# Patient Record
Sex: MALE | Race: WHITE | NOT HISPANIC OR LATINO
[De-identification: names, ages, dates, MRNs, and addresses within clinical notes are randomized per-mention and may not be internally consistent; named-entity substitution may affect disease eponyms.]

---

## 2017-01-11 ENCOUNTER — FORM ENCOUNTER (OUTPATIENT)
Age: 67
End: 2017-01-11

## 2017-01-11 ENCOUNTER — OUTPATIENT (OUTPATIENT)
Dept: OUTPATIENT SERVICES | Facility: HOSPITAL | Age: 67
LOS: 1 days | End: 2017-01-11
Payer: MEDICARE

## 2017-01-11 VITALS
TEMPERATURE: 99 F | WEIGHT: 196.21 LBS | OXYGEN SATURATION: 99 % | DIASTOLIC BLOOD PRESSURE: 80 MMHG | SYSTOLIC BLOOD PRESSURE: 147 MMHG | HEIGHT: 71 IN | RESPIRATION RATE: 16 BRPM | HEART RATE: 96 BPM

## 2017-01-11 DIAGNOSIS — Z98.890 OTHER SPECIFIED POSTPROCEDURAL STATES: Chronic | ICD-10-CM

## 2017-01-11 DIAGNOSIS — K31.9 DISEASE OF STOMACH AND DUODENUM, UNSPECIFIED: ICD-10-CM

## 2017-01-11 DIAGNOSIS — Z01.818 ENCOUNTER FOR OTHER PREPROCEDURAL EXAMINATION: ICD-10-CM

## 2017-01-11 DIAGNOSIS — E11.9 TYPE 2 DIABETES MELLITUS WITHOUT COMPLICATIONS: ICD-10-CM

## 2017-01-11 RX ORDER — CEFAZOLIN SODIUM 1 G
2000 VIAL (EA) INJECTION ONCE
Qty: 0 | Refills: 0 | Status: DISCONTINUED | OUTPATIENT
Start: 2017-01-12 | End: 2017-01-27

## 2017-01-11 NOTE — H&P PST ADULT - PMH
Essential hypertension    Gastric mass    Hypothyroidism    Type 2 diabetes mellitus without complication, unspecified long term insulin use status  no BS monitoring Essential hypertension  was on losartan  Gastric mass  ulcerated mass in gastric cardia with small perigastric nodes on endoscopy.  Hypothyroidism    Iron deficiency anemia, unspecified iron deficiency anemia type    Type 2 diabetes mellitus without complication, unspecified long term insulin use status  no BS monitoring

## 2017-01-11 NOTE — H&P PST ADULT - NEGATIVE GASTROINTESTINAL SYMPTOMS
no change in bowel habits/no nausea/no vomiting no nausea/no melena/no vomiting/no abdominal pain/no change in bowel habits

## 2017-01-11 NOTE — H&P PST ADULT - GASTROINTESTINAL DETAILS
no distention/nontender/no guarding/no rebound tenderness/no masses palpable/soft/bowel sounds normal

## 2017-01-11 NOTE — H&P PST ADULT - HISTORY OF PRESENT ILLNESS
66 year old male with PMH of HTN, Hypothyroidism, DM2 with iron deficiency anemia found to have ulcerated mass in the gastric cardia on endoscopy planned for Laparoscopy  abdominal washing, insertion of Mediport.

## 2017-01-11 NOTE — H&P PST ADULT - NSANTHOSAYNRD_GEN_A_CORE
No. SIMONE screening performed.  STOP BANG Legend: 0-2 = LOW Risk; 3-4 = INTERMEDIATE Risk; 5-8 = HIGH Risk

## 2017-01-11 NOTE — H&P PST ADULT - VENOUS THROMBOEMBOLISM CURRENT STATUS
major surgery, including arthroscopic and laparoscopic (greater than 1 hr)/present cancer or chemotherapy

## 2017-01-11 NOTE — H&P PST ADULT - PROBLEM SELECTOR PLAN 1
Laparoscopy  abdominal washing, insertion of Mediport.  Recent Labs to [facility] from PCP  Preprocedure surgical scrub instructions discussed

## 2017-01-12 ENCOUNTER — APPOINTMENT (OUTPATIENT)
Dept: SURGICAL ONCOLOGY | Facility: HOSPITAL | Age: 67
End: 2017-01-12

## 2017-01-12 ENCOUNTER — OUTPATIENT (OUTPATIENT)
Dept: OUTPATIENT SERVICES | Facility: HOSPITAL | Age: 67
LOS: 1 days | End: 2017-01-12
Payer: MEDICARE

## 2017-01-12 VITALS
SYSTOLIC BLOOD PRESSURE: 106 MMHG | TEMPERATURE: 97 F | HEART RATE: 83 BPM | RESPIRATION RATE: 18 BRPM | DIASTOLIC BLOOD PRESSURE: 62 MMHG | OXYGEN SATURATION: 96 %

## 2017-01-12 VITALS
WEIGHT: 196.21 LBS | OXYGEN SATURATION: 98 % | TEMPERATURE: 99 F | HEIGHT: 71 IN | RESPIRATION RATE: 20 BRPM | DIASTOLIC BLOOD PRESSURE: 78 MMHG | HEART RATE: 86 BPM | SYSTOLIC BLOOD PRESSURE: 150 MMHG

## 2017-01-12 DIAGNOSIS — K31.9 DISEASE OF STOMACH AND DUODENUM, UNSPECIFIED: ICD-10-CM

## 2017-01-12 DIAGNOSIS — Z98.890 OTHER SPECIFIED POSTPROCEDURAL STATES: Chronic | ICD-10-CM

## 2017-01-12 PROCEDURE — C1788: CPT

## 2017-01-12 PROCEDURE — 71010: CPT | Mod: 26

## 2017-01-12 PROCEDURE — 76000 FLUOROSCOPY <1 HR PHYS/QHP: CPT

## 2017-01-12 PROCEDURE — 88112 CYTOPATH CELL ENHANCE TECH: CPT | Mod: 26

## 2017-01-12 PROCEDURE — 49320 DIAG LAPARO SEPARATE PROC: CPT

## 2017-01-12 PROCEDURE — 71045 X-RAY EXAM CHEST 1 VIEW: CPT

## 2017-01-12 PROCEDURE — 88112 CYTOPATH CELL ENHANCE TECH: CPT

## 2017-01-12 PROCEDURE — 36561 INSERT TUNNELED CV CATH: CPT

## 2017-01-12 PROCEDURE — 49320 DIAG LAPARO SEPARATE PROC: CPT | Mod: 59

## 2017-01-12 RX ORDER — SODIUM CHLORIDE 9 MG/ML
3 INJECTION INTRAMUSCULAR; INTRAVENOUS; SUBCUTANEOUS EVERY 8 HOURS
Qty: 0 | Refills: 0 | Status: DISCONTINUED | OUTPATIENT
Start: 2017-01-12 | End: 2017-01-12

## 2017-01-12 RX ORDER — ONDANSETRON 8 MG/1
4 TABLET, FILM COATED ORAL ONCE
Qty: 0 | Refills: 0 | Status: DISCONTINUED | OUTPATIENT
Start: 2017-01-12 | End: 2017-01-12

## 2017-01-12 RX ORDER — HYDROMORPHONE HYDROCHLORIDE 2 MG/ML
0.5 INJECTION INTRAMUSCULAR; INTRAVENOUS; SUBCUTANEOUS
Qty: 0 | Refills: 0 | Status: DISCONTINUED | OUTPATIENT
Start: 2017-01-12 | End: 2017-01-12

## 2017-01-12 NOTE — ASU DISCHARGE PLAN (ADULT/PEDIATRIC). - MEDICATION SUMMARY - MEDICATIONS TO TAKE
I will START or STAY ON the medications listed below when I get home from the hospital:    metFORMIN 500 mg oral tablet, extended release  -- 1 tab(s) by mouth once a day  -- Indication: For Diabetes    pravastatin 20 mg oral tablet  -- 1 tab(s) by mouth once a day  -- Indication: For Hyperlipidemia    Ambien 10 mg oral tablet  -- 1 tab(s) by mouth once a day (at bedtime)  -- Indication: For Sedative    ferrous sulfate 324 mg (65 mg elemental iron) oral delayed release tablet  --  by mouth 2 times a day  -- Indication: For Supplementation    omeprazole 40 mg oral delayed release capsule  -- 1 cap(s) by mouth once a day  -- Indication: For Reflux    levothyroxine 100 mcg (0.1 mg) oral tablet  -- 1 tab(s) by mouth once a day  -- Indication: For Hypothyroidism

## 2017-01-12 NOTE — ASU DISCHARGE PLAN (ADULT/PEDIATRIC). - NOTIFY
Swelling that continues/Persistent Nausea and Vomiting/Fever greater than 101/Bleeding that does not stop

## 2017-01-12 NOTE — BRIEF OPERATIVE NOTE - OPERATION/FINDINGS
Procedure: Mediport placement; laparoscopic abdominal washout    Findings: A Mediport was placed in the R subclavian vein in the upper R chest. Placement was confirmed on fluoroscopy. Skin was then closed. Attention was then turned towards the abdomen. A Veress needle was placed in the LUQ and the abdomen was insufflated. Additional trocars were placed in the midline and L quadrant. No peritoneal carcinomatosis was noted. The abdomen was washed out and then suctioned out. A sample was sent off. Skin was then closed. Procedure: Mediport placement; laparoscopic abdominal washout    Findings: A Mediport was placed in the R subclavian vein in the upper R chest. Placement was confirmed on fluoroscopy. Skin was then closed. Attention was then turned towards the abdomen. A Veress needle was placed in the LUQ and the abdomen was insufflated. Additional trocars were placed in the midline and L quadrant. No peritoneal carcinomatosis was noted. The abdomen was washed out and then suctioned out. A sample was sent off. Skin was then closed. Post-procedure CXR showed no pneumothorax.

## 2017-01-17 LAB — NON-GYNECOLOGICAL CYTOLOGY STUDY: SIGNIFICANT CHANGE UP

## 2017-01-18 ENCOUNTER — OUTPATIENT (OUTPATIENT)
Dept: OUTPATIENT SERVICES | Facility: HOSPITAL | Age: 67
LOS: 1 days | Discharge: HOME | End: 2017-01-18

## 2017-01-18 DIAGNOSIS — Z98.890 OTHER SPECIFIED POSTPROCEDURAL STATES: Chronic | ICD-10-CM

## 2017-01-18 DIAGNOSIS — C16.9 MALIGNANT NEOPLASM OF STOMACH, UNSPECIFIED: ICD-10-CM

## 2017-01-19 ENCOUNTER — RESULT REVIEW (OUTPATIENT)
Age: 67
End: 2017-01-19

## 2017-01-20 ENCOUNTER — LABORATORY RESULT (OUTPATIENT)
Age: 67
End: 2017-01-20

## 2017-01-20 ENCOUNTER — APPOINTMENT (OUTPATIENT)
Dept: INFUSION THERAPY | Facility: HOSPITAL | Age: 67
End: 2017-01-20

## 2017-01-20 LAB
BASOPHILS # BLD AUTO: 0.1 K/UL — SIGNIFICANT CHANGE UP (ref 0–0.2)
BASOPHILS NFR BLD AUTO: 0.6 % — SIGNIFICANT CHANGE UP (ref 0–2)
EOSINOPHIL # BLD AUTO: 0.3 K/UL — SIGNIFICANT CHANGE UP (ref 0–0.5)
EOSINOPHIL NFR BLD AUTO: 2.1 % — SIGNIFICANT CHANGE UP (ref 0–6)
HCT VFR BLD CALC: 32.8 % — LOW (ref 39–50)
HGB BLD-MCNC: 10.3 G/DL — LOW (ref 13–17)
LYMPHOCYTES # BLD AUTO: 18.8 % — SIGNIFICANT CHANGE UP (ref 13–44)
LYMPHOCYTES # BLD AUTO: 2.6 K/UL — SIGNIFICANT CHANGE UP (ref 1–3.3)
MCHC RBC-ENTMCNC: 24.7 PG — LOW (ref 27–34)
MCHC RBC-ENTMCNC: 31.5 GM/DL — LOW (ref 32–36)
MCV RBC AUTO: 78.3 FL — LOW (ref 80–100)
MONOCYTES # BLD AUTO: 0.7 K/UL — SIGNIFICANT CHANGE UP (ref 0–0.9)
MONOCYTES NFR BLD AUTO: 5.5 % — SIGNIFICANT CHANGE UP (ref 2–14)
NEUTROPHILS # BLD AUTO: 9.9 K/UL — HIGH (ref 1.8–7.4)
NEUTROPHILS NFR BLD AUTO: 73 % — SIGNIFICANT CHANGE UP (ref 43–77)
PLATELET # BLD AUTO: 417 K/UL — HIGH (ref 150–400)
RBC # BLD: 4.2 M/UL — SIGNIFICANT CHANGE UP (ref 4.2–5.8)
RBC # FLD: 14.7 % — HIGH (ref 10.3–14.5)
WBC # BLD: 13.6 K/UL — HIGH (ref 3.8–10.5)
WBC # FLD AUTO: 13.6 K/UL — HIGH (ref 3.8–10.5)

## 2017-01-23 DIAGNOSIS — R11.2 NAUSEA WITH VOMITING, UNSPECIFIED: ICD-10-CM

## 2017-01-23 DIAGNOSIS — Z51.11 ENCOUNTER FOR ANTINEOPLASTIC CHEMOTHERAPY: ICD-10-CM

## 2017-01-24 ENCOUNTER — APPOINTMENT (OUTPATIENT)
Dept: MRI IMAGING | Facility: IMAGING CENTER | Age: 67
End: 2017-01-24

## 2017-01-24 ENCOUNTER — APPOINTMENT (OUTPATIENT)
Dept: SURGICAL ONCOLOGY | Facility: CLINIC | Age: 67
End: 2017-01-24

## 2017-01-24 VITALS
HEART RATE: 83 BPM | DIASTOLIC BLOOD PRESSURE: 79 MMHG | RESPIRATION RATE: 17 BRPM | BODY MASS INDEX: 26.32 KG/M2 | HEIGHT: 71 IN | WEIGHT: 188 LBS | SYSTOLIC BLOOD PRESSURE: 120 MMHG

## 2017-01-30 PROBLEM — D50.9 IRON DEFICIENCY ANEMIA, UNSPECIFIED: Chronic | Status: ACTIVE | Noted: 2017-01-11

## 2017-01-30 PROBLEM — E03.9 HYPOTHYROIDISM, UNSPECIFIED: Chronic | Status: ACTIVE | Noted: 2017-01-11

## 2017-01-31 ENCOUNTER — RESULT REVIEW (OUTPATIENT)
Age: 67
End: 2017-01-31

## 2017-02-01 ENCOUNTER — APPOINTMENT (OUTPATIENT)
Dept: HEMATOLOGY ONCOLOGY | Facility: CLINIC | Age: 67
End: 2017-02-01

## 2017-02-01 VITALS
OXYGEN SATURATION: 90 % | WEIGHT: 190.48 LBS | SYSTOLIC BLOOD PRESSURE: 129 MMHG | TEMPERATURE: 100.3 F | DIASTOLIC BLOOD PRESSURE: 75 MMHG | BODY MASS INDEX: 26.57 KG/M2 | HEART RATE: 125 BPM

## 2017-02-01 LAB
BASOPHILS # BLD AUTO: 0.1 K/UL — SIGNIFICANT CHANGE UP (ref 0–0.2)
BASOPHILS NFR BLD AUTO: 0.5 % — SIGNIFICANT CHANGE UP (ref 0–2)
EOSINOPHIL # BLD AUTO: 0.1 K/UL — SIGNIFICANT CHANGE UP (ref 0–0.5)
EOSINOPHIL NFR BLD AUTO: 1.2 % — SIGNIFICANT CHANGE UP (ref 0–6)
HCT VFR BLD CALC: 33.2 % — LOW (ref 39–50)
HGB BLD-MCNC: 10.5 G/DL — LOW (ref 13–17)
LYMPHOCYTES # BLD AUTO: 1.9 K/UL — SIGNIFICANT CHANGE UP (ref 1–3.3)
LYMPHOCYTES # BLD AUTO: 15.3 % — SIGNIFICANT CHANGE UP (ref 13–44)
MCHC RBC-ENTMCNC: 24.7 PG — LOW (ref 27–34)
MCHC RBC-ENTMCNC: 31.5 GM/DL — LOW (ref 32–36)
MCV RBC AUTO: 78.5 FL — LOW (ref 80–100)
MONOCYTES # BLD AUTO: 0.7 K/UL — SIGNIFICANT CHANGE UP (ref 0–0.9)
MONOCYTES NFR BLD AUTO: 5.7 % — SIGNIFICANT CHANGE UP (ref 2–14)
NEUTROPHILS # BLD AUTO: 9.5 K/UL — HIGH (ref 1.8–7.4)
NEUTROPHILS NFR BLD AUTO: 77.3 % — HIGH (ref 43–77)
PLATELET # BLD AUTO: 339 K/UL — SIGNIFICANT CHANGE UP (ref 150–400)
RBC # BLD: 4.23 M/UL — SIGNIFICANT CHANGE UP (ref 4.2–5.8)
RBC # FLD: 15.6 % — HIGH (ref 10.3–14.5)
WBC # BLD: 12.3 K/UL — HIGH (ref 3.8–10.5)
WBC # FLD AUTO: 12.3 K/UL — HIGH (ref 3.8–10.5)

## 2017-02-02 ENCOUNTER — RESULT REVIEW (OUTPATIENT)
Age: 67
End: 2017-02-02

## 2017-02-03 ENCOUNTER — APPOINTMENT (OUTPATIENT)
Dept: INFUSION THERAPY | Facility: HOSPITAL | Age: 67
End: 2017-02-03

## 2017-02-03 LAB
BASOPHILS # BLD AUTO: 0.1 K/UL — SIGNIFICANT CHANGE UP (ref 0–0.2)
BASOPHILS NFR BLD AUTO: 0.6 % — SIGNIFICANT CHANGE UP (ref 0–2)
EOSINOPHIL # BLD AUTO: 0 K/UL — SIGNIFICANT CHANGE UP (ref 0–0.5)
EOSINOPHIL NFR BLD AUTO: 0 % — SIGNIFICANT CHANGE UP (ref 0–6)
HCT VFR BLD CALC: 34.6 % — LOW (ref 39–50)
HGB BLD-MCNC: 10.4 G/DL — LOW (ref 13–17)
LYMPHOCYTES # BLD AUTO: 19.1 % — SIGNIFICANT CHANGE UP (ref 13–44)
LYMPHOCYTES # BLD AUTO: 2.1 K/UL — SIGNIFICANT CHANGE UP (ref 1–3.3)
MCHC RBC-ENTMCNC: 23.7 PG — LOW (ref 27–34)
MCHC RBC-ENTMCNC: 30 GM/DL — LOW (ref 32–36)
MCV RBC AUTO: 78.9 FL — LOW (ref 80–100)
MONOCYTES # BLD AUTO: 0.8 K/UL — SIGNIFICANT CHANGE UP (ref 0–0.9)
MONOCYTES NFR BLD AUTO: 7 % — SIGNIFICANT CHANGE UP (ref 2–14)
NEUTROPHILS # BLD AUTO: 8.2 K/UL — HIGH (ref 1.8–7.4)
NEUTROPHILS NFR BLD AUTO: 73.2 % — SIGNIFICANT CHANGE UP (ref 43–77)
PLATELET # BLD AUTO: 286 K/UL — SIGNIFICANT CHANGE UP (ref 150–400)
RBC # BLD: 4.39 M/UL — SIGNIFICANT CHANGE UP (ref 4.2–5.8)
RBC # FLD: 16.2 % — HIGH (ref 10.3–14.5)
WBC # BLD: 11.1 K/UL — HIGH (ref 3.8–10.5)
WBC # FLD AUTO: 11.1 K/UL — HIGH (ref 3.8–10.5)

## 2017-02-14 ENCOUNTER — RESULT REVIEW (OUTPATIENT)
Age: 67
End: 2017-02-14

## 2017-02-15 ENCOUNTER — LABORATORY RESULT (OUTPATIENT)
Age: 67
End: 2017-02-15

## 2017-02-15 ENCOUNTER — OUTPATIENT (OUTPATIENT)
Dept: OUTPATIENT SERVICES | Facility: HOSPITAL | Age: 67
LOS: 1 days | Discharge: HOME | End: 2017-02-15

## 2017-02-15 ENCOUNTER — APPOINTMENT (OUTPATIENT)
Dept: HEMATOLOGY ONCOLOGY | Facility: CLINIC | Age: 67
End: 2017-02-15

## 2017-02-15 DIAGNOSIS — C16.9 MALIGNANT NEOPLASM OF STOMACH, UNSPECIFIED: ICD-10-CM

## 2017-02-15 DIAGNOSIS — Z98.890 OTHER SPECIFIED POSTPROCEDURAL STATES: Chronic | ICD-10-CM

## 2017-02-15 LAB
BASOPHILS # BLD AUTO: 0.1 K/UL — SIGNIFICANT CHANGE UP (ref 0–0.2)
BASOPHILS NFR BLD AUTO: 0.7 % — SIGNIFICANT CHANGE UP (ref 0–2)
EOSINOPHIL # BLD AUTO: 0.1 K/UL — SIGNIFICANT CHANGE UP (ref 0–0.5)
EOSINOPHIL NFR BLD AUTO: 1.2 % — SIGNIFICANT CHANGE UP (ref 0–6)
HCT VFR BLD CALC: 33.1 % — LOW (ref 39–50)
HGB BLD-MCNC: 10.6 G/DL — LOW (ref 13–17)
LYMPHOCYTES # BLD AUTO: 1.9 K/UL — SIGNIFICANT CHANGE UP (ref 1–3.3)
LYMPHOCYTES # BLD AUTO: 20.7 % — SIGNIFICANT CHANGE UP (ref 13–44)
MCHC RBC-ENTMCNC: 25 PG — LOW (ref 27–34)
MCHC RBC-ENTMCNC: 31.9 G/DL — LOW (ref 32–36)
MCV RBC AUTO: 78.3 FL — LOW (ref 80–100)
MONOCYTES # BLD AUTO: 0.6 K/UL — SIGNIFICANT CHANGE UP (ref 0–0.9)
MONOCYTES NFR BLD AUTO: 6.7 % — SIGNIFICANT CHANGE UP (ref 2–14)
NEUTROPHILS # BLD AUTO: 6.6 K/UL — SIGNIFICANT CHANGE UP (ref 1.8–7.4)
NEUTROPHILS NFR BLD AUTO: 70.8 % — SIGNIFICANT CHANGE UP (ref 43–77)
PLATELET # BLD AUTO: 196 K/UL — SIGNIFICANT CHANGE UP (ref 150–400)
RBC # BLD: 4.22 M/UL — SIGNIFICANT CHANGE UP (ref 4.2–5.8)
RBC # FLD: 17.2 % — HIGH (ref 10.3–14.5)
WBC # BLD: 9.3 K/UL — SIGNIFICANT CHANGE UP (ref 3.8–10.5)
WBC # FLD AUTO: 9.3 K/UL — SIGNIFICANT CHANGE UP (ref 3.8–10.5)

## 2017-02-17 ENCOUNTER — APPOINTMENT (OUTPATIENT)
Dept: INFUSION THERAPY | Facility: HOSPITAL | Age: 67
End: 2017-02-17

## 2017-02-21 DIAGNOSIS — R11.2 NAUSEA WITH VOMITING, UNSPECIFIED: ICD-10-CM

## 2017-02-21 DIAGNOSIS — Z51.11 ENCOUNTER FOR ANTINEOPLASTIC CHEMOTHERAPY: ICD-10-CM

## 2017-03-02 ENCOUNTER — RESULT REVIEW (OUTPATIENT)
Age: 67
End: 2017-03-02

## 2017-03-03 ENCOUNTER — APPOINTMENT (OUTPATIENT)
Dept: INFUSION THERAPY | Facility: HOSPITAL | Age: 67
End: 2017-03-03

## 2017-03-03 ENCOUNTER — APPOINTMENT (OUTPATIENT)
Dept: HEMATOLOGY ONCOLOGY | Facility: CLINIC | Age: 67
End: 2017-03-03

## 2017-03-03 VITALS
WEIGHT: 191.8 LBS | SYSTOLIC BLOOD PRESSURE: 150 MMHG | DIASTOLIC BLOOD PRESSURE: 100 MMHG | BODY MASS INDEX: 26.75 KG/M2 | TEMPERATURE: 98.8 F | OXYGEN SATURATION: 99 % | HEART RATE: 76 BPM | RESPIRATION RATE: 16 BRPM

## 2017-03-03 LAB
BASOPHILS # BLD AUTO: 0.1 K/UL — SIGNIFICANT CHANGE UP (ref 0–0.2)
BASOPHILS NFR BLD AUTO: 0.9 % — SIGNIFICANT CHANGE UP (ref 0–2)
EOSINOPHIL # BLD AUTO: 0.1 K/UL — SIGNIFICANT CHANGE UP (ref 0–0.5)
EOSINOPHIL NFR BLD AUTO: 1.3 % — SIGNIFICANT CHANGE UP (ref 0–6)
HCT VFR BLD CALC: 34.1 % — LOW (ref 39–50)
HGB BLD-MCNC: 10.9 G/DL — LOW (ref 13–17)
LYMPHOCYTES # BLD AUTO: 2.3 K/UL — SIGNIFICANT CHANGE UP (ref 1–3.3)
LYMPHOCYTES # BLD AUTO: 34.5 % — SIGNIFICANT CHANGE UP (ref 13–44)
MCHC RBC-ENTMCNC: 26 PG — LOW (ref 27–34)
MCHC RBC-ENTMCNC: 32.1 G/DL — SIGNIFICANT CHANGE UP (ref 32–36)
MCV RBC AUTO: 80.9 FL — SIGNIFICANT CHANGE UP (ref 80–100)
MONOCYTES # BLD AUTO: 0.5 K/UL — SIGNIFICANT CHANGE UP (ref 0–0.9)
MONOCYTES NFR BLD AUTO: 7.3 % — SIGNIFICANT CHANGE UP (ref 2–14)
NEUTROPHILS # BLD AUTO: 3.7 K/UL — SIGNIFICANT CHANGE UP (ref 1.8–7.4)
NEUTROPHILS NFR BLD AUTO: 56 % — SIGNIFICANT CHANGE UP (ref 43–77)
PLATELET # BLD AUTO: 150 K/UL — SIGNIFICANT CHANGE UP (ref 150–400)
RBC # BLD: 4.21 M/UL — SIGNIFICANT CHANGE UP (ref 4.2–5.8)
RBC # FLD: 21.7 % — HIGH (ref 10.3–14.5)
WBC # BLD: 6.6 K/UL — SIGNIFICANT CHANGE UP (ref 3.8–10.5)
WBC # FLD AUTO: 6.6 K/UL — SIGNIFICANT CHANGE UP (ref 3.8–10.5)

## 2017-03-09 ENCOUNTER — OTHER (OUTPATIENT)
Age: 67
End: 2017-03-09

## 2017-03-16 ENCOUNTER — RESULT REVIEW (OUTPATIENT)
Age: 67
End: 2017-03-16

## 2017-03-16 ENCOUNTER — OUTPATIENT (OUTPATIENT)
Dept: OUTPATIENT SERVICES | Facility: HOSPITAL | Age: 67
LOS: 1 days | Discharge: HOME | End: 2017-03-16

## 2017-03-16 DIAGNOSIS — C16.9 MALIGNANT NEOPLASM OF STOMACH, UNSPECIFIED: ICD-10-CM

## 2017-03-16 DIAGNOSIS — Z98.890 OTHER SPECIFIED POSTPROCEDURAL STATES: Chronic | ICD-10-CM

## 2017-03-17 ENCOUNTER — APPOINTMENT (OUTPATIENT)
Dept: INFUSION THERAPY | Facility: HOSPITAL | Age: 67
End: 2017-03-17

## 2017-03-17 ENCOUNTER — LABORATORY RESULT (OUTPATIENT)
Age: 67
End: 2017-03-17

## 2017-03-17 LAB
BASOPHILS # BLD AUTO: 0.1 K/UL — SIGNIFICANT CHANGE UP (ref 0–0.2)
BASOPHILS NFR BLD AUTO: 1.4 % — SIGNIFICANT CHANGE UP (ref 0–2)
EOSINOPHIL # BLD AUTO: 0.1 K/UL — SIGNIFICANT CHANGE UP (ref 0–0.5)
EOSINOPHIL NFR BLD AUTO: 1.2 % — SIGNIFICANT CHANGE UP (ref 0–6)
HCT VFR BLD CALC: 35.7 % — LOW (ref 39–50)
HGB BLD-MCNC: 11.5 G/DL — LOW (ref 13–17)
LYMPHOCYTES # BLD AUTO: 2.2 K/UL — SIGNIFICANT CHANGE UP (ref 1–3.3)
LYMPHOCYTES # BLD AUTO: 31.9 % — SIGNIFICANT CHANGE UP (ref 13–44)
MCHC RBC-ENTMCNC: 27.2 PG — SIGNIFICANT CHANGE UP (ref 27–34)
MCHC RBC-ENTMCNC: 32.4 G/DL — SIGNIFICANT CHANGE UP (ref 32–36)
MCV RBC AUTO: 83.9 FL — SIGNIFICANT CHANGE UP (ref 80–100)
MONOCYTES # BLD AUTO: 0.5 K/UL — SIGNIFICANT CHANGE UP (ref 0–0.9)
MONOCYTES NFR BLD AUTO: 7.9 % — SIGNIFICANT CHANGE UP (ref 2–14)
NEUTROPHILS # BLD AUTO: 3.9 K/UL — SIGNIFICANT CHANGE UP (ref 1.8–7.4)
NEUTROPHILS NFR BLD AUTO: 57.6 % — SIGNIFICANT CHANGE UP (ref 43–77)
PLATELET # BLD AUTO: 153 K/UL — SIGNIFICANT CHANGE UP (ref 150–400)
RBC # BLD: 4.25 M/UL — SIGNIFICANT CHANGE UP (ref 4.2–5.8)
RBC # FLD: 23.4 % — HIGH (ref 10.3–14.5)
WBC # BLD: 6.8 K/UL — SIGNIFICANT CHANGE UP (ref 3.8–10.5)
WBC # FLD AUTO: 6.8 K/UL — SIGNIFICANT CHANGE UP (ref 3.8–10.5)

## 2017-03-20 DIAGNOSIS — Z51.11 ENCOUNTER FOR ANTINEOPLASTIC CHEMOTHERAPY: ICD-10-CM

## 2017-03-20 DIAGNOSIS — R11.2 NAUSEA WITH VOMITING, UNSPECIFIED: ICD-10-CM

## 2017-03-30 ENCOUNTER — RESULT REVIEW (OUTPATIENT)
Age: 67
End: 2017-03-30

## 2017-03-31 ENCOUNTER — LABORATORY RESULT (OUTPATIENT)
Age: 67
End: 2017-03-31

## 2017-03-31 ENCOUNTER — APPOINTMENT (OUTPATIENT)
Dept: INFUSION THERAPY | Facility: HOSPITAL | Age: 67
End: 2017-03-31

## 2017-03-31 LAB
BASOPHILS # BLD AUTO: 0.1 K/UL — SIGNIFICANT CHANGE UP (ref 0–0.2)
BASOPHILS NFR BLD AUTO: 1.3 % — SIGNIFICANT CHANGE UP (ref 0–2)
EOSINOPHIL # BLD AUTO: 0.1 K/UL — SIGNIFICANT CHANGE UP (ref 0–0.5)
EOSINOPHIL NFR BLD AUTO: 1.9 % — SIGNIFICANT CHANGE UP (ref 0–6)
HCT VFR BLD CALC: 34.3 % — LOW (ref 39–50)
HGB BLD-MCNC: 11.2 G/DL — LOW (ref 13–17)
LYMPHOCYTES # BLD AUTO: 1.9 K/UL — SIGNIFICANT CHANGE UP (ref 1–3.3)
LYMPHOCYTES # BLD AUTO: 34.4 % — SIGNIFICANT CHANGE UP (ref 13–44)
MCHC RBC-ENTMCNC: 28.7 PG — SIGNIFICANT CHANGE UP (ref 27–34)
MCHC RBC-ENTMCNC: 32.6 G/DL — SIGNIFICANT CHANGE UP (ref 32–36)
MCV RBC AUTO: 88.1 FL — SIGNIFICANT CHANGE UP (ref 80–100)
MONOCYTES # BLD AUTO: 0.6 K/UL — SIGNIFICANT CHANGE UP (ref 0–0.9)
MONOCYTES NFR BLD AUTO: 10.2 % — SIGNIFICANT CHANGE UP (ref 2–14)
NEUTROPHILS # BLD AUTO: 2.9 K/UL — SIGNIFICANT CHANGE UP (ref 1.8–7.4)
NEUTROPHILS NFR BLD AUTO: 52.3 % — SIGNIFICANT CHANGE UP (ref 43–77)
PLATELET # BLD AUTO: 123 K/UL — LOW (ref 150–400)
RBC # BLD: 3.89 M/UL — LOW (ref 4.2–5.8)
RBC # FLD: 23.4 % — HIGH (ref 10.3–14.5)
WBC # BLD: 5.6 K/UL — SIGNIFICANT CHANGE UP (ref 3.8–10.5)
WBC # FLD AUTO: 5.6 K/UL — SIGNIFICANT CHANGE UP (ref 3.8–10.5)

## 2017-04-04 ENCOUNTER — APPOINTMENT (OUTPATIENT)
Dept: INFUSION THERAPY | Facility: HOSPITAL | Age: 67
End: 2017-04-04

## 2017-04-09 ENCOUNTER — FORM ENCOUNTER (OUTPATIENT)
Age: 67
End: 2017-04-09

## 2017-04-10 ENCOUNTER — APPOINTMENT (OUTPATIENT)
Dept: CT IMAGING | Facility: IMAGING CENTER | Age: 67
End: 2017-04-10

## 2017-04-10 ENCOUNTER — OUTPATIENT (OUTPATIENT)
Dept: OUTPATIENT SERVICES | Facility: HOSPITAL | Age: 67
LOS: 1 days | End: 2017-04-10
Payer: MEDICARE

## 2017-04-10 DIAGNOSIS — Z98.890 OTHER SPECIFIED POSTPROCEDURAL STATES: Chronic | ICD-10-CM

## 2017-04-10 DIAGNOSIS — C16.9 MALIGNANT NEOPLASM OF STOMACH, UNSPECIFIED: ICD-10-CM

## 2017-04-10 PROCEDURE — 71260 CT THORAX DX C+: CPT

## 2017-04-10 PROCEDURE — 74177 CT ABD & PELVIS W/CONTRAST: CPT

## 2017-04-11 ENCOUNTER — OUTPATIENT (OUTPATIENT)
Dept: OUTPATIENT SERVICES | Facility: HOSPITAL | Age: 67
LOS: 1 days | Discharge: HOME | End: 2017-04-11

## 2017-04-11 DIAGNOSIS — Z98.890 OTHER SPECIFIED POSTPROCEDURAL STATES: Chronic | ICD-10-CM

## 2017-04-11 DIAGNOSIS — C16.9 MALIGNANT NEOPLASM OF STOMACH, UNSPECIFIED: ICD-10-CM

## 2017-04-12 ENCOUNTER — APPOINTMENT (OUTPATIENT)
Dept: HEMATOLOGY ONCOLOGY | Facility: CLINIC | Age: 67
End: 2017-04-12

## 2017-04-12 VITALS
WEIGHT: 200.62 LBS | BODY MASS INDEX: 27.98 KG/M2 | SYSTOLIC BLOOD PRESSURE: 130 MMHG | TEMPERATURE: 98.1 F | DIASTOLIC BLOOD PRESSURE: 80 MMHG | HEART RATE: 76 BPM

## 2017-04-13 ENCOUNTER — APPOINTMENT (OUTPATIENT)
Dept: HEMATOLOGY ONCOLOGY | Facility: CLINIC | Age: 67
End: 2017-04-13

## 2017-04-16 ENCOUNTER — FORM ENCOUNTER (OUTPATIENT)
Age: 67
End: 2017-04-16

## 2017-04-17 ENCOUNTER — APPOINTMENT (OUTPATIENT)
Dept: SURGICAL ONCOLOGY | Facility: CLINIC | Age: 67
End: 2017-04-17

## 2017-04-17 ENCOUNTER — OUTPATIENT (OUTPATIENT)
Dept: OUTPATIENT SERVICES | Facility: HOSPITAL | Age: 67
LOS: 1 days | End: 2017-04-17
Payer: MEDICARE

## 2017-04-17 ENCOUNTER — APPOINTMENT (OUTPATIENT)
Dept: MRI IMAGING | Facility: IMAGING CENTER | Age: 67
End: 2017-04-17

## 2017-04-17 VITALS
TEMPERATURE: 98 F | WEIGHT: 199 LBS | RESPIRATION RATE: 15 BRPM | HEIGHT: 71 IN | BODY MASS INDEX: 27.86 KG/M2 | OXYGEN SATURATION: 99 % | HEART RATE: 80 BPM | DIASTOLIC BLOOD PRESSURE: 63 MMHG | SYSTOLIC BLOOD PRESSURE: 185 MMHG

## 2017-04-17 DIAGNOSIS — C16.9 MALIGNANT NEOPLASM OF STOMACH, UNSPECIFIED: ICD-10-CM

## 2017-04-17 DIAGNOSIS — Z98.890 OTHER SPECIFIED POSTPROCEDURAL STATES: Chronic | ICD-10-CM

## 2017-04-17 PROCEDURE — 74183 MRI ABD W/O CNTR FLWD CNTR: CPT

## 2017-04-17 PROCEDURE — A9585: CPT

## 2017-05-02 ENCOUNTER — OUTPATIENT (OUTPATIENT)
Dept: OUTPATIENT SERVICES | Facility: HOSPITAL | Age: 67
LOS: 1 days | End: 2017-05-02
Payer: MEDICARE

## 2017-05-02 VITALS
RESPIRATION RATE: 16 BRPM | SYSTOLIC BLOOD PRESSURE: 150 MMHG | HEART RATE: 69 BPM | DIASTOLIC BLOOD PRESSURE: 100 MMHG | HEIGHT: 71.5 IN | TEMPERATURE: 99 F | WEIGHT: 199.96 LBS

## 2017-05-02 DIAGNOSIS — I10 ESSENTIAL (PRIMARY) HYPERTENSION: ICD-10-CM

## 2017-05-02 DIAGNOSIS — C16.9 MALIGNANT NEOPLASM OF STOMACH, UNSPECIFIED: ICD-10-CM

## 2017-05-02 DIAGNOSIS — E03.9 HYPOTHYROIDISM, UNSPECIFIED: ICD-10-CM

## 2017-05-02 DIAGNOSIS — Z95.828 PRESENCE OF OTHER VASCULAR IMPLANTS AND GRAFTS: Chronic | ICD-10-CM

## 2017-05-02 DIAGNOSIS — Z98.890 OTHER SPECIFIED POSTPROCEDURAL STATES: Chronic | ICD-10-CM

## 2017-05-02 DIAGNOSIS — E11.9 TYPE 2 DIABETES MELLITUS WITHOUT COMPLICATIONS: ICD-10-CM

## 2017-05-02 LAB
ALBUMIN SERPL ELPH-MCNC: 4.1 G/DL — SIGNIFICANT CHANGE UP (ref 3.3–5)
ALP SERPL-CCNC: 120 U/L — SIGNIFICANT CHANGE UP (ref 40–120)
ALT FLD-CCNC: 17 U/L — SIGNIFICANT CHANGE UP (ref 4–41)
AST SERPL-CCNC: 24 U/L — SIGNIFICANT CHANGE UP (ref 4–40)
BILIRUB SERPL-MCNC: 0.5 MG/DL — SIGNIFICANT CHANGE UP (ref 0.2–1.2)
BLD GP AB SCN SERPL QL: NEGATIVE — SIGNIFICANT CHANGE UP
BUN SERPL-MCNC: 11 MG/DL — SIGNIFICANT CHANGE UP (ref 7–23)
CALCIUM SERPL-MCNC: 9.7 MG/DL — SIGNIFICANT CHANGE UP (ref 8.4–10.5)
CHLORIDE SERPL-SCNC: 102 MMOL/L — SIGNIFICANT CHANGE UP (ref 98–107)
CO2 SERPL-SCNC: 24 MMOL/L — SIGNIFICANT CHANGE UP (ref 22–31)
CREAT SERPL-MCNC: 0.79 MG/DL — SIGNIFICANT CHANGE UP (ref 0.5–1.3)
GLUCOSE SERPL-MCNC: 107 MG/DL — HIGH (ref 70–99)
HBA1C BLD-MCNC: 6.5 % — HIGH (ref 4–5.6)
HCT VFR BLD CALC: 42.6 % — SIGNIFICANT CHANGE UP (ref 39–50)
HGB BLD-MCNC: 13.2 G/DL — SIGNIFICANT CHANGE UP (ref 13–17)
MCHC RBC-ENTMCNC: 30.4 PG — SIGNIFICANT CHANGE UP (ref 27–34)
MCHC RBC-ENTMCNC: 31 % — LOW (ref 32–36)
MCV RBC AUTO: 98.2 FL — SIGNIFICANT CHANGE UP (ref 80–100)
PLATELET # BLD AUTO: 125 K/UL — LOW (ref 150–400)
PMV BLD: 9.7 FL — SIGNIFICANT CHANGE UP (ref 7–13)
POTASSIUM SERPL-MCNC: 4 MMOL/L — SIGNIFICANT CHANGE UP (ref 3.5–5.3)
POTASSIUM SERPL-SCNC: 4 MMOL/L — SIGNIFICANT CHANGE UP (ref 3.5–5.3)
PROT SERPL-MCNC: 7.9 G/DL — SIGNIFICANT CHANGE UP (ref 6–8.3)
RBC # BLD: 4.34 M/UL — SIGNIFICANT CHANGE UP (ref 4.2–5.8)
RBC # FLD: 18.3 % — HIGH (ref 10.3–14.5)
RH IG SCN BLD-IMP: NEGATIVE — SIGNIFICANT CHANGE UP
SODIUM SERPL-SCNC: 141 MMOL/L — SIGNIFICANT CHANGE UP (ref 135–145)
TSH SERPL-MCNC: 1.27 UIU/ML — SIGNIFICANT CHANGE UP (ref 0.27–4.2)
WBC # BLD: 6.5 K/UL — SIGNIFICANT CHANGE UP (ref 3.8–10.5)
WBC # FLD AUTO: 6.5 K/UL — SIGNIFICANT CHANGE UP (ref 3.8–10.5)

## 2017-05-02 PROCEDURE — 93010 ELECTROCARDIOGRAM REPORT: CPT

## 2017-05-02 RX ORDER — SODIUM CHLORIDE 9 MG/ML
1000 INJECTION, SOLUTION INTRAVENOUS
Qty: 0 | Refills: 0 | Status: DISCONTINUED | OUTPATIENT
Start: 2017-05-12 | End: 2017-05-13

## 2017-05-02 RX ORDER — ZOLPIDEM TARTRATE 10 MG/1
1 TABLET ORAL
Qty: 0 | Refills: 0 | COMMUNITY

## 2017-05-02 RX ORDER — SODIUM CHLORIDE 9 MG/ML
3 INJECTION INTRAMUSCULAR; INTRAVENOUS; SUBCUTANEOUS EVERY 8 HOURS
Qty: 0 | Refills: 0 | Status: DISCONTINUED | OUTPATIENT
Start: 2017-05-12 | End: 2017-05-12

## 2017-05-02 NOTE — H&P PST ADULT - HISTORY OF PRESENT ILLNESS
65yo male with HTN, Hypothyroidism, DM II, GERD and HDL. Pt reports dark stool and surveillance Endoscopy and Colonoscopy done in 12/2016 showed abnormal results. Pt reports completed chemotherapy 3/2017. Pt presents today for presurgical evaluation for Laparotomy Proximal Gastrectomy Possible Total Gastrectomy scheduled for 5/12/2017. 65yo male with HTN, Hypothyroidism, DM II, GERD and HDL. Pt reports noticing dark stool in 12/2016 and had Endoscopy and Colonoscopy done which showed abnormal results. Pt reports completing chemotherapy in 3/2017. Pt presents today for presurgical evaluation for Laparotomy Proximal Gastrectomy Possible Total Gastrectomy scheduled for 5/12/2017.

## 2017-05-02 NOTE — H&P PST ADULT - NEGATIVE GASTROINTESTINAL SYMPTOMS
no constipation/no vomiting/no abdominal pain/no change in bowel habits/no diarrhea/no flatulence/no nausea/no melena

## 2017-05-02 NOTE — H&P PST ADULT - NEGATIVE CARDIOVASCULAR SYMPTOMS
port to right chest for chemotherapy access/no peripheral edema/no claudication/no chest pain/no orthopnea/no paroxysmal nocturnal dyspnea/no palpitations/no dyspnea on exertion

## 2017-05-02 NOTE — H&P PST ADULT - MUSCULOSKELETAL
negative detailed exam no joint erythema/no calf tenderness/ROM intact/no joint swelling/no joint warmth

## 2017-05-02 NOTE — H&P PST ADULT - FAMILY HISTORY
Mother  Still living? Unknown  Family history of ovarian cancer, Age at diagnosis: Age Unknown     Father  Still living? No  Family history of ischemic heart disease (IHD), Age at diagnosis: Age Unknown

## 2017-05-02 NOTE — H&P PST ADULT - PROBLEM SELECTOR PLAN 3
Pt instructed to take meds as prescribed.  SIMONE precaution - OR Booking notified. BP elevated 150/100 - pt reports he had stopped taking BP meds as instructed by PCP due to BP being in normal range.  Medical clearance requested - pt has appt today at 11am.  SIMONE precaution - OR Booking notified.

## 2017-05-02 NOTE — H&P PST ADULT - PMH
Essential hypertension  was on losartan  Gastric mass  ulcerated mass in gastric cardia with small perigastric nodes on endoscopy.  Hypothyroidism    Iron deficiency anemia, unspecified iron deficiency anemia type    Type 2 diabetes mellitus without complication, unspecified long term insulin use status  no BS monitoring

## 2017-05-02 NOTE — H&P PST ADULT - ATTENDING COMMENTS
planned procedure and risks d/w patient    PMH/PSH/FH/SH:   Past Medical History:  Essential hypertension  was on losartan  Gastric mass  ulcerated mass in gastric cardia with small perigastric nodes on endoscopy.  Hypothyroidism    Iron deficiency anemia, unspecified iron deficiency anemia type    Type 2 diabetes mellitus without complication, unspecified long term insulin use status  no BS monitoring.

## 2017-05-02 NOTE — H&P PST ADULT - PROBLEM SELECTOR PLAN 1
Laparotomy Proximal Gastrectomy Possible Total Gastrectomy scheduled for 5/12/2017. Laparotomy Proximal Gastrectomy Possible Total Gastrectomy scheduled for 5/12/2017.  Pre-op instructed given. Pt verbalized understanding.  Pepcid given for GI prophylaxis.  Chlorhexidine wash instructions given.  ABO ordered STAT for day of procedure.  Medical clearance requested - including copy of Stress/Echo results.

## 2017-05-02 NOTE — H&P PST ADULT - LYMPHATIC
supraclavicular L/posterior cervical R/anterior cervical R/posterior cervical L/anterior cervical L/supraclavicular R

## 2017-05-12 ENCOUNTER — APPOINTMENT (OUTPATIENT)
Dept: SURGICAL ONCOLOGY | Facility: HOSPITAL | Age: 67
End: 2017-05-12

## 2017-05-12 ENCOUNTER — RESULT REVIEW (OUTPATIENT)
Age: 67
End: 2017-05-12

## 2017-05-12 ENCOUNTER — INPATIENT (INPATIENT)
Facility: HOSPITAL | Age: 67
LOS: 13 days | Discharge: HOME | End: 2017-05-26
Attending: SPECIALIST | Admitting: SPECIALIST
Payer: MEDICARE

## 2017-05-12 VITALS
DIASTOLIC BLOOD PRESSURE: 93 MMHG | OXYGEN SATURATION: 97 % | HEIGHT: 71.5 IN | SYSTOLIC BLOOD PRESSURE: 153 MMHG | TEMPERATURE: 99 F | WEIGHT: 199.96 LBS | HEART RATE: 83 BPM | RESPIRATION RATE: 14 BRPM

## 2017-05-12 DIAGNOSIS — Z95.828 PRESENCE OF OTHER VASCULAR IMPLANTS AND GRAFTS: Chronic | ICD-10-CM

## 2017-05-12 DIAGNOSIS — C16.9 MALIGNANT NEOPLASM OF STOMACH, UNSPECIFIED: ICD-10-CM

## 2017-05-12 DIAGNOSIS — Z98.890 OTHER SPECIFIED POSTPROCEDURAL STATES: Chronic | ICD-10-CM

## 2017-05-12 LAB
BASE EXCESS BLDA CALC-SCNC: 0.1 MMOL/L — SIGNIFICANT CHANGE UP
BASE EXCESS BLDA CALC-SCNC: 0.1 MMOL/L — SIGNIFICANT CHANGE UP
BASE EXCESS BLDA CALC-SCNC: 0.2 MMOL/L — SIGNIFICANT CHANGE UP
BUN SERPL-MCNC: 9 MG/DL — SIGNIFICANT CHANGE UP (ref 7–23)
CA-I BLDA-SCNC: 1.16 MMOL/L — SIGNIFICANT CHANGE UP (ref 1.15–1.29)
CA-I BLDA-SCNC: 1.16 MMOL/L — SIGNIFICANT CHANGE UP (ref 1.15–1.29)
CA-I BLDA-SCNC: 1.2 MMOL/L — SIGNIFICANT CHANGE UP (ref 1.15–1.29)
CALCIUM SERPL-MCNC: 8.5 MG/DL — SIGNIFICANT CHANGE UP (ref 8.4–10.5)
CHLORIDE SERPL-SCNC: 101 MMOL/L — SIGNIFICANT CHANGE UP (ref 98–107)
CO2 SERPL-SCNC: 22 MMOL/L — SIGNIFICANT CHANGE UP (ref 22–31)
CREAT SERPL-MCNC: 0.79 MG/DL — SIGNIFICANT CHANGE UP (ref 0.5–1.3)
GLUCOSE BLDA-MCNC: 121 MG/DL — HIGH (ref 70–99)
GLUCOSE BLDA-MCNC: 179 MG/DL — HIGH (ref 70–99)
GLUCOSE BLDA-MCNC: 195 MG/DL — HIGH (ref 70–99)
GLUCOSE SERPL-MCNC: 201 MG/DL — HIGH (ref 70–99)
HCO3 BLDA-SCNC: 25 MMOL/L — SIGNIFICANT CHANGE UP (ref 22–26)
HCT VFR BLD CALC: 36.1 % — LOW (ref 39–50)
HCT VFR BLDA CALC: 36 % — LOW (ref 39–51)
HCT VFR BLDA CALC: 36.8 % — LOW (ref 39–51)
HCT VFR BLDA CALC: 37.3 % — LOW (ref 39–51)
HGB BLD-MCNC: 11.4 G/DL — LOW (ref 13–17)
HGB BLDA-MCNC: 11.7 G/DL — LOW (ref 13–17)
HGB BLDA-MCNC: 11.9 G/DL — LOW (ref 13–17)
HGB BLDA-MCNC: 12.1 G/DL — LOW (ref 13–17)
MAGNESIUM SERPL-MCNC: 1.6 MG/DL — SIGNIFICANT CHANGE UP (ref 1.6–2.6)
MCHC RBC-ENTMCNC: 31 PG — SIGNIFICANT CHANGE UP (ref 27–34)
MCHC RBC-ENTMCNC: 31.6 % — LOW (ref 32–36)
MCV RBC AUTO: 98.1 FL — SIGNIFICANT CHANGE UP (ref 80–100)
PCO2 BLDA: 36 MMHG — SIGNIFICANT CHANGE UP (ref 35–48)
PCO2 BLDA: 36 MMHG — SIGNIFICANT CHANGE UP (ref 35–48)
PCO2 BLDA: 37 MMHG — SIGNIFICANT CHANGE UP (ref 35–48)
PH BLDA: 7.43 PH — SIGNIFICANT CHANGE UP (ref 7.35–7.45)
PH BLDA: 7.44 PH — SIGNIFICANT CHANGE UP (ref 7.35–7.45)
PH BLDA: 7.44 PH — SIGNIFICANT CHANGE UP (ref 7.35–7.45)
PHOSPHATE SERPL-MCNC: 3.8 MG/DL — SIGNIFICANT CHANGE UP (ref 2.5–4.5)
PLATELET # BLD AUTO: 130 K/UL — LOW (ref 150–400)
PMV BLD: 10.6 FL — SIGNIFICANT CHANGE UP (ref 7–13)
PO2 BLDA: 145 MMHG — HIGH (ref 83–108)
PO2 BLDA: 295 MMHG — HIGH (ref 83–108)
PO2 BLDA: 297 MMHG — HIGH (ref 83–108)
POTASSIUM BLDA-SCNC: 3.6 MMOL/L — SIGNIFICANT CHANGE UP (ref 3.4–4.5)
POTASSIUM BLDA-SCNC: 3.6 MMOL/L — SIGNIFICANT CHANGE UP (ref 3.4–4.5)
POTASSIUM BLDA-SCNC: 3.7 MMOL/L — SIGNIFICANT CHANGE UP (ref 3.4–4.5)
POTASSIUM SERPL-MCNC: 3.7 MMOL/L — SIGNIFICANT CHANGE UP (ref 3.5–5.3)
POTASSIUM SERPL-SCNC: 3.7 MMOL/L — SIGNIFICANT CHANGE UP (ref 3.5–5.3)
RBC # BLD: 3.68 M/UL — LOW (ref 4.2–5.8)
RBC # FLD: 15.8 % — HIGH (ref 10.3–14.5)
RH IG SCN BLD-IMP: NEGATIVE — SIGNIFICANT CHANGE UP
SAO2 % BLDA: 100 % — HIGH (ref 95–99)
SAO2 % BLDA: 99.4 % — HIGH (ref 95–99)
SAO2 % BLDA: 99.8 % — HIGH (ref 95–99)
SODIUM BLDA-SCNC: 135 MMOL/L — LOW (ref 136–146)
SODIUM BLDA-SCNC: 136 MMOL/L — SIGNIFICANT CHANGE UP (ref 136–146)
SODIUM BLDA-SCNC: 137 MMOL/L — SIGNIFICANT CHANGE UP (ref 136–146)
SODIUM SERPL-SCNC: 138 MMOL/L — SIGNIFICANT CHANGE UP (ref 135–145)
WBC # BLD: 9.45 K/UL — SIGNIFICANT CHANGE UP (ref 3.8–10.5)
WBC # FLD AUTO: 9.45 K/UL — SIGNIFICANT CHANGE UP (ref 3.8–10.5)

## 2017-05-12 PROCEDURE — 88341 IMHCHEM/IMCYTCHM EA ADD ANTB: CPT | Mod: 26

## 2017-05-12 PROCEDURE — 43122 PARTIAL REMOVAL OF ESOPHAGUS: CPT

## 2017-05-12 PROCEDURE — 88342 IMHCHEM/IMCYTCHM 1ST ANTB: CPT | Mod: 26

## 2017-05-12 PROCEDURE — 48140 PARTIAL REMOVAL OF PANCREAS: CPT

## 2017-05-12 PROCEDURE — 44121 REMOVAL OF SMALL INTESTINE: CPT

## 2017-05-12 PROCEDURE — 49421 INS TUN IP CATH FOR DIAL OPN: CPT

## 2017-05-12 PROCEDURE — 88304 TISSUE EXAM BY PATHOLOGIST: CPT | Mod: 26

## 2017-05-12 PROCEDURE — 44120 REMOVAL OF SMALL INTESTINE: CPT

## 2017-05-12 PROCEDURE — 38747 REMOVE ABDOMINAL LYMPH NODES: CPT | Mod: 59

## 2017-05-12 PROCEDURE — 88309 TISSUE EXAM BY PATHOLOGIST: CPT | Mod: 26

## 2017-05-12 PROCEDURE — 88305 TISSUE EXAM BY PATHOLOGIST: CPT | Mod: 26

## 2017-05-12 PROCEDURE — 88331 PATH CONSLTJ SURG 1 BLK 1SPC: CPT | Mod: 26

## 2017-05-12 RX ORDER — LEVOTHYROXINE SODIUM 125 MCG
50 TABLET ORAL DAILY
Qty: 0 | Refills: 0 | Status: DISCONTINUED | OUTPATIENT
Start: 2017-05-12 | End: 2017-05-23

## 2017-05-12 RX ORDER — NALOXONE HYDROCHLORIDE 4 MG/.1ML
0.1 SPRAY NASAL
Qty: 0 | Refills: 0 | Status: DISCONTINUED | OUTPATIENT
Start: 2017-05-12 | End: 2017-05-16

## 2017-05-12 RX ORDER — POTASSIUM CHLORIDE 20 MEQ
10 PACKET (EA) ORAL
Qty: 0 | Refills: 0 | Status: COMPLETED | OUTPATIENT
Start: 2017-05-12 | End: 2017-05-12

## 2017-05-12 RX ORDER — MAGNESIUM SULFATE 500 MG/ML
2 VIAL (ML) INJECTION ONCE
Qty: 0 | Refills: 0 | Status: COMPLETED | OUTPATIENT
Start: 2017-05-12 | End: 2017-05-12

## 2017-05-12 RX ORDER — HEPARIN SODIUM 5000 [USP'U]/ML
5000 INJECTION INTRAVENOUS; SUBCUTANEOUS EVERY 8 HOURS
Qty: 0 | Refills: 0 | Status: DISCONTINUED | OUTPATIENT
Start: 2017-05-12 | End: 2017-05-17

## 2017-05-12 RX ORDER — HYDROMORPHONE HYDROCHLORIDE 2 MG/ML
0.5 INJECTION INTRAMUSCULAR; INTRAVENOUS; SUBCUTANEOUS
Qty: 0 | Refills: 0 | Status: DISCONTINUED | OUTPATIENT
Start: 2017-05-12 | End: 2017-05-16

## 2017-05-12 RX ORDER — ONDANSETRON 8 MG/1
4 TABLET, FILM COATED ORAL ONCE
Qty: 0 | Refills: 0 | Status: DISCONTINUED | OUTPATIENT
Start: 2017-05-12 | End: 2017-05-12

## 2017-05-12 RX ORDER — INSULIN LISPRO 100/ML
VIAL (ML) SUBCUTANEOUS EVERY 6 HOURS
Qty: 0 | Refills: 0 | Status: DISCONTINUED | OUTPATIENT
Start: 2017-05-12 | End: 2017-05-17

## 2017-05-12 RX ORDER — ONDANSETRON 8 MG/1
4 TABLET, FILM COATED ORAL EVERY 6 HOURS
Qty: 0 | Refills: 0 | Status: DISCONTINUED | OUTPATIENT
Start: 2017-05-12 | End: 2017-05-16

## 2017-05-12 RX ORDER — CEFOTETAN DISODIUM 1 G
2 VIAL (EA) INJECTION
Qty: 0 | Refills: 0 | Status: COMPLETED | OUTPATIENT
Start: 2017-05-12 | End: 2017-05-12

## 2017-05-12 RX ORDER — HYDROMORPHONE HYDROCHLORIDE 2 MG/ML
1 INJECTION INTRAMUSCULAR; INTRAVENOUS; SUBCUTANEOUS
Qty: 0 | Refills: 0 | Status: DISCONTINUED | OUTPATIENT
Start: 2017-05-12 | End: 2017-05-12

## 2017-05-12 RX ADMIN — HYDROMORPHONE HYDROCHLORIDE 1 MILLIGRAM(S): 2 INJECTION INTRAMUSCULAR; INTRAVENOUS; SUBCUTANEOUS at 15:00

## 2017-05-12 RX ADMIN — SODIUM CHLORIDE 130 MILLILITER(S): 9 INJECTION, SOLUTION INTRAVENOUS at 13:38

## 2017-05-12 RX ADMIN — HYDROMORPHONE HYDROCHLORIDE 1 MILLIGRAM(S): 2 INJECTION INTRAMUSCULAR; INTRAVENOUS; SUBCUTANEOUS at 14:45

## 2017-05-12 RX ADMIN — Medication 50 GRAM(S): at 20:03

## 2017-05-12 RX ADMIN — HEPARIN SODIUM 5000 UNIT(S): 5000 INJECTION INTRAVENOUS; SUBCUTANEOUS at 14:56

## 2017-05-12 RX ADMIN — Medication 100 MILLIEQUIVALENT(S): at 21:08

## 2017-05-12 RX ADMIN — Medication 100 MILLIEQUIVALENT(S): at 22:23

## 2017-05-12 RX ADMIN — HYDROMORPHONE HYDROCHLORIDE 1 MILLIGRAM(S): 2 INJECTION INTRAMUSCULAR; INTRAVENOUS; SUBCUTANEOUS at 13:45

## 2017-05-12 RX ADMIN — HYDROMORPHONE HYDROCHLORIDE 1 MILLIGRAM(S): 2 INJECTION INTRAMUSCULAR; INTRAVENOUS; SUBCUTANEOUS at 14:00

## 2017-05-12 RX ADMIN — Medication 100 GRAM(S): at 22:21

## 2017-05-12 RX ADMIN — HEPARIN SODIUM 5000 UNIT(S): 5000 INJECTION INTRAVENOUS; SUBCUTANEOUS at 21:07

## 2017-05-12 RX ADMIN — Medication 100 MILLIEQUIVALENT(S): at 23:35

## 2017-05-12 NOTE — BRIEF OPERATIVE NOTE - PROCEDURE
Jejunostomy feeding tube placement  05/12/2017    Active  ASHEN12  Laparotomy  05/12/2017    Active  ASHEN12  Esophagogastroduodenoscopy  05/12/2017    Active  ASHEN12  Total gastrectomy and Lorena-en-Y esophagojejunal anastomosis  05/12/2017    Active  ASHEN12  Lymphadenectomy  05/12/2017    Active  ASHEN12  Splenectomy  05/12/2017    Active  [id number]  Distal pancreatectomy  05/12/2017    Active  [id number]

## 2017-05-12 NOTE — BRIEF OPERATIVE NOTE - OPERATION/FINDINGS
EGD showed gastric cancer at the GE junction, and the cardia extending downward along the lesser curvature.    1. Total Gastrectomy  2. Splenectomy  3. Distal Pancreatectomy  4. Lorena-En-Y Esophagojejunostomy  5. Placement of Feeding Jejunostomy  6. Paraesophageal and celiac axis lymphadenectomy

## 2017-05-13 LAB
APTT BLD: 28.5 SEC — SIGNIFICANT CHANGE UP (ref 27.5–37.4)
BUN SERPL-MCNC: 12 MG/DL — SIGNIFICANT CHANGE UP (ref 7–23)
CALCIUM SERPL-MCNC: 8.3 MG/DL — LOW (ref 8.4–10.5)
CHLORIDE SERPL-SCNC: 99 MMOL/L — SIGNIFICANT CHANGE UP (ref 98–107)
CO2 SERPL-SCNC: 25 MMOL/L — SIGNIFICANT CHANGE UP (ref 22–31)
CREAT SERPL-MCNC: 0.96 MG/DL — SIGNIFICANT CHANGE UP (ref 0.5–1.3)
GLUCOSE SERPL-MCNC: 141 MG/DL — HIGH (ref 70–99)
HCT VFR BLD CALC: 36.1 % — LOW (ref 39–50)
HGB BLD-MCNC: 11.2 G/DL — LOW (ref 13–17)
INR BLD: 1.15 — SIGNIFICANT CHANGE UP (ref 0.88–1.17)
MAGNESIUM SERPL-MCNC: 2 MG/DL — SIGNIFICANT CHANGE UP (ref 1.6–2.6)
MCHC RBC-ENTMCNC: 30.8 PG — SIGNIFICANT CHANGE UP (ref 27–34)
MCHC RBC-ENTMCNC: 31 % — LOW (ref 32–36)
MCV RBC AUTO: 99.2 FL — SIGNIFICANT CHANGE UP (ref 80–100)
PHOSPHATE SERPL-MCNC: 3.6 MG/DL — SIGNIFICANT CHANGE UP (ref 2.5–4.5)
PLATELET # BLD AUTO: 149 K/UL — LOW (ref 150–400)
PMV BLD: 10.5 FL — SIGNIFICANT CHANGE UP (ref 7–13)
POTASSIUM SERPL-MCNC: 4.6 MMOL/L — SIGNIFICANT CHANGE UP (ref 3.5–5.3)
POTASSIUM SERPL-SCNC: 4.6 MMOL/L — SIGNIFICANT CHANGE UP (ref 3.5–5.3)
PROTHROM AB SERPL-ACNC: 12.9 SEC — SIGNIFICANT CHANGE UP (ref 9.8–13.1)
RBC # BLD: 3.64 M/UL — LOW (ref 4.2–5.8)
RBC # FLD: 15.6 % — HIGH (ref 10.3–14.5)
SODIUM SERPL-SCNC: 137 MMOL/L — SIGNIFICANT CHANGE UP (ref 135–145)
WBC # BLD: 10.5 K/UL — SIGNIFICANT CHANGE UP (ref 3.8–10.5)
WBC # FLD AUTO: 10.5 K/UL — SIGNIFICANT CHANGE UP (ref 3.8–10.5)

## 2017-05-13 PROCEDURE — 74000: CPT | Mod: 26

## 2017-05-13 RX ORDER — TETRACAINE/BENZOCAINE/BUTAMBEN 2%-14%-2%
1 OINTMENT (GRAM) TOPICAL THREE TIMES A DAY
Qty: 0 | Refills: 0 | Status: DISCONTINUED | OUTPATIENT
Start: 2017-05-13 | End: 2017-05-18

## 2017-05-13 RX ORDER — DEXTROSE MONOHYDRATE, SODIUM CHLORIDE, AND POTASSIUM CHLORIDE 50; .745; 4.5 G/1000ML; G/1000ML; G/1000ML
1000 INJECTION, SOLUTION INTRAVENOUS
Qty: 0 | Refills: 0 | Status: DISCONTINUED | OUTPATIENT
Start: 2017-05-13 | End: 2017-05-14

## 2017-05-13 RX ADMIN — SODIUM CHLORIDE 130 MILLILITER(S): 9 INJECTION, SOLUTION INTRAVENOUS at 03:23

## 2017-05-13 RX ADMIN — HEPARIN SODIUM 5000 UNIT(S): 5000 INJECTION INTRAVENOUS; SUBCUTANEOUS at 14:57

## 2017-05-13 RX ADMIN — Medication 2: at 00:10

## 2017-05-13 RX ADMIN — Medication: at 19:36

## 2017-05-13 RX ADMIN — Medication 50 MICROGRAM(S): at 06:36

## 2017-05-13 RX ADMIN — HEPARIN SODIUM 5000 UNIT(S): 5000 INJECTION INTRAVENOUS; SUBCUTANEOUS at 06:36

## 2017-05-13 RX ADMIN — Medication 1 SPRAY(S): at 11:41

## 2017-05-13 RX ADMIN — DEXTROSE MONOHYDRATE, SODIUM CHLORIDE, AND POTASSIUM CHLORIDE 100 MILLILITER(S): 50; .745; 4.5 INJECTION, SOLUTION INTRAVENOUS at 14:59

## 2017-05-13 RX ADMIN — Medication 1 SPRAY(S): at 19:39

## 2017-05-13 RX ADMIN — HEPARIN SODIUM 5000 UNIT(S): 5000 INJECTION INTRAVENOUS; SUBCUTANEOUS at 21:29

## 2017-05-14 LAB
APPEARANCE UR: SIGNIFICANT CHANGE UP
APTT BLD: 29.1 SEC — SIGNIFICANT CHANGE UP (ref 27.5–37.4)
BILIRUB UR-MCNC: SIGNIFICANT CHANGE UP
BLOOD UR QL VISUAL: HIGH
BUN SERPL-MCNC: 10 MG/DL — SIGNIFICANT CHANGE UP (ref 7–23)
CALCIUM SERPL-MCNC: 8.4 MG/DL — SIGNIFICANT CHANGE UP (ref 8.4–10.5)
CHLORIDE SERPL-SCNC: 99 MMOL/L — SIGNIFICANT CHANGE UP (ref 98–107)
CO2 SERPL-SCNC: 24 MMOL/L — SIGNIFICANT CHANGE UP (ref 22–31)
COLOR SPEC: YELLOW — SIGNIFICANT CHANGE UP
CREAT SERPL-MCNC: 0.81 MG/DL — SIGNIFICANT CHANGE UP (ref 0.5–1.3)
GLUCOSE SERPL-MCNC: 165 MG/DL — HIGH (ref 70–99)
GLUCOSE UR-MCNC: SIGNIFICANT CHANGE UP
HCT VFR BLD CALC: 37.1 % — LOW (ref 39–50)
HGB BLD-MCNC: 11.6 G/DL — LOW (ref 13–17)
HYALINE CASTS # UR AUTO: SIGNIFICANT CHANGE UP (ref 0–?)
INR BLD: 1.11 — SIGNIFICANT CHANGE UP (ref 0.88–1.17)
KETONES UR-MCNC: SIGNIFICANT CHANGE UP
LEUKOCYTE ESTERASE UR-ACNC: HIGH
MAGNESIUM SERPL-MCNC: 1.9 MG/DL — SIGNIFICANT CHANGE UP (ref 1.6–2.6)
MCHC RBC-ENTMCNC: 31.3 % — LOW (ref 32–36)
MCHC RBC-ENTMCNC: 31.5 PG — SIGNIFICANT CHANGE UP (ref 27–34)
MCV RBC AUTO: 100.8 FL — HIGH (ref 80–100)
MUCOUS THREADS # UR AUTO: SIGNIFICANT CHANGE UP
NITRITE UR-MCNC: NEGATIVE — SIGNIFICANT CHANGE UP
PH UR: 6 — SIGNIFICANT CHANGE UP (ref 4.6–8)
PHOSPHATE SERPL-MCNC: 2.3 MG/DL — LOW (ref 2.5–4.5)
PLATELET # BLD AUTO: 152 K/UL — SIGNIFICANT CHANGE UP (ref 150–400)
PMV BLD: 10.7 FL — SIGNIFICANT CHANGE UP (ref 7–13)
POTASSIUM SERPL-MCNC: 4.5 MMOL/L — SIGNIFICANT CHANGE UP (ref 3.5–5.3)
POTASSIUM SERPL-SCNC: 4.5 MMOL/L — SIGNIFICANT CHANGE UP (ref 3.5–5.3)
PROT UR-MCNC: 150 — HIGH
PROTHROM AB SERPL-ACNC: 12.5 SEC — SIGNIFICANT CHANGE UP (ref 9.8–13.1)
RBC # BLD: 3.68 M/UL — LOW (ref 4.2–5.8)
RBC # FLD: 15.4 % — HIGH (ref 10.3–14.5)
RBC CASTS # UR COMP ASSIST: HIGH (ref 0–?)
SODIUM SERPL-SCNC: 135 MMOL/L — SIGNIFICANT CHANGE UP (ref 135–145)
SP GR SPEC: 1.03 — HIGH (ref 1–1.03)
UROBILINOGEN FLD QL: 1 E.U. — SIGNIFICANT CHANGE UP (ref 0.1–0.2)
WBC # BLD: 14.66 K/UL — HIGH (ref 3.8–10.5)
WBC # FLD AUTO: 14.66 K/UL — HIGH (ref 3.8–10.5)
WBC UR QL: SIGNIFICANT CHANGE UP (ref 0–?)

## 2017-05-14 PROCEDURE — 71010: CPT | Mod: 26

## 2017-05-14 RX ORDER — CEFTRIAXONE 500 MG/1
1 INJECTION, POWDER, FOR SOLUTION INTRAMUSCULAR; INTRAVENOUS ONCE
Qty: 0 | Refills: 0 | Status: COMPLETED | OUTPATIENT
Start: 2017-05-14 | End: 2017-05-14

## 2017-05-14 RX ORDER — CEFTRIAXONE 500 MG/1
INJECTION, POWDER, FOR SOLUTION INTRAMUSCULAR; INTRAVENOUS
Qty: 0 | Refills: 0 | Status: DISCONTINUED | OUTPATIENT
Start: 2017-05-14 | End: 2017-05-19

## 2017-05-14 RX ORDER — CEFTRIAXONE 500 MG/1
1 INJECTION, POWDER, FOR SOLUTION INTRAMUSCULAR; INTRAVENOUS EVERY 24 HOURS
Qty: 0 | Refills: 0 | Status: DISCONTINUED | OUTPATIENT
Start: 2017-05-15 | End: 2017-05-19

## 2017-05-14 RX ORDER — SODIUM CHLORIDE 9 MG/ML
1000 INJECTION, SOLUTION INTRAVENOUS
Qty: 0 | Refills: 0 | Status: DISCONTINUED | OUTPATIENT
Start: 2017-05-14 | End: 2017-05-18

## 2017-05-14 RX ORDER — MAGNESIUM SULFATE 500 MG/ML
2 VIAL (ML) INJECTION ONCE
Qty: 0 | Refills: 0 | Status: COMPLETED | OUTPATIENT
Start: 2017-05-14 | End: 2017-05-14

## 2017-05-14 RX ADMIN — Medication 50 MICROGRAM(S): at 06:46

## 2017-05-14 RX ADMIN — Medication 2: at 06:47

## 2017-05-14 RX ADMIN — Medication 2: at 17:59

## 2017-05-14 RX ADMIN — Medication 2: at 23:09

## 2017-05-14 RX ADMIN — DEXTROSE MONOHYDRATE, SODIUM CHLORIDE, AND POTASSIUM CHLORIDE 75 MILLILITER(S): 50; .745; 4.5 INJECTION, SOLUTION INTRAVENOUS at 09:50

## 2017-05-14 RX ADMIN — Medication 63.75 MILLIMOLE(S): at 15:06

## 2017-05-14 RX ADMIN — HEPARIN SODIUM 5000 UNIT(S): 5000 INJECTION INTRAVENOUS; SUBCUTANEOUS at 06:46

## 2017-05-14 RX ADMIN — HEPARIN SODIUM 5000 UNIT(S): 5000 INJECTION INTRAVENOUS; SUBCUTANEOUS at 13:07

## 2017-05-14 RX ADMIN — Medication 1 SPRAY(S): at 22:56

## 2017-05-14 RX ADMIN — Medication 1 SPRAY(S): at 13:08

## 2017-05-14 RX ADMIN — HEPARIN SODIUM 5000 UNIT(S): 5000 INJECTION INTRAVENOUS; SUBCUTANEOUS at 22:55

## 2017-05-14 RX ADMIN — Medication 2: at 01:23

## 2017-05-14 RX ADMIN — Medication 1 SPRAY(S): at 06:48

## 2017-05-14 RX ADMIN — Medication 50 GRAM(S): at 13:01

## 2017-05-14 NOTE — PROVIDER CONTACT NOTE (OTHER) - ASSESSMENT
Isael 831T  urine output only 150 ml  joyner  since 1600time  and d5 1/2NS running at 75 ml /hr. No complaints for pain-  Evelyn rn ext [phone] for any questions Isael 831T  urine output only 150 ml  joyner  since 1600time  and d5 1/2NS running at 75 ml /hr. No complaints for pain-

## 2017-05-14 NOTE — PROVIDER CONTACT NOTE (OTHER) - BACKGROUND
Isael 831T  urine output only 150 ml  joyner  since 1600time  and d5 1/2NS running at 75 ml /hr. No complaints for pain-  Evelyn rn ext [phone] for any questions Isael 831T  urine output only 150 ml  joyner  since 1600time  and d5 1/2NS running at 75 ml /hr. No complaints for pain-

## 2017-05-14 NOTE — PROVIDER CONTACT NOTE (OTHER) - SITUATION
Isael 831T  urine output only 150 ml  joyner  since 1600time  and d5 1/2NS running at 75 ml /hr. No complaints for pain-  Evelyn rn ext [phone] for any questions Isael 831T  urine output only 150 ml  joyner  since 1600time  and d5 1/2NS running at 75 ml /hr. No complaints for pain-

## 2017-05-15 ENCOUNTER — TRANSCRIPTION ENCOUNTER (OUTPATIENT)
Age: 67
End: 2017-05-15

## 2017-05-15 LAB
AMYLASE FLD-CCNC: 58 U/L — SIGNIFICANT CHANGE UP
AMYLASE P1 CFR SERPL: 60 U/L — SIGNIFICANT CHANGE UP (ref 25–125)
APTT BLD: 28.7 SEC — SIGNIFICANT CHANGE UP (ref 27.5–37.4)
BUN SERPL-MCNC: 12 MG/DL — SIGNIFICANT CHANGE UP (ref 7–23)
CALCIUM SERPL-MCNC: 8.1 MG/DL — LOW (ref 8.4–10.5)
CHLORIDE SERPL-SCNC: 98 MMOL/L — SIGNIFICANT CHANGE UP (ref 98–107)
CO2 SERPL-SCNC: 23 MMOL/L — SIGNIFICANT CHANGE UP (ref 22–31)
CREAT SERPL-MCNC: 0.71 MG/DL — SIGNIFICANT CHANGE UP (ref 0.5–1.3)
GLUCOSE SERPL-MCNC: 181 MG/DL — HIGH (ref 70–99)
HCT VFR BLD CALC: 34.4 % — LOW (ref 39–50)
HGB BLD-MCNC: 10.7 G/DL — LOW (ref 13–17)
INR BLD: 1.08 — SIGNIFICANT CHANGE UP (ref 0.88–1.17)
MAGNESIUM SERPL-MCNC: 2 MG/DL — SIGNIFICANT CHANGE UP (ref 1.6–2.6)
MCHC RBC-ENTMCNC: 31.1 % — LOW (ref 32–36)
MCHC RBC-ENTMCNC: 31.4 PG — SIGNIFICANT CHANGE UP (ref 27–34)
MCV RBC AUTO: 100.9 FL — HIGH (ref 80–100)
PHOSPHATE SERPL-MCNC: 2.6 MG/DL — SIGNIFICANT CHANGE UP (ref 2.5–4.5)
PLATELET # BLD AUTO: 176 K/UL — SIGNIFICANT CHANGE UP (ref 150–400)
PMV BLD: 11.4 FL — SIGNIFICANT CHANGE UP (ref 7–13)
POTASSIUM SERPL-MCNC: 4.1 MMOL/L — SIGNIFICANT CHANGE UP (ref 3.5–5.3)
POTASSIUM SERPL-SCNC: 4.1 MMOL/L — SIGNIFICANT CHANGE UP (ref 3.5–5.3)
PROTHROM AB SERPL-ACNC: 12.1 SEC — SIGNIFICANT CHANGE UP (ref 9.8–13.1)
RBC # BLD: 3.41 M/UL — LOW (ref 4.2–5.8)
RBC # FLD: 15 % — HIGH (ref 10.3–14.5)
SODIUM SERPL-SCNC: 134 MMOL/L — LOW (ref 135–145)
SPECIMEN SOURCE: SIGNIFICANT CHANGE UP
SPECIMEN SOURCE: SIGNIFICANT CHANGE UP
WBC # BLD: 14.13 K/UL — HIGH (ref 3.8–10.5)
WBC # FLD AUTO: 14.13 K/UL — HIGH (ref 3.8–10.5)

## 2017-05-15 RX ADMIN — SODIUM CHLORIDE 100 MILLILITER(S): 9 INJECTION, SOLUTION INTRAVENOUS at 11:01

## 2017-05-15 RX ADMIN — CEFTRIAXONE 100 GRAM(S): 500 INJECTION, POWDER, FOR SOLUTION INTRAMUSCULAR; INTRAVENOUS at 00:21

## 2017-05-15 RX ADMIN — HEPARIN SODIUM 5000 UNIT(S): 5000 INJECTION INTRAVENOUS; SUBCUTANEOUS at 13:01

## 2017-05-15 RX ADMIN — Medication 2: at 13:00

## 2017-05-15 RX ADMIN — Medication 50 MICROGRAM(S): at 05:03

## 2017-05-15 RX ADMIN — Medication 2: at 17:56

## 2017-05-15 RX ADMIN — SODIUM CHLORIDE 100 MILLILITER(S): 9 INJECTION, SOLUTION INTRAVENOUS at 20:24

## 2017-05-15 RX ADMIN — Medication 1 SPRAY(S): at 22:04

## 2017-05-15 RX ADMIN — HEPARIN SODIUM 5000 UNIT(S): 5000 INJECTION INTRAVENOUS; SUBCUTANEOUS at 22:03

## 2017-05-15 RX ADMIN — CEFTRIAXONE 100 GRAM(S): 500 INJECTION, POWDER, FOR SOLUTION INTRAMUSCULAR; INTRAVENOUS at 23:13

## 2017-05-15 RX ADMIN — HEPARIN SODIUM 5000 UNIT(S): 5000 INJECTION INTRAVENOUS; SUBCUTANEOUS at 05:04

## 2017-05-15 RX ADMIN — SODIUM CHLORIDE 100 MILLILITER(S): 9 INJECTION, SOLUTION INTRAVENOUS at 00:33

## 2017-05-15 RX ADMIN — Medication 1 SPRAY(S): at 05:04

## 2017-05-15 RX ADMIN — Medication 1 SPRAY(S): at 16:01

## 2017-05-15 RX ADMIN — Medication 2: at 06:25

## 2017-05-15 NOTE — PROVIDER CONTACT NOTE (OTHER) - ACTION/TREATMENT ORDERED:
Will continue to monitor
Pt was pan cultured yesterday. If he complains, Acetaminophen will be ordered.
Will order blood, urine cultures as well as xray

## 2017-05-15 NOTE — PROVIDER CONTACT NOTE (OTHER) - REASON
elevated temp. of 101
pt is febrile
urine output only 150 ml  joyner  since 1600time  and d5 1/2NS running at 75 ml /hr.

## 2017-05-16 LAB
ALBUMIN SERPL ELPH-MCNC: 2.6 G/DL — LOW (ref 3.3–5)
ALP SERPL-CCNC: 119 U/L — SIGNIFICANT CHANGE UP (ref 40–120)
ALT FLD-CCNC: 23 U/L — SIGNIFICANT CHANGE UP (ref 4–41)
APTT BLD: 28.2 SEC — SIGNIFICANT CHANGE UP (ref 27.5–37.4)
AST SERPL-CCNC: 27 U/L — SIGNIFICANT CHANGE UP (ref 4–40)
BACTERIA UR CULT: SIGNIFICANT CHANGE UP
BILIRUB SERPL-MCNC: 0.3 MG/DL — SIGNIFICANT CHANGE UP (ref 0.2–1.2)
BUN SERPL-MCNC: 9 MG/DL — SIGNIFICANT CHANGE UP (ref 7–23)
CALCIUM SERPL-MCNC: 8.4 MG/DL — SIGNIFICANT CHANGE UP (ref 8.4–10.5)
CHLORIDE SERPL-SCNC: 99 MMOL/L — SIGNIFICANT CHANGE UP (ref 98–107)
CO2 SERPL-SCNC: 22 MMOL/L — SIGNIFICANT CHANGE UP (ref 22–31)
CREAT SERPL-MCNC: 0.62 MG/DL — SIGNIFICANT CHANGE UP (ref 0.5–1.3)
FOLATE SERPL-MCNC: 14.3 NG/ML — SIGNIFICANT CHANGE UP (ref 4.7–20)
GLUCOSE SERPL-MCNC: 196 MG/DL — HIGH (ref 70–99)
HCT VFR BLD CALC: 37.3 % — LOW (ref 39–50)
HGB BLD-MCNC: 11.2 G/DL — LOW (ref 13–17)
INR BLD: 1.05 — SIGNIFICANT CHANGE UP (ref 0.88–1.17)
MAGNESIUM SERPL-MCNC: 1.8 MG/DL — SIGNIFICANT CHANGE UP (ref 1.6–2.6)
MCHC RBC-ENTMCNC: 30 % — LOW (ref 32–36)
MCHC RBC-ENTMCNC: 31 PG — SIGNIFICANT CHANGE UP (ref 27–34)
MCV RBC AUTO: 103.3 FL — HIGH (ref 80–100)
PHOSPHATE SERPL-MCNC: 2.7 MG/DL — SIGNIFICANT CHANGE UP (ref 2.5–4.5)
PLATELET # BLD AUTO: 215 K/UL — SIGNIFICANT CHANGE UP (ref 150–400)
PMV BLD: 11.3 FL — SIGNIFICANT CHANGE UP (ref 7–13)
POTASSIUM SERPL-MCNC: 4.3 MMOL/L — SIGNIFICANT CHANGE UP (ref 3.5–5.3)
POTASSIUM SERPL-SCNC: 4.3 MMOL/L — SIGNIFICANT CHANGE UP (ref 3.5–5.3)
PROT SERPL-MCNC: 6.1 G/DL — SIGNIFICANT CHANGE UP (ref 6–8.3)
PROTHROM AB SERPL-ACNC: 11.8 SEC — SIGNIFICANT CHANGE UP (ref 9.8–13.1)
RBC # BLD: 3.61 M/UL — LOW (ref 4.2–5.8)
RBC # FLD: 15.2 % — HIGH (ref 10.3–14.5)
SODIUM SERPL-SCNC: 136 MMOL/L — SIGNIFICANT CHANGE UP (ref 135–145)
SPECIMEN SOURCE: SIGNIFICANT CHANGE UP
VIT B12 SERPL-MCNC: 353 PG/ML — SIGNIFICANT CHANGE UP (ref 200–900)
WBC # BLD: 11.99 K/UL — HIGH (ref 3.8–10.5)
WBC # FLD AUTO: 11.99 K/UL — HIGH (ref 3.8–10.5)

## 2017-05-16 RX ORDER — MAGNESIUM SULFATE 500 MG/ML
2 VIAL (ML) INJECTION ONCE
Qty: 0 | Refills: 0 | Status: COMPLETED | OUTPATIENT
Start: 2017-05-16 | End: 2017-05-16

## 2017-05-16 RX ORDER — HYDROMORPHONE HYDROCHLORIDE 2 MG/ML
0.5 INJECTION INTRAMUSCULAR; INTRAVENOUS; SUBCUTANEOUS
Qty: 0 | Refills: 0 | Status: DISCONTINUED | OUTPATIENT
Start: 2017-05-16 | End: 2017-05-23

## 2017-05-16 RX ADMIN — Medication 2: at 23:25

## 2017-05-16 RX ADMIN — Medication 50 GRAM(S): at 10:00

## 2017-05-16 RX ADMIN — HEPARIN SODIUM 5000 UNIT(S): 5000 INJECTION INTRAVENOUS; SUBCUTANEOUS at 05:35

## 2017-05-16 RX ADMIN — Medication 2: at 06:50

## 2017-05-16 RX ADMIN — CEFTRIAXONE 100 GRAM(S): 500 INJECTION, POWDER, FOR SOLUTION INTRAMUSCULAR; INTRAVENOUS at 23:24

## 2017-05-16 RX ADMIN — Medication 1 SPRAY(S): at 14:02

## 2017-05-16 RX ADMIN — SODIUM CHLORIDE 100 MILLILITER(S): 9 INJECTION, SOLUTION INTRAVENOUS at 10:07

## 2017-05-16 RX ADMIN — Medication 2: at 00:11

## 2017-05-16 RX ADMIN — HEPARIN SODIUM 5000 UNIT(S): 5000 INJECTION INTRAVENOUS; SUBCUTANEOUS at 14:01

## 2017-05-16 RX ADMIN — HYDROMORPHONE HYDROCHLORIDE 0.5 MILLIGRAM(S): 2 INJECTION INTRAMUSCULAR; INTRAVENOUS; SUBCUTANEOUS at 23:23

## 2017-05-16 RX ADMIN — Medication 50 MICROGRAM(S): at 05:35

## 2017-05-16 RX ADMIN — HEPARIN SODIUM 5000 UNIT(S): 5000 INJECTION INTRAVENOUS; SUBCUTANEOUS at 23:24

## 2017-05-16 RX ADMIN — Medication 2: at 12:51

## 2017-05-17 LAB
BUN SERPL-MCNC: 9 MG/DL — SIGNIFICANT CHANGE UP (ref 7–23)
CALCIUM SERPL-MCNC: 8.6 MG/DL — SIGNIFICANT CHANGE UP (ref 8.4–10.5)
CHLORIDE SERPL-SCNC: 97 MMOL/L — LOW (ref 98–107)
CO2 SERPL-SCNC: 24 MMOL/L — SIGNIFICANT CHANGE UP (ref 22–31)
CREAT SERPL-MCNC: 0.65 MG/DL — SIGNIFICANT CHANGE UP (ref 0.5–1.3)
GLUCOSE SERPL-MCNC: 215 MG/DL — HIGH (ref 70–99)
HCT VFR BLD CALC: 35 % — LOW (ref 39–50)
HGB BLD-MCNC: 10.9 G/DL — LOW (ref 13–17)
MAGNESIUM SERPL-MCNC: 1.8 MG/DL — SIGNIFICANT CHANGE UP (ref 1.6–2.6)
MCHC RBC-ENTMCNC: 30.9 PG — SIGNIFICANT CHANGE UP (ref 27–34)
MCHC RBC-ENTMCNC: 31.1 % — LOW (ref 32–36)
MCV RBC AUTO: 99.2 FL — SIGNIFICANT CHANGE UP (ref 80–100)
PHOSPHATE SERPL-MCNC: 3.2 MG/DL — SIGNIFICANT CHANGE UP (ref 2.5–4.5)
PLATELET # BLD AUTO: 309 K/UL — SIGNIFICANT CHANGE UP (ref 150–400)
PMV BLD: 10.6 FL — SIGNIFICANT CHANGE UP (ref 7–13)
POTASSIUM SERPL-MCNC: 4.1 MMOL/L — SIGNIFICANT CHANGE UP (ref 3.5–5.3)
POTASSIUM SERPL-SCNC: 4.1 MMOL/L — SIGNIFICANT CHANGE UP (ref 3.5–5.3)
RBC # BLD: 3.53 M/UL — LOW (ref 4.2–5.8)
RBC # FLD: 15 % — HIGH (ref 10.3–14.5)
SODIUM SERPL-SCNC: 134 MMOL/L — LOW (ref 135–145)
WBC # BLD: 12.75 K/UL — HIGH (ref 3.8–10.5)
WBC # FLD AUTO: 12.75 K/UL — HIGH (ref 3.8–10.5)

## 2017-05-17 RX ORDER — ENOXAPARIN SODIUM 100 MG/ML
40 INJECTION SUBCUTANEOUS DAILY
Qty: 0 | Refills: 0 | Status: DISCONTINUED | OUTPATIENT
Start: 2017-05-17 | End: 2017-05-26

## 2017-05-17 RX ORDER — INSULIN LISPRO 100/ML
VIAL (ML) SUBCUTANEOUS AT BEDTIME
Qty: 0 | Refills: 0 | Status: DISCONTINUED | OUTPATIENT
Start: 2017-05-17 | End: 2017-05-18

## 2017-05-17 RX ORDER — INSULIN LISPRO 100/ML
VIAL (ML) SUBCUTANEOUS
Qty: 0 | Refills: 0 | Status: DISCONTINUED | OUTPATIENT
Start: 2017-05-17 | End: 2017-05-18

## 2017-05-17 RX ORDER — INSULIN GLARGINE 100 [IU]/ML
6 INJECTION, SOLUTION SUBCUTANEOUS AT BEDTIME
Qty: 0 | Refills: 0 | Status: DISCONTINUED | OUTPATIENT
Start: 2017-05-17 | End: 2017-05-18

## 2017-05-17 RX ADMIN — Medication 2: at 12:46

## 2017-05-17 RX ADMIN — INSULIN GLARGINE 6 UNIT(S): 100 INJECTION, SOLUTION SUBCUTANEOUS at 23:56

## 2017-05-17 RX ADMIN — Medication 1 SPRAY(S): at 05:27

## 2017-05-17 RX ADMIN — HYDROMORPHONE HYDROCHLORIDE 0.5 MILLIGRAM(S): 2 INJECTION INTRAMUSCULAR; INTRAVENOUS; SUBCUTANEOUS at 18:42

## 2017-05-17 RX ADMIN — Medication 50 MICROGRAM(S): at 05:26

## 2017-05-17 RX ADMIN — Medication 1 SPRAY(S): at 23:57

## 2017-05-17 RX ADMIN — HYDROMORPHONE HYDROCHLORIDE 0.5 MILLIGRAM(S): 2 INJECTION INTRAMUSCULAR; INTRAVENOUS; SUBCUTANEOUS at 12:46

## 2017-05-17 RX ADMIN — CEFTRIAXONE 100 GRAM(S): 500 INJECTION, POWDER, FOR SOLUTION INTRAMUSCULAR; INTRAVENOUS at 23:56

## 2017-05-17 RX ADMIN — Medication 4: at 18:27

## 2017-05-17 RX ADMIN — HYDROMORPHONE HYDROCHLORIDE 0.5 MILLIGRAM(S): 2 INJECTION INTRAMUSCULAR; INTRAVENOUS; SUBCUTANEOUS at 13:01

## 2017-05-17 RX ADMIN — HYDROMORPHONE HYDROCHLORIDE 0.5 MILLIGRAM(S): 2 INJECTION INTRAMUSCULAR; INTRAVENOUS; SUBCUTANEOUS at 00:00

## 2017-05-17 RX ADMIN — ENOXAPARIN SODIUM 40 MILLIGRAM(S): 100 INJECTION SUBCUTANEOUS at 12:46

## 2017-05-17 RX ADMIN — Medication 4: at 06:28

## 2017-05-17 RX ADMIN — HYDROMORPHONE HYDROCHLORIDE 0.5 MILLIGRAM(S): 2 INJECTION INTRAMUSCULAR; INTRAVENOUS; SUBCUTANEOUS at 18:27

## 2017-05-17 RX ADMIN — HEPARIN SODIUM 5000 UNIT(S): 5000 INJECTION INTRAVENOUS; SUBCUTANEOUS at 05:27

## 2017-05-18 LAB
BUN SERPL-MCNC: 9 MG/DL — SIGNIFICANT CHANGE UP (ref 7–23)
CALCIUM SERPL-MCNC: 9.2 MG/DL — SIGNIFICANT CHANGE UP (ref 8.4–10.5)
CHLORIDE SERPL-SCNC: 93 MMOL/L — LOW (ref 98–107)
CO2 SERPL-SCNC: 25 MMOL/L — SIGNIFICANT CHANGE UP (ref 22–31)
CREAT SERPL-MCNC: 0.69 MG/DL — SIGNIFICANT CHANGE UP (ref 0.5–1.3)
GLUCOSE SERPL-MCNC: 305 MG/DL — HIGH (ref 70–99)
HCT VFR BLD CALC: 38.3 % — LOW (ref 39–50)
HGB BLD-MCNC: 12 G/DL — LOW (ref 13–17)
MAGNESIUM SERPL-MCNC: 1.8 MG/DL — SIGNIFICANT CHANGE UP (ref 1.6–2.6)
MCHC RBC-ENTMCNC: 30.8 PG — SIGNIFICANT CHANGE UP (ref 27–34)
MCHC RBC-ENTMCNC: 31.3 % — LOW (ref 32–36)
MCV RBC AUTO: 98.5 FL — SIGNIFICANT CHANGE UP (ref 80–100)
PHOSPHATE SERPL-MCNC: 3 MG/DL — SIGNIFICANT CHANGE UP (ref 2.5–4.5)
PLATELET # BLD AUTO: 446 K/UL — HIGH (ref 150–400)
PMV BLD: 10.4 FL — SIGNIFICANT CHANGE UP (ref 7–13)
POTASSIUM SERPL-MCNC: 4.2 MMOL/L — SIGNIFICANT CHANGE UP (ref 3.5–5.3)
POTASSIUM SERPL-SCNC: 4.2 MMOL/L — SIGNIFICANT CHANGE UP (ref 3.5–5.3)
RBC # BLD: 3.89 M/UL — LOW (ref 4.2–5.8)
RBC # FLD: 14.9 % — HIGH (ref 10.3–14.5)
SODIUM SERPL-SCNC: 132 MMOL/L — LOW (ref 135–145)
WBC # BLD: 11.53 K/UL — HIGH (ref 3.8–10.5)
WBC # FLD AUTO: 11.53 K/UL — HIGH (ref 3.8–10.5)

## 2017-05-18 PROCEDURE — 76000 FLUOROSCOPY <1 HR PHYS/QHP: CPT | Mod: 26

## 2017-05-18 PROCEDURE — 74000: CPT | Mod: 26

## 2017-05-18 PROCEDURE — 74220 X-RAY XM ESOPHAGUS 1CNTRST: CPT | Mod: 26

## 2017-05-18 PROCEDURE — 74177 CT ABD & PELVIS W/CONTRAST: CPT | Mod: 26

## 2017-05-18 RX ORDER — DEXTROSE 50 % IN WATER 50 %
25 SYRINGE (ML) INTRAVENOUS ONCE
Qty: 0 | Refills: 0 | Status: DISCONTINUED | OUTPATIENT
Start: 2017-05-18 | End: 2017-05-26

## 2017-05-18 RX ORDER — TETRACAINE/BENZOCAINE/BUTAMBEN 2%-14%-2%
1 OINTMENT (GRAM) TOPICAL ONCE
Qty: 0 | Refills: 0 | Status: COMPLETED | OUTPATIENT
Start: 2017-05-18 | End: 2017-05-18

## 2017-05-18 RX ORDER — DEXTROSE 50 % IN WATER 50 %
1 SYRINGE (ML) INTRAVENOUS ONCE
Qty: 0 | Refills: 0 | Status: DISCONTINUED | OUTPATIENT
Start: 2017-05-18 | End: 2017-05-19

## 2017-05-18 RX ORDER — INSULIN GLARGINE 100 [IU]/ML
10 INJECTION, SOLUTION SUBCUTANEOUS AT BEDTIME
Qty: 0 | Refills: 0 | Status: DISCONTINUED | OUTPATIENT
Start: 2017-05-18 | End: 2017-05-26

## 2017-05-18 RX ORDER — SODIUM CHLORIDE 9 MG/ML
1000 INJECTION, SOLUTION INTRAVENOUS
Qty: 0 | Refills: 0 | Status: DISCONTINUED | OUTPATIENT
Start: 2017-05-18 | End: 2017-05-19

## 2017-05-18 RX ORDER — DEXTROSE 50 % IN WATER 50 %
12.5 SYRINGE (ML) INTRAVENOUS ONCE
Qty: 0 | Refills: 0 | Status: DISCONTINUED | OUTPATIENT
Start: 2017-05-18 | End: 2017-05-19

## 2017-05-18 RX ORDER — GLUCAGON INJECTION, SOLUTION 0.5 MG/.1ML
1 INJECTION, SOLUTION SUBCUTANEOUS ONCE
Qty: 0 | Refills: 0 | Status: DISCONTINUED | OUTPATIENT
Start: 2017-05-18 | End: 2017-05-19

## 2017-05-18 RX ORDER — ONDANSETRON 8 MG/1
4 TABLET, FILM COATED ORAL ONCE
Qty: 0 | Refills: 0 | Status: COMPLETED | OUTPATIENT
Start: 2017-05-18 | End: 2017-05-18

## 2017-05-18 RX ORDER — SODIUM CHLORIDE 9 MG/ML
1000 INJECTION INTRAMUSCULAR; INTRAVENOUS; SUBCUTANEOUS
Qty: 0 | Refills: 0 | Status: DISCONTINUED | OUTPATIENT
Start: 2017-05-18 | End: 2017-05-19

## 2017-05-18 RX ORDER — INSULIN LISPRO 100/ML
VIAL (ML) SUBCUTANEOUS
Qty: 0 | Refills: 0 | Status: DISCONTINUED | OUTPATIENT
Start: 2017-05-18 | End: 2017-05-19

## 2017-05-18 RX ADMIN — INSULIN GLARGINE 10 UNIT(S): 100 INJECTION, SOLUTION SUBCUTANEOUS at 23:25

## 2017-05-18 RX ADMIN — Medication 1 SPRAY(S): at 23:26

## 2017-05-18 RX ADMIN — HYDROMORPHONE HYDROCHLORIDE 0.5 MILLIGRAM(S): 2 INJECTION INTRAMUSCULAR; INTRAVENOUS; SUBCUTANEOUS at 02:16

## 2017-05-18 RX ADMIN — HYDROMORPHONE HYDROCHLORIDE 0.5 MILLIGRAM(S): 2 INJECTION INTRAMUSCULAR; INTRAVENOUS; SUBCUTANEOUS at 17:44

## 2017-05-18 RX ADMIN — ENOXAPARIN SODIUM 40 MILLIGRAM(S): 100 INJECTION SUBCUTANEOUS at 11:36

## 2017-05-18 RX ADMIN — CEFTRIAXONE 100 GRAM(S): 500 INJECTION, POWDER, FOR SOLUTION INTRAMUSCULAR; INTRAVENOUS at 23:26

## 2017-05-18 RX ADMIN — HYDROMORPHONE HYDROCHLORIDE 0.5 MILLIGRAM(S): 2 INJECTION INTRAMUSCULAR; INTRAVENOUS; SUBCUTANEOUS at 02:46

## 2017-05-18 RX ADMIN — HYDROMORPHONE HYDROCHLORIDE 0.5 MILLIGRAM(S): 2 INJECTION INTRAMUSCULAR; INTRAVENOUS; SUBCUTANEOUS at 20:33

## 2017-05-18 RX ADMIN — HYDROMORPHONE HYDROCHLORIDE 0.5 MILLIGRAM(S): 2 INJECTION INTRAMUSCULAR; INTRAVENOUS; SUBCUTANEOUS at 10:31

## 2017-05-18 RX ADMIN — HYDROMORPHONE HYDROCHLORIDE 0.5 MILLIGRAM(S): 2 INJECTION INTRAMUSCULAR; INTRAVENOUS; SUBCUTANEOUS at 13:50

## 2017-05-18 RX ADMIN — Medication 1 SPRAY(S): at 06:48

## 2017-05-18 RX ADMIN — SODIUM CHLORIDE 100 MILLILITER(S): 9 INJECTION INTRAMUSCULAR; INTRAVENOUS; SUBCUTANEOUS at 10:31

## 2017-05-18 RX ADMIN — SODIUM CHLORIDE 100 MILLILITER(S): 9 INJECTION INTRAMUSCULAR; INTRAVENOUS; SUBCUTANEOUS at 23:23

## 2017-05-18 RX ADMIN — Medication 50 MICROGRAM(S): at 06:47

## 2017-05-18 RX ADMIN — Medication 4: at 20:33

## 2017-05-18 RX ADMIN — HYDROMORPHONE HYDROCHLORIDE 0.5 MILLIGRAM(S): 2 INJECTION INTRAMUSCULAR; INTRAVENOUS; SUBCUTANEOUS at 10:46

## 2017-05-18 RX ADMIN — HYDROMORPHONE HYDROCHLORIDE 0.5 MILLIGRAM(S): 2 INJECTION INTRAMUSCULAR; INTRAVENOUS; SUBCUTANEOUS at 21:00

## 2017-05-18 RX ADMIN — HYDROMORPHONE HYDROCHLORIDE 0.5 MILLIGRAM(S): 2 INJECTION INTRAMUSCULAR; INTRAVENOUS; SUBCUTANEOUS at 13:35

## 2017-05-18 RX ADMIN — Medication 6: at 11:36

## 2017-05-18 RX ADMIN — ONDANSETRON 4 MILLIGRAM(S): 8 TABLET, FILM COATED ORAL at 11:35

## 2017-05-18 NOTE — PROGRESS NOTE ADULT - SUBJECTIVE AND OBJECTIVE BOX
Surgical oncology (D Team) daily Progress note    S: Unclogged J tube with Central line dilator at approximately 00:00;    VITALS    T(C): 36.8, Max: 37.8 (05-17 @ 18:18)  HR: 87 (87 - 92)  BP: 148/78 (139/72 - 153/77)  RR: 18 (18 - 18)  SpO2: 94% (94% - 98%)  Wt(kg): --  I&O's Detail  I & Os for 24h ending 17 May 2017 07:00  =============================================  IN:    dextrose 5% + sodium chloride 0.45%.: 1200 ml    Glucerna: 780 ml    Total IN: 1980 ml  ---------------------------------------------  OUT:    Indwelling Catheter - Urethral: 2100 ml    Nasoenteral Tube: 300 ml    Bulb: 5 ml    Total OUT: 2405 ml  ---------------------------------------------  Total NET: -425 ml    I & Os for current day (as of 18 May 2017 02:36)  =============================================  IN:    dextrose 5% + sodium chloride 0.45%.: 1100 ml    Glucerna: 715 ml    Total IN: 1815 ml  ---------------------------------------------  OUT:    Voided: 650 ml    Nasoenteral Tube: 200 ml    Bulb: 12 ml    Total OUT: 862 ml  ---------------------------------------------  Total NET: 953 ml      PHYSICAL EXAM    Gen:  Abdominal: Surgical oncology (D Team) daily Progress note    S: Unclogged J tube with Central line dilator at approximately 00:00. Patient reports nocturia since Abarca removed, causing him poor sleep overnight. He follows up with Dr. Conway as an outpatient.     VITALS    T(C): 36.8, Max: 37.8 (05-17 @ 18:18)  HR: 87 (87 - 92)  BP: 148/78 (139/72 - 153/77)  RR: 18 (18 - 18)  SpO2: 94% (94% - 98%)  Wt(kg): --  I&O's Detail  I & Os for 24h ending 17 May 2017 07:00  =============================================  IN:    dextrose 5% + sodium chloride 0.45%.: 1200 ml    Glucerna: 780 ml    Total IN: 1980 ml  ---------------------------------------------  OUT:    Indwelling Catheter - Urethral: 2100 ml    Nasoenteral Tube: 300 ml    Bulb: 5 ml    Total OUT: 2405 ml  ---------------------------------------------  Total NET: -425 ml    I & Os for current day (as of 18 May 2017 02:36)  =============================================  IN:    dextrose 5% + sodium chloride 0.45%.: 1100 ml    Glucerna: 715 ml    Total IN: 1815 ml  ---------------------------------------------  OUT:    Voided: 650 ml    Nasoenteral Tube: 200 ml    Bulb: 12 ml    Total OUT: 862 ml  ---------------------------------------------  Total NET: 953 ml      PHYSICAL EXAM    Gen: NAD, AOx3.  Abdominal: Soft, NT, ND. Incisions healing well, c/d/i. MICH with serosanguinous drainage.

## 2017-05-18 NOTE — CONSULT NOTE ADULT - SUBJECTIVE AND OBJECTIVE BOX
HPI  66M with stage 3 gastric cancer s/p chemo (3/2017) POD#6 s/p s/p total gastrectomy, minh-en-Y, splenectomy, distal pancreatectomy reports increasing urinary frequency since joyner removed. Also endorses urgency and nocturia. Denies hematuria, dysuria, incomplete emptying, fevers/chills. NPO and reports +flatus/bowel movement.   Last seen by Dr. Conway 12/2016 for elevated PSA of 4.2 with no previous PSA. Reported nocturia at that time.  exam revealed 40 gram prostate with induration on the right and MRI prostate recommended at that time.     PAST MEDICAL & SURGICAL HISTORY:  Iron deficiency anemia  Gastric mass: ulcerated mass in gastric cardia with small perigastric nodes on endoscopy.  Hypothyroidism  Type 2 diabetes mellitus   Essential hypertension  Port-a-cath in place: right chest wall  H/O umbilical hernia repair      MEDICATIONS  (STANDING):  levothyroxine Injectable 50MICROGram(s) IV Push daily  cefTRIAXone   IVPB 1Gram(s) IV Intermittent every 24 hours  enoxaparin Injectable 40milliGRAM(s) SubCutaneous daily  insulin glargine Injectable (LANTUS) 10Unit(s) SubCutaneous at bedtime  insulin lispro (HumaLOG) corrective regimen sliding scale  SubCutaneous three times a day before meals      MEDICATIONS  (PRN):  HYDROmorphone  Injectable 0.5milliGRAM(s) IV Push every 2 hours PRN Moderate and Severe Pain      FAMILY HISTORY: No history of prostate/kidney/bladder cancer   Family history of ischemic heart disease (IHD) (Father): father  Family history of ovarian cancer (Mother): mother      Allergies: No Known Allergies    SOCIAL HISTORY: Non-smoker    REVIEW OF SYSTEMS: Otherwise negative as stated in HPI    Physical Exam  Vital signs  T(C): 36.5, Max: 37.8 (05-17 @ 18:18)   HR: 81   BP: 144/84    SpO2: 96%    Output  I&O's Summary  I & Os for 24h ending 18 May 2017 07:00  =============================================  IN: 1815 ml / OUT: 1172 ml / NET: 643 ml    I & Os for current day (as of 18 May 2017 12:36)  =============================================  IN: 0 ml / OUT: 225 ml / NET: -225 ml    UOP: 225 cc     Gen:  [X] NAD [] toxic    Pulm:  [X] no resp distress [] no substernal retractions  	    GI: soft, approp tender, incision c/d/i,  J tube capped, no suprapubic fullness, MICH secure     :  Glans Circumcised [x]Y  []N, []lesions: none  Meatus Discharge []Y  [x] N,  Blood []Y x[] N  Testes  Descended []Y  []N,    Tender []Y  [x]N,   Epididymis Tender  []Y []N,    Cremasteric []Y  []N  Did not want rectal exam at this time. Previous ROSALIA January 2016 as indicated                        	  MSK:  Edema []Y [x]N    LABS:                        12.0   11.53 )-----------( 446      ( 18 May 2017 06:31 )             38.3     05-18    132<L>  |  93<L>  |  9   ----------------------------<  305<H>  4.2   |  25  |  0.69    Ca    9.2      18 May 2017 06:31  Phos  3.0     05-18  Mg     1.8     05-18      Urine Cx: negative  Blood Cx: prelim negative     RADIOLOGY:  CT abd/pelvis (4/17): prostate enlarged measuring 5.3 x 5.8 cm and indents the bladder base, small right posterior lateral bladder diverticulum

## 2017-05-18 NOTE — PROGRESS NOTE ADULT - SUBJECTIVE AND OBJECTIVE BOX
CHIEF COMPLAINT:Patient is a 66y old  Male who presents with a chief complaint of "they are removing a tumor from my stomach" (02 May 2017 09:58)    	      No Known Allergies      PAST MEDICAL & SURGICAL HISTORY:  Iron deficiency anemia, unspecified iron deficiency anemia type  Gastric mass: ulcerated mass in gastric cardia with small perigastric nodes on endoscopy.  Hypothyroidism  Type 2 diabetes mellitus without complication, unspecified long term insulin use status: no BS monitoring  Essential hypertension: was on losartan  Port-a-cath in place: right chest wall  H/O umbilical hernia repair      FAMILY HISTORY:  Family history of ischemic heart disease (IHD) (Father): father  Family history of ovarian cancer (Mother): mother      REVIEW OF SYSTEMS:  CONSTITUTIONAL: No fever, weight loss, or fatigue  EYES: No eye pain, visual disturbances, or discharge  NECK: No pain or stiffness  RESPIRATORY: No cough, wheezing, chills or hemoptysis; No Shortness of Breath  CARDIOVASCULAR: No chest pain, palpitations, passing out, dizziness, or leg swelling  GASTROINTESTINAL: No abdominal or epigastric pain. Reports nausea and 1 episode of vomiting, no hematemesis; No diarrhea or constipation. No melena or hematochezia.  GENITOURINARY: No dysuria, hematuria, or incontinence, Reports urinary frequency and hesitancy  NEUROLOGICAL: No headaches, memory loss, loss of strength, numbness, or tremors  SKIN: No itching, burning, rashes, or lesions   LYMPH Nodes: No enlarged glands  ENDOCRINE: No heat or cold intolerance; No hair loss  MUSCULOSKELETAL: No joint pain or swelling; No muscle, back, or extremity pain    Medications:  MEDICATIONS  (STANDING):  levothyroxine Injectable 50MICROGram(s) IV Push daily  cefTRIAXone   IVPB 1Gram(s) IV Intermittent every 24 hours  cefTRIAXone   IVPB  IV Intermittent   enoxaparin Injectable 40milliGRAM(s) SubCutaneous daily  sodium chloride 0.9%. 1000milliLiter(s) IV Continuous <Continuous>  insulin glargine Injectable (LANTUS) 10Unit(s) SubCutaneous at bedtime  insulin lispro (HumaLOG) corrective regimen sliding scale  SubCutaneous three times a day before meals  dextrose 5%. 1000milliLiter(s) IV Continuous <Continuous>  dextrose 50% Injectable 12.5Gram(s) IV Push once  dextrose 50% Injectable 25Gram(s) IV Push once  dextrose 50% Injectable 25Gram(s) IV Push once    MEDICATIONS  (PRN):  HYDROmorphone  Injectable 0.5milliGRAM(s) IV Push every 2 hours PRN Moderate and Severe Pain  dextrose Gel 1Dose(s) Oral once PRN Blood Glucose LESS THAN 70 milliGRAM(s)/deciliter  glucagon  Injectable 1milliGRAM(s) IntraMuscular once PRN Glucose LESS THAN 70 milligrams/deciliter    	    PHYSICAL EXAM:  T(C): 36.9, Max: 37.5 (05-18 @ 12:54)  HR: 94 (81 - 94)  BP: 131/64 (131/64 - 163/78)  RR: 18 (18 - 19)  SpO2: 94% (92% - 96%)  Wt(kg): --  I&O's Summary  I & Os for 24h ending 18 May 2017 07:00  =============================================  IN: 1815 ml / OUT: 1172 ml / NET: 643 ml    I & Os for current day (as of 18 May 2017 20:42)  =============================================  IN: 0 ml / OUT: 485 ml / NET: -485 ml      Appearance: Normal	  HEENT:   Normal oral mucosa, PERRL, EOMI	  Lymphatic: No lymphadenopathy  Cardiovascular: Normal S1 S2, No JVD, No murmurs, No edema  Respiratory: Lungs clear to auscultation	  Psychiatry: A & O x 3, Mood & affect appropriate  Gastrointestinal:  Soft, mildly tender over surgery site, + BS	  Skin: No rashes, No ecchymoses, No cyanosis	  Neurologic: Non-focal  Extremities: Normal range of motion, No clubbing, cyanosis or edema  Vascular: Peripheral pulses palpable 2+ bilaterally                              12.0   11.53 )-----------( 446      ( 18 May 2017 06:31 )             38.3     05-18    132<L>  |  93<L>  |  9   ----------------------------<  305<H>  4.2   |  25  |  0.69    Ca    9.2      18 May 2017 06:31  Phos  3.0     05-18  Mg     1.8     05-18

## 2017-05-18 NOTE — PROGRESS NOTE ADULT - ASSESSMENT
67 yo M with HTN, DM2, hypothyroidism, gastric CA s/p resection, now with UTI and urinary frequency  - Gastric CA s/p resection - NGT removed today, reporting nausea and vomiting x1, would start Reglan if ok from surgical standpoint, continue pain control and incentive spirometry  - UTI - patient to complete 7 days of Ceftriaxone, currently afebrile, WBC improving  - Urinary frequency and hesitancy - possibly BPH, follow with Urology, consider Flomax  - DM2 - will continue current Insuling regimen, monitor FS closely as pt is off tube feeds and may have not good PO intake, hold Lantus if FS <150  - HTN - BP at goal, monitor  - Hypothyroidism - continue Synthroid.

## 2017-05-18 NOTE — PROGRESS NOTE ADULT - ASSESSMENT
66M p/w gastric adenocarcinoma s/p total gastrectomy, splenectomy, distal pancreatectomy, feeding J tube  - AM labs  - f/u I/O  - OOB  - c/w Tube feeds 66M p/w gastric adenocarcinoma s/p total gastrectomy, splenectomy, distal pancreatectomy, feeding J tube   - PO esophagram today indicating no leak  - c/w NPO since patient with emesis post PO contrast  - f/u FS, will increase lantus  - Appreciate uro recommendations; will restart flomax when no longer NPO

## 2017-05-18 NOTE — CONSULT NOTE ADULT - ASSESSMENT
66M with stage 3 gastric cancer s/p chemo (3/2017) POD#6 s/p s/p total gastrectomy, minh-en-Y, splenectomy, distal pancreatectomy with urinary frequency, previously seen by Dr. Conway for elevated PSA.   -Post void residual 75 cc   -Urine culture negative   -Flomax when no longer NPO    -Follow up with Dr. Conway

## 2017-05-19 LAB
ALBUMIN SERPL ELPH-MCNC: 2.4 G/DL — LOW (ref 3.3–5)
ALP SERPL-CCNC: 121 U/L — HIGH (ref 40–120)
ALT FLD-CCNC: 28 U/L — SIGNIFICANT CHANGE UP (ref 4–41)
AST SERPL-CCNC: 19 U/L — SIGNIFICANT CHANGE UP (ref 4–40)
BACTERIA BLD CULT: SIGNIFICANT CHANGE UP
BACTERIA BLD CULT: SIGNIFICANT CHANGE UP
BILIRUB SERPL-MCNC: 0.3 MG/DL — SIGNIFICANT CHANGE UP (ref 0.2–1.2)
BUN SERPL-MCNC: 17 MG/DL — SIGNIFICANT CHANGE UP (ref 7–23)
CALCIUM SERPL-MCNC: 8.7 MG/DL — SIGNIFICANT CHANGE UP (ref 8.4–10.5)
CHLORIDE SERPL-SCNC: 100 MMOL/L — SIGNIFICANT CHANGE UP (ref 98–107)
CO2 SERPL-SCNC: 26 MMOL/L — SIGNIFICANT CHANGE UP (ref 22–31)
CREAT SERPL-MCNC: 0.78 MG/DL — SIGNIFICANT CHANGE UP (ref 0.5–1.3)
GLUCOSE SERPL-MCNC: 150 MG/DL — HIGH (ref 70–99)
HCT VFR BLD CALC: 33.2 % — LOW (ref 39–50)
HGB BLD-MCNC: 10.3 G/DL — LOW (ref 13–17)
MAGNESIUM SERPL-MCNC: 1.9 MG/DL — SIGNIFICANT CHANGE UP (ref 1.6–2.6)
MCHC RBC-ENTMCNC: 30.8 PG — SIGNIFICANT CHANGE UP (ref 27–34)
MCHC RBC-ENTMCNC: 31 % — LOW (ref 32–36)
MCV RBC AUTO: 99.4 FL — SIGNIFICANT CHANGE UP (ref 80–100)
PHOSPHATE SERPL-MCNC: 3.7 MG/DL — SIGNIFICANT CHANGE UP (ref 2.5–4.5)
PLATELET # BLD AUTO: 480 K/UL — HIGH (ref 150–400)
PMV BLD: 10.7 FL — SIGNIFICANT CHANGE UP (ref 7–13)
POTASSIUM SERPL-MCNC: 4.3 MMOL/L — SIGNIFICANT CHANGE UP (ref 3.5–5.3)
POTASSIUM SERPL-SCNC: 4.3 MMOL/L — SIGNIFICANT CHANGE UP (ref 3.5–5.3)
PROT SERPL-MCNC: 5.7 G/DL — LOW (ref 6–8.3)
RBC # BLD: 3.34 M/UL — LOW (ref 4.2–5.8)
RBC # FLD: 15.1 % — HIGH (ref 10.3–14.5)
SODIUM SERPL-SCNC: 137 MMOL/L — SIGNIFICANT CHANGE UP (ref 135–145)
WBC # BLD: 15.95 K/UL — HIGH (ref 3.8–10.5)
WBC # FLD AUTO: 15.95 K/UL — HIGH (ref 3.8–10.5)

## 2017-05-19 RX ORDER — INSULIN LISPRO 100/ML
VIAL (ML) SUBCUTANEOUS EVERY 6 HOURS
Qty: 0 | Refills: 0 | Status: DISCONTINUED | OUTPATIENT
Start: 2017-05-19 | End: 2017-05-22

## 2017-05-19 RX ORDER — CEFTRIAXONE 500 MG/1
1 INJECTION, POWDER, FOR SOLUTION INTRAMUSCULAR; INTRAVENOUS EVERY 24 HOURS
Qty: 0 | Refills: 0 | Status: DISCONTINUED | OUTPATIENT
Start: 2017-05-19 | End: 2017-05-20

## 2017-05-19 RX ORDER — DEXTROSE MONOHYDRATE, SODIUM CHLORIDE, AND POTASSIUM CHLORIDE 50; .745; 4.5 G/1000ML; G/1000ML; G/1000ML
1000 INJECTION, SOLUTION INTRAVENOUS
Qty: 0 | Refills: 0 | Status: DISCONTINUED | OUTPATIENT
Start: 2017-05-19 | End: 2017-05-23

## 2017-05-19 RX ADMIN — HYDROMORPHONE HYDROCHLORIDE 0.5 MILLIGRAM(S): 2 INJECTION INTRAMUSCULAR; INTRAVENOUS; SUBCUTANEOUS at 07:25

## 2017-05-19 RX ADMIN — Medication: at 23:48

## 2017-05-19 RX ADMIN — HYDROMORPHONE HYDROCHLORIDE 0.5 MILLIGRAM(S): 2 INJECTION INTRAMUSCULAR; INTRAVENOUS; SUBCUTANEOUS at 06:51

## 2017-05-19 RX ADMIN — INSULIN GLARGINE 10 UNIT(S): 100 INJECTION, SOLUTION SUBCUTANEOUS at 23:47

## 2017-05-19 RX ADMIN — HYDROMORPHONE HYDROCHLORIDE 0.5 MILLIGRAM(S): 2 INJECTION INTRAMUSCULAR; INTRAVENOUS; SUBCUTANEOUS at 19:00

## 2017-05-19 RX ADMIN — ENOXAPARIN SODIUM 40 MILLIGRAM(S): 100 INJECTION SUBCUTANEOUS at 11:46

## 2017-05-19 RX ADMIN — Medication 50 MICROGRAM(S): at 06:51

## 2017-05-19 RX ADMIN — HYDROMORPHONE HYDROCHLORIDE 0.5 MILLIGRAM(S): 2 INJECTION INTRAMUSCULAR; INTRAVENOUS; SUBCUTANEOUS at 18:29

## 2017-05-19 RX ADMIN — DEXTROSE MONOHYDRATE, SODIUM CHLORIDE, AND POTASSIUM CHLORIDE 75 MILLILITER(S): 50; .745; 4.5 INJECTION, SOLUTION INTRAVENOUS at 20:08

## 2017-05-19 RX ADMIN — CEFTRIAXONE 100 GRAM(S): 500 INJECTION, POWDER, FOR SOLUTION INTRAMUSCULAR; INTRAVENOUS at 21:45

## 2017-05-19 NOTE — PROGRESS NOTE ADULT - SUBJECTIVE AND OBJECTIVE BOX
Procedure: Total gastrectomy, splenectomy, feeding j-tube  Post operative date: 7    S: No acute overnight events. Feeling improved today.     VITAL SIGNS: T(C): 37, Max: 37 (05-19 @ 20:25)  HR: 84 (77 - 87)  BP: 116/63 (112/62 - 134/73)  RR: 18 (16 - 18)  SpO2: 93% (93% - 95%)  Wt(kg): --      I+Os: I&O's Summary  I & Os for 24h ending 19 May 2017 07:00  =============================================  IN: 0 ml / OUT: 1115 ml / NET: -1115 ml    I & Os for current day (as of 19 May 2017 20:35)  =============================================  IN: 0 ml / OUT: 915 ml / NET: -915 ml    Labs:                         10.3   15.95 )-----------( 480      ( 19 May 2017 06:10 )             33.2      05-19    137  |  100  |  17  ----------------------------<  150<H>  4.3   |  26  |  0.78    Ca    8.7      19 May 2017 06:19  Phos  3.7     05-19  Mg     1.9     05-19    TPro  5.7<L>  /  Alb  2.4<L>  /  TBili  0.3  /  DBili  x   /  AST  19  /  ALT  28  /  AlkPhos  121<H>  05-19    PHYSICAL EXAM    Gen: NAD, AOx3.  Abdominal: Soft, NT, ND. Incisions healing well, c/d/i. MICH with serosanguinous drainage    MEDICATIONS:  levothyroxine Injectable 50MICROGram(s) IV Push daily  HYDROmorphone  Injectable 0.5milliGRAM(s) IV Push every 2 hours PRN  enoxaparin Injectable 40milliGRAM(s) SubCutaneous daily  insulin glargine Injectable (LANTUS) 10Unit(s) SubCutaneous at bedtime  dextrose 50% Injectable 25Gram(s) IV Push once  dextrose 50% Injectable 25Gram(s) IV Push once  insulin lispro (HumaLOG) corrective regimen sliding scale  SubCutaneous every 6 hours  dextrose 5% + sodium chloride 0.45% with potassium chloride 20 mEq/L 1000milliLiter(s) IV Continuous <Continuous>  cefTRIAXone   IVPB 1Gram(s) IV Intermittent every 24 hours      IMAGING STUDIES:

## 2017-05-19 NOTE — PROGRESS NOTE ADULT - SUBJECTIVE AND OBJECTIVE BOX
CHIEF COMPLAINt pt w/ ng tube  feels ok   no cp or sob    no n/v   no chills      	      No Known Allergies      PAST MEDICAL & SURGICAL HISTORY:  Iron deficiency anemia, unspecified iron deficiency anemia type  Gastric mass: ulcerated mass in gastric cardia with small perigastric nodes on endoscopy.  Hypothyroidism  Type 2 diabetes mellitus without complication, unspecified long term insulin use status: no BS monitoring  Essential hypertension: was on losartan  Port-a-cath in place: right chest wall  H/O umbilical hernia repair      FAMILY HISTORY:  Family history of ischemic heart disease (IHD) (Father): father  Family history of ovarian cancer (Mother): mother      REVIEW OF SYSTEMS:  CONSTITUTIONAL: No fever, weight loss, or fatigue  EYES: No eye pain, visual disturbances, or discharge  NECK: No pain or stiffness  RESPIRATORY: No cough, wheezing, chills or hemoptysis; No Shortness of Breath  CARDIOVASCULAR: No chest pain, palpitations, passing out, dizziness, or leg swelling  GASTROINTESTINAL: No abdominal or epigastric pain. No nausea, vomiting, or hematemesis; No diarrhea or constipation. No melena or hematochezia.  GENITOURINARY: No dysuria, frequency, hematuria, or incontinence  NEUROLOGICAL: No headaches, memory loss, loss of strength, numbness, or tremors  SKIN: No itching, burning, rashes, or lesions   LYMPH Nodes: No enlarged glands  ENDOCRINE: No heat or cold intolerance; No hair loss  MUSCULOSKELETAL: No joint pain or swelling; No muscle, back, or extremity pain    Medications:  MEDICATIONS  (STANDING):  levothyroxine Injectable 50MICROGram(s) IV Push daily  cefTRIAXone   IVPB 1Gram(s) IV Intermittent every 24 hours  cefTRIAXone   IVPB  IV Intermittent   enoxaparin Injectable 40milliGRAM(s) SubCutaneous daily  sodium chloride 0.9%. 1000milliLiter(s) IV Continuous <Continuous>  insulin glargine Injectable (LANTUS) 10Unit(s) SubCutaneous at bedtime  dextrose 50% Injectable 25Gram(s) IV Push once  dextrose 50% Injectable 25Gram(s) IV Push once  insulin lispro (HumaLOG) corrective regimen sliding scale  SubCutaneous every 6 hours    MEDICATIONS  (PRN):  HYDROmorphone  Injectable 0.5milliGRAM(s) IV Push every 2 hours PRN Moderate and Severe Pain    	    PHYSICAL EXAM:  T(C): 36.8, Max: 37.5 (05-18 @ 12:54)  HR: 82 (77 - 94)  BP: 121/71 (112/62 - 163/78)  RR: 16 (16 - 19)  SpO2: 94% (92% - 94%)  Wt(kg): --  I&O's Summary    I & Os for current day (as of 19 May 2017 09:10)  =============================================  IN: 0 ml / OUT: 1115 ml / NET: -1115 ml      Appearance: Normal	  HEENT:   Normal oral mucosa, PERRL, EOMI	ng in place   Lymphatic: No lymphadenopathy  Cardiovascular: Normal S1 S2, No JVD, No murmurs, No edema  Respiratory: Lungs clear to auscultation	  Psychiatry: A & O x 3, Mood & affect appropriate  Gastrointestinal:  Soft, Non-tender, + BS	  Skin: No rashes, No ecchymoses, No cyanosis	  Neurologic: Non-focal  Extremities: Normal range of motion, No clubbing, cyanosis or edema  Vascular: Peripheral pulses palpable 2+ bilaterally    TELEMETRY: 	    ECG:  	  RADIOLOGY:  OTHER: 	  	  LABS:	 	    CARDIAC MARKERS:                                10.3   15.95 )-----------( 480      ( 19 May 2017 06:10 )             33.2     05-19    137  |  100  |  17  ----------------------------<  150<H>  4.3   |  26  |  0.78    Ca    8.7      19 May 2017 06:19  Phos  3.7     05-19  Mg     1.9     05-19    TPro  5.7<L>  /  Alb  2.4<L>  /  TBili  0.3  /  DBili  x   /  AST  19  /  ALT  28  /  AlkPhos  121<H>  05-19    proBNP:   Lipid Profile:   HgA1c:   TSH:

## 2017-05-19 NOTE — PROGRESS NOTE ADULT - ASSESSMENT
· Assessment	  65 yo M with HTN, DM2, hypothyroidism, gastric CA s/p resection,  - Gastric CA s/p resection - ng reinserted ,    continue pain control and incentive spirometry  - UTi  complete 7 days of Ceftriaxone, currently afebrile,   -   - DM2 - will continue current Insulin regimen, monitor FS / hold Lantus if FS <150  - HTN - BP at goal, monitor  - Hypothyroidism - continue Synthroid  dvt proph  inc spirometry   oob

## 2017-05-19 NOTE — PROGRESS NOTE ADULT - ASSESSMENT
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 Lutz

## 2017-05-20 LAB
ALBUMIN SERPL ELPH-MCNC: 2.6 G/DL — LOW (ref 3.3–5)
ALP SERPL-CCNC: 118 U/L — SIGNIFICANT CHANGE UP (ref 40–120)
ALT FLD-CCNC: 26 U/L — SIGNIFICANT CHANGE UP (ref 4–41)
AST SERPL-CCNC: 23 U/L — SIGNIFICANT CHANGE UP (ref 4–40)
BACTERIA BLD CULT: SIGNIFICANT CHANGE UP
BACTERIA BLD CULT: SIGNIFICANT CHANGE UP
BILIRUB SERPL-MCNC: 0.2 MG/DL — SIGNIFICANT CHANGE UP (ref 0.2–1.2)
BUN SERPL-MCNC: 15 MG/DL — SIGNIFICANT CHANGE UP (ref 7–23)
CALCIUM SERPL-MCNC: 8.4 MG/DL — SIGNIFICANT CHANGE UP (ref 8.4–10.5)
CHLORIDE SERPL-SCNC: 103 MMOL/L — SIGNIFICANT CHANGE UP (ref 98–107)
CO2 SERPL-SCNC: 26 MMOL/L — SIGNIFICANT CHANGE UP (ref 22–31)
CREAT SERPL-MCNC: 0.63 MG/DL — SIGNIFICANT CHANGE UP (ref 0.5–1.3)
GLUCOSE SERPL-MCNC: 178 MG/DL — HIGH (ref 70–99)
HCT VFR BLD CALC: 32.5 % — LOW (ref 39–50)
HGB BLD-MCNC: 10.1 G/DL — LOW (ref 13–17)
MCHC RBC-ENTMCNC: 30.9 PG — SIGNIFICANT CHANGE UP (ref 27–34)
MCHC RBC-ENTMCNC: 31.1 % — LOW (ref 32–36)
MCV RBC AUTO: 99.4 FL — SIGNIFICANT CHANGE UP (ref 80–100)
PLATELET # BLD AUTO: 510 K/UL — HIGH (ref 150–400)
PMV BLD: 10.3 FL — SIGNIFICANT CHANGE UP (ref 7–13)
POTASSIUM SERPL-MCNC: 4.2 MMOL/L — SIGNIFICANT CHANGE UP (ref 3.5–5.3)
POTASSIUM SERPL-SCNC: 4.2 MMOL/L — SIGNIFICANT CHANGE UP (ref 3.5–5.3)
PROT SERPL-MCNC: 5.8 G/DL — LOW (ref 6–8.3)
RBC # BLD: 3.27 M/UL — LOW (ref 4.2–5.8)
RBC # FLD: 15.1 % — HIGH (ref 10.3–14.5)
SODIUM SERPL-SCNC: 140 MMOL/L — SIGNIFICANT CHANGE UP (ref 135–145)
WBC # BLD: 10.09 K/UL — SIGNIFICANT CHANGE UP (ref 3.8–10.5)
WBC # FLD AUTO: 10.09 K/UL — SIGNIFICANT CHANGE UP (ref 3.8–10.5)

## 2017-05-20 RX ADMIN — HYDROMORPHONE HYDROCHLORIDE 0.5 MILLIGRAM(S): 2 INJECTION INTRAMUSCULAR; INTRAVENOUS; SUBCUTANEOUS at 08:28

## 2017-05-20 RX ADMIN — HYDROMORPHONE HYDROCHLORIDE 0.5 MILLIGRAM(S): 2 INJECTION INTRAMUSCULAR; INTRAVENOUS; SUBCUTANEOUS at 17:45

## 2017-05-20 RX ADMIN — HYDROMORPHONE HYDROCHLORIDE 0.5 MILLIGRAM(S): 2 INJECTION INTRAMUSCULAR; INTRAVENOUS; SUBCUTANEOUS at 08:58

## 2017-05-20 RX ADMIN — ENOXAPARIN SODIUM 40 MILLIGRAM(S): 100 INJECTION SUBCUTANEOUS at 12:33

## 2017-05-20 RX ADMIN — Medication 50 MICROGRAM(S): at 05:38

## 2017-05-20 RX ADMIN — HYDROMORPHONE HYDROCHLORIDE 0.5 MILLIGRAM(S): 2 INJECTION INTRAMUSCULAR; INTRAVENOUS; SUBCUTANEOUS at 18:15

## 2017-05-20 RX ADMIN — Medication 2: at 06:07

## 2017-05-20 RX ADMIN — INSULIN GLARGINE 10 UNIT(S): 100 INJECTION, SOLUTION SUBCUTANEOUS at 22:59

## 2017-05-20 RX ADMIN — DEXTROSE MONOHYDRATE, SODIUM CHLORIDE, AND POTASSIUM CHLORIDE 75 MILLILITER(S): 50; .745; 4.5 INJECTION, SOLUTION INTRAVENOUS at 20:51

## 2017-05-20 RX ADMIN — Medication 2: at 17:47

## 2017-05-20 NOTE — PROGRESS NOTE ADULT - ASSESSMENT
67 yo M with HTN, DM2, hypothyroidism, gastric CA s/p resection,  - Gastric CA s/p resection - ng as per sx   continue pain control and incentive spirometry  - UTi  culture neg  d/c abs  leukocytosis resolved  -   - DM2 - will continue current Insulin regimen, monitor FS / hold Lantus if FS <150  - HTN - BP at goal, monitor  - Hypothyroidism - continue Synthroid  dvt proph  inc spirometry   oob

## 2017-05-20 NOTE — PROGRESS NOTE ADULT - ASSESSMENT
66M p/w gastric adenocarcinoma s/p total gastrectomy, splenectomy, distal pancreatectomy, feeding J tube  - NPO / NJ tube  - c/w tube feeds  - Ceftriaxone x 7 days for UTI per Dr. Lutz  - NATALYA  - f/u I/O 66M p/w gastric adenocarcinoma s/p total gastrectomy, splenectomy, distal pancreatectomy, feeding J tube now POD 8  - NPO / NJ tube  - increase TF to 40ml / hour  - UCx neg; d/c-ed abx today  - OOB / Ambulate / IS  - Pt seen and examined with Dr. Worley

## 2017-05-20 NOTE — PROGRESS NOTE ADULT - SUBJECTIVE AND OBJECTIVE BOX
CHIEF COMPLAINT:Patient is a 66y old  Male who presents with a chief complaint of "they are removing a tumor from my stomach" (02 May 2017 09:58)    	      No Known Allergies      PAST MEDICAL & SURGICAL HISTORY:  Iron deficiency anemia, unspecified iron deficiency anemia type  Gastric mass: ulcerated mass in gastric cardia with small perigastric nodes on endoscopy.  Hypothyroidism  Type 2 diabetes mellitus without complication, unspecified long term insulin use status: no BS monitoring  Essential hypertension: was on losartan  Port-a-cath in place: right chest wall  H/O umbilical hernia repair      FAMILY HISTORY:  Family history of ischemic heart disease (IHD) (Father): father  Family history of ovarian cancer (Mother): mother      REVIEW OF SYSTEMS:  CONSTITUTIONAL: No fever, weight loss, or fatigue  EYES: No eye pain, visual disturbances, or discharge  NECK: No pain or stiffness  RESPIRATORY: No cough, wheezing, chills or hemoptysis; No Shortness of Breath  CARDIOVASCULAR: No chest pain, palpitations, passing out, dizziness, or leg swelling  GASTROINTESTINAL: No abdominal or epigastric pain. No nausea, vomiting, or hematemesis; No diarrhea or constipation. No melena or hematochezia.  GENITOURINARY: No dysuria, frequency, hematuria, or incontinence  NEUROLOGICAL: No headaches, memory loss, loss of strength, numbness, or tremors  SKIN: No itching, burning, rashes, or lesions   LYMPH Nodes: No enlarged glands  ENDOCRINE: No heat or cold intolerance; No hair loss  MUSCULOSKELETAL: No joint pain or swelling; No muscle, back, or extremity pain    Medications:  MEDICATIONS  (STANDING):  levothyroxine Injectable 50MICROGram(s) IV Push daily  enoxaparin Injectable 40milliGRAM(s) SubCutaneous daily  insulin glargine Injectable (LANTUS) 10Unit(s) SubCutaneous at bedtime  dextrose 50% Injectable 25Gram(s) IV Push once  dextrose 50% Injectable 25Gram(s) IV Push once  insulin lispro (HumaLOG) corrective regimen sliding scale  SubCutaneous every 6 hours  dextrose 5% + sodium chloride 0.45% with potassium chloride 20 mEq/L 1000milliLiter(s) IV Continuous <Continuous>    MEDICATIONS  (PRN):  HYDROmorphone  Injectable 0.5milliGRAM(s) IV Push every 2 hours PRN Moderate and Severe Pain    	    PHYSICAL EXAM:  T(C): 36.9, Max: 37 (05-19 @ 20:25)  HR: 74 (74 - 87)  BP: 149/76 (116/63 - 149/76)  RR: 18 (16 - 18)  SpO2: 93% (93% - 96%)  Wt(kg): --  I&O's Summary    I & Os for current day (as of 20 May 2017 09:21)  =============================================  IN: 1310 ml / OUT: 1280 ml / NET: 30 ml      Appearance: Normal	  HEENT:   Normal oral mucosa, PERRL, EOMI	  Lymphatic: No lymphadenopathy  Cardiovascular: Normal S1 S2, No JVD, No murmurs, No edema  Respiratory: Lungs clear to auscultation	  Psychiatry: A & O x 3, Mood & affect appropriate  Gastrointestinal:  Soft, Non-tender, + BS	  Skin: No rashes, No ecchymoses, No cyanosis	  Neurologic: Non-focal  Extremities: Normal range of motion, No clubbing, cyanosis or edema  Vascular: Peripheral pulses palpable 2+ bilaterally    TELEMETRY: 	    ECG:  	  RADIOLOGY:  OTHER: 	  	  LABS:	 	    CARDIAC MARKERS:                                10.1   10.09 )-----------( 510      ( 20 May 2017 06:33 )             32.5     05-20    140  |  103  |  15  ----------------------------<  178<H>  4.2   |  26  |  0.63    Ca    8.4      20 May 2017 06:33  Phos  3.7     05-19  Mg     1.9     05-19    TPro  5.8<L>  /  Alb  2.6<L>  /  TBili  0.2  /  DBili  x   /  AST  23  /  ALT  26  /  AlkPhos  118  05-20    proBNP:   Lipid Profile:   HgA1c:   TSH: CHIEF COMPLAINT:Patient feels better  no cp no sob        	            PAST MEDICAL & SURGICAL HISTORY:  Iron deficiency anemia, unspecified iron deficiency anemia type  Gastric mass: ulcerated mass in gastric cardia with small perigastric nodes on endoscopy.  Hypothyroidism  Type 2 diabetes mellitus without complication, unspecified long term insulin use status: no BS monitoring  Essential hypertension: was on losartan  Port-a-cath in place: right chest wall  H/O umbilical hernia repair        REVIEW OF SYSTEMS:  CONSTITUTIONAL: No fever, weight loss, or fatigue  EYES: No eye pain, visual disturbances, or discharge  NECK: No pain or stiffness  RESPIRATORY: No cough, wheezing, chills or hemoptysis; No Shortness of Breath  CARDIOVASCULAR: No chest pain, palpitations, passing out, dizziness, or leg swelling  GASTROINTESTINAL: mild abd pain  No diarrhea or constipation. No melena or hematochezia.  GENITOURINARY: No dysuria, frequency, hematuria, or incontinence  NEUROLOGICAL: No headaches, memory loss, loss of strength, numbness, or tremors  SKIN: No itching, burning, rashes, or lesions   LYMPH Nodes: No enlarged glands  ENDOCRINE: No heat or cold intolerance; No hair loss  MUSCULOSKELETAL: No joint pain or swelling; No muscle, back, or extremity pain    Medications:  MEDICATIONS  (STANDING):  levothyroxine Injectable 50MICROGram(s) IV Push daily  enoxaparin Injectable 40milliGRAM(s) SubCutaneous daily  insulin glargine Injectable (LANTUS) 10Unit(s) SubCutaneous at bedtime  dextrose 50% Injectable 25Gram(s) IV Push once  dextrose 50% Injectable 25Gram(s) IV Push once  insulin lispro (HumaLOG) corrective regimen sliding scale  SubCutaneous every 6 hours  dextrose 5% + sodium chloride 0.45% with potassium chloride 20 mEq/L 1000milliLiter(s) IV Continuous <Continuous>    MEDICATIONS  (PRN):  HYDROmorphone  Injectable 0.5milliGRAM(s) IV Push every 2 hours PRN Moderate and Severe Pain    	    PHYSICAL EXAM:  T(C): 36.9, Max: 37 (05-19 @ 20:25)  HR: 74 (74 - 87)  BP: 149/76 (116/63 - 149/76)  RR: 18 (16 - 18)  SpO2: 93% (93% - 96%)  Wt(kg): --  I&O's Summary    I & Os for current day (as of 20 May 2017 09:21)  =============================================  IN: 1310 ml / OUT: 1280 ml / NET: 30 ml      Appearance: Normal	  HEENT:   Normal oral mucosa, PERRL, EOMI/ ng in place	  Lymphatic: No lymphadenopathy  Cardiovascular: Normal S1 S2, No JVD, No murmurs, No edema  Respiratory: Lungs clear to auscultation	  Psychiatry: A & O x 3, Mood & affect appropriate  Gastrointestinal:  Soft, Non-tender, + BS	  Skin: No rashes, No ecchymoses, No cyanosis	  Neurologic: Non-focal  Extremities: Normal range of motion, No clubbing, cyanosis or edema  Vascular: Peripheral pulses palpable 2+ bilaterally    TELEMETRY: 	    ECG:  	  RADIOLOGY:  OTHER: 	  	  LABS:	 	    CARDIAC MARKERS:                                10.1   10.09 )-----------( 510      ( 20 May 2017 06:33 )             32.5     05-20    140  |  103  |  15  ----------------------------<  178<H>  4.2   |  26  |  0.63    Ca    8.4      20 May 2017 06:33  Phos  3.7     05-19  Mg     1.9     05-19    TPro  5.8<L>  /  Alb  2.6<L>  /  TBili  0.2  /  DBili  x   /  AST  23  /  ALT  26  /  AlkPhos  118  05-20    proBNP:   Lipid Profile:   HgA1c:   TSH:

## 2017-05-21 LAB
ALBUMIN SERPL ELPH-MCNC: 2.5 G/DL — LOW (ref 3.3–5)
ALP SERPL-CCNC: 117 U/L — SIGNIFICANT CHANGE UP (ref 40–120)
ALT FLD-CCNC: 25 U/L — SIGNIFICANT CHANGE UP (ref 4–41)
AST SERPL-CCNC: 23 U/L — SIGNIFICANT CHANGE UP (ref 4–40)
BILIRUB SERPL-MCNC: < 0.2 MG/DL — LOW (ref 0.2–1.2)
BUN SERPL-MCNC: 12 MG/DL — SIGNIFICANT CHANGE UP (ref 7–23)
CALCIUM SERPL-MCNC: 8.1 MG/DL — LOW (ref 8.4–10.5)
CHLORIDE SERPL-SCNC: 102 MMOL/L — SIGNIFICANT CHANGE UP (ref 98–107)
CO2 SERPL-SCNC: 25 MMOL/L — SIGNIFICANT CHANGE UP (ref 22–31)
CREAT SERPL-MCNC: 0.58 MG/DL — SIGNIFICANT CHANGE UP (ref 0.5–1.3)
GLUCOSE SERPL-MCNC: 177 MG/DL — HIGH (ref 70–99)
HCT VFR BLD CALC: 31.1 % — LOW (ref 39–50)
HGB BLD-MCNC: 9.6 G/DL — LOW (ref 13–17)
MAGNESIUM SERPL-MCNC: 2.1 MG/DL — SIGNIFICANT CHANGE UP (ref 1.6–2.6)
MCHC RBC-ENTMCNC: 30.6 PG — SIGNIFICANT CHANGE UP (ref 27–34)
MCHC RBC-ENTMCNC: 30.9 % — LOW (ref 32–36)
MCV RBC AUTO: 99 FL — SIGNIFICANT CHANGE UP (ref 80–100)
PHOSPHATE SERPL-MCNC: 3.5 MG/DL — SIGNIFICANT CHANGE UP (ref 2.5–4.5)
PLATELET # BLD AUTO: 600 K/UL — HIGH (ref 150–400)
PMV BLD: 10 FL — SIGNIFICANT CHANGE UP (ref 7–13)
POTASSIUM SERPL-MCNC: 4.2 MMOL/L — SIGNIFICANT CHANGE UP (ref 3.5–5.3)
POTASSIUM SERPL-SCNC: 4.2 MMOL/L — SIGNIFICANT CHANGE UP (ref 3.5–5.3)
PROT SERPL-MCNC: 5.6 G/DL — LOW (ref 6–8.3)
RBC # BLD: 3.14 M/UL — LOW (ref 4.2–5.8)
RBC # FLD: 14.9 % — HIGH (ref 10.3–14.5)
SODIUM SERPL-SCNC: 138 MMOL/L — SIGNIFICANT CHANGE UP (ref 135–145)
WBC # BLD: 9.36 K/UL — SIGNIFICANT CHANGE UP (ref 3.8–10.5)
WBC # FLD AUTO: 9.36 K/UL — SIGNIFICANT CHANGE UP (ref 3.8–10.5)

## 2017-05-21 RX ADMIN — INSULIN GLARGINE 10 UNIT(S): 100 INJECTION, SOLUTION SUBCUTANEOUS at 23:51

## 2017-05-21 RX ADMIN — Medication 2: at 23:51

## 2017-05-21 RX ADMIN — HYDROMORPHONE HYDROCHLORIDE 0.5 MILLIGRAM(S): 2 INJECTION INTRAMUSCULAR; INTRAVENOUS; SUBCUTANEOUS at 12:02

## 2017-05-21 RX ADMIN — HYDROMORPHONE HYDROCHLORIDE 0.5 MILLIGRAM(S): 2 INJECTION INTRAMUSCULAR; INTRAVENOUS; SUBCUTANEOUS at 12:30

## 2017-05-21 RX ADMIN — Medication 2: at 17:45

## 2017-05-21 RX ADMIN — ENOXAPARIN SODIUM 40 MILLIGRAM(S): 100 INJECTION SUBCUTANEOUS at 12:02

## 2017-05-21 RX ADMIN — HYDROMORPHONE HYDROCHLORIDE 0.5 MILLIGRAM(S): 2 INJECTION INTRAMUSCULAR; INTRAVENOUS; SUBCUTANEOUS at 03:08

## 2017-05-21 RX ADMIN — Medication 50 MICROGRAM(S): at 05:31

## 2017-05-21 RX ADMIN — DEXTROSE MONOHYDRATE, SODIUM CHLORIDE, AND POTASSIUM CHLORIDE 75 MILLILITER(S): 50; .745; 4.5 INJECTION, SOLUTION INTRAVENOUS at 21:14

## 2017-05-21 RX ADMIN — HYDROMORPHONE HYDROCHLORIDE 0.5 MILLIGRAM(S): 2 INJECTION INTRAMUSCULAR; INTRAVENOUS; SUBCUTANEOUS at 02:53

## 2017-05-21 NOTE — PROGRESS NOTE ADULT - SUBJECTIVE AND OBJECTIVE BOX
CHIEF COMPLAINT:no new complaints     	        PAST MEDICAL & SURGICAL HISTORY:  Iron deficiency anemia, unspecified iron deficiency anemia type  Gastric mass: ulcerated mass in gastric cardia with small perigastric nodes on endoscopy.  Hypothyroidism  Type 2 diabetes mellitus without complication, unspecified long term insulin use status: no BS monitoring  Essential hypertension: was on losartan  Port-a-cath in place: right chest wall  H/O umbilical hernia repair          REVIEW OF SYSTEMS:  CONSTITUTIONAL: No fever, weight loss, or fatigue  EYES: No eye pain, visual disturbances, or discharge  NECK: No pain or stiffness  RESPIRATORY: No cough, wheezing, chills or hemoptysis; No Shortness of Breath  CARDIOVASCULAR: No chest pain, palpitations, passing out, dizziness, or leg swelling  GASTROINTESTINAL: No abdominal or epigastric pain. No nausea, vomiting, or hematemesis; No diarrhea or constipation. No melena or hematochezia.  GENITOURINARY: No dysuria, frequency, hematuria, or incontinence  NEUROLOGICAL: No headaches, memory loss, loss of strength, numbness, or tremors  SKIN: No itching, burning, rashes, or lesions   LYMPH Nodes: No enlarged glands  ENDOCRINE: No heat or cold intolerance; No hair loss  MUSCULOSKELETAL: No joint pain or swelling; No muscle, back, or extremity pain    Medications:  MEDICATIONS  (STANDING):  levothyroxine Injectable 50MICROGram(s) IV Push daily  enoxaparin Injectable 40milliGRAM(s) SubCutaneous daily  insulin glargine Injectable (LANTUS) 10Unit(s) SubCutaneous at bedtime  dextrose 50% Injectable 25Gram(s) IV Push once  dextrose 50% Injectable 25Gram(s) IV Push once  insulin lispro (HumaLOG) corrective regimen sliding scale  SubCutaneous every 6 hours  dextrose 5% + sodium chloride 0.45% with potassium chloride 20 mEq/L 1000milliLiter(s) IV Continuous <Continuous>    MEDICATIONS  (PRN):  HYDROmorphone  Injectable 0.5milliGRAM(s) IV Push every 2 hours PRN Moderate and Severe Pain    	    PHYSICAL EXAM:  T(C): 37.2, Max: 37.2 (05-20 @ 20:39)  HR: 72 (66 - 75)  BP: 145/72 (121/53 - 145/72)  RR: 17 (16 - 20)  SpO2: 97% (95% - 98%)  Wt(kg): --  I&O's Summary    I & Os for current day (as of 21 May 2017 12:13)  =============================================  IN: 1380 ml / OUT: 1712.5 ml / NET: -332.5 ml      Appearance: Normal	  HEENT:   Normal oral mucosa, PERRL, EOMI	ng in place  Lymphatic: No lymphadenopathy  Cardiovascular: Normal S1 S2, No JVD, No murmurs, No edema  Respiratory: Lungs clear to auscultation	  Psychiatry: A & O x 3, Mood & affect appropriate  Gastrointestinal:  Soft, Non-tender, + BS	  Skin: No rashes, No ecchymoses, No cyanosis	  Neurologic: Non-focal  Extremities: Normal range of motion, No clubbing, cyanosis or edema  Vascular: Peripheral pulses palpable 2+ bilaterally    TELEMETRY: 	    ECG:  	  RADIOLOGY:  OTHER: 	  	  LABS:	 	    CARDIAC MARKERS:                                9.6    9.36  )-----------( 600      ( 21 May 2017 05:27 )             31.1     05-21    138  |  102  |  12  ----------------------------<  177<H>  4.2   |  25  |  0.58    Ca    8.1<L>      21 May 2017 05:27  Phos  3.5     05-21  Mg     2.1     05-21    TPro  5.6<L>  /  Alb  2.5<L>  /  TBili  < 0.2<L>  /  DBili  x   /  AST  23  /  ALT  25  /  AlkPhos  117  05-21    proBNP:   Lipid Profile:   HgA1c:   TSH:

## 2017-05-21 NOTE — PROGRESS NOTE ADULT - ASSESSMENT
65 yo M with HTN, DM2, hypothyroidism, gastric CA s/p resection,  - Gastric CA s/p resection - ng as per sx   continue pain control and incentive spirometry  - UTi  culture neg  d/c abs  leukocytosis resolved  -   - DM2 - will continue current Insulin regimen, monitor FS / hold Lantus if FS <150  - HTN - BP at goal, monitor  - Hypothyroidism - continue Synthroid  dvt proph  inc spirometry   oob

## 2017-05-21 NOTE — PROGRESS NOTE ADULT - SUBJECTIVE AND OBJECTIVE BOX
Surgical Oncology Progress Note    Procedure: total gastrectomy, splenectomy, distal pancreatectomy, feeding J tube  POD: 9 Surgical Oncology Progress Note    Procedure: total gastrectomy, splenectomy, distal pancreatectomy, feeding J tube  POD: 9    Overnight Events:  doing well      Physical Exam  Vital Signs Last 24 Hrs  T(C): 36.8, Max: 37.2 (05-20 @ 20:39)  T(F): 98.3, Max: 99 (05-20 @ 20:39)  HR: 66 (66 - 75)  BP: 132/76 (121/53 - 140/73)  BP(mean): --  RR: 18 (16 - 20)  SpO2: 98% (95% - 98%)  Gen: NAD  HEENT: normocephalic, atraumatic, no scleral icterus  CV: S1, S2, RRR  Pulm: CTA B/L  Abd: Soft, ND, NTP, no rebound, no guarding, no palpable organomegaly/masses  Ext: warm, no edema, palp dp/pt  I&O's Detail    I & Os for current day (as of 21 May 2017 09:41)  =============================================  IN:    dextrose 5% + sodium chloride 0.45% with potassium chloride 20 mEq/L: 900 ml    Glucerna: 480 ml    Total IN: 1380 ml  ---------------------------------------------  OUT:    Voided: 1425 ml    Nasoenteral Tube: 250 ml    Bulb: 37.5 ml    Total OUT: 1712.5 ml  ---------------------------------------------  Total NET: -332.5 ml      Labs:                        9.6    9.36  )-----------( 600      ( 21 May 2017 05:27 )             31.1     05-21    138  |  102  |  12  ----------------------------<  177<H>  4.2   |  25  |  0.58    Ca    8.1<L>      21 May 2017 05:27  Phos  3.5     05-21  Mg     2.1     05-21    TPro  5.6<L>  /  Alb  2.5<L>  /  TBili  < 0.2<L>  /  DBili  x   /  AST  23  /  ALT  25  /  AlkPhos  117  05-21

## 2017-05-21 NOTE — PROGRESS NOTE ADULT - ASSESSMENT
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

## 2017-05-22 ENCOUNTER — TRANSCRIPTION ENCOUNTER (OUTPATIENT)
Age: 67
End: 2017-05-22

## 2017-05-22 LAB
ALBUMIN SERPL ELPH-MCNC: 3 G/DL — LOW (ref 3.3–5)
ALP SERPL-CCNC: 131 U/L — HIGH (ref 40–120)
ALT FLD-CCNC: 23 U/L — SIGNIFICANT CHANGE UP (ref 4–41)
AMYLASE FLD-CCNC: 5315 U/L — SIGNIFICANT CHANGE UP
AST SERPL-CCNC: 24 U/L — SIGNIFICANT CHANGE UP (ref 4–40)
BILIRUB DIRECT SERPL-MCNC: 0.1 MG/DL — SIGNIFICANT CHANGE UP (ref 0.1–0.2)
BILIRUB SERPL-MCNC: 0.2 MG/DL — SIGNIFICANT CHANGE UP (ref 0.2–1.2)
BUN SERPL-MCNC: 9 MG/DL — SIGNIFICANT CHANGE UP (ref 7–23)
CALCIUM SERPL-MCNC: 8.5 MG/DL — SIGNIFICANT CHANGE UP (ref 8.4–10.5)
CHLORIDE SERPL-SCNC: 99 MMOL/L — SIGNIFICANT CHANGE UP (ref 98–107)
CO2 SERPL-SCNC: 22 MMOL/L — SIGNIFICANT CHANGE UP (ref 22–31)
CREAT SERPL-MCNC: 0.61 MG/DL — SIGNIFICANT CHANGE UP (ref 0.5–1.3)
GLUCOSE SERPL-MCNC: 184 MG/DL — HIGH (ref 70–99)
HCT VFR BLD CALC: 34.9 % — LOW (ref 39–50)
HGB BLD-MCNC: 10.9 G/DL — LOW (ref 13–17)
MAGNESIUM SERPL-MCNC: 2.1 MG/DL — SIGNIFICANT CHANGE UP (ref 1.6–2.6)
MCHC RBC-ENTMCNC: 30.6 PG — SIGNIFICANT CHANGE UP (ref 27–34)
MCHC RBC-ENTMCNC: 31.2 % — LOW (ref 32–36)
MCV RBC AUTO: 98 FL — SIGNIFICANT CHANGE UP (ref 80–100)
PHOSPHATE SERPL-MCNC: 3.2 MG/DL — SIGNIFICANT CHANGE UP (ref 2.5–4.5)
PLATELET # BLD AUTO: 707 K/UL — HIGH (ref 150–400)
PMV BLD: 10.2 FL — SIGNIFICANT CHANGE UP (ref 7–13)
POTASSIUM SERPL-MCNC: 4.2 MMOL/L — SIGNIFICANT CHANGE UP (ref 3.5–5.3)
POTASSIUM SERPL-SCNC: 4.2 MMOL/L — SIGNIFICANT CHANGE UP (ref 3.5–5.3)
PROT SERPL-MCNC: 6.5 G/DL — SIGNIFICANT CHANGE UP (ref 6–8.3)
RBC # BLD: 3.56 M/UL — LOW (ref 4.2–5.8)
RBC # FLD: 14.7 % — HIGH (ref 10.3–14.5)
SODIUM SERPL-SCNC: 138 MMOL/L — SIGNIFICANT CHANGE UP (ref 135–145)
WBC # BLD: 11.31 K/UL — HIGH (ref 3.8–10.5)
WBC # FLD AUTO: 11.31 K/UL — HIGH (ref 3.8–10.5)

## 2017-05-22 RX ORDER — INSULIN LISPRO 100/ML
VIAL (ML) SUBCUTANEOUS AT BEDTIME
Qty: 0 | Refills: 0 | Status: DISCONTINUED | OUTPATIENT
Start: 2017-05-22 | End: 2017-05-26

## 2017-05-22 RX ORDER — DEXTROSE 50 % IN WATER 50 %
1 SYRINGE (ML) INTRAVENOUS ONCE
Qty: 0 | Refills: 0 | Status: DISCONTINUED | OUTPATIENT
Start: 2017-05-22 | End: 2017-05-26

## 2017-05-22 RX ORDER — GLUCAGON INJECTION, SOLUTION 0.5 MG/.1ML
1 INJECTION, SOLUTION SUBCUTANEOUS ONCE
Qty: 0 | Refills: 0 | Status: DISCONTINUED | OUTPATIENT
Start: 2017-05-22 | End: 2017-05-26

## 2017-05-22 RX ORDER — SODIUM CHLORIDE 9 MG/ML
1000 INJECTION, SOLUTION INTRAVENOUS
Qty: 0 | Refills: 0 | Status: DISCONTINUED | OUTPATIENT
Start: 2017-05-22 | End: 2017-05-26

## 2017-05-22 RX ORDER — INSULIN LISPRO 100/ML
VIAL (ML) SUBCUTANEOUS
Qty: 0 | Refills: 0 | Status: DISCONTINUED | OUTPATIENT
Start: 2017-05-22 | End: 2017-05-26

## 2017-05-22 RX ORDER — TAMSULOSIN HYDROCHLORIDE 0.4 MG/1
0.4 CAPSULE ORAL AT BEDTIME
Qty: 0 | Refills: 0 | Status: DISCONTINUED | OUTPATIENT
Start: 2017-05-22 | End: 2017-05-26

## 2017-05-22 RX ADMIN — ENOXAPARIN SODIUM 40 MILLIGRAM(S): 100 INJECTION SUBCUTANEOUS at 12:34

## 2017-05-22 RX ADMIN — HYDROMORPHONE HYDROCHLORIDE 0.5 MILLIGRAM(S): 2 INJECTION INTRAMUSCULAR; INTRAVENOUS; SUBCUTANEOUS at 16:51

## 2017-05-22 RX ADMIN — Medication 50 MICROGRAM(S): at 05:40

## 2017-05-22 RX ADMIN — HYDROMORPHONE HYDROCHLORIDE 0.5 MILLIGRAM(S): 2 INJECTION INTRAMUSCULAR; INTRAVENOUS; SUBCUTANEOUS at 16:23

## 2017-05-22 RX ADMIN — Medication 2: at 08:11

## 2017-05-22 RX ADMIN — TAMSULOSIN HYDROCHLORIDE 0.4 MILLIGRAM(S): 0.4 CAPSULE ORAL at 22:17

## 2017-05-22 RX ADMIN — DEXTROSE MONOHYDRATE, SODIUM CHLORIDE, AND POTASSIUM CHLORIDE 75 MILLILITER(S): 50; .745; 4.5 INJECTION, SOLUTION INTRAVENOUS at 23:22

## 2017-05-22 RX ADMIN — DEXTROSE MONOHYDRATE, SODIUM CHLORIDE, AND POTASSIUM CHLORIDE 75 MILLILITER(S): 50; .745; 4.5 INJECTION, SOLUTION INTRAVENOUS at 09:11

## 2017-05-22 RX ADMIN — INSULIN GLARGINE 10 UNIT(S): 100 INJECTION, SOLUTION SUBCUTANEOUS at 23:21

## 2017-05-22 RX ADMIN — Medication 2: at 12:34

## 2017-05-22 NOTE — PROGRESS NOTE ADULT - ASSESSMENT
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

## 2017-05-22 NOTE — DISCHARGE NOTE ADULT - PATIENT PORTAL LINK FT
“You can access the [employer] Patient Portal, offered by [facility], by registering with the following website: [email or web address]”

## 2017-05-22 NOTE — PROGRESS NOTE ADULT - SUBJECTIVE AND OBJECTIVE BOX
*********Note incomplete pending morning rounds*****************    Surgical Oncology Progress Note  pager: [phone]    Procedure: gastric adenocarcinoma s/p total gastrectomy, splenectomy, distal pancreatectomy, feeding J tube  POD: 6    S: NGT out, CLD          Physical Exam  Gen: awake and alert, NAD  Abd: Surgical Oncology Progress Note  pager: [phone]    Procedure: gastric adenocarcinoma s/p total gastrectomy, splenectomy, distal pancreatectomy, feeding J tube  POD: 6    S: NGT out, CLD. doing very well. flatus and bm (+). no n/v      Vital Signs Last 24 Hrs  T(C): 36.6, Max: 37.2 (05-21 @ 11:51)  T(F): 97.8, Max: 99 (05-21 @ 11:51)  HR: 75 (70 - 75)  BP: 146/63 (117/93 - 147/80)  BP(mean): --  RR: 16 (16 - 18)  SpO2: 97% (96% - 98%)    I&O's Detail    I & Os for current day (as of 22 May 2017 07:52)  =============================================  IN:    dextrose 5% + sodium chloride 0.45% with potassium chloride 20 mEq/L: 900 ml    Glucerna: 480 ml    Total IN: 1380 ml  ---------------------------------------------  OUT:    Voided: 1350 ml    Nasoenteral Tube: 100 ml    Bulb: 12.5 ml    Total OUT: 1462.5 ml  ---------------------------------------------  Total NET: -82.5 ml                            10.9   11.31 )-----------( 707      ( 22 May 2017 06:13 )             34.9       05-22    138  |  99  |  9   ----------------------------<  184<H>  4.2   |  22  |  0.61    Ca    8.5      22 May 2017 06:13  Phos  3.2     05-22  Mg     2.1     05-22    TPro  6.5  /  Alb  3.0<L>  /  TBili  0.2  /  DBili  0.1  /  AST  24  /  ALT  23  /  AlkPhos  131<H>  05-22      CAPILLARY BLOOD GLUCOSE  173 (21 May 2017 23:49)  154 (21 May 2017 17:43)  148 (21 May 2017 11:51)      LIVER FUNCTIONS - ( 22 May 2017 06:13 )  Alb: 3.0 g/dL / Pro: 6.5 g/dL / ALK PHOS: 131 u/L / ALT: 23 u/L / AST: 24 u/L / GGT: x                     Physical Exam  Gen: awake and alert, NAD  Abd: soft, mildly tender, mild distension, incision CDI

## 2017-05-22 NOTE — DISCHARGE NOTE ADULT - HOSPITAL COURSE
67yo male with HTN, Hypothyroidism, DM II, GERD and HDL. Pt reports noticing dark stool in 12/2016 and had Endoscopy and Colonoscopy done which showed abnormal results. Pt reports completing chemotherapy in 3/2017. Pt presents today for presurgical evaluation for Laparotomy Proximal Gastrectomy Possible Total Gastrectomy scheduled for 5/12/2017.    Pt admitted to general surgery service and went to the OR with Dr. Cabello on 5/12/17 for a EGD showed gastric cancer at the GE junction, and the cardia extending downward along the lesser curvature.  1. Total Gastrectomy 2. Splenectomy 3. Distal Pancreatectomy 4. Lorena-En-Y Esophagojejunostomy 5. Placement of Feeding Jejunostomy 6. Paraesophageal and celiac axis lymphadenectomy.  Pt tolerated procedure well without complication.  Post op pt transferred from PACU to the floor with MICH drain, J tube, NJT, and PCEA for pain control.  J tube study done on POD#1 IMPRESSION: A jejunostomy tube overlies the left hemiabdomen with contrast opacifying a loop of small bowel. There is no extravasation of contrast suggest leak.  Enteric tube with tip and side port within the stomach. Trickle feeds started through J tube and slowly advanced to goal.  Pt with post op fevers, BCxs and UCx negative.      May 18th IMPRESSION:   Normal postgastrectomy appearance with an intact gastrojejunal   anastomosis. No evidence of leak. Mild delay in the progression of   contrast through the anastomosis which likely represents postoperative   edema. 65yo male with HTN, Hypothyroidism, DM II, GERD and HDL. Pt reports noticing dark stool in 12/2016 and had Endoscopy and Colonoscopy done which showed abnormal results. Pt reports completing chemotherapy in 3/2017. Pt presents today for presurgical evaluation for Laparotomy Proximal Gastrectomy Possible Total Gastrectomy scheduled for 5/12/2017.    Pt admitted to general surgery service and went to the OR with Dr. Cabello on 5/12/17 for a EGD showed gastric cancer at the GE junction, and the cardia extending downward along the lesser curvature.  1. Total Gastrectomy 2. Splenectomy 3. Distal Pancreatectomy 4. Lorena-En-Y Esophagojejunostomy 5. Placement of Feeding Jejunostomy 6. Paraesophageal and celiac axis lymphadenectomy.  Pt tolerated procedure well without complication.  Post op pt transferred from PACU to the floor with MICH drain, J tube, NJT, and PCEA for pain control.  J tube study done on POD#1 IMPRESSION: A jejunostomy tube overlies the left hemiabdomen with contrast opacifying a loop of small bowel. There is no extravasation of contrast suggest leak.  Enteric tube with tip and side port within the stomach. Trickle feeds started through J tube and slowly advanced to goal.  Pt with post op fevers treated empirically for UTI, BCxs and UCx negative.        May 18th IMPRESSION: Normal postgastrectomy appearance with an intact gastrojejunal anastomosis. No evidence of leak. Mild delay in the progression of contrast through the anastomosis which likely represents postoperative edema. 65yo male with HTN, Hypothyroidism, DM II, GERD and HDL. Pt reports noticing dark stool in 12/2016 and had Endoscopy and Colonoscopy done which showed abnormal results. Pt reports completing chemotherapy in 3/2017. Pt presents today for presurgical evaluation for Laparotomy Proximal Gastrectomy Possible Total Gastrectomy scheduled for 5/12/2017.    Pt admitted to general surgery service and went to the OR with Dr. Cabello on 5/12/17 for a EGD showed gastric cancer at the GE junction, and the cardia extending downward along the lesser curvature.  1. Total Gastrectomy 2. Splenectomy 3. Distal Pancreatectomy 4. Lorena-En-Y Esophagojejunostomy 5. Placement of Feeding Jejunostomy 6. Paraesophageal and celiac axis lymphadenectomy.  Pt tolerated procedure well without complication.  Post op pt transferred from PACU to the floor with MICH drain, J tube, NJT, and PCEA for pain control.  J tube study done on POD#1 IMPRESSION: A jejunostomy tube overlies the left hemiabdomen with contrast opacifying a loop of small bowel. There is no extravasation of contrast suggest leak.  Enteric tube with tip and side port within the stomach. Trickle feeds started through J tube and slowly advanced to goal.  Pt with post op fevers treated empirically for UTI, BCxs and UCx negative.  Pt improved.  On POD#6 an esophagram was performed IMPRESSION: Normal postgastrectomy appearance with an intact gastrojejunal anastomosis. No evidence of leak. Mild delay in the progression of contrast through the anastomosis which likely represents postoperative edema. 67yo male with HTN, Hypothyroidism, DM II, GERD and HDL. Pt reports noticing dark stool in 12/2016 and had Endoscopy and Colonoscopy done which showed abnormal results. Pt reports completing chemotherapy in 3/2017. Pt presents today for presurgical evaluation for Laparotomy Proximal Gastrectomy Possible Total Gastrectomy scheduled for 5/12/2017.    Pt admitted to general surgery service and went to the OR with Dr. Cabello on 5/12/17 for a EGD showed gastric cancer at the GE junction, and the cardia extending downward along the lesser curvature.  1. Total Gastrectomy 2. Splenectomy 3. Distal Pancreatectomy 4. Lorena-En-Y Esophagojejunostomy 5. Placement of Feeding Jejunostomy 6. Paraesophageal and celiac axis lymphadenectomy.  Pt tolerated procedure well without complication.  Post op pt transferred from PACU to the floor with MICH drain, J tube, NJT, and PCEA for pain control.  J tube study done on POD#1 IMPRESSION: A jejunostomy tube overlies the left hemiabdomen with contrast opacifying a loop of small bowel. There is no extravasation of contrast suggest leak.  Enteric tube with tip and side port within the stomach. Trickle feeds started through J tube and slowly advanced to goal.  Pt with post op fevers treated empirically for UTI, BCxs and UCx negative.  CT abd/pelvis: IMPRESSION: Status post gastrectomy with esophagojejunostomy. Dilated loops of proximal jejunum with significant wall thickening and associated mesenteric edema involving the jejunum after the jejunostomy tube. Consideration given to the jejunal ischemia versus hemorrhage. Oral contrast reaches the rectum.  Wall thickening of the descending colon and sigmoid colon likely reactive.  Small amount of abdominal and pelvic ascites with trace intraperitoneal free air. No drainable fluid collection.  Small bilateral pleural effusions and associated atelectasis.  Pt improved, remained afebrile.  On POD#6 an esophagram was performed IMPRESSION: Normal postgastrectomy appearance with an intact gastrojejunal anastomosis. No evidence of leak. Mild delay in the progression of contrast through the anastomosis which likely represents postoperative edema.  NJT removed prior to esophagram and later that evening pt developed N/V and NJT replaced under fluoroscopy.  As N improved and pt had return of GI function a clamp trial was performed, pt tolerated well, and NJT was removed and diet slowly restarted, CLD to post gastrectomy regular diet.  Pt continued on TF and switched to nocturnal TF as pt tolerated more PO food.  A calorie count and dietician eval was ordered.  Pt requiring 2268 kwan per day, but only taking in around 1000 kwan.  Decision was made to continue nocturnal TF.  Pain control was transitioned from PCEA to PO pain meds as tolerated.  Abarca removed after PCEA.  After the PCEA removed DVT ppx changed from SQH to lovenox and pt provided lovenox teaching.  Pt not comfortable with self injections and case discussed with case management for VNS.  Pt was requiring lantus for better glycemic control and endocrine was consulted for diabetes management and outpatient regimen.  Physical therapy worked with pt, rec outpatient PT.  HIB and Prevnar 13 vaccines given prior to discharge pt will need to follow up as an outpatient for the pneumovax (pneumococcal 23) in 5-8weeks.    __________ 67yo male with HTN, Hypothyroidism, DM II, GERD and HDL. Pt reports noticing dark stool in 12/2016 and had Endoscopy and Colonoscopy done which showed abnormal results. Pt reports completing chemotherapy in 3/2017. Pt presents today for presurgical evaluation for Laparotomy Proximal Gastrectomy Possible Total Gastrectomy scheduled for 5/12/2017.    Pt admitted to general surgery service and went to the OR with Dr. Cabello on 5/12/17 for a EGD showed gastric cancer at the GE junction, and the cardia extending downward along the lesser curvature.  1. Total Gastrectomy 2. Splenectomy 3. Distal Pancreatectomy 4. Lorena-En-Y Esophagojejunostomy 5. Placement of Feeding Jejunostomy 6. Paraesophageal and celiac axis lymphadenectomy.  Pt tolerated procedure well without complication.  Post op pt transferred from PACU to the floor with MICH drain, J tube, NJT, and PCEA for pain control.  J tube study done on POD#1 IMPRESSION: A jejunostomy tube overlies the left hemiabdomen with contrast opacifying a loop of small bowel. There is no extravasation of contrast suggest leak.  Enteric tube with tip and side port within the stomach. Trickle feeds started through J tube and slowly advanced to goal.  Pt with post op fevers treated empirically for UTI, BCxs and UCx negative.  CT abd/pelvis: IMPRESSION: Status post gastrectomy with esophagojejunostomy. Dilated loops of proximal jejunum with significant wall thickening and associated mesenteric edema involving the jejunum after the jejunostomy tube. Consideration given to the jejunal ischemia versus hemorrhage. Oral contrast reaches the rectum.  Wall thickening of the descending colon and sigmoid colon likely reactive.  Small amount of abdominal and pelvic ascites with trace intraperitoneal free air. No drainable fluid collection.  Small bilateral pleural effusions and associated atelectasis.  Pt improved, remained afebrile.  On POD#6 an esophagram was performed IMPRESSION: Normal postgastrectomy appearance with an intact gastrojejunal anastomosis. No evidence of leak. Mild delay in the progression of contrast through the anastomosis which likely represents postoperative edema.  NJT removed prior to esophagram and later that evening pt developed N/V and NJT replaced under fluoroscopy.  As N improved and pt had return of GI function a clamp trial was performed, pt tolerated well, and NJT was removed and diet slowly restarted, CLD to post gastrectomy regular diet.  Pt continued on TF and switched to nocturnal TF as pt tolerated more PO food.  A calorie count and dietician eval was ordered.  Pt requiring 2268 kwan per day, but only taking in around 1000 kwan.  Decision was made to continue nocturnal TF but will not be covered under his insurance.  Pain control was transitioned from PCEA to PO pain meds as tolerated.  Abarca removed after PCEA.  After the PCEA removed DVT ppx changed from SQH to lovenox and pt provided lovenox teaching.  Pt not comfortable with self injections and case discussed with case management for VNS.  Pt was requiring lantus for better glycemic control and endocrine was consulted for diabetes management and outpatient regimen.  Physical therapy worked with pt, rec outpatient PT.  HIB and Prevnar 13 and meningococcal with diptheria vaccines given prior to discharge

## 2017-05-22 NOTE — DISCHARGE NOTE ADULT - MEDICATION SUMMARY - MEDICATIONS TO TAKE
I will START or STAY ON the medications listed below when I get home from the hospital:    acetaminophen 325 mg oral tablet  -- 2 tab(s) by mouth every 6 hours, As needed, Mild Pain (1 - 3)  -- Indication: For Pain    oxyCODONE 5 mg oral tablet  -- 1 tab(s) by mouth every 6 hours as needed for pain MDD:4  -- Caution federal law prohibits the transfer of this drug to any person other  than the person for whom it was prescribed.  It is very important that you take or use this exactly as directed.  Do not skip doses or discontinue unless directed by your doctor.  May cause drowsiness.  Alcohol may intensify this effect.  Use care when operating dangerous machinery.  This prescription cannot be refilled.  Using more of this medication than prescribed may cause serious breathing problems.    -- Indication: For Pain    aspirin 81 mg oral tablet, chewable  -- 1 tab(s) by mouth once a day  -- Indication: For Antibiotics    tamsulosin 0.4 mg oral capsule  -- 1 cap(s) by mouth once a day (at bedtime) MDD:1  -- Indication: For bph    enoxaparin 40 mg/0.4 mL injectable solution  -- 40 milligram(s) injectable once a day  -- It is very important that you take or use this exactly as directed.  Do not skip doses or discontinue unless directed by your doctor.    -- Indication: For Prevent blood clot    metFORMIN 500 mg oral tablet, extended release  -- 1 tab(s) by mouth once a day  -- Indication: For Diabetes    pravastatin 20 mg oral tablet  -- 1 tab(s) by mouth once a day  -- Indication: For Hyperlipidemia    ferrous sulfate 324 mg (65 mg elemental iron) oral delayed release tablet  --  by mouth 2 times a day  -- Indication: For iron    omeprazole 40 mg oral delayed release capsule  -- 1 cap(s) by mouth once a day  -- Indication: For gerd    levothyroxine 100 mcg (0.1 mg) oral tablet  -- 1 tab(s) by mouth once a day  -- Indication: For Hypothyroidism

## 2017-05-22 NOTE — PROGRESS NOTE ADULT - ASSESSMENT
65 yo M with HTN, DM2, hypothyroidism, gastric CA s/p resection,  - Gastric CA s/p resection - ng as per sx   continue pain control and incentive spirometry  - UTi  culture neg  abs d/ronnie  leukocytosis resolved  -   - DM2 - will continue current Insulin regimen, monitor FS / hold Lantus if FS <150  - HTN - BP at goal, monitor  - Hypothyroidism - continue Synthroid  dvt proph  inc spirometry   oob   pt tolerating diet

## 2017-05-22 NOTE — DISCHARGE NOTE ADULT - HOME CARE AGENCY
[facility] [phone] initial visit will be day after discharge home. A nurse will call prior to the home visit.

## 2017-05-22 NOTE — DISCHARGE NOTE ADULT - PLAN OF CARE
wound healing WOUND CARE:   BATHING: Please do not submerge wound underwater. You may shower and/or sponge bathe. It is OK to wash drain wound site.  ACTIVITY: No heavy lifting or straining. Otherwise, you may return to your usual level of physical activity. If you are taking narcotic pain medication (such as Percocet) DO NOT drive a car, operate machinery or make important decisions.  DIET: Regular post gastrectomy diet.  Frequent small meals, 6 per day, please only have liquids 30min after ingesting solids.  NOTIFY YOUR SURGEON IF: You have any bleeding that does not stop, any pus draining from your wound(s), any fever (over 100.4 F) or chills, persistent nausea/vomiting, persistent diarrhea, or if your pain is not controlled on your discharge pain medications.  FOLLOW-UP: Please follow up with your primary care physician in one week regarding your hospitalization. Please follow up with Dr. Cabello in one week. Please follow up with your primary care physician regarding your hospitalization WOUND CARE: Apply clean gauze and paper tape to drain site daily.  Monitor and record MICH output and bring record to your follow up appointment.  Drain will likely be removed at this appointment.  The remaining staples will also be removed at this follow up appointment.  Apply clean gauze and paper tape to J tube daily.  Please flush with 10cc 0.9%NS after TF to prevent clogging of J tube.  BATHING: Please do not submerge wound underwater. You may shower and/or sponge bathe. It is OK to wash drain wound site.  ACTIVITY: No heavy lifting or straining. Otherwise, you may return to your usual level of physical activity. If you are taking narcotic pain medication (such as Percocet) DO NOT drive a car, operate machinery or make important decisions.  DIET: Regular post gastrectomy diet.  Frequent small meals, 6 per day, please only have liquids 30min after ingesting solids.  You will also be receiving supplemental nocturnal TF through your J tube.  NOTIFY YOUR SURGEON IF: You have any bleeding that does not stop, any pus draining from your wound(s), any fever (over 100.4 F) or chills, persistent nausea/vomiting, persistent diarrhea, or if your pain is not controlled on your discharge pain medications.  FOLLOW-UP: Please follow up with your primary care physician in one week regarding your hospitalization. Please follow up with Dr. Cabello in one week. ____________ WOUND CARE: Apply clean gauze and paper tape to drain site daily.  Monitor and record MICH output and bring record to your follow up appointment.  Drain will likely be removed at this appointment.  The remaining staples will also be removed at this follow up appointment.  Apply clean gauze and paper tape to J tube daily.  Please flush with 10cc 0.9%NS after TF to prevent clogging of J tube.  BATHING: Please do not submerge wound underwater. You may shower and/or sponge bathe. It is OK to wash drain wound site.  ACTIVITY: No heavy lifting or straining. Otherwise, you may return to your usual level of physical activity. If you are taking narcotic pain medication (such as Percocet) DO NOT drive a car, operate machinery or make important decisions.  DIET: Regular post gastrectomy diet.  Frequent small meals, 6 per day, please only have liquids 30min after ingesting solids.  You will also be receiving supplemental nocturnal TF through your J tube.  NOTIFY YOUR SURGEON IF: You have any bleeding that does not stop, any pus draining from your wound(s), any fever (over 100.4 F) or chills, persistent nausea/vomiting, persistent diarrhea, or if your pain is not controlled on your discharge pain medications.  FOLLOW-UP: Please follow up with your primary care physician in one week regarding your hospitalization. Please follow up with Dr. Cabello in one week.  You will need the pneumococcal 23 (pneumovax) 5-8 weeks post Prevnar 13 vaccine (received 5/24).  Please call to schedule an appointment on or after 6/29/17 for the Pneumovax 23.

## 2017-05-22 NOTE — PROGRESS NOTE ADULT - SUBJECTIVE AND OBJECTIVE BOX
CHIEF COMPLAINT:Patient eating  ng removed    	        PAST MEDICAL & SURGICAL HISTORY:  Iron deficiency anemia, unspecified iron deficiency anemia type  Gastric mass: ulcerated mass in gastric cardia with small perigastric nodes on endoscopy.  Hypothyroidism  Type 2 diabetes mellitus without complication, unspecified long term insulin use status: no BS monitoring  Essential hypertension: was on losartan  Port-a-cath in place: right chest wall  H/O umbilical hernia repair          REVIEW OF SYSTEMS:  CONSTITUTIONAL: No fever, weight loss, or fatigue  EYES: No eye pain, visual disturbances, or discharge  NECK: No pain or stiffness  RESPIRATORY: No cough, wheezing, chills or hemoptysis; No Shortness of Breath  CARDIOVASCULAR: No chest pain, palpitations, passing out, dizziness, or leg swelling  GASTROINTESTINAL: No abdominal or epigastric pain. No nausea, vomiting, or hematemesis; No diarrhea or constipation. No melena or hematochezia.  GENITOURINARY: No dysuria, frequency, hematuria, or incontinence  NEUROLOGICAL: No headaches, memory loss, loss of strength, numbness, or tremors  SKIN: No itching, burning, rashes, or lesions   LYMPH Nodes: No enlarged glands  ENDOCRINE: No heat or cold intolerance; No hair loss  MUSCULOSKELETAL: No joint pain or swelling; No muscle, back, or extremity pain    Medications:  MEDICATIONS  (STANDING):  levothyroxine Injectable 50MICROGram(s) IV Push daily  enoxaparin Injectable 40milliGRAM(s) SubCutaneous daily  insulin glargine Injectable (LANTUS) 10Unit(s) SubCutaneous at bedtime  dextrose 50% Injectable 25Gram(s) IV Push once  dextrose 50% Injectable 25Gram(s) IV Push once  dextrose 5% + sodium chloride 0.45% with potassium chloride 20 mEq/L 1000milliLiter(s) IV Continuous <Continuous>  insulin lispro (HumaLOG) corrective regimen sliding scale  SubCutaneous three times a day before meals  insulin lispro (HumaLOG) corrective regimen sliding scale  SubCutaneous at bedtime  dextrose 5%. 1000milliLiter(s) IV Continuous <Continuous>  tamsulosin 0.4milliGRAM(s) Oral at bedtime    MEDICATIONS  (PRN):  HYDROmorphone  Injectable 0.5milliGRAM(s) IV Push every 2 hours PRN Moderate and Severe Pain  dextrose Gel 1Dose(s) Oral once PRN Blood Glucose LESS THAN 70 milliGRAM(s)/deciliter  glucagon  Injectable 1milliGRAM(s) IntraMuscular once PRN Glucose LESS THAN 70 milligrams/deciliter    	    PHYSICAL EXAM:  T(C): 37, Max: 37.2 (05-21 @ 11:51)  HR: 73 (70 - 75)  BP: 149/79 (117/93 - 149/79)  RR: 17 (16 - 18)  SpO2: 98% (96% - 98%)  Wt(kg): --  I&O's Summary  I & Os for 24h ending 22 May 2017 07:00  =============================================  IN: 1380 ml / OUT: 1462.5 ml / NET: -82.5 ml    I & Os for current day (as of 22 May 2017 11:01)  =============================================  IN: 460 ml / OUT: 320 ml / NET: 140 ml      Appearance: Normal	  HEENT:   Normal oral mucosa, PERRL, EOMI	  Lymphatic: No lymphadenopathy  Cardiovascular: Normal S1 S2, No JVD, No murmurs, No edema  Respiratory: Lungs clear to auscultation	  Psychiatry: A & O x 3, Mood & affect appropriate  Gastrointestinal:  Soft, Non-tender, + BS	  Skin: No rashes, No ecchymoses, No cyanosis	  Neurologic: Non-focal  Extremities: Normal range of motion, No clubbing, cyanosis or edema  Vascular: Peripheral pulses palpable 2+ bilaterally    TELEMETRY: 	    ECG:  	  RADIOLOGY:  OTHER: 	  	  LABS:	 	    CARDIAC MARKERS:                                10.9   11.31 )-----------( 707      ( 22 May 2017 06:13 )             34.9     05-22    138  |  99  |  9   ----------------------------<  184<H>  4.2   |  22  |  0.61    Ca    8.5      22 May 2017 06:13  Phos  3.2     05-22  Mg     2.1     05-22    TPro  6.5  /  Alb  3.0<L>  /  TBili  0.2  /  DBili  0.1  /  AST  24  /  ALT  23  /  AlkPhos  131<H>  05-22    proBNP:   Lipid Profile:   HgA1c:   TSH:

## 2017-05-22 NOTE — DISCHARGE NOTE ADULT - CARE PLAN
Principal Discharge DX:	Malignant neoplasm of stomach, unspecified  Goal:	wound healing  Instructions for follow-up, activity and diet:	WOUND CARE:   BATHING: Please do not submerge wound underwater. You may shower and/or sponge bathe. It is OK to wash drain wound site.  ACTIVITY: No heavy lifting or straining. Otherwise, you may return to your usual level of physical activity. If you are taking narcotic pain medication (such as Percocet) DO NOT drive a car, operate machinery or make important decisions.  DIET: Regular post gastrectomy diet.  Frequent small meals, 6 per day, please only have liquids 30min after ingesting solids.  NOTIFY YOUR SURGEON IF: You have any bleeding that does not stop, any pus draining from your wound(s), any fever (over 100.4 F) or chills, persistent nausea/vomiting, persistent diarrhea, or if your pain is not controlled on your discharge pain medications.  FOLLOW-UP: Please follow up with your primary care physician in one week regarding your hospitalization. Please follow up with Dr. Cabello in one week.  Secondary Diagnosis:	Hypertension  Instructions for follow-up, activity and diet:	Please follow up with your primary care physician regarding your hospitalization  Secondary Diagnosis:	Hypothyroidism  Instructions for follow-up, activity and diet:	Please follow up with your primary care physician regarding your hospitalization  Secondary Diagnosis:	Diabetes  Instructions for follow-up, activity and diet:	Please follow up with your primary care physician regarding your hospitalization Principal Discharge DX:	Malignant neoplasm of stomach, unspecified  Goal:	wound healing  Instructions for follow-up, activity and diet:	WOUND CARE: Apply clean gauze and paper tape to drain site daily.  Monitor and record MICH output and bring record to your follow up appointment.  Drain will likely be removed at this appointment.  The remaining staples will also be removed at this follow up appointment.  Apply clean gauze and paper tape to J tube daily.  Please flush with 10cc 0.9%NS after TF to prevent clogging of J tube.  BATHING: Please do not submerge wound underwater. You may shower and/or sponge bathe. It is OK to wash drain wound site.  ACTIVITY: No heavy lifting or straining. Otherwise, you may return to your usual level of physical activity. If you are taking narcotic pain medication (such as Percocet) DO NOT drive a car, operate machinery or make important decisions.  DIET: Regular post gastrectomy diet.  Frequent small meals, 6 per day, please only have liquids 30min after ingesting solids.  You will also be receiving supplemental nocturnal TF through your J tube.  NOTIFY YOUR SURGEON IF: You have any bleeding that does not stop, any pus draining from your wound(s), any fever (over 100.4 F) or chills, persistent nausea/vomiting, persistent diarrhea, or if your pain is not controlled on your discharge pain medications.  FOLLOW-UP: Please follow up with your primary care physician in one week regarding your hospitalization. Please follow up with Dr. Cabello in one week.  Secondary Diagnosis:	Hypertension  Instructions for follow-up, activity and diet:	Please follow up with your primary care physician regarding your hospitalization  Secondary Diagnosis:	Hypothyroidism  Instructions for follow-up, activity and diet:	Please follow up with your primary care physician regarding your hospitalization  Secondary Diagnosis:	Diabetes  Instructions for follow-up, activity and diet:	____________ Principal Discharge DX:	Malignant neoplasm of stomach, unspecified  Goal:	wound healing  Instructions for follow-up, activity and diet:	WOUND CARE: Apply clean gauze and paper tape to drain site daily.  Monitor and record MICH output and bring record to your follow up appointment.  Drain will likely be removed at this appointment.  The remaining staples will also be removed at this follow up appointment.  Apply clean gauze and paper tape to J tube daily.  Please flush with 10cc 0.9%NS after TF to prevent clogging of J tube.  BATHING: Please do not submerge wound underwater. You may shower and/or sponge bathe. It is OK to wash drain wound site.  ACTIVITY: No heavy lifting or straining. Otherwise, you may return to your usual level of physical activity. If you are taking narcotic pain medication (such as Percocet) DO NOT drive a car, operate machinery or make important decisions.  DIET: Regular post gastrectomy diet.  Frequent small meals, 6 per day, please only have liquids 30min after ingesting solids.  You will also be receiving supplemental nocturnal TF through your J tube.  NOTIFY YOUR SURGEON IF: You have any bleeding that does not stop, any pus draining from your wound(s), any fever (over 100.4 F) or chills, persistent nausea/vomiting, persistent diarrhea, or if your pain is not controlled on your discharge pain medications.  FOLLOW-UP: Please follow up with your primary care physician in one week regarding your hospitalization. Please follow up with Dr. Cabello in one week.  You will need the pneumococcal 23 (pneumovax) 5-8 weeks post Prevnar 13 vaccine (received 5/24).  Please call to schedule an appointment on or after 6/29/17 for the Pneumovax 23.  Secondary Diagnosis:	Hypertension  Instructions for follow-up, activity and diet:	Please follow up with your primary care physician regarding your hospitalization  Secondary Diagnosis:	Hypothyroidism  Instructions for follow-up, activity and diet:	Please follow up with your primary care physician regarding your hospitalization  Secondary Diagnosis:	Diabetes  Instructions for follow-up, activity and diet:	____________

## 2017-05-23 DIAGNOSIS — E03.9 HYPOTHYROIDISM, UNSPECIFIED: ICD-10-CM

## 2017-05-23 DIAGNOSIS — E78.4 OTHER HYPERLIPIDEMIA: ICD-10-CM

## 2017-05-23 DIAGNOSIS — E11.9 TYPE 2 DIABETES MELLITUS WITHOUT COMPLICATIONS: ICD-10-CM

## 2017-05-23 LAB
ALBUMIN SERPL ELPH-MCNC: 2.7 G/DL — LOW (ref 3.3–5)
ALP SERPL-CCNC: 115 U/L — SIGNIFICANT CHANGE UP (ref 40–120)
ALT FLD-CCNC: 19 U/L — SIGNIFICANT CHANGE UP (ref 4–41)
AST SERPL-CCNC: 20 U/L — SIGNIFICANT CHANGE UP (ref 4–40)
BILIRUB SERPL-MCNC: 0.2 MG/DL — SIGNIFICANT CHANGE UP (ref 0.2–1.2)
BUN SERPL-MCNC: 10 MG/DL — SIGNIFICANT CHANGE UP (ref 7–23)
CALCIUM SERPL-MCNC: 8.5 MG/DL — SIGNIFICANT CHANGE UP (ref 8.4–10.5)
CHLORIDE SERPL-SCNC: 97 MMOL/L — LOW (ref 98–107)
CO2 SERPL-SCNC: 23 MMOL/L — SIGNIFICANT CHANGE UP (ref 22–31)
CREAT SERPL-MCNC: 0.63 MG/DL — SIGNIFICANT CHANGE UP (ref 0.5–1.3)
GLUCOSE SERPL-MCNC: 195 MG/DL — HIGH (ref 70–99)
HCT VFR BLD CALC: 34.4 % — LOW (ref 39–50)
HGB BLD-MCNC: 10.6 G/DL — LOW (ref 13–17)
MAGNESIUM SERPL-MCNC: 2 MG/DL — SIGNIFICANT CHANGE UP (ref 1.6–2.6)
MCHC RBC-ENTMCNC: 30 PG — SIGNIFICANT CHANGE UP (ref 27–34)
MCHC RBC-ENTMCNC: 30.8 % — LOW (ref 32–36)
MCV RBC AUTO: 97.5 FL — SIGNIFICANT CHANGE UP (ref 80–100)
PHOSPHATE SERPL-MCNC: 3.1 MG/DL — SIGNIFICANT CHANGE UP (ref 2.5–4.5)
PLATELET # BLD AUTO: 712 K/UL — HIGH (ref 150–400)
PMV BLD: 9.6 FL — SIGNIFICANT CHANGE UP (ref 7–13)
POTASSIUM SERPL-MCNC: 4.3 MMOL/L — SIGNIFICANT CHANGE UP (ref 3.5–5.3)
POTASSIUM SERPL-SCNC: 4.3 MMOL/L — SIGNIFICANT CHANGE UP (ref 3.5–5.3)
PROT SERPL-MCNC: 6 G/DL — SIGNIFICANT CHANGE UP (ref 6–8.3)
RBC # BLD: 3.53 M/UL — LOW (ref 4.2–5.8)
RBC # FLD: 14.8 % — HIGH (ref 10.3–14.5)
SODIUM SERPL-SCNC: 135 MMOL/L — SIGNIFICANT CHANGE UP (ref 135–145)
SURGICAL PATHOLOGY STUDY: SIGNIFICANT CHANGE UP
WBC # BLD: 11.8 K/UL — HIGH (ref 3.8–10.5)
WBC # FLD AUTO: 11.8 K/UL — HIGH (ref 3.8–10.5)

## 2017-05-23 PROCEDURE — 99222 1ST HOSP IP/OBS MODERATE 55: CPT

## 2017-05-23 RX ORDER — LEVOTHYROXINE SODIUM 125 MCG
100 TABLET ORAL DAILY
Qty: 0 | Refills: 0 | Status: DISCONTINUED | OUTPATIENT
Start: 2017-05-23 | End: 2017-05-26

## 2017-05-23 RX ORDER — ATORVASTATIN CALCIUM 80 MG/1
10 TABLET, FILM COATED ORAL AT BEDTIME
Qty: 0 | Refills: 0 | Status: DISCONTINUED | OUTPATIENT
Start: 2017-05-23 | End: 2017-05-26

## 2017-05-23 RX ORDER — ENOXAPARIN SODIUM 100 MG/ML
40 INJECTION SUBCUTANEOUS
Qty: 30 | Refills: 0 | OUTPATIENT
Start: 2017-05-23 | End: 2017-06-22

## 2017-05-23 RX ORDER — ACETAMINOPHEN 500 MG
650 TABLET ORAL EVERY 6 HOURS
Qty: 0 | Refills: 0 | Status: DISCONTINUED | OUTPATIENT
Start: 2017-05-23 | End: 2017-05-26

## 2017-05-23 RX ORDER — OXYCODONE HYDROCHLORIDE 5 MG/1
5 TABLET ORAL EVERY 4 HOURS
Qty: 0 | Refills: 0 | Status: DISCONTINUED | OUTPATIENT
Start: 2017-05-23 | End: 2017-05-26

## 2017-05-23 RX ADMIN — Medication 2: at 08:07

## 2017-05-23 RX ADMIN — TAMSULOSIN HYDROCHLORIDE 0.4 MILLIGRAM(S): 0.4 CAPSULE ORAL at 21:13

## 2017-05-23 RX ADMIN — DEXTROSE MONOHYDRATE, SODIUM CHLORIDE, AND POTASSIUM CHLORIDE 75 MILLILITER(S): 50; .745; 4.5 INJECTION, SOLUTION INTRAVENOUS at 08:08

## 2017-05-23 RX ADMIN — ENOXAPARIN SODIUM 40 MILLIGRAM(S): 100 INJECTION SUBCUTANEOUS at 12:47

## 2017-05-23 RX ADMIN — Medication 50 MICROGRAM(S): at 05:08

## 2017-05-23 RX ADMIN — ATORVASTATIN CALCIUM 10 MILLIGRAM(S): 80 TABLET, FILM COATED ORAL at 21:12

## 2017-05-23 RX ADMIN — INSULIN GLARGINE 10 UNIT(S): 100 INJECTION, SOLUTION SUBCUTANEOUS at 22:12

## 2017-05-23 NOTE — PROGRESS NOTE ADULT - SUBJECTIVE AND OBJECTIVE BOX
*********Note incomplete pending morning rounds*****************    Surgical Oncology Progress Note  pager: [phone]    Procedure: gastric adenocarcinoma s/p total gastrectomy, splenectomy, distal pancreatectomy, feeding J tube  POD: 11    S:           Physical Exam  Gen:  Abd: Surgical Oncology Progress Note  pager: [phone]    Procedure: gastric adenocarcinoma s/p total gastrectomy, splenectomy, distal pancreatectomy, feeding J tube  POD: 11    S: No acute overnight events. Tolerated his bariatric regular diet without nausea or vomiting. Continuing to pass flatus and have bowel movements. OOB, ambulating.       Physical Exam  Gen: NAD, AOx3  Abd: soft, mildly tender, mild distension, incision CDI. MICH with light milky output.     Vital Signs Last 24 Hrs  T(C): 37, Max: 37.1 (05-22 @ 20:39)  T(F): 98.6, Max: 98.7 (05-22 @ 20:39)  HR: 91 (73 - 91)  BP: 132/88 (128/72 - 135/75)  BP(mean): --  RR: 18 (17 - 18)  SpO2: 98% (95% - 98%)    I&O's Detail  I & Os for 24h ending 23 May 2017 07:00  =============================================  IN:    dextrose 5% + sodium chloride 0.45% with potassium chloride 20 mEq/L: 1800 ml    Glucerna: 480 ml    Total IN: 2280 ml  ---------------------------------------------  OUT:    Voided: 1350 ml    Bulb: 29.5 ml    Total OUT: 1379.5 ml  ---------------------------------------------  Total NET: 900.5 ml    I & Os for current day (as of 23 May 2017 18:49)  =============================================  IN:    dextrose 5% + sodium chloride 0.45% with potassium chloride 20 mEq/L: 600 ml    Total IN: 600 ml  ---------------------------------------------  OUT:    Voided: 300 ml    Bulb: 3 ml    Total OUT: 303 ml  ---------------------------------------------  Total NET: 297 ml                            10.6   11.80 )-----------( 712      ( 23 May 2017 06:07 )             34.4       05-23    135  |  97<L>  |  10  ----------------------------<  195<H>  4.3   |  23  |  0.63    Ca    8.5      23 May 2017 06:07  Phos  3.1     05-23  Mg     2.0     05-23    TPro  6.0  /  Alb  2.7<L>  /  TBili  0.2  /  DBili  x   /  AST  20  /  ALT  19  /  AlkPhos  115  05-23

## 2017-05-23 NOTE — PROGRESS NOTE ADULT - SUBJECTIVE AND OBJECTIVE BOX
STATUS POST:      POST OPERATIVE DAY #:     Vital Signs Last 24 Hrs  T(C): 36.8, Max: 37.2 (05-22 @ 16:00)  T(F): 98.3, Max: 98.9 (05-22 @ 16:00)  HR: 86 (73 - 86)  BP: 135/75 (128/72 - 138/74)  BP(mean): --  RR: 18 (16 - 18)  SpO2: 98% (95% - 98%)      SUBJECTIVE: Pt seen  SOB:  [ ] YES [ ] NO  Dyspnea: [ ]YES [ ]NO  Chest Discomfort: [ ] YES [ ] NO    Nausea: [ ] YES [ ] NO           Vomiting: [ ] YES [ ] NO  Flatus: [ ] YES [ ] NO             Bowel Movement: [ ] YES [ ] NO  Diarrhea: [ ] YES [ ] NO         Void: [ ]YES [ ]No  Constipation: [ ] YES [ ] NO     Pain (0-10):              Pain Control Adequate: [ ] YES [ ] NO  Aabrca:  NGT:    PHYSICAL EXAM:  Constitutional: Patient well nourish. well developed.  Eyes:  PERRL, EOMI, Conjunctiva clear.  ENMT:  WNL  Neck:  Supple.  Respiratory:  Lungs CTA, B/L, no rales , no wheezing, no rhonchi.  Cardiovascular:  S1, S2, RRR  Gastrointestinal: Abdomen soft, non distended, + BS, non tenderness  Wound: C/D/I  Genitourinary:  Normal.  Rectal:   Extremities:  No edema, no calf tenderrness,  Neurological: AxAxOx3                    I&O's Summary  I & Os for 24h ending 23 May 2017 07:00  =============================================  IN: 2280 ml / OUT: 1379.5 ml / NET: 900.5 ml    I & Os for current day (as of 23 May 2017 15:23)  =============================================  IN: 450 ml / OUT: 303 ml / NET: 147 ml    I&O's Detail  I & Os for 24h ending 23 May 2017 07:00  =============================================  IN:    dextrose 5% + sodium chloride 0.45% with potassium chloride 20 mEq/L: 1800 ml    Glucerna: 480 ml    Total IN: 2280 ml  ---------------------------------------------  OUT:    Voided: 1350 ml    Bulb: 29.5 ml    Total OUT: 1379.5 ml  ---------------------------------------------  Total NET: 900.5 ml    I & Os for current day (as of 23 May 2017 15:23)  =============================================  IN:    dextrose 5% + sodium chloride 0.45% with potassium chloride 20 mEq/L: 450 ml    Total IN: 450 ml  ---------------------------------------------  OUT:    Voided: 300 ml    Bulb: 3 ml    Total OUT: 303 ml  ---------------------------------------------  Total NET: 147 ml      MEDICATIONS  (STANDING):  enoxaparin Injectable 40milliGRAM(s) SubCutaneous daily  insulin glargine Injectable (LANTUS) 10Unit(s) SubCutaneous at bedtime  dextrose 50% Injectable 25Gram(s) IV Push once  dextrose 50% Injectable 25Gram(s) IV Push once  dextrose 5% + sodium chloride 0.45% with potassium chloride 20 mEq/L 1000milliLiter(s) IV Continuous <Continuous>  insulin lispro (HumaLOG) corrective regimen sliding scale  SubCutaneous three times a day before meals  insulin lispro (HumaLOG) corrective regimen sliding scale  SubCutaneous at bedtime  dextrose 5%. 1000milliLiter(s) IV Continuous <Continuous>  tamsulosin 0.4milliGRAM(s) Oral at bedtime  levothyroxine 100MICROGram(s) Oral daily  atorvastatin 10milliGRAM(s) Oral at bedtime    MEDICATIONS  (PRN):  dextrose Gel 1Dose(s) Oral once PRN Blood Glucose LESS THAN 70 milliGRAM(s)/deciliter  glucagon  Injectable 1milliGRAM(s) IntraMuscular once PRN Glucose LESS THAN 70 milligrams/deciliter  acetaminophen   Tablet. 650milliGRAM(s) Oral every 6 hours PRN Mild Pain (1 - 3)  oxyCODONE IR 5milliGRAM(s) Oral every 4 hours PRN moderate and severe pain      LABS:                        10.6   11.80 )-----------( 712      ( 23 May 2017 06:07 )             34.4     05-23    135  |  97<L>  |  10  ----------------------------<  195<H>  4.3   |  23  |  0.63    Ca    8.5      23 May 2017 06:07  Phos  3.1     05-23  Mg     2.0     05-23    TPro  6.0  /  Alb  2.7<L>  /  TBili  0.2  /  DBili  x   /  AST  20  /  ALT  19  /  AlkPhos  115  05-23          RADIOLOGY & ADDITIONAL STUDIES:      Pathology:  Surgical Pathology Report:   ACCESSION No:  [id number]    BEATA MCGEE                       1        Surgical Final Report          Final Diagnosis    1. Stomach, gastric mass, biopsy  - Ulcerated high-grade dysplasia, focally suspicious for  intramucosal carcinoma, see comment  - Chronic gastritis with intestinal metaplasia also present  - No H. pylori seen on Warthin-Starry stain    2. Stomach, antrum, biopsy  - Chronic gastritis with intestinal metaplasia  - No H. pylori seen on Warthin-Starry stain    Comment:    This case was reviewed for  by Dr. TRINITY Rubalcava,  who concur(s) with the diagnosis on 12/2/2016.    Discussed with Dr. South on 12/2/2016 2:59:10 PM EST    AMY MEDRANO MD  (Electronic Signature)  Reported on: 12/02/16    Clinical History  No clinical data provided  Operation performed: EGD, colonoscopy    Specimen(s) Submitted  1- Gastric mass fundus  2- Antrum    Gross Description  12/01/16 21:10  1.   The specimen is received in formalin and the specimen  container is labeled: Gastric mass fundus.  It consists of two  fragments of soft tan tissue measuring 0.1 cm and 0.3 cm in  greatest dimension.  Entirely submitted.  One cassette.    2.   The specimen is received in formalin and the specimen  container is labeled: Antrum.  It consists of a 0.4 cm fragment  of soft tan tissue.  H. pylori staining has been ordered.  Entirely submitted.  One cassette.    In addition to other data that may appear on the specimen  containers, all labels have been inspected to confirm the  presence of the patient's name and date of birth.  TK 12/01/16 21:12 (11.30.16 @ 11:50)

## 2017-05-23 NOTE — PROGRESS NOTE ADULT - SUBJECTIVE AND OBJECTIVE BOX
CHIEF COMPLAINT: Patient is a 66y old  Male with s/p gastric CA resection    	      No Known Allergies      PAST MEDICAL & SURGICAL HISTORY:  Iron deficiency anemia, unspecified iron deficiency anemia type  Gastric mass: ulcerated mass in gastric cardia with small perigastric nodes on endoscopy.  Hypothyroidism  Type 2 diabetes mellitus without complication, unspecified long term insulin use status: no BS monitoring  Essential hypertension: was on losartan  Port-a-cath in place: right chest wall  H/O umbilical hernia repair      FAMILY HISTORY:  Family history of ischemic heart disease (IHD) (Father): father  Family history of ovarian cancer (Mother): mother      REVIEW OF SYSTEMS:  CONSTITUTIONAL: No fever, weight loss, or fatigue  EYES: No eye pain, visual disturbances, or discharge  NECK: No pain or stiffness  RESPIRATORY: No cough, wheezing, chills or hemoptysis; No Shortness of Breath  CARDIOVASCULAR: No chest pain, palpitations, passing out, dizziness, or leg swelling  GASTROINTESTINAL: No abdominal or epigastric pain. No nausea, vomiting, or hematemesis; No diarrhea or constipation. No melena or hematochezia.  GENITOURINARY: No dysuria, frequency, hematuria, or incontinence  NEUROLOGICAL: No headaches, memory loss, loss of strength, numbness, or tremors  SKIN: No itching, burning, rashes, or lesions   LYMPH Nodes: No enlarged glands  ENDOCRINE: No heat or cold intolerance; No hair loss  MUSCULOSKELETAL: No joint pain or swelling; No muscle, back, or extremity pain    Medications:  MEDICATIONS  (STANDING):  enoxaparin Injectable 40milliGRAM(s) SubCutaneous daily  insulin glargine Injectable (LANTUS) 10Unit(s) SubCutaneous at bedtime  dextrose 50% Injectable 25Gram(s) IV Push once  dextrose 50% Injectable 25Gram(s) IV Push once  insulin lispro (HumaLOG) corrective regimen sliding scale  SubCutaneous three times a day before meals  insulin lispro (HumaLOG) corrective regimen sliding scale  SubCutaneous at bedtime  dextrose 5%. 1000milliLiter(s) IV Continuous <Continuous>  tamsulosin 0.4milliGRAM(s) Oral at bedtime  levothyroxine 100MICROGram(s) Oral daily  atorvastatin 10milliGRAM(s) Oral at bedtime    MEDICATIONS  (PRN):  dextrose Gel 1Dose(s) Oral once PRN Blood Glucose LESS THAN 70 milliGRAM(s)/deciliter  glucagon  Injectable 1milliGRAM(s) IntraMuscular once PRN Glucose LESS THAN 70 milligrams/deciliter  acetaminophen   Tablet. 650milliGRAM(s) Oral every 6 hours PRN Mild Pain (1 - 3)  oxyCODONE IR 5milliGRAM(s) Oral every 4 hours PRN moderate and severe pain    	    PHYSICAL EXAM:  T(C): 37, Max: 37.1 (05-22 @ 20:39)  HR: 91 (73 - 91)  BP: 132/88 (128/72 - 135/75)  RR: 18 (17 - 18)  SpO2: 98% (95% - 98%)  Wt(kg): --  I&O's Summary  I & Os for 24h ending 23 May 2017 07:00  =============================================  IN: 2280 ml / OUT: 1379.5 ml / NET: 900.5 ml    I & Os for current day (as of 23 May 2017 19:51)  =============================================  IN: 680 ml / OUT: 933 ml / NET: -253 ml      Appearance: Normal	  HEENT:   Normal oral mucosa, PERRL, EOMI	  Lymphatic: No lymphadenopathy  Cardiovascular: Normal S1 S2, No JVD, No murmurs, No edema  Respiratory: Lungs clear to auscultation	  Psychiatry: A & O x 3, Mood & affect appropriate  Gastrointestinal:  Soft, mildly tender over surgical scar, + BS	  Skin: No rashes, No ecchymoses, No cyanosis	  Neurologic: Non-focal  Extremities: Normal range of motion, No clubbing, cyanosis or edema  Vascular: Peripheral pulses palpable 2+ bilaterally                              10.6   11.80 )-----------( 712      ( 23 May 2017 06:07 )             34.4     05-23    135  |  97<L>  |  10  ----------------------------<  195<H>  4.3   |  23  |  0.63    Ca    8.5      23 May 2017 06:07  Phos  3.1     05-23  Mg     2.0     05-23    TPro  6.0  /  Alb  2.7<L>  /  TBili  0.2  /  DBili  x   /  AST  20  /  ALT  19  /  AlkPhos  115  05-23

## 2017-05-23 NOTE — PROGRESS NOTE ADULT - ASSESSMENT
65 yo M with HTN, DM2, hypothyroidism, gastric CA s/p resection, now with UTI and urinary frequency  - Gastric CA s/p resection - NGT removed today, reporting nausea and vomiting x1, would start Reglan if ok from surgical standpoint, continue pain control and incentive spirometry  - UTI - resolved, off antibiotics  - Urinary frequency and hesitancy - continue with Flomax  - DM2 - Insulin dosing as per Endocrine  - HTN - BP at goal, monitor  - Hypothyroidism - continue Synthroid.  - Tolerating diet with nocturnal tube feeds, continue as per Surgical team

## 2017-05-23 NOTE — PROGRESS NOTE ADULT - ASSESSMENT
66M p/w gastric adenocarcinoma s/p total gastrectomy, splenectomy, distal pancreatectomy, feeding J tube  - DVT PPx  - Out of Bed  - Incentive Spirometry  - Analgesia  - Diet: Reg  - MICH Care / Teaching  - Local Wound Care 66M p/w gastric adenocarcinoma s/p total gastrectomy, splenectomy, distal pancreatectomy, feeding J tube  - DVT PPx  - Out of Bed  - Incentive Spirometry  - Analgesia  - Diet: Reg w/ nocturnal TF (Plan to d/c with norcturnal tube feeds, will require pump as cannot bolus feed through J tube)  - MICH Care / Teaching  - Local Wound Care 66M p/w gastric adenocarcinoma s/p total gastrectomy, splenectomy, distal pancreatectomy, feeding J tube  - DVT PPx  - Out of Bed  - Incentive Spirometry  - Analgesia  - Diet: Reg w/ nocturnal TF (Plan to d/c with nocturnal tube feeds, will require pump as cannot bolus feed through J tube)  - MICH Care / Teaching  - Local Wound Care  - Appreciate endocrine recommendations for outpatient medications

## 2017-05-23 NOTE — CONSULT NOTE ADULT - SUBJECTIVE AND OBJECTIVE BOX
HPI:  67yo male with HTN, Hypothyroidism, DM2, GERD and HLD  and recently diagnosed gastric adenocarcinoma s/p total gastrectomy, splenectomy and distal pancreatectomy, with J tube placement. History of DM2 x several years but has always been well controlled only on metformin ER 500mg daily. Tries to follow proper DM diet.  Currently tolerating po post op and plan for J tube feeds overnight x 12h to start tonight with po feeding during the day. In hospital patient has been maintained on Lantus 10u qhs and a Huamlog correction scale. He is currently receiving IV dextrose in fluids. HbA1c was noted at 6.5% on this admission.    PAST MEDICAL & SURGICAL HISTORY:  Iron deficiency anemia, unspecified iron deficiency anemia type  Gastric mass: ulcerated mass in gastric cardia with small perigastric nodes on endoscopy.  Hypothyroidism  Type 2 diabetes mellitus without complication, unspecified long term insulin use status: no BS monitoring  Essential hypertension: was on losartan  Port-a-cath in place: right chest wall  H/O umbilical hernia repair      FAMILY HISTORY:  Family history of ischemic heart disease (IHD) (Father): father  Family history of ovarian cancer (Mother): mother      Social History: never smoker    Outpatient Medications: metformin  mg daily, levothyroxine 100 mcg, pravastatin 20    MEDICATIONS  (STANDING):  enoxaparin Injectable 40milliGRAM(s) SubCutaneous daily  insulin glargine Injectable (LANTUS) 10Unit(s) SubCutaneous at bedtime  dextrose 50% Injectable 25Gram(s) IV Push once  dextrose 50% Injectable 25Gram(s) IV Push once  insulin lispro (HumaLOG) corrective regimen sliding scale  SubCutaneous three times a day before meals  insulin lispro (HumaLOG) corrective regimen sliding scale  SubCutaneous at bedtime  dextrose 5%. 1000milliLiter(s) IV Continuous <Continuous>  tamsulosin 0.4milliGRAM(s) Oral at bedtime  levothyroxine 100MICROGram(s) Oral daily  atorvastatin 10milliGRAM(s) Oral at bedtime    MEDICATIONS  (PRN):  dextrose Gel 1Dose(s) Oral once PRN Blood Glucose LESS THAN 70 milliGRAM(s)/deciliter  glucagon  Injectable 1milliGRAM(s) IntraMuscular once PRN Glucose LESS THAN 70 milligrams/deciliter  acetaminophen   Tablet. 650milliGRAM(s) Oral every 6 hours PRN Mild Pain (1 - 3)  oxyCODONE IR 5milliGRAM(s) Oral every 4 hours PRN moderate and severe pain      Allergies    No Known Allergies    Intolerances      Review of Systems:  Constitutional: No fever  Eyes: No blurry vision  Neuro: No tremors  HEENT: No pain  Cardiovascular: No chest pain, palpitations  Respiratory: No SOB, no cough  GI: No nausea, vomiting, abdominal pain  : No dysuria  Skin: no rash  Psych: no depression  Endocrine: no polyuria, polydipsia  Hem/lymph: no swelling  Osteoporosis: no fractures    ALL OTHER SYSTEMS REVIEWED AND NEGATIVE    UNABLE TO OBTAIN    PHYSICAL EXAM:  VITALS: T(C): 36.8  T(F): 98.3, Max: 98.7 (05-22 @ 20:39)  HR: 86 (73 - 86)  BP: 135/75 (128/72 - 135/75)  RR:  (17 - 18)  SpO2:  (95% - 98%)  Wt(kg): --  GENERAL: NAD, well-groomed, thin male  EYES: No proptosis, no lid lag, anicteric  HEENT:  Atraumatic, Normocephalic, moist mucous membranes  THYROID: Normal size, no palpable nodules  RESPIRATORY: Clear to auscultation bilaterally; No rales, rhonchi, wheezing  CARDIOVASCULAR: Regular rate and rhythm; No murmurs; no peripheral edema  GI: Soft, nontender J tube in place  SKIN: Dry, intact, No rashes or lesions  MUSCULOSKELETAL: Full range of motion, normal strength  NEURO: sensation intact, extraocular movements intact, no tremor  PSYCH: Alert and oriented x 3, normal affect, normal mood  CUSHING'S SIGNS: no striae    CAPILLARY BLOOD GLUCOSE  143 (05-23 @ 12:45)  160 (05-23 @ 08:00)  171 (05-22 @ 22:20)  136 (05-22 @ 16:57)  176 (05-22 @ 12:40)  188 (05-22 @ 08:32)  173 (05-21 @ 23:49)  154 (05-21 @ 17:43)  148 (05-21 @ 11:51)  144 (05-21 @ 06:08)  148 (05-20 @ 22:58)  156 (05-20 @ 17:41)                            10.6   11.80 )-----------( 712      ( 23 May 2017 06:07 )             34.4       05-23    135  |  97<L>  |  10  ----------------------------<  195<H>  4.3   |  23  |  0.63    EGFR if : 118  EGFR if non : 102    Ca    8.5      05-23  Mg     2.0     05-23  Phos  3.1     05-23    TPro  6.0  /  Alb  2.7<L>  /  TBili  0.2  /  DBili  x   /  AST  20  /  ALT  19  /  AlkPhos  115  05-23      Thyroid Function Tests:  05-02 @ 10:00 TSH 1.27 FreeT4 -- T3 -- Anti TPO -- Anti Thyroglobulin Ab -- TSI --      Hemoglobin A1C, Whole Blood: 6.5 % <H> [4.0 - 5.6] (05-02 @ 10:00)          Radiology:

## 2017-05-23 NOTE — CONSULT NOTE ADULT - PROBLEM SELECTOR RECOMMENDATION 9
Would stop dextrose in IV fluids as patient is tolerating po and will also be receiving enteral feeds overnight.  Monitor glucose trend once enteral feeds are started.  For now continue Lantus 10u qhs and Humalog moderate scale qac, low scale qhs.  Will consider resuming metformin ER for dc once observe glucose trend on enteral feeds, to be clarified prior to dc. Do not suspect insulin deficiency in setting of only distal pancreatectomy.

## 2017-05-23 NOTE — CONSULT NOTE ADULT - ASSESSMENT
66M DM2 controlled HbA1c 6.5% with gastric adenocarcinoma s/p total gastrectomy, distal pancreatectomy, J tube on oral and will be started on nightly enteral feeds via J tube.

## 2017-05-24 LAB
ALBUMIN SERPL ELPH-MCNC: 3 G/DL — LOW (ref 3.3–5)
ALP SERPL-CCNC: 115 U/L — SIGNIFICANT CHANGE UP (ref 40–120)
ALT FLD-CCNC: 19 U/L — SIGNIFICANT CHANGE UP (ref 4–41)
AMYLASE FLD-CCNC: 2770 U/L — SIGNIFICANT CHANGE UP
AST SERPL-CCNC: 19 U/L — SIGNIFICANT CHANGE UP (ref 4–40)
BILIRUB SERPL-MCNC: 0.2 MG/DL — SIGNIFICANT CHANGE UP (ref 0.2–1.2)
BUN SERPL-MCNC: 11 MG/DL — SIGNIFICANT CHANGE UP (ref 7–23)
CALCIUM SERPL-MCNC: 8.6 MG/DL — SIGNIFICANT CHANGE UP (ref 8.4–10.5)
CHLORIDE SERPL-SCNC: 99 MMOL/L — SIGNIFICANT CHANGE UP (ref 98–107)
CO2 SERPL-SCNC: 23 MMOL/L — SIGNIFICANT CHANGE UP (ref 22–31)
CREAT SERPL-MCNC: 0.7 MG/DL — SIGNIFICANT CHANGE UP (ref 0.5–1.3)
GLUCOSE SERPL-MCNC: 155 MG/DL — HIGH (ref 70–99)
HCT VFR BLD CALC: 34.4 % — LOW (ref 39–50)
HGB BLD-MCNC: 10.8 G/DL — LOW (ref 13–17)
MAGNESIUM SERPL-MCNC: 2 MG/DL — SIGNIFICANT CHANGE UP (ref 1.6–2.6)
MCHC RBC-ENTMCNC: 30.8 PG — SIGNIFICANT CHANGE UP (ref 27–34)
MCHC RBC-ENTMCNC: 31.4 % — LOW (ref 32–36)
MCV RBC AUTO: 98 FL — SIGNIFICANT CHANGE UP (ref 80–100)
PHOSPHATE SERPL-MCNC: 3.6 MG/DL — SIGNIFICANT CHANGE UP (ref 2.5–4.5)
PLATELET # BLD AUTO: 756 K/UL — HIGH (ref 150–400)
PMV BLD: 9.6 FL — SIGNIFICANT CHANGE UP (ref 7–13)
POTASSIUM SERPL-MCNC: 4.1 MMOL/L — SIGNIFICANT CHANGE UP (ref 3.5–5.3)
POTASSIUM SERPL-SCNC: 4.1 MMOL/L — SIGNIFICANT CHANGE UP (ref 3.5–5.3)
PROT SERPL-MCNC: 6.6 G/DL — SIGNIFICANT CHANGE UP (ref 6–8.3)
RBC # BLD: 3.51 M/UL — LOW (ref 4.2–5.8)
RBC # FLD: 15 % — HIGH (ref 10.3–14.5)
SODIUM SERPL-SCNC: 137 MMOL/L — SIGNIFICANT CHANGE UP (ref 135–145)
WBC # BLD: 10.95 K/UL — HIGH (ref 3.8–10.5)
WBC # FLD AUTO: 10.95 K/UL — HIGH (ref 3.8–10.5)

## 2017-05-24 PROCEDURE — 99232 SBSQ HOSP IP/OBS MODERATE 35: CPT

## 2017-05-24 RX ORDER — INFLUENZA VIRUS VACCINE 15; 15; 15; 15 UG/.5ML; UG/.5ML; UG/.5ML; UG/.5ML
0.5 SUSPENSION INTRAMUSCULAR ONCE
Qty: 0 | Refills: 0 | Status: DISCONTINUED | OUTPATIENT
Start: 2017-05-24 | End: 2017-05-24

## 2017-05-24 RX ORDER — HAEMOPH B POLYSAC CONJ-MENING 7.5MCG/0.5
0.5 VIAL (ML) INTRAMUSCULAR ONCE
Qty: 0 | Refills: 0 | Status: COMPLETED | OUTPATIENT
Start: 2017-05-24 | End: 2017-05-24

## 2017-05-24 RX ORDER — PNEUMOCOCCAL 23-VAL P-SAC VAC 25MCG/0.5
0.5 VIAL (ML) INJECTION ONCE
Qty: 0 | Refills: 0 | Status: DISCONTINUED | OUTPATIENT
Start: 2017-05-24 | End: 2017-05-25

## 2017-05-24 RX ORDER — PNEUMOCOCCAL 13-VALENT CONJUGATE VACCINE 2.2; 2.2; 2.2; 2.2; 2.2; 4.4; 2.2; 2.2; 2.2; 2.2; 2.2; 2.2; 2.2 UG/.5ML; UG/.5ML; UG/.5ML; UG/.5ML; UG/.5ML; UG/.5ML; UG/.5ML; UG/.5ML; UG/.5ML; UG/.5ML; UG/.5ML; UG/.5ML; UG/.5ML
0.5 INJECTION, SUSPENSION INTRAMUSCULAR ONCE
Qty: 0 | Refills: 0 | Status: COMPLETED | OUTPATIENT
Start: 2017-05-24 | End: 2017-05-24

## 2017-05-24 RX ADMIN — ATORVASTATIN CALCIUM 10 MILLIGRAM(S): 80 TABLET, FILM COATED ORAL at 21:58

## 2017-05-24 RX ADMIN — ENOXAPARIN SODIUM 40 MILLIGRAM(S): 100 INJECTION SUBCUTANEOUS at 12:59

## 2017-05-24 RX ADMIN — Medication 0.5 MILLILITER(S): at 18:23

## 2017-05-24 RX ADMIN — PNEUMOCOCCAL 13-VALENT CONJUGATE VACCINE 0.5 MILLILITER(S): 2.2; 2.2; 2.2; 2.2; 2.2; 4.4; 2.2; 2.2; 2.2; 2.2; 2.2; 2.2; 2.2 INJECTION, SUSPENSION INTRAMUSCULAR at 18:19

## 2017-05-24 RX ADMIN — Medication 100 MICROGRAM(S): at 06:49

## 2017-05-24 RX ADMIN — TAMSULOSIN HYDROCHLORIDE 0.4 MILLIGRAM(S): 0.4 CAPSULE ORAL at 21:58

## 2017-05-24 RX ADMIN — INSULIN GLARGINE 10 UNIT(S): 100 INJECTION, SOLUTION SUBCUTANEOUS at 23:03

## 2017-05-24 NOTE — DIETITIAN INITIAL EVALUATION ADULT. - OTHER INFO
Calorie count was conducted for Pt yesterday. Pt consumed approximately 960 kwan, po in 24 hours. He also was receiving  Glucerna 1.2 @ 40 ml/hour for 12 hours via J tube. Pt tolerated both po and enteral feeds well. Pt's total caloric intake was 1536 kwan/24 hours which is below his caloric needs. Pt should be taking 2268 kcal /day. To meet his needs the Glucerna should provide about half of his needs. The Glucerna 1.2 should be increased to 84 ml/hr for 12 hours. Start at 40 ml/hour and increase by 20 every 2 hours until goal is reached. RDN remains available.

## 2017-05-24 NOTE — PROGRESS NOTE ADULT - ASSESSMENT
66M p/w gastric adenocarcinoma s/p total gastrectomy, splenectomy, distal pancreatectomy, feeding J tube  - DVT PPx  - Out of Bed  - Incentive Spirometry  - Analgesia  - Diet: Reg w/ nocturnal TF (Plan to d/c with nocturnal tube feeds, will require pump as cannot bolus feed through J tube)  - MICH Care / Teaching  - Local Wound Care  - Appreciate endocrine recommendations for outpatient medications 66M p/w gastric adenocarcinoma s/p total gastrectomy, splenectomy, distal pancreatectomy, feeding J tube now POD 12  - DVT PPx with lovenox  - Diet: Reg w/ nocturnal TF (Plan to d/c with nocturnal tube feeds, will require pump as cannot bolus feed through J tube)  - MICH amylase; if normal will likely d/c MICH  - Splenectomy vaccines; pneumovax to be given 5-8 weeks after Prevnar (plan to give pneumovax as an outpatient)  - d/c planning for Thursday v. Friday  - Appreciate endocrine recommendations for outpatient medications

## 2017-05-24 NOTE — PROGRESS NOTE ADULT - SUBJECTIVE AND OBJECTIVE BOX
Chief Complaint: DM2    History: Tolerated overnight tube feeds. Also tolerating moderate po intake during the day.  No hypoglycemia.    MEDICATIONS  (STANDING):  enoxaparin Injectable 40milliGRAM(s) SubCutaneous daily  insulin glargine Injectable (LANTUS) 10Unit(s) SubCutaneous at bedtime  dextrose 50% Injectable 25Gram(s) IV Push once  dextrose 50% Injectable 25Gram(s) IV Push once  insulin lispro (HumaLOG) corrective regimen sliding scale  SubCutaneous three times a day before meals  insulin lispro (HumaLOG) corrective regimen sliding scale  SubCutaneous at bedtime  dextrose 5%. 1000milliLiter(s) IV Continuous <Continuous>  tamsulosin 0.4milliGRAM(s) Oral at bedtime  levothyroxine 100MICROGram(s) Oral daily  atorvastatin 10milliGRAM(s) Oral at bedtime  pneumococcal  13 Vaccine (PREVNAR 13) 0.5milliLiter(s) IntraMuscular once  pneumococcal  23 Vaccine (PNEUMOVAX) 0.5milliLiter(s) IntraMuscular once  haemophilus B conjugate Vaccine 0.5milliLiter(s) IntraMuscular once    MEDICATIONS  (PRN):  dextrose Gel 1Dose(s) Oral once PRN Blood Glucose LESS THAN 70 milliGRAM(s)/deciliter  glucagon  Injectable 1milliGRAM(s) IntraMuscular once PRN Glucose LESS THAN 70 milligrams/deciliter  acetaminophen   Tablet. 650milliGRAM(s) Oral every 6 hours PRN Mild Pain (1 - 3)  oxyCODONE IR 5milliGRAM(s) Oral every 4 hours PRN moderate and severe pain      Allergies    No Known Allergies    Intolerances      Review of Systems:  Constitutional: No fever  Eyes: No blurry vision  Neuro: No tremors  HEENT: No pain  Cardiovascular: No chest pain, palpitations  Respiratory: No SOB, no cough  GI: No nausea, vomiting, abdominal pain  : No dysuria  Skin: no rash  Psych: no depression  Endocrine: no polyuria, polydipsia  Hem/lymph: no swelling  Osteoporosis: no fractures    ALL OTHER SYSTEMS REVIEWED AND NEGATIVE      PHYSICAL EXAM:  VITALS: T(C): 37.1  T(F): 98.8, Max: 98.8 (05-23 @ 20:36)  HR: 76 (76 - 95)  BP: 147/79 (125/72 - 147/79)  RR:  (18 - 18)  SpO2:  (95% - 100%)  Wt(kg): --  GENERAL: NAD, well-groomed, well-developed  EYES: No proptosis, no lid lag, anicteric  HEENT:  Atraumatic, Normocephalic, moist mucous membranes  GI: J tube in place  SKIN: Dry, intact, No rashes or lesions  MUSCULOSKELETAL: Full range of motion, normal strength  NEURO: extraocular movements intact, no tremor  PSYCH: Alert and oriented x 3, normal affect, normal mood    CAPILLARY BLOOD GLUCOSE  123 (05-24 @ 12:43)  148 (05-24 @ 08:00)  160 (05-23 @ 21:55)  129 (05-23 @ 17:13)  143 (05-23 @ 12:45)  160 (05-23 @ 08:00)  171 (05-22 @ 22:20)  136 (05-22 @ 16:57)  176 (05-22 @ 12:40)  188 (05-22 @ 08:32)  173 (05-21 @ 23:49)  154 (05-21 @ 17:43)      05-24    137  |  99  |  11  ----------------------------<  155<H>  4.1   |  23  |  0.70    EGFR if : 113  EGFR if non : 98    Ca    8.6      05-24  Mg     2.0     05-24  Phos  3.6     05-24    TPro  6.6  /  Alb  3.0<L>  /  TBili  0.2  /  DBili  x   /  AST  19  /  ALT  19  /  AlkPhos  115  05-24          Thyroid Function Tests:  05-02 @ 10:00 TSH 1.27 FreeT4 -- T3 -- Anti TPO -- Anti Thyroglobulin Ab -- TSI --      Hemoglobin A1C, Whole Blood: 6.5 % <H> [4.0 - 5.6] (05-02 @ 10:00)

## 2017-05-24 NOTE — DIETITIAN INITIAL EVALUATION ADULT. - DIET TYPE
Glucerna 1.2 via J tube, Glucerchirag Shake 3x/day/phase 3 bariatric regular/fiber/residue restricted/consistent carbohydrate (no snacks)

## 2017-05-24 NOTE — PROGRESS NOTE ADULT - ASSESSMENT
67 yo M with HTN, DM2, hypothyroidism, gastric CA s/p resection, now with UTI and urinary frequency  - Gastric CA s/p resection - NGT removed, continue pain control and incentive spirometry  - UTI - resolved, off antibiotics  - Urinary frequency and hesitancy - continue with Flomax  - DM2 - Insulin dosing as per Endocrine  - HTN - BP at goal, monitor  - Hypothyroidism - continue Synthroid.  - Tolerating diet with nocturnal tube feeds, continue as per Surgical team

## 2017-05-24 NOTE — PROGRESS NOTE ADULT - SUBJECTIVE AND OBJECTIVE BOX
*********Note incomplete pending morning rounds*****************    Surgical Oncology Progress Note  pager: [phone]    Procedure: gastric adenocarcinoma s/p total gastrectomy, splenectomy, distal pancreatectomy, feeding J tube  POD: 12    S:           Physical Exam  Gen:  Abd: Surgical Oncology Progress Note  pager: [phone]    Procedure: gastric adenocarcinoma s/p total gastrectomy, splenectomy, distal pancreatectomy, feeding J tube  POD: 12    S: No acute events overnight. Tolerated his reg bariatric diet without N/V, but reports he felt at one point his "food became stuck".       Physical Exam  Gen: NAD, AOx3  Abd: soft, mildly tender, mild distension, incision CDI. MICH with light milky output.     Vital Signs Last 24 Hrs  T(C): 36.7, Max: 37.1 (05-23 @ 20:36)  T(F): 98, Max: 98.8 (05-23 @ 20:36)  HR: 89 (76 - 95)  BP: 133/69 (125/72 - 147/79)  BP(mean): --  RR: 18 (18 - 18)  SpO2: 94% (94% - 100%)    I&O's Detail  I & Os for 24h ending 24 May 2017 07:00  =============================================  IN:    dextrose 5% + sodium chloride 0.45% with potassium chloride 20 mEq/L: 600 ml    Glucerna: 80 ml    Total IN: 680 ml  ---------------------------------------------  OUT:    Voided: 1980 ml    Bulb: 4 ml    Total OUT: 1984 ml  ---------------------------------------------  Total NET: -1304 ml    I & Os for current day (as of 24 May 2017 18:12)  =============================================  IN:    Oral Fluid: 240 ml    Total IN: 240 ml  ---------------------------------------------  OUT:    Total OUT: 0 ml  ---------------------------------------------  Total NET: 240 ml                            10.8   10.95 )-----------( 756      ( 24 May 2017 06:30 )             34.4       05-24    137  |  99  |  11  ----------------------------<  155<H>  4.1   |  23  |  0.70    Ca    8.6      24 May 2017 06:30  Phos  3.6     05-24  Mg     2.0     05-24    TPro  6.6  /  Alb  3.0<L>  /  TBili  0.2  /  DBili  x   /  AST  19  /  ALT  19  /  AlkPhos  115  05-24

## 2017-05-24 NOTE — PROGRESS NOTE ADULT - PROBLEM SELECTOR PLAN 1
While inpatient BG are well controlled on Lantus 10u qhs and Humalog correction. Continue same plan.    For discharge:  patient will need a glucometer and instruction on its use (he has never used one before).  Will recommend metformin 500mg BID upon dc  follow up with pcp and if needed endocrine [phone]. While inpatient BG are well controlled on Lantus 10u qhs and Humalog correction. Continue same plan.    For discharge:  patient will need a glucometer and instruction on its use (he has never used one before).  Will recommend metformin ER 500mg BID upon dc  follow up with pcp and if needed endocrine [phone].

## 2017-05-24 NOTE — PROGRESS NOTE ADULT - ASSESSMENT
66M DM2 controlled HbA1c 6.5% with gastric adenocarcinoma s/p total gastrectomy, distal pancreatectomy, J tube on oral and nightly enteral feeds via J tube.

## 2017-05-24 NOTE — PROGRESS NOTE ADULT - SUBJECTIVE AND OBJECTIVE BOX
CHIEF COMPLAINT: Patient is a 66y old  Male with s/p gastric CA resection    	  No Known Allergies      PAST MEDICAL & SURGICAL HISTORY:  Iron deficiency anemia, unspecified iron deficiency anemia type  Gastric mass: ulcerated mass in gastric cardia with small perigastric nodes on endoscopy.  Hypothyroidism  Type 2 diabetes mellitus without complication, unspecified long term insulin use status: no BS monitoring  Essential hypertension: was on losartan  Port-a-cath in place: right chest wall  H/O umbilical hernia repair      FAMILY HISTORY:  Family history of ischemic heart disease (IHD) (Father): father  Family history of ovarian cancer (Mother): mother      REVIEW OF SYSTEMS:  CONSTITUTIONAL: No fever, weight loss, or fatigue  EYES: No eye pain, visual disturbances, or discharge  NECK: No pain or stiffness  RESPIRATORY: No cough, wheezing, chills or hemoptysis; No Shortness of Breath  CARDIOVASCULAR: No chest pain, palpitations, passing out, dizziness, or leg swelling  GASTROINTESTINAL: No abdominal or epigastric pain. No nausea, vomiting, or hematemesis; No diarrhea or constipation. No melena or hematochezia.  GENITOURINARY: No dysuria, frequency, hematuria, or incontinence  NEUROLOGICAL: No headaches, memory loss, loss of strength, numbness, or tremors  SKIN: No itching, burning, rashes, or lesions   LYMPH Nodes: No enlarged glands  ENDOCRINE: No heat or cold intolerance; No hair loss  MUSCULOSKELETAL: No joint pain or swelling; No muscle, back, or extremity pain    Medications:  MEDICATIONS  (STANDING):  enoxaparin Injectable 40milliGRAM(s) SubCutaneous daily  insulin glargine Injectable (LANTUS) 10Unit(s) SubCutaneous at bedtime  dextrose 50% Injectable 25Gram(s) IV Push once  dextrose 50% Injectable 25Gram(s) IV Push once  insulin lispro (HumaLOG) corrective regimen sliding scale  SubCutaneous three times a day before meals  insulin lispro (HumaLOG) corrective regimen sliding scale  SubCutaneous at bedtime  dextrose 5%. 1000milliLiter(s) IV Continuous <Continuous>  tamsulosin 0.4milliGRAM(s) Oral at bedtime  levothyroxine 100MICROGram(s) Oral daily  atorvastatin 10milliGRAM(s) Oral at bedtime  pneumococcal  13 Vaccine (PREVNAR 13) 0.5milliLiter(s) IntraMuscular once  pneumococcal  23 Vaccine (PNEUMOVAX) 0.5milliLiter(s) IntraMuscular once  influenza   Vaccine 0.5milliLiter(s) IntraMuscular once    MEDICATIONS  (PRN):  dextrose Gel 1Dose(s) Oral once PRN Blood Glucose LESS THAN 70 milliGRAM(s)/deciliter  glucagon  Injectable 1milliGRAM(s) IntraMuscular once PRN Glucose LESS THAN 70 milligrams/deciliter  acetaminophen   Tablet. 650milliGRAM(s) Oral every 6 hours PRN Mild Pain (1 - 3)  oxyCODONE IR 5milliGRAM(s) Oral every 4 hours PRN moderate and severe pain    	    PHYSICAL EXAM:  T(C): 37.1, Max: 37.1 (05-23 @ 20:36)  HR: 76 (76 - 95)  BP: 147/79 (125/72 - 147/79)  RR: 18 (18 - 18)  SpO2: 100% (95% - 100%)  Wt(kg): --  I&O's Summary  I & Os for 24h ending 24 May 2017 07:00  =============================================  IN: 680 ml / OUT: 1984 ml / NET: -1304 ml    I & Os for current day (as of 24 May 2017 15:06)  =============================================  IN: 240 ml / OUT: 0 ml / NET: 240 ml      Appearance: Normal	  HEENT:   Normal oral mucosa, PERRL, EOMI	  Lymphatic: No lymphadenopathy  Cardiovascular: Normal S1 S2, No JVD, No murmurs, No edema  Respiratory: Lungs clear to auscultation	  Psychiatry: A & O x 3, Mood & affect appropriate  Gastrointestinal:  Soft, mildly tender over surgical scar, + BS	  Skin: No rashes, No ecchymoses, No cyanosis	  Neurologic: Non-focal  Extremities: Normal range of motion, No clubbing, cyanosis or edema  Vascular: Peripheral pulses palpable 2+ bilaterally                              10.8   10.95 )-----------( 756      ( 24 May 2017 06:30 )             34.4     05-24    137  |  99  |  11  ----------------------------<  155<H>  4.1   |  23  |  0.70    Ca    8.6      24 May 2017 06:30  Phos  3.6     05-24  Mg     2.0     05-24    TPro  6.6  /  Alb  3.0<L>  /  TBili  0.2  /  DBili  x   /  AST  19  /  ALT  19  /  AlkPhos  115  05-24    proBNP:   Lipid Profile:   HgA1c:   TSH:

## 2017-05-24 NOTE — PROGRESS NOTE ADULT - ATTENDING COMMENTS
Comfortable.  Tolerating po.  Denies nausea/emesis.  Receiving nocturnal jtube feeds.    Afebrile/VSS.    Abdomen: soft, NT/ND.  MICH scant drainage.  Jtube in place.    Assessment: stable.  Plan:  po as tolerated.  Continue nocturnal tube feeds.  D/C planning next 48 hours with home jtube feeds.
Continue NGT  Awaiting more significant GI fxn
NGT clamp trial today if no nausea D/C and give clears  OOB/Ambulate  Pain control
I have seen and examined the patient, reviewed the laboratory and radiologic data and agree with practitioner's history, physical assessment, plan and reviewed and edited where appropriate.  While esophagram looked normal, patient began to have some diaphoresis, nausea and vomiting shortly after study.  After discussion with Dr. Cabello, we decided to have NGT replaced under fluoroscopy and to hold off on cross-sectional imaging.    Plan:  1) NGT/NPO  2) Restart J tube feeds  3) Urology input

## 2017-05-25 RX ORDER — ASPIRIN/CALCIUM CARB/MAGNESIUM 324 MG
81 TABLET ORAL DAILY
Qty: 0 | Refills: 0 | Status: DISCONTINUED | OUTPATIENT
Start: 2017-05-25 | End: 2017-05-26

## 2017-05-25 RX ADMIN — TAMSULOSIN HYDROCHLORIDE 0.4 MILLIGRAM(S): 0.4 CAPSULE ORAL at 20:25

## 2017-05-25 RX ADMIN — INSULIN GLARGINE 10 UNIT(S): 100 INJECTION, SOLUTION SUBCUTANEOUS at 21:22

## 2017-05-25 RX ADMIN — ENOXAPARIN SODIUM 40 MILLIGRAM(S): 100 INJECTION SUBCUTANEOUS at 12:44

## 2017-05-25 RX ADMIN — Medication: at 17:47

## 2017-05-25 RX ADMIN — Medication 100 MICROGRAM(S): at 05:41

## 2017-05-25 RX ADMIN — Medication 81 MILLIGRAM(S): at 12:43

## 2017-05-25 RX ADMIN — ATORVASTATIN CALCIUM 10 MILLIGRAM(S): 80 TABLET, FILM COATED ORAL at 20:25

## 2017-05-25 NOTE — PROGRESS NOTE ADULT - SUBJECTIVE AND OBJECTIVE BOX
Surgical Oncology Progress Note  pager: [phone]    Procedure: gastric adenocarcinoma s/p total gastrectomy, splenectomy, distal pancreatectomy, feeding J tube  POD: 13    S:        Physical Exam  Gen: NAD, AOx3  Abd: soft, mildly tender, mild distension, incision CDI. MICH with light milky output.     Vital Signs Surgical Oncology Progress Note  pager: [phone]    Procedure: gastric adenocarcinoma s/p total gastrectomy, splenectomy, distal pancreatectomy, feeding J tube  POD: 13    S:  feeling well, david reg, ambulating.    Vital Signs Last 24 Hrs  T(C): 37, Max: 37.1 (05-24 @ 12:43)  T(F): 98.6, Max: 98.8 (05-24 @ 12:43)  HR: 85 (75 - 95)  BP: 151/84 (119/66 - 151/84)  BP(mean): --  RR: 18 (18 - 18)  SpO2: 98% (94% - 100%)    I&O's Detail    I & Os for current day (as of 25 May 2017 09:08)  =============================================  IN:    Oral Fluid: 440 ml    Total IN: 440 ml  ---------------------------------------------  OUT:    Voided: 1650 ml    Bulb: 24 ml    Total OUT: 1674 ml  ---------------------------------------------  Total NET: -1234 ml                            10.8   10.95 )-----------( 756      ( 24 May 2017 06:30 )             34.4       05-24    137  |  99  |  11  ----------------------------<  155<H>  4.1   |  23  |  0.70    Ca    8.6      24 May 2017 06:30  Phos  3.6     05-24  Mg     2.0     05-24    TPro  6.6  /  Alb  3.0<L>  /  TBili  0.2  /  DBili  x   /  AST  19  /  ALT  19  /  AlkPhos  115  05-24      CAPILLARY BLOOD GLUCOSE  136 (25 May 2017 08:28)  155 (24 May 2017 22:08)  149 (24 May 2017 17:13)  123 (24 May 2017 12:43)      LIVER FUNCTIONS - ( 24 May 2017 06:30 )  Alb: 3.0 g/dL / Pro: 6.6 g/dL / ALK PHOS: 115 u/L / ALT: 19 u/L / AST: 19 u/L / GGT: x                     Physical Exam  Gen: NAD, AOx3  Abd: soft, mildly tender, mild distension, incision CDI. MICH with light milky output.

## 2017-05-25 NOTE — PROGRESS NOTE ADULT - ASSESSMENT
66M p/w gastric adenocarcinoma s/p total gastrectomy, splenectomy, distal pancreatectomy, feeding J tube now POD 12  - DVT PPx with lovenox  - Diet: Reg w/ nocturnal TF (Plan to d/c with nocturnal tube feeds, will require pump as cannot bolus feed through J tube)  - Splenectomy vaccines; pneumovax to be given 5-8 weeks after Prevnar (plan to give pneumovax as an outpatient)  - d/c planning for Thursday v. Friday  - Appreciate endocrine recommendations for outpatient medications  MEDICATIONS  (STANDING):  enoxaparin Injectable 40milliGRAM(s) SubCutaneous daily  insulin glargine Injectable (LANTUS) 10Unit(s) SubCutaneous at bedtime  dextrose 50% Injectable 25Gram(s) IV Push once  dextrose 50% Injectable 25Gram(s) IV Push once  insulin lispro (HumaLOG) corrective regimen sliding scale  SubCutaneous three times a day before meals  insulin lispro (HumaLOG) corrective regimen sliding scale  SubCutaneous at bedtime  dextrose 5%. 1000milliLiter(s) IV Continuous <Continuous>  tamsulosin 0.4milliGRAM(s) Oral at bedtime  levothyroxine 100MICROGram(s) Oral daily  atorvastatin 10milliGRAM(s) Oral at bedtime  pneumococcal  23 Vaccine (PNEUMOVAX) 0.5milliLiter(s) IntraMuscular once    MEDICATIONS  (PRN):  dextrose Gel 1Dose(s) Oral once PRN Blood Glucose LESS THAN 70 milliGRAM(s)/deciliter  glucagon  Injectable 1milliGRAM(s) IntraMuscular once PRN Glucose LESS THAN 70 milligrams/deciliter  acetaminophen   Tablet. 650milliGRAM(s) Oral every 6 hours PRN Mild Pain (1 - 3)  oxyCODONE IR 5milliGRAM(s) Oral every 4 hours PRN moderate and severe pain 66M p/w gastric adenocarcinoma s/p total gastrectomy, splenectomy, distal pancreatectomy, feeding J tube now POD 12  - DVT PPx with lovenox  - Diet: Reg w/ nocturnal TF (Plan to d/c with nocturnal tube feeds, will require pump as cannot bolus feed through J tube)  - Splenectomy vaccines; pneumovax to be given 5-8 weeks after Prevnar (plan to give pneumovax as an outpatient)  - d/c planning for Friday 66M p/w gastric adenocarcinoma s/p total gastrectomy, splenectomy, distal pancreatectomy, permanent feeding J tube now POD 12  - DVT PPx with lovenox  - Diet: Reg w/ nocturnal TF (Plan to d/c with nocturnal tube feeds, will require pump as cannot bolus feed jejunum); Per dietary pt requires 2268 kwan/day; calorie count shows only eating 960cal/day; pt will require nocturnal tube feeds upon to prevent severe malnutrition)  - Splenectomy vaccines; pneumovax to be given 5-8 weeks after Prevnar (plan to give pneumovax as an outpatient)  - d/c planning for Friday

## 2017-05-25 NOTE — PROGRESS NOTE ADULT - SUBJECTIVE AND OBJECTIVE BOX
CHIEF COMPLAINT: Patient is a 66y old  Male with s/p gastric CA resection    	  No Known Allergies      PAST MEDICAL & SURGICAL HISTORY:  Iron deficiency anemia, unspecified iron deficiency anemia type  Gastric mass: ulcerated mass in gastric cardia with small perigastric nodes on endoscopy.  Hypothyroidism  Type 2 diabetes mellitus without complication, unspecified long term insulin use status: no BS monitoring  Essential hypertension: was on losartan  Port-a-cath in place: right chest wall  H/O umbilical hernia repair      FAMILY HISTORY:  Family history of ischemic heart disease (IHD) (Father): father  Family history of ovarian cancer (Mother): mother      REVIEW OF SYSTEMS:  CONSTITUTIONAL: No fever, weight loss, or fatigue  EYES: No eye pain, visual disturbances, or discharge  NECK: No pain or stiffness  RESPIRATORY: No cough, wheezing, chills or hemoptysis; No Shortness of Breath  CARDIOVASCULAR: No chest pain, palpitations, passing out, dizziness, or leg swelling  GASTROINTESTINAL: No abdominal or epigastric pain. No nausea, vomiting, or hematemesis; No diarrhea or constipation. No melena or hematochezia.  GENITOURINARY: No dysuria, frequency, hematuria, or incontinence  NEUROLOGICAL: No headaches, memory loss, loss of strength, numbness, or tremors  SKIN: No itching, burning, rashes, or lesions   LYMPH Nodes: No enlarged glands  ENDOCRINE: No heat or cold intolerance; No hair loss  MUSCULOSKELETAL: No joint pain or swelling; No muscle, back, or extremity pain      Medications:  MEDICATIONS  (STANDING):  enoxaparin Injectable 40milliGRAM(s) SubCutaneous daily  insulin glargine Injectable (LANTUS) 10Unit(s) SubCutaneous at bedtime  dextrose 50% Injectable 25Gram(s) IV Push once  dextrose 50% Injectable 25Gram(s) IV Push once  insulin lispro (HumaLOG) corrective regimen sliding scale  SubCutaneous three times a day before meals  insulin lispro (HumaLOG) corrective regimen sliding scale  SubCutaneous at bedtime  dextrose 5%. 1000milliLiter(s) IV Continuous <Continuous>  tamsulosin 0.4milliGRAM(s) Oral at bedtime  levothyroxine 100MICROGram(s) Oral daily  atorvastatin 10milliGRAM(s) Oral at bedtime  aspirin  chewable 81milliGRAM(s) Oral daily    MEDICATIONS  (PRN):  dextrose Gel 1Dose(s) Oral once PRN Blood Glucose LESS THAN 70 milliGRAM(s)/deciliter  glucagon  Injectable 1milliGRAM(s) IntraMuscular once PRN Glucose LESS THAN 70 milligrams/deciliter  acetaminophen   Tablet. 650milliGRAM(s) Oral every 6 hours PRN Mild Pain (1 - 3)  oxyCODONE IR 5milliGRAM(s) Oral every 4 hours PRN moderate and severe pain    	    PHYSICAL EXAM:  T(C): 37, Max: 37 (05-24 @ 20:06)  HR: 77 (75 - 89)  BP: 134/75 (119/66 - 151/84)  RR: 18 (18 - 18)  SpO2: 97% (94% - 98%)  Wt(kg): --  I&O's Summary  I & Os for 24h ending 25 May 2017 07:00  =============================================  IN: 440 ml / OUT: 1674 ml / NET: -1234 ml    I & Os for current day (as of 25 May 2017 16:08)  =============================================  IN: 820 ml / OUT: 850 ml / NET: -30 ml      Appearance: Normal	  HEENT:   Normal oral mucosa, PERRL, EOMI	  Lymphatic: No lymphadenopathy  Cardiovascular: Normal S1 S2, No JVD, No murmurs, No edema  Respiratory: Lungs clear to auscultation	  Psychiatry: A & O x 3, Mood & affect appropriate  Gastrointestinal:  Soft, Non-tender, + BS	  Skin: No rashes, No ecchymoses, No cyanosis	  Neurologic: Non-focal  Extremities: Normal range of motion, No clubbing, cyanosis or edema  Vascular: Peripheral pulses palpable 2+ bilaterally                                10.8   10.95 )-----------( 756      ( 24 May 2017 06:30 )             34.4     05-24    137  |  99  |  11  ----------------------------<  155<H>  4.1   |  23  |  0.70    Ca    8.6      24 May 2017 06:30  Phos  3.6     05-24  Mg     2.0     05-24    TPro  6.6  /  Alb  3.0<L>  /  TBili  0.2  /  DBili  x   /  AST  19  /  ALT  19  /  AlkPhos  115  05-24

## 2017-05-25 NOTE — PROGRESS NOTE ADULT - ASSESSMENT
65 yo M with HTN, DM2, hypothyroidism, gastric CA s/p resection, now with UTI and urinary frequency  - Gastric CA s/p resection - continue pain control and incentive spirometry, PT, DVT prophylaxis  - Urinary frequency and hesitancy - continue with Flomax  - DM2 - Insulin dosing as per Endocrine  - HTN - BP at goal, monitor  - Hypothyroidism - continue Synthroid.  - Tolerating diet with nocturnal tube feeds, continue as per Surgical team

## 2017-05-26 VITALS
OXYGEN SATURATION: 98 % | RESPIRATION RATE: 18 BRPM | SYSTOLIC BLOOD PRESSURE: 151 MMHG | TEMPERATURE: 99 F | DIASTOLIC BLOOD PRESSURE: 88 MMHG | HEART RATE: 90 BPM

## 2017-05-26 LAB
ALBUMIN SERPL ELPH-MCNC: 2.9 G/DL — LOW (ref 3.3–5)
ALP SERPL-CCNC: 61 U/L — SIGNIFICANT CHANGE UP (ref 40–120)
ALT FLD-CCNC: 10 U/L — SIGNIFICANT CHANGE UP (ref 4–41)
AST SERPL-CCNC: 21 U/L — SIGNIFICANT CHANGE UP (ref 4–40)
BILIRUB SERPL-MCNC: 0.2 MG/DL — SIGNIFICANT CHANGE UP (ref 0.2–1.2)
BUN SERPL-MCNC: 11 MG/DL — SIGNIFICANT CHANGE UP (ref 7–23)
BUN SERPL-MCNC: 12 MG/DL — SIGNIFICANT CHANGE UP (ref 7–23)
CALCIUM SERPL-MCNC: 8.5 MG/DL — SIGNIFICANT CHANGE UP (ref 8.4–10.5)
CALCIUM SERPL-MCNC: 8.8 MG/DL — SIGNIFICANT CHANGE UP (ref 8.4–10.5)
CHLORIDE SERPL-SCNC: 107 MMOL/L — SIGNIFICANT CHANGE UP (ref 98–107)
CHLORIDE SERPL-SCNC: 98 MMOL/L — SIGNIFICANT CHANGE UP (ref 98–107)
CO2 SERPL-SCNC: 23 MMOL/L — SIGNIFICANT CHANGE UP (ref 22–31)
CO2 SERPL-SCNC: 23 MMOL/L — SIGNIFICANT CHANGE UP (ref 22–31)
CREAT SERPL-MCNC: 0.68 MG/DL — SIGNIFICANT CHANGE UP (ref 0.5–1.3)
CREAT SERPL-MCNC: 0.74 MG/DL — SIGNIFICANT CHANGE UP (ref 0.5–1.3)
GLUCOSE SERPL-MCNC: 117 MG/DL — HIGH (ref 70–99)
GLUCOSE SERPL-MCNC: 155 MG/DL — HIGH (ref 70–99)
HCT VFR BLD CALC: 34.4 % — LOW (ref 39–50)
HGB BLD-MCNC: 10.6 G/DL — LOW (ref 13–17)
MCHC RBC-ENTMCNC: 30 PG — SIGNIFICANT CHANGE UP (ref 27–34)
MCHC RBC-ENTMCNC: 30.8 % — LOW (ref 32–36)
MCV RBC AUTO: 97.5 FL — SIGNIFICANT CHANGE UP (ref 80–100)
PLATELET # BLD AUTO: 736 K/UL — HIGH (ref 150–400)
PMV BLD: 9.6 FL — SIGNIFICANT CHANGE UP (ref 7–13)
POTASSIUM SERPL-MCNC: 4.1 MMOL/L — SIGNIFICANT CHANGE UP (ref 3.5–5.3)
POTASSIUM SERPL-MCNC: 4.3 MMOL/L — SIGNIFICANT CHANGE UP (ref 3.5–5.3)
POTASSIUM SERPL-SCNC: 4.1 MMOL/L — SIGNIFICANT CHANGE UP (ref 3.5–5.3)
POTASSIUM SERPL-SCNC: 4.3 MMOL/L — SIGNIFICANT CHANGE UP (ref 3.5–5.3)
PROT SERPL-MCNC: 5.8 G/DL — LOW (ref 6–8.3)
RBC # BLD: 3.53 M/UL — LOW (ref 4.2–5.8)
RBC # FLD: 14.9 % — HIGH (ref 10.3–14.5)
SODIUM SERPL-SCNC: 136 MMOL/L — SIGNIFICANT CHANGE UP (ref 135–145)
SODIUM SERPL-SCNC: 142 MMOL/L — SIGNIFICANT CHANGE UP (ref 135–145)
WBC # BLD: 9.05 K/UL — SIGNIFICANT CHANGE UP (ref 3.8–10.5)
WBC # FLD AUTO: 9.05 K/UL — SIGNIFICANT CHANGE UP (ref 3.8–10.5)

## 2017-05-26 PROCEDURE — 99232 SBSQ HOSP IP/OBS MODERATE 35: CPT

## 2017-05-26 RX ORDER — ASPIRIN/CALCIUM CARB/MAGNESIUM 324 MG
1 TABLET ORAL
Qty: 0 | Refills: 0 | COMMUNITY
Start: 2017-05-26

## 2017-05-26 RX ORDER — ACETAMINOPHEN 500 MG
2 TABLET ORAL
Qty: 0 | Refills: 0 | COMMUNITY
Start: 2017-05-26

## 2017-05-26 RX ORDER — NEISSERIA MENINGITIDIS GROUP A CAPSULAR POLYSACCHARIDE TETANUS TOXOID CONJUGATE ANTIGEN, NEISSERIA MENINGITIDIS GROUP C CAPSULAR POLYSACCHARIDE TETANUS TOXOID CONJUGATE ANTIGEN, NEISSERIA MENINGITIDIS GROUP Y CAPSULAR POLYSACCHARIDE TETANUS TOXOID CONJUGATE ANTIGEN, AND NEISSERIA MENINGITIDIS GROUP W-135 CAPSULAR POLYSACCHARIDE TETANUS TOXOID CONJUGATE ANTIGEN 10; 10; 10; 10 UG/.5ML; UG/.5ML; UG/.5ML; UG/.5ML
0.5 INJECTION, SOLUTION INTRAMUSCULAR ONCE
Qty: 0 | Refills: 0 | Status: COMPLETED | OUTPATIENT
Start: 2017-05-26 | End: 2017-05-26

## 2017-05-26 RX ORDER — OXYCODONE HYDROCHLORIDE 5 MG/1
1 TABLET ORAL
Qty: 12 | Refills: 0 | OUTPATIENT
Start: 2017-05-26 | End: 2017-05-29

## 2017-05-26 RX ORDER — TAMSULOSIN HYDROCHLORIDE 0.4 MG/1
1 CAPSULE ORAL
Qty: 30 | Refills: 0 | OUTPATIENT
Start: 2017-05-26 | End: 2017-06-25

## 2017-05-26 RX ADMIN — ENOXAPARIN SODIUM 40 MILLIGRAM(S): 100 INJECTION SUBCUTANEOUS at 13:13

## 2017-05-26 RX ADMIN — NEISSERIA MENINGITIDIS GROUP A CAPSULAR POLYSACCHARIDE TETANUS TOXOID CONJUGATE ANTIGEN, NEISSERIA MENINGITIDIS GROUP C CAPSULAR POLYSACCHARIDE TETANUS TOXOID CONJUGATE ANTIGEN, NEISSERIA MENINGITIDIS GROUP Y CAPSULAR POLYSACCHARIDE TETANUS TOXOID CONJUGATE ANTIGEN, AND NEISSERIA MENINGITIDIS GROUP W-135 CAPSULAR POLYSACCHARIDE TETANUS TOXOID CONJUGATE ANTIGEN 0.5 MILLILITER(S): 10; 10; 10; 10 INJECTION, SOLUTION INTRAMUSCULAR at 13:17

## 2017-05-26 RX ADMIN — Medication 100 MICROGRAM(S): at 05:22

## 2017-05-26 RX ADMIN — Medication 81 MILLIGRAM(S): at 13:13

## 2017-05-26 NOTE — PROGRESS NOTE ADULT - PROBLEM SELECTOR PROBLEM 1
Controlled type 2 diabetes mellitus without complication, without long-term current use of insulin
Controlled type 2 diabetes mellitus without complication, without long-term current use of insulin

## 2017-05-26 NOTE — PROGRESS NOTE ADULT - PROVIDER SPECIALTY LIST ADULT
Endocrinology
Endocrinology
Internal Medicine
Surgery
Internal Medicine
Surgery
Internal Medicine

## 2017-05-26 NOTE — PROGRESS NOTE ADULT - SUBJECTIVE AND OBJECTIVE BOX
Surgical Oncology Progress Note  pager: [phone]    Procedure: gastric adenocarcinoma s/p total gastrectomy, splenectomy, distal pancreatectomy, feeding J tube  POD: 14    S:        O:  Physical Exam  Gen: NAD, AOx3  Abd: soft, mildly tender, mild distension, incision CDI. IMCH with light milky output. Surgical Oncology Progress Note  pager: [phone]    Procedure: gastric adenocarcinoma s/p total gastrectomy, splenectomy, distal pancreatectomy, feeding J tube  POD: 14    S:  Pt seen and examined with Dr. Cabello, he is without complaints.      O:  Physical Exam  Gen: NAD, AOx3  Abd: soft, mildly tender, mild distension, incision CDI. MICH with light milky output.

## 2017-05-26 NOTE — PROGRESS NOTE ADULT - ASSESSMENT
66M p/w gastric adenocarcinoma s/p total gastrectomy, splenectomy, distal pancreatectomy, permanent feeding J tube now POD 12  - DVT PPx with lovenox  - Diet: Reg w/ nocturnal TF (Plan to d/c with nocturnal tube feeds, will require pump as cannot bolus feed jejunum); Per dietary pt requires 2268 kwan/day; calorie count shows only eating 960cal/day; pt will require nocturnal tube feeds upon to prevent severe malnutrition)  - Splenectomy vaccines; pneumovax to be given 5-8 weeks after Prevnar (plan to give pneumovax as an outpatient)  - d/c planning for Friday 66M p/w gastric adenocarcinoma s/p total gastrectomy, splenectomy, distal pancreatectomy, permanent feeding J tube  - DVT PPx with lovenox  - Diet: Reg w/ nocturnal TF (Plan to d/c with nocturnal tube feeds, will require pump as cannot bolus feed jejunum); Per dietary pt requires 2268 kwan/day; calorie count shows only eating 960cal/day; pt will require nocturnal tube feeds upon to prevent severe malnutrition)  - Splenectomy vaccines; pneumovax to be given 5-8 weeks after Prevnar (plan to give pneumovax as an outpatient)  - d/c planning for Friday 66M p/w gastric adenocarcinoma s/p total gastrectomy, splenectomy, distal pancreatectomy, permanent feeding J tube  - DVT PPx with lovenox  - Diet: Reg w/ nocturnal TF (Plan to d/c with nocturnal tube feeds, will require pump as cannot bolus feed jejunum); Per dietary pt requires 2268 kwan/day; calorie count shows only eating 960cal/day; pt will require nocturnal tube feeds upon to prevent severe malnutrition for greater than 3 months.  - Splenectomy vaccines; pneumovax to be given 5-8 weeks after Prevnar (plan to give pneumovax as an outpatient)  - d/c planning for Friday 66M p/w gastric adenocarcinoma s/p total gastrectomy, splenectomy, distal pancreatectomy, permanent feeding J tube  - DVT PPx with lovenox  - Diet: Reg w/ nocturnal TF (Plan to d/c with nocturnal tube feeds, will require pump as cannot bolus feed jejunum); Per dietary pt requires 2268 kwan/day; calorie count shows only eating 960cal/day; pt will require nocturnal tube feeds upon to prevent severe malnutrition for greater than 3 months.  - Splenectomy vaccines; needs only meningococcal / diptheria   - d/c planning for Friday

## 2017-05-26 NOTE — PROGRESS NOTE ADULT - ASSESSMENT
67 yo M with HTN, DM2, hypothyroidism, gastric CA s/p resection, now with UTI and urinary frequency  - Vomiting - isolated episode, will monitor, consider Reglan PRN  - Gastric CA s/p resection - continue pain control and incentive spirometry, PT, DVT prophylaxis  - Urinary frequency and hesitancy - continue with Flomax  - DM2 - Insulin dosing as per Endocrine  - HTN - BP at goal, monitor  - Hypothyroidism - continue Synthroid.  - Tolerating diet with nocturnal tube feeds, continue as per Surgical team

## 2017-05-26 NOTE — PROGRESS NOTE ADULT - PROBLEM SELECTOR PLAN 1
While inpatient BG are well controlled on Lantus 10u qhs and Humalog correction. Continue same plan.    For discharge:  patient will need a glucometer and instruction on its use.  Will recommend metformin ER 500mg BID upon dc while on overnight tube feeds. If off tube feeds he should take metformin ER 500mg once daily.  follow up with pcp and if needed endocrine [phone].

## 2017-05-26 NOTE — PROGRESS NOTE ADULT - SUBJECTIVE AND OBJECTIVE BOX
CHIEF COMPLAINT: Patient is a 66y old  Male with s/p gastric CA resection          No Known Allergies      PAST MEDICAL & SURGICAL HISTORY:  Iron deficiency anemia, unspecified iron deficiency anemia type  Gastric mass: ulcerated mass in gastric cardia with small perigastric nodes on endoscopy.  Hypothyroidism  Type 2 diabetes mellitus without complication, unspecified long term insulin use status: no BS monitoring  Essential hypertension: was on losartan  Port-a-cath in place: right chest wall  H/O umbilical hernia repair      FAMILY HISTORY:  Family history of ischemic heart disease (IHD) (Father): father  Family history of ovarian cancer (Mother): mother      REVIEW OF SYSTEMS:  CONSTITUTIONAL: No fever, weight loss, or fatigue  EYES: No eye pain, visual disturbances, or discharge  NECK: No pain or stiffness  RESPIRATORY: No cough, wheezing, chills or hemoptysis; No Shortness of Breath  CARDIOVASCULAR: No chest pain, palpitations, passing out, dizziness, or leg swelling  GASTROINTESTINAL: No abdominal or epigastric pain. One episode of NBNB vomiting today after lunch, or hematemesis; No diarrhea or constipation. No melena or hematochezia.  GENITOURINARY: No dysuria, frequency, hematuria, or incontinence  NEUROLOGICAL: No headaches, memory loss, loss of strength, numbness, or tremors  SKIN: No itching, burning, rashes, or lesions   LYMPH Nodes: No enlarged glands  ENDOCRINE: No heat or cold intolerance; No hair loss  MUSCULOSKELETAL: No joint pain or swelling; No muscle, back, or extremity pain    Medications:  MEDICATIONS  (STANDING):  enoxaparin Injectable 40milliGRAM(s) SubCutaneous daily  insulin glargine Injectable (LANTUS) 10Unit(s) SubCutaneous at bedtime  dextrose 50% Injectable 25Gram(s) IV Push once  dextrose 50% Injectable 25Gram(s) IV Push once  insulin lispro (HumaLOG) corrective regimen sliding scale  SubCutaneous three times a day before meals  insulin lispro (HumaLOG) corrective regimen sliding scale  SubCutaneous at bedtime  dextrose 5%. 1000milliLiter(s) IV Continuous <Continuous>  tamsulosin 0.4milliGRAM(s) Oral at bedtime  levothyroxine 100MICROGram(s) Oral daily  atorvastatin 10milliGRAM(s) Oral at bedtime  aspirin  chewable 81milliGRAM(s) Oral daily    MEDICATIONS  (PRN):  dextrose Gel 1Dose(s) Oral once PRN Blood Glucose LESS THAN 70 milliGRAM(s)/deciliter  glucagon  Injectable 1milliGRAM(s) IntraMuscular once PRN Glucose LESS THAN 70 milligrams/deciliter  acetaminophen   Tablet. 650milliGRAM(s) Oral every 6 hours PRN Mild Pain (1 - 3)  oxyCODONE IR 5milliGRAM(s) Oral every 4 hours PRN moderate and severe pain    	    PHYSICAL EXAM:  T(C): 37.2, Max: 37.2 (05-26 @ 12:47)  HR: 90 (77 - 90)  BP: 151/88 (107/62 - 151/88)  RR: 18 (16 - 18)  SpO2: 98% (95% - 98%)  Wt(kg): --  I&O's Summary    I & Os for current day (as of 26 May 2017 18:13)  =============================================  IN: 1380 ml / OUT: 1280 ml / NET: 100 ml      Appearance: Normal	  HEENT:   Normal oral mucosa, PERRL, EOMI	  Lymphatic: No lymphadenopathy  Cardiovascular: Normal S1 S2, No JVD, No murmurs, No edema  Respiratory: Lungs clear to auscultation	  Psychiatry: A & O x 3, Mood & affect appropriate  Gastrointestinal:  Soft, Non-tender, + BS	  Skin: No rashes, No ecchymoses, No cyanosis	  Neurologic: Non-focal  Extremities: Normal range of motion, No clubbing, cyanosis or edema  Vascular: Peripheral pulses palpable 2+ bilaterally                                10.6   9.05  )-----------( 736      ( 26 May 2017 06:16 )             34.4     05-26    136  |  98  |  12  ----------------------------<  155<H>  4.1   |  23  |  0.68    Ca    8.8      26 May 2017 06:16    TPro  Test not performed 05/26/17 0454:  PROTEIN - TOTAL previously reported as: 5.8  L  g/dL  /  Alb  Test not performed 05/26/17 0454:  ALBUMIN previously reported as: 2.9  L g/dL  /  TBili  Test not performed 05/26/17 0454:  BILIRUBIN,TOT. previously reported as: 0.2  mg/dL  /  DBili  x   /  AST  Test not performed 05/26/17 0454:  AST previously reported as: 21  u/L  /  ALT  Test not performed 05/26/17 0454:  ALT previously reported as: 10  u/L  /  AlkPhos  Test not performed 05/26/17 0454:  ALK PHOSP'TASE previously reported as: 61  u/L  Please note new reference ranges are adjusted for age and  gender.  05-26

## 2017-05-31 ENCOUNTER — TRANSCRIPTION ENCOUNTER (OUTPATIENT)
Age: 67
End: 2017-05-31

## 2017-06-05 ENCOUNTER — RESULT REVIEW (OUTPATIENT)
Age: 67
End: 2017-06-05

## 2017-06-05 ENCOUNTER — APPOINTMENT (OUTPATIENT)
Dept: SURGICAL ONCOLOGY | Facility: CLINIC | Age: 67
End: 2017-06-05

## 2017-06-05 ENCOUNTER — LABORATORY RESULT (OUTPATIENT)
Age: 67
End: 2017-06-05

## 2017-06-05 VITALS
BODY MASS INDEX: 26.32 KG/M2 | HEIGHT: 71 IN | WEIGHT: 188 LBS | HEART RATE: 78 BPM | DIASTOLIC BLOOD PRESSURE: 99 MMHG | OXYGEN SATURATION: 98 % | SYSTOLIC BLOOD PRESSURE: 144 MMHG | TEMPERATURE: 98.6 F

## 2017-06-12 ENCOUNTER — OUTPATIENT (OUTPATIENT)
Dept: OUTPATIENT SERVICES | Facility: HOSPITAL | Age: 67
LOS: 1 days | Discharge: HOME | End: 2017-06-12

## 2017-06-12 DIAGNOSIS — Z95.828 PRESENCE OF OTHER VASCULAR IMPLANTS AND GRAFTS: Chronic | ICD-10-CM

## 2017-06-12 DIAGNOSIS — Z98.890 OTHER SPECIFIED POSTPROCEDURAL STATES: Chronic | ICD-10-CM

## 2017-06-12 DIAGNOSIS — C16.9 MALIGNANT NEOPLASM OF STOMACH, UNSPECIFIED: ICD-10-CM

## 2017-06-13 ENCOUNTER — APPOINTMENT (OUTPATIENT)
Dept: HEMATOLOGY ONCOLOGY | Facility: CLINIC | Age: 67
End: 2017-06-13

## 2017-06-13 ENCOUNTER — APPOINTMENT (OUTPATIENT)
Dept: SURGICAL ONCOLOGY | Facility: CLINIC | Age: 67
End: 2017-06-13

## 2017-06-13 VITALS
HEART RATE: 77 BPM | DIASTOLIC BLOOD PRESSURE: 90 MMHG | TEMPERATURE: 98.7 F | SYSTOLIC BLOOD PRESSURE: 154 MMHG | RESPIRATION RATE: 16 BRPM | BODY MASS INDEX: 26.23 KG/M2 | WEIGHT: 188.05 LBS | OXYGEN SATURATION: 96 %

## 2017-06-19 ENCOUNTER — OUTPATIENT (OUTPATIENT)
Dept: OUTPATIENT SERVICES | Facility: HOSPITAL | Age: 67
LOS: 1 days | Discharge: HOME | End: 2017-06-19

## 2017-06-19 ENCOUNTER — APPOINTMENT (OUTPATIENT)
Dept: SURGICAL ONCOLOGY | Facility: CLINIC | Age: 67
End: 2017-06-19

## 2017-06-19 VITALS
HEART RATE: 77 BPM | HEIGHT: 71 IN | DIASTOLIC BLOOD PRESSURE: 89 MMHG | SYSTOLIC BLOOD PRESSURE: 163 MMHG | OXYGEN SATURATION: 94 % | BODY MASS INDEX: 26.32 KG/M2 | WEIGHT: 188 LBS

## 2017-06-19 DIAGNOSIS — Z98.890 OTHER SPECIFIED POSTPROCEDURAL STATES: Chronic | ICD-10-CM

## 2017-06-19 DIAGNOSIS — Z95.828 PRESENCE OF OTHER VASCULAR IMPLANTS AND GRAFTS: Chronic | ICD-10-CM

## 2017-06-20 ENCOUNTER — LABORATORY RESULT (OUTPATIENT)
Age: 67
End: 2017-06-20

## 2017-06-20 ENCOUNTER — RESULT REVIEW (OUTPATIENT)
Age: 67
End: 2017-06-20

## 2017-06-20 ENCOUNTER — APPOINTMENT (OUTPATIENT)
Dept: INFUSION THERAPY | Facility: HOSPITAL | Age: 67
End: 2017-06-20

## 2017-06-20 ENCOUNTER — APPOINTMENT (OUTPATIENT)
Dept: RADIATION ONCOLOGY | Facility: CLINIC | Age: 67
End: 2017-06-20

## 2017-06-20 VITALS
SYSTOLIC BLOOD PRESSURE: 154 MMHG | OXYGEN SATURATION: 94 % | HEIGHT: 71 IN | WEIGHT: 189.49 LBS | TEMPERATURE: 98.1 F | BODY MASS INDEX: 26.53 KG/M2 | DIASTOLIC BLOOD PRESSURE: 98 MMHG | HEART RATE: 97 BPM | RESPIRATION RATE: 17 BRPM

## 2017-06-20 LAB
BASOPHILS # BLD AUTO: 0.1 K/UL — SIGNIFICANT CHANGE UP (ref 0–0.2)
BASOPHILS NFR BLD AUTO: 0.6 % — SIGNIFICANT CHANGE UP (ref 0–2)
EOSINOPHIL # BLD AUTO: 0.1 K/UL — SIGNIFICANT CHANGE UP (ref 0–0.5)
EOSINOPHIL NFR BLD AUTO: 0.7 % — SIGNIFICANT CHANGE UP (ref 0–6)
HCT VFR BLD CALC: 37 % — LOW (ref 39–50)
HGB BLD-MCNC: 12.1 G/DL — LOW (ref 13–17)
LYMPHOCYTES # BLD AUTO: 17.9 % — SIGNIFICANT CHANGE UP (ref 13–44)
LYMPHOCYTES # BLD AUTO: 2.4 K/UL — SIGNIFICANT CHANGE UP (ref 1–3.3)
MCHC RBC-ENTMCNC: 31.4 PG — SIGNIFICANT CHANGE UP (ref 27–34)
MCHC RBC-ENTMCNC: 32.6 G/DL — SIGNIFICANT CHANGE UP (ref 32–36)
MCV RBC AUTO: 96.4 FL — SIGNIFICANT CHANGE UP (ref 80–100)
MONOCYTES # BLD AUTO: 1 K/UL — HIGH (ref 0–0.9)
MONOCYTES NFR BLD AUTO: 7.6 % — SIGNIFICANT CHANGE UP (ref 2–14)
NEUTROPHILS # BLD AUTO: 9.9 K/UL — HIGH (ref 1.8–7.4)
NEUTROPHILS NFR BLD AUTO: 73.3 % — SIGNIFICANT CHANGE UP (ref 43–77)
PLATELET # BLD AUTO: 376 K/UL — SIGNIFICANT CHANGE UP (ref 150–400)
RBC # BLD: 3.84 M/UL — LOW (ref 4.2–5.8)
RBC # FLD: 14.4 % — SIGNIFICANT CHANGE UP (ref 10.3–14.5)
WBC # BLD: 13.5 K/UL — HIGH (ref 3.8–10.5)
WBC # FLD AUTO: 13.5 K/UL — HIGH (ref 3.8–10.5)

## 2017-06-21 LAB
ALBUMIN SERPL ELPH-MCNC: 3.6 G/DL
ALP BLD-CCNC: 113 U/L
ALT SERPL-CCNC: 13 U/L
ANION GAP SERPL CALC-SCNC: 20 MMOL/L
AST SERPL-CCNC: 17 U/L
BILIRUB SERPL-MCNC: 0.3 MG/DL
BUN SERPL-MCNC: 11 MG/DL
CALCIUM SERPL-MCNC: 9.4 MG/DL
CANCER AG19-9 SERPL-ACNC: 39 U/ML
CEA SERPL-MCNC: 10.1 NG/ML
CHLORIDE SERPL-SCNC: 98 MMOL/L
CO2 SERPL-SCNC: 21 MMOL/L
CREAT SERPL-MCNC: 0.86 MG/DL
GLUCOSE SERPL-MCNC: 129 MG/DL
POTASSIUM SERPL-SCNC: 4.1 MMOL/L
PROT SERPL-MCNC: 7.2 G/DL
SODIUM SERPL-SCNC: 139 MMOL/L

## 2017-07-17 ENCOUNTER — APPOINTMENT (OUTPATIENT)
Dept: SURGICAL ONCOLOGY | Facility: CLINIC | Age: 67
End: 2017-07-17

## 2017-07-17 VITALS
WEIGHT: 185 LBS | HEIGHT: 71 IN | HEART RATE: 86 BPM | SYSTOLIC BLOOD PRESSURE: 165 MMHG | OXYGEN SATURATION: 96 % | DIASTOLIC BLOOD PRESSURE: 95 MMHG | BODY MASS INDEX: 25.9 KG/M2

## 2017-07-20 ENCOUNTER — OTHER (OUTPATIENT)
Age: 67
End: 2017-07-20

## 2017-07-24 VITALS
WEIGHT: 181.66 LBS | RESPIRATION RATE: 15 BRPM | DIASTOLIC BLOOD PRESSURE: 106 MMHG | SYSTOLIC BLOOD PRESSURE: 165 MMHG | OXYGEN SATURATION: 96 % | HEART RATE: 67 BPM | BODY MASS INDEX: 25.34 KG/M2

## 2017-07-25 ENCOUNTER — OTHER (OUTPATIENT)
Age: 67
End: 2017-07-25

## 2017-07-25 ENCOUNTER — OUTPATIENT (OUTPATIENT)
Dept: OUTPATIENT SERVICES | Facility: HOSPITAL | Age: 67
LOS: 1 days | Discharge: HOME | End: 2017-07-25

## 2017-07-25 ENCOUNTER — APPOINTMENT (OUTPATIENT)
Dept: HEMATOLOGY ONCOLOGY | Facility: CLINIC | Age: 67
End: 2017-07-25

## 2017-07-25 DIAGNOSIS — Z98.890 OTHER SPECIFIED POSTPROCEDURAL STATES: Chronic | ICD-10-CM

## 2017-07-25 DIAGNOSIS — Z95.828 PRESENCE OF OTHER VASCULAR IMPLANTS AND GRAFTS: Chronic | ICD-10-CM

## 2017-07-25 DIAGNOSIS — C16.9 MALIGNANT NEOPLASM OF STOMACH, UNSPECIFIED: ICD-10-CM

## 2017-07-26 ENCOUNTER — RESULT REVIEW (OUTPATIENT)
Age: 67
End: 2017-07-26

## 2017-07-26 ENCOUNTER — APPOINTMENT (OUTPATIENT)
Dept: HEMATOLOGY ONCOLOGY | Facility: CLINIC | Age: 67
End: 2017-07-26

## 2017-07-26 LAB
BASOPHILS # BLD AUTO: 0.1 K/UL — SIGNIFICANT CHANGE UP (ref 0–0.2)
BASOPHILS NFR BLD AUTO: 1.2 % — SIGNIFICANT CHANGE UP (ref 0–2)
EOSINOPHIL # BLD AUTO: 0.2 K/UL — SIGNIFICANT CHANGE UP (ref 0–0.5)
EOSINOPHIL NFR BLD AUTO: 3 % — SIGNIFICANT CHANGE UP (ref 0–6)
HCT VFR BLD CALC: 41 % — SIGNIFICANT CHANGE UP (ref 39–50)
HGB BLD-MCNC: 13 G/DL — SIGNIFICANT CHANGE UP (ref 13–17)
LYMPHOCYTES # BLD AUTO: 1.4 K/UL — SIGNIFICANT CHANGE UP (ref 1–3.3)
LYMPHOCYTES # BLD AUTO: 23.2 % — SIGNIFICANT CHANGE UP (ref 13–44)
MCHC RBC-ENTMCNC: 30 PG — SIGNIFICANT CHANGE UP (ref 27–34)
MCHC RBC-ENTMCNC: 31.6 G/DL — LOW (ref 32–36)
MCV RBC AUTO: 95 FL — SIGNIFICANT CHANGE UP (ref 80–100)
MONOCYTES # BLD AUTO: 0.5 K/UL — SIGNIFICANT CHANGE UP (ref 0–0.9)
MONOCYTES NFR BLD AUTO: 8.4 % — SIGNIFICANT CHANGE UP (ref 2–14)
NEUTROPHILS # BLD AUTO: 3.7 K/UL — SIGNIFICANT CHANGE UP (ref 1.8–7.4)
NEUTROPHILS NFR BLD AUTO: 64.2 % — SIGNIFICANT CHANGE UP (ref 43–77)
PLATELET # BLD AUTO: 328 K/UL — SIGNIFICANT CHANGE UP (ref 150–400)
RBC # BLD: 4.32 M/UL — SIGNIFICANT CHANGE UP (ref 4.2–5.8)
RBC # FLD: 16.5 % — HIGH (ref 10.3–14.5)
WBC # BLD: 5.8 K/UL — SIGNIFICANT CHANGE UP (ref 3.8–10.5)
WBC # FLD AUTO: 5.8 K/UL — SIGNIFICANT CHANGE UP (ref 3.8–10.5)

## 2017-07-27 ENCOUNTER — APPOINTMENT (OUTPATIENT)
Dept: INFUSION THERAPY | Facility: HOSPITAL | Age: 67
End: 2017-07-27

## 2017-07-27 ENCOUNTER — OTHER (OUTPATIENT)
Age: 67
End: 2017-07-27

## 2017-07-31 VITALS
BODY MASS INDEX: 25.8 KG/M2 | DIASTOLIC BLOOD PRESSURE: 91 MMHG | RESPIRATION RATE: 18 BRPM | OXYGEN SATURATION: 97 % | HEART RATE: 84 BPM | SYSTOLIC BLOOD PRESSURE: 141 MMHG | WEIGHT: 184.95 LBS

## 2017-08-01 ENCOUNTER — APPOINTMENT (OUTPATIENT)
Dept: HEMATOLOGY ONCOLOGY | Facility: CLINIC | Age: 67
End: 2017-08-01

## 2017-08-01 ENCOUNTER — RESULT REVIEW (OUTPATIENT)
Age: 67
End: 2017-08-01

## 2017-08-01 LAB
BASOPHILS # BLD AUTO: 0 K/UL — SIGNIFICANT CHANGE UP (ref 0–0.2)
BASOPHILS NFR BLD AUTO: 0.7 % — SIGNIFICANT CHANGE UP (ref 0–2)
EOSINOPHIL # BLD AUTO: 0.6 K/UL — HIGH (ref 0–0.5)
EOSINOPHIL NFR BLD AUTO: 9.1 % — HIGH (ref 0–6)
HCT VFR BLD CALC: 40.3 % — SIGNIFICANT CHANGE UP (ref 39–50)
HGB BLD-MCNC: 13.7 G/DL — SIGNIFICANT CHANGE UP (ref 13–17)
LYMPHOCYTES # BLD AUTO: 1.2 K/UL — SIGNIFICANT CHANGE UP (ref 1–3.3)
LYMPHOCYTES # BLD AUTO: 18.4 % — SIGNIFICANT CHANGE UP (ref 13–44)
MCHC RBC-ENTMCNC: 32.7 PG — SIGNIFICANT CHANGE UP (ref 27–34)
MCHC RBC-ENTMCNC: 34.1 G/DL — SIGNIFICANT CHANGE UP (ref 32–36)
MCV RBC AUTO: 96.1 FL — SIGNIFICANT CHANGE UP (ref 80–100)
MONOCYTES # BLD AUTO: 0.6 K/UL — SIGNIFICANT CHANGE UP (ref 0–0.9)
MONOCYTES NFR BLD AUTO: 9.7 % — SIGNIFICANT CHANGE UP (ref 2–14)
NEUTROPHILS # BLD AUTO: 4.2 K/UL — SIGNIFICANT CHANGE UP (ref 1.8–7.4)
NEUTROPHILS NFR BLD AUTO: 62.1 % — SIGNIFICANT CHANGE UP (ref 43–77)
PLATELET # BLD AUTO: 251 K/UL — SIGNIFICANT CHANGE UP (ref 150–400)
RBC # BLD: 4.19 M/UL — LOW (ref 4.2–5.8)
RBC # FLD: 16.3 % — HIGH (ref 10.3–14.5)
WBC # BLD: 6.7 K/UL — SIGNIFICANT CHANGE UP (ref 3.8–10.5)
WBC # FLD AUTO: 6.7 K/UL — SIGNIFICANT CHANGE UP (ref 3.8–10.5)

## 2017-08-03 ENCOUNTER — OTHER (OUTPATIENT)
Age: 67
End: 2017-08-03

## 2017-08-07 VITALS
WEIGHT: 181.22 LBS | BODY MASS INDEX: 25.28 KG/M2 | DIASTOLIC BLOOD PRESSURE: 88 MMHG | RESPIRATION RATE: 16 BRPM | HEART RATE: 88 BPM | SYSTOLIC BLOOD PRESSURE: 149 MMHG

## 2017-08-08 ENCOUNTER — APPOINTMENT (OUTPATIENT)
Dept: HEMATOLOGY ONCOLOGY | Facility: CLINIC | Age: 67
End: 2017-08-08
Payer: MEDICARE

## 2017-08-08 ENCOUNTER — RESULT REVIEW (OUTPATIENT)
Age: 67
End: 2017-08-08

## 2017-08-08 VITALS
SYSTOLIC BLOOD PRESSURE: 120 MMHG | WEIGHT: 181.44 LBS | BODY MASS INDEX: 25.31 KG/M2 | TEMPERATURE: 98.3 F | OXYGEN SATURATION: 94 % | DIASTOLIC BLOOD PRESSURE: 79 MMHG | HEART RATE: 78 BPM | RESPIRATION RATE: 16 BRPM

## 2017-08-08 LAB
BASOPHILS # BLD AUTO: 0.1 K/UL — SIGNIFICANT CHANGE UP (ref 0–0.2)
BASOPHILS NFR BLD AUTO: 0.9 % — SIGNIFICANT CHANGE UP (ref 0–2)
EOSINOPHIL # BLD AUTO: 0.3 K/UL — SIGNIFICANT CHANGE UP (ref 0–0.5)
EOSINOPHIL NFR BLD AUTO: 4.3 % — SIGNIFICANT CHANGE UP (ref 0–6)
HCT VFR BLD CALC: 38.5 % — LOW (ref 39–50)
HGB BLD-MCNC: 13.1 G/DL — SIGNIFICANT CHANGE UP (ref 13–17)
LYMPHOCYTES # BLD AUTO: 0.9 K/UL — LOW (ref 1–3.3)
LYMPHOCYTES # BLD AUTO: 12.7 % — LOW (ref 13–44)
MCHC RBC-ENTMCNC: 32.9 PG — SIGNIFICANT CHANGE UP (ref 27–34)
MCHC RBC-ENTMCNC: 34.2 G/DL — SIGNIFICANT CHANGE UP (ref 32–36)
MCV RBC AUTO: 96.3 FL — SIGNIFICANT CHANGE UP (ref 80–100)
MONOCYTES # BLD AUTO: 0.6 K/UL — SIGNIFICANT CHANGE UP (ref 0–0.9)
MONOCYTES NFR BLD AUTO: 8.9 % — SIGNIFICANT CHANGE UP (ref 2–14)
NEUTROPHILS # BLD AUTO: 5.2 K/UL — SIGNIFICANT CHANGE UP (ref 1.8–7.4)
NEUTROPHILS NFR BLD AUTO: 73.2 % — SIGNIFICANT CHANGE UP (ref 43–77)
PLATELET # BLD AUTO: 193 K/UL — SIGNIFICANT CHANGE UP (ref 150–400)
RBC # BLD: 3.99 M/UL — LOW (ref 4.2–5.8)
RBC # FLD: 17.4 % — HIGH (ref 10.3–14.5)
WBC # BLD: 7.1 K/UL — SIGNIFICANT CHANGE UP (ref 3.8–10.5)
WBC # FLD AUTO: 7.1 K/UL — SIGNIFICANT CHANGE UP (ref 3.8–10.5)

## 2017-08-08 PROCEDURE — 99214 OFFICE O/P EST MOD 30 MIN: CPT

## 2017-08-08 RX ORDER — PROCHLORPERAZINE MALEATE 5 MG/1
5 TABLET ORAL
Qty: 180 | Refills: 1 | Status: DISCONTINUED | COMMUNITY
Start: 2017-07-24 | End: 2017-08-08

## 2017-08-08 RX ORDER — CAPECITABINE 500 MG/1
500 TABLET ORAL
Qty: 180 | Refills: 0 | Status: DISCONTINUED | COMMUNITY
Start: 2017-07-11 | End: 2017-08-08

## 2017-08-08 RX ORDER — PROCHLORPERAZINE MALEATE 10 MG/1
10 TABLET ORAL EVERY 6 HOURS
Qty: 60 | Refills: 1 | Status: DISCONTINUED | COMMUNITY
Start: 2017-01-20 | End: 2017-08-08

## 2017-08-08 RX ORDER — ENOXAPARIN SODIUM 100 MG/ML
40 INJECTION SUBCUTANEOUS
Qty: 12 | Refills: 0 | Status: DISCONTINUED | COMMUNITY
Start: 2017-05-23 | End: 2017-08-08

## 2017-08-10 ENCOUNTER — OTHER (OUTPATIENT)
Age: 67
End: 2017-08-10

## 2017-08-14 ENCOUNTER — RX RENEWAL (OUTPATIENT)
Age: 67
End: 2017-08-14

## 2017-08-15 ENCOUNTER — APPOINTMENT (OUTPATIENT)
Dept: HEMATOLOGY ONCOLOGY | Facility: CLINIC | Age: 67
End: 2017-08-15

## 2017-08-15 ENCOUNTER — RESULT REVIEW (OUTPATIENT)
Age: 67
End: 2017-08-15

## 2017-08-15 LAB
ANISOCYTOSIS BLD QL: SLIGHT — SIGNIFICANT CHANGE UP
BASOPHILS # BLD AUTO: 0.1 K/UL — SIGNIFICANT CHANGE UP (ref 0–0.2)
BASOPHILS NFR BLD AUTO: 1 % — SIGNIFICANT CHANGE UP (ref 0–2)
EOSINOPHIL # BLD AUTO: 0.2 K/UL — SIGNIFICANT CHANGE UP (ref 0–0.5)
EOSINOPHIL NFR BLD AUTO: 9 % — HIGH (ref 0–6)
HCT VFR BLD CALC: 41.7 % — SIGNIFICANT CHANGE UP (ref 39–50)
HGB BLD-MCNC: 13.8 G/DL — SIGNIFICANT CHANGE UP (ref 13–17)
LYMPHOCYTES # BLD AUTO: 0.7 K/UL — LOW (ref 1–3.3)
LYMPHOCYTES # BLD AUTO: 22 % — SIGNIFICANT CHANGE UP (ref 13–44)
MACROCYTES BLD QL: SLIGHT — SIGNIFICANT CHANGE UP
MCHC RBC-ENTMCNC: 31.9 PG — SIGNIFICANT CHANGE UP (ref 27–34)
MCHC RBC-ENTMCNC: 33.2 G/DL — SIGNIFICANT CHANGE UP (ref 32–36)
MCV RBC AUTO: 96 FL — SIGNIFICANT CHANGE UP (ref 80–100)
MONOCYTES # BLD AUTO: 0.6 K/UL — SIGNIFICANT CHANGE UP (ref 0–0.9)
MONOCYTES NFR BLD AUTO: 14 % — SIGNIFICANT CHANGE UP (ref 2–14)
NEUTROPHILS # BLD AUTO: 1.8 K/UL — SIGNIFICANT CHANGE UP (ref 1.8–7.4)
NEUTROPHILS NFR BLD AUTO: 54 % — SIGNIFICANT CHANGE UP (ref 43–77)
PLAT MORPH BLD: NORMAL — SIGNIFICANT CHANGE UP
PLATELET # BLD AUTO: 119 K/UL — LOW (ref 150–400)
RBC # BLD: 4.34 M/UL — SIGNIFICANT CHANGE UP (ref 4.2–5.8)
RBC # FLD: 18.6 % — HIGH (ref 10.3–14.5)
RBC BLD AUTO: ABNORMAL
WBC # BLD: 3.4 K/UL — LOW (ref 3.8–10.5)
WBC # FLD AUTO: 3.4 K/UL — LOW (ref 3.8–10.5)

## 2017-08-16 ENCOUNTER — OTHER (OUTPATIENT)
Age: 67
End: 2017-08-16

## 2017-08-16 ENCOUNTER — APPOINTMENT (OUTPATIENT)
Dept: PHYSICAL MEDICINE AND REHAB | Facility: CLINIC | Age: 67
End: 2017-08-16
Payer: MEDICARE

## 2017-08-16 VITALS
SYSTOLIC BLOOD PRESSURE: 137 MMHG | WEIGHT: 178.57 LBS | RESPIRATION RATE: 16 BRPM | DIASTOLIC BLOOD PRESSURE: 83 MMHG | OXYGEN SATURATION: 97 % | BODY MASS INDEX: 24.91 KG/M2 | HEART RATE: 63 BPM

## 2017-08-16 VITALS
BODY MASS INDEX: 24.52 KG/M2 | RESPIRATION RATE: 16 BRPM | SYSTOLIC BLOOD PRESSURE: 137 MMHG | DIASTOLIC BLOOD PRESSURE: 83 MMHG | WEIGHT: 175.82 LBS | OXYGEN SATURATION: 97 % | HEART RATE: 63 BPM

## 2017-08-16 PROCEDURE — 99203 OFFICE O/P NEW LOW 30 MIN: CPT

## 2017-08-17 ENCOUNTER — TRANSCRIPTION ENCOUNTER (OUTPATIENT)
Age: 67
End: 2017-08-17

## 2017-08-21 VITALS
RESPIRATION RATE: 16 BRPM | BODY MASS INDEX: 24.51 KG/M2 | HEART RATE: 64 BPM | SYSTOLIC BLOOD PRESSURE: 135 MMHG | DIASTOLIC BLOOD PRESSURE: 68 MMHG | WEIGHT: 175.71 LBS | OXYGEN SATURATION: 98 %

## 2017-08-22 ENCOUNTER — RESULT REVIEW (OUTPATIENT)
Age: 67
End: 2017-08-22

## 2017-08-22 ENCOUNTER — APPOINTMENT (OUTPATIENT)
Dept: HEMATOLOGY ONCOLOGY | Facility: CLINIC | Age: 67
End: 2017-08-22

## 2017-08-22 ENCOUNTER — APPOINTMENT (OUTPATIENT)
Dept: INFUSION THERAPY | Facility: HOSPITAL | Age: 67
End: 2017-08-22

## 2017-08-22 LAB
BASOPHILS # BLD AUTO: 0 K/UL — SIGNIFICANT CHANGE UP (ref 0–0.2)
BASOPHILS NFR BLD AUTO: 0.4 % — SIGNIFICANT CHANGE UP (ref 0–2)
EOSINOPHIL # BLD AUTO: 0.3 K/UL — SIGNIFICANT CHANGE UP (ref 0–0.5)
EOSINOPHIL NFR BLD AUTO: 2.3 % — SIGNIFICANT CHANGE UP (ref 0–6)
HCT VFR BLD CALC: 41 % — SIGNIFICANT CHANGE UP (ref 39–50)
HGB BLD-MCNC: 13.6 G/DL — SIGNIFICANT CHANGE UP (ref 13–17)
LYMPHOCYTES # BLD AUTO: 0.7 K/UL — LOW (ref 1–3.3)
LYMPHOCYTES # BLD AUTO: 6.3 % — LOW (ref 13–44)
MCHC RBC-ENTMCNC: 32.3 PG — SIGNIFICANT CHANGE UP (ref 27–34)
MCHC RBC-ENTMCNC: 33.2 G/DL — SIGNIFICANT CHANGE UP (ref 32–36)
MCV RBC AUTO: 97.1 FL — SIGNIFICANT CHANGE UP (ref 80–100)
MONOCYTES # BLD AUTO: 1.4 K/UL — HIGH (ref 0–0.9)
MONOCYTES NFR BLD AUTO: 11.6 % — SIGNIFICANT CHANGE UP (ref 2–14)
NEUTROPHILS # BLD AUTO: 9.3 K/UL — HIGH (ref 1.8–7.4)
NEUTROPHILS NFR BLD AUTO: 79.4 % — HIGH (ref 43–77)
PLATELET # BLD AUTO: 226 K/UL — SIGNIFICANT CHANGE UP (ref 150–400)
RBC # BLD: 4.22 M/UL — SIGNIFICANT CHANGE UP (ref 4.2–5.8)
RBC # FLD: 19.3 % — HIGH (ref 10.3–14.5)
WBC # BLD: 11.8 K/UL — HIGH (ref 3.8–10.5)
WBC # FLD AUTO: 11.8 K/UL — HIGH (ref 3.8–10.5)

## 2017-08-24 ENCOUNTER — OUTPATIENT (OUTPATIENT)
Dept: OUTPATIENT SERVICES | Facility: HOSPITAL | Age: 67
LOS: 1 days | Discharge: HOME | End: 2017-08-24

## 2017-08-24 DIAGNOSIS — Z98.890 OTHER SPECIFIED POSTPROCEDURAL STATES: Chronic | ICD-10-CM

## 2017-08-24 DIAGNOSIS — Z95.828 PRESENCE OF OTHER VASCULAR IMPLANTS AND GRAFTS: Chronic | ICD-10-CM

## 2017-08-24 DIAGNOSIS — C16.9 MALIGNANT NEOPLASM OF STOMACH, UNSPECIFIED: ICD-10-CM

## 2017-08-31 ENCOUNTER — RESULT REVIEW (OUTPATIENT)
Age: 67
End: 2017-08-31

## 2017-08-31 ENCOUNTER — APPOINTMENT (OUTPATIENT)
Dept: HEMATOLOGY ONCOLOGY | Facility: CLINIC | Age: 67
End: 2017-08-31
Payer: MEDICARE

## 2017-08-31 ENCOUNTER — LABORATORY RESULT (OUTPATIENT)
Age: 67
End: 2017-08-31

## 2017-08-31 VITALS
WEIGHT: 177.69 LBS | TEMPERATURE: 99.1 F | DIASTOLIC BLOOD PRESSURE: 93 MMHG | HEART RATE: 79 BPM | OXYGEN SATURATION: 96 % | RESPIRATION RATE: 16 BRPM | SYSTOLIC BLOOD PRESSURE: 155 MMHG | BODY MASS INDEX: 24.78 KG/M2

## 2017-08-31 LAB
ANISOCYTOSIS BLD QL: SLIGHT — SIGNIFICANT CHANGE UP
ELLIPTOCYTES BLD QL SMEAR: SLIGHT — SIGNIFICANT CHANGE UP
EOSINOPHIL # BLD AUTO: 0.2 K/UL — SIGNIFICANT CHANGE UP (ref 0–0.5)
EOSINOPHIL NFR BLD AUTO: 7 % — HIGH (ref 0–6)
HCT VFR BLD CALC: 42.2 % — SIGNIFICANT CHANGE UP (ref 39–50)
HGB BLD-MCNC: 13.1 G/DL — SIGNIFICANT CHANGE UP (ref 13–17)
LYMPHOCYTES # BLD AUTO: 1.1 K/UL — SIGNIFICANT CHANGE UP (ref 1–3.3)
LYMPHOCYTES # BLD AUTO: 14 % — SIGNIFICANT CHANGE UP (ref 13–44)
MACROCYTES BLD QL: SLIGHT — SIGNIFICANT CHANGE UP
MCHC RBC-ENTMCNC: 31.1 PG — SIGNIFICANT CHANGE UP (ref 27–34)
MCHC RBC-ENTMCNC: 31.2 G/DL — LOW (ref 32–36)
MCV RBC AUTO: 99.8 FL — SIGNIFICANT CHANGE UP (ref 80–100)
MICROCYTES BLD QL: SLIGHT — SIGNIFICANT CHANGE UP
MONOCYTES # BLD AUTO: 0.8 K/UL — SIGNIFICANT CHANGE UP (ref 0–0.9)
MONOCYTES NFR BLD AUTO: 16 % — HIGH (ref 2–14)
NEUTROPHILS # BLD AUTO: 2.2 K/UL — SIGNIFICANT CHANGE UP (ref 1.8–7.4)
NEUTROPHILS NFR BLD AUTO: 62 % — SIGNIFICANT CHANGE UP (ref 43–77)
NEUTS BAND # BLD: 1 % — SIGNIFICANT CHANGE UP (ref 0–8)
PLAT MORPH BLD: NORMAL — SIGNIFICANT CHANGE UP
PLATELET # BLD AUTO: 125 K/UL — LOW (ref 150–400)
RBC # BLD: 4.22 M/UL — SIGNIFICANT CHANGE UP (ref 4.2–5.8)
RBC # FLD: 20.7 % — HIGH (ref 10.3–14.5)
RBC BLD AUTO: ABNORMAL
WBC # BLD: 4.4 K/UL — SIGNIFICANT CHANGE UP (ref 3.8–10.5)
WBC # FLD AUTO: 4.4 K/UL — SIGNIFICANT CHANGE UP (ref 3.8–10.5)

## 2017-08-31 PROCEDURE — 99214 OFFICE O/P EST MOD 30 MIN: CPT

## 2017-09-25 ENCOUNTER — OUTPATIENT (OUTPATIENT)
Dept: OUTPATIENT SERVICES | Facility: HOSPITAL | Age: 67
LOS: 1 days | Discharge: HOME | End: 2017-09-25

## 2017-09-25 DIAGNOSIS — C16.9 MALIGNANT NEOPLASM OF STOMACH, UNSPECIFIED: ICD-10-CM

## 2017-09-25 DIAGNOSIS — Z95.828 PRESENCE OF OTHER VASCULAR IMPLANTS AND GRAFTS: Chronic | ICD-10-CM

## 2017-09-25 DIAGNOSIS — Z98.890 OTHER SPECIFIED POSTPROCEDURAL STATES: Chronic | ICD-10-CM

## 2017-09-26 ENCOUNTER — LABORATORY RESULT (OUTPATIENT)
Age: 67
End: 2017-09-26

## 2017-09-26 ENCOUNTER — RESULT REVIEW (OUTPATIENT)
Age: 67
End: 2017-09-26

## 2017-09-26 ENCOUNTER — APPOINTMENT (OUTPATIENT)
Dept: RADIATION ONCOLOGY | Facility: CLINIC | Age: 67
End: 2017-09-26

## 2017-09-26 ENCOUNTER — APPOINTMENT (OUTPATIENT)
Dept: INFUSION THERAPY | Facility: HOSPITAL | Age: 67
End: 2017-09-26

## 2017-09-26 ENCOUNTER — APPOINTMENT (OUTPATIENT)
Dept: RADIATION ONCOLOGY | Facility: CLINIC | Age: 67
End: 2017-09-26
Payer: MEDICARE

## 2017-09-26 VITALS
RESPIRATION RATE: 16 BRPM | SYSTOLIC BLOOD PRESSURE: 144 MMHG | TEMPERATURE: 98.4 F | OXYGEN SATURATION: 99 % | HEIGHT: 71 IN | HEART RATE: 52 BPM | WEIGHT: 173.83 LBS | DIASTOLIC BLOOD PRESSURE: 82 MMHG | BODY MASS INDEX: 24.34 KG/M2

## 2017-09-26 LAB
BASOPHILS # BLD AUTO: 0 K/UL — SIGNIFICANT CHANGE UP (ref 0–0.2)
BASOPHILS NFR BLD AUTO: 0.4 % — SIGNIFICANT CHANGE UP (ref 0–2)
EOSINOPHIL # BLD AUTO: 0.3 K/UL — SIGNIFICANT CHANGE UP (ref 0–0.5)
EOSINOPHIL NFR BLD AUTO: 4.3 % — SIGNIFICANT CHANGE UP (ref 0–6)
HCT VFR BLD CALC: 41.4 % — SIGNIFICANT CHANGE UP (ref 39–50)
HGB BLD-MCNC: 13.3 G/DL — SIGNIFICANT CHANGE UP (ref 13–17)
LYMPHOCYTES # BLD AUTO: 1.1 K/UL — SIGNIFICANT CHANGE UP (ref 1–3.3)
LYMPHOCYTES # BLD AUTO: 19 % — SIGNIFICANT CHANGE UP (ref 13–44)
MCHC RBC-ENTMCNC: 32.1 G/DL — SIGNIFICANT CHANGE UP (ref 32–36)
MCHC RBC-ENTMCNC: 33.8 PG — SIGNIFICANT CHANGE UP (ref 27–34)
MCV RBC AUTO: 105 FL — HIGH (ref 80–100)
MONOCYTES # BLD AUTO: 0.7 K/UL — SIGNIFICANT CHANGE UP (ref 0–0.9)
MONOCYTES NFR BLD AUTO: 12.1 % — SIGNIFICANT CHANGE UP (ref 2–14)
NEUTROPHILS # BLD AUTO: 3.8 K/UL — SIGNIFICANT CHANGE UP (ref 1.8–7.4)
NEUTROPHILS NFR BLD AUTO: 64.1 % — SIGNIFICANT CHANGE UP (ref 43–77)
PLATELET # BLD AUTO: 162 K/UL — SIGNIFICANT CHANGE UP (ref 150–400)
RBC # BLD: 3.94 M/UL — LOW (ref 4.2–5.8)
RBC # FLD: 16.7 % — HIGH (ref 10.3–14.5)
WBC # BLD: 5.9 K/UL — SIGNIFICANT CHANGE UP (ref 3.8–10.5)
WBC # FLD AUTO: 5.9 K/UL — SIGNIFICANT CHANGE UP (ref 3.8–10.5)

## 2017-09-26 PROCEDURE — 99024 POSTOP FOLLOW-UP VISIT: CPT

## 2017-10-01 ENCOUNTER — FORM ENCOUNTER (OUTPATIENT)
Age: 67
End: 2017-10-01

## 2017-10-02 ENCOUNTER — APPOINTMENT (OUTPATIENT)
Dept: CT IMAGING | Facility: IMAGING CENTER | Age: 67
End: 2017-10-02
Payer: MEDICARE

## 2017-10-02 ENCOUNTER — OUTPATIENT (OUTPATIENT)
Dept: OUTPATIENT SERVICES | Facility: HOSPITAL | Age: 67
LOS: 1 days | End: 2017-10-02
Payer: MEDICARE

## 2017-10-02 DIAGNOSIS — C16.9 MALIGNANT NEOPLASM OF STOMACH, UNSPECIFIED: ICD-10-CM

## 2017-10-02 DIAGNOSIS — Z95.828 PRESENCE OF OTHER VASCULAR IMPLANTS AND GRAFTS: Chronic | ICD-10-CM

## 2017-10-02 DIAGNOSIS — Z98.890 OTHER SPECIFIED POSTPROCEDURAL STATES: Chronic | ICD-10-CM

## 2017-10-02 PROCEDURE — 71260 CT THORAX DX C+: CPT

## 2017-10-02 PROCEDURE — 74177 CT ABD & PELVIS W/CONTRAST: CPT | Mod: 26

## 2017-10-02 PROCEDURE — 71260 CT THORAX DX C+: CPT | Mod: 26

## 2017-10-02 PROCEDURE — 74177 CT ABD & PELVIS W/CONTRAST: CPT

## 2017-10-10 ENCOUNTER — FORM ENCOUNTER (OUTPATIENT)
Age: 67
End: 2017-10-10

## 2017-10-11 ENCOUNTER — APPOINTMENT (OUTPATIENT)
Dept: MRI IMAGING | Facility: IMAGING CENTER | Age: 67
End: 2017-10-11
Payer: MEDICARE

## 2017-10-11 ENCOUNTER — OUTPATIENT (OUTPATIENT)
Dept: OUTPATIENT SERVICES | Facility: HOSPITAL | Age: 67
LOS: 1 days | End: 2017-10-11
Payer: MEDICARE

## 2017-10-11 DIAGNOSIS — Z98.890 OTHER SPECIFIED POSTPROCEDURAL STATES: Chronic | ICD-10-CM

## 2017-10-11 DIAGNOSIS — C16.9 MALIGNANT NEOPLASM OF STOMACH, UNSPECIFIED: ICD-10-CM

## 2017-10-11 DIAGNOSIS — Z95.828 PRESENCE OF OTHER VASCULAR IMPLANTS AND GRAFTS: Chronic | ICD-10-CM

## 2017-10-11 PROCEDURE — 74183 MRI ABD W/O CNTR FLWD CNTR: CPT

## 2017-10-11 PROCEDURE — 74183 MRI ABD W/O CNTR FLWD CNTR: CPT | Mod: 26

## 2017-10-11 PROCEDURE — A9585: CPT

## 2017-10-11 PROCEDURE — 82565 ASSAY OF CREATININE: CPT

## 2017-10-16 ENCOUNTER — LABORATORY RESULT (OUTPATIENT)
Age: 67
End: 2017-10-16

## 2017-10-16 ENCOUNTER — RESULT REVIEW (OUTPATIENT)
Age: 67
End: 2017-10-16

## 2017-10-16 ENCOUNTER — APPOINTMENT (OUTPATIENT)
Dept: HEMATOLOGY ONCOLOGY | Facility: CLINIC | Age: 67
End: 2017-10-16
Payer: MEDICARE

## 2017-10-16 VITALS
OXYGEN SATURATION: 97 % | TEMPERATURE: 98.8 F | DIASTOLIC BLOOD PRESSURE: 92 MMHG | HEART RATE: 61 BPM | BODY MASS INDEX: 24.29 KG/M2 | RESPIRATION RATE: 16 BRPM | WEIGHT: 174.14 LBS | SYSTOLIC BLOOD PRESSURE: 165 MMHG

## 2017-10-16 LAB
BASOPHILS # BLD AUTO: 0.1 K/UL — SIGNIFICANT CHANGE UP (ref 0–0.2)
BASOPHILS NFR BLD AUTO: 1.2 % — SIGNIFICANT CHANGE UP (ref 0–2)
EOSINOPHIL # BLD AUTO: 0.2 K/UL — SIGNIFICANT CHANGE UP (ref 0–0.5)
EOSINOPHIL NFR BLD AUTO: 2.8 % — SIGNIFICANT CHANGE UP (ref 0–6)
HCT VFR BLD CALC: 43.1 % — SIGNIFICANT CHANGE UP (ref 39–50)
HGB BLD-MCNC: 14.1 G/DL — SIGNIFICANT CHANGE UP (ref 13–17)
LYMPHOCYTES # BLD AUTO: 1.2 K/UL — SIGNIFICANT CHANGE UP (ref 1–3.3)
LYMPHOCYTES # BLD AUTO: 19.1 % — SIGNIFICANT CHANGE UP (ref 13–44)
MCHC RBC-ENTMCNC: 32.8 G/DL — SIGNIFICANT CHANGE UP (ref 32–36)
MCHC RBC-ENTMCNC: 34 PG — SIGNIFICANT CHANGE UP (ref 27–34)
MCV RBC AUTO: 104 FL — HIGH (ref 80–100)
MONOCYTES # BLD AUTO: 0.6 K/UL — SIGNIFICANT CHANGE UP (ref 0–0.9)
MONOCYTES NFR BLD AUTO: 9.2 % — SIGNIFICANT CHANGE UP (ref 2–14)
NEUTROPHILS # BLD AUTO: 4.2 K/UL — SIGNIFICANT CHANGE UP (ref 1.8–7.4)
NEUTROPHILS NFR BLD AUTO: 67.8 % — SIGNIFICANT CHANGE UP (ref 43–77)
PLATELET # BLD AUTO: 225 K/UL — SIGNIFICANT CHANGE UP (ref 150–400)
RBC # BLD: 4.15 M/UL — LOW (ref 4.2–5.8)
RBC # FLD: 14.8 % — HIGH (ref 10.3–14.5)
WBC # BLD: 6.2 K/UL — SIGNIFICANT CHANGE UP (ref 3.8–10.5)
WBC # FLD AUTO: 6.2 K/UL — SIGNIFICANT CHANGE UP (ref 3.8–10.5)

## 2017-10-16 PROCEDURE — 99214 OFFICE O/P EST MOD 30 MIN: CPT

## 2017-10-23 ENCOUNTER — RESULT REVIEW (OUTPATIENT)
Age: 67
End: 2017-10-23

## 2017-10-23 ENCOUNTER — APPOINTMENT (OUTPATIENT)
Dept: INFUSION THERAPY | Facility: HOSPITAL | Age: 67
End: 2017-10-23

## 2017-10-23 LAB
BASOPHILS # BLD AUTO: 0.1 K/UL — SIGNIFICANT CHANGE UP (ref 0–0.2)
BASOPHILS NFR BLD AUTO: 1.2 % — SIGNIFICANT CHANGE UP (ref 0–2)
EOSINOPHIL # BLD AUTO: 0.2 K/UL — SIGNIFICANT CHANGE UP (ref 0–0.5)
EOSINOPHIL NFR BLD AUTO: 3.8 % — SIGNIFICANT CHANGE UP (ref 0–6)
HCT VFR BLD CALC: 42.2 % — SIGNIFICANT CHANGE UP (ref 39–50)
HGB BLD-MCNC: 14 G/DL — SIGNIFICANT CHANGE UP (ref 13–17)
LYMPHOCYTES # BLD AUTO: 1.2 K/UL — SIGNIFICANT CHANGE UP (ref 1–3.3)
LYMPHOCYTES # BLD AUTO: 20.3 % — SIGNIFICANT CHANGE UP (ref 13–44)
MCHC RBC-ENTMCNC: 33.3 G/DL — SIGNIFICANT CHANGE UP (ref 32–36)
MCHC RBC-ENTMCNC: 34.7 PG — HIGH (ref 27–34)
MCV RBC AUTO: 104 FL — HIGH (ref 80–100)
MONOCYTES # BLD AUTO: 0.6 K/UL — SIGNIFICANT CHANGE UP (ref 0–0.9)
MONOCYTES NFR BLD AUTO: 9.6 % — SIGNIFICANT CHANGE UP (ref 2–14)
NEUTROPHILS # BLD AUTO: 3.9 K/UL — SIGNIFICANT CHANGE UP (ref 1.8–7.4)
NEUTROPHILS NFR BLD AUTO: 65.1 % — SIGNIFICANT CHANGE UP (ref 43–77)
PLATELET # BLD AUTO: 185 K/UL — SIGNIFICANT CHANGE UP (ref 150–400)
RBC # BLD: 4.05 M/UL — LOW (ref 4.2–5.8)
RBC # FLD: 14.5 % — SIGNIFICANT CHANGE UP (ref 10.3–14.5)
WBC # BLD: 6 K/UL — SIGNIFICANT CHANGE UP (ref 3.8–10.5)
WBC # FLD AUTO: 6 K/UL — SIGNIFICANT CHANGE UP (ref 3.8–10.5)

## 2017-10-24 DIAGNOSIS — Z51.11 ENCOUNTER FOR ANTINEOPLASTIC CHEMOTHERAPY: ICD-10-CM

## 2017-10-24 DIAGNOSIS — R11.2 NAUSEA WITH VOMITING, UNSPECIFIED: ICD-10-CM

## 2017-11-06 ENCOUNTER — APPOINTMENT (OUTPATIENT)
Dept: SURGICAL ONCOLOGY | Facility: CLINIC | Age: 67
End: 2017-11-06
Payer: MEDICARE

## 2017-11-06 VITALS
RESPIRATION RATE: 15 BRPM | HEART RATE: 73 BPM | BODY MASS INDEX: 24.36 KG/M2 | OXYGEN SATURATION: 95 % | HEIGHT: 71 IN | SYSTOLIC BLOOD PRESSURE: 145 MMHG | WEIGHT: 174 LBS | DIASTOLIC BLOOD PRESSURE: 82 MMHG

## 2017-11-06 PROCEDURE — 99214 OFFICE O/P EST MOD 30 MIN: CPT

## 2017-11-08 ENCOUNTER — OUTPATIENT (OUTPATIENT)
Dept: OUTPATIENT SERVICES | Facility: HOSPITAL | Age: 67
LOS: 1 days | Discharge: HOME | End: 2017-11-08

## 2017-11-08 DIAGNOSIS — Z95.828 PRESENCE OF OTHER VASCULAR IMPLANTS AND GRAFTS: Chronic | ICD-10-CM

## 2017-11-08 DIAGNOSIS — Z98.890 OTHER SPECIFIED POSTPROCEDURAL STATES: Chronic | ICD-10-CM

## 2017-11-08 DIAGNOSIS — C16.9 MALIGNANT NEOPLASM OF STOMACH, UNSPECIFIED: ICD-10-CM

## 2017-11-09 NOTE — PROGRESS NOTE ADULT - SUBJECTIVE AND OBJECTIVE BOX
Procedure:   total gastrectomy, splenectomy, distal pancreatectomy, feeding J tube  Post operative date: 8    S: No acute events overnight. Tolerating TF without nausea or vomiting.     VITAL SIGNS: T(C): 36.6, Max: 37 (05-19 @ 20:25)  HR: 69 (69 - 87)  BP: 121/53 (107/64 - 149/76)  RR: 16 (16 - 18)  SpO2: 95% (93% - 96%)  Wt(kg): --      I+Os: I&O's Summary  I & Os for 24h ending 20 May 2017 07:00  =============================================  IN: 1310 ml / OUT: 1280 ml / NET: 30 ml    I & Os for current day (as of 20 May 2017 16:44)  =============================================  IN: 0 ml / OUT: 112.5 ml / NET: -112.5 ml    Labs:                         10.1   10.09 )-----------( 510      ( 20 May 2017 06:33 )             32.5      05-20    140  |  103  |  15  ----------------------------<  178<H>  4.2   |  26  |  0.63    Ca    8.4      20 May 2017 06:33  Phos  3.7     05-19  Mg     1.9     05-19    TPro  5.8<L>  /  Alb  2.6<L>  /  TBili  0.2  /  DBili  x   /  AST  23  /  ALT  26  /  AlkPhos  118  05-20    PHYSICAL EXAM    Gen: NAD, AOx3.  Abdominal: Soft, NT, ND. Incisions healing well, c/d/i. MICH with serosanguinous drainage      MEDICATIONS:  levothyroxine Injectable 50MICROGram(s) IV Push daily  HYDROmorphone  Injectable 0.5milliGRAM(s) IV Push every 2 hours PRN  enoxaparin Injectable 40milliGRAM(s) SubCutaneous daily  insulin glargine Injectable (LANTUS) 10Unit(s) SubCutaneous at bedtime  dextrose 50% Injectable 25Gram(s) IV Push once  dextrose 50% Injectable 25Gram(s) IV Push once  insulin lispro (HumaLOG) corrective regimen sliding scale  SubCutaneous every 6 hours  dextrose 5% + sodium chloride 0.45% with potassium chloride 20 mEq/L 1000milliLiter(s) IV Continuous <Continuous>      IMAGING STUDIES: .

## 2017-11-13 ENCOUNTER — RESULT REVIEW (OUTPATIENT)
Age: 67
End: 2017-11-13

## 2017-11-13 ENCOUNTER — APPOINTMENT (OUTPATIENT)
Dept: HEMATOLOGY ONCOLOGY | Facility: CLINIC | Age: 67
End: 2017-11-13
Payer: MEDICARE

## 2017-11-13 ENCOUNTER — APPOINTMENT (OUTPATIENT)
Dept: INFUSION THERAPY | Facility: HOSPITAL | Age: 67
End: 2017-11-13

## 2017-11-13 VITALS
RESPIRATION RATE: 16 BRPM | DIASTOLIC BLOOD PRESSURE: 91 MMHG | OXYGEN SATURATION: 95 % | BODY MASS INDEX: 24.11 KG/M2 | TEMPERATURE: 98.7 F | SYSTOLIC BLOOD PRESSURE: 158 MMHG | HEART RATE: 75 BPM | WEIGHT: 172.84 LBS

## 2017-11-13 LAB
BASOPHILS # BLD AUTO: 0.1 K/UL — SIGNIFICANT CHANGE UP (ref 0–0.2)
BASOPHILS NFR BLD AUTO: 1.2 % — SIGNIFICANT CHANGE UP (ref 0–2)
EOSINOPHIL # BLD AUTO: 0.1 K/UL — SIGNIFICANT CHANGE UP (ref 0–0.5)
EOSINOPHIL NFR BLD AUTO: 2.2 % — SIGNIFICANT CHANGE UP (ref 0–6)
HCT VFR BLD CALC: 42.8 % — SIGNIFICANT CHANGE UP (ref 39–50)
HGB BLD-MCNC: 14.1 G/DL — SIGNIFICANT CHANGE UP (ref 13–17)
LYMPHOCYTES # BLD AUTO: 1.3 K/UL — SIGNIFICANT CHANGE UP (ref 1–3.3)
LYMPHOCYTES # BLD AUTO: 21.8 % — SIGNIFICANT CHANGE UP (ref 13–44)
MCHC RBC-ENTMCNC: 33 G/DL — SIGNIFICANT CHANGE UP (ref 32–36)
MCHC RBC-ENTMCNC: 35.1 PG — HIGH (ref 27–34)
MCV RBC AUTO: 107 FL — HIGH (ref 80–100)
MONOCYTES # BLD AUTO: 0.7 K/UL — SIGNIFICANT CHANGE UP (ref 0–0.9)
MONOCYTES NFR BLD AUTO: 12.5 % — SIGNIFICANT CHANGE UP (ref 2–14)
NEUTROPHILS # BLD AUTO: 3.7 K/UL — SIGNIFICANT CHANGE UP (ref 1.8–7.4)
NEUTROPHILS NFR BLD AUTO: 62.3 % — SIGNIFICANT CHANGE UP (ref 43–77)
PLATELET # BLD AUTO: 124 K/UL — LOW (ref 150–400)
RBC # BLD: 4.02 M/UL — LOW (ref 4.2–5.8)
RBC # FLD: 16 % — HIGH (ref 10.3–14.5)
WBC # BLD: 5.9 K/UL — SIGNIFICANT CHANGE UP (ref 3.8–10.5)
WBC # FLD AUTO: 5.9 K/UL — SIGNIFICANT CHANGE UP (ref 3.8–10.5)

## 2017-11-13 PROCEDURE — 99214 OFFICE O/P EST MOD 30 MIN: CPT

## 2017-11-14 DIAGNOSIS — Z51.11 ENCOUNTER FOR ANTINEOPLASTIC CHEMOTHERAPY: ICD-10-CM

## 2017-11-14 DIAGNOSIS — R11.2 NAUSEA WITH VOMITING, UNSPECIFIED: ICD-10-CM

## 2017-12-04 ENCOUNTER — RESULT REVIEW (OUTPATIENT)
Age: 67
End: 2017-12-04

## 2017-12-04 ENCOUNTER — LABORATORY RESULT (OUTPATIENT)
Age: 67
End: 2017-12-04

## 2017-12-04 ENCOUNTER — APPOINTMENT (OUTPATIENT)
Dept: HEMATOLOGY ONCOLOGY | Facility: CLINIC | Age: 67
End: 2017-12-04
Payer: MEDICARE

## 2017-12-04 ENCOUNTER — APPOINTMENT (OUTPATIENT)
Dept: INFUSION THERAPY | Facility: HOSPITAL | Age: 67
End: 2017-12-04

## 2017-12-04 VITALS
WEIGHT: 171.08 LBS | SYSTOLIC BLOOD PRESSURE: 150 MMHG | BODY MASS INDEX: 23.86 KG/M2 | HEART RATE: 68 BPM | RESPIRATION RATE: 16 BRPM | DIASTOLIC BLOOD PRESSURE: 87 MMHG | OXYGEN SATURATION: 100 % | TEMPERATURE: 98.7 F

## 2017-12-04 LAB
BASOPHILS # BLD AUTO: 0.1 K/UL — SIGNIFICANT CHANGE UP (ref 0–0.2)
BASOPHILS NFR BLD AUTO: 1 % — SIGNIFICANT CHANGE UP (ref 0–2)
EOSINOPHIL # BLD AUTO: 0.2 K/UL — SIGNIFICANT CHANGE UP (ref 0–0.5)
EOSINOPHIL NFR BLD AUTO: 3 % — SIGNIFICANT CHANGE UP (ref 0–6)
HCT VFR BLD CALC: 43.5 % — SIGNIFICANT CHANGE UP (ref 39–50)
HGB BLD-MCNC: 14.5 G/DL — SIGNIFICANT CHANGE UP (ref 13–17)
LYMPHOCYTES # BLD AUTO: 1.4 K/UL — SIGNIFICANT CHANGE UP (ref 1–3.3)
LYMPHOCYTES # BLD AUTO: 36 % — SIGNIFICANT CHANGE UP (ref 13–44)
MCHC RBC-ENTMCNC: 33.4 G/DL — SIGNIFICANT CHANGE UP (ref 32–36)
MCHC RBC-ENTMCNC: 35.4 PG — HIGH (ref 27–34)
MCV RBC AUTO: 106 FL — HIGH (ref 80–100)
MONOCYTES # BLD AUTO: 0.8 K/UL — SIGNIFICANT CHANGE UP (ref 0–0.9)
MONOCYTES NFR BLD AUTO: 11 % — SIGNIFICANT CHANGE UP (ref 2–14)
NEUTROPHILS # BLD AUTO: 2.3 K/UL — SIGNIFICANT CHANGE UP (ref 1.8–7.4)
NEUTROPHILS NFR BLD AUTO: 49 % — SIGNIFICANT CHANGE UP (ref 43–77)
PLAT MORPH BLD: NORMAL — SIGNIFICANT CHANGE UP
PLATELET # BLD AUTO: 150 K/UL — SIGNIFICANT CHANGE UP (ref 150–400)
RBC # BLD: 4.11 M/UL — LOW (ref 4.2–5.8)
RBC # FLD: 17.8 % — HIGH (ref 10.3–14.5)
RBC BLD AUTO: SIGNIFICANT CHANGE UP
WBC # BLD: 4.7 K/UL — SIGNIFICANT CHANGE UP (ref 3.8–10.5)
WBC # FLD AUTO: 4.7 K/UL — SIGNIFICANT CHANGE UP (ref 3.8–10.5)

## 2017-12-04 PROCEDURE — 99214 OFFICE O/P EST MOD 30 MIN: CPT

## 2017-12-15 ENCOUNTER — OUTPATIENT (OUTPATIENT)
Dept: OUTPATIENT SERVICES | Facility: HOSPITAL | Age: 67
LOS: 1 days | Discharge: HOME | End: 2017-12-15

## 2017-12-15 DIAGNOSIS — C16.9 MALIGNANT NEOPLASM OF STOMACH, UNSPECIFIED: ICD-10-CM

## 2017-12-15 DIAGNOSIS — Z98.890 OTHER SPECIFIED POSTPROCEDURAL STATES: Chronic | ICD-10-CM

## 2017-12-15 DIAGNOSIS — Z95.828 PRESENCE OF OTHER VASCULAR IMPLANTS AND GRAFTS: Chronic | ICD-10-CM

## 2017-12-21 ENCOUNTER — APPOINTMENT (OUTPATIENT)
Dept: HEMATOLOGY ONCOLOGY | Facility: CLINIC | Age: 67
End: 2017-12-21
Payer: MEDICARE

## 2017-12-21 ENCOUNTER — RESULT REVIEW (OUTPATIENT)
Age: 67
End: 2017-12-21

## 2017-12-21 ENCOUNTER — OTHER (OUTPATIENT)
Age: 67
End: 2017-12-21

## 2017-12-21 VITALS
BODY MASS INDEX: 23.74 KG/M2 | SYSTOLIC BLOOD PRESSURE: 158 MMHG | OXYGEN SATURATION: 97 % | DIASTOLIC BLOOD PRESSURE: 70 MMHG | RESPIRATION RATE: 16 BRPM | WEIGHT: 170.17 LBS | HEART RATE: 61 BPM | TEMPERATURE: 98.5 F

## 2017-12-21 LAB
BASOPHILS # BLD AUTO: 0.1 K/UL — SIGNIFICANT CHANGE UP (ref 0–0.2)
BASOPHILS NFR BLD AUTO: 1.4 % — SIGNIFICANT CHANGE UP (ref 0–2)
EOSINOPHIL # BLD AUTO: 0.2 K/UL — SIGNIFICANT CHANGE UP (ref 0–0.5)
EOSINOPHIL NFR BLD AUTO: 3.5 % — SIGNIFICANT CHANGE UP (ref 0–6)
HCT VFR BLD CALC: 38.4 % — LOW (ref 39–50)
HGB BLD-MCNC: 13 G/DL — SIGNIFICANT CHANGE UP (ref 13–17)
LYMPHOCYTES # BLD AUTO: 1.5 K/UL — SIGNIFICANT CHANGE UP (ref 1–3.3)
LYMPHOCYTES # BLD AUTO: 30.9 % — SIGNIFICANT CHANGE UP (ref 13–44)
MCHC RBC-ENTMCNC: 33.9 G/DL — SIGNIFICANT CHANGE UP (ref 32–36)
MCHC RBC-ENTMCNC: 37.2 PG — HIGH (ref 27–34)
MCV RBC AUTO: 110 FL — HIGH (ref 80–100)
MONOCYTES # BLD AUTO: 0.7 K/UL — SIGNIFICANT CHANGE UP (ref 0–0.9)
MONOCYTES NFR BLD AUTO: 14.4 % — HIGH (ref 2–14)
NEUTROPHILS # BLD AUTO: 2.4 K/UL — SIGNIFICANT CHANGE UP (ref 1.8–7.4)
NEUTROPHILS NFR BLD AUTO: 49.8 % — SIGNIFICANT CHANGE UP (ref 43–77)
PLATELET # BLD AUTO: 172 K/UL — SIGNIFICANT CHANGE UP (ref 150–400)
RBC # BLD: 3.5 M/UL — LOW (ref 4.2–5.8)
RBC # FLD: 19.6 % — HIGH (ref 10.3–14.5)
WBC # BLD: 4.8 K/UL — SIGNIFICANT CHANGE UP (ref 3.8–10.5)
WBC # FLD AUTO: 4.8 K/UL — SIGNIFICANT CHANGE UP (ref 3.8–10.5)

## 2017-12-21 PROCEDURE — 99214 OFFICE O/P EST MOD 30 MIN: CPT

## 2017-12-22 LAB — CANCER AG19-9 SERPL-ACNC: 35 U/ML

## 2017-12-29 ENCOUNTER — OTHER (OUTPATIENT)
Age: 67
End: 2017-12-29

## 2018-01-12 LAB — CEA SERPL-MCNC: 4.8 NG/ML

## 2018-01-18 LAB
ALBUMIN SERPL ELPH-MCNC: 3.7 G/DL
ALP BLD-CCNC: 187 U/L
ALT SERPL-CCNC: 26 U/L
ANION GAP SERPL CALC-SCNC: 12 MMOL/L
AST SERPL-CCNC: 38 U/L
BILIRUB SERPL-MCNC: 1.1 MG/DL
BUN SERPL-MCNC: 9 MG/DL
CALCIUM SERPL-MCNC: 9.5 MG/DL
CHLORIDE SERPL-SCNC: 102 MMOL/L
CO2 SERPL-SCNC: 26 MMOL/L
CREAT SERPL-MCNC: 0.72 MG/DL
GLUCOSE SERPL-MCNC: 150 MG/DL
POTASSIUM SERPL-SCNC: 3.8 MMOL/L
PROT SERPL-MCNC: 7 G/DL
SODIUM SERPL-SCNC: 140 MMOL/L

## 2018-01-25 ENCOUNTER — OUTPATIENT (OUTPATIENT)
Dept: OUTPATIENT SERVICES | Facility: HOSPITAL | Age: 68
LOS: 1 days | Discharge: HOME | End: 2018-01-25

## 2018-01-25 DIAGNOSIS — Z95.828 PRESENCE OF OTHER VASCULAR IMPLANTS AND GRAFTS: Chronic | ICD-10-CM

## 2018-01-25 DIAGNOSIS — C16.9 MALIGNANT NEOPLASM OF STOMACH, UNSPECIFIED: ICD-10-CM

## 2018-01-25 DIAGNOSIS — Z98.890 OTHER SPECIFIED POSTPROCEDURAL STATES: Chronic | ICD-10-CM

## 2018-01-30 ENCOUNTER — APPOINTMENT (OUTPATIENT)
Dept: RADIATION ONCOLOGY | Facility: CLINIC | Age: 68
End: 2018-01-30
Payer: MEDICARE

## 2018-01-30 ENCOUNTER — APPOINTMENT (OUTPATIENT)
Dept: INFUSION THERAPY | Facility: HOSPITAL | Age: 68
End: 2018-01-30

## 2018-01-30 ENCOUNTER — RESULT REVIEW (OUTPATIENT)
Age: 68
End: 2018-01-30

## 2018-01-30 ENCOUNTER — LABORATORY RESULT (OUTPATIENT)
Age: 68
End: 2018-01-30

## 2018-01-30 VITALS
SYSTOLIC BLOOD PRESSURE: 130 MMHG | OXYGEN SATURATION: 97 % | BODY MASS INDEX: 23.73 KG/M2 | WEIGHT: 169.53 LBS | RESPIRATION RATE: 16 BRPM | HEART RATE: 70 BPM | HEIGHT: 71 IN | TEMPERATURE: 97.8 F | DIASTOLIC BLOOD PRESSURE: 79 MMHG

## 2018-01-30 LAB
BASOPHILS # BLD AUTO: 0.1 K/UL — SIGNIFICANT CHANGE UP (ref 0–0.2)
BASOPHILS NFR BLD AUTO: 1.3 % — SIGNIFICANT CHANGE UP (ref 0–2)
EOSINOPHIL # BLD AUTO: 0.2 K/UL — SIGNIFICANT CHANGE UP (ref 0–0.5)
EOSINOPHIL NFR BLD AUTO: 3.1 % — SIGNIFICANT CHANGE UP (ref 0–6)
HCT VFR BLD CALC: 37.9 % — LOW (ref 39–50)
HGB BLD-MCNC: 12.5 G/DL — LOW (ref 13–17)
LYMPHOCYTES # BLD AUTO: 1.5 K/UL — SIGNIFICANT CHANGE UP (ref 1–3.3)
LYMPHOCYTES # BLD AUTO: 26.8 % — SIGNIFICANT CHANGE UP (ref 13–44)
MCHC RBC-ENTMCNC: 33 G/DL — SIGNIFICANT CHANGE UP (ref 32–36)
MCHC RBC-ENTMCNC: 37.1 PG — HIGH (ref 27–34)
MCV RBC AUTO: 113 FL — HIGH (ref 80–100)
MONOCYTES # BLD AUTO: 0.6 K/UL — SIGNIFICANT CHANGE UP (ref 0–0.9)
MONOCYTES NFR BLD AUTO: 11.4 % — SIGNIFICANT CHANGE UP (ref 2–14)
NEUTROPHILS # BLD AUTO: 3.2 K/UL — SIGNIFICANT CHANGE UP (ref 1.8–7.4)
NEUTROPHILS NFR BLD AUTO: 57.4 % — SIGNIFICANT CHANGE UP (ref 43–77)
PLATELET # BLD AUTO: 172 K/UL — SIGNIFICANT CHANGE UP (ref 150–400)
RBC # BLD: 3.36 M/UL — LOW (ref 4.2–5.8)
RBC # FLD: 14.2 % — SIGNIFICANT CHANGE UP (ref 10.3–14.5)
WBC # BLD: 5.6 K/UL — SIGNIFICANT CHANGE UP (ref 3.8–10.5)
WBC # FLD AUTO: 5.6 K/UL — SIGNIFICANT CHANGE UP (ref 3.8–10.5)

## 2018-01-30 PROCEDURE — 99214 OFFICE O/P EST MOD 30 MIN: CPT

## 2018-01-31 ENCOUNTER — OTHER (OUTPATIENT)
Age: 68
End: 2018-01-31

## 2018-01-31 ENCOUNTER — FORM ENCOUNTER (OUTPATIENT)
Age: 68
End: 2018-01-31

## 2018-02-01 ENCOUNTER — OTHER (OUTPATIENT)
Age: 68
End: 2018-02-01

## 2018-02-01 ENCOUNTER — APPOINTMENT (OUTPATIENT)
Dept: CT IMAGING | Facility: IMAGING CENTER | Age: 68
End: 2018-02-01
Payer: MEDICARE

## 2018-02-01 ENCOUNTER — OUTPATIENT (OUTPATIENT)
Dept: OUTPATIENT SERVICES | Facility: HOSPITAL | Age: 68
LOS: 1 days | End: 2018-02-01
Payer: MEDICARE

## 2018-02-01 DIAGNOSIS — Z98.890 OTHER SPECIFIED POSTPROCEDURAL STATES: Chronic | ICD-10-CM

## 2018-02-01 DIAGNOSIS — Z95.828 PRESENCE OF OTHER VASCULAR IMPLANTS AND GRAFTS: Chronic | ICD-10-CM

## 2018-02-01 DIAGNOSIS — C16.9 MALIGNANT NEOPLASM OF STOMACH, UNSPECIFIED: ICD-10-CM

## 2018-02-01 PROCEDURE — 71260 CT THORAX DX C+: CPT | Mod: 26

## 2018-02-01 PROCEDURE — 74177 CT ABD & PELVIS W/CONTRAST: CPT

## 2018-02-01 PROCEDURE — 74177 CT ABD & PELVIS W/CONTRAST: CPT | Mod: 26

## 2018-02-01 PROCEDURE — 71260 CT THORAX DX C+: CPT

## 2018-02-05 ENCOUNTER — APPOINTMENT (OUTPATIENT)
Dept: SURGICAL ONCOLOGY | Facility: CLINIC | Age: 68
End: 2018-02-05
Payer: MEDICARE

## 2018-02-05 ENCOUNTER — OTHER (OUTPATIENT)
Age: 68
End: 2018-02-05

## 2018-02-05 VITALS
SYSTOLIC BLOOD PRESSURE: 123 MMHG | DIASTOLIC BLOOD PRESSURE: 60 MMHG | BODY MASS INDEX: 23.66 KG/M2 | HEIGHT: 71 IN | HEART RATE: 77 BPM | WEIGHT: 169 LBS | OXYGEN SATURATION: 94 % | RESPIRATION RATE: 16 BRPM

## 2018-02-05 PROCEDURE — 99215 OFFICE O/P EST HI 40 MIN: CPT

## 2018-02-06 ENCOUNTER — OTHER (OUTPATIENT)
Age: 68
End: 2018-02-06

## 2018-03-13 ENCOUNTER — OUTPATIENT (OUTPATIENT)
Dept: OUTPATIENT SERVICES | Facility: HOSPITAL | Age: 68
LOS: 1 days | Discharge: HOME | End: 2018-03-13

## 2018-03-13 DIAGNOSIS — Z98.890 OTHER SPECIFIED POSTPROCEDURAL STATES: Chronic | ICD-10-CM

## 2018-03-13 DIAGNOSIS — C16.9 MALIGNANT NEOPLASM OF STOMACH, UNSPECIFIED: ICD-10-CM

## 2018-03-13 DIAGNOSIS — Z95.828 PRESENCE OF OTHER VASCULAR IMPLANTS AND GRAFTS: Chronic | ICD-10-CM

## 2018-03-15 ENCOUNTER — LABORATORY RESULT (OUTPATIENT)
Age: 68
End: 2018-03-15

## 2018-03-15 ENCOUNTER — OUTPATIENT (OUTPATIENT)
Dept: OUTPATIENT SERVICES | Facility: HOSPITAL | Age: 68
LOS: 1 days | End: 2018-03-15
Payer: MEDICARE

## 2018-03-15 ENCOUNTER — RESULT REVIEW (OUTPATIENT)
Age: 68
End: 2018-03-15

## 2018-03-15 ENCOUNTER — APPOINTMENT (OUTPATIENT)
Dept: INFUSION THERAPY | Facility: HOSPITAL | Age: 68
End: 2018-03-15

## 2018-03-15 VITALS
HEART RATE: 62 BPM | WEIGHT: 166.01 LBS | TEMPERATURE: 99 F | RESPIRATION RATE: 16 BRPM | SYSTOLIC BLOOD PRESSURE: 140 MMHG | DIASTOLIC BLOOD PRESSURE: 80 MMHG | HEIGHT: 71 IN | OXYGEN SATURATION: 98 %

## 2018-03-15 DIAGNOSIS — Z98.890 OTHER SPECIFIED POSTPROCEDURAL STATES: Chronic | ICD-10-CM

## 2018-03-15 DIAGNOSIS — I10 ESSENTIAL (PRIMARY) HYPERTENSION: ICD-10-CM

## 2018-03-15 DIAGNOSIS — K43.2 INCISIONAL HERNIA WITHOUT OBSTRUCTION OR GANGRENE: ICD-10-CM

## 2018-03-15 DIAGNOSIS — Z95.828 PRESENCE OF OTHER VASCULAR IMPLANTS AND GRAFTS: Chronic | ICD-10-CM

## 2018-03-15 DIAGNOSIS — E03.9 HYPOTHYROIDISM, UNSPECIFIED: ICD-10-CM

## 2018-03-15 DIAGNOSIS — E11.9 TYPE 2 DIABETES MELLITUS WITHOUT COMPLICATIONS: ICD-10-CM

## 2018-03-15 DIAGNOSIS — G47.33 OBSTRUCTIVE SLEEP APNEA (ADULT) (PEDIATRIC): ICD-10-CM

## 2018-03-15 LAB
ALBUMIN SERPL ELPH-MCNC: 3.9 G/DL — SIGNIFICANT CHANGE UP (ref 3.3–5)
ALP SERPL-CCNC: 171 U/L — HIGH (ref 40–120)
ALT FLD-CCNC: 28 U/L — SIGNIFICANT CHANGE UP (ref 4–41)
AST SERPL-CCNC: 40 U/L — SIGNIFICANT CHANGE UP (ref 4–40)
BASOPHILS # BLD AUTO: 0.1 K/UL — SIGNIFICANT CHANGE UP (ref 0–0.2)
BASOPHILS NFR BLD AUTO: 1.8 % — SIGNIFICANT CHANGE UP (ref 0–2)
BILIRUB SERPL-MCNC: 0.7 MG/DL — SIGNIFICANT CHANGE UP (ref 0.2–1.2)
BLD GP AB SCN SERPL QL: NEGATIVE — SIGNIFICANT CHANGE UP
BUN SERPL-MCNC: 12 MG/DL — SIGNIFICANT CHANGE UP (ref 7–23)
CALCIUM SERPL-MCNC: 8.9 MG/DL — SIGNIFICANT CHANGE UP (ref 8.4–10.5)
CHLORIDE SERPL-SCNC: 103 MMOL/L — SIGNIFICANT CHANGE UP (ref 98–107)
CO2 SERPL-SCNC: 25 MMOL/L — SIGNIFICANT CHANGE UP (ref 22–31)
CREAT SERPL-MCNC: 0.69 MG/DL — SIGNIFICANT CHANGE UP (ref 0.5–1.3)
EOSINOPHIL # BLD AUTO: 0.2 K/UL — SIGNIFICANT CHANGE UP (ref 0–0.5)
EOSINOPHIL NFR BLD AUTO: 3.6 % — SIGNIFICANT CHANGE UP (ref 0–6)
GLUCOSE SERPL-MCNC: 115 MG/DL — HIGH (ref 70–99)
HBA1C BLD-MCNC: 6.4 % — HIGH (ref 4–5.6)
HCT VFR BLD CALC: 36.1 % — LOW (ref 39–50)
HCT VFR BLD CALC: 39.2 % — SIGNIFICANT CHANGE UP (ref 39–50)
HGB BLD-MCNC: 12.5 G/DL — LOW (ref 13–17)
HGB BLD-MCNC: 12.9 G/DL — LOW (ref 13–17)
LYMPHOCYTES # BLD AUTO: 2.1 K/UL — SIGNIFICANT CHANGE UP (ref 1–3.3)
LYMPHOCYTES # BLD AUTO: 33.5 % — SIGNIFICANT CHANGE UP (ref 13–44)
MCHC RBC-ENTMCNC: 32.8 G/DL — SIGNIFICANT CHANGE UP (ref 32–36)
MCHC RBC-ENTMCNC: 34.6 % — SIGNIFICANT CHANGE UP (ref 32–36)
MCHC RBC-ENTMCNC: 35.6 PG — HIGH (ref 27–34)
MCHC RBC-ENTMCNC: 36 PG — HIGH (ref 27–34)
MCV RBC AUTO: 104 FL — HIGH (ref 80–100)
MCV RBC AUTO: 108 FL — HIGH (ref 80–100)
MONOCYTES # BLD AUTO: 0.6 K/UL — SIGNIFICANT CHANGE UP (ref 0–0.9)
MONOCYTES NFR BLD AUTO: 10.2 % — SIGNIFICANT CHANGE UP (ref 2–14)
NEUTROPHILS # BLD AUTO: 3.2 K/UL — SIGNIFICANT CHANGE UP (ref 1.8–7.4)
NEUTROPHILS NFR BLD AUTO: 50.8 % — SIGNIFICANT CHANGE UP (ref 43–77)
NRBC # FLD: 0 — SIGNIFICANT CHANGE UP
PLATELET # BLD AUTO: 231 K/UL — SIGNIFICANT CHANGE UP (ref 150–400)
PLATELET # BLD AUTO: 238 K/UL — SIGNIFICANT CHANGE UP (ref 150–400)
PMV BLD: 11.1 FL — SIGNIFICANT CHANGE UP (ref 7–13)
POTASSIUM SERPL-MCNC: 3.6 MMOL/L — SIGNIFICANT CHANGE UP (ref 3.5–5.3)
POTASSIUM SERPL-SCNC: 3.6 MMOL/L — SIGNIFICANT CHANGE UP (ref 3.5–5.3)
PROT SERPL-MCNC: 7.5 G/DL — SIGNIFICANT CHANGE UP (ref 6–8.3)
RBC # BLD: 3.47 M/UL — LOW (ref 4.2–5.8)
RBC # BLD: 3.61 M/UL — LOW (ref 4.2–5.8)
RBC # FLD: 11.9 % — SIGNIFICANT CHANGE UP (ref 10.3–14.5)
RBC # FLD: 13.4 % — SIGNIFICANT CHANGE UP (ref 10.3–14.5)
RH IG SCN BLD-IMP: NEGATIVE — SIGNIFICANT CHANGE UP
SODIUM SERPL-SCNC: 141 MMOL/L — SIGNIFICANT CHANGE UP (ref 135–145)
WBC # BLD: 5.33 K/UL — SIGNIFICANT CHANGE UP (ref 3.8–10.5)
WBC # BLD: 6.3 K/UL — SIGNIFICANT CHANGE UP (ref 3.8–10.5)
WBC # FLD AUTO: 5.33 K/UL — SIGNIFICANT CHANGE UP (ref 3.8–10.5)
WBC # FLD AUTO: 6.3 K/UL — SIGNIFICANT CHANGE UP (ref 3.8–10.5)

## 2018-03-15 PROCEDURE — 93010 ELECTROCARDIOGRAM REPORT: CPT

## 2018-03-15 NOTE — H&P PST ADULT - VISION (WITH CORRECTIVE LENSES IF THE PATIENT USUALLY WEARS THEM):
Partially impaired: cannot see medication labels or newsprint, but can see obstacles in path, and the surrounding layout; can count fingers at arm's length/Wears glasses "I need to have cataracts done"

## 2018-03-15 NOTE — H&P PST ADULT - NEGATIVE PSYCHIATRIC SYMPTOMS
Date:  12/15/2017    Medication:  amphetamine-dextroamphetamine (ADDERALL XR) 20 MG 24 hr capsule    Concerns:     Name of [facility]acy: [facility]     at desk   []      Name:       Bring valid ID     Allow 72 hours for       [] Patient completely out of medication          
Refill Requested:    Adderall 20 mg tablets  #60 with 0 refill   Take 1 capsule by mouth 2 times daily.   Last Refill: 11/15/17  Last Office Visit: 10/27/17              
Refill faxed.   
Normal rate, regular rhythm.  Heart sounds S1, S2.  No murmurs, rubs or gallops.
no anxiety/no depression

## 2018-03-15 NOTE — H&P PST ADULT - PMH
Essential hypertension  was on losartan, now diet control  Gastric mass  ulcerated mass in gastric cardia with small perigastric nodes on endoscopy.  Hypothyroidism    Iron deficiency anemia, unspecified iron deficiency anemia type    Type 2 diabetes mellitus without complication, unspecified long term insulin use status  no BS monitoring

## 2018-03-15 NOTE — H&P PST ADULT - PROBLEM SELECTOR PLAN 1
incisional hernia without obstruction or gangrene ;  Abdominal wall reconstruction with component separation muscle flaps and skin graft  Medical clearance as per Dr Cabello , to be faxed from Dr Obando .

## 2018-03-15 NOTE — H&P PST ADULT - MUSCULOSKELETAL
details… detailed exam no joint swelling/ROM intact/no joint erythema/no joint warmth/no calf tenderness/normal strength

## 2018-03-15 NOTE — H&P PST ADULT - PROBLEM SELECTOR PLAN 3
diet control , /80 in pst , EKG done with changes noted ,pt offers no complaints in pst . Call placed to Cardiology Dr Olivier , EKG faxed to Dr Olivier . Dr Olivier to evaluate 3/20/18 with stress test planned . Discussed with Dr Munoz, who agrees with plan for pt . diet control , /80 in pst , EKG done with changes noted ,pt offers no complaints in pst , vital signs stable . Call placed to Cardiology Dr Olivier , EKG faxed to Dr Olivier . Dr Olivier to evaluate pt tomorrow, 3/16/18 with stress test planned . Discussed with Dr Munoz, who agrees with plan for pt .Call placed to Dr Obando who evaluated pt today for medical clearance. Dr Obando agrees with plan for pt.

## 2018-03-15 NOTE — H&P PST ADULT - PROBLEM SELECTOR PLAN 4
bloods pending from pst including hgba1c. Pt is npo from 11 pm the night before surgery . STAT FS upon admit , monitor FS during hospital stay . Pt to hold metformin the day of surgery .

## 2018-03-15 NOTE — H&P PST ADULT - RS GEN PE MLT RESP DETAILS PC
no rhonchi/no wheezes/breath sounds equal/clear to auscultation bilaterally/respirations non-labored/no rales/good air movement

## 2018-03-15 NOTE — H&P PST ADULT - PSH
H/O umbilical hernia repair    History of gastric surgery  2017  Port-a-cath in place  right chest wall

## 2018-03-15 NOTE — H&P PST ADULT - HISTORY OF PRESENT ILLNESS
This is a 67 y.o. male s/p stomach resection for malignancy - 5/12/17 , follow up with chemo and radiation - all completed by 12/3/17 . Pt reports , MRI of abdomen and PET scan 1/2018. Pt evaluated Dr Cabello and Dr Arevalo . Pt has incisional hernia without obstruction or gangrene , now for surgery .

## 2018-03-16 PROBLEM — I10 ESSENTIAL (PRIMARY) HYPERTENSION: Chronic | Status: ACTIVE | Noted: 2017-01-11

## 2018-03-22 ENCOUNTER — OUTPATIENT (OUTPATIENT)
Dept: OUTPATIENT SERVICES | Facility: HOSPITAL | Age: 68
LOS: 1 days | End: 2018-03-22

## 2018-03-22 DIAGNOSIS — Z95.828 PRESENCE OF OTHER VASCULAR IMPLANTS AND GRAFTS: Chronic | ICD-10-CM

## 2018-03-22 DIAGNOSIS — Z98.890 OTHER SPECIFIED POSTPROCEDURAL STATES: Chronic | ICD-10-CM

## 2018-03-26 ENCOUNTER — FORM ENCOUNTER (OUTPATIENT)
Age: 68
End: 2018-03-26

## 2018-03-27 ENCOUNTER — APPOINTMENT (OUTPATIENT)
Dept: SURGICAL ONCOLOGY | Facility: HOSPITAL | Age: 68
End: 2018-03-27

## 2018-03-27 ENCOUNTER — OUTPATIENT (OUTPATIENT)
Dept: OUTPATIENT SERVICES | Facility: HOSPITAL | Age: 68
LOS: 1 days | End: 2018-03-27
Payer: MEDICARE

## 2018-03-27 ENCOUNTER — RESULT REVIEW (OUTPATIENT)
Age: 68
End: 2018-03-27

## 2018-03-27 ENCOUNTER — APPOINTMENT (OUTPATIENT)
Dept: ULTRASOUND IMAGING | Facility: IMAGING CENTER | Age: 68
End: 2018-03-27
Payer: MEDICARE

## 2018-03-27 DIAGNOSIS — Z00.8 ENCOUNTER FOR OTHER GENERAL EXAMINATION: ICD-10-CM

## 2018-03-27 DIAGNOSIS — Z98.890 OTHER SPECIFIED POSTPROCEDURAL STATES: Chronic | ICD-10-CM

## 2018-03-27 DIAGNOSIS — Z95.828 PRESENCE OF OTHER VASCULAR IMPLANTS AND GRAFTS: Chronic | ICD-10-CM

## 2018-03-27 PROCEDURE — 76942 ECHO GUIDE FOR BIOPSY: CPT

## 2018-03-27 PROCEDURE — 88172 CYTP DX EVAL FNA 1ST EA SITE: CPT

## 2018-03-27 PROCEDURE — 88341 IMHCHEM/IMCYTCHM EA ADD ANTB: CPT | Mod: 26

## 2018-03-27 PROCEDURE — 88173 CYTOPATH EVAL FNA REPORT: CPT

## 2018-03-27 PROCEDURE — 88342 IMHCHEM/IMCYTCHM 1ST ANTB: CPT

## 2018-03-27 PROCEDURE — 88341 IMHCHEM/IMCYTCHM EA ADD ANTB: CPT

## 2018-03-27 PROCEDURE — 88173 CYTOPATH EVAL FNA REPORT: CPT | Mod: 26

## 2018-03-27 PROCEDURE — 20206 BIOPSY MUSCLE PERQ NEEDLE: CPT

## 2018-03-27 PROCEDURE — 88342 IMHCHEM/IMCYTCHM 1ST ANTB: CPT | Mod: 26

## 2018-03-27 PROCEDURE — 88305 TISSUE EXAM BY PATHOLOGIST: CPT | Mod: 26

## 2018-03-27 PROCEDURE — 88305 TISSUE EXAM BY PATHOLOGIST: CPT

## 2018-03-27 PROCEDURE — 76942 ECHO GUIDE FOR BIOPSY: CPT | Mod: 26

## 2018-03-28 ENCOUNTER — OTHER (OUTPATIENT)
Age: 68
End: 2018-03-28

## 2018-03-29 LAB — NON-GYNECOLOGICAL CYTOLOGY STUDY: SIGNIFICANT CHANGE UP

## 2018-03-30 LAB — NON-GYNECOLOGICAL CYTOLOGY STUDY: SIGNIFICANT CHANGE UP

## 2018-04-01 ENCOUNTER — FORM ENCOUNTER (OUTPATIENT)
Age: 68
End: 2018-04-01

## 2018-04-02 ENCOUNTER — APPOINTMENT (OUTPATIENT)
Dept: NUCLEAR MEDICINE | Facility: IMAGING CENTER | Age: 68
End: 2018-04-02
Payer: MEDICARE

## 2018-04-02 ENCOUNTER — OUTPATIENT (OUTPATIENT)
Dept: OUTPATIENT SERVICES | Facility: HOSPITAL | Age: 68
LOS: 1 days | End: 2018-04-02
Payer: MEDICARE

## 2018-04-02 DIAGNOSIS — Z98.890 OTHER SPECIFIED POSTPROCEDURAL STATES: Chronic | ICD-10-CM

## 2018-04-02 DIAGNOSIS — Z95.828 PRESENCE OF OTHER VASCULAR IMPLANTS AND GRAFTS: Chronic | ICD-10-CM

## 2018-04-02 DIAGNOSIS — C16.9 MALIGNANT NEOPLASM OF STOMACH, UNSPECIFIED: ICD-10-CM

## 2018-04-02 PROCEDURE — 78815 PET IMAGE W/CT SKULL-THIGH: CPT | Mod: 26,PI

## 2018-04-02 PROCEDURE — A9552: CPT

## 2018-04-02 PROCEDURE — 78815 PET IMAGE W/CT SKULL-THIGH: CPT

## 2018-04-11 ENCOUNTER — APPOINTMENT (OUTPATIENT)
Dept: SURGICAL ONCOLOGY | Facility: CLINIC | Age: 68
End: 2018-04-11
Payer: MEDICARE

## 2018-04-11 VITALS
SYSTOLIC BLOOD PRESSURE: 131 MMHG | RESPIRATION RATE: 97 BRPM | HEIGHT: 71 IN | HEART RATE: 62 BPM | DIASTOLIC BLOOD PRESSURE: 68 MMHG

## 2018-04-11 PROCEDURE — 99215 OFFICE O/P EST HI 40 MIN: CPT

## 2018-04-12 ENCOUNTER — RESULT REVIEW (OUTPATIENT)
Age: 68
End: 2018-04-12

## 2018-04-13 ENCOUNTER — OUTPATIENT (OUTPATIENT)
Dept: OUTPATIENT SERVICES | Facility: HOSPITAL | Age: 68
LOS: 1 days | End: 2018-04-13
Payer: MEDICARE

## 2018-04-13 VITALS
OXYGEN SATURATION: 97 % | RESPIRATION RATE: 20 BRPM | HEART RATE: 69 BPM | TEMPERATURE: 99 F | HEIGHT: 70 IN | SYSTOLIC BLOOD PRESSURE: 137 MMHG | WEIGHT: 164.91 LBS | DIASTOLIC BLOOD PRESSURE: 90 MMHG

## 2018-04-13 DIAGNOSIS — Z98.890 OTHER SPECIFIED POSTPROCEDURAL STATES: Chronic | ICD-10-CM

## 2018-04-13 DIAGNOSIS — Z01.818 ENCOUNTER FOR OTHER PREPROCEDURAL EXAMINATION: ICD-10-CM

## 2018-04-13 DIAGNOSIS — R22.2 LOCALIZED SWELLING, MASS AND LUMP, TRUNK: ICD-10-CM

## 2018-04-13 DIAGNOSIS — C16.9 MALIGNANT NEOPLASM OF STOMACH, UNSPECIFIED: ICD-10-CM

## 2018-04-13 DIAGNOSIS — Z95.828 PRESENCE OF OTHER VASCULAR IMPLANTS AND GRAFTS: Chronic | ICD-10-CM

## 2018-04-13 PROCEDURE — G0463: CPT

## 2018-04-13 RX ORDER — SODIUM CHLORIDE 9 MG/ML
3 INJECTION INTRAMUSCULAR; INTRAVENOUS; SUBCUTANEOUS EVERY 8 HOURS
Qty: 0 | Refills: 0 | Status: DISCONTINUED | OUTPATIENT
Start: 2018-04-17 | End: 2018-04-17

## 2018-04-13 RX ORDER — CEFAZOLIN SODIUM 1 G
2000 VIAL (EA) INJECTION ONCE
Qty: 0 | Refills: 0 | Status: DISCONTINUED | OUTPATIENT
Start: 2018-04-17 | End: 2018-04-18

## 2018-04-13 NOTE — H&P PST ADULT - ASSESSMENT
This is a 67 y.o. male PMHx of Hypothyroidism, acid reflux, T2DM, hgbA1c 6.4,  proximal Gastric CA, s/p stomach resection for malignancy - 5/12/17 , follow up with chemo and radiation - all completed by 12/3/17 . Pt reports , MRI of abdomen and PET scan 1/2018. pt underwent a USGB & FNA of the left supraclavicular Lymph node in 3/2018 which is positive for metastatic gastric CA,  Pt evaluated Dr Cabello and recommends for Resection of left Supraclavicular Lymph node, now for surgery 4/17/18

## 2018-04-13 NOTE — H&P PST ADULT - HISTORY OF PRESENT ILLNESS
This is a 67 y.o. male s/p stomach resection for malignancy - 5/12/17 , follow up with chemo and radiation - all completed by 12/3/17 . Pt reports , MRI of abdomen and PET scan 1/2018. Pt evaluated Dr Cabello and recommends for Resection of left Supraclavicular Lymph node, now for surgery 4/17/18 This is a 67 y.o. male PMHx of Hypothyroidism, acid reflux, T2DM, hgbA1c 6.4,  proximal Gastric CA, s/p stomach resection for malignancy - 5/12/17 , follow up with chemo and radiation - all completed by 12/3/17 . Pt reports , MRI of abdomen and PET scan 1/2018. pt underwent a USGB & FNA of the left supraclavicular Lymph node in 3/2018 which is positive for metastatic gastric CA,  Pt evaluated Dr Cabello and recommends for Resection of left Supraclavicular Lymph node, now for surgery 4/17/18

## 2018-04-16 ENCOUNTER — TRANSCRIPTION ENCOUNTER (OUTPATIENT)
Age: 68
End: 2018-04-16

## 2018-04-17 ENCOUNTER — RESULT REVIEW (OUTPATIENT)
Age: 68
End: 2018-04-17

## 2018-04-17 ENCOUNTER — APPOINTMENT (OUTPATIENT)
Dept: SURGICAL ONCOLOGY | Facility: HOSPITAL | Age: 68
End: 2018-04-17

## 2018-04-17 ENCOUNTER — OUTPATIENT (OUTPATIENT)
Dept: OUTPATIENT SERVICES | Facility: HOSPITAL | Age: 68
LOS: 1 days | End: 2018-04-17
Payer: MEDICARE

## 2018-04-17 VITALS
SYSTOLIC BLOOD PRESSURE: 150 MMHG | TEMPERATURE: 99 F | HEART RATE: 62 BPM | DIASTOLIC BLOOD PRESSURE: 75 MMHG | RESPIRATION RATE: 18 BRPM | OXYGEN SATURATION: 99 % | HEIGHT: 70 IN | WEIGHT: 164.91 LBS

## 2018-04-17 DIAGNOSIS — Z95.828 PRESENCE OF OTHER VASCULAR IMPLANTS AND GRAFTS: Chronic | ICD-10-CM

## 2018-04-17 DIAGNOSIS — Z98.890 OTHER SPECIFIED POSTPROCEDURAL STATES: Chronic | ICD-10-CM

## 2018-04-17 DIAGNOSIS — C16.9 MALIGNANT NEOPLASM OF STOMACH, UNSPECIFIED: ICD-10-CM

## 2018-04-17 DIAGNOSIS — Z01.818 ENCOUNTER FOR OTHER PREPROCEDURAL EXAMINATION: ICD-10-CM

## 2018-04-17 LAB — GLUCOSE BLDC GLUCOMTR-MCNC: 102 MG/DL — HIGH (ref 70–99)

## 2018-04-17 PROCEDURE — 71045 X-RAY EXAM CHEST 1 VIEW: CPT | Mod: 26

## 2018-04-17 PROCEDURE — 88305 TISSUE EXAM BY PATHOLOGIST: CPT | Mod: 26

## 2018-04-17 RX ORDER — SODIUM CHLORIDE 9 MG/ML
1000 INJECTION, SOLUTION INTRAVENOUS
Qty: 0 | Refills: 0 | Status: DISCONTINUED | OUTPATIENT
Start: 2018-04-17 | End: 2018-04-18

## 2018-04-17 RX ORDER — HEPARIN SODIUM 5000 [USP'U]/ML
5000 INJECTION INTRAVENOUS; SUBCUTANEOUS EVERY 8 HOURS
Qty: 0 | Refills: 0 | Status: DISCONTINUED | OUTPATIENT
Start: 2018-04-17 | End: 2018-04-18

## 2018-04-17 RX ORDER — DEXTROSE 50 % IN WATER 50 %
1 SYRINGE (ML) INTRAVENOUS ONCE
Qty: 0 | Refills: 0 | Status: DISCONTINUED | OUTPATIENT
Start: 2018-04-17 | End: 2018-04-18

## 2018-04-17 RX ORDER — ACETAMINOPHEN 500 MG
650 TABLET ORAL EVERY 6 HOURS
Qty: 0 | Refills: 0 | Status: DISCONTINUED | OUTPATIENT
Start: 2018-04-17 | End: 2018-04-18

## 2018-04-17 RX ORDER — LANOLIN ALCOHOL/MO/W.PET/CERES
6 CREAM (GRAM) TOPICAL AT BEDTIME
Qty: 0 | Refills: 0 | Status: DISCONTINUED | OUTPATIENT
Start: 2018-04-17 | End: 2018-04-18

## 2018-04-17 RX ORDER — DEXTROSE 50 % IN WATER 50 %
12.5 SYRINGE (ML) INTRAVENOUS ONCE
Qty: 0 | Refills: 0 | Status: DISCONTINUED | OUTPATIENT
Start: 2018-04-17 | End: 2018-04-18

## 2018-04-17 RX ORDER — ONDANSETRON 8 MG/1
4 TABLET, FILM COATED ORAL ONCE
Qty: 0 | Refills: 0 | Status: DISCONTINUED | OUTPATIENT
Start: 2018-04-17 | End: 2018-04-17

## 2018-04-17 RX ORDER — INSULIN LISPRO 100/ML
VIAL (ML) SUBCUTANEOUS
Qty: 0 | Refills: 0 | Status: DISCONTINUED | OUTPATIENT
Start: 2018-04-17 | End: 2018-04-18

## 2018-04-17 RX ORDER — LANOLIN ALCOHOL/MO/W.PET/CERES
6 CREAM (GRAM) TOPICAL AT BEDTIME
Qty: 0 | Refills: 0 | Status: DISCONTINUED | OUTPATIENT
Start: 2018-04-17 | End: 2018-04-17

## 2018-04-17 RX ORDER — GLUCAGON INJECTION, SOLUTION 0.5 MG/.1ML
1 INJECTION, SOLUTION SUBCUTANEOUS ONCE
Qty: 0 | Refills: 0 | Status: DISCONTINUED | OUTPATIENT
Start: 2018-04-17 | End: 2018-04-18

## 2018-04-17 RX ORDER — DEXTROSE 50 % IN WATER 50 %
25 SYRINGE (ML) INTRAVENOUS ONCE
Qty: 0 | Refills: 0 | Status: DISCONTINUED | OUTPATIENT
Start: 2018-04-17 | End: 2018-04-18

## 2018-04-17 RX ORDER — TAMSULOSIN HYDROCHLORIDE 0.4 MG/1
0.4 CAPSULE ORAL AT BEDTIME
Qty: 0 | Refills: 0 | Status: DISCONTINUED | OUTPATIENT
Start: 2018-04-17 | End: 2018-04-18

## 2018-04-17 RX ORDER — SODIUM CHLORIDE 9 MG/ML
1000 INJECTION, SOLUTION INTRAVENOUS
Qty: 0 | Refills: 0 | Status: DISCONTINUED | OUTPATIENT
Start: 2018-04-17 | End: 2018-04-17

## 2018-04-17 RX ORDER — FENTANYL CITRATE 50 UG/ML
25 INJECTION INTRAVENOUS
Qty: 0 | Refills: 0 | Status: DISCONTINUED | OUTPATIENT
Start: 2018-04-17 | End: 2018-04-17

## 2018-04-17 RX ADMIN — TAMSULOSIN HYDROCHLORIDE 0.4 MILLIGRAM(S): 0.4 CAPSULE ORAL at 22:11

## 2018-04-17 RX ADMIN — Medication 650 MILLIGRAM(S): at 21:49

## 2018-04-17 RX ADMIN — SODIUM CHLORIDE 50 MILLILITER(S): 9 INJECTION, SOLUTION INTRAVENOUS at 18:36

## 2018-04-17 RX ADMIN — Medication 650 MILLIGRAM(S): at 22:25

## 2018-04-17 NOTE — ASU DISCHARGE PLAN (ADULT/PEDIATRIC). - SPECIAL INSTRUCTIONS
Please follow-up with Dr. Cabello in 7-10 days. Please call for an appointment. May resume home medications.

## 2018-04-17 NOTE — BRIEF OPERATIVE NOTE - OPERATION/FINDINGS
Left supraclavicular lymph node excision performed without complication. Given the proximity of the dissection to the lung apex a chest X-ray was performed at the end of the case and no obvious large pneumothorax was seen by both the surgical and anesthesia teams in the case. Official read of the CXR is pending. Please refer to the dictated operative report for full details of the case.

## 2018-04-17 NOTE — ASU DISCHARGE PLAN (ADULT/PEDIATRIC). - MEDICATION SUMMARY - MEDICATIONS TO TAKE
I will START or STAY ON the medications listed below when I get home from the hospital:    Percocet 5/325 oral tablet  -- 1 tab(s) by mouth every 6 hours, As Needed -for severe pain MDD:4 tabs daily   -- Caution federal law prohibits the transfer of this drug to any person other  than the person for whom it was prescribed.  May cause drowsiness.  Alcohol may intensify this effect.  Use care when operating dangerous machinery.  This prescription cannot be refilled.  This product contains acetaminophen.  Do not use  with any other product containing acetaminophen to prevent possible liver damage.  Using more of this medication than prescribed may cause serious breathing problems.    -- Indication: For Pain    tamsulosin 0.4 mg oral capsule  -- 1 cap(s) by mouth once a day (at bedtime) MDD:1  -- Indication: For BPH    metFORMIN 500 mg oral tablet, extended release  -- 1 tab(s) by mouth once a day  -- Indication: For DM    pravastatin 20 mg oral tablet  -- 1 tab(s) by mouth once a day  -- Indication: For HLD    ferrous sulfate 324 mg (65 mg elemental iron) oral delayed release tablet  --  by mouth 2 times a day  -- Indication: For supplement    omeprazole 40 mg oral delayed release capsule  -- 1 cap(s) by mouth once a day  -- Indication: For GERD    levothyroxine 100 mcg (0.1 mg) oral tablet  -- 1 tab(s) by mouth once a day  -- Indication: For hypothyroid

## 2018-04-18 ENCOUNTER — TRANSCRIPTION ENCOUNTER (OUTPATIENT)
Age: 68
End: 2018-04-18

## 2018-04-18 VITALS
HEART RATE: 67 BPM | DIASTOLIC BLOOD PRESSURE: 66 MMHG | SYSTOLIC BLOOD PRESSURE: 121 MMHG | OXYGEN SATURATION: 97 % | TEMPERATURE: 99 F | RESPIRATION RATE: 18 BRPM

## 2018-04-18 LAB
GLUCOSE BLDC GLUCOMTR-MCNC: 138 MG/DL — HIGH (ref 70–99)
HBA1C BLD-MCNC: 6.5 % — HIGH (ref 4–5.6)
HCT VFR BLD CALC: 33.8 % — LOW (ref 39–50)
HGB BLD-MCNC: 11.3 G/DL — LOW (ref 13–17)
MCHC RBC-ENTMCNC: 33.6 GM/DL — SIGNIFICANT CHANGE UP (ref 32–36)
MCHC RBC-ENTMCNC: 36.1 PG — HIGH (ref 27–34)
MCV RBC AUTO: 107 FL — HIGH (ref 80–100)
PLATELET # BLD AUTO: 220 K/UL — SIGNIFICANT CHANGE UP (ref 150–400)
RBC # BLD: 3.14 M/UL — LOW (ref 4.2–5.8)
RBC # FLD: 12.1 % — SIGNIFICANT CHANGE UP (ref 10.3–14.5)
WBC # BLD: 5.6 K/UL — SIGNIFICANT CHANGE UP (ref 3.8–10.5)
WBC # FLD AUTO: 5.6 K/UL — SIGNIFICANT CHANGE UP (ref 3.8–10.5)

## 2018-04-18 PROCEDURE — 21556 EXC NECK TUM DEEP < 5 CM: CPT

## 2018-04-18 PROCEDURE — 83036 HEMOGLOBIN GLYCOSYLATED A1C: CPT

## 2018-04-18 PROCEDURE — 88305 TISSUE EXAM BY PATHOLOGIST: CPT

## 2018-04-18 PROCEDURE — 82962 GLUCOSE BLOOD TEST: CPT

## 2018-04-18 PROCEDURE — 71045 X-RAY EXAM CHEST 1 VIEW: CPT

## 2018-04-18 PROCEDURE — 85027 COMPLETE CBC AUTOMATED: CPT

## 2018-04-18 RX ORDER — TAMSULOSIN HYDROCHLORIDE 0.4 MG/1
1 CAPSULE ORAL
Qty: 0 | Refills: 0 | COMMUNITY
Start: 2018-04-18

## 2018-04-18 RX ORDER — ACETAMINOPHEN 500 MG
2 TABLET ORAL
Qty: 0 | Refills: 0 | DISCHARGE
Start: 2018-04-18

## 2018-04-18 RX ADMIN — HEPARIN SODIUM 5000 UNIT(S): 5000 INJECTION INTRAVENOUS; SUBCUTANEOUS at 05:28

## 2018-04-18 NOTE — DISCHARGE NOTE ADULT - MEDICATION SUMMARY - MEDICATIONS TO TAKE
I will START or STAY ON the medications listed below when I get home from the hospital:    Percocet 5/325 oral tablet  -- 1 tab(s) by mouth every 6 hours, As Needed -for severe pain MDD:4 tabs daily   -- Caution federal law prohibits the transfer of this drug to any person other  than the person for whom it was prescribed.  May cause drowsiness.  Alcohol may intensify this effect.  Use care when operating dangerous machinery.  This prescription cannot be refilled.  This product contains acetaminophen.  Do not use  with any other product containing acetaminophen to prevent possible liver damage.  Using more of this medication than prescribed may cause serious breathing problems.    -- Indication: For Mild-moderate pain    acetaminophen 325 mg oral tablet  -- 2 tab(s) by mouth every 6 hours, As needed, mild to moderate pain  -- Indication: For Mild pain    tamsulosin 0.4 mg oral capsule  -- 1 cap(s) by mouth once a day (at bedtime)  -- Indication: For BPH    metFORMIN 500 mg oral tablet, extended release  -- 1 tab(s) by mouth once a day  -- Indication: For Diabetes    pravastatin 20 mg oral tablet  -- 1 tab(s) by mouth once a day  -- Indication: For HLD    ferrous sulfate 324 mg (65 mg elemental iron) oral delayed release tablet  --  by mouth 2 times a day  -- Indication: For Iron deficiency anemia    omeprazole 40 mg oral delayed release capsule  -- 1 cap(s) by mouth once a day  -- Indication: For GERD    levothyroxine 100 mcg (0.1 mg) oral tablet  -- 1 tab(s) by mouth once a day  -- Indication: For Hypothyroid

## 2018-04-18 NOTE — DISCHARGE NOTE ADULT - CARE PLAN
Principal Discharge DX:	Gastric mass  Goal:	s/p Left supraclavicular lymph node resection  Assessment and plan of treatment:	Follow up with Dr. Cabello in the office in 1-2 weeks. Call the office to make an appointment.     Resume diet and all activities, no restrictions.  Secondary Diagnosis:	Essential hypertension  Goal:	Continue your home medications, follow up with your Primary Care Physician  Secondary Diagnosis:	Hypothyroidism  Goal:	Continue your home medications, follow up with your Primary Care Physician  Secondary Diagnosis:	Type 2 diabetes mellitus without complication, unspecified long term insulin use status  Goal:	Continue your home medications, follow up with your Primary Care Physician

## 2018-04-18 NOTE — PROGRESS NOTE ADULT - ASSESSMENT
67M s/p Left supraclavicular lymph node resection on 4/17.    Plan:  - Consistent Carb Regular diet  - F/u A1C  - Pain controlled  - Encourage OOB  - Discharge home today with outpatient f/u

## 2018-04-18 NOTE — DISCHARGE NOTE ADULT - PLAN OF CARE
s/p Left supraclavicular lymph node resection Follow up with Dr. Cabello in the office in 1-2 weeks. Call the office to make an appointment.     Resume diet and all activities, no restrictions. Continue your home medications, follow up with your Primary Care Physician

## 2018-04-18 NOTE — DISCHARGE NOTE ADULT - PATIENT PORTAL LINK FT
You can access the [facility] Patient Portal, offered by [facility], by registering with the following website: [email or web address]

## 2018-04-18 NOTE — DISCHARGE NOTE ADULT - CARE PROVIDER_API CALL
Saravanan Cabello), ColonRectal Surgery; Surgery  [address] [place] [address]  Phone: [phone]  Fax: [phone]

## 2018-04-18 NOTE — DISCHARGE NOTE ADULT - SECONDARY DIAGNOSIS.
Essential hypertension Hypothyroidism Type 2 diabetes mellitus without complication, unspecified long term insulin use status

## 2018-04-18 NOTE — PROGRESS NOTE ADULT - SUBJECTIVE AND OBJECTIVE BOX
BLUE TEAM GENERAL SURGERY DAILY PROGRESS NOTE:       Subjective:  Patient seen this AM status post Left supraclavicular lymph node excision for metastatic gastric carcinoma. No acute overnight events. Patient feels well this morning, does not complain of any pain. Tolerating diet. Requesting some lab work be done prior to discharge.       Objective:    PE:  Gen: NAD  Chest: Biopsy site clean, dry  Resp: airway patent, respirations unlabored, no increased WoB  Abd: soft, ND, NT, no rebound or guarding  Ext: no edema, WWP  Neuro: AAOx3, no focal deficits      Vital Signs Last 24 Hrs  T(C): 36.5 (18 Apr 2018 05:25), Max: 37 (17 Apr 2018 12:53)  T(F): 97.7 (18 Apr 2018 05:25), Max: 98.6 (17 Apr 2018 12:53)  HR: 57 (18 Apr 2018 05:25) (52 - 73)  BP: 124/72 (18 Apr 2018 05:25) (104/55 - 159/89)  BP(mean): 107 (17 Apr 2018 19:30) (92 - 117)  RR: 18 (18 Apr 2018 05:25) (16 - 18)  SpO2: 97% (18 Apr 2018 05:25) (95% - 99%)    I&O's Detail    17 Apr 2018 07:01  -  18 Apr 2018 07:00  --------------------------------------------------------  IN:    lactated ringers.: 125 mL  Total IN: 125 mL    OUT:    Voided: 400 mL  Total OUT: 400 mL    Total NET: -275 mL          Daily Height in cm: 177.8 (17 Apr 2018 12:53)    Daily     MEDICATIONS  (STANDING):  ceFAZolin   IVPB 2000 milliGRAM(s) IV Intermittent once  dextrose 5%. 1000 milliLiter(s) (50 mL/Hr) IV Continuous <Continuous>  dextrose 50% Injectable 12.5 Gram(s) IV Push once  dextrose 50% Injectable 25 Gram(s) IV Push once  dextrose 50% Injectable 25 Gram(s) IV Push once  heparin  Injectable 5000 Unit(s) SubCutaneous every 8 hours  insulin lispro (HumaLOG) corrective regimen sliding scale   SubCutaneous three times a day before meals  tamsulosin 0.4 milliGRAM(s) Oral at bedtime    MEDICATIONS  (PRN):  acetaminophen   Tablet. 650 milliGRAM(s) Oral every 6 hours PRN mild to moderate pain  dextrose Gel 1 Dose(s) Oral once PRN Blood Glucose LESS THAN 70 milliGRAM(s)/deciliter  glucagon  Injectable 1 milliGRAM(s) IntraMuscular once PRN Glucose LESS THAN 70 milligrams/deciliter  melatonin 6 milliGRAM(s) Oral at bedtime PRN Sleep      LABS:                        11.3   5.6   )-----------( 220      ( 18 Apr 2018 07:19 )             33.8

## 2018-04-20 LAB — SURGICAL PATHOLOGY STUDY: SIGNIFICANT CHANGE UP

## 2018-04-24 ENCOUNTER — OUTPATIENT (OUTPATIENT)
Dept: OUTPATIENT SERVICES | Facility: HOSPITAL | Age: 68
LOS: 1 days | Discharge: HOME | End: 2018-04-24

## 2018-04-24 DIAGNOSIS — Z98.890 OTHER SPECIFIED POSTPROCEDURAL STATES: Chronic | ICD-10-CM

## 2018-04-24 DIAGNOSIS — Z95.828 PRESENCE OF OTHER VASCULAR IMPLANTS AND GRAFTS: Chronic | ICD-10-CM

## 2018-04-24 DIAGNOSIS — C16.9 MALIGNANT NEOPLASM OF STOMACH, UNSPECIFIED: ICD-10-CM

## 2018-04-25 ENCOUNTER — OTHER (OUTPATIENT)
Age: 68
End: 2018-04-25

## 2018-04-26 ENCOUNTER — APPOINTMENT (OUTPATIENT)
Dept: HEMATOLOGY ONCOLOGY | Facility: CLINIC | Age: 68
End: 2018-04-26
Payer: MEDICARE

## 2018-04-26 VITALS
SYSTOLIC BLOOD PRESSURE: 130 MMHG | TEMPERATURE: 98 F | HEART RATE: 60 BPM | WEIGHT: 164.24 LBS | BODY MASS INDEX: 22.91 KG/M2 | RESPIRATION RATE: 16 BRPM | OXYGEN SATURATION: 96 % | DIASTOLIC BLOOD PRESSURE: 70 MMHG

## 2018-04-26 PROCEDURE — 99215 OFFICE O/P EST HI 40 MIN: CPT

## 2018-04-30 ENCOUNTER — APPOINTMENT (OUTPATIENT)
Dept: SURGICAL ONCOLOGY | Facility: CLINIC | Age: 68
End: 2018-04-30
Payer: MEDICARE

## 2018-04-30 VITALS
BODY MASS INDEX: 23.1 KG/M2 | SYSTOLIC BLOOD PRESSURE: 136 MMHG | DIASTOLIC BLOOD PRESSURE: 84 MMHG | RESPIRATION RATE: 18 BRPM | HEIGHT: 71 IN | OXYGEN SATURATION: 96 % | HEART RATE: 58 BPM | WEIGHT: 165 LBS

## 2018-04-30 PROCEDURE — 99024 POSTOP FOLLOW-UP VISIT: CPT

## 2018-05-01 ENCOUNTER — TRANSCRIPTION ENCOUNTER (OUTPATIENT)
Age: 68
End: 2018-05-01

## 2018-05-07 ENCOUNTER — OTHER (OUTPATIENT)
Age: 68
End: 2018-05-07

## 2018-05-22 ENCOUNTER — OTHER (OUTPATIENT)
Age: 68
End: 2018-05-22

## 2018-05-24 ENCOUNTER — OUTPATIENT (OUTPATIENT)
Dept: OUTPATIENT SERVICES | Facility: HOSPITAL | Age: 68
LOS: 1 days | Discharge: HOME | End: 2018-05-24

## 2018-05-24 DIAGNOSIS — Z98.890 OTHER SPECIFIED POSTPROCEDURAL STATES: Chronic | ICD-10-CM

## 2018-05-24 DIAGNOSIS — Z95.828 PRESENCE OF OTHER VASCULAR IMPLANTS AND GRAFTS: Chronic | ICD-10-CM

## 2018-05-24 DIAGNOSIS — C16.9 MALIGNANT NEOPLASM OF STOMACH, UNSPECIFIED: ICD-10-CM

## 2018-05-30 ENCOUNTER — RESULT REVIEW (OUTPATIENT)
Age: 68
End: 2018-05-30

## 2018-05-30 ENCOUNTER — LABORATORY RESULT (OUTPATIENT)
Age: 68
End: 2018-05-30

## 2018-05-30 ENCOUNTER — APPOINTMENT (OUTPATIENT)
Dept: INFUSION THERAPY | Facility: HOSPITAL | Age: 68
End: 2018-05-30

## 2018-05-30 LAB
BASOPHILS # BLD AUTO: 0.1 K/UL — SIGNIFICANT CHANGE UP (ref 0–0.2)
BASOPHILS NFR BLD AUTO: 1.1 % — SIGNIFICANT CHANGE UP (ref 0–2)
EOSINOPHIL # BLD AUTO: 0.1 K/UL — SIGNIFICANT CHANGE UP (ref 0–0.5)
EOSINOPHIL NFR BLD AUTO: 2.8 % — SIGNIFICANT CHANGE UP (ref 0–6)
HCT VFR BLD CALC: 37.9 % — LOW (ref 39–50)
HGB BLD-MCNC: 12.4 G/DL — LOW (ref 13–17)
LYMPHOCYTES # BLD AUTO: 1.4 K/UL — SIGNIFICANT CHANGE UP (ref 1–3.3)
LYMPHOCYTES # BLD AUTO: 28.4 % — SIGNIFICANT CHANGE UP (ref 13–44)
MCHC RBC-ENTMCNC: 32.7 G/DL — SIGNIFICANT CHANGE UP (ref 32–36)
MCHC RBC-ENTMCNC: 34.2 PG — HIGH (ref 27–34)
MCV RBC AUTO: 105 FL — HIGH (ref 80–100)
MONOCYTES # BLD AUTO: 0.4 K/UL — SIGNIFICANT CHANGE UP (ref 0–0.9)
MONOCYTES NFR BLD AUTO: 8.2 % — SIGNIFICANT CHANGE UP (ref 2–14)
NEUTROPHILS # BLD AUTO: 2.9 K/UL — SIGNIFICANT CHANGE UP (ref 1.8–7.4)
NEUTROPHILS NFR BLD AUTO: 59.6 % — SIGNIFICANT CHANGE UP (ref 43–77)
PLATELET # BLD AUTO: 205 K/UL — SIGNIFICANT CHANGE UP (ref 150–400)
RBC # BLD: 3.62 M/UL — LOW (ref 4.2–5.8)
RBC # FLD: 12.5 % — SIGNIFICANT CHANGE UP (ref 10.3–14.5)
WBC # BLD: 4.9 K/UL — SIGNIFICANT CHANGE UP (ref 3.8–10.5)
WBC # FLD AUTO: 4.9 K/UL — SIGNIFICANT CHANGE UP (ref 3.8–10.5)

## 2018-06-12 ENCOUNTER — OUTPATIENT (OUTPATIENT)
Dept: OUTPATIENT SERVICES | Facility: HOSPITAL | Age: 68
LOS: 1 days | End: 2018-06-12

## 2018-06-12 VITALS
SYSTOLIC BLOOD PRESSURE: 130 MMHG | TEMPERATURE: 99 F | HEART RATE: 62 BPM | WEIGHT: 154.98 LBS | RESPIRATION RATE: 16 BRPM | HEIGHT: 71 IN | OXYGEN SATURATION: 99 % | DIASTOLIC BLOOD PRESSURE: 76 MMHG

## 2018-06-12 DIAGNOSIS — Z98.890 OTHER SPECIFIED POSTPROCEDURAL STATES: Chronic | ICD-10-CM

## 2018-06-12 DIAGNOSIS — G47.33 OBSTRUCTIVE SLEEP APNEA (ADULT) (PEDIATRIC): ICD-10-CM

## 2018-06-12 DIAGNOSIS — E03.9 HYPOTHYROIDISM, UNSPECIFIED: ICD-10-CM

## 2018-06-12 DIAGNOSIS — Z95.828 PRESENCE OF OTHER VASCULAR IMPLANTS AND GRAFTS: Chronic | ICD-10-CM

## 2018-06-12 DIAGNOSIS — K43.2 INCISIONAL HERNIA WITHOUT OBSTRUCTION OR GANGRENE: ICD-10-CM

## 2018-06-12 DIAGNOSIS — I10 ESSENTIAL (PRIMARY) HYPERTENSION: ICD-10-CM

## 2018-06-12 DIAGNOSIS — D50.9 IRON DEFICIENCY ANEMIA, UNSPECIFIED: ICD-10-CM

## 2018-06-12 DIAGNOSIS — E11.9 TYPE 2 DIABETES MELLITUS WITHOUT COMPLICATIONS: ICD-10-CM

## 2018-06-12 LAB
ALBUMIN SERPL ELPH-MCNC: 4.3 G/DL — SIGNIFICANT CHANGE UP (ref 3.3–5)
ALP SERPL-CCNC: 147 U/L — HIGH (ref 40–120)
ALT FLD-CCNC: 28 U/L — SIGNIFICANT CHANGE UP (ref 4–41)
AST SERPL-CCNC: 40 U/L — SIGNIFICANT CHANGE UP (ref 4–40)
BILIRUB SERPL-MCNC: 0.6 MG/DL — SIGNIFICANT CHANGE UP (ref 0.2–1.2)
BLD GP AB SCN SERPL QL: NEGATIVE — SIGNIFICANT CHANGE UP
BUN SERPL-MCNC: 11 MG/DL — SIGNIFICANT CHANGE UP (ref 7–23)
CALCIUM SERPL-MCNC: 9.6 MG/DL — SIGNIFICANT CHANGE UP (ref 8.4–10.5)
CHLORIDE SERPL-SCNC: 100 MMOL/L — SIGNIFICANT CHANGE UP (ref 98–107)
CO2 SERPL-SCNC: 28 MMOL/L — SIGNIFICANT CHANGE UP (ref 22–31)
CREAT SERPL-MCNC: 0.69 MG/DL — SIGNIFICANT CHANGE UP (ref 0.5–1.3)
GLUCOSE SERPL-MCNC: 129 MG/DL — HIGH (ref 70–99)
HCT VFR BLD CALC: 37.1 % — LOW (ref 39–50)
HGB BLD-MCNC: 12.4 G/DL — LOW (ref 13–17)
MCHC RBC-ENTMCNC: 33.4 % — SIGNIFICANT CHANGE UP (ref 32–36)
MCHC RBC-ENTMCNC: 33.6 PG — SIGNIFICANT CHANGE UP (ref 27–34)
MCV RBC AUTO: 100.5 FL — HIGH (ref 80–100)
NRBC # FLD: 0 — SIGNIFICANT CHANGE UP
PLATELET # BLD AUTO: 228 K/UL — SIGNIFICANT CHANGE UP (ref 150–400)
PMV BLD: 10.6 FL — SIGNIFICANT CHANGE UP (ref 7–13)
POTASSIUM SERPL-MCNC: 4.2 MMOL/L — SIGNIFICANT CHANGE UP (ref 3.5–5.3)
POTASSIUM SERPL-SCNC: 4.2 MMOL/L — SIGNIFICANT CHANGE UP (ref 3.5–5.3)
PROT SERPL-MCNC: 7.4 G/DL — SIGNIFICANT CHANGE UP (ref 6–8.3)
RBC # BLD: 3.69 M/UL — LOW (ref 4.2–5.8)
RBC # FLD: 15.1 % — HIGH (ref 10.3–14.5)
RH IG SCN BLD-IMP: NEGATIVE — SIGNIFICANT CHANGE UP
SODIUM SERPL-SCNC: 139 MMOL/L — SIGNIFICANT CHANGE UP (ref 135–145)
WBC # BLD: 5.11 K/UL — SIGNIFICANT CHANGE UP (ref 3.8–10.5)
WBC # FLD AUTO: 5.11 K/UL — SIGNIFICANT CHANGE UP (ref 3.8–10.5)

## 2018-06-12 RX ORDER — OMEPRAZOLE 10 MG/1
1 CAPSULE, DELAYED RELEASE ORAL
Qty: 0 | Refills: 0 | COMMUNITY

## 2018-06-12 RX ORDER — PSYLLIUM SEED (WITH DEXTROSE)
5 POWDER (GRAM) ORAL
Qty: 0 | Refills: 0 | COMMUNITY

## 2018-06-12 RX ORDER — FERROUS SULFATE 325(65) MG
0 TABLET ORAL
Qty: 0 | Refills: 0 | COMMUNITY

## 2018-06-12 NOTE — H&P PST ADULT - NEGATIVE SKIN SYMPTOMS
no tumor/no pitted nails/no brittle nails/no dryness/no change in size/color of mole/no hair loss/no itching

## 2018-06-12 NOTE — H&P PST ADULT - NSANTHOSAYNRD_GEN_A_CORE
never tested/No. SIMONE screening performed.  STOP BANG Legend: 0-2 = LOW Risk; 3-4 = INTERMEDIATE Risk; 5-8 = HIGH Risk

## 2018-06-12 NOTE — H&P PST ADULT - OTHER CARE PROVIDERS
Cardiology Dr Olivier [phone], Dr fairbanks heme onc Cardiology Dr Olivier [phone], Dr Sosa heme onc

## 2018-06-12 NOTE — H&P PST ADULT - PROBLEM SELECTOR PLAN 1
Pt is scheduled for a repair of incisional hernia possible mesh, abdominal wall reconstruction with component separation muscle flap and skin flap on 6/25/18. Preop instructions provided including NPO status, GI prophylaxis and Hibiclens. verbalized understanding. Pt has MC on 6/18/18. Form provided.  Cardiology clearance done for last preop eval at [facility] when EKG noted to be abnormal and procedure cancelled. copy of ekg in chart w/ clearance. stress echo requested to office. (done on 2017).

## 2018-06-12 NOTE — H&P PST ADULT - NEGATIVE BREAST SYMPTOMS
no nipple discharge R/no breast tenderness R/no breast lump L/no breast lump R/no nipple discharge L

## 2018-06-12 NOTE — H&P PST ADULT - NEGATIVE GASTROINTESTINAL SYMPTOMS
no diarrhea/no change in bowel habits/no flatulence/no melena/no hematochezia/no steatorrhea/no vomiting/no nausea/no abdominal pain

## 2018-06-12 NOTE — H&P PST ADULT - RS GEN PE MLT RESP DETAILS PC
good air movement/breath sounds equal/no chest wall tenderness/no subcutaneous emphysema/no intercostal retractions/no rales/respirations non-labored/no wheezes/clear to auscultation bilaterally/no rhonchi/airway patent

## 2018-06-12 NOTE — H&P PST ADULT - ASSESSMENT
DX: DX: incisional hernia without obstruction or gangrene. Evaluated for a scheduled repair of incisional hernia possible mesh, abdominal wall reconstruction with component separation muscle flap and skin flap on 6/25/18.

## 2018-06-12 NOTE — H&P PST ADULT - NEGATIVE GENERAL GENITOURINARY SYMPTOMS
no renal colic/no flank pain L/no nocturia/no bladder infections/no dysuria/no urinary hesitancy/no incontinence/normal urinary frequency/controlled urgency with tamsulosin/no flank pain R/no hematuria
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
## 2018-06-12 NOTE — H&P PST ADULT - NEGATIVE OPHTHALMOLOGIC SYMPTOMS
no blurred vision L/no photophobia/no lacrimation R/no irritation L/no loss of vision R/no scleral injection R/no diplopia/no blurred vision R/no discharge R/no discharge L/no pain L/no lacrimation L/no pain R/no irritation R/no loss of vision L/no scleral injection L

## 2018-06-12 NOTE — H&P PST ADULT - NEGATIVE ENMT SYMPTOMS
no sinus symptoms/no throat pain/no dysphagia/no nose bleeds/no recurrent cold sores/no abnormal taste sensation/no ear pain/no nasal obstruction/no gum bleeding/no dry mouth/no nasal congestion/no post-nasal discharge/no nasal discharge/no hearing difficulty/no tinnitus/no vertigo

## 2018-06-12 NOTE — H&P PST ADULT - PROBLEM SELECTOR PLAN 2
pt to continue Metformin as ordered and ware of last dose to be taking on 6/24/18 am dose only, hold on 6/25. Verbalized understanding.  FS ordered for am dos

## 2018-06-12 NOTE — H&P PST ADULT - PMH
Constipation    Essential hypertension  was on losartan, now diet control  Gastric mass  ulcerated mass in gastric cardia with small perigastric nodes on endoscopy.  Hypothyroidism    Iron deficiency anemia, unspecified iron deficiency anemia type    Type 2 diabetes mellitus without complication, unspecified long term insulin use status  no BS monitoring

## 2018-06-12 NOTE — H&P PST ADULT - NEGATIVE GENERAL SYMPTOMS
no polyphagia/no polydipsia/no chills/no fever/no polyuria/no fatigue/no sweating/no anorexia/no malaise/no weight loss/no weight gain

## 2018-06-12 NOTE — H&P PST ADULT - GASTROINTESTINAL DETAILS
nontender/no guarding/no distention/no bruit/no rebound tenderness/no rigidity/soft/bowel sounds normal

## 2018-06-12 NOTE — H&P PST ADULT - NEGATIVE MALE-SPECIFIC SYMPTOMS
no urethral discharge/no undescended testicle R/no genital sores/no impotence/no erectile dysfunction/no scrotal mass L/no ejaculatory dysfunction/no scrotal mass R/no undescended testicle L

## 2018-06-12 NOTE — H&P PST ADULT - NEGATIVE CARDIOVASCULAR SYMPTOMS
no claudication/no peripheral edema/no paroxysmal nocturnal dyspnea/no orthopnea/no dyspnea on exertion/no palpitations/no chest pain

## 2018-06-12 NOTE — H&P PST ADULT - HISTORY OF PRESENT ILLNESS
This is a 67 y.o. male PMHx of Hypothyroidism, acid reflux, T2DM, hgbA1c 6.5,  proximal Gastric CA, s/p stomach resection for malignancy - 5/12/17 , follow up with chemo and radiation - all completed by 12/3/17 . Has a Mediport in place. Pt reports , MRI of abdomen and PET scan done on 1/2018 and wnl. Pt underwent a USGB & FNA of the left supraclavicular Lymph node in 3/2018 which was positive for metastatic gastric CA,  Pt also underwent Resection of left Supraclavicular Lymph node and done on 4/17/18.  Currently presents to be evaluated for a scheduled repair of incisional hernia possible mesh, abdominal wall reconstruction with component separation muscle flap and skin flap.   states Dr. Cabello noted a lump on his stomach by incisional region 6 months ago and was recommended surgical intervention but was held due to other procedures and chemo needed to be done first. Now pt scheduled for the procedure. Pt denies any hernia related complaints. This is a 67 y.o. male PMHx of Hypothyroidism, acid reflux, T2DM, hgbA1c 6.5,  proximal Gastric CA, s/p stomach resection for malignancy - 5/12/17 , follow up with chemo and radiation - all completed by 12/3/17 . Has a Mediport in place. Pt reports , MRI of abdomen and PET scan done on 1/2018 and wnl. Pt underwent a USGB & FNA of the left supraclavicular Lymph node in 3/2018 which was positive for metastatic gastric CA,  Pt also underwent Resection of left Supraclavicular Lymph node and done on 4/17/18.  Currently presents to be evaluated for a scheduled repair of incisional hernia possible mesh, abdominal wall reconstruction with component separation muscle flap and skin flap on 6/25/18.   States Dr. Cabello noted a lump on his stomach by 6 months ago and was recommended surgical intervention but was held due to other procedures and chemo needed to be done first. Now pt scheduled for the procedure. Pt denies any hernia related complaints.

## 2018-06-24 ENCOUNTER — TRANSCRIPTION ENCOUNTER (OUTPATIENT)
Age: 68
End: 2018-06-24

## 2018-06-25 ENCOUNTER — APPOINTMENT (OUTPATIENT)
Dept: SURGICAL ONCOLOGY | Facility: HOSPITAL | Age: 68
End: 2018-06-25

## 2018-06-25 ENCOUNTER — RESULT REVIEW (OUTPATIENT)
Age: 68
End: 2018-06-25

## 2018-06-25 ENCOUNTER — INPATIENT (INPATIENT)
Facility: HOSPITAL | Age: 68
LOS: 3 days | Discharge: HOME | End: 2018-06-29
Attending: SPECIALIST | Admitting: SPECIALIST
Payer: MEDICARE

## 2018-06-25 VITALS
RESPIRATION RATE: 16 BRPM | DIASTOLIC BLOOD PRESSURE: 75 MMHG | HEART RATE: 60 BPM | OXYGEN SATURATION: 99 % | TEMPERATURE: 98 F | WEIGHT: 154.98 LBS | HEIGHT: 71 IN | SYSTOLIC BLOOD PRESSURE: 146 MMHG

## 2018-06-25 DIAGNOSIS — Z95.828 PRESENCE OF OTHER VASCULAR IMPLANTS AND GRAFTS: Chronic | ICD-10-CM

## 2018-06-25 DIAGNOSIS — Z98.890 OTHER SPECIFIED POSTPROCEDURAL STATES: Chronic | ICD-10-CM

## 2018-06-25 DIAGNOSIS — K43.2 INCISIONAL HERNIA WITHOUT OBSTRUCTION OR GANGRENE: ICD-10-CM

## 2018-06-25 LAB
GLUCOSE BLDC GLUCOMTR-MCNC: 109 MG/DL — HIGH (ref 70–99)
GLUCOSE BLDC GLUCOMTR-MCNC: 132 MG/DL — HIGH (ref 70–99)
GLUCOSE BLDC GLUCOMTR-MCNC: 140 MG/DL — HIGH (ref 70–99)
GLUCOSE BLDC GLUCOMTR-MCNC: 165 MG/DL — HIGH (ref 70–99)

## 2018-06-25 PROCEDURE — 93010 ELECTROCARDIOGRAM REPORT: CPT

## 2018-06-25 PROCEDURE — 88302 TISSUE EXAM BY PATHOLOGIST: CPT | Mod: 26

## 2018-06-25 PROCEDURE — 74018 RADEX ABDOMEN 1 VIEW: CPT | Mod: 26

## 2018-06-25 RX ORDER — DEXTROSE 50 % IN WATER 50 %
25 SYRINGE (ML) INTRAVENOUS ONCE
Qty: 0 | Refills: 0 | Status: DISCONTINUED | OUTPATIENT
Start: 2018-06-25 | End: 2018-06-29

## 2018-06-25 RX ORDER — CEFAZOLIN SODIUM 1 G
1000 VIAL (EA) INJECTION EVERY 8 HOURS
Qty: 0 | Refills: 0 | Status: COMPLETED | OUTPATIENT
Start: 2018-06-25 | End: 2018-06-26

## 2018-06-25 RX ORDER — HEPARIN SODIUM 5000 [USP'U]/ML
5000 INJECTION INTRAVENOUS; SUBCUTANEOUS EVERY 12 HOURS
Qty: 0 | Refills: 0 | Status: DISCONTINUED | OUTPATIENT
Start: 2018-06-25 | End: 2018-06-29

## 2018-06-25 RX ORDER — GLUCAGON INJECTION, SOLUTION 0.5 MG/.1ML
1 INJECTION, SOLUTION SUBCUTANEOUS ONCE
Qty: 0 | Refills: 0 | Status: DISCONTINUED | OUTPATIENT
Start: 2018-06-25 | End: 2018-06-29

## 2018-06-25 RX ORDER — DEXTROSE 50 % IN WATER 50 %
15 SYRINGE (ML) INTRAVENOUS ONCE
Qty: 0 | Refills: 0 | Status: DISCONTINUED | OUTPATIENT
Start: 2018-06-25 | End: 2018-06-29

## 2018-06-25 RX ORDER — SODIUM CHLORIDE 9 MG/ML
1000 INJECTION, SOLUTION INTRAVENOUS
Qty: 0 | Refills: 0 | Status: DISCONTINUED | OUTPATIENT
Start: 2018-06-25 | End: 2018-06-27

## 2018-06-25 RX ORDER — SODIUM CHLORIDE 9 MG/ML
1000 INJECTION, SOLUTION INTRAVENOUS
Qty: 0 | Refills: 0 | Status: DISCONTINUED | OUTPATIENT
Start: 2018-06-25 | End: 2018-06-25

## 2018-06-25 RX ORDER — NALOXONE HYDROCHLORIDE 4 MG/.1ML
0.1 SPRAY NASAL
Qty: 0 | Refills: 0 | Status: DISCONTINUED | OUTPATIENT
Start: 2018-06-25 | End: 2018-06-26

## 2018-06-25 RX ORDER — INSULIN LISPRO 100/ML
VIAL (ML) SUBCUTANEOUS
Qty: 0 | Refills: 0 | Status: DISCONTINUED | OUTPATIENT
Start: 2018-06-25 | End: 2018-06-29

## 2018-06-25 RX ORDER — HYDROMORPHONE HYDROCHLORIDE 2 MG/ML
0.5 INJECTION INTRAMUSCULAR; INTRAVENOUS; SUBCUTANEOUS
Qty: 0 | Refills: 0 | Status: DISCONTINUED | OUTPATIENT
Start: 2018-06-25 | End: 2018-06-26

## 2018-06-25 RX ORDER — TAMSULOSIN HYDROCHLORIDE 0.4 MG/1
0.4 CAPSULE ORAL AT BEDTIME
Qty: 0 | Refills: 0 | Status: DISCONTINUED | OUTPATIENT
Start: 2018-06-26 | End: 2018-06-26

## 2018-06-25 RX ORDER — DEXTROSE 50 % IN WATER 50 %
12.5 SYRINGE (ML) INTRAVENOUS ONCE
Qty: 0 | Refills: 0 | Status: DISCONTINUED | OUTPATIENT
Start: 2018-06-25 | End: 2018-06-29

## 2018-06-25 RX ORDER — LEVOTHYROXINE SODIUM 125 MCG
100 TABLET ORAL DAILY
Qty: 0 | Refills: 0 | Status: DISCONTINUED | OUTPATIENT
Start: 2018-06-25 | End: 2018-06-29

## 2018-06-25 RX ORDER — ATORVASTATIN CALCIUM 80 MG/1
10 TABLET, FILM COATED ORAL AT BEDTIME
Qty: 0 | Refills: 0 | Status: DISCONTINUED | OUTPATIENT
Start: 2018-06-25 | End: 2018-06-29

## 2018-06-25 RX ORDER — ONDANSETRON 8 MG/1
4 TABLET, FILM COATED ORAL EVERY 6 HOURS
Qty: 0 | Refills: 0 | Status: DISCONTINUED | OUTPATIENT
Start: 2018-06-25 | End: 2018-06-26

## 2018-06-25 RX ORDER — INSULIN LISPRO 100/ML
VIAL (ML) SUBCUTANEOUS AT BEDTIME
Qty: 0 | Refills: 0 | Status: DISCONTINUED | OUTPATIENT
Start: 2018-06-25 | End: 2018-06-29

## 2018-06-25 RX ORDER — PANTOPRAZOLE SODIUM 20 MG/1
40 TABLET, DELAYED RELEASE ORAL
Qty: 0 | Refills: 0 | Status: DISCONTINUED | OUTPATIENT
Start: 2018-06-25 | End: 2018-06-29

## 2018-06-25 RX ADMIN — Medication 1: at 16:48

## 2018-06-25 RX ADMIN — SODIUM CHLORIDE 100 MILLILITER(S): 9 INJECTION, SOLUTION INTRAVENOUS at 21:54

## 2018-06-25 RX ADMIN — SODIUM CHLORIDE 100 MILLILITER(S): 9 INJECTION, SOLUTION INTRAVENOUS at 14:00

## 2018-06-25 RX ADMIN — HEPARIN SODIUM 5000 UNIT(S): 5000 INJECTION INTRAVENOUS; SUBCUTANEOUS at 18:17

## 2018-06-25 RX ADMIN — ATORVASTATIN CALCIUM 10 MILLIGRAM(S): 80 TABLET, FILM COATED ORAL at 21:54

## 2018-06-25 RX ADMIN — Medication 100 MILLIGRAM(S): at 16:47

## 2018-06-25 NOTE — BRIEF OPERATIVE NOTE - PROCEDURE
<<-----Click on this checkbox to enter Procedure Component separation of abdominal wall  06/25/2018    Active  USHAH3  Incisional hernia repair with mesh  06/25/2018    Active  USHAH3

## 2018-06-25 NOTE — BRIEF OPERATIVE NOTE - OPERATION/FINDINGS
b/l  sparing component separation
incisional hernia repair with mesh.  Dr. Arevalo (plastic surgery) performing b/l component separation.
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
## 2018-06-26 LAB
APTT BLD: 29.3 SEC — SIGNIFICANT CHANGE UP (ref 27.5–37.4)
BUN SERPL-MCNC: 16 MG/DL — SIGNIFICANT CHANGE UP (ref 7–23)
CALCIUM SERPL-MCNC: 8.2 MG/DL — LOW (ref 8.4–10.5)
CHLORIDE SERPL-SCNC: 96 MMOL/L — LOW (ref 98–107)
CO2 SERPL-SCNC: 28 MMOL/L — SIGNIFICANT CHANGE UP (ref 22–31)
CREAT SERPL-MCNC: 0.75 MG/DL — SIGNIFICANT CHANGE UP (ref 0.5–1.3)
GLUCOSE BLDC GLUCOMTR-MCNC: 130 MG/DL — HIGH (ref 70–99)
GLUCOSE BLDC GLUCOMTR-MCNC: 135 MG/DL — HIGH (ref 70–99)
GLUCOSE BLDC GLUCOMTR-MCNC: 147 MG/DL — HIGH (ref 70–99)
GLUCOSE BLDC GLUCOMTR-MCNC: 175 MG/DL — HIGH (ref 70–99)
GLUCOSE SERPL-MCNC: 108 MG/DL — HIGH (ref 70–99)
HBA1C BLD-MCNC: 6.2 % — HIGH (ref 4–5.6)
HCT VFR BLD CALC: 32.1 % — LOW (ref 39–50)
HGB BLD-MCNC: 10.7 G/DL — LOW (ref 13–17)
INR BLD: 1.11 — SIGNIFICANT CHANGE UP (ref 0.88–1.17)
MAGNESIUM SERPL-MCNC: 2.1 MG/DL — SIGNIFICANT CHANGE UP (ref 1.6–2.6)
MCHC RBC-ENTMCNC: 33.3 % — SIGNIFICANT CHANGE UP (ref 32–36)
MCHC RBC-ENTMCNC: 33.8 PG — SIGNIFICANT CHANGE UP (ref 27–34)
MCV RBC AUTO: 101.3 FL — HIGH (ref 80–100)
NRBC # FLD: 0 — SIGNIFICANT CHANGE UP
PHOSPHATE SERPL-MCNC: 3.8 MG/DL — SIGNIFICANT CHANGE UP (ref 2.5–4.5)
PLATELET # BLD AUTO: 175 K/UL — SIGNIFICANT CHANGE UP (ref 150–400)
PMV BLD: 11.1 FL — SIGNIFICANT CHANGE UP (ref 7–13)
POTASSIUM SERPL-MCNC: 3.8 MMOL/L — SIGNIFICANT CHANGE UP (ref 3.5–5.3)
POTASSIUM SERPL-SCNC: 3.8 MMOL/L — SIGNIFICANT CHANGE UP (ref 3.5–5.3)
PROTHROM AB SERPL-ACNC: 12.3 SEC — SIGNIFICANT CHANGE UP (ref 9.8–13.1)
RBC # BLD: 3.17 M/UL — LOW (ref 4.2–5.8)
RBC # FLD: 15.4 % — HIGH (ref 10.3–14.5)
SODIUM SERPL-SCNC: 136 MMOL/L — SIGNIFICANT CHANGE UP (ref 135–145)
WBC # BLD: 6.42 K/UL — SIGNIFICANT CHANGE UP (ref 3.8–10.5)
WBC # FLD AUTO: 6.42 K/UL — SIGNIFICANT CHANGE UP (ref 3.8–10.5)

## 2018-06-26 RX ORDER — ACETAMINOPHEN 500 MG
650 TABLET ORAL EVERY 6 HOURS
Qty: 0 | Refills: 0 | Status: COMPLETED | OUTPATIENT
Start: 2018-06-26 | End: 2018-06-28

## 2018-06-26 RX ORDER — SODIUM CHLORIDE 9 MG/ML
1000 INJECTION, SOLUTION INTRAVENOUS ONCE
Qty: 0 | Refills: 0 | Status: COMPLETED | OUTPATIENT
Start: 2018-06-26 | End: 2018-06-26

## 2018-06-26 RX ORDER — DOXAZOSIN MESYLATE 4 MG
2 TABLET ORAL AT BEDTIME
Qty: 0 | Refills: 0 | Status: DISCONTINUED | OUTPATIENT
Start: 2018-06-26 | End: 2018-06-26

## 2018-06-26 RX ORDER — OXYCODONE HYDROCHLORIDE 5 MG/1
10 TABLET ORAL
Qty: 0 | Refills: 0 | Status: DISCONTINUED | OUTPATIENT
Start: 2018-06-26 | End: 2018-06-29

## 2018-06-26 RX ORDER — OXYCODONE HYDROCHLORIDE 5 MG/1
5 TABLET ORAL EVERY 6 HOURS
Qty: 0 | Refills: 0 | Status: DISCONTINUED | OUTPATIENT
Start: 2018-06-26 | End: 2018-06-29

## 2018-06-26 RX ORDER — DOXAZOSIN MESYLATE 4 MG
1 TABLET ORAL AT BEDTIME
Qty: 0 | Refills: 0 | Status: DISCONTINUED | OUTPATIENT
Start: 2018-06-26 | End: 2018-06-29

## 2018-06-26 RX ADMIN — Medication 100 MILLIGRAM(S): at 00:29

## 2018-06-26 RX ADMIN — SODIUM CHLORIDE 100 MILLILITER(S): 9 INJECTION, SOLUTION INTRAVENOUS at 07:51

## 2018-06-26 RX ADMIN — ATORVASTATIN CALCIUM 10 MILLIGRAM(S): 80 TABLET, FILM COATED ORAL at 21:46

## 2018-06-26 RX ADMIN — Medication 650 MILLIGRAM(S): at 17:22

## 2018-06-26 RX ADMIN — Medication 650 MILLIGRAM(S): at 18:34

## 2018-06-26 RX ADMIN — SODIUM CHLORIDE 2000 MILLILITER(S): 9 INJECTION, SOLUTION INTRAVENOUS at 13:26

## 2018-06-26 RX ADMIN — Medication 100 MILLIGRAM(S): at 07:51

## 2018-06-26 RX ADMIN — HEPARIN SODIUM 5000 UNIT(S): 5000 INJECTION INTRAVENOUS; SUBCUTANEOUS at 06:26

## 2018-06-26 RX ADMIN — HEPARIN SODIUM 5000 UNIT(S): 5000 INJECTION INTRAVENOUS; SUBCUTANEOUS at 21:47

## 2018-06-26 RX ADMIN — SODIUM CHLORIDE 100 MILLILITER(S): 9 INJECTION, SOLUTION INTRAVENOUS at 19:43

## 2018-06-26 RX ADMIN — Medication 1 MILLIGRAM(S): at 21:46

## 2018-06-26 RX ADMIN — Medication 100 MICROGRAM(S): at 07:26

## 2018-06-26 RX ADMIN — PANTOPRAZOLE SODIUM 40 MILLIGRAM(S): 20 TABLET, DELAYED RELEASE ORAL at 07:26

## 2018-06-26 NOTE — PROGRESS NOTE ADULT - ASSESSMENT
67M with hx gastric ca now s/p IHR with  sparing component separation  -continue drains and binder  -pain control  -OOB/ambulate as tolerated  -diet per gen surg    [id number]

## 2018-06-26 NOTE — CONSULT NOTE ADULT - SUBJECTIVE AND OBJECTIVE BOX
HPI: 67 y.o. male PMHx of Hypothyroidism, acid reflux, T2DM, hgbA1c 6.5,  proximal Gastric CA, s/p stomach resection for malignancy - 5/12/17 , follow up with chemo and radiation - all completed by 12/3/17 . Has a Mediport in place. Pt reports , MRI of abdomen and PET scan done on 1/2018 and wnl. Pt underwent a USGB & FNA of the left supraclavicular Lymph node in 3/2018 which was positive for metastatic gastric CA,  Pt also underwent Resection of left Supraclavicular Lymph node and done on 4/17/18. States Dr. Cabello noted a lump on his stomach by 6 months ago and was recommended surgical intervention but was held due to other procedures and chemo needed to be done first.   Currently pt is s/p incisional hernia repair. Reports feeling mild nausea, otherwise well.    Allergies:  No Known Allergies      PAST MEDICAL & SURGICAL HISTORY:  Constipation  Iron deficiency anemia, unspecified iron deficiency anemia type  Gastric mass: ulcerated mass in gastric cardia with small perigastric nodes on endoscopy.  Hypothyroidism  Type 2 diabetes mellitus without complication, unspecified long term insulin use status: no BS monitoring  Essential hypertension: was on losartan, now diet control  History of gastric surgery: 2017  Port-a-cath in place: right chest wall  H/O umbilical hernia repair      FAMILY HISTORY:  Family history of ischemic heart disease (IHD): father  Family history of ovarian cancer: mother      REVIEW OF SYSTEMS:  CONSTITUTIONAL: No fever, weight loss, or fatigue  EYES: No eye pain, visual disturbances, or discharge  NECK: No pain or stiffness  RESPIRATORY: No cough or wheezing, no shortness of breath  CARDIOVASCULAR: No chest pain, palpitations, dizziness, or leg swelling  GASTROINTESTINAL: + postop abd pain. + mild nausea, no vomiting, diarrhea or constipation  GENITOURINARY: No dysuria, urinary frequency or urgency, no hematuria  NEUROLOGICAL: No headaches, memory loss, loss of strength, numbness, or tremors  SKIN: No itching, burning, rashes, or lesions   MUSCULOSKELETAL: No joint pain or swelling; No muscle, back, or extremity pain    Medications:  MEDICATIONS  (STANDING):  atorvastatin 10 milliGRAM(s) Oral at bedtime  dextrose 5%. 1000 milliLiter(s) (50 mL/Hr) IV Continuous <Continuous>  dextrose 50% Injectable 12.5 Gram(s) IV Push once  dextrose 50% Injectable 25 Gram(s) IV Push once  dextrose 50% Injectable 25 Gram(s) IV Push once  doxazosin 1 milliGRAM(s) Oral at bedtime  heparin  Injectable 5000 Unit(s) SubCutaneous every 12 hours  HYDROmorphone (10 MICROgram(s)/mL) + BUpivacaine 0.0625% in 0.9% Sodium Chloride PCEA 250 milliLiter(s) Epidural PCA Continuous  insulin lispro (HumaLOG) corrective regimen sliding scale   SubCutaneous three times a day before meals  insulin lispro (HumaLOG) corrective regimen sliding scale   SubCutaneous at bedtime  lactated ringers. 1000 milliLiter(s) (100 mL/Hr) IV Continuous <Continuous>  levothyroxine 100 MICROGram(s) Oral daily  pantoprazole    Tablet 40 milliGRAM(s) Oral before breakfast    MEDICATIONS  (PRN):  dextrose 40% Gel 15 Gram(s) Oral once PRN Blood Glucose LESS THAN 70 milliGRAM(s)/deciliter  glucagon  Injectable 1 milliGRAM(s) IntraMuscular once PRN Glucose LESS THAN 70 milligrams/deciliter  HYDROmorphone  Injectable 0.5 milliGRAM(s) IV Push every 3 hours PRN Break Through Pain  naloxone Injectable 0.1 milliGRAM(s) IV Push every 3 minutes PRN For ANY of the following changes in patient status:  A. RR LESS THAN 10 breaths per minute, B. Oxygen saturation LESS THAN 90%, C. Sedation score of 6  ondansetron Injectable 4 milliGRAM(s) IV Push every 6 hours PRN Nausea    	    PHYSICAL EXAM:  T(C): 36.6 (06-26-18 @ 11:31), Max: 37.3 (06-26-18 @ 07:45)  HR: 51 (06-26-18 @ 11:31) (51 - 66)  BP: 107/49 (06-26-18 @ 11:31) (102/56 - 122/60)  RR: 17 (06-26-18 @ 11:31) (15 - 17)  SpO2: 98% (06-26-18 @ 11:31) (96% - 99%)  Wt(kg): --  I&O's Summary    25 Jun 2018 07:01  -  26 Jun 2018 07:00  --------------------------------------------------------  IN: 620 mL / OUT: 1446 mL / NET: -826 mL    26 Jun 2018 07:01  -  26 Jun 2018 15:34  --------------------------------------------------------  IN: 1700 mL / OUT: 320 mL / NET: 1380 mL        Appearance: Normal	  HEENT:   NCAT, PERRL, EOMI	  Lymphatic: No lymphadenopathy  Cardiovascular: Normal S1 S2, RRR  Respiratory: Lungs clear to auscultation BL  Psychiatry: A & O x 3, Mood & affect appropriate  Gastrointestinal:  Soft, mildly-tender, + BS  Skin: No rashes, No ecchymoses, No cyanosis	  Neurologic: Non-focal  Extremities: Normal range of motion, No clubbing, cyanosis or edema    	  LABS:	 	    CARDIAC MARKERS:                                10.7   6.42  )-----------( 175      ( 26 Jun 2018 05:15 )             32.1     06-26    136  |  96<L>  |  16  ----------------------------<  108<H>  3.8   |  28  |  0.75    Ca    8.2<L>      26 Jun 2018 05:15  Phos  3.8     06-26  Mg     2.1     06-26      proBNP:   Lipid Profile:   HgA1c: Hemoglobin A1C, Whole Blood: 6.2 % (06-26 @ 05:15)    TSH:

## 2018-06-26 NOTE — CONSULT NOTE ADULT - ASSESSMENT
67 y.o. male PMHx of Hypothyroidism, acid reflux, T2DM, hgbA1c 6.5,  proximal Gastric CA, s/p stomach resection for malignancy, now s/p hernia repair:  1. S/p hernia repair - care and diet per Surg, IVF, pain control, incentive spirometry, PT/OOB  2. BPH - c/w Doxazosyn  3. DM2 - ISS  4. HLD - statin  5. Hypothyroid - c/w Synthroid  6. GERD - c/w PPI  7. Nausea - slow diet advance, Zofran PRN  8. DVT prophylaxis

## 2018-06-27 ENCOUNTER — TRANSCRIPTION ENCOUNTER (OUTPATIENT)
Age: 68
End: 2018-06-27

## 2018-06-27 LAB
APTT BLD: 30.4 SEC — SIGNIFICANT CHANGE UP (ref 27.5–37.4)
GLUCOSE BLDC GLUCOMTR-MCNC: 112 MG/DL — HIGH (ref 70–99)
GLUCOSE BLDC GLUCOMTR-MCNC: 117 MG/DL — HIGH (ref 70–99)
GLUCOSE BLDC GLUCOMTR-MCNC: 132 MG/DL — HIGH (ref 70–99)
GLUCOSE BLDC GLUCOMTR-MCNC: 161 MG/DL — HIGH (ref 70–99)
INR BLD: 1.09 — SIGNIFICANT CHANGE UP (ref 0.88–1.17)
PROTHROM AB SERPL-ACNC: 12.6 SEC — SIGNIFICANT CHANGE UP (ref 9.8–13.1)

## 2018-06-27 RX ADMIN — HEPARIN SODIUM 5000 UNIT(S): 5000 INJECTION INTRAVENOUS; SUBCUTANEOUS at 17:44

## 2018-06-27 RX ADMIN — Medication 650 MILLIGRAM(S): at 06:07

## 2018-06-27 RX ADMIN — ATORVASTATIN CALCIUM 10 MILLIGRAM(S): 80 TABLET, FILM COATED ORAL at 21:55

## 2018-06-27 RX ADMIN — Medication 650 MILLIGRAM(S): at 12:57

## 2018-06-27 RX ADMIN — Medication 650 MILLIGRAM(S): at 00:19

## 2018-06-27 RX ADMIN — Medication 650 MILLIGRAM(S): at 05:29

## 2018-06-27 RX ADMIN — Medication 650 MILLIGRAM(S): at 13:49

## 2018-06-27 RX ADMIN — HEPARIN SODIUM 5000 UNIT(S): 5000 INJECTION INTRAVENOUS; SUBCUTANEOUS at 05:30

## 2018-06-27 RX ADMIN — Medication 100 MICROGRAM(S): at 05:29

## 2018-06-27 RX ADMIN — Medication 650 MILLIGRAM(S): at 17:44

## 2018-06-27 RX ADMIN — Medication 1: at 17:44

## 2018-06-27 RX ADMIN — PANTOPRAZOLE SODIUM 40 MILLIGRAM(S): 20 TABLET, DELAYED RELEASE ORAL at 06:06

## 2018-06-27 RX ADMIN — Medication 650 MILLIGRAM(S): at 01:00

## 2018-06-27 RX ADMIN — Medication 1 MILLIGRAM(S): at 21:55

## 2018-06-27 NOTE — DISCHARGE NOTE ADULT - PATIENT PORTAL LINK FT
You can access the [facility] Patient Portal, offered by [facility], by registering with the following website: [email or web address]

## 2018-06-27 NOTE — DISCHARGE NOTE ADULT - CARE PROVIDER_API CALL
Saravanan Cabello), ColonRectal Surgery; Surgery  [address] [place] [address]  Phone: [phone]  Fax: [phone] Saravanan Cabello), ColonRectal Surgery; Surgery  [address] [place] [address]  Phone: [phone]  Fax: [phone]    Sahil Arevalo), Plastic Surgery  [address] [address]  Phone: [phone]  Fax: [phone]

## 2018-06-27 NOTE — DISCHARGE NOTE ADULT - HOSPITAL COURSE
This is a 67 y.o. male PMHx of Hypothyroidism, acid reflux, T2DM, hgbA1c 6.5,  proximal Gastric CA, s/p stomach resection for malignancy - 5/12/17 , follow up with chemo and radiation - all completed by 12/3/17 . Has a Mediport in place. Pt reports , MRI of abdomen and PET scan done on 1/2018 and wnl. Pt underwent a USGB & FNA of the left supraclavicular Lymph node in 3/2018 which was positive for metastatic gastric CA,  Pt also underwent Resection of left Supraclavicular Lymph node and done on 4/17/18.  Currently presents to be evaluated for a scheduled repair of incisional hernia possible mesh, abdominal wall reconstruction with component separation muscle flap and skin flap on 6/25/18.  States Dr. Cabello noted a lump on his stomach by 6 months ago and was recommended surgical intervention but was held due to other procedures and chemo needed to be done first. Now pt scheduled for the procedure. Pt denies any hernia related complaints.     Patient was admitted to the surgical service. Taken to the OR 6/25/18 and underwent incisional hernia repair with mesh.  Dr. Arevalo (plastic surgery) performing b/l component separation. Pt tolerated procedure well, without complication. Pt remained hemodynamically stable in the PACU and transferred to the surgical floor.       Diet was restarted and advanced as tolerated. Pain control was transitioned from IV to PO pain meds. Pt currently ambulating, voiding, tolerating a regular diet, with pain well controlled on PO pain meds. Patient is stable for discharge home to follow up as an outpatient, instructed to call to schedule appointment. This is a 67 y.o. male PMHx of Hypothyroidism, acid reflux, T2DM, hgbA1c 6.5,  proximal Gastric CA, s/p stomach resection for malignancy - 5/12/17 , follow up with chemo and radiation - all completed by 12/3/17 . Has a Mediport in place. Pt reports , MRI of abdomen and PET scan done on 1/2018 and wnl. Pt underwent a USGB & FNA of the left supraclavicular Lymph node in 3/2018 which was positive for metastatic gastric CA,  Pt also underwent Resection of left Supraclavicular Lymph node and done on 4/17/18.  Currently presents to be evaluated for a scheduled repair of incisional hernia possible mesh, abdominal wall reconstruction with component separation muscle flap and skin flap on 6/25/18.  States Dr. Cabello noted a lump on his stomach by 6 months ago and was recommended surgical intervention but was held due to other procedures and chemo needed to be done first. Now pt scheduled for the procedure. Pt denies any hernia related complaints.     Patient was admitted to the surgical service. Taken to the OR 6/25/18 and underwent incisional hernia repair with mesh.  Dr. Arevalo (plastic surgery) performing b/l component separation. Pt tolerated procedure well, without complication. Pt remained hemodynamically stable in the PACU and transferred to the surgical floor.       Diet was restarted and advanced as tolerated. Pain control was transitioned from IV to PO pain meds. Pt currently ambulating, voiding, tolerating a regular diet, with pain well controlled on PO pain meds. Patient is stable for discharge home to follow up as an outpatient, instructed to call to schedule appointment.     istop #: [id number]

## 2018-06-27 NOTE — DISCHARGE NOTE ADULT - CARE PLAN
Goal:	resolution of symptoms  Assessment and plan of treatment:	WOUND CARE:  Please keep incisions clean and dry. Please do not Scrub or rub incisions. Do not use lotion or powder on incisions.   MICH: You will be discharged with MICH drains. You will need to empty them and record outputs accurately. This will be taught to you by the nursing staff. Please do not remove the MICH drains. They will be removed in the office. Please bring to the office accurate records of output.  BATHING: Please do not submerge wound underwater. You may shower and/or sponge bathe.  ACTIVITY: No heavy lifting or straining. Otherwise, you may return to your usual level of physical activity. If you are taking narcotic pain medication (such as Percocet) DO NOT drive a car, operate machinery or make important decisions.  DIET: Return to your usual diet.  NOTIFY YOUR SURGEON IF: You have any bleeding that does not stop, any pus draining from your wound(s), any fever (over 100.4 F) or chills, persistent nausea/vomiting, persistent diarrhea, or if your pain is not controlled on your discharge pain medications.  FOLLOW-UP: Please follow up with your primary care physician in one week regarding your hospitalization. Please follow-up with your surgeon, Dr. Cabello within 7 days following discharge- please call to schedule an appointment. Principal Discharge DX:	Incisional hernia  Goal:	resolution of symptoms  Assessment and plan of treatment:	WOUND CARE:  Please keep incisions clean and dry. Please do not Scrub or rub incisions. Do not use lotion or powder on incisions.   -Continue to wear abdominal binder  MICH: You will be discharged with MICH drains. You will need to empty them and record outputs accurately. This will be taught to you by the nursing staff. Please do not remove the MICH drains. They will be removed in the office. Please bring to the office accurate records of output.  BATHING: Please do not submerge wound underwater. You may shower and/or sponge bathe.  ACTIVITY: No heavy lifting or straining. Otherwise, you may return to your usual level of physical activity. If you are taking narcotic pain medication (such as Percocet) DO NOT drive a car, operate machinery or make important decisions.  DIET: Return to your usual diet.  NOTIFY YOUR SURGEON IF: You have any bleeding that does not stop, any pus draining from your wound(s), any fever (over 100.4 F) or chills, persistent nausea/vomiting, persistent diarrhea, or if your pain is not controlled on your discharge pain medications.  FOLLOW-UP: Please follow up with your primary care physician in one week regarding your hospitalization. Please follow-up with your surgeon, Dr. Cabello within 7 days following discharge- please call to schedule an appointment. Principal Discharge DX:	Incisional hernia  Goal:	resolution of symptoms  Assessment and plan of treatment:	WOUND CARE:  Please keep incisions clean and dry. Please do not Scrub or rub incisions. Do not use lotion or powder on incisions.   -Continue to wear abdominal binder  MICH: You will be discharged with MICH drains. You will need to empty them and record outputs accurately. This will be taught to you by the nursing staff. Please do not remove the MICH drains. They will be removed in the office. Please bring to the office accurate records of output.  BATHING: Please do not submerge wound underwater. You may shower and/or sponge bathe.  ACTIVITY: No heavy lifting or straining. Otherwise, you may return to your usual level of physical activity. If you are taking narcotic pain medication (such as Percocet) DO NOT drive a car, operate machinery or make important decisions.  DIET: Return to your usual diet.  NOTIFY YOUR SURGEON IF: You have any bleeding that does not stop, any pus draining from your wound(s), any fever (over 100.4 F) or chills, persistent nausea/vomiting, persistent diarrhea, or if your pain is not controlled on your discharge pain medications.  FOLLOW-UP: Please follow up with your primary care physician in one week regarding your hospitalization. Please follow-up with your surgeon, Dr. Cabello within 7 days following discharge- please call to schedule an appointment.  Secondary Diagnosis:	BPH (benign prostatic hyperplasia)  Assessment and plan of treatment:	Your flomax was stopped due to upcoming cataract surgery.   - Cardura was started for BPH management, you were sent a prescription  - Please follow-up with your PCP regarding this change and for continued management.

## 2018-06-27 NOTE — DISCHARGE NOTE ADULT - HOME CARE AGENCY
[facility] [place] [phone] initial visit will be day after discharge home. A nurse will call prior to the home visit.

## 2018-06-27 NOTE — DISCHARGE NOTE ADULT - ADDITIONAL INSTRUCTIONS
Please follow-up with your surgeon, Dr. Cabello within 7 days following discharge- please call [phone] to schedule an appointment. Please follow-up with your surgeon, Dr. Cabello within 7 days following discharge- please call [phone] to schedule an appointment.    Please follow-up with your plastic surgeon, Dr. Arevalo within 7 days following discharge- please call [phone] to schedule an appointment. Please follow-up with your surgeon, Dr. Cabello within 10 days following discharge- please call [phone] to schedule an appointment.    Please follow-up with your plastic surgeon, Dr. Arevalo within 7 days following discharge- please call [phone] to schedule an appointment.

## 2018-06-27 NOTE — DISCHARGE NOTE ADULT - CONDITIONS AT DISCHARGE
VSS, MICH x 3 collapsed-patient was able to present how to empty and collapse them, as well as record the drainage.

## 2018-06-27 NOTE — DISCHARGE NOTE ADULT - PLAN OF CARE
resolution of symptoms WOUND CARE:  Please keep incisions clean and dry. Please do not Scrub or rub incisions. Do not use lotion or powder on incisions.   MICH: You will be discharged with MICH drains. You will need to empty them and record outputs accurately. This will be taught to you by the nursing staff. Please do not remove the MICH drains. They will be removed in the office. Please bring to the office accurate records of output.  BATHING: Please do not submerge wound underwater. You may shower and/or sponge bathe.  ACTIVITY: No heavy lifting or straining. Otherwise, you may return to your usual level of physical activity. If you are taking narcotic pain medication (such as Percocet) DO NOT drive a car, operate machinery or make important decisions.  DIET: Return to your usual diet.  NOTIFY YOUR SURGEON IF: You have any bleeding that does not stop, any pus draining from your wound(s), any fever (over 100.4 F) or chills, persistent nausea/vomiting, persistent diarrhea, or if your pain is not controlled on your discharge pain medications.  FOLLOW-UP: Please follow up with your primary care physician in one week regarding your hospitalization. Please follow-up with your surgeon, Dr. Cabello within 7 days following discharge- please call to schedule an appointment. WOUND CARE:  Please keep incisions clean and dry. Please do not Scrub or rub incisions. Do not use lotion or powder on incisions.   -Continue to wear abdominal binder  MICH: You will be discharged with MICH drains. You will need to empty them and record outputs accurately. This will be taught to you by the nursing staff. Please do not remove the MICH drains. They will be removed in the office. Please bring to the office accurate records of output.  BATHING: Please do not submerge wound underwater. You may shower and/or sponge bathe.  ACTIVITY: No heavy lifting or straining. Otherwise, you may return to your usual level of physical activity. If you are taking narcotic pain medication (such as Percocet) DO NOT drive a car, operate machinery or make important decisions.  DIET: Return to your usual diet.  NOTIFY YOUR SURGEON IF: You have any bleeding that does not stop, any pus draining from your wound(s), any fever (over 100.4 F) or chills, persistent nausea/vomiting, persistent diarrhea, or if your pain is not controlled on your discharge pain medications.  FOLLOW-UP: Please follow up with your primary care physician in one week regarding your hospitalization. Please follow-up with your surgeon, Dr. Cabello within 7 days following discharge- please call to schedule an appointment. Your flomax was stopped due to upcoming cataract surgery.   - Cardura was started for BPH management, you were sent a prescription  - Please follow-up with your PCP regarding this change and for continued management.

## 2018-06-27 NOTE — DISCHARGE NOTE ADULT - MEDICATION SUMMARY - MEDICATIONS TO TAKE
I will START or STAY ON the medications listed below when I get home from the hospital:    acetaminophen 325 mg oral tablet  -- 2 tab(s) by mouth every 6 hours, As needed, mild to moderate pain  -- Indication: For pain    oxyCODONE 5 mg oral tablet  -- 1 tab(s) by mouth every 4 hours, As Needed for pain MDD:6   -- Indication: For pain    tamsulosin 0.4 mg oral capsule  -- 1 cap(s) by mouth once a day (at bedtime)  -- Indication: For BPH    metFORMIN 500 mg oral tablet, extended release  -- 1 tab(s) by mouth once a day am  -- Indication: For diabetes    pravastatin 20 mg oral tablet  -- 1 tab(s) by mouth once a day am  -- Indication: For high cholesterol    Ambien 5 mg oral tablet  -- 1 tab(s) by mouth once a day (at bedtime), As Needed  -- Indication: For Insomnia    Feosol 325 mg (65 mg elemental iron) oral tablet  -- 1 tab(s) by mouth 2 times a day  -- Indication: For supplement    Metamucil 400 mg oral capsule  -- 5 cap(s) by mouth once a day, As Needed  -- Indication: For constipation    omeprazole 40 mg oral delayed release capsule  -- 1 cap(s) by mouth once a day  -- Indication: For reflux    levothyroxine 100 mcg (0.1 mg) oral tablet  -- 1 tab(s) by mouth once a day am  -- Indication: For hypothyroid

## 2018-06-27 NOTE — DISCHARGE NOTE ADULT - CARE PROVIDERS DIRECT ADDRESSES
[email or web address]

## 2018-06-27 NOTE — PROGRESS NOTE ADULT - ASSESSMENT
67y Male s/p Ventral hernia repair with mesh, b/l  sparing component separation POD 1    - Continue karri reg diet (x of gastrectomy)  - FLORIDALMA joyner, chidi f/u TOV  - Appreciate PRS reccs  - Dispo: FLORIDALMA planning

## 2018-06-28 LAB
BUN SERPL-MCNC: 9 MG/DL — SIGNIFICANT CHANGE UP (ref 7–23)
CALCIUM SERPL-MCNC: 8.1 MG/DL — LOW (ref 8.4–10.5)
CHLORIDE SERPL-SCNC: 103 MMOL/L — SIGNIFICANT CHANGE UP (ref 98–107)
CO2 SERPL-SCNC: 27 MMOL/L — SIGNIFICANT CHANGE UP (ref 22–31)
CREAT SERPL-MCNC: 0.61 MG/DL — SIGNIFICANT CHANGE UP (ref 0.5–1.3)
GLUCOSE BLDC GLUCOMTR-MCNC: 109 MG/DL — HIGH (ref 70–99)
GLUCOSE BLDC GLUCOMTR-MCNC: 141 MG/DL — HIGH (ref 70–99)
GLUCOSE BLDC GLUCOMTR-MCNC: 165 MG/DL — HIGH (ref 70–99)
GLUCOSE SERPL-MCNC: 111 MG/DL — HIGH (ref 70–99)
HCT VFR BLD CALC: 30.9 % — LOW (ref 39–50)
HGB BLD-MCNC: 10.2 G/DL — LOW (ref 13–17)
MAGNESIUM SERPL-MCNC: 2.2 MG/DL — SIGNIFICANT CHANGE UP (ref 1.6–2.6)
MCHC RBC-ENTMCNC: 33 % — SIGNIFICANT CHANGE UP (ref 32–36)
MCHC RBC-ENTMCNC: 33.1 PG — SIGNIFICANT CHANGE UP (ref 27–34)
MCV RBC AUTO: 100.3 FL — HIGH (ref 80–100)
NRBC # FLD: 0 — SIGNIFICANT CHANGE UP
PHOSPHATE SERPL-MCNC: 2.7 MG/DL — SIGNIFICANT CHANGE UP (ref 2.5–4.5)
PLATELET # BLD AUTO: 173 K/UL — SIGNIFICANT CHANGE UP (ref 150–400)
PMV BLD: 11.7 FL — SIGNIFICANT CHANGE UP (ref 7–13)
POTASSIUM SERPL-MCNC: 3.8 MMOL/L — SIGNIFICANT CHANGE UP (ref 3.5–5.3)
POTASSIUM SERPL-SCNC: 3.8 MMOL/L — SIGNIFICANT CHANGE UP (ref 3.5–5.3)
RBC # BLD: 3.08 M/UL — LOW (ref 4.2–5.8)
RBC # FLD: 15.6 % — HIGH (ref 10.3–14.5)
SODIUM SERPL-SCNC: 138 MMOL/L — SIGNIFICANT CHANGE UP (ref 135–145)
WBC # BLD: 6.34 K/UL — SIGNIFICANT CHANGE UP (ref 3.8–10.5)
WBC # FLD AUTO: 6.34 K/UL — SIGNIFICANT CHANGE UP (ref 3.8–10.5)

## 2018-06-28 RX ORDER — SODIUM,POTASSIUM PHOSPHATES 278-250MG
1 POWDER IN PACKET (EA) ORAL ONCE
Qty: 0 | Refills: 0 | Status: COMPLETED | OUTPATIENT
Start: 2018-06-28 | End: 2018-06-28

## 2018-06-28 RX ADMIN — Medication 1 TABLET(S): at 12:42

## 2018-06-28 RX ADMIN — Medication 1 MILLIGRAM(S): at 21:04

## 2018-06-28 RX ADMIN — Medication 650 MILLIGRAM(S): at 12:46

## 2018-06-28 RX ADMIN — HEPARIN SODIUM 5000 UNIT(S): 5000 INJECTION INTRAVENOUS; SUBCUTANEOUS at 05:09

## 2018-06-28 RX ADMIN — Medication 0: at 17:40

## 2018-06-28 RX ADMIN — HEPARIN SODIUM 5000 UNIT(S): 5000 INJECTION INTRAVENOUS; SUBCUTANEOUS at 17:41

## 2018-06-28 RX ADMIN — Medication 650 MILLIGRAM(S): at 06:00

## 2018-06-28 RX ADMIN — Medication 650 MILLIGRAM(S): at 00:26

## 2018-06-28 RX ADMIN — Medication 650 MILLIGRAM(S): at 05:09

## 2018-06-28 RX ADMIN — PANTOPRAZOLE SODIUM 40 MILLIGRAM(S): 20 TABLET, DELAYED RELEASE ORAL at 05:09

## 2018-06-28 RX ADMIN — Medication 2: at 12:55

## 2018-06-28 RX ADMIN — Medication 100 MICROGRAM(S): at 05:09

## 2018-06-28 RX ADMIN — Medication 650 MILLIGRAM(S): at 13:00

## 2018-06-28 RX ADMIN — ATORVASTATIN CALCIUM 10 MILLIGRAM(S): 80 TABLET, FILM COATED ORAL at 21:04

## 2018-06-28 NOTE — PROGRESS NOTE ADULT - ASSESSMENT
67 y.o. male PMHx of Hypothyroidism, acid reflux, T2DM, hgbA1c 6.5,  proximal Gastric CA, s/p stomach resection for malignancy, now s/p hernia repair:  1. S/p hernia repair - care and diet per Surg, IVF, pain control, incentive spirometry, PT/OOB, d/c planning per Surg  2. BPH - c/w Doxazosyn  3. DM2 - ISS  4. HLD - statin  5. Hypothyroid - c/w Synthroid  6. GERD - c/w PPI  7. Nausea - resolved  8. DVT prophylaxis

## 2018-06-28 NOTE — PROGRESS NOTE ADULT - ASSESSMENT
67y Male s/p Ventral hernia repair with mesh, b/l  sparing component separation POD 3    - Continue karri reg diet (x of gastrectomy)  - Appreciate PRS reccs  - continue with abdominal binder  - encourage ambulation, IS  - DVT ppx  - drain care teaching  - Dispo: DC planning, likely tomorrow 67y Male s/p Ventral hernia repair with mesh, b/l  sparing component separation POD 3    - Continue karri reg diet (x of gastrectomy)  - Appreciate PRS reccs  - continue with abdominal binder  - encourage ambulation, IS  - DVT ppx  - drain care teaching  - Dispo: ready for discharge

## 2018-06-28 NOTE — PROGRESS NOTE ADULT - ASSESSMENT
Patient is a 67y old  Male who presents with a chief complaint of incisional hernia, now POD2 from repair. Doing well, should continue to ambulate  - Continue drains  - Continue binder  - OK to dc today from PRS perspective

## 2018-06-29 VITALS
HEART RATE: 73 BPM | DIASTOLIC BLOOD PRESSURE: 85 MMHG | SYSTOLIC BLOOD PRESSURE: 108 MMHG | TEMPERATURE: 98 F | RESPIRATION RATE: 18 BRPM | OXYGEN SATURATION: 97 %

## 2018-06-29 LAB — GLUCOSE BLDC GLUCOMTR-MCNC: 140 MG/DL — HIGH (ref 70–99)

## 2018-06-29 RX ORDER — OXYCODONE HYDROCHLORIDE 5 MG/1
1 TABLET ORAL
Qty: 18 | Refills: 0
Start: 2018-06-29 | End: 2018-07-01

## 2018-06-29 RX ORDER — DOXAZOSIN MESYLATE 4 MG
1 TABLET ORAL
Qty: 30 | Refills: 0
Start: 2018-06-29 | End: 2018-07-28

## 2018-06-29 RX ADMIN — HEPARIN SODIUM 5000 UNIT(S): 5000 INJECTION INTRAVENOUS; SUBCUTANEOUS at 05:04

## 2018-06-29 RX ADMIN — Medication 100 MICROGRAM(S): at 05:03

## 2018-06-29 RX ADMIN — PANTOPRAZOLE SODIUM 40 MILLIGRAM(S): 20 TABLET, DELAYED RELEASE ORAL at 05:04

## 2018-06-29 NOTE — PROGRESS NOTE ADULT - PROVIDER SPECIALTY LIST ADULT
Internal Medicine
Pain Medicine
Pain Medicine
Plastic Surgery
Surgery

## 2018-06-29 NOTE — PROGRESS NOTE ADULT - SUBJECTIVE AND OBJECTIVE BOX
GENERAL SURGERY DAILY PROGRESS NOTE:       Subjective:  No acute events overnight. This AM pt feels well overall. Pain well controlled. Has been tolerating diet.         Objective:    PE:  Gen: Laying in bed, in NAD  Resp: airway patent, respirations unlabored, no increased WoB  Abd: soft, ND, NT, incision c/d/i. MICH x3 in place, serosanguinous      Vital Signs Last 24 Hrs  T(C): 36.8 (2018 11:40), Max: 37.2 (2018 20:01)  T(F): 98.2 (2018 11:40), Max: 98.9 (2018 20:01)  HR: 51 (2018 11:40) (51 - 59)  BP: 132/74 (2018 11:40) (107/54 - 132/74)  BP(mean): 68 (2018 08:13) (68 - 68)  RR: 19 (2018 11:40) (17 - 19)  SpO2: 99% (2018 11:40) (94% - 99%)    I&O's Detail    2018 07:01  -  2018 07:00  --------------------------------------------------------  IN:    Lactated Ringers IV Bolus: 1000 mL    lactated ringers.: 2100 mL  Total IN: 3100 mL    OUT:    Bulb: 100 mL    Bulb: 143 mL    Bulb: 128 mL    Indwelling Catheter - Urethral: 4045 mL  Total OUT: 4416 mL    Total NET: -1316 mL      2018 07:01  -  2018 11:48  --------------------------------------------------------  IN:  Total IN: 0 mL    OUT:    Bulb: 20 mL    Bulb: 25 mL    Bulb: 20 mL    Indwelling Catheter - Urethral: 250 mL  Total OUT: 315 mL    Total NET: -315 mL          Daily     Daily Weight in k.8 (2018 00:32)    MEDICATIONS  (STANDING):  acetaminophen   Tablet. 650 milliGRAM(s) Oral every 6 hours  atorvastatin 10 milliGRAM(s) Oral at bedtime  dextrose 50% Injectable 12.5 Gram(s) IV Push once  dextrose 50% Injectable 25 Gram(s) IV Push once  dextrose 50% Injectable 25 Gram(s) IV Push once  doxazosin 1 milliGRAM(s) Oral at bedtime  heparin  Injectable 5000 Unit(s) SubCutaneous every 12 hours  insulin lispro (HumaLOG) corrective regimen sliding scale   SubCutaneous three times a day before meals  insulin lispro (HumaLOG) corrective regimen sliding scale   SubCutaneous at bedtime  levothyroxine 100 MICROGram(s) Oral daily  pantoprazole    Tablet 40 milliGRAM(s) Oral before breakfast    MEDICATIONS  (PRN):  dextrose 40% Gel 15 Gram(s) Oral once PRN Blood Glucose LESS THAN 70 milliGRAM(s)/deciliter  glucagon  Injectable 1 milliGRAM(s) IntraMuscular once PRN Glucose LESS THAN 70 milligrams/deciliter  oxyCODONE    IR 5 milliGRAM(s) Oral every 6 hours PRN Mild Pain (1 - 3)  oxyCODONE    IR 10 milliGRAM(s) Oral every 3 hours PRN Moderate Pain (4 - 6) to Severe Pain      LABS:                        10.7   6.42  )-----------( 175      ( 2018 05:15 )             32.1         136  |  96<L>  |  16  ----------------------------<  108<H>  3.8   |  28  |  0.75    Ca    8.2<L>      2018 05:15  Phos  3.8       Mg     2.1           PT/INR - ( 2018 06:30 )   PT: 12.6 SEC;   INR: 1.09          PTT - ( 2018 06:30 )  PTT:30.4 SEC      RADIOLOGY & ADDITIONAL STUDIES:
Anesthesia Pain Management Service: Day _2_ of Epidural    SUBJECTIVE: Patient doing well with PCEA and no problems. Surg team called & requested epid to be removed now.  Pain Scale Score:   Refer to charted pain scores    THERAPY:  [x ] Epidural Bupivacaine 0.0625% and Hydromorphone  		[X ] 10 micrograms/mL	[ ] 5 micrograms/mL  [ ] Epidural Bupivacaine 0.0625% and Fentanyl - 2 micrograms/mL  [ ] Epidural Ropivacaine 0.1% plain – 1 mg/mL  [ ] Patient Controlled Regional Anesthesia (PCRA) Ropivacaine  		[ ] 0.2%			[ ] 0.1%    MEDICATIONS  (STANDING):  acetaminophen   Tablet. 650 milliGRAM(s) Oral every 6 hours  atorvastatin 10 milliGRAM(s) Oral at bedtime  dextrose 5%. 1000 milliLiter(s) (50 mL/Hr) IV Continuous <Continuous>  dextrose 50% Injectable 12.5 Gram(s) IV Push once  dextrose 50% Injectable 25 Gram(s) IV Push once  dextrose 50% Injectable 25 Gram(s) IV Push once  doxazosin 1 milliGRAM(s) Oral at bedtime  heparin  Injectable 5000 Unit(s) SubCutaneous every 12 hours  insulin lispro (HumaLOG) corrective regimen sliding scale   SubCutaneous three times a day before meals  insulin lispro (HumaLOG) corrective regimen sliding scale   SubCutaneous at bedtime  lactated ringers. 1000 milliLiter(s) (100 mL/Hr) IV Continuous <Continuous>  levothyroxine 100 MICROGram(s) Oral daily  pantoprazole    Tablet 40 milliGRAM(s) Oral before breakfast    MEDICATIONS  (PRN):  dextrose 40% Gel 15 Gram(s) Oral once PRN Blood Glucose LESS THAN 70 milliGRAM(s)/deciliter  glucagon  Injectable 1 milliGRAM(s) IntraMuscular once PRN Glucose LESS THAN 70 milligrams/deciliter  oxyCODONE    IR 5 milliGRAM(s) Oral every 6 hours PRN Mild Pain (1 - 3)  oxyCODONE    IR 10 milliGRAM(s) Oral every 3 hours PRN Moderate Pain (4 - 6) to Severe Pain      OBJECTIVE:    Assessment of Catheter Site:	[ ] Left	[ ] Right  [x ] Epidural 	[ ] Femoral	      [ ] Saphenous   [ ] Supraclavicular   [ ] Other:    [x ] Dressing intact	[x ] Site non-tender	[ x] Site without erythema, discharge, edema  [x ] Epidural tubing and connection checked	[x] Gross neurological exam within normal limits  [X ] Catheter removed – tip intact		[ ] Afebrile	  [ ] Febrile: ___       [ X] see Temp under VS below)    PT/INR - ( 26 Jun 2018 05:15 )   PT: 12.3 SEC;   INR: 1.11          PTT - ( 26 Jun 2018 05:15 )  PTT:29.3 SEC                      10.7   6.42  )-----------( 175      ( 26 Jun 2018 05:15 )             32.1     Vital Signs Last 24 Hrs  T(C): 37.1 (06-26-18 @ 16:02), Max: 37.3 (06-26-18 @ 07:45)  T(F): 98.7 (06-26-18 @ 16:02), Max: 99.1 (06-26-18 @ 07:45)  HR: 59 (06-26-18 @ 16:02) (51 - 60)  BP: 109/65 (06-26-18 @ 16:02) (102/56 - 117/60)  BP(mean): --  RR: 17 (06-26-18 @ 16:02) (16 - 17)  SpO2: 98% (06-26-18 @ 16:02) (96% - 98%)      Sedation Score:	[x ] Alert	[ ] Drowsy	[ ] Arousable	[ ] Asleep	[ ] Unresponsive    Side Effects:	[x ] None	[ ] Nausea	[ ] Vomiting	[ ] Pruritus  		[ ] Weakness		[ ] Numbness	[ ] Other:    ASSESSMENT/ PLAN:    Therapy to  be:	[ ] Continue   [ X] Discontinued   [ X] Change to prn Analgesics    Documentation and Verification of current medications:  [ X ] Done	[ ] Not done, not eligible, reason:    Comments: Changed to IV or oral opioid medication at this point.    Progress Note written now but Patient was seen earlier.
BEATA MCELROYPMAN  [id number]    Subjective:  Patient is a 67y old  Male who presents with a chief complaint of incisional hernia. Doing well, tolerating diet, pain controlled    Objective:  T(C): 36.7 (06-27-18 @ 05:25), Max: 37.3 (06-26-18 @ 07:45)  HR: 58 (06-27-18 @ 05:25) (51 - 60)  BP: 125/59 (06-27-18 @ 05:25) (107/49 - 125/59)  RR: 18 (06-27-18 @ 05:25) (16 - 18)  SpO2: 94% (06-27-18 @ 05:25) (94% - 98%)  Wt(kg): --   06-26    136  |  96<L>  |  16  ----------------------------<  108<H>  3.8   |  28  |  0.75    Ca    8.2<L>      26 Jun 2018 05:15  Phos  3.8     06-26  Mg     2.1     06-26                          10.7   6.42  )-----------( 175      ( 26 Jun 2018 05:15 )             32.1       06-26 @ 07:01  -  06-27 @ 07:00  --------------------------------------------------------  IN: 3100 mL / OUT: 4416 mL / NET: -1316 mL      PHYSICAL EXAM:    General: NAd, resting comfortably in bed  Abd: Incision cdi, abdomen soft, appropriately tender, drain outputs serosanguinous      MEDICATIONS  (STANDING):  acetaminophen   Tablet. 650 milliGRAM(s) Oral every 6 hours  atorvastatin 10 milliGRAM(s) Oral at bedtime  dextrose 5%. 1000 milliLiter(s) (50 mL/Hr) IV Continuous <Continuous>  dextrose 50% Injectable 12.5 Gram(s) IV Push once  dextrose 50% Injectable 25 Gram(s) IV Push once  dextrose 50% Injectable 25 Gram(s) IV Push once  doxazosin 1 milliGRAM(s) Oral at bedtime  heparin  Injectable 5000 Unit(s) SubCutaneous every 12 hours  insulin lispro (HumaLOG) corrective regimen sliding scale   SubCutaneous three times a day before meals  insulin lispro (HumaLOG) corrective regimen sliding scale   SubCutaneous at bedtime  levothyroxine 100 MICROGram(s) Oral daily  pantoprazole    Tablet 40 milliGRAM(s) Oral before breakfast    MEDICATIONS  (PRN):  dextrose 40% Gel 15 Gram(s) Oral once PRN Blood Glucose LESS THAN 70 milliGRAM(s)/deciliter  glucagon  Injectable 1 milliGRAM(s) IntraMuscular once PRN Glucose LESS THAN 70 milligrams/deciliter  oxyCODONE    IR 5 milliGRAM(s) Oral every 6 hours PRN Mild Pain (1 - 3)  oxyCODONE    IR 10 milliGRAM(s) Oral every 3 hours PRN Moderate Pain (4 - 6) to Severe Pain      Assessment/Plan:  Patient is a 67y old  Male who presents with a chief complaint of incisional hernia, now POD2 from repair. Doing well, should continue to ambulate  - Continue drains  - Continue binder  - Dispo planning  - [place] per gen. surg  - Diet per gen. surg
Chief complaint: s/p hernia repair    Allergies:  No Known Allergies      PAST MEDICAL & SURGICAL HISTORY:  Constipation  Iron deficiency anemia, unspecified iron deficiency anemia type  Gastric mass: ulcerated mass in gastric cardia with small perigastric nodes on endoscopy.  Hypothyroidism  Type 2 diabetes mellitus without complication, unspecified long term insulin use status: no BS monitoring  Essential hypertension: was on losartan, now diet control  History of gastric surgery: 2017  Port-a-cath in place: right chest wall  H/O umbilical hernia repair      FAMILY HISTORY:  Family history of ischemic heart disease (IHD): father  Family history of ovarian cancer: mother      REVIEW OF SYSTEMS:  CONSTITUTIONAL: No fever, weight loss, or fatigue  EYES: No eye pain, visual disturbances, or discharge  NECK: No pain or stiffness  RESPIRATORY: No cough or wheezing, no shortness of breath  CARDIOVASCULAR: No chest pain, palpitations, dizziness, or leg swelling  GASTROINTESTINAL: minimal postop abd pain. no nausea, no vomiting, diarrhea or constipation  GENITOURINARY: No dysuria, urinary frequency or urgency, no hematuria  NEUROLOGICAL: No headaches, memory loss, loss of strength, numbness, or tremors  SKIN: No itching, burning, rashes, or lesions   MUSCULOSKELETAL: No joint pain or swelling; No muscle, back, or extremity pain      Medications:  MEDICATIONS  (STANDING):  acetaminophen   Tablet. 650 milliGRAM(s) Oral every 6 hours  atorvastatin 10 milliGRAM(s) Oral at bedtime  dextrose 50% Injectable 12.5 Gram(s) IV Push once  dextrose 50% Injectable 25 Gram(s) IV Push once  dextrose 50% Injectable 25 Gram(s) IV Push once  doxazosin 1 milliGRAM(s) Oral at bedtime  heparin  Injectable 5000 Unit(s) SubCutaneous every 12 hours  insulin lispro (HumaLOG) corrective regimen sliding scale   SubCutaneous three times a day before meals  insulin lispro (HumaLOG) corrective regimen sliding scale   SubCutaneous at bedtime  levothyroxine 100 MICROGram(s) Oral daily  pantoprazole    Tablet 40 milliGRAM(s) Oral before breakfast    MEDICATIONS  (PRN):  dextrose 40% Gel 15 Gram(s) Oral once PRN Blood Glucose LESS THAN 70 milliGRAM(s)/deciliter  glucagon  Injectable 1 milliGRAM(s) IntraMuscular once PRN Glucose LESS THAN 70 milligrams/deciliter  oxyCODONE    IR 5 milliGRAM(s) Oral every 6 hours PRN Mild Pain (1 - 3)  oxyCODONE    IR 10 milliGRAM(s) Oral every 3 hours PRN Moderate Pain (4 - 6) to Severe Pain    	    PHYSICAL EXAM:  T(C): 36.8 (06-27-18 @ 11:40), Max: 37.2 (06-26-18 @ 20:01)  HR: 51 (06-27-18 @ 11:40) (51 - 59)  BP: 132/74 (06-27-18 @ 11:40) (107/54 - 132/74)  RR: 19 (06-27-18 @ 11:40) (17 - 19)  SpO2: 99% (06-27-18 @ 11:40) (94% - 99%)  Wt(kg): --  I&O's Summary    26 Jun 2018 07:01  -  27 Jun 2018 07:00  --------------------------------------------------------  IN: 3100 mL / OUT: 4416 mL / NET: -1316 mL    27 Jun 2018 07:01  -  27 Jun 2018 13:27  --------------------------------------------------------  IN: 0 mL / OUT: 315 mL / NET: -315 mL    Appearance: Normal	  HEENT:   NCAT, PERRL, EOMI	  Lymphatic: No lymphadenopathy  Cardiovascular: Normal S1 S2, RRR  Respiratory: Lungs clear to auscultation BL  Psychiatry: A & O x 3, Mood & affect appropriate  Gastrointestinal:  Soft, mildly-tender, + BS  Skin: No rashes, No ecchymoses, No cyanosis	  Neurologic: Non-focal  Extremities: Normal range of motion, No clubbing, cyanosis or edema      LABS:	 	    CARDIAC MARKERS:                                10.7   6.42  )-----------( 175      ( 26 Jun 2018 05:15 )             32.1     06-26    136  |  96<L>  |  16  ----------------------------<  108<H>  3.8   |  28  |  0.75    Ca    8.2<L>      26 Jun 2018 05:15  Phos  3.8     06-26  Mg     2.1     06-26      proBNP:   Lipid Profile:   HgA1c:   TSH:
Chief complaint: s/p hernia repair  no acute events    Allergies:  No Known Allergies      PAST MEDICAL & SURGICAL HISTORY:  Constipation  Iron deficiency anemia, unspecified iron deficiency anemia type  Gastric mass: ulcerated mass in gastric cardia with small perigastric nodes on endoscopy.  Hypothyroidism  Type 2 diabetes mellitus without complication, unspecified long term insulin use status: no BS monitoring  Essential hypertension: was on losartan, now diet control  History of gastric surgery: 2017  Port-a-cath in place: right chest wall  H/O umbilical hernia repair      FAMILY HISTORY:  Family history of ischemic heart disease (IHD): father  Family history of ovarian cancer: mother      REVIEW OF SYSTEMS:  CONSTITUTIONAL: No fever, weight loss, or fatigue  EYES: No eye pain, visual disturbances, or discharge  NECK: No pain or stiffness  RESPIRATORY: No cough or wheezing, no shortness of breath  CARDIOVASCULAR: No chest pain, palpitations, dizziness, or leg swelling  GASTROINTESTINAL: minimal postop abd pain. no nausea, no vomiting, diarrhea or constipation  GENITOURINARY: No dysuria, urinary frequency or urgency, no hematuria  NEUROLOGICAL: No headaches, memory loss, loss of strength, numbness, or tremors  SKIN: No itching, burning, rashes, or lesions   MUSCULOSKELETAL: No joint pain or swelling; No muscle, back, or extremity pain      Medications:  MEDICATIONS  (STANDING):  atorvastatin 10 milliGRAM(s) Oral at bedtime  dextrose 50% Injectable 12.5 Gram(s) IV Push once  dextrose 50% Injectable 25 Gram(s) IV Push once  dextrose 50% Injectable 25 Gram(s) IV Push once  doxazosin 1 milliGRAM(s) Oral at bedtime  heparin  Injectable 5000 Unit(s) SubCutaneous every 12 hours  insulin lispro (HumaLOG) corrective regimen sliding scale   SubCutaneous three times a day before meals  insulin lispro (HumaLOG) corrective regimen sliding scale   SubCutaneous at bedtime  levothyroxine 100 MICROGram(s) Oral daily  pantoprazole    Tablet 40 milliGRAM(s) Oral before breakfast    MEDICATIONS  (PRN):  dextrose 40% Gel 15 Gram(s) Oral once PRN Blood Glucose LESS THAN 70 milliGRAM(s)/deciliter  glucagon  Injectable 1 milliGRAM(s) IntraMuscular once PRN Glucose LESS THAN 70 milligrams/deciliter  oxyCODONE    IR 5 milliGRAM(s) Oral every 6 hours PRN Mild Pain (1 - 3)  oxyCODONE    IR 10 milliGRAM(s) Oral every 3 hours PRN Moderate Pain (4 - 6) to Severe Pain    	    PHYSICAL EXAM:  T(C): 36.7 (06-28-18 @ 12:07), Max: 37.1 (06-27-18 @ 21:56)  HR: 63 (06-28-18 @ 12:07) (56 - 63)  BP: 114/60 (06-28-18 @ 12:07) (105/65 - 124/74)  RR: 18 (06-28-18 @ 12:07) (18 - 18)  SpO2: 96% (06-28-18 @ 12:07) (95% - 98%)  Wt(kg): --  I&O's Summary    27 Jun 2018 07:01  -  28 Jun 2018 07:00  --------------------------------------------------------  IN: 0 mL / OUT: 2170 mL / NET: -2170 mL    28 Jun 2018 07:01  -  28 Jun 2018 16:14  --------------------------------------------------------  IN: 0 mL / OUT: 435 mL / NET: -435 mL      Appearance: Normal	  HEENT:   NCAT, PERRL, EOMI	  Lymphatic: No lymphadenopathy  Cardiovascular: Normal S1 S2, RRR  Respiratory: Lungs clear to auscultation BL  Psychiatry: A & O x 3, Mood & affect appropriate  Gastrointestinal:  Soft, mildly-tender, + BS  Skin: No rashes, No ecchymoses, No cyanosis	  Neurologic: Non-focal  Extremities: Normal range of motion, No clubbing, cyanosis or edema    LABS:	 	    CARDIAC MARKERS:                                10.2   6.34  )-----------( 173      ( 28 Jun 2018 06:09 )             30.9     06-28    138  |  103  |  9   ----------------------------<  111<H>  3.8   |  27  |  0.61    Ca    8.1<L>      28 Jun 2018 06:09  Phos  2.7     06-28  Mg     2.2     06-28      proBNP:   Lipid Profile:   HgA1c:   TSH:
Chief complaint: s/p hernia repair  no acute events    Allergies:  No Known Allergies      PAST MEDICAL & SURGICAL HISTORY:  Constipation  Iron deficiency anemia, unspecified iron deficiency anemia type  Gastric mass: ulcerated mass in gastric cardia with small perigastric nodes on endoscopy.  Hypothyroidism  Type 2 diabetes mellitus without complication, unspecified long term insulin use status: no BS monitoring  Essential hypertension: was on losartan, now diet control  History of gastric surgery: 2017  Port-a-cath in place: right chest wall  H/O umbilical hernia repair      FAMILY HISTORY:  Family history of ischemic heart disease (IHD): father  Family history of ovarian cancer: mother      REVIEW OF SYSTEMS:  CONSTITUTIONAL: No fever, weight loss, or fatigue  EYES: No eye pain, visual disturbances, or discharge  NECK: No pain or stiffness  RESPIRATORY: No cough or wheezing, no shortness of breath  CARDIOVASCULAR: No chest pain, palpitations, dizziness, or leg swelling  GASTROINTESTINAL: minimal postop abd pain. no nausea, no vomiting, diarrhea or constipation  GENITOURINARY: No dysuria, urinary frequency or urgency, no hematuria  NEUROLOGICAL: No headaches, memory loss, loss of strength, numbness, or tremors  SKIN: No itching, burning, rashes, or lesions   MUSCULOSKELETAL: No joint pain or swelling; No muscle, back, or extremity pain      Medications:  MEDICATIONS  (STANDING):  atorvastatin 10 milliGRAM(s) Oral at bedtime  dextrose 50% Injectable 12.5 Gram(s) IV Push once  dextrose 50% Injectable 25 Gram(s) IV Push once  dextrose 50% Injectable 25 Gram(s) IV Push once  doxazosin 1 milliGRAM(s) Oral at bedtime  heparin  Injectable 5000 Unit(s) SubCutaneous every 12 hours  insulin lispro (HumaLOG) corrective regimen sliding scale   SubCutaneous three times a day before meals  insulin lispro (HumaLOG) corrective regimen sliding scale   SubCutaneous at bedtime  levothyroxine 100 MICROGram(s) Oral daily  pantoprazole    Tablet 40 milliGRAM(s) Oral before breakfast    MEDICATIONS  (PRN):  dextrose 40% Gel 15 Gram(s) Oral once PRN Blood Glucose LESS THAN 70 milliGRAM(s)/deciliter  glucagon  Injectable 1 milliGRAM(s) IntraMuscular once PRN Glucose LESS THAN 70 milligrams/deciliter  oxyCODONE    IR 5 milliGRAM(s) Oral every 6 hours PRN Mild Pain (1 - 3)  oxyCODONE    IR 10 milliGRAM(s) Oral every 3 hours PRN Moderate Pain (4 - 6) to Severe Pain    	    PHYSICAL EXAM:  T(C): 36.8 (06-29-18 @ 09:45), Max: 36.9 (06-28-18 @ 16:25)  HR: 73 (06-29-18 @ 09:45) (56 - 73)  BP: 108/85 (06-29-18 @ 09:45) (108/85 - 145/74)  RR: 18 (06-29-18 @ 09:45) (16 - 18)  SpO2: 97% (06-29-18 @ 09:45) (94% - 99%)  Wt(kg): --  I&O's Summary    28 Jun 2018 07:01  -  29 Jun 2018 07:00  --------------------------------------------------------  IN: 240 mL / OUT: 1720 mL / NET: -1480 mL    29 Jun 2018 07:01  -  29 Jun 2018 10:11  --------------------------------------------------------  IN: 0 mL / OUT: 30 mL / NET: -30 mL      Appearance: Normal	  HEENT:   NCAT, PERRL, EOMI	  Lymphatic: No lymphadenopathy  Cardiovascular: Normal S1 S2, RRR  Respiratory: Lungs clear to auscultation BL  Psychiatry: A & O x 3, Mood & affect appropriate  Gastrointestinal:  Soft, mildly-tender, + BS  Skin: No rashes, No ecchymoses, No cyanosis	  Neurologic: Non-focal  Extremities: Normal range of motion, No clubbing, cyanosis or edema    LABS:	 	    CARDIAC MARKERS:                                10.2   6.34  )-----------( 173      ( 28 Jun 2018 06:09 )             30.9     06-28    138  |  103  |  9   ----------------------------<  111<H>  3.8   |  27  |  0.61    Ca    8.1<L>      28 Jun 2018 06:09  Phos  2.7     06-28  Mg     2.2     06-28      proBNP:   Lipid Profile:   HgA1c:   TSH:
D Team Surgery     Post-operative check     SUBJECTIVE: Pt seen and examined at bedside. Pt reports feeling well.  Pain is controlled with: PCEA- hydromophone. Pt denies fever, chills, nausea, vomiting, abdominal pain, CP, SOB and/or difficulty breathing.    Vitals: T(C): 36.7 (06-25-18 @ 16:10), Max: 36.7 (06-25-18 @ 14:00)  HR: 60 (06-25-18 @ 16:10)  BP: 122/60 (06-25-18 @ 16:10)  RR: 16 (06-25-18 @ 16:10)  SpO2: 99% (06-25-18 @ 16:10)    I's and O's:   06-25 @ 07:01  -  06-25 @ 17:16  --------------------------------------------------------  IN:    lactated ringers.: 500 mL    Oral Fluid: 120 mL  Total IN: 620 mL    OUT:    Bulb: 15 mL serosanguinous     Bulb: 15 mL  serosanguinous     Bulb: 15 mL  serosanguinous     Indwelling Catheter - Urethral: 260 mL  Total OUT: 305 mL    Total NET: 315 mL      PHYSICAL EXAM:   General: Pt in NAD, A & O x 3  Heart: S1, S2 RRR, No murmurs, rubs or gallops  Lungs: CTA equal B/L, No wheezes, rales or ronchi  Abdomen:  soft, non tender, nondistended,  No palpable masses, no gaurding, no rebound tenderness.   Midline Incision: clean/dry/intact- no staples, sutures, bleeding or discharge noted.    A/P BEATA MCGEE 67y Male s/p Ventral hernia repair with mesh, b/l  sparing component separation POD 0    - Diet: Clear liquid diet, ADAT  - Continue IVF  @ mL/hr  - Pain control: Continue PCEA  - Continue to monitor urine output. Likely D/C tmrw   - DVT Prophylaxis- SQH  -Follow up AM CBC and BMP    YARELY Motley  #[id number]
Day _2__ of Anesthesia Pain Management Service    Allergies    No Known Allergies    Intolerances        SUBJECTIVE: "I'm doing ok"   Pain Scale Score	At rest: __2_ 	With Activity: ___ 	[  ] Refer to charted pain scores    THERAPY:  [X] Epidural Bupivacaine 0.0625% and Hydromorphone  		[X] 10 micrograms/mL	[ ] 5 micrograms/mL  [ ] Epidural Bupivacaine 0.0625% and Fentanyl - 2 micrograms/mL  [ ] Epidural Ropivacaine 0.1% plain – 1 mg/mL  [ ] Patient Controlled Regional Anesthesia (PCRA) Ropivacaine  		[ ] 0.2%			[ ] 0.1%    Demand dose _3mL_ lockout _15min_ (minutes) Continuous Rate _6mL_ Total: _115.9ml__ Daily      MEDICATIONS  (STANDING):  atorvastatin 10 milliGRAM(s) Oral at bedtime  dextrose 5%. 1000 milliLiter(s) (50 mL/Hr) IV Continuous <Continuous>  dextrose 50% Injectable 12.5 Gram(s) IV Push once  dextrose 50% Injectable 25 Gram(s) IV Push once  dextrose 50% Injectable 25 Gram(s) IV Push once  heparin  Injectable 5000 Unit(s) SubCutaneous every 12 hours  HYDROmorphone (10 MICROgram(s)/mL) + BUpivacaine 0.0625% in 0.9% Sodium Chloride PCEA 250 milliLiter(s) Epidural PCA Continuous  insulin lispro (HumaLOG) corrective regimen sliding scale   SubCutaneous three times a day before meals  insulin lispro (HumaLOG) corrective regimen sliding scale   SubCutaneous at bedtime  lactated ringers. 1000 milliLiter(s) (100 mL/Hr) IV Continuous <Continuous>  levothyroxine 100 MICROGram(s) Oral daily  pantoprazole    Tablet 40 milliGRAM(s) Oral before breakfast  tamsulosin 0.4 milliGRAM(s) Oral at bedtime    MEDICATIONS  (PRN):  dextrose 40% Gel 15 Gram(s) Oral once PRN Blood Glucose LESS THAN 70 milliGRAM(s)/deciliter  glucagon  Injectable 1 milliGRAM(s) IntraMuscular once PRN Glucose LESS THAN 70 milligrams/deciliter  HYDROmorphone  Injectable 0.5 milliGRAM(s) IV Push every 3 hours PRN Break Through Pain  naloxone Injectable 0.1 milliGRAM(s) IV Push every 3 minutes PRN For ANY of the following changes in patient status:  A. RR LESS THAN 10 breaths per minute, B. Oxygen saturation LESS THAN 90%, C. Sedation score of 6  ondansetron Injectable 4 milliGRAM(s) IV Push every 6 hours PRN Nausea      OBJECTIVE: Patient A&Ox3, NAD, sitting up in chair  Assessment of Catheter Site:	[ ] Left	[ ] Right  [X] Epidural 	[ ] Femoral	      [ ] Saphenous   [ ] Supraclavicular   [ ] Other:    [X] Dressing intact	[X] Site non-tender	[X] Site without erythema, discharge, edema  [ ] Epidural tubing and connection checked	[X] Gross neurological exam within normal limits  [ ] Catheter removed – tip intact		    [X ] Temperatue:  ___ [TMax: ]    Sedation Score:	[X] Alert	[ ] Drowsy	[ ] Arousable	[ ] Asleep	[ ] Unresponsive    Side Effects:	[X] None	[ ] Nausea	[ ] Vomiting	[ ] Pruritus  		[ ] Weakness		[ ] Numbness	[ ] Other:    PT/INR - ( 26 Jun 2018 05:15 )   PT: 12.3 SEC;   INR: 1.11          PTT - ( 26 Jun 2018 05:15 )  PTT:29.3 SEC                          10.7   6.42  )-----------( 175      ( 26 Jun 2018 05:15 )             32.1       06-26    136  |  96<L>  |  16  ----------------------------<  108<H>  3.8   |  28  |  0.75    Ca    8.2<L>      26 Jun 2018 05:15  Phos  3.8     06-26  Mg     2.1     06-26        ASSESSMENT/ PLAN:    Therapy to  be:	[X] Continue   [ ] Discontinued   [ ] Change to prn Analgesics    Documentation and Verification of current medications:  [X] Done	[ ] Not done, not eligible  [ ] Not done, reason not given    [ ]  [place]  Reviewed and Copied to Chart    COMMENTS: continue current therapy   Dressing reinforced.  systemic anticoagulant sign placed above bed.
GENERAL SURGERY DAILY PROGRESS NOTE:       Subjective:  No acute events overnight, tolerating diet, pain controlled.    Objective:  T(C): 36.8 (06-28-18 @ 05:06), Max: 37.1 (06-27-18 @ 16:00)  HR: 60 (06-28-18 @ 05:06) (51 - 72)  BP: 120/67 (06-28-18 @ 05:06) (105/65 - 133/65)  RR: 18 (06-28-18 @ 05:06) (18 - 19)  SpO2: 95% (06-28-18 @ 05:06) (95% - 99%)  Wt(kg): --  06-27 @ 07:01  -  06-28 @ 07:00  --------------------------------------------------------  IN:  Total IN: 0 mL    OUT:    Bulb: 75 mL    Bulb: 90 mL    Bulb: 75 mL    Indwelling Catheter - Urethral: 250 mL    Voided: 1680 mL  Total OUT: 2170 mL    Total NET: -2170 mL      PE:  Gen: Laying in bed, in NAD  Resp: airway patent, respirations unlabored, no increased WoB  Abd: soft, ND, NT, incision c/d/i. MICH x3 in place, serosanguinous      LABS:           PT/INR - ( 27 Jun 2018 06:30 )   PT: 12.6 SEC;   INR: 1.09          PTT - ( 27 Jun 2018 06:30 )  PTT:30.4 SEC          RADIOLOGY, EKG & ADDITIONAL TESTS: Reviewed.
GENERAL SURGERY DAILY PROGRESS NOTE:       Subjective:  No acute events overnight. This AM pt feels well overall. Pain well controlled. Tolerating regular diet       Objective:    PE:  Gen: Laying in bed, in NAD  Resp: airway patent, respirations unlabored, no increased WoB  Abd: soft, ND, NT, incision c/d/i. MICH x3 in place, serosanguinous    Vital Signs Last 24 Hrs  T(C): 36.9 (29 Jun 2018 05:01), Max: 36.9 (28 Jun 2018 16:25)  T(F): 98.4 (29 Jun 2018 05:01), Max: 98.4 (28 Jun 2018 16:25)  HR: 62 (29 Jun 2018 05:01) (56 - 63)  BP: 145/74 (29 Jun 2018 05:01) (114/60 - 145/74)  BP(mean): 85 (28 Jun 2018 10:02) (85 - 85)  RR: 16 (29 Jun 2018 05:01) (16 - 18)  SpO2: 94% (29 Jun 2018 05:01) (94% - 99%)    I&O's Detail    28 Jun 2018 07:01  -  29 Jun 2018 07:00  --------------------------------------------------------  IN:    Oral Fluid: 240 mL  Total IN: 240 mL    OUT:    Bulb: 65 mL    Bulb: 90 mL    Bulb: 65 mL    Voided: 1500 mL  Total OUT: 1720 mL    Total NET: -1480 mL      29 Jun 2018 07:01  -  29 Jun 2018 09:36  --------------------------------------------------------  IN:  Total IN: 0 mL    OUT:    Bulb: 10 mL    Bulb: 5 mL    Bulb: 15 mL  Total OUT: 30 mL    Total NET: -30 mL          Daily     Daily     MEDICATIONS  (STANDING):  atorvastatin 10 milliGRAM(s) Oral at bedtime  dextrose 50% Injectable 12.5 Gram(s) IV Push once  dextrose 50% Injectable 25 Gram(s) IV Push once  dextrose 50% Injectable 25 Gram(s) IV Push once  doxazosin 1 milliGRAM(s) Oral at bedtime  heparin  Injectable 5000 Unit(s) SubCutaneous every 12 hours  insulin lispro (HumaLOG) corrective regimen sliding scale   SubCutaneous three times a day before meals  insulin lispro (HumaLOG) corrective regimen sliding scale   SubCutaneous at bedtime  levothyroxine 100 MICROGram(s) Oral daily  pantoprazole    Tablet 40 milliGRAM(s) Oral before breakfast    MEDICATIONS  (PRN):  dextrose 40% Gel 15 Gram(s) Oral once PRN Blood Glucose LESS THAN 70 milliGRAM(s)/deciliter  glucagon  Injectable 1 milliGRAM(s) IntraMuscular once PRN Glucose LESS THAN 70 milligrams/deciliter  oxyCODONE    IR 5 milliGRAM(s) Oral every 6 hours PRN Mild Pain (1 - 3)  oxyCODONE    IR 10 milliGRAM(s) Oral every 3 hours PRN Moderate Pain (4 - 6) to Severe Pain      LABS:                        10.2   6.34  )-----------( 173      ( 28 Jun 2018 06:09 )             30.9     06-28    138  |  103  |  9   ----------------------------<  111<H>  3.8   |  27  |  0.61    Ca    8.1<L>      28 Jun 2018 06:09  Phos  2.7     06-28  Mg     2.2     06-28            RADIOLOGY & ADDITIONAL STUDIES:
Plastic Surgery Progress Note    S: Patient seen and examined.  doing well.  ambulated. david CLDs    Vital Signs Last 24 Hrs  T(C): 36.7 (26 Jun 2018 04:45), Max: 36.7 (25 Jun 2018 14:00)  T(F): 98 (26 Jun 2018 04:45), Max: 98.1 (25 Jun 2018 14:00)  HR: 56 (26 Jun 2018 04:45) (52 - 72)  BP: 105/54 (26 Jun 2018 04:45) (96/66 - 134/86)  BP(mean): --  RR: 16 (26 Jun 2018 04:45) (10 - 18)  SpO2: 98% (26 Jun 2018 04:45) (96% - 99%)  I&O's Detail    25 Jun 2018 07:01  -  26 Jun 2018 06:53  --------------------------------------------------------  IN:    lactated ringers.: 500 mL    Oral Fluid: 120 mL  Total IN: 620 mL    OUT:    Bulb: 58 mL    Bulb: 40 mL    Bulb: 38 mL    Indwelling Catheter - Urethral: 1310 mL  Total OUT: 1446 mL    Total NET: -826 mL          Physical Exam:  General: Awake and alert, NAD  Abd: incision CDI, drains SS, soft
Plastic Surgery Progress Note    S: Patient seen and examined. doing well.     Vital Signs Last 24 Hrs  T(C): 36.8 (28 Jun 2018 05:06), Max: 37.1 (27 Jun 2018 16:00)  T(F): 98.3 (28 Jun 2018 05:06), Max: 98.8 (27 Jun 2018 21:56)  HR: 60 (28 Jun 2018 05:06) (51 - 72)  BP: 120/67 (28 Jun 2018 05:06) (105/65 - 133/65)  BP(mean): 68 (27 Jun 2018 08:13) (68 - 68)  RR: 18 (28 Jun 2018 05:06) (18 - 19)  SpO2: 95% (28 Jun 2018 05:06) (95% - 99%)  I&O's Detail    27 Jun 2018 07:01  -  28 Jun 2018 07:00  --------------------------------------------------------  IN:  Total IN: 0 mL    OUT:    Bulb: 75 mL    Bulb: 90 mL    Bulb: 75 mL    Indwelling Catheter - Urethral: 250 mL    Voided: 1680 mL  Total OUT: 2170 mL    Total NET: -2170 mL          Physical Exam:  General: Awake and alert, NAD  Abd: soft, nt, incision CDI, drains SS
Plastic Surgery Progress Note    S: Patient seen and examined. doing well. No acute events overnight    Vital Signs Last 24 Hrs  T(C): 36.8 (28 Jun 2018 05:06), Max: 37.1 (27 Jun 2018 16:00)  T(F): 98.3 (28 Jun 2018 05:06), Max: 98.8 (27 Jun 2018 21:56)  HR: 60 (28 Jun 2018 05:06) (51 - 72)  BP: 120/67 (28 Jun 2018 05:06) (105/65 - 133/65)  BP(mean): 68 (27 Jun 2018 08:13) (68 - 68)  RR: 18 (28 Jun 2018 05:06) (18 - 19)  SpO2: 95% (28 Jun 2018 05:06) (95% - 99%)  I&O's Detail    27 Jun 2018 07:01  -  28 Jun 2018 07:00  --------------------------------------------------------  IN:  Total IN: 0 mL    OUT:    Bulb: 75 mL    Bulb: 90 mL    Bulb: 75 mL    Indwelling Catheter - Urethral: 250 mL    Voided: 1680 mL  Total OUT: 2170 mL    Total NET: -2170 mL          Physical Exam:  General: Awake and alert, NAD  Abd: soft, nt, incision CDI, drains SS
Pt doing well this morning. Ambulating and tolerating regular diet. AVSS    On exam, abdomen is soft. Incision intact. Drains ss.    Plan  Continue drain care  Abdominal binder  Pain control  [place]ca and dc planning per Dr. Cabello
Pt doing well this morning. No complaints. Sitting up in bed, eating breakfast. AVSS    On exam, abdomen is soft. Incision cdi. Drains ss    Plan  Continue abdominal binder  Drain care  Pain control  DVT ppx  Advance per primary team

## 2018-06-29 NOTE — PROGRESS NOTE ADULT - ASSESSMENT
67y Male s/p Ventral hernia repair with mesh, b/l  sparing component separation    - Continue karri reg diet (x of gastrectomy)  - Appreciate PRS reccs  - continue with abdominal binder  - encourage ambulation, IS  - DVT ppx  - drain care teaching  - Dispo: DC today

## 2018-06-29 NOTE — PROGRESS NOTE ADULT - ASSESSMENT
Patient is a 67y old  Male who presents with a chief complaint of incisional hernia, now POD3 from repair. Doing well, should continue to ambulate  - Continue drains (MICH 65/90/65)  - Continue binder  - OK to dc from PRS perspective

## 2018-07-02 LAB — SURGICAL PATHOLOGY STUDY: SIGNIFICANT CHANGE UP

## 2018-07-17 ENCOUNTER — APPOINTMENT (OUTPATIENT)
Dept: INFUSION THERAPY | Facility: HOSPITAL | Age: 68
End: 2018-07-17

## 2018-07-17 ENCOUNTER — LABORATORY RESULT (OUTPATIENT)
Age: 68
End: 2018-07-17

## 2018-07-17 ENCOUNTER — RESULT REVIEW (OUTPATIENT)
Age: 68
End: 2018-07-17

## 2018-07-17 ENCOUNTER — OUTPATIENT (OUTPATIENT)
Dept: OUTPATIENT SERVICES | Facility: HOSPITAL | Age: 68
LOS: 1 days | Discharge: HOME | End: 2018-07-17

## 2018-07-17 DIAGNOSIS — Z98.890 OTHER SPECIFIED POSTPROCEDURAL STATES: Chronic | ICD-10-CM

## 2018-07-17 DIAGNOSIS — Z95.828 PRESENCE OF OTHER VASCULAR IMPLANTS AND GRAFTS: Chronic | ICD-10-CM

## 2018-07-17 DIAGNOSIS — C16.9 MALIGNANT NEOPLASM OF STOMACH, UNSPECIFIED: ICD-10-CM

## 2018-07-17 LAB
BASOPHILS # BLD AUTO: 0 K/UL — SIGNIFICANT CHANGE UP (ref 0–0.2)
BASOPHILS NFR BLD AUTO: 0.7 % — SIGNIFICANT CHANGE UP (ref 0–2)
EOSINOPHIL # BLD AUTO: 0.9 K/UL — HIGH (ref 0–0.5)
EOSINOPHIL NFR BLD AUTO: 14.8 % — HIGH (ref 0–6)
HCT VFR BLD CALC: 34.6 % — LOW (ref 39–50)
HGB BLD-MCNC: 11.4 G/DL — LOW (ref 13–17)
LYMPHOCYTES # BLD AUTO: 1.6 K/UL — SIGNIFICANT CHANGE UP (ref 1–3.3)
LYMPHOCYTES # BLD AUTO: 26.3 % — SIGNIFICANT CHANGE UP (ref 13–44)
MCHC RBC-ENTMCNC: 32.8 G/DL — SIGNIFICANT CHANGE UP (ref 32–36)
MCHC RBC-ENTMCNC: 34.7 PG — HIGH (ref 27–34)
MCV RBC AUTO: 106 FL — HIGH (ref 80–100)
MONOCYTES # BLD AUTO: 0.5 K/UL — SIGNIFICANT CHANGE UP (ref 0–0.9)
MONOCYTES NFR BLD AUTO: 7.6 % — SIGNIFICANT CHANGE UP (ref 2–14)
NEUTROPHILS # BLD AUTO: 3.1 K/UL — SIGNIFICANT CHANGE UP (ref 1.8–7.4)
NEUTROPHILS NFR BLD AUTO: 50.7 % — SIGNIFICANT CHANGE UP (ref 43–77)
PLATELET # BLD AUTO: 191 K/UL — SIGNIFICANT CHANGE UP (ref 150–400)
RBC # BLD: 3.28 M/UL — LOW (ref 4.2–5.8)
RBC # FLD: 13 % — SIGNIFICANT CHANGE UP (ref 10.3–14.5)
WBC # BLD: 6.1 K/UL — SIGNIFICANT CHANGE UP (ref 3.8–10.5)
WBC # FLD AUTO: 6.1 K/UL — SIGNIFICANT CHANGE UP (ref 3.8–10.5)

## 2018-07-23 ENCOUNTER — APPOINTMENT (OUTPATIENT)
Dept: HEMATOLOGY ONCOLOGY | Facility: CLINIC | Age: 68
End: 2018-07-23
Payer: MEDICARE

## 2018-07-23 VITALS
DIASTOLIC BLOOD PRESSURE: 80 MMHG | TEMPERATURE: 98.8 F | HEART RATE: 54 BPM | BODY MASS INDEX: 21.77 KG/M2 | SYSTOLIC BLOOD PRESSURE: 161 MMHG | OXYGEN SATURATION: 98 % | RESPIRATION RATE: 16 BRPM | WEIGHT: 156.09 LBS

## 2018-07-23 PROCEDURE — 99214 OFFICE O/P EST MOD 30 MIN: CPT

## 2018-08-20 ENCOUNTER — APPOINTMENT (OUTPATIENT)
Dept: SURGICAL ONCOLOGY | Facility: CLINIC | Age: 68
End: 2018-08-20
Payer: MEDICARE

## 2018-08-20 VITALS
DIASTOLIC BLOOD PRESSURE: 92 MMHG | SYSTOLIC BLOOD PRESSURE: 154 MMHG | HEIGHT: 71 IN | WEIGHT: 151 LBS | HEART RATE: 62 BPM | RESPIRATION RATE: 15 BRPM | BODY MASS INDEX: 21.14 KG/M2

## 2018-08-20 PROCEDURE — 99024 POSTOP FOLLOW-UP VISIT: CPT

## 2018-08-21 ENCOUNTER — TRANSCRIPTION ENCOUNTER (OUTPATIENT)
Age: 68
End: 2018-08-21

## 2018-08-24 ENCOUNTER — OUTPATIENT (OUTPATIENT)
Dept: OUTPATIENT SERVICES | Facility: HOSPITAL | Age: 68
LOS: 1 days | Discharge: HOME | End: 2018-08-24

## 2018-08-24 DIAGNOSIS — Z98.890 OTHER SPECIFIED POSTPROCEDURAL STATES: Chronic | ICD-10-CM

## 2018-08-24 DIAGNOSIS — Z95.828 PRESENCE OF OTHER VASCULAR IMPLANTS AND GRAFTS: Chronic | ICD-10-CM

## 2018-08-24 DIAGNOSIS — C16.9 MALIGNANT NEOPLASM OF STOMACH, UNSPECIFIED: ICD-10-CM

## 2018-09-03 ENCOUNTER — FORM ENCOUNTER (OUTPATIENT)
Age: 68
End: 2018-09-03

## 2018-09-04 ENCOUNTER — LABORATORY RESULT (OUTPATIENT)
Age: 68
End: 2018-09-04

## 2018-09-04 ENCOUNTER — APPOINTMENT (OUTPATIENT)
Dept: CT IMAGING | Facility: IMAGING CENTER | Age: 68
End: 2018-09-04
Payer: MEDICARE

## 2018-09-04 ENCOUNTER — OUTPATIENT (OUTPATIENT)
Dept: OUTPATIENT SERVICES | Facility: HOSPITAL | Age: 68
LOS: 1 days | End: 2018-09-04
Payer: MEDICARE

## 2018-09-04 ENCOUNTER — APPOINTMENT (OUTPATIENT)
Dept: INFUSION THERAPY | Facility: HOSPITAL | Age: 68
End: 2018-09-04

## 2018-09-04 ENCOUNTER — RESULT REVIEW (OUTPATIENT)
Age: 68
End: 2018-09-04

## 2018-09-04 DIAGNOSIS — Z95.828 PRESENCE OF OTHER VASCULAR IMPLANTS AND GRAFTS: Chronic | ICD-10-CM

## 2018-09-04 DIAGNOSIS — Z98.890 OTHER SPECIFIED POSTPROCEDURAL STATES: Chronic | ICD-10-CM

## 2018-09-04 DIAGNOSIS — C16.9 MALIGNANT NEOPLASM OF STOMACH, UNSPECIFIED: ICD-10-CM

## 2018-09-04 LAB
BASOPHILS # BLD AUTO: 0.1 K/UL — SIGNIFICANT CHANGE UP (ref 0–0.2)
BASOPHILS NFR BLD AUTO: 1 % — SIGNIFICANT CHANGE UP (ref 0–2)
EOSINOPHIL # BLD AUTO: 0.5 K/UL — SIGNIFICANT CHANGE UP (ref 0–0.5)
EOSINOPHIL NFR BLD AUTO: 8 % — HIGH (ref 0–6)
HCT VFR BLD CALC: 40.9 % — SIGNIFICANT CHANGE UP (ref 39–50)
HGB BLD-MCNC: 13 G/DL — SIGNIFICANT CHANGE UP (ref 13–17)
LYMPHOCYTES # BLD AUTO: 1.6 K/UL — SIGNIFICANT CHANGE UP (ref 1–3.3)
LYMPHOCYTES # BLD AUTO: 27.8 % — SIGNIFICANT CHANGE UP (ref 13–44)
MCHC RBC-ENTMCNC: 31.9 G/DL — LOW (ref 32–36)
MCHC RBC-ENTMCNC: 33.5 PG — SIGNIFICANT CHANGE UP (ref 27–34)
MCV RBC AUTO: 105 FL — HIGH (ref 80–100)
MONOCYTES # BLD AUTO: 0.5 K/UL — SIGNIFICANT CHANGE UP (ref 0–0.9)
MONOCYTES NFR BLD AUTO: 9.1 % — SIGNIFICANT CHANGE UP (ref 2–14)
NEUTROPHILS # BLD AUTO: 3.2 K/UL — SIGNIFICANT CHANGE UP (ref 1.8–7.4)
NEUTROPHILS NFR BLD AUTO: 54.2 % — SIGNIFICANT CHANGE UP (ref 43–77)
PLATELET # BLD AUTO: 202 K/UL — SIGNIFICANT CHANGE UP (ref 150–400)
RBC # BLD: 3.9 M/UL — LOW (ref 4.2–5.8)
RBC # FLD: 13.1 % — SIGNIFICANT CHANGE UP (ref 10.3–14.5)
WBC # BLD: 5.8 K/UL — SIGNIFICANT CHANGE UP (ref 3.8–10.5)
WBC # FLD AUTO: 5.8 K/UL — SIGNIFICANT CHANGE UP (ref 3.8–10.5)

## 2018-09-04 PROCEDURE — 74177 CT ABD & PELVIS W/CONTRAST: CPT | Mod: 26

## 2018-09-04 PROCEDURE — 82565 ASSAY OF CREATININE: CPT

## 2018-09-04 PROCEDURE — 71260 CT THORAX DX C+: CPT | Mod: 26

## 2018-09-04 PROCEDURE — 74177 CT ABD & PELVIS W/CONTRAST: CPT

## 2018-09-04 PROCEDURE — 71260 CT THORAX DX C+: CPT

## 2018-09-05 ENCOUNTER — APPOINTMENT (OUTPATIENT)
Dept: HEMATOLOGY ONCOLOGY | Facility: CLINIC | Age: 68
End: 2018-09-05
Payer: MEDICARE

## 2018-09-05 VITALS
HEART RATE: 63 BPM | RESPIRATION RATE: 16 BRPM | TEMPERATURE: 98.5 F | SYSTOLIC BLOOD PRESSURE: 162 MMHG | WEIGHT: 153.66 LBS | OXYGEN SATURATION: 99 % | BODY MASS INDEX: 21.43 KG/M2 | DIASTOLIC BLOOD PRESSURE: 81 MMHG

## 2018-09-05 PROCEDURE — 99214 OFFICE O/P EST MOD 30 MIN: CPT

## 2018-09-07 ENCOUNTER — APPOINTMENT (OUTPATIENT)
Dept: CARDIOLOGY | Facility: CLINIC | Age: 68
End: 2018-09-07
Payer: MEDICARE

## 2018-09-07 VITALS
SYSTOLIC BLOOD PRESSURE: 136 MMHG | WEIGHT: 153 LBS | DIASTOLIC BLOOD PRESSURE: 86 MMHG | HEART RATE: 62 BPM | HEIGHT: 71 IN | OXYGEN SATURATION: 98 % | BODY MASS INDEX: 21.42 KG/M2

## 2018-09-07 PROCEDURE — 99204 OFFICE O/P NEW MOD 45 MIN: CPT

## 2018-10-11 ENCOUNTER — APPOINTMENT (OUTPATIENT)
Dept: INFUSION THERAPY | Facility: HOSPITAL | Age: 68
End: 2018-10-11

## 2018-10-11 ENCOUNTER — OUTPATIENT (OUTPATIENT)
Dept: OUTPATIENT SERVICES | Facility: HOSPITAL | Age: 68
LOS: 1 days | Discharge: HOME | End: 2018-10-11

## 2018-10-11 DIAGNOSIS — C16.9 MALIGNANT NEOPLASM OF STOMACH, UNSPECIFIED: ICD-10-CM

## 2018-10-11 DIAGNOSIS — Z98.890 OTHER SPECIFIED POSTPROCEDURAL STATES: Chronic | ICD-10-CM

## 2018-10-11 DIAGNOSIS — Z95.828 PRESENCE OF OTHER VASCULAR IMPLANTS AND GRAFTS: Chronic | ICD-10-CM

## 2018-12-06 ENCOUNTER — OUTPATIENT (OUTPATIENT)
Dept: OUTPATIENT SERVICES | Facility: HOSPITAL | Age: 68
LOS: 1 days | Discharge: HOME | End: 2018-12-06

## 2018-12-06 DIAGNOSIS — Z95.828 PRESENCE OF OTHER VASCULAR IMPLANTS AND GRAFTS: Chronic | ICD-10-CM

## 2018-12-06 DIAGNOSIS — Z98.890 OTHER SPECIFIED POSTPROCEDURAL STATES: Chronic | ICD-10-CM

## 2018-12-06 DIAGNOSIS — C16.9 MALIGNANT NEOPLASM OF STOMACH, UNSPECIFIED: ICD-10-CM

## 2018-12-07 ENCOUNTER — RESULT REVIEW (OUTPATIENT)
Age: 68
End: 2018-12-07

## 2018-12-07 ENCOUNTER — LABORATORY RESULT (OUTPATIENT)
Age: 68
End: 2018-12-07

## 2018-12-07 ENCOUNTER — APPOINTMENT (OUTPATIENT)
Dept: INFUSION THERAPY | Facility: HOSPITAL | Age: 68
End: 2018-12-07

## 2018-12-07 LAB
BASOPHILS # BLD AUTO: 0 K/UL — SIGNIFICANT CHANGE UP (ref 0–0.2)
BASOPHILS NFR BLD AUTO: 0.6 % — SIGNIFICANT CHANGE UP (ref 0–2)
EOSINOPHIL # BLD AUTO: 0.2 K/UL — SIGNIFICANT CHANGE UP (ref 0–0.5)
EOSINOPHIL NFR BLD AUTO: 3.1 % — SIGNIFICANT CHANGE UP (ref 0–6)
HCT VFR BLD CALC: 39.3 % — SIGNIFICANT CHANGE UP (ref 39–50)
HGB BLD-MCNC: 12.6 G/DL — LOW (ref 13–17)
LYMPHOCYTES # BLD AUTO: 1.4 K/UL — SIGNIFICANT CHANGE UP (ref 1–3.3)
LYMPHOCYTES # BLD AUTO: 26.1 % — SIGNIFICANT CHANGE UP (ref 13–44)
MCHC RBC-ENTMCNC: 32.2 G/DL — SIGNIFICANT CHANGE UP (ref 32–36)
MCHC RBC-ENTMCNC: 33.4 PG — SIGNIFICANT CHANGE UP (ref 27–34)
MCV RBC AUTO: 104 FL — HIGH (ref 80–100)
MONOCYTES # BLD AUTO: 0.4 K/UL — SIGNIFICANT CHANGE UP (ref 0–0.9)
MONOCYTES NFR BLD AUTO: 7.2 % — SIGNIFICANT CHANGE UP (ref 2–14)
NEUTROPHILS # BLD AUTO: 3.5 K/UL — SIGNIFICANT CHANGE UP (ref 1.8–7.4)
NEUTROPHILS NFR BLD AUTO: 63 % — SIGNIFICANT CHANGE UP (ref 43–77)
PLATELET # BLD AUTO: 208 K/UL — SIGNIFICANT CHANGE UP (ref 150–400)
RBC # BLD: 3.79 M/UL — LOW (ref 4.2–5.8)
RBC # FLD: 12.5 % — SIGNIFICANT CHANGE UP (ref 10.3–14.5)
WBC # BLD: 5.6 K/UL — SIGNIFICANT CHANGE UP (ref 3.8–10.5)
WBC # FLD AUTO: 5.6 K/UL — SIGNIFICANT CHANGE UP (ref 3.8–10.5)

## 2018-12-13 LAB
ALBUMIN SERPL ELPH-MCNC: 3.7 G/DL
ALBUMIN SERPL ELPH-MCNC: 4 G/DL
ALBUMIN SERPL ELPH-MCNC: 4.1 G/DL
ALBUMIN SERPL ELPH-MCNC: 4.2 G/DL
ALBUMIN SERPL ELPH-MCNC: 4.3 G/DL
ALP BLD-CCNC: 113 U/L
ALP BLD-CCNC: 160 U/L
ALP BLD-CCNC: 194 U/L
ALP BLD-CCNC: 91 U/L
ALP BLD-CCNC: 92 U/L
ALT SERPL-CCNC: 13 U/L
ALT SERPL-CCNC: 15 U/L
ALT SERPL-CCNC: 15 U/L
ALT SERPL-CCNC: 28 U/L
ALT SERPL-CCNC: 30 U/L
ANION GAP SERPL CALC-SCNC: 11 MMOL/L
ANION GAP SERPL CALC-SCNC: 13 MMOL/L
ANION GAP SERPL CALC-SCNC: 14 MMOL/L
ANION GAP SERPL CALC-SCNC: 14 MMOL/L
ANION GAP SERPL CALC-SCNC: 15 MMOL/L
AST SERPL-CCNC: 17 U/L
AST SERPL-CCNC: 18 U/L
AST SERPL-CCNC: 18 U/L
AST SERPL-CCNC: 40 U/L
AST SERPL-CCNC: 46 U/L
BACTERIA BLD CULT: NORMAL
BILIRUB SERPL-MCNC: 0.3 MG/DL
BILIRUB SERPL-MCNC: 0.6 MG/DL
BILIRUB SERPL-MCNC: 0.8 MG/DL
BILIRUB SERPL-MCNC: 0.9 MG/DL
BILIRUB SERPL-MCNC: 1 MG/DL
BUN SERPL-MCNC: 10 MG/DL
BUN SERPL-MCNC: 11 MG/DL
BUN SERPL-MCNC: 11 MG/DL
BUN SERPL-MCNC: 14 MG/DL
BUN SERPL-MCNC: 15 MG/DL
CALCIUM SERPL-MCNC: 10.2 MG/DL
CALCIUM SERPL-MCNC: 9.3 MG/DL
CALCIUM SERPL-MCNC: 9.5 MG/DL
CALCIUM SERPL-MCNC: 9.6 MG/DL
CALCIUM SERPL-MCNC: 9.8 MG/DL
CEA SERPL-MCNC: 3.3 NG/ML
CHLORIDE SERPL-SCNC: 100 MMOL/L
CHLORIDE SERPL-SCNC: 101 MMOL/L
CHLORIDE SERPL-SCNC: 103 MMOL/L
CHLORIDE SERPL-SCNC: 96 MMOL/L
CHLORIDE SERPL-SCNC: 98 MMOL/L
CO2 SERPL-SCNC: 24 MMOL/L
CO2 SERPL-SCNC: 25 MMOL/L
CO2 SERPL-SCNC: 26 MMOL/L
CO2 SERPL-SCNC: 26 MMOL/L
CO2 SERPL-SCNC: 27 MMOL/L
CREAT SERPL-MCNC: 0.76 MG/DL
CREAT SERPL-MCNC: 0.8 MG/DL
CREAT SERPL-MCNC: 0.82 MG/DL
CREAT SERPL-MCNC: 0.86 MG/DL
CREAT SERPL-MCNC: 1 MG/DL
GLUCOSE SERPL-MCNC: 147 MG/DL
GLUCOSE SERPL-MCNC: 166 MG/DL
GLUCOSE SERPL-MCNC: 179 MG/DL
GLUCOSE SERPL-MCNC: 236 MG/DL
GLUCOSE SERPL-MCNC: 252 MG/DL
POTASSIUM SERPL-SCNC: 3.7 MMOL/L
POTASSIUM SERPL-SCNC: 4.2 MMOL/L
POTASSIUM SERPL-SCNC: 4.3 MMOL/L
POTASSIUM SERPL-SCNC: 4.3 MMOL/L
POTASSIUM SERPL-SCNC: 4.6 MMOL/L
PROT SERPL-MCNC: 7.2 G/DL
PROT SERPL-MCNC: 7.4 G/DL
PROT SERPL-MCNC: 7.4 G/DL
PROT SERPL-MCNC: 7.5 G/DL
PROT SERPL-MCNC: 7.5 G/DL
SODIUM SERPL-SCNC: 137 MMOL/L
SODIUM SERPL-SCNC: 138 MMOL/L
SODIUM SERPL-SCNC: 139 MMOL/L
SODIUM SERPL-SCNC: 139 MMOL/L
SODIUM SERPL-SCNC: 140 MMOL/L

## 2018-12-19 ENCOUNTER — APPOINTMENT (OUTPATIENT)
Dept: HEMATOLOGY ONCOLOGY | Facility: CLINIC | Age: 68
End: 2018-12-19
Payer: MEDICARE

## 2018-12-19 VITALS
RESPIRATION RATE: 16 BRPM | OXYGEN SATURATION: 98 % | WEIGHT: 151.22 LBS | HEART RATE: 75 BPM | TEMPERATURE: 98.5 F | SYSTOLIC BLOOD PRESSURE: 157 MMHG | DIASTOLIC BLOOD PRESSURE: 55 MMHG | BODY MASS INDEX: 21.09 KG/M2

## 2018-12-19 PROCEDURE — 99214 OFFICE O/P EST MOD 30 MIN: CPT

## 2018-12-19 NOTE — HISTORY OF PRESENT ILLNESS
[de-identified] : see dictated letter [de-identified] : Since the last visit the pt has been well and has no symptoms indicating recurrent disease.

## 2018-12-19 NOTE — REVIEW OF SYSTEMS
[Joint Pain] : joint pain [Joint Stiffness] : joint stiffness [Negative] : Allergic/Immunologic [FreeTextEntry2] : flu shot pos [FreeTextEntry7] : weight stable , occ regurgitation [de-identified] : has dec neuropathy

## 2018-12-19 NOTE — PHYSICAL EXAM
[Restricted in physically strenuous activity but ambulatory and able to carry out work of a light or sedentary nature] : Status 1- Restricted in physically strenuous activity but ambulatory and able to carry out work of a light or sedentary nature, e.g., light house work, office work [Normal] : affect appropriate [de-identified] : abdominal hernia incision healed

## 2018-12-19 NOTE — ASSESSMENT
[FreeTextEntry1] : Pt to continue surveillance for his stoomacj ca and do repeat CT scans and blood testing.P to see Surgery re hernia repair. Pt to do Medical exams.  [Palliative] : Goals of care discussed with patient: Palliative [Palliative Care Plan] : not applicable at this time

## 2018-12-21 ENCOUNTER — NON-APPOINTMENT (OUTPATIENT)
Age: 68
End: 2018-12-21

## 2018-12-21 ENCOUNTER — APPOINTMENT (OUTPATIENT)
Dept: CARDIOLOGY | Facility: CLINIC | Age: 68
End: 2018-12-21
Payer: MEDICARE

## 2018-12-21 VITALS — DIASTOLIC BLOOD PRESSURE: 97 MMHG | SYSTOLIC BLOOD PRESSURE: 162 MMHG

## 2018-12-21 PROCEDURE — 99214 OFFICE O/P EST MOD 30 MIN: CPT

## 2018-12-21 NOTE — ASSESSMENT
[FreeTextEntry1] : 68 year-old male presents for an initial consolation, \par -status post laparotomy, extensive MICAH and IHR with mesh and plastic reconstruction on 6/25/18. 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 A PET/CT performed at the time of initial diagnosis revealed NO evidence of metastatic disease.\par -Virchows node w mets s/p resected. \par -sp chemo and xrt. \par -Presents today with recently diagnosed left partial portal vein thrombosis. No symptoms. \par \par ASSESSMENT--He has an asymptomatic, partial portal vein thrombosis. There is debate about the need to anticoagulate in this setting (GI societies and pulm societies differ). Taking the more aggressive stance, 3 months of anticoagulation (at least) would be recommended. The typical agent would be lovenox in the setting of malignancy, or Lovenox to coumadin. Of note, he really is incapable of giving himself injections due to profound feat of needles. As such, we are left to coumadinization or a NOAC. More conservative therapy would be coumadin. If NOACs are considered, it is worth noting that his gastrectomy makes him a poor candidate for xarelto as its absorption is in stomach. Apixiban is preferred as it is absorbed more diffusely in the intestine and colon. \par \par Plan:\par -I recommend 3-6 months of AC. \par -Eliquis till his next scan in early February. \par \par

## 2018-12-21 NOTE — HISTORY OF PRESENT ILLNESS
[de-identified] : 67 year-old male presents with history of gastric CA T3 N1, no mets on PET,  for evaluation of potential anticoagulation in setting of DVT . In 5/2017 he underwent distal pancreatectomy/splenectomy, RNY esophagojejunostomy.  Final pathology was consistent with residual gastric adenocarcinoma, invading through the posterior gastric wall into the jordy pancreatic soft tissue.  Pancreatic parenchyma and spleen were benign.  Metastatic gastric adenocarcinoma was present in 3/51 lymph nodes (T4aN2).  All resection margins were negative.  Also s/p radiation to the gastric bed and lymph node between 7/9/17 and 8/22/17, and he received adjuvant chemotherapy through approximately 12/21/17. \par He underwent an USGB and FNA of a left supraclavicular lymph node in March 2018 which was positive for metastatic gastric cancer.  He completed a PET/CT on 4/2/18, which showed hypermetabolic left supraclavicular lymph node corresponding to biopsy proven metastasis. He is status post left neck dissection/excision of node on 4/17/18.  Final pathology was consistent with metastatic adenocarcinoma, gastric primary.\par \par Most recently is underwent extensive MICAH and IHR with mesh and plastic reconstruction on 6/25/18.   \par \par \par Referring MD: Dr. Matthew South (GI), Dr. Jayda Obando (PCP)\par GI: Dr. Daniel Aguirre\par Med Onc: Dr. Mario Henry\par Dr. Erlinda Angel\par

## 2018-12-21 NOTE — PHYSICAL EXAM
[General Appearance - Well Developed] : well developed [Normal Conjunctiva] : the conjunctiva exhibited no abnormalities [Normal Oral Mucosa] : normal oral mucosa [Normal Jugular Venous V Waves Present] : normal jugular venous V waves present [] : no respiratory distress [Heart Sounds] : normal S1 and S2 [1+] : left 1+ [2+] : left 2+ [Bowel Sounds] : normal bowel sounds [Abnormal Walk] : normal gait [Nail Clubbing] : no clubbing of the fingernails [Skin Color & Pigmentation] : normal skin color and pigmentation [Oriented To Time, Place, And Person] : oriented to person, place, and time [de-identified] : Abdominal incisions healing well with no evidence of infection or recurrent hernia.

## 2019-02-04 ENCOUNTER — OUTPATIENT (OUTPATIENT)
Dept: OUTPATIENT SERVICES | Facility: HOSPITAL | Age: 69
LOS: 1 days | Discharge: HOME | End: 2019-02-04

## 2019-02-04 DIAGNOSIS — Z98.890 OTHER SPECIFIED POSTPROCEDURAL STATES: Chronic | ICD-10-CM

## 2019-02-04 DIAGNOSIS — Z95.828 PRESENCE OF OTHER VASCULAR IMPLANTS AND GRAFTS: Chronic | ICD-10-CM

## 2019-02-04 DIAGNOSIS — C16.9 MALIGNANT NEOPLASM OF STOMACH, UNSPECIFIED: ICD-10-CM

## 2019-02-05 ENCOUNTER — FORM ENCOUNTER (OUTPATIENT)
Age: 69
End: 2019-02-05

## 2019-02-06 ENCOUNTER — LABORATORY RESULT (OUTPATIENT)
Age: 69
End: 2019-02-06

## 2019-02-06 ENCOUNTER — OUTPATIENT (OUTPATIENT)
Dept: OUTPATIENT SERVICES | Facility: HOSPITAL | Age: 69
LOS: 1 days | End: 2019-02-06
Payer: MEDICARE

## 2019-02-06 ENCOUNTER — APPOINTMENT (OUTPATIENT)
Dept: CT IMAGING | Facility: IMAGING CENTER | Age: 69
End: 2019-02-06
Payer: MEDICARE

## 2019-02-06 ENCOUNTER — RESULT REVIEW (OUTPATIENT)
Age: 69
End: 2019-02-06

## 2019-02-06 ENCOUNTER — APPOINTMENT (OUTPATIENT)
Dept: INFUSION THERAPY | Facility: HOSPITAL | Age: 69
End: 2019-02-06

## 2019-02-06 DIAGNOSIS — C16.9 MALIGNANT NEOPLASM OF STOMACH, UNSPECIFIED: ICD-10-CM

## 2019-02-06 DIAGNOSIS — Z98.890 OTHER SPECIFIED POSTPROCEDURAL STATES: Chronic | ICD-10-CM

## 2019-02-06 DIAGNOSIS — I81 PORTAL VEIN THROMBOSIS: ICD-10-CM

## 2019-02-06 DIAGNOSIS — Z95.828 PRESENCE OF OTHER VASCULAR IMPLANTS AND GRAFTS: Chronic | ICD-10-CM

## 2019-02-06 LAB
BASOPHILS # BLD AUTO: 0 K/UL — SIGNIFICANT CHANGE UP (ref 0–0.2)
BASOPHILS NFR BLD AUTO: 0.6 % — SIGNIFICANT CHANGE UP (ref 0–2)
EOSINOPHIL # BLD AUTO: 0.1 K/UL — SIGNIFICANT CHANGE UP (ref 0–0.5)
EOSINOPHIL NFR BLD AUTO: 2 % — SIGNIFICANT CHANGE UP (ref 0–6)
HCT VFR BLD CALC: 39.5 % — SIGNIFICANT CHANGE UP (ref 39–50)
HGB BLD-MCNC: 12.8 G/DL — LOW (ref 13–17)
LYMPHOCYTES # BLD AUTO: 1.7 K/UL — SIGNIFICANT CHANGE UP (ref 1–3.3)
LYMPHOCYTES # BLD AUTO: 32.4 % — SIGNIFICANT CHANGE UP (ref 13–44)
MCHC RBC-ENTMCNC: 32.3 G/DL — SIGNIFICANT CHANGE UP (ref 32–36)
MCHC RBC-ENTMCNC: 33.6 PG — SIGNIFICANT CHANGE UP (ref 27–34)
MCV RBC AUTO: 104 FL — HIGH (ref 80–100)
MONOCYTES # BLD AUTO: 0.4 K/UL — SIGNIFICANT CHANGE UP (ref 0–0.9)
MONOCYTES NFR BLD AUTO: 7.2 % — SIGNIFICANT CHANGE UP (ref 2–14)
NEUTROPHILS # BLD AUTO: 3 K/UL — SIGNIFICANT CHANGE UP (ref 1.8–7.4)
NEUTROPHILS NFR BLD AUTO: 57.8 % — SIGNIFICANT CHANGE UP (ref 43–77)
PLATELET # BLD AUTO: 226 K/UL — SIGNIFICANT CHANGE UP (ref 150–400)
RBC # BLD: 3.8 M/UL — LOW (ref 4.2–5.8)
RBC # FLD: 12.4 % — SIGNIFICANT CHANGE UP (ref 10.3–14.5)
WBC # BLD: 5.2 K/UL — SIGNIFICANT CHANGE UP (ref 3.8–10.5)
WBC # FLD AUTO: 5.2 K/UL — SIGNIFICANT CHANGE UP (ref 3.8–10.5)

## 2019-02-06 PROCEDURE — 71260 CT THORAX DX C+: CPT

## 2019-02-06 PROCEDURE — 82565 ASSAY OF CREATININE: CPT

## 2019-02-06 PROCEDURE — 74177 CT ABD & PELVIS W/CONTRAST: CPT | Mod: 26

## 2019-02-06 PROCEDURE — 74177 CT ABD & PELVIS W/CONTRAST: CPT

## 2019-02-06 PROCEDURE — 71260 CT THORAX DX C+: CPT | Mod: 26

## 2019-03-04 NOTE — H&P PST ADULT - LAST PROSTATE EXAM
Mildly to Moderately Impaired: difficulty hearing in some environments or speaker may need to increase volume or speak distinctly
2016 - normal

## 2019-04-04 ENCOUNTER — OUTPATIENT (OUTPATIENT)
Dept: OUTPATIENT SERVICES | Facility: HOSPITAL | Age: 69
LOS: 1 days | Discharge: HOME | End: 2019-04-04

## 2019-04-04 ENCOUNTER — RESULT REVIEW (OUTPATIENT)
Age: 69
End: 2019-04-04

## 2019-04-04 ENCOUNTER — LABORATORY RESULT (OUTPATIENT)
Age: 69
End: 2019-04-04

## 2019-04-04 ENCOUNTER — APPOINTMENT (OUTPATIENT)
Dept: INFUSION THERAPY | Facility: HOSPITAL | Age: 69
End: 2019-04-04

## 2019-04-04 DIAGNOSIS — Z98.890 OTHER SPECIFIED POSTPROCEDURAL STATES: Chronic | ICD-10-CM

## 2019-04-04 DIAGNOSIS — Z95.828 PRESENCE OF OTHER VASCULAR IMPLANTS AND GRAFTS: Chronic | ICD-10-CM

## 2019-04-04 DIAGNOSIS — C16.9 MALIGNANT NEOPLASM OF STOMACH, UNSPECIFIED: ICD-10-CM

## 2019-04-04 LAB
BASOPHILS # BLD AUTO: 0.1 K/UL — SIGNIFICANT CHANGE UP (ref 0–0.2)
BASOPHILS NFR BLD AUTO: 1 % — SIGNIFICANT CHANGE UP (ref 0–2)
EOSINOPHIL # BLD AUTO: 0.1 K/UL — SIGNIFICANT CHANGE UP (ref 0–0.5)
EOSINOPHIL NFR BLD AUTO: 2.7 % — SIGNIFICANT CHANGE UP (ref 0–6)
HCT VFR BLD CALC: 35.9 % — LOW (ref 39–50)
HGB BLD-MCNC: 12.2 G/DL — LOW (ref 13–17)
LYMPHOCYTES # BLD AUTO: 1.4 K/UL — SIGNIFICANT CHANGE UP (ref 1–3.3)
LYMPHOCYTES # BLD AUTO: 26.3 % — SIGNIFICANT CHANGE UP (ref 13–44)
MCHC RBC-ENTMCNC: 34 G/DL — SIGNIFICANT CHANGE UP (ref 32–36)
MCHC RBC-ENTMCNC: 35.3 PG — HIGH (ref 27–34)
MCV RBC AUTO: 104 FL — HIGH (ref 80–100)
MONOCYTES # BLD AUTO: 0.4 K/UL — SIGNIFICANT CHANGE UP (ref 0–0.9)
MONOCYTES NFR BLD AUTO: 7.8 % — SIGNIFICANT CHANGE UP (ref 2–14)
NEUTROPHILS # BLD AUTO: 3.4 K/UL — SIGNIFICANT CHANGE UP (ref 1.8–7.4)
NEUTROPHILS NFR BLD AUTO: 62.2 % — SIGNIFICANT CHANGE UP (ref 43–77)
PLATELET # BLD AUTO: 193 K/UL — SIGNIFICANT CHANGE UP (ref 150–400)
RBC # BLD: 3.46 M/UL — LOW (ref 4.2–5.8)
RBC # FLD: 12.4 % — SIGNIFICANT CHANGE UP (ref 10.3–14.5)
WBC # BLD: 5.4 K/UL — SIGNIFICANT CHANGE UP (ref 3.8–10.5)
WBC # FLD AUTO: 5.4 K/UL — SIGNIFICANT CHANGE UP (ref 3.8–10.5)

## 2019-04-17 ENCOUNTER — APPOINTMENT (OUTPATIENT)
Dept: HEMATOLOGY ONCOLOGY | Facility: CLINIC | Age: 69
End: 2019-04-17
Payer: MEDICARE

## 2019-04-17 VITALS
RESPIRATION RATE: 16 BRPM | BODY MASS INDEX: 19.89 KG/M2 | WEIGHT: 142.64 LBS | HEART RATE: 59 BPM | DIASTOLIC BLOOD PRESSURE: 96 MMHG | OXYGEN SATURATION: 98 % | SYSTOLIC BLOOD PRESSURE: 165 MMHG | TEMPERATURE: 99.1 F

## 2019-04-17 PROCEDURE — 99214 OFFICE O/P EST MOD 30 MIN: CPT

## 2019-04-17 NOTE — PHYSICAL EXAM
[Restricted in physically strenuous activity but ambulatory and able to carry out work of a light or sedentary nature] : Status 1- Restricted in physically strenuous activity but ambulatory and able to carry out work of a light or sedentary nature, e.g., light house work, office work [Thin] : thin [Normal] : grossly intact

## 2019-04-17 NOTE — HISTORY OF PRESENT ILLNESS
[de-identified] : see dictated letter [de-identified] : Since last visit the pt remains well but continues to lose weight with total of 50 lbs since diagnosis

## 2019-04-17 NOTE — REVIEW OF SYSTEMS
[Negative] : Allergic/Immunologic [de-identified] : has numbness of the fingers and feet are cold. [FreeTextEntry7] : slight abdominal pain, weight loss

## 2019-04-17 NOTE — ASSESSMENT
[Curative] : Goals of care discussed with patient: Curative [FreeTextEntry1] : Ptm to be evaluated for weight loss and will do CT scans to assess for response or recurrence of his disease. Pt to inc calories. Pt to speak with dietician [Palliative Care Plan] : not applicable at this time

## 2019-05-01 ENCOUNTER — FORM ENCOUNTER (OUTPATIENT)
Age: 69
End: 2019-05-01

## 2019-05-02 ENCOUNTER — APPOINTMENT (OUTPATIENT)
Dept: CT IMAGING | Facility: IMAGING CENTER | Age: 69
End: 2019-05-02
Payer: MEDICARE

## 2019-05-02 ENCOUNTER — OUTPATIENT (OUTPATIENT)
Dept: OUTPATIENT SERVICES | Facility: HOSPITAL | Age: 69
LOS: 1 days | End: 2019-05-02
Payer: MEDICARE

## 2019-05-02 DIAGNOSIS — C16.9 MALIGNANT NEOPLASM OF STOMACH, UNSPECIFIED: ICD-10-CM

## 2019-05-02 DIAGNOSIS — Z95.828 PRESENCE OF OTHER VASCULAR IMPLANTS AND GRAFTS: Chronic | ICD-10-CM

## 2019-05-02 DIAGNOSIS — Z98.890 OTHER SPECIFIED POSTPROCEDURAL STATES: Chronic | ICD-10-CM

## 2019-05-02 PROCEDURE — 71260 CT THORAX DX C+: CPT

## 2019-05-02 PROCEDURE — 74177 CT ABD & PELVIS W/CONTRAST: CPT

## 2019-05-02 PROCEDURE — 74177 CT ABD & PELVIS W/CONTRAST: CPT | Mod: 26

## 2019-05-02 PROCEDURE — 71260 CT THORAX DX C+: CPT | Mod: 26

## 2019-06-12 ENCOUNTER — OUTPATIENT (OUTPATIENT)
Dept: OUTPATIENT SERVICES | Facility: HOSPITAL | Age: 69
LOS: 1 days | Discharge: HOME | End: 2019-06-12

## 2019-06-12 DIAGNOSIS — Z98.890 OTHER SPECIFIED POSTPROCEDURAL STATES: Chronic | ICD-10-CM

## 2019-06-12 DIAGNOSIS — Z95.828 PRESENCE OF OTHER VASCULAR IMPLANTS AND GRAFTS: Chronic | ICD-10-CM

## 2019-06-12 DIAGNOSIS — C16.9 MALIGNANT NEOPLASM OF STOMACH, UNSPECIFIED: ICD-10-CM

## 2019-06-13 ENCOUNTER — LABORATORY RESULT (OUTPATIENT)
Age: 69
End: 2019-06-13

## 2019-06-13 ENCOUNTER — RESULT REVIEW (OUTPATIENT)
Age: 69
End: 2019-06-13

## 2019-06-13 ENCOUNTER — APPOINTMENT (OUTPATIENT)
Dept: INFUSION THERAPY | Facility: HOSPITAL | Age: 69
End: 2019-06-13

## 2019-06-13 LAB
BASOPHILS # BLD AUTO: 0 K/UL — SIGNIFICANT CHANGE UP (ref 0–0.2)
BASOPHILS NFR BLD AUTO: 0.6 % — SIGNIFICANT CHANGE UP (ref 0–2)
EOSINOPHIL # BLD AUTO: 0.1 K/UL — SIGNIFICANT CHANGE UP (ref 0–0.5)
EOSINOPHIL NFR BLD AUTO: 2.4 % — SIGNIFICANT CHANGE UP (ref 0–6)
HCT VFR BLD CALC: 38 % — LOW (ref 39–50)
HGB BLD-MCNC: 13.1 G/DL — SIGNIFICANT CHANGE UP (ref 13–17)
LYMPHOCYTES # BLD AUTO: 1.6 K/UL — SIGNIFICANT CHANGE UP (ref 1–3.3)
LYMPHOCYTES # BLD AUTO: 26 % — SIGNIFICANT CHANGE UP (ref 13–44)
MCHC RBC-ENTMCNC: 34.3 G/DL — SIGNIFICANT CHANGE UP (ref 32–36)
MCHC RBC-ENTMCNC: 36.2 PG — HIGH (ref 27–34)
MCV RBC AUTO: 105 FL — HIGH (ref 80–100)
MONOCYTES # BLD AUTO: 0.5 K/UL — SIGNIFICANT CHANGE UP (ref 0–0.9)
MONOCYTES NFR BLD AUTO: 8.7 % — SIGNIFICANT CHANGE UP (ref 2–14)
NEUTROPHILS # BLD AUTO: 3.7 K/UL — SIGNIFICANT CHANGE UP (ref 1.8–7.4)
NEUTROPHILS NFR BLD AUTO: 62.3 % — SIGNIFICANT CHANGE UP (ref 43–77)
PLATELET # BLD AUTO: 203 K/UL — SIGNIFICANT CHANGE UP (ref 150–400)
RBC # BLD: 3.61 M/UL — LOW (ref 4.2–5.8)
RBC # FLD: 12.9 % — SIGNIFICANT CHANGE UP (ref 10.3–14.5)
WBC # BLD: 6 K/UL — SIGNIFICANT CHANGE UP (ref 3.8–10.5)
WBC # FLD AUTO: 6 K/UL — SIGNIFICANT CHANGE UP (ref 3.8–10.5)

## 2019-07-31 ENCOUNTER — OUTPATIENT (OUTPATIENT)
Dept: OUTPATIENT SERVICES | Facility: HOSPITAL | Age: 69
LOS: 1 days | Discharge: HOME | End: 2019-07-31

## 2019-07-31 DIAGNOSIS — Z98.890 OTHER SPECIFIED POSTPROCEDURAL STATES: Chronic | ICD-10-CM

## 2019-07-31 DIAGNOSIS — Z95.828 PRESENCE OF OTHER VASCULAR IMPLANTS AND GRAFTS: Chronic | ICD-10-CM

## 2019-07-31 DIAGNOSIS — C16.9 MALIGNANT NEOPLASM OF STOMACH, UNSPECIFIED: ICD-10-CM

## 2019-08-01 ENCOUNTER — RESULT REVIEW (OUTPATIENT)
Age: 69
End: 2019-08-01

## 2019-08-01 ENCOUNTER — LABORATORY RESULT (OUTPATIENT)
Age: 69
End: 2019-08-01

## 2019-08-01 ENCOUNTER — APPOINTMENT (OUTPATIENT)
Dept: INFUSION THERAPY | Facility: HOSPITAL | Age: 69
End: 2019-08-01

## 2019-08-01 LAB
BASOPHILS # BLD AUTO: 0.1 K/UL — SIGNIFICANT CHANGE UP (ref 0–0.2)
BASOPHILS NFR BLD AUTO: 1.1 % — SIGNIFICANT CHANGE UP (ref 0–2)
EOSINOPHIL # BLD AUTO: 0.1 K/UL — SIGNIFICANT CHANGE UP (ref 0–0.5)
EOSINOPHIL NFR BLD AUTO: 1.6 % — SIGNIFICANT CHANGE UP (ref 0–6)
HCT VFR BLD CALC: 40.2 % — SIGNIFICANT CHANGE UP (ref 39–50)
HGB BLD-MCNC: 13.2 G/DL — SIGNIFICANT CHANGE UP (ref 13–17)
LYMPHOCYTES # BLD AUTO: 1.3 K/UL — SIGNIFICANT CHANGE UP (ref 1–3.3)
LYMPHOCYTES # BLD AUTO: 20.6 % — SIGNIFICANT CHANGE UP (ref 13–44)
MCHC RBC-ENTMCNC: 32.8 G/DL — SIGNIFICANT CHANGE UP (ref 32–36)
MCHC RBC-ENTMCNC: 35.1 PG — HIGH (ref 27–34)
MCV RBC AUTO: 107 FL — HIGH (ref 80–100)
MONOCYTES # BLD AUTO: 0.4 K/UL — SIGNIFICANT CHANGE UP (ref 0–0.9)
MONOCYTES NFR BLD AUTO: 6.6 % — SIGNIFICANT CHANGE UP (ref 2–14)
NEUTROPHILS # BLD AUTO: 4.5 K/UL — SIGNIFICANT CHANGE UP (ref 1.8–7.4)
NEUTROPHILS NFR BLD AUTO: 70.1 % — SIGNIFICANT CHANGE UP (ref 43–77)
PLATELET # BLD AUTO: 192 K/UL — SIGNIFICANT CHANGE UP (ref 150–400)
RBC # BLD: 3.75 M/UL — LOW (ref 4.2–5.8)
RBC # FLD: 12.5 % — SIGNIFICANT CHANGE UP (ref 10.3–14.5)
WBC # BLD: 6.4 K/UL — SIGNIFICANT CHANGE UP (ref 3.8–10.5)
WBC # FLD AUTO: 6.4 K/UL — SIGNIFICANT CHANGE UP (ref 3.8–10.5)

## 2019-10-01 ENCOUNTER — OUTPATIENT (OUTPATIENT)
Dept: OUTPATIENT SERVICES | Facility: HOSPITAL | Age: 69
LOS: 1 days | Discharge: HOME | End: 2019-10-01

## 2019-10-01 DIAGNOSIS — Z98.890 OTHER SPECIFIED POSTPROCEDURAL STATES: Chronic | ICD-10-CM

## 2019-10-01 DIAGNOSIS — Z95.828 PRESENCE OF OTHER VASCULAR IMPLANTS AND GRAFTS: Chronic | ICD-10-CM

## 2019-10-01 DIAGNOSIS — C16.9 MALIGNANT NEOPLASM OF STOMACH, UNSPECIFIED: ICD-10-CM

## 2019-10-02 ENCOUNTER — RESULT REVIEW (OUTPATIENT)
Age: 69
End: 2019-10-02

## 2019-10-02 ENCOUNTER — LABORATORY RESULT (OUTPATIENT)
Age: 69
End: 2019-10-02

## 2019-10-02 ENCOUNTER — APPOINTMENT (OUTPATIENT)
Dept: INFUSION THERAPY | Facility: HOSPITAL | Age: 69
End: 2019-10-02

## 2019-10-02 LAB
BASOPHILS # BLD AUTO: 0.1 K/UL — SIGNIFICANT CHANGE UP (ref 0–0.2)
BASOPHILS NFR BLD AUTO: 0.9 % — SIGNIFICANT CHANGE UP (ref 0–2)
EOSINOPHIL # BLD AUTO: 0.1 K/UL — SIGNIFICANT CHANGE UP (ref 0–0.5)
EOSINOPHIL NFR BLD AUTO: 1.6 % — SIGNIFICANT CHANGE UP (ref 0–6)
HCT VFR BLD CALC: 41.8 % — SIGNIFICANT CHANGE UP (ref 39–50)
HGB BLD-MCNC: 13.9 G/DL — SIGNIFICANT CHANGE UP (ref 13–17)
LYMPHOCYTES # BLD AUTO: 1.4 K/UL — SIGNIFICANT CHANGE UP (ref 1–3.3)
LYMPHOCYTES # BLD AUTO: 22.4 % — SIGNIFICANT CHANGE UP (ref 13–44)
MCHC RBC-ENTMCNC: 33.3 G/DL — SIGNIFICANT CHANGE UP (ref 32–36)
MCHC RBC-ENTMCNC: 36 PG — HIGH (ref 27–34)
MCV RBC AUTO: 108 FL — HIGH (ref 80–100)
MONOCYTES # BLD AUTO: 0.6 K/UL — SIGNIFICANT CHANGE UP (ref 0–0.9)
MONOCYTES NFR BLD AUTO: 8.6 % — SIGNIFICANT CHANGE UP (ref 2–14)
NEUTROPHILS # BLD AUTO: 4.3 K/UL — SIGNIFICANT CHANGE UP (ref 1.8–7.4)
NEUTROPHILS NFR BLD AUTO: 66.5 % — SIGNIFICANT CHANGE UP (ref 43–77)
PLATELET # BLD AUTO: 193 K/UL — SIGNIFICANT CHANGE UP (ref 150–400)
RBC # BLD: 3.85 M/UL — LOW (ref 4.2–5.8)
RBC # FLD: 12.7 % — SIGNIFICANT CHANGE UP (ref 10.3–14.5)
WBC # BLD: 6.4 K/UL — SIGNIFICANT CHANGE UP (ref 3.8–10.5)
WBC # FLD AUTO: 6.4 K/UL — SIGNIFICANT CHANGE UP (ref 3.8–10.5)

## 2019-10-21 ENCOUNTER — FORM ENCOUNTER (OUTPATIENT)
Age: 69
End: 2019-10-21

## 2019-10-22 ENCOUNTER — APPOINTMENT (OUTPATIENT)
Dept: CT IMAGING | Facility: IMAGING CENTER | Age: 69
End: 2019-10-22
Payer: MEDICARE

## 2019-10-22 ENCOUNTER — OUTPATIENT (OUTPATIENT)
Dept: OUTPATIENT SERVICES | Facility: HOSPITAL | Age: 69
LOS: 1 days | End: 2019-10-22
Payer: MEDICARE

## 2019-10-22 DIAGNOSIS — Z95.828 PRESENCE OF OTHER VASCULAR IMPLANTS AND GRAFTS: Chronic | ICD-10-CM

## 2019-10-22 DIAGNOSIS — C16.9 MALIGNANT NEOPLASM OF STOMACH, UNSPECIFIED: ICD-10-CM

## 2019-10-22 DIAGNOSIS — Z98.890 OTHER SPECIFIED POSTPROCEDURAL STATES: Chronic | ICD-10-CM

## 2019-10-22 PROCEDURE — 71260 CT THORAX DX C+: CPT

## 2019-10-22 PROCEDURE — 74177 CT ABD & PELVIS W/CONTRAST: CPT | Mod: 26

## 2019-10-22 PROCEDURE — 74177 CT ABD & PELVIS W/CONTRAST: CPT

## 2019-10-22 PROCEDURE — 71260 CT THORAX DX C+: CPT | Mod: 26

## 2019-10-29 ENCOUNTER — APPOINTMENT (OUTPATIENT)
Dept: HEMATOLOGY ONCOLOGY | Facility: CLINIC | Age: 69
End: 2019-10-29
Payer: MEDICARE

## 2019-10-29 VITALS
DIASTOLIC BLOOD PRESSURE: 88 MMHG | TEMPERATURE: 98.7 F | SYSTOLIC BLOOD PRESSURE: 156 MMHG | WEIGHT: 141.74 LBS | OXYGEN SATURATION: 100 % | BODY MASS INDEX: 19.77 KG/M2 | RESPIRATION RATE: 18 BRPM | HEART RATE: 67 BPM

## 2019-10-29 PROCEDURE — 99214 OFFICE O/P EST MOD 30 MIN: CPT

## 2019-10-29 NOTE — HISTORY OF PRESENT ILLNESS
[de-identified] : see dictated letter [de-identified] : Since her last visit the patient remains well and has no symptoms indicating recurrent disease. He continues his full-time employment and medical checkups and testing

## 2019-10-29 NOTE — ASSESSMENT
[FreeTextEntry1] : Patient has undergone repeat CAT scans of the chest abdomen pelvis which showed no evidence of recurrence of his disease. He is undergoing further blood testing. We also receive the vaccine and shingles vaccine. He will continue GI and medical followup and evaluations. You're undergo colonoscopy and upper endoscopy is indicated. The patient will return to the office in 6 months or sooner as needed [Curative] : Goals of care discussed with patient: Curative [Palliative Care Plan] : not applicable at this time

## 2019-11-16 NOTE — DISCHARGE NOTE ADULT - HOSPITAL COURSE
Cardiac
67M underwent Left supraclavicular lymph node excision, performed without complication. Given the proximity of the dissection to the lung apex a chest X-ray was performed at the end of the case and no pneumothorax was seen by both the surgical and anesthesia teams in the case as well as the Radiologist.    Patient was sent from PACU to the floor for overnight monitoring. All vital signs stable. Tolerating consistent carb regular diet. Pain is well controlled. Patient medically stable for discharge, to recommended to follow up outpatient with Dr. Cabello.

## 2019-12-19 ENCOUNTER — OUTPATIENT (OUTPATIENT)
Dept: OUTPATIENT SERVICES | Facility: HOSPITAL | Age: 69
LOS: 1 days | Discharge: HOME | End: 2019-12-19

## 2019-12-19 DIAGNOSIS — Z98.890 OTHER SPECIFIED POSTPROCEDURAL STATES: Chronic | ICD-10-CM

## 2019-12-19 DIAGNOSIS — C16.9 MALIGNANT NEOPLASM OF STOMACH, UNSPECIFIED: ICD-10-CM

## 2019-12-19 DIAGNOSIS — Z95.828 PRESENCE OF OTHER VASCULAR IMPLANTS AND GRAFTS: Chronic | ICD-10-CM

## 2019-12-20 ENCOUNTER — RESULT REVIEW (OUTPATIENT)
Age: 69
End: 2019-12-20

## 2019-12-20 ENCOUNTER — LABORATORY RESULT (OUTPATIENT)
Age: 69
End: 2019-12-20

## 2019-12-20 ENCOUNTER — APPOINTMENT (OUTPATIENT)
Dept: INFUSION THERAPY | Facility: HOSPITAL | Age: 69
End: 2019-12-20

## 2019-12-20 LAB
BASOPHILS # BLD AUTO: 0.1 K/UL — SIGNIFICANT CHANGE UP (ref 0–0.2)
BASOPHILS NFR BLD AUTO: 1.2 % — SIGNIFICANT CHANGE UP (ref 0–2)
EOSINOPHIL # BLD AUTO: 0.1 K/UL — SIGNIFICANT CHANGE UP (ref 0–0.5)
EOSINOPHIL NFR BLD AUTO: 1.1 % — SIGNIFICANT CHANGE UP (ref 0–6)
HCT VFR BLD CALC: 38.5 % — LOW (ref 39–50)
HGB BLD-MCNC: 12.8 G/DL — LOW (ref 13–17)
LYMPHOCYTES # BLD AUTO: 1.2 K/UL — SIGNIFICANT CHANGE UP (ref 1–3.3)
LYMPHOCYTES # BLD AUTO: 20.5 % — SIGNIFICANT CHANGE UP (ref 13–44)
MCHC RBC-ENTMCNC: 33.3 G/DL — SIGNIFICANT CHANGE UP (ref 32–36)
MCHC RBC-ENTMCNC: 36.1 PG — HIGH (ref 27–34)
MCV RBC AUTO: 108 FL — HIGH (ref 80–100)
MONOCYTES # BLD AUTO: 0.5 K/UL — SIGNIFICANT CHANGE UP (ref 0–0.9)
MONOCYTES NFR BLD AUTO: 8.6 % — SIGNIFICANT CHANGE UP (ref 2–14)
NEUTROPHILS # BLD AUTO: 3.9 K/UL — SIGNIFICANT CHANGE UP (ref 1.8–7.4)
NEUTROPHILS NFR BLD AUTO: 68.6 % — SIGNIFICANT CHANGE UP (ref 43–77)
PLATELET # BLD AUTO: 173 K/UL — SIGNIFICANT CHANGE UP (ref 150–400)
RBC # BLD: 3.55 M/UL — LOW (ref 4.2–5.8)
RBC # FLD: 12.6 % — SIGNIFICANT CHANGE UP (ref 10.3–14.5)
WBC # BLD: 5.6 K/UL — SIGNIFICANT CHANGE UP (ref 3.8–10.5)
WBC # FLD AUTO: 5.6 K/UL — SIGNIFICANT CHANGE UP (ref 3.8–10.5)

## 2020-01-26 ENCOUNTER — EMERGENCY (EMERGENCY)
Facility: HOSPITAL | Age: 70
LOS: 1 days | End: 2020-01-26
Admitting: EMERGENCY MEDICINE
Payer: MEDICARE

## 2020-01-26 DIAGNOSIS — Z98.890 OTHER SPECIFIED POSTPROCEDURAL STATES: Chronic | ICD-10-CM

## 2020-01-26 DIAGNOSIS — Z95.828 PRESENCE OF OTHER VASCULAR IMPLANTS AND GRAFTS: Chronic | ICD-10-CM

## 2020-01-26 PROCEDURE — 99284 EMERGENCY DEPT VISIT MOD MDM: CPT

## 2020-02-13 ENCOUNTER — LABORATORY RESULT (OUTPATIENT)
Age: 70
End: 2020-02-13

## 2020-02-13 ENCOUNTER — APPOINTMENT (OUTPATIENT)
Dept: INFUSION THERAPY | Facility: HOSPITAL | Age: 70
End: 2020-02-13

## 2020-02-13 ENCOUNTER — RESULT REVIEW (OUTPATIENT)
Age: 70
End: 2020-02-13

## 2020-02-13 ENCOUNTER — OUTPATIENT (OUTPATIENT)
Dept: OUTPATIENT SERVICES | Facility: HOSPITAL | Age: 70
LOS: 1 days | Discharge: HOME | End: 2020-02-13

## 2020-02-13 DIAGNOSIS — Z95.828 PRESENCE OF OTHER VASCULAR IMPLANTS AND GRAFTS: Chronic | ICD-10-CM

## 2020-02-13 DIAGNOSIS — Z98.890 OTHER SPECIFIED POSTPROCEDURAL STATES: Chronic | ICD-10-CM

## 2020-02-13 DIAGNOSIS — C16.9 MALIGNANT NEOPLASM OF STOMACH, UNSPECIFIED: ICD-10-CM

## 2020-02-13 LAB
BASOPHILS # BLD AUTO: 0.1 K/UL — SIGNIFICANT CHANGE UP (ref 0–0.2)
BASOPHILS NFR BLD AUTO: 0.9 % — SIGNIFICANT CHANGE UP (ref 0–2)
EOSINOPHIL # BLD AUTO: 0.1 K/UL — SIGNIFICANT CHANGE UP (ref 0–0.5)
EOSINOPHIL NFR BLD AUTO: 1.7 % — SIGNIFICANT CHANGE UP (ref 0–6)
HCT VFR BLD CALC: 39.1 % — SIGNIFICANT CHANGE UP (ref 39–50)
HGB BLD-MCNC: 13 G/DL — SIGNIFICANT CHANGE UP (ref 13–17)
LYMPHOCYTES # BLD AUTO: 1.3 K/UL — SIGNIFICANT CHANGE UP (ref 1–3.3)
LYMPHOCYTES # BLD AUTO: 20.8 % — SIGNIFICANT CHANGE UP (ref 13–44)
MCHC RBC-ENTMCNC: 33.2 G/DL — SIGNIFICANT CHANGE UP (ref 32–36)
MCHC RBC-ENTMCNC: 36.1 PG — HIGH (ref 27–34)
MCV RBC AUTO: 109 FL — HIGH (ref 80–100)
MONOCYTES # BLD AUTO: 0.6 K/UL — SIGNIFICANT CHANGE UP (ref 0–0.9)
MONOCYTES NFR BLD AUTO: 9 % — SIGNIFICANT CHANGE UP (ref 2–14)
NEUTROPHILS # BLD AUTO: 4.2 K/UL — SIGNIFICANT CHANGE UP (ref 1.8–7.4)
NEUTROPHILS NFR BLD AUTO: 67.6 % — SIGNIFICANT CHANGE UP (ref 43–77)
PLATELET # BLD AUTO: 183 K/UL — SIGNIFICANT CHANGE UP (ref 150–400)
RBC # BLD: 3.6 M/UL — LOW (ref 4.2–5.8)
RBC # FLD: 12.4 % — SIGNIFICANT CHANGE UP (ref 10.3–14.5)
WBC # BLD: 6.2 K/UL — SIGNIFICANT CHANGE UP (ref 3.8–10.5)
WBC # FLD AUTO: 6.2 K/UL — SIGNIFICANT CHANGE UP (ref 3.8–10.5)

## 2020-02-24 NOTE — H&P PST ADULT - BP NONINVASIVE MEAN (MM HG)
Speech pathology note Reviewed chart and discussed case with RN. Attempted to see patient for swallowing evaluation, however patient remains too lethargic for PO consideration at this time. Patient did not arouse to verbal/tactile stimulation or light sternal rub. Provided wet washcloth on face and cold, wet spoon to lips which resulted in patient grimacing, moaning, and turning head away. Offered empty spoon and patient without appropriate response, instead biting down hard on spoon. Patient remains inappropriate for PO intake at this time secondary to lethargy. Note palliative following, and agree with further discussions regarding goals of care related to nutrition. Agree that long term feeding tubes are not recommended for patients with advanced dementia. SLP will continue to follow as alertness allows. Thank you. Yovana Owens, Desire Simons., CCC-SLP  
 94

## 2020-02-28 ENCOUNTER — APPOINTMENT (OUTPATIENT)
Dept: HEMATOLOGY ONCOLOGY | Facility: CLINIC | Age: 70
End: 2020-02-28
Payer: MEDICARE

## 2020-02-28 VITALS
OXYGEN SATURATION: 99 % | SYSTOLIC BLOOD PRESSURE: 148 MMHG | DIASTOLIC BLOOD PRESSURE: 79 MMHG | WEIGHT: 136.66 LBS | TEMPERATURE: 98 F | HEART RATE: 51 BPM | BODY MASS INDEX: 19.06 KG/M2 | RESPIRATION RATE: 15 BRPM

## 2020-02-28 PROCEDURE — 99214 OFFICE O/P EST MOD 30 MIN: CPT

## 2020-03-11 NOTE — ASSESSMENT
[FreeTextEntry1] : The patient will continue to be monitored for recurrence of his stomach carcinoma. She will undergo followup CAT scans to assess for response or progression of his disease. He will also continue blood testing and medical followup. He was encouraged to increase his caloric intake and to seek nutrition consultation. He'll return in 3 months or sooner as needed. [Palliative] : Goals of care discussed with patient: Palliative [Palliative Care Plan] : not applicable at this time

## 2020-03-11 NOTE — HISTORY OF PRESENT ILLNESS
[de-identified] : see dictated letter [de-identified] : Since the last visit he patient remains well but has symptoms of increased fatigue and has been losing weight. He denies abdominal pain diarrhea or vomiting.

## 2020-03-15 ENCOUNTER — FORM ENCOUNTER (OUTPATIENT)
Age: 70
End: 2020-03-15

## 2020-03-16 ENCOUNTER — OUTPATIENT (OUTPATIENT)
Dept: OUTPATIENT SERVICES | Facility: HOSPITAL | Age: 70
LOS: 1 days | End: 2020-03-16
Payer: MEDICARE

## 2020-03-16 ENCOUNTER — APPOINTMENT (OUTPATIENT)
Dept: CT IMAGING | Facility: IMAGING CENTER | Age: 70
End: 2020-03-16
Payer: MEDICARE

## 2020-03-16 DIAGNOSIS — Z95.828 PRESENCE OF OTHER VASCULAR IMPLANTS AND GRAFTS: Chronic | ICD-10-CM

## 2020-03-16 DIAGNOSIS — C16.9 MALIGNANT NEOPLASM OF STOMACH, UNSPECIFIED: ICD-10-CM

## 2020-03-16 DIAGNOSIS — Z98.890 OTHER SPECIFIED POSTPROCEDURAL STATES: Chronic | ICD-10-CM

## 2020-03-16 PROCEDURE — 71260 CT THORAX DX C+: CPT | Mod: 26

## 2020-03-16 PROCEDURE — 74177 CT ABD & PELVIS W/CONTRAST: CPT | Mod: 26

## 2020-03-16 PROCEDURE — 74177 CT ABD & PELVIS W/CONTRAST: CPT

## 2020-03-16 PROCEDURE — 71260 CT THORAX DX C+: CPT

## 2020-04-20 ENCOUNTER — OUTPATIENT (OUTPATIENT)
Dept: OUTPATIENT SERVICES | Facility: HOSPITAL | Age: 70
LOS: 1 days | Discharge: HOME | End: 2020-04-20

## 2020-04-20 DIAGNOSIS — Z98.890 OTHER SPECIFIED POSTPROCEDURAL STATES: Chronic | ICD-10-CM

## 2020-04-20 DIAGNOSIS — C16.9 MALIGNANT NEOPLASM OF STOMACH, UNSPECIFIED: ICD-10-CM

## 2020-04-20 DIAGNOSIS — Z95.828 PRESENCE OF OTHER VASCULAR IMPLANTS AND GRAFTS: Chronic | ICD-10-CM

## 2020-04-21 ENCOUNTER — LABORATORY RESULT (OUTPATIENT)
Age: 70
End: 2020-04-21

## 2020-04-21 ENCOUNTER — APPOINTMENT (OUTPATIENT)
Dept: INFUSION THERAPY | Facility: HOSPITAL | Age: 70
End: 2020-04-21

## 2020-04-21 ENCOUNTER — RESULT REVIEW (OUTPATIENT)
Age: 70
End: 2020-04-21

## 2020-04-21 LAB
BASOPHILS # BLD AUTO: 0.05 K/UL — SIGNIFICANT CHANGE UP (ref 0–0.2)
BASOPHILS NFR BLD AUTO: 0.9 % — SIGNIFICANT CHANGE UP (ref 0–2)
EOSINOPHIL # BLD AUTO: 0.08 K/UL — SIGNIFICANT CHANGE UP (ref 0–0.5)
EOSINOPHIL NFR BLD AUTO: 1.4 % — SIGNIFICANT CHANGE UP (ref 0–6)
HCT VFR BLD CALC: 36 % — LOW (ref 39–50)
HGB BLD-MCNC: 12.4 G/DL — LOW (ref 13–17)
IMM GRANULOCYTES NFR BLD AUTO: 0.4 % — SIGNIFICANT CHANGE UP (ref 0–1.5)
LYMPHOCYTES # BLD AUTO: 1.19 K/UL — SIGNIFICANT CHANGE UP (ref 1–3.3)
LYMPHOCYTES # BLD AUTO: 21.3 % — SIGNIFICANT CHANGE UP (ref 13–44)
MCHC RBC-ENTMCNC: 34.4 GM/DL — SIGNIFICANT CHANGE UP (ref 32–36)
MCHC RBC-ENTMCNC: 35.1 PG — HIGH (ref 27–34)
MCV RBC AUTO: 102 FL — HIGH (ref 80–100)
MONOCYTES # BLD AUTO: 0.43 K/UL — SIGNIFICANT CHANGE UP (ref 0–0.9)
MONOCYTES NFR BLD AUTO: 7.7 % — SIGNIFICANT CHANGE UP (ref 2–14)
NEUTROPHILS # BLD AUTO: 3.83 K/UL — SIGNIFICANT CHANGE UP (ref 1.8–7.4)
NEUTROPHILS NFR BLD AUTO: 68.3 % — SIGNIFICANT CHANGE UP (ref 43–77)
NRBC # BLD: 0 /100 WBCS — SIGNIFICANT CHANGE UP (ref 0–0)
PLATELET # BLD AUTO: 203 K/UL — SIGNIFICANT CHANGE UP (ref 150–400)
RBC # BLD: 3.53 M/UL — LOW (ref 4.2–5.8)
RBC # FLD: 14.7 % — HIGH (ref 10.3–14.5)
WBC # BLD: 5.6 K/UL — SIGNIFICANT CHANGE UP (ref 3.8–10.5)
WBC # FLD AUTO: 5.6 K/UL — SIGNIFICANT CHANGE UP (ref 3.8–10.5)

## 2020-06-02 ENCOUNTER — OUTPATIENT (OUTPATIENT)
Dept: OUTPATIENT SERVICES | Facility: HOSPITAL | Age: 70
LOS: 1 days | Discharge: HOME | End: 2020-06-02

## 2020-06-02 DIAGNOSIS — Z98.890 OTHER SPECIFIED POSTPROCEDURAL STATES: Chronic | ICD-10-CM

## 2020-06-02 DIAGNOSIS — C16.9 MALIGNANT NEOPLASM OF STOMACH, UNSPECIFIED: ICD-10-CM

## 2020-06-02 DIAGNOSIS — Z95.828 PRESENCE OF OTHER VASCULAR IMPLANTS AND GRAFTS: Chronic | ICD-10-CM

## 2020-06-03 ENCOUNTER — LABORATORY RESULT (OUTPATIENT)
Age: 70
End: 2020-06-03

## 2020-06-03 ENCOUNTER — RESULT REVIEW (OUTPATIENT)
Age: 70
End: 2020-06-03

## 2020-06-03 ENCOUNTER — APPOINTMENT (OUTPATIENT)
Dept: INFUSION THERAPY | Facility: HOSPITAL | Age: 70
End: 2020-06-03

## 2020-06-03 LAB
BASOPHILS # BLD AUTO: 0.06 K/UL — SIGNIFICANT CHANGE UP (ref 0–0.2)
BASOPHILS NFR BLD AUTO: 1.1 % — SIGNIFICANT CHANGE UP (ref 0–2)
EOSINOPHIL # BLD AUTO: 0.08 K/UL — SIGNIFICANT CHANGE UP (ref 0–0.5)
EOSINOPHIL NFR BLD AUTO: 1.5 % — SIGNIFICANT CHANGE UP (ref 0–6)
HCT VFR BLD CALC: 38.1 % — LOW (ref 39–50)
HGB BLD-MCNC: 12.8 G/DL — LOW (ref 13–17)
IMM GRANULOCYTES NFR BLD AUTO: 0.2 % — SIGNIFICANT CHANGE UP (ref 0–1.5)
LYMPHOCYTES # BLD AUTO: 1.24 K/UL — SIGNIFICANT CHANGE UP (ref 1–3.3)
LYMPHOCYTES # BLD AUTO: 22.8 % — SIGNIFICANT CHANGE UP (ref 13–44)
MCHC RBC-ENTMCNC: 33.6 GM/DL — SIGNIFICANT CHANGE UP (ref 32–36)
MCHC RBC-ENTMCNC: 34.8 PG — HIGH (ref 27–34)
MCV RBC AUTO: 103.5 FL — HIGH (ref 80–100)
MONOCYTES # BLD AUTO: 0.45 K/UL — SIGNIFICANT CHANGE UP (ref 0–0.9)
MONOCYTES NFR BLD AUTO: 8.3 % — SIGNIFICANT CHANGE UP (ref 2–14)
NEUTROPHILS # BLD AUTO: 3.61 K/UL — SIGNIFICANT CHANGE UP (ref 1.8–7.4)
NEUTROPHILS NFR BLD AUTO: 66.1 % — SIGNIFICANT CHANGE UP (ref 43–77)
NRBC # BLD: 0 /100 WBCS — SIGNIFICANT CHANGE UP (ref 0–0)
PLATELET # BLD AUTO: 200 K/UL — SIGNIFICANT CHANGE UP (ref 150–400)
RBC # BLD: 3.68 M/UL — LOW (ref 4.2–5.8)
RBC # FLD: 14.5 % — SIGNIFICANT CHANGE UP (ref 10.3–14.5)
WBC # BLD: 5.45 K/UL — SIGNIFICANT CHANGE UP (ref 3.8–10.5)
WBC # FLD AUTO: 5.45 K/UL — SIGNIFICANT CHANGE UP (ref 3.8–10.5)

## 2020-06-08 ENCOUNTER — APPOINTMENT (OUTPATIENT)
Dept: GASTROENTEROLOGY | Facility: CLINIC | Age: 70
End: 2020-06-08
Payer: MEDICARE

## 2020-06-08 VITALS
TEMPERATURE: 99 F | OXYGEN SATURATION: 97 % | SYSTOLIC BLOOD PRESSURE: 145 MMHG | HEIGHT: 71 IN | HEART RATE: 68 BPM | BODY MASS INDEX: 18.9 KG/M2 | WEIGHT: 135 LBS | DIASTOLIC BLOOD PRESSURE: 91 MMHG

## 2020-06-08 DIAGNOSIS — Z86.39 PERSONAL HISTORY OF OTHER ENDOCRINE, NUTRITIONAL AND METABOLIC DISEASE: ICD-10-CM

## 2020-06-08 PROCEDURE — 99204 OFFICE O/P NEW MOD 45 MIN: CPT

## 2020-06-08 NOTE — PHYSICAL EXAM
[General Appearance - Alert] : alert [General Appearance - In No Acute Distress] : in no acute distress [Sclera] : the sclera and conjunctiva were normal [PERRL With Normal Accommodation] : pupils were equal in size, round, and reactive to light [Extraocular Movements] : extraocular movements were intact [Outer Ear] : the ears and nose were normal in appearance [Oropharynx] : the oropharynx was normal [Neck Appearance] : the appearance of the neck was normal [Neck Cervical Mass (___cm)] : no neck mass was observed [Jugular Venous Distention Increased] : there was no jugular-venous distention [Thyroid Nodule] : there were no palpable thyroid nodules [Thyroid Diffuse Enlargement] : the thyroid was not enlarged [Auscultation Breath Sounds / Voice Sounds] : lungs were clear to auscultation bilaterally [Heart Sounds] : normal S1 and S2 [Heart Rate And Rhythm] : heart rate was normal and rhythm regular [Heart Sounds Gallop] : no gallops [Murmurs] : no murmurs [Heart Sounds Pericardial Friction Rub] : no pericardial rub [Bowel Sounds] : normal bowel sounds [Abdomen Tenderness] : non-tender [Abdomen Soft] : soft [] : no hepato-splenomegaly [Abdomen Mass (___ Cm)] : no abdominal mass palpated [FreeTextEntry1] : Epigastric fullness [No CVA Tenderness] : no ~M costovertebral angle tenderness [No Spinal Tenderness] : no spinal tenderness [Abnormal Walk] : normal gait [Nail Clubbing] : no clubbing  or cyanosis of the fingernails [Musculoskeletal - Swelling] : no joint swelling seen [Motor Tone] : muscle strength and tone were normal [Oriented To Time, Place, And Person] : oriented to person, place, and time [Affect] : the affect was normal [Impaired Insight] : insight and judgment were intact

## 2020-06-08 NOTE — HISTORY OF PRESENT ILLNESS
[FreeTextEntry1] : 69 year-old male presents with history of gastric CA T3 N1,  In 5/2017 he underwent distal pancreatectomy/splenectomy, Lorena en Y esophagojejunostomy. Final pathology was consistent with residual gastric adenocarcinoma, invading through the posterior gastric wall into the jordy pancreatic soft tissue. Pancreatic parenchyma and spleen were benign. Metastatic gastric adenocarcinoma was present in 3/51 lymph nodes (T4aN2). All resection margins were negative. Also s/p radiation to the gastric bed and lymph node between 7/9/17 and 8/22/17, and he received adjuvant chemotherapy through approximately 12/21/17. \par He underwent an USGB and FNA of a left supraclavicular lymph node in March 2018 which was positive for metastatic gastric cancer. He completed a PET/CT on 4/2/18, which showed hypermetabolic left supraclavicular lymph node corresponding to biopsy proven metastasis. He is status post left neck dissection/excision of node on 4/17/18. Final pathology was consistent with metastatic adenocarcinoma, gastric primary.\par \par Most recently is underwent extensive lysis of adhesions and incisional hernia repair with mesh and plastic reconstruction on 6/25/18.\par \[facility] Since his operations patient has had a baseline of 145 pounds.  Over the past several months he has lost about 10 pounds.  He is seeing a nutritionist.  He claims to be under much stress.  His appetite is decreased.  He also complains of some early satiety and describes it as filling up quickly after eating.  He has had some recent regurgitation.  He denies any abdominal pain.  His bowel movements are regular.\[facility] \[facility] Patient had a CAT scan of the abdomen and pelvis and chest in March.  Chest CT was essentially normal.  Abdominal CT revealed evidence of a his distal pancreatectomy and splenectomy.  He had some layering calculi in the right bladder diverticulum.  His prostate was enlarged.  He had a small amount of free fluid in his upper abdomen.  They were stable for sonometer abdominal aortic aneurysm and 2.8 cm right iliac artery aneurysm.  He had no evidence of lymphadenopathy or residual tumor.\[facility] \[facility] Blood work revealed H/H 12.8/38.1, platelets 200 K, GGTP 44, LFTs normal, albumin 4.2, total protein 7.1, CA-19-9 16, CEA 5.1 which is stable, TSH was normal.\par \[facility] Last colonoscopy was in 11/2016 and was normal.

## 2020-06-08 NOTE — REVIEW OF SYSTEMS
[Feeling Tired] : feeling tired [Recent Weight Loss (___ Lbs)] : recent [unfilled] ~Ulb weight loss [As Noted in HPI] : as noted in HPI [Feelings Of Weakness] : feelings of weakness [Negative] : Neurological [FreeTextEntry8] : BPH, bladder stones [de-identified] : On anticoagulants

## 2020-06-08 NOTE — REASON FOR VISIT
[Initial Evaluation] : an initial evaluation [FreeTextEntry1] : Weight loss, Hx of gastric carcinoma

## 2020-06-08 NOTE — ASSESSMENT
[FreeTextEntry1] : Patient with history of gastric cancer locally invasive, status post Lorena-en-Y esophago gastrectomy and esophago-.  He also had distal pancreatectomy and splenectomy.  He received radiation therapy to the upper abdominal area.  He subsequently had a left supraclavicular node that was positive for metastatic disease and underwent lymphadenectomy.  Recent CAT scan did not show any residual disease but patient has been having some weight loss over the past 2 months associated with symptoms of early satiety and some regurgitation.  He does have some fullness in the epigastric area.  He will need an upper endoscopy and possible EUS.  Patient will also require a PET scan.\par Blood work and tumor markers are stable.\par He does complain of some anxiety and possible depression.  He will be started on Lexapro 5 mg daily.  He will continue seeing a nutritionist.\par We will continue to monitor his weight.

## 2020-06-21 ENCOUNTER — APPOINTMENT (OUTPATIENT)
Dept: DISASTER EMERGENCY | Facility: CLINIC | Age: 70
End: 2020-06-21

## 2020-06-22 LAB — SARS-COV-2 N GENE NPH QL NAA+PROBE: NOT DETECTED

## 2020-06-23 ENCOUNTER — TRANSCRIPTION ENCOUNTER (OUTPATIENT)
Age: 70
End: 2020-06-23

## 2020-06-24 ENCOUNTER — OUTPATIENT (OUTPATIENT)
Dept: OUTPATIENT SERVICES | Facility: HOSPITAL | Age: 70
LOS: 1 days | Discharge: HOME | End: 2020-06-24
Payer: MEDICARE

## 2020-06-24 ENCOUNTER — APPOINTMENT (OUTPATIENT)
Dept: GASTROENTEROLOGY | Facility: HOSPITAL | Age: 70
End: 2020-06-24

## 2020-06-24 VITALS
HEART RATE: 55 BPM | DIASTOLIC BLOOD PRESSURE: 86 MMHG | RESPIRATION RATE: 16 BRPM | OXYGEN SATURATION: 97 % | TEMPERATURE: 98 F | HEIGHT: 71 IN | SYSTOLIC BLOOD PRESSURE: 148 MMHG | WEIGHT: 297.62 LBS

## 2020-06-24 VITALS
DIASTOLIC BLOOD PRESSURE: 83 MMHG | SYSTOLIC BLOOD PRESSURE: 163 MMHG | OXYGEN SATURATION: 98 % | HEART RATE: 60 BPM | RESPIRATION RATE: 16 BRPM

## 2020-06-24 DIAGNOSIS — C16.9 MALIGNANT NEOPLASM OF STOMACH, UNSPECIFIED: ICD-10-CM

## 2020-06-24 DIAGNOSIS — Z98.890 OTHER SPECIFIED POSTPROCEDURAL STATES: Chronic | ICD-10-CM

## 2020-06-24 DIAGNOSIS — Z95.828 PRESENCE OF OTHER VASCULAR IMPLANTS AND GRAFTS: Chronic | ICD-10-CM

## 2020-06-24 LAB — GLUCOSE BLDC GLUCOMTR-MCNC: 110 MG/DL — HIGH (ref 70–99)

## 2020-06-24 PROCEDURE — 43239 EGD BIOPSY SINGLE/MULTIPLE: CPT | Mod: 59,GC

## 2020-06-24 PROCEDURE — 43259 EGD US EXAM DUODENUM/JEJUNUM: CPT | Mod: GC

## 2020-06-24 RX ORDER — SODIUM CHLORIDE 9 MG/ML
1000 INJECTION, SOLUTION INTRAVENOUS
Refills: 0 | Status: DISCONTINUED | OUTPATIENT
Start: 2020-06-24 | End: 2020-07-09

## 2020-07-23 NOTE — H&P PST ADULT - HEIGHT IN INCHES
Cardiac Cath Lab Procedure Area Arrival Note:    Vikki Bacon arrived to Cardiac Cath Lab, Procedure Area. Patient identifiers verified with NAME and DATE OF BIRTH. Procedure verified with patient. Consent forms verified. Allergies verified. Patient informed of procedure and plan of care. Questions answered with review. Patient voiced understanding of procedure and plan of care. Patient on cardiac monitor, non-invasive blood pressure, SPO2 monitor. On O2 @ 2 lpm via nasal cannula. IV of normal saline on pump at 75 ml/hr. Patient status doing well without problems. Patient is A&Ox 4. Patient reports no pain. Patient medicated during procedure with orders obtained and verified by Dr. Salma Yap. Refer to patients Cardiac Cath Lab PROCEDURE REPORT for vital signs, assessment, status, and response during procedure, printed at end of case. Printed report on chart or scanned into chart. 10

## 2020-08-19 ENCOUNTER — RESULT REVIEW (OUTPATIENT)
Age: 70
End: 2020-08-19

## 2020-08-19 ENCOUNTER — APPOINTMENT (OUTPATIENT)
Dept: INFUSION THERAPY | Facility: HOSPITAL | Age: 70
End: 2020-08-19

## 2020-08-19 ENCOUNTER — LABORATORY RESULT (OUTPATIENT)
Age: 70
End: 2020-08-19

## 2020-08-19 ENCOUNTER — OUTPATIENT (OUTPATIENT)
Dept: OUTPATIENT SERVICES | Facility: HOSPITAL | Age: 70
LOS: 1 days | Discharge: HOME | End: 2020-08-19

## 2020-08-19 DIAGNOSIS — Z98.890 OTHER SPECIFIED POSTPROCEDURAL STATES: Chronic | ICD-10-CM

## 2020-08-19 DIAGNOSIS — C16.9 MALIGNANT NEOPLASM OF STOMACH, UNSPECIFIED: ICD-10-CM

## 2020-08-19 DIAGNOSIS — Z95.828 PRESENCE OF OTHER VASCULAR IMPLANTS AND GRAFTS: Chronic | ICD-10-CM

## 2020-08-19 LAB
BASOPHILS # BLD AUTO: 0.06 K/UL — SIGNIFICANT CHANGE UP (ref 0–0.2)
BASOPHILS NFR BLD AUTO: 1.1 % — SIGNIFICANT CHANGE UP (ref 0–2)
EOSINOPHIL # BLD AUTO: 0.12 K/UL — SIGNIFICANT CHANGE UP (ref 0–0.5)
EOSINOPHIL NFR BLD AUTO: 2.1 % — SIGNIFICANT CHANGE UP (ref 0–6)
HCT VFR BLD CALC: 36.4 % — LOW (ref 39–50)
HGB BLD-MCNC: 12.3 G/DL — LOW (ref 13–17)
IMM GRANULOCYTES NFR BLD AUTO: 0.4 % — SIGNIFICANT CHANGE UP (ref 0–1.5)
LYMPHOCYTES # BLD AUTO: 1.07 K/UL — SIGNIFICANT CHANGE UP (ref 1–3.3)
LYMPHOCYTES # BLD AUTO: 18.9 % — SIGNIFICANT CHANGE UP (ref 13–44)
MCHC RBC-ENTMCNC: 33.8 GM/DL — SIGNIFICANT CHANGE UP (ref 32–36)
MCHC RBC-ENTMCNC: 35.1 PG — HIGH (ref 27–34)
MCV RBC AUTO: 104 FL — HIGH (ref 80–100)
MONOCYTES # BLD AUTO: 0.47 K/UL — SIGNIFICANT CHANGE UP (ref 0–0.9)
MONOCYTES NFR BLD AUTO: 8.3 % — SIGNIFICANT CHANGE UP (ref 2–14)
NEUTROPHILS # BLD AUTO: 3.93 K/UL — SIGNIFICANT CHANGE UP (ref 1.8–7.4)
NEUTROPHILS NFR BLD AUTO: 69.2 % — SIGNIFICANT CHANGE UP (ref 43–77)
NRBC # BLD: 0 /100 WBCS — SIGNIFICANT CHANGE UP (ref 0–0)
PLATELET # BLD AUTO: 199 K/UL — SIGNIFICANT CHANGE UP (ref 150–400)
RBC # BLD: 3.5 M/UL — LOW (ref 4.2–5.8)
RBC # FLD: 15.2 % — HIGH (ref 10.3–14.5)
WBC # BLD: 5.67 K/UL — SIGNIFICANT CHANGE UP (ref 3.8–10.5)
WBC # FLD AUTO: 5.67 K/UL — SIGNIFICANT CHANGE UP (ref 3.8–10.5)

## 2020-10-05 ENCOUNTER — OUTPATIENT (OUTPATIENT)
Dept: OUTPATIENT SERVICES | Facility: HOSPITAL | Age: 70
LOS: 1 days | Discharge: HOME | End: 2020-10-05

## 2020-10-05 ENCOUNTER — OUTPATIENT (OUTPATIENT)
Dept: OUTPATIENT SERVICES | Facility: HOSPITAL | Age: 70
LOS: 1 days | End: 2020-10-05

## 2020-10-05 DIAGNOSIS — Z95.828 PRESENCE OF OTHER VASCULAR IMPLANTS AND GRAFTS: Chronic | ICD-10-CM

## 2020-10-05 DIAGNOSIS — Z98.890 OTHER SPECIFIED POSTPROCEDURAL STATES: Chronic | ICD-10-CM

## 2020-10-05 DIAGNOSIS — C16.9 MALIGNANT NEOPLASM OF STOMACH, UNSPECIFIED: ICD-10-CM

## 2020-10-06 ENCOUNTER — LABORATORY RESULT (OUTPATIENT)
Age: 70
End: 2020-10-06

## 2020-10-06 ENCOUNTER — APPOINTMENT (OUTPATIENT)
Dept: INFUSION THERAPY | Facility: HOSPITAL | Age: 70
End: 2020-10-06

## 2020-10-08 ENCOUNTER — APPOINTMENT (OUTPATIENT)
Dept: HEMATOLOGY ONCOLOGY | Facility: CLINIC | Age: 70
End: 2020-10-08

## 2020-10-08 ENCOUNTER — APPOINTMENT (OUTPATIENT)
Dept: HEMATOLOGY ONCOLOGY | Facility: CLINIC | Age: 70
End: 2020-10-08
Payer: MEDICARE

## 2020-10-08 VITALS
DIASTOLIC BLOOD PRESSURE: 79 MMHG | OXYGEN SATURATION: 97 % | SYSTOLIC BLOOD PRESSURE: 134 MMHG | TEMPERATURE: 98.4 F | RESPIRATION RATE: 16 BRPM | WEIGHT: 125.64 LBS | BODY MASS INDEX: 17.79 KG/M2 | HEIGHT: 70.47 IN | HEART RATE: 66 BPM

## 2020-10-08 PROCEDURE — 99214 OFFICE O/P EST MOD 30 MIN: CPT

## 2020-10-08 NOTE — ASSESSMENT
[FreeTextEntry1] : Pt to be evaluated for weight loss and will do PetCt, discuss regurgitation with GI, pt on antidepressant escataloprine. Pt to inc calories and wants to try medical THC. 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 Pt to RTO after above. [Curative] : Goals of care discussed with patient: Curative [Palliative Care Plan] : not applicable at this time

## 2020-10-08 NOTE — REVIEW OF SYSTEMS
[Negative] : Allergic/Immunologic [FreeTextEntry7] : occ diarrhea [FreeTextEntry8] : on tamulosin for BPH [FreeTextEntry9] : occ backaches

## 2020-10-08 NOTE — HISTORY OF PRESENT ILLNESS
[de-identified] : see dictated letter [de-identified] : Since the last visit pt continues to lose more weight and has lost 15lbs in last 6 months.

## 2020-10-20 ENCOUNTER — OUTPATIENT (OUTPATIENT)
Dept: OUTPATIENT SERVICES | Facility: HOSPITAL | Age: 70
LOS: 1 days | End: 2020-10-20
Payer: MEDICARE

## 2020-10-20 ENCOUNTER — APPOINTMENT (OUTPATIENT)
Dept: NUCLEAR MEDICINE | Facility: IMAGING CENTER | Age: 70
End: 2020-10-20
Payer: MEDICARE

## 2020-10-20 DIAGNOSIS — C16.9 MALIGNANT NEOPLASM OF STOMACH, UNSPECIFIED: ICD-10-CM

## 2020-10-20 DIAGNOSIS — Z98.890 OTHER SPECIFIED POSTPROCEDURAL STATES: Chronic | ICD-10-CM

## 2020-10-20 DIAGNOSIS — Z95.828 PRESENCE OF OTHER VASCULAR IMPLANTS AND GRAFTS: Chronic | ICD-10-CM

## 2020-10-20 PROCEDURE — 78815 PET IMAGE W/CT SKULL-THIGH: CPT | Mod: 26,PS

## 2020-10-20 PROCEDURE — 78815 PET IMAGE W/CT SKULL-THIGH: CPT

## 2020-10-20 PROCEDURE — A9552: CPT

## 2020-10-26 ENCOUNTER — NON-APPOINTMENT (OUTPATIENT)
Age: 70
End: 2020-10-26

## 2020-10-28 DIAGNOSIS — R11.10 VOMITING, UNSPECIFIED: ICD-10-CM

## 2020-11-04 ENCOUNTER — APPOINTMENT (OUTPATIENT)
Dept: RADIOLOGY | Facility: HOSPITAL | Age: 70
End: 2020-11-04
Payer: MEDICARE

## 2020-11-04 ENCOUNTER — OUTPATIENT (OUTPATIENT)
Dept: OUTPATIENT SERVICES | Facility: HOSPITAL | Age: 70
LOS: 1 days | End: 2020-11-04

## 2020-11-04 DIAGNOSIS — Z98.890 OTHER SPECIFIED POSTPROCEDURAL STATES: Chronic | ICD-10-CM

## 2020-11-04 DIAGNOSIS — Z95.828 PRESENCE OF OTHER VASCULAR IMPLANTS AND GRAFTS: Chronic | ICD-10-CM

## 2020-11-04 DIAGNOSIS — R63.4 ABNORMAL WEIGHT LOSS: ICD-10-CM

## 2020-11-04 DIAGNOSIS — C16.9 MALIGNANT NEOPLASM OF STOMACH, UNSPECIFIED: ICD-10-CM

## 2020-11-04 DIAGNOSIS — R11.10 VOMITING, UNSPECIFIED: ICD-10-CM

## 2020-11-04 PROCEDURE — 74240 X-RAY XM UPR GI TRC 1CNTRST: CPT | Mod: 26

## 2020-11-11 ENCOUNTER — APPOINTMENT (OUTPATIENT)
Dept: DISASTER EMERGENCY | Facility: CLINIC | Age: 70
End: 2020-11-11

## 2020-11-11 ENCOUNTER — LABORATORY RESULT (OUTPATIENT)
Age: 70
End: 2020-11-11

## 2020-11-12 RX ORDER — SODIUM CHLORIDE 9 MG/ML
1000 INJECTION, SOLUTION INTRAVENOUS
Refills: 0 | Status: DISCONTINUED | OUTPATIENT
Start: 2020-11-13 | End: 2020-11-27

## 2020-11-13 ENCOUNTER — APPOINTMENT (OUTPATIENT)
Dept: GASTROENTEROLOGY | Facility: HOSPITAL | Age: 70
End: 2020-11-13

## 2020-11-13 ENCOUNTER — OUTPATIENT (OUTPATIENT)
Dept: OUTPATIENT SERVICES | Facility: HOSPITAL | Age: 70
LOS: 1 days | Discharge: HOME | End: 2020-11-13
Payer: MEDICARE

## 2020-11-13 VITALS — OXYGEN SATURATION: 98 % | HEART RATE: 53 BPM | RESPIRATION RATE: 18 BRPM

## 2020-11-13 VITALS
OXYGEN SATURATION: 100 % | WEIGHT: 125 LBS | TEMPERATURE: 98 F | HEIGHT: 71 IN | SYSTOLIC BLOOD PRESSURE: 139 MMHG | RESPIRATION RATE: 10 BRPM | DIASTOLIC BLOOD PRESSURE: 98 MMHG | HEART RATE: 56 BPM

## 2020-11-13 DIAGNOSIS — K21.9 GASTRO-ESOPHAGEAL REFLUX DISEASE WITHOUT ESOPHAGITIS: ICD-10-CM

## 2020-11-13 DIAGNOSIS — Z98.890 OTHER SPECIFIED POSTPROCEDURAL STATES: Chronic | ICD-10-CM

## 2020-11-13 DIAGNOSIS — Z95.828 PRESENCE OF OTHER VASCULAR IMPLANTS AND GRAFTS: Chronic | ICD-10-CM

## 2020-11-13 LAB
GLUCOSE BLDC GLUCOMTR-MCNC: 91 MG/DL — SIGNIFICANT CHANGE UP (ref 70–99)
GLUCOSE BLDC GLUCOMTR-MCNC: 98 MG/DL — SIGNIFICANT CHANGE UP (ref 70–99)

## 2020-11-13 PROCEDURE — 43239 EGD BIOPSY SINGLE/MULTIPLE: CPT | Mod: GC

## 2020-11-13 RX ADMIN — SODIUM CHLORIDE 30 MILLILITER(S): 9 INJECTION, SOLUTION INTRAVENOUS at 11:38

## 2020-11-17 ENCOUNTER — INPATIENT (INPATIENT)
Facility: HOSPITAL | Age: 70
LOS: 13 days | Discharge: HOME HEALTH | End: 2020-12-01
Attending: SPECIALIST | Admitting: SPECIALIST
Payer: MEDICARE

## 2020-11-17 VITALS
TEMPERATURE: 98 F | RESPIRATION RATE: 16 BRPM | SYSTOLIC BLOOD PRESSURE: 133 MMHG | OXYGEN SATURATION: 100 % | HEIGHT: 71 IN | HEART RATE: 61 BPM | DIASTOLIC BLOOD PRESSURE: 97 MMHG

## 2020-11-17 DIAGNOSIS — Z98.890 OTHER SPECIFIED POSTPROCEDURAL STATES: Chronic | ICD-10-CM

## 2020-11-17 DIAGNOSIS — K31.89 OTHER DISEASES OF STOMACH AND DUODENUM: ICD-10-CM

## 2020-11-17 DIAGNOSIS — Z95.828 PRESENCE OF OTHER VASCULAR IMPLANTS AND GRAFTS: Chronic | ICD-10-CM

## 2020-11-17 LAB
ALBUMIN SERPL ELPH-MCNC: 4.3 G/DL — SIGNIFICANT CHANGE UP (ref 3.3–5)
ALP SERPL-CCNC: 85 U/L — SIGNIFICANT CHANGE UP (ref 40–120)
ALT FLD-CCNC: 21 U/L — SIGNIFICANT CHANGE UP (ref 4–41)
ANION GAP SERPL CALC-SCNC: 11 MMO/L — SIGNIFICANT CHANGE UP (ref 7–14)
APTT BLD: 29.9 SEC — SIGNIFICANT CHANGE UP (ref 27–36.3)
AST SERPL-CCNC: 30 U/L — SIGNIFICANT CHANGE UP (ref 4–40)
BASOPHILS # BLD AUTO: 0.06 K/UL — SIGNIFICANT CHANGE UP (ref 0–0.2)
BASOPHILS NFR BLD AUTO: 1.1 % — SIGNIFICANT CHANGE UP (ref 0–2)
BILIRUB SERPL-MCNC: 1.1 MG/DL — SIGNIFICANT CHANGE UP (ref 0.2–1.2)
BLD GP AB SCN SERPL QL: NEGATIVE — SIGNIFICANT CHANGE UP
BUN SERPL-MCNC: 13 MG/DL — SIGNIFICANT CHANGE UP (ref 7–23)
CALCIUM SERPL-MCNC: 9.6 MG/DL — SIGNIFICANT CHANGE UP (ref 8.4–10.5)
CHLORIDE SERPL-SCNC: 98 MMOL/L — SIGNIFICANT CHANGE UP (ref 98–107)
CO2 SERPL-SCNC: 28 MMOL/L — SIGNIFICANT CHANGE UP (ref 22–31)
CREAT SERPL-MCNC: 0.84 MG/DL — SIGNIFICANT CHANGE UP (ref 0.5–1.3)
EOSINOPHIL # BLD AUTO: 0.14 K/UL — SIGNIFICANT CHANGE UP (ref 0–0.5)
EOSINOPHIL NFR BLD AUTO: 2.6 % — SIGNIFICANT CHANGE UP (ref 0–6)
GLUCOSE BLDC GLUCOMTR-MCNC: 154 MG/DL — HIGH (ref 70–99)
GLUCOSE SERPL-MCNC: 93 MG/DL — SIGNIFICANT CHANGE UP (ref 70–99)
HCT VFR BLD CALC: 38.6 % — LOW (ref 39–50)
HGB BLD-MCNC: 12.9 G/DL — LOW (ref 13–17)
IMM GRANULOCYTES NFR BLD AUTO: 0.2 % — SIGNIFICANT CHANGE UP (ref 0–1.5)
INR BLD: 1.12 — SIGNIFICANT CHANGE UP (ref 0.88–1.16)
LYMPHOCYTES # BLD AUTO: 1.2 K/UL — SIGNIFICANT CHANGE UP (ref 1–3.3)
LYMPHOCYTES # BLD AUTO: 22.5 % — SIGNIFICANT CHANGE UP (ref 13–44)
MCHC RBC-ENTMCNC: 33.4 % — SIGNIFICANT CHANGE UP (ref 32–36)
MCHC RBC-ENTMCNC: 34.5 PG — HIGH (ref 27–34)
MCV RBC AUTO: 103.2 FL — HIGH (ref 80–100)
MONOCYTES # BLD AUTO: 0.35 K/UL — SIGNIFICANT CHANGE UP (ref 0–0.9)
MONOCYTES NFR BLD AUTO: 6.6 % — SIGNIFICANT CHANGE UP (ref 2–14)
NEUTROPHILS # BLD AUTO: 3.58 K/UL — SIGNIFICANT CHANGE UP (ref 1.8–7.4)
NEUTROPHILS NFR BLD AUTO: 67 % — SIGNIFICANT CHANGE UP (ref 43–77)
NRBC # FLD: 0 K/UL — SIGNIFICANT CHANGE UP (ref 0–0)
PLATELET # BLD AUTO: 186 K/UL — SIGNIFICANT CHANGE UP (ref 150–400)
PMV BLD: 10.7 FL — SIGNIFICANT CHANGE UP (ref 7–13)
POTASSIUM SERPL-MCNC: 3.2 MMOL/L — LOW (ref 3.5–5.3)
POTASSIUM SERPL-SCNC: 3.2 MMOL/L — LOW (ref 3.5–5.3)
PROT SERPL-MCNC: 7.5 G/DL — SIGNIFICANT CHANGE UP (ref 6–8.3)
PROTHROM AB SERPL-ACNC: 12.8 SEC — SIGNIFICANT CHANGE UP (ref 10.6–13.6)
RBC # BLD: 3.74 M/UL — LOW (ref 4.2–5.8)
RBC # FLD: 14.1 % — SIGNIFICANT CHANGE UP (ref 10.3–14.5)
RH IG SCN BLD-IMP: NEGATIVE — SIGNIFICANT CHANGE UP
SARS-COV-2 RNA SPEC QL NAA+PROBE: SIGNIFICANT CHANGE UP
SODIUM SERPL-SCNC: 137 MMOL/L — SIGNIFICANT CHANGE UP (ref 135–145)
WBC # BLD: 5.34 K/UL — SIGNIFICANT CHANGE UP (ref 3.8–10.5)
WBC # FLD AUTO: 5.34 K/UL — SIGNIFICANT CHANGE UP (ref 3.8–10.5)

## 2020-11-17 PROCEDURE — 99223 1ST HOSP IP/OBS HIGH 75: CPT

## 2020-11-17 PROCEDURE — 74177 CT ABD & PELVIS W/CONTRAST: CPT | Mod: 26

## 2020-11-17 PROCEDURE — 99284 EMERGENCY DEPT VISIT MOD MDM: CPT

## 2020-11-17 PROCEDURE — 71260 CT THORAX DX C+: CPT | Mod: 26

## 2020-11-17 RX ORDER — LEVOTHYROXINE SODIUM 125 MCG
75 TABLET ORAL AT BEDTIME
Refills: 0 | Status: DISCONTINUED | OUTPATIENT
Start: 2020-11-17 | End: 2020-11-23

## 2020-11-17 RX ORDER — ENOXAPARIN SODIUM 100 MG/ML
40 INJECTION SUBCUTANEOUS DAILY
Refills: 0 | Status: DISCONTINUED | OUTPATIENT
Start: 2020-11-17 | End: 2020-11-23

## 2020-11-17 RX ORDER — INSULIN LISPRO 100/ML
VIAL (ML) SUBCUTANEOUS AT BEDTIME
Refills: 0 | Status: DISCONTINUED | OUTPATIENT
Start: 2020-11-17 | End: 2020-11-23

## 2020-11-17 RX ORDER — PANTOPRAZOLE SODIUM 20 MG/1
40 TABLET, DELAYED RELEASE ORAL ONCE
Refills: 0 | Status: COMPLETED | OUTPATIENT
Start: 2020-11-17 | End: 2020-11-17

## 2020-11-17 RX ORDER — DEXTROSE 50 % IN WATER 50 %
25 SYRINGE (ML) INTRAVENOUS ONCE
Refills: 0 | Status: DISCONTINUED | OUTPATIENT
Start: 2020-11-17 | End: 2020-11-23

## 2020-11-17 RX ORDER — LANOLIN ALCOHOL/MO/W.PET/CERES
5 CREAM (GRAM) TOPICAL AT BEDTIME
Refills: 0 | Status: DISCONTINUED | OUTPATIENT
Start: 2020-11-17 | End: 2020-11-17

## 2020-11-17 RX ORDER — FERROUS SULFATE 325(65) MG
325 TABLET ORAL
Refills: 0 | Status: DISCONTINUED | OUTPATIENT
Start: 2020-11-17 | End: 2020-11-17

## 2020-11-17 RX ORDER — DOXAZOSIN MESYLATE 4 MG
1 TABLET ORAL AT BEDTIME
Refills: 0 | Status: DISCONTINUED | OUTPATIENT
Start: 2020-11-17 | End: 2020-11-17

## 2020-11-17 RX ORDER — SODIUM CHLORIDE 9 MG/ML
1000 INJECTION, SOLUTION INTRAVENOUS
Refills: 0 | Status: DISCONTINUED | OUTPATIENT
Start: 2020-11-17 | End: 2020-11-18

## 2020-11-17 RX ORDER — DEXTROSE 50 % IN WATER 50 %
15 SYRINGE (ML) INTRAVENOUS ONCE
Refills: 0 | Status: DISCONTINUED | OUTPATIENT
Start: 2020-11-17 | End: 2020-11-23

## 2020-11-17 RX ORDER — LEVOTHYROXINE SODIUM 125 MCG
100 TABLET ORAL DAILY
Refills: 0 | Status: DISCONTINUED | OUTPATIENT
Start: 2020-11-17 | End: 2020-11-17

## 2020-11-17 RX ORDER — PANTOPRAZOLE SODIUM 20 MG/1
40 TABLET, DELAYED RELEASE ORAL
Refills: 0 | Status: DISCONTINUED | OUTPATIENT
Start: 2020-11-17 | End: 2020-11-17

## 2020-11-17 RX ORDER — INSULIN LISPRO 100/ML
VIAL (ML) SUBCUTANEOUS
Refills: 0 | Status: DISCONTINUED | OUTPATIENT
Start: 2020-11-17 | End: 2020-11-23

## 2020-11-17 RX ORDER — LANOLIN ALCOHOL/MO/W.PET/CERES
3 CREAM (GRAM) TOPICAL AT BEDTIME
Refills: 0 | Status: DISCONTINUED | OUTPATIENT
Start: 2020-11-17 | End: 2020-11-18

## 2020-11-17 RX ORDER — ATORVASTATIN CALCIUM 80 MG/1
10 TABLET, FILM COATED ORAL AT BEDTIME
Refills: 0 | Status: DISCONTINUED | OUTPATIENT
Start: 2020-11-17 | End: 2020-11-17

## 2020-11-17 RX ORDER — DEXTROSE 50 % IN WATER 50 %
12.5 SYRINGE (ML) INTRAVENOUS ONCE
Refills: 0 | Status: DISCONTINUED | OUTPATIENT
Start: 2020-11-17 | End: 2020-11-23

## 2020-11-17 RX ORDER — GLUCAGON INJECTION, SOLUTION 0.5 MG/.1ML
1 INJECTION, SOLUTION SUBCUTANEOUS ONCE
Refills: 0 | Status: DISCONTINUED | OUTPATIENT
Start: 2020-11-17 | End: 2020-11-23

## 2020-11-17 RX ORDER — ONDANSETRON 8 MG/1
4 TABLET, FILM COATED ORAL ONCE
Refills: 0 | Status: COMPLETED | OUTPATIENT
Start: 2020-11-17 | End: 2020-11-17

## 2020-11-17 RX ADMIN — Medication 3 MILLIGRAM(S): at 23:03

## 2020-11-17 RX ADMIN — ONDANSETRON 4 MILLIGRAM(S): 8 TABLET, FILM COATED ORAL at 15:12

## 2020-11-17 RX ADMIN — Medication 75 MICROGRAM(S): at 22:12

## 2020-11-17 RX ADMIN — PANTOPRAZOLE SODIUM 40 MILLIGRAM(S): 20 TABLET, DELAYED RELEASE ORAL at 18:44

## 2020-11-17 NOTE — ED PROVIDER NOTE - CLINICAL SUMMARY MEDICAL DECISION MAKING FREE TEXT BOX
69 y/o male with PMHx Gastric mass, DM type 2, Hypothyroidism, HTN, and anemia who presented to the ED for nausea and vomiting with concerns of an obstruction X days.  Concern for obstruction  Labs, CT, Surgery

## 2020-11-17 NOTE — ED PROVIDER NOTE - ATTENDING CONTRIBUTION TO CARE
Pt was seen and evaluated by me. Pt is a 71 y/o male with PMHx Gastric mass, DM type 2, Hypothyroidism, HTN, and anemia who presented to the ED for nausea and vomiting with concerns of an obstruction X days. Pt states over the past 2 wks having decreased PO secondary to nausea and vomiting worse over the past few days. Pt states he was seen and they tried to do an endoscopy but felt there was still food in the distal esophagus so sent pt in for further evaluation/management. Pt follows with Dr. Cabello. Lungs CTA b/l. RRR. Abd soft, non-tender. Skin tear to left anterior knee.  Concern for obstruction  Labs, CT, Surgery

## 2020-11-17 NOTE — H&P ADULT - NSICDXPASTMEDICALHX_GEN_ALL_CORE_FT
PAST MEDICAL HISTORY:  Constipation     Essential hypertension was on losartan, now diet control    Gastric mass ulcerated mass in gastric cardia with small perigastric nodes on endoscopy.    Hypothyroidism     Iron deficiency anemia, unspecified iron deficiency anemia type     Type 2 diabetes mellitus without complication, unspecified long term insulin use status no BS monitoring

## 2020-11-17 NOTE — ED PROVIDER NOTE - FAMILY HISTORY
Father  Still living? Unknown  Family history of ischemic heart disease (IHD), Age at diagnosis: Age Unknown     Mother  Still living? Unknown  Family history of ovarian cancer, Age at diagnosis: Age Unknown

## 2020-11-17 NOTE — ED ADULT NURSE NOTE - NSIMPLEMENTINTERV_GEN_ALL_ED
Implemented All Universal Safety Interventions:  [place] to call system. Call bell, personal items and telephone within reach. Instruct patient to call for assistance. Room bathroom lighting operational. Non-slip footwear when patient is off stretcher. Physically safe environment: no spills, clutter or unnecessary equipment. Stretcher in lowest position, wheels locked, appropriate side rails in place.

## 2020-11-17 NOTE — ED ADULT TRIAGE NOTE - CHIEF COMPLAINT QUOTE
Pt. sent for admission by Dr. Cabello. States he's been having difficulty eating x few weeks. Unable to keep any food down. h/o stomach ca with gastric surgery in 2017.

## 2020-11-17 NOTE — H&P ADULT - ATTENDING COMMENTS
Concern for mechanical obstruction at esophagojejunostomy or nonfunctional esophagus.  Will admit for TPN due to high risk for malnutrition.  GI evaluation. CT chest/abdomen/pelvis.

## 2020-11-17 NOTE — H&P ADULT - NSHPPHYSICALEXAM_GEN_ALL_CORE
VITAL SIGNS:  Vital Signs Last 24 Hrs  T(C): 36.6 (17 Nov 2020 16:12), Max: 36.7 (17 Nov 2020 13:19)  T(F): 97.9 (17 Nov 2020 16:12), Max: 98 (17 Nov 2020 13:19)  HR: 55 (17 Nov 2020 16:12) (55 - 61)  BP: 160/96 (17 Nov 2020 16:12) (133/97 - 160/96)  BP(mean): --  RR: 18 (17 Nov 2020 16:12) (16 - 18)  SpO2: 100% (17 Nov 2020 16:12) (100% - 100%)      PHYSICAL EXAM:    General: NAD, Sitting in bed comfortably  HEENT: NC/AT, EOMI  Neck: Soft, supple  Cardio: RRR, nml S1/S2  Resp: Good effort, CTA b/l  GI/Abd: Soft, NT/ND, no rebound/guarding, no masses palpated  Musculoskeletal: All 4 extremities moving spontaneously, no limitations

## 2020-11-17 NOTE — ED ADULT NURSE REASSESSMENT NOTE - NS ED NURSE REASSESS COMMENT FT1
Received report from Ruby MANCIA. Pt [id number]. NAD at present. Rpt given to jovani MANCIA, pt to be transported there at this time.

## 2020-11-17 NOTE — ED PROVIDER NOTE - PROGRESS NOTE DETAILS
Dr. Pichardo: Discussed case with Dr. Banda, covering for Dr. Cabello as well as Surgery who asked to admit to Dr. Cabello. Surgery requested a CT of chest/abd/pelvis with PO and IV contrast.

## 2020-11-17 NOTE — ED PROVIDER NOTE - OBJECTIVE STATEMENT
69 y/o male with PMHx 69 y/o male with PMHx Gastric mass, DM type 2, Hypothyroidism, HTN, and anemia who presented to the ED 69 y/o male with PMHx Gastric mass, DM type 2, Hypothyroidism, HTN, and anemia who presented to the ED for nausea and vomiting with concerns of an obstruction X days. Pt states over the past 2 wks having decreased PO secondary to nausea and vomiting worse over the past few days. Pt states he was seen and they tried to do an endoscopy but felt there was still food in the distal esophagus so sent pt in for further evaluation/management. Pt follows with Dr. Cabello.

## 2020-11-17 NOTE — ED ADULT NURSE NOTE - OBJECTIVE STATEMENT
Received pt to room 5 A&OX4 ambulatory reports was sent by his surgeon Dr. Cabello for surgery r/t inability to tolerate PO intake for several weeks. Pt has PMH of gastric ca, in remission not currently on chemo, diabetes type 2 on metformin. Denies abdominal pain, N/V/D currently. IV placed, labs sent, VS as documented, will continue to monitor.

## 2020-11-17 NOTE — H&P ADULT - NSICDXPASTSURGICALHX_GEN_ALL_CORE_FT
PAST SURGICAL HISTORY:  H/O umbilical hernia repair     History of gastric surgery 2017    Port-a-cath in place right chest wall

## 2020-11-17 NOTE — H&P ADULT - NSHPLABSRESULTS_GEN_ALL_CORE
LABS:                        12.9   5.34  )-----------( 186      ( 17 Nov 2020 14:15 )             38.6     17 Nov 2020 14:15    137    |  98     |  13     ----------------------------<  93     3.2     |  28     |  0.84     Ca    9.6        17 Nov 2020 14:15    TPro  7.5    /  Alb  4.3    /  TBili  1.1    /  DBili  x      /  AST  30     /  ALT  21     /  AlkPhos  85     17 Nov 2020 14:15    PT/INR - ( 17 Nov 2020 14:15 )   PT: 12.8 SEC;   INR: 1.12          PTT - ( 17 Nov 2020 14:15 )  PTT:29.9 SEC  CAPILLARY BLOOD GLUCOSE    LIVER FUNCTIONS - ( 17 Nov 2020 14:15 )  Alb: 4.3 g/dL / Pro: 7.5 g/dL / ALK PHOS: 85 u/L / ALT: 21 u/L / AST: 30 u/L / GGT: x

## 2020-11-17 NOTE — H&P ADULT - ASSESSMENT
PLAN:  - Admit to D Team Surgery under Dr. Cabello  - NPO/IVFs  - CT C/A/P w/ IV and PO contrast  - Plan for PICC line/TPN for nutrition given chronic malnutrition secondary to poor PO tolerance.    To be discussed with attending surgical oncologist Dr. Banda.    FUENTES Hernandez, PGY-2  [facility]  D Team Surgery  [id number] 70M w/ hx of Gastric cancer s/p resection and minh-n-Y esophagojejunostomy who now presents with 2 months of worsening PO intake tolerance.    PLAN:  - Admit to D Team Surgery under Dr. Cabello  - CT C/A/P w/ IV and PO contrast  - NPO/IVFs  - CEA and CA 19-9 in AM w/ morning labs  - Plan for PICC line/TPN for nutrition given chronic malnutrition secondary to poor PO tolerance.  - GI consult tomorrow morning.    To be discussed with attending surgical oncologist Dr. Banda.    FUENTES Hernandez, PGY-2  [facility]  D Team Surgery  [phone]

## 2020-11-17 NOTE — H&P ADULT - HISTORY OF PRESENT ILLNESS
67M w/ PMHx of Hypothyroidism, acid reflux, T2DM, hgbA1c 6.5, proximal Gastric CA, s/p total gastrectomy, splenectomy, distal pancreatectomy, and Lorena-n-Y Esophagojejunostomy on 5/12/17 by Dr. Cabello. Patient followed up with chemo and radiation - all completed by 12/3/17. Patient underwent a USGB & FNA of the left supraclavicular Lymph node in 3/2018 which was positive for metastatic gastric CA s/p resection on 4/17/18. Patient also underwent component separation for incisional hernia repair with mesh on 6/25/18 by Dr. Cabello. 67M w/ PMHx of Hypothyroidism, acid reflux, T2DM, hgbA1c 6.5, proximal Gastric CA, s/p total gastrectomy, splenectomy, distal pancreatectomy, and Lorena-n-Y Esophagojejunostomy on 5/12/17 by Dr. Cabello. Patient followed up with chemo and radiation - all completed by 12/3/17. Patient underwent a USGB & FNA of the left supraclavicular Lymph node in 3/2018 which was positive for metastatic gastric CA s/p resection on 4/17/18. Patient also underwent component separation for incisional hernia repair with mesh on 6/25/18 by Dr. Cabello. Since then, patient started to experience poor PO tolerance 2 months ago that has worsened in the last 2 weeks. Patient states he still has appetite but vomits his food each time he eats. 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 Patient was advised to visit the ED for further management.    In the ED, patient was afebrile, hemodynamically stable, unremarkable labs. CT C/A/P w/ IV and PO contrast ordered. Surgery contacted for further management.    Patient states last colonoscopy was ~5 years ago and was normal. 67M w/ PMHx of Hypothyroidism, acid reflux, T2DM, hgbA1c 6.5, proximal Gastric CA, s/p total gastrectomy, splenectomy, distal pancreatectomy, and Lorena-n-Y Esophagojejunostomy on 5/12/17 by Dr. Cabello. Patient followed up with chemo and radiation - all completed by 12/3/17. Patient underwent a USGB & FNA of the left supraclavicular Lymph node in 3/2018 which was positive for metastatic gastric CA s/p resection on 4/17/18. Patient also underwent component separation for incisional hernia repair with mesh on 6/25/18 by Dr. Cabello. Since then, patient started to experience poor PO tolerance 2 months ago that has worsened in the last 2 weeks. Patient states he still has appetite but vomits his food each time he eats. 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 Patient was advised to visit the ED for further management.    In the ED, patient was afebrile, hemodynamically stable, unremarkable labs. CT C/A/P w/ IV and PO contrast ordered. Surgery contacted for further management.    Patient states last colonoscopy was ~5 years ago and was normal.

## 2020-11-17 NOTE — H&P ADULT - NSICDXFAMILYHX_GEN_ALL_CORE_FT
FAMILY HISTORY:  Father  Still living? Unknown  Family history of ischemic heart disease (IHD), Age at diagnosis: Age Unknown    Mother  Still living? Unknown  Family history of ovarian cancer, Age at diagnosis: Age Unknown

## 2020-11-18 LAB
ALBUMIN SERPL ELPH-MCNC: 3.8 G/DL — SIGNIFICANT CHANGE UP (ref 3.3–5)
ALP SERPL-CCNC: 75 U/L — SIGNIFICANT CHANGE UP (ref 40–120)
ALT FLD-CCNC: 17 U/L — SIGNIFICANT CHANGE UP (ref 4–41)
ANION GAP SERPL CALC-SCNC: 10 MMO/L — SIGNIFICANT CHANGE UP (ref 7–14)
AST SERPL-CCNC: 26 U/L — SIGNIFICANT CHANGE UP (ref 4–40)
BILIRUB SERPL-MCNC: 1 MG/DL — SIGNIFICANT CHANGE UP (ref 0.2–1.2)
BUN SERPL-MCNC: 13 MG/DL — SIGNIFICANT CHANGE UP (ref 7–23)
CALCIUM SERPL-MCNC: 9.4 MG/DL — SIGNIFICANT CHANGE UP (ref 8.4–10.5)
CANCER AG19-9 SERPL-ACNC: 23 U/ML — SIGNIFICANT CHANGE UP
CEA SERPL-MCNC: 5.4 NG/ML — HIGH (ref 1–3.8)
CHLORIDE SERPL-SCNC: 100 MMOL/L — SIGNIFICANT CHANGE UP (ref 98–107)
CO2 SERPL-SCNC: 28 MMOL/L — SIGNIFICANT CHANGE UP (ref 22–31)
CREAT SERPL-MCNC: 0.84 MG/DL — SIGNIFICANT CHANGE UP (ref 0.5–1.3)
GLUCOSE BLDC GLUCOMTR-MCNC: 87 MG/DL — SIGNIFICANT CHANGE UP (ref 70–99)
GLUCOSE BLDC GLUCOMTR-MCNC: 93 MG/DL — SIGNIFICANT CHANGE UP (ref 70–99)
GLUCOSE SERPL-MCNC: 91 MG/DL — SIGNIFICANT CHANGE UP (ref 70–99)
HBA1C BLD-MCNC: 5.8 % — HIGH (ref 4–5.6)
HCT VFR BLD CALC: 36.1 % — LOW (ref 39–50)
HCV AB S/CO SERPL IA: 0.13 S/CO — SIGNIFICANT CHANGE UP (ref 0–0.99)
HCV AB SERPL-IMP: SIGNIFICANT CHANGE UP
HGB BLD-MCNC: 12 G/DL — LOW (ref 13–17)
MAGNESIUM SERPL-MCNC: 2 MG/DL — SIGNIFICANT CHANGE UP (ref 1.6–2.6)
MCHC RBC-ENTMCNC: 33.2 % — SIGNIFICANT CHANGE UP (ref 32–36)
MCHC RBC-ENTMCNC: 34.2 PG — HIGH (ref 27–34)
MCV RBC AUTO: 102.8 FL — HIGH (ref 80–100)
NRBC # FLD: 0 K/UL — SIGNIFICANT CHANGE UP (ref 0–0)
PHOSPHATE SERPL-MCNC: 3.4 MG/DL — SIGNIFICANT CHANGE UP (ref 2.5–4.5)
PLATELET # BLD AUTO: 177 K/UL — SIGNIFICANT CHANGE UP (ref 150–400)
PMV BLD: 11.3 FL — SIGNIFICANT CHANGE UP (ref 7–13)
POTASSIUM SERPL-MCNC: 3.9 MMOL/L — SIGNIFICANT CHANGE UP (ref 3.5–5.3)
POTASSIUM SERPL-SCNC: 3.9 MMOL/L — SIGNIFICANT CHANGE UP (ref 3.5–5.3)
PROT SERPL-MCNC: 6.4 G/DL — SIGNIFICANT CHANGE UP (ref 6–8.3)
RBC # BLD: 3.51 M/UL — LOW (ref 4.2–5.8)
RBC # FLD: 14.5 % — SIGNIFICANT CHANGE UP (ref 10.3–14.5)
SODIUM SERPL-SCNC: 138 MMOL/L — SIGNIFICANT CHANGE UP (ref 135–145)
WBC # BLD: 4.71 K/UL — SIGNIFICANT CHANGE UP (ref 3.8–10.5)
WBC # FLD AUTO: 4.71 K/UL — SIGNIFICANT CHANGE UP (ref 3.8–10.5)

## 2020-11-18 PROCEDURE — 99232 SBSQ HOSP IP/OBS MODERATE 35: CPT

## 2020-11-18 PROCEDURE — 99222 1ST HOSP IP/OBS MODERATE 55: CPT | Mod: 25

## 2020-11-18 PROCEDURE — 99221 1ST HOSP IP/OBS SF/LOW 40: CPT

## 2020-11-18 PROCEDURE — 36573 INSJ PICC RS&I 5 YR+: CPT

## 2020-11-18 RX ORDER — ATORVASTATIN CALCIUM 80 MG/1
10 TABLET, FILM COATED ORAL AT BEDTIME
Refills: 0 | Status: DISCONTINUED | OUTPATIENT
Start: 2020-11-18 | End: 2020-11-23

## 2020-11-18 RX ORDER — CHLORHEXIDINE GLUCONATE 213 G/1000ML
1 SOLUTION TOPICAL
Refills: 0 | Status: DISCONTINUED | OUTPATIENT
Start: 2020-11-18 | End: 2020-11-23

## 2020-11-18 RX ORDER — I.V. FAT EMULSION 20 G/100ML
8.3 EMULSION INTRAVENOUS
Qty: 20 | Refills: 0 | Status: DISCONTINUED | OUTPATIENT
Start: 2020-11-18 | End: 2020-11-19

## 2020-11-18 RX ORDER — DEXTROSE MONOHYDRATE, SODIUM CHLORIDE, AND POTASSIUM CHLORIDE 50; .745; 4.5 G/1000ML; G/1000ML; G/1000ML
1000 INJECTION, SOLUTION INTRAVENOUS
Refills: 0 | Status: DISCONTINUED | OUTPATIENT
Start: 2020-11-18 | End: 2020-11-18

## 2020-11-18 RX ORDER — SODIUM CHLORIDE 9 MG/ML
10 INJECTION INTRAMUSCULAR; INTRAVENOUS; SUBCUTANEOUS
Refills: 0 | Status: DISCONTINUED | OUTPATIENT
Start: 2020-11-18 | End: 2020-11-23

## 2020-11-18 RX ORDER — LANOLIN ALCOHOL/MO/W.PET/CERES
6 CREAM (GRAM) TOPICAL AT BEDTIME
Refills: 0 | Status: DISCONTINUED | OUTPATIENT
Start: 2020-11-18 | End: 2020-11-23

## 2020-11-18 RX ORDER — PANTOPRAZOLE SODIUM 20 MG/1
40 TABLET, DELAYED RELEASE ORAL
Refills: 0 | Status: DISCONTINUED | OUTPATIENT
Start: 2020-11-18 | End: 2020-11-23

## 2020-11-18 RX ORDER — LANOLIN ALCOHOL/MO/W.PET/CERES
5 CREAM (GRAM) TOPICAL AT BEDTIME
Refills: 0 | Status: DISCONTINUED | OUTPATIENT
Start: 2020-11-18 | End: 2020-11-18

## 2020-11-18 RX ORDER — DOXAZOSIN MESYLATE 4 MG
1 TABLET ORAL AT BEDTIME
Refills: 0 | Status: DISCONTINUED | OUTPATIENT
Start: 2020-11-18 | End: 2020-11-20

## 2020-11-18 RX ORDER — ELECTROLYTE SOLUTION,INJ
1 VIAL (ML) INTRAVENOUS
Refills: 0 | Status: DISCONTINUED | OUTPATIENT
Start: 2020-11-18 | End: 2020-11-18

## 2020-11-18 RX ADMIN — I.V. FAT EMULSION 8.3 ML/HR: 20 EMULSION INTRAVENOUS at 18:08

## 2020-11-18 RX ADMIN — Medication 75 MICROGRAM(S): at 23:26

## 2020-11-18 RX ADMIN — DEXTROSE MONOHYDRATE, SODIUM CHLORIDE, AND POTASSIUM CHLORIDE 75 MILLILITER(S): 50; .745; 4.5 INJECTION, SOLUTION INTRAVENOUS at 14:12

## 2020-11-18 RX ADMIN — Medication 1 MILLIGRAM(S): at 23:26

## 2020-11-18 RX ADMIN — DEXTROSE MONOHYDRATE, SODIUM CHLORIDE, AND POTASSIUM CHLORIDE 75 MILLILITER(S): 50; .745; 4.5 INJECTION, SOLUTION INTRAVENOUS at 10:20

## 2020-11-18 RX ADMIN — Medication 6 MILLIGRAM(S): at 23:27

## 2020-11-18 RX ADMIN — Medication 1 EACH: at 18:08

## 2020-11-18 RX ADMIN — ENOXAPARIN SODIUM 40 MILLIGRAM(S): 100 INJECTION SUBCUTANEOUS at 18:07

## 2020-11-18 RX ADMIN — ATORVASTATIN CALCIUM 10 MILLIGRAM(S): 80 TABLET, FILM COATED ORAL at 23:26

## 2020-11-18 NOTE — CONSULT NOTE ADULT - SUBJECTIVE AND OBJECTIVE BOX
Nutrition Support Consult Note    HPI:  69 y/o male with hx of gastric cancer s/p resection and minh-n-Y esophagojejunostomy who was admitted with complaints of progressively worsening tolerance to PO diet. Patient states that he started to experience poor PO tolerance about two months ago that worsened in the past two weeks. Patient reports about 15 lb weight loss over the past 2-3 months. Patient states that he belches and/or vomits each time he eats something. Nutrition support consult was consult for initiation of parenteral nutrition in view of poor malnutrition status and prolonged period of inadequate caloric intake. PICC line placement planned for today. At this time, pt denies hest pain, shortness or breath, fevers or chills.     Allergies  No Known Allergies    PAST MEDICAL & SURGICAL HISTORY:  Constipation  Iron deficiency anemia, unspecified iron deficiency anemia type  Gastric mass - ulcerated mass in gastric cardia with small perigastric nodes on endoscopy.  Hypothyroidism  Type 2 diabetes mellitus without complication, unspecified long term insulin use status no BS monitoring  Essential hypertension was on losartan, now diet control  History of gastric surgery 2017  Port-a-cath in place - right chest wall  H/O umbilical hernia repair    FAMILY HISTORY:  Family history of ischemic heart disease (IHD) (Father)  Family history of ovarian cancer (Mother)    Vital Signs Last 24 Hrs  T(C): 36.8 (18 Nov 2020 08:30), Max: 36.8 (18 Nov 2020 08:30)  T(F): 98.3 (18 Nov 2020 08:30), Max: 98.3 (18 Nov 2020 08:30)  HR: 56 (18 Nov 2020 08:30) (50 - 61)  BP: 129/85 (18 Nov 2020 08:30) (129/85 - 160/96)  RR: 16 (18 Nov 2020 08:30) (16 - 19)  SpO2: 97% (18 Nov 2020 08:30) (97% - 100%)    MEDICATIONS  (STANDING):  dextrose 40% Gel 15 Gram(s) Oral once  dextrose 5% + sodium chloride 0.45% with potassium chloride 20 mEq/L 1000 milliLiter(s) (75 mL/Hr) IV Continuous <Continuous>  dextrose 50% Injectable 25 Gram(s) IV Push once  dextrose 50% Injectable 12.5 Gram(s) IV Push once  dextrose 50% Injectable 25 Gram(s) IV Push once  enoxaparin Injectable 40 milliGRAM(s) SubCutaneous daily  fat emulsion (Fish Oil and Plant Based) 20% Infusion 8.3 mL/Hr (8.3 mL/Hr) IV Continuous <Continuous>  glucagon  Injectable 1 milliGRAM(s) IntraMuscular once  insulin lispro (ADMELOG) corrective regimen sliding scale   SubCutaneous three times a day before meals  insulin lispro (ADMELOG) corrective regimen sliding scale   SubCutaneous at bedtime  levothyroxine Injectable 75 MICROGram(s) IV Push at bedtime  melatonin 3 milliGRAM(s) Oral at bedtime  Parenteral Nutrition - Adult 1 Each (42 mL/Hr) TPN Continuous <Continuous>    PHYSICAL EXAM:  General: A&Ox3, NAD, resting comfortably in bed   Respiratory: CTA b/l, nonlabored respirations   Cardiovascular: Regular rate & rhythm  Abdominal: Soft, nontender, nondistended    LABS:                        12.0   4.71  )-----------( 177      ( 18 Nov 2020 06:42 )             36.1     11-18    138  |  100  |  13  ----------------------------<  91  3.9   |  28  |  0.84    Ca    9.4      18 Nov 2020 06:42  Phos  3.4     11-18  Mg     2.0     11-18    TPro  6.4  /  Alb  3.8  /  TBili  1.0  /  DBili  x   /  AST  26  /  ALT  17  /  AlkPhos  75  11-18    LIVER FUNCTIONS - ( 18 Nov 2020 06:42 )  Alb: 3.8 g/dL / Pro: 6.4 g/dL / ALK PHOS: 75 u/L / ALT: 17 u/L / AST: 26 u/L / GGT: x           POCT Blood Glucose.: 93 mg/dL (18 Nov 2020 08:19)    PT/INR - ( 17 Nov 2020 14:15 )   PT: 12.8 SEC;   INR: 1.12       PTT - ( 17 Nov 2020 14:15 )  PTT:29.9 SEC    Current Weight: 53.2 kG  Height: 180.3 cm  Ideal Body Weight: 75 kG  BMI: 17.3  Current Diet: [ x ]NPO  Appetite: [ x ]Poor [  ]Adequate [  ]Good    CLINICAL INDICATORS  MALNUTRITION IN CONTEXT OF ACUTE ILLNESS OR INJURY  SEVERE MALNUTRITION  Weight Loss  [  ] >2% in 1 week  [  ] >5% in 1 month  [  ] >7.5% in 3 months    Caloric Intake  [ x ] <50% of nutrition needs >= 5 days    Generalized Edema  Severe Edema    Metabolic Requirements:  The patient will require:  _____25________ kilocalories / kg / day  Diagnosis:  [  ] Mild protein malnutrition  [  ] Moderate protein calorie malnutrition in acute illness/ injury  [ x ] Severe protein calorie malnutrition in acute illness/ injury  [  ] Moderate protein calorie malnutrition in chronic illness  [  ] Severe protein calorie malnutrition in chronic illness    Plan:  [ x ] Initiate TPN formula after PICC placement today, will increase to full volume and calories tomorrow   Carbohydrates:___250___grams, Amino Acid:___90___grams  SMOF Lipids:___40____ grams, Total volume:___2000____mL.  1. Dedicated Central line must be placed for TPN.  2. Strict Intake and Output.  3. Weights three times a week  4. Monitor BMP, Mg+, Ionized Ca++, Phosphorus daily  5. Monitor Triglycerides monthly  6. Pre-albumin weekly.  7. Fingersticks to monitor glucose every 6 hours until stable then may be decreased to twice a day.      Nutrition support [id number]

## 2020-11-18 NOTE — CHART NOTE - NSCHARTNOTEFT_GEN_A_CORE
Pre-Interventional Radiology Procedure Note    70y    Male    Procedure: PICC for TPN    Diagnosis/Indication: Patient is a 70y old  Male who presents with a chief complaint of Poor PO intake (18 Nov 2020 08:51)      Interventional Radiology Attending Physician: PICC Team    Ordering Attending Physician: Dr. Chema Banda  Approved by ROMULO MURPHY    PAST MEDICAL & SURGICAL HISTORY:  Constipation    Iron deficiency anemia, unspecified iron deficiency anemia type    Gastric mass  ulcerated mass in gastric cardia with small perigastric nodes on endoscopy.    Hypothyroidism    Type 2 diabetes mellitus without complication, unspecified long term insulin use status  no BS monitoring    Essential hypertension  was on losartan, now diet control    History of gastric surgery  2017    Port-a-cath in place  right chest wall    H/O umbilical hernia repair         CBC Full  -  ( 18 Nov 2020 06:42 )  WBC Count : 4.71 K/uL  RBC Count : 3.51 M/uL  Hemoglobin : 12.0 g/dL  Hematocrit : 36.1 %  Platelet Count - Automated : 177 K/uL  Mean Cell Volume : 102.8 fL  Mean Cell Hemoglobin : 34.2 pg  Mean Cell Hemoglobin Concentration : 33.2 %  Auto Neutrophil # : x  Auto Lymphocyte # : x  Auto Monocyte # : x  Auto Eosinophil # : x  Auto Basophil # : x  Auto Neutrophil % : x  Auto Lymphocyte % : x  Auto Monocyte % : x  Auto Eosinophil % : x  Auto Basophil % : x    11-18    138  |  100  |  13  ----------------------------<  91  3.9   |  28  |  0.84    Ca    9.4      18 Nov 2020 06:42  Phos  3.4     11-18  Mg     2.0     11-18    TPro  6.4  /  Alb  3.8  /  TBili  1.0  /  DBili  x   /  AST  26  /  ALT  17  /  AlkPhos  75  11-18    PT/INR - ( 17 Nov 2020 14:15 )   PT: 12.8 SEC;   INR: 1.12          PTT - ( 17 Nov 2020 14:15 )  PTT:29.9 SEC    Patient aware & agreeable

## 2020-11-18 NOTE — PROGRESS NOTE ADULT - ASSESSMENT
70M w/ hx of Gastric cancer s/p resection and minh-n-Y esophagojejunostomy who now presents with 2 months of worsening PO intake tolerance.  - CT C/A/P w/ IV and PO contrast   - NPO/IVFs  - CEA and CA 19-9 in AM w/ morning labs  - Plan for PICC line/TPN for nutrition given chronic malnutrition secondary to poor PO tolerance  - Continue Synthroid  - GI consult     D Team Surgery  #[id number]

## 2020-11-18 NOTE — CHART NOTE - NSCHARTNOTEFT_GEN_A_CORE
CAPRINI SCORE    AGE RELATED RISK FACTORS                                                             [ ] Age 41-60 years                                            (1 Point)  [x] Age: 61-74 years                                           (2 Points)                 [ ] Age= 75 years                                                (3 Points)             DISEASE RELATED RISK FACTORS                                                       [ ] Edema in the lower extremities                 (1 Point)                     [ ] Varicose veins                                               (1 Point)                                 [ ] BMI > 25 Kg/m2                                            (1 Point)                                  [ ] Serious infection (ie PNA)                            (1 Point)                     [ ] Lung disease ( COPD, Emphysema)            (1 Point)                                                                          [ ] Acute myocardial infarction                         (1 Point)                  [ ] Congestive heart failure (in the previous month)  (1 Point)         [ ] Inflammatory bowel disease                            (1 Point)                  [ ] Central venous access, PICC or Port               (2 points)       (within the last month)                                                                [ ] Stroke (in the previous month)                        (5 Points)    [x] Previous or present malignancy                       (2 points)                                                                                                                                                         HEMATOLOGY RELATED FACTORS                                                         [ ] Prior episodes of VTE                                     (3 Points)                     [ ] Positive family history for VTE                      (3 Points)                  [ ] Prothrombin 41907 A                                     (3 Points)                     [ ] Factor V Leiden                                                (3 Points)                        [ ] Lupus anticoagulants                                      (3 Points)                                                           [ ] Anticardiolipin antibodies                              (3 Points)                                                       [ ] High homocysteine in the blood                   (3 Points)                                             [ ] Other congenital or acquired thrombophilia      (3 Points)                                                [ ] Heparin induced thrombocytopenia                  (3 Points)                                        MOBILITY RELATED FACTORS  [ ] Bed rest                                                         (1 Point)  [ ] Plaster cast                                                    (2 points)  [ ] Bed bound for more than 72 hours           (2 Points)    GENDER SPECIFIC FACTORS  [ ] Pregnancy or had a baby within the last month   (1 Point)  [ ] Post-partum < 6 weeks                                   (1 Point)  [ ] Hormonal therapy  or oral contraception   (1 Point)  [ ] History of pregnancy complications              (1 point)  [ ] Unexplained or recurrent              (1 Point)    OTHER RISK FACTORS                                           (1 Point)  BMI >40, smoking, diabetes requiring insulin, chemotherapy  blood transfusions and length of surgery over 2 hours    SURGERY RELATED RISK FACTORS  [ ]  Section within the last month     (1 Point)  [ ] Minor surgery                                                  (1 Point)  [ ] Arthroscopic surgery                                       (2 Points)  [ ] Planned major surgery lasting more            (2 Points)      than 45 minutes     [ ] Elective hip or knee joint replacement       (5 points)       surgery                                                TRAUMA RELATED RISK FACTORS  [ ] Fracture of the hip, pelvis, or leg                       (5 Points)  [ ] Spinal cord injury resulting in paralysis             (5 points)       (in the previous month)    [ ] Paralysis  (less than 1 month)                             (5 Points)  [ ] Multiple Trauma within 1 month                        (5 Points)    Total Score [   4     ]    Caprini Score 0-2: Low Risk, NO VTE prophylaxis required for most patients, encourage ambulation  Caprini Score 3-6: Moderate Risk , pharmacologic VTE prophylaxis is indicated for most patients (in the absence of contraindications)  Caprini Score Greater than or =7: High risk, pharmacologic VTE prophylaxis indicated for most patients (in the absence of contraindications)

## 2020-11-18 NOTE — CONSULT NOTE ADULT - ATTENDING COMMENTS
Agree with notes of health care providers on my service.  I have seen and examined the patient, reviewed the laboratory and available data and agree with the history, physical assessment and plan.  I reviewed and discussed with all consultants, house staff and PA's.  The Nutrition Support Team (NST) discusses on an ongoing basis with the primary team and all consultants, House staff and PA's to have a permanent risk benefit analyses on all decisions.  71 y/o male with hx of gastric cancer s/p resection and minh-n-Y esophagojejunostomy who was admitted with complaints of progressively worsening intolerance to PO diet with weight. Nutrition support consult was consult for initiation of parenteral nutrition in view of poor malnutrition status and prolonged period of inadequate caloric intake.   PHYSICAL EXAM:  General: A&Ox3, NAD, resting comfortably in bed   Respiratory: CTA b/l, nonlabored respirations   Cardiovascular: Regular rate & rhythm  Abdominal: Soft, nontender, nondistended  Metabolic Requirements:  The patient will require:25 kilocalories / kg / day  Diagnosis: Severe protein calorie malnutrition in acute illness/ injury  Plan: Initiate TPN formula

## 2020-11-18 NOTE — PROGRESS NOTE ADULT - SUBJECTIVE AND OBJECTIVE BOX
D TEAM SURGERY PROGRESS NOTE    SUBJECTIVE: Patient seen and examined at bedside on AM rounds, patient without complaints. States for the first time in three days he has tolerated some food. Ate Ruffles and had some apple juice. Reports no bowel movement since Bo 11/15. Passing flatus. Denies chest pain/SOB. Denies nausea/emesis.    Vital Signs Last 24 Hrs  T(C): 36.8 (18 Nov 2020 08:30), Max: 36.8 (18 Nov 2020 08:30)  T(F): 98.3 (18 Nov 2020 08:30), Max: 98.3 (18 Nov 2020 08:30)  HR: 56 (18 Nov 2020 08:30) (50 - 61)  BP: 129/85 (18 Nov 2020 08:30) (129/85 - 160/96)  BP(mean): --  RR: 16 (18 Nov 2020 08:30) (16 - 19)  SpO2: 97% (18 Nov 2020 08:30) (97% - 100%)  I&O's Detail    MEDICATIONS  (STANDING):  dextrose 40% Gel 15 Gram(s) Oral once  dextrose 5%. 1000 milliLiter(s) (50 mL/Hr) IV Continuous <Continuous>  dextrose 5%. 1000 milliLiter(s) (100 mL/Hr) IV Continuous <Continuous>  dextrose 50% Injectable 25 Gram(s) IV Push once  dextrose 50% Injectable 12.5 Gram(s) IV Push once  dextrose 50% Injectable 25 Gram(s) IV Push once  enoxaparin Injectable 40 milliGRAM(s) SubCutaneous daily  glucagon  Injectable 1 milliGRAM(s) IntraMuscular once  insulin lispro (ADMELOG) corrective regimen sliding scale   SubCutaneous three times a day before meals  insulin lispro (ADMELOG) corrective regimen sliding scale   SubCutaneous at bedtime  levothyroxine Injectable 75 MICROGram(s) IV Push at bedtime  melatonin 3 milliGRAM(s) Oral at bedtime    MEDICATIONS  (PRN):      Physical Exam  General: A&Ox3, NAD  Respiratory: Clear bilaterally, equal bilateral expansion  Cardiovascular: Regular rate & rhythm  Abdominal: Soft, non-tender, non-distended    LABS:                        12.0   4.71  )-----------( 177      ( 18 Nov 2020 06:42 )             36.1     11-18    138  |  100  |  13  ----------------------------<  91  3.9   |  28  |  0.84    Ca    9.4      18 Nov 2020 06:42  Phos  3.4     11-18  Mg     2.0     11-18    TPro  6.4  /  Alb  3.8  /  TBili  1.0  /  DBili  x   /  AST  26  /  ALT  17  /  AlkPhos  75  11-18    PT/INR - ( 17 Nov 2020 14:15 )   PT: 12.8 SEC;   INR: 1.12          PTT - ( 17 Nov 2020 14:15 )  PTT:29.9 SEC    ABO Interpretation: O (11-17-20 @ 14:10)

## 2020-11-18 NOTE — CONSULT NOTE ADULT - SUBJECTIVE AND OBJECTIVE BOX
Chief Complaint:  Patient is a 70y old  Male who presents with a chief complaint of Poor PO intake (18 Nov 2020 08:51)      HPI:    67M w/ PMHx of Hypothyroidism, acid reflux, T2DM, hgbA1c 6.5, proximal Gastric CA, s/p total gastrectomy, splenectomy, distal pancreatectomy, and Lorena-n-Y Esophagojejunostomy on 5/12/17 by Dr. Cabello, history of metastatic gastric adenocarcinoma in 3/51 lymph nodes s/p resection 4/2018, s/p chemo and radiation completed by 12/2017, presents with worsening PO intake for 2 past 2 month, worsening in the last 2 weeks. Patient states that he is able to eat, but would often vomit his food. Pt had an UGIS that showed significantly delayed passage of contrast at level of esophagojejunal anastomosis, with retained contrast in remnant esophagus seen 20 minutes after contrast ingestion. An EGD last week showed food in esophagus, dilated without peristalsis, anastomosis appeared collapse but easily traversed.   CT this time showed no new findings.   Patient states last colonoscopy was ~5 years ago and was normal.    Allergies:  No Known Allergies      Home Medications:    Hospital Medications:  dextrose 40% Gel 15 Gram(s) Oral once  dextrose 5% + sodium chloride 0.45% with potassium chloride 20 mEq/L 1000 milliLiter(s) IV Continuous <Continuous>  dextrose 50% Injectable 25 Gram(s) IV Push once  dextrose 50% Injectable 12.5 Gram(s) IV Push once  dextrose 50% Injectable 25 Gram(s) IV Push once  enoxaparin Injectable 40 milliGRAM(s) SubCutaneous daily  glucagon  Injectable 1 milliGRAM(s) IntraMuscular once  insulin lispro (ADMELOG) corrective regimen sliding scale   SubCutaneous three times a day before meals  insulin lispro (ADMELOG) corrective regimen sliding scale   SubCutaneous at bedtime  levothyroxine Injectable 75 MICROGram(s) IV Push at bedtime  melatonin 3 milliGRAM(s) Oral at bedtime      PMHX/PSHX:  Constipation    Iron deficiency anemia, unspecified iron deficiency anemia type    Gastric mass    Hypothyroidism    Type 2 diabetes mellitus without complication, unspecified long term insulin use status    Essential hypertension    History of gastric surgery    Port-a-cath in place    H/O umbilical hernia repair        Family history:  Family history of ischemic heart disease (IHD) (Father)    Family history of ovarian cancer (Mother)        There is no family history of peptic ulcer disease, gastric cancer, colon polyps, colon cancer, celiac disease, biliary, hepatic, or pancreatic disease.  None of the female relatives have breast, uterine, or ovarian cancer.     Social History:     ROS:     General:  No wt loss, fevers, chills, night sweats, fatigue,   Eyes:  Good vision, no reported pain  ENT:  No sore throat, pain, runny nose, dysphagia  CV:  No pain, palpitations, hypo/hypertension  Resp:  No dyspnea, cough, tachypnea, wheezing  GI: See HPI   :  No pain, bleeding, incontinence, nocturia  Muscle:  No pain, weakness  Neuro:  No weakness, tingling, memory problems  Psych:  No fatigue, insomnia, mood problems, depression  Endocrine:  No polyuria, polydipsia, cold/heat intolerance  Heme:  No petechiae, ecchymosis, easy bruisability  Skin:  No rash, tattoos, scars, edema      PHYSICAL EXAM:     GENERAL:  Appears stated age, well-groomed  HEENT:  NC/AT,  conjunctivae clear and pink, no thyromegaly, nodules  CHEST:  Full & symmetric excursion, no increased effort, breath sounds clear  HEART:  Regular rhythm, S1, S2, no murmur/rub/S3/S4  ABDOMEN:  Soft, non-tender, non-distended  EXTEREMITIES:  no cyanosis,clubbing or edema  SKIN:  No rash/erythema/ecchymoses  NEURO:  Alert, oriented, no asterixis    Vital Signs:  Vital Signs Last 24 Hrs  T(C): 36.8 (18 Nov 2020 08:30), Max: 36.8 (18 Nov 2020 08:30)  T(F): 98.3 (18 Nov 2020 08:30), Max: 98.3 (18 Nov 2020 08:30)  HR: 56 (18 Nov 2020 08:30) (50 - 61)  BP: 129/85 (18 Nov 2020 08:30) (129/85 - 160/96)  BP(mean): --  RR: 16 (18 Nov 2020 08:30) (16 - 19)  SpO2: 97% (18 Nov 2020 08:30) (97% - 100%)  Daily Height in cm: 180.34 (17 Nov 2020 13:19)    Daily     LABS:                        12.0   4.71  )-----------( 177      ( 18 Nov 2020 06:42 )             36.1     Mean Cell Volume: 102.8 fL (11-18-20 @ 06:42)    11-18    138  |  100  |  13  ----------------------------<  91  3.9   |  28  |  0.84    Ca    9.4      18 Nov 2020 06:42  Phos  3.4     11-18  Mg     2.0     11-18    TPro  6.4  /  Alb  3.8  /  TBili  1.0  /  DBili  x   /  AST  26  /  ALT  17  /  AlkPhos  75  11-18    LIVER FUNCTIONS - ( 18 Nov 2020 06:42 )  Alb: 3.8 g/dL / Pro: 6.4 g/dL / ALK PHOS: 75 u/L / ALT: 17 u/L / AST: 26 u/L / GGT: x           PT/INR - ( 17 Nov 2020 14:15 )   PT: 12.8 SEC;   INR: 1.12          PTT - ( 17 Nov 2020 14:15 )  PTT:29.9 SEC                            12.0   4.71  )-----------( 177      ( 18 Nov 2020 06:42 )             36.1                         12.9   5.34  )-----------( 186      ( 17 Nov 2020 14:15 )             38.6     Imaging:

## 2020-11-18 NOTE — CONSULT NOTE ADULT - ASSESSMENT
Impression:  1) Nausea, vomiting -S/P proximal Gastric CA, s/p total gastrectomy, splenectomy, distal pancreatectomy, and Lorena-n-Y Esophagojejunostomy. Likely 2/2 dysmotility s/p surgery, no evidence of obstruction on imaging or esophagitis/cancer/EOE-rings on last EGD.     Recommendations:  -Recommend supportive care with Zofran/Reglan PRN  -Patient should follow up with Dr Nadege Harper as an outpatient for further work up of possible motility disorder ([id number])    Elias Solano, PGY6  Gastroenterology Fellow  Pager # [phone] / [phone]  Can be contacted via [employer] Teams

## 2020-11-18 NOTE — CHART NOTE - NSCHARTNOTEFT_GEN_A_CORE
GI Update Note    Patient seen by advanced GI today. Case discussed w/ Dr. Harper for motility manommetry.     Patient can be scheduled for inpatient manometry study, to be done today at 4pm. Patient should remain NPO today.     Gus Rudolph, PGY4  Gastroenterology Fellow  Available on [employer] Teams  [phone] ([email or web address] Short Range Pager)  [phone] (Long Range Pager)    After 5pm, please contact the on-call GI fellow. [phone]

## 2020-11-19 LAB
ANION GAP SERPL CALC-SCNC: 8 MMO/L — SIGNIFICANT CHANGE UP (ref 7–14)
BASOPHILS # BLD AUTO: 0.06 K/UL — SIGNIFICANT CHANGE UP (ref 0–0.2)
BASOPHILS NFR BLD AUTO: 1.3 % — SIGNIFICANT CHANGE UP (ref 0–2)
BUN SERPL-MCNC: 19 MG/DL — SIGNIFICANT CHANGE UP (ref 7–23)
CA-I BLD-SCNC: 1.11 MMOL/L — SIGNIFICANT CHANGE UP (ref 1.03–1.23)
CALCIUM SERPL-MCNC: 9.3 MG/DL — SIGNIFICANT CHANGE UP (ref 8.4–10.5)
CHLORIDE SERPL-SCNC: 100 MMOL/L — SIGNIFICANT CHANGE UP (ref 98–107)
CO2 SERPL-SCNC: 28 MMOL/L — SIGNIFICANT CHANGE UP (ref 22–31)
CREAT SERPL-MCNC: 0.76 MG/DL — SIGNIFICANT CHANGE UP (ref 0.5–1.3)
EOSINOPHIL # BLD AUTO: 0.23 K/UL — SIGNIFICANT CHANGE UP (ref 0–0.5)
EOSINOPHIL NFR BLD AUTO: 5 % — SIGNIFICANT CHANGE UP (ref 0–6)
GLUCOSE BLDC GLUCOMTR-MCNC: 121 MG/DL — HIGH (ref 70–99)
GLUCOSE BLDC GLUCOMTR-MCNC: 121 MG/DL — HIGH (ref 70–99)
GLUCOSE BLDC GLUCOMTR-MCNC: 124 MG/DL — HIGH (ref 70–99)
GLUCOSE BLDC GLUCOMTR-MCNC: 135 MG/DL — HIGH (ref 70–99)
GLUCOSE BLDC GLUCOMTR-MCNC: 144 MG/DL — HIGH (ref 70–99)
GLUCOSE SERPL-MCNC: 125 MG/DL — HIGH (ref 70–99)
HCT VFR BLD CALC: 39.3 % — SIGNIFICANT CHANGE UP (ref 39–50)
HGB BLD-MCNC: 13.1 G/DL — SIGNIFICANT CHANGE UP (ref 13–17)
IMM GRANULOCYTES NFR BLD AUTO: 0.2 % — SIGNIFICANT CHANGE UP (ref 0–1.5)
LYMPHOCYTES # BLD AUTO: 1.03 K/UL — SIGNIFICANT CHANGE UP (ref 1–3.3)
LYMPHOCYTES # BLD AUTO: 22.4 % — SIGNIFICANT CHANGE UP (ref 13–44)
MAGNESIUM SERPL-MCNC: 2 MG/DL — SIGNIFICANT CHANGE UP (ref 1.6–2.6)
MCHC RBC-ENTMCNC: 33.3 % — SIGNIFICANT CHANGE UP (ref 32–36)
MCHC RBC-ENTMCNC: 34.4 PG — HIGH (ref 27–34)
MCV RBC AUTO: 103.1 FL — HIGH (ref 80–100)
MONOCYTES # BLD AUTO: 0.38 K/UL — SIGNIFICANT CHANGE UP (ref 0–0.9)
MONOCYTES NFR BLD AUTO: 8.3 % — SIGNIFICANT CHANGE UP (ref 2–14)
NEUTROPHILS # BLD AUTO: 2.88 K/UL — SIGNIFICANT CHANGE UP (ref 1.8–7.4)
NEUTROPHILS NFR BLD AUTO: 62.8 % — SIGNIFICANT CHANGE UP (ref 43–77)
NRBC # FLD: 0 K/UL — SIGNIFICANT CHANGE UP (ref 0–0)
PHOSPHATE SERPL-MCNC: 3.1 MG/DL — SIGNIFICANT CHANGE UP (ref 2.5–4.5)
PLATELET # BLD AUTO: 176 K/UL — SIGNIFICANT CHANGE UP (ref 150–400)
PMV BLD: 10.9 FL — SIGNIFICANT CHANGE UP (ref 7–13)
POTASSIUM SERPL-MCNC: 3.9 MMOL/L — SIGNIFICANT CHANGE UP (ref 3.5–5.3)
POTASSIUM SERPL-SCNC: 3.9 MMOL/L — SIGNIFICANT CHANGE UP (ref 3.5–5.3)
RBC # BLD: 3.81 M/UL — LOW (ref 4.2–5.8)
RBC # FLD: 14.5 % — SIGNIFICANT CHANGE UP (ref 10.3–14.5)
SODIUM SERPL-SCNC: 136 MMOL/L — SIGNIFICANT CHANGE UP (ref 135–145)
TRIGL SERPL-MCNC: 80 MG/DL — SIGNIFICANT CHANGE UP (ref 10–149)
WBC # BLD: 4.59 K/UL — SIGNIFICANT CHANGE UP (ref 3.8–10.5)
WBC # FLD AUTO: 4.59 K/UL — SIGNIFICANT CHANGE UP (ref 3.8–10.5)

## 2020-11-19 PROCEDURE — 99232 SBSQ HOSP IP/OBS MODERATE 35: CPT | Mod: GC

## 2020-11-19 PROCEDURE — 99232 SBSQ HOSP IP/OBS MODERATE 35: CPT

## 2020-11-19 PROCEDURE — 93010 ELECTROCARDIOGRAM REPORT: CPT

## 2020-11-19 RX ORDER — ELECTROLYTE SOLUTION,INJ
1 VIAL (ML) INTRAVENOUS
Refills: 0 | Status: DISCONTINUED | OUTPATIENT
Start: 2020-11-19 | End: 2020-11-20

## 2020-11-19 RX ORDER — I.V. FAT EMULSION 20 G/100ML
16.6 EMULSION INTRAVENOUS
Qty: 40 | Refills: 0 | Status: DISCONTINUED | OUTPATIENT
Start: 2020-11-19 | End: 2020-11-20

## 2020-11-19 RX ADMIN — ATORVASTATIN CALCIUM 10 MILLIGRAM(S): 80 TABLET, FILM COATED ORAL at 21:47

## 2020-11-19 RX ADMIN — Medication 6 MILLIGRAM(S): at 22:36

## 2020-11-19 RX ADMIN — ENOXAPARIN SODIUM 40 MILLIGRAM(S): 100 INJECTION SUBCUTANEOUS at 13:48

## 2020-11-19 RX ADMIN — I.V. FAT EMULSION 16.6 ML/HR: 20 EMULSION INTRAVENOUS at 18:12

## 2020-11-19 RX ADMIN — Medication 75 MICROGRAM(S): at 22:37

## 2020-11-19 RX ADMIN — Medication 1 EACH: at 18:11

## 2020-11-19 RX ADMIN — PANTOPRAZOLE SODIUM 40 MILLIGRAM(S): 20 TABLET, DELAYED RELEASE ORAL at 07:14

## 2020-11-19 RX ADMIN — Medication 1 MILLIGRAM(S): at 21:47

## 2020-11-19 RX ADMIN — CHLORHEXIDINE GLUCONATE 1 APPLICATION(S): 213 SOLUTION TOPICAL at 07:13

## 2020-11-19 NOTE — CHART NOTE - NSCHARTNOTEFT_GEN_A_CORE
See full manometry report (in patients chart)    esophageal manometry demonstrated:    10 out of 10 supine swallows demonstrated aperistalsis of the esophagus.  EGJ IRP pressures not able to assess secondary to altered anatomy surgery    Impression:  aperistalsis of the esophagus    Rec:  1. Alternate means of nutrition: J tube to bolster nutritional status

## 2020-11-19 NOTE — PROGRESS NOTE ADULT - ASSESSMENT
70M w/ hx of Gastric cancer s/p resection and minh-n-Y esophagojejunostomy who now presents with 2 months of worsening PO intake tolerance.    - J-Tube monday  - NPO/IVFs  - TPN  - Continue Synthroid  - GI consult     D Team Surgery  #[id number]

## 2020-11-19 NOTE — CONSULT NOTE ADULT - ASSESSMENT
67M w/ PMHx of Hypothyroidism, acid reflux, T2DM, hgbA1c 6.5, proximal Gastric CA, s/p total gastrectomy, splenectomy, distal pancreatectomy, and Lorena-n-Y Esophagojejunostomy on 5/12/17 by Dr. Cabello. Patient followed up with chemo and radiation - all completed by 12/3/17. Patient underwent a USGB & FNA of the left supraclavicular Lymph node in 3/2018 which was positive for metastatic gastric CA s/p resection on 4/17/18. Patient also underwent component separation for incisional hernia repair with mesh on 6/25/18 by Dr. Cabello. Since then, patient started to experience poor PO tolerance 2 months ago that has worsened in the last 2 weeks.   NO CP no SOB Functional status is intact (He can go up three flights of stairs done recently at our office)     Last Echo 10/7/20 in office --> Normal LV and RV function EF 60% Mild MR Mild TR PASP = 35 mm Hg  Last stress1/8/2019 --> Normal EKG, Normal Perfusion EF 54%    Bradycardia noted on EKG and tele is asymptomatic and sinus mechanism. May be related to higher vagal tone given GI symptomatology. NO surface EKG evidence for cardiac conduction disease   - Avoid AV lexus blocking agents   - Pt is in optimum cardiac condition to proceed with planned surgery (low risk procedure)   - Cont statin pre, jordy and post op for additional risk reduction  - thank you will follow . D/W Dnayelle KEARNEY

## 2020-11-19 NOTE — PROGRESS NOTE ADULT - SUBJECTIVE AND OBJECTIVE BOX
NUTRITION NOTE  [id number] EVERARDO  ===============================    Interval events - pt was seen and examined at bedside, no acute events overnight; PICC line was placed and TPN started on 11/18/2020 for nutritional support, pt is tolerating parenteral nutrition without any issues; TPN volume and calories increased to goal today; at this time, pt denies hest pain, shortness or breath, fevers or chills    Allergies  No Known Allergies    PAST MEDICAL & SURGICAL HISTORY:  Constipation  Iron deficiency anemia, unspecified iron deficiency anemia type  Gastric mass - ulcerated mass in gastric cardia with small perigastric nodes on endoscopy.  Hypothyroidism  Type 2 diabetes mellitus without complication, unspecified long term insulin use status no BS monitoring  Essential hypertension was on losartan, now diet control  History of gastric surgery 2017  Port-a-cath in place - right chest wall  H/O umbilical hernia repair    FAMILY HISTORY:  Family history of ischemic heart disease (IHD) (Father)  Family history of ovarian cancer (Mother)    Vital Signs Last 24 Hrs  T(C): 36.2 (19 Nov 2020 04:59), Max: 37.1 (18 Nov 2020 16:00)  T(F): 97.2 (19 Nov 2020 04:59), Max: 98.7 (18 Nov 2020 16:00)  HR: 61 (19 Nov 2020 04:59) (45 - 61)  BP: 116/73 (19 Nov 2020 04:59) (107/70 - 145/77)  RR: 17 (19 Nov 2020 04:59) (15 - 18)  SpO2: 100% (19 Nov 2020 04:59) (100% - 100%)    MEDICATIONS  (STANDING):  atorvastatin 10 milliGRAM(s) Oral at bedtime  chlorhexidine 4% Liquid 1 Application(s) Topical <User Schedule>  dextrose 40% Gel 15 Gram(s) Oral once  dextrose 50% Injectable 25 Gram(s) IV Push once  dextrose 50% Injectable 12.5 Gram(s) IV Push once  dextrose 50% Injectable 25 Gram(s) IV Push once  doxazosin 1 milliGRAM(s) Oral at bedtime  enoxaparin Injectable 40 milliGRAM(s) SubCutaneous daily  fat emulsion (Fish Oil and Plant Based) 20% Infusion 16.6 mL/Hr (16.6 mL/Hr) IV Continuous <Continuous>  glucagon  Injectable 1 milliGRAM(s) IntraMuscular once  insulin lispro (ADMELOG) corrective regimen sliding scale   SubCutaneous three times a day before meals  insulin lispro (ADMELOG) corrective regimen sliding scale   SubCutaneous at bedtime  levothyroxine Injectable 75 MICROGram(s) IV Push at bedtime  melatonin 6 milliGRAM(s) Oral at bedtime  pantoprazole    Tablet 40 milliGRAM(s) Oral before breakfast  Parenteral Nutrition - Adult 1 Each (42 mL/Hr) TPN Continuous <Continuous>  Parenteral Nutrition - Adult 1 Each (83 mL/Hr) TPN Continuous <Continuous>    MEDICATIONS  (PRN):  sodium chloride 0.9% lock flush 10 milliLiter(s) IV Push every 1 hour PRN Pre/post blood products, medications, blood draw, and to maintain line patency    I&O's Detail    18 Nov 2020 07:01  -  19 Nov 2020 07:00  --------------------------------------------------------  IN:    Fat Emulsion (Fish Oil &amp; Plant Based) 20% Infusion: 99.6 mL  Total IN: 99.6 mL    OUT:    Voided (mL): 500 mL  Total OUT: 500 mL    Total NET: -400.4 mL    POCT Blood Glucose.: 124 mg/dL (19 Nov 2020 06:08)  POCT Blood Glucose.: 121 mg/dL (19 Nov 2020 00:53)  POCT Blood Glucose.: 87 mg/dL (18 Nov 2020 17:43)    Daily Height in cm: 180.34 (18 Nov 2020 16:09)      Drug Dosing Weight  Height (cm): 180.3 (18 Nov 2020 16:09)  Weight (kg): 44 (18 Nov 2020 16:09)  BMI (kg/m2): 13.5 (18 Nov 2020 16:09)  BSA (m2): 1.55 (18 Nov 2020 16:09)    PHYSICAL EXAM   General: A&Ox3, NAD, sitting comfortably in chair   Respiratory: CTA b/l, nonlabored respirations   Cardiovascular: Regular rate & rhythm  Abdominal: Soft, nontender, nondistended  PICC Site: C/D/I    Diet: NPO and TPN/lipids (started on 11/18/2020)    LABORATORY                        13.1   4.59  )-----------( 176      ( 19 Nov 2020 06:08 )             39.3   11-19    136  |  100  |  19  ----------------------------<  125<H>  3.9   |  28  |  0.76    Ca    9.3      19 Nov 2020 06:08  Phos  3.1     11-19  Mg     2.0     11-19    TPro  6.4  /  Alb  3.8  /  TBili  1.0  /  DBili  x   /  AST  26  /  ALT  17  /  AlkPhos  75  11-18    LIVER FUNCTIONS - ( 18 Nov 2020 06:42 )  Alb: 3.8 g/dL / Pro: 6.4 g/dL / ALK PHOS: 75 u/L / ALT: 17 u/L / AST: 26 u/L / GGT: x           11-19 Chol -- LDL -- HDL -- Trig 80    ASSESSMENT/PLAN:  71 y/o male with hx of gastric cancer s/p resection and minh-n-Y esophagojejunostomy who was admitted with complaints of progressively worsening tolerance to PO diet. Patient states that he started to experience poor PO tolerance about two months ago that worsened in the past two weeks. Patient reports about 15 lb weight loss over the past 2-3 months. Patient states that he belches and/or vomits each time he eats something. Nutrition support consult was consult for initiation of parenteral nutrition in view of poor malnutrition status and prolonged period of inadequate caloric intake.  PICC line was placed and TPN started on 11/18/2020 for nutritional support.    TPN infusion volume increased to 2 L, TPN will provide 1505 kcal/day     labs reviewed - electrolytes adjusted appropriately     monitor fingersticks, obtain daily weights    continue parenteral nutrition at this time, will follow up with primary team on plan     1.  Severe protein calorie malnutrition being optimized with TPN: CHO [225] gm.  AA [85] gm. SMOF Lipids [40] gm.  2.  Hyperglycemia managed with: [0] units of regular insulin    3.  Check fluid balance daily.  Strict I/O  [ ] [ ]   4.  Daily BMP, Ionized Calcium, Magnesium and Phosphorous   5.  Triglycerides at initiation of TPN and monthly [ ] [ ]     Nutrition Support [id number]

## 2020-11-19 NOTE — PROGRESS NOTE ADULT - SUBJECTIVE AND OBJECTIVE BOX
Chief Complaint:  Patient is a 70y old  Male who presents with a chief complaint of Poor PO intake/progressive dysphagia. (19 Nov 2020 10:19)      Interval Events: S/P Manometry yesterday revealing aperistalsis of the esophagus, EGJ IRP pressures not able to assess secondary to altered anatomy surgery  ROS: All 12 point system except listed above were otherwise negative.    Allergies:  No Known Allergies    Hospital Medications:  atorvastatin 10 milliGRAM(s) Oral at bedtime  chlorhexidine 4% Liquid 1 Application(s) Topical <User Schedule>  dextrose 40% Gel 15 Gram(s) Oral once  dextrose 50% Injectable 25 Gram(s) IV Push once  dextrose 50% Injectable 12.5 Gram(s) IV Push once  dextrose 50% Injectable 25 Gram(s) IV Push once  doxazosin 1 milliGRAM(s) Oral at bedtime  enoxaparin Injectable 40 milliGRAM(s) SubCutaneous daily  fat emulsion (Fish Oil and Plant Based) 20% Infusion 16.6 mL/Hr IV Continuous <Continuous>  glucagon  Injectable 1 milliGRAM(s) IntraMuscular once  insulin lispro (ADMELOG) corrective regimen sliding scale   SubCutaneous three times a day before meals  insulin lispro (ADMELOG) corrective regimen sliding scale   SubCutaneous at bedtime  levothyroxine Injectable 75 MICROGram(s) IV Push at bedtime  melatonin 6 milliGRAM(s) Oral at bedtime  pantoprazole    Tablet 40 milliGRAM(s) Oral before breakfast  Parenteral Nutrition - Adult 1 Each TPN Continuous <Continuous>  Parenteral Nutrition - Adult 1 Each TPN Continuous <Continuous>  sodium chloride 0.9% lock flush 10 milliLiter(s) IV Push every 1 hour PRN      PMHX/PSHX:  Constipation    Iron deficiency anemia, unspecified iron deficiency anemia type    Gastric mass    Hypothyroidism    Type 2 diabetes mellitus without complication, unspecified long term insulin use status    Essential hypertension    History of gastric surgery    Port-a-cath in place    H/O umbilical hernia repair        Family history:  Family history of ischemic heart disease (IHD) (Father)    Family history of ovarian cancer (Mother)      There is no family history of peptic ulcer disease, gastric cancer, colon polyps, colon cancer, celiac disease, biliary, hepatic, or pancreatic disease.  None of the female relatives have breast, uterine, or ovarian cancer.     PHYSICAL EXAM:   Vital Signs:  Vital Signs Last 24 Hrs  T(C): 36.6 (19 Nov 2020 11:03), Max: 37.1 (18 Nov 2020 16:00)  T(F): 97.8 (19 Nov 2020 11:03), Max: 98.7 (18 Nov 2020 16:00)  HR: 61 (19 Nov 2020 11:03) (45 - 61)  BP: 125/80 (19 Nov 2020 11:03) (107/70 - 145/77)  BP(mean): --  RR: 16 (19 Nov 2020 11:03) (15 - 18)  SpO2: 100% (19 Nov 2020 11:03) (100% - 100%)  Daily Height in cm: 180.34 (18 Nov 2020 16:09)    Daily     PHYSICAL EXAM:     GENERAL:  Appears stated age, well-groomed  HEENT:  NC/AT,  conjunctivae clear and pink, no thyromegaly, nodules  CHEST:  Full & symmetric excursion, no increased effort, breath sounds clear  HEART:  Regular rhythm, S1, S2, no murmur/rub/S3/S4  ABDOMEN:  Soft, non-tender, non-distended  EXTEREMITIES:  no cyanosis,clubbing or edema  SKIN:  No rash/erythema/ecchymoses  NEURO:  Alert, oriented, no asterixis    LABS:                        13.1   4.59  )-----------( 176      ( 19 Nov 2020 06:08 )             39.3     Mean Cell Volume: 103.1 fL (11-19-20 @ 06:08)    11-19    136  |  100  |  19  ----------------------------<  125<H>  3.9   |  28  |  0.76    Ca    9.3      19 Nov 2020 06:08  Phos  3.1     11-19  Mg     2.0     11-19    TPro  6.4  /  Alb  3.8  /  TBili  1.0  /  DBili  x   /  AST  26  /  ALT  17  /  AlkPhos  75  11-18    LIVER FUNCTIONS - ( 18 Nov 2020 06:42 )  Alb: 3.8 g/dL / Pro: 6.4 g/dL / ALK PHOS: 75 u/L / ALT: 17 u/L / AST: 26 u/L / GGT: x           PT/INR - ( 17 Nov 2020 14:15 )   PT: 12.8 SEC;   INR: 1.12          PTT - ( 17 Nov 2020 14:15 )  PTT:29.9 SEC                            13.1   4.59  )-----------( 176      ( 19 Nov 2020 06:08 )             39.3                         12.0   4.71  )-----------( 177      ( 18 Nov 2020 06:42 )             36.1                         12.9   5.34  )-----------( 186      ( 17 Nov 2020 14:15 )             38.6     Imaging:

## 2020-11-19 NOTE — CONSULT NOTE ADULT - SUBJECTIVE AND OBJECTIVE BOX
CHIEF COMPLAINT: Poor PO INTAKE    HPI:  67M w/ PMHx of Hypothyroidism, acid reflux, T2DM, hgbA1c 6.5, proximal Gastric CA, s/p total gastrectomy, splenectomy, distal pancreatectomy, and Lorena-n-Y Esophagojejunostomy on 5/12/17 by Dr. Cabello. Patient followed up with chemo and radiation - all completed by 12/3/17. Patient underwent a USGB & FNA of the left supraclavicular Lymph node in 3/2018 which was positive for metastatic gastric CA s/p resection on 4/17/18. Patient also underwent component separation for incisional hernia repair with mesh on 6/25/18 by Dr. Cabello. Since then, patient started to experience poor PO tolerance 2 months ago that has worsened in the last 2 weeks. Patient states he still has appetite but vomits his food each time he eats. 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 Patient was advised to visit the ED for further management.    In the ED, patient was afebrile, hemodynamically stable, unremarkable labs. CT C/A/P w/ IV and PO contrast ordered. Surgery contacted for further management.    Patient states last colonoscopy was ~5 years ago and was normal. (17 Nov 2020 17:01)    He is lying in bed comfortably at the present time and on TPN. J tube placement is being planned tomorrow. He has no CP no SOB able to walk on level ground without stopping and did 3 flights of stairs at our office without difficulties.     PAST MEDICAL & SURGICAL HISTORY:  Constipation    Iron deficiency anemia, unspecified iron deficiency anemia type    Gastric mass  ulcerated mass in gastric cardia with small perigastric nodes on endoscopy.    Hypothyroidism    Type 2 diabetes mellitus without complication, unspecified long term insulin use status  no BS monitoring    Essential hypertension  was on losartan, now diet control    History of gastric surgery  2017    Port-a-cath in place  right chest wall    H/O umbilical hernia repair        Allergies    No Known Allergies    Intolerances        SOCIAL HISTORY    Smoking Hx:  ETOH Hx:  Marital Status:  Occupational Hx:    FAMILY HISTORY:  Family history of ischemic heart disease (IHD) (Father)  father    Family history of ovarian cancer (Mother)  mother        MEDICATIONS:  atorvastatin 10 milliGRAM(s) Oral at bedtime  chlorhexidine 4% Liquid 1 Application(s) Topical <User Schedule>  dextrose 40% Gel 15 Gram(s) Oral once  dextrose 50% Injectable 25 Gram(s) IV Push once  dextrose 50% Injectable 12.5 Gram(s) IV Push once  dextrose 50% Injectable 25 Gram(s) IV Push once  doxazosin 1 milliGRAM(s) Oral at bedtime  enoxaparin Injectable 40 milliGRAM(s) SubCutaneous daily  fat emulsion (Fish Oil and Plant Based) 20% Infusion 16.6 mL/Hr IV Continuous <Continuous>  glucagon  Injectable 1 milliGRAM(s) IntraMuscular once  insulin lispro (ADMELOG) corrective regimen sliding scale   SubCutaneous three times a day before meals  insulin lispro (ADMELOG) corrective regimen sliding scale   SubCutaneous at bedtime  levothyroxine Injectable 75 MICROGram(s) IV Push at bedtime  melatonin 6 milliGRAM(s) Oral at bedtime  pantoprazole    Tablet 40 milliGRAM(s) Oral before breakfast  Parenteral Nutrition - Adult 1 Each TPN Continuous <Continuous>  Parenteral Nutrition - Adult 1 Each TPN Continuous <Continuous>  sodium chloride 0.9% lock flush 10 milliLiter(s) IV Push every 1 hour PRN      REVIEW OF SYSTEMS:    CONSTITUTIONAL: No weakness, fevers or chills  EYES/ENT: No visual changes;  No vertigo or throat pain   NECK: No pain or stiffness  RESPIRATORY: No cough, wheezing, hemoptysis; No shortness of breath  CARDIOVASCULAR: No chest pain or palpitations  GASTROINTESTINAL: No abdominal or epigastric pain. unable to keep fodd down  GENITOURINARY: No dysuria, frequency or hematuria  NEUROLOGICAL: No numbness or weakness  SKIN: No itching, burning, rashes, or lesions   All other review of systems is negative unless indicated above    Vital Signs Last 24 Hrs  T(C): 36.6 (19 Nov 2020 11:03), Max: 37.1 (18 Nov 2020 16:00)  T(F): 97.8 (19 Nov 2020 11:03), Max: 98.7 (18 Nov 2020 16:00)  HR: 61 (19 Nov 2020 11:03) (45 - 61)  BP: 125/80 (19 Nov 2020 11:03) (107/70 - 145/77)  BP(mean): --  RR: 16 (19 Nov 2020 11:03) (15 - 18)  SpO2: 100% (19 Nov 2020 11:03) (100% - 100%)    I&O's Summary    18 Nov 2020 07:01  -  19 Nov 2020 07:00  --------------------------------------------------------  IN: 99.6 mL / OUT: 500 mL / NET: -400.4 mL        PHYSICAL EXAM:    Constitutional: NAD, awake and alert, gaunt appearing but NAD   HEENT: PERR, EOMI  Neck: soft and supple, No LAD, No JVD  Respiratory: Breath sounds are clear bilaterally, No wheezing, rales or rhonchi  Cardiovascular: Regular rate and rhythm, normal S1 and S2,  no murmurs, gallops or rubs  Gastrointestinal: Bowel Sounds present, soft, nontender.   Extremities: No peripheral edema. No clubbing or cyanosis.  Vascular: 2+ peripheral pulses  Neurological: A/O x 3, no focal deficits  Musculoskeletal: no calf tenderness.  Skin: No rashes.      LABS: All Labs Reviewed:                        13.1   4.59  )-----------( 176      ( 19 Nov 2020 06:08 )             39.3                         12.0   4.71  )-----------( 177      ( 18 Nov 2020 06:42 )             36.1                         12.9   5.34  )-----------( 186      ( 17 Nov 2020 14:15 )             38.6     19 Nov 2020 06:08    136    |  100    |  19     ----------------------------<  125    3.9     |  28     |  0.76   18 Nov 2020 06:42    138    |  100    |  13     ----------------------------<  91     3.9     |  28     |  0.84   17 Nov 2020 14:15    137    |  98     |  13     ----------------------------<  93     3.2     |  28     |  0.84     Ca    9.3        19 Nov 2020 06:08  Ca    9.4        18 Nov 2020 06:42  Ca    9.6        17 Nov 2020 14:15  Phos  3.1       19 Nov 2020 06:08  Phos  3.4       18 Nov 2020 06:42  Mg     2.0       19 Nov 2020 06:08  Mg     2.0       18 Nov 2020 06:42    TPro  6.4    /  Alb  3.8    /  TBili  1.0    /  DBili  x      /  AST  26     /  ALT  17     /  AlkPhos  75     18 Nov 2020 06:42  TPro  7.5    /  Alb  4.3    /  TBili  1.1    /  DBili  x      /  AST  30     /  ALT  21     /  AlkPhos  85     17 Nov 2020 14:15    PT/INR - ( 17 Nov 2020 14:15 )   PT: 12.8 SEC;   INR: 1.12          PTT - ( 17 Nov 2020 14:15 )  PTT:29.9 SEC  Blood Culture:     CARDIAC MARKERS:            proBNP:   Lipid Profile:   HgA1c:   TSH:           RADIOLOGY/EKG: SB 51

## 2020-11-19 NOTE — PROGRESS NOTE ADULT - ASSESSMENT
Impression:  1) Nausea, vomiting -S/P proximal Gastric CA, s/p total gastrectomy, splenectomy, distal pancreatectomy, and Lorena-n-Y Esophagojejunostomy. Likely 2/2 dysmotility s/p surgery, no evidence of obstruction on imaging or esophagitis/cancer/EOE-rings on last EGD. S/P Manometry revealing aperistalsis of the esophagus, EGJ IRP pressures not able to assess secondary to altered anatomy surgery    Recommendations:  -Will need discuss alternate means of nutrition (J-tube) for nutrition    Elias Solano, PGY6  Gastroenterology Fellow  Pager # [phone] / [phone]  Can be contacted via [employer] Teams    Impression:  1) Nausea, vomiting -S/P proximal Gastric CA, s/p total gastrectomy, splenectomy, distal pancreatectomy, and Lorena-n-Y Esophagojejunostomy. Likely 2/2 dysmotility s/p surgery, no evidence of obstruction on imaging or esophagitis/cancer/EOE-rings on last EGD. S/P Manometry revealing aperistalsis of the esophagus, EGJ IRP pressures not able to assess secondary to altered anatomy surgery    Recommendations:  -Will need discuss alternate means of nutrition (J-tube) for nutrition; Given altered anatomy, recommend placement by surgery if patient is interested    Elias Solano, PGY6  Gastroenterology Fellow  Pager # [phone] / [phone]  Can be contacted via [employer] Teams

## 2020-11-19 NOTE — PROGRESS NOTE ADULT - SUBJECTIVE AND OBJECTIVE BOX
Surgery Progress Note    SUBJECTIVE: Pt seen and examined at bedside. Patient comfortable and in no-apparent distress. No nausea, vomiting, diarrhea.  no pain      Vital Signs Last 24 Hrs  T(C): 36.6 (19 Nov 2020 11:03), Max: 37.1 (18 Nov 2020 16:00)  T(F): 97.8 (19 Nov 2020 11:03), Max: 98.7 (18 Nov 2020 16:00)  HR: 61 (19 Nov 2020 11:03) (45 - 61)  BP: 125/80 (19 Nov 2020 11:03) (107/70 - 145/77)  BP(mean): --  RR: 16 (19 Nov 2020 11:03) (15 - 18)  SpO2: 100% (19 Nov 2020 11:03) (100% - 100%)    Physical Exam:  General Appearance: Appears well, NAD  Respiratory: No labored breathing  CV: Pulse regularly present  Abdomen: Soft, nontense, NTND      LABS:                        13.1   4.59  )-----------( 176      ( 19 Nov 2020 06:08 )             39.3     11-19    136  |  100  |  19  ----------------------------<  125<H>  3.9   |  28  |  0.76    Ca    9.3      19 Nov 2020 06:08  Phos  3.1     11-19  Mg     2.0     11-19    TPro  6.4  /  Alb  3.8  /  TBili  1.0  /  DBili  x   /  AST  26  /  ALT  17  /  AlkPhos  75  11-18    PT/INR - ( 17 Nov 2020 14:15 )   PT: 12.8 SEC;   INR: 1.12          PTT - ( 17 Nov 2020 14:15 )  PTT:29.9 SEC      INs and OUTs:    11-18-20 @ 07:01  -  11-19-20 @ 07:00  --------------------------------------------------------  IN: 99.6 mL / OUT: 500 mL / NET: -400.4 mL

## 2020-11-20 LAB
ANION GAP SERPL CALC-SCNC: 8 MMO/L — SIGNIFICANT CHANGE UP (ref 7–14)
BASOPHILS # BLD AUTO: 0.03 K/UL — SIGNIFICANT CHANGE UP (ref 0–0.2)
BASOPHILS NFR BLD AUTO: 0.8 % — SIGNIFICANT CHANGE UP (ref 0–2)
BUN SERPL-MCNC: 21 MG/DL — SIGNIFICANT CHANGE UP (ref 7–23)
CA-I BLD-SCNC: 1.1 MMOL/L — SIGNIFICANT CHANGE UP (ref 1.03–1.23)
CALCIUM SERPL-MCNC: 8.7 MG/DL — SIGNIFICANT CHANGE UP (ref 8.4–10.5)
CHLORIDE SERPL-SCNC: 103 MMOL/L — SIGNIFICANT CHANGE UP (ref 98–107)
CO2 SERPL-SCNC: 26 MMOL/L — SIGNIFICANT CHANGE UP (ref 22–31)
CREAT SERPL-MCNC: 0.61 MG/DL — SIGNIFICANT CHANGE UP (ref 0.5–1.3)
EOSINOPHIL # BLD AUTO: 0.21 K/UL — SIGNIFICANT CHANGE UP (ref 0–0.5)
EOSINOPHIL NFR BLD AUTO: 5.5 % — SIGNIFICANT CHANGE UP (ref 0–6)
GLUCOSE BLDC GLUCOMTR-MCNC: 151 MG/DL — HIGH (ref 70–99)
GLUCOSE BLDC GLUCOMTR-MCNC: 163 MG/DL — HIGH (ref 70–99)
GLUCOSE BLDC GLUCOMTR-MCNC: 163 MG/DL — HIGH (ref 70–99)
GLUCOSE BLDC GLUCOMTR-MCNC: 92 MG/DL — SIGNIFICANT CHANGE UP (ref 70–99)
GLUCOSE SERPL-MCNC: 139 MG/DL — HIGH (ref 70–99)
HCT VFR BLD CALC: 34.3 % — LOW (ref 39–50)
HGB BLD-MCNC: 11.2 G/DL — LOW (ref 13–17)
IMM GRANULOCYTES NFR BLD AUTO: 0.3 % — SIGNIFICANT CHANGE UP (ref 0–1.5)
LYMPHOCYTES # BLD AUTO: 0.55 K/UL — LOW (ref 1–3.3)
LYMPHOCYTES # BLD AUTO: 14.3 % — SIGNIFICANT CHANGE UP (ref 13–44)
MAGNESIUM SERPL-MCNC: 1.8 MG/DL — SIGNIFICANT CHANGE UP (ref 1.6–2.6)
MCHC RBC-ENTMCNC: 32.7 % — SIGNIFICANT CHANGE UP (ref 32–36)
MCHC RBC-ENTMCNC: 34.1 PG — HIGH (ref 27–34)
MCV RBC AUTO: 104.6 FL — HIGH (ref 80–100)
MONOCYTES # BLD AUTO: 0.41 K/UL — SIGNIFICANT CHANGE UP (ref 0–0.9)
MONOCYTES NFR BLD AUTO: 10.7 % — SIGNIFICANT CHANGE UP (ref 2–14)
NEUTROPHILS # BLD AUTO: 2.63 K/UL — SIGNIFICANT CHANGE UP (ref 1.8–7.4)
NEUTROPHILS NFR BLD AUTO: 68.4 % — SIGNIFICANT CHANGE UP (ref 43–77)
NRBC # FLD: 0 K/UL — SIGNIFICANT CHANGE UP (ref 0–0)
PHOSPHATE SERPL-MCNC: 2.6 MG/DL — SIGNIFICANT CHANGE UP (ref 2.5–4.5)
PLATELET # BLD AUTO: 140 K/UL — LOW (ref 150–400)
PMV BLD: 11.3 FL — SIGNIFICANT CHANGE UP (ref 7–13)
POTASSIUM SERPL-MCNC: 3.7 MMOL/L — SIGNIFICANT CHANGE UP (ref 3.5–5.3)
POTASSIUM SERPL-SCNC: 3.7 MMOL/L — SIGNIFICANT CHANGE UP (ref 3.5–5.3)
RBC # BLD: 3.28 M/UL — LOW (ref 4.2–5.8)
RBC # FLD: 14.2 % — SIGNIFICANT CHANGE UP (ref 10.3–14.5)
SODIUM SERPL-SCNC: 137 MMOL/L — SIGNIFICANT CHANGE UP (ref 135–145)
WBC # BLD: 3.84 K/UL — SIGNIFICANT CHANGE UP (ref 3.8–10.5)
WBC # FLD AUTO: 3.84 K/UL — SIGNIFICANT CHANGE UP (ref 3.8–10.5)

## 2020-11-20 PROCEDURE — 74183 MRI ABD W/O CNTR FLWD CNTR: CPT | Mod: 26

## 2020-11-20 PROCEDURE — 99232 SBSQ HOSP IP/OBS MODERATE 35: CPT

## 2020-11-20 RX ORDER — POTASSIUM PHOSPHATE, MONOBASIC POTASSIUM PHOSPHATE, DIBASIC 236; 224 MG/ML; MG/ML
15 INJECTION, SOLUTION INTRAVENOUS ONCE
Refills: 0 | Status: DISCONTINUED | OUTPATIENT
Start: 2020-11-20 | End: 2020-11-20

## 2020-11-20 RX ORDER — ELECTROLYTE SOLUTION,INJ
1 VIAL (ML) INTRAVENOUS
Refills: 0 | Status: DISCONTINUED | OUTPATIENT
Start: 2020-11-20 | End: 2020-11-21

## 2020-11-20 RX ORDER — TAMSULOSIN HYDROCHLORIDE 0.4 MG/1
0.4 CAPSULE ORAL AT BEDTIME
Refills: 0 | Status: DISCONTINUED | OUTPATIENT
Start: 2020-11-20 | End: 2020-11-20

## 2020-11-20 RX ORDER — TAMSULOSIN HYDROCHLORIDE 0.4 MG/1
0.4 CAPSULE ORAL AT BEDTIME
Refills: 0 | Status: DISCONTINUED | OUTPATIENT
Start: 2020-11-20 | End: 2020-11-23

## 2020-11-20 RX ORDER — MAGNESIUM SULFATE 500 MG/ML
2 VIAL (ML) INJECTION ONCE
Refills: 0 | Status: DISCONTINUED | OUTPATIENT
Start: 2020-11-20 | End: 2020-11-20

## 2020-11-20 RX ORDER — I.V. FAT EMULSION 20 G/100ML
16.6 EMULSION INTRAVENOUS
Qty: 40 | Refills: 0 | Status: DISCONTINUED | OUTPATIENT
Start: 2020-11-20 | End: 2020-11-23

## 2020-11-20 RX ADMIN — Medication 1 EACH: at 05:19

## 2020-11-20 RX ADMIN — Medication 75 MICROGRAM(S): at 21:48

## 2020-11-20 RX ADMIN — Medication 2: at 05:19

## 2020-11-20 RX ADMIN — CHLORHEXIDINE GLUCONATE 1 APPLICATION(S): 213 SOLUTION TOPICAL at 05:18

## 2020-11-20 RX ADMIN — TAMSULOSIN HYDROCHLORIDE 0.4 MILLIGRAM(S): 0.4 CAPSULE ORAL at 21:34

## 2020-11-20 RX ADMIN — PANTOPRAZOLE SODIUM 40 MILLIGRAM(S): 20 TABLET, DELAYED RELEASE ORAL at 05:18

## 2020-11-20 RX ADMIN — Medication 6 MILLIGRAM(S): at 21:48

## 2020-11-20 RX ADMIN — Medication 1 EACH: at 18:19

## 2020-11-20 RX ADMIN — Medication 2: at 18:21

## 2020-11-20 RX ADMIN — ATORVASTATIN CALCIUM 10 MILLIGRAM(S): 80 TABLET, FILM COATED ORAL at 21:34

## 2020-11-20 RX ADMIN — ENOXAPARIN SODIUM 40 MILLIGRAM(S): 100 INJECTION SUBCUTANEOUS at 11:43

## 2020-11-20 RX ADMIN — I.V. FAT EMULSION 16.6 ML/HR: 20 EMULSION INTRAVENOUS at 18:19

## 2020-11-20 NOTE — DIETITIAN INITIAL EVALUATION ADULT. - OTHER INFO
71 y/o male with hx of gastric cancer s/p resection and minh-n-Y esophagojejunostomy who was admitted with complaints of progressively worsening tolerance to PO diet. Patient states that he started to experience poor PO tolerance about two months ago that worsened in the past two weeks. Patient reports about 15 lb weight loss over the past 2-3 months. Patient states that he belches and/or vomits each time he eats something. Nutrition support consult was consult for initiation of parenteral nutrition in view of poor malnutrition status and prolonged period of inadequate caloric intake.  PICC line was placed and TPN started on 11/18/2020 for nutritional support. Pt. tolerating PN . Pt. receiving 35 kcal/kg wt. & 1.2 gm amino acids /kg IBW .

## 2020-11-20 NOTE — DIETITIAN INITIAL EVALUATION ADULT. - ORAL INTAKE PTA/DIET HISTORY
Pt.  stated he  started to experience poor PO tolerance 2 months ago that has worsened in the last 2 weeks. Patient states he still has appetite but vomits his food each time he eats. 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 Pt. with wt. loss since Feb. 2020. Pt.  stated he  started to experience poor PO tolerance 2 months ago that has worsened in the last 2 weeks. Patient states he still has appetite but vomits his food each time he eats. 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 Pt. with wt. loss since Feb. 2020 - 62 kg to 57kg in Oct  & 44 kg now .

## 2020-11-20 NOTE — PROGRESS NOTE ADULT - SUBJECTIVE AND OBJECTIVE BOX
D TEAM SURGERY PROGRESS NOTE    SUBJECTIVE: Pt seen and examined at bedside. Patient comfortable and in no-apparent distress. No nausea, vomiting, diarrhea.  no pain. Feels hungry. Aware of plan for OR Monday.       Vital Signs Last 24 Hrs  T(C): 36.7 (11-20-20 @ 07:30), Max: 36.8 (11-19-20 @ 20:03)  HR: 50 (11-20-20 @ 07:30) (46 - 61)  BP: 132/50 (11-20-20 @ 07:30) (100/56 - 132/50)  RR: 16 (11-20-20 @ 07:30) (16 - 18)  SpO2: 99% (11-20-20 @ 07:30) (97% - 100%)      11-19 @ 07:01  -  11-20 @ 07:00  --------------------------------------------------------  IN:    Fat Emulsion (Fish Oil &amp; Plant Based) 20% Infusion: 166 mL    TPN (Total Parenteral Nutrition): 830 mL  Total IN: 996 mL    OUT:    Voided (mL): 150 mL  Total OUT: 150 mL    Total NET: 846 mL      Physical Exam:  General Appearance: Appears well, NAD  Respiratory: No labored breathing  CV: Pulse regularly present  Abdomen: Soft, nontense, NTND      LABS:  CBC (11-20 @ 06:19)                              11.2<L>                         3.84    )----------------(  140<L>     68.4  % Neutrophils, 14.3  % Lymphocytes, ANC: 2.63                                34.3<L>              CBC (11-19 @ 06:08)                              13.1                           4.59    )----------------(  176        62.8  % Neutrophils, 22.4  % Lymphocytes, ANC: 2.88                                39.3                  BMP (11-20 @ 06:19)             137     |  103     |  21    		Ca++ 1.10    Ca 8.7                ---------------------------------( 139<H>		Mg 1.8                3.7     |  26      |  0.61  			Ph 2.6     BMP (11-19 @ 06:08)             136     |  100     |  19    		Ca++ 1.11    Ca 9.3                ---------------------------------( 125<H>		Mg 2.0                3.9     |  28      |  0.76  			Ph 3.1

## 2020-11-20 NOTE — PROGRESS NOTE ADULT - ASSESSMENT
70M w/ hx of Gastric cancer s/p resection and minh-n-Y esophagojejunostomy who now presents with 2 months of worsening PO intake tolerance.    - J-Tube monday  - NPO/IVFs  - TPN  - Continue Synthroid  - Follow-up MRI    D Team Surgery  #[id number]

## 2020-11-20 NOTE — PROGRESS NOTE ADULT - SUBJECTIVE AND OBJECTIVE BOX
NUTRITION NOTE  [id number] EVERARDO  ===============================    Interval events - pt was seen and examined at bedside, no acute events overnight; PICC line was placed and TPN started on 11/18/2020 for nutritional support, pt is tolerating parenteral nutrition without any issues; TPN remains at goal, pt denies hest pain, shortness or breath, fevers or chills    Allergies  No Known Allergies    PAST MEDICAL & SURGICAL HISTORY:  Constipation  Iron deficiency anemia, unspecified iron deficiency anemia type  Gastric mass - ulcerated mass in gastric cardia with small perigastric nodes on endoscopy.  Hypothyroidism  Type 2 diabetes mellitus without complication, unspecified long term insulin use status no BS monitoring  Essential hypertension was on losartan, now diet control  History of gastric surgery 2017  Port-a-cath in place - right chest wall  H/O umbilical hernia repair    FAMILY HISTORY:  Family history of ischemic heart disease (IHD) (Father)  Family history of ovarian cancer (Mother)    Vital Signs Last 24 Hrs  T(C): 36.7 (20 Nov 2020 07:30), Max: 36.8 (19 Nov 2020 20:03)  T(F): 98 (20 Nov 2020 07:30), Max: 98.3 (19 Nov 2020 20:03)  HR: 50 (20 Nov 2020 07:30) (46 - 52)  BP: 132/50 (20 Nov 2020 07:30) (100/56 - 132/50)  RR: 16 (20 Nov 2020 07:30) (16 - 18)  SpO2: 99% (20 Nov 2020 07:30) (97% - 100%)    MEDICATIONS  (STANDING):  atorvastatin 10 milliGRAM(s) Oral at bedtime  chlorhexidine 4% Liquid 1 Application(s) Topical <User Schedule>  dextrose 40% Gel 15 Gram(s) Oral once  dextrose 50% Injectable 25 Gram(s) IV Push once  dextrose 50% Injectable 12.5 Gram(s) IV Push once  dextrose 50% Injectable 25 Gram(s) IV Push once  doxazosin 1 milliGRAM(s) Oral at bedtime  enoxaparin Injectable 40 milliGRAM(s) SubCutaneous daily  fat emulsion (Fish Oil and Plant Based) 20% Infusion 16.6 mL/Hr (16.6 mL/Hr) IV Continuous <Continuous>  glucagon  Injectable 1 milliGRAM(s) IntraMuscular once  insulin lispro (ADMELOG) corrective regimen sliding scale   SubCutaneous three times a day before meals  insulin lispro (ADMELOG) corrective regimen sliding scale   SubCutaneous at bedtime  levothyroxine Injectable 75 MICROGram(s) IV Push at bedtime  melatonin 6 milliGRAM(s) Oral at bedtime  pantoprazole    Tablet 40 milliGRAM(s) Oral before breakfast  Parenteral Nutrition - Adult 1 Each (83 mL/Hr) TPN Continuous <Continuous>  Parenteral Nutrition - Adult 1 Each (83 mL/Hr) TPN Continuous <Continuous>    MEDICATIONS  (PRN):  sodium chloride 0.9% lock flush 10 milliLiter(s) IV Push every 1 hour PRN Pre/post blood products, medications, blood draw, and to maintain line patency    I&O's Detail    19 Nov 2020 07:01  -  20 Nov 2020 07:00  --------------------------------------------------------  IN:    Fat Emulsion (Fish Oil &amp; Plant Based) 20% Infusion: 166 mL    TPN (Total Parenteral Nutrition): 830 mL  Total IN: 996 mL    OUT:    Voided (mL): 150 mL  Total OUT: 150 mL    Total NET: 846 mL    POCT Blood Glucose.: 151 mg/dL (20 Nov 2020 05:17)  POCT Blood Glucose.: 144 mg/dL (19 Nov 2020 22:36)  POCT Blood Glucose.: 135 mg/dL (19 Nov 2020 17:12)  POCT Blood Glucose.: 121 mg/dL (19 Nov 2020 12:41)    Daily Height in cm: 180.34 (18 Nov 2020 16:09)      Drug Dosing Weight  Height (cm): 180.3 (18 Nov 2020 16:09)  Weight (kg): 44 (18 Nov 2020 16:09)  BMI (kg/m2): 13.5 (18 Nov 2020 16:09)  BSA (m2): 1.55 (18 Nov 2020 16:09)    PHYSICAL EXAM   General: A&Ox3, NAD, sitting comfortably in chair   Respiratory: CTA b/l, nonlabored respirations   Cardiovascular: Regular rate & rhythm  Abdominal: Soft, nontender, nondistended  PICC Site: C/D/I    Diet: NPO and TPN/lipids (started on 11/18/2020)    LABORATORY                                 11.2   3.84  )-----------( 140      ( 20 Nov 2020 06:19 )             34.3     11-20    137  |  103  |  21  ----------------------------<  139<H>  3.7   |  26  |  0.61    Ca    8.7      20 Nov 2020 06:19  Phos  2.6     11-20  Mg     1.8     11-20    TPro  6.4  /  Alb  3.8  /  TBili  1.0  /  DBili  x   /  AST  26  /  ALT  17  /  AlkPhos  75  11-18    LIVER FUNCTIONS - ( 18 Nov 2020 06:42 )  Alb: 3.8 g/dL / Pro: 6.4 g/dL / ALK PHOS: 75 u/L / ALT: 17 u/L / AST: 26 u/L / GGT: x           11-19 Chol -- LDL -- HDL -- Trig 80    ASSESSMENT/PLAN:  71 y/o male with hx of gastric cancer s/p resection and minh-n-Y esophagojejunostomy who was admitted with complaints of progressively worsening tolerance to PO diet. Patient states that he started to experience poor PO tolerance about two months ago that worsened in the past two weeks. Patient reports about 15 lb weight loss over the past 2-3 months. Patient states that he belches and/or vomits each time he eats something. Nutrition support consult was consult for initiation of parenteral nutrition in view of poor malnutrition status and prolonged period of inadequate caloric intake.  PICC line was placed and TPN started on 11/18/2020 for nutritional support.    continue TPN with infusion volume of 2 L, TPN will provide 1545 kcal/day - protein calories increased in TPN bag    labs reviewed - electrolytes adjusted appropriately     monitor fingersticks, obtain daily weights    continue parenteral nutrition at this time, will follow up with primary team on plan - J tube placement planned for Monday     1.  Severe protein calorie malnutrition being optimized with TPN: CHO [225] gm.  AA [95] gm. SMOF Lipids [40] gm.  2.  Hyperglycemia managed with: [0] units of regular insulin    3.  Check fluid balance daily.  Strict I/O  [ ] [ ]   4.  Daily BMP, Ionized Calcium, Magnesium and Phosphorous   5.  Triglycerides at initiation of TPN and monthly [ ] [ ]     Nutrition Support [id number]

## 2020-11-21 LAB
ANION GAP SERPL CALC-SCNC: 10 MMO/L — SIGNIFICANT CHANGE UP (ref 7–14)
BASOPHILS # BLD AUTO: 0.02 K/UL — SIGNIFICANT CHANGE UP (ref 0–0.2)
BASOPHILS NFR BLD AUTO: 0.5 % — SIGNIFICANT CHANGE UP (ref 0–2)
BUN SERPL-MCNC: 24 MG/DL — HIGH (ref 7–23)
CA-I BLD-SCNC: 1.07 MMOL/L — SIGNIFICANT CHANGE UP (ref 1.03–1.23)
CALCIUM SERPL-MCNC: 8.7 MG/DL — SIGNIFICANT CHANGE UP (ref 8.4–10.5)
CHLORIDE SERPL-SCNC: 101 MMOL/L — SIGNIFICANT CHANGE UP (ref 98–107)
CO2 SERPL-SCNC: 24 MMOL/L — SIGNIFICANT CHANGE UP (ref 22–31)
CREAT SERPL-MCNC: 0.6 MG/DL — SIGNIFICANT CHANGE UP (ref 0.5–1.3)
EOSINOPHIL # BLD AUTO: 0.2 K/UL — SIGNIFICANT CHANGE UP (ref 0–0.5)
EOSINOPHIL NFR BLD AUTO: 5.3 % — SIGNIFICANT CHANGE UP (ref 0–6)
GLUCOSE BLDC GLUCOMTR-MCNC: 126 MG/DL — HIGH (ref 70–99)
GLUCOSE BLDC GLUCOMTR-MCNC: 129 MG/DL — HIGH (ref 70–99)
GLUCOSE BLDC GLUCOMTR-MCNC: 137 MG/DL — HIGH (ref 70–99)
GLUCOSE BLDC GLUCOMTR-MCNC: 146 MG/DL — HIGH (ref 70–99)
GLUCOSE SERPL-MCNC: 163 MG/DL — HIGH (ref 70–99)
HCT VFR BLD CALC: 31.9 % — LOW (ref 39–50)
HGB BLD-MCNC: 10.8 G/DL — LOW (ref 13–17)
IMM GRANULOCYTES NFR BLD AUTO: 0.3 % — SIGNIFICANT CHANGE UP (ref 0–1.5)
LYMPHOCYTES # BLD AUTO: 0.66 K/UL — LOW (ref 1–3.3)
LYMPHOCYTES # BLD AUTO: 17.6 % — SIGNIFICANT CHANGE UP (ref 13–44)
MAGNESIUM SERPL-MCNC: 1.9 MG/DL — SIGNIFICANT CHANGE UP (ref 1.6–2.6)
MCHC RBC-ENTMCNC: 33.9 % — SIGNIFICANT CHANGE UP (ref 32–36)
MCHC RBC-ENTMCNC: 34.6 PG — HIGH (ref 27–34)
MCV RBC AUTO: 102.2 FL — HIGH (ref 80–100)
MONOCYTES # BLD AUTO: 0.41 K/UL — SIGNIFICANT CHANGE UP (ref 0–0.9)
MONOCYTES NFR BLD AUTO: 10.9 % — SIGNIFICANT CHANGE UP (ref 2–14)
NEUTROPHILS # BLD AUTO: 2.46 K/UL — SIGNIFICANT CHANGE UP (ref 1.8–7.4)
NEUTROPHILS NFR BLD AUTO: 65.4 % — SIGNIFICANT CHANGE UP (ref 43–77)
NRBC # FLD: 0 K/UL — SIGNIFICANT CHANGE UP (ref 0–0)
PHOSPHATE SERPL-MCNC: 3.1 MG/DL — SIGNIFICANT CHANGE UP (ref 2.5–4.5)
PLATELET # BLD AUTO: 135 K/UL — LOW (ref 150–400)
PMV BLD: 12.3 FL — SIGNIFICANT CHANGE UP (ref 7–13)
POTASSIUM SERPL-MCNC: 3.9 MMOL/L — SIGNIFICANT CHANGE UP (ref 3.5–5.3)
POTASSIUM SERPL-SCNC: 3.9 MMOL/L — SIGNIFICANT CHANGE UP (ref 3.5–5.3)
RBC # BLD: 3.12 M/UL — LOW (ref 4.2–5.8)
RBC # FLD: 14.4 % — SIGNIFICANT CHANGE UP (ref 10.3–14.5)
SODIUM SERPL-SCNC: 135 MMOL/L — SIGNIFICANT CHANGE UP (ref 135–145)
WBC # BLD: 3.76 K/UL — LOW (ref 3.8–10.5)
WBC # FLD AUTO: 3.76 K/UL — LOW (ref 3.8–10.5)

## 2020-11-21 PROCEDURE — 99232 SBSQ HOSP IP/OBS MODERATE 35: CPT

## 2020-11-21 RX ORDER — ELECTROLYTE SOLUTION,INJ
1 VIAL (ML) INTRAVENOUS
Refills: 0 | Status: DISCONTINUED | OUTPATIENT
Start: 2020-11-21 | End: 2020-11-22

## 2020-11-21 RX ORDER — I.V. FAT EMULSION 20 G/100ML
16.6 EMULSION INTRAVENOUS
Qty: 40 | Refills: 0 | Status: DISCONTINUED | OUTPATIENT
Start: 2020-11-21 | End: 2020-11-23

## 2020-11-21 RX ADMIN — I.V. FAT EMULSION 16.6 ML/HR: 20 EMULSION INTRAVENOUS at 17:28

## 2020-11-21 RX ADMIN — TAMSULOSIN HYDROCHLORIDE 0.4 MILLIGRAM(S): 0.4 CAPSULE ORAL at 21:51

## 2020-11-21 RX ADMIN — ATORVASTATIN CALCIUM 10 MILLIGRAM(S): 80 TABLET, FILM COATED ORAL at 21:52

## 2020-11-21 RX ADMIN — Medication 75 MICROGRAM(S): at 21:51

## 2020-11-21 RX ADMIN — Medication 6 MILLIGRAM(S): at 21:52

## 2020-11-21 RX ADMIN — PANTOPRAZOLE SODIUM 40 MILLIGRAM(S): 20 TABLET, DELAYED RELEASE ORAL at 05:10

## 2020-11-21 RX ADMIN — ENOXAPARIN SODIUM 40 MILLIGRAM(S): 100 INJECTION SUBCUTANEOUS at 11:51

## 2020-11-21 RX ADMIN — Medication 1 EACH: at 17:28

## 2020-11-21 RX ADMIN — Medication 1 EACH: at 05:10

## 2020-11-21 RX ADMIN — CHLORHEXIDINE GLUCONATE 1 APPLICATION(S): 213 SOLUTION TOPICAL at 05:11

## 2020-11-21 NOTE — PROGRESS NOTE ADULT - ASSESSMENT
69yo M with gastric cancer s/p total gastrectomy, splenectomy and distal panc in 2017 admitted with poor PO intake and aperistalsis of esophagus, now on TPN with plan for J tube on 11/23.  The patient is stable from a cardiovascular perspective.

## 2020-11-21 NOTE — PROGRESS NOTE ADULT - SUBJECTIVE AND OBJECTIVE BOX
SUBJECTIVE: The patient denies chest pain, shortness of breath, arm pain or jaw pain, dizziness or palpitations.    	  MEDICATIONS:  tamsulosin 0.4 milliGRAM(s) Oral at bedtime        melatonin 6 milliGRAM(s) Oral at bedtime    pantoprazole    Tablet 40 milliGRAM(s) Oral before breakfast    atorvastatin 10 milliGRAM(s) Oral at bedtime  dextrose 40% Gel 15 Gram(s) Oral once  dextrose 50% Injectable 25 Gram(s) IV Push once  dextrose 50% Injectable 12.5 Gram(s) IV Push once  dextrose 50% Injectable 25 Gram(s) IV Push once  glucagon  Injectable 1 milliGRAM(s) IntraMuscular once  insulin lispro (ADMELOG) corrective regimen sliding scale   SubCutaneous three times a day before meals  insulin lispro (ADMELOG) corrective regimen sliding scale   SubCutaneous at bedtime  levothyroxine Injectable 75 MICROGram(s) IV Push at bedtime    chlorhexidine 4% Liquid 1 Application(s) Topical <User Schedule>  enoxaparin Injectable 40 milliGRAM(s) SubCutaneous daily  fat emulsion (Fish Oil and Plant Based) 20% Infusion 16.6 mL/Hr IV Continuous <Continuous>  fat emulsion (Fish Oil and Plant Based) 20% Infusion 16.6 mL/Hr IV Continuous <Continuous>  Parenteral Nutrition - Adult 1 Each TPN Continuous <Continuous>  Parenteral Nutrition - Adult 1 Each TPN Continuous <Continuous>  sodium chloride 0.9% lock flush 10 milliLiter(s) IV Push every 1 hour PRN      REVIEW OF SYSTEMS:    CONSTITUTIONAL: No fever, weight loss, or fatigue  EYES: No eye pain, visual disturbances, or discharge  NECK: No pain or stiffness  RESPIRATORY: No cough, wheezing, chills or hemoptysis; No Shortness of Breath  CARDIOVASCULAR: No chest pain, palpitations, dizziness, or leg swelling  GASTROINTESTINAL: No abdominal or epigastric pain. No nausea, vomiting, or hematemesis; No diarrhea or constipation. No melena or hematochezia.  GENITOURINARY: No dysuria, frequency, hematuria, or incontinence  NEUROLOGICAL: No headaches, memory loss, loss of strength, numbness, or tremors  SKIN: No itching, burning, rashes, or lesions   LYMPH Nodes: No enlarged glands  MUSCULOSKELETAL: No joint pain or swelling; No muscle, back, or extremity pain  All other review of systems are negative.  	  [ ] Unable to obtain    PHYSICAL EXAM:  T(C): 36.6 (11-21-20 @ 15:59), Max: 36.8 (11-20-20 @ 20:09)  HR: 45 (11-21-20 @ 15:59) (45 - 52)  BP: 103/72 (11-21-20 @ 15:59) (103/72 - 135/69)  RR: 18 (11-21-20 @ 15:59) (16 - 18)  SpO2: 100% (11-21-20 @ 15:59) (99% - 100%)  Wt(kg): --  I&O's Summary    20 Nov 2020 07:01  -  21 Nov 2020 07:00  --------------------------------------------------------  IN: 2108.2 mL / OUT: 600 mL / NET: 1508.2 mL    21 Nov 2020 07:01  -  21 Nov 2020 18:57  --------------------------------------------------------  IN: 0 mL / OUT: 400 mL / NET: -400 mL          PHYSICAL EXAM    Appearance: Normal	  HEENT:   Normal oral mucosa, PERRL, EOMI	  NECK: Soft and supple, No LAD, No JVD  Cardiovascular: Regular Rate and Rhythm, Normal S1 S2, No murmurs, No clicks, gallops or rubs  Respiratory: Lungs clear to auscultation	  Gastrointestinal:  Soft, Non-tender, + BS	  Skin: No rashes, No ecchymoses, No cyanosis  Neurologic: Non-focal  Extremities: No clubbing, cyanosis or edema  Vascular: Peripheral pulses palpable 2+ bilaterally    	    LABS:	 	                                10.8   3.76  )-----------( 135      ( 21 Nov 2020 06:36 )             31.9     11-21    135  |  101  |  24<H>  ----------------------------<  163<H>  3.9   |  24  |  0.60    Ca    8.7      21 Nov 2020 06:36  Phos  3.1     11-21  Mg     1.9     11-21

## 2020-11-21 NOTE — PROGRESS NOTE ADULT - SUBJECTIVE AND OBJECTIVE BOX
NUTRITION NOTE  [id number] EVERARDO  ===============================    Interval events; no acute events overnight; plan for J tube placement 11/23     VITAL SIGNS:  T(C): 36.7 (11-21-20 @ 10:55), Max: 36.8 (11-20-20 @ 20:09)  HR: 47 (11-21-20 @ 10:55) (47 - 55)  BP: 132/62 (11-21-20 @ 10:55) (109/62 - 135/69)  ABP: --  ABP(mean): --  RR: 16 (11-21-20 @ 10:55) (16 - 18)  SpO2: 100% (11-21-20 @ 10:55) (99% - 100%)  CVP(mm Hg): --  11-20 @ 07:01  -  11-21 @ 07:00  --------------------------------------------------------  IN: 2108.2 mL / OUT: 600 mL / NET: 1508.2 mL      Glucose      PHYSICAL EXAM   General: A&Ox3, NAD, sitting comfortably in chair   Respiratory: CTA b/l, nonlabored respirations   Cardiovascular: Regular rate & rhythm  Abdominal: Soft, nontender, nondistended  PICC Site: C/D/I    Diet: NPO and TPN/lipids (started on 11/18/2020)    Metabolic/FLUIDS/ELECTROLYTES/NUTRITION:  Gastrointestinal Medications:  fat emulsion (Fish Oil and Plant Based) 20% Infusion 16.6 mL/Hr IV Continuous <Continuous>  fat emulsion (Fish Oil and Plant Based) 20% Infusion 16.6 mL/Hr IV Continuous <Continuous>  pantoprazole    Tablet 40 milliGRAM(s) Oral before breakfast  Parenteral Nutrition - Adult 1 Each TPN Continuous <Continuous>  Parenteral Nutrition - Adult 1 Each TPN Continuous <Continuous>  sodium chloride 0.9% lock flush 10 milliLiter(s) IV Push every 1 hour PRN    I&O's Detail  70y  Daily   11-21    135  |  101  |  24<H>  ----------------------------<  163<H>  3.9   |  24  |  0.60    Ca    8.7      21 Nov 2020 06:36  Phos  3.1     11-21  Mg     1.9     11-21        Diet: NPO/TPN       INFECTIOUS DISEASE:  Antimicrobials/Immunologic Medications:    RECENT CULTURES:    OTHER MEDICATIONS:  Endocrine/Metabolic Medications:  atorvastatin 10 milliGRAM(s) Oral at bedtime  dextrose 40% Gel 15 Gram(s) Oral once  dextrose 50% Injectable 25 Gram(s) IV Push once  dextrose 50% Injectable 12.5 Gram(s) IV Push once  dextrose 50% Injectable 25 Gram(s) IV Push once  glucagon  Injectable 1 milliGRAM(s) IntraMuscular once  insulin lispro (ADMELOG) corrective regimen sliding scale   SubCutaneous three times a day before meals  insulin lispro (ADMELOG) corrective regimen sliding scale   SubCutaneous at bedtime  levothyroxine Injectable 75 MICROGram(s) IV Push at bedtime    Genitourinary Medications:    Topical/Other Medications:  chlorhexidine 4% Liquid 1 Application(s) Topical <User Schedule>    ASSESSMENT/PLAN:  71 y/o male with hx of gastric cancer s/p resection and minh-n-Y esophagojejunostomy who was admitted with complaints of progressively worsening tolerance to PO diet. Patient states that he started to experience poor PO tolerance about two months ago that worsened in the past two weeks. Patient reports about 15 lb weight loss over the past 2-3 months. Patient states that he belches and/or vomits each time he eats something. Nutrition support consult was consult for initiation of parenteral nutrition in view of poor malnutrition status and prolonged period of inadequate caloric intake.  PICC line was placed and TPN started on 11/18/2020 for nutritional support.    continue TPN with infusion volume of 2 L, TPN will provide 1545 kcal/day - protein calories increased in TPN bag    labs reviewed - electrolytes adjusted appropriately     monitor fingersticks, obtain daily weights    continue parenteral nutrition at this time, will follow up with primary team on plan - J tube placement planned for Monday     1.  Severe protein calorie malnutrition being optimized with TPN: CHO [225] gm.  AA [95] gm. SMOF Lipids [40] gm.  2.  Hyperglycemia managed with: [0] units of regular insulin    3.  Check fluid balance daily.  Strict I/O  [ ] [ ]   4.  Daily BMP, Ionized Calcium, Magnesium and Phosphorous   5.  Triglycerides at initiation of TPN and monthly [ ] [ ]     Nutrition Support [id number]         1. Protein calorie malnutrition being optimized with TPN: CHO [ ] gm.  AA [ ] gm. Lipids [ ] gm.  2.  Hyperglycemia managed with: [ ] units of regular insulin    3.  Check fluid balance daily.  Strict I/O  [ ] [ ]   4.  Daily BMP, Ionized Calcium, Magnesium and Phosphorous   5.  Triglycerides at initiation of TPN and monthly [ ] [ ]   6.  Pepcid in TPN for Gi prophylaxis  [ ]

## 2020-11-21 NOTE — CONSULT NOTE ADULT - SUBJECTIVE AND OBJECTIVE BOX
22)  67M w/ PMHx of Hypothyroidism, gerd /, T2DM, , proximal Gastric CA, s/p total gastrectomy, splenectomy, distal pancreatectomy, and Lorena-n-Y Esophagojejunostomy on 5/12/17 by Dr. Cabello. Patient followed up with chemo and radiation - all completed by 12//17. Patient underwent a USGB & FNA of the left supraclavicular Lymph node in 3/2018 which was positive for metastatic gastric CA s/p resection on 4/17/18. Patient also underwent component separation for incisional hernia repair with mesh on 6//18 by Dr. Cabello.  patient started to experience poor PO tolerance 2 months ago that has worsened in the last 2 weeks.   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 that was able to be traversed  on tpn now    INTERVAL HPI/OVERNIGHT EVENTS:    Medications:MEDICATIONS  (STANDING):  atorvastatin 10 milliGRAM(s) Oral at bedtime  chlorhexidine 4% Liquid 1 Application(s) Topical <User Schedule>  dextrose 40% Gel 15 Gram(s) Oral once  dextrose 50% Injectable 25 Gram(s) IV Push once  dextrose 50% Injectable 12.5 Gram(s) IV Push once  dextrose 50% Injectable 25 Gram(s) IV Push once  enoxaparin Injectable 40 milliGRAM(s) SubCutaneous daily  fat emulsion (Fish Oil and Plant Based) 20% Infusion 16.6 mL/Hr (16.6 mL/Hr) IV Continuous <Continuous>  fat emulsion (Fish Oil and Plant Based) 20% Infusion 16.6 mL/Hr (16.6 mL/Hr) IV Continuous <Continuous>  glucagon  Injectable 1 milliGRAM(s) IntraMuscular once  insulin lispro (ADMELOG) corrective regimen sliding scale   SubCutaneous three times a day before meals  insulin lispro (ADMELOG) corrective regimen sliding scale   SubCutaneous at bedtime  levothyroxine Injectable 75 MICROGram(s) IV Push at bedtime  melatonin 6 milliGRAM(s) Oral at bedtime  pantoprazole    Tablet 40 milliGRAM(s) Oral before breakfast  Parenteral Nutrition - Adult 1 Each (83 mL/Hr) TPN Continuous <Continuous>  Parenteral Nutrition - Adult 1 Each (83 mL/Hr) TPN Continuous <Continuous>  tamsulosin 0.4 milliGRAM(s) Oral at bedtime    MEDICATIONS  (PRN):  sodium chloride 0.9% lock flush 10 milliLiter(s) IV Push every 1 hour PRN Pre/post blood products, medications, blood draw, and to maintain line patency      Allergies: Allergies    No Known Allergies    Intolerances          FAMILY HISTORY:  Family history of ischemic heart disease (IHD) (Father)  father    Family history of ovarian cancer (Mother)  mother          PAST MEDICAL & SURGICAL HISTORY:  Constipation    Iron deficiency anemia, unspecified iron deficiency anemia type    Gastric mass  ulcerated mass in gastric cardia with small perigastric nodes on endoscopy.    Hypothyroidism    Type 2 diabetes mellitus without complication, unspecified long term insulin use status  no BS monitoring    Essential hypertension  was on losartan, now diet control    History of gastric surgery  2017    Port-a-cath in place  right chest wall    H/O umbilical hernia repair        weak  EYES: No eye pain, visual disturbances, or discharge  ENMT:  No difficulty hearing, tinnitus, vertigo; No sinus or throat pain  NECK: No pain or stiffness    RESPIRATORY: No cough, wheezing, chills or hemoptysis; No shortness of breath  CARDIOVASCULAR: No chest pain, palpitations, dizziness, or leg swelling  GASTROINTESTINAL: No abdominal or epigastric pain.   GENITOURINARY: No dysuria, frequency, hematuria, or incontinence  NEUROLOGICAL: No headaches, memory loss, loss of strength, numbness, or tremors        T(C): 36.7 (11-21-20 @ 05:09), Max: 36.8 (11-20-20 @ 20:09)  HR: 47 (11-21-20 @ 05:09) (47 - 55)  BP: 109/62 (11-21-20 @ 05:09) (109/62 - 135/69)  RR: 18 (11-21-20 @ 05:09) (17 - 18)  SpO2: 99% (11-21-20 @ 05:09) (99% - 100%)  Wt(kg): --Vital Signs Last 24 Hrs  T(C): 36.7 (21 Nov 2020 05:09), Max: 36.8 (20 Nov 2020 20:09)  T(F): 98 (21 Nov 2020 05:09), Max: 98.2 (20 Nov 2020 20:09)  HR: 47 (21 Nov 2020 05:09) (47 - 55)  BP: 109/62 (21 Nov 2020 05:09) (109/62 - 135/69)  BP(mean): --  RR: 18 (21 Nov 2020 05:09) (17 - 18)  SpO2: 99% (21 Nov 2020 05:09) (99% - 100%)  I&O's Summary    20 Nov 2020 07:01  -  21 Nov 2020 07:00  --------------------------------------------------------  IN: 2108.2 mL / OUT: 600 mL / NET: 1508.2 mL        PHYSICAL EXAM:  GENERAL: NAD,  HEAD:  Atraumatic, Normocephalic  EYES: EOMI, PERRLA, conjunctiva and sclera clear  ENMT: No tonsillar erythema, exudates, or enlargement;No lesions  NECK: Supple, No JVD, Normal thyroid  NERVOUS SYSTEM:  Alert & Oriented X3,  Motor Strength 5/5 B/L upper and lower extremities; DTRs 2+ intact and symmetric  CHEST/LUNG: Clear to percussion bilaterally; No rales, rhonchi, wheezing, or rubs  HEART: Regular rate and rhythm; No murmurs, rubs, or gallops  ABDOMEN: Soft, Nontender, Nondistended; Bowel sounds present  EXTREMITIES:  2+ Peripheral Pulses, No clubbing, cyanosis, or edema      Consultant(s) Notes Reviewed:  [x ] YES  [ ] NO  Care Discussed with Consultants/Other Providerscpk [ x] YES  [ ] NO    LABS:                    CBC Full  -  ( 21 Nov 2020 06:36 )  WBC Count : 3.76 K/uL  RBC Count : 3.12 M/uL  Hemoglobin : 10.8 g/dL  Hematocrit : 31.9 %  Platelet Count - Automated : 135 K/uL  Mean Cell Volume : 102.2 fL  Mean Cell Hemoglobin : 34.6 pg  Mean Cell Hemoglobin Concentration : 33.9 %  Auto Neutrophil # : 2.46 K/uL  Auto Lymphocyte # : 0.66 K/uL  Auto Monocyte # : 0.41 K/uL  Auto Eosinophil # : 0.20 K/uL  Auto Basophil # : 0.02 K/uL  Auto Neutrophil % : 65.4 %  Auto Lymphocyte % : 17.6 %  Auto Monocyte % : 10.9 %  Auto Eosinophil % : 5.3 %  Auto Basophil % : 0.5 %      11-21    135  |  101  |  24<H>  ----------------------------<  163<H>  3.9   |  24  |  0.60    Ca    8.7      21 Nov 2020 06:36  Phos  3.1     11-21  Mg     1.9     11-21              RADIOLOGY & ADDITIONAL TESTS:    Imaging Personally Reviewed:  [ ] YES  [ ] NO

## 2020-11-21 NOTE — PROGRESS NOTE ADULT - SUBJECTIVE AND OBJECTIVE BOX
24hr events:  - Liver MRI showed no suspicious hepatic lesions    Overnight events:   - No acute events    SUBJECTIVE:  Patient feels well. Denies nausea, vomiting, fever, chills.     OBJECTIVE:  Vital Signs Last 24 Hrs  T(C): 36.7 (21 Nov 2020 10:55), Max: 36.8 (20 Nov 2020 20:09)  T(F): 98.1 (21 Nov 2020 10:55), Max: 98.2 (20 Nov 2020 20:09)  HR: 47 (21 Nov 2020 10:55) (47 - 55)  BP: 132/62 (21 Nov 2020 10:55) (109/62 - 135/69)  BP(mean): --  RR: 16 (21 Nov 2020 10:55) (16 - 18)  SpO2: 100% (21 Nov 2020 10:55) (99% - 100%)    Physical Examination:  GEN: NAD, resting quietly  PULM: symmetric chest rise bilaterally, no increased WOB  ABD: soft, nontender, nondistended, no rebound or guarding, incision CDI  EXTR: no LE erythema, moving all extremities      LABS:                        10.8   3.76  )-----------( 135      ( 21 Nov 2020 06:36 )             31.9       11-21    135  |  101  |  24<H>  ----------------------------<  163<H>  3.9   |  24  |  0.60    Ca    8.7      21 Nov 2020 06:36  Phos  3.1     11-21  Mg     1.9     11-21

## 2020-11-21 NOTE — CONSULT NOTE ADULT - REASON FOR ADMISSION
Poor PO intake
Poor PO intake
Poor PO intake/progressive dysphagia.
Poor PO intake/progressive dysphagia.

## 2020-11-21 NOTE — CONSULT NOTE ADULT - ASSESSMENT
70 y /o Male  w/ hx of Gastric cancer s/p resection and minh-n-Y esophagojejunostomy who now presents with 2 months of worsening PO intake tolerance.    - J-Tube as per sx  - NPO/IVFs  - TPN  - Continue Synthroid  dm stable  fsg riss  htn stable   medically stable for sx

## 2020-11-21 NOTE — PROGRESS NOTE ADULT - ASSESSMENT
71yo M with gastric cancer s/p total gastrectomy, splenectomy and distal panc in 2017 admitted with poor PO intake and aperistalsis of esophagus, now on TPN with plan for J tube on Monday.    Plan:  - NPO/TPN 71yo M with gastric cancer s/p total gastrectomy, splenectomy and distal panc in 2017 admitted with poor PO intake and aperistalsis of esophagus, now on TPN with plan for J tube on Monday.    Plan:  - NPO/TPN  - J tube Monday  - Cleared by cardiology for procedure  - Encourage OOB/ambulation  - F/u labs    Harjinder, PGY-1  D Team Surgery, [id number]

## 2020-11-22 ENCOUNTER — TRANSCRIPTION ENCOUNTER (OUTPATIENT)
Age: 70
End: 2020-11-22

## 2020-11-22 LAB
ANION GAP SERPL CALC-SCNC: 9 MMO/L — SIGNIFICANT CHANGE UP (ref 7–14)
BASOPHILS # BLD AUTO: 0.04 K/UL — SIGNIFICANT CHANGE UP (ref 0–0.2)
BASOPHILS NFR BLD AUTO: 1.1 % — SIGNIFICANT CHANGE UP (ref 0–2)
BUN SERPL-MCNC: 23 MG/DL — SIGNIFICANT CHANGE UP (ref 7–23)
CA-I BLD-SCNC: 1.23 MMOL/L — SIGNIFICANT CHANGE UP (ref 1.03–1.23)
CALCIUM SERPL-MCNC: 8.8 MG/DL — SIGNIFICANT CHANGE UP (ref 8.4–10.5)
CHLORIDE SERPL-SCNC: 102 MMOL/L — SIGNIFICANT CHANGE UP (ref 98–107)
CO2 SERPL-SCNC: 26 MMOL/L — SIGNIFICANT CHANGE UP (ref 22–31)
CREAT SERPL-MCNC: 0.53 MG/DL — SIGNIFICANT CHANGE UP (ref 0.5–1.3)
EOSINOPHIL # BLD AUTO: 0.16 K/UL — SIGNIFICANT CHANGE UP (ref 0–0.5)
EOSINOPHIL NFR BLD AUTO: 4.5 % — SIGNIFICANT CHANGE UP (ref 0–6)
GLUCOSE BLDC GLUCOMTR-MCNC: 130 MG/DL — HIGH (ref 70–99)
GLUCOSE BLDC GLUCOMTR-MCNC: 133 MG/DL — HIGH (ref 70–99)
GLUCOSE BLDC GLUCOMTR-MCNC: 137 MG/DL — HIGH (ref 70–99)
GLUCOSE BLDC GLUCOMTR-MCNC: 149 MG/DL — HIGH (ref 70–99)
GLUCOSE SERPL-MCNC: 130 MG/DL — HIGH (ref 70–99)
HCT VFR BLD CALC: 37.8 % — LOW (ref 39–50)
HGB BLD-MCNC: 12.4 G/DL — LOW (ref 13–17)
IMM GRANULOCYTES NFR BLD AUTO: 0 % — SIGNIFICANT CHANGE UP (ref 0–1.5)
LYMPHOCYTES # BLD AUTO: 0.72 K/UL — LOW (ref 1–3.3)
LYMPHOCYTES # BLD AUTO: 20.3 % — SIGNIFICANT CHANGE UP (ref 13–44)
MAGNESIUM SERPL-MCNC: 2.1 MG/DL — SIGNIFICANT CHANGE UP (ref 1.6–2.6)
MCHC RBC-ENTMCNC: 32.8 % — SIGNIFICANT CHANGE UP (ref 32–36)
MCHC RBC-ENTMCNC: 34.3 PG — HIGH (ref 27–34)
MCV RBC AUTO: 104.7 FL — HIGH (ref 80–100)
MONOCYTES # BLD AUTO: 0.3 K/UL — SIGNIFICANT CHANGE UP (ref 0–0.9)
MONOCYTES NFR BLD AUTO: 8.5 % — SIGNIFICANT CHANGE UP (ref 2–14)
NEUTROPHILS # BLD AUTO: 2.32 K/UL — SIGNIFICANT CHANGE UP (ref 1.8–7.4)
NEUTROPHILS NFR BLD AUTO: 65.6 % — SIGNIFICANT CHANGE UP (ref 43–77)
NRBC # FLD: 0 K/UL — SIGNIFICANT CHANGE UP (ref 0–0)
PHOSPHATE SERPL-MCNC: 3.1 MG/DL — SIGNIFICANT CHANGE UP (ref 2.5–4.5)
PLATELET # BLD AUTO: 148 K/UL — LOW (ref 150–400)
PMV BLD: 12.3 FL — SIGNIFICANT CHANGE UP (ref 7–13)
POTASSIUM SERPL-MCNC: 3.9 MMOL/L — SIGNIFICANT CHANGE UP (ref 3.5–5.3)
POTASSIUM SERPL-SCNC: 3.9 MMOL/L — SIGNIFICANT CHANGE UP (ref 3.5–5.3)
RBC # BLD: 3.61 M/UL — LOW (ref 4.2–5.8)
RBC # FLD: 14.4 % — SIGNIFICANT CHANGE UP (ref 10.3–14.5)
SARS-COV-2 RNA SPEC QL NAA+PROBE: SIGNIFICANT CHANGE UP
SODIUM SERPL-SCNC: 137 MMOL/L — SIGNIFICANT CHANGE UP (ref 135–145)
WBC # BLD: 3.54 K/UL — LOW (ref 3.8–10.5)
WBC # FLD AUTO: 3.54 K/UL — LOW (ref 3.8–10.5)

## 2020-11-22 PROCEDURE — 99232 SBSQ HOSP IP/OBS MODERATE 35: CPT

## 2020-11-22 RX ORDER — ELECTROLYTE SOLUTION,INJ
1 VIAL (ML) INTRAVENOUS
Refills: 0 | Status: DISCONTINUED | OUTPATIENT
Start: 2020-11-22 | End: 2020-11-22

## 2020-11-22 RX ORDER — I.V. FAT EMULSION 20 G/100ML
16.6 EMULSION INTRAVENOUS
Qty: 40 | Refills: 0 | Status: DISCONTINUED | OUTPATIENT
Start: 2020-11-22 | End: 2020-11-23

## 2020-11-22 RX ADMIN — CHLORHEXIDINE GLUCONATE 1 APPLICATION(S): 213 SOLUTION TOPICAL at 06:00

## 2020-11-22 RX ADMIN — Medication 6 MILLIGRAM(S): at 21:59

## 2020-11-22 RX ADMIN — ATORVASTATIN CALCIUM 10 MILLIGRAM(S): 80 TABLET, FILM COATED ORAL at 21:05

## 2020-11-22 RX ADMIN — ENOXAPARIN SODIUM 40 MILLIGRAM(S): 100 INJECTION SUBCUTANEOUS at 13:25

## 2020-11-22 RX ADMIN — PANTOPRAZOLE SODIUM 40 MILLIGRAM(S): 20 TABLET, DELAYED RELEASE ORAL at 05:59

## 2020-11-22 RX ADMIN — TAMSULOSIN HYDROCHLORIDE 0.4 MILLIGRAM(S): 0.4 CAPSULE ORAL at 21:05

## 2020-11-22 RX ADMIN — I.V. FAT EMULSION 16.6 ML/HR: 20 EMULSION INTRAVENOUS at 17:49

## 2020-11-22 RX ADMIN — Medication 75 MICROGRAM(S): at 21:59

## 2020-11-22 RX ADMIN — Medication 1 EACH: at 05:59

## 2020-11-22 RX ADMIN — Medication 1 EACH: at 17:49

## 2020-11-22 NOTE — PROGRESS NOTE ADULT - SUBJECTIVE AND OBJECTIVE BOX
NUTRITION NOTE  [id number] EVERARDO  ===============================    Interval events; no acute events overnight; Pt tolerating TPN    VITAL SIGNS:  T(C): 36.6 (20 @ 11:07), Max: 36.7 (20 @ 23:47)  HR: 48 (20 @ 11:07) (45 - 49)  BP: 105/68 (20 @ 11:07) (103/72 - 135/82)  ABP: --  ABP(mean): --  RR: 16 (20 @ 11:07) (16 - 18)  SpO2: 98% (20 @ 11:07) (98% - 100%)  CVP(mm Hg): --   @ 07:01  -   @ 07:00  --------------------------------------------------------  IN: 1079 mL / OUT: 1400 mL / NET: -321 mL     @ 07:01  -   @ 11:32  --------------------------------------------------------  IN: 0 mL / OUT: 150 mL / NET: -150 mL      Glucose 130-163    Physical Examination:  GEN: NAD, resting quietly  PULM: symmetric chest rise bilaterally, no increased WOB  ABD: soft, nontender, nondistended, no rebound or guarding  EXTR: no LE erythema, moving all extremities  PICC Site: C/D/I     Metabolic/FLUIDS/ELECTROLYTES/NUTRITION:  Gastrointestinal Medications:  fat emulsion (Fish Oil and Plant Based) 20% Infusion 16.6 mL/Hr IV Continuous <Continuous>  fat emulsion (Fish Oil and Plant Based) 20% Infusion 16.6 mL/Hr IV Continuous <Continuous>  fat emulsion (Fish Oil and Plant Based) 20% Infusion 16.6 mL/Hr IV Continuous <Continuous>  pantoprazole    Tablet 40 milliGRAM(s) Oral before breakfast  Parenteral Nutrition - Adult 1 Each TPN Continuous <Continuous>  Parenteral Nutrition - Adult 1 Each TPN Continuous <Continuous>  sodium chloride 0.9% lock flush 10 milliLiter(s) IV Push every 1 hour PRN    I&O's Detail  70y  Daily Weight in k.4 (2020 15:59)      137  |  102  |  23  ----------------------------<  130<H>  3.9   |  26  |  0.53    Ca    8.8      2020 06:10  Phos  3.1       Mg     2.1             Diet: NPO       INFECTIOUS DISEASE:  Antimicrobials/Immunologic Medications:    RECENT CULTURES:        OTHER MEDICATIONS:  Endocrine/Metabolic Medications:  atorvastatin 10 milliGRAM(s) Oral at bedtime  dextrose 40% Gel 15 Gram(s) Oral once  dextrose 50% Injectable 25 Gram(s) IV Push once  dextrose 50% Injectable 12.5 Gram(s) IV Push once  dextrose 50% Injectable 25 Gram(s) IV Push once  glucagon  Injectable 1 milliGRAM(s) IntraMuscular once  insulin lispro (ADMELOG) corrective regimen sliding scale   SubCutaneous three times a day before meals  insulin lispro (ADMELOG) corrective regimen sliding scale   SubCutaneous at bedtime  levothyroxine Injectable 75 MICROGram(s) IV Push at bedtime    Genitourinary Medications:    Topical/Other Medications:  chlorhexidine 4% Liquid 1 Application(s) Topical <User Schedule>    ASSESSMENT/PLAN:  71 y/o male with hx of gastric cancer s/p resection and minh-n-Y esophagojejunostomy who was admitted with complaints of progressively worsening tolerance to PO diet. Patient states that he started to experience poor PO tolerance about two months ago that worsened in the past two weeks. Patient reports about 15 lb weight loss over the past 2-3 months. Patient states that he belches and/or vomits each time he eats something. Nutrition support consult was consult for initiation of parenteral nutrition in view of poor malnutrition status and prolonged period of inadequate caloric intake.  PICC line was placed and TPN started on 2020 for nutritional support.    continue TPN with infusion volume of 2 L, TPN will provide 1545 kcal/day - protein calories increased in TPN bag    labs reviewed - electrolytes adjusted appropriately     monitor fingersticks, obtain daily weights    continue parenteral nutrition at this time, will follow up with primary team on plan - J tube placement planned for Monday     1.  Severe protein calorie malnutrition being optimized with TPN: CHO [225] gm.  AA [95] gm. SMOF Lipids [40] gm.  2.  Hyperglycemia managed with: [0] units of regular insulin    3.  Check fluid balance daily.  Strict I/O  [ ] [ ]   4.  Daily BMP, Ionized Calcium, Magnesium and Phosphorous   5.  Triglycerides at initiation of TPN and monthly [ ] [ ]     Nutrition Support [id number]           1. Protein calorie malnutrition being optimized with TPN: CHO [ ] gm.  AA [ ] gm. Lipids [ ] gm.  2.  Hyperglycemia managed with: [ ] units of regular insulin    3.  Check fluid balance daily.  Strict I/O  [ ] [ ]   4.  Daily BMP, Ionized Calcium, Magnesium and Phosphorous   5.  Triglycerides at initiation of TPN and monthly [ ] [ ]   6.  Pepcid in TPN for Gi prophylaxis  [ ]

## 2020-11-22 NOTE — PROGRESS NOTE ADULT - SUBJECTIVE AND OBJECTIVE BOX
CHIEF COMPLAINT:Patient is a 70y old  Male who presents with a chief complaint of Poor PO intake/progressive dysphagia. (22 Nov 2020 07:32)    	        PAST MEDICAL & SURGICAL HISTORY:  Constipation    Iron deficiency anemia, unspecified iron deficiency anemia type    Gastric mass  ulcerated mass in gastric cardia with small perigastric nodes on endoscopy.    Hypothyroidism    Type 2 diabetes mellitus without complication, unspecified long term insulin use status  no BS monitoring    Essential hypertension  was on losartan, now diet control    History of gastric surgery  2017    Port-a-cath in place  right chest wall    H/O umbilical hernia repair            weak  RESPIRATORY: No cough, wheezing, chills or hemoptysis; No Shortness of Breath  CARDIOVASCULAR: No chest pain, palpitations, passing out, dizziness, or leg swelling  GASTROINTESTINAL: No abdominal or epigastric pain. No nausea, vomiting, or hematemesis;   +flatus  GENITOURINARY: No dysuria, frequency, hematuria, or incontinence  NEUROLOGICAL: No headaches, memory loss, loss of strength, numbness, or tremors    MUSCULOSKELETAL: No joint pain or swelling; No muscle, back, or extremity pain    Medications:  MEDICATIONS  (STANDING):  atorvastatin 10 milliGRAM(s) Oral at bedtime  chlorhexidine 4% Liquid 1 Application(s) Topical <User Schedule>  dextrose 40% Gel 15 Gram(s) Oral once  dextrose 50% Injectable 25 Gram(s) IV Push once  dextrose 50% Injectable 12.5 Gram(s) IV Push once  dextrose 50% Injectable 25 Gram(s) IV Push once  enoxaparin Injectable 40 milliGRAM(s) SubCutaneous daily  fat emulsion (Fish Oil and Plant Based) 20% Infusion 16.6 mL/Hr (16.6 mL/Hr) IV Continuous <Continuous>  fat emulsion (Fish Oil and Plant Based) 20% Infusion 16.6 mL/Hr (16.6 mL/Hr) IV Continuous <Continuous>  glucagon  Injectable 1 milliGRAM(s) IntraMuscular once  insulin lispro (ADMELOG) corrective regimen sliding scale   SubCutaneous three times a day before meals  insulin lispro (ADMELOG) corrective regimen sliding scale   SubCutaneous at bedtime  levothyroxine Injectable 75 MICROGram(s) IV Push at bedtime  melatonin 6 milliGRAM(s) Oral at bedtime  pantoprazole    Tablet 40 milliGRAM(s) Oral before breakfast  Parenteral Nutrition - Adult 1 Each (83 mL/Hr) TPN Continuous <Continuous>  tamsulosin 0.4 milliGRAM(s) Oral at bedtime    MEDICATIONS  (PRN):  sodium chloride 0.9% lock flush 10 milliLiter(s) IV Push every 1 hour PRN Pre/post blood products, medications, blood draw, and to maintain line patency    	    PHYSICAL EXAM:  T(C): 36.4 (11-22-20 @ 05:57), Max: 36.7 (11-21-20 @ 10:55)  HR: 46 (11-22-20 @ 05:57) (45 - 49)  BP: 109/73 (11-22-20 @ 05:57) (103/72 - 135/82)  RR: 18 (11-22-20 @ 05:57) (16 - 18)  SpO2: 100% (11-22-20 @ 05:57) (100% - 100%)  Wt(kg): --  I&O's Summary    21 Nov 2020 07:01  -  22 Nov 2020 07:00  --------------------------------------------------------  IN: 1079 mL / OUT: 1400 mL / NET: -321 mL          HEENT:   Normal oral mucosa, PERRL, EOMI	  Lymphatic: No lymphadenopathy  Cardiovascular: Normal S1 S2, No JVD, No murmurs,   Respiratory: Lungs clear to auscultation	  Psychiatry: A & O   Gastrointestinal:  Soft, Non-tender, + BS	  Skin: No rashes, No ecchymoses, No cyanosis	  Neurologic: Non-focal  Extremities: Normal range of motion, No clubbing, cyanosis or edema  Vascular: Peripheral pulses palpable    TELEMETRY: 	    ECG:  	  RADIOLOGY:  OTHER: 	  	  LABS:	 	    CARDIAC MARKERS:                                12.4   3.54  )-----------( 148      ( 22 Nov 2020 06:10 )             37.8     11-22    137  |  102  |  23  ----------------------------<  130<H>  3.9   |  26  |  0.53    Ca    8.8      22 Nov 2020 06:10  Phos  3.1     11-22  Mg     2.1     11-22      proBNP:   Lipid Profile:   HgA1c:   TSH:

## 2020-11-22 NOTE — PROGRESS NOTE ADULT - SUBJECTIVE AND OBJECTIVE BOX
Overnight events:   - No acute events    SUBJECTIVE:  Feels well. Denies abdominal pain or nausea.     OBJECTIVE:  Vital Signs Last 24 Hrs  T(C): 36.7 (21 Nov 2020 23:47), Max: 36.7 (21 Nov 2020 05:09)  T(F): 98 (21 Nov 2020 23:47), Max: 98.1 (21 Nov 2020 10:55)  HR: 45 (21 Nov 2020 23:47) (45 - 49)  BP: 119/70 (21 Nov 2020 23:47) (103/72 - 135/82)  BP(mean): --  RR: 18 (21 Nov 2020 23:47) (16 - 18)  SpO2: 100% (21 Nov 2020 23:47) (99% - 100%)    Physical Examination:  GEN: NAD, resting quietly  PULM: symmetric chest rise bilaterally, no increased WOB  ABD: soft, nontender, nondistended, no rebound or guarding  EXTR: no LE erythema, moving all extremities      LABS:                        10.8   3.76  )-----------( 135      ( 21 Nov 2020 06:36 )             31.9       11-21    135  |  101  |  24<H>  ----------------------------<  163<H>  3.9   |  24  |  0.60    Ca    8.7      21 Nov 2020 06:36  Phos  3.1     11-21  Mg     1.9     11-21

## 2020-11-22 NOTE — CHART NOTE - NSCHARTNOTEFT_GEN_A_CORE
Preoperative Note    Preop Dx: gastric cancer s/p total gastrectomy, splenectomy, distal pancreatectomy, and aperistalsis of esophagus   Surgeon: Irineo Banda  Procedure: open jejunostomy feeding tube placement    Vital Signs Last 24 Hrs  T(C): 36.8 (22 Nov 2020 20:03), Max: 36.8 (22 Nov 2020 20:03)  T(F): 98.3 (22 Nov 2020 20:03), Max: 98.3 (22 Nov 2020 20:03)  HR: 45 (22 Nov 2020 20:03) (45 - 50)  BP: 100/66 (22 Nov 2020 20:03) (100/66 - 119/70)  BP(mean): --  RR: 18 (22 Nov 2020 20:03) (16 - 18)  SpO2: 98% (22 Nov 2020 20:03) (98% - 100%)                        12.4   3.54  )-----------( 148      ( 22 Nov 2020 06:10 )             37.8     11-22    137  |  102  |  23  ----------------------------<  130<H>  3.9   |  26  |  0.53    Ca    8.8      22 Nov 2020 06:10  Phos  3.1     11-22  Mg     2.1     11-22      EKG: ordered  CXR: CT chest on 11/14  Type and Screen: ordered 2x for am        A/P: 70y Male with gastric cancer s/p total gastrectomy, splenectomy and distal panc in 2017 admitted with poor PO intake and aperistalsis of esophagus, now on TPN with plan for open jejunostomy tube tomorrow    - OR 11/22/20 for open feeding jejunostomy tube placement with Dr. Banda   - NPO past midnight, except medications  - IVF while NPO  - Morning Labs: CBC, BMP, coags, type & screen  - Consent signed and in chart      DIPAK CadenaY1   D Team Surgery [id number] Preoperative Note    Preop Dx: gastric cancer s/p total gastrectomy, splenectomy, distal pancreatectomy, and aperistalsis of esophagus   Surgeon: Irineo Banda  Procedure: open jejunostomy feeding tube placement    Vital Signs Last 24 Hrs  T(C): 36.8 (22 Nov 2020 20:03), Max: 36.8 (22 Nov 2020 20:03)  T(F): 98.3 (22 Nov 2020 20:03), Max: 98.3 (22 Nov 2020 20:03)  HR: 45 (22 Nov 2020 20:03) (45 - 50)  BP: 100/66 (22 Nov 2020 20:03) (100/66 - 119/70)  BP(mean): --  RR: 18 (22 Nov 2020 20:03) (16 - 18)  SpO2: 98% (22 Nov 2020 20:03) (98% - 100%)                        12.4   3.54  )-----------( 148      ( 22 Nov 2020 06:10 )             37.8     11-22    137  |  102  |  23  ----------------------------<  130<H>  3.9   |  26  |  0.53    Ca    8.8      22 Nov 2020 06:10  Phos  3.1     11-22  Mg     2.1     11-22      EKG: ordered  CXR: CT chest on 11/14  Type and Screen: ordered 2x for am        A/P: 70y Male with gastric cancer s/p total gastrectomy, splenectomy and distal panc in 2017 admitted with poor PO intake and aperistalsis of esophagus, now on TPN with plan for open jejunostomy tube tomorrow    - OR 11/23/20 for open feeding jejunostomy tube placement with Dr. Banda   - NPO past midnight, except medications  - IVF while NPO  - Morning Labs: CBC, BMP, coags, type & screen  - Consent signed and in chart      DIPAK CadenaY1   D Team Surgery [id number]

## 2020-11-22 NOTE — PROGRESS NOTE ADULT - ASSESSMENT
69yo M with gastric cancer s/p total gastrectomy, splenectomy and distal panc in 2017 admitted with poor PO intake and aperistalsis of esophagus, now on TPN with plan for J tube on Monday.    Plan:  - NPO/TPN  - J tube Monday  - Cleared by cardiology for procedure  - Encourage OOB/ambulation  - F/u labs  - Preop for tomorrow    D Team Surgery, [id number]

## 2020-11-22 NOTE — PROGRESS NOTE ADULT - SUBJECTIVE AND OBJECTIVE BOX
SUBJECTIVE: The patient denies chest pain, shortness of breath, arm pain or jaw pain, dizziness or palpitations.    MEDICATIONS  (STANDING):  atorvastatin 10 milliGRAM(s) Oral at bedtime  chlorhexidine 4% Liquid 1 Application(s) Topical <User Schedule>  dextrose 40% Gel 15 Gram(s) Oral once  dextrose 50% Injectable 25 Gram(s) IV Push once  dextrose 50% Injectable 12.5 Gram(s) IV Push once  dextrose 50% Injectable 25 Gram(s) IV Push once  enoxaparin Injectable 40 milliGRAM(s) SubCutaneous daily  fat emulsion (Fish Oil and Plant Based) 20% Infusion 16.6 mL/Hr (16.6 mL/Hr) IV Continuous <Continuous>  fat emulsion (Fish Oil and Plant Based) 20% Infusion 16.6 mL/Hr (16.6 mL/Hr) IV Continuous <Continuous>  glucagon  Injectable 1 milliGRAM(s) IntraMuscular once  insulin lispro (ADMELOG) corrective regimen sliding scale   SubCutaneous three times a day before meals  insulin lispro (ADMELOG) corrective regimen sliding scale   SubCutaneous at bedtime  levothyroxine Injectable 75 MICROGram(s) IV Push at bedtime  melatonin 6 milliGRAM(s) Oral at bedtime  pantoprazole    Tablet 40 milliGRAM(s) Oral before breakfast  Parenteral Nutrition - Adult 1 Each (83 mL/Hr) TPN Continuous <Continuous>  tamsulosin 0.4 milliGRAM(s) Oral at bedtime    MEDICATIONS  (PRN):  sodium chloride 0.9% lock flush 10 milliLiter(s) IV Push every 1 hour PRN Pre/post blood products, medications, blood draw, and to maintain line patency        REVIEW OF SYSTEMS:    CONSTITUTIONAL: No fever, weight loss, or fatigue  EYES: No eye pain, visual disturbances, or discharge  NECK: No pain or stiffness  RESPIRATORY: No cough, wheezing, chills or hemoptysis; No Shortness of Breath  CARDIOVASCULAR: No chest pain, palpitations, dizziness, or leg swelling  GASTROINTESTINAL: No abdominal or epigastric pain. No nausea, vomiting, or hematemesis; No diarrhea or constipation. No melena or hematochezia.  GENITOURINARY: No dysuria, frequency, hematuria, or incontinence  NEUROLOGICAL: No headaches, memory loss, loss of strength, numbness, or tremors  SKIN: No itching, burning, rashes, or lesions   LYMPH Nodes: No enlarged glands  MUSCULOSKELETAL: No joint pain or swelling; No muscle, back, or extremity pain  All other review of systems are negative.  	  [ ] Unable to obtain    PHYSICAL EXAM:  T(F): 97.6 (11-22-20 @ 05:57), Max: 98.1 (11-21-20 @ 10:55)  HR: 46 (11-22-20 @ 05:57) (45 - 49)  BP: 109/73 (11-22-20 @ 05:57) (103/72 - 135/82)  RR: 18 (11-22-20 @ 05:57) (16 - 18)  SpO2: 100% (11-22-20 @ 05:57) (100% - 100%)  Wt(kg): --  ,   I&O's Summary    21 Nov 2020 07:01  -  22 Nov 2020 07:00  --------------------------------------------------------  IN: 1079 mL / OUT: 1400 mL / NET: -321 mL            PHYSICAL EXAM    Appearance: Normal	  HEENT:   Normal oral mucosa, PERRL, EOMI	  NECK: Soft and supple, No LAD, No JVD  Cardiovascular: Regular Rate and Rhythm, Normal S1 S2, No murmurs, No clicks, gallops or rubs  Respiratory: Lungs clear to auscultation	  Gastrointestinal:  Soft, Non-tender, + BS	  Skin: No rashes, No ecchymoses, No cyanosis  Neurologic: Non-focal  Extremities: No clubbing, cyanosis or edema  Vascular: Peripheral pulses palpable 2+ bilaterally    	    LABS:	 	                          10.8   3.76  )-----------( 135      ( 21 Nov 2020 06:36 )             31.9               11-21    135  |  101  |  24<H>  ----------------------------<  163<H>  3.9   |  24  |  0.60    Ca    8.7      21 Nov 2020 06:36  Phos  3.1     11-21  Mg     1.9     11-21                             Mg     1.9     11-21

## 2020-11-23 ENCOUNTER — APPOINTMENT (OUTPATIENT)
Dept: SURGICAL ONCOLOGY | Facility: HOSPITAL | Age: 70
End: 2020-11-23

## 2020-11-23 ENCOUNTER — RESULT REVIEW (OUTPATIENT)
Age: 70
End: 2020-11-23

## 2020-11-23 LAB
ANION GAP SERPL CALC-SCNC: 8 MMO/L — SIGNIFICANT CHANGE UP (ref 7–14)
APTT BLD: 32 SEC — SIGNIFICANT CHANGE UP (ref 27–36.3)
BLD GP AB SCN SERPL QL: NEGATIVE — SIGNIFICANT CHANGE UP
BUN SERPL-MCNC: 24 MG/DL — HIGH (ref 7–23)
CALCIUM SERPL-MCNC: 8.8 MG/DL — SIGNIFICANT CHANGE UP (ref 8.4–10.5)
CHLORIDE SERPL-SCNC: 103 MMOL/L — SIGNIFICANT CHANGE UP (ref 98–107)
CO2 SERPL-SCNC: 25 MMOL/L — SIGNIFICANT CHANGE UP (ref 22–31)
CREAT SERPL-MCNC: 0.55 MG/DL — SIGNIFICANT CHANGE UP (ref 0.5–1.3)
GLUCOSE BLDC GLUCOMTR-MCNC: 130 MG/DL — HIGH (ref 70–99)
GLUCOSE BLDC GLUCOMTR-MCNC: 136 MG/DL — HIGH (ref 70–99)
GLUCOSE BLDC GLUCOMTR-MCNC: 181 MG/DL — HIGH (ref 70–99)
GLUCOSE BLDC GLUCOMTR-MCNC: 246 MG/DL — HIGH (ref 70–99)
GLUCOSE SERPL-MCNC: 127 MG/DL — HIGH (ref 70–99)
HCT VFR BLD CALC: 38.6 % — LOW (ref 39–50)
HGB BLD-MCNC: 12.5 G/DL — LOW (ref 13–17)
INR BLD: 1.06 — SIGNIFICANT CHANGE UP (ref 0.88–1.16)
MAGNESIUM SERPL-MCNC: 2.2 MG/DL — SIGNIFICANT CHANGE UP (ref 1.6–2.6)
MCHC RBC-ENTMCNC: 32.4 % — SIGNIFICANT CHANGE UP (ref 32–36)
MCHC RBC-ENTMCNC: 34.2 PG — HIGH (ref 27–34)
MCV RBC AUTO: 105.5 FL — HIGH (ref 80–100)
PHOSPHATE SERPL-MCNC: 3.1 MG/DL — SIGNIFICANT CHANGE UP (ref 2.5–4.5)
PLATELET # BLD AUTO: 127 K/UL — LOW (ref 150–400)
PMV BLD: 12.4 FL — SIGNIFICANT CHANGE UP (ref 7–13)
POTASSIUM SERPL-MCNC: 4.2 MMOL/L — SIGNIFICANT CHANGE UP (ref 3.5–5.3)
POTASSIUM SERPL-SCNC: 4.2 MMOL/L — SIGNIFICANT CHANGE UP (ref 3.5–5.3)
PROTHROM AB SERPL-ACNC: 12 SEC — SIGNIFICANT CHANGE UP (ref 10.6–13.6)
RBC # BLD: 3.66 M/UL — LOW (ref 4.2–5.8)
RBC # FLD: 14.2 % — SIGNIFICANT CHANGE UP (ref 10.3–14.5)
RH IG SCN BLD-IMP: NEGATIVE — SIGNIFICANT CHANGE UP
SODIUM SERPL-SCNC: 136 MMOL/L — SIGNIFICANT CHANGE UP (ref 135–145)
WBC # BLD: 4.47 K/UL — SIGNIFICANT CHANGE UP (ref 3.8–10.5)
WBC # FLD AUTO: 4.47 K/UL — SIGNIFICANT CHANGE UP (ref 3.8–10.5)

## 2020-11-23 PROCEDURE — 99232 SBSQ HOSP IP/OBS MODERATE 35: CPT

## 2020-11-23 PROCEDURE — 88305 TISSUE EXAM BY PATHOLOGIST: CPT | Mod: 26

## 2020-11-23 PROCEDURE — 44300 OPEN BOWEL TO SKIN: CPT

## 2020-11-23 PROCEDURE — 44050 REDUCE BOWEL OBSTRUCTION: CPT

## 2020-11-23 RX ORDER — ESCITALOPRAM OXALATE 10 MG/1
10 TABLET, FILM COATED ORAL DAILY
Refills: 0 | Status: DISCONTINUED | OUTPATIENT
Start: 2020-11-23 | End: 2020-11-25

## 2020-11-23 RX ORDER — I.V. FAT EMULSION 20 G/100ML
16.6 EMULSION INTRAVENOUS
Qty: 40 | Refills: 0 | Status: DISCONTINUED | OUTPATIENT
Start: 2020-11-23 | End: 2020-11-24

## 2020-11-23 RX ORDER — GLUCAGON INJECTION, SOLUTION 0.5 MG/.1ML
1 INJECTION, SOLUTION SUBCUTANEOUS ONCE
Refills: 0 | Status: DISCONTINUED | OUTPATIENT
Start: 2020-11-23 | End: 2020-12-01

## 2020-11-23 RX ORDER — ACETAMINOPHEN 500 MG
1000 TABLET ORAL EVERY 6 HOURS
Refills: 0 | Status: COMPLETED | OUTPATIENT
Start: 2020-11-23 | End: 2020-11-24

## 2020-11-23 RX ORDER — CHLORHEXIDINE GLUCONATE 213 G/1000ML
1 SOLUTION TOPICAL DAILY
Refills: 0 | Status: DISCONTINUED | OUTPATIENT
Start: 2020-11-23 | End: 2020-12-01

## 2020-11-23 RX ORDER — SODIUM CHLORIDE 9 MG/ML
10 INJECTION INTRAMUSCULAR; INTRAVENOUS; SUBCUTANEOUS
Refills: 0 | Status: DISCONTINUED | OUTPATIENT
Start: 2020-11-23 | End: 2020-11-25

## 2020-11-23 RX ORDER — HYDROMORPHONE HYDROCHLORIDE 2 MG/ML
1 INJECTION INTRAMUSCULAR; INTRAVENOUS; SUBCUTANEOUS
Refills: 0 | Status: DISCONTINUED | OUTPATIENT
Start: 2020-11-23 | End: 2020-11-23

## 2020-11-23 RX ORDER — INSULIN LISPRO 100/ML
VIAL (ML) SUBCUTANEOUS AT BEDTIME
Refills: 0 | Status: DISCONTINUED | OUTPATIENT
Start: 2020-11-23 | End: 2020-12-01

## 2020-11-23 RX ORDER — LEVOTHYROXINE SODIUM 125 MCG
75 TABLET ORAL AT BEDTIME
Refills: 0 | Status: DISCONTINUED | OUTPATIENT
Start: 2020-11-23 | End: 2020-11-25

## 2020-11-23 RX ORDER — ESCITALOPRAM OXALATE 10 MG/1
10 TABLET, FILM COATED ORAL DAILY
Refills: 0 | Status: DISCONTINUED | OUTPATIENT
Start: 2020-11-23 | End: 2020-11-23

## 2020-11-23 RX ORDER — HYDROMORPHONE HYDROCHLORIDE 2 MG/ML
0.5 INJECTION INTRAMUSCULAR; INTRAVENOUS; SUBCUTANEOUS
Refills: 0 | Status: DISCONTINUED | OUTPATIENT
Start: 2020-11-23 | End: 2020-11-23

## 2020-11-23 RX ORDER — ELECTROLYTE SOLUTION,INJ
1 VIAL (ML) INTRAVENOUS
Refills: 0 | Status: DISCONTINUED | OUTPATIENT
Start: 2020-11-23 | End: 2020-11-24

## 2020-11-23 RX ORDER — PANTOPRAZOLE SODIUM 20 MG/1
40 TABLET, DELAYED RELEASE ORAL
Refills: 0 | Status: DISCONTINUED | OUTPATIENT
Start: 2020-11-23 | End: 2020-11-25

## 2020-11-23 RX ORDER — ONDANSETRON 8 MG/1
4 TABLET, FILM COATED ORAL ONCE
Refills: 0 | Status: DISCONTINUED | OUTPATIENT
Start: 2020-11-23 | End: 2020-11-23

## 2020-11-23 RX ORDER — DEXTROSE 50 % IN WATER 50 %
25 SYRINGE (ML) INTRAVENOUS ONCE
Refills: 0 | Status: DISCONTINUED | OUTPATIENT
Start: 2020-11-23 | End: 2020-12-01

## 2020-11-23 RX ORDER — DEXTROSE 50 % IN WATER 50 %
15 SYRINGE (ML) INTRAVENOUS ONCE
Refills: 0 | Status: DISCONTINUED | OUTPATIENT
Start: 2020-11-23 | End: 2020-12-01

## 2020-11-23 RX ORDER — LANOLIN ALCOHOL/MO/W.PET/CERES
3 CREAM (GRAM) TOPICAL AT BEDTIME
Refills: 0 | Status: DISCONTINUED | OUTPATIENT
Start: 2020-11-23 | End: 2020-11-25

## 2020-11-23 RX ORDER — HYDROMORPHONE HYDROCHLORIDE 2 MG/ML
0.5 INJECTION INTRAMUSCULAR; INTRAVENOUS; SUBCUTANEOUS EVERY 6 HOURS
Refills: 0 | Status: DISCONTINUED | OUTPATIENT
Start: 2020-11-23 | End: 2020-11-25

## 2020-11-23 RX ORDER — INSULIN LISPRO 100/ML
VIAL (ML) SUBCUTANEOUS
Refills: 0 | Status: DISCONTINUED | OUTPATIENT
Start: 2020-11-23 | End: 2020-12-01

## 2020-11-23 RX ADMIN — Medication 75 MICROGRAM(S): at 23:09

## 2020-11-23 RX ADMIN — Medication 0: at 23:07

## 2020-11-23 RX ADMIN — Medication 3 MILLIGRAM(S): at 23:40

## 2020-11-23 RX ADMIN — I.V. FAT EMULSION 16.6 ML/HR: 20 EMULSION INTRAVENOUS at 19:14

## 2020-11-23 RX ADMIN — CHLORHEXIDINE GLUCONATE 1 APPLICATION(S): 213 SOLUTION TOPICAL at 07:04

## 2020-11-23 RX ADMIN — Medication 1 EACH: at 19:13

## 2020-11-23 RX ADMIN — PANTOPRAZOLE SODIUM 40 MILLIGRAM(S): 20 TABLET, DELAYED RELEASE ORAL at 07:04

## 2020-11-23 RX ADMIN — Medication 400 MILLIGRAM(S): at 23:07

## 2020-11-23 NOTE — PROGRESS NOTE ADULT - SUBJECTIVE AND OBJECTIVE BOX
SUBJECTIVE: The patient denies chest pain, shortness of breath, arm pain or jaw pain, dizziness or palpitations.    MEDICATIONS  (STANDING):  atorvastatin 10 milliGRAM(s) Oral at bedtime  chlorhexidine 4% Liquid 1 Application(s) Topical <User Schedule>  dextrose 40% Gel 15 Gram(s) Oral once  dextrose 50% Injectable 25 Gram(s) IV Push once  dextrose 50% Injectable 12.5 Gram(s) IV Push once  dextrose 50% Injectable 25 Gram(s) IV Push once  enoxaparin Injectable 40 milliGRAM(s) SubCutaneous daily  escitalopram 10 milliGRAM(s) Oral daily  fat emulsion (Fish Oil and Plant Based) 20% Infusion 16.6 mL/Hr (16.6 mL/Hr) IV Continuous <Continuous>  glucagon  Injectable 1 milliGRAM(s) IntraMuscular once  insulin lispro (ADMELOG) corrective regimen sliding scale   SubCutaneous three times a day before meals  insulin lispro (ADMELOG) corrective regimen sliding scale   SubCutaneous at bedtime  levothyroxine Injectable 75 MICROGram(s) IV Push at bedtime  melatonin 6 milliGRAM(s) Oral at bedtime  pantoprazole    Tablet 40 milliGRAM(s) Oral before breakfast  Parenteral Nutrition - Adult 1 Each (83 mL/Hr) TPN Continuous <Continuous>  Parenteral Nutrition - Adult 1 Each (83 mL/Hr) TPN Continuous <Continuous>  tamsulosin 0.4 milliGRAM(s) Oral at bedtime    MEDICATIONS  (PRN):  sodium chloride 0.9% lock flush 10 milliLiter(s) IV Push every 1 hour PRN Pre/post blood products, medications, blood draw, and to maintain line patency          REVIEW OF SYSTEMS:    CONSTITUTIONAL: No fever, weight loss, or fatigue  EYES: No eye pain, visual disturbances, or discharge  NECK: No pain or stiffness  RESPIRATORY: No cough, wheezing, chills or hemoptysis; No Shortness of Breath  CARDIOVASCULAR: No chest pain, palpitations, dizziness, or leg swelling  GASTROINTESTINAL: No abdominal or epigastric pain. No nausea, vomiting, or hematemesis; No diarrhea or constipation. No melena or hematochezia.  GENITOURINARY: No dysuria, frequency, hematuria, or incontinence  NEUROLOGICAL: No headaches, memory loss, loss of strength, numbness, or tremors  SKIN: No itching, burning, rashes, or lesions   LYMPH Nodes: No enlarged glands  MUSCULOSKELETAL: No joint pain or swelling; No muscle, back, or extremity pain  All other review of systems are negative.  	  [ ] Unable to obtain    PHYSICAL EXAM:  T(F): 97.7 (11-23-20 @ 08:56), Max: 98.3 (11-22-20 @ 20:03)  HR: 48 (11-23-20 @ 08:56) (45 - 50)  BP: 93/59 (11-23-20 @ 08:56) (93/59 - 117/83)  RR: 18 (11-23-20 @ 08:56) (17 - 18)  SpO2: 100% (11-23-20 @ 08:56) (96% - 100%)  Wt(kg): --  ,   I&O's Summary    22 Nov 2020 07:01  -  23 Nov 2020 07:00  --------------------------------------------------------  IN: 1195.2 mL / OUT: 750 mL / NET: 445.2 mL          PHYSICAL EXAM    Appearance: Normal	  HEENT:   Normal oral mucosa, PERRL, EOMI	  NECK: Soft and supple, No LAD, No JVD  Cardiovascular: Regular Rate and Rhythm, Normal S1 S2, No murmurs, No clicks, gallops or rubs  Respiratory: Lungs clear to auscultation	  Gastrointestinal:  Soft, Non-tender, + BS	  Skin: No rashes, No ecchymoses, No cyanosis  Neurologic: Non-focal  Extremities: No clubbing, cyanosis or edema  Vascular: Peripheral pulses palpable 2+ bilaterally    	    LABS:	 	                          12.5   4.47  )-----------( 127      ( 23 Nov 2020 06:34 )             38.6               11-23    136  |  103  |  24<H>  ----------------------------<  127<H>  4.2   |  25  |  0.55    Ca    8.8      23 Nov 2020 06:34  Phos  3.1     11-23  Mg     2.2     11-23      PT/INR - ( 23 Nov 2020 06:34 )   PT: 12.0 SEC;   INR: 1.06          PTT - ( 23 Nov 2020 06:34 )  PTT:32.0 SEC

## 2020-11-23 NOTE — PROGRESS NOTE ADULT - ASSESSMENT
70 y /o Male  w/ hx of Gastric cancer s/p resection and minh-n-Y esophagojejunostomy who now presents with 2 months of worsening PO intake tolerance.    - J-Tube as per sx  - NPO/IVFs  - TPN  - Continue Synthroid  dm stable  fsg riss  htn stable   medically stable for sx   cards f/u noted cleared  pt w/ resting bradycardia

## 2020-11-23 NOTE — PROGRESS NOTE ADULT - ASSESSMENT
71yo M with gastric cancer s/p total gastrectomy, splenectomy and distal panc in 2017 admitted with poor PO intake and aperistalsis of esophagus, now on TPN with plan for J tube today.    Plan:  - NPO/TPN  - OR today for J tube   - Cleared by cardiology for procedure  - Encourage OOB/ambulation  - F/u labs  - Preop for tomorrow    D Team Surgery, [id number]

## 2020-11-23 NOTE — PROGRESS NOTE ADULT - SUBJECTIVE AND OBJECTIVE BOX
CHIEF COMPLAINT:Patient is a 70y old  Male who presents with a chief complaint of Poor PO intake/progressive dysphagia. (23 Nov 2020 11:43)    	        PAST MEDICAL & SURGICAL HISTORY:  Constipation    Iron deficiency anemia, unspecified iron deficiency anemia type    Gastric mass  ulcerated mass in gastric cardia with small perigastric nodes on endoscopy.    Hypothyroidism    Type 2 diabetes mellitus without complication, unspecified long term insulin use status  no BS monitoring    Essential hypertension  was on losartan, now diet control    History of gastric surgery  2017    Port-a-cath in place  right chest wall    H/O umbilical hernia repair            REVIEW OF SYSTEMS:  weak  NECK: No pain or stiffness  RESPIRATORY: No cough, wheezing, chills or hemoptysis; No Shortness of Breath  CARDIOVASCULAR: No chest pain, palpitations, passing out, dizziness, or leg swelling  GASTROINTESTINAL: No abdominal or epigastric pain. No nausea, vomiting, or hematemesis; No diarrhea or constipation. No melena or hematochezia.  GENITOURINARY: No dysuria, frequency, hematuria, or incontinence  NEUROLOGICAL: No headaches, memory loss, loss of strength, numbness, or tremors      Medications:  MEDICATIONS  (STANDING):  atorvastatin 10 milliGRAM(s) Oral at bedtime  chlorhexidine 4% Liquid 1 Application(s) Topical <User Schedule>  dextrose 40% Gel 15 Gram(s) Oral once  dextrose 50% Injectable 25 Gram(s) IV Push once  dextrose 50% Injectable 12.5 Gram(s) IV Push once  dextrose 50% Injectable 25 Gram(s) IV Push once  enoxaparin Injectable 40 milliGRAM(s) SubCutaneous daily  escitalopram 10 milliGRAM(s) Oral daily  fat emulsion (Fish Oil and Plant Based) 20% Infusion 16.6 mL/Hr (16.6 mL/Hr) IV Continuous <Continuous>  glucagon  Injectable 1 milliGRAM(s) IntraMuscular once  insulin lispro (ADMELOG) corrective regimen sliding scale   SubCutaneous three times a day before meals  insulin lispro (ADMELOG) corrective regimen sliding scale   SubCutaneous at bedtime  levothyroxine Injectable 75 MICROGram(s) IV Push at bedtime  melatonin 6 milliGRAM(s) Oral at bedtime  pantoprazole    Tablet 40 milliGRAM(s) Oral before breakfast  Parenteral Nutrition - Adult 1 Each (83 mL/Hr) TPN Continuous <Continuous>  Parenteral Nutrition - Adult 1 Each (83 mL/Hr) TPN Continuous <Continuous>  tamsulosin 0.4 milliGRAM(s) Oral at bedtime    MEDICATIONS  (PRN):  sodium chloride 0.9% lock flush 10 milliLiter(s) IV Push every 1 hour PRN Pre/post blood products, medications, blood draw, and to maintain line patency    	    PHYSICAL EXAM:  T(C): 36.5 (11-23-20 @ 08:56), Max: 36.8 (11-22-20 @ 20:03)  HR: 48 (11-23-20 @ 08:56) (45 - 50)  BP: 93/59 (11-23-20 @ 08:56) (93/59 - 117/83)  RR: 18 (11-23-20 @ 08:56) (17 - 18)  SpO2: 100% (11-23-20 @ 08:56) (96% - 100%)  Wt(kg): --  I&O's Summary    22 Nov 2020 07:01  -  23 Nov 2020 07:00  --------------------------------------------------------  IN: 1195.2 mL / OUT: 750 mL / NET: 445.2 mL        Appearance: Normal	  HEENT:   Normal oral mucosa, PERRL, EOMI	  Lymphatic: No lymphadenopathy  Cardiovascular: Normal S1 S2, No JVD, No murmurs, No edema  Respiratory: Lungs clear to auscultation	  Psychiatry: A & O x 3, Mood & affect appropriate  Gastrointestinal:  Soft, Non-tender, + BS	  scrap on knee  Neurologic: Non-focal  Extremities: Normal range of motion, No clubbing, cyanosis or edema  Vascular: Peripheral pulses palpable 2+ bilaterally    TELEMETRY: 	    ECG:  	  RADIOLOGY:  OTHER: 	  	  LABS:	 	    CARDIAC MARKERS:                                12.5   4.47  )-----------( 127      ( 23 Nov 2020 06:34 )             38.6     11-23    136  |  103  |  24<H>  ----------------------------<  127<H>  4.2   |  25  |  0.55    Ca    8.8      23 Nov 2020 06:34  Phos  3.1     11-23  Mg     2.2     11-23      proBNP:   Lipid Profile:   HgA1c:   TSH:

## 2020-11-23 NOTE — CHART NOTE - NSCHARTNOTEFT_GEN_A_CORE
Subjective: Patient is doing well after surgery. He states that his pain is very well controlled. He is not nauseous and has not vomited. He has not had RBF yet. Denies CP/SOB    Objective:  Physical Exam:  General: NAD, laying in bed  Chest: rrr, nonlabored respirations  Abdomen: soft, nontender, nondistended; midline incision with some dark serosanguineous strikethrough inferiorly. J tube site CDI    Vital Signs Last 24 Hrs  T(C): 36.3 (23 Nov 2020 20:07), Max: 36.9 (23 Nov 2020 10:58)  T(F): 97.4 (23 Nov 2020 20:07), Max: 98.5 (23 Nov 2020 14:00)  HR: 47 (23 Nov 2020 20:07) (45 - 55)  BP: 134/76 (23 Nov 2020 20:07) (93/59 - 153/98)  BP(mean): 79 (23 Nov 2020 18:30) (63 - 93)  RR: 18 (23 Nov 2020 20:07) (10 - 18)  SpO2: 98% (23 Nov 2020 20:07) (96% - 100%)    I&O's Detail    22 Nov 2020 07:01  -  23 Nov 2020 07:00  --------------------------------------------------------  IN:    Fat Emulsion (Fish Oil &amp; Plant Based) 20% Infusion: 199.2 mL    TPN (Total Parenteral Nutrition): 996 mL  Total IN: 1195.2 mL    OUT:    Voided (mL): 750 mL  Total OUT: 750 mL    Total NET: 445.2 mL      23 Nov 2020 07:01  -  23 Nov 2020 23:01  --------------------------------------------------------  IN:    TPN (Total Parenteral Nutrition): 176 mL  Total IN: 176 mL    OUT:    Indwelling Catheter - Urethral (mL): 1100 mL  Total OUT: 1100 mL    Total NET: -924 mL      MEDICATIONS  (STANDING):  acetaminophen  IVPB .. 1000 milliGRAM(s) IV Intermittent every 6 hours  dextrose 40% Gel 15 Gram(s) Oral once  dextrose 50% Injectable 25 Gram(s) IV Push once  escitalopram 10 milliGRAM(s) Oral daily  fat emulsion (Fish Oil and Plant Based) 20% Infusion 16.6 mL/Hr (16.6 mL/Hr) IV Continuous <Continuous>  glucagon  Injectable 1 milliGRAM(s) IntraMuscular once  insulin lispro (ADMELOG) corrective regimen sliding scale   SubCutaneous three times a day before meals  insulin lispro (ADMELOG) corrective regimen sliding scale   SubCutaneous at bedtime  levothyroxine Injectable 75 MICROGram(s) IV Push at bedtime  pantoprazole    Tablet 40 milliGRAM(s) Oral before breakfast  Parenteral Nutrition - Adult 1 Each (83 mL/Hr) TPN Continuous <Continuous>  Parenteral Nutrition - Adult 1 Each (83 mL/Hr) TPN Continuous <Continuous>    MEDICATIONS  (PRN):  HYDROmorphone  Injectable 0.5 milliGRAM(s) IV Push every 6 hours PRN Severe Pain (7 - 10)  sodium chloride 0.9% lock flush 10 milliLiter(s) IV Push every 1 hour PRN Pre/post blood products, medications, blood draw, and to maintain line patency      LABS:                        12.5   4.47  )-----------( 127      ( 23 Nov 2020 06:34 )             38.6     11-23    136  |  103  |  24<H>  ----------------------------<  127<H>  4.2   |  25  |  0.55    Ca    8.8      23 Nov 2020 06:34  Phos  3.1     11-23  Mg     2.2     11-23      PT/INR - ( 23 Nov 2020 06:34 )   PT: 12.0 SEC;   INR: 1.06     PTT - ( 23 Nov 2020 06:34 )  PTT:32.0 SEC  ABO Interpretation: O (11-23-20 @ 09:22)      A/P: 70 M with gastric cancer s/p total gastrectomy, splenectomy and distal panc in 2017 admitted with poor PO intake and aperistalsis of esophagus, now on TPN, s/p open J tube. Stable.    - NPO/TPN   - pain control  - Encourage OOB/ambulation  - vitals  - I/O, strict    D Team Surgery, [id number]

## 2020-11-23 NOTE — PROGRESS NOTE ADULT - SUBJECTIVE AND OBJECTIVE BOX
NUTRITION NOTE  [id number] EVERARDO  ===============================    Interval events - pt was seen and examined at bedside, no acute events overnight; PICC line was placed and TPN started on 11/18/2020 for nutritional support, pt is tolerating parenteral nutrition without any issues; TPN remains at goal, pt denies hest pain, shortness or breath, fevers or chills    Allergies  No Known Allergies    PAST MEDICAL & SURGICAL HISTORY:  Constipation  Iron deficiency anemia, unspecified iron deficiency anemia type  Gastric mass - ulcerated mass in gastric cardia with small perigastric nodes on endoscopy.  Hypothyroidism  Type 2 diabetes mellitus without complication, unspecified long term insulin use status no BS monitoring  Essential hypertension was on losartan, now diet control  History of gastric surgery 2017  Port-a-cath in place - right chest wall  H/O umbilical hernia repair    FAMILY HISTORY:  Family history of ischemic heart disease (IHD) (Father)  Family history of ovarian cancer (Mother)    Vital Signs Last 24 Hrs  T(C): 36.5 (23 Nov 2020 08:56), Max: 36.8 (22 Nov 2020 20:03)  T(F): 97.7 (23 Nov 2020 08:56), Max: 98.3 (22 Nov 2020 20:03)  HR: 48 (23 Nov 2020 08:56) (45 - 50)  BP: 93/59 (23 Nov 2020 08:56) (93/59 - 117/83)  RR: 18 (23 Nov 2020 08:56) (16 - 18)  SpO2: 100% (23 Nov 2020 08:56) (96% - 100%)    MEDICATIONS  (STANDING):  atorvastatin 10 milliGRAM(s) Oral at bedtime  chlorhexidine 4% Liquid 1 Application(s) Topical <User Schedule>  dextrose 40% Gel 15 Gram(s) Oral once  dextrose 50% Injectable 25 Gram(s) IV Push once  dextrose 50% Injectable 12.5 Gram(s) IV Push once  dextrose 50% Injectable 25 Gram(s) IV Push once  enoxaparin Injectable 40 milliGRAM(s) SubCutaneous daily  fat emulsion (Fish Oil and Plant Based) 20% Infusion 16.6 mL/Hr (16.6 mL/Hr) IV Continuous <Continuous>  glucagon  Injectable 1 milliGRAM(s) IntraMuscular once  insulin lispro (ADMELOG) corrective regimen sliding scale   SubCutaneous three times a day before meals  insulin lispro (ADMELOG) corrective regimen sliding scale   SubCutaneous at bedtime  levothyroxine Injectable 75 MICROGram(s) IV Push at bedtime  melatonin 6 milliGRAM(s) Oral at bedtime  pantoprazole    Tablet 40 milliGRAM(s) Oral before breakfast  Parenteral Nutrition - Adult 1 Each (83 mL/Hr) TPN Continuous <Continuous>  Parenteral Nutrition - Adult 1 Each (83 mL/Hr) TPN Continuous <Continuous>  tamsulosin 0.4 milliGRAM(s) Oral at bedtime    MEDICATIONS  (PRN):  sodium chloride 0.9% lock flush 10 milliLiter(s) IV Push every 1 hour PRN Pre/post blood products, medications, blood draw, and to maintain line patency    I&O's Detail    22 Nov 2020 07:01  -  23 Nov 2020 07:00  --------------------------------------------------------  IN:    Fat Emulsion (Fish Oil &amp; Plant Based) 20% Infusion: 199.2 mL    TPN (Total Parenteral Nutrition): 996 mL  Total IN: 1195.2 mL    OUT:    Voided (mL): 750 mL  Total OUT: 750 mL    Total NET: 445.2 mL    POCT Blood Glucose.: 136 mg/dL (23 Nov 2020 08:07)  POCT Blood Glucose.: 149 mg/dL (22 Nov 2020 22:50)  POCT Blood Glucose.: 137 mg/dL (22 Nov 2020 17:04)  POCT Blood Glucose.: 133 mg/dL (22 Nov 2020 12:45)    Daily Height in cm: 180.34 (18 Nov 2020 16:09)      Drug Dosing Weight  Height (cm): 180.3 (18 Nov 2020 16:09)  Weight (kg): 44 (18 Nov 2020 16:09)  BMI (kg/m2): 13.5 (18 Nov 2020 16:09)  BSA (m2): 1.55 (18 Nov 2020 16:09)    PHYSICAL EXAM   General: A&Ox3, NAD, sitting comfortably in chair   Respiratory: CTA b/l, nonlabored respirations   Cardiovascular: Regular rate & rhythm  Abdominal: Soft, nontender, nondistended  PICC Site: C/D/I    Diet: NPO and TPN/lipids (started on 11/18/2020)    LABORATORY                                          12.5   4.47  )-----------( 127      ( 23 Nov 2020 06:34 )             38.6   11-23    136  |  103  |  24<H>  ----------------------------<  127<H>  4.2   |  25  |  0.55    Ca    8.8      23 Nov 2020 06:34  Phos  3.1     11-23  Mg     2.2     11-23    TPro  6.4  /  Alb  3.8  /  TBili  1.0  /  DBili  x   /  AST  26  /  ALT  17  /  AlkPhos  75  11-18    LIVER FUNCTIONS - ( 18 Nov 2020 06:42 )  Alb: 3.8 g/dL / Pro: 6.4 g/dL / ALK PHOS: 75 u/L / ALT: 17 u/L / AST: 26 u/L / GGT: x           11-19 Chol -- LDL -- HDL -- Trig 80    ASSESSMENT/PLAN:  69 y/o male with hx of gastric cancer s/p resection and minh-n-Y esophagojejunostomy who was admitted with complaints of progressively worsening tolerance to PO diet. Patient states that he started to experience poor PO tolerance about two months ago that worsened in the past two weeks. Patient reports about 15 lb weight loss over the past 2-3 months. Patient states that he belches and/or vomits each time he eats something. Nutrition support consult was consult for initiation of parenteral nutrition in view of poor malnutrition status and prolonged period of inadequate caloric intake.  PICC line was placed and TPN started on 11/18/2020 for nutritional support.    continue TPN with infusion volume of 2 L, TPN will provide 1545 kcal/day    labs reviewed - electrolytes adjusted appropriately     monitor fingersticks, obtain daily weights    continue parenteral nutrition at this time, will follow up with primary team on plan - J tube placement planned for today      1.  Severe protein calorie malnutrition being optimized with TPN: CHO [225] gm.  AA [95] gm. SMOF Lipids [40] gm.  2.  Hyperglycemia managed with: [0] units of regular insulin    3.  Check fluid balance daily.  Strict I/O  [ ] [ ]   4.  Daily BMP, Ionized Calcium, Magnesium and Phosphorous   5.  Triglycerides at initiation of TPN and monthly [ ] [ ]     Nutrition Support [id number]

## 2020-11-23 NOTE — PROGRESS NOTE ADULT - ASSESSMENT
69yo M with gastric cancer s/p total gastrectomy, splenectomy and distal panc in 2017 admitted with poor PO intake and aperistalsis of esophagus, now on TPN with plan for J tube today.  The patient is stable from a cardiovascular perspective.

## 2020-11-23 NOTE — BRIEF OPERATIVE NOTE - NSICDXBRIEFPROCEDURE_GEN_ALL_CORE_FT
PROCEDURES:  Lysis of intestinal adhesions 23-Nov-2020 17:48:57  Cathleen King  Jejunostomy 23-Nov-2020 17:48:49  Cathleen King

## 2020-11-23 NOTE — PROGRESS NOTE ADULT - SUBJECTIVE AND OBJECTIVE BOX
SURGERY PROGRESS NOTE    SUBJECTIVE / 24H EVENTS:  Patient seen and examined on morning rounds. No acute events overnight.  Resting comfortably in bed. No complaints, eager for the operation today.      OBJECTIVE:  VITAL SIGNS:  T(C): 36.5 (11-23-20 @ 08:56), Max: 36.8 (11-22-20 @ 20:03)  HR: 48 (11-23-20 @ 08:56) (45 - 50)  BP: 93/59 (11-23-20 @ 08:56) (93/59 - 117/83)  RR: 18 (11-23-20 @ 08:56) (16 - 18)  SpO2: 100% (11-23-20 @ 08:56) (96% - 100%)  Daily Height in cm: 180.3 (23 Nov 2020 10:58)    Daily   POCT Blood Glucose.: 136 mg/dL (11-23-20 @ 08:07)  POCT Blood Glucose.: 149 mg/dL (11-22-20 @ 22:50)  POCT Blood Glucose.: 137 mg/dL (11-22-20 @ 17:04)      Physical Examination:  GEN: NAD, resting quietly  PULM: symmetric chest rise bilaterally, no increased WOB  ABD: soft, nontender, nondistended, no rebound or guarding  EXTR: no LE erythema, moving all extremities        11-22-20 @ 07:01  -  11-23-20 @ 07:00  --------------------------------------------------------  IN:    Fat Emulsion (Fish Oil &amp; Plant Based) 20% Infusion: 199.2 mL    TPN (Total Parenteral Nutrition): 996 mL  Total IN: 1195.2 mL    OUT:    Voided (mL): 750 mL  Total OUT: 750 mL    Total NET: 445.2 mL          LAB VALUES:  11-23    136  |  103  |  24<H>  ----------------------------<  127<H>  4.2   |  25  |  0.55    Ca    8.8      23 Nov 2020 06:34  Phos  3.1     11-23  Mg     2.2     11-23                                 12.5   4.47  )-----------( 127      ( 23 Nov 2020 06:34 )             38.6       PT/INR - ( 23 Nov 2020 06:34 )   PT: 12.0 SEC;   INR: 1.06          PTT - ( 23 Nov 2020 06:34 )  PTT:32.0 SEC        MEDICATIONS  (STANDING):  atorvastatin 10 milliGRAM(s) Oral at bedtime  chlorhexidine 4% Liquid 1 Application(s) Topical <User Schedule>  dextrose 40% Gel 15 Gram(s) Oral once  dextrose 50% Injectable 25 Gram(s) IV Push once  dextrose 50% Injectable 12.5 Gram(s) IV Push once  dextrose 50% Injectable 25 Gram(s) IV Push once  enoxaparin Injectable 40 milliGRAM(s) SubCutaneous daily  fat emulsion (Fish Oil and Plant Based) 20% Infusion 16.6 mL/Hr (16.6 mL/Hr) IV Continuous <Continuous>  glucagon  Injectable 1 milliGRAM(s) IntraMuscular once  insulin lispro (ADMELOG) corrective regimen sliding scale   SubCutaneous three times a day before meals  insulin lispro (ADMELOG) corrective regimen sliding scale   SubCutaneous at bedtime  levothyroxine Injectable 75 MICROGram(s) IV Push at bedtime  melatonin 6 milliGRAM(s) Oral at bedtime  pantoprazole    Tablet 40 milliGRAM(s) Oral before breakfast  Parenteral Nutrition - Adult 1 Each (83 mL/Hr) TPN Continuous <Continuous>  Parenteral Nutrition - Adult 1 Each (83 mL/Hr) TPN Continuous <Continuous>  tamsulosin 0.4 milliGRAM(s) Oral at bedtime    MEDICATIONS  (PRN):  sodium chloride 0.9% lock flush 10 milliLiter(s) IV Push every 1 hour PRN Pre/post blood products, medications, blood draw, and to maintain line patency

## 2020-11-24 ENCOUNTER — TRANSCRIPTION ENCOUNTER (OUTPATIENT)
Age: 70
End: 2020-11-24

## 2020-11-24 LAB
ACANTHOCYTES BLD QL SMEAR: SIGNIFICANT CHANGE UP
ACANTHOCYTES BLD QL SMEAR: SIGNIFICANT CHANGE UP
ANION GAP SERPL CALC-SCNC: 10 MMO/L — SIGNIFICANT CHANGE UP (ref 7–14)
ANISOCYTOSIS BLD QL: SIGNIFICANT CHANGE UP
ANISOCYTOSIS BLD QL: SIGNIFICANT CHANGE UP
BASOPHILS # BLD AUTO: 0.02 K/UL — SIGNIFICANT CHANGE UP (ref 0–0.2)
BASOPHILS NFR BLD AUTO: 0.2 % — SIGNIFICANT CHANGE UP (ref 0–2)
BASOPHILS NFR SPEC: 0.9 % — SIGNIFICANT CHANGE UP (ref 0–2)
BASOPHILS NFR SPEC: 0.9 % — SIGNIFICANT CHANGE UP (ref 0–2)
BUN SERPL-MCNC: 25 MG/DL — HIGH (ref 7–23)
BURR CELLS BLD QL SMEAR: PRESENT — SIGNIFICANT CHANGE UP
BURR CELLS BLD QL SMEAR: PRESENT — SIGNIFICANT CHANGE UP
CA-I BLD-SCNC: 1.08 MMOL/L — SIGNIFICANT CHANGE UP (ref 1.03–1.23)
CALCIUM SERPL-MCNC: 8.6 MG/DL — SIGNIFICANT CHANGE UP (ref 8.4–10.5)
CHLORIDE SERPL-SCNC: 101 MMOL/L — SIGNIFICANT CHANGE UP (ref 98–107)
CO2 SERPL-SCNC: 22 MMOL/L — SIGNIFICANT CHANGE UP (ref 22–31)
CREAT SERPL-MCNC: 0.5 MG/DL — SIGNIFICANT CHANGE UP (ref 0.5–1.3)
EOSINOPHIL # BLD AUTO: 0.01 K/UL — SIGNIFICANT CHANGE UP (ref 0–0.5)
EOSINOPHIL NFR BLD AUTO: 0.1 % — SIGNIFICANT CHANGE UP (ref 0–6)
EOSINOPHIL NFR FLD: 0.9 % — SIGNIFICANT CHANGE UP (ref 0–6)
EOSINOPHIL NFR FLD: 0.9 % — SIGNIFICANT CHANGE UP (ref 0–6)
GIANT PLATELETS BLD QL SMEAR: PRESENT — SIGNIFICANT CHANGE UP
GIANT PLATELETS BLD QL SMEAR: PRESENT — SIGNIFICANT CHANGE UP
GLUCOSE BLDC GLUCOMTR-MCNC: 141 MG/DL — HIGH (ref 70–99)
GLUCOSE BLDC GLUCOMTR-MCNC: 148 MG/DL — HIGH (ref 70–99)
GLUCOSE BLDC GLUCOMTR-MCNC: 170 MG/DL — HIGH (ref 70–99)
GLUCOSE BLDC GLUCOMTR-MCNC: 177 MG/DL — HIGH (ref 70–99)
GLUCOSE SERPL-MCNC: 196 MG/DL — HIGH (ref 70–99)
HCT VFR BLD CALC: 33.6 % — LOW (ref 39–50)
HCT VFR BLD CALC: 33.6 % — LOW (ref 39–50)
HGB BLD-MCNC: 10.8 G/DL — LOW (ref 13–17)
HGB BLD-MCNC: 10.8 G/DL — LOW (ref 13–17)
IMM GRANULOCYTES NFR BLD AUTO: 0.5 % — SIGNIFICANT CHANGE UP (ref 0–1.5)
LYMPHOCYTES # BLD AUTO: 0.79 K/UL — LOW (ref 1–3.3)
LYMPHOCYTES # BLD AUTO: 7.1 % — LOW (ref 13–44)
LYMPHOCYTES NFR SPEC AUTO: 6.3 % — LOW (ref 13–44)
LYMPHOCYTES NFR SPEC AUTO: 6.3 % — LOW (ref 13–44)
MACROCYTES BLD QL: SIGNIFICANT CHANGE UP
MACROCYTES BLD QL: SIGNIFICANT CHANGE UP
MAGNESIUM SERPL-MCNC: 2.1 MG/DL — SIGNIFICANT CHANGE UP (ref 1.6–2.6)
MCHC RBC-ENTMCNC: 32.1 % — SIGNIFICANT CHANGE UP (ref 32–36)
MCHC RBC-ENTMCNC: 32.1 % — SIGNIFICANT CHANGE UP (ref 32–36)
MCHC RBC-ENTMCNC: 33.6 PG — SIGNIFICANT CHANGE UP (ref 27–34)
MCHC RBC-ENTMCNC: 33.6 PG — SIGNIFICANT CHANGE UP (ref 27–34)
MCV RBC AUTO: 104.7 FL — HIGH (ref 80–100)
MCV RBC AUTO: 104.7 FL — HIGH (ref 80–100)
MONOCYTES # BLD AUTO: 0.6 K/UL — SIGNIFICANT CHANGE UP (ref 0–0.9)
MONOCYTES NFR BLD AUTO: 5.4 % — SIGNIFICANT CHANGE UP (ref 2–14)
MONOCYTES NFR BLD: 3.6 % — SIGNIFICANT CHANGE UP (ref 2–9)
MONOCYTES NFR BLD: 3.6 % — SIGNIFICANT CHANGE UP (ref 2–9)
NEUTROPHIL AB SER-ACNC: 84.7 % — HIGH (ref 43–77)
NEUTROPHIL AB SER-ACNC: 84.7 % — HIGH (ref 43–77)
NEUTROPHILS # BLD AUTO: 9.59 K/UL — HIGH (ref 1.8–7.4)
NEUTROPHILS NFR BLD AUTO: 86.7 % — HIGH (ref 43–77)
NRBC # FLD: 0 K/UL — SIGNIFICANT CHANGE UP (ref 0–0)
NRBC # FLD: 0 K/UL — SIGNIFICANT CHANGE UP (ref 0–0)
PHOSPHATE SERPL-MCNC: 3 MG/DL — SIGNIFICANT CHANGE UP (ref 2.5–4.5)
PLATELET # BLD AUTO: 123 K/UL — LOW (ref 150–400)
PLATELET # BLD AUTO: 123 K/UL — LOW (ref 150–400)
PLATELET COUNT - ESTIMATE: SIGNIFICANT CHANGE UP
PLATELET COUNT - ESTIMATE: SIGNIFICANT CHANGE UP
PMV BLD: 12.3 FL — SIGNIFICANT CHANGE UP (ref 7–13)
PMV BLD: 12.3 FL — SIGNIFICANT CHANGE UP (ref 7–13)
POIKILOCYTOSIS BLD QL AUTO: SIGNIFICANT CHANGE UP
POIKILOCYTOSIS BLD QL AUTO: SIGNIFICANT CHANGE UP
POTASSIUM SERPL-MCNC: 4.6 MMOL/L — SIGNIFICANT CHANGE UP (ref 3.5–5.3)
POTASSIUM SERPL-SCNC: 4.6 MMOL/L — SIGNIFICANT CHANGE UP (ref 3.5–5.3)
RBC # BLD: 3.21 M/UL — LOW (ref 4.2–5.8)
RBC # BLD: 3.21 M/UL — LOW (ref 4.2–5.8)
RBC # FLD: 14.1 % — SIGNIFICANT CHANGE UP (ref 10.3–14.5)
RBC # FLD: 14.1 % — SIGNIFICANT CHANGE UP (ref 10.3–14.5)
SCHISTOCYTES BLD QL AUTO: SLIGHT — SIGNIFICANT CHANGE UP
SCHISTOCYTES BLD QL AUTO: SLIGHT — SIGNIFICANT CHANGE UP
SODIUM SERPL-SCNC: 133 MMOL/L — LOW (ref 135–145)
TARGETS BLD QL SMEAR: SLIGHT — SIGNIFICANT CHANGE UP
TARGETS BLD QL SMEAR: SLIGHT — SIGNIFICANT CHANGE UP
VARIANT LYMPHS # BLD: 3.6 % — SIGNIFICANT CHANGE UP
VARIANT LYMPHS # BLD: 3.6 % — SIGNIFICANT CHANGE UP
WBC # BLD: 11.07 K/UL — HIGH (ref 3.8–10.5)
WBC # BLD: 11.07 K/UL — HIGH (ref 3.8–10.5)
WBC # FLD AUTO: 11.07 K/UL — HIGH (ref 3.8–10.5)
WBC # FLD AUTO: 11.07 K/UL — HIGH (ref 3.8–10.5)

## 2020-11-24 PROCEDURE — 74018 RADEX ABDOMEN 1 VIEW: CPT | Mod: 26

## 2020-11-24 PROCEDURE — 99232 SBSQ HOSP IP/OBS MODERATE 35: CPT

## 2020-11-24 RX ORDER — ELECTROLYTE SOLUTION,INJ
1 VIAL (ML) INTRAVENOUS
Refills: 0 | Status: DISCONTINUED | OUTPATIENT
Start: 2020-11-24 | End: 2020-11-24

## 2020-11-24 RX ORDER — ENOXAPARIN SODIUM 100 MG/ML
40 INJECTION SUBCUTANEOUS DAILY
Refills: 0 | Status: DISCONTINUED | OUTPATIENT
Start: 2020-11-24 | End: 2020-11-24

## 2020-11-24 RX ORDER — ENOXAPARIN SODIUM 100 MG/ML
30 INJECTION SUBCUTANEOUS DAILY
Refills: 0 | Status: DISCONTINUED | OUTPATIENT
Start: 2020-11-24 | End: 2020-12-01

## 2020-11-24 RX ORDER — ACETAMINOPHEN 500 MG
1000 TABLET ORAL ONCE
Refills: 0 | Status: COMPLETED | OUTPATIENT
Start: 2020-11-24 | End: 2020-11-24

## 2020-11-24 RX ORDER — I.V. FAT EMULSION 20 G/100ML
18.75 EMULSION INTRAVENOUS
Qty: 45 | Refills: 0 | Status: DISCONTINUED | OUTPATIENT
Start: 2020-11-24 | End: 2020-11-25

## 2020-11-24 RX ORDER — DIATRIZOATE MEGLUMINE 180 MG/ML
30 INJECTION, SOLUTION INTRAVESICAL ONCE
Refills: 0 | Status: DISCONTINUED | OUTPATIENT
Start: 2020-11-24 | End: 2020-11-25

## 2020-11-24 RX ADMIN — Medication 1000 MILLIGRAM(S): at 06:22

## 2020-11-24 RX ADMIN — Medication 1000 MILLIGRAM(S): at 12:30

## 2020-11-24 RX ADMIN — Medication 400 MILLIGRAM(S): at 23:50

## 2020-11-24 RX ADMIN — Medication 400 MILLIGRAM(S): at 11:57

## 2020-11-24 RX ADMIN — Medication 75 MICROGRAM(S): at 22:37

## 2020-11-24 RX ADMIN — CHLORHEXIDINE GLUCONATE 1 APPLICATION(S): 213 SOLUTION TOPICAL at 11:56

## 2020-11-24 RX ADMIN — ESCITALOPRAM OXALATE 10 MILLIGRAM(S): 10 TABLET, FILM COATED ORAL at 11:57

## 2020-11-24 RX ADMIN — I.V. FAT EMULSION 18.8 ML/HR: 20 EMULSION INTRAVENOUS at 18:07

## 2020-11-24 RX ADMIN — Medication 1 EACH: at 18:06

## 2020-11-24 RX ADMIN — ENOXAPARIN SODIUM 30 MILLIGRAM(S): 100 INJECTION SUBCUTANEOUS at 11:57

## 2020-11-24 RX ADMIN — Medication 1 EACH: at 05:20

## 2020-11-24 RX ADMIN — Medication 1 EACH: at 22:06

## 2020-11-24 RX ADMIN — Medication 3 MILLIGRAM(S): at 22:06

## 2020-11-24 RX ADMIN — Medication 400 MILLIGRAM(S): at 05:17

## 2020-11-24 RX ADMIN — Medication 2: at 06:43

## 2020-11-24 RX ADMIN — HYDROMORPHONE HYDROCHLORIDE 0.5 MILLIGRAM(S): 2 INJECTION INTRAMUSCULAR; INTRAVENOUS; SUBCUTANEOUS at 17:32

## 2020-11-24 RX ADMIN — PANTOPRAZOLE SODIUM 40 MILLIGRAM(S): 20 TABLET, DELAYED RELEASE ORAL at 05:20

## 2020-11-24 RX ADMIN — HYDROMORPHONE HYDROCHLORIDE 0.5 MILLIGRAM(S): 2 INJECTION INTRAMUSCULAR; INTRAVENOUS; SUBCUTANEOUS at 18:01

## 2020-11-24 NOTE — PROGRESS NOTE ADULT - SUBJECTIVE AND OBJECTIVE BOX
SUBJECTIVE: Asymptomatic  	  MEDICATIONS:  acetaminophen  IVPB .. 1000 milliGRAM(s) IV Intermittent every 6 hours  escitalopram 10 milliGRAM(s) Oral daily  HYDROmorphone  Injectable 0.5 milliGRAM(s) IV Push every 6 hours PRN  melatonin 3 milliGRAM(s) Oral at bedtime  pantoprazole    Tablet 40 milliGRAM(s) Oral before breakfast  dextrose 40% Gel 15 Gram(s) Oral once  dextrose 50% Injectable 25 Gram(s) IV Push once  glucagon  Injectable 1 milliGRAM(s) IntraMuscular once  insulin lispro (ADMELOG) corrective regimen sliding scale   SubCutaneous at bedtime  insulin lispro (ADMELOG) corrective regimen sliding scale   SubCutaneous three times a day before meals  levothyroxine Injectable 75 MICROGram(s) IV Push at bedtime  chlorhexidine 2% Cloths 1 Application(s) Topical daily  enoxaparin Injectable 30 milliGRAM(s) SubCutaneous daily  fat emulsion (Fish Oil and Plant Based) 20% Infusion 16.6 mL/Hr IV Continuous <Continuous>  Parenteral Nutrition - Adult 1 Each TPN Continuous <Continuous>  sodium chloride 0.9% lock flush 10 milliLiter(s) IV Push every 1 hour PRN      REVIEW OF SYSTEMS:    CONSTITUTIONAL: No fever, weight loss, or fatigue  EYES: No eye pain, visual disturbances, or discharge  NECK: No pain or stiffness  RESPIRATORY: No cough, wheezing, chills or hemoptysis; No Shortness of Breath  CARDIOVASCULAR: No chest pain, palpitations, dizziness, or leg swelling  GASTROINTESTINAL: No abdominal or epigastric pain. No nausea, vomiting, or hematemesis; No diarrhea or constipation. No melena or hematochezia.  GENITOURINARY: No dysuria, frequency, hematuria, or incontinence  NEUROLOGICAL: No headaches, memory loss, loss of strength, numbness, or tremors  SKIN: No itching, burning, rashes, or lesions   LYMPH Nodes: No enlarged glands  MUSCULOSKELETAL: No joint pain or swelling; No muscle, back, or extremity pain  All other review of systems are negative.  	    PHYSICAL EXAM:  T(C): 36.8 (11-24-20 @ 05:16), Max: 36.9 (11-23-20 @ 10:58)  HR: 52 (11-24-20 @ 05:16) (45 - 55)  BP: 125/69 (11-24-20 @ 05:16) (93/59 - 153/98)  RR: 18 (11-24-20 @ 05:16) (10 - 18)  SpO2: 99% (11-24-20 @ 05:16) (98% - 100%)  Wt(kg): --  I&O's Summary    23 Nov 2020 07:01  -  24 Nov 2020 07:00  --------------------------------------------------------  IN: 1245 mL / OUT: 1800 mL / NET: -555 mL      Height (cm): 180.3 (11-23 @ 11:00)  Weight (kg): 55.9 (11-23 @ 12:40)  BMI (kg/m2): 17.2 (11-23 @ 12:40)  BSA (m2): 1.72 (11-23 @ 12:40)    PHYSICAL EXAM    Appearance: Normal	  HEENT:   Normal oral mucosa, PERRL, EOMI	  NECK: Soft and supple, No LAD, No JVD  Cardiovascular: Regular Rate and Rhythm, Normal S1 S2, No murmurs, No clicks, gallops or rubs  Respiratory: Lungs clear to auscultation	  Extremities: No clubbing, cyanosis or edema  	    LABS:	 	                    10.8   11.07 )-----------( 123      ( 24 Nov 2020 06:30 )             33.6     11-24    133<L>  |  101  |  25<H>  ----------------------------<  196<H>  4.6   |  22  |  0.50    Ca    8.6      24 Nov 2020 06:30  Phos  3.0     11-24  Mg     2.1     11-24

## 2020-11-24 NOTE — PROGRESS NOTE ADULT - ASSESSMENT
71 y/o Male with gastric cancer s/p total gastrectomy, splenectomy, and distal pancreatectomy (Dx. 2017) admitted for poor PO intake and aperistalsis of the esophagus, now s/p jejunostomy with J tube pod 1 receiving total parental nutrition and recovering well.    Plan:  - TPN + NPO.  - Pain management as indicated.  - Encourage OOB/ambulation.  - F/u AM labs.   69 y/o Male with gastric cancer s/p total gastrectomy, splenectomy, and distal pancreatectomy (Dx. 2017) admitted for poor PO intake and aperistalsis of the esophagus, now POD1 s/p jejunostomy with J tube, receiving total parental nutrition and recovering well.    Plan:  - TPN + NPO.  - bedside J tube study today prior to initiating feeds  - Pain management as indicated.  - Encourage OOB/ambulation.  - F/u AM labs.    D Team Surgery, [id number]

## 2020-11-24 NOTE — PROGRESS NOTE ADULT - ASSESSMENT
#/p total gastrectomy, splenectomy and distal panc in 2017 admitted with poor PO intake and aperistalsis of esophagus, now on TPN s/p J tube.  Remains stable cv perspective.

## 2020-11-24 NOTE — DISCHARGE NOTE PROVIDER - NSDCMRMEDTOKEN_GEN_ALL_CORE_FT
acetaminophen 325 mg oral tablet: 2 tab(s) orally every 6 hours, As needed, mild to moderate pain  Ambien 5 mg oral tablet: 1 tab(s) orally once a day (at bedtime), As Needed  doxazosin 1 mg oral tablet: 1 tab(s) orally once a day (at bedtime)  Feosol 325 mg (65 mg elemental iron) oral tablet: 1 tab(s) orally 2 times a day  levothyroxine 100 mcg (0.1 mg) oral tablet: 1 tab(s) orally once a day am  Metamucil 400 mg oral capsule: 5 cap(s) orally once a day, As Needed  metFORMIN 500 mg oral tablet, extended release: 1 tab(s) orally once a day am  omeprazole 40 mg oral delayed release capsule: 1 cap(s) orally once a day  oxyCODONE 5 mg oral tablet: 1 tab(s) orally every 4 hours, As Needed for pain MDD:6   pravastatin 20 mg oral tablet: 1 tab(s) orally once a day am   no acetaminophen 325 mg oral tablet: 2 tab(s) orally every 6 hours, As needed, mild to moderate pain  Ambien 5 mg oral tablet: 1 tab(s) orally once a day (at bedtime), As Needed  doxazosin 1 mg oral tablet: 1 tab(s) orally once a day (at bedtime)  escitalopram 10 mg oral tablet: 1 tab(s) orally once a day  Feosol 325 mg (65 mg elemental iron) oral tablet: 1 tab(s) orally 2 times a day  Jejunostomy Tube Care: Flush jejunostomy tube with 10cc sterile water PRE and POST infusion  levothyroxine 100 mcg (0.1 mg) oral tablet: 1 tab(s) orally once a day am  metFORMIN 500 mg oral tablet, extended release: 1 tab(s) orally once a day am  oxyCODONE 5 mg oral tablet: 1 tab(s) orally every 4 hours, As Needed for pain MDD:6   pantoprazole 40 mg oral granule, delayed release: 40 milligram(s) orally once a day   pravastatin 20 mg oral tablet: 1 tab(s) orally once a day am  TUBE FEED: Jevity 1.2 kwan    rate: 80cc/hour    hours (nocturnal): 16 hours    via Jejunostomy tube  Tube Feeding Supply: Dispense one TUBE FEEDING PUMP  TUBE FEEDING SUPPLY: Dispense one IV POLE for tube feeding administration

## 2020-11-24 NOTE — DISCHARGE NOTE PROVIDER - NSDCCPCAREPLAN_GEN_ALL_CORE_FT
PRINCIPAL DISCHARGE DIAGNOSIS  Diagnosis: Gastric mass  Assessment and Plan of Treatment: ulcerated mass in gastric cardia with small perigastric nodes on endoscopy.  s/p jejunostomy

## 2020-11-24 NOTE — DISCHARGE NOTE PROVIDER - HOSPITAL COURSE
71 y/o Male with gastric cancer s/p total gastrectomy, splenectomy, and distal pancreatectomy (Dx. 2017) admitted to D team surgery for poor PO intake. GI consulted s/p manometry report   demonstrating aperistalsis of the esophagus. Patient had a PICC line placed with IR and was started on TPN.   Cardiology consulted and patient deemed in optimal cardiac condition for surgery  On 11/23 patient went to the OR with Dr Banda s/p jejunostomy with J tube. Post op patient started on a clear liquid diet.  .................................... 69 y/o Male with gastric cancer s/p total gastrectomy, splenectomy, and distal pancreatectomy (Dx. 2017) admitted to D team surgery for poor PO intake. GI consulted s/p manometry report   demonstrating aperistalsis of the esophagus. Patient had a PICC line placed with IR and was started on TPN.   Cardiology consulted and patient deemed in optimal cardiac condition for surgery.    On 11/23 patient went to the OR with Dr Banda s/p jejunostomy with J tube.. The patient tolerated the procedure well. Post-operatively the patient was sent to the PACU. The patient was hemodynamically stable and was transferred to a surgical floor. The patient had daily wound care. The patient's pain was controlled by IV pain medications and then by PO pain medications. The patient was advanced to a clear liquid diet and tolerated it well. The patient was placed on home medications. The patient was placed on jejunostomy feeds which he tolerated well.At the time of discharge, the patient was hemodynamically stable, was tolerating PO diet, was voiding urine and passing stool, was ambulating, and was comfortable with adequate pain control. The patient was instructed to follow up with Dr. Banda within 2 weeks after discharge from the hospital. The patient felt comfortable with discharge. The patient had no other issues.

## 2020-11-24 NOTE — DISCHARGE NOTE PROVIDER - NSDCFUADDINST_GEN_ALL_CORE_FT
WOUND CARE: Keep wound clean and dry. You may cover with dry gauze and tape for comfort.  BATHING: Please do not submerge wound underwater. You may shower and/or sponge bathe.  ACTIVITY: No heavy lifting or straining. Resume activities of daily living. Do not lift heavy weights (greater than 15 pounds) or engage in strenuous exercise until you see your doctor in the office in 1-2 weeks. You may shower today, just let the water run over the wound, no scrubbing. No immersion baths or swimming in pools or ocean until you see us in the office again. Please leave the steri-strips (small white bandaids) on. These will remain on and fall off by themselves in the next few weeks. If you are taking narcotic pain medication (such as Percocet), do NOT drive a car, operate machinery or make important decisions.  DIET: Return to your usual diet.  NOTIFY YOUR SURGEON IF: You have any bleeding that does not stop, any pus draining from your wound, any fever (over 100.4 F) or chills, persistent nausea/vomiting, persistent diarrhea, or if your pain is not controlled on your discharge pain medications.  FOLLOW-UP:  1. Please call to make a follow-up appointment within one week of discharge  2. Please follow up with your primary care provider in one week regarding your hospitalization, please bring all discharge paperwork with you to this follow up appointment.

## 2020-11-24 NOTE — PROGRESS NOTE ADULT - SUBJECTIVE AND OBJECTIVE BOX
NUTRITION NOTE  [id number] EVERARDO  ===============================    Interval events - pt was seen and examined at bedside, no acute events overnight; PICC line was placed and TPN started on 2020 for nutritional support, pt is tolerating parenteral nutrition without any issues; TPN remains at goal, pt denies hest pain, shortness or breath, fevers or chills    Allergies  No Known Allergies    PAST MEDICAL & SURGICAL HISTORY:  Constipation  Iron deficiency anemia, unspecified iron deficiency anemia type  Gastric mass - ulcerated mass in gastric cardia with small perigastric nodes on endoscopy.  Hypothyroidism  Type 2 diabetes mellitus without complication, unspecified long term insulin use status no BS monitoring  Essential hypertension was on losartan, now diet control  History of gastric surgery 2017  Port-a-cath in place - right chest wall  H/O umbilical hernia repair    FAMILY HISTORY:  Family history of ischemic heart disease (IHD) (Father)  Family history of ovarian cancer (Mother)    Vital Signs Last 24 Hrs  T(C): 36.8 (2020 05:16), Max: 36.9 (2020 12:35)  T(F): 98.2 (2020 05:16), Max: 98.5 (2020 14:00)  HR: 52 (2020 05:16) (45 - 55)  BP: 125/69 (2020 05:16) (105/50 - 153/98)  BP(mean): 79 (2020 18:30) (63 - 93)  RR: 18 (2020 05:16) (10 - 18)  SpO2: 99% (2020 05:16) (98% - 100%)  Daily Height in cm: 180.34 (2020 16:09)      MEDICATIONS  (STANDING):  acetaminophen  IVPB .. 1000 milliGRAM(s) IV Intermittent every 6 hours  chlorhexidine 2% Cloths 1 Application(s) Topical daily  dextrose 40% Gel 15 Gram(s) Oral once  dextrose 50% Injectable 25 Gram(s) IV Push once  diatrizoate meglumine/diatrizoate sodium. 30 milliLiter(s) Oral once  enoxaparin Injectable 30 milliGRAM(s) SubCutaneous daily  escitalopram 10 milliGRAM(s) Oral daily  fat emulsion (Fish Oil and Plant Based) 20% Infusion 18.75 mL/Hr (18.8 mL/Hr) IV Continuous <Continuous>  glucagon  Injectable 1 milliGRAM(s) IntraMuscular once  insulin lispro (ADMELOG) corrective regimen sliding scale   SubCutaneous three times a day before meals  insulin lispro (ADMELOG) corrective regimen sliding scale   SubCutaneous at bedtime  levothyroxine Injectable 75 MICROGram(s) IV Push at bedtime  melatonin 3 milliGRAM(s) Oral at bedtime  pantoprazole    Tablet 40 milliGRAM(s) Oral before breakfast  Parenteral Nutrition - Adult 1 Each (83 mL/Hr) TPN Continuous <Continuous>  Parenteral Nutrition - Adult 1 Each (83 mL/Hr) TPN Continuous <Continuous>    MEDICATIONS  (PRN):  HYDROmorphone  Injectable 0.5 milliGRAM(s) IV Push every 6 hours PRN Severe Pain (7 - 10)  sodium chloride 0.9% lock flush 10 milliLiter(s) IV Push every 1 hour PRN Pre/post blood products, medications, blood draw, and to maintain line patency    I&O's Detail    2020 07:01  -  2020 07:00  --------------------------------------------------------  IN:    TPN (Total Parenteral Nutrition): 1245 mL  Total IN: 1245 mL    OUT:    Indwelling Catheter - Urethral (mL): 1800 mL  Total OUT: 1800 mL    Total NET: -555 mL    POCT Blood Glucose.: 177 mg/dL (2020 06:27)  POCT Blood Glucose.: 246 mg/dL (2020 23:06)  POCT Blood Glucose.: 181 mg/dL (2020 19:09)  POCT Blood Glucose.: 130 mg/dL (2020 12:08)    Daily Weight in k.9 (2020 12:35)    Drug Dosing Weight  Height (cm): 180.3 (2020 16:09)  Weight (kg): 44 (2020 16:09)  BMI (kg/m2): 13.5 (2020 16:09)  BSA (m2): 1.55 (2020 16:09)    PHYSICAL EXAM   General: A&Ox3, NAD, sitting comfortably in chair   Respiratory: CTA b/l, nonlabored respirations   Cardiovascular: Regular rate & rhythm  Abdominal: Soft, nontender, nondistended, J tube site C/D/I  PICC Site: C/D/I    Diet: NPO and TPN/lipids (started on 2020)    LABORATORY                                                   10.8   11.07 )-----------( 123      ( 2020 06:30 )             33.6   11-24    133<L>  |  101  |  25<H>  ----------------------------<  196<H>  4.6   |  22  |  0.50    Ca    8.6      2020 06:30  Phos  3.0     11-  Mg     2.1         TPro  6.4  /  Alb  3.8  /  TBili  1.0  /  DBili  x   /  AST  26  /  ALT  17  /  AlkPhos  75  11-18    LIVER FUNCTIONS - ( 2020 06:42 )  Alb: 3.8 g/dL / Pro: 6.4 g/dL / ALK PHOS: 75 u/L / ALT: 17 u/L / AST: 26 u/L / GGT: x           11-19 Chol -- LDL -- HDL -- Trig 80    ASSESSMENT/PLAN:  69 y/o male with hx of gastric cancer s/p resection and minh-n-Y esophagojejunostomy who was admitted with complaints of progressively worsening tolerance to PO diet. Patient states that he started to experience poor PO tolerance about two months ago that worsened in the past two weeks. Patient reports about 15 lb weight loss over the past 2-3 months. Patient states that he belches and/or vomits each time he eats something. Nutrition support consult was consult for initiation of parenteral nutrition in view of poor malnutrition status and prolonged period of inadequate caloric intake.  PICC line was placed and TPN started on 2020 for nutritional support. Pt is s/p J tube placement on 2020.    continue TPN with infusion volume of 2 L, TPN will provide 1527 kcal/day  - formula adjusted in view of elevated FS    labs reviewed - electrolytes adjusted appropriately     monitor fingersticks, obtain daily weights    continue parenteral nutrition at this time, will follow up with primary team on plan    1.  Severe protein calorie malnutrition being optimized with TPN: CHO [205] gm.  AA [95] gm. SMOF Lipids [45] gm.  2.  Hyperglycemia managed with: [0] units of regular insulin    3.  Check fluid balance daily.  Strict I/O  [ ] [ ]   4.  Daily BMP, Ionized Calcium, Magnesium and Phosphorous   5.  Triglycerides at initiation of TPN and monthly [ ] [ ]     Nutrition Support [id number] NUTRITION NOTE  [id number] EVERARDO  ===============================    Interval events - pt was seen and examined at bedside, no acute events overnight; PICC line was placed and TPN started on 2020 for nutritional support, pt is tolerating parenteral nutrition without any issues; TPN remains at goal, pt denies hest pain, shortness or breath, fevers or chills    Allergies  No Known Allergies    PAST MEDICAL & SURGICAL HISTORY:  Constipation  Iron deficiency anemia, unspecified iron deficiency anemia type  Gastric mass - ulcerated mass in gastric cardia with small perigastric nodes on endoscopy.  Hypothyroidism  Type 2 diabetes mellitus without complication, unspecified long term insulin use status no BS monitoring  Essential hypertension was on losartan, now diet control  History of gastric surgery 2017  Port-a-cath in place - right chest wall  H/O umbilical hernia repair    FAMILY HISTORY:  Family history of ischemic heart disease (IHD) (Father)  Family history of ovarian cancer (Mother)    Vital Signs Last 24 Hrs  T(C): 36.8 (2020 05:16), Max: 36.9 (2020 12:35)  T(F): 98.2 (2020 05:16), Max: 98.5 (2020 14:00)  HR: 52 (2020 05:16) (45 - 55)  BP: 125/69 (2020 05:16) (105/50 - 153/98)  BP(mean): 79 (2020 18:30) (63 - 93)  RR: 18 (2020 05:16) (10 - 18)  SpO2: 99% (2020 05:16) (98% - 100%)  Daily Height in cm: 180.34 (2020 16:09)      MEDICATIONS  (STANDING):  acetaminophen  IVPB .. 1000 milliGRAM(s) IV Intermittent every 6 hours  chlorhexidine 2% Cloths 1 Application(s) Topical daily  dextrose 40% Gel 15 Gram(s) Oral once  dextrose 50% Injectable 25 Gram(s) IV Push once  diatrizoate meglumine/diatrizoate sodium. 30 milliLiter(s) Oral once  enoxaparin Injectable 30 milliGRAM(s) SubCutaneous daily  escitalopram 10 milliGRAM(s) Oral daily  fat emulsion (Fish Oil and Plant Based) 20% Infusion 18.75 mL/Hr (18.8 mL/Hr) IV Continuous <Continuous>  glucagon  Injectable 1 milliGRAM(s) IntraMuscular once  insulin lispro (ADMELOG) corrective regimen sliding scale   SubCutaneous three times a day before meals  insulin lispro (ADMELOG) corrective regimen sliding scale   SubCutaneous at bedtime  levothyroxine Injectable 75 MICROGram(s) IV Push at bedtime  melatonin 3 milliGRAM(s) Oral at bedtime  pantoprazole    Tablet 40 milliGRAM(s) Oral before breakfast  Parenteral Nutrition - Adult 1 Each (83 mL/Hr) TPN Continuous <Continuous>  Parenteral Nutrition - Adult 1 Each (83 mL/Hr) TPN Continuous <Continuous>    MEDICATIONS  (PRN):  HYDROmorphone  Injectable 0.5 milliGRAM(s) IV Push every 6 hours PRN Severe Pain (7 - 10)  sodium chloride 0.9% lock flush 10 milliLiter(s) IV Push every 1 hour PRN Pre/post blood products, medications, blood draw, and to maintain line patency    I&O's Detail    2020 07:01  -  2020 07:00  --------------------------------------------------------  IN:    TPN (Total Parenteral Nutrition): 1245 mL  Total IN: 1245 mL    OUT:    Indwelling Catheter - Urethral (mL): 1800 mL  Total OUT: 1800 mL    Total NET: -555 mL    POCT Blood Glucose.: 177 mg/dL (2020 06:27)  POCT Blood Glucose.: 246 mg/dL (2020 23:06)  POCT Blood Glucose.: 181 mg/dL (2020 19:09)  POCT Blood Glucose.: 130 mg/dL (2020 12:08)    Daily Weight in k.9 (2020 12:35)    Drug Dosing Weight  Height (cm): 180.3 (2020 16:09)  Weight (kg): 44 (2020 16:09)  BMI (kg/m2): 13.5 (2020 16:09)  BSA (m2): 1.55 (2020 16:09)    PHYSICAL EXAM   General: A&Ox3, NAD, sitting comfortably in chair   Respiratory: CTA b/l, nonlabored respirations   Cardiovascular: Regular rate & rhythm  Abdominal: Soft, nontender, nondistended, J tube site C/D/I  PICC Site: C/D/I    Diet: clear liquids and TPN/lipids (started on 2020)    LABORATORY                                                   10.8   11.07 )-----------( 123      ( 2020 06:30 )             33.6   11-24    133<L>  |  101  |  25<H>  ----------------------------<  196<H>  4.6   |  22  |  0.50    Ca    8.6      2020 06:30  Phos  3.0     11-  Mg     2.1         TPro  6.4  /  Alb  3.8  /  TBili  1.0  /  DBili  x   /  AST  26  /  ALT  17  /  AlkPhos  75  11-18    LIVER FUNCTIONS - ( 2020 06:42 )  Alb: 3.8 g/dL / Pro: 6.4 g/dL / ALK PHOS: 75 u/L / ALT: 17 u/L / AST: 26 u/L / GGT: x           11-19 Chol -- LDL -- HDL -- Trig 80    ASSESSMENT/PLAN:  71 y/o male with hx of gastric cancer s/p resection and minh-n-Y esophagojejunostomy who was admitted with complaints of progressively worsening tolerance to PO diet. Patient states that he started to experience poor PO tolerance about two months ago that worsened in the past two weeks. Patient reports about 15 lb weight loss over the past 2-3 months. Patient states that he belches and/or vomits each time he eats something. Nutrition support consult was consult for initiation of parenteral nutrition in view of poor malnutrition status and prolonged period of inadequate caloric intake.  PICC line was placed and TPN started on 2020 for nutritional support. Pt is s/p J tube placement on 2020.    continue TPN with infusion volume of 2 L, TPN will provide 1527 kcal/day  - formula adjusted in view of elevated FS    labs reviewed - electrolytes adjusted appropriately     monitor fingersticks, obtain daily weights    continue parenteral nutrition at this time, will follow up with primary team on plan    1.  Severe protein calorie malnutrition being optimized with TPN: CHO [205] gm.  AA [95] gm. SMOF Lipids [45] gm.  2.  Hyperglycemia managed with: [0] units of regular insulin    3.  Check fluid balance daily.  Strict I/O  [ ] [ ]   4.  Daily BMP, Ionized Calcium, Magnesium and Phosphorous   5.  Triglycerides at initiation of TPN and monthly [ ] [ ]     Nutrition Support [id number]

## 2020-11-24 NOTE — PROGRESS NOTE ADULT - SUBJECTIVE AND OBJECTIVE BOX
GENERAL SURGERY PROGRESS NOTE    71 y/o Male with gastric cancer s/p total gastrectomy, splenectomy, and distal pancreatectomy (Dx. 2017) admitted for poor PO intake and aperistalsis of the esophagus, now s/p jejunostomy with J tube pod 1.     STATUS POST:  J-tube placement  POST OPERATIVE DAY #: 1      SUBJECTIVE    OVERNIGHT EVENTS:    10-point review of systems completed and negative except as noted above.      OBJECTIVE    MEDICATIONS  acetaminophen  IVPB .. 1000 milliGRAM(s) IV Intermittent every 6 hours  chlorhexidine 2% Cloths 1 Application(s) Topical daily  dextrose 40% Gel 15 Gram(s) Oral once  dextrose 50% Injectable 25 Gram(s) IV Push once  escitalopram 10 milliGRAM(s) Oral daily  fat emulsion (Fish Oil and Plant Based) 20% Infusion 16.6 mL/Hr IV Continuous <Continuous>  glucagon  Injectable 1 milliGRAM(s) IntraMuscular once  HYDROmorphone  Injectable 0.5 milliGRAM(s) IV Push every 6 hours PRN  insulin lispro (ADMELOG) corrective regimen sliding scale   SubCutaneous three times a day before meals  insulin lispro (ADMELOG) corrective regimen sliding scale   SubCutaneous at bedtime  levothyroxine Injectable 75 MICROGram(s) IV Push at bedtime  melatonin 3 milliGRAM(s) Oral at bedtime  pantoprazole    Tablet 40 milliGRAM(s) Oral before breakfast  Parenteral Nutrition - Adult 1 Each TPN Continuous <Continuous>  sodium chloride 0.9% lock flush 10 milliLiter(s) IV Push every 1 hour PRN      PHYSICAL EXAM  T(C): 36.3 (11-23-20 @ 23:50), Max: 36.9 (11-23-20 @ 10:58)  HR: 45 (11-23-20 @ 23:50) (45 - 55)  BP: 116/61 (11-23-20 @ 23:50) (93/59 - 153/98)  RR: 18 (11-23-20 @ 23:50) (10 - 18)  SpO2: 100% (11-23-20 @ 23:50) (96% - 100%)    11-22-20 @ 07:01  -  11-23-20 @ 07:00  --------------------------------------------------------  IN: 1195.2 mL / OUT: 750 mL / NET: 445.2 mL    11-23-20 @ 07:01  -  11-24-20 @ 04:54  --------------------------------------------------------  IN: 747 mL / OUT: 1350 mL / NET: -603 mL        General: Appears well, NAD  Neuro: AAOx3  CHEST: Clear to auscultation bilaterally  CV: Regular rate and rhythm  Abdomen: soft, nontender, nondistended, no rebound or guarding  Extremities: Grossly symmetric    LABS                        12.5   4.47  )-----------( 127      ( 23 Nov 2020 06:34 )             38.6     11-23    136  |  103  |  24<H>  ----------------------------<  127<H>  4.2   |  25  |  0.55    Ca    8.8      23 Nov 2020 06:34  Phos  3.1     11-23  Mg     2.2     11-23      PT/INR - ( 23 Nov 2020 06:34 )   PT: 12.0 SEC;   INR: 1.06          PTT - ( 23 Nov 2020 06:34 )  PTT:32.0 SEC      RADIOLOGY & ADDITIONAL STUDIES GENERAL SURGERY PROGRESS NOTE    69 y/o Male with gastric cancer s/p total gastrectomy, splenectomy, and distal pancreatectomy (Dx. 2017) admitted for poor PO intake and aperistalsis of the esophagus, now s/p jejunostomy with J tube pod 1.     STATUS POST:  J-tube placement  POST OPERATIVE DAY #: 1    24hr events:  - OR for open J tube placement    SUBJECTIVE  Feels well. Reports soreness around J tube site. Denies abdominal pain. Reports some nausea but no emesis.    OBJECTIVE  T(C): 36.3 (11-23-20 @ 23:50), Max: 36.9 (11-23-20 @ 10:58)  HR: 45 (11-23-20 @ 23:50) (45 - 55)  BP: 116/61 (11-23-20 @ 23:50) (93/59 - 153/98)  RR: 18 (11-23-20 @ 23:50) (10 - 18)  SpO2: 100% (11-23-20 @ 23:50) (96% - 100%)    11-22-20 @ 07:01  -  11-23-20 @ 07:00  --------------------------------------------------------  IN: 1195.2 mL / OUT: 750 mL / NET: 445.2 mL    11-23-20 @ 07:01  -  11-24-20 @ 04:54  --------------------------------------------------------  IN: 747 mL / OUT: 1350 mL / NET: -603 mL      Physical Exam:  General: Appears well, NAD  Neuro: AAOx3  Abdomen: soft, appropriately tender, nondistended, no rebound or guarding, incision around J tube c/d/i, J tube clamped    LABS                        12.5   4.47  )-----------( 127      ( 23 Nov 2020 06:34 )             38.6     11-23    136  |  103  |  24<H>  ----------------------------<  127<H>  4.2   |  25  |  0.55    Ca    8.8      23 Nov 2020 06:34  Phos  3.1     11-23  Mg     2.2     11-23      PT/INR - ( 23 Nov 2020 06:34 )   PT: 12.0 SEC;   INR: 1.06          PTT - ( 23 Nov 2020 06:34 )  PTT:32.0 SEC

## 2020-11-24 NOTE — PROGRESS NOTE ADULT - SUBJECTIVE AND OBJECTIVE BOX
CHIEF COMPLAINT:Patient is a 70y old  Male who presents with a chief complaint of Poor PO intake/progressive dysphagia. (24 Nov 2020 11:22)    	        PAST MEDICAL & SURGICAL HISTORY:  Constipation    Iron deficiency anemia, unspecified iron deficiency anemia type    Gastric mass  ulcerated mass in gastric cardia with small perigastric nodes on endoscopy.    Hypothyroidism    Type 2 diabetes mellitus without complication, unspecified long term insulin use status  no BS monitoring    Essential hypertension  was on losartan, now diet control    History of gastric surgery  2017    Port-a-cath in place  right chest wall    H/O umbilical hernia repair            REVIEW OF SYSTEMS:  CONSTITUTIONAL: No fever, weight loss, or fatigue  EYES: No eye pain, visual disturbances, or discharge  NECK: No pain or stiffness  RESPIRATORY: No cough, wheezing, chills or hemoptysis; No Shortness of Breath  CARDIOVASCULAR: No chest pain, palpitations, passing out, dizziness, or leg swelling  GASTROINTESTINAL: mild abd pain / nausea   GENITOURINARY: No dysuria, frequency, hematuria, or incontinence  NEUROLOGICAL: No headaches, memory loss, loss of strength, numbness, or tremors    MUSCULOSKELETAL: No joint pain or swelling; No muscle, back, or extremity pain    Medications:  MEDICATIONS  (STANDING):  chlorhexidine 2% Cloths 1 Application(s) Topical daily  dextrose 40% Gel 15 Gram(s) Oral once  dextrose 50% Injectable 25 Gram(s) IV Push once  diatrizoate meglumine/diatrizoate sodium. 30 milliLiter(s) Oral once  enoxaparin Injectable 30 milliGRAM(s) SubCutaneous daily  escitalopram 10 milliGRAM(s) Oral daily  fat emulsion (Fish Oil and Plant Based) 20% Infusion 18.75 mL/Hr (18.8 mL/Hr) IV Continuous <Continuous>  glucagon  Injectable 1 milliGRAM(s) IntraMuscular once  insulin lispro (ADMELOG) corrective regimen sliding scale   SubCutaneous three times a day before meals  insulin lispro (ADMELOG) corrective regimen sliding scale   SubCutaneous at bedtime  levothyroxine Injectable 75 MICROGram(s) IV Push at bedtime  melatonin 3 milliGRAM(s) Oral at bedtime  pantoprazole    Tablet 40 milliGRAM(s) Oral before breakfast  Parenteral Nutrition - Adult 1 Each (83 mL/Hr) TPN Continuous <Continuous>  Parenteral Nutrition - Adult 1 Each (83 mL/Hr) TPN Continuous <Continuous>    MEDICATIONS  (PRN):  HYDROmorphone  Injectable 0.5 milliGRAM(s) IV Push every 6 hours PRN Severe Pain (7 - 10)  sodium chloride 0.9% lock flush 10 milliLiter(s) IV Push every 1 hour PRN Pre/post blood products, medications, blood draw, and to maintain line patency    	    PHYSICAL EXAM:  T(C): 36.6 (11-24-20 @ 11:21), Max: 36.9 (11-23-20 @ 12:35)  HR: 64 (11-24-20 @ 11:21) (45 - 64)  BP: 111/55 (11-24-20 @ 11:21) (105/50 - 153/98)  RR: 18 (11-24-20 @ 11:21) (10 - 18)  SpO2: 99% (11-24-20 @ 11:21) (98% - 100%)  Wt(kg): --  I&O's Summary    23 Nov 2020 07:01  -  24 Nov 2020 07:00  --------------------------------------------------------  IN: 1245 mL / OUT: 1800 mL / NET: -555 mL        Appearance: Normal	  HEENT:   Normal oral mucosa, PERRL, EOMI	  Lymphatic: No lymphadenopathy  Cardiovascular: Normal S1 S2, No JVD, No murmurs, No edema  Respiratory: Lungs clear to auscultation	  Psychiatry: A & O x 3, Mood & affect appropriate  Gastrointestinal: j tube  Skin: No rashes, No ecchymoses, No cyanosis	  Neurologic: Non-focal  Extremities: Normal range of motion, No clubbing, cyanosis or edema  Vascular: Peripheral pulses palpable 2+ bilaterally    TELEMETRY: 	    ECG:  	  RADIOLOGY:  OTHER: 	  	  LABS:	 	    CARDIAC MARKERS:                                10.8   11.07 )-----------( 123      ( 24 Nov 2020 06:30 )             33.6     11-24    133<L>  |  101  |  25<H>  ----------------------------<  196<H>  4.6   |  22  |  0.50    Ca    8.6      24 Nov 2020 06:30  Phos  3.0     11-24  Mg     2.1     11-24      proBNP:   Lipid Profile:   HgA1c:   TSH:

## 2020-11-24 NOTE — PROGRESS NOTE ADULT - ASSESSMENT
70 y /o Male  w/ hx of Gastric cancer s/p resection and minh-n-Y esophagojejunostomy who now presents with 2 months of worsening PO intake tolerance.    s/p jejunostomy /w j tube  - TPN  - Continue Synthroid  dm stable  fsg riss  htn stable   cards f/u noted  pt w/ resting bradycardia  oob  dvt proph

## 2020-11-24 NOTE — DISCHARGE NOTE PROVIDER - CARE PROVIDER_API CALL
Chema Banda  SURGERY  [address] [place] [address]  Phone: [phone]  Fax: [phone]  Follow Up Time: 2 weeks

## 2020-11-25 LAB
ANION GAP SERPL CALC-SCNC: 9 MMO/L — SIGNIFICANT CHANGE UP (ref 7–14)
BASOPHILS # BLD AUTO: 0.02 K/UL — SIGNIFICANT CHANGE UP (ref 0–0.2)
BASOPHILS NFR BLD AUTO: 0.3 % — SIGNIFICANT CHANGE UP (ref 0–2)
BUN SERPL-MCNC: 28 MG/DL — HIGH (ref 7–23)
CA-I BLD-SCNC: 1.11 MMOL/L — SIGNIFICANT CHANGE UP (ref 1.03–1.23)
CALCIUM SERPL-MCNC: 8.4 MG/DL — SIGNIFICANT CHANGE UP (ref 8.4–10.5)
CHLORIDE SERPL-SCNC: 100 MMOL/L — SIGNIFICANT CHANGE UP (ref 98–107)
CO2 SERPL-SCNC: 24 MMOL/L — SIGNIFICANT CHANGE UP (ref 22–31)
CREAT SERPL-MCNC: 0.6 MG/DL — SIGNIFICANT CHANGE UP (ref 0.5–1.3)
EOSINOPHIL # BLD AUTO: 0.15 K/UL — SIGNIFICANT CHANGE UP (ref 0–0.5)
EOSINOPHIL NFR BLD AUTO: 2 % — SIGNIFICANT CHANGE UP (ref 0–6)
GLUCOSE BLDC GLUCOMTR-MCNC: 137 MG/DL — HIGH (ref 70–99)
GLUCOSE BLDC GLUCOMTR-MCNC: 137 MG/DL — HIGH (ref 70–99)
GLUCOSE BLDC GLUCOMTR-MCNC: 153 MG/DL — HIGH (ref 70–99)
GLUCOSE BLDC GLUCOMTR-MCNC: 181 MG/DL — HIGH (ref 70–99)
GLUCOSE SERPL-MCNC: 134 MG/DL — HIGH (ref 70–99)
HCT VFR BLD CALC: 31.6 % — LOW (ref 39–50)
HGB BLD-MCNC: 10.3 G/DL — LOW (ref 13–17)
IMM GRANULOCYTES NFR BLD AUTO: 0.4 % — SIGNIFICANT CHANGE UP (ref 0–1.5)
LYMPHOCYTES # BLD AUTO: 0.65 K/UL — LOW (ref 1–3.3)
LYMPHOCYTES # BLD AUTO: 8.7 % — LOW (ref 13–44)
MAGNESIUM SERPL-MCNC: 2.2 MG/DL — SIGNIFICANT CHANGE UP (ref 1.6–2.6)
MCHC RBC-ENTMCNC: 32.6 % — SIGNIFICANT CHANGE UP (ref 32–36)
MCHC RBC-ENTMCNC: 34.1 PG — HIGH (ref 27–34)
MCV RBC AUTO: 104.6 FL — HIGH (ref 80–100)
MONOCYTES # BLD AUTO: 0.65 K/UL — SIGNIFICANT CHANGE UP (ref 0–0.9)
MONOCYTES NFR BLD AUTO: 8.7 % — SIGNIFICANT CHANGE UP (ref 2–14)
NEUTROPHILS # BLD AUTO: 5.97 K/UL — SIGNIFICANT CHANGE UP (ref 1.8–7.4)
NEUTROPHILS NFR BLD AUTO: 79.9 % — HIGH (ref 43–77)
NRBC # FLD: 0 K/UL — SIGNIFICANT CHANGE UP (ref 0–0)
PHOSPHATE SERPL-MCNC: 3.1 MG/DL — SIGNIFICANT CHANGE UP (ref 2.5–4.5)
PLATELET # BLD AUTO: 118 K/UL — LOW (ref 150–400)
PMV BLD: 12.6 FL — SIGNIFICANT CHANGE UP (ref 7–13)
POTASSIUM SERPL-MCNC: 4.6 MMOL/L — SIGNIFICANT CHANGE UP (ref 3.5–5.3)
POTASSIUM SERPL-SCNC: 4.6 MMOL/L — SIGNIFICANT CHANGE UP (ref 3.5–5.3)
RBC # BLD: 3.02 M/UL — LOW (ref 4.2–5.8)
RBC # FLD: 14.6 % — HIGH (ref 10.3–14.5)
SODIUM SERPL-SCNC: 133 MMOL/L — LOW (ref 135–145)
WBC # BLD: 7.47 K/UL — SIGNIFICANT CHANGE UP (ref 3.8–10.5)
WBC # FLD AUTO: 7.47 K/UL — SIGNIFICANT CHANGE UP (ref 3.8–10.5)

## 2020-11-25 PROCEDURE — 99232 SBSQ HOSP IP/OBS MODERATE 35: CPT

## 2020-11-25 RX ORDER — ELECTROLYTE SOLUTION,INJ
1 VIAL (ML) INTRAVENOUS
Refills: 0 | Status: DISCONTINUED | OUTPATIENT
Start: 2020-11-25 | End: 2020-11-25

## 2020-11-25 RX ORDER — ACETAMINOPHEN 500 MG
650 TABLET ORAL EVERY 6 HOURS
Refills: 0 | Status: DISCONTINUED | OUTPATIENT
Start: 2020-11-25 | End: 2020-12-01

## 2020-11-25 RX ORDER — I.V. FAT EMULSION 20 G/100ML
18.75 EMULSION INTRAVENOUS
Qty: 45 | Refills: 0 | Status: DISCONTINUED | OUTPATIENT
Start: 2020-11-25 | End: 2020-11-26

## 2020-11-25 RX ORDER — LEVOTHYROXINE SODIUM 125 MCG
100 TABLET ORAL DAILY
Refills: 0 | Status: DISCONTINUED | OUTPATIENT
Start: 2020-11-25 | End: 2020-12-01

## 2020-11-25 RX ORDER — LANOLIN ALCOHOL/MO/W.PET/CERES
3 CREAM (GRAM) TOPICAL AT BEDTIME
Refills: 0 | Status: DISCONTINUED | OUTPATIENT
Start: 2020-11-25 | End: 2020-12-01

## 2020-11-25 RX ORDER — ESCITALOPRAM OXALATE 10 MG/1
10 TABLET, FILM COATED ORAL DAILY
Refills: 0 | Status: DISCONTINUED | OUTPATIENT
Start: 2020-11-25 | End: 2020-12-01

## 2020-11-25 RX ORDER — PANTOPRAZOLE SODIUM 20 MG/1
40 TABLET, DELAYED RELEASE ORAL DAILY
Refills: 0 | Status: DISCONTINUED | OUTPATIENT
Start: 2020-11-25 | End: 2020-12-01

## 2020-11-25 RX ORDER — OXYCODONE HYDROCHLORIDE 5 MG/1
5 TABLET ORAL EVERY 4 HOURS
Refills: 0 | Status: DISCONTINUED | OUTPATIENT
Start: 2020-11-25 | End: 2020-12-01

## 2020-11-25 RX ADMIN — Medication 100 MICROGRAM(S): at 12:12

## 2020-11-25 RX ADMIN — CHLORHEXIDINE GLUCONATE 1 APPLICATION(S): 213 SOLUTION TOPICAL at 12:26

## 2020-11-25 RX ADMIN — Medication 3 MILLIGRAM(S): at 21:42

## 2020-11-25 RX ADMIN — OXYCODONE HYDROCHLORIDE 5 MILLIGRAM(S): 5 TABLET ORAL at 09:09

## 2020-11-25 RX ADMIN — Medication 1 EACH: at 18:31

## 2020-11-25 RX ADMIN — I.V. FAT EMULSION 18.8 ML/HR: 20 EMULSION INTRAVENOUS at 18:32

## 2020-11-25 RX ADMIN — Medication 650 MILLIGRAM(S): at 19:14

## 2020-11-25 RX ADMIN — Medication 650 MILLIGRAM(S): at 18:24

## 2020-11-25 RX ADMIN — Medication 1000 MILLIGRAM(S): at 01:01

## 2020-11-25 RX ADMIN — Medication 650 MILLIGRAM(S): at 13:00

## 2020-11-25 RX ADMIN — ENOXAPARIN SODIUM 30 MILLIGRAM(S): 100 INJECTION SUBCUTANEOUS at 12:12

## 2020-11-25 RX ADMIN — PANTOPRAZOLE SODIUM 40 MILLIGRAM(S): 20 TABLET, DELAYED RELEASE ORAL at 05:20

## 2020-11-25 RX ADMIN — PANTOPRAZOLE SODIUM 40 MILLIGRAM(S): 20 TABLET, DELAYED RELEASE ORAL at 12:12

## 2020-11-25 RX ADMIN — ESCITALOPRAM OXALATE 10 MILLIGRAM(S): 10 TABLET, FILM COATED ORAL at 12:13

## 2020-11-25 RX ADMIN — Medication 650 MILLIGRAM(S): at 12:12

## 2020-11-25 RX ADMIN — OXYCODONE HYDROCHLORIDE 5 MILLIGRAM(S): 5 TABLET ORAL at 10:00

## 2020-11-25 NOTE — PROGRESS NOTE ADULT - SUBJECTIVE AND OBJECTIVE BOX
NUTRITION NOTE  [id number] EVERARDO  ===============================    Interval events - TPN remains at goal and pt is tolerating TPN without any issues; pt denies chest pain, shortness or breath, fevers or chills; Jevity tube feeds will start this morning     Allergies  No Known Allergies    PAST MEDICAL & SURGICAL HISTORY:  Constipation  Iron deficiency anemia, unspecified iron deficiency anemia type  Gastric mass - ulcerated mass in gastric cardia with small perigastric nodes on endoscopy.  Hypothyroidism  Type 2 diabetes mellitus without complication, unspecified long term insulin use status no BS monitoring  Essential hypertension was on losartan, now diet control  History of gastric surgery 2017  Port-a-cath in place - right chest wall  H/O umbilical hernia repair    FAMILY HISTORY:  Family history of ischemic heart disease (IHD) (Father)  Family history of ovarian cancer (Mother)    Vital Signs Last 24 Hrs  T(C): 36.2 (2020 04:00), Max: 36.9 (2020 16:18)  T(F): 97.1 (2020 04:00), Max: 98.4 (2020 16:18)  HR: 51 (2020 04:00) (48 - 64)  BP: 103/53 (2020 04:00) (92/45 - 111/55)  RR: 18 (2020 04:00) (18 - 18)  SpO2: 100% (2020 04:00) (99% - 100%)    MEDICATIONS  (STANDING):  acetaminophen    Suspension .. 650 milliGRAM(s) Oral every 6 hours  chlorhexidine 2% Cloths 1 Application(s) Topical daily  dextrose 40% Gel 15 Gram(s) Oral once  dextrose 50% Injectable 25 Gram(s) IV Push once  enoxaparin Injectable 30 milliGRAM(s) SubCutaneous daily  escitalopram 10 milliGRAM(s) Oral daily  fat emulsion (Fish Oil and Plant Based) 20% Infusion 18.75 mL/Hr (18.8 mL/Hr) IV Continuous <Continuous>  glucagon  Injectable 1 milliGRAM(s) IntraMuscular once  insulin lispro (ADMELOG) corrective regimen sliding scale   SubCutaneous three times a day before meals  insulin lispro (ADMELOG) corrective regimen sliding scale   SubCutaneous at bedtime  levothyroxine 100 MICROGram(s) Oral daily  melatonin 3 milliGRAM(s) Oral at bedtime  pantoprazole   Suspension 40 milliGRAM(s) Oral daily  Parenteral Nutrition - Adult 1 Each (83 mL/Hr) TPN Continuous <Continuous>  Parenteral Nutrition - Adult 1 Each (83 mL/Hr) TPN Continuous <Continuous>  silver nitrate Applicator 1 Application(s) Topical once    MEDICATIONS  (PRN):  oxyCODONE    Solution 5 milliGRAM(s) Oral every 4 hours PRN moderate to severe pain    I&O's Detail    2020 07:01  -  2020 07:00  --------------------------------------------------------  IN:    Fat Emulsion (Fish Oil &amp; Plant Based) 20% Infusion: 131.6 mL    TPN (Total Parenteral Nutrition): 583.1 mL  Total IN: 714.7 mL    OUT:    Voided (mL): 600 mL  Total OUT: 600 mL    Total NET: 114.7 mL    POCT Blood Glucose.: 137 mg/dL (2020 06:08)  POCT Blood Glucose.: 170 mg/dL (2020 21:52)  POCT Blood Glucose.: 148 mg/dL (2020 17:33)  POCT Blood Glucose.: 141 mg/dL (2020 12:30)    Daily Weight in k.9 (2020 12:35)    Drug Dosing Weight  Height (cm): 180.3 (2020 16:09)  Weight (kg): 44 (2020 16:09)  BMI (kg/m2): 13.5 (2020 16:09)  BSA (m2): 1.55 (2020 16:09)    PHYSICAL EXAM   General: A&Ox3, NAD, sitting comfortably in bed  Respiratory: CTA b/l, nonlabored respirations   Cardiovascular: Regular rate & rhythm  Abdominal: Soft, nontender, nondistended, J tube site C/D/I  PICC Site: C/D/I    Diet: clear liquids and TPN/lipids (started on 2020); Jevity tube feeds to start today     LABORATORY                                              10.3   7.47  )-----------( 118      ( 2020 06:22 )             31.6         133<L>  |  100  |  28<H>  ----------------------------<  134<H>  4.6   |  24  |  0.60    Ca    8.4      2020 06:22  Phos  3.1       Mg     2.2         TPro  6.4  /  Alb  3.8  /  TBili  1.0  /  DBili  x   /  AST  26  /  ALT  17  /  AlkPhos  75      LIVER FUNCTIONS - ( 2020 06:42 )  Alb: 3.8 g/dL / Pro: 6.4 g/dL / ALK PHOS: 75 u/L / ALT: 17 u/L / AST: 26 u/L / GGT: x            Chol -- LDL -- HDL -- Trig 80    ASSESSMENT/PLAN:  69 y/o male with hx of gastric cancer s/p resection and minh-n-Y esophagojejunostomy who was admitted with complaints of progressively worsening tolerance to PO diet. Patient states that he started to experience poor PO tolerance about two months ago that worsened in the past two weeks. Patient reports about 15 lb weight loss over the past 2-3 months. Patient states that he belches and/or vomits each time he eats something. Nutrition support consult was consult for initiation of parenteral nutrition in view of poor malnutrition status and prolonged period of inadequate caloric intake.  PICC line was placed and TPN started on 2020 for nutritional support. Pt is s/p J tube placement on 2020.    continue TPN with infusion volume of 2 L, TPN will provide 1527 kcal/day     labs reviewed - electrolytes adjusted appropriately     monitor fingersticks, obtain daily weights    continue parenteral nutrition at this time, will follow up with primary team on plan - plan to initiate tube feeds today, will start weaning off parenteral nutrition when enteral feeds are at goal     1.  Severe protein calorie malnutrition being optimized with TPN: CHO [205] gm.  AA [95] gm. SMOF Lipids [45] gm.  2.  Hyperglycemia managed with: [0] units of regular insulin    3.  Check fluid balance daily.  Strict I/O  [ ] [ ]   4.  Daily BMP, Ionized Calcium, Magnesium and Phosphorous   5.  Triglycerides at initiation of TPN and monthly [ ] [ ]     Nutrition Support [id number]

## 2020-11-25 NOTE — PROGRESS NOTE ADULT - SUBJECTIVE AND OBJECTIVE BOX
D TEAM SURGERY PROGRESS NOTE    STATUS POST:  J-tube placement  POST OPERATIVE DAY #: 2    24hr events:  - Diet advanced to clear liquids for comfort  - Jtube study completed yesterday without signs of extravasation     SUBJECTIVE  Feels well. Happy with "comfort clears". Reports appropriate midline tenderness.     OBJECTIVE  ICU Vital Signs Last 24 Hrs  T(C): 36.2 (25 Nov 2020 04:00), Max: 36.9 (24 Nov 2020 16:18)  T(F): 97.1 (25 Nov 2020 04:00), Max: 98.4 (24 Nov 2020 16:18)  HR: 51 (25 Nov 2020 04:00) (48 - 64)  BP: 103/53 (25 Nov 2020 04:00) (92/45 - 111/55)  RR: 18 (25 Nov 2020 04:00) (18 - 18)  SpO2: 100% (25 Nov 2020 04:00) (99% - 100%)      Physical Exam:  General: Appears well, NAD  Neuro: AAOx3  Abdomen: soft, appropriately tender, nondistended, no rebound or guarding, incision around J tube c/d/i, J tube clamped  *Wound: mild oozing at superior aspect of wound, open area packed with 1" packing.     LABS  CBC (11-25 @ 06:22)                              10.3<L>                         7.47    )----------------(  118<L>     79.9<H>% Neutrophils, 8.7<L>% Lymphocytes, ANC: 5.97                                31.6<L>              CBC (11-24 @ 06:30)                              10.8<L>                         11.07<H>  )----------------(  123<L>     86.7<H>% Neutrophils, 7.1<L>% Lymphocytes, ANC: 9.59<H>                              33.6<L>                BMP (11-25 @ 06:22)             133<L>  |  100     |  28<H> 		Ca++ 1.11    Ca 8.4                ---------------------------------( 134<H>		Mg 2.2                4.6     |  24      |  0.60  			Ph 3.1     BMP (11-24 @ 06:30)             133<L>  |  101     |  25<H> 		Ca++ 1.08    Ca 8.6                ---------------------------------( 196<H>		Mg 2.1                4.6     |  22      |  0.50  			Ph 3.0

## 2020-11-25 NOTE — PROGRESS NOTE ADULT - ASSESSMENT
71 y/o Male with gastric cancer s/p total gastrectomy, splenectomy, and distal pancreatectomy (Dx. 2017) admitted for poor PO intake and aperistalsis of the esophagus, now POD1 s/p jejunostomy with J tube, receiving total parental nutrition and recovering well.    Plan:  - TPN + CLD (for comfort)  - Start J tube feeds at 10cc/h  - Pain management as indicated.  - Encourage OOB/ambulation.  - F/u AM labs.    D Team Surgery, [id number]    69 y/o Male with gastric cancer s/p total gastrectomy, splenectomy, and distal pancreatectomy (Dx. 2017) admitted for poor PO intake and aperistalsis of the esophagus, now POD1 s/p jejunostomy with J tube, receiving total parental nutrition and recovering well.    Plan:  - TPN + CLD (for comfort)  - Start J tube feeds at 20cc/h  - Pain management as indicated.  - Encourage OOB/ambulation.  - F/u AM labs.    D Team Surgery, [id number]

## 2020-11-25 NOTE — PROGRESS NOTE ADULT - ASSESSMENT
70 y /o Male  w/ hx of Gastric cancer s/p resection and minh-n-Y esophagojejunostomy who now presents with 2 months of worsening PO intake tolerance.    s/p jejunostomy /w j tube  - TPN  - Continue Synthroid  dm stable  fsg riss  htn stable   cards f/u noted  pt w/ resting bradycardia  oob  dvt proph   70 y /o Male  w/ hx of Gastric cancer s/p resection and minh-n-Y esophagojejunostomy who now presents with 2 months of worsening PO intake tolerance.    s/p jejunostomy /w j tube  - TPN  feeds as per sx  - Continue Synthroid  dm stable  fsg riss  htn stable   cards f/u noted  pt w/ resting bradycardia  oob  dvt proph

## 2020-11-25 NOTE — PROGRESS NOTE ADULT - SUBJECTIVE AND OBJECTIVE BOX
CHIEF COMPLAINT:Patient is a 70y old  Male who presents with a chief complaint of Poor PO intake/progressive dysphagia. (25 Nov 2020 10:03)    	        PAST MEDICAL & SURGICAL HISTORY:  Constipation    Iron deficiency anemia, unspecified iron deficiency anemia type    Gastric mass  ulcerated mass in gastric cardia with small perigastric nodes on endoscopy.    Hypothyroidism    Type 2 diabetes mellitus without complication, unspecified long term insulin use status  no BS monitoring    Essential hypertension  was on losartan, now diet control    History of gastric surgery  2017    Port-a-cath in place  right chest wall    H/O umbilical hernia repair            REVIEW OF SYSTEMS:  CONSTITUTIONAL: No fever, weight loss, or fatigue  EYES: No eye pain, visual disturbances, or discharge  NECK: No pain or stiffness  RESPIRATORY: No cough, wheezing, chills or hemoptysis; No Shortness of Breath  CARDIOVASCULAR: No chest pain, palpitations, passing out, dizziness, or leg swelling  GASTROINTESTINAL: No abdominal or epigastric pain. No nausea, vomiting, or hematemesis; No diarrhea or constipation. No melena or hematochezia.  GENITOURINARY: No dysuria, frequency, hematuria, or incontinence  NEUROLOGICAL: No headaches, memory loss, loss of strength, numbness, or tremors  SKIN: No itching, burning, rashes, or lesions   LYMPH Nodes: No enlarged glands  ENDOCRINE: No heat or cold intolerance; No hair loss  MUSCULOSKELETAL: No joint pain or swelling; No muscle, back, or extremity pain    Medications:  MEDICATIONS  (STANDING):  acetaminophen    Suspension .. 650 milliGRAM(s) Oral every 6 hours  chlorhexidine 2% Cloths 1 Application(s) Topical daily  dextrose 40% Gel 15 Gram(s) Oral once  dextrose 50% Injectable 25 Gram(s) IV Push once  enoxaparin Injectable 30 milliGRAM(s) SubCutaneous daily  escitalopram 10 milliGRAM(s) Oral daily  fat emulsion (Fish Oil and Plant Based) 20% Infusion 18.75 mL/Hr (18.8 mL/Hr) IV Continuous <Continuous>  glucagon  Injectable 1 milliGRAM(s) IntraMuscular once  insulin lispro (ADMELOG) corrective regimen sliding scale   SubCutaneous three times a day before meals  insulin lispro (ADMELOG) corrective regimen sliding scale   SubCutaneous at bedtime  levothyroxine 100 MICROGram(s) Oral daily  melatonin 3 milliGRAM(s) Oral at bedtime  pantoprazole   Suspension 40 milliGRAM(s) Oral daily  Parenteral Nutrition - Adult 1 Each (83 mL/Hr) TPN Continuous <Continuous>  Parenteral Nutrition - Adult 1 Each (83 mL/Hr) TPN Continuous <Continuous>  silver nitrate Applicator 1 Application(s) Topical once    MEDICATIONS  (PRN):  oxyCODONE    Solution 5 milliGRAM(s) Oral every 4 hours PRN moderate to severe pain    	    PHYSICAL EXAM:  T(C): 36.2 (11-25-20 @ 04:00), Max: 36.9 (11-24-20 @ 16:18)  HR: 51 (11-25-20 @ 04:00) (48 - 51)  BP: 103/53 (11-25-20 @ 04:00) (92/45 - 103/53)  RR: 18 (11-25-20 @ 04:00) (18 - 18)  SpO2: 100% (11-25-20 @ 04:00) (100% - 100%)  Wt(kg): --  I&O's Summary    24 Nov 2020 07:01  -  25 Nov 2020 07:00  --------------------------------------------------------  IN: 714.7 mL / OUT: 600 mL / NET: 114.7 mL        Appearance: Normal	  HEENT:   Normal oral mucosa, PERRL, EOMI	  Lymphatic: No lymphadenopathy  Cardiovascular: Normal S1 S2, No JVD, No murmurs, No edema  Respiratory: Lungs clear to auscultation	  Psychiatry: A & O x 3, Mood & affect appropriate  Gastrointestinal:  Soft, Non-tender, + BS	  Skin: No rashes, No ecchymoses, No cyanosis	  Neurologic: Non-focal  Extremities: Normal range of motion, No clubbing, cyanosis or edema  Vascular: Peripheral pulses palpable 2+ bilaterally    TELEMETRY: 	    ECG:  	  RADIOLOGY:  OTHER: 	  	  LABS:	 	    CARDIAC MARKERS:                                10.3   7.47  )-----------( 118      ( 25 Nov 2020 06:22 )             31.6     11-25    133<L>  |  100  |  28<H>  ----------------------------<  134<H>  4.6   |  24  |  0.60    Ca    8.4      25 Nov 2020 06:22  Phos  3.1     11-25  Mg     2.2     11-25      proBNP:   Lipid Profile:   HgA1c:   TSH:     	           CHIEF COMPLAINT:Patient is a 70y old  Male who presents with a chief complaint of Poor PO intake/progressive dysphagia. (25 Nov 2020 10:03)    	        PAST MEDICAL & SURGICAL HISTORY:  Constipation    Iron deficiency anemia, unspecified iron deficiency anemia type    Gastric mass  ulcerated mass in gastric cardia with small perigastric nodes on endoscopy.    Hypothyroidism    Type 2 diabetes mellitus without complication, unspecified long term insulin use status  no BS monitoring    Essential hypertension  was on losartan, now diet control    History of gastric surgery  2017    Port-a-cath in place  right chest wall    H/O umbilical hernia repair            REVIEW OF SYSTEMS:  CONSTITUTIONAL: No fever, weight loss, or fatigue  EYES: No eye pain, visual disturbances, or discharge  NECK: No pain or stiffness  RESPIRATORY: No cough, wheezing, chills or hemoptysis; No Shortness of Breath  CARDIOVASCULAR: No chest pain, palpitations, passing out, dizziness, or leg swelling  GASTROINTESTINAL:some abd discomfort at site  GENITOURINARY: No dysuria, frequency, hematuria, or incontinence  NEUROLOGICAL: No headaches, memory loss, loss of strength, numbness, or tremors    MUSCULOSKELETAL: No joint pain or swelling; No muscle, back, or extremity pain    Medications:  MEDICATIONS  (STANDING):  acetaminophen    Suspension .. 650 milliGRAM(s) Oral every 6 hours  chlorhexidine 2% Cloths 1 Application(s) Topical daily  dextrose 40% Gel 15 Gram(s) Oral once  dextrose 50% Injectable 25 Gram(s) IV Push once  enoxaparin Injectable 30 milliGRAM(s) SubCutaneous daily  escitalopram 10 milliGRAM(s) Oral daily  fat emulsion (Fish Oil and Plant Based) 20% Infusion 18.75 mL/Hr (18.8 mL/Hr) IV Continuous <Continuous>  glucagon  Injectable 1 milliGRAM(s) IntraMuscular once  insulin lispro (ADMELOG) corrective regimen sliding scale   SubCutaneous three times a day before meals  insulin lispro (ADMELOG) corrective regimen sliding scale   SubCutaneous at bedtime  levothyroxine 100 MICROGram(s) Oral daily  melatonin 3 milliGRAM(s) Oral at bedtime  pantoprazole   Suspension 40 milliGRAM(s) Oral daily  Parenteral Nutrition - Adult 1 Each (83 mL/Hr) TPN Continuous <Continuous>  Parenteral Nutrition - Adult 1 Each (83 mL/Hr) TPN Continuous <Continuous>  silver nitrate Applicator 1 Application(s) Topical once    MEDICATIONS  (PRN):  oxyCODONE    Solution 5 milliGRAM(s) Oral every 4 hours PRN moderate to severe pain    	    PHYSICAL EXAM:  T(C): 36.2 (11-25-20 @ 04:00), Max: 36.9 (11-24-20 @ 16:18)  HR: 51 (11-25-20 @ 04:00) (48 - 51)  BP: 103/53 (11-25-20 @ 04:00) (92/45 - 103/53)  RR: 18 (11-25-20 @ 04:00) (18 - 18)  SpO2: 100% (11-25-20 @ 04:00) (100% - 100%)  Wt(kg): --  I&O's Summary    24 Nov 2020 07:01  -  25 Nov 2020 07:00  --------------------------------------------------------  IN: 714.7 mL / OUT: 600 mL / NET: 114.7 mL        Appearance: Normal	  HEENT:   Normal oral mucosa, PERRL, EOMI	  Lymphatic: No lymphadenopathy  Cardiovascular: Normal S1 S2, No JVD, No murmurs, No edema  Respiratory: Lungs clear to auscultation	  Psychiatry: A & O x 3, Mood & affect appropriate  Gastrointestinal:  Soft,  j tube in place  no r/g    Neurologic: Non-focal  Extremities: Normal range of motion, No clubbing, cyanosis or edema  Vascular: Peripheral pulses palpable 2+ bilaterally    TELEMETRY: 	    ECG:  	  RADIOLOGY:  OTHER: 	  	  LABS:	 	    CARDIAC MARKERS:                                10.3   7.47  )-----------( 118      ( 25 Nov 2020 06:22 )             31.6     11-25    133<L>  |  100  |  28<H>  ----------------------------<  134<H>  4.6   |  24  |  0.60    Ca    8.4      25 Nov 2020 06:22  Phos  3.1     11-25  Mg     2.2     11-25      proBNP:   Lipid Profile:   HgA1c:   TSH:

## 2020-11-26 LAB
ANION GAP SERPL CALC-SCNC: 7 MMO/L — SIGNIFICANT CHANGE UP (ref 7–14)
BASOPHILS # BLD AUTO: 0.02 K/UL — SIGNIFICANT CHANGE UP (ref 0–0.2)
BASOPHILS NFR BLD AUTO: 0.3 % — SIGNIFICANT CHANGE UP (ref 0–2)
BUN SERPL-MCNC: 21 MG/DL — SIGNIFICANT CHANGE UP (ref 7–23)
CA-I BLD-SCNC: 1.14 MMOL/L — SIGNIFICANT CHANGE UP (ref 1.03–1.23)
CALCIUM SERPL-MCNC: 8.6 MG/DL — SIGNIFICANT CHANGE UP (ref 8.4–10.5)
CHLORIDE SERPL-SCNC: 104 MMOL/L — SIGNIFICANT CHANGE UP (ref 98–107)
CO2 SERPL-SCNC: 25 MMOL/L — SIGNIFICANT CHANGE UP (ref 22–31)
CREAT SERPL-MCNC: 0.55 MG/DL — SIGNIFICANT CHANGE UP (ref 0.5–1.3)
EOSINOPHIL # BLD AUTO: 0.22 K/UL — SIGNIFICANT CHANGE UP (ref 0–0.5)
EOSINOPHIL NFR BLD AUTO: 3.8 % — SIGNIFICANT CHANGE UP (ref 0–6)
GLUCOSE BLDC GLUCOMTR-MCNC: 120 MG/DL — HIGH (ref 70–99)
GLUCOSE BLDC GLUCOMTR-MCNC: 149 MG/DL — HIGH (ref 70–99)
GLUCOSE BLDC GLUCOMTR-MCNC: 164 MG/DL — HIGH (ref 70–99)
GLUCOSE SERPL-MCNC: 158 MG/DL — HIGH (ref 70–99)
HCT VFR BLD CALC: 28.5 % — LOW (ref 39–50)
HGB BLD-MCNC: 9.4 G/DL — LOW (ref 13–17)
IMM GRANULOCYTES NFR BLD AUTO: 0.5 % — SIGNIFICANT CHANGE UP (ref 0–1.5)
LYMPHOCYTES # BLD AUTO: 0.51 K/UL — LOW (ref 1–3.3)
LYMPHOCYTES # BLD AUTO: 8.9 % — LOW (ref 13–44)
MAGNESIUM SERPL-MCNC: 2.2 MG/DL — SIGNIFICANT CHANGE UP (ref 1.6–2.6)
MCHC RBC-ENTMCNC: 33 % — SIGNIFICANT CHANGE UP (ref 32–36)
MCHC RBC-ENTMCNC: 34.9 PG — HIGH (ref 27–34)
MCV RBC AUTO: 105.9 FL — HIGH (ref 80–100)
MONOCYTES # BLD AUTO: 0.46 K/UL — SIGNIFICANT CHANGE UP (ref 0–0.9)
MONOCYTES NFR BLD AUTO: 8 % — SIGNIFICANT CHANGE UP (ref 2–14)
NEUTROPHILS # BLD AUTO: 4.48 K/UL — SIGNIFICANT CHANGE UP (ref 1.8–7.4)
NEUTROPHILS NFR BLD AUTO: 78.5 % — HIGH (ref 43–77)
NRBC # FLD: 0 K/UL — SIGNIFICANT CHANGE UP (ref 0–0)
PHOSPHATE SERPL-MCNC: 2.9 MG/DL — SIGNIFICANT CHANGE UP (ref 2.5–4.5)
PLATELET # BLD AUTO: 102 K/UL — LOW (ref 150–400)
PMV BLD: 13.1 FL — HIGH (ref 7–13)
POTASSIUM SERPL-MCNC: 4.5 MMOL/L — SIGNIFICANT CHANGE UP (ref 3.5–5.3)
POTASSIUM SERPL-SCNC: 4.5 MMOL/L — SIGNIFICANT CHANGE UP (ref 3.5–5.3)
RBC # BLD: 2.69 M/UL — LOW (ref 4.2–5.8)
RBC # FLD: 14.8 % — HIGH (ref 10.3–14.5)
SODIUM SERPL-SCNC: 136 MMOL/L — SIGNIFICANT CHANGE UP (ref 135–145)
WBC # BLD: 5.77 K/UL — SIGNIFICANT CHANGE UP (ref 3.8–10.5)
WBC # FLD AUTO: 5.77 K/UL — SIGNIFICANT CHANGE UP (ref 3.8–10.5)

## 2020-11-26 PROCEDURE — 99232 SBSQ HOSP IP/OBS MODERATE 35: CPT

## 2020-11-26 RX ORDER — I.V. FAT EMULSION 20 G/100ML
10.4 EMULSION INTRAVENOUS
Qty: 25 | Refills: 0 | Status: DISCONTINUED | OUTPATIENT
Start: 2020-11-26 | End: 2020-11-27

## 2020-11-26 RX ORDER — ELECTROLYTE SOLUTION,INJ
1 VIAL (ML) INTRAVENOUS
Refills: 0 | Status: DISCONTINUED | OUTPATIENT
Start: 2020-11-26 | End: 2020-11-26

## 2020-11-26 RX ADMIN — I.V. FAT EMULSION 10.4 ML/HR: 20 EMULSION INTRAVENOUS at 18:42

## 2020-11-26 RX ADMIN — Medication 650 MILLIGRAM(S): at 12:30

## 2020-11-26 RX ADMIN — Medication 650 MILLIGRAM(S): at 18:40

## 2020-11-26 RX ADMIN — Medication 650 MILLIGRAM(S): at 11:50

## 2020-11-26 RX ADMIN — ENOXAPARIN SODIUM 30 MILLIGRAM(S): 100 INJECTION SUBCUTANEOUS at 11:49

## 2020-11-26 RX ADMIN — CHLORHEXIDINE GLUCONATE 1 APPLICATION(S): 213 SOLUTION TOPICAL at 11:59

## 2020-11-26 RX ADMIN — Medication 650 MILLIGRAM(S): at 02:48

## 2020-11-26 RX ADMIN — Medication 3 MILLIGRAM(S): at 21:28

## 2020-11-26 RX ADMIN — PANTOPRAZOLE SODIUM 40 MILLIGRAM(S): 20 TABLET, DELAYED RELEASE ORAL at 11:50

## 2020-11-26 RX ADMIN — Medication 650 MILLIGRAM(S): at 17:30

## 2020-11-26 RX ADMIN — Medication 650 MILLIGRAM(S): at 03:18

## 2020-11-26 RX ADMIN — Medication 1 EACH: at 18:42

## 2020-11-26 RX ADMIN — ESCITALOPRAM OXALATE 10 MILLIGRAM(S): 10 TABLET, FILM COATED ORAL at 11:50

## 2020-11-26 RX ADMIN — Medication 100 MICROGRAM(S): at 06:55

## 2020-11-26 NOTE — PROGRESS NOTE ADULT - ASSESSMENT
70 y /o Male  w/ hx of Gastric cancer s/p resection and minh-n-Y esophagojejunostomy who now presents with 2 months of worsening PO intake tolerance.    s/p jejunostomy /w j tube  - TPN  feeds/diet  as per sx  - Continue Synthroid  dm stable  fsg riss  htn stable   cards f/u noted  pt w/ resting bradycardia  oob  dvt proph

## 2020-11-26 NOTE — PROGRESS NOTE ADULT - SUBJECTIVE AND OBJECTIVE BOX
NUTRITION NOTE  [id number] EVERARDO  ===============================    Interval events - pt is tolerating Jevity tube feeds; TPN infusion volume and calories decreased today; pt denies chest pain, shortness or breath, fevers or chills at this time     Allergies  No Known Allergies    PAST MEDICAL & SURGICAL HISTORY:  Constipation  Iron deficiency anemia, unspecified iron deficiency anemia type  Gastric mass - ulcerated mass in gastric cardia with small perigastric nodes on endoscopy.  Hypothyroidism  Type 2 diabetes mellitus without complication, unspecified long term insulin use status no BS monitoring  Essential hypertension was on losartan, now diet control  History of gastric surgery 2017  Port-a-cath in place - right chest wall  H/O umbilical hernia repair    FAMILY HISTORY:  Family history of ischemic heart disease (IHD) (Father)  Family history of ovarian cancer (Mother)    Vital Signs Last 24 Hrs  T(C): 36.4 (2020 06:54), Max: 37.1 (2020 16:32)  T(F): 97.6 (2020 06:54), Max: 98.7 (2020 16:32)  HR: 50 (2020 06:54) (48 - 51)  BP: 110/56 (2020 06:54) (96/45 - 110/56)  RR: 16 (2020 06:54) (16 - 18)  SpO2: 100% (2020 06:54) (99% - 100%)    MEDICATIONS  (STANDING):  acetaminophen    Suspension .. 650 milliGRAM(s) Oral every 6 hours  chlorhexidine 2% Cloths 1 Application(s) Topical daily  dextrose 40% Gel 15 Gram(s) Oral once  dextrose 50% Injectable 25 Gram(s) IV Push once  enoxaparin Injectable 30 milliGRAM(s) SubCutaneous daily  escitalopram 10 milliGRAM(s) Oral daily  fat emulsion (Fish Oil and Plant Based) 20% Infusion 10.4 mL/Hr (10.4 mL/Hr) IV Continuous <Continuous>  glucagon  Injectable 1 milliGRAM(s) IntraMuscular once  insulin lispro (ADMELOG) corrective regimen sliding scale   SubCutaneous three times a day before meals  insulin lispro (ADMELOG) corrective regimen sliding scale   SubCutaneous at bedtime  levothyroxine 100 MICROGram(s) Oral daily  melatonin 3 milliGRAM(s) Oral at bedtime  pantoprazole   Suspension 40 milliGRAM(s) Oral daily  Parenteral Nutrition - Adult 1 Each (83 mL/Hr) TPN Continuous <Continuous>  Parenteral Nutrition - Adult 1 Each (42 mL/Hr) TPN Continuous <Continuous>  silver nitrate Applicator 1 Application(s) Topical once    MEDICATIONS  (PRN):  oxyCODONE    Solution 5 milliGRAM(s) Oral every 4 hours PRN moderate to severe pain    I&O's Detail    2020 07:01  -  2020 07:00  --------------------------------------------------------  IN:    Fat Emulsion (Fish Oil &amp; Plant Based) 20% Infusion: 94 mL    Jevity 1.2: 100 mL    TPN (Total Parenteral Nutrition): 415 mL  Total IN: 609 mL    OUT:    Voided (mL): 2000 mL  Total OUT: 2000 mL    Total NET: -1391 mL    POCT Blood Glucose.: 153 mg/dL (2020 21:56)  POCT Blood Glucose.: 181 mg/dL (2020 18:02)  POCT Blood Glucose.: 137 mg/dL (2020 12:41)    Daily Weight in k.9 (2020 12:35)    Drug Dosing Weight  Height (cm): 180.3 (2020 16:09)  Weight (kg): 44 (2020 16:09)  BMI (kg/m2): 13.5 (2020 16:09)  BSA (m2): 1.55 (2020 16:09)    PHYSICAL EXAM   General: A&Ox3, NAD, sitting comfortably in bed  Respiratory: CTA b/l, nonlabored respirations   Cardiovascular: Regular rate & rhythm  Abdominal: Soft, nontender, nondistended, J tube site C/D/I  PICC Site: C/D/I    Diet: full liquids and TPN/lipids (started on 2020); Jevity tube feeds started on 2020    LABORATORY                                                9.4    5.77  )-----------( 102      ( 2020 05:43 )             28.5       136  |  104  |  21  ----------------------------<  158<H>  4.5   |  25  |  0.55    Ca    8.6      2020 05:43  Phos  2.9       Mg     2.2         TPro  6.4  /  Alb  3.8  /  TBili  1.0  /  DBili  x   /  AST  26  /  ALT  17  /  AlkPhos  75      LIVER FUNCTIONS - ( 2020 06:42 )  Alb: 3.8 g/dL / Pro: 6.4 g/dL / ALK PHOS: 75 u/L / ALT: 17 u/L / AST: 26 u/L / GGT: x            Chol -- LDL -- HDL -- Trig 80    ASSESSMENT/PLAN:  71 y/o male with hx of gastric cancer s/p resection and minh-n-Y esophagojejunostomy who was admitted with complaints of progressively worsening tolerance to PO diet. Patient states that he started to experience poor PO tolerance about two months ago that worsened in the past two weeks. Patient reports about 15 lb weight loss over the past 2-3 months. Patient states that he belches and/or vomits each time he eats something. Nutrition support consult was consult for initiation of parenteral nutrition in view of poor malnutrition status and prolonged period of inadequate caloric intake.  PICC line was placed and TPN started on 2020 for nutritional support. Pt is s/p J tube placement on 2020.    TPN infusion volume decreased to 1 L, TPN will provide 790 kcal/day     labs reviewed - electrolytes adjusted appropriately     monitor fingersticks, obtain daily weights    continue parenteral nutrition at this time, will follow up with primary team on plan - will d/c parenteral nutrition when enteral feeds are at goal     1.  Severe protein calorie malnutrition being optimized with TPN: CHO [100] gm.  AA [50] gm. SMOF Lipids [25] gm.  2.  Hyperglycemia managed with: [0] units of regular insulin    3.  Check fluid balance daily.  Strict I/O  [ ] [ ]   4.  Daily BMP, Ionized Calcium, Magnesium and Phosphorous   5.  Triglycerides at initiation of TPN and monthly [ ] [ ]     Nutrition Support [id number]

## 2020-11-26 NOTE — PROGRESS NOTE ADULT - ASSESSMENT
71 y/o Male with gastric cancer s/p total gastrectomy, splenectomy, and distal pancreatectomy (Dx. 2017) admitted for poor PO intake and aperistalsis of the esophagus, now POD3 s/p jejunostomy with J tube, receiving total parental nutrition and recovering well.    Plan:  - TPN + CLD (for comfort)  - Increase J tube feeds to 30cc/hr  - Pain management as indicated.  - Encourage OOB/ambulation.  - F/u AM labs.    D Team Surgery, [id number]

## 2020-11-26 NOTE — PROGRESS NOTE ADULT - SUBJECTIVE AND OBJECTIVE BOX
POD #3    24hr events:  - TF started    Overnight events:   - No acute events    SUBJECTIVE:  Reports feeling well. Endorses some abdominal discomfort but no nausea or vomiting. Has been OOB. Reports flatus and BM.    OBJECTIVE:  Vital Signs Last 24 Hrs  T(C): 36.4 (26 Nov 2020 06:54), Max: 37.1 (25 Nov 2020 16:32)  T(F): 97.6 (26 Nov 2020 06:54), Max: 98.7 (25 Nov 2020 16:32)  HR: 50 (26 Nov 2020 06:54) (48 - 51)  BP: 110/56 (26 Nov 2020 06:54) (96/45 - 110/56)  BP(mean): --  RR: 16 (26 Nov 2020 06:54) (16 - 18)  SpO2: 100% (26 Nov 2020 06:54) (99% - 100%)    Physical Examination:  GEN: NAD, resting quietly  PULM: symmetric chest rise bilaterally, no increased WOB  ABD: soft, nontender, nondistended, no rebound or guarding, incision CDI, J tube incision clean  EXTR: no LE erythema, moving all extremities      LABS:                        9.4    5.77  )-----------( 102      ( 26 Nov 2020 05:43 )             28.5       11-26    136  |  104  |  21  ----------------------------<  158<H>  4.5   |  25  |  0.55    Ca    8.6      26 Nov 2020 05:43  Phos  2.9     11-26  Mg     2.2     11-26           POD #3      24hr events:  - TF started    Overnight events:   - No acute events    SUBJECTIVE:  Reports feeling well. Endorses some abdominal discomfort but no nausea or vomiting. Has been OOB. Reports flatus and BM.    OBJECTIVE:  Vital Signs Last 24 Hrs  T(C): 36.4 (26 Nov 2020 06:54), Max: 37.1 (25 Nov 2020 16:32)  T(F): 97.6 (26 Nov 2020 06:54), Max: 98.7 (25 Nov 2020 16:32)  HR: 50 (26 Nov 2020 06:54) (48 - 51)  BP: 110/56 (26 Nov 2020 06:54) (96/45 - 110/56)  BP(mean): --  RR: 16 (26 Nov 2020 06:54) (16 - 18)  SpO2: 100% (26 Nov 2020 06:54) (99% - 100%)    Physical Examination:  GEN: NAD, resting quietly  PULM: symmetric chest rise bilaterally, no increased WOB  ABD: soft, nontender, nondistended, no rebound or guarding, incision CDI, J tube incision clean  EXTR: no LE erythema, moving all extremities      LABS:                        9.4    5.77  )-----------( 102      ( 26 Nov 2020 05:43 )             28.5       11-26    136  |  104  |  21  ----------------------------<  158<H>  4.5   |  25  |  0.55    Ca    8.6      26 Nov 2020 05:43  Phos  2.9     11-26  Mg     2.2     11-26

## 2020-11-26 NOTE — PROGRESS NOTE ADULT - SUBJECTIVE AND OBJECTIVE BOX
CHIEF COMPLAINT:Patient is a 70y old  Male who presents with a chief complaint of Poor PO intake/progressive dysphagia. (26 Nov 2020 10:33)    	        PAST MEDICAL & SURGICAL HISTORY:  Constipation    Iron deficiency anemia, unspecified iron deficiency anemia type    Gastric mass  ulcerated mass in gastric cardia with small perigastric nodes on endoscopy.    Hypothyroidism    Type 2 diabetes mellitus without complication, unspecified long term insulin use status  no BS monitoring    Essential hypertension  was on losartan, now diet control    History of gastric surgery  2017    Port-a-cath in place  right chest wall    H/O umbilical hernia repair            REVIEW OF SYSTEMS:  CONSTITUTIONAL: No fever, weight loss, or fatigue  EYES: No eye pain, visual disturbances, or discharge  NECK: No pain or stiffness  RESPIRATORY: No cough, wheezing, chills or hemoptysis; No Shortness of Breath  CARDIOVASCULAR: No chest pain, palpitations, passing out, dizziness, or leg swelling  GASTROINTESTINAL:abd pain better  GENITOURINARY: No dysuria, frequency, hematuria, or incontinence  NEUROLOGICAL: No headaches, memory loss, loss of strength, numbness, or tremors    MUSCULOSKELETAL: No joint pain or swelling; No muscle, back, or extremity pain    Medications:  MEDICATIONS  (STANDING):  acetaminophen    Suspension .. 650 milliGRAM(s) Oral every 6 hours  chlorhexidine 2% Cloths 1 Application(s) Topical daily  dextrose 40% Gel 15 Gram(s) Oral once  dextrose 50% Injectable 25 Gram(s) IV Push once  enoxaparin Injectable 30 milliGRAM(s) SubCutaneous daily  escitalopram 10 milliGRAM(s) Oral daily  fat emulsion (Fish Oil and Plant Based) 20% Infusion 10.4 mL/Hr (10.4 mL/Hr) IV Continuous <Continuous>  glucagon  Injectable 1 milliGRAM(s) IntraMuscular once  insulin lispro (ADMELOG) corrective regimen sliding scale   SubCutaneous three times a day before meals  insulin lispro (ADMELOG) corrective regimen sliding scale   SubCutaneous at bedtime  levothyroxine 100 MICROGram(s) Oral daily  melatonin 3 milliGRAM(s) Oral at bedtime  pantoprazole   Suspension 40 milliGRAM(s) Oral daily  Parenteral Nutrition - Adult 1 Each (83 mL/Hr) TPN Continuous <Continuous>  Parenteral Nutrition - Adult 1 Each (42 mL/Hr) TPN Continuous <Continuous>  silver nitrate Applicator 1 Application(s) Topical once    MEDICATIONS  (PRN):  oxyCODONE    Solution 5 milliGRAM(s) Oral every 4 hours PRN moderate to severe pain    	    PHYSICAL EXAM:  T(C): 36.4 (11-26-20 @ 06:54), Max: 37.1 (11-25-20 @ 16:32)  HR: 50 (11-26-20 @ 06:54) (48 - 50)  BP: 110/56 (11-26-20 @ 06:54) (96/45 - 110/56)  RR: 16 (11-26-20 @ 06:54) (16 - 18)  SpO2: 100% (11-26-20 @ 06:54) (99% - 100%)  Wt(kg): --  I&O's Summary    25 Nov 2020 07:01  -  26 Nov 2020 07:00  --------------------------------------------------------  IN: 609 mL / OUT: 2000 mL / NET: -1391 mL        Appearance: Normal	  HEENT:   Normal oral mucosa, PERRL, EOMI	  Lymphatic: No lymphadenopathy  Cardiovascular: Normal S1 S2, No JVD, No murmurs, No edema  Respiratory: Lungs clear to auscultation	  Gastrointestinal:  Soft, Non-tender, + BS	/j tube  Skin: No rashes, No ecchymoses, No cyanosis	  Neurologic: Non-focal  Extremities: Normal range of motion, No clubbing, cyanosis or edema  Vascular: Peripheral pulses palpable 2+ bilaterally    TELEMETRY: 	    ECG:  	  RADIOLOGY:  OTHER: 	  	  LABS:	 	    CARDIAC MARKERS:                                9.4    5.77  )-----------( 102      ( 26 Nov 2020 05:43 )             28.5     11-26    136  |  104  |  21  ----------------------------<  158<H>  4.5   |  25  |  0.55    Ca    8.6      26 Nov 2020 05:43  Phos  2.9     11-26  Mg     2.2     11-26      proBNP:   Lipid Profile:   HgA1c:   TSH:

## 2020-11-27 LAB
ANION GAP SERPL CALC-SCNC: 5 MMO/L — LOW (ref 7–14)
BASOPHILS # BLD AUTO: 0.05 K/UL — SIGNIFICANT CHANGE UP (ref 0–0.2)
BASOPHILS NFR BLD AUTO: 1.2 % — SIGNIFICANT CHANGE UP (ref 0–2)
BUN SERPL-MCNC: 18 MG/DL — SIGNIFICANT CHANGE UP (ref 7–23)
CA-I BLD-SCNC: 1.17 MMOL/L — SIGNIFICANT CHANGE UP (ref 1.03–1.23)
CALCIUM SERPL-MCNC: 8.6 MG/DL — SIGNIFICANT CHANGE UP (ref 8.4–10.5)
CHLORIDE SERPL-SCNC: 102 MMOL/L — SIGNIFICANT CHANGE UP (ref 98–107)
CO2 SERPL-SCNC: 27 MMOL/L — SIGNIFICANT CHANGE UP (ref 22–31)
CREAT SERPL-MCNC: 0.5 MG/DL — SIGNIFICANT CHANGE UP (ref 0.5–1.3)
EOSINOPHIL # BLD AUTO: 0.31 K/UL — SIGNIFICANT CHANGE UP (ref 0–0.5)
EOSINOPHIL NFR BLD AUTO: 7.2 % — HIGH (ref 0–6)
GLUCOSE BLDC GLUCOMTR-MCNC: 108 MG/DL — HIGH (ref 70–99)
GLUCOSE BLDC GLUCOMTR-MCNC: 117 MG/DL — HIGH (ref 70–99)
GLUCOSE BLDC GLUCOMTR-MCNC: 145 MG/DL — HIGH (ref 70–99)
GLUCOSE BLDC GLUCOMTR-MCNC: 155 MG/DL — HIGH (ref 70–99)
GLUCOSE SERPL-MCNC: 147 MG/DL — HIGH (ref 70–99)
HCT VFR BLD CALC: 30.6 % — LOW (ref 39–50)
HGB BLD-MCNC: 10 G/DL — LOW (ref 13–17)
IMM GRANULOCYTES NFR BLD AUTO: 0.5 % — SIGNIFICANT CHANGE UP (ref 0–1.5)
LYMPHOCYTES # BLD AUTO: 0.8 K/UL — LOW (ref 1–3.3)
LYMPHOCYTES # BLD AUTO: 18.5 % — SIGNIFICANT CHANGE UP (ref 13–44)
MAGNESIUM SERPL-MCNC: 2.3 MG/DL — SIGNIFICANT CHANGE UP (ref 1.6–2.6)
MCHC RBC-ENTMCNC: 32.7 % — SIGNIFICANT CHANGE UP (ref 32–36)
MCHC RBC-ENTMCNC: 34.7 PG — HIGH (ref 27–34)
MCV RBC AUTO: 106.3 FL — HIGH (ref 80–100)
MONOCYTES # BLD AUTO: 0.43 K/UL — SIGNIFICANT CHANGE UP (ref 0–0.9)
MONOCYTES NFR BLD AUTO: 9.9 % — SIGNIFICANT CHANGE UP (ref 2–14)
NEUTROPHILS # BLD AUTO: 2.72 K/UL — SIGNIFICANT CHANGE UP (ref 1.8–7.4)
NEUTROPHILS NFR BLD AUTO: 62.7 % — SIGNIFICANT CHANGE UP (ref 43–77)
NRBC # FLD: 0 K/UL — SIGNIFICANT CHANGE UP (ref 0–0)
PHOSPHATE SERPL-MCNC: 2.9 MG/DL — SIGNIFICANT CHANGE UP (ref 2.5–4.5)
PLATELET # BLD AUTO: 132 K/UL — LOW (ref 150–400)
PMV BLD: 12.9 FL — SIGNIFICANT CHANGE UP (ref 7–13)
POTASSIUM SERPL-MCNC: 4.6 MMOL/L — SIGNIFICANT CHANGE UP (ref 3.5–5.3)
POTASSIUM SERPL-SCNC: 4.6 MMOL/L — SIGNIFICANT CHANGE UP (ref 3.5–5.3)
RBC # BLD: 2.88 M/UL — LOW (ref 4.2–5.8)
RBC # FLD: 14.8 % — HIGH (ref 10.3–14.5)
SODIUM SERPL-SCNC: 134 MMOL/L — LOW (ref 135–145)
WBC # BLD: 4.33 K/UL — SIGNIFICANT CHANGE UP (ref 3.8–10.5)
WBC # FLD AUTO: 4.33 K/UL — SIGNIFICANT CHANGE UP (ref 3.8–10.5)

## 2020-11-27 PROCEDURE — 99232 SBSQ HOSP IP/OBS MODERATE 35: CPT

## 2020-11-27 RX ORDER — I.V. FAT EMULSION 20 G/100ML
10.4 EMULSION INTRAVENOUS
Qty: 25 | Refills: 0 | Status: DISCONTINUED | OUTPATIENT
Start: 2020-11-27 | End: 2020-11-28

## 2020-11-27 RX ORDER — ELECTROLYTE SOLUTION,INJ
1 VIAL (ML) INTRAVENOUS
Refills: 0 | Status: DISCONTINUED | OUTPATIENT
Start: 2020-11-27 | End: 2020-11-27

## 2020-11-27 RX ADMIN — Medication 650 MILLIGRAM(S): at 22:23

## 2020-11-27 RX ADMIN — I.V. FAT EMULSION 10.4 ML/HR: 20 EMULSION INTRAVENOUS at 18:07

## 2020-11-27 RX ADMIN — ESCITALOPRAM OXALATE 10 MILLIGRAM(S): 10 TABLET, FILM COATED ORAL at 12:05

## 2020-11-27 RX ADMIN — PANTOPRAZOLE SODIUM 40 MILLIGRAM(S): 20 TABLET, DELAYED RELEASE ORAL at 12:06

## 2020-11-27 RX ADMIN — Medication 100 MICROGRAM(S): at 06:06

## 2020-11-27 RX ADMIN — Medication 650 MILLIGRAM(S): at 12:06

## 2020-11-27 RX ADMIN — Medication 650 MILLIGRAM(S): at 06:07

## 2020-11-27 RX ADMIN — Medication 2: at 09:19

## 2020-11-27 RX ADMIN — Medication 650 MILLIGRAM(S): at 14:23

## 2020-11-27 RX ADMIN — Medication 0: at 22:16

## 2020-11-27 RX ADMIN — ENOXAPARIN SODIUM 30 MILLIGRAM(S): 100 INJECTION SUBCUTANEOUS at 12:05

## 2020-11-27 RX ADMIN — Medication 650 MILLIGRAM(S): at 06:37

## 2020-11-27 RX ADMIN — CHLORHEXIDINE GLUCONATE 1 APPLICATION(S): 213 SOLUTION TOPICAL at 12:08

## 2020-11-27 RX ADMIN — Medication 3 MILLIGRAM(S): at 22:23

## 2020-11-27 RX ADMIN — Medication 1 EACH: at 18:08

## 2020-11-27 NOTE — PROGRESS NOTE ADULT - ASSESSMENT
69 y/o Male with gastric cancer s/p total gastrectomy, splenectomy, and distal pancreatectomy (Dx. 2017) admitted for poor PO intake and aperistalsis of the esophagus, now POD4 s/p jejunostomy with J tube, receiving total parental nutrition and recovering well.    Plan:  - TPN (halved) + CLD (for comfort)  - Increase J tube feeds to goal 65ml/hr  - Pain management as indicated.  - Encourage OOB/ambulation.  - F/u AM labs.  - Discharge planning with J-tube feeds    D Team Surgery, [id number]

## 2020-11-27 NOTE — PROGRESS NOTE ADULT - SUBJECTIVE AND OBJECTIVE BOX
D TEAM SURGERY PROGRESS NOTE    POST OPERATIVE DAY #: 4 s/p open jejunostomy tube    SUBJECTIVE: Patient seen and examined at bedside on AM rounds, patient without complaints. Ate ice cream yesterday, tolerating liquid diet without emesis or nausea. Has been independently ambulating. Tolerating tube feeds without distension or pain. Passing flatus, had three bowel movements yesterday. Denies chest pain/SOB. Denies nausea/emesis.     Vital Signs Last 24 Hrs  T(C): 36.7 (27 Nov 2020 04:54), Max: 37.1 (26 Nov 2020 19:24)  T(F): 98.1 (27 Nov 2020 04:54), Max: 98.7 (26 Nov 2020 19:24)  HR: 49 (27 Nov 2020 04:54) (49 - 50)  BP: 120/64 (27 Nov 2020 04:54) (97/52 - 120/64)  BP(mean): --  RR: 16 (27 Nov 2020 04:54) (16 - 18)  SpO2: 97% (27 Nov 2020 04:54) (97% - 100%)  I&O's Detail    26 Nov 2020 07:01  -  27 Nov 2020 07:00  --------------------------------------------------------  IN:    Fat Emulsion (Fish Oil &amp; Plant Based) 20% Infusion: 124.8 mL    Jevity 1.2: 360 mL    TPN (Total Parenteral Nutrition): 504 mL  Total IN: 988.8 mL    OUT:    Voided (mL): 600 mL  Total OUT: 600 mL    Total NET: 388.8 mL      MEDICATIONS  (STANDING):  acetaminophen    Suspension .. 650 milliGRAM(s) Oral every 6 hours  chlorhexidine 2% Cloths 1 Application(s) Topical daily  dextrose 40% Gel 15 Gram(s) Oral once  dextrose 50% Injectable 25 Gram(s) IV Push once  enoxaparin Injectable 30 milliGRAM(s) SubCutaneous daily  escitalopram 10 milliGRAM(s) Oral daily  fat emulsion (Fish Oil and Plant Based) 20% Infusion 10.4 mL/Hr (10.4 mL/Hr) IV Continuous <Continuous>  glucagon  Injectable 1 milliGRAM(s) IntraMuscular once  insulin lispro (ADMELOG) corrective regimen sliding scale   SubCutaneous at bedtime  insulin lispro (ADMELOG) corrective regimen sliding scale   SubCutaneous three times a day before meals  levothyroxine 100 MICROGram(s) Oral daily  melatonin 3 milliGRAM(s) Oral at bedtime  pantoprazole   Suspension 40 milliGRAM(s) Oral daily  Parenteral Nutrition - Adult 1 Each (42 mL/Hr) TPN Continuous <Continuous>  silver nitrate Applicator 1 Application(s) Topical once    MEDICATIONS  (PRN):  oxyCODONE    Solution 5 milliGRAM(s) Oral every 4 hours PRN moderate to severe pain      Physical Exam  General: A&Ox3, NAD  Respiratory: Clear bilaterally, equal bilateral expansion  Cardiovascular: Regular rate & rhythm  Abdominal: Soft, non-tender, non-distended, Jtube in place without surrounding erythema or palpable collection    LABS:                        10.0   4.33  )-----------( 132      ( 27 Nov 2020 05:47 )             30.6     11-27    134<L>  |  102  |  18  ----------------------------<  147<H>  4.6   |  27  |  0.50    Ca    8.6      27 Nov 2020 05:47  Phos  2.9     11-27  Mg     2.3     11-27

## 2020-11-27 NOTE — PROGRESS NOTE ADULT - SUBJECTIVE AND OBJECTIVE BOX
NUTRITION NOTE  [id number] EVERARDO  ===============================    Interval events - pt is tolerating Jevity tube feeds, plan to increase tube feeds to goal today; TPN infusion volume and calories remains at half today; pt denies chest pain, shortness or breath, fevers or chills at this time     Allergies  No Known Allergies    PAST MEDICAL & SURGICAL HISTORY:  Constipation  Iron deficiency anemia, unspecified iron deficiency anemia type  Gastric mass - ulcerated mass in gastric cardia with small perigastric nodes on endoscopy.  Hypothyroidism  Type 2 diabetes mellitus without complication, unspecified long term insulin use status no BS monitoring  Essential hypertension was on losartan, now diet control  History of gastric surgery 2017  Port-a-cath in place - right chest wall  H/O umbilical hernia repair    FAMILY HISTORY:  Family history of ischemic heart disease (IHD) (Father)  Family history of ovarian cancer (Mother)    Vital Signs Last 24 Hrs  T(C): 36.9 (2020 09:37), Max: 37.1 (2020 19:24)  T(F): 98.5 (2020 09:37), Max: 98.7 (2020 19:24)  HR: 51 (2020 09:37) (49 - 51)  BP: 113/57 (2020 09:37) (97/52 - 120/64)  RR: 14 (2020 09:37) (14 - 18)  SpO2: 100% (2020 09:37) (97% - 100%)    MEDICATIONS  (STANDING):  acetaminophen    Suspension .. 650 milliGRAM(s) Oral every 6 hours  chlorhexidine 2% Cloths 1 Application(s) Topical daily  dextrose 40% Gel 15 Gram(s) Oral once  dextrose 50% Injectable 25 Gram(s) IV Push once  enoxaparin Injectable 30 milliGRAM(s) SubCutaneous daily  escitalopram 10 milliGRAM(s) Oral daily  fat emulsion (Fish Oil and Plant Based) 20% Infusion 10.4 mL/Hr (10.4 mL/Hr) IV Continuous <Continuous>  glucagon  Injectable 1 milliGRAM(s) IntraMuscular once  insulin lispro (ADMELOG) corrective regimen sliding scale   SubCutaneous three times a day before meals  insulin lispro (ADMELOG) corrective regimen sliding scale   SubCutaneous at bedtime  levothyroxine 100 MICROGram(s) Oral daily  melatonin 3 milliGRAM(s) Oral at bedtime  pantoprazole   Suspension 40 milliGRAM(s) Oral daily  Parenteral Nutrition - Adult 1 Each (42 mL/Hr) TPN Continuous <Continuous>  Parenteral Nutrition - Adult 1 Each (42 mL/Hr) TPN Continuous <Continuous>  silver nitrate Applicator 1 Application(s) Topical once    MEDICATIONS  (PRN):  oxyCODONE    Solution 5 milliGRAM(s) Oral every 4 hours PRN moderate to severe pain    I&O's Detail    2020 07:01  -  2020 07:00  --------------------------------------------------------  IN:    Fat Emulsion (Fish Oil &amp; Plant Based) 20% Infusion: 124.8 mL    Jevity 1.2: 360 mL    TPN (Total Parenteral Nutrition): 504 mL  Total IN: 988.8 mL    OUT:    Voided (mL): 600 mL  Total OUT: 600 mL    Total NET: 388.8 mL    POCT Blood Glucose.: 155 mg/dL (2020 09:07)  POCT Blood Glucose.: 164 mg/dL (2020 21:39)  POCT Blood Glucose.: 120 mg/dL (2020 18:01)  POCT Blood Glucose.: 149 mg/dL (2020 12:01)    Daily Weight in k.9 (2020 12:35)    Drug Dosing Weight  Height (cm): 180.3 (2020 16:09)  Weight (kg): 44 (2020 16:09)  BMI (kg/m2): 13.5 (2020 16:09)  BSA (m2): 1.55 (2020 16:09)    PHYSICAL EXAM   General: A&Ox3, NAD, sitting comfortably in bed  Respiratory: CTA b/l, nonlabored respirations   Cardiovascular: Regular rate & rhythm  Abdominal: Soft, nontender, nondistended, J tube site C/D/I  PICC Site: C/D/I    Diet: full liquids and TPN/lipids (started on 2020); Jevity tube feeds started on 2020    LABORATORY                                           10.0   4.33  )-----------( 132      ( 2020 05:47 )             30.6       134<L>  |  102  |  18  ----------------------------<  147<H>  4.6   |  27  |  0.50    Ca    8.6      2020 05:47  Phos  2.9       Mg     2.3         TPro  6.4  /  Alb  3.8  /  TBili  1.0  /  DBili  x   /  AST  26  /  ALT  17  /  AlkPhos  75      LIVER FUNCTIONS - ( 2020 06:42 )  Alb: 3.8 g/dL / Pro: 6.4 g/dL / ALK PHOS: 75 u/L / ALT: 17 u/L / AST: 26 u/L / GGT: x            Chol -- LDL -- HDL -- Trig 80    ASSESSMENT/PLAN:  71 y/o male with hx of gastric cancer s/p resection and minh-n-Y esophagojejunostomy who was admitted with complaints of progressively worsening tolerance to PO diet. Patient states that he started to experience poor PO tolerance about two months ago that worsened in the past two weeks. Patient reports about 15 lb weight loss over the past 2-3 months. Patient states that he belches and/or vomits each time he eats something. Nutrition support consult was consult for initiation of parenteral nutrition in view of poor malnutrition status and prolonged period of inadequate caloric intake.  PICC line was placed and TPN started on 2020 for nutritional support. Pt is s/p J tube placement on 2020.    TPN infusion volume decreased to 1 L, TPN will provide 790 kcal/day     labs reviewed - continue same TPN formula today     monitor fingersticks, obtain daily weights    continue parenteral nutrition at this time, will follow up with primary team on plan - plan to increase Jevity tube feeds to goal today and d/c parenteral nutrition tomorrow     1.  Severe protein calorie malnutrition being optimized with TPN: CHO [100] gm.  AA [50] gm. SMOF Lipids [25] gm.  2.  Hyperglycemia managed with: [0] units of regular insulin    3.  Check fluid balance daily.  Strict I/O  [ ] [ ]   4.  Daily BMP, Ionized Calcium, Magnesium and Phosphorous   5.  Triglycerides at initiation of TPN and monthly [ ] [ ]     Nutrition Support [id number]

## 2020-11-28 LAB
ANION GAP SERPL CALC-SCNC: 8 MMO/L — SIGNIFICANT CHANGE UP (ref 7–14)
ANION GAP SERPL CALC-SCNC: 8 MMO/L — SIGNIFICANT CHANGE UP (ref 7–14)
BASOPHILS # BLD AUTO: 0.06 K/UL — SIGNIFICANT CHANGE UP (ref 0–0.2)
BASOPHILS NFR BLD AUTO: 1.1 % — SIGNIFICANT CHANGE UP (ref 0–2)
BUN SERPL-MCNC: 19 MG/DL — SIGNIFICANT CHANGE UP (ref 7–23)
BUN SERPL-MCNC: 19 MG/DL — SIGNIFICANT CHANGE UP (ref 7–23)
CA-I BLD-SCNC: 1.17 MMOL/L — SIGNIFICANT CHANGE UP (ref 1.03–1.23)
CALCIUM SERPL-MCNC: 8.7 MG/DL — SIGNIFICANT CHANGE UP (ref 8.4–10.5)
CALCIUM SERPL-MCNC: 8.7 MG/DL — SIGNIFICANT CHANGE UP (ref 8.4–10.5)
CHLORIDE SERPL-SCNC: 102 MMOL/L — SIGNIFICANT CHANGE UP (ref 98–107)
CHLORIDE SERPL-SCNC: 102 MMOL/L — SIGNIFICANT CHANGE UP (ref 98–107)
CO2 SERPL-SCNC: 26 MMOL/L — SIGNIFICANT CHANGE UP (ref 22–31)
CO2 SERPL-SCNC: 26 MMOL/L — SIGNIFICANT CHANGE UP (ref 22–31)
CREAT SERPL-MCNC: 0.55 MG/DL — SIGNIFICANT CHANGE UP (ref 0.5–1.3)
CREAT SERPL-MCNC: 0.55 MG/DL — SIGNIFICANT CHANGE UP (ref 0.5–1.3)
EOSINOPHIL # BLD AUTO: 0.38 K/UL — SIGNIFICANT CHANGE UP (ref 0–0.5)
EOSINOPHIL NFR BLD AUTO: 7.2 % — HIGH (ref 0–6)
GLUCOSE BLDC GLUCOMTR-MCNC: 114 MG/DL — HIGH (ref 70–99)
GLUCOSE BLDC GLUCOMTR-MCNC: 115 MG/DL — HIGH (ref 70–99)
GLUCOSE BLDC GLUCOMTR-MCNC: 150 MG/DL — HIGH (ref 70–99)
GLUCOSE BLDC GLUCOMTR-MCNC: 161 MG/DL — HIGH (ref 70–99)
GLUCOSE SERPL-MCNC: 146 MG/DL — HIGH (ref 70–99)
GLUCOSE SERPL-MCNC: 146 MG/DL — HIGH (ref 70–99)
HCT VFR BLD CALC: 31.2 % — LOW (ref 39–50)
HCT VFR BLD CALC: 31.2 % — LOW (ref 39–50)
HGB BLD-MCNC: 10.3 G/DL — LOW (ref 13–17)
HGB BLD-MCNC: 10.3 G/DL — LOW (ref 13–17)
IMM GRANULOCYTES NFR BLD AUTO: 0.4 % — SIGNIFICANT CHANGE UP (ref 0–1.5)
LYMPHOCYTES # BLD AUTO: 0.99 K/UL — LOW (ref 1–3.3)
LYMPHOCYTES # BLD AUTO: 18.8 % — SIGNIFICANT CHANGE UP (ref 13–44)
MAGNESIUM SERPL-MCNC: 2.2 MG/DL — SIGNIFICANT CHANGE UP (ref 1.6–2.6)
MCHC RBC-ENTMCNC: 33 % — SIGNIFICANT CHANGE UP (ref 32–36)
MCHC RBC-ENTMCNC: 33 % — SIGNIFICANT CHANGE UP (ref 32–36)
MCHC RBC-ENTMCNC: 35.3 PG — HIGH (ref 27–34)
MCHC RBC-ENTMCNC: 35.3 PG — HIGH (ref 27–34)
MCV RBC AUTO: 106.8 FL — HIGH (ref 80–100)
MCV RBC AUTO: 106.8 FL — HIGH (ref 80–100)
MONOCYTES # BLD AUTO: 0.51 K/UL — SIGNIFICANT CHANGE UP (ref 0–0.9)
MONOCYTES NFR BLD AUTO: 9.7 % — SIGNIFICANT CHANGE UP (ref 2–14)
NEUTROPHILS # BLD AUTO: 3.32 K/UL — SIGNIFICANT CHANGE UP (ref 1.8–7.4)
NEUTROPHILS NFR BLD AUTO: 62.8 % — SIGNIFICANT CHANGE UP (ref 43–77)
NRBC # FLD: 0 K/UL — SIGNIFICANT CHANGE UP (ref 0–0)
NRBC # FLD: 0 K/UL — SIGNIFICANT CHANGE UP (ref 0–0)
PHOSPHATE SERPL-MCNC: 3.3 MG/DL — SIGNIFICANT CHANGE UP (ref 2.5–4.5)
PLATELET # BLD AUTO: 150 K/UL — SIGNIFICANT CHANGE UP (ref 150–400)
PLATELET # BLD AUTO: 150 K/UL — SIGNIFICANT CHANGE UP (ref 150–400)
PMV BLD: 12.9 FL — SIGNIFICANT CHANGE UP (ref 7–13)
PMV BLD: 12.9 FL — SIGNIFICANT CHANGE UP (ref 7–13)
POTASSIUM SERPL-MCNC: 4.6 MMOL/L — SIGNIFICANT CHANGE UP (ref 3.5–5.3)
POTASSIUM SERPL-MCNC: 4.6 MMOL/L — SIGNIFICANT CHANGE UP (ref 3.5–5.3)
POTASSIUM SERPL-SCNC: 4.6 MMOL/L — SIGNIFICANT CHANGE UP (ref 3.5–5.3)
POTASSIUM SERPL-SCNC: 4.6 MMOL/L — SIGNIFICANT CHANGE UP (ref 3.5–5.3)
RBC # BLD: 2.92 M/UL — LOW (ref 4.2–5.8)
RBC # BLD: 2.92 M/UL — LOW (ref 4.2–5.8)
RBC # FLD: 14.8 % — HIGH (ref 10.3–14.5)
RBC # FLD: 14.8 % — HIGH (ref 10.3–14.5)
SODIUM SERPL-SCNC: 136 MMOL/L — SIGNIFICANT CHANGE UP (ref 135–145)
SODIUM SERPL-SCNC: 136 MMOL/L — SIGNIFICANT CHANGE UP (ref 135–145)
WBC # BLD: 5.28 K/UL — SIGNIFICANT CHANGE UP (ref 3.8–10.5)
WBC # BLD: 5.28 K/UL — SIGNIFICANT CHANGE UP (ref 3.8–10.5)
WBC # FLD AUTO: 5.28 K/UL — SIGNIFICANT CHANGE UP (ref 3.8–10.5)
WBC # FLD AUTO: 5.28 K/UL — SIGNIFICANT CHANGE UP (ref 3.8–10.5)

## 2020-11-28 PROCEDURE — 99232 SBSQ HOSP IP/OBS MODERATE 35: CPT

## 2020-11-28 RX ORDER — DOXAZOSIN MESYLATE 4 MG
2 TABLET ORAL AT BEDTIME
Refills: 0 | Status: DISCONTINUED | OUTPATIENT
Start: 2020-11-28 | End: 2020-12-01

## 2020-11-28 RX ORDER — TAMSULOSIN HYDROCHLORIDE 0.4 MG/1
0.4 CAPSULE ORAL AT BEDTIME
Refills: 0 | Status: DISCONTINUED | OUTPATIENT
Start: 2020-11-28 | End: 2020-11-28

## 2020-11-28 RX ADMIN — ENOXAPARIN SODIUM 30 MILLIGRAM(S): 100 INJECTION SUBCUTANEOUS at 12:53

## 2020-11-28 RX ADMIN — PANTOPRAZOLE SODIUM 40 MILLIGRAM(S): 20 TABLET, DELAYED RELEASE ORAL at 12:53

## 2020-11-28 RX ADMIN — Medication 650 MILLIGRAM(S): at 12:53

## 2020-11-28 RX ADMIN — Medication 100 MICROGRAM(S): at 05:02

## 2020-11-28 RX ADMIN — Medication 650 MILLIGRAM(S): at 05:02

## 2020-11-28 RX ADMIN — CHLORHEXIDINE GLUCONATE 1 APPLICATION(S): 213 SOLUTION TOPICAL at 12:54

## 2020-11-28 RX ADMIN — ESCITALOPRAM OXALATE 10 MILLIGRAM(S): 10 TABLET, FILM COATED ORAL at 12:53

## 2020-11-28 RX ADMIN — Medication 3 MILLIGRAM(S): at 23:03

## 2020-11-28 RX ADMIN — Medication 650 MILLIGRAM(S): at 23:02

## 2020-11-28 RX ADMIN — Medication 2 MILLIGRAM(S): at 23:03

## 2020-11-28 NOTE — PROGRESS NOTE ADULT - SUBJECTIVE AND OBJECTIVE BOX
NUTRITION NOTE  [id number] EVERARDO  ===============================    Interval events - pt is tolerating Jevity tube feeds, plan to increase tube feeds to goal tonight; pt denies chest pain, shortness or breath, fevers or chills at this time     d/c parenteral nutrition today     Allergies  No Known Allergies    PAST MEDICAL & SURGICAL HISTORY:  Constipation  Iron deficiency anemia, unspecified iron deficiency anemia type  Gastric mass - ulcerated mass in gastric cardia with small perigastric nodes on endoscopy.  Hypothyroidism  Type 2 diabetes mellitus without complication, unspecified long term insulin use status no BS monitoring  Essential hypertension was on losartan, now diet control  History of gastric surgery 2017  Port-a-cath in place - right chest wall  H/O umbilical hernia repair    FAMILY HISTORY:  Family history of ischemic heart disease (IHD) (Father)  Family history of ovarian cancer (Mother)    Vital Signs Last 24 Hrs  T(C): 36.9 (2020 08:10), Max: 37.2 (2020 20:47)  T(F): 98.4 (2020 08:10), Max: 99 (2020 20:47)  HR: 64 (2020 08:10) (54 - 64)  BP: 122/70 (2020 08:10) (110/57 - 135/83)  RR: 18 (2020 08:10) (14 - 18)  SpO2: 99% (2020 08:10) (97% - 99%)    MEDICATIONS  (STANDING):  acetaminophen    Suspension .. 650 milliGRAM(s) Oral every 6 hours  chlorhexidine 2% Cloths 1 Application(s) Topical daily  dextrose 40% Gel 15 Gram(s) Oral once  dextrose 50% Injectable 25 Gram(s) IV Push once  enoxaparin Injectable 30 milliGRAM(s) SubCutaneous daily  escitalopram 10 milliGRAM(s) Oral daily  glucagon  Injectable 1 milliGRAM(s) IntraMuscular once  insulin lispro (ADMELOG) corrective regimen sliding scale   SubCutaneous at bedtime  insulin lispro (ADMELOG) corrective regimen sliding scale   SubCutaneous three times a day before meals  levothyroxine 100 MICROGram(s) Oral daily  melatonin 3 milliGRAM(s) Oral at bedtime  pantoprazole   Suspension 40 milliGRAM(s) Oral daily  Parenteral Nutrition - Adult 1 Each (42 mL/Hr) TPN Continuous <Continuous>  silver nitrate Applicator 1 Application(s) Topical once    MEDICATIONS  (PRN):  oxyCODONE    Solution 5 milliGRAM(s) Oral every 4 hours PRN moderate to severe pain    I&O's Detail    2020 07:01  -  2020 07:00  --------------------------------------------------------  IN:    Fat Emulsion (Fish Oil &amp; Plant Based) 20% Infusion: 93.6 mL    Jevity 1.2: 500 mL    TPN (Total Parenteral Nutrition): 378 mL  Total IN: 971.6 mL    OUT:    Voided (mL): 100 mL  Total OUT: 100 mL    Total NET: 871.6 mL      2020 07:01  -  2020 11:05  --------------------------------------------------------  IN:    Jevity 1.2: 130 mL    TPN (Total Parenteral Nutrition): 84 mL  Total IN: 214 mL    OUT:    Fat Emulsion (Fish Oil &amp; Plant Based) 20% Infusion: 0 mL    Voided (mL): 300 mL  Total OUT: 300 mL    Total NET: -86 mL    POCT Blood Glucose.: 150 mg/dL (2020 08:20)  POCT Blood Glucose.: 145 mg/dL (2020 21:53)  POCT Blood Glucose.: 117 mg/dL (2020 18:18)  POCT Blood Glucose.: 108 mg/dL (2020 11:59)    Daily Weight in k.7 (2020 08:10)    Drug Dosing Weight  Height (cm): 180.3 (2020 16:09)  Weight (kg): 44 (2020 16:09)  BMI (kg/m2): 13.5 (2020 16:09)  BSA (m2): 1.55 (2020 16:09)    PHYSICAL EXAM   General: A&Ox3, NAD, sitting comfortably in bed  Respiratory: CTA b/l, nonlabored respirations   Cardiovascular: Regular rate & rhythm  Abdominal: Soft, nontender, nondistended, J tube site C/D/I  PICC Site: C/D/I    Diet: full liquids and Jevity tube feeds started on 2020    LABORATORY                        10.3   5.28  )-----------( 150      ( 2020 06:35 )             31.2       136  |  102  |  19  ----------------------------<  146<H>  4.6   |  26  |  0.55    Ca    8.7      2020 06:35  Phos  3.3       Mg     2.2         TPro  6.4  /  Alb  3.8  /  TBili  1.0  /  DBili  x   /  AST  26  /  ALT  17  /  AlkPhos  75      LIVER FUNCTIONS - ( 2020 06:42 )  Alb: 3.8 g/dL / Pro: 6.4 g/dL / ALK PHOS: 75 u/L / ALT: 17 u/L / AST: 26 u/L / GGT: x            Chol -- LDL -- HDL -- Trig 80    ASSESSMENT/PLAN:  71 y/o male with hx of gastric cancer s/p resection and minh-n-Y esophagojejunostomy who was admitted with complaints of progressively worsening tolerance to PO diet. Patient states that he started to experience poor PO tolerance about two months ago that worsened in the past two weeks. Patient reports about 15 lb weight loss over the past 2-3 months. Patient states that he belches and/or vomits each time he eats something. Nutrition support consult was consult for initiation of parenteral nutrition in view of poor malnutrition status and prolonged period of inadequate caloric intake.  PICC line was placed and TPN started on 2020 for nutritional support. Pt is s/p J tube placement on 2020. Pt will be receiving Jevity tube feeds at goal rate tonight.    plan to discontinue parenteral nutrition today    continue to monitor tolerance to enteral nutrition      Nutrition Support [id number]

## 2020-11-28 NOTE — PROGRESS NOTE ADULT - SUBJECTIVE AND OBJECTIVE BOX
SURGERY PROGRESS NOTE    SUBJECTIVE / 24H EVENTS:  Patient seen and examined on morning rounds. No acute events overnight. Resting comfortably in bed, tube feeds at goal, TPN halved yesterday, no nausea/ abd pain. Tolerating CLD, passing stool /flatus.       OBJECTIVE:  VITAL SIGNS:  T(C): 36.9 (20 @ 08:10), Max: 37.2 (20 @ 20:47)  HR: 64 (20 08:10) (54 - 64)  BP: 122/70 (20 08:10) (110/57 - 135/83)  RR: 18 (20 08:10) (14 - 18)  SpO2: 99% (20 08:10) (97% - 99%)  Daily     Daily Weight in k.7 (2020 08:10)  POCT Blood Glucose.: 150 mg/dL (20 @ 08:20)  POCT Blood Glucose.: 145 mg/dL (20 @ 21:53)  POCT Blood Glucose.: 117 mg/dL (20 @ 18:18)      Physical Exam  General: A&Ox3, NAD  Respiratory: Clear bilaterally, equal bilateral expansion  Cardiovascular: Regular rate & rhythm  Abdominal: Soft, non-tender, non-distended, Jtube in place without surrounding erythema or palpable collection    20 @ 07:01  -  20 @ 07:00  --------------------------------------------------------  IN:    Fat Emulsion (Fish Oil &amp; Plant Based) 20% Infusion: 93.6 mL    Jevity 1.2: 500 mL    TPN (Total Parenteral Nutrition): 378 mL  Total IN: 971.6 mL    OUT:    Voided (mL): 100 mL  Total OUT: 100 mL    Total NET: 871.6 mL      20 @ 07:01  -  20 @ 11:06  --------------------------------------------------------  IN:    Jevity 1.2: 130 mL    TPN (Total Parenteral Nutrition): 84 mL  Total IN: 214 mL    OUT:    Fat Emulsion (Fish Oil &amp; Plant Based) 20% Infusion: 0 mL    Voided (mL): 300 mL  Total OUT: 300 mL    Total NET: -86 mL          LAB VALUES:      136  |  102  |  19  ----------------------------<  146<H>  4.6   |  26  |  0.55    Ca    8.7      2020 06:35  Phos  3.3     -  Mg     2.2                                      10.3   5.28  )-----------( 150      ( 2020 06:35 )             31.2       MEDICATIONS  (STANDING):  acetaminophen    Suspension .. 650 milliGRAM(s) Oral every 6 hours  chlorhexidine 2% Cloths 1 Application(s) Topical daily  dextrose 40% Gel 15 Gram(s) Oral once  dextrose 50% Injectable 25 Gram(s) IV Push once  enoxaparin Injectable 30 milliGRAM(s) SubCutaneous daily  escitalopram 10 milliGRAM(s) Oral daily  glucagon  Injectable 1 milliGRAM(s) IntraMuscular once  insulin lispro (ADMELOG) corrective regimen sliding scale   SubCutaneous three times a day before meals  insulin lispro (ADMELOG) corrective regimen sliding scale   SubCutaneous at bedtime  levothyroxine 100 MICROGram(s) Oral daily  melatonin 3 milliGRAM(s) Oral at bedtime  pantoprazole   Suspension 40 milliGRAM(s) Oral daily  Parenteral Nutrition - Adult 1 Each (42 mL/Hr) TPN Continuous <Continuous>  silver nitrate Applicator 1 Application(s) Topical once    MEDICATIONS  (PRN):  oxyCODONE    Solution 5 milliGRAM(s) Oral every 4 hours PRN moderate to severe pain

## 2020-11-28 NOTE — CHART NOTE - NSCHARTNOTEFT_GEN_A_CORE
Source: Patient [ x]    Family [ ]     other [x ] chart review, Team D     Nutrition f/u for consult for tube feeding. Hospital events noted; pt is tolerating full liquid diet (sips and tastes for pleasure). s/p J-tube placement 11/23. Pt is going to be transitioned to full feeds today. Pt states he has an active lifestyle in which he is a  that works 3 days/week with variable hrs.    Pt may benefit from more concentrated formula to reduce goal daily volume of formula. Discussed with team's resident.     Diet, Full Liquid:   Tube Feeding Modality: Jejunostomy  Jevity 1.2 Jesús (JEVITY1.2RTH)  Total Volume for 24 Hours (mL): 1120  Continuous  Starting Tube Feed Rate {mL per Hour}: 30  Increase Tube Feed Rate by (mL): 25     Every 4 hours  Until Goal Tube Feed Rate (mL per Hour): 70  Tube Feed Duration (in Hours): 16  Tube Feed Start Time: 20:00 (11-28-20 @ 09:41)    Reported:  Denies any GI issues (nausea/vomiting/diarrhea/constipation.) Last BM 11/28.   Enteral /Parenteral Nutrition: Jevity 1.2 70 mL/hr x 16 hrs (1,120 mL formula, 1,344 jesús, 62gm pro/d)     Current Weight: Weight (kg): 55.9 (11-23)  Pt needs new standing weight.     UBW (1 year ago 145 pounds)   Pt with goal to gain weight.     Edema: none  Pressure Injuries: none    __________________ Pertinent Medications__________________   MEDICATIONS  (STANDING):  acetaminophen    Suspension .. 650 milliGRAM(s) Oral every 6 hours  chlorhexidine 2% Cloths 1 Application(s) Topical daily  dextrose 40% Gel 15 Gram(s) Oral once  dextrose 50% Injectable 25 Gram(s) IV Push once  enoxaparin Injectable 30 milliGRAM(s) SubCutaneous daily  escitalopram 10 milliGRAM(s) Oral daily  glucagon  Injectable 1 milliGRAM(s) IntraMuscular once  insulin lispro (ADMELOG) corrective regimen sliding scale   SubCutaneous three times a day before meals  insulin lispro (ADMELOG) corrective regimen sliding scale   SubCutaneous at bedtime  levothyroxine 100 MICROGram(s) Oral daily  melatonin 3 milliGRAM(s) Oral at bedtime  pantoprazole   Suspension 40 milliGRAM(s) Oral daily  Parenteral Nutrition - Adult 1 Each (42 mL/Hr) TPN Continuous <Continuous>  silver nitrate Applicator 1 Application(s) Topical once    MEDICATIONS  (PRN):  oxyCODONE    Solution 5 milliGRAM(s) Oral every 4 hours PRN moderate to severe pain      __________________ Pertinent Labs__________________   11-28 Na136 mmol/L Glu 146 mg/dL<H> K+ 4.6 mmol/L Cr  0.55 mg/dL BUN 19 mg/dL 11-28 Phos 3.3 mg/dL 11-1 Trig 80 mg/dL      Estimated Needs:   [ x] recalculated: Based on dosing wt 55.9 kg     25-30 jesús/kg = [id number]   1.2-1.4 gm pro/kg = 67 gm- 78 gm pro      Previous Nutrition Diagnosis: severe protein calorie malnutrition     Nutrition Diagnosis is [ x] ongoing      Recommendations:  1. Change tube feed formula to TwoCal HN. Recommend to initiate TwoCal HN at 30 mL/hr and increase by 15 mL q 4 hrs to goal rate of 65 mL/hr x 12 hrs.   Goal rate provides 780 mL, 1560 jesús, 65 gm pro daily.      2. Free water per MD discretion.       Monitoring and Evaluation:      [ x] Tolerance to diet prescription [x ] weights [x ] follow up per protocol  [ ] other:

## 2020-11-28 NOTE — PROGRESS NOTE ADULT - ASSESSMENT
69 y/o Male with gastric cancer s/p total gastrectomy, splenectomy, and distal pancreatectomy (Dx. 2017) admitted for poor PO intake and aperistalsis of the esophagus, now POD4 s/p jejunostomy with J tube, receiving total parental nutrition and recovering well.    Plan:  - can d/c TPN  - FLD (for comfort)  - Increase J tube feeds to goal 70ml/hr over 16 hours  - Pain management as indicated.  - Encourage OOB/ambulation.  - F/u AM labs.  - Discharge planning with J-tube feeds    D Team Surgery, [id number]

## 2020-11-29 LAB
ANION GAP SERPL CALC-SCNC: 6 MMO/L — LOW (ref 7–14)
BUN SERPL-MCNC: 18 MG/DL — SIGNIFICANT CHANGE UP (ref 7–23)
CALCIUM SERPL-MCNC: 8.5 MG/DL — SIGNIFICANT CHANGE UP (ref 8.4–10.5)
CHLORIDE SERPL-SCNC: 102 MMOL/L — SIGNIFICANT CHANGE UP (ref 98–107)
CO2 SERPL-SCNC: 27 MMOL/L — SIGNIFICANT CHANGE UP (ref 22–31)
CREAT SERPL-MCNC: 0.56 MG/DL — SIGNIFICANT CHANGE UP (ref 0.5–1.3)
GLUCOSE BLDC GLUCOMTR-MCNC: 101 MG/DL — HIGH (ref 70–99)
GLUCOSE BLDC GLUCOMTR-MCNC: 135 MG/DL — HIGH (ref 70–99)
GLUCOSE BLDC GLUCOMTR-MCNC: 144 MG/DL — HIGH (ref 70–99)
GLUCOSE SERPL-MCNC: 142 MG/DL — HIGH (ref 70–99)
HCT VFR BLD CALC: 29.6 % — LOW (ref 39–50)
HGB BLD-MCNC: 9.8 G/DL — LOW (ref 13–17)
MAGNESIUM SERPL-MCNC: 2.2 MG/DL — SIGNIFICANT CHANGE UP (ref 1.6–2.6)
MCHC RBC-ENTMCNC: 33.1 % — SIGNIFICANT CHANGE UP (ref 32–36)
MCHC RBC-ENTMCNC: 34.6 PG — HIGH (ref 27–34)
MCV RBC AUTO: 104.6 FL — HIGH (ref 80–100)
NRBC # FLD: 0 K/UL — SIGNIFICANT CHANGE UP (ref 0–0)
PHOSPHATE SERPL-MCNC: 3.2 MG/DL — SIGNIFICANT CHANGE UP (ref 2.5–4.5)
PLATELET # BLD AUTO: 172 K/UL — SIGNIFICANT CHANGE UP (ref 150–400)
PMV BLD: 12.1 FL — SIGNIFICANT CHANGE UP (ref 7–13)
POTASSIUM SERPL-MCNC: 4.5 MMOL/L — SIGNIFICANT CHANGE UP (ref 3.5–5.3)
POTASSIUM SERPL-SCNC: 4.5 MMOL/L — SIGNIFICANT CHANGE UP (ref 3.5–5.3)
RBC # BLD: 2.83 M/UL — LOW (ref 4.2–5.8)
RBC # FLD: 14.8 % — HIGH (ref 10.3–14.5)
SODIUM SERPL-SCNC: 135 MMOL/L — SIGNIFICANT CHANGE UP (ref 135–145)
WBC # BLD: 5.87 K/UL — SIGNIFICANT CHANGE UP (ref 3.8–10.5)
WBC # FLD AUTO: 5.87 K/UL — SIGNIFICANT CHANGE UP (ref 3.8–10.5)

## 2020-11-29 RX ADMIN — Medication 650 MILLIGRAM(S): at 05:35

## 2020-11-29 RX ADMIN — CHLORHEXIDINE GLUCONATE 1 APPLICATION(S): 213 SOLUTION TOPICAL at 11:58

## 2020-11-29 RX ADMIN — ENOXAPARIN SODIUM 30 MILLIGRAM(S): 100 INJECTION SUBCUTANEOUS at 11:58

## 2020-11-29 RX ADMIN — Medication 3 MILLIGRAM(S): at 21:33

## 2020-11-29 RX ADMIN — PANTOPRAZOLE SODIUM 40 MILLIGRAM(S): 20 TABLET, DELAYED RELEASE ORAL at 11:58

## 2020-11-29 RX ADMIN — Medication 100 MICROGRAM(S): at 05:35

## 2020-11-29 RX ADMIN — ESCITALOPRAM OXALATE 10 MILLIGRAM(S): 10 TABLET, FILM COATED ORAL at 11:58

## 2020-11-29 RX ADMIN — Medication 2 MILLIGRAM(S): at 21:33

## 2020-11-29 RX ADMIN — Medication 650 MILLIGRAM(S): at 11:58

## 2020-11-29 NOTE — PROGRESS NOTE ADULT - ASSESSMENT
69 y/o Male with gastric cancer s/p total gastrectomy, splenectomy, and distal pancreatectomy (Dx. 2017) admitted for poor PO intake and aperistalsis of the esophagus, now POD5 s/p jejunostomy with J tube, receiving total parental nutrition and recovering well.    Plan:    - FLD (for comfort)  - Increase J tube feeds to goal 80ml/hr over 16 hours  - Pain management as indicated.  - Encourage OOB/ambulation.  - F/u AM labs.  - Discharge planning with J-tube feeds    D Team Surgery, [id number] 69 y/o Male with gastric cancer s/p total gastrectomy, splenectomy, and distal pancreatectomy (Dx. 2017) admitted for poor PO intake and aperistalsis of the esophagus, now POD6 s/p jejunostomy with J tube, tolerating tube feeds    Plan:    - FLD (for comfort)  - Increase J tube feeds to goal 80ml/hr over 16 hours  - Pain management as indicated.  - Encourage OOB/ambulation.  - F/u AM labs.  - Discharge planning with J-tube feeds    D Team Surgery, [id number]

## 2020-11-29 NOTE — PROGRESS NOTE ADULT - SUBJECTIVE AND OBJECTIVE BOX
SURGERY PROGRESS NOTE    SUBJECTIVE / 24H EVENTS:  Patient seen and examined on morning rounds. No acute events overnight.  Tolerated compressed tube feeds at 70/hr over 16 hours. TPN d/ronnie. No nausea/ emesis, no abd discomfort. Ambulating around halls.      OBJECTIVE:  VITAL SIGNS:  T(C): 36.8 (20 @ 07:55), Max: 37.2 (20 @ 16:17)  HR: 60 (20 07:55) (50 - 61)  BP: 118/70 (20 @ 07:55) (108/57 - 133/71)  RR: 18 (20 07:55) (18 - 18)  SpO2: 99% (20 07:55) (96% - 100%)  Daily     Daily Weight in k.5 (2020 05:29)  POCT Blood Glucose.: 161 mg/dL (20 @ 21:39)  POCT Blood Glucose.: 115 mg/dL (20 @ 17:21)  POCT Blood Glucose.: 114 mg/dL (20 @ 12:15)      PPhysical Exam  General: A&Ox3, NAD  Respiratory: Clear bilaterally, equal bilateral expansion  Cardiovascular: Regular rate & rhythm  Abdominal: Soft, non-tender, non-distended, Jtube in place without surrounding erythema or palpable collection      20 @ 07:01  -  20 @ 07:00  --------------------------------------------------------  IN:    Jevity 1.2: 970 mL    TPN (Total Parenteral Nutrition): 378 mL  Total IN: 1348 mL    OUT:    Fat Emulsion (Fish Oil &amp; Plant Based) 20% Infusion: 0 mL    Voided (mL): 700 mL  Total OUT: 700 mL    Total NET: 648 mL      20 @ 07:01  -  20 @ 08:42  --------------------------------------------------------  IN:    Jevity 1.2: 70 mL  Total IN: 70 mL    OUT:    Voided (mL): 200 mL  Total OUT: 200 mL    Total NET: -130 mL          LAB VALUES:      135  |  102  |  18  ----------------------------<  142<H>  4.5   |  27  |  0.56    Ca    8.5      2020 05:48  Phos  3.2     -  Mg     2.2                                      10.3   5.28  )-----------( 150      ( 2020 06:35 )             31.2          MEDICATIONS  (STANDING):  acetaminophen    Suspension .. 650 milliGRAM(s) Oral every 6 hours  chlorhexidine 2% Cloths 1 Application(s) Topical daily  dextrose 40% Gel 15 Gram(s) Oral once  dextrose 50% Injectable 25 Gram(s) IV Push once  doxazosin 2 milliGRAM(s) Oral at bedtime  enoxaparin Injectable 30 milliGRAM(s) SubCutaneous daily  escitalopram 10 milliGRAM(s) Oral daily  glucagon  Injectable 1 milliGRAM(s) IntraMuscular once  insulin lispro (ADMELOG) corrective regimen sliding scale   SubCutaneous at bedtime  insulin lispro (ADMELOG) corrective regimen sliding scale   SubCutaneous three times a day before meals  levothyroxine 100 MICROGram(s) Oral daily  melatonin 3 milliGRAM(s) Oral at bedtime  pantoprazole   Suspension 40 milliGRAM(s) Oral daily  silver nitrate Applicator 1 Application(s) Topical once    MEDICATIONS  (PRN):  oxyCODONE    Solution 5 milliGRAM(s) Oral every 4 hours PRN moderate to severe pain

## 2020-11-30 LAB
ANION GAP SERPL CALC-SCNC: 5 MMO/L — LOW (ref 7–14)
BUN SERPL-MCNC: 17 MG/DL — SIGNIFICANT CHANGE UP (ref 7–23)
CALCIUM SERPL-MCNC: 8.6 MG/DL — SIGNIFICANT CHANGE UP (ref 8.4–10.5)
CHLORIDE SERPL-SCNC: 103 MMOL/L — SIGNIFICANT CHANGE UP (ref 98–107)
CO2 SERPL-SCNC: 29 MMOL/L — SIGNIFICANT CHANGE UP (ref 22–31)
CREAT SERPL-MCNC: 0.62 MG/DL — SIGNIFICANT CHANGE UP (ref 0.5–1.3)
GLUCOSE BLDC GLUCOMTR-MCNC: 135 MG/DL — HIGH (ref 70–99)
GLUCOSE BLDC GLUCOMTR-MCNC: 157 MG/DL — HIGH (ref 70–99)
GLUCOSE BLDC GLUCOMTR-MCNC: 168 MG/DL — HIGH (ref 70–99)
GLUCOSE BLDC GLUCOMTR-MCNC: 87 MG/DL — SIGNIFICANT CHANGE UP (ref 70–99)
GLUCOSE SERPL-MCNC: 121 MG/DL — HIGH (ref 70–99)
HCT VFR BLD CALC: 28.8 % — LOW (ref 39–50)
HGB BLD-MCNC: 9.4 G/DL — LOW (ref 13–17)
MAGNESIUM SERPL-MCNC: 2.1 MG/DL — SIGNIFICANT CHANGE UP (ref 1.6–2.6)
MCHC RBC-ENTMCNC: 32.6 % — SIGNIFICANT CHANGE UP (ref 32–36)
MCHC RBC-ENTMCNC: 34.2 PG — HIGH (ref 27–34)
MCV RBC AUTO: 104.7 FL — HIGH (ref 80–100)
NRBC # FLD: 0 K/UL — SIGNIFICANT CHANGE UP (ref 0–0)
PHOSPHATE SERPL-MCNC: 3.5 MG/DL — SIGNIFICANT CHANGE UP (ref 2.5–4.5)
PLATELET # BLD AUTO: 168 K/UL — SIGNIFICANT CHANGE UP (ref 150–400)
PMV BLD: 12.2 FL — SIGNIFICANT CHANGE UP (ref 7–13)
POTASSIUM SERPL-MCNC: 4.5 MMOL/L — SIGNIFICANT CHANGE UP (ref 3.5–5.3)
POTASSIUM SERPL-SCNC: 4.5 MMOL/L — SIGNIFICANT CHANGE UP (ref 3.5–5.3)
RBC # BLD: 2.75 M/UL — LOW (ref 4.2–5.8)
RBC # FLD: 15.1 % — HIGH (ref 10.3–14.5)
SODIUM SERPL-SCNC: 137 MMOL/L — SIGNIFICANT CHANGE UP (ref 135–145)
WBC # BLD: 4.62 K/UL — SIGNIFICANT CHANGE UP (ref 3.8–10.5)
WBC # FLD AUTO: 4.62 K/UL — SIGNIFICANT CHANGE UP (ref 3.8–10.5)

## 2020-11-30 RX ADMIN — CHLORHEXIDINE GLUCONATE 1 APPLICATION(S): 213 SOLUTION TOPICAL at 13:40

## 2020-11-30 RX ADMIN — Medication 3 MILLIGRAM(S): at 21:51

## 2020-11-30 RX ADMIN — Medication 650 MILLIGRAM(S): at 22:28

## 2020-11-30 RX ADMIN — Medication 2 MILLIGRAM(S): at 21:56

## 2020-11-30 RX ADMIN — ESCITALOPRAM OXALATE 10 MILLIGRAM(S): 10 TABLET, FILM COATED ORAL at 12:45

## 2020-11-30 RX ADMIN — Medication 650 MILLIGRAM(S): at 21:51

## 2020-11-30 RX ADMIN — Medication 2: at 12:45

## 2020-11-30 RX ADMIN — ENOXAPARIN SODIUM 30 MILLIGRAM(S): 100 INJECTION SUBCUTANEOUS at 12:44

## 2020-11-30 RX ADMIN — Medication 100 MICROGRAM(S): at 05:30

## 2020-11-30 RX ADMIN — PANTOPRAZOLE SODIUM 40 MILLIGRAM(S): 20 TABLET, DELAYED RELEASE ORAL at 12:46

## 2020-11-30 NOTE — PROGRESS NOTE ADULT - SUBJECTIVE AND OBJECTIVE BOX
GENERAL SURGERY PROGRESS NOTE    STATUS POST:    POST OPERATIVE DAY #:       SUBJECTIVE    OVERNIGHT EVENTS:      OBJECTIVE    VITALS  T(C): 36.8 (11-29-20 @ 19:56), Max: 37 (11-29-20 @ 16:13)  HR: 49 (11-29-20 @ 19:56) (43 - 61)  BP: 108/65 (11-29-20 @ 19:56) (107/57 - 133/71)  RR: 18 (11-29-20 @ 19:56) (16 - 18)  SpO2: 98% (11-29-20 @ 19:56) (96% - 99%)    INS & OUTS    11-28-20 @ 07:01  -  11-29-20 @ 07:00  --------------------------------------------------------  IN: 1348 mL / OUT: 700 mL / NET: 648 mL    11-29-20 @ 07:01  -  11-30-20 @ 04:31  --------------------------------------------------------  IN: 680 mL / OUT: 500 mL / NET: 180 mL        PHYSICAL EXAM  General: Appears well, NAD.  Neuro: A&Ox3  Chest: Breathing comfortably, CTAB.  CV: RRR, No m/r/g.  Abdomen: Soft, NTND, No rebound or guarding. Dressings c/d/i.     LABS                        9.8    5.87  )-----------( 172      ( 29 Nov 2020 12:58 )             29.6     11-29    135  |  102  |  18  ----------------------------<  142<H>  4.5   |  27  |  0.56    Ca    8.5      29 Nov 2020 05:48  Phos  3.2     11-29  Mg     2.2     11-29            RADIOLOGY & ADDITIONAL STUDIES GENERAL SURGERY PROGRESS NOTE    STATUS POST:    POST OPERATIVE DAY #:         SUBJECTIVE    OVERNIGHT EVENTS:      OBJECTIVE    VITALS  T(C): 36.8 (11-29-20 @ 19:56), Max: 37 (11-29-20 @ 16:13)  HR: 49 (11-29-20 @ 19:56) (43 - 61)  BP: 108/65 (11-29-20 @ 19:56) (107/57 - 133/71)  RR: 18 (11-29-20 @ 19:56) (16 - 18)  SpO2: 98% (11-29-20 @ 19:56) (96% - 99%)    INS & OUTS    11-28-20 @ 07:01  -  11-29-20 @ 07:00  --------------------------------------------------------  IN: 1348 mL / OUT: 700 mL / NET: 648 mL    11-29-20 @ 07:01  -  11-30-20 @ 04:31  --------------------------------------------------------  IN: 680 mL / OUT: 500 mL / NET: 180 mL        PHYSICAL EXAM  General: Appears well, NAD.  Neuro: A&Ox3  Chest: Breathing comfortably, CTAB.  CV: RRR, No m/r/g.  Abdomen: Soft, NTND, No rebound or guarding. Dressings c/d/i.     LABS                        9.8    5.87  )-----------( 172      ( 29 Nov 2020 12:58 )             29.6     11-29    135  |  102  |  18  ----------------------------<  142<H>  4.5   |  27  |  0.56    Ca    8.5      29 Nov 2020 05:48  Phos  3.2     11-29  Mg     2.2     11-29            RADIOLOGY & ADDITIONAL STUDIES GENERAL SURGERY PROGRESS NOTE      OVERNIGHT EVENTS: No acute events    SUBJECTIVE  Feels well. Reports one episode of watery stool. No nausea or emesis. Denies abdominal pain. Has been OOB.      OBJECTIVE    VITALS  T(C): 36.8 (11-29-20 @ 19:56), Max: 37 (11-29-20 @ 16:13)  HR: 49 (11-29-20 @ 19:56) (43 - 61)  BP: 108/65 (11-29-20 @ 19:56) (107/57 - 133/71)  RR: 18 (11-29-20 @ 19:56) (16 - 18)  SpO2: 98% (11-29-20 @ 19:56) (96% - 99%)    INS & OUTS    11-28-20 @ 07:01  -  11-29-20 @ 07:00  --------------------------------------------------------  IN: 1348 mL / OUT: 700 mL / NET: 648 mL    11-29-20 @ 07:01  -  11-30-20 @ 04:31  --------------------------------------------------------  IN: 680 mL / OUT: 500 mL / NET: 180 mL        PHYSICAL EXAM  General: Appears well, NAD.  Neuro: A&Ox3  Chest: Breathing comfortably  Abdomen: Soft, NTND, no rebound or guarding, incision c/d/i, J tube insertion site c/d/i    LABS                        9.8    5.87  )-----------( 172      ( 29 Nov 2020 12:58 )             29.6     11-29    135  |  102  |  18  ----------------------------<  142<H>  4.5   |  27  |  0.56    Ca    8.5      29 Nov 2020 05:48  Phos  3.2     11-29  Mg     2.2     11-29            RADIOLOGY & ADDITIONAL STUDIES

## 2020-11-30 NOTE — CHART NOTE - NSCHARTNOTEFT_GEN_A_CORE
NUTRITION FOLLOW UP NOTE: Nutrition consult requested for tube feeding recommendations for home feedings. Current diet order of Jevity 1.2 @ 97cal/hour for 16 hours provides an excess of calories and protein. It is higher than the Pt's nutrient requirements. The goal rate should be decreased to 80ml/hr which will provide 1536 kcal and 71 gm of protein in 24 hours.      DIET: Full liquid diet and Jevity 1.2 total volume 86 kcal in 24 hours. Start at 30 ml/hr and increase by 25 every 4 hours until goal rate of 97 ml/hour is reached.    WT: 55.9 kg    SKIN: intact    LABS: POCT-135, 144, 101    ADDITIONAL RECOMMENDATIONS:  1) Change tube feeding order to Jevity 1.2  1280 ml total volume in 24 hours. Start at 30 ml/hour and increase by 25 ml/hr to goal rate of 80 ml/hr for 16 ml/hr  2) Monitor weight, labs, po intake, tube feeding tolerance and skin integrity.   3) Nutrition Services Remains available NUTRITION FOLLOW UP NOTE: Nutrition consult requested for tube feeding recommendations for home feedings. Current diet order of Jevity 1.2 @ 97cal/hour for 16 hours provides an excess of calories and protein. It is higher than the Pt's nutrient requirements. The goal rate should be decreased to 80ml/hr which will provide 1536 kcal and 71 gm of protein in 24 hours.      DIET: Full liquid diet and Jevity 1.2 total volume 86 kcal in 24 hours. Start at 30 ml/hr and increase by 25 every 4 hours until goal rate of 97 ml/hour is reached.    WT: 55.9 kg    SKIN: intact    LABS: POCT-135, 144, 101    MALNUTRITION STATUS: ongoing severe malnutrition    ADDITIONAL RECOMMENDATIONS:  1) Change tube feeding order to Jevity 1.2  1280 ml total volume in 24 hours. Start at 30 ml/hour and increase by 25 ml/hr to goal rate of 80 ml/hr for 16 ml/hr  2) Monitor weight, labs, po intake, tube feeding tolerance and skin integrity.   3) Nutrition Services Remains available

## 2020-11-30 NOTE — PROGRESS NOTE ADULT - ASSESSMENT
69 y/o Male with gastric cancer s/p total gastrectomy, splenectomy, and distal pancreatectomy (2017) admitted for poor PO intake and aperistalsis of the esophagus, now POD7 s/p open jejunostomy with J tube. Patient stable and tolerating tube feeds, almost at goal.    Plan:  - FLD (for comfort)  - Increase J tube feeds to goal 97ml/hr over 16 hours  - Pain control as needed  - Encourage OOB/ambulation.  - F/u AM labs.  - Dispo: home with nocturnal tube feeds, discharge planning     D Team Surgery, [id number]

## 2020-12-01 ENCOUNTER — TRANSCRIPTION ENCOUNTER (OUTPATIENT)
Age: 70
End: 2020-12-01

## 2020-12-01 VITALS
HEART RATE: 54 BPM | SYSTOLIC BLOOD PRESSURE: 105 MMHG | TEMPERATURE: 98 F | OXYGEN SATURATION: 99 % | RESPIRATION RATE: 18 BRPM | DIASTOLIC BLOOD PRESSURE: 63 MMHG

## 2020-12-01 LAB
ANION GAP SERPL CALC-SCNC: 7 MMO/L — SIGNIFICANT CHANGE UP (ref 7–14)
BUN SERPL-MCNC: 16 MG/DL — SIGNIFICANT CHANGE UP (ref 7–23)
CALCIUM SERPL-MCNC: 8.6 MG/DL — SIGNIFICANT CHANGE UP (ref 8.4–10.5)
CHLORIDE SERPL-SCNC: 102 MMOL/L — SIGNIFICANT CHANGE UP (ref 98–107)
CO2 SERPL-SCNC: 26 MMOL/L — SIGNIFICANT CHANGE UP (ref 22–31)
CREAT SERPL-MCNC: 0.59 MG/DL — SIGNIFICANT CHANGE UP (ref 0.5–1.3)
GLUCOSE BLDC GLUCOMTR-MCNC: 184 MG/DL — HIGH (ref 70–99)
GLUCOSE SERPL-MCNC: 148 MG/DL — HIGH (ref 70–99)
HCT VFR BLD CALC: 29 % — LOW (ref 39–50)
HGB BLD-MCNC: 9.6 G/DL — LOW (ref 13–17)
MAGNESIUM SERPL-MCNC: 2 MG/DL — SIGNIFICANT CHANGE UP (ref 1.6–2.6)
MCHC RBC-ENTMCNC: 33.1 % — SIGNIFICANT CHANGE UP (ref 32–36)
MCHC RBC-ENTMCNC: 34.5 PG — HIGH (ref 27–34)
MCV RBC AUTO: 104.3 FL — HIGH (ref 80–100)
NRBC # FLD: 0 K/UL — SIGNIFICANT CHANGE UP (ref 0–0)
PHOSPHATE SERPL-MCNC: 3.5 MG/DL — SIGNIFICANT CHANGE UP (ref 2.5–4.5)
PLATELET # BLD AUTO: 183 K/UL — SIGNIFICANT CHANGE UP (ref 150–400)
PMV BLD: 11.9 FL — SIGNIFICANT CHANGE UP (ref 7–13)
POTASSIUM SERPL-MCNC: 4.4 MMOL/L — SIGNIFICANT CHANGE UP (ref 3.5–5.3)
POTASSIUM SERPL-SCNC: 4.4 MMOL/L — SIGNIFICANT CHANGE UP (ref 3.5–5.3)
RBC # BLD: 2.78 M/UL — LOW (ref 4.2–5.8)
RBC # FLD: 15.1 % — HIGH (ref 10.3–14.5)
SODIUM SERPL-SCNC: 135 MMOL/L — SIGNIFICANT CHANGE UP (ref 135–145)
WBC # BLD: 4.71 K/UL — SIGNIFICANT CHANGE UP (ref 3.8–10.5)
WBC # FLD AUTO: 4.71 K/UL — SIGNIFICANT CHANGE UP (ref 3.8–10.5)

## 2020-12-01 RX ORDER — PANTOPRAZOLE SODIUM 20 MG/1
40 TABLET, DELAYED RELEASE ORAL
Qty: 1200 | Refills: 0
Start: 2020-12-01 | End: 2020-12-30

## 2020-12-01 RX ORDER — ESCITALOPRAM OXALATE 10 MG/1
1 TABLET, FILM COATED ORAL
Qty: 30 | Refills: 0
Start: 2020-12-01 | End: 2020-12-30

## 2020-12-01 RX ADMIN — Medication 2: at 09:12

## 2020-12-01 RX ADMIN — Medication 100 MICROGRAM(S): at 05:00

## 2020-12-01 NOTE — PROVIDER CONTACT NOTE (OTHER) - ACTION/TREATMENT ORDERED:
provider ok will continue to monitor. safety maintained.
MD coming to bedside to assess patient post-surgery
Team aware. will continue to monitor pt

## 2020-12-01 NOTE — PROGRESS NOTE ADULT - NUTRITIONAL ASSESSMENT
This patient has been assessed with a concern for Malnutrition and has been determined to have a diagnosis/diagnoses of Severe protein-calorie malnutrition.    This patient is being managed with:   Diet Clear Liquid-  Entered: Nov 24 2020  9:50AM    
This patient has been assessed with a concern for Malnutrition and has been determined to have a diagnosis/diagnoses of Severe protein-calorie malnutrition.    This patient is being managed with:   Diet Clear Liquid-  Tube Feeding Modality: Jejunostomy  Jevity 1.2 Jesús (JEVITY1.2RTH)  Total Volume for 24 Hours (mL): 480  Continuous  Starting Tube Feed Rate {mL per Hour}: 20  Until Goal Tube Feed Rate (mL per Hour): 20  Tube Feed Duration (in Hours): 24  Tube Feed Start Time: 09:00  Entered: Nov 25 2020  8:53AM    
This patient has been assessed with a concern for Malnutrition and has been determined to have a diagnosis/diagnoses of Severe protein-calorie malnutrition.    This patient is being managed with:   Diet Full Liquid-  Tube Feeding Modality: Jejunostomy  Jevity 1.2 Jesús (JEVITY1.2RTH)  Total Volume for 24 Hours (mL): 1120  Continuous  Starting Tube Feed Rate {mL per Hour}: 30  Increase Tube Feed Rate by (mL): 25     Every 4 hours  Until Goal Tube Feed Rate (mL per Hour): 70  Tube Feed Duration (in Hours): 16  Tube Feed Start Time: 20:00  Entered: Nov 28 2020  9:41AM    
This patient has been assessed with a concern for Malnutrition and has been determined to have a diagnosis/diagnoses of Severe protein-calorie malnutrition.    This patient is being managed with:   Diet Full Liquid-  Tube Feeding Modality: Jejunostomy  Jevity 1.2 Jesús (JEVITY1.2RTH)  Total Volume for 24 Hours (mL): 1560  Continuous  Starting Tube Feed Rate {mL per Hour}: 30  Increase Tube Feed Rate by (mL): 25     Every 4 hours  Until Goal Tube Feed Rate (mL per Hour): 65  Tube Feed Duration (in Hours): 24  Tube Feed Start Time: 09:00  Entered: Nov 27 2020  9:22AM    
This patient has been assessed with a concern for Malnutrition and has been determined to have a diagnosis/diagnoses of Severe protein-calorie malnutrition.    This patient is being managed with:   Diet Full Liquid-  Tube Feeding Modality: Jejunostomy  Jevity 1.2 Jesús (JEVITY1.2RTH)  Total Volume for 24 Hours (mL): 720  Continuous  Until Goal Tube Feed Rate (mL per Hour): 30  Tube Feed Duration (in Hours): 24  Tube Feed Start Time: 09:00  Entered: Nov 26 2020  8:54AM    
This patient has been assessed with a concern for Malnutrition and has been determined to have a diagnosis/diagnoses of Severe protein-calorie malnutrition.    This patient is being managed with:   Diet NPO-  Except Medications  Entered: Nov 18 2020  8:49AM    
This patient has been assessed with a concern for Malnutrition and has been determined to have a diagnosis/diagnoses of Severe protein-calorie malnutrition.    This patient is being managed with:   Diet Clear Liquid-  Entered: Nov 24 2020  9:50AM    
This patient has been assessed with a concern for Malnutrition and has been determined to have a diagnosis/diagnoses of Severe protein-calorie malnutrition.    This patient is being managed with:   Diet Clear Liquid-  Tube Feeding Modality: Jejunostomy  Jevity 1.2 Jesús (JEVITY1.2RTH)  Total Volume for 24 Hours (mL): 480  Continuous  Starting Tube Feed Rate {mL per Hour}: 20  Until Goal Tube Feed Rate (mL per Hour): 20  Tube Feed Duration (in Hours): 24  Tube Feed Start Time: 09:00  Entered: Nov 25 2020  8:53AM    
This patient has been assessed with a concern for Malnutrition and has been determined to have a diagnosis/diagnoses of Severe protein-calorie malnutrition.    This patient is being managed with:   Diet Full Liquid-  Tube Feeding Modality: Jejunostomy  Jevity 1.2 Jesús (JEVITY1.2RTH)  Total Volume for 24 Hours (mL): 1120  Continuous  Starting Tube Feed Rate {mL per Hour}: 30  Increase Tube Feed Rate by (mL): 25     Every 4 hours  Until Goal Tube Feed Rate (mL per Hour): 70  Tube Feed Duration (in Hours): 16  Tube Feed Start Time: 20:00  Entered: Nov 28 2020  9:41AM    
This patient has been assessed with a concern for Malnutrition and has been determined to have a diagnosis/diagnoses of Severe protein-calorie malnutrition.    This patient is being managed with:   Diet Full Liquid-  Tube Feeding Modality: Jejunostomy  Jevity 1.2 Jesús (JEVITY1.2RTH)  Total Volume for 24 Hours (mL): 1120  Continuous  Starting Tube Feed Rate {mL per Hour}: 30  Increase Tube Feed Rate by (mL): 25     Every 4 hours  Until Goal Tube Feed Rate (mL per Hour): 70  Tube Feed Duration (in Hours): 16  Tube Feed Start Time: 20:00  Entered: Nov 28 2020  9:41AM    
This patient has been assessed with a concern for Malnutrition and has been determined to have a diagnosis/diagnoses of Severe protein-calorie malnutrition.    This patient is being managed with:   Diet Full Liquid-  Tube Feeding Modality: Jejunostomy  Jevity 1.2 Jesús (JEVITY1.2RTH)  Total Volume for 24 Hours (mL): 1280  Continuous  Starting Tube Feed Rate {mL per Hour}: 30  Increase Tube Feed Rate by (mL): 25     Every 4 hours  Until Goal Tube Feed Rate (mL per Hour): 80  Tube Feed Duration (in Hours): 16  Tube Feed Start Time: 11:00  Entered: Nov 30 2020 10:54AM    
This patient has been assessed with a concern for Malnutrition and has been determined to have a diagnosis/diagnoses of Severe protein-calorie malnutrition.    This patient is being managed with:   Diet Full Liquid-  Tube Feeding Modality: Jejunostomy  Jevity 1.2 Jesús (JEVITY1.2RTH)  Total Volume for 24 Hours (mL): 1280  Continuous  Starting Tube Feed Rate {mL per Hour}: 30  Increase Tube Feed Rate by (mL): 25     Every 4 hours  Until Goal Tube Feed Rate (mL per Hour): 80  Tube Feed Duration (in Hours): 16  Tube Feed Start Time: 20:00  Entered: Nov 29 2020  8:46AM    
This patient has been assessed with a concern for Malnutrition and has been determined to have a diagnosis/diagnoses of Severe protein-calorie malnutrition.    This patient is being managed with:   Diet Full Liquid-  Tube Feeding Modality: Jejunostomy  Jevity 1.2 Jesús (JEVITY1.2RTH)  Total Volume for 24 Hours (mL): 1560  Continuous  Starting Tube Feed Rate {mL per Hour}: 30  Increase Tube Feed Rate by (mL): 25     Every 4 hours  Until Goal Tube Feed Rate (mL per Hour): 65  Tube Feed Duration (in Hours): 24  Tube Feed Start Time: 09:00  Entered: Nov 27 2020  9:22AM    
This patient has been assessed with a concern for Malnutrition and has been determined to have a diagnosis/diagnoses of Severe protein-calorie malnutrition.    This patient is being managed with:   Diet Full Liquid-  Tube Feeding Modality: Jejunostomy  Jevity 1.2 Jesús (JEVITY1.2RTH)  Total Volume for 24 Hours (mL): 720  Continuous  Until Goal Tube Feed Rate (mL per Hour): 30  Tube Feed Duration (in Hours): 24  Tube Feed Start Time: 09:00  Entered: Nov 26 2020  8:54AM    
This patient has been assessed with a concern for Malnutrition and has been determined to have a diagnosis/diagnoses of Severe protein-calorie malnutrition.    This patient is being managed with:   Diet Full Liquid-  Tube Feeding Modality: Jejunostomy  Jevity 1.2 Jesús (JEVITY1.2RTH)  Total Volume for 24 Hours (mL): 720  Continuous  Until Goal Tube Feed Rate (mL per Hour): 30  Tube Feed Duration (in Hours): 24  Tube Feed Start Time: 09:00  Entered: Nov 26 2020  8:54AM    
This patient has been assessed with a concern for Malnutrition and has been determined to have a diagnosis/diagnoses of Severe protein-calorie malnutrition.    This patient is being managed with:   Diet NPO-  Except Medications  Entered: Nov 18 2020  8:49AM    
This patient has been assessed with a concern for Malnutrition and has been determined to have a diagnosis/diagnoses of Severe protein-calorie malnutrition.    This patient is being managed with:   Diet NPO-  Except Medications  Entered: Nov 23 2020  2:41PM    
This patient has been assessed with a concern for Malnutrition and has been determined to have a diagnosis/diagnoses of Severe protein-calorie malnutrition.    This patient is being managed with:   Diet NPO-  Except Medications  Entered: Nov 18 2020  8:49AM    
This patient has been assessed with a concern for Malnutrition and has been determined to have a diagnosis/diagnoses of Severe protein-calorie malnutrition.    This patient is being managed with:   Diet NPO-  Except Medications  Entered: Nov 23 2020  2:41PM

## 2020-12-01 NOTE — CHART NOTE - NSCHARTNOTEFT_GEN_A_CORE
LUE PICC removed, tip intact at 45cm. Length confirmed with insertion document. Pressure held and hemostasis achieved. Patient tolerated without complication.    YARELY Apodaca PA-C

## 2020-12-01 NOTE — PROGRESS NOTE ADULT - REASON FOR ADMISSION
Poor PO intake/progressive dysphagia.
Poor PO intake
Poor PO intake/progressive dysphagia.

## 2020-12-01 NOTE — CHART NOTE - NSCHARTNOTESELECT_GEN_ALL_CORE
Malnutrition Notification
Caprini Score
Chart Note/Nutrition Services
Event Note
Event Note/GI
Follow Up/Nutrition Services
IR Pre Procedure Note
PICC Removal
PostOp Check
Pre-Operative Note

## 2020-12-01 NOTE — DISCHARGE NOTE NURSING/CASE MANAGEMENT/SOCIAL WORK - PATIENT PORTAL LINK FT
You can access the FollowMyHealth Patient Portal offered by [facility] by registering at the following website: [email or web address]. By joining [employer]’s FollowMyHealth portal, you will also be able to view your health information using other applications (apps) compatible with our system.

## 2020-12-01 NOTE — PROGRESS NOTE ADULT - SUBJECTIVE AND OBJECTIVE BOX
GENERAL SURGERY PROGRESS NOTE    71 y/o Male with gastric cancer s/p total gastrectomy, splenectomy, and distal pancreatectomy (Dx. 2017) POD6 s/p jejunostomy with J tube for for two months of poor PO intake and dysphagia.    POST OPERATIVE DAY #: 8      SUBJECTIVE    OVERNIGHT EVENTS:      OBJECTIVE    VITALS  T(C): 36.5 (11-30-20 @ 21:56), Max: 36.9 (11-30-20 @ 05:28)  HR: 55 (11-30-20 @ 21:56) (51 - 58)  BP: 114/75 (11-30-20 @ 21:56) (112/75 - 123/73)  RR: 17 (11-30-20 @ 21:56) (17 - 18)  SpO2: 98% (11-30-20 @ 21:56) (95% - 98%)    INS & OUTS    11-29-20 @ 07:01  -  11-30-20 @ 07:00  --------------------------------------------------------  IN: 1800 mL / OUT: 500 mL / NET: 1300 mL    11-30-20 @ 07:01  -  12-01-20 @ 04:35  --------------------------------------------------------  IN: 1320 mL / OUT: 0 mL / NET: 1320 mL        PHYSICAL EXAM  General: Appears well, NAD.  Neuro: A&Ox3  Chest: Breathing comfortably, CTAB.  CV: RRR, No m/r/g.  Abdomen: Soft, NTND, No rebound or guarding. Dressings c/d/i.     LABS                        9.4    4.62  )-----------( 168      ( 30 Nov 2020 07:14 )             28.8     11-30    137  |  103  |  17  ----------------------------<  121<H>  4.5   |  29  |  0.62    Ca    8.6      30 Nov 2020 07:00  Phos  3.5     11-30  Mg     2.1     11-30            RADIOLOGY & ADDITIONAL STUDIES GENERAL SURGERY PROGRESS NOTE    69 y/o Male with gastric cancer s/p total gastrectomy, splenectomy, and distal pancreatectomy (Dx. 2017) POD8 s/p jejunostomy with J tube for for two months of poor PO intake and dysphagia.    POST OPERATIVE DAY #: 8      SUBJECTIVE    OVERNIGHT EVENTS:      OBJECTIVE    VITALS  T(C): 36.5 (11-30-20 @ 21:56), Max: 36.9 (11-30-20 @ 05:28)  HR: 55 (11-30-20 @ 21:56) (51 - 58)  BP: 114/75 (11-30-20 @ 21:56) (112/75 - 123/73)  RR: 17 (11-30-20 @ 21:56) (17 - 18)  SpO2: 98% (11-30-20 @ 21:56) (95% - 98%)    INS & OUTS    11-29-20 @ 07:01  -  11-30-20 @ 07:00  --------------------------------------------------------  IN: 1800 mL / OUT: 500 mL / NET: 1300 mL    11-30-20 @ 07:01  -  12-01-20 @ 04:35  --------------------------------------------------------  IN: 1320 mL / OUT: 0 mL / NET: 1320 mL        PHYSICAL EXAM  General: Appears well, NAD.  Neuro: A&Ox3  Chest: Breathing comfortably, CTAB.  CV: RRR, No m/r/g.  Abdomen: Soft, NTND, No rebound or guarding. Dressings c/d/i.     LABS                        9.4    4.62  )-----------( 168      ( 30 Nov 2020 07:14 )             28.8     11-30    137  |  103  |  17  ----------------------------<  121<H>  4.5   |  29  |  0.62    Ca    8.6      30 Nov 2020 07:00  Phos  3.5     11-30  Mg     2.1     11-30            RADIOLOGY & ADDITIONAL STUDIES GENERAL SURGERY PROGRESS NOTE    69 y/o Male with gastric cancer s/p total gastrectomy, splenectomy, and distal pancreatectomy (Dx. 2017) POD8 s/p jejunostomy with J tube for for two months of poor PO intake and dysphagia. Endorsing full ROBF with semi-solid stool and tolerating initial tube feed at goal (80 mL/hr) as of last night with some reported distension this morning.    POST OPERATIVE DAY #: 8      SUBJECTIVE    OVERNIGHT EVENTS: Patient received first tube feed at goal (80 mg/hr) yesterday evening without complication, but endorsing mild, non-tender abdominal distension this morning.       OBJECTIVE    VITALS  T(C): 36.5 (11-30-20 @ 21:56), Max: 36.9 (11-30-20 @ 05:28)  HR: 55 (11-30-20 @ 21:56) (51 - 58)  BP: 114/75 (11-30-20 @ 21:56) (112/75 - 123/73)  RR: 17 (11-30-20 @ 21:56) (17 - 18)  SpO2: 98% (11-30-20 @ 21:56) (95% - 98%)    INS & OUTS    11-29-20 @ 07:01  -  11-30-20 @ 07:00  --------------------------------------------------------  IN: 1800 mL / OUT: 500 mL / NET: 1300 mL    11-30-20 @ 07:01  -  12-01-20 @ 04:35  --------------------------------------------------------  IN: 1320 mL / OUT: 0 mL / NET: 1320 mL        PHYSICAL EXAM  General: Appears well, NAD.  Neuro: A&Ox3.  Chest: Breathing comfortably, CTAB.  CV: RRR, No m/r/g.  Abdomen: Soft and non-tender with diffuse but mild abdominal distension.  No rebound or guarding. Dressings c/d/i and j-tube insertion site appears clean and try with minimal incisional erythema.    LABS                        9.4    4.62  )-----------( 168      ( 30 Nov 2020 07:14 )             28.8     11-30    137  |  103  |  17  ----------------------------<  121<H>  4.5   |  29  |  0.62    Ca    8.6      30 Nov 2020 07:00  Phos  3.5     11-30  Mg     2.1     11-30            RADIOLOGY & ADDITIONAL STUDIES GENERAL SURGERY PROGRESS NOTE    71 y/o Male with gastric cancer s/p total gastrectomy, splenectomy, and distal pancreatectomy (Dx. 2017) POD8 s/p jejunostomy with J tube for for two months of poor PO intake and dysphagia.     POST OPERATIVE DAY #: 8    OVERNIGHT EVENTS: No acute events.       SUBJECTIVE: Endorsing full ROBF with semi-solid stool and tolerating tube feeds at goal (80 mL/hr) as of last night.        OBJECTIVE    VITALS  T(C): 36.5 (11-30-20 @ 21:56), Max: 36.9 (11-30-20 @ 05:28)  HR: 55 (11-30-20 @ 21:56) (51 - 58)  BP: 114/75 (11-30-20 @ 21:56) (112/75 - 123/73)  RR: 17 (11-30-20 @ 21:56) (17 - 18)  SpO2: 98% (11-30-20 @ 21:56) (95% - 98%)    INS & OUTS    11-29-20 @ 07:01  -  11-30-20 @ 07:00  --------------------------------------------------------  IN: 1800 mL / OUT: 500 mL / NET: 1300 mL    11-30-20 @ 07:01  -  12-01-20 @ 04:35  --------------------------------------------------------  IN: 1320 mL / OUT: 0 mL / NET: 1320 mL        PHYSICAL EXAM  General: Appears well, NAD.  Neuro: A&Ox3.  Chest: Breathing comfortably  Abdomen: Soft and non-tender, mildly distended.  No rebound or guarding. Dressings c/d/i and j-tube insertion site appears clean and try with minimal incisional erythema.    LABS                        9.4    4.62  )-----------( 168      ( 30 Nov 2020 07:14 )             28.8     11-30    137  |  103  |  17  ----------------------------<  121<H>  4.5   |  29  |  0.62    Ca    8.6      30 Nov 2020 07:00  Phos  3.5     11-30  Mg     2.1     11-30

## 2020-12-01 NOTE — DISCHARGE NOTE NURSING/CASE MANAGEMENT/SOCIAL WORK - NSSCNAMETXT_GEN_ALL_CORE
[facility] [place] [phone] initial visit will be day after discharge home. A nurse will call prior to the home visit.

## 2020-12-01 NOTE — PROGRESS NOTE ADULT - ASSESSMENT
69 y/o Male with gastric cancer s/p total gastrectomy, splenectomy, and distal pancreatectomy (Dx. 2017) admitted for poor PO intake and aperistalsis of the esophagus, now POD8 s/p jejunostomy with J tube, tolerating tube feeds.    PLAN:   69 y/o Male with gastric cancer s/p total gastrectomy, splenectomy, and distal pancreatectomy (Dx. 2017) admitted for poor PO intake and aperistalsis of the esophagus, now POD8 s/p jejunostomy with J tube, tolerating tube feeds at goal (80 mg/mL).    PLAN:  - Now receiving tube feeds at goal (80 mg/mL); Tolerating well.  - Full liquid diet for comfort.  - Pain control as needed.  - Encourage OOB/ambulation.  - F/u AM labs.  - Dispo: Discharge home today pending clearance from Dietary and Social Work, plan for nocturnal feeds. 69 y/o Male with gastric cancer s/p total gastrectomy, splenectomy, and distal pancreatectomy (Dx. 2017) admitted for poor PO intake and aperistalsis of the esophagus, now POD8 s/p open jejunostomy with J tube,. Patient stable and tolerating tube feeds at goal (80 mg/mL).    PLAN:  - Now receiving tube feeds at goal (80 mg/mL); Tolerating well.  - Full liquid diet for comfort.  - Pain control as needed.  - Encourage OOB/ambulation.  - F/u AM labs.  - Dispo: Discharge home today with home VNS and TPN    D Team Surgery, [id number]

## 2020-12-01 NOTE — PROVIDER CONTACT NOTE (OTHER) - ASSESSMENT
A/ox4. OOBxstandby. Voids. Fullliquid diet tolerating with continuous tube feedings for 16hrs. No issues of pain, pt wants to rest comfortably at this time.

## 2020-12-01 NOTE — PROGRESS NOTE ADULT - ATTENDING COMMENTS
Agree with notes of health care providers on my service.  I have seen and examined the patient, reviewed the laboratory and available data and agree with the history, physical assessment and plan.  I reviewed and discussed with all consultants, house staff and PA's.  The Nutrition Support Team (NST) discusses on an ongoing basis with the primary team and all consultants, House staff and PA's to have a permanent risk benefit analyses on all decisions.  69 y/o male with hx of gastric cancer s/p resection and minh-n-Y esophagojejunostomy receiving parenteral nutrition in view of severe calorie protein malnutrition for nutritional support.  labs reviewed - electrolytes adjusted   continue TPN with infusion volume of 2 L/1545 kcal/day
Agree with notes of health care providers on my service.  I have seen and examined the patient, reviewed the laboratory and available data and agree with the history, physical assessment and plan.  I reviewed and discussed with all consultants, house staff and PA's.  The Nutrition Support Team (NST) discusses on an ongoing basis with the primary team and all consultants, House staff and PA's to have a permanent risk benefit analyses on all decisions.  71 y/o male with hx of gastric cancer s/p resection and minh-n-Y esophagojejunostomy receiving parenteral nutrition secondary to protein calorie malnutrition.  electrolytes reviewed and adjusted.  TPN infusion volume increased to 2 L/ 1505 kcal/day
Agree with notes of health care providers on my service.  I have seen and examined the patient, reviewed the laboratory and available data and agree with the history, physical assessment and plan.  I reviewed and discussed with all consultants, house staff and PA's.  The Nutrition Support Team (NST) discusses on an ongoing basis with the primary team and all consultants, House staff and PA's to have a permanent risk benefit analyses on all decisions.  1 y/o male with hx of gastric cancer s/p resection and minh-n-Y esophagojejunostomy who was admitted with complaints of progressively worsening tolerance to PO diet receiving parenteral nutrition for nutritional support.  labs reviewed - electrolytes adjusted  continue TPN with infusion volume of 2 L/1545 kcal/day
Agree with notes of health care providers on my service.  I have seen and examined the patient, reviewed the laboratory and available data and agree with the history, physical assessment and plan.  I reviewed and discussed with all consultants, house staff and PA's.  The Nutrition Support Team (NST) discusses on an ongoing basis with the primary team and all consultants, House staff and PA's to have a permanent risk benefit analyses on all decisions.  71 y/o male with hx of gastric cancer s/p resection and minh-n-Y esophagojejunostomy receiving parenteral nutrition for calorie protein malnutrition for nutritional support.  labs reviewed - electrolytes adjusted   continue TPN with infusion volume of 2 L,/1545 kcal/day
Agree with notes of health care providers on my service.  I have seen and examined the patient, reviewed the laboratory and available data and agree with the history, physical assessment and plan.  I reviewed and discussed with all consultants, house staff and PA's.  The Nutrition Support Team (NST) discusses on an ongoing basis with the primary team and all consultants, House staff and PA's to have a permanent risk benefit analyses on all decisions.  71 y/o male with hx of gastric cancer s/p resection and minh-n-Y esophagojejunostomy who was admitted with complaints of progressively worsening tolerance to PO diet receiving parenteral nutrition for nutritional support.  labs reviewed - electrolytes adjusted   continue TPN with infusion volume of 2 L/1545 kcal/day
Agree with notes of health care providers on my service.  I have seen and examined the patient, reviewed the laboratory and available data and agree with the history, physical assessment and plan.  I reviewed and discussed with all consultants, house staff and PA's.  The Nutrition Support Team (NST) discusses on an ongoing basis with the primary team and all consultants, House staff and PA's to have a permanent risk benefit analyses on all decisions.  71 y/o male with hx of gastric cancer s/p resection and minh-n-Y esophagojejunostomy who was admitted with complaints of progressively worsening tolerance to PO diet. Receiving parenteral nutrition for nutritional support. Pt is s/p J tube placement.  labs reviewed - continue same formula.   TPN infusion volume decreased to 1 L/790 kcal/day
Await PICC/TPN.  GI evaluation.  Follow up CT scan.
Continue TPN.  Plan for surgical jtube early next week.
Continue TPN.  Plan for surgical jtube early next week.
Will continue dysphagia workup as an outpatient.

## 2020-12-01 NOTE — DISCHARGE NOTE NURSING/CASE MANAGEMENT/SOCIAL WORK - NSDCVIVACCINE_GEN_ALL_CORE_FT
Haemophilus Influenza, type b , 2017/5/24 18:23 , Rita Kiser (RN)  Meningococcal , 2017/5/26 13:17 , Juany Arriaga (RN)  Pneumococcal Conjugate (PCV 13) , 2017/5/24 18:19 , Rita Kiser (RN)

## 2020-12-02 LAB — SURGICAL PATHOLOGY STUDY: SIGNIFICANT CHANGE UP

## 2020-12-03 ENCOUNTER — APPOINTMENT (OUTPATIENT)
Dept: SURGICAL ONCOLOGY | Facility: CLINIC | Age: 70
End: 2020-12-03
Payer: MEDICARE

## 2020-12-03 VITALS
WEIGHT: 140 LBS | HEART RATE: 65 BPM | OXYGEN SATURATION: 98 % | HEIGHT: 70 IN | SYSTOLIC BLOOD PRESSURE: 153 MMHG | DIASTOLIC BLOOD PRESSURE: 82 MMHG | BODY MASS INDEX: 20.04 KG/M2

## 2020-12-03 PROCEDURE — 99024 POSTOP FOLLOW-UP VISIT: CPT

## 2020-12-03 NOTE — HISTORY OF PRESENT ILLNESS
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

## 2020-12-03 NOTE — ASSESSMENT
[FreeTextEntry1] : Return to office next week for staple removal.\par Flush jejunostomy tube before and after each use.\par Referral to Dr. Adonay Peguero to explore endoscopic treatment of esophageal dysfunction.

## 2020-12-07 ENCOUNTER — NON-APPOINTMENT (OUTPATIENT)
Age: 70
End: 2020-12-07

## 2020-12-08 ENCOUNTER — APPOINTMENT (OUTPATIENT)
Dept: SURGICAL ONCOLOGY | Facility: CLINIC | Age: 70
End: 2020-12-08
Payer: MEDICARE

## 2020-12-08 VITALS
WEIGHT: 140 LBS | HEIGHT: 70 IN | RESPIRATION RATE: 16 BRPM | HEART RATE: 58 BPM | BODY MASS INDEX: 20.04 KG/M2 | OXYGEN SATURATION: 96 % | DIASTOLIC BLOOD PRESSURE: 89 MMHG | SYSTOLIC BLOOD PRESSURE: 157 MMHG

## 2020-12-08 PROCEDURE — 99024 POSTOP FOLLOW-UP VISIT: CPT

## 2020-12-11 ENCOUNTER — APPOINTMENT (OUTPATIENT)
Dept: GASTROENTEROLOGY | Facility: HOSPITAL | Age: 70
End: 2020-12-11
Payer: MEDICARE

## 2020-12-11 PROCEDURE — 99214 OFFICE O/P EST MOD 30 MIN: CPT

## 2020-12-13 ENCOUNTER — LABORATORY RESULT (OUTPATIENT)
Age: 70
End: 2020-12-13

## 2020-12-14 ENCOUNTER — APPOINTMENT (OUTPATIENT)
Dept: DISASTER EMERGENCY | Facility: CLINIC | Age: 70
End: 2020-12-14

## 2020-12-15 ENCOUNTER — OUTPATIENT (OUTPATIENT)
Dept: OUTPATIENT SERVICES | Facility: HOSPITAL | Age: 70
LOS: 1 days | End: 2020-12-15
Payer: MEDICARE

## 2020-12-15 VITALS
DIASTOLIC BLOOD PRESSURE: 86 MMHG | OXYGEN SATURATION: 98 % | HEIGHT: 70 IN | SYSTOLIC BLOOD PRESSURE: 130 MMHG | WEIGHT: 132.06 LBS | HEART RATE: 65 BPM | RESPIRATION RATE: 16 BRPM | TEMPERATURE: 99 F

## 2020-12-15 DIAGNOSIS — Z93.1 GASTROSTOMY STATUS: Chronic | ICD-10-CM

## 2020-12-15 DIAGNOSIS — Z98.890 OTHER SPECIFIED POSTPROCEDURAL STATES: Chronic | ICD-10-CM

## 2020-12-15 DIAGNOSIS — R13.10 DYSPHAGIA, UNSPECIFIED: ICD-10-CM

## 2020-12-15 DIAGNOSIS — E03.9 HYPOTHYROIDISM, UNSPECIFIED: ICD-10-CM

## 2020-12-15 DIAGNOSIS — Z20.828 CONTACT WITH AND (SUSPECTED) EXPOSURE TO OTHER VIRAL COMMUNICABLE DISEASES: ICD-10-CM

## 2020-12-15 DIAGNOSIS — E11.9 TYPE 2 DIABETES MELLITUS WITHOUT COMPLICATIONS: ICD-10-CM

## 2020-12-15 DIAGNOSIS — Z98.49 CATARACT EXTRACTION STATUS, UNSPECIFIED EYE: Chronic | ICD-10-CM

## 2020-12-15 DIAGNOSIS — Z95.828 PRESENCE OF OTHER VASCULAR IMPLANTS AND GRAFTS: Chronic | ICD-10-CM

## 2020-12-15 PROCEDURE — 93010 ELECTROCARDIOGRAM REPORT: CPT

## 2020-12-15 RX ORDER — METFORMIN HYDROCHLORIDE 850 MG/1
1 TABLET ORAL
Qty: 0 | Refills: 0 | DISCHARGE

## 2020-12-15 RX ORDER — LEVOTHYROXINE SODIUM 125 MCG
1 TABLET ORAL
Qty: 0 | Refills: 0 | DISCHARGE

## 2020-12-15 RX ORDER — SODIUM CHLORIDE 9 MG/ML
1000 INJECTION, SOLUTION INTRAVENOUS
Refills: 0 | Status: DISCONTINUED | OUTPATIENT
Start: 2020-12-17 | End: 2020-12-18

## 2020-12-15 RX ORDER — FERROUS SULFATE 325(65) MG
1 TABLET ORAL
Qty: 0 | Refills: 0 | DISCHARGE

## 2020-12-15 RX ORDER — ZOLPIDEM TARTRATE 10 MG/1
1 TABLET ORAL
Qty: 0 | Refills: 0 | DISCHARGE

## 2020-12-15 NOTE — H&P PST ADULT - HISTORY OF PRESENT ILLNESS
69 yo male presents to [facility] for preop evaluation for Endoscopy stent anesthesia.  Patient with complaints of dysphagia, inability to tolerate PO and weight loss for 2-3 month.  Patient was evaluated at [facility] in November 2020. Patient was noted to have aperistalsis of esophagus s/p open jejunostomy with J-tube.    71 yo male with complaints of dysphagia, inability to tolerate PO and weight loss for 2-3 months.  Patient was evaluated at [facility] in November 2020 and was noted to have aperistalsis of esophagus s/p open jejunostomy with J-tube.  Patient presents to PST for preop evaluation for Endoscopy stent anesthesia.

## 2020-12-15 NOTE — H&P PST ADULT - NSICDXPASTSURGICALHX_GEN_ALL_CORE_FT
PAST SURGICAL HISTORY:  Gastrostomy tube in place     H/O umbilical hernia repair     History of gastric surgery 2017    Port-a-cath in place right chest wall    S/P cataract surgery      PAST SURGICAL HISTORY:  Gastrostomy tube in place open jejunostomy with J-tube November 2020    H/O umbilical hernia repair     History of gastric surgery 2017    Port-a-cath in place right chest wall    S/P cataract surgery

## 2020-12-15 NOTE — H&P PST ADULT - NSICDXPASTMEDICALHX_GEN_ALL_CORE_FT
PAST MEDICAL HISTORY:  Constipation     Essential hypertension was on losartan, now diet control    Gastric mass ulcerated mass in gastric cardia with small perigastric nodes on endoscopy.    Hypothyroidism     Iron deficiency anemia, unspecified iron deficiency anemia type     Type 2 diabetes mellitus without complication, unspecified long term insulin use status no BS monitoring     PAST MEDICAL HISTORY:  Constipation     Dysphagia, unspecified     Essential hypertension was on losartan, now diet control    Gastric mass ulcerated mass in gastric cardia with small perigastric nodes on endoscopy.    Hypothyroidism     Iron deficiency anemia, unspecified iron deficiency anemia type     Type 2 diabetes mellitus without complication, unspecified long term insulin use status no BS monitoring

## 2020-12-15 NOTE — H&P PST ADULT - OTHER CARE PROVIDERS
Cardiology Dr Olivier [phone], Dr Sosa heme onc Dr Olivier Cardiologist [phone], Dr. Henry Heme-Onc [phone]

## 2020-12-15 NOTE — H&P PST ADULT - NEGATIVE NEUROLOGICAL SYMPTOMS
no weakness/no paresthesias/no generalized seizures/no syncope/no vertigo/no headache/no loss of consciousness

## 2020-12-15 NOTE — H&P PST ADULT - NSICDXPROBLEM_GEN_ALL_CORE_FT
PROBLEM DIAGNOSES  Problem: Dysphagia, unspecified  Assessment and Plan: Patient scheduled for Endoscopy stent anesthesia on 12/17/2020  Written & verbal preop instructions. Pt verbalized good understanding.   Patient had recent medical evaluation with PCP, pending copy of report    Problem: Type 2 diabetes mellitus  Assessment and Plan: Patient instructed last dose of Metformin on 12/16/2020 AM  Copy of HgbA1c in chart, FS ordered for AM of surgery, OR booking notified    Problem: Hypothyroidism  Assessment and Plan: Patient instructed to take Levothyroxine in AM of surgery    Problem: Encounter for screening laboratory testing for COVID-19 virus  Assessment and Plan: Patient aware of need for COVID testing prior to procedure and advised to co ordinate with surgeon.        PROBLEM DIAGNOSES  Problem: Dysphagia, unspecified  Assessment and Plan: Patient scheduled for Endoscopy stent anesthesia on 12/17/2020  Written & verbal preop instructions. Pt verbalized good understanding.   Patient had recent medical evaluation with PCP, pending copy of report and comparison EKG    Problem: Type 2 diabetes mellitus  Assessment and Plan: Patient instructed last dose of Metformin on 12/16/2020 AM  Copy of HgbA1c in chart, FS ordered for AM of surgery, OR booking notified    Problem: Hypothyroidism  Assessment and Plan: Patient instructed to take Levothyroxine in AM of surgery    Problem: Encounter for screening laboratory testing for COVID-19 virus  Assessment and Plan: Patient aware of need for COVID testing prior to procedure and advised to co ordinate with surgeon.

## 2020-12-15 NOTE — H&P PST ADULT - NEGATIVE ENMT SYMPTOMS
no hearing difficulty/no ear pain/no tinnitus/no vertigo/no sinus symptoms/no nasal congestion/no nose bleeds/no gum bleeding/no throat pain

## 2020-12-17 ENCOUNTER — APPOINTMENT (OUTPATIENT)
Dept: GASTROENTEROLOGY | Facility: HOSPITAL | Age: 70
End: 2020-12-17

## 2020-12-17 ENCOUNTER — INPATIENT (INPATIENT)
Facility: HOSPITAL | Age: 70
LOS: 3 days | Discharge: HOME HEALTH | End: 2020-12-21
Attending: INTERNAL MEDICINE | Admitting: INTERNAL MEDICINE
Payer: MEDICARE

## 2020-12-17 VITALS
HEART RATE: 58 BPM | WEIGHT: 132.06 LBS | OXYGEN SATURATION: 99 % | SYSTOLIC BLOOD PRESSURE: 130 MMHG | DIASTOLIC BLOOD PRESSURE: 67 MMHG | HEIGHT: 70 IN | TEMPERATURE: 99 F | RESPIRATION RATE: 15 BRPM

## 2020-12-17 DIAGNOSIS — R13.10 DYSPHAGIA, UNSPECIFIED: ICD-10-CM

## 2020-12-17 DIAGNOSIS — Z93.1 GASTROSTOMY STATUS: Chronic | ICD-10-CM

## 2020-12-17 DIAGNOSIS — Z98.890 OTHER SPECIFIED POSTPROCEDURAL STATES: Chronic | ICD-10-CM

## 2020-12-17 DIAGNOSIS — D64.9 ANEMIA, UNSPECIFIED: ICD-10-CM

## 2020-12-17 DIAGNOSIS — Z98.49 CATARACT EXTRACTION STATUS, UNSPECIFIED EYE: Chronic | ICD-10-CM

## 2020-12-17 DIAGNOSIS — Z95.828 PRESENCE OF OTHER VASCULAR IMPLANTS AND GRAFTS: Chronic | ICD-10-CM

## 2020-12-17 LAB
ALBUMIN SERPL ELPH-MCNC: 3.9 G/DL — SIGNIFICANT CHANGE UP (ref 3.3–5)
ALP SERPL-CCNC: 117 U/L — SIGNIFICANT CHANGE UP (ref 40–120)
ALT FLD-CCNC: 28 U/L — SIGNIFICANT CHANGE UP (ref 4–41)
ANION GAP SERPL CALC-SCNC: 9 MMOL/L — SIGNIFICANT CHANGE UP (ref 7–14)
APTT BLD: 32.1 SEC — SIGNIFICANT CHANGE UP (ref 27–36.3)
AST SERPL-CCNC: 34 U/L — SIGNIFICANT CHANGE UP (ref 4–40)
BASOPHILS # BLD AUTO: 0.08 K/UL — SIGNIFICANT CHANGE UP (ref 0–0.2)
BASOPHILS NFR BLD AUTO: 1.4 % — SIGNIFICANT CHANGE UP (ref 0–2)
BILIRUB SERPL-MCNC: 0.7 MG/DL — SIGNIFICANT CHANGE UP (ref 0.2–1.2)
BUN SERPL-MCNC: 13 MG/DL — SIGNIFICANT CHANGE UP (ref 7–23)
CALCIUM SERPL-MCNC: 9.3 MG/DL — SIGNIFICANT CHANGE UP (ref 8.4–10.5)
CHLORIDE SERPL-SCNC: 101 MMOL/L — SIGNIFICANT CHANGE UP (ref 98–107)
CO2 SERPL-SCNC: 28 MMOL/L — SIGNIFICANT CHANGE UP (ref 22–31)
CREAT SERPL-MCNC: 0.63 MG/DL — SIGNIFICANT CHANGE UP (ref 0.5–1.3)
EOSINOPHIL # BLD AUTO: 0.19 K/UL — SIGNIFICANT CHANGE UP (ref 0–0.5)
EOSINOPHIL NFR BLD AUTO: 3.4 % — SIGNIFICANT CHANGE UP (ref 0–6)
FOLATE SERPL-MCNC: 17.4 NG/ML — SIGNIFICANT CHANGE UP (ref 3.1–17.5)
GLUCOSE BLDC GLUCOMTR-MCNC: 102 MG/DL — HIGH (ref 70–99)
GLUCOSE BLDC GLUCOMTR-MCNC: 138 MG/DL — HIGH (ref 70–99)
GLUCOSE BLDC GLUCOMTR-MCNC: 68 MG/DL — LOW (ref 70–99)
GLUCOSE BLDC GLUCOMTR-MCNC: 72 MG/DL — SIGNIFICANT CHANGE UP (ref 70–99)
GLUCOSE BLDC GLUCOMTR-MCNC: 91 MG/DL — SIGNIFICANT CHANGE UP (ref 70–99)
GLUCOSE SERPL-MCNC: 98 MG/DL — SIGNIFICANT CHANGE UP (ref 70–99)
HCT VFR BLD CALC: 38.5 % — LOW (ref 39–50)
HGB BLD-MCNC: 12.2 G/DL — LOW (ref 13–17)
IANC: 3.63 K/UL — SIGNIFICANT CHANGE UP (ref 1.5–8.5)
IMM GRANULOCYTES NFR BLD AUTO: 0.4 % — SIGNIFICANT CHANGE UP (ref 0–1.5)
INR BLD: 1.03 RATIO — SIGNIFICANT CHANGE UP (ref 0.88–1.17)
LYMPHOCYTES # BLD AUTO: 1.14 K/UL — SIGNIFICANT CHANGE UP (ref 1–3.3)
LYMPHOCYTES # BLD AUTO: 20.6 % — SIGNIFICANT CHANGE UP (ref 13–44)
MCHC RBC-ENTMCNC: 31.7 GM/DL — LOW (ref 32–36)
MCHC RBC-ENTMCNC: 33.9 PG — SIGNIFICANT CHANGE UP (ref 27–34)
MCV RBC AUTO: 106.9 FL — HIGH (ref 80–100)
MONOCYTES # BLD AUTO: 0.48 K/UL — SIGNIFICANT CHANGE UP (ref 0–0.9)
MONOCYTES NFR BLD AUTO: 8.7 % — SIGNIFICANT CHANGE UP (ref 2–14)
NEUTROPHILS # BLD AUTO: 3.63 K/UL — SIGNIFICANT CHANGE UP (ref 1.8–7.4)
NEUTROPHILS NFR BLD AUTO: 65.5 % — SIGNIFICANT CHANGE UP (ref 43–77)
NRBC # BLD: 0 /100 WBCS — SIGNIFICANT CHANGE UP
NRBC # FLD: 0 K/UL — SIGNIFICANT CHANGE UP
PLATELET # BLD AUTO: 282 K/UL — SIGNIFICANT CHANGE UP (ref 150–400)
POTASSIUM SERPL-MCNC: 3.7 MMOL/L — SIGNIFICANT CHANGE UP (ref 3.5–5.3)
POTASSIUM SERPL-SCNC: 3.7 MMOL/L — SIGNIFICANT CHANGE UP (ref 3.5–5.3)
PROT SERPL-MCNC: 7.1 G/DL — SIGNIFICANT CHANGE UP (ref 6–8.3)
PROTHROM AB SERPL-ACNC: 11.8 SEC — SIGNIFICANT CHANGE UP (ref 9.8–13.1)
RBC # BLD: 3.6 M/UL — LOW (ref 4.2–5.8)
RBC # FLD: 14.8 % — HIGH (ref 10.3–14.5)
SODIUM SERPL-SCNC: 138 MMOL/L — SIGNIFICANT CHANGE UP (ref 135–145)
TSH SERPL-MCNC: 4.61 UIU/ML — HIGH (ref 0.27–4.2)
VIT B12 SERPL-MCNC: 735 PG/ML — SIGNIFICANT CHANGE UP (ref 200–900)
WBC # BLD: 5.54 K/UL — SIGNIFICANT CHANGE UP (ref 3.8–10.5)
WBC # FLD AUTO: 5.54 K/UL — SIGNIFICANT CHANGE UP (ref 3.8–10.5)

## 2020-12-17 PROCEDURE — 99223 1ST HOSP IP/OBS HIGH 75: CPT

## 2020-12-17 RX ORDER — SODIUM CHLORIDE 9 MG/ML
1000 INJECTION, SOLUTION INTRAVENOUS
Refills: 0 | Status: DISCONTINUED | OUTPATIENT
Start: 2020-12-17 | End: 2020-12-21

## 2020-12-17 RX ORDER — FERROUS SULFATE 325(65) MG
300 TABLET ORAL DAILY
Refills: 0 | Status: DISCONTINUED | OUTPATIENT
Start: 2020-12-17 | End: 2020-12-21

## 2020-12-17 RX ORDER — DEXTROSE 50 % IN WATER 50 %
25 SYRINGE (ML) INTRAVENOUS ONCE
Refills: 0 | Status: DISCONTINUED | OUTPATIENT
Start: 2020-12-17 | End: 2020-12-21

## 2020-12-17 RX ORDER — ENOXAPARIN SODIUM 100 MG/ML
40 INJECTION SUBCUTANEOUS DAILY
Refills: 0 | Status: DISCONTINUED | OUTPATIENT
Start: 2020-12-17 | End: 2020-12-21

## 2020-12-17 RX ORDER — LEVOTHYROXINE SODIUM 125 MCG
100 TABLET ORAL DAILY
Refills: 0 | Status: DISCONTINUED | OUTPATIENT
Start: 2020-12-17 | End: 2020-12-21

## 2020-12-17 RX ORDER — LANOLIN ALCOHOL/MO/W.PET/CERES
6 CREAM (GRAM) TOPICAL AT BEDTIME
Refills: 0 | Status: DISCONTINUED | OUTPATIENT
Start: 2020-12-17 | End: 2020-12-21

## 2020-12-17 RX ORDER — BACITRACIN ZINC 500 UNIT/G
1 OINTMENT IN PACKET (EA) TOPICAL
Refills: 0 | Status: DISCONTINUED | OUTPATIENT
Start: 2020-12-17 | End: 2020-12-21

## 2020-12-17 RX ORDER — LEVOTHYROXINE SODIUM 125 MCG
1 TABLET ORAL
Qty: 0 | Refills: 0 | DISCHARGE

## 2020-12-17 RX ORDER — BENZOYL PEROXIDE MICRONIZED 5.8 %
1 TOWELETTE (EA) TOPICAL
Qty: 0 | Refills: 0 | DISCHARGE

## 2020-12-17 RX ORDER — ZOLPIDEM TARTRATE 10 MG/1
5 TABLET ORAL AT BEDTIME
Refills: 0 | Status: DISCONTINUED | OUTPATIENT
Start: 2020-12-17 | End: 2020-12-21

## 2020-12-17 RX ORDER — DOXAZOSIN MESYLATE 4 MG
1 TABLET ORAL AT BEDTIME
Refills: 0 | Status: DISCONTINUED | OUTPATIENT
Start: 2020-12-17 | End: 2020-12-21

## 2020-12-17 RX ORDER — INSULIN LISPRO 100/ML
VIAL (ML) SUBCUTANEOUS EVERY 6 HOURS
Refills: 0 | Status: DISCONTINUED | OUTPATIENT
Start: 2020-12-17 | End: 2020-12-21

## 2020-12-17 RX ORDER — DEXTROSE 50 % IN WATER 50 %
12.5 SYRINGE (ML) INTRAVENOUS ONCE
Refills: 0 | Status: DISCONTINUED | OUTPATIENT
Start: 2020-12-17 | End: 2020-12-21

## 2020-12-17 RX ORDER — DEXTROSE 50 % IN WATER 50 %
15 SYRINGE (ML) INTRAVENOUS ONCE
Refills: 0 | Status: DISCONTINUED | OUTPATIENT
Start: 2020-12-17 | End: 2020-12-21

## 2020-12-17 RX ORDER — TAMSULOSIN HYDROCHLORIDE 0.4 MG/1
0.4 CAPSULE ORAL AT BEDTIME
Refills: 0 | Status: DISCONTINUED | OUTPATIENT
Start: 2020-12-17 | End: 2020-12-17

## 2020-12-17 RX ORDER — ESCITALOPRAM OXALATE 10 MG/1
10 TABLET, FILM COATED ORAL DAILY
Refills: 0 | Status: DISCONTINUED | OUTPATIENT
Start: 2020-12-17 | End: 2020-12-21

## 2020-12-17 RX ORDER — GLUCAGON INJECTION, SOLUTION 0.5 MG/.1ML
1 INJECTION, SOLUTION SUBCUTANEOUS ONCE
Refills: 0 | Status: DISCONTINUED | OUTPATIENT
Start: 2020-12-17 | End: 2020-12-21

## 2020-12-17 RX ADMIN — SODIUM CHLORIDE 30 MILLILITER(S): 9 INJECTION, SOLUTION INTRAVENOUS at 20:47

## 2020-12-17 RX ADMIN — Medication 1 MILLIGRAM(S): at 22:05

## 2020-12-17 RX ADMIN — Medication 1 APPLICATION(S): at 22:05

## 2020-12-17 RX ADMIN — Medication 6 MILLIGRAM(S): at 22:05

## 2020-12-17 RX ADMIN — SODIUM CHLORIDE 30 MILLILITER(S): 9 INJECTION, SOLUTION INTRAVENOUS at 08:03

## 2020-12-17 NOTE — H&P ADULT - ASSESSMENT
70M with PMH of HTN, BPH, T2DM, recent dx of gastric cancer s/p gastrectomy/splenectomy/distal pancreatectomy/Esophagojejunostomy (2017), aperistalsis of esophagus s/p Nov 2020 admission for J-tube placement who presents for stent placement to connect blind limb to efferent limb of esophagojejunostomy to facilitate passage of food.

## 2020-12-17 NOTE — H&P ADULT - NSICDXPASTMEDICALHX_GEN_ALL_CORE_FT
PAST MEDICAL HISTORY:  Constipation     Dysphagia, unspecified     Essential hypertension was on losartan, now diet control    Gastric mass ulcerated mass in gastric cardia with small perigastric nodes on endoscopy.    Hypothyroidism     Iron deficiency anemia, unspecified iron deficiency anemia type     Type 2 diabetes mellitus without complication, unspecified long term insulin use status no BS monitoring

## 2020-12-17 NOTE — H&P ADULT - PROBLEM SELECTOR PLAN 6
-Low BMI for age w/ notable weight/loss decreased PO intake in setting of gastric malignancy. Exam w/ signs of muscle wasting.  -Cont w/ supportive measures.   Nutrition recs from recent stay noted - will resume home TF regimen (1.2 Jevity - goal 80cc/h x10h)

## 2020-12-17 NOTE — H&P ADULT - PROBLEM SELECTOR PLAN 7
Lovenox Lovenox    Communication: Spoke w/ dtr (Haley) updated on status, POC. Reviewed plan with GI as well. Will await med rec pharmacist f/u re: complete med list.

## 2020-12-17 NOTE — H&P ADULT - HISTORY OF PRESENT ILLNESS
70M with PMH of HTN, BPH, T2DM, recent dx of gastric cancer s/p gastrectomy, splenectomy, distal pancreatectomy, aperistalsis of esophagus, recent admission for J-tube placement who presents for stent placement to connect blind limb to efferent limb to facilitate passage of food. Course uneventful. Pt tolerated procedure well. He was assessed at bedside, denied any nausea/vomiting, abdominal pain, F/c, SOB, CP, cough, sore throat. He had a small BM this AM. He is also voiding w/o issues. He has been compliant w/ his meds. His chronic conditions - HTN/DM/BPH have remained stable. No changes to his med management. Pt follows closely w/ GI and surg onc as outpatient.    Oncologist- Dr. Mario Henry  Surg Onc- Dr. Chema Banda

## 2020-12-17 NOTE — PHARMACOTHERAPY INTERVENTION NOTE - COMMENTS
Medication history is complete. Medication list updated in Outpatient Medication Record (OMR). Please call spectra [phone] if you have any questions.

## 2020-12-17 NOTE — H&P ADULT - PROBLEM SELECTOR PLAN 1
-S/p stent placement by EGD, detailed as above.  -Follow up as OP w/ surg onc and oncology. HIE records reviewed.  -Discussed with GI. Okay to use J-tube for meds. Okay to resume feeds this afternoon.  -Upper GI series will be ordered for tomorrow.   -Pain control. -S/p stent placement by EGD, detailed as above.  -Follow up as OP w/ surg onc and oncology. HIE records and last hospitalization records reviewed.  -Discussed with GI. Okay to use J-tube for meds. Okay to resume feeds this afternoon.  -Upper GI series will be ordered for tomorrow. NPO @ [facility]. Discussed w/ radiology.  -Pain control.

## 2020-12-17 NOTE — H&P ADULT - NSICDXPASTSURGICALHX_GEN_ALL_CORE_FT
PAST SURGICAL HISTORY:  Gastrostomy tube in place open jejunostomy with J-tube November 2020    H/O umbilical hernia repair     History of gastric surgery 2017    Port-a-cath in place right chest wall    S/P cataract surgery

## 2020-12-17 NOTE — H&P ADULT - NSHPPHYSICALEXAM_GEN_ALL_CORE
Vital Signs Last 24 Hrs  T(C): 36.3 (17 Dec 2020 10:03), Max: 37 (17 Dec 2020 07:35)  T(F): 97.3 (17 Dec 2020 10:03), Max: 98.6 (17 Dec 2020 07:35)  HR: 62 (17 Dec 2020 10:45) (58 - 68)  BP: 122/76 (17 Dec 2020 10:45) (100/59 - 130/67)  BP(mean): --  RR: 20 (17 Dec 2020 10:45) (14 - 20)  SpO2: 97% (17 Dec 2020 10:45) (97% - 99%)    Gen- In bed, comfortable, NAD. Frail.  Eyes- EOMI, PERRLA, nonicteric.  EMNT- Fair dentition. MMM. No tonsilar exudates. No posterior pharynx erythema.  Neck- Supple. No masses. No tracheal deviation.  Resp- CTAB, good effort. No r/r/w. No accessory muscle use.  CVS- RRR, S1S2, no g/r/m. B/l pedal edema.  GI- Soft abd, NT, ND, +BSx4. No HSM. Midline surgical scars, staples in place. J-tube in place.   MSK- Bitemp wasting. No C/C. ROM intact. No crepitus.  Neuro- CN II-XII intact. Speech fluent/face symmetric. Sensation intact.  Skin- No rashes/ulcers. Warm/moist.  Psych- AAOx3. Appropriate mood/affect.

## 2020-12-17 NOTE — H&P ADULT - NSHPLABSRESULTS_GEN_ALL_CORE
Labs are pending at this time. 12.2   5.54  )-----------( 282      ( 17 Dec 2020 14:58 )             38.5     12-17    138  |  101  |  13  ----------------------------<  98  3.7   |  28  |  0.63    Ca    9.3      17 Dec 2020 14:58    TPro  7.1  /  Alb  3.9  /  TBili  0.7  /  DBili  x   /  AST  34  /  ALT  28  /  AlkPhos  117  12-17    PT/INR - ( 17 Dec 2020 14:58 )   PT: 11.8 sec;   INR: 1.03 ratio         PTT - ( 17 Dec 2020 14:58 )  PTT:32.1 sec

## 2020-12-17 NOTE — H&P ADULT - PROBLEM SELECTOR PLAN 3
FS q6h while NPO.   Correctional insulin. Titrate PRN.  TF to be restarted at 4PM.   Home PO meds from home. FS q6h while NPO/TF.   Correctional insulin. Titrate PRN.  TF to be restarted at 4PM as discussed w/ GI.   Hold PO meds from home.

## 2020-12-18 ENCOUNTER — TRANSCRIPTION ENCOUNTER (OUTPATIENT)
Age: 70
End: 2020-12-18

## 2020-12-18 LAB
ANION GAP SERPL CALC-SCNC: 10 MMOL/L — SIGNIFICANT CHANGE UP (ref 7–14)
BUN SERPL-MCNC: 14 MG/DL — SIGNIFICANT CHANGE UP (ref 7–23)
CALCIUM SERPL-MCNC: 9.2 MG/DL — SIGNIFICANT CHANGE UP (ref 8.4–10.5)
CHLORIDE SERPL-SCNC: 101 MMOL/L — SIGNIFICANT CHANGE UP (ref 98–107)
CO2 SERPL-SCNC: 25 MMOL/L — SIGNIFICANT CHANGE UP (ref 22–31)
CREAT SERPL-MCNC: 0.62 MG/DL — SIGNIFICANT CHANGE UP (ref 0.5–1.3)
GLUCOSE BLDC GLUCOMTR-MCNC: 137 MG/DL — HIGH (ref 70–99)
GLUCOSE BLDC GLUCOMTR-MCNC: 62 MG/DL — LOW (ref 70–99)
GLUCOSE BLDC GLUCOMTR-MCNC: 93 MG/DL — SIGNIFICANT CHANGE UP (ref 70–99)
GLUCOSE BLDC GLUCOMTR-MCNC: 97 MG/DL — SIGNIFICANT CHANGE UP (ref 70–99)
GLUCOSE BLDC GLUCOMTR-MCNC: 98 MG/DL — SIGNIFICANT CHANGE UP (ref 70–99)
GLUCOSE SERPL-MCNC: 83 MG/DL — SIGNIFICANT CHANGE UP (ref 70–99)
HCT VFR BLD CALC: 36.4 % — LOW (ref 39–50)
HGB BLD-MCNC: 11.7 G/DL — LOW (ref 13–17)
MAGNESIUM SERPL-MCNC: 2.1 MG/DL — SIGNIFICANT CHANGE UP (ref 1.6–2.6)
MCHC RBC-ENTMCNC: 32.1 GM/DL — SIGNIFICANT CHANGE UP (ref 32–36)
MCHC RBC-ENTMCNC: 34 PG — SIGNIFICANT CHANGE UP (ref 27–34)
MCV RBC AUTO: 105.8 FL — HIGH (ref 80–100)
NRBC # BLD: 0 /100 WBCS — SIGNIFICANT CHANGE UP
NRBC # FLD: 0 K/UL — SIGNIFICANT CHANGE UP
PHOSPHATE SERPL-MCNC: 4 MG/DL — SIGNIFICANT CHANGE UP (ref 2.5–4.5)
PLATELET # BLD AUTO: 231 K/UL — SIGNIFICANT CHANGE UP (ref 150–400)
POTASSIUM SERPL-MCNC: 4.1 MMOL/L — SIGNIFICANT CHANGE UP (ref 3.5–5.3)
POTASSIUM SERPL-SCNC: 4.1 MMOL/L — SIGNIFICANT CHANGE UP (ref 3.5–5.3)
RBC # BLD: 3.44 M/UL — LOW (ref 4.2–5.8)
RBC # FLD: 14.9 % — HIGH (ref 10.3–14.5)
SODIUM SERPL-SCNC: 136 MMOL/L — SIGNIFICANT CHANGE UP (ref 135–145)
WBC # BLD: 6.44 K/UL — SIGNIFICANT CHANGE UP (ref 3.8–10.5)
WBC # FLD AUTO: 6.44 K/UL — SIGNIFICANT CHANGE UP (ref 3.8–10.5)

## 2020-12-18 PROCEDURE — 99233 SBSQ HOSP IP/OBS HIGH 50: CPT

## 2020-12-18 PROCEDURE — 74018 RADEX ABDOMEN 1 VIEW: CPT | Mod: 26

## 2020-12-18 RX ADMIN — Medication 1 MILLIGRAM(S): at 21:20

## 2020-12-18 RX ADMIN — ESCITALOPRAM OXALATE 10 MILLIGRAM(S): 10 TABLET, FILM COATED ORAL at 14:09

## 2020-12-18 RX ADMIN — Medication 1 APPLICATION(S): at 18:06

## 2020-12-18 RX ADMIN — Medication 300 MILLIGRAM(S): at 14:08

## 2020-12-18 RX ADMIN — Medication 100 MICROGRAM(S): at 05:47

## 2020-12-18 RX ADMIN — ENOXAPARIN SODIUM 40 MILLIGRAM(S): 100 INJECTION SUBCUTANEOUS at 14:08

## 2020-12-18 RX ADMIN — Medication 6 MILLIGRAM(S): at 21:20

## 2020-12-18 RX ADMIN — Medication 1 APPLICATION(S): at 05:47

## 2020-12-18 NOTE — PROGRESS NOTE ADULT - ASSESSMENT
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

## 2020-12-18 NOTE — PROGRESS NOTE ADULT - SUBJECTIVE AND OBJECTIVE BOX
PROGRESS NOTE:   Teressa Turk  Hospitalist  Pager [phone]    Patient is a 70y old  Male who presents with a chief complaint of S/p endoscopic stent placement (18 Dec 2020 10:41)      SUBJECTIVE / OVERNIGHT EVENTS: Patient underwent Upper GI series this morning but was "Unsuccesful" as per patient. Patient has no other acute complains. Denies any chest pain, abdominal pain, Nausea, vomiting.    ADDITIONAL REVIEW OF SYSTEMS:    MEDICATIONS  (STANDING):  BACItracin   Ointment 1 Application(s) Topical two times a day  dextrose 40% Gel 15 Gram(s) Oral once  dextrose 5%. 1000 milliLiter(s) (50 mL/Hr) IV Continuous <Continuous>  dextrose 5%. 1000 milliLiter(s) (100 mL/Hr) IV Continuous <Continuous>  dextrose 50% Injectable 25 Gram(s) IV Push once  dextrose 50% Injectable 12.5 Gram(s) IV Push once  dextrose 50% Injectable 25 Gram(s) IV Push once  doxazosin 1 milliGRAM(s) Oral at bedtime  enoxaparin Injectable 40 milliGRAM(s) SubCutaneous daily  escitalopram 10 milliGRAM(s) Oral daily  ferrous    sulfate Liquid 300 milliGRAM(s) Enteral Tube daily  glucagon  Injectable 1 milliGRAM(s) IntraMuscular once  insulin lispro (ADMELOG) corrective regimen sliding scale   SubCutaneous every 6 hours  lactated ringers. 1000 milliLiter(s) (30 mL/Hr) IV Continuous <Continuous>  levoFLOXacin IVPB      levothyroxine 100 MICROGram(s) Oral daily  melatonin 6 milliGRAM(s) Oral at bedtime    MEDICATIONS  (PRN):  zolpidem 5 milliGRAM(s) Oral at bedtime PRN Insomnia  zolpidem 5 milliGRAM(s) Oral at bedtime PRN Insomnia      CAPILLARY BLOOD GLUCOSE      POCT Blood Glucose.: 62 mg/dL (18 Dec 2020 12:37)  POCT Blood Glucose.: 97 mg/dL (18 Dec 2020 05:41)  POCT Blood Glucose.: 102 mg/dL (17 Dec 2020 23:56)  POCT Blood Glucose.: 72 mg/dL (17 Dec 2020 18:11)  POCT Blood Glucose.: 68 mg/dL (17 Dec 2020 18:09)    I&O's Summary    17 Dec 2020 07:01  -  18 Dec 2020 07:00  --------------------------------------------------------  IN: 440 mL / OUT: 950 mL / NET: -510 mL        PHYSICAL EXAM:  Vital Signs Last 24 Hrs  T(C): 36.1 (18 Dec 2020 08:15), Max: 36.8 (18 Dec 2020 05:44)  T(F): 97 (18 Dec 2020 08:15), Max: 98.2 (18 Dec 2020 05:44)  HR: 67 (18 Dec 2020 08:15) (53 - 68)  BP: 122/63 (18 Dec 2020 08:15) (104/61 - 128/84)  BP(mean): 78 (18 Dec 2020 08:15) (78 - 78)  RR: 18 (18 Dec 2020 08:15) (17 - 20)  SpO2: 100% (18 Dec 2020 08:15) (98% - 100%)    GENERAL: No acute distress, well-developed  HEAD:  Atraumatic, Normocephalic  EYES: EOMI, PERRLA, conjunctiva and sclera clear  NECK: Supple, no lymphadenopathy, no JVD  CHEST/LUNG: CTAB; No wheezes, rales, or rhonchi  HEART: Regular rate and rhythm; No murmurs, rubs, or gallops  ABDOMEN: Soft, non-tender, non-distended; normal bowel sounds ,has prominent vertical scars well-healed with left sided J tube, clean,dry, intact  EXTREMITIES:  2+ peripheral pulses b/l, No clubbing, cyanosis, or edema  NEUROLOGY: A&O x 3, no focal deficits  SKIN: No rashes or lesions    LABS:                        11.7   6.44  )-----------( 231      ( 18 Dec 2020 07:05 )             36.4     12-18    136  |  101  |  14  ----------------------------<  83  4.1   |  25  |  0.62    Ca    9.2      18 Dec 2020 07:05  Phos  4.0     12-18  Mg     2.1     12-18    TPro  7.1  /  Alb  3.9  /  TBili  0.7  /  DBili  x   /  AST  34  /  ALT  28  /  AlkPhos  117  12-17    PT/INR - ( 17 Dec 2020 14:58 )   PT: 11.8 sec;   INR: 1.03 ratio         PTT - ( 17 Dec 2020 14:58 )  PTT:32.1 sec            RADIOLOGY & ADDITIONAL TESTS: Y  Results Reviewed: Y  Imaging Personally Reviewed:Y  Electrocardiogram Personally Reviewed:Y    COORDINATION OF CARE:  Care Discussed with Consultants/Other Providers [Y/N]:Y  Prior or Outpatient Records Reviewed [Y/N]:Y

## 2020-12-18 NOTE — DISCHARGE NOTE NURSING/CASE MANAGEMENT/SOCIAL WORK - PATIENT PORTAL LINK FT
You can access the FollowMyHealth Patient Portal offered by [facility] by registering at the following website: [email or web address]. By joining [employer]’s FollowMyHealth portal, you will also be able to view your health information using other applications (apps) compatible with our system.

## 2020-12-18 NOTE — DISCHARGE NOTE NURSING/CASE MANAGEMENT/SOCIAL WORK - NSSCNAMETXT_GEN_ALL_CORE
[facility] [phone] initial visit will be day after discharge home. A nurse will call prior to the home visit.

## 2020-12-18 NOTE — PROGRESS NOTE ADULT - PROBLEM SELECTOR PLAN 1
-S/p stent placement by EGD, detailed as above.  -Follow up as OP w/ surg onc and oncology. HIE records and last hospitalization records reviewed.  - GI note appreciated, Will start prophylactic abx with Ciprofloxacin.  Continue enteral feeds as tolerated, NPO after midnight, plan for repeat Upper GI series in AM.

## 2020-12-18 NOTE — CONSULT NOTE ADULT - ASSESSMENT
70 year old man with hx of HTN, BPH, T2DM, gastric cancer s/p total gastrectomy, splenectomy, and distal pancreatectomy (2017) admitted for poor PO intake and aperistalsis of the esophagus, s/p J-tube placement who presented for Jejunojenunostomy.    Impression:  # s/p Axios Jejunojenunostomy  # Gastric cancer s/p total gastrectomy, splenectomy, and distal pancreatectomy (2017)     Recommendation:  - Obtain Upper GI series  - Tube feeds as tolerated  - Supportive care per primary team    Mira Burton MD  Gastroenterology Fellow  [phone]  Available on Microsoft Teams  Please page on call fellow on weekends and after 5pm on weekdays: [phone] 70 year old man with hx of HTN, BPH, T2DM, gastric cancer s/p total gastrectomy, splenectomy, and distal pancreatectomy (2017) admitted for poor PO intake and aperistalsis of the esophagus, s/p J-tube placement who presented for Jejunojenunostomy.    Impression:  # s/p Axios Jejunojenunostomy  # Gastric cancer s/p total gastrectomy, splenectomy, and distal pancreatectomy (2017)     Recommendation:  - Repeat Upper GI series in the AM  - Levaquin 500 mg x 5 days  - Tube feeds as tolerated  - Supportive care per primary team    Mira Burton MD  Gastroenterology Fellow  [phone]  [phone]  Available on [employer] Teams  Please page on call fellow on weekends and after 5pm on weekdays: [phone]

## 2020-12-18 NOTE — PROGRESS NOTE ADULT - SUBJECTIVE AND OBJECTIVE BOX
ANESTHESIA POSTOP CHECK    70y Male POSTOP DAY 1 S/P egd with stent  POD1    Vital Signs Last 24 Hrs  T(C): 36.1 (18 Dec 2020 08:15), Max: 36.8 (18 Dec 2020 05:44)  T(F): 97 (18 Dec 2020 08:15), Max: 98.2 (18 Dec 2020 05:44)  HR: 67 (18 Dec 2020 08:15) (53 - 68)  BP: 122/63 (18 Dec 2020 08:15) (104/61 - 128/84)  BP(mean): 78 (18 Dec 2020 08:15) (78 - 78)  RR: 18 (18 Dec 2020 08:15) (17 - 20)  SpO2: 100% (18 Dec 2020 08:15) (98% - 100%)  I&O's Summary    17 Dec 2020 07:01  -  18 Dec 2020 07:00  --------------------------------------------------------  IN: 440 mL / OUT: 950 mL / NET: -510 mL        [x ] NO APPARENT ANESTHESIA COMPLICATIONS      Comments:

## 2020-12-18 NOTE — PROGRESS NOTE ADULT - PROBLEM SELECTOR PLAN 3
FS q6h while NPO/TF.   Correctional insulin. Titrate PRN.  Continue Enteral feeds as per GI, will keep NPO after midnight.  Hold PO meds from home.

## 2020-12-18 NOTE — DISCHARGE NOTE NURSING/CASE MANAGEMENT/SOCIAL WORK - NSDCPNINST_GEN_ALL_CORE
Pt remained hemodynamically stable.  Pt denies any pain or shortness of breath. Pt being discharged to home. Pt understands all discharge instructions and medication. Pt. a&o x4. Pt. taught tube feeding education

## 2020-12-18 NOTE — CONSULT NOTE ADULT - SUBJECTIVE AND OBJECTIVE BOX
Chief Complaint:  Patient is a 70y old  Male who presents with a chief complaint of S/p endoscopic stent placement (17 Dec 2020 11:19)    HPI:  70 year old man with hx of HTN, BPH, T2DM, gastric cancer s/p total gastrectomy, splenectomy, and distal pancreatectomy (2017) admitted for poor PO intake and aperistalsis of the esophagus, s/p J-tube placement who presented for stent placement with Dr. Cooney & Dr. Peguero. Patient with previous weight loss and inability to tolerate PO intake. Underwent EGD with Axios stent placement to connect blind limb to efferent limb (jejunojeunostomy). Procedure went well without complication, but patient admitted for observation.     Allergies:  No Known Allergies    Home Medications:  Reviewed    Hospital Medications:  BACItracin   Ointment 1 Application(s) Topical two times a day  dextrose 40% Gel 15 Gram(s) Oral once  dextrose 5%. 1000 milliLiter(s) IV Continuous <Continuous>  dextrose 5%. 1000 milliLiter(s) IV Continuous <Continuous>  dextrose 50% Injectable 25 Gram(s) IV Push once  dextrose 50% Injectable 12.5 Gram(s) IV Push once  dextrose 50% Injectable 25 Gram(s) IV Push once  doxazosin 1 milliGRAM(s) Oral at bedtime  enoxaparin Injectable 40 milliGRAM(s) SubCutaneous daily  escitalopram 10 milliGRAM(s) Oral daily  ferrous    sulfate Liquid 300 milliGRAM(s) Enteral Tube daily  glucagon  Injectable 1 milliGRAM(s) IntraMuscular once  insulin lispro (ADMELOG) corrective regimen sliding scale   SubCutaneous every 6 hours  lactated ringers. 1000 milliLiter(s) IV Continuous <Continuous>  levothyroxine 100 MICROGram(s) Oral daily  melatonin 6 milliGRAM(s) Oral at bedtime  zolpidem 5 milliGRAM(s) Oral at bedtime PRN  zolpidem 5 milliGRAM(s) Oral at bedtime PRN    PMHX/PSHX:  Dysphagia, unspecified  Constipation  Iron deficiency anemia, unspecified iron deficiency anemia type  Gastric mass  Hypothyroidism  Type 2 diabetes mellitus without complication, unspecified long term insulin use status  Essential hypertension  S/P cataract surgery  Gastrostomy tube in place  History of gastric surgery  Port-a-cath in place  H/O umbilical hernia repair    Family history:  Family history of ischemic heart disease (IHD) (Father)  Family history of ovarian cancer (Mother)    Social History:   Denies tobacco use, EtOH use or illicit drug use     ROS:   General:  No fevers, chills or night sweats.  ENT:  No sore throat or dysphagia  CV:  No pain or palpitations  Resp:  No dyspnea, cough, wheezing  GI:  See H&P  Skin:  No rash or edema      PHYSICAL EXAM:   GENERAL:  NAD, Appears stated age  HEENT:  NC/AT,  conjunctivae clear and pink, sclera -anicteric  CHEST:  CTA B/L, Normal effort  HEART:  RRR S1/S2, No murmurs  ABDOMEN:  Soft, non-tender, non-distended, BS+  EXTEREMITIES:  No cyanosis or Edema  SKIN:  Warm & Dry.  NEURO:  Alert, oriented    Vital Signs:  Vital Signs Last 24 Hrs  T(C): 36.1 (18 Dec 2020 08:15), Max: 36.8 (18 Dec 2020 05:44)  T(F): 97 (18 Dec 2020 08:15), Max: 98.2 (18 Dec 2020 05:44)  HR: 67 (18 Dec 2020 08:15) (53 - 68)  BP: 122/63 (18 Dec 2020 08:15) (104/61 - 128/84)  BP(mean): 78 (18 Dec 2020 08:15) (78 - 78)  RR: 18 (18 Dec 2020 08:15) (17 - 20)  SpO2: 100% (18 Dec 2020 08:15) (97% - 100%)  Daily     Daily Weight in k.4 (18 Dec 2020 05:44)    LABS:                        11.7   6.44  )-----------( 231      ( 18 Dec 2020 07:05 )             36.4     Mean Cell Volume: 105.8 fL (-20 @ 07:05)        136  |  101  |  14  ----------------------------<  83  4.1   |  25  |  0.62    Ca    9.2      18 Dec 2020 07:05  Phos  4.0       Mg     2.1         TPro  7.1  /  Alb  3.9  /  TBili  0.7  /  DBili  x   /  AST  34  /  ALT  28  /  AlkPhos  117  17    LIVER FUNCTIONS - ( 17 Dec 2020 14:58 )  Alb: 3.9 g/dL / Pro: 7.1 g/dL / ALK PHOS: 117 U/L / ALT: 28 U/L / AST: 34 U/L / GGT: x           PT/INR - ( 17 Dec 2020 14:58 )   PT: 11.8 sec;   INR: 1.03 ratio         PTT - ( 17 Dec 2020 14:58 )  PTT:32.1 sec                            11.7   6.44  )-----------( 231      ( 18 Dec 2020 07:05 )             36.4                         12.2   5.54  )-----------( 282      ( 17 Dec 2020 14:58 )             38.5     Imaging:

## 2020-12-19 LAB
ANION GAP SERPL CALC-SCNC: 10 MMOL/L — SIGNIFICANT CHANGE UP (ref 7–14)
BUN SERPL-MCNC: 17 MG/DL — SIGNIFICANT CHANGE UP (ref 7–23)
CALCIUM SERPL-MCNC: 8.7 MG/DL — SIGNIFICANT CHANGE UP (ref 8.4–10.5)
CHLORIDE SERPL-SCNC: 102 MMOL/L — SIGNIFICANT CHANGE UP (ref 98–107)
CO2 SERPL-SCNC: 26 MMOL/L — SIGNIFICANT CHANGE UP (ref 22–31)
CREAT SERPL-MCNC: 0.63 MG/DL — SIGNIFICANT CHANGE UP (ref 0.5–1.3)
GLUCOSE BLDC GLUCOMTR-MCNC: 136 MG/DL — HIGH (ref 70–99)
GLUCOSE BLDC GLUCOMTR-MCNC: 73 MG/DL — SIGNIFICANT CHANGE UP (ref 70–99)
GLUCOSE BLDC GLUCOMTR-MCNC: 80 MG/DL — SIGNIFICANT CHANGE UP (ref 70–99)
GLUCOSE BLDC GLUCOMTR-MCNC: 94 MG/DL — SIGNIFICANT CHANGE UP (ref 70–99)
GLUCOSE BLDC GLUCOMTR-MCNC: 99 MG/DL — SIGNIFICANT CHANGE UP (ref 70–99)
GLUCOSE SERPL-MCNC: 90 MG/DL — SIGNIFICANT CHANGE UP (ref 70–99)
HCT VFR BLD CALC: 30 % — LOW (ref 39–50)
HGB BLD-MCNC: 10 G/DL — LOW (ref 13–17)
MAGNESIUM SERPL-MCNC: 2 MG/DL — SIGNIFICANT CHANGE UP (ref 1.6–2.6)
MCHC RBC-ENTMCNC: 33.3 GM/DL — SIGNIFICANT CHANGE UP (ref 32–36)
MCHC RBC-ENTMCNC: 34.5 PG — HIGH (ref 27–34)
MCV RBC AUTO: 103.4 FL — HIGH (ref 80–100)
NRBC # BLD: 0 /100 WBCS — SIGNIFICANT CHANGE UP
NRBC # FLD: 0 K/UL — SIGNIFICANT CHANGE UP
PHOSPHATE SERPL-MCNC: 3.2 MG/DL — SIGNIFICANT CHANGE UP (ref 2.5–4.5)
PLATELET # BLD AUTO: 222 K/UL — SIGNIFICANT CHANGE UP (ref 150–400)
POTASSIUM SERPL-MCNC: 3.9 MMOL/L — SIGNIFICANT CHANGE UP (ref 3.5–5.3)
POTASSIUM SERPL-SCNC: 3.9 MMOL/L — SIGNIFICANT CHANGE UP (ref 3.5–5.3)
RBC # BLD: 2.9 M/UL — LOW (ref 4.2–5.8)
RBC # FLD: 14.6 % — HIGH (ref 10.3–14.5)
SODIUM SERPL-SCNC: 138 MMOL/L — SIGNIFICANT CHANGE UP (ref 135–145)
WBC # BLD: 6.71 K/UL — SIGNIFICANT CHANGE UP (ref 3.8–10.5)
WBC # FLD AUTO: 6.71 K/UL — SIGNIFICANT CHANGE UP (ref 3.8–10.5)

## 2020-12-19 PROCEDURE — 74019 RADEX ABDOMEN 2 VIEWS: CPT | Mod: 26

## 2020-12-19 PROCEDURE — 99231 SBSQ HOSP IP/OBS SF/LOW 25: CPT

## 2020-12-19 RX ADMIN — ENOXAPARIN SODIUM 40 MILLIGRAM(S): 100 INJECTION SUBCUTANEOUS at 14:01

## 2020-12-19 RX ADMIN — Medication 100 MICROGRAM(S): at 17:33

## 2020-12-19 RX ADMIN — Medication 1 APPLICATION(S): at 05:29

## 2020-12-19 RX ADMIN — Medication 1 MILLIGRAM(S): at 21:19

## 2020-12-19 RX ADMIN — Medication 6 MILLIGRAM(S): at 21:19

## 2020-12-19 RX ADMIN — ESCITALOPRAM OXALATE 10 MILLIGRAM(S): 10 TABLET, FILM COATED ORAL at 17:33

## 2020-12-19 RX ADMIN — Medication 1 APPLICATION(S): at 18:36

## 2020-12-19 RX ADMIN — Medication 300 MILLIGRAM(S): at 17:33

## 2020-12-19 NOTE — PROGRESS NOTE ADULT - ASSESSMENT
70 year old man with hx of HTN, BPH, T2DM, gastric cancer s/p total gastrectomy, splenectomy, and distal pancreatectomy (2017) admitted for poor PO intake and aperistalsis of the esophagus, s/p J-tube placement who presented for Jejunojenunostomy.    Impression:  # s/p Axios Jejunojenunostomy  # Gastric cancer s/p total gastrectomy, splenectomy, and distal pancreatectomy (2017)     Recommendation:  - pending repeat UGIS given contrast in large bowel on previous imaging  - Levaquin 500 mg x 5 days  - Tube feeds as tolerated after UGIS  - Supportive care per primary team      Rika Almodovar PGY-4  Gastroenterology Fellow  Pager #[phone] or [phone]  Please page on-call Fellow on weekends/after 5 pm on weekdays   Please call answering service for on-call GI fellow ([phone]) after 5pm and before 8am, and on weekends.

## 2020-12-19 NOTE — PROGRESS NOTE ADULT - ATTENDING COMMENTS
I have seen, examined, and discussed patient with Dr. Almodovar. I agree with above assessment and plan.    Ralph Garcia MD  [facility]

## 2020-12-19 NOTE — PROGRESS NOTE ADULT - ATTENDING COMMENTS
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 S/p Upper GI series on 12/18 and pending repeat GI series on 12/19. Patient started on prophylactic abx with Ciprofloxacin. Based on result of GI series, will f/u GI further recommendations.   Patient is accepted for home care service starting 12/20 and pending hospital course for discharge.

## 2020-12-19 NOTE — PROGRESS NOTE ADULT - PROBLEM SELECTOR PLAN 3
FS q6h while NPO/TF.   Correctional insulin. Titrate PRN.  Continue Enteral feeds, NPO for repeat upper GI series in AM  Hold PO meds from home.

## 2020-12-19 NOTE — PROGRESS NOTE ADULT - SUBJECTIVE AND OBJECTIVE BOX
PROGRESS NOTE:   Teressa Turk  Hospitalist  Pager [phone]    Patient is a 70y old  Male who presents with a chief complaint of S/p endoscopic stent placement (18 Dec 2020 12:55)      SUBJECTIVE / OVERNIGHT EVENTS: Patient denies Chest pain, shortness of breath, nausea, vomiting, diarrhea. Was on Enteral feeds overnight and gentle IVF. NPO after midnight for planned repeat upper GI series this morning. He is anxious to obtain his Upper GI series.    ADDITIONAL REVIEW OF SYSTEMS:    MEDICATIONS  (STANDING):  BACItracin   Ointment 1 Application(s) Topical two times a day  dextrose 40% Gel 15 Gram(s) Oral once  dextrose 5%. 1000 milliLiter(s) (50 mL/Hr) IV Continuous <Continuous>  dextrose 5%. 1000 milliLiter(s) (100 mL/Hr) IV Continuous <Continuous>  dextrose 50% Injectable 25 Gram(s) IV Push once  dextrose 50% Injectable 12.5 Gram(s) IV Push once  dextrose 50% Injectable 25 Gram(s) IV Push once  doxazosin 1 milliGRAM(s) Oral at bedtime  enoxaparin Injectable 40 milliGRAM(s) SubCutaneous daily  escitalopram 10 milliGRAM(s) Oral daily  ferrous    sulfate Liquid 300 milliGRAM(s) Enteral Tube daily  glucagon  Injectable 1 milliGRAM(s) IntraMuscular once  insulin lispro (ADMELOG) corrective regimen sliding scale   SubCutaneous every 6 hours  levoFLOXacin IVPB      levoFLOXacin IVPB 500 milliGRAM(s) IV Intermittent every 24 hours  levothyroxine 100 MICROGram(s) Oral daily  melatonin 6 milliGRAM(s) Oral at bedtime    MEDICATIONS  (PRN):  zolpidem 5 milliGRAM(s) Oral at bedtime PRN Insomnia  zolpidem 5 milliGRAM(s) Oral at bedtime PRN Insomnia      CAPILLARY BLOOD GLUCOSE      POCT Blood Glucose.: 80 mg/dL (19 Dec 2020 12:10)  POCT Blood Glucose.: 94 mg/dL (19 Dec 2020 08:28)  POCT Blood Glucose.: 99 mg/dL (19 Dec 2020 04:56)  POCT Blood Glucose.: 137 mg/dL (18 Dec 2020 23:38)  POCT Blood Glucose.: 93 mg/dL (18 Dec 2020 17:37)  POCT Blood Glucose.: 98 mg/dL (18 Dec 2020 14:02)    I&O's Summary    18 Dec 2020 07:01  -  19 Dec 2020 07:00  --------------------------------------------------------  IN: 400 mL / OUT: 1250 mL / NET: -850 mL        PHYSICAL EXAM:  Vital Signs Last 24 Hrs  T(C): 36.7 (19 Dec 2020 09:07), Max: 37.3 (18 Dec 2020 20:30)  T(F): 98 (19 Dec 2020 09:07), Max: 99.1 (18 Dec 2020 20:30)  HR: 61 (19 Dec 2020 09:07) (57 - 65)  BP: 100/64 (19 Dec 2020 09:07) (100/64 - 136/76)  BP(mean): 99 (18 Dec 2020 16:01) (99 - 99)  RR: 16 (19 Dec 2020 09:07) (16 - 18)  SpO2: 99% (19 Dec 2020 09:07) (98% - 100%)    GENERAL: No acute distress, well-developed  HEAD:  Atraumatic, Normocephalic  EYES: EOMI, PERRLA, conjunctiva and sclera clear  NECK: Supple, no lymphadenopathy, no JVD  CHEST/LUNG: CTAB; No wheezes, rales, or rhonchi  HEART: Regular rate and rhythm; No murmurs, rubs, or gallops  ABDOMEN: Soft, non-tender, non-distended; normal bowel sounds, no organomegaly  EXTREMITIES:  2+ peripheral pulses b/l, No clubbing, cyanosis, or edema  NEUROLOGY: A&O x 3, no focal deficits  SKIN: No rashes or lesions    LABS:                        10.0   6.71  )-----------( 222      ( 19 Dec 2020 06:41 )             30.0     12-19    138  |  102  |  17  ----------------------------<  90  3.9   |  26  |  0.63    Ca    8.7      19 Dec 2020 06:41  Phos  3.2     12-19  Mg     2.0     12-19    TPro  7.1  /  Alb  3.9  /  TBili  0.7  /  DBili  x   /  AST  34  /  ALT  28  /  AlkPhos  117  12-17    PT/INR - ( 17 Dec 2020 14:58 )   PT: 11.8 sec;   INR: 1.03 ratio         PTT - ( 17 Dec 2020 14:58 )  PTT:32.1 sec            RADIOLOGY & ADDITIONAL TESTS:  Results Reviewed: y  Imaging Personally Reviewed:Y  Electrocardiogram Personally Reviewed:    COORDINATION OF CARE:  Care Discussed with Consultants/Other Providers [Y/N]:  Prior or Outpatient Records Reviewed [Y/N]:

## 2020-12-19 NOTE — PROGRESS NOTE ADULT - SUBJECTIVE AND OBJECTIVE BOX
Chief Complaint:  Patient is a 70y old  Male who presents with a chief complaint of S/p endoscopic stent placement (19 Dec 2020 12:37)      Interval Events: no acute events overnight  - seen this AM walking around floor, denies any nausea, vomiting, abdominal pain      Hospital Medications:  BACItracin   Ointment 1 Application(s) Topical two times a day  dextrose 40% Gel 15 Gram(s) Oral once  dextrose 5%. 1000 milliLiter(s) IV Continuous <Continuous>  dextrose 5%. 1000 milliLiter(s) IV Continuous <Continuous>  dextrose 50% Injectable 25 Gram(s) IV Push once  dextrose 50% Injectable 12.5 Gram(s) IV Push once  dextrose 50% Injectable 25 Gram(s) IV Push once  doxazosin 1 milliGRAM(s) Oral at bedtime  enoxaparin Injectable 40 milliGRAM(s) SubCutaneous daily  escitalopram 10 milliGRAM(s) Oral daily  ferrous    sulfate Liquid 300 milliGRAM(s) Enteral Tube daily  glucagon  Injectable 1 milliGRAM(s) IntraMuscular once  insulin lispro (ADMELOG) corrective regimen sliding scale   SubCutaneous every 6 hours  levoFLOXacin IVPB      levoFLOXacin IVPB 500 milliGRAM(s) IV Intermittent every 24 hours  levothyroxine 100 MICROGram(s) Oral daily  melatonin 6 milliGRAM(s) Oral at bedtime  zolpidem 5 milliGRAM(s) Oral at bedtime PRN  zolpidem 5 milliGRAM(s) Oral at bedtime PRN      PMHX/PSHX:  Dysphagia, unspecified    Constipation    Iron deficiency anemia, unspecified iron deficiency anemia type    Gastric mass    Hypothyroidism    Type 2 diabetes mellitus without complication, unspecified long term insulin use status    Essential hypertension    S/P cataract surgery    Gastrostomy tube in place    History of gastric surgery    Port-a-cath in place    H/O umbilical hernia repair            ROS:     General:  No weight loss, fevers, chills, night sweats, fatigue   Eyes:  No vision changes  ENT:  No sore throat, pain, runny nose  CV:  No chest pain, palpitations, dizziness   Resp:  No SOB, cough, wheezing  GI:  See HPI  :  No burning with urination, hematuria  Muscle:  No pain, weakness  Neuro:  No weakness/tingling, memory problems  Psych:  No fatigue, insomnia, mood problems, depression  Heme:  No easy bruisability  Skin:  No rash, edema      PHYSICAL EXAM:     GENERAL:  thin, no distress  HEENT:  NC/AT,  conjunctivae clear, sclera anicteric  CHEST:  Full & symmetric excursion, no increased effort w/ respirations  HEART:  Regular rhythm & rate  ABDOMEN:  Soft, non-tender, non-distended  EXTREMITIES:  no LE  edema  SKIN:  No rash/erythema/ecchymoses/petechiae/wounds/jaundice  NEURO:  Alert, oriented    Vital Signs:  Vital Signs Last 24 Hrs  T(C): 36.7 (19 Dec 2020 09:07), Max: 37.3 (18 Dec 2020 20:30)  T(F): 98 (19 Dec 2020 09:07), Max: 99.1 (18 Dec 2020 20:30)  HR: 61 (19 Dec 2020 09:07) (57 - 65)  BP: 100/64 (19 Dec 2020 09:07) (100/64 - 136/76)  BP(mean): 99 (18 Dec 2020 16:01) (99 - 99)  RR: 16 (19 Dec 2020 09:07) (16 - 18)  SpO2: 99% (19 Dec 2020 09:07) (98% - 100%)  Daily     Daily     LABS:                        10.0   6.71  )-----------( 222      ( 19 Dec 2020 06:41 )             30.0     12-19    138  |  102  |  17  ----------------------------<  90  3.9   |  26  |  0.63    Ca    8.7      19 Dec 2020 06:41  Phos  3.2     12-19  Mg     2.0     12-19    TPro  7.1  /  Alb  3.9  /  TBili  0.7  /  DBili  x   /  AST  34  /  ALT  28  /  AlkPhos  117  12-17    LIVER FUNCTIONS - ( 17 Dec 2020 14:58 )  Alb: 3.9 g/dL / Pro: 7.1 g/dL / ALK PHOS: 117 U/L / ALT: 28 U/L / AST: 34 U/L / GGT: x           PT/INR - ( 17 Dec 2020 14:58 )   PT: 11.8 sec;   INR: 1.03 ratio         PTT - ( 17 Dec 2020 14:58 )  PTT:32.1 sec        Imaging:

## 2020-12-19 NOTE — PROGRESS NOTE ADULT - ASSESSMENT
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 S/p Upper GI series on 12/18 and pending repeat GI series on 12/19. Patient started on prophylactic abx with Ciprofloxacin.

## 2020-12-19 NOTE — PROGRESS NOTE ADULT - PROBLEM SELECTOR PLAN 1
-S/p stent placement by EGD, detailed as above.  -Follow up as OP w/ surg onc and oncology. HIE records and last hospitalization records reviewed.  - Continue Ciprofloxacin for 5 days total.  Continue enteral feeds as tolerated, NPO after midnight, pending repeat Upper GI series in AM.

## 2020-12-20 LAB
ANION GAP SERPL CALC-SCNC: 11 MMOL/L — SIGNIFICANT CHANGE UP (ref 7–14)
BUN SERPL-MCNC: 20 MG/DL — SIGNIFICANT CHANGE UP (ref 7–23)
CALCIUM SERPL-MCNC: 8.6 MG/DL — SIGNIFICANT CHANGE UP (ref 8.4–10.5)
CHLORIDE SERPL-SCNC: 103 MMOL/L — SIGNIFICANT CHANGE UP (ref 98–107)
CO2 SERPL-SCNC: 24 MMOL/L — SIGNIFICANT CHANGE UP (ref 22–31)
CREAT SERPL-MCNC: 0.65 MG/DL — SIGNIFICANT CHANGE UP (ref 0.5–1.3)
GLUCOSE BLDC GLUCOMTR-MCNC: 121 MG/DL — HIGH (ref 70–99)
GLUCOSE BLDC GLUCOMTR-MCNC: 159 MG/DL — HIGH (ref 70–99)
GLUCOSE BLDC GLUCOMTR-MCNC: 94 MG/DL — SIGNIFICANT CHANGE UP (ref 70–99)
GLUCOSE BLDC GLUCOMTR-MCNC: 96 MG/DL — SIGNIFICANT CHANGE UP (ref 70–99)
GLUCOSE SERPL-MCNC: 82 MG/DL — SIGNIFICANT CHANGE UP (ref 70–99)
HCT VFR BLD CALC: 29.9 % — LOW (ref 39–50)
HGB BLD-MCNC: 9.9 G/DL — LOW (ref 13–17)
MAGNESIUM SERPL-MCNC: 2.2 MG/DL — SIGNIFICANT CHANGE UP (ref 1.6–2.6)
MCHC RBC-ENTMCNC: 33.1 GM/DL — SIGNIFICANT CHANGE UP (ref 32–36)
MCHC RBC-ENTMCNC: 34.6 PG — HIGH (ref 27–34)
MCV RBC AUTO: 104.5 FL — HIGH (ref 80–100)
NRBC # BLD: 0 /100 WBCS — SIGNIFICANT CHANGE UP
NRBC # FLD: 0 K/UL — SIGNIFICANT CHANGE UP
PHOSPHATE SERPL-MCNC: 2.9 MG/DL — SIGNIFICANT CHANGE UP (ref 2.5–4.5)
PLATELET # BLD AUTO: 203 K/UL — SIGNIFICANT CHANGE UP (ref 150–400)
POTASSIUM SERPL-MCNC: 3.9 MMOL/L — SIGNIFICANT CHANGE UP (ref 3.5–5.3)
POTASSIUM SERPL-SCNC: 3.9 MMOL/L — SIGNIFICANT CHANGE UP (ref 3.5–5.3)
RBC # BLD: 2.86 M/UL — LOW (ref 4.2–5.8)
RBC # FLD: 14.5 % — SIGNIFICANT CHANGE UP (ref 10.3–14.5)
SODIUM SERPL-SCNC: 138 MMOL/L — SIGNIFICANT CHANGE UP (ref 135–145)
WBC # BLD: 4.61 K/UL — SIGNIFICANT CHANGE UP (ref 3.8–10.5)
WBC # FLD AUTO: 4.61 K/UL — SIGNIFICANT CHANGE UP (ref 3.8–10.5)

## 2020-12-20 PROCEDURE — 99232 SBSQ HOSP IP/OBS MODERATE 35: CPT

## 2020-12-20 RX ORDER — SODIUM CHLORIDE 9 MG/ML
1000 INJECTION, SOLUTION INTRAVENOUS
Refills: 0 | Status: DISCONTINUED | OUTPATIENT
Start: 2020-12-21 | End: 2020-12-21

## 2020-12-20 RX ORDER — SODIUM CHLORIDE 9 MG/ML
1000 INJECTION, SOLUTION INTRAVENOUS
Refills: 0 | Status: DISCONTINUED | OUTPATIENT
Start: 2020-12-20 | End: 2020-12-20

## 2020-12-20 RX ADMIN — Medication 1 APPLICATION(S): at 17:07

## 2020-12-20 RX ADMIN — ESCITALOPRAM OXALATE 10 MILLIGRAM(S): 10 TABLET, FILM COATED ORAL at 15:10

## 2020-12-20 RX ADMIN — Medication 6 MILLIGRAM(S): at 21:54

## 2020-12-20 RX ADMIN — Medication 1 MILLIGRAM(S): at 21:54

## 2020-12-20 RX ADMIN — SODIUM CHLORIDE 50 MILLILITER(S): 9 INJECTION, SOLUTION INTRAVENOUS at 09:48

## 2020-12-20 RX ADMIN — Medication 300 MILLIGRAM(S): at 15:10

## 2020-12-20 RX ADMIN — Medication 100 MICROGRAM(S): at 04:57

## 2020-12-20 RX ADMIN — Medication 1 APPLICATION(S): at 04:57

## 2020-12-20 RX ADMIN — ENOXAPARIN SODIUM 40 MILLIGRAM(S): 100 INJECTION SUBCUTANEOUS at 12:28

## 2020-12-20 NOTE — PROGRESS NOTE ADULT - SUBJECTIVE AND OBJECTIVE BOX
PROGRESS NOTE:   Teressa Turk  Hospitalist  Pager [phone]    Patient is a 70y old  Male who presents with a chief complaint of S/p endoscopic stent placement (19 Dec 2020 14:30)      SUBJECTIVE / OVERNIGHT EVENTS: Patient denies any chest pain, abdominal pain. Borderline BP. Pending repeat Upper GI series today. Afebrile overnight.    ADDITIONAL REVIEW OF SYSTEMS:    MEDICATIONS  (STANDING):  BACItracin   Ointment 1 Application(s) Topical two times a day  dextrose 40% Gel 15 Gram(s) Oral once  dextrose 5% + sodium chloride 0.45%. 1000 milliLiter(s) (50 mL/Hr) IV Continuous <Continuous>  dextrose 5%. 1000 milliLiter(s) (50 mL/Hr) IV Continuous <Continuous>  dextrose 5%. 1000 milliLiter(s) (100 mL/Hr) IV Continuous <Continuous>  dextrose 50% Injectable 25 Gram(s) IV Push once  dextrose 50% Injectable 12.5 Gram(s) IV Push once  dextrose 50% Injectable 25 Gram(s) IV Push once  doxazosin 1 milliGRAM(s) Oral at bedtime  enoxaparin Injectable 40 milliGRAM(s) SubCutaneous daily  escitalopram 10 milliGRAM(s) Oral daily  ferrous    sulfate Liquid 300 milliGRAM(s) Enteral Tube daily  glucagon  Injectable 1 milliGRAM(s) IntraMuscular once  insulin lispro (ADMELOG) corrective regimen sliding scale   SubCutaneous every 6 hours  levoFLOXacin IVPB      levoFLOXacin IVPB 500 milliGRAM(s) IV Intermittent every 24 hours  levothyroxine 100 MICROGram(s) Oral daily  melatonin 6 milliGRAM(s) Oral at bedtime    MEDICATIONS  (PRN):  zolpidem 5 milliGRAM(s) Oral at bedtime PRN Insomnia  zolpidem 5 milliGRAM(s) Oral at bedtime PRN Insomnia      CAPILLARY BLOOD GLUCOSE      POCT Blood Glucose.: 94 mg/dL (20 Dec 2020 06:14)  POCT Blood Glucose.: 136 mg/dL (19 Dec 2020 23:52)  POCT Blood Glucose.: 73 mg/dL (19 Dec 2020 17:40)  POCT Blood Glucose.: 80 mg/dL (19 Dec 2020 12:10)    I&O's Summary    19 Dec 2020 07:01  -  20 Dec 2020 07:00  --------------------------------------------------------  IN: 230 mL / OUT: 1150 mL / NET: -920 mL    20 Dec 2020 07:01  -  20 Dec 2020 11:23  --------------------------------------------------------  IN: 100 mL / OUT: 0 mL / NET: 100 mL        PHYSICAL EXAM:  Vital Signs Last 24 Hrs  T(C): 36.6 (20 Dec 2020 09:26), Max: 37.1 (19 Dec 2020 16:26)  T(F): 97.9 (20 Dec 2020 09:26), Max: 98.8 (19 Dec 2020 16:26)  HR: 55 (20 Dec 2020 09:26) (45 - 65)  BP: 100/62 (20 Dec 2020 09:53) (98/60 - 119/64)  BP(mean): 72 (20 Dec 2020 04:58) (72 - 75)  RR: 18 (20 Dec 2020 09:26) (16 - 18)  SpO2: 100% (20 Dec 2020 09:26) (97% - 100%)    GENERAL: No acute distress, well-developed  HEAD:  Atraumatic, Normocephalic  EYES: EOMI, PERRLA, conjunctiva and sclera clear  NECK: Supple, no lymphadenopathy, no JVD  CHEST/LUNG: CTAB; No wheezes, rales, or rhonchi  HEART: Regular rate and rhythm; No murmurs, rubs, or gallops  ABDOMEN: Soft, non-tender, non-distended; normal bowel sounds, J-tube noted left sided, vertical scar well healed, Clean/Dry/Intact.  EXTREMITIES:  2+ peripheral pulses b/l, No clubbing, cyanosis, or edema.  NEUROLOGY: A&O x 3, no focal neurological deficits  SKIN: No rashes or lesions    LABS:                        9.9    4.61  )-----------( 203      ( 20 Dec 2020 07:13 )             29.9     12-20    138  |  103  |  20  ----------------------------<  82  3.9   |  24  |  0.65    Ca    8.6      20 Dec 2020 07:13  Phos  2.9     12-20  Mg     2.2     12-20                  RADIOLOGY & ADDITIONAL TESTS:  Results Reviewed: Y  Imaging Personally Reviewed:Y  Electrocardiogram Personally Reviewed:    COORDINATION OF CARE:  Care Discussed with Consultants/Other Providers [Y/N]:  Prior or Outpatient Records Reviewed [Y/N]:

## 2020-12-20 NOTE — PROGRESS NOTE ADULT - ATTENDING COMMENTS
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 S/p Upper GI series on 12/18 and pending repeat GI series. Patient started on prophylactic abx with Ciprofloxacin. Based on result of GI series, will f/u GI further recommendations.   Patient is accepted for home care service starting 12/20 and pending hospital course for discharge. 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 S/p Upper GI series on 12/18 and pending repeat GI series. Due to left over contrast in large bowels, will postpone UGI series to 12/21. Patient started on prophylactic abx with Ciprofloxacin. Based on result of GI series, will f/u GI further recommendations.   Patient is accepted for home care service  and pending hospital course for discharge.

## 2020-12-20 NOTE — PROVIDER CONTACT NOTE (OTHER) - BACKGROUND
Patient admitted for esophageal stent placement on Dec. 17.
came in s/p stent placement and failure to thrive
Patient admitted for esophageal stent placement on Dec. 17.
came in s/p stent placement and failure to thrive

## 2020-12-20 NOTE — PROVIDER CONTACT NOTE (OTHER) - RECOMMENDATIONS
either restart tube feeds or start IVF with dextrose 5% to prevent hypoglycemic event
Re-start tube feeds or start IV fluids with Dextrose

## 2020-12-20 NOTE — PROGRESS NOTE ADULT - PROBLEM SELECTOR PLAN 3
FS q6h while NPO/TF.   Correctional insulin. Titrate PRN.  Continue Enteral feeds.  Hold PO meds from home.

## 2020-12-20 NOTE — PROGRESS NOTE ADULT - ASSESSMENT
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 S/p Upper GI series on 12/18 and pending repeat GI series. Patient started on prophylactic abx with Ciprofloxacin.

## 2020-12-20 NOTE — PROGRESS NOTE ADULT - PROBLEM SELECTOR PLAN 1
-S/p stent placement by EGD, detailed as above.  -Follow up as OP w/ surg onc and oncology. HIE records and last hospitalization records reviewed.  - Continue Ciprofloxacin for 5 days total.  Continue enteral feeds as tolerated, NPO after midnight, pending repeat upper GI series. F/u GI further recommendations once Imaging studies returns.

## 2020-12-20 NOTE — PROVIDER CONTACT NOTE (OTHER) - REASON
Hypoglycemia
Low BP 98/60
IV Fluids and tube feeding clarification
Pt. Tube feeds held for possible upper GI series

## 2020-12-20 NOTE — PROVIDER CONTACT NOTE (OTHER) - ASSESSMENT
no complaints of headache or dizziness. No signs or symptoms of hypoglycemia. Last blood sugar @ 0600 94. History of hypoglycemia events.
Pt. Alert and Oriented x 4. Ambulating in the hallway. Tube feeds held as per order.
no complaints of lightheadedness or dizziness. No signs or symptoms of distress.  Other VS WNL
Pt. Alert and Oriented x 4. Ambulating in the hallway. Tube feeds continued

## 2020-12-20 NOTE — PROVIDER CONTACT NOTE (OTHER) - SITUATION
Patient ordered for LR @ 30 and tube feedings set at 50mL/hr. Patient NPO after midnight.
Pt. Tube feeds held for possible upper GI series
Low BP 98/60
Mid day FS 62. Patient NPO for upper endoscopy in AM (not performed)---awaiting new orders from covering team (ACP)

## 2020-12-21 ENCOUNTER — TRANSCRIPTION ENCOUNTER (OUTPATIENT)
Age: 70
End: 2020-12-21

## 2020-12-21 VITALS
DIASTOLIC BLOOD PRESSURE: 72 MMHG | TEMPERATURE: 98 F | RESPIRATION RATE: 18 BRPM | OXYGEN SATURATION: 98 % | SYSTOLIC BLOOD PRESSURE: 110 MMHG | HEART RATE: 50 BPM

## 2020-12-21 LAB
ANION GAP SERPL CALC-SCNC: 10 MMOL/L — SIGNIFICANT CHANGE UP (ref 7–14)
BUN SERPL-MCNC: 18 MG/DL — SIGNIFICANT CHANGE UP (ref 7–23)
CALCIUM SERPL-MCNC: 8.8 MG/DL — SIGNIFICANT CHANGE UP (ref 8.4–10.5)
CHLORIDE SERPL-SCNC: 104 MMOL/L — SIGNIFICANT CHANGE UP (ref 98–107)
CO2 SERPL-SCNC: 26 MMOL/L — SIGNIFICANT CHANGE UP (ref 22–31)
CREAT SERPL-MCNC: 0.63 MG/DL — SIGNIFICANT CHANGE UP (ref 0.5–1.3)
GLUCOSE BLDC GLUCOMTR-MCNC: 114 MG/DL — HIGH (ref 70–99)
GLUCOSE BLDC GLUCOMTR-MCNC: 147 MG/DL — HIGH (ref 70–99)
GLUCOSE SERPL-MCNC: 98 MG/DL — SIGNIFICANT CHANGE UP (ref 70–99)
HCT VFR BLD CALC: 34.8 % — LOW (ref 39–50)
HGB BLD-MCNC: 11.4 G/DL — LOW (ref 13–17)
MAGNESIUM SERPL-MCNC: 2.2 MG/DL — SIGNIFICANT CHANGE UP (ref 1.6–2.6)
MCHC RBC-ENTMCNC: 32.8 GM/DL — SIGNIFICANT CHANGE UP (ref 32–36)
MCHC RBC-ENTMCNC: 34.8 PG — HIGH (ref 27–34)
MCV RBC AUTO: 106.1 FL — HIGH (ref 80–100)
NRBC # BLD: 0 /100 WBCS — SIGNIFICANT CHANGE UP
NRBC # FLD: 0 K/UL — SIGNIFICANT CHANGE UP
PHOSPHATE SERPL-MCNC: 2.7 MG/DL — SIGNIFICANT CHANGE UP (ref 2.5–4.5)
PLATELET # BLD AUTO: 217 K/UL — SIGNIFICANT CHANGE UP (ref 150–400)
POTASSIUM SERPL-MCNC: 4.1 MMOL/L — SIGNIFICANT CHANGE UP (ref 3.5–5.3)
POTASSIUM SERPL-SCNC: 4.1 MMOL/L — SIGNIFICANT CHANGE UP (ref 3.5–5.3)
RBC # BLD: 3.28 M/UL — LOW (ref 4.2–5.8)
RBC # FLD: 14.2 % — SIGNIFICANT CHANGE UP (ref 10.3–14.5)
SODIUM SERPL-SCNC: 140 MMOL/L — SIGNIFICANT CHANGE UP (ref 135–145)
WBC # BLD: 4.55 K/UL — SIGNIFICANT CHANGE UP (ref 3.8–10.5)
WBC # FLD AUTO: 4.55 K/UL — SIGNIFICANT CHANGE UP (ref 3.8–10.5)

## 2020-12-21 PROCEDURE — 99239 HOSP IP/OBS DSCHRG MGMT >30: CPT

## 2020-12-21 PROCEDURE — 99232 SBSQ HOSP IP/OBS MODERATE 35: CPT

## 2020-12-21 PROCEDURE — 74240 X-RAY XM UPR GI TRC 1CNTRST: CPT | Mod: 26

## 2020-12-21 RX ORDER — FERROUS SULFATE 325(65) MG
5 TABLET ORAL
Qty: 150 | Refills: 0
Start: 2020-12-21 | End: 2021-01-19

## 2020-12-21 RX ORDER — ZOLPIDEM TARTRATE 10 MG/1
1 TABLET ORAL
Qty: 0 | Refills: 0 | DISCHARGE
Start: 2020-12-21

## 2020-12-21 RX ORDER — LEVOTHYROXINE SODIUM 125 MCG
75 TABLET ORAL ONCE
Refills: 0 | Status: COMPLETED | OUTPATIENT
Start: 2020-12-21 | End: 2020-12-21

## 2020-12-21 RX ORDER — ZOLPIDEM TARTRATE 10 MG/1
1 TABLET ORAL
Qty: 0 | Refills: 0 | DISCHARGE

## 2020-12-21 RX ORDER — TAMSULOSIN HYDROCHLORIDE 0.4 MG/1
1 CAPSULE ORAL
Qty: 0 | Refills: 0 | DISCHARGE

## 2020-12-21 RX ORDER — LANOLIN ALCOHOL/MO/W.PET/CERES
2 CREAM (GRAM) TOPICAL
Qty: 0 | Refills: 0 | DISCHARGE
Start: 2020-12-21

## 2020-12-21 RX ORDER — DOXAZOSIN MESYLATE 4 MG
1 TABLET ORAL
Qty: 30 | Refills: 0
Start: 2020-12-21 | End: 2021-01-19

## 2020-12-21 RX ORDER — BACITRACIN ZINC 500 UNIT/G
1 OINTMENT IN PACKET (EA) TOPICAL
Qty: 1 | Refills: 0
Start: 2020-12-21 | End: 2020-12-27

## 2020-12-21 RX ORDER — FERROUS SULFATE 325(65) MG
1 TABLET ORAL
Qty: 0 | Refills: 0 | DISCHARGE

## 2020-12-21 RX ADMIN — ESCITALOPRAM OXALATE 10 MILLIGRAM(S): 10 TABLET, FILM COATED ORAL at 14:01

## 2020-12-21 RX ADMIN — Medication 1 APPLICATION(S): at 06:08

## 2020-12-21 RX ADMIN — Medication 300 MILLIGRAM(S): at 14:01

## 2020-12-21 RX ADMIN — ENOXAPARIN SODIUM 40 MILLIGRAM(S): 100 INJECTION SUBCUTANEOUS at 14:01

## 2020-12-21 RX ADMIN — SODIUM CHLORIDE 75 MILLILITER(S): 9 INJECTION, SOLUTION INTRAVENOUS at 00:02

## 2020-12-21 RX ADMIN — Medication 75 MICROGRAM(S): at 06:08

## 2020-12-21 NOTE — PROGRESS NOTE ADULT - ATTENDING COMMENTS
Agree with above  Seen with fellow on 12/21  Plan for outpatient follow up, with possible endoscopic follow up to assess obstruction

## 2020-12-21 NOTE — DISCHARGE NOTE PROVIDER - CARE PROVIDERS DIRECT ADDRESSES
,DirectAddress_Unknown,[email or web address]

## 2020-12-21 NOTE — DISCHARGE NOTE PROVIDER - PROVIDER TOKENS
FREE:[LAST:[Your PCP],PHONE:[(   )    -],FAX:[(   )    -]],PROVIDER:[TOKEN:[[id number]],FOLLOWUP:[1 week]],PROVIDER:[TOKEN:[[id number]],FOLLOWUP:[1 week]]

## 2020-12-21 NOTE — PROGRESS NOTE ADULT - PROBLEM SELECTOR PROBLEM 3
Type 2 diabetes mellitus without complication, unspecified long term insulin use status

## 2020-12-21 NOTE — PROGRESS NOTE ADULT - PROBLEM SELECTOR PLAN 3
FS q6h while NPO/TF.   Correctional insulin. Titrate PRN.  Continue Enteral feeds.  Hold PO meds from home. - FS q6h while NPO/TF.   - Correctional insulin. Titrate PRN.  - Continue Enteral feeds.  - Can resume Metformin on discharge.

## 2020-12-21 NOTE — PROGRESS NOTE ADULT - ASSESSMENT
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 S/p Upper GI series on 12/18 and pending repeat GI series. Patient started on prophylactic abx with Ciprofloxacin. 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 S/p Upper GI series on 12/18 and pending repeat GI series. Patient started on prophylactic abx with Levaquin.

## 2020-12-21 NOTE — DISCHARGE NOTE PROVIDER - CARE PROVIDER_API CALL
Your PCP,   Phone: (   )    -  Fax: (   )    -  Follow Up Time:     Chema Banda)  Surgery  [address] [place] [address]  Phone: [phone]  Fax: [phone]  Follow Up Time: 1 week    Mario Henry)  Hematology; Internal Medicine; Medical Oncology  [address] [place] [address]  Phone: [phone]  Fax: [phone]  Follow Up Time: 1 week

## 2020-12-21 NOTE — PROGRESS NOTE ADULT - PROBLEM SELECTOR PLAN 1
-S/p stent placement by EGD, detailed as above.  -Follow up as OP w/ surg onc and oncology. HIE records and last hospitalization records reviewed.  - Continue Ciprofloxacin for 5 days total.  Continue enteral feeds as tolerated, NPO after midnight, pending repeat upper GI series. F/u GI further recommendations once Imaging studies returns. - S/p stent placement by EGD, detailed as above.  - F/u as OP w/ Surg Onc, Oncology. HIE records +last hospitalizations reviewed.  - Per GI continue antibiotics x1 more day.   - Advance to full liquid diet+enteral feeds as tolerated (home regimen).  - Per GI, stable for discharge today with follow up as outpatient.

## 2020-12-21 NOTE — PROGRESS NOTE ADULT - ATTENDING COMMENTS
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 S/p Upper GI series on 12/18 and pending repeat GI series. Due to left over contrast in large bowels, will postpone UGI series to 12/21. Patient started on prophylactic abx with Ciprofloxacin. Based on result of GI series, will f/u GI further recommendations.   Patient is accepted for home care service  and pending hospital course for discharge.

## 2020-12-21 NOTE — PROGRESS NOTE ADULT - PROBLEM SELECTOR PLAN 7
Lovenox - DVT ppx with Lovenox SQ  - DC planning: Patient cleared for DC home today with no needs.   - GI cleared patient, pt clinically/hemodynamically stable for discharge today.   - DC planning time: 34 min in coordinating dc plan with patient and team.   - To follow up with GI , Surg/Onc, and PCP on discharge.

## 2020-12-21 NOTE — PROGRESS NOTE ADULT - PROVIDER SPECIALTY LIST ADULT
Anesthesia
Gastroenterology
Gastroenterology
Internal Medicine
Internal Medicine
Hospitalist
Internal Medicine

## 2020-12-21 NOTE — DISCHARGE NOTE PROVIDER - NSFOLLOWUPCLINICS_GEN_ALL_ED_FT
[facility]  Hematology/Oncology  [address] [id number]  Phone: [phone]  Fax:   Follow Up Time:

## 2020-12-21 NOTE — DISCHARGE NOTE PROVIDER - NSDCMRMEDTOKEN_GEN_ALL_CORE_FT
Ambien 10 mg oral tablet: 1 tab(s) orally once a day (at bedtime)  escitalopram 10 mg oral tablet: 1 tab(s) orally once a day  ferrous sulfate 324 mg (65 mg elemental iron) oral tablet: 1 tab(s) orally once a day  Flomax 0.4 mg oral capsule: 1 cap(s) orally once a day  levothyroxine 100 mcg (0.1 mg) oral tablet: 1 tab(s) orally once a day  metFORMIN 500 mg oral tablet: 1 tab(s) orally 2 times a day  omeprazole 20 mg oral delayed release capsule: 1 cap(s) orally once a day   bacitracin 500 units/g topical ointment: 1 application topically 2 times a day  doxazosin 1 mg oral tablet: 1 tab(s) orally once a day (at bedtime)  escitalopram 10 mg oral tablet: 1 tab(s) orally once a day  ferrous sulfate 300 mg/5 mL (60 mg/5 mL elemental iron) oral liquid: 5 milliliter(s) orally once a day  levoFLOXacin 500 mg oral tablet: 1 tab(s) orally once a day   levothyroxine 100 mcg (0.1 mg) oral tablet: 1 tab(s) orally once a day  melatonin 3 mg oral tablet: 2 tab(s) orally once a day (at bedtime)  metFORMIN 500 mg oral tablet: 1 tab(s) orally 2 times a day  omeprazole 20 mg oral delayed release capsule: 1 cap(s) orally once a day  zolpidem 5 mg oral tablet: 1 tab(s) orally once a day (at bedtime), As needed, Insomnia

## 2020-12-21 NOTE — DISCHARGE NOTE PROVIDER - NSDCCPCAREPLAN_GEN_ALL_CORE_FT
PRINCIPAL DISCHARGE DIAGNOSIS  Diagnosis: Gastric cancer  Assessment and Plan of Treatment: Please follow up with your gastroenterologist, oncologist, and PCP. You were recently admitted for a J-tube placement. This admission you had  a  stent placement to connect a blind limb to an efferent limb to facilitate passage of food.  You may continue a full liquid diet with enteral feeds as tolerated. Please take your antibiotic, Levaquin, until 12/22.      SECONDARY DISCHARGE DIAGNOSES  Diagnosis: Diabetes mellitus  Assessment and Plan of Treatment: Please take Metformin as prescribed. Please see your PCP for further Diabetes management.

## 2020-12-21 NOTE — PROGRESS NOTE ADULT - PROBLEM SELECTOR PLAN 6
-Low BMI for age w/ notable weight/loss decreased PO intake in setting of gastric malignancy. Exam w/ signs of muscle wasting.  -Cont w/ supportive measures.   Nutrition recs from recent stay noted - will resume home TF regimen (1.2 Jevity - goal 80cc/h x10h) Low BMI for age w/ notable weight/loss decreased PO intake in setting of gastric malignancy. Exam w/ signs of muscle wasting.  - Cont w/ supportive measures.   - Nutrition recs from recent stay noted.  - RESUME home TF regimen (1.2 Jevity - goal 80cc/h x10h) as tolerated.

## 2020-12-21 NOTE — PROGRESS NOTE ADULT - SUBJECTIVE AND OBJECTIVE BOX
PROGRESS NOTE:     Patient is a 70y old  Male who presents with a chief complaint of S/p endoscopic stent placement (21 Dec 2020 10:08)    SUBJECTIVE / OVERNIGHT EVENTS:    ADDITIONAL REVIEW OF SYSTEMS:    MEDICATIONS  (STANDING):  BACItracin   Ointment 1 Application(s) Topical two times a day  dextrose 40% Gel 15 Gram(s) Oral once  dextrose 5% + sodium chloride 0.9%. 1000 milliLiter(s) (75 mL/Hr) IV Continuous <Continuous>  dextrose 5%. 1000 milliLiter(s) (50 mL/Hr) IV Continuous <Continuous>  dextrose 5%. 1000 milliLiter(s) (100 mL/Hr) IV Continuous <Continuous>  dextrose 50% Injectable 25 Gram(s) IV Push once  dextrose 50% Injectable 12.5 Gram(s) IV Push once  dextrose 50% Injectable 25 Gram(s) IV Push once  doxazosin 1 milliGRAM(s) Oral at bedtime  enoxaparin Injectable 40 milliGRAM(s) SubCutaneous daily  escitalopram 10 milliGRAM(s) Oral daily  ferrous    sulfate Liquid 300 milliGRAM(s) Enteral Tube daily  glucagon  Injectable 1 milliGRAM(s) IntraMuscular once  insulin lispro (ADMELOG) corrective regimen sliding scale   SubCutaneous every 6 hours  levoFLOXacin IVPB      levoFLOXacin IVPB 500 milliGRAM(s) IV Intermittent every 24 hours  melatonin 6 milliGRAM(s) Oral at bedtime    MEDICATIONS  (PRN):  zolpidem 5 milliGRAM(s) Oral at bedtime PRN Insomnia  zolpidem 5 milliGRAM(s) Oral at bedtime PRN Insomnia    CAPILLARY BLOOD GLUCOSE    POCT Blood Glucose.: 114 mg/dL (21 Dec 2020 06:25)  POCT Blood Glucose.: 159 mg/dL (20 Dec 2020 23:44)  POCT Blood Glucose.: 121 mg/dL (20 Dec 2020 17:45)  POCT Blood Glucose.: 96 mg/dL (20 Dec 2020 12:30)    I&O's Summary    20 Dec 2020 07:01  -  21 Dec 2020 07:00  --------------------------------------------------------  IN: 1095 mL / OUT: 200 mL / NET: 895 mL    21 Dec 2020 07:01  -  21 Dec 2020 10:31  --------------------------------------------------------  IN: 0 mL / OUT: 0 mL / NET: 0 mL    PHYSICAL EXAM:  Vital Signs Last 24 Hrs  T(C): 36.4 (21 Dec 2020 09:32), Max: 36.8 (21 Dec 2020 06:11)  T(F): 97.5 (21 Dec 2020 09:32), Max: 98.3 (21 Dec 2020 07:40)  HR: 50 (21 Dec 2020 09:32) (48 - 52)  BP: 110/72 (21 Dec 2020 09:32) (105/64 - 126/71)  BP(mean): --  RR: 18 (21 Dec 2020 09:32) (18 - 18)  SpO2: 98% (21 Dec 2020 09:32) (97% - 100%)    GENERAL: No acute distress, well-developed  HEAD:  Atraumatic, Normocephalic  EYES: EOMI, PERRLA, conjunctiva and sclera clear  NECK: Supple, no lymphadenopathy, no JVD  CHEST/LUNG: CTAB; No wheezes, rales, or rhonchi  HEART: Regular rate and rhythm; No murmurs, rubs, or gallops  ABDOMEN: Soft, NT/ND, normal bowel sounds, J-tube noted left sided, vertical scar well healed, Clean/Dry/Intact.  EXTREMITIES:  2+ peripheral pulses b/l, No clubbing, cyanosis, or edema.  NEUROLOGY: A&O x 3, no focal neurological deficits  SKIN: No rashes or lesions    LABS: reviewed                         11.4   4.55  )-----------( 217      ( 21 Dec 2020 07:16 )             34.8     12-21    140  |  104  |  18  ----------------------------<  98  4.1   |  26  |  0.63    Ca    8.8      21 Dec 2020 07:16  Phos  2.7     12-21  Mg     2.2     12-21    RADIOLOGY & ADDITIONAL TESTS:  Results Reviewed:   Imaging Personally Reviewed:  Electrocardiogram Personally Reviewed:    COORDINATION OF CARE:  Care Discussed with Consultants/Other Providers [Y/N]:  Prior or Outpatient Records Reviewed [Y/N]:   PROGRESS NOTE:     Patient is a 70y old  Male who presents with a chief complaint of S/p endoscopic stent placement (21 Dec 2020 10:08)    SUBJECTIVE / OVERNIGHT EVENTS: Patient seen and evaluated at bedside. Reports feeling well. Denies any current complaints of shortness of breath, chest pain, dizziness, palpitations, nausea, vomiting. No overnight events. s/p GI series today, GI following.     ADDITIONAL REVIEW OF SYSTEMS:    MEDICATIONS  (STANDING):  BACItracin   Ointment 1 Application(s) Topical two times a day  dextrose 40% Gel 15 Gram(s) Oral once  dextrose 5% + sodium chloride 0.9%. 1000 milliLiter(s) (75 mL/Hr) IV Continuous <Continuous>  dextrose 5%. 1000 milliLiter(s) (50 mL/Hr) IV Continuous <Continuous>  dextrose 5%. 1000 milliLiter(s) (100 mL/Hr) IV Continuous <Continuous>  dextrose 50% Injectable 25 Gram(s) IV Push once  dextrose 50% Injectable 12.5 Gram(s) IV Push once  dextrose 50% Injectable 25 Gram(s) IV Push once  doxazosin 1 milliGRAM(s) Oral at bedtime  enoxaparin Injectable 40 milliGRAM(s) SubCutaneous daily  escitalopram 10 milliGRAM(s) Oral daily  ferrous    sulfate Liquid 300 milliGRAM(s) Enteral Tube daily  glucagon  Injectable 1 milliGRAM(s) IntraMuscular once  insulin lispro (ADMELOG) corrective regimen sliding scale   SubCutaneous every 6 hours  levoFLOXacin IVPB      levoFLOXacin IVPB 500 milliGRAM(s) IV Intermittent every 24 hours  melatonin 6 milliGRAM(s) Oral at bedtime    MEDICATIONS  (PRN):  zolpidem 5 milliGRAM(s) Oral at bedtime PRN Insomnia  zolpidem 5 milliGRAM(s) Oral at bedtime PRN Insomnia    CAPILLARY BLOOD GLUCOSE    POCT Blood Glucose.: 114 mg/dL (21 Dec 2020 06:25)  POCT Blood Glucose.: 159 mg/dL (20 Dec 2020 23:44)  POCT Blood Glucose.: 121 mg/dL (20 Dec 2020 17:45)  POCT Blood Glucose.: 96 mg/dL (20 Dec 2020 12:30)    I&O's Summary    20 Dec 2020 07:01  -  21 Dec 2020 07:00  --------------------------------------------------------  IN: 1095 mL / OUT: 200 mL / NET: 895 mL    21 Dec 2020 07:01  -  21 Dec 2020 10:31  --------------------------------------------------------  IN: 0 mL / OUT: 0 mL / NET: 0 mL    PHYSICAL EXAM:  Vital Signs Last 24 Hrs  T(C): 36.4 (21 Dec 2020 09:32), Max: 36.8 (21 Dec 2020 06:11)  T(F): 97.5 (21 Dec 2020 09:32), Max: 98.3 (21 Dec 2020 07:40)  HR: 50 (21 Dec 2020 09:32) (48 - 52)  BP: 110/72 (21 Dec 2020 09:32) (105/64 - 126/71)  BP(mean): --  RR: 18 (21 Dec 2020 09:32) (18 - 18)  SpO2: 98% (21 Dec 2020 09:32) (97% - 100%)    GENERAL: No acute distress, well-developed  HEAD:  Atraumatic, Normocephalic, middle aged man ambulating in room, comfortable appearing.   EYES: EOMI, PERRLA, conjunctiva and sclera clear  NECK: Supple, no lymphadenopathy, no JVD  CHEST/LUNG: CTAB; No wheezes, rales, or rhonchi  HEART: Regular rate and rhythm; No murmurs, rubs, or gallops  ABDOMEN: Soft, NT/ND, normal bowel sounds, J-tube noted left sided, vertical scar well healed, Clean/Dry/Intact.  EXTREMITIES:  2+ peripheral pulses b/l, No clubbing, cyanosis, or edema.  NEUROLOGY: A&O x 3, no focal neurological deficits  SKIN: No rashes or lesions    LABS: reviewed                         11.4   4.55  )-----------( 217      ( 21 Dec 2020 07:16 )             34.8     12-21    140  |  104  |  18  ----------------------------<  98  4.1   |  26  |  0.63    Ca    8.8      21 Dec 2020 07:16  Phos  2.7     12-21  Mg     2.2     12-21    RADIOLOGY & ADDITIONAL TESTS:  Results Reviewed:   Imaging Personally Reviewed:  Electrocardiogram Personally Reviewed:    COORDINATION OF CARE:  Care Discussed with Consultants/Other Providers [Y/N]:  Prior or Outpatient Records Reviewed [Y/N]:

## 2020-12-21 NOTE — DISCHARGE NOTE PROVIDER - HOSPITAL COURSE
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 S/p Upper GI series on 12/18 and 12/21. Patient started on prophylactic abx with Ciprofloxacin.    Gastric cancer.    -S/p stent placement by EGD, detailed as above.  -Follow up as OP w/ surg onc and oncology. HIE records and last hospitalization records reviewed.  -Continue Ciprofloxacin for 5 days total.  Continue enteral feeds as tolerated, NPO after midnight, pending repeat upper GI series. F/u GI further recommendations once Imaging studies returns.     Anemia.  Chronic stable.   B9/b12 wnl.     Type 2 diabetes mellitus without complication, unspecified long term insulin use status.  FS q6h while NPO/TF.   Correctional insulin. Titrate PRN.  Continue Enteral feeds.  Hold PO meds from home.     Hypothyroidism.    Stable. On Levothyroxine.     HTN  -Not on meds.    Severe protein-calorie malnutrition.   -Low BMI for age w/ notable weight/loss decreased PO intake in setting of gastric malignancy. Exam w/ signs of muscle wasting.  Nutrition recs from recent stay noted - will resume home TF regimen (1.2 Jevity - goal 80cc/h x10h).    On 12/21/2020 this case was reviewed with Dr. Samaniego, the patient is medically stable and optimized for discharge. All medications were reviewed and prescriptions were sent to mutually agreed upon pharmacy. The patient agrees to follow up with providers as recommended.   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 S/p Upper GI series on 12/18 and 12/21. Patient started on prophylactic abx with Ciprofloxacin.    Gastric cancer.    -S/p stent placement by EGD, detailed as above.  -Follow up as OP w/ surg onc and oncology.   -Continue Ciprofloxacin for 5 days total.  Continue enteral feeds as tolerated.     Anemia.  Chronic stable.   B9/b12 wnl.     Type 2 diabetes mellitus without complication, unspecified long term insulin use status.  FS q6h while NPO/TF.   Correctional insulin. Titrate PRN.  Continue Enteral feeds.  Hold PO meds from home.     Hypothyroidism.    Stable. On Levothyroxine.     HTN  -Not on meds.    Severe protein-calorie malnutrition.   -Low BMI for age w/ notable weight/loss decreased PO intake in setting of gastric malignancy. Exam w/ signs of muscle wasting.  -As per GI on 12/21 pt can resume full liquid diet w/ TF today.  -Resume home TF regimen (1.2 Jevity - goal 80cc/h x10h).    On 12/21/2020 this case was reviewed with Dr. Samaniego, the patient is medically stable and optimized for discharge. All medications were reviewed and prescriptions were sent to mutually agreed upon pharmacy. The patient agrees to follow up with providers as recommended.   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 S/p Upper GI series on 12/18 and 12/21. Patient started on prophylactic abx (Cipro --> Levaquin.)    Gastric cancer.    -S/p stent placement by EGD  -Follow up as OP w/ surg onc and oncology.   -Continue abx for 5 days total. To finish Levaquin on 12/22.  Continue  full liquid diet with enteral feeds as tolerated.     Anemia.  Chronic stable.   B9/b12 wnl.     Type 2 diabetes mellitus without complication, unspecified long term insulin use status.  FS q6h while NPO/TF.   Correctional insulin. Titrate PRN.  Continue Enteral feeds.  Hold PO meds from home.     Hypothyroidism.    Stable. On Levothyroxine.     HTN  -Not on meds.    Severe protein-calorie malnutrition.   -Low BMI for age w/ notable weight/loss decreased PO intake in setting of gastric malignancy. Exam w/ signs of muscle wasting.  -As per GI on 12/21 pt can resume full liquid diet w/ TF today.  -Home TF regimen: 1.2 Jevity - goal 80cc/h x10hr    On 12/21/2020 this case was reviewed with Dr. Samaniego, the patient is medically stable and optimized for discharge. All medications were reviewed and prescriptions were sent to mutually agreed upon pharmacy. The patient agrees to follow up with providers as recommended.   70 year old male with PMH of gastric CA s/p gastrectomy/splenectomy/distal pancreatectomy/esophagojejunostomy(2017) presents s/p recent J-tube placement now here for stent placement to connect blind limb to efferent limb of esophagojejunostomy to facilitate passage of food. S/p Upper GI series on 12/18 and 12/21. Patient started on prophylactic abx (Cipro --> Levaquin.)    Gastric cancer.    -S/p stent placement by EGD  -Follow up as OP w/ surg onc and oncology.   -Continue abx for 5 days total. To finish Levaquin on 12/22.  Continue  full liquid diet with enteral feeds as tolerated.     Anemia.  Chronic stable.   B9/b12 wnl.     Type 2 diabetes mellitus without complication, unspecified long term insulin use status.  FS q6h while NPO/TF.   Correctional insulin. Titrate PRN.  Continue Enteral feeds.  Hold PO meds from home.     Hypothyroidism.    Stable. On Levothyroxine.     HTN  -Not on meds.    Severe protein-calorie malnutrition.   -Low BMI for age w/ notable weight/loss decreased PO intake in setting of gastric malignancy. Exam w/ signs of muscle wasting.  -As per GI on 12/21 pt can resume full liquid diet w/ TF today.  -Home TF regimen: 1.2 Jevity - goal 80cc/h x10hr    On 12/21/2020 this case was reviewed with Dr. Samaniego, the patient is medically stable and optimized for discharge. All medications were reviewed and prescriptions were sent to mutually agreed upon pharmacy. The patient agrees to follow up with providers as recommended.

## 2020-12-21 NOTE — PROGRESS NOTE ADULT - REASON FOR ADMISSION
S/p endoscopic stent placement

## 2020-12-21 NOTE — PROGRESS NOTE ADULT - SUBJECTIVE AND OBJECTIVE BOX
Chief Complaint:  Patient is a 70y old  Male who presents with a chief complaint of S/p endoscopic stent placement (20 Dec 2020 11:22)      Interval Events:   No acute overnight events    Allergies:  No Known Allergies      Hospital Medications:  BACItracin   Ointment 1 Application(s) Topical two times a day  dextrose 40% Gel 15 Gram(s) Oral once  dextrose 5% + sodium chloride 0.9%. 1000 milliLiter(s) IV Continuous <Continuous>  dextrose 5%. 1000 milliLiter(s) IV Continuous <Continuous>  dextrose 5%. 1000 milliLiter(s) IV Continuous <Continuous>  dextrose 50% Injectable 25 Gram(s) IV Push once  dextrose 50% Injectable 12.5 Gram(s) IV Push once  dextrose 50% Injectable 25 Gram(s) IV Push once  doxazosin 1 milliGRAM(s) Oral at bedtime  enoxaparin Injectable 40 milliGRAM(s) SubCutaneous daily  escitalopram 10 milliGRAM(s) Oral daily  ferrous    sulfate Liquid 300 milliGRAM(s) Enteral Tube daily  glucagon  Injectable 1 milliGRAM(s) IntraMuscular once  insulin lispro (ADMELOG) corrective regimen sliding scale   SubCutaneous every 6 hours  levoFLOXacin IVPB      levoFLOXacin IVPB 500 milliGRAM(s) IV Intermittent every 24 hours  melatonin 6 milliGRAM(s) Oral at bedtime  zolpidem 5 milliGRAM(s) Oral at bedtime PRN  zolpidem 5 milliGRAM(s) Oral at bedtime PRN      PMHX/PSHX:  Dysphagia, unspecified    Constipation    Iron deficiency anemia, unspecified iron deficiency anemia type    Gastric mass    Hypothyroidism    Type 2 diabetes mellitus without complication, unspecified long term insulin use status    Essential hypertension    S/P cataract surgery    Gastrostomy tube in place    History of gastric surgery    Port-a-cath in place    H/O umbilical hernia repair        ROS:   General:  No fevers, chills or night sweats  ENT:  No sore throat or dysphagia  CV:  No pain or palpitations  Resp:  No dyspnea, cough or  wheezing  GI:  No pain, No nausea, No vomiting, No diarrhea, No rectal bleeding, No tarry stools,  Skin:  No rash or edema    PHYSICAL EXAM:   Vital Signs:  Vital Signs Last 24 Hrs  T(C): 36.4 (21 Dec 2020 09:32), Max: 36.8 (21 Dec 2020 06:11)  T(F): 97.5 (21 Dec 2020 09:32), Max: 98.3 (21 Dec 2020 07:40)  HR: 50 (21 Dec 2020 09:32) (48 - 52)  BP: 110/72 (21 Dec 2020 09:32) (105/64 - 126/71)  BP(mean): --  RR: 18 (21 Dec 2020 09:32) (18 - 18)  SpO2: 98% (21 Dec 2020 09:32) (97% - 100%)  Daily     Daily     GENERAL:  thin, no distress  HEENT:  NC/AT,  conjunctivae clear, sclera anicteric  CHEST:  Full & symmetric excursion, no increased effort w/ respirations  HEART:  Regular rhythm & rate  ABDOMEN:  Soft, non-tender, non-distended  EXTREMITIES:  no LE  edema  SKIN:  No rash/erythema/ecchymoses/petechiae/wounds/jaundice  NEURO:  Alert, oriented    LABS:                        11.4   4.55  )-----------( 217      ( 21 Dec 2020 07:16 )             34.8     Mean Cell Volume: 106.1 fL (12-21-20 @ 07:16)    12-21    140  |  104  |  18  ----------------------------<  98  4.1   |  26  |  0.63    Ca    8.8      21 Dec 2020 07:16  Phos  2.7     12-21  Mg     2.2     12-21               11.4   4.55  )-----------( 217      ( 21 Dec 2020 07:16 )             34.8                         9.9    4.61  )-----------( 203      ( 20 Dec 2020 07:13 )             29.9                         10.0   6.71  )-----------( 222      ( 19 Dec 2020 06:41 )             30.0       Imaging:

## 2020-12-21 NOTE — PROGRESS NOTE ADULT - ASSESSMENT
70 year old man with hx of HTN, BPH, T2DM, gastric cancer s/p total gastrectomy, splenectomy, and distal pancreatectomy (2017) admitted for poor PO intake and aperistalsis of the esophagus, s/p J-tube placement who presented for Jejunojenunostomy.    Impression:  # s/p Axios Jejunojenunostomy  # Gastric cancer s/p total gastrectomy, splenectomy, and distal pancreatectomy (2017)     Recommendation:  - Follow up results of UGIS  - Complete course of levaquin tomorrow  - Tube feeds as tolerated after UGIS  - Can start full liquid diet  - Supportive care per primary team    Mira Burton MD  Gastroenterology Fellow  [phone]  Available on Microsoft Teams  Please page on call fellow on weekends and after 5pm on weekdays: [phone]     70 year old man with hx of HTN, BPH, T2DM, gastric cancer s/p total gastrectomy, splenectomy, and distal pancreatectomy (2017) admitted for poor PO intake and aperistalsis of the esophagus, s/p J-tube placement who presented for Jejunojenunostomy.    Impression:  # s/p Axios Jejunojenunostomy  # Gastric cancer s/p total gastrectomy, splenectomy, and distal pancreatectomy (2017)     Recommendation:  - Follow up results of UGIS  - Complete course of levaquin tomorrow  - Tube feeds as tolerated after UGIS  - Can start full liquid diet  - Supportive care per primary team  - No GI contraindication to discharge    Mira Burton MD  Gastroenterology Fellow  [phone]  Available on [employer] Teams  Please page on call fellow on weekends and after 5pm on weekdays: [phone]

## 2020-12-24 ENCOUNTER — NON-APPOINTMENT (OUTPATIENT)
Age: 70
End: 2020-12-24

## 2020-12-31 ENCOUNTER — OUTPATIENT (OUTPATIENT)
Dept: OUTPATIENT SERVICES | Facility: HOSPITAL | Age: 70
LOS: 1 days | End: 2020-12-31
Payer: MEDICARE

## 2020-12-31 DIAGNOSIS — Z93.1 GASTROSTOMY STATUS: Chronic | ICD-10-CM

## 2020-12-31 DIAGNOSIS — Z95.828 PRESENCE OF OTHER VASCULAR IMPLANTS AND GRAFTS: Chronic | ICD-10-CM

## 2020-12-31 DIAGNOSIS — Z98.890 OTHER SPECIFIED POSTPROCEDURAL STATES: Chronic | ICD-10-CM

## 2020-12-31 DIAGNOSIS — Z98.49 CATARACT EXTRACTION STATUS, UNSPECIFIED EYE: Chronic | ICD-10-CM

## 2020-12-31 PROCEDURE — 93970 EXTREMITY STUDY: CPT | Mod: 26

## 2021-01-04 ENCOUNTER — APPOINTMENT (OUTPATIENT)
Dept: SURGICAL ONCOLOGY | Facility: CLINIC | Age: 71
End: 2021-01-04
Payer: MEDICARE

## 2021-01-04 ENCOUNTER — OUTPATIENT (OUTPATIENT)
Dept: OUTPATIENT SERVICES | Facility: HOSPITAL | Age: 71
LOS: 1 days | Discharge: HOME | End: 2021-01-04

## 2021-01-04 VITALS
HEIGHT: 70 IN | DIASTOLIC BLOOD PRESSURE: 84 MMHG | HEART RATE: 77 BPM | BODY MASS INDEX: 21.19 KG/M2 | WEIGHT: 148 LBS | OXYGEN SATURATION: 96 % | SYSTOLIC BLOOD PRESSURE: 149 MMHG

## 2021-01-04 DIAGNOSIS — Z98.890 OTHER SPECIFIED POSTPROCEDURAL STATES: Chronic | ICD-10-CM

## 2021-01-04 DIAGNOSIS — Z95.828 PRESENCE OF OTHER VASCULAR IMPLANTS AND GRAFTS: Chronic | ICD-10-CM

## 2021-01-04 DIAGNOSIS — Z93.1 GASTROSTOMY STATUS: Chronic | ICD-10-CM

## 2021-01-04 DIAGNOSIS — C16.9 MALIGNANT NEOPLASM OF STOMACH, UNSPECIFIED: ICD-10-CM

## 2021-01-04 DIAGNOSIS — Z98.49 CATARACT EXTRACTION STATUS, UNSPECIFIED EYE: Chronic | ICD-10-CM

## 2021-01-04 PROCEDURE — 99024 POSTOP FOLLOW-UP VISIT: CPT

## 2021-01-04 NOTE — HISTORY OF PRESENT ILLNESS
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

## 2021-01-04 NOTE — HISTORY OF PRESENT ILLNESS
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 Peguero.  His dysphagia has improved slightly.  He reports clogged jtube.  He denies abdominal pain.

## 2021-01-04 NOTE — ASSESSMENT
[FreeTextEntry1] : Dry dressing to midline incision.\par Follow up with Dr. Peguero.\par Follow up in 2 months.

## 2021-01-05 ENCOUNTER — RESULT REVIEW (OUTPATIENT)
Age: 71
End: 2021-01-05

## 2021-01-05 ENCOUNTER — LABORATORY RESULT (OUTPATIENT)
Age: 71
End: 2021-01-05

## 2021-01-05 ENCOUNTER — APPOINTMENT (OUTPATIENT)
Dept: INFUSION THERAPY | Facility: HOSPITAL | Age: 71
End: 2021-01-05

## 2021-01-05 LAB
BASOPHILS # BLD AUTO: 0.05 K/UL — SIGNIFICANT CHANGE UP (ref 0–0.2)
BASOPHILS NFR BLD AUTO: 0.8 % — SIGNIFICANT CHANGE UP (ref 0–2)
EOSINOPHIL # BLD AUTO: 0.26 K/UL — SIGNIFICANT CHANGE UP (ref 0–0.5)
EOSINOPHIL NFR BLD AUTO: 4 % — SIGNIFICANT CHANGE UP (ref 0–6)
HCT VFR BLD CALC: 30.9 % — LOW (ref 39–50)
HGB BLD-MCNC: 10.2 G/DL — LOW (ref 13–17)
IMM GRANULOCYTES NFR BLD AUTO: 0.5 % — SIGNIFICANT CHANGE UP (ref 0–1.5)
LYMPHOCYTES # BLD AUTO: 1.02 K/UL — SIGNIFICANT CHANGE UP (ref 1–3.3)
LYMPHOCYTES # BLD AUTO: 15.8 % — SIGNIFICANT CHANGE UP (ref 13–44)
MCHC RBC-ENTMCNC: 33 G/DL — SIGNIFICANT CHANGE UP (ref 32–36)
MCHC RBC-ENTMCNC: 34.5 PG — HIGH (ref 27–34)
MCV RBC AUTO: 104.4 FL — HIGH (ref 80–100)
MONOCYTES # BLD AUTO: 0.67 K/UL — SIGNIFICANT CHANGE UP (ref 0–0.9)
MONOCYTES NFR BLD AUTO: 10.4 % — SIGNIFICANT CHANGE UP (ref 2–14)
NEUTROPHILS # BLD AUTO: 4.41 K/UL — SIGNIFICANT CHANGE UP (ref 1.8–7.4)
NEUTROPHILS NFR BLD AUTO: 68.5 % — SIGNIFICANT CHANGE UP (ref 43–77)
NRBC # BLD: 0 /100 WBCS — SIGNIFICANT CHANGE UP (ref 0–0)
PLATELET # BLD AUTO: 300 K/UL — SIGNIFICANT CHANGE UP (ref 150–400)
RBC # BLD: 2.96 M/UL — LOW (ref 4.2–5.8)
RBC # FLD: 15.1 % — HIGH (ref 10.3–14.5)
WBC # BLD: 6.44 K/UL — SIGNIFICANT CHANGE UP (ref 3.8–10.5)
WBC # FLD AUTO: 6.44 K/UL — SIGNIFICANT CHANGE UP (ref 3.8–10.5)

## 2021-01-06 ENCOUNTER — RESULT REVIEW (OUTPATIENT)
Age: 71
End: 2021-01-06

## 2021-01-06 ENCOUNTER — OUTPATIENT (OUTPATIENT)
Dept: OUTPATIENT SERVICES | Facility: HOSPITAL | Age: 71
LOS: 1 days | End: 2021-01-06
Payer: MEDICARE

## 2021-01-06 DIAGNOSIS — Z93.1 GASTROSTOMY STATUS: Chronic | ICD-10-CM

## 2021-01-06 DIAGNOSIS — R13.10 DYSPHAGIA, UNSPECIFIED: ICD-10-CM

## 2021-01-06 DIAGNOSIS — Z98.49 CATARACT EXTRACTION STATUS, UNSPECIFIED EYE: Chronic | ICD-10-CM

## 2021-01-06 DIAGNOSIS — Z98.890 OTHER SPECIFIED POSTPROCEDURAL STATES: Chronic | ICD-10-CM

## 2021-01-06 DIAGNOSIS — Z95.828 PRESENCE OF OTHER VASCULAR IMPLANTS AND GRAFTS: Chronic | ICD-10-CM

## 2021-01-06 PROCEDURE — 49451 REPLACE DUOD/JEJ TUBE PERC: CPT | Mod: 53,GC

## 2021-01-12 ENCOUNTER — NON-APPOINTMENT (OUTPATIENT)
Age: 71
End: 2021-01-12

## 2021-01-13 DIAGNOSIS — Z46.59 ENCOUNTER FOR FITTING AND ADJUSTMENT OF OTHER GASTROINTESTINAL APPLIANCE AND DEVICE: ICD-10-CM

## 2021-01-14 NOTE — ASU PATIENT PROFILE, ADULT - NS PRO PT RIGHT SUPPORT PERSON
Patient called today regarding her question about the bubbles under her skin  She spoke with Sanjiv Burns and she informed her that Dr Saundra Ramirez said it would take up to a year and if any sutures begin to push threw to call the office for an appointment   (I was not able to get back to her sooner Dr Saundra Ramirez had surgery and interviews and I was not in the office on 1/13/21
Declines

## 2021-01-19 NOTE — ASSESSMENT
[FreeTextEntry1] : The patient is improved and he will continue to increase his diet\par Will plan to bring patient back for up size of stent and dilation of any anastomotic strictures\par

## 2021-01-19 NOTE — HISTORY OF PRESENT ILLNESS
[Home] : at home, [unfilled] , at the time of the visit. [Medical Office: ([facility])___] : at the medical office located in  [Verbal consent obtained from patient] : the patient, [unfilled] [FreeTextEntry1] : 70 with Esophagojejunostomy and total gastrectomy with dysphagia related to functional obstruction with dominant blind limb\par S/p EUS guided Enteroenterostomy to facilitate drainage of limbs\par Weight has increased and stable\par Eating more purees currently and he feels this has made a significant difference\par No abdominal pain \par No fever or chills

## 2021-02-04 ENCOUNTER — NON-APPOINTMENT (OUTPATIENT)
Age: 71
End: 2021-02-04

## 2021-02-13 ENCOUNTER — OUTPATIENT (OUTPATIENT)
Dept: OUTPATIENT SERVICES | Facility: HOSPITAL | Age: 71
LOS: 1 days | End: 2021-02-13
Payer: MEDICARE

## 2021-02-13 ENCOUNTER — APPOINTMENT (OUTPATIENT)
Dept: MRI IMAGING | Facility: IMAGING CENTER | Age: 71
End: 2021-02-13
Payer: MEDICARE

## 2021-02-13 DIAGNOSIS — Z95.828 PRESENCE OF OTHER VASCULAR IMPLANTS AND GRAFTS: Chronic | ICD-10-CM

## 2021-02-13 DIAGNOSIS — Z93.1 GASTROSTOMY STATUS: Chronic | ICD-10-CM

## 2021-02-13 DIAGNOSIS — Z98.890 OTHER SPECIFIED POSTPROCEDURAL STATES: Chronic | ICD-10-CM

## 2021-02-13 DIAGNOSIS — C16.9 MALIGNANT NEOPLASM OF STOMACH, UNSPECIFIED: ICD-10-CM

## 2021-02-13 DIAGNOSIS — Z98.49 CATARACT EXTRACTION STATUS, UNSPECIFIED EYE: Chronic | ICD-10-CM

## 2021-02-13 PROCEDURE — A9585: CPT

## 2021-02-13 PROCEDURE — 74183 MRI ABD W/O CNTR FLWD CNTR: CPT

## 2021-02-13 PROCEDURE — G1004: CPT

## 2021-02-13 PROCEDURE — 74183 MRI ABD W/O CNTR FLWD CNTR: CPT | Mod: 26,MG

## 2021-03-01 ENCOUNTER — OUTPATIENT (OUTPATIENT)
Dept: OUTPATIENT SERVICES | Facility: HOSPITAL | Age: 71
LOS: 1 days | End: 2021-03-01
Payer: MEDICARE

## 2021-03-01 ENCOUNTER — APPOINTMENT (OUTPATIENT)
Dept: SURGICAL ONCOLOGY | Facility: CLINIC | Age: 71
End: 2021-03-01
Payer: MEDICARE

## 2021-03-01 ENCOUNTER — RESULT REVIEW (OUTPATIENT)
Age: 71
End: 2021-03-01

## 2021-03-01 VITALS
HEART RATE: 85 BPM | HEIGHT: 70 IN | BODY MASS INDEX: 21.33 KG/M2 | SYSTOLIC BLOOD PRESSURE: 173 MMHG | OXYGEN SATURATION: 99 % | DIASTOLIC BLOOD PRESSURE: 101 MMHG | RESPIRATION RATE: 16 BRPM | WEIGHT: 149 LBS

## 2021-03-01 DIAGNOSIS — Z98.49 CATARACT EXTRACTION STATUS, UNSPECIFIED EYE: Chronic | ICD-10-CM

## 2021-03-01 DIAGNOSIS — C16.9 MALIGNANT NEOPLASM OF STOMACH, UNSPECIFIED: ICD-10-CM

## 2021-03-01 DIAGNOSIS — Z95.828 PRESENCE OF OTHER VASCULAR IMPLANTS AND GRAFTS: Chronic | ICD-10-CM

## 2021-03-01 DIAGNOSIS — Z98.890 OTHER SPECIFIED POSTPROCEDURAL STATES: Chronic | ICD-10-CM

## 2021-03-01 DIAGNOSIS — Z93.1 GASTROSTOMY STATUS: Chronic | ICD-10-CM

## 2021-03-01 PROCEDURE — 49451 REPLACE DUOD/JEJ TUBE PERC: CPT | Mod: 53,GC

## 2021-03-01 PROCEDURE — 99215 OFFICE O/P EST HI 40 MIN: CPT

## 2021-03-01 NOTE — PHYSICAL EXAM
[Normal] : supple, no neck mass and thyroid not enlarged [Normal Supraclavicular Lymph Nodes] : normal supraclavicular lymph nodes [Normal Axillary Lymph Nodes] : normal axillary lymph nodes [Normal] : oriented to person, place and time, with appropriate affect [de-identified] : Abdominal incisions well-healed no evidence of infection or recurrent hernia.  J-tube site asymptomatic.

## 2021-03-01 NOTE — HISTORY OF PRESENT ILLNESS
[de-identified] : 70 year-old male returns for follow up. He was initially diagnosed with a proximal gastric cancer in December 2016.  Dr. Aguirre performed an EUS at that time, which revealed a circumferential mass in the gastric cardia, and areas away from the primary tumor where the second hypoechoic layer was markedly thickened and consistent with Linnitus Plastica.  Biopsy proved the mass to be adenocarcinoma which was staged T3N1 by EUS criteria.  A PET/CT performed at the time of initial diagnosis revealed NO evidence of metastatic disease.\par \par In January 2017 he underwent a laparoscopy.  It was noted that there was extensive tumor going to the GEJ along the lesser curvature, jail the length of the stomach.  There was no evidence of carcinomatosis.  Cytology of abdominal washings were negative.  \par \par A mediport was also placed at that time and he completed chemotherapy under the guidance of Dr. Henry on 3/31/17.\par \par He had a CT of the chest, abdomen and pelvis in April 2016.  There is interval decrease in size of the gastric mass which now measures 5.0 x 4.4 cm (vs. 5.8 x 5.7 on prior).  There is soft tissue extending posteriorly the gastric wall which is increased and measures 1.7 cm.  There is ill defined infiltrative soft tissue also extending posteriorly and inferiorly to involve the left adrenal gland and into the left retroperitoneal space.  Additionally, there is a 7 mm hypodense left hepatic lobe  lesion which is minimally increased from prior but no new hepatic lesions.  \par \par On 5/12/17 he was taken to the OR for a total gastrectomy, distal pancreatectomy/splenectomy, RNY esophagojejunostomy.  Final pathology was consistent with residual gastric adenocarcinoma, invading through the posterior gastric wall into the jordy pancreatic soft tissue.  Pancreatic parenchyma and spleen were benign.  Metastatic gastric adenocarcinoma was present in 3/51 lymph nodes ([id number]).  All resection margins were negative.  \par \par He completed radiation to the gastric bed and lymph node between 7/9/17 and 8/22/17, and he received adjuvant chemotherapy through approximately 12/21/17.\par \par He completed a CT of the chest, abdomen and pelvis on 10/2/17 which showed stable nonspecific celiac axis lymph nodes as well as a new right hepatic lobe lesion and an additional area of heterogeneous enhancement in the liver.  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  The celiac node is stable/mildly decreased in size.  There is a small ventral hernia with protrusion of small bowel.\par \par He was see on 2/5/18 at which point he expressed desire to schedule repair of his ventral hernia. \par \par In the interim, he underwent an USGB and FNA of a left supraclavicular lymph node in March 2018 which was positive for metastatic gastric cancer.  He completed a PET/CT on 4/2/18, which showed hypermetabolic left supraclavicular lymph node corresponding to biopsy proven metastasis.  Exam was otherwise negative aside from some equivocal pulmonary findings which should be followed.  Given this new finding, hernia repair was contraindicated.\par \par He is status post left neck dissection/excision of Virchows node on 4/17/18.  Final pathology was consistent with metastatic adenocarcinoma, gastric primary.\par \par He is status post laparotomy, extensive MICAH and IHR with mesh and plastic reconstruction on 6/25/18.  All pathology was benign.  \par \par He was admitted to [facility] for progressive dysphagia and inability to tolerate po with weight loss on 11/17/20.  CT of the chest, abdomen and pelvis revealed obstruction at the level of the esophagojejunostomy with proximal dilation and distention, and multiple vague hepatic hypo densities, new, and concerning for metastatic disease.   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  Patient had a PICC line placed in IR and was started on TPN.  On 11/23/20 he was taken to the OR by Dr. Chema Banda for an open feeding jejunostomy tube placement.  He was readmitted on 12/17/21.  He underwent an EGD with Axios stent placement to connect the blind limb to the efferent limb (jejunojejunostomy) with Dr. Peguero.\par \par He was referred for a bilateral lower extremity venous duplex on 12/31/20 to evaluate bilateral lower extremity edema, which was negative for DVT. \par \par He saw my partner Dr. Chema Banda on 1/4/21.   His dysphagia had improved slightly but he reported a clogged jtube. 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 \par \par He continues tube feeds, however, his j-tube became clogged last night so he did not get his feeds last night.  He can tolerate some food by mouth but his appetite is diminished.  He states that once a day he regurgitates slightly after eating but states this is significantly improved overall and he denies vomiting.  He states he is continuing to gain weight.  He states that he was informed that he may need a larger stent placed by Dr. Peguero but does not know any further details. \par \par Referring MD: Dr. Matthew South (GI), Dr. Jayda Obando (PCP)\par GI: Dr. Daniel Aguirre\par Med Onc: Dr. Mario Henry\par Dr. Erlinda Angel\par

## 2021-03-01 NOTE — CONSULT LETTER
[Dear  ___] : Dear  [unfilled], [Courtesy Letter:] : I had the pleasure of seeing your patient, [unfilled], in my office today. [Consult Closing:] : Thank you very much for allowing me to participate in the care of this patient.  If you have any questions, please do not hesitate to contact me. [Sincerely,] : Sincerely, [DrDasha  ___] : Dr. CONTRERAS [DrDasha ___] : Dr. CONTRERAS [___] : [unfilled] [FreeTextEntry2] : Matthew South MD [FreeTextEntry1] : 70 year-old male returns for follow up. He was initially diagnosed with a proximal gastric cancer in December 2016.  Dr. Aguirre performed an EUS at that time, which revealed a circumferential mass in the gastric cardia, and areas away from the primary tumor where the second hypoechoic layer was markedly thickened and consistent with Linnitus Plastica.  Biopsy proved the mass to be adenocarcinoma which was staged T3N1 by EUS criteria.  A PET/CT performed at the time of initial diagnosis revealed NO evidence of metastatic disease.\par \par In January 2017 he underwent a laparoscopy.  It was noted that there was extensive tumor going to the GEJ along the lesser curvature, detention the length of the stomach.  There was no evidence of carcinomatosis.  Cytology of abdominal washings were negative.  \par \par A mediport was also placed at that time and he completed chemotherapy under the guidance of Dr. Henry on 3/31/17.\par \par He had a CT of the chest, abdomen and pelvis in April 2016.  There is interval decrease in size of the gastric mass which now measures 5.0 x 4.4 cm (vs. 5.8 x 5.7 on prior).  There is soft tissue extending posteriorly the gastric wall which is increased and measures 1.7 cm.  There is ill defined infiltrative soft tissue also extending posteriorly and inferiorly to involve the left adrenal gland and into the left retroperitoneal space.  Additionally, there is a 7 mm hypodense left hepatic lobe  lesion which is minimally increased from prior but no new hepatic lesions.  \par \par On 5/12/17 he was taken to the OR for a total gastrectomy, distal pancreatectomy/splenectomy, RNY esophagojejunostomy.  Final pathology was consistent with residual gastric adenocarcinoma, invading through the posterior gastric wall into the jordy pancreatic soft tissue.  Pancreatic parenchyma and spleen were benign.  Metastatic gastric adenocarcinoma was present in 3/51 lymph nodes (T4aN2).  All resection margins were negative.  \par \par He completed radiation to the gastric bed and lymph node between 7/9/17 and 8/22/17, and he received adjuvant chemotherapy through approximately 12/21/17.\par \par He completed a CT of the chest, abdomen and pelvis on 10/2/17 which showed stable nonspecific celiac axis lymph nodes as well as a new right hepatic lobe lesion and an additional area of heterogeneous enhancement in the liver.  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  The celiac node is stable/mildly decreased in size.  There is a small ventral hernia with protrusion of small bowel.\par \par He was see on 2/5/18 at which point he expressed desire to schedule repair of his ventral hernia. \par \par In the interim, he underwent an USGB and FNA of a left supraclavicular lymph node in March 2018 which was positive for metastatic gastric cancer.  He completed a PET/CT on 4/2/18, which showed hypermetabolic left supraclavicular lymph node corresponding to biopsy proven metastasis.  Exam was otherwise negative aside from some equivocal pulmonary findings which should be followed.  Given this new finding, hernia repair was contraindicated.\par \par He is status post left neck dissection/excision of Virchows node on 4/17/18.  Final pathology was consistent with metastatic adenocarcinoma, gastric primary.\par \par He is status post laparotomy, extensive MICAH and IHR with mesh and plastic reconstruction on 6/25/18.  All pathology was benign.  \par \par He was admitted to [facility] for progressive dysphagia and inability to tolerate po with weight loss on 11/17/20.  CT of the chest, abdomen and pelvis revealed obstruction at the level of the esophagojejunostomy with proximal dilation and distention, and multiple vague hepatic hypo densities, new, and concerning for metastatic disease.   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  Patient had a PICC line placed in IR and was started on TPN.  On 11/23/20 he was taken to the OR by Dr. Chema Banda for an open feeding jejunostomy tube placement.  He was readmitted on 12/17/21.  He underwent an EGD with Axios stent placement to connect the blind limb to the efferent limb (jejunojejunostomy) with Dr. Peguero.\par \par He was referred for a bilateral lower extremity venous duplex on 12/31/20 to evaluate bilateral lower extremity edema, which was negative for DVT. \par \par He saw my partner Dr. Chema Banda on 1/4/21.   His dysphagia had improved slightly but he reported a clogged jtube. 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 \par \par He continues nocturnal tube feeds, however, his j-tube became clogged last night so he did not get his feeds last night.  He can tolerate some food by mouth but his appetite is diminished.  He states that once a day he regurgitates slightly after eating but states this is significantly improved overall and he denies vomiting.  He states he is continuing to gain weight.  He states that he was informed that he may need a larger stent placed by Dr. Peguero but does not know any further details. \par \par \par A&P:\par -Gastric cancer, s/p neoadjuvant chemotherapy, total gastrectomy, distal pancreatectomy/splenectomy, RNY esophagojejunostomy on 5/12/17 (T4aN2) with adjuvant RT and chemotherapy.\par - Patient developed a left supraclavicular lexus recurrence in 2018, s/p left neck dissection/excision of Virchows node on 4/17/18.\par - S/p IHR with mesh 6/25/18 \par -11/2020 patient developed dysphagia related to functional obstruction with dominant blind limb and is s/p EUS guided enteroenterostomy to facilitate drainage of limbs with Dr. Peguero.  He was taken to the OR for an open jejunostomy placement with Dr. Gonzalez on 11/23/20 for enteral nutrition.\par -S/p J-tube exchange in IR on 1/6/21\par -Recent CT with concern for liver mets, however, MRI notable for probable hemangioma in the dome and no clear evidence of hepatic mets, as well as a new nonocclusive thrombus in the portal vein.\par -Clogged j-tube, will refer to IR for tube check\par - Patient improving clinically overall, however, still reliant on tube feeds to maintain adequate nutrition\par -Follow up with medical oncology\par - Follow up with Dr. Peguero\par - I have reviewed the pertinent imaging, blood work and pathology.\par -arranging for IR tube change \par \par Referring MD: Dr. Matthew South (GI), Dr. Jayda Obando (PCP)\par GI: Dr. Daniel Aguirre\par Med Onc: Dr. Mario Henry\par Dr. Erlinda Angel\par  [FreeTextEntry3] : Saravanan Cabello MD, FACS, FASCRS\par , Department of Surgery\par Director of the [facility] [place]\par , Minimally Invasive/Robotic Cancer Surgery, Central & Eastern Divisions\par Division of Surgical Oncology \par \par enclose pathology

## 2021-03-01 NOTE — ASSESSMENT
[FreeTextEntry1] : 70 year-old male returns for follow up. He was initially diagnosed with a proximal gastric cancer in December 2016.  Dr. Aguirre performed an EUS at that time, which revealed a circumferential mass in the gastric cardia, and areas away from the primary tumor where the second hypoechoic layer was markedly thickened and consistent with Linnitus Plastica.  Biopsy proved the mass to be adenocarcinoma which was staged T3N1 by EUS criteria.  A PET/CT performed at the time of initial diagnosis revealed NO evidence of metastatic disease.\par \par In January 2017 he underwent a laparoscopy.  It was noted that there was extensive tumor going to the GEJ along the lesser curvature, custodial the length of the stomach.  There was no evidence of carcinomatosis.  Cytology of abdominal washings were negative.  \par \par A mediport was also placed at that time and he completed chemotherapy under the guidance of Dr. Henry on 3/31/17.\par \par He had a CT of the chest, abdomen and pelvis in April 2016.  There is interval decrease in size of the gastric mass which now measures 5.0 x 4.4 cm (vs. 5.8 x 5.7 on prior).  There is soft tissue extending posteriorly the gastric wall which is increased and measures 1.7 cm.  There is ill defined infiltrative soft tissue also extending posteriorly and inferiorly to involve the left adrenal gland and into the left retroperitoneal space.  Additionally, there is a 7 mm hypodense left hepatic lobe  lesion which is minimally increased from prior but no new hepatic lesions.  \par \par On 5/12/17 he was taken to the OR for a total gastrectomy, distal pancreatectomy/splenectomy, RNY esophagojejunostomy.  Final pathology was consistent with residual gastric adenocarcinoma, invading through the posterior gastric wall into the jordy pancreatic soft tissue.  Pancreatic parenchyma and spleen were benign.  Metastatic gastric adenocarcinoma was present in 3/51 lymph nodes (T4aN2).  All resection margins were negative.  \par \par He completed radiation to the gastric bed and lymph node between 7/9/17 and 8/22/17, and he received adjuvant chemotherapy through approximately 12/21/17.\par \par He completed a CT of the chest, abdomen and pelvis on 10/2/17 which showed stable nonspecific celiac axis lymph nodes as well as a new right hepatic lobe lesion and an additional area of heterogeneous enhancement in the liver.  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  The celiac node is stable/mildly decreased in size.  There is a small ventral hernia with protrusion of small bowel.\par \par He was see on 2/5/18 at which point he expressed desire to schedule repair of his ventral hernia. \par \par In the interim, he underwent an USGB and FNA of a left supraclavicular lymph node in March 2018 which was positive for metastatic gastric cancer.  He completed a PET/CT on 4/2/18, which showed hypermetabolic left supraclavicular lymph node corresponding to biopsy proven metastasis.  Exam was otherwise negative aside from some equivocal pulmonary findings which should be followed.  Given this new finding, hernia repair was contraindicated.\par \par He is status post left neck dissection/excision of Virchows node on 4/17/18.  Final pathology was consistent with metastatic adenocarcinoma, gastric primary.\par \par He is status post laparotomy, extensive MICAH and IHR with mesh and plastic reconstruction on 6/25/18.  All pathology was benign.  \par \par He was admitted to [facility] for progressive dysphagia and inability to tolerate po with weight loss on 11/17/20.  CT of the chest, abdomen and pelvis revealed obstruction at the level of the esophagojejunostomy with proximal dilation and distention, and multiple vague hepatic hypo densities, new, and concerning for metastatic disease.   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  Patient had a PICC line placed in IR and was started on TPN.  On 11/23/20 he was taken to the OR by Dr. Chema Banda for an open feeding jejunostomy tube placement.  He was readmitted on 12/17/21.  He underwent an EGD with Axios stent placement to connect the blind limb to the efferent limb (jejunojejunostomy) with Dr. Peguero.\par \par He was referred for a bilateral lower extremity venous duplex on 12/31/20 to evaluate bilateral lower extremity edema, which was negative for DVT. \par \par He saw my partner Dr. Chema Banda on 1/4/21.   His dysphagia had improved slightly but he reported a clogged jtube. 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 \par \par He continues nocturnal tube feeds, however, his j-tube became clogged last night so he did not get his feeds last night.  He can tolerate some food by mouth but his appetite is diminished.  He states that once a day he regurgitates slightly after eating but states this is significantly improved overall and he denies vomiting.  He states he is continuing to gain weight.  He states that he was informed that he may need a larger stent placed by Dr. Peguero but does not know any further details. \par \par \par A&P:\par -Gastric cancer, s/p neoadjuvant chemotherapy, total gastrectomy, distal pancreatectomy/splenectomy, RNY esophagojejunostomy on 5/12/17 (T4aN2) with adjuvant RT and chemotherapy.\par - Patient developed a left supraclavicular lexus recurrence in 2018, s/p left neck dissection/excision of Virchows node on 4/17/18.\par - S/p IHR with mesh 6/25/18 \par -11/2020 patient developed dysphagia related to functional obstruction with dominant blind limb and is s/p EUS guided enteroenterostomy to facilitate drainage of limbs with Dr. Peguero.  He was taken to the OR for an open jejunostomy placement with Dr. Gonzalez on 11/23/20 for enteral nutrition.\par -S/p J-tube exchange in IR on 1/6/21\par -Recent CT with concern for liver mets, however, MRI notable for probable hemangioma in the dome and no clear evidence of hepatic mets, as well as a new nonocclusive thrombus in the portal vein.\par -Clogged j-tube, will refer to IR for tube check\par - Patient improving clinically overall, however, still reliant on tube feeds to maintain adequate nutrition\par -Follow up with medical oncology\par - Follow up with Dr. Peguero\par - I have reviewed the pertinent imaging, blood work and pathology. \par -arranging for IR tube change \par

## 2021-03-02 ENCOUNTER — LABORATORY RESULT (OUTPATIENT)
Age: 71
End: 2021-03-02

## 2021-03-02 ENCOUNTER — APPOINTMENT (OUTPATIENT)
Dept: SURGICAL ONCOLOGY | Facility: CLINIC | Age: 71
End: 2021-03-02
Payer: MEDICARE

## 2021-03-02 ENCOUNTER — RESULT REVIEW (OUTPATIENT)
Age: 71
End: 2021-03-02

## 2021-03-02 ENCOUNTER — APPOINTMENT (OUTPATIENT)
Dept: INFUSION THERAPY | Facility: HOSPITAL | Age: 71
End: 2021-03-02

## 2021-03-02 ENCOUNTER — OUTPATIENT (OUTPATIENT)
Dept: OUTPATIENT SERVICES | Facility: HOSPITAL | Age: 71
LOS: 1 days | Discharge: HOME | End: 2021-03-02

## 2021-03-02 DIAGNOSIS — Z98.890 OTHER SPECIFIED POSTPROCEDURAL STATES: Chronic | ICD-10-CM

## 2021-03-02 DIAGNOSIS — C16.9 MALIGNANT NEOPLASM OF STOMACH, UNSPECIFIED: ICD-10-CM

## 2021-03-02 DIAGNOSIS — Z93.1 GASTROSTOMY STATUS: Chronic | ICD-10-CM

## 2021-03-02 DIAGNOSIS — Z98.49 CATARACT EXTRACTION STATUS, UNSPECIFIED EYE: Chronic | ICD-10-CM

## 2021-03-02 DIAGNOSIS — Z95.828 PRESENCE OF OTHER VASCULAR IMPLANTS AND GRAFTS: Chronic | ICD-10-CM

## 2021-03-02 LAB
BASOPHILS # BLD AUTO: 0.09 K/UL — SIGNIFICANT CHANGE UP (ref 0–0.2)
BASOPHILS NFR BLD AUTO: 1.3 % — SIGNIFICANT CHANGE UP (ref 0–2)
EOSINOPHIL # BLD AUTO: 0.35 K/UL — SIGNIFICANT CHANGE UP (ref 0–0.5)
EOSINOPHIL NFR BLD AUTO: 5.2 % — SIGNIFICANT CHANGE UP (ref 0–6)
HCT VFR BLD CALC: 37.3 % — LOW (ref 39–50)
HGB BLD-MCNC: 12.4 G/DL — LOW (ref 13–17)
IMM GRANULOCYTES NFR BLD AUTO: 0.4 % — SIGNIFICANT CHANGE UP (ref 0–1.5)
LYMPHOCYTES # BLD AUTO: 1.45 K/UL — SIGNIFICANT CHANGE UP (ref 1–3.3)
LYMPHOCYTES # BLD AUTO: 21.6 % — SIGNIFICANT CHANGE UP (ref 13–44)
MCHC RBC-ENTMCNC: 32.4 PG — SIGNIFICANT CHANGE UP (ref 27–34)
MCHC RBC-ENTMCNC: 33.2 G/DL — SIGNIFICANT CHANGE UP (ref 32–36)
MCV RBC AUTO: 97.4 FL — SIGNIFICANT CHANGE UP (ref 80–100)
MONOCYTES # BLD AUTO: 0.68 K/UL — SIGNIFICANT CHANGE UP (ref 0–0.9)
MONOCYTES NFR BLD AUTO: 10.1 % — SIGNIFICANT CHANGE UP (ref 2–14)
NEUTROPHILS # BLD AUTO: 4.12 K/UL — SIGNIFICANT CHANGE UP (ref 1.8–7.4)
NEUTROPHILS NFR BLD AUTO: 61.4 % — SIGNIFICANT CHANGE UP (ref 43–77)
NRBC # BLD: 0 /100 WBCS — SIGNIFICANT CHANGE UP (ref 0–0)
PLATELET # BLD AUTO: 261 K/UL — SIGNIFICANT CHANGE UP (ref 150–400)
RBC # BLD: 3.83 M/UL — LOW (ref 4.2–5.8)
RBC # FLD: 13.8 % — SIGNIFICANT CHANGE UP (ref 10.3–14.5)
WBC # BLD: 6.72 K/UL — SIGNIFICANT CHANGE UP (ref 3.8–10.5)
WBC # FLD AUTO: 6.72 K/UL — SIGNIFICANT CHANGE UP (ref 3.8–10.5)

## 2021-03-02 PROCEDURE — 99213 OFFICE O/P EST LOW 20 MIN: CPT

## 2021-03-02 NOTE — ASSESSMENT
[FreeTextEntry1] : 1) Continue tube feeds and flush tube prior to and after starting feeds\par 2) Patient to see dermatology regarding indeterminate skin lesion\par 3) Continued follow up with medical oncology and GI

## 2021-03-02 NOTE — HISTORY OF PRESENT ILLNESS
[de-identified] : 70 year-old male with history of gastric cancer.  He was admitted to [facility] for progressive dysphagia and inability to tolerate po with weight loss on 11/17/20. CT of the chest, abdomen and pelvis revealed obstruction at the level of the esophagojejunostomy with proximal dilation and distention, and multiple vague hepatic hypo densities, new, and concerning for metastatic disease. 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 Patient had a PICC line placed in IR and was started on TPN. On 11/23/20 he was taken to the OR by Dr. Chema Banda for an open feeding jejunostomy tube placement. He was readmitted on 12/17/21. He underwent an EGD with Axios stent placement to connect the blind limb to the efferent limb (jejunojejunostomy) with Dr. Peguero.\par \par He was referred for a bilateral lower extremity venous duplex on 12/31/20 to evaluate bilateral lower extremity edema, which was negative for DVT. \par \par He saw my partner Dr. Chema Banda on 1/4/21. His dysphagia had improved slightly but he reported a clogged jtube. He was referred to IR for a j tube exchange on 1/6/21.\par \par He was seen on 3/1/21 with a clogged J tube.  He is status post tube exchange in IR later that day.  He returns today stating that he is having more trouble with the tube.  He also notes a red skin lesion on his abdomen to the left of the midline which has been present for a few weeks but has increased in size.  There is no associated pain or pruritus.

## 2021-03-02 NOTE — PHYSICAL EXAM
[Normal] : supple, no neck mass and thyroid not enlarged [Normal Neck Lymph Nodes] : normal neck lymph nodes  [Normal Supraclavicular Lymph Nodes] : normal supraclavicular lymph nodes [Normal] : oriented to person, place and time, with appropriate affect [de-identified] : The cap from the tube feed administration set is inside of the j-tube.  It was easily removed.  The tube was flushed and is patent.  There is an approximately 3 cm well circumscribed erythematous lesion just to the left of the mid abdomen.  Slightly raised, no drainage.

## 2021-03-09 DIAGNOSIS — K94.19 OTHER COMPLICATIONS OF ENTEROSTOMY: ICD-10-CM

## 2021-03-09 DIAGNOSIS — C16.9 MALIGNANT NEOPLASM OF STOMACH, UNSPECIFIED: ICD-10-CM

## 2021-03-12 ENCOUNTER — RESULT REVIEW (OUTPATIENT)
Age: 71
End: 2021-03-12

## 2021-03-12 ENCOUNTER — LABORATORY RESULT (OUTPATIENT)
Age: 71
End: 2021-03-12

## 2021-03-12 ENCOUNTER — APPOINTMENT (OUTPATIENT)
Dept: HEMATOLOGY ONCOLOGY | Facility: CLINIC | Age: 71
End: 2021-03-12
Payer: MEDICARE

## 2021-03-12 ENCOUNTER — APPOINTMENT (OUTPATIENT)
Dept: HEMATOLOGY ONCOLOGY | Facility: CLINIC | Age: 71
End: 2021-03-12

## 2021-03-12 VITALS
RESPIRATION RATE: 16 BRPM | HEART RATE: 78 BPM | HEIGHT: 70.87 IN | TEMPERATURE: 98.9 F | OXYGEN SATURATION: 95 % | DIASTOLIC BLOOD PRESSURE: 93 MMHG | WEIGHT: 150.99 LBS | BODY MASS INDEX: 21.14 KG/M2 | SYSTOLIC BLOOD PRESSURE: 154 MMHG

## 2021-03-12 LAB
BASOPHILS # BLD AUTO: 0.09 K/UL — SIGNIFICANT CHANGE UP (ref 0–0.2)
BASOPHILS NFR BLD AUTO: 1.5 % — SIGNIFICANT CHANGE UP (ref 0–2)
EOSINOPHIL # BLD AUTO: 0.22 K/UL — SIGNIFICANT CHANGE UP (ref 0–0.5)
EOSINOPHIL NFR BLD AUTO: 3.7 % — SIGNIFICANT CHANGE UP (ref 0–6)
HCT VFR BLD CALC: 39.1 % — SIGNIFICANT CHANGE UP (ref 39–50)
HGB BLD-MCNC: 13.1 G/DL — SIGNIFICANT CHANGE UP (ref 13–17)
IMM GRANULOCYTES NFR BLD AUTO: 0.3 % — SIGNIFICANT CHANGE UP (ref 0–1.5)
LYMPHOCYTES # BLD AUTO: 1.26 K/UL — SIGNIFICANT CHANGE UP (ref 1–3.3)
LYMPHOCYTES # BLD AUTO: 21 % — SIGNIFICANT CHANGE UP (ref 13–44)
MCHC RBC-ENTMCNC: 32.8 PG — SIGNIFICANT CHANGE UP (ref 27–34)
MCHC RBC-ENTMCNC: 33.5 G/DL — SIGNIFICANT CHANGE UP (ref 32–36)
MCV RBC AUTO: 97.8 FL — SIGNIFICANT CHANGE UP (ref 80–100)
MONOCYTES # BLD AUTO: 0.6 K/UL — SIGNIFICANT CHANGE UP (ref 0–0.9)
MONOCYTES NFR BLD AUTO: 10 % — SIGNIFICANT CHANGE UP (ref 2–14)
NEUTROPHILS # BLD AUTO: 3.8 K/UL — SIGNIFICANT CHANGE UP (ref 1.8–7.4)
NEUTROPHILS NFR BLD AUTO: 63.5 % — SIGNIFICANT CHANGE UP (ref 43–77)
NRBC # BLD: 0 /100 WBCS — SIGNIFICANT CHANGE UP (ref 0–0)
PLATELET # BLD AUTO: 302 K/UL — SIGNIFICANT CHANGE UP (ref 150–400)
RBC # BLD: 4 M/UL — LOW (ref 4.2–5.8)
RBC # FLD: 13.8 % — SIGNIFICANT CHANGE UP (ref 10.3–14.5)
WBC # BLD: 5.99 K/UL — SIGNIFICANT CHANGE UP (ref 3.8–10.5)
WBC # FLD AUTO: 5.99 K/UL — SIGNIFICANT CHANGE UP (ref 3.8–10.5)

## 2021-03-12 PROCEDURE — 99214 OFFICE O/P EST MOD 30 MIN: CPT

## 2021-03-14 LAB
APTT BLD: 30.5 SEC
INR PPP: 1.22 RATIO
LUPUS ANTICOAGULANT CASCADE REFLEX: NORMAL
PT BLD: 14.3 SEC

## 2021-03-15 NOTE — REVIEW OF SYSTEMS
[Negative] : Allergic/Immunologic [FreeTextEntry6] : has inc phlegm and cough [de-identified] : has neuropathy in fingers and toes.

## 2021-03-15 NOTE — ASSESSMENT
[FreeTextEntry1] : The patient continues to do well and has no symptoms indicating recurrent disease.  His recent MRI test of the abdomen revealed a new portal vein thrombosis and as result the patient has been on Eliquis.  He had previous treatment and history of portal vein thrombosis in the past which had resolved.  The patient will undergo hypercoagulable evaluation to decide how long he should remain on treatment.  The patient will continue medical, GI follow-up and testing and also will recommend vascular evaluation because of his history of a abdominal aneurysm measuring 4.5 cm noted on his MRI of the abdomen.  He will return in 3 months or sooner as needed. [Curative] : Goals of care discussed with patient: Curative [Palliative Care Plan] : not applicable at this time

## 2021-03-15 NOTE — HISTORY OF PRESENT ILLNESS
[de-identified] : see dictated letter [de-identified] : Since the last visit the pt had repeat scan and now has new recurrent portal vein thrombosis.

## 2021-03-17 LAB
CARDIOLIPIN AB SER IA-ACNC: POSITIVE
CONFIRM: 41.6 SEC
DRVVT 1H NP PPP: 38.6 SEC
DRVVT IMM 1:2 NP PPP: NORMAL
DRVVT SCREEN TO CONFIRM RATIO: 0.69 RATIO
FIBRINOGEN AG PPP IA-MCNC: 322 MG/DL
PROT C PPP CHRO-ACNC: 83 %
PROT S AG ACT/NOR PPP IA: 75 %
SCREEN DRVVT: 38.3 SEC

## 2021-03-23 ENCOUNTER — APPOINTMENT (OUTPATIENT)
Dept: VASCULAR SURGERY | Facility: CLINIC | Age: 71
End: 2021-03-23
Payer: MEDICARE

## 2021-03-23 VITALS
SYSTOLIC BLOOD PRESSURE: 143 MMHG | HEIGHT: 71 IN | BODY MASS INDEX: 21.14 KG/M2 | HEART RATE: 84 BPM | DIASTOLIC BLOOD PRESSURE: 84 MMHG | WEIGHT: 151 LBS

## 2021-03-23 VITALS — TEMPERATURE: 97.7 F

## 2021-03-23 PROCEDURE — 99203 OFFICE O/P NEW LOW 30 MIN: CPT

## 2021-03-23 RX ORDER — ENOXAPARIN SODIUM 100 MG/ML
80 INJECTION SUBCUTANEOUS
Qty: 60 | Refills: 3 | Status: COMPLETED | COMMUNITY
Start: 2018-09-05 | End: 2021-03-23

## 2021-03-23 RX ORDER — CAPECITABINE 500 MG/1
500 TABLET ORAL
Qty: 84 | Refills: 3 | Status: COMPLETED | COMMUNITY
Start: 2017-10-20 | End: 2021-03-23

## 2021-03-23 RX ORDER — CAPECITABINE 500 MG/1
500 TABLET ORAL
Qty: 84 | Refills: 3 | Status: COMPLETED | COMMUNITY
Start: 2017-08-03 | End: 2021-03-23

## 2021-03-23 RX ORDER — APIXABAN 5 MG/1
5 TABLET, FILM COATED ORAL TWICE DAILY
Qty: 180 | Refills: 1 | Status: DISCONTINUED | COMMUNITY
Start: 2018-09-12 | End: 2021-03-23

## 2021-03-23 NOTE — CONSULT LETTER
[Dear  ___] : Dear  [unfilled], [Consult Letter:] : I had the pleasure of evaluating your patient, [unfilled]. [Please see my note below.] : Please see my note below. [Consult Closing:] : Thank you very much for allowing me to participate in the care of this patient.  If you have any questions, please do not hesitate to contact me. [Sincerely,] : Sincerely, [FreeTextEntry3] : Daniel Luna M.D., HAILEY., R.P.V.I.\par \par  [facility]\par  of  Surgery\par Assistant Professor of Radiology\par Vascular Surgery at [place]

## 2021-03-23 NOTE — PHYSICAL EXAM
[JVD] : no jugular venous distention  [Normal Breath Sounds] : Normal breath sounds [Normal Rate and Rhythm] : normal rate and rhythm [2+] : left 2+ [Ankle Swelling (On Exam)] : not present [Varicose Veins Of Lower Extremities] : not present [] : not present [Abdomen Tenderness] : ~T ~M No abdominal tenderness [No Rash or Lesion] : No rash or lesion [Skin Ulcer] : no ulcer [Alert] : alert [Calm] : calm [de-identified] : Appears well [de-identified] : J-tube in place

## 2021-03-23 NOTE — HISTORY OF PRESENT ILLNESS
[FreeTextEntry1] : 70-year-old gentleman with an extensive past medical history.  His history began several years ago when he was diagnosed with gastric adenocarcinoma.  Patient underwent a esophagojejunostomy with a splenectomy and distal pancreatectomy.  Since that time he has partially maintained nutrition with a G-tube.  He recently was found to have a recurrent small portal vein thrombus.  He was started on Eliquis twice daily.  Patient does not have any unusual epigastric or back pain.  It should also be noted that the patient has a history of a stable 4.5 cm abdominal aortic aneurysm.

## 2021-03-23 NOTE — ASSESSMENT
[FreeTextEntry1] : At this time I agree with Eliquis twice daily.  Most likely this will need to be continued indefinitely.  If the follow-up MRI shows resolution of the thrombus he may benefit in changing the Eliquis to 2.5 mg twice daily.  In addition he should have the aneurysm followed on a 6-month basis.  He can follow-up with me as necessary.

## 2021-03-28 LAB
DNA PLOIDY SPEC FC-IMP: NORMAL
PTR INTERP: NORMAL

## 2021-04-16 ENCOUNTER — OUTPATIENT (OUTPATIENT)
Dept: OUTPATIENT SERVICES | Facility: HOSPITAL | Age: 71
LOS: 1 days | Discharge: HOME | End: 2021-04-16

## 2021-04-16 DIAGNOSIS — Z98.890 OTHER SPECIFIED POSTPROCEDURAL STATES: Chronic | ICD-10-CM

## 2021-04-16 DIAGNOSIS — Z98.49 CATARACT EXTRACTION STATUS, UNSPECIFIED EYE: Chronic | ICD-10-CM

## 2021-04-16 DIAGNOSIS — C16.9 MALIGNANT NEOPLASM OF STOMACH, UNSPECIFIED: ICD-10-CM

## 2021-04-16 DIAGNOSIS — Z93.1 GASTROSTOMY STATUS: Chronic | ICD-10-CM

## 2021-04-16 DIAGNOSIS — Z95.828 PRESENCE OF OTHER VASCULAR IMPLANTS AND GRAFTS: Chronic | ICD-10-CM

## 2021-04-19 ENCOUNTER — LABORATORY RESULT (OUTPATIENT)
Age: 71
End: 2021-04-19

## 2021-04-19 ENCOUNTER — RESULT REVIEW (OUTPATIENT)
Age: 71
End: 2021-04-19

## 2021-04-19 ENCOUNTER — APPOINTMENT (OUTPATIENT)
Dept: INFUSION THERAPY | Facility: HOSPITAL | Age: 71
End: 2021-04-19

## 2021-04-19 LAB
BASOPHILS # BLD AUTO: 0.07 K/UL — SIGNIFICANT CHANGE UP (ref 0–0.2)
BASOPHILS NFR BLD AUTO: 1.1 % — SIGNIFICANT CHANGE UP (ref 0–2)
EOSINOPHIL # BLD AUTO: 0.22 K/UL — SIGNIFICANT CHANGE UP (ref 0–0.5)
EOSINOPHIL NFR BLD AUTO: 3.5 % — SIGNIFICANT CHANGE UP (ref 0–6)
HCT VFR BLD CALC: 37.9 % — LOW (ref 39–50)
HGB BLD-MCNC: 12.7 G/DL — LOW (ref 13–17)
IMM GRANULOCYTES NFR BLD AUTO: 0.3 % — SIGNIFICANT CHANGE UP (ref 0–1.5)
LYMPHOCYTES # BLD AUTO: 1.39 K/UL — SIGNIFICANT CHANGE UP (ref 1–3.3)
LYMPHOCYTES # BLD AUTO: 22.1 % — SIGNIFICANT CHANGE UP (ref 13–44)
MCHC RBC-ENTMCNC: 31.7 PG — SIGNIFICANT CHANGE UP (ref 27–34)
MCHC RBC-ENTMCNC: 33.5 G/DL — SIGNIFICANT CHANGE UP (ref 32–36)
MCV RBC AUTO: 94.5 FL — SIGNIFICANT CHANGE UP (ref 80–100)
MONOCYTES # BLD AUTO: 0.68 K/UL — SIGNIFICANT CHANGE UP (ref 0–0.9)
MONOCYTES NFR BLD AUTO: 10.8 % — SIGNIFICANT CHANGE UP (ref 2–14)
NEUTROPHILS # BLD AUTO: 3.92 K/UL — SIGNIFICANT CHANGE UP (ref 1.8–7.4)
NEUTROPHILS NFR BLD AUTO: 62.2 % — SIGNIFICANT CHANGE UP (ref 43–77)
NRBC # BLD: 0 /100 WBCS — SIGNIFICANT CHANGE UP (ref 0–0)
PLATELET # BLD AUTO: 289 K/UL — SIGNIFICANT CHANGE UP (ref 150–400)
RBC # BLD: 4.01 M/UL — LOW (ref 4.2–5.8)
RBC # FLD: 14.5 % — SIGNIFICANT CHANGE UP (ref 10.3–14.5)
WBC # BLD: 6.3 K/UL — SIGNIFICANT CHANGE UP (ref 3.8–10.5)
WBC # FLD AUTO: 6.3 K/UL — SIGNIFICANT CHANGE UP (ref 3.8–10.5)

## 2021-04-21 ENCOUNTER — OUTPATIENT (OUTPATIENT)
Dept: OUTPATIENT SERVICES | Facility: HOSPITAL | Age: 71
LOS: 1 days | End: 2021-04-21
Payer: MEDICARE

## 2021-04-21 DIAGNOSIS — Z98.890 OTHER SPECIFIED POSTPROCEDURAL STATES: Chronic | ICD-10-CM

## 2021-04-21 DIAGNOSIS — Z98.49 CATARACT EXTRACTION STATUS, UNSPECIFIED EYE: Chronic | ICD-10-CM

## 2021-04-21 DIAGNOSIS — Z11.52 ENCOUNTER FOR SCREENING FOR COVID-19: ICD-10-CM

## 2021-04-21 DIAGNOSIS — Z93.1 GASTROSTOMY STATUS: Chronic | ICD-10-CM

## 2021-04-21 DIAGNOSIS — Z95.828 PRESENCE OF OTHER VASCULAR IMPLANTS AND GRAFTS: Chronic | ICD-10-CM

## 2021-04-21 LAB — SARS-COV-2 RNA SPEC QL NAA+PROBE: SIGNIFICANT CHANGE UP

## 2021-04-21 PROCEDURE — U0003: CPT

## 2021-04-21 PROCEDURE — U0005: CPT

## 2021-04-21 PROCEDURE — C9803: CPT

## 2021-04-22 ENCOUNTER — RESULT REVIEW (OUTPATIENT)
Age: 71
End: 2021-04-22

## 2021-04-22 ENCOUNTER — OUTPATIENT (OUTPATIENT)
Dept: OUTPATIENT SERVICES | Facility: HOSPITAL | Age: 71
LOS: 1 days | End: 2021-04-22
Payer: MEDICARE

## 2021-04-22 DIAGNOSIS — Z95.828 PRESENCE OF OTHER VASCULAR IMPLANTS AND GRAFTS: Chronic | ICD-10-CM

## 2021-04-22 DIAGNOSIS — Z98.890 OTHER SPECIFIED POSTPROCEDURAL STATES: Chronic | ICD-10-CM

## 2021-04-22 DIAGNOSIS — Z93.1 GASTROSTOMY STATUS: Chronic | ICD-10-CM

## 2021-04-22 DIAGNOSIS — Z98.49 CATARACT EXTRACTION STATUS, UNSPECIFIED EYE: Chronic | ICD-10-CM

## 2021-04-22 DIAGNOSIS — C16.9 MALIGNANT NEOPLASM OF STOMACH, UNSPECIFIED: ICD-10-CM

## 2021-04-22 PROCEDURE — 49451 REPLACE DUOD/JEJ TUBE PERC: CPT | Mod: 53,GC

## 2021-04-27 DIAGNOSIS — K94.19 OTHER COMPLICATIONS OF ENTEROSTOMY: ICD-10-CM

## 2021-04-28 ENCOUNTER — OUTPATIENT (OUTPATIENT)
Dept: OUTPATIENT SERVICES | Facility: HOSPITAL | Age: 71
LOS: 1 days | End: 2021-04-28

## 2021-04-28 VITALS
TEMPERATURE: 99 F | DIASTOLIC BLOOD PRESSURE: 84 MMHG | RESPIRATION RATE: 18 BRPM | OXYGEN SATURATION: 98 % | SYSTOLIC BLOOD PRESSURE: 150 MMHG | HEIGHT: 70 IN | HEART RATE: 78 BPM | WEIGHT: 154.1 LBS

## 2021-04-28 DIAGNOSIS — Z98.890 OTHER SPECIFIED POSTPROCEDURAL STATES: Chronic | ICD-10-CM

## 2021-04-28 DIAGNOSIS — Z93.1 GASTROSTOMY STATUS: Chronic | ICD-10-CM

## 2021-04-28 DIAGNOSIS — R13.10 DYSPHAGIA, UNSPECIFIED: ICD-10-CM

## 2021-04-28 DIAGNOSIS — I81 PORTAL VEIN THROMBOSIS: ICD-10-CM

## 2021-04-28 DIAGNOSIS — D63.0 ANEMIA IN NEOPLASTIC DISEASE: ICD-10-CM

## 2021-04-28 DIAGNOSIS — Z95.828 PRESENCE OF OTHER VASCULAR IMPLANTS AND GRAFTS: Chronic | ICD-10-CM

## 2021-04-28 DIAGNOSIS — E11.9 TYPE 2 DIABETES MELLITUS WITHOUT COMPLICATIONS: ICD-10-CM

## 2021-04-28 DIAGNOSIS — Z98.49 CATARACT EXTRACTION STATUS, UNSPECIFIED EYE: Chronic | ICD-10-CM

## 2021-04-28 DIAGNOSIS — C16.9 MALIGNANT NEOPLASM OF STOMACH, UNSPECIFIED: ICD-10-CM

## 2021-04-28 LAB
A1C WITH ESTIMATED AVERAGE GLUCOSE RESULT: 7.6 % — HIGH (ref 4–5.6)
ESTIMATED AVERAGE GLUCOSE: 171 MG/DL — HIGH (ref 68–114)

## 2021-04-28 RX ORDER — OMEPRAZOLE 10 MG/1
1 CAPSULE, DELAYED RELEASE ORAL
Qty: 0 | Refills: 0 | DISCHARGE

## 2021-04-28 RX ORDER — ESCITALOPRAM OXALATE 10 MG/1
1 TABLET, FILM COATED ORAL
Qty: 0 | Refills: 0 | DISCHARGE

## 2021-04-28 RX ORDER — SODIUM CHLORIDE 9 MG/ML
1000 INJECTION, SOLUTION INTRAVENOUS
Refills: 0 | Status: DISCONTINUED | OUTPATIENT
Start: 2021-05-10 | End: 2021-05-25

## 2021-04-28 NOTE — H&P PST ADULT - NEGATIVE CARDIOVASCULAR SYMPTOMS
no chest pain/no palpitations/no claudication no chest pain/no palpitations/no peripheral edema/no claudication

## 2021-04-28 NOTE — H&P PST ADULT - NSICDXPROBLEM_GEN_ALL_CORE_FT
PROBLEM DIAGNOSES  Problem: Stomach cancer  Assessment and Plan: Scheduled for endoscopy stent exchange anesthesia with Dr. Peguero on May 10th, 2021.  Verbal and written pre-op instructions provided to patient. Patient verbalized understanding and will call surgeons office for revised instructions if surgery is rescheduled.   Pepcid for GI prophylaxis provided.   Patient aware of need for COVID testing prior to  procedure at a [facility]  and advised to coordinate with surgeon to get tested within 72 hours of procedure.   Patient will obtain medical clearance per PST NP for advanced age & DM2.     Problem: Diabetes mellitus  Assessment and Plan: Pt. instructed to hold Metformin the night before and morning of procedure. Accucheck DOS. OR booking notified of DM2     Problem: Anemia in neoplastic disease  Assessment and Plan: Pt. instructed to continue medications as prescribed.     Problem: Portal vein thrombosis  Assessment and Plan: Pt. on Eliquis- last dose per Oncologist.

## 2021-04-28 NOTE — H&P PST ADULT - HISTORY OF PRESENT ILLNESS
69 yo male with complaints of inability to tolerate PO and weight loss for 2-3 months.  Patient was evaluated at [facility] in November 2020 and was noted to have aperistalsis of esophagus s/p open jejunostomy with J-tube.  Patient presents to PST for preop evaluation for Endoscopy stent exchange anesthesia.  71 yo male with history of stomach cancer s/p chemotherapy ( last 2 months ago)  esophagojejunostomy and total gastrectomy 2017. Pt complains of inability to tolerate PO and weight loss for 2-3 months.   Patient presents to PST for preop evaluation for Endoscopy stent exchange anesthesia.

## 2021-04-28 NOTE — H&P PST ADULT - NSICDXPASTSURGICALHX_GEN_ALL_CORE_FT
PAST SURGICAL HISTORY:  Gastrostomy tube in place open jejunostomy with J-tube November 2020    H/O endoscopy & stent placement 12/2020    H/O umbilical hernia repair     History of gastric surgery 2017    Port-a-cath in place right chest wall    S/P cataract surgery

## 2021-04-28 NOTE — H&P PST ADULT - NSICDXPASTMEDICALHX_GEN_ALL_CORE_FT
PAST MEDICAL HISTORY:  Constipation     Dysphagia, unspecified     Essential hypertension was on losartan, now diet control    Gastric mass ulcerated mass in gastric cardia with small perigastric nodes on endoscopy.    Hypothyroidism     Iron deficiency anemia, unspecified iron deficiency anemia type     Type 2 diabetes mellitus

## 2021-04-28 NOTE — H&P PST ADULT - ASSESSMENT
71 yo male with history of stomach cancer s/p chemotherapy ( last 2 months ago)  esophagojejunostomy and total gastrectomy 2017. Pt complains of inability to tolerate PO and weight loss for 2-3 months.   Patient presents to PST for preop evaluation for Endoscopy stent exchange anesthesia.

## 2021-04-28 NOTE — H&P PST ADULT - DOES THIS PATIENT HAVE A HISTORY OF OR HAS BEEN DX WITH HEART FAILURE?
We discussed a rash around her mouth and an ointment was prescribed, but I did NOT charge a separate office visit for it (though I could have).   We did the routine recommended screenings for vision, hearing, emotional development, and she got a flu shot.    Going back into the visit, I note that the preventive code is different from the one we are using now (there is an 01 added at the end now)  I'm not sure when the computer upgrade happened, but I tried to resend with the new billing code for preventive. Some of the well child defaults are still being updated. Please let me know if there are any additional concerns.      Natali Granados MD on 10/16/2020 at 5:00 PM     no

## 2021-04-28 NOTE — H&P PST ADULT - GASTROINTESTINAL DETAILS
lower mid abdomen healing, left lower abdomen J-tube in place/soft/nontender/no distention left lower abdomen J-tube in place/soft/nontender/no distention

## 2021-05-06 NOTE — H&P PST ADULT - NSICDXFAMILYHX_GEN_ALL_CORE_FT
denies pain/discomfort FAMILY HISTORY:  Father  Still living? Unknown  Family history of ischemic heart disease (IHD), Age at diagnosis: Age Unknown    Mother  Still living? Unknown  Family history of ovarian cancer, Age at diagnosis: Age Unknown

## 2021-05-07 ENCOUNTER — APPOINTMENT (OUTPATIENT)
Dept: DISASTER EMERGENCY | Facility: CLINIC | Age: 71
End: 2021-05-07

## 2021-05-07 ENCOUNTER — RESULT REVIEW (OUTPATIENT)
Age: 71
End: 2021-05-07

## 2021-05-07 ENCOUNTER — APPOINTMENT (OUTPATIENT)
Dept: HEMATOLOGY ONCOLOGY | Facility: CLINIC | Age: 71
End: 2021-05-07
Payer: MEDICARE

## 2021-05-07 VITALS
OXYGEN SATURATION: 96 % | TEMPERATURE: 97.2 F | HEIGHT: 71 IN | WEIGHT: 155.64 LBS | RESPIRATION RATE: 14 BRPM | HEART RATE: 69 BPM | SYSTOLIC BLOOD PRESSURE: 143 MMHG | DIASTOLIC BLOOD PRESSURE: 84 MMHG | BODY MASS INDEX: 21.79 KG/M2

## 2021-05-07 DIAGNOSIS — Z80.41 FAMILY HISTORY OF MALIGNANT NEOPLASM OF OVARY: ICD-10-CM

## 2021-05-07 DIAGNOSIS — Z63.5 DISRUPTION OF FAMILY BY SEPARATION AND DIVORCE: ICD-10-CM

## 2021-05-07 LAB
BASOPHILS # BLD AUTO: 0.1 K/UL — SIGNIFICANT CHANGE UP (ref 0–0.2)
BASOPHILS NFR BLD AUTO: 1.7 % — SIGNIFICANT CHANGE UP (ref 0–2)
EOSINOPHIL # BLD AUTO: 0.3 K/UL — SIGNIFICANT CHANGE UP (ref 0–0.5)
EOSINOPHIL NFR BLD AUTO: 5.1 % — SIGNIFICANT CHANGE UP (ref 0–6)
HCT VFR BLD CALC: 37.8 % — LOW (ref 39–50)
HGB BLD-MCNC: 12.6 G/DL — LOW (ref 13–17)
IMM GRANULOCYTES NFR BLD AUTO: 0.9 % — SIGNIFICANT CHANGE UP (ref 0–1.5)
LYMPHOCYTES # BLD AUTO: 1.27 K/UL — SIGNIFICANT CHANGE UP (ref 1–3.3)
LYMPHOCYTES # BLD AUTO: 21.7 % — SIGNIFICANT CHANGE UP (ref 13–44)
MCHC RBC-ENTMCNC: 31.3 PG — SIGNIFICANT CHANGE UP (ref 27–34)
MCHC RBC-ENTMCNC: 33.3 G/DL — SIGNIFICANT CHANGE UP (ref 32–36)
MCV RBC AUTO: 93.8 FL — SIGNIFICANT CHANGE UP (ref 80–100)
MONOCYTES # BLD AUTO: 0.79 K/UL — SIGNIFICANT CHANGE UP (ref 0–0.9)
MONOCYTES NFR BLD AUTO: 13.5 % — SIGNIFICANT CHANGE UP (ref 2–14)
NEUTROPHILS # BLD AUTO: 3.35 K/UL — SIGNIFICANT CHANGE UP (ref 1.8–7.4)
NEUTROPHILS NFR BLD AUTO: 57.1 % — SIGNIFICANT CHANGE UP (ref 43–77)
NRBC # BLD: 0 /100 WBCS — SIGNIFICANT CHANGE UP (ref 0–0)
PLATELET # BLD AUTO: 249 K/UL — SIGNIFICANT CHANGE UP (ref 150–400)
RBC # BLD: 4.03 M/UL — LOW (ref 4.2–5.8)
RBC # FLD: 14.9 % — HIGH (ref 10.3–14.5)
WBC # BLD: 5.86 K/UL — SIGNIFICANT CHANGE UP (ref 3.8–10.5)
WBC # FLD AUTO: 5.86 K/UL — SIGNIFICANT CHANGE UP (ref 3.8–10.5)

## 2021-05-07 PROCEDURE — 99215 OFFICE O/P EST HI 40 MIN: CPT

## 2021-05-07 RX ORDER — ESCITALOPRAM OXALATE 10 MG/1
10 TABLET ORAL
Qty: 30 | Refills: 3 | Status: DISCONTINUED | COMMUNITY
Start: 2020-06-08 | End: 2021-05-07

## 2021-05-07 RX ORDER — ONDANSETRON 8 MG/1
8 TABLET ORAL EVERY 8 HOURS
Qty: 90 | Refills: 0 | Status: DISCONTINUED | COMMUNITY
Start: 2017-07-24 | End: 2021-05-07

## 2021-05-07 SDOH — SOCIAL STABILITY - SOCIAL INSECURITY: DISRUPTION OF FAMILY BY SEPARATION AND DIVORCE: Z63.5

## 2021-05-08 LAB — SARS-COV-2 N GENE NPH QL NAA+PROBE: NOT DETECTED

## 2021-05-09 RX ORDER — SODIUM CHLORIDE 9 MG/ML
500 INJECTION INTRAMUSCULAR; INTRAVENOUS; SUBCUTANEOUS
Refills: 0 | Status: DISCONTINUED | OUTPATIENT
Start: 2021-05-10 | End: 2021-05-25

## 2021-05-10 ENCOUNTER — OUTPATIENT (OUTPATIENT)
Dept: OUTPATIENT SERVICES | Facility: HOSPITAL | Age: 71
LOS: 1 days | Discharge: HOME | End: 2021-05-10
Payer: MEDICARE

## 2021-05-10 ENCOUNTER — APPOINTMENT (OUTPATIENT)
Dept: GASTROENTEROLOGY | Facility: HOSPITAL | Age: 71
End: 2021-05-10

## 2021-05-10 VITALS
OXYGEN SATURATION: 98 % | SYSTOLIC BLOOD PRESSURE: 164 MMHG | RESPIRATION RATE: 17 BRPM | DIASTOLIC BLOOD PRESSURE: 86 MMHG | HEART RATE: 60 BPM

## 2021-05-10 VITALS
SYSTOLIC BLOOD PRESSURE: 151 MMHG | OXYGEN SATURATION: 98 % | DIASTOLIC BLOOD PRESSURE: 85 MMHG | WEIGHT: 154.1 LBS | TEMPERATURE: 99 F | HEART RATE: 68 BPM | RESPIRATION RATE: 20 BRPM | HEIGHT: 71.5 IN

## 2021-05-10 DIAGNOSIS — Z98.49 CATARACT EXTRACTION STATUS, UNSPECIFIED EYE: Chronic | ICD-10-CM

## 2021-05-10 DIAGNOSIS — Z98.890 OTHER SPECIFIED POSTPROCEDURAL STATES: Chronic | ICD-10-CM

## 2021-05-10 DIAGNOSIS — Z95.828 PRESENCE OF OTHER VASCULAR IMPLANTS AND GRAFTS: Chronic | ICD-10-CM

## 2021-05-10 DIAGNOSIS — R13.10 DYSPHAGIA, UNSPECIFIED: ICD-10-CM

## 2021-05-10 DIAGNOSIS — Z93.1 GASTROSTOMY STATUS: Chronic | ICD-10-CM

## 2021-05-10 PROBLEM — Z80.41 FAMILY HISTORY OF MALIGNANT NEOPLASM OF OVARY: Status: ACTIVE | Noted: 2021-05-10

## 2021-05-10 PROBLEM — Z63.5 DIVORCED: Status: ACTIVE | Noted: 2021-05-10

## 2021-05-10 LAB
AT III PPP CHRO-ACNC: 108 %
GLUCOSE BLDC GLUCOMTR-MCNC: 110 MG/DL — HIGH (ref 70–99)
HCYS SERPL-MCNC: 21.9 UMOL/L

## 2021-05-10 PROCEDURE — 43247 EGD REMOVE FOREIGN BODY: CPT

## 2021-05-10 PROCEDURE — 43266 EGD ENDOSCOPIC STENT PLACE: CPT | Mod: 59

## 2021-05-10 NOTE — ASU PATIENT PROFILE, ADULT - PMH
Constipation    Dysphagia, unspecified    Essential hypertension  was on losartan, now diet control  Gastric mass  ulcerated mass in gastric cardia with small perigastric nodes on endoscopy.  Hypothyroidism    Iron deficiency anemia, unspecified iron deficiency anemia type    Type 2 diabetes mellitus

## 2021-05-10 NOTE — ASU PATIENT PROFILE, ADULT - PSH
Gastrostomy tube in place  open jejunostomy with J-tube November 2020  H/O endoscopy  & stent placement 12/2020  H/O umbilical hernia repair    History of gastric surgery  2017  Port-a-cath in place  right chest wall  S/P cataract surgery

## 2021-05-10 NOTE — CONSULT LETTER
[Consult Letter:] : I had the pleasure of evaluating your patient, [unfilled]. [Please see my note below.] : Please see my note below. [Consult Closing:] : Thank you very much for allowing me to participate in the care of this patient.  If you have any questions, please do not hesitate to contact me. [Sincerely,] : Sincerely, [DrDasha  ___] : Dr. CONTRERAS [Dear  ___] : Dear ~TARIQ, [FreeTextEntry2] : Mario Henry MD [FreeTextEntry3] : Ricky\par Martell Sotomayor M.D., FACP\par Professor of Medicine\par [employer]\par Associate Chief, Division of Hematology\par [facility]\par [facility]\par [address] [place] [address]\[place] [address]\par [phone]

## 2021-05-10 NOTE — PHYSICAL EXAM
[Fully active, able to carry on all pre-disease performance without restriction] : Status 0 - Fully active, able to carry on all pre-disease performance without restriction [Normal] : normoactive bowel sounds, soft and nontender, no hepatosplenomegaly or masses appreciated [de-identified] : Limited exam [de-identified] : HEATHER PEG [de-identified] : Vitiligo, senile purpura

## 2021-05-10 NOTE — REVIEW OF SYSTEMS
[Negative] : Allergic/Immunologic [Recent Change In Weight] : ~T recent weight change [Easy Bleeding] : a tendency for easy bleeding [Easy Bruising] : a tendency for easy bruising [de-identified] : Soles numb

## 2021-05-10 NOTE — ADDENDUM
[FreeTextEntry1] : I, Amari Darrel, acted solely as a scribe for Dr. Martell Sotomayor on 05/07/2021. All medical entries made by the Scribe were at my, Dr. Martell Sotomayor's, direction and personally dictated by me on 05/07/2021. I have reviewed the chart and agree that the record accurately reflects my personal performance of the history, physical exam, assessment and plan. I have also personally directed, reviewed, and agreed with the chart.

## 2021-05-10 NOTE — HISTORY OF PRESENT ILLNESS
[de-identified] : Recurrent portal vein thrombosis\par Prothrombin G heterozygote\par Metastatic gastric cancer [de-identified] : 70 year old male with metastatic gastric cancer, recurrent portal vein thrombosis, heterozygosity for prothrombin G referred in consultation regarding anticoagulation. 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 He did well until March 2018 when a left supraclavicular lymph node metastasis was found. The lymph node was excised. There was no further therapy. CT scan of abdomen and pelvis in Sept, 2018 revealed a portal vein thrombosis. He was anticoagulated with Eliquis for three months. A routine screening MRI of his abdomen in Feb 2021 found a new portal vein thrombosis. Hypercoagulable screen was obtained. PT, APTT, protein C activity, protein S free, DRVVT, SCT, anticardiolipin antibodies, beta 2 glycoprotein 1 antibodies, and factor V Leiden were normal. He was found to be heterozygous for prothrombin G. Anticoagulation with Eliquis 5 mg twice daily was resumed. He is having an esophageal stent replacement procedure on Monday. He will be holding the Eliquis beginning this evening. \par \par He can only swallow liquids and soft foods due to esophageal aperistalsis. He reports no chest pain, shortness of breath, abdominal pain, leg pain, leg swelling, melena, rectal bleeding, hematemesis, coffee grounds. He is fed with a PEG tube. He is gaining weight. He does bleed and bruise easily. He states this is since his chemotherapy. He has senile purpura. \par \par There is no personal ( other than the portal vein thrombosis) nor family history of deep venous thrombophlebitis nor pulmonary embolism.

## 2021-05-10 NOTE — ASSESSMENT
[Palliative Care Plan] : not applicable at this time [FreeTextEntry1] : 70 year old male with metastatic gastric cancer doing remarkably well. He has recurrent portal vein thrombosis in the setting of metastatic gastric cancer and heterozygosity for prothrombin G. Either of these three (recurrent thrombosis, metastatic cancer, heterozygosity for prothrombin G) would be an adequate indication for lifelong anticoagulation. One must factor in the risk of bleeding that accompanies this. In view of this, I would suggest full anticoagulation with Eliquis 5 mg twice daily for a total of 6 months and then tapering the dose to 2.5 mg twice daily lifelong. I have reviewed prothrombin G with him and discussed its implications, hereditary nature, and precautions to decrease the risk of thrombosis. \par \par Plan:\par Eliquis dosing as outlined above\par Hold Eliquis beginning tonight for procedure on Monday. Resume at least 24 hours post procedure if the surgeon concurs.\par Stat PT pre op (discussed with Dr. MATHEW Obando)\par No ASA/NSAIDs\par Screen first degree relatives for prothrombin G\par Reviewed symptoms and signs of thrombosis and steps to take should they occur. Reviewed precautions to decrease risk of thrombosis including use of support hose and frequent ambulation particularly on long trips. \par Complete his hypercoagulable screen with AT III and homocysteine levels\par RTC prn\par \par

## 2021-06-10 ENCOUNTER — OUTPATIENT (OUTPATIENT)
Dept: OUTPATIENT SERVICES | Facility: HOSPITAL | Age: 71
LOS: 1 days | End: 2021-06-10
Payer: MEDICARE

## 2021-06-10 ENCOUNTER — RESULT REVIEW (OUTPATIENT)
Age: 71
End: 2021-06-10

## 2021-06-10 DIAGNOSIS — Z98.890 OTHER SPECIFIED POSTPROCEDURAL STATES: Chronic | ICD-10-CM

## 2021-06-10 DIAGNOSIS — Z93.1 GASTROSTOMY STATUS: Chronic | ICD-10-CM

## 2021-06-10 DIAGNOSIS — C16.9 MALIGNANT NEOPLASM OF STOMACH, UNSPECIFIED: ICD-10-CM

## 2021-06-10 DIAGNOSIS — Z98.49 CATARACT EXTRACTION STATUS, UNSPECIFIED EYE: Chronic | ICD-10-CM

## 2021-06-10 DIAGNOSIS — Z95.828 PRESENCE OF OTHER VASCULAR IMPLANTS AND GRAFTS: Chronic | ICD-10-CM

## 2021-06-10 PROCEDURE — 49451 REPLACE DUOD/JEJ TUBE PERC: CPT | Mod: 53,GC

## 2021-06-16 DIAGNOSIS — C16.9 MALIGNANT NEOPLASM OF STOMACH, UNSPECIFIED: ICD-10-CM

## 2021-06-16 DIAGNOSIS — Z46.59 ENCOUNTER FOR FITTING AND ADJUSTMENT OF OTHER GASTROINTESTINAL APPLIANCE AND DEVICE: ICD-10-CM

## 2021-06-30 ENCOUNTER — OUTPATIENT (OUTPATIENT)
Dept: OUTPATIENT SERVICES | Facility: HOSPITAL | Age: 71
LOS: 1 days | Discharge: HOME | End: 2021-06-30

## 2021-06-30 DIAGNOSIS — Z98.890 OTHER SPECIFIED POSTPROCEDURAL STATES: Chronic | ICD-10-CM

## 2021-06-30 DIAGNOSIS — Z98.49 CATARACT EXTRACTION STATUS, UNSPECIFIED EYE: Chronic | ICD-10-CM

## 2021-06-30 DIAGNOSIS — Z93.1 GASTROSTOMY STATUS: Chronic | ICD-10-CM

## 2021-06-30 DIAGNOSIS — C16.9 MALIGNANT NEOPLASM OF STOMACH, UNSPECIFIED: ICD-10-CM

## 2021-06-30 DIAGNOSIS — Z95.828 PRESENCE OF OTHER VASCULAR IMPLANTS AND GRAFTS: Chronic | ICD-10-CM

## 2021-07-01 ENCOUNTER — RESULT REVIEW (OUTPATIENT)
Age: 71
End: 2021-07-01

## 2021-07-01 ENCOUNTER — LABORATORY RESULT (OUTPATIENT)
Age: 71
End: 2021-07-01

## 2021-07-01 ENCOUNTER — APPOINTMENT (OUTPATIENT)
Dept: INFUSION THERAPY | Facility: HOSPITAL | Age: 71
End: 2021-07-01

## 2021-07-01 LAB
BASOPHILS # BLD AUTO: 0.08 K/UL — SIGNIFICANT CHANGE UP (ref 0–0.2)
BASOPHILS NFR BLD AUTO: 1 % — SIGNIFICANT CHANGE UP (ref 0–2)
EOSINOPHIL # BLD AUTO: 0.23 K/UL — SIGNIFICANT CHANGE UP (ref 0–0.5)
EOSINOPHIL NFR BLD AUTO: 3 % — SIGNIFICANT CHANGE UP (ref 0–6)
HCT VFR BLD CALC: 37.5 % — LOW (ref 39–50)
HGB BLD-MCNC: 12.4 G/DL — LOW (ref 13–17)
LYMPHOCYTES # BLD AUTO: 1.67 K/UL — SIGNIFICANT CHANGE UP (ref 1–3.3)
LYMPHOCYTES # BLD AUTO: 22 % — SIGNIFICANT CHANGE UP (ref 13–44)
MCHC RBC-ENTMCNC: 32 PG — SIGNIFICANT CHANGE UP (ref 27–34)
MCHC RBC-ENTMCNC: 33.1 G/DL — SIGNIFICANT CHANGE UP (ref 32–36)
MCV RBC AUTO: 96.6 FL — SIGNIFICANT CHANGE UP (ref 80–100)
MONOCYTES # BLD AUTO: 0.84 K/UL — SIGNIFICANT CHANGE UP (ref 0–0.9)
MONOCYTES NFR BLD AUTO: 11 % — SIGNIFICANT CHANGE UP (ref 2–14)
NEUTROPHILS # BLD AUTO: 4.79 K/UL — SIGNIFICANT CHANGE UP (ref 1.8–7.4)
NEUTROPHILS NFR BLD AUTO: 63 % — SIGNIFICANT CHANGE UP (ref 43–77)
NRBC # BLD: 0 /100 — SIGNIFICANT CHANGE UP (ref 0–0)
NRBC # BLD: SIGNIFICANT CHANGE UP /100 WBCS (ref 0–0)
PLAT MORPH BLD: NORMAL — SIGNIFICANT CHANGE UP
PLATELET # BLD AUTO: 279 K/UL — SIGNIFICANT CHANGE UP (ref 150–400)
RBC # BLD: 3.88 M/UL — LOW (ref 4.2–5.8)
RBC # FLD: 15 % — HIGH (ref 10.3–14.5)
RBC BLD AUTO: SIGNIFICANT CHANGE UP
WBC # BLD: 7.6 K/UL — SIGNIFICANT CHANGE UP (ref 3.8–10.5)
WBC # FLD AUTO: 7.6 K/UL — SIGNIFICANT CHANGE UP (ref 3.8–10.5)

## 2021-07-16 ENCOUNTER — APPOINTMENT (OUTPATIENT)
Dept: HEMATOLOGY ONCOLOGY | Facility: CLINIC | Age: 71
End: 2021-07-16
Payer: MEDICARE

## 2021-07-16 ENCOUNTER — RESULT REVIEW (OUTPATIENT)
Age: 71
End: 2021-07-16

## 2021-07-16 VITALS
SYSTOLIC BLOOD PRESSURE: 180 MMHG | OXYGEN SATURATION: 96 % | WEIGHT: 164.22 LBS | RESPIRATION RATE: 16 BRPM | HEART RATE: 73 BPM | DIASTOLIC BLOOD PRESSURE: 98 MMHG | TEMPERATURE: 97.2 F | BODY MASS INDEX: 22.91 KG/M2

## 2021-07-16 PROCEDURE — 99214 OFFICE O/P EST MOD 30 MIN: CPT

## 2021-07-16 NOTE — ASSESSMENT
[Palliative] : Goals of care discussed with patient: Palliative [Palliative Care Plan] : not applicable at this time [FreeTextEntry1] : Pt to continue on eliquis for portal vein thrombosis. Pt on omeprazole and has inc appetite. Pt has feeding tube for caloric and will see GI for followup. Pt to do Medical, GI and Surgical followup. Pt to do CT scans to assess for remission or progression of his disease. Pt to get result of hypercoagulable testing.

## 2021-07-16 NOTE — PHYSICAL EXAM
[Restricted in physically strenuous activity but ambulatory and able to carry out work of a light or sedentary nature] : Status 1- Restricted in physically strenuous activity but ambulatory and able to carry out work of a light or sedentary nature, e.g., light house work, office work [Normal] : affect appropriate [de-identified] : feeding tube in place

## 2021-07-16 NOTE — HISTORY OF PRESENT ILLNESS
[de-identified] : see dictated letter [de-identified] : Since the last visit the pt has been gaining weight  wt and fells better. Pt on eliquis for portal vein thrombosis ands saw Dr. Bran lane prothrombin gene mutation.
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
## 2021-08-07 ENCOUNTER — OUTPATIENT (OUTPATIENT)
Dept: OUTPATIENT SERVICES | Facility: HOSPITAL | Age: 71
LOS: 1 days | End: 2021-08-07
Payer: MEDICARE

## 2021-08-07 ENCOUNTER — RESULT REVIEW (OUTPATIENT)
Age: 71
End: 2021-08-07

## 2021-08-07 ENCOUNTER — APPOINTMENT (OUTPATIENT)
Dept: CT IMAGING | Facility: IMAGING CENTER | Age: 71
End: 2021-08-07
Payer: MEDICARE

## 2021-08-07 DIAGNOSIS — Z98.890 OTHER SPECIFIED POSTPROCEDURAL STATES: Chronic | ICD-10-CM

## 2021-08-07 DIAGNOSIS — Z98.49 CATARACT EXTRACTION STATUS, UNSPECIFIED EYE: Chronic | ICD-10-CM

## 2021-08-07 DIAGNOSIS — Z93.1 GASTROSTOMY STATUS: Chronic | ICD-10-CM

## 2021-08-07 DIAGNOSIS — C16.9 MALIGNANT NEOPLASM OF STOMACH, UNSPECIFIED: ICD-10-CM

## 2021-08-07 DIAGNOSIS — Z95.828 PRESENCE OF OTHER VASCULAR IMPLANTS AND GRAFTS: Chronic | ICD-10-CM

## 2021-08-07 PROCEDURE — 74177 CT ABD & PELVIS W/CONTRAST: CPT | Mod: 26,MG

## 2021-08-07 PROCEDURE — 71260 CT THORAX DX C+: CPT | Mod: 26,MH

## 2021-08-07 PROCEDURE — G1004: CPT

## 2021-08-07 PROCEDURE — 74177 CT ABD & PELVIS W/CONTRAST: CPT | Mod: MG

## 2021-08-07 PROCEDURE — 71260 CT THORAX DX C+: CPT | Mod: MH

## 2021-08-16 ENCOUNTER — APPOINTMENT (OUTPATIENT)
Dept: SURGICAL ONCOLOGY | Facility: CLINIC | Age: 71
End: 2021-08-16
Payer: MEDICARE

## 2021-08-16 VITALS
HEART RATE: 81 BPM | HEIGHT: 70.5 IN | RESPIRATION RATE: 17 BRPM | BODY MASS INDEX: 22.93 KG/M2 | OXYGEN SATURATION: 96 % | SYSTOLIC BLOOD PRESSURE: 141 MMHG | DIASTOLIC BLOOD PRESSURE: 85 MMHG | WEIGHT: 162 LBS

## 2021-08-16 PROCEDURE — 99215 OFFICE O/P EST HI 40 MIN: CPT

## 2021-08-16 NOTE — CONSULT LETTER
[Dear  ___] : Dear  [unfilled], [Courtesy Letter:] : I had the pleasure of seeing your patient, [unfilled], in my office today. [Consult Closing:] : Thank you very much for allowing me to participate in the care of this patient.  If you have any questions, please do not hesitate to contact me. [Sincerely,] : Sincerely, [DrDasha  ___] : Dr. CONTRERAS [DrDasha ___] : Dr. CONTRERAS [___] : [unfilled] [FreeTextEntry2] : Matthew South MD [FreeTextEntry1] : 71 year-old male returns for follow up. He was initially diagnosed with a proximal gastric cancer in December 2016.  Dr. Aguirre performed an EUS at that time, which revealed a circumferential mass in the gastric cardia, and areas away from the primary tumor where the second hypoechoic layer was markedly thickened and consistent with Linnitus Plastica.  Biopsy proved the mass to be adenocarcinoma which was staged T3N1 by EUS criteria.  A PET/CT performed at the time of initial diagnosis revealed NO evidence of metastatic disease.\par \par In January 2017 he underwent a laparoscopy.  It was noted that there was extensive tumor going to the GEJ along the lesser curvature, MCFP the length of the stomach.  There was no evidence of carcinomatosis.  Cytology of abdominal washings were negative.  \par \par A mediport was also placed at that time and he completed chemotherapy under the guidance of Dr. Henry on 3/31/17.\par \par He had a CT of the chest, abdomen and pelvis in April 2016.  There is interval decrease in size of the gastric mass which now measures 5.0 x 4.4 cm (vs. 5.8 x 5.7 on prior).  There is soft tissue extending posteriorly the gastric wall which is increased and measures 1.7 cm.  There is ill defined infiltrative soft tissue also extending posteriorly and inferiorly to involve the left adrenal gland and into the left retroperitoneal space.  Additionally, there is a 7 mm hypodense left hepatic lobe  lesion which is minimally increased from prior but no new hepatic lesions.  \par \par On 5/12/17 he was taken to the OR for a total gastrectomy, distal pancreatectomy/splenectomy, RNY esophagojejunostomy.  Final pathology was consistent with residual gastric adenocarcinoma, invading through the posterior gastric wall into the jordy pancreatic soft tissue.  Pancreatic parenchyma and spleen were benign.  Metastatic gastric adenocarcinoma was present in 3/51 lymph nodes (T4aN2).  All resection margins were negative.  \par \par He completed radiation to the gastric bed and lymph node between 7/9/17 and 8/22/17, and he received adjuvant chemotherapy through approximately 12/21/17.\par \par He completed a CT of the chest, abdomen and pelvis on 10/2/17 which showed stable nonspecific celiac axis lymph nodes as well as a new right hepatic lobe lesion and an additional area of heterogeneous enhancement in the liver.  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  The celiac node is stable/mildly decreased in size.  There is a small ventral hernia with protrusion of small bowel.\par \par He was see on 2/5/18 at which point he expressed desire to schedule repair of his ventral hernia. \par \par In the interim, he underwent an USGB and FNA of a left supraclavicular lymph node in March 2018 which was positive for metastatic gastric cancer.  He completed a PET/CT on 4/2/18, which showed hypermetabolic left supraclavicular lymph node corresponding to biopsy proven metastasis.  Exam was otherwise negative aside from some equivocal pulmonary findings which should be followed.  Given this new finding, hernia repair was contraindicated.\par \par He is status post left neck dissection/excision of Virchows node on 4/17/18.  Final pathology was consistent with metastatic adenocarcinoma, gastric primary.\par \par He is status post laparotomy, extensive MICAH and IHR with mesh and plastic reconstruction on 6/25/18.  All pathology was benign.  \par \par He was admitted to [facility] for progressive dysphagia and inability to tolerate po with weight loss on 11/17/20.  CT of the chest, abdomen and pelvis revealed obstruction at the level of the esophagojejunostomy with proximal dilation and distention, and multiple vague hepatic hypo densities, new, and concerning for metastatic disease.   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  Patient had a PICC line placed in IR and was started on TPN.  On 11/23/20 he was taken to the OR by Dr. Chema Banda for an open feeding jejunostomy tube placement.  He was readmitted on 12/17/21.  He underwent an EGD with Axios stent placement to connect the blind limb to the efferent limb (jejunojejunostomy) with Dr. Peguero.\par \par He was referred for a bilateral lower extremity venous duplex on 12/31/20 to evaluate bilateral lower extremity edema, which was negative for DVT. \par \par He saw my partner Dr. Chema Banda on 1/4/21.   His dysphagia had improved slightly but he reported a clogged jtube. 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 \par \par 3/1/21- He continues tube feeds, however, his j-tube became clogged last night so he did not get his feeds last night.  He can tolerate some food by mouth but his appetite is diminished.  He states that once a day he regurgitates slightly after eating but states this is significantly improved overall and he denies vomiting.  He states he is continuing to gain weight.  He states that he was informed that he may need a larger stent placed by Dr. Peguero but does not know any further details. \par \par He saw Dr. Peguero on 5/10/21 for an EGD.  The existing Axios stent was removed and a 20 mm axios stent was then deployed across the jejunojejunal fistula.\par \par He underwent exchange of his jejunostomy in IR on 6/10/21 (tube was clogged).\par \par CT of the chest, abdomen and pelvis on 8/7/21 which showed no evidence of metastatic disease.  A 4.8 cm AAA and a right internal iliac artery aneurysm is also unchanged.  Right posterior bladder diverticulum with layering calculi. (Patient prevoiusly saw Dr. Daniel Luna from vascular surgery who agrees with Eliquis for portal vein thrombosis and recommends continued surveillance of his aneurysm). \par \par CEA 7/1/21= 6.4 ng/ml (elevated by stable) | CA 19-9: 13 U/ml (WNL).\par \par 8/16/21- Patient continues Eliquis for portal vein thrombosis and was seen by Dr. Martell Sotomayor regarding a prothrombin gene mutation.  He has gained approximately 20 lbs over the past year and his weight is currently stable.  He states his appetite is diminished and food does not appeal to him, however, he denies any significant dysphagia or sensation of food getting stuck in his chest.  He denies any abdominal pain or nausea/vomiting.  He continues compression tube feeds.\par \par A&P:\par -Gastric cancer, s/p neoadjuvant chemotherapy, total gastrectomy, distal pancreatectomy/splenectomy, RNY esophagojejunostomy on 5/12/17 (T4aN2) with adjuvant RT and chemotherapy.\par - Patient developed a left supraclavicular lexus recurrence in 2018, s/p left neck dissection/excision of Virchows node on 4/17/18.\par - S/p IHR with mesh 6/25/18 \par -11/2020 patient developed dysphagia related to functional obstruction with dominant blind limb and is s/p EUS guided enteroenterostomy to facilitate drainage of limbs with Dr. Peguero.  He was taken to the OR for an open jejunostomy placement with Dr. Gonzalez on 11/23/20 for enteral nutrition.\par -S/p J-tube exchange in IR on 1/6/21\par -CT with concern for liver mets, however, MRI notable for probable hemangioma in the dome and no clear evidence of hepatic mets, as well as a new nonocclusive thrombus in the portal vein- continue ELIQUIS\par - Recent CT 8/2021 MARANDA\par - Patient improving clinically overall, however, still reliant on tube feeds to maintain adequate nutrition\par -Follow up with medical oncology\par -Urology evaluation for bladder findings on CT\par -Follow up with IR for tube check if he experiences any difficulty flushing the J tube\par - I have reviewed the pertinent imaging, blood work and pathology.\par \par suture midline wound removed\par images reviewed\par \par \par Referring MD: Dr. Matthew South (GI), Dr. Jayda Obando (PCP)\par GI: Dr. Daniel Aguirre\par Med Onc: Dr. Mario Henry\par Dr. Erlinda Angel\par  [FreeTextEntry3] : Saravanan Cabello MD, FACS, FASCRS\par , Department of Surgery\par Director of the [facility] [place]\par , Minimally Invasive/Robotic Cancer Surgery, [facility] & [facility] Divisions\par Division of Surgical Oncology \par \par enclose pathology

## 2021-08-16 NOTE — HISTORY OF PRESENT ILLNESS
[de-identified] : 71 year-old male returns for follow up. He was initially diagnosed with a proximal gastric cancer in December 2016.  Dr. Aguirre performed an EUS at that time, which revealed a circumferential mass in the gastric cardia, and areas away from the primary tumor where the second hypoechoic layer was markedly thickened and consistent with Linnitus Plastica.  Biopsy proved the mass to be adenocarcinoma which was staged T3N1 by EUS criteria.  A PET/CT performed at the time of initial diagnosis revealed NO evidence of metastatic disease.\par \par In January 2017 he underwent a laparoscopy.  It was noted that there was extensive tumor going to the GEJ along the lesser curvature, custodial the length of the stomach.  There was no evidence of carcinomatosis.  Cytology of abdominal washings were negative.  \par \par A mediport was also placed at that time and he completed chemotherapy under the guidance of Dr. Henry on 3/31/17.\par \par He had a CT of the chest, abdomen and pelvis in April 2016.  There is interval decrease in size of the gastric mass which now measures 5.0 x 4.4 cm (vs. 5.8 x 5.7 on prior).  There is soft tissue extending posteriorly the gastric wall which is increased and measures 1.7 cm.  There is ill defined infiltrative soft tissue also extending posteriorly and inferiorly to involve the left adrenal gland and into the left retroperitoneal space.  Additionally, there is a 7 mm hypodense left hepatic lobe  lesion which is minimally increased from prior but no new hepatic lesions.  \par \par On 5/12/17 he was taken to the OR for a total gastrectomy, distal pancreatectomy/splenectomy, RNY esophagojejunostomy.  Final pathology was consistent with residual gastric adenocarcinoma, invading through the posterior gastric wall into the jordy pancreatic soft tissue.  Pancreatic parenchyma and spleen were benign.  Metastatic gastric adenocarcinoma was present in 3/51 lymph nodes (T4aN2).  All resection margins were negative.  \par \par He completed radiation to the gastric bed and lymph node between 7/9/17 and 8/22/17, and he received adjuvant chemotherapy through approximately 12/21/17.\par \par He completed a CT of the chest, abdomen and pelvis on 10/2/17 which showed stable nonspecific celiac axis lymph nodes as well as a new right hepatic lobe lesion and an additional area of heterogeneous enhancement in the liver.  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  The celiac node is stable/mildly decreased in size.  There is a small ventral hernia with protrusion of small bowel.\par \par He was see on 2/5/18 at which point he expressed desire to schedule repair of his ventral hernia. \par \par In the interim, he underwent an USGB and FNA of a left supraclavicular lymph node in March 2018 which was positive for metastatic gastric cancer.  He completed a PET/CT on 4/2/18, which showed hypermetabolic left supraclavicular lymph node corresponding to biopsy proven metastasis.  Exam was otherwise negative aside from some equivocal pulmonary findings which should be followed.  Given this new finding, hernia repair was contraindicated.\par \par He is status post left neck dissection/excision of Virchows node on 4/17/18.  Final pathology was consistent with metastatic adenocarcinoma, gastric primary.\par \par He is status post laparotomy, extensive MICAH and IHR with mesh and plastic reconstruction on 6/25/18.  All pathology was benign.  \par \par He was admitted to [facility] for progressive dysphagia and inability to tolerate po with weight loss on 11/17/20.  CT of the chest, abdomen and pelvis revealed obstruction at the level of the esophagojejunostomy with proximal dilation and distention, and multiple vague hepatic hypo densities, new, and concerning for metastatic disease.   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  Patient had a PICC line placed in IR and was started on TPN.  On 11/23/20 he was taken to the OR by Dr. Chema Banda for an open feeding jejunostomy tube placement.  He was readmitted on 12/17/21.  He underwent an EGD with Axios stent placement to connect the blind limb to the efferent limb (jejunojejunostomy) with Dr. Peguero.\par \par He was referred for a bilateral lower extremity venous duplex on 12/31/20 to evaluate bilateral lower extremity edema, which was negative for DVT. \par \par He saw my partner Dr. Chema Banda on 1/4/21.   His dysphagia had improved slightly but he reported a clogged jtube. 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 \par \par 3/1/21- He continues tube feeds, however, his j-tube became clogged last night so he did not get his feeds last night.  He can tolerate some food by mouth but his appetite is diminished.  He states that once a day he regurgitates slightly after eating but states this is significantly improved overall and he denies vomiting.  He states he is continuing to gain weight.  He states that he was informed that he may need a larger stent placed by Dr. Peguero but does not know any further details. \par \par He saw Dr. Peguero on 5/10/21 for an EGD.  The existing Axios stent was removed and a 20 mm axios stent was then deployed across the jejunojejunal fistula.\par \par He underwent exchange of his jejunostomy in IR on 6/10/21 (tube was clogged).\par \par CT of the chest, abdomen and pelvis on 8/7/21 which showed no evidence of metastatic disease.  A 4.8 cm AAA and a right internal iliac artery aneurysm is also unchanged.  Right posterior bladder diverticulum with layering calculi. (Patient prevoiusly saw Dr. Daniel Luna from vascular surgery who agrees with Eliquis for portal vein thrombosis and recommends continued surveillance of his aneurysm). \par \par CEA 7/1/21= 6.4 ng/ml (elevated by stable) | CA 19-9: 13 U/ml (WNL).\par \par 8/16/21- Patient continues Eliquis for portal vein thrombosis and was seen by Dr. Martell Sotomayor regarding a prothrombin gene mutation.  He has gained approximately 20 lbs over the past year and his weight is currently stable.  He states his appetite is diminished and food does not appeal to him, however, he denies any significant dysphagia or sensation of food getting stuck in his chest.  He denies any abdominal pain or nausea/vomiting.  He continues compression tube feeds.\par \par Referring MD: Dr. Matthew South (GI), Dr. Jayda Obando (PCP)\par GI: Dr. Daniel Aguirre\par Med Onc: Dr. Mario Henry\par Dr. Erlinda Angel\par

## 2021-08-16 NOTE — PHYSICAL EXAM
[Normal] : supple, no neck mass and thyroid not enlarged [Normal Supraclavicular Lymph Nodes] : normal supraclavicular lymph nodes [Normal Axillary Lymph Nodes] : normal axillary lymph nodes [Normal] : oriented to person, place and time, with appropriate affect [de-identified] : Abdominal incisions well-healed no evidence of infection or recurrent hernia.  J-tube site asymptomatic.

## 2021-08-16 NOTE — ASSESSMENT
[FreeTextEntry1] : 71 year-old male returns for follow up. He was initially diagnosed with a proximal gastric cancer in December 2016.  Dr. Aguirre performed an EUS at that time, which revealed a circumferential mass in the gastric cardia, and areas away from the primary tumor where the second hypoechoic layer was markedly thickened and consistent with Linnitus Plastica.  Biopsy proved the mass to be adenocarcinoma which was staged T3N1 by EUS criteria.  A PET/CT performed at the time of initial diagnosis revealed NO evidence of metastatic disease.\par \par In January 2017 he underwent a laparoscopy.  It was noted that there was extensive tumor going to the GEJ along the lesser curvature, prison the length of the stomach.  There was no evidence of carcinomatosis.  Cytology of abdominal washings were negative.  \par \par A mediport was also placed at that time and he completed chemotherapy under the guidance of Dr. Henry on 3/31/17.\par \par He had a CT of the chest, abdomen and pelvis in April 2016.  There is interval decrease in size of the gastric mass which now measures 5.0 x 4.4 cm (vs. 5.8 x 5.7 on prior).  There is soft tissue extending posteriorly the gastric wall which is increased and measures 1.7 cm.  There is ill defined infiltrative soft tissue also extending posteriorly and inferiorly to involve the left adrenal gland and into the left retroperitoneal space.  Additionally, there is a 7 mm hypodense left hepatic lobe  lesion which is minimally increased from prior but no new hepatic lesions.  \par \par On 5/12/17 he was taken to the OR for a total gastrectomy, distal pancreatectomy/splenectomy, RNY esophagojejunostomy.  Final pathology was consistent with residual gastric adenocarcinoma, invading through the posterior gastric wall into the jordy pancreatic soft tissue.  Pancreatic parenchyma and spleen were benign.  Metastatic gastric adenocarcinoma was present in 3/51 lymph nodes (T4aN2).  All resection margins were negative.  \par \par He completed radiation to the gastric bed and lymph node between 7/9/17 and 8/22/17, and he received adjuvant chemotherapy through approximately 12/21/17.\par \par He completed a CT of the chest, abdomen and pelvis on 10/2/17 which showed stable nonspecific celiac axis lymph nodes as well as a new right hepatic lobe lesion and an additional area of heterogeneous enhancement in the liver.  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  The celiac node is stable/mildly decreased in size.  There is a small ventral hernia with protrusion of small bowel.\par \par He was see on 2/5/18 at which point he expressed desire to schedule repair of his ventral hernia. \par \par In the interim, he underwent an USGB and FNA of a left supraclavicular lymph node in March 2018 which was positive for metastatic gastric cancer.  He completed a PET/CT on 4/2/18, which showed hypermetabolic left supraclavicular lymph node corresponding to biopsy proven metastasis.  Exam was otherwise negative aside from some equivocal pulmonary findings which should be followed.  Given this new finding, hernia repair was contraindicated.\par \par He is status post left neck dissection/excision of Virchows node on 4/17/18.  Final pathology was consistent with metastatic adenocarcinoma, gastric primary.\par \par He is status post laparotomy, extensive MICAH and IHR with mesh and plastic reconstruction on 6/25/18.  All pathology was benign.  \par \par He was admitted to [facility] for progressive dysphagia and inability to tolerate po with weight loss on 11/17/20.  CT of the chest, abdomen and pelvis revealed obstruction at the level of the esophagojejunostomy with proximal dilation and distention, and multiple vague hepatic hypo densities, new, and concerning for metastatic disease.   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  Patient had a PICC line placed in IR and was started on TPN.  On 11/23/20 he was taken to the OR by Dr. Chema Banda for an open feeding jejunostomy tube placement.  He was readmitted on 12/17/21.  He underwent an EGD with Axios stent placement to connect the blind limb to the efferent limb (jejunojejunostomy) with Dr. Peguero.\par \par He was referred for a bilateral lower extremity venous duplex on 12/31/20 to evaluate bilateral lower extremity edema, which was negative for DVT. \par \par He saw my partner Dr. Chema Banda on 1/4/21.   His dysphagia had improved slightly but he reported a clogged jtube. 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 \par \par 3/1/21- He continues tube feeds, however, his j-tube became clogged last night so he did not get his feeds last night.  He can tolerate some food by mouth but his appetite is diminished.  He states that once a day he regurgitates slightly after eating but states this is significantly improved overall and he denies vomiting.  He states he is continuing to gain weight.  He states that he was informed that he may need a larger stent placed by Dr. Peguero but does not know any further details. \par \par He saw Dr. Peguero on 5/10/21 for an EGD.  The existing Axios stent was removed and a 20 mm axios stent was then deployed across the jejunojejunal fistula.\par \par He underwent exchange of his jejunostomy in IR on 6/10/21 (tube was clogged).\par \par CT of the chest, abdomen and pelvis on 8/7/21 which showed no evidence of metastatic disease.  A 4.8 cm AAA and a right internal iliac artery aneurysm is also unchanged.  Right posterior bladder diverticulum with layering calculi. (Patient prevoiusly saw Dr. Daniel Luna from vascular surgery who agrees with Eliquis for portal vein thrombosis and recommends continued surveillance of his aneurysm). \par \par CEA 7/1/21= 6.4 ng/ml (elevated by stable) | CA 19-9: 13 U/ml (WNL).\par \par 8/16/21- Patient continues Eliquis for portal vein thrombosis and was seen by Dr. Martell Sotomayor regarding a prothrombin gene mutation.  He has gained approximately 20 lbs over the past year and his weight is currently stable.  He states his appetite is diminished and food does not appeal to him, however, he denies any significant dysphagia or sensation of food getting stuck in his chest.  He denies any abdominal pain or nausea/vomiting.  He continues compression tube feeds.\par \par A&P:\par -Gastric cancer, s/p neoadjuvant chemotherapy, total gastrectomy, distal pancreatectomy/splenectomy, RNY esophagojejunostomy on 5/12/17 (T4aN2) with adjuvant RT and chemotherapy.\par - Patient developed a left supraclavicular lexus recurrence in 2018, s/p left neck dissection/excision of Virchows node on 4/17/18.\par - S/p IHR with mesh 6/25/18 \par -11/2020 patient developed dysphagia related to functional obstruction with dominant blind limb and is s/p EUS guided enteroenterostomy to facilitate drainage of limbs with Dr. Peguero.  He was taken to the OR for an open jejunostomy placement with Dr. Gonzalez on 11/23/20 for enteral nutrition.\par -S/p J-tube exchange in IR on 1/6/21\par -CT with concern for liver mets, however, MRI notable for probable hemangioma in the dome and no clear evidence of hepatic mets, as well as a new nonocclusive thrombus in the portal vein- continue ELIQUIS\par - Recent CT 8/2021 MARANDA\par - Patient improving clinically overall, however, still reliant on tube feeds to maintain adequate nutrition\par -Follow up with medical oncology\par -Urology evaluation for bladder findings on CT\par -Follow up with IR for tube check if he experiences any difficulty flushing the J tube\par - I have reviewed the pertinent imaging, blood work and pathology.\par \par suture midline wound removed\par images reviewed

## 2021-08-17 ENCOUNTER — NON-APPOINTMENT (OUTPATIENT)
Age: 71
End: 2021-08-17

## 2021-09-07 ENCOUNTER — NON-APPOINTMENT (OUTPATIENT)
Age: 71
End: 2021-09-07

## 2021-09-08 ENCOUNTER — NON-APPOINTMENT (OUTPATIENT)
Age: 71
End: 2021-09-08

## 2021-09-14 ENCOUNTER — RESULT REVIEW (OUTPATIENT)
Age: 71
End: 2021-09-14

## 2021-09-14 ENCOUNTER — APPOINTMENT (OUTPATIENT)
Dept: SURGICAL ONCOLOGY | Facility: CLINIC | Age: 71
End: 2021-09-14
Payer: MEDICARE

## 2021-09-14 ENCOUNTER — OUTPATIENT (OUTPATIENT)
Dept: OUTPATIENT SERVICES | Facility: HOSPITAL | Age: 71
LOS: 1 days | End: 2021-09-14
Payer: MEDICARE

## 2021-09-14 VITALS
DIASTOLIC BLOOD PRESSURE: 54 MMHG | OXYGEN SATURATION: 95 % | SYSTOLIC BLOOD PRESSURE: 155 MMHG | HEART RATE: 80 BPM | HEIGHT: 70 IN

## 2021-09-14 DIAGNOSIS — C16.9 MALIGNANT NEOPLASM OF STOMACH, UNSPECIFIED: ICD-10-CM

## 2021-09-14 DIAGNOSIS — Z93.1 GASTROSTOMY STATUS: Chronic | ICD-10-CM

## 2021-09-14 DIAGNOSIS — Z98.890 OTHER SPECIFIED POSTPROCEDURAL STATES: Chronic | ICD-10-CM

## 2021-09-14 DIAGNOSIS — Z95.828 PRESENCE OF OTHER VASCULAR IMPLANTS AND GRAFTS: Chronic | ICD-10-CM

## 2021-09-14 DIAGNOSIS — Z98.49 CATARACT EXTRACTION STATUS, UNSPECIFIED EYE: Chronic | ICD-10-CM

## 2021-09-14 PROCEDURE — 49451 REPLACE DUOD/JEJ TUBE PERC: CPT | Mod: 53,GC

## 2021-09-14 PROCEDURE — 99213 OFFICE O/P EST LOW 20 MIN: CPT

## 2021-09-15 NOTE — PHYSICAL EXAM
[FreeTextEntry1] : Small open area in upper incision at site of suture removal.  No visible drainage.  Base with granulation tissue, but small ECF is not excluded.

## 2021-09-15 NOTE — HISTORY OF PRESENT ILLNESS
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 Peguero.  His dysphagia has improved slightly.  He reports clogged jtube.  He denies abdominal pain.\par \par 09/14/2021: Doing well.  He reports improved po intake, but is still predominantly on jtube feeds.  He had a suture granuloma removed last month during visit with Dr. Cabello.  He reports yellowish drainage from removal site in upper abdominal incision.  He denies fever, abdominal pain.

## 2021-09-15 NOTE — ASSESSMENT
[FreeTextEntry1] : Monitor drainage.\par Follow up with myself or Dr. Cabello in one month, sooner if drainage worsens.

## 2021-09-20 ENCOUNTER — OUTPATIENT (OUTPATIENT)
Dept: OUTPATIENT SERVICES | Facility: HOSPITAL | Age: 71
LOS: 1 days | Discharge: HOME | End: 2021-09-20

## 2021-09-20 DIAGNOSIS — Z98.890 OTHER SPECIFIED POSTPROCEDURAL STATES: Chronic | ICD-10-CM

## 2021-09-20 DIAGNOSIS — Z98.49 CATARACT EXTRACTION STATUS, UNSPECIFIED EYE: Chronic | ICD-10-CM

## 2021-09-20 DIAGNOSIS — Z93.1 GASTROSTOMY STATUS: Chronic | ICD-10-CM

## 2021-09-20 DIAGNOSIS — Z95.828 PRESENCE OF OTHER VASCULAR IMPLANTS AND GRAFTS: Chronic | ICD-10-CM

## 2021-09-20 DIAGNOSIS — C16.9 MALIGNANT NEOPLASM OF STOMACH, UNSPECIFIED: ICD-10-CM

## 2021-09-21 ENCOUNTER — APPOINTMENT (OUTPATIENT)
Dept: HEMATOLOGY ONCOLOGY | Facility: CLINIC | Age: 71
End: 2021-09-21
Payer: MEDICARE

## 2021-09-21 VITALS
BODY MASS INDEX: 23.1 KG/M2 | TEMPERATURE: 100.4 F | DIASTOLIC BLOOD PRESSURE: 69 MMHG | WEIGHT: 161.38 LBS | RESPIRATION RATE: 17 BRPM | OXYGEN SATURATION: 96 % | HEIGHT: 70 IN | SYSTOLIC BLOOD PRESSURE: 95 MMHG | HEART RATE: 76 BPM

## 2021-09-21 VITALS — TEMPERATURE: 99.4 F

## 2021-09-21 PROCEDURE — 99214 OFFICE O/P EST MOD 30 MIN: CPT

## 2021-09-22 DIAGNOSIS — K94.19 OTHER COMPLICATIONS OF ENTEROSTOMY: ICD-10-CM

## 2021-09-22 DIAGNOSIS — C16.9 MALIGNANT NEOPLASM OF STOMACH, UNSPECIFIED: ICD-10-CM

## 2021-09-22 NOTE — ASSESSMENT
[FreeTextEntry1] : The patient continues to do well with no evidence of recurrence of his metastatic gastric carcinoma with left supraclavicular node recurrence status post chemotherapy surgical resection and radiation therapy.  The patient has noted serosanguineous drainage from the upper part of his abdominal wound over the last several weeks.  It was recommended that he contact Dr. Cabello's office for follow-up as he was previously seen by Dr. Banda who mentioned the possibility fistula.  The patient will continue medical, GI and surgical follow-up and testing.  He continues to use the feeding tube as he states he is not able to maintain the appropriate number of calories to maintain his weight.  He will return in 3 months or sooner as needed.

## 2021-09-22 NOTE — HISTORY OF PRESENT ILLNESS
[de-identified] : see dictated letter [de-identified] : Since the last visit the pt remains well and has drainage from abdominal wound. Pt has feeding tube and weight is stable.

## 2021-09-22 NOTE — PHYSICAL EXAM
[Restricted in physically strenuous activity but ambulatory and able to carry out work of a light or sedentary nature] : Status 1- Restricted in physically strenuous activity but ambulatory and able to carry out work of a light or sedentary nature, e.g., light house work, office work [Normal] : affect appropriate [de-identified] : serosanguinous drainage from abdominal

## 2021-11-03 ENCOUNTER — OUTPATIENT (OUTPATIENT)
Dept: OUTPATIENT SERVICES | Facility: HOSPITAL | Age: 71
LOS: 1 days | Discharge: HOME | End: 2021-11-03

## 2021-11-03 DIAGNOSIS — C16.9 MALIGNANT NEOPLASM OF STOMACH, UNSPECIFIED: ICD-10-CM

## 2021-11-03 DIAGNOSIS — Z98.890 OTHER SPECIFIED POSTPROCEDURAL STATES: Chronic | ICD-10-CM

## 2021-11-03 DIAGNOSIS — Z95.828 PRESENCE OF OTHER VASCULAR IMPLANTS AND GRAFTS: Chronic | ICD-10-CM

## 2021-11-03 DIAGNOSIS — Z93.1 GASTROSTOMY STATUS: Chronic | ICD-10-CM

## 2021-11-03 DIAGNOSIS — Z98.49 CATARACT EXTRACTION STATUS, UNSPECIFIED EYE: Chronic | ICD-10-CM

## 2021-11-04 ENCOUNTER — LABORATORY RESULT (OUTPATIENT)
Age: 71
End: 2021-11-04

## 2021-11-04 ENCOUNTER — RESULT REVIEW (OUTPATIENT)
Age: 71
End: 2021-11-04

## 2021-11-04 ENCOUNTER — APPOINTMENT (OUTPATIENT)
Dept: INFUSION THERAPY | Facility: HOSPITAL | Age: 71
End: 2021-11-04

## 2021-11-04 ENCOUNTER — APPOINTMENT (OUTPATIENT)
Dept: SURGICAL ONCOLOGY | Facility: CLINIC | Age: 71
End: 2021-11-04
Payer: MEDICARE

## 2021-11-04 VITALS
RESPIRATION RATE: 17 BRPM | SYSTOLIC BLOOD PRESSURE: 139 MMHG | OXYGEN SATURATION: 97 % | DIASTOLIC BLOOD PRESSURE: 89 MMHG | HEART RATE: 79 BPM | TEMPERATURE: 97 F

## 2021-11-04 LAB
BASOPHILS # BLD AUTO: 0.09 K/UL — SIGNIFICANT CHANGE UP (ref 0–0.2)
BASOPHILS NFR BLD AUTO: 1.2 % — SIGNIFICANT CHANGE UP (ref 0–2)
EOSINOPHIL # BLD AUTO: 0.22 K/UL — SIGNIFICANT CHANGE UP (ref 0–0.5)
EOSINOPHIL NFR BLD AUTO: 3.1 % — SIGNIFICANT CHANGE UP (ref 0–6)
HCT VFR BLD CALC: 39.1 % — SIGNIFICANT CHANGE UP (ref 39–50)
HGB BLD-MCNC: 12.9 G/DL — LOW (ref 13–17)
IMM GRANULOCYTES NFR BLD AUTO: 0.6 % — SIGNIFICANT CHANGE UP (ref 0–1.5)
LYMPHOCYTES # BLD AUTO: 1.46 K/UL — SIGNIFICANT CHANGE UP (ref 1–3.3)
LYMPHOCYTES # BLD AUTO: 20.2 % — SIGNIFICANT CHANGE UP (ref 13–44)
MCHC RBC-ENTMCNC: 32.3 PG — SIGNIFICANT CHANGE UP (ref 27–34)
MCHC RBC-ENTMCNC: 33 G/DL — SIGNIFICANT CHANGE UP (ref 32–36)
MCV RBC AUTO: 97.8 FL — SIGNIFICANT CHANGE UP (ref 80–100)
MONOCYTES # BLD AUTO: 0.69 K/UL — SIGNIFICANT CHANGE UP (ref 0–0.9)
MONOCYTES NFR BLD AUTO: 9.6 % — SIGNIFICANT CHANGE UP (ref 2–14)
NEUTROPHILS # BLD AUTO: 4.71 K/UL — SIGNIFICANT CHANGE UP (ref 1.8–7.4)
NEUTROPHILS NFR BLD AUTO: 65.3 % — SIGNIFICANT CHANGE UP (ref 43–77)
NRBC # BLD: 0 /100 WBCS — SIGNIFICANT CHANGE UP (ref 0–0)
PLATELET # BLD AUTO: 258 K/UL — SIGNIFICANT CHANGE UP (ref 150–400)
RBC # BLD: 4 M/UL — LOW (ref 4.2–5.8)
RBC # FLD: 14.7 % — HIGH (ref 10.3–14.5)
WBC # BLD: 7.21 K/UL — SIGNIFICANT CHANGE UP (ref 3.8–10.5)
WBC # FLD AUTO: 7.21 K/UL — SIGNIFICANT CHANGE UP (ref 3.8–10.5)

## 2021-11-04 PROCEDURE — 99213 OFFICE O/P EST LOW 20 MIN: CPT

## 2021-11-04 NOTE — PHYSICAL EXAM
[FreeTextEntry1] : Small open area in upper incision at site of suture removal.  Gauze with brown/yellowish drainage.  No surrounding skin erythema.  Site of granulation tissue cauterized with AgNO3.

## 2021-11-04 NOTE — ASSESSMENT
[FreeTextEntry1] : Obtain CT abdomen/pelvis to evaluate for enterocutaneous fistula.\par Continue local wound care.

## 2021-11-04 NOTE — HISTORY OF PRESENT ILLNESS
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 Peguero.  His dysphagia has improved slightly.  He reports clogged jtube.  He denies abdominal pain.\par \par 09/14/2021: Doing well.  He reports improved po intake, but is still predominantly on jtube feeds.  He had a suture granuloma removed last month during visit with Dr. Cabello.  He reports yellowish drainage from removal site in upper abdominal incision.  He denies fever, abdominal pain.\par \par 11/04/2021: Patient still has persistent drainage from suture granuloma removal site in the upper abdominal incisional region.  He changes dressing daily.  Fluid is thick yellow/purulent.  He is maintaining nutrition with supplemental jtube feeds.  He denies fever.

## 2021-11-26 ENCOUNTER — OUTPATIENT (OUTPATIENT)
Dept: OUTPATIENT SERVICES | Facility: HOSPITAL | Age: 71
LOS: 1 days | End: 2021-11-26

## 2021-11-26 ENCOUNTER — APPOINTMENT (OUTPATIENT)
Dept: CT IMAGING | Facility: IMAGING CENTER | Age: 71
End: 2021-11-26
Payer: MEDICARE

## 2021-11-26 ENCOUNTER — OUTPATIENT (OUTPATIENT)
Dept: OUTPATIENT SERVICES | Facility: HOSPITAL | Age: 71
LOS: 1 days | End: 2021-11-26
Payer: MEDICARE

## 2021-11-26 DIAGNOSIS — Z98.890 OTHER SPECIFIED POSTPROCEDURAL STATES: Chronic | ICD-10-CM

## 2021-11-26 DIAGNOSIS — Z95.828 PRESENCE OF OTHER VASCULAR IMPLANTS AND GRAFTS: Chronic | ICD-10-CM

## 2021-11-26 DIAGNOSIS — Z98.49 CATARACT EXTRACTION STATUS, UNSPECIFIED EYE: Chronic | ICD-10-CM

## 2021-11-26 DIAGNOSIS — C16.9 MALIGNANT NEOPLASM OF STOMACH, UNSPECIFIED: ICD-10-CM

## 2021-11-26 DIAGNOSIS — Z93.1 GASTROSTOMY STATUS: Chronic | ICD-10-CM

## 2021-11-26 DIAGNOSIS — Z11.52 ENCOUNTER FOR SCREENING FOR COVID-19: ICD-10-CM

## 2021-11-26 LAB — SARS-COV-2 RNA SPEC QL NAA+PROBE: SIGNIFICANT CHANGE UP

## 2021-11-26 PROCEDURE — 74177 CT ABD & PELVIS W/CONTRAST: CPT | Mod: 26,MG

## 2021-11-26 PROCEDURE — C9803: CPT

## 2021-11-26 PROCEDURE — 74177 CT ABD & PELVIS W/CONTRAST: CPT | Mod: MG

## 2021-11-26 PROCEDURE — G1004: CPT

## 2021-11-26 PROCEDURE — U0003: CPT

## 2021-11-26 PROCEDURE — U0005: CPT

## 2021-11-27 ENCOUNTER — NON-APPOINTMENT (OUTPATIENT)
Age: 71
End: 2021-11-27

## 2021-11-29 ENCOUNTER — RESULT REVIEW (OUTPATIENT)
Age: 71
End: 2021-11-29

## 2021-11-29 ENCOUNTER — OUTPATIENT (OUTPATIENT)
Dept: OUTPATIENT SERVICES | Facility: HOSPITAL | Age: 71
LOS: 1 days | End: 2021-11-29
Payer: MEDICARE

## 2021-11-29 VITALS
OXYGEN SATURATION: 99 % | DIASTOLIC BLOOD PRESSURE: 95 MMHG | SYSTOLIC BLOOD PRESSURE: 139 MMHG | HEART RATE: 82 BPM | RESPIRATION RATE: 17 BRPM | TEMPERATURE: 98 F

## 2021-11-29 DIAGNOSIS — Z98.49 CATARACT EXTRACTION STATUS, UNSPECIFIED EYE: Chronic | ICD-10-CM

## 2021-11-29 DIAGNOSIS — Z98.890 OTHER SPECIFIED POSTPROCEDURAL STATES: Chronic | ICD-10-CM

## 2021-11-29 DIAGNOSIS — Z93.1 GASTROSTOMY STATUS: Chronic | ICD-10-CM

## 2021-11-29 DIAGNOSIS — Z95.828 PRESENCE OF OTHER VASCULAR IMPLANTS AND GRAFTS: Chronic | ICD-10-CM

## 2021-11-29 DIAGNOSIS — C16.9 MALIGNANT NEOPLASM OF STOMACH, UNSPECIFIED: ICD-10-CM

## 2021-11-29 PROCEDURE — L8699: CPT

## 2021-11-29 PROCEDURE — 49451 REPLACE DUOD/JEJ TUBE PERC: CPT

## 2021-11-29 PROCEDURE — C1769: CPT

## 2021-11-29 NOTE — PROCEDURE NOTE - PLAN
-The patient tolerated the procedure well and was discharged from the radiology department in stable condition. No immediate complications.  -routine follow up every 3 months for Jtube exchange

## 2021-11-29 NOTE — ASU DISCHARGE PLAN (ADULT/PEDIATRIC) - ASU DC SPECIAL INSTRUCTIONSFT
JTube Exchange    Discharge Instructions  - You have had a J-Tube exchanged.  - Keep the area clean and dry.  - Do not soak in a tub or pool with the JTube, however you may shower with the JTube, and dressing covered in plastic wrap.  - Do not put traction on the JTube  and be careful that the JTube does not get accidentally dislodged.  - You may resume your normal diet.  - You may resume your normal medications. Flush JTube aggressively with saline to prevent clogging of the JTube.  - It is normal to experience some pain over the site for the next few days. You may take apply ice to the area (20 minutes on, 20 minutes off) and take Tylenol for that pain. Do not take more frequently than every 6 hours and do not exceed more than 3000mg of Tylenol in a 24 hour period.    Notify your primary physician and/or Interventional Radiology IMMEDIATELY if you experience any of the following       - Fever of 101F or 38C       - Chills or Rigors/ Shakes       - Swelling and/or Redness in the area of the puncture site       - Worsening Pain       - Blood soaked bandages or worsening bleeding       - Lightheadedness and/or dizziness upon standing       - Chest Pain/ Tightness       - Shortness of Breath       - Difficulty walking    If you have a problem that you believe requires IMMEDIATE attention, please go to your NEAREST Emergency Room. If you believe your problem can safely wait until you speak to a physician, please call Interventional Radiology for any concerns.    During Normal Weekday Business Hours- You can contact the Interventional Radiology department during normal business hours via telephone.  During Evenings and Weekends- If you need to contact Interventional Radiology during off hours, do so by calling the hospital and requesting to be connected to the Interventional Radiologist on call.

## 2021-11-29 NOTE — PROCEDURE NOTE - PROCEDURE FINDINGS AND DETAILS
-existing jejunostomy tube was prepped and draped.   -Injection of contrast demonstrated the jejunostomy in the small bowel.   -The jejunostomy tube was removed over a guidewire under the fluoroscopic guidance.   -A new  jejunostomy tube was placed over the wire.   -Approximately 7 cc of diluted contrast was injected into the balloon.   -Injection of contrast confirmed appropriate tube position in the small bowel.

## 2021-11-29 NOTE — PRE PROCEDURE NOTE - PRE PROCEDURE EVALUATION
Interventional Radiology    HPI: 70 yo male with history of stomach cancer s/p chemotherapy,  esophagojejunostomy and total gastrectomy 2017.  Presents to IR today 11/29/2021 for routine J-tube exchange.       Allergies: NKDA  Medications (Abx/Cardiac/Anticoagulation/Blood Products)      Data:    T(C): 36.8  HR: 82  BP: 139/95  RR: 17  SpO2: 99%    Malignant neoplasm of stomach    Family history of ovarian cancer (Mother)    Family history of ischemic heart disease (IHD) (Father)    Essential hypertension    Type 2 diabetes mellitus without complication, unspecified long term insulin use status    Hypothyroidism    Gastric mass    Iron deficiency anemia, unspecified iron deficiency anemia type    Constipation    Dysphagia, unspecified    Type 2 diabetes mellitus    H/O umbilical hernia repair    Port-a-cath in place    History of gastric surgery    Gastrostomy tube in place    S/P cataract surgery    H/O endoscopy    [id number]        Exam  General: No acute distress  Chest: Non labored breathing   ABD: Soft, non tender to palp , J tube in place    Plan: 70 yo male with history of stomach cancer s/p chemotherapy,  esophagojejunostomy and total gastrectomy 2017.  Presents to IR today 11/29/2021 for routine J-tube exchange.    -Risks/Benefits/alternatives explained with the patient and/or healthcare proxy and witnessed informed consent obtained.

## 2021-11-29 NOTE — ASU DISCHARGE PLAN (ADULT/PEDIATRIC) - NURSING INSTRUCTIONS
Please feel free to contact us at [phone] if any problems arise. After 6PM, Monday through Friday, on weekends and on holidays, please call [phone] and ask for the radiology resident on call to be paged.

## 2021-11-30 ENCOUNTER — APPOINTMENT (OUTPATIENT)
Dept: SURGICAL ONCOLOGY | Facility: CLINIC | Age: 71
End: 2021-11-30
Payer: MEDICARE

## 2021-11-30 VITALS
RESPIRATION RATE: 16 BRPM | BODY MASS INDEX: 22.19 KG/M2 | OXYGEN SATURATION: 97 % | HEIGHT: 70 IN | TEMPERATURE: 97.8 F | HEART RATE: 70 BPM | WEIGHT: 155 LBS | SYSTOLIC BLOOD PRESSURE: 143 MMHG | DIASTOLIC BLOOD PRESSURE: 90 MMHG

## 2021-11-30 PROCEDURE — 99213 OFFICE O/P EST LOW 20 MIN: CPT

## 2021-12-01 NOTE — HISTORY OF PRESENT ILLNESS
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 Peguero.  His dysphagia has improved slightly.  He reports clogged jtube.  He denies abdominal pain.\par \par 09/14/2021: Doing well.  He reports improved po intake, but is still predominantly on jtube feeds.  He had a suture granuloma removed last month during visit with Dr. Cabello.  He reports yellowish drainage from removal site in upper abdominal incision.  He denies fever, abdominal pain.\par \par 11/04/2021: Patient still has persistent drainage from suture granuloma removal site in the upper abdominal incisional region.  He changes dressing daily.  Fluid is thick yellow/purulent.  He is maintaining nutrition with supplemental jtube feeds.  He denies fever.\par \par 11/30/2021: Patient is s/p CT abdomen/pelvis and jtube exchange.  He has had mild weight loss since his last visit.  He had persistent drainage from upper midline incisional opening at site of previous suture.  CT abdomen/pelvis 11/26/2021 shows an thin anterior abdominal wall fluid collection just posterior to the rectus muscle measuring 7.8 x 1.7 cm.

## 2021-12-01 NOTE — ASSESSMENT
[FreeTextEntry1] : CT reviewed.  Collection is thin and unlikely amenable to percutaneous drainage.\par Continue local wound care.\par Increase jtube feedings.\par Follow up in one month.

## 2021-12-13 ENCOUNTER — OUTPATIENT (OUTPATIENT)
Dept: OUTPATIENT SERVICES | Facility: HOSPITAL | Age: 71
LOS: 1 days | Discharge: HOME | End: 2021-12-13

## 2021-12-13 DIAGNOSIS — Z98.890 OTHER SPECIFIED POSTPROCEDURAL STATES: Chronic | ICD-10-CM

## 2021-12-13 DIAGNOSIS — Z95.828 PRESENCE OF OTHER VASCULAR IMPLANTS AND GRAFTS: Chronic | ICD-10-CM

## 2021-12-13 DIAGNOSIS — Z98.49 CATARACT EXTRACTION STATUS, UNSPECIFIED EYE: Chronic | ICD-10-CM

## 2021-12-13 DIAGNOSIS — C16.9 MALIGNANT NEOPLASM OF STOMACH, UNSPECIFIED: ICD-10-CM

## 2021-12-13 DIAGNOSIS — Z93.1 GASTROSTOMY STATUS: Chronic | ICD-10-CM

## 2021-12-14 DIAGNOSIS — Z46.59 ENCOUNTER FOR FITTING AND ADJUSTMENT OF OTHER GASTROINTESTINAL APPLIANCE AND DEVICE: ICD-10-CM

## 2021-12-15 ENCOUNTER — APPOINTMENT (OUTPATIENT)
Dept: HEMATOLOGY ONCOLOGY | Facility: CLINIC | Age: 71
End: 2021-12-15
Payer: MEDICARE

## 2021-12-15 VITALS
HEIGHT: 70 IN | BODY MASS INDEX: 22.66 KG/M2 | TEMPERATURE: 97 F | HEART RATE: 79 BPM | DIASTOLIC BLOOD PRESSURE: 81 MMHG | WEIGHT: 158.29 LBS | OXYGEN SATURATION: 97 % | RESPIRATION RATE: 16 BRPM | SYSTOLIC BLOOD PRESSURE: 137 MMHG

## 2021-12-15 PROCEDURE — 99214 OFFICE O/P EST MOD 30 MIN: CPT

## 2021-12-16 NOTE — REASON FOR VISIT
[Follow-Up Visit] : a follow-up [FreeTextEntry2] : gastric ca I have personally performed a face to face diagnostic evaluation on this patient. I have reviewed the ACP note and agree with the history, exam and plan of care, except as noted.

## 2021-12-16 NOTE — HISTORY OF PRESENT ILLNESS
[de-identified] : see dictated letter [de-identified] : Pt has inc sugar and diabetes and pt saw Dr. Garcia. Pt put on humulin but not started. Pt wants to go on oral meds.

## 2021-12-16 NOTE — ASSESSMENT
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  He currently has no evidence of recurrent disease.  He continues on tube feeding because of his loss of interest and appetite for food.  He currently his weight is maintained with tube feedings and small amount of oral feedings.  He currently has been required treatment for diabetes and will begin insulin treatments shortly.  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  The recommendation for that as per Dr. Sotomayor from hematology is lifelong Eliquis 2.5 mg twice a day.  The patient will return to the office in 4 months or sooner as needed. [Curative] : Goals of care discussed with patient: Curative [Palliative Care Plan] : not applicable at this time

## 2021-12-28 ENCOUNTER — APPOINTMENT (OUTPATIENT)
Dept: INFUSION THERAPY | Facility: HOSPITAL | Age: 71
End: 2021-12-28

## 2021-12-28 ENCOUNTER — RESULT REVIEW (OUTPATIENT)
Age: 71
End: 2021-12-28

## 2021-12-28 LAB
ALBUMIN SERPL ELPH-MCNC: 3.9 G/DL — SIGNIFICANT CHANGE UP (ref 3.3–5)
ALP SERPL-CCNC: 145 U/L — HIGH (ref 40–120)
ALT FLD-CCNC: 41 U/L — SIGNIFICANT CHANGE UP (ref 10–45)
ANION GAP SERPL CALC-SCNC: 11 MMOL/L — SIGNIFICANT CHANGE UP (ref 5–17)
APTT BLD: 39.3 SEC — HIGH (ref 27.5–35.5)
AST SERPL-CCNC: 37 U/L — SIGNIFICANT CHANGE UP (ref 10–40)
BASOPHILS # BLD AUTO: 0.1 K/UL — SIGNIFICANT CHANGE UP (ref 0–0.2)
BASOPHILS NFR BLD AUTO: 1.6 % — SIGNIFICANT CHANGE UP (ref 0–2)
BILIRUB SERPL-MCNC: 0.7 MG/DL — SIGNIFICANT CHANGE UP (ref 0.2–1.2)
BUN SERPL-MCNC: 17 MG/DL — SIGNIFICANT CHANGE UP (ref 7–23)
CALCIUM SERPL-MCNC: 9 MG/DL — SIGNIFICANT CHANGE UP (ref 8.4–10.5)
CHLORIDE SERPL-SCNC: 101 MMOL/L — SIGNIFICANT CHANGE UP (ref 96–108)
CO2 SERPL-SCNC: 24 MMOL/L — SIGNIFICANT CHANGE UP (ref 22–31)
CREAT SERPL-MCNC: 0.74 MG/DL — SIGNIFICANT CHANGE UP (ref 0.5–1.3)
EOSINOPHIL # BLD AUTO: 0.19 K/UL — SIGNIFICANT CHANGE UP (ref 0–0.5)
EOSINOPHIL NFR BLD AUTO: 3 % — SIGNIFICANT CHANGE UP (ref 0–6)
GLUCOSE SERPL-MCNC: 136 MG/DL — HIGH (ref 70–99)
HCT VFR BLD CALC: 39.6 % — SIGNIFICANT CHANGE UP (ref 39–50)
HGB BLD-MCNC: 13.3 G/DL — SIGNIFICANT CHANGE UP (ref 13–17)
IMM GRANULOCYTES NFR BLD AUTO: 0.3 % — SIGNIFICANT CHANGE UP (ref 0–1.5)
INR BLD: 1.23 RATIO — HIGH (ref 0.88–1.16)
LYMPHOCYTES # BLD AUTO: 1.38 K/UL — SIGNIFICANT CHANGE UP (ref 1–3.3)
LYMPHOCYTES # BLD AUTO: 21.6 % — SIGNIFICANT CHANGE UP (ref 13–44)
MCHC RBC-ENTMCNC: 32.8 PG — SIGNIFICANT CHANGE UP (ref 27–34)
MCHC RBC-ENTMCNC: 33.6 G/DL — SIGNIFICANT CHANGE UP (ref 32–36)
MCV RBC AUTO: 97.5 FL — SIGNIFICANT CHANGE UP (ref 80–100)
MONOCYTES # BLD AUTO: 0.77 K/UL — SIGNIFICANT CHANGE UP (ref 0–0.9)
MONOCYTES NFR BLD AUTO: 12.1 % — SIGNIFICANT CHANGE UP (ref 2–14)
NEUTROPHILS # BLD AUTO: 3.93 K/UL — SIGNIFICANT CHANGE UP (ref 1.8–7.4)
NEUTROPHILS NFR BLD AUTO: 61.4 % — SIGNIFICANT CHANGE UP (ref 43–77)
NRBC # BLD: 0 /100 WBCS — SIGNIFICANT CHANGE UP (ref 0–0)
PLATELET # BLD AUTO: 261 K/UL — SIGNIFICANT CHANGE UP (ref 150–400)
POTASSIUM SERPL-MCNC: 4.3 MMOL/L — SIGNIFICANT CHANGE UP (ref 3.5–5.3)
POTASSIUM SERPL-SCNC: 4.3 MMOL/L — SIGNIFICANT CHANGE UP (ref 3.5–5.3)
PROT SERPL-MCNC: 7.3 G/DL — SIGNIFICANT CHANGE UP (ref 6–8.3)
PROTHROM AB SERPL-ACNC: 14.4 SEC — HIGH (ref 10.6–13.6)
RBC # BLD: 4.06 M/UL — LOW (ref 4.2–5.8)
RBC # FLD: 14.3 % — SIGNIFICANT CHANGE UP (ref 10.3–14.5)
SODIUM SERPL-SCNC: 136 MMOL/L — SIGNIFICANT CHANGE UP (ref 135–145)
WBC # BLD: 6.39 K/UL — SIGNIFICANT CHANGE UP (ref 3.8–10.5)
WBC # FLD AUTO: 6.39 K/UL — SIGNIFICANT CHANGE UP (ref 3.8–10.5)

## 2021-12-29 DIAGNOSIS — Z51.11 ENCOUNTER FOR ANTINEOPLASTIC CHEMOTHERAPY: ICD-10-CM

## 2021-12-29 DIAGNOSIS — R11.2 NAUSEA WITH VOMITING, UNSPECIFIED: ICD-10-CM

## 2021-12-30 ENCOUNTER — OUTPATIENT (OUTPATIENT)
Dept: OUTPATIENT SERVICES | Facility: HOSPITAL | Age: 71
LOS: 1 days | End: 2021-12-30
Payer: MEDICARE

## 2021-12-30 VITALS
TEMPERATURE: 99 F | DIASTOLIC BLOOD PRESSURE: 80 MMHG | OXYGEN SATURATION: 97 % | RESPIRATION RATE: 16 BRPM | HEART RATE: 87 BPM | SYSTOLIC BLOOD PRESSURE: 130 MMHG | WEIGHT: 162.04 LBS | HEIGHT: 70 IN

## 2021-12-30 DIAGNOSIS — Z98.890 OTHER SPECIFIED POSTPROCEDURAL STATES: Chronic | ICD-10-CM

## 2021-12-30 DIAGNOSIS — Z90.411 ACQUIRED PARTIAL ABSENCE OF PANCREAS: Chronic | ICD-10-CM

## 2021-12-30 DIAGNOSIS — C16.9 MALIGNANT NEOPLASM OF STOMACH, UNSPECIFIED: ICD-10-CM

## 2021-12-30 DIAGNOSIS — Z93.1 GASTROSTOMY STATUS: Chronic | ICD-10-CM

## 2021-12-30 DIAGNOSIS — Z90.3 ACQUIRED ABSENCE OF STOMACH [PART OF]: Chronic | ICD-10-CM

## 2021-12-30 DIAGNOSIS — Z98.49 CATARACT EXTRACTION STATUS, UNSPECIFIED EYE: Chronic | ICD-10-CM

## 2021-12-30 DIAGNOSIS — Z86.2 PERSONAL HISTORY OF DISEASES OF THE BLOOD AND BLOOD-FORMING ORGANS AND CERTAIN DISORDERS INVOLVING THE IMMUNE MECHANISM: ICD-10-CM

## 2021-12-30 DIAGNOSIS — Z87.19 PERSONAL HISTORY OF OTHER DISEASES OF THE DIGESTIVE SYSTEM: Chronic | ICD-10-CM

## 2021-12-30 DIAGNOSIS — R94.31 ABNORMAL ELECTROCARDIOGRAM [ECG] [EKG]: ICD-10-CM

## 2021-12-30 DIAGNOSIS — Z92.21 PERSONAL HISTORY OF ANTINEOPLASTIC CHEMOTHERAPY: ICD-10-CM

## 2021-12-30 DIAGNOSIS — Z95.828 PRESENCE OF OTHER VASCULAR IMPLANTS AND GRAFTS: Chronic | ICD-10-CM

## 2021-12-30 DIAGNOSIS — E11.9 TYPE 2 DIABETES MELLITUS WITHOUT COMPLICATIONS: ICD-10-CM

## 2021-12-30 LAB
A1C WITH ESTIMATED AVERAGE GLUCOSE RESULT: 8.4 % — HIGH (ref 4–5.6)
ALBUMIN SERPL ELPH-MCNC: 3.7 G/DL — SIGNIFICANT CHANGE UP (ref 3.3–5)
ALP SERPL-CCNC: 149 U/L — HIGH (ref 40–120)
ALT FLD-CCNC: 36 U/L — SIGNIFICANT CHANGE UP (ref 4–41)
ANION GAP SERPL CALC-SCNC: 12 MMOL/L — SIGNIFICANT CHANGE UP (ref 7–14)
AST SERPL-CCNC: 47 U/L — HIGH (ref 4–40)
BILIRUB SERPL-MCNC: 0.6 MG/DL — SIGNIFICANT CHANGE UP (ref 0.2–1.2)
BUN SERPL-MCNC: 17 MG/DL — SIGNIFICANT CHANGE UP (ref 7–23)
CALCIUM SERPL-MCNC: 9.2 MG/DL — SIGNIFICANT CHANGE UP (ref 8.4–10.5)
CHLORIDE SERPL-SCNC: 102 MMOL/L — SIGNIFICANT CHANGE UP (ref 98–107)
CO2 SERPL-SCNC: 22 MMOL/L — SIGNIFICANT CHANGE UP (ref 22–31)
CREAT SERPL-MCNC: 0.62 MG/DL — SIGNIFICANT CHANGE UP (ref 0.5–1.3)
ESTIMATED AVERAGE GLUCOSE: 194 — SIGNIFICANT CHANGE UP
GLUCOSE SERPL-MCNC: 166 MG/DL — HIGH (ref 70–99)
HCT VFR BLD CALC: 41.8 % — SIGNIFICANT CHANGE UP (ref 39–50)
HGB BLD-MCNC: 13.7 G/DL — SIGNIFICANT CHANGE UP (ref 13–17)
MCHC RBC-ENTMCNC: 32.5 PG — SIGNIFICANT CHANGE UP (ref 27–34)
MCHC RBC-ENTMCNC: 32.8 GM/DL — SIGNIFICANT CHANGE UP (ref 32–36)
MCV RBC AUTO: 99.1 FL — SIGNIFICANT CHANGE UP (ref 80–100)
NRBC # BLD: 0 /100 WBCS — SIGNIFICANT CHANGE UP
NRBC # FLD: 0 K/UL — SIGNIFICANT CHANGE UP
PLATELET # BLD AUTO: 241 K/UL — SIGNIFICANT CHANGE UP (ref 150–400)
POTASSIUM SERPL-MCNC: 5.3 MMOL/L — SIGNIFICANT CHANGE UP (ref 3.5–5.3)
POTASSIUM SERPL-SCNC: 5.3 MMOL/L — SIGNIFICANT CHANGE UP (ref 3.5–5.3)
PROT SERPL-MCNC: 7.6 G/DL — SIGNIFICANT CHANGE UP (ref 6–8.3)
RBC # BLD: 4.22 M/UL — SIGNIFICANT CHANGE UP (ref 4.2–5.8)
RBC # FLD: 14.6 % — HIGH (ref 10.3–14.5)
SODIUM SERPL-SCNC: 136 MMOL/L — SIGNIFICANT CHANGE UP (ref 135–145)
WBC # BLD: 6.14 K/UL — SIGNIFICANT CHANGE UP (ref 3.8–10.5)
WBC # FLD AUTO: 6.14 K/UL — SIGNIFICANT CHANGE UP (ref 3.8–10.5)

## 2021-12-30 PROCEDURE — 93010 ELECTROCARDIOGRAM REPORT: CPT

## 2021-12-30 RX ORDER — METFORMIN HYDROCHLORIDE 850 MG/1
1 TABLET ORAL
Qty: 0 | Refills: 0 | DISCHARGE

## 2021-12-30 RX ORDER — SODIUM CHLORIDE 9 MG/ML
1000 INJECTION, SOLUTION INTRAVENOUS
Refills: 0 | Status: DISCONTINUED | OUTPATIENT
Start: 2022-01-07 | End: 2022-01-21

## 2021-12-30 NOTE — H&P PST ADULT - PROBLEM SELECTOR PLAN 3
Pt advised to take only 4 units Humalin N night prior to surgery.  Pt also advised to review plan with BECKY Ramey on admission Pt on Humulin 6 units prior to JT feedings at night.  Pt advised no tube feedings after 11pm.  Advised him to discuss regimen for JT feedings and his Humulin N day prior to surgery.

## 2021-12-30 NOTE — H&P PST ADULT - NSICDXPASTSURGICALHX_GEN_ALL_CORE_FT
PAST SURGICAL HISTORY:  Gastrostomy tube in place open jejunostomy with J-tube November 2020    H/O endoscopy & stent placement 12/2020    H/O umbilical hernia repair     History of gastric surgery 2017    Port-a-cath in place right chest wall    S/P cataract surgery      PAST SURGICAL HISTORY:  Gastrostomy tube in place open jejunostomy with J-tube November 2020    H/O endoscopy & stent placement 12/2020    H/O umbilical hernia repair 2018 with mesh    History of dysphagia EGD with Axios stent placment to connect the blind limb to th e efferent limb (jejunojejunostomy)    History of gastric surgery 2017    History of partial pancreatectomy distal pancreatectomy/ splenectomy esophatojejunostomy    Port-a-cath in place right chest wall    S/P cataract surgery     S/P gastrectomy total gastrectomy 2017    Status post neck dissection 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, gastric primary

## 2021-12-30 NOTE — H&P PST ADULT - PROBLEM SELECTOR PLAN 2
Pt on Eliquis  followed by Hematology.  I called Hematology office (Dr. Da Silva), Informed Hematology pt awaiting instructions for Eliquis pre-op. Requested recommendations in writing, Dr. Cabello informed of above by email

## 2021-12-30 NOTE — H&P PST ADULT - OTHER CARE PROVIDERS
Dr. Montalvo, Cardiologist  [phone]; Dr. Martell Sotomayor  and Dr. Henry Hem/ Onc [phone] Dr. Montalvo, Cardiologist  [phone]; Dr. Martell Sotomayor  and Dr. Henry Hem/ Onc [phone]; Dr. Garcia, Endocrinologist

## 2021-12-30 NOTE — H&P PST ADULT - ASSESSMENT
69 y/o male  with PMH of HTN, BPH, T2DM, dx of gastric cancer s/p gastrectomy, splenectomy, distal pancreatectomy, aperistalsis of esophagus, J-tube placement, s/p stent placement to connect blind limb to efferent limb to facilitate passage of food. S/P chemo and Rad tx [id number].  Pt reports no evidence of returning disease.  Pt scheduled for removal of Mediport.

## 2021-12-30 NOTE — H&P PST ADULT - NSICDXPASTMEDICALHX_GEN_ALL_CORE_FT
PAST MEDICAL HISTORY:  Constipation     Dysphagia, unspecified     Essential hypertension was on losartan, now diet control    Gastric mass ulcerated mass in gastric cardia with small perigastric nodes on endoscopy.    History of blood clotting disorder prothrombin  mutation genetic condition causing hypercoagulable state.  Follwed  by Hematology    Hypothyroidism     Iron deficiency anemia, unspecified iron deficiency anemia type     Type 2 diabetes mellitus      PAST MEDICAL HISTORY:  Constipation     DVT, lower extremity     Dysphagia, unspecified obstruction at level of esophagojejunostomy    Essential hypertension was on losartan, now diet control    Gastric adenocarcinoma metastatic    Gastric mass ulcerated mass in gastric cardia with small perigastric nodes on endoscopy.    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

## 2021-12-30 NOTE — H&P PST ADULT - HEMATOLOGY/LYMPHATICS COMMENTS
Px of portal vein thrombosis, DVT. Prothrombin mutation on Eliquis 2.5 mg 2x/day.  Followed by hematology.

## 2021-12-30 NOTE — H&P PST ADULT - GASTROINTESTINAL COMMENTS
hx of gastric cancer s/p gastrectomy, splenectomy, distal pancreatectomy, aperistalsis of esophagus, J-tube placement, s/p stent placement to connect blind limb to efferent limb to facilitate passage of food. S/P chemo and Rad tx [id number].  Pt reports no evidence of returning disease.  Pt scheduled for removal of Mediport.  Pt takes small amt soft  foods orally .  He continues on Jtube feedings. Pt reports he has persistent drainage from upper midline incisional opening at site of previous thin J tube present.  Also with gauze dressing to  abdomen, clean, intact

## 2021-12-30 NOTE — H&P PST ADULT - VENOUS THROMBOEMBOLISM CURRENT STATUS
(2) malignancy (present or previous) (2) malignancy (present or previous)/(3) other thrombophilia, congenital or acquired

## 2021-12-30 NOTE — H&P PST ADULT - GIT GEN HX ROS MEA POS PC
Pt reports he takes small soft food and fluids throughout daytime, and takes JT feedings at night.  Pt takes Humulin N prior to JT feedings at night

## 2021-12-30 NOTE — H&P PST ADULT - PROBLEM SELECTOR PLAN 1
removal of Mediport.     CBC CMP HgbA1C EKG    Preop instructions and antibacterial soap given and explained (verbal and written), with teach back.     Pt reports he has appt at PST office today form preop eval  (comorbidities).  Pt also reports he had cardiology eval and testing in 2020, requested on chart    SIMONE precautions per Stop Bang Questionnaire criteria removal of Mediport.     CBC CMP HgbA1C EKG    Preop instructions and antibacterial soap given and explained (verbal and written), with teach back.     Pt reports he has appt at PST office today form preop eval  (comorbidities).      SIMONE precautions per Stop Bang Questionnaire criteria removal of Mediport.     CBC CMP HgbA1C EKG  PT/PTT on chart done 12/28/21    Preop instructions and antibacterial soap given and explained (verbal and written), with teach back.     Pt reports he has appt at PST office today form preop eval  (comorbidities).      SIMOEN precautions per Stop Bang Questionnaire criteria

## 2021-12-30 NOTE — H&P PST ADULT - HISTORY OF PRESENT ILLNESS
70M with PMH of HTN, BPH, T2DM, recent dx of gastric cancer s/p gastrectomy, splenectomy, distal pancreatectomy, aperistalsis of esophagus, recent admission for J-tube placement, s/p stent placement to connect blind limb to efferent limb to facilitate passage of food. No evidence of recurrent disease.  Pt scheduled for removal of mediport 71 y/o male  with PMH of HTN, BPH, T2DM, recent dx of gastric cancer s/p gastrectomy, splenectomy, distal pancreatectomy, aperistalsis of esophagus, recent admission for J-tube placement, s/p stent placement to connect blind limb to efferent limb to facilitate passage of food. No evidence of recurrent disease.  Pt scheduled for removal of mediport 71 y/o male  with PMH of HTN, BPH, T2DM, dx of gastric cancer s/p gastrectomy, splenectomy, distal pancreatectomy, aperistalsis of esophagus, J-tube placement, s/p stent placement to connect blind limb to efferent limb to facilitate passage of food. S/P chemo and Rad tx [id number].  Pt reports no evidence of returning disease.  Pt scheduled for removal of Mediport.

## 2022-01-04 PROBLEM — R13.10 DYSPHAGIA, UNSPECIFIED: Chronic | Status: ACTIVE | Noted: 2020-12-15

## 2022-01-04 PROBLEM — Z87.19 PERSONAL HISTORY OF OTHER DISEASES OF THE DIGESTIVE SYSTEM: Chronic | Status: ACTIVE | Noted: 2021-12-30

## 2022-01-04 PROBLEM — Z86.2 PERSONAL HISTORY OF DISEASES OF THE BLOOD AND BLOOD-FORMING ORGANS AND CERTAIN DISORDERS INVOLVING THE IMMUNE MECHANISM: Chronic | Status: ACTIVE | Noted: 2021-12-30

## 2022-01-04 PROBLEM — E11.9 TYPE 2 DIABETES MELLITUS WITHOUT COMPLICATIONS: Chronic | Status: ACTIVE | Noted: 2021-04-28

## 2022-01-04 PROBLEM — I82.409 ACUTE EMBOLISM AND THROMBOSIS OF UNSPECIFIED DEEP VEINS OF UNSPECIFIED LOWER EXTREMITY: Chronic | Status: ACTIVE | Noted: 2021-12-30

## 2022-01-04 PROBLEM — C77.0 SECONDARY AND UNSPECIFIED MALIGNANT NEOPLASM OF LYMPH NODES OF HEAD, FACE AND NECK: Chronic | Status: ACTIVE | Noted: 2021-12-30

## 2022-01-04 PROBLEM — C16.9 MALIGNANT NEOPLASM OF STOMACH, UNSPECIFIED: Chronic | Status: ACTIVE | Noted: 2021-12-30

## 2022-01-06 ENCOUNTER — TRANSCRIPTION ENCOUNTER (OUTPATIENT)
Age: 72
End: 2022-01-06

## 2022-01-06 NOTE — ASU PATIENT PROFILE, ADULT - NSICDXPASTMEDICALHX_GEN_ALL_CORE_FT
PAST MEDICAL HISTORY:  Constipation     DVT, lower extremity     Dysphagia, unspecified obstruction at level of esophagojejunostomy    Essential hypertension was on losartan, now diet control    Gastric adenocarcinoma metastatic    Gastric mass ulcerated mass in gastric cardia with small perigastric nodes on endoscopy.    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

## 2022-01-06 NOTE — ASU PATIENT PROFILE, ADULT - NSICDXPASTSURGICALHX_GEN_ALL_CORE_FT
PAST SURGICAL HISTORY:  Gastrostomy tube in place open jejunostomy with J-tube November 2020    H/O endoscopy & stent placement 12/2020    H/O umbilical hernia repair 2018 with mesh    History of dysphagia EGD with Axios stent placment to connect the blind limb to th e efferent limb (jejunojejunostomy)    History of gastric surgery 2017    History of partial pancreatectomy distal pancreatectomy/ splenectomy esophatojejunostomy    Port-a-cath in place right chest wall    S/P cataract surgery     S/P gastrectomy total gastrectomy 2017    Status post neck dissection 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, gastric primary

## 2022-01-06 NOTE — ASU PATIENT PROFILE, ADULT - FALL HARM RISK - UNIVERSAL INTERVENTIONS
Bed in lowest position, wheels locked, appropriate side rails in place/Call bell, personal items and telephone in reach/Instruct patient to call for assistance before getting out of bed or chair/Non-slip footwear when patient is out of bed/[place] to call system/Physically safe environment - no spills, clutter or unnecessary equipment/Purposeful Proactive Rounding/Room/bathroom lighting operational, light cord in reach

## 2022-01-07 ENCOUNTER — APPOINTMENT (OUTPATIENT)
Dept: SURGICAL ONCOLOGY | Facility: HOSPITAL | Age: 72
End: 2022-01-07

## 2022-01-07 ENCOUNTER — RESULT REVIEW (OUTPATIENT)
Age: 72
End: 2022-01-07

## 2022-01-07 ENCOUNTER — OUTPATIENT (OUTPATIENT)
Dept: OUTPATIENT SERVICES | Facility: HOSPITAL | Age: 72
LOS: 1 days | Discharge: HOME | End: 2022-01-07
Payer: MEDICARE

## 2022-01-07 VITALS
HEART RATE: 58 BPM | DIASTOLIC BLOOD PRESSURE: 72 MMHG | SYSTOLIC BLOOD PRESSURE: 130 MMHG | RESPIRATION RATE: 19 BRPM | OXYGEN SATURATION: 97 %

## 2022-01-07 VITALS
HEIGHT: 70 IN | TEMPERATURE: 98 F | WEIGHT: 162.04 LBS | DIASTOLIC BLOOD PRESSURE: 91 MMHG | HEART RATE: 70 BPM | RESPIRATION RATE: 16 BRPM | OXYGEN SATURATION: 96 % | SYSTOLIC BLOOD PRESSURE: 158 MMHG

## 2022-01-07 DIAGNOSIS — Z98.890 OTHER SPECIFIED POSTPROCEDURAL STATES: Chronic | ICD-10-CM

## 2022-01-07 DIAGNOSIS — C16.9 MALIGNANT NEOPLASM OF STOMACH, UNSPECIFIED: ICD-10-CM

## 2022-01-07 DIAGNOSIS — Z95.828 PRESENCE OF OTHER VASCULAR IMPLANTS AND GRAFTS: Chronic | ICD-10-CM

## 2022-01-07 DIAGNOSIS — Z87.19 PERSONAL HISTORY OF OTHER DISEASES OF THE DIGESTIVE SYSTEM: Chronic | ICD-10-CM

## 2022-01-07 DIAGNOSIS — Z98.49 CATARACT EXTRACTION STATUS, UNSPECIFIED EYE: Chronic | ICD-10-CM

## 2022-01-07 DIAGNOSIS — Z90.411 ACQUIRED PARTIAL ABSENCE OF PANCREAS: Chronic | ICD-10-CM

## 2022-01-07 DIAGNOSIS — Z93.1 GASTROSTOMY STATUS: Chronic | ICD-10-CM

## 2022-01-07 DIAGNOSIS — Z90.3 ACQUIRED ABSENCE OF STOMACH [PART OF]: Chronic | ICD-10-CM

## 2022-01-07 LAB
GLUCOSE BLDC GLUCOMTR-MCNC: 112 MG/DL — HIGH (ref 70–99)
GLUCOSE BLDC GLUCOMTR-MCNC: 124 MG/DL — HIGH (ref 70–99)

## 2022-01-07 PROCEDURE — 88300 SURGICAL PATH GROSS: CPT | Mod: 26

## 2022-01-07 PROCEDURE — 36590 REMOVAL TUNNELED CV CATH: CPT

## 2022-01-07 RX ORDER — ACETAMINOPHEN 500 MG
975 TABLET ORAL ONCE
Refills: 0 | Status: DISCONTINUED | OUTPATIENT
Start: 2022-01-07 | End: 2022-01-21

## 2022-01-07 NOTE — ASU DISCHARGE PLAN (ADULT/PEDIATRIC) - NS MD DC FALL RISK RISK
For information on Fall & Injury Prevention, visit: [email or web address] OR  [email or web address] OR  https://www.cdc.gov/steadi/patient.html

## 2022-01-07 NOTE — ASU DISCHARGE PLAN (ADULT/PEDIATRIC) - ASU DC SPECIAL INSTRUCTIONSFT
You do NOT need to followup with your surgeon after this procedure.    You may resume a regular diet.    You may take Tylenol and Motrin as needed for pain, being mindful of maximum dosages per day. You may pick these up over the counter at your pharmacy.    Please do not strain or exercise heavily for two weeks as the incision heals.    You may shower after 24 hours. Please do not remove the dressing until then and please keep it dry.

## 2022-01-07 NOTE — ASU DISCHARGE PLAN (ADULT/PEDIATRIC) - CARE PROVIDER_API CALL
Saravanan Cabello)  ColonRectal Surgery; Surgery  [address] [place] [address]  Phone: [phone]  Fax: [phone]  Follow Up Time:

## 2022-01-07 NOTE — ASU DISCHARGE PLAN (ADULT/PEDIATRIC) - NURSING INSTRUCTIONS
Last dose of TYLENOL for pain management was at 8:45 AM. Next dose of TYLENOL may be taken at or after 2:45 PM if needed. DO NOT take any additional products containing TYLENOL or ACETAMINOPHEN, such as VICODIN, PERCOCET, NORCO, EXCEDRIN, and any over-the-counter cold medications. DO NOT CONSUME MORE THAN 0828-1940 MG OF TYLENOL (acetaminophen) in a 24-hour period.  Next dose of NDAIDS (ibuprofen, motrin, advil, naproxen, aleve, or aspirin) may be taken if needed every 6 hours.

## 2022-01-13 LAB — SURGICAL PATHOLOGY STUDY: SIGNIFICANT CHANGE UP

## 2022-01-18 NOTE — PATIENT PROFILE ADULT - NSTRANSFEREYEGLASSESPAIRS_GEN_A_NUR
Patient would like to speak to a nurse  
Refill request received on pt's Vagifem.  Pt last seen 1/2021 for IC and atrophic vaginitis.  Per Dr. Hunt if pt doing well, may refill x1 year.    Spoke with pt she is doing well on Vagifem, no concerns at this time.  She would like future refills sent to [facility] in [place].  Rx sent.  Remain available as needed  
1 pair

## 2022-01-20 PROBLEM — E11.9 TYPE 2 DIABETES MELLITUS WITHOUT COMPLICATIONS: Chronic | Status: INACTIVE | Noted: 2017-01-11 | Resolved: 2021-04-28

## 2022-01-24 NOTE — PRE-OP CHECKLIST - HEIGHT IN INCHES
----- Message from Michelle Rausch MA sent at 1/19/2022  4:23 PM CST -----  Contact: GLORIA ALVARENGA [[id number]]    ----- Message -----  From: Jordan Delcid MA  Sent: 1/19/2022   4:15 PM CST  To: [facility] Staff    Type: Needs Medical Advice    Who Called: GLORIA ALVARENGA [[id number]]  Best Call Back Number: [phone]  Inquiry/Question: GLORIA ALVARENGA [[id number]] requesting sooner appt has spots on neck he is concerned about , next appt he has is 03/22 would like a sooner appt  Thank you~          11

## 2022-02-22 ENCOUNTER — OUTPATIENT (OUTPATIENT)
Dept: OUTPATIENT SERVICES | Facility: HOSPITAL | Age: 72
LOS: 1 days | End: 2022-02-22
Payer: MEDICARE

## 2022-02-22 ENCOUNTER — APPOINTMENT (OUTPATIENT)
Dept: SURGICAL ONCOLOGY | Facility: CLINIC | Age: 72
End: 2022-02-22
Payer: MEDICARE

## 2022-02-22 VITALS
HEIGHT: 70.5 IN | DIASTOLIC BLOOD PRESSURE: 101 MMHG | BODY MASS INDEX: 21.52 KG/M2 | SYSTOLIC BLOOD PRESSURE: 189 MMHG | WEIGHT: 152 LBS | OXYGEN SATURATION: 92 % | HEART RATE: 69 BPM | RESPIRATION RATE: 16 BRPM | TEMPERATURE: 98.1 F

## 2022-02-22 DIAGNOSIS — Z95.828 PRESENCE OF OTHER VASCULAR IMPLANTS AND GRAFTS: Chronic | ICD-10-CM

## 2022-02-22 DIAGNOSIS — Z93.1 GASTROSTOMY STATUS: Chronic | ICD-10-CM

## 2022-02-22 DIAGNOSIS — Z90.3 ACQUIRED ABSENCE OF STOMACH [PART OF]: Chronic | ICD-10-CM

## 2022-02-22 DIAGNOSIS — Z98.890 OTHER SPECIFIED POSTPROCEDURAL STATES: Chronic | ICD-10-CM

## 2022-02-22 DIAGNOSIS — Z11.52 ENCOUNTER FOR SCREENING FOR COVID-19: ICD-10-CM

## 2022-02-22 DIAGNOSIS — Z98.49 CATARACT EXTRACTION STATUS, UNSPECIFIED EYE: Chronic | ICD-10-CM

## 2022-02-22 DIAGNOSIS — Z90.411 ACQUIRED PARTIAL ABSENCE OF PANCREAS: Chronic | ICD-10-CM

## 2022-02-22 DIAGNOSIS — Z87.19 PERSONAL HISTORY OF OTHER DISEASES OF THE DIGESTIVE SYSTEM: Chronic | ICD-10-CM

## 2022-02-22 LAB — SARS-COV-2 RNA SPEC QL NAA+PROBE: SIGNIFICANT CHANGE UP

## 2022-02-22 PROCEDURE — 99212 OFFICE O/P EST SF 10 MIN: CPT

## 2022-02-22 PROCEDURE — C9803: CPT

## 2022-02-22 PROCEDURE — U0003: CPT

## 2022-02-22 PROCEDURE — U0005: CPT

## 2022-02-23 NOTE — HISTORY OF PRESENT ILLNESS
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 Peguero.  His dysphagia has improved slightly.  He reports clogged jtube.  He denies abdominal pain.\par \par 09/14/2021: Doing well.  He reports improved po intake, but is still predominantly on jtube feeds.  He had a suture granuloma removed last month during visit with Dr. Cabello.  He reports yellowish drainage from removal site in upper abdominal incision.  He denies fever, abdominal pain.\par \par 11/04/2021: Patient still has persistent drainage from suture granuloma removal site in the upper abdominal incisional region.  He changes dressing daily.  Fluid is thick yellow/purulent.  He is maintaining nutrition with supplemental jtube feeds.  He denies fever.\par \par 11/30/2021: Patient is s/p CT abdomen/pelvis and jtube exchange.  He has had mild weight loss since his last visit.  He had persistent drainage from upper midline incisional opening at site of previous suture.  CT abdomen/pelvis 11/26/2021 shows an thin anterior abdominal wall fluid collection just posterior to the rectus muscle measuring 7.8 x 1.7 cm.\par \par 02/22/2022: Overall unchanged.  He reports decreased midline incisional drainage.  He continue to self administer tube feeds without issue.  Tolerating small amounts of oral diet.

## 2022-02-23 NOTE — PHYSICAL EXAM
[FreeTextEntry1] : Abdomen: soft, nontender/nondistended.  Small opening in superior aspect of incision, improved from prior exam.

## 2022-02-25 ENCOUNTER — OUTPATIENT (OUTPATIENT)
Dept: OUTPATIENT SERVICES | Facility: HOSPITAL | Age: 72
LOS: 1 days | End: 2022-02-25
Payer: MEDICARE

## 2022-02-25 ENCOUNTER — RESULT REVIEW (OUTPATIENT)
Age: 72
End: 2022-02-25

## 2022-02-25 VITALS
OXYGEN SATURATION: 96 % | DIASTOLIC BLOOD PRESSURE: 84 MMHG | HEART RATE: 60 BPM | WEIGHT: 158.07 LBS | TEMPERATURE: 98 F | SYSTOLIC BLOOD PRESSURE: 142 MMHG | HEIGHT: 71 IN | RESPIRATION RATE: 18 BRPM

## 2022-02-25 DIAGNOSIS — Z98.49 CATARACT EXTRACTION STATUS, UNSPECIFIED EYE: Chronic | ICD-10-CM

## 2022-02-25 DIAGNOSIS — Z98.890 OTHER SPECIFIED POSTPROCEDURAL STATES: Chronic | ICD-10-CM

## 2022-02-25 DIAGNOSIS — Z90.411 ACQUIRED PARTIAL ABSENCE OF PANCREAS: Chronic | ICD-10-CM

## 2022-02-25 DIAGNOSIS — C16.9 MALIGNANT NEOPLASM OF STOMACH, UNSPECIFIED: ICD-10-CM

## 2022-02-25 DIAGNOSIS — Z87.19 PERSONAL HISTORY OF OTHER DISEASES OF THE DIGESTIVE SYSTEM: Chronic | ICD-10-CM

## 2022-02-25 DIAGNOSIS — Z93.1 GASTROSTOMY STATUS: Chronic | ICD-10-CM

## 2022-02-25 DIAGNOSIS — Z95.828 PRESENCE OF OTHER VASCULAR IMPLANTS AND GRAFTS: Chronic | ICD-10-CM

## 2022-02-25 DIAGNOSIS — Z90.3 ACQUIRED ABSENCE OF STOMACH [PART OF]: Chronic | ICD-10-CM

## 2022-02-25 PROCEDURE — L8699: CPT

## 2022-02-25 PROCEDURE — 49451 REPLACE DUOD/JEJ TUBE PERC: CPT

## 2022-02-25 NOTE — ASU PATIENT PROFILE, ADULT - FALL HARM RISK - UNIVERSAL INTERVENTIONS
Bed in lowest position, wheels locked, appropriate side rails in place/Call bell, personal items and telephone in reach/Instruct patient to call for assistance before getting out of bed or chair/Non-slip footwear when patient is out of bed/[place] to call system/Physically safe environment - no spills, clutter or unnecessary equipment/Purposeful Proactive Rounding/Room/bathroom lighting operational, light cord in reach

## 2022-02-25 NOTE — ASU DISCHARGE PLAN (ADULT/PEDIATRIC) - NURSING INSTRUCTIONS
Please feel free to contact us at [phone] if any  problem arises. After 6PM, Monday through Friday on weekends and on holidays, please call [phone] and ask for the radiology resident on call to be paged.

## 2022-02-25 NOTE — ASU DISCHARGE PLAN (ADULT/PEDIATRIC) - ASU DC SPECIAL INSTRUCTIONSFT
J Tube Exchange    Discharge Instructions  - You have had a J-Tube exchanged.  - Keep the area clean and dry.  - Do not soak in a tub or pool with the J-Tube, however you may shower with the J-Tube and dressing covered in plastic wrap.  - Do not put traction on the J-Tube and be careful that the J-Tube does not get accidentally dislodged.  - You may resume your normal diet.  - You may resume your normal medications. Flush J-Tube aggressively with saline to prevent clogging of the J-tube.  - It is normal to experience some pain over the site for the next few days. You may take apply ice to the area (20 minutes on, 20 minutes off) and take Tylenol for that pain. Do not take more frequently than every 6 hours and do not exceed more than 3000mg of Tylenol in a 24 hour period.    Notify your primary physician and/or Interventional Radiology IMMEDIATELY if you experience any of the following       - Fever of 100.4F or 38C       - Chills or Rigors/ Shakes       - Swelling and/or Redness in the area of the puncture site       - Worsening Pain       - Blood soaked bandages or worsening bleeding       - Lightheadedness and/or dizziness upon standing       - Chest Pain/ Tightness       - Shortness of Breath       - Difficulty walking    If you have a problem that you believe requires IMMEDIATE attention, please go to your NEAREST Emergency Room. If you believe your problem can safely wait until you speak to a physician, please call Interventional Radiology for any concerns.    During Normal Weekday Business Hours- You can contact the Interventional Radiology department during normal business hours via telephone.  During Evenings and Weekends- If you need to contact Interventional Radiology during off hours, do so by calling the hospital and requesting to be connected to the Interventional Radiologist on call.

## 2022-02-25 NOTE — ASU DISCHARGE PLAN (ADULT/PEDIATRIC) - NS MD DC FALL RISK RISK
For information on Fall & Injury Prevention, visit: [email or web address] OR  [email or web address] OR  https://www.cdc.gov/steadi/patient.html

## 2022-02-25 NOTE — PRE PROCEDURE NOTE - PRE PROCEDURE EVALUATION
Interventional Radiology    HPI: 70 yo male with history of stomach cancer s/p chemotherapy,  esophagojejunostomy and total gastrectomy 2017.  Last ryqotjsj06/29/2021. Pt here today for J-tube exchange.    Allergies:   Medications (Abx/Cardiac/Anticoagulation/Blood Products)      Data:  180.3  71.7  T(C): 36.9  HR: 60  BP: 142/84  RR: 18  SpO2: 96%    Exam  General: No acute distress  Chest: Non labored breathing  Abdomen: Non-distended  Extremities: No swelling, warm    Plan: 71y Male presents for J-tube exchange  -Risks/Benefits/alternatives explained with the patient and/or healthcare proxy and witnessed informed consent obtained.

## 2022-03-07 DIAGNOSIS — Z46.59 ENCOUNTER FOR FITTING AND ADJUSTMENT OF OTHER GASTROINTESTINAL APPLIANCE AND DEVICE: ICD-10-CM

## 2022-03-17 ENCOUNTER — OUTPATIENT (OUTPATIENT)
Dept: OUTPATIENT SERVICES | Facility: HOSPITAL | Age: 72
LOS: 1 days | Discharge: HOME | End: 2022-03-17

## 2022-03-17 DIAGNOSIS — Z95.828 PRESENCE OF OTHER VASCULAR IMPLANTS AND GRAFTS: Chronic | ICD-10-CM

## 2022-03-17 DIAGNOSIS — Z98.890 OTHER SPECIFIED POSTPROCEDURAL STATES: Chronic | ICD-10-CM

## 2022-03-17 DIAGNOSIS — Z98.49 CATARACT EXTRACTION STATUS, UNSPECIFIED EYE: Chronic | ICD-10-CM

## 2022-03-17 DIAGNOSIS — C16.9 MALIGNANT NEOPLASM OF STOMACH, UNSPECIFIED: ICD-10-CM

## 2022-03-17 DIAGNOSIS — Z90.411 ACQUIRED PARTIAL ABSENCE OF PANCREAS: Chronic | ICD-10-CM

## 2022-03-17 DIAGNOSIS — Z93.1 GASTROSTOMY STATUS: Chronic | ICD-10-CM

## 2022-03-17 DIAGNOSIS — Z90.3 ACQUIRED ABSENCE OF STOMACH [PART OF]: Chronic | ICD-10-CM

## 2022-03-17 DIAGNOSIS — Z87.19 PERSONAL HISTORY OF OTHER DISEASES OF THE DIGESTIVE SYSTEM: Chronic | ICD-10-CM

## 2022-03-18 ENCOUNTER — APPOINTMENT (OUTPATIENT)
Dept: HEMATOLOGY ONCOLOGY | Facility: CLINIC | Age: 72
End: 2022-03-18
Payer: MEDICARE

## 2022-03-18 ENCOUNTER — RESULT REVIEW (OUTPATIENT)
Age: 72
End: 2022-03-18

## 2022-03-18 VITALS
RESPIRATION RATE: 16 BRPM | DIASTOLIC BLOOD PRESSURE: 80 MMHG | HEART RATE: 87 BPM | SYSTOLIC BLOOD PRESSURE: 132 MMHG | OXYGEN SATURATION: 96 % | WEIGHT: 154.32 LBS | TEMPERATURE: 97.1 F | BODY MASS INDEX: 21.85 KG/M2 | HEIGHT: 70.47 IN

## 2022-03-18 LAB
BASOPHILS # BLD AUTO: 0.08 K/UL — SIGNIFICANT CHANGE UP (ref 0–0.2)
BASOPHILS NFR BLD AUTO: 1.1 % — SIGNIFICANT CHANGE UP (ref 0–2)
EOSINOPHIL # BLD AUTO: 0.09 K/UL — SIGNIFICANT CHANGE UP (ref 0–0.5)
EOSINOPHIL NFR BLD AUTO: 1.3 % — SIGNIFICANT CHANGE UP (ref 0–6)
HCT VFR BLD CALC: 40.5 % — SIGNIFICANT CHANGE UP (ref 39–50)
HGB BLD-MCNC: 13.6 G/DL — SIGNIFICANT CHANGE UP (ref 13–17)
IMM GRANULOCYTES NFR BLD AUTO: 0.4 % — SIGNIFICANT CHANGE UP (ref 0–1.5)
LYMPHOCYTES # BLD AUTO: 1.24 K/UL — SIGNIFICANT CHANGE UP (ref 1–3.3)
LYMPHOCYTES # BLD AUTO: 17.4 % — SIGNIFICANT CHANGE UP (ref 13–44)
MCHC RBC-ENTMCNC: 33.1 PG — SIGNIFICANT CHANGE UP (ref 27–34)
MCHC RBC-ENTMCNC: 33.6 G/DL — SIGNIFICANT CHANGE UP (ref 32–36)
MCV RBC AUTO: 98.5 FL — SIGNIFICANT CHANGE UP (ref 80–100)
MONOCYTES # BLD AUTO: 0.51 K/UL — SIGNIFICANT CHANGE UP (ref 0–0.9)
MONOCYTES NFR BLD AUTO: 7.2 % — SIGNIFICANT CHANGE UP (ref 2–14)
NEUTROPHILS # BLD AUTO: 5.18 K/UL — SIGNIFICANT CHANGE UP (ref 1.8–7.4)
NEUTROPHILS NFR BLD AUTO: 72.6 % — SIGNIFICANT CHANGE UP (ref 43–77)
NRBC # BLD: 0 /100 WBCS — SIGNIFICANT CHANGE UP (ref 0–0)
PLATELET # BLD AUTO: 272 K/UL — SIGNIFICANT CHANGE UP (ref 150–400)
RBC # BLD: 4.11 M/UL — LOW (ref 4.2–5.8)
RBC # FLD: 14.6 % — HIGH (ref 10.3–14.5)
WBC # BLD: 7.13 K/UL — SIGNIFICANT CHANGE UP (ref 3.8–10.5)
WBC # FLD AUTO: 7.13 K/UL — SIGNIFICANT CHANGE UP (ref 3.8–10.5)

## 2022-03-18 PROCEDURE — 99213 OFFICE O/P EST LOW 20 MIN: CPT

## 2022-03-18 NOTE — HISTORY OF PRESENT ILLNESS
[de-identified] : see dictated letter [de-identified] : Since the last visit the pt remains well and has been followed by Endocrinology to manage his blood sugars.

## 2022-03-18 NOTE — PHYSICAL EXAM
[Fully active, able to carry on all pre-disease performance without restriction] : Status 0 - Fully active, able to carry on all pre-disease performance without restriction [Normal] : affect appropriate [de-identified] : has gastric feeding tube in place, had drainage from abdominalwopund followed by surgery

## 2022-03-18 NOTE — ASSESSMENT
[FreeTextEntry1] : Pt to continue on surveillance for his recurrent gastric ca with left suprsclavicular node metastasis. Pt has no new symptoms indicating rcurrne or progression. Pt to continue Endocrinology for his glucose levels and weight control. Weight currently stable. Pt to repeat CT scans in 5/22 and repeat blood testing as per Endocrinology. pt to continue Surgical followup for draining wound and feeding tube. Pt to RTO in 3 months or sooner. [Palliative] : Goals of care discussed with patient: Palliative [Palliative Care Plan] : not applicable at this time

## 2022-03-20 LAB
25(OH)D3 SERPL-MCNC: 29.9 NG/ML
ALBUMIN SERPL ELPH-MCNC: 3.8 G/DL
ALP BLD-CCNC: 147 U/L
ALT SERPL-CCNC: 47 U/L
ANION GAP SERPL CALC-SCNC: 11 MMOL/L
AST SERPL-CCNC: 44 U/L
BILIRUB SERPL-MCNC: 0.9 MG/DL
BUN SERPL-MCNC: 17 MG/DL
C PEPTIDE SERPL-MCNC: 3.2 NG/ML
CALCIUM SERPL-MCNC: 9.4 MG/DL
CALCIUM SERPL-MCNC: 9.4 MG/DL
CANCER AG19-9 SERPL-ACNC: 12 U/ML
CEA SERPL-MCNC: 5.7 NG/ML
CHLORIDE SERPL-SCNC: 101 MMOL/L
CHOLEST SERPL-MCNC: 193 MG/DL
CO2 SERPL-SCNC: 26 MMOL/L
CREAT SERPL-MCNC: 0.78 MG/DL
CREAT SPEC-SCNC: 126 MG/DL
EGFR: 95 ML/MIN/1.73M2
FOLATE SERPL-MCNC: >20 NG/ML
GLUCOSE SERPL-MCNC: 187 MG/DL
HDLC SERPL-MCNC: 80 MG/DL
IRON SATN MFR SERPL: 33 %
IRON SERPL-MCNC: 96 UG/DL
LDLC SERPL CALC-MCNC: 106 MG/DL
MICROALBUMIN 24H UR DL<=1MG/L-MCNC: 5.4 MG/DL
MICROALBUMIN/CREAT 24H UR-RTO: 43 MG/G
NONHDLC SERPL-MCNC: 113 MG/DL
PARATHYROID HORMONE INTACT: 48 PG/ML
POTASSIUM SERPL-SCNC: 4.1 MMOL/L
PROT SERPL-MCNC: 7.6 G/DL
SODIUM SERPL-SCNC: 138 MMOL/L
T4 FREE SERPL-MCNC: 1.4 NG/DL
TIBC SERPL-MCNC: 288 UG/DL
TRIGL SERPL-MCNC: 39 MG/DL
UIBC SERPL-MCNC: 192 UG/DL
VIT B12 SERPL-MCNC: 207 PG/ML

## 2022-04-06 ENCOUNTER — OUTPATIENT (OUTPATIENT)
Dept: OUTPATIENT SERVICES | Facility: HOSPITAL | Age: 72
LOS: 1 days | End: 2022-04-06
Payer: MEDICARE

## 2022-04-06 ENCOUNTER — RESULT REVIEW (OUTPATIENT)
Age: 72
End: 2022-04-06

## 2022-04-06 VITALS
HEART RATE: 72 BPM | RESPIRATION RATE: 16 BRPM | SYSTOLIC BLOOD PRESSURE: 130 MMHG | DIASTOLIC BLOOD PRESSURE: 67 MMHG | OXYGEN SATURATION: 96 % | TEMPERATURE: 97 F

## 2022-04-06 VITALS
DIASTOLIC BLOOD PRESSURE: 66 MMHG | OXYGEN SATURATION: 95 % | HEART RATE: 77 BPM | RESPIRATION RATE: 16 BRPM | SYSTOLIC BLOOD PRESSURE: 133 MMHG | TEMPERATURE: 97 F

## 2022-04-06 DIAGNOSIS — Z98.890 OTHER SPECIFIED POSTPROCEDURAL STATES: Chronic | ICD-10-CM

## 2022-04-06 DIAGNOSIS — Z95.828 PRESENCE OF OTHER VASCULAR IMPLANTS AND GRAFTS: Chronic | ICD-10-CM

## 2022-04-06 DIAGNOSIS — Z90.411 ACQUIRED PARTIAL ABSENCE OF PANCREAS: Chronic | ICD-10-CM

## 2022-04-06 DIAGNOSIS — Z98.49 CATARACT EXTRACTION STATUS, UNSPECIFIED EYE: Chronic | ICD-10-CM

## 2022-04-06 DIAGNOSIS — Z87.19 PERSONAL HISTORY OF OTHER DISEASES OF THE DIGESTIVE SYSTEM: Chronic | ICD-10-CM

## 2022-04-06 DIAGNOSIS — Z90.3 ACQUIRED ABSENCE OF STOMACH [PART OF]: Chronic | ICD-10-CM

## 2022-04-06 DIAGNOSIS — Z93.1 GASTROSTOMY STATUS: Chronic | ICD-10-CM

## 2022-04-06 PROCEDURE — 49451 REPLACE DUOD/JEJ TUBE PERC: CPT | Mod: 53,GC

## 2022-04-12 DIAGNOSIS — Z46.59 ENCOUNTER FOR FITTING AND ADJUSTMENT OF OTHER GASTROINTESTINAL APPLIANCE AND DEVICE: ICD-10-CM

## 2022-04-12 DIAGNOSIS — C16.9 MALIGNANT NEOPLASM OF STOMACH, UNSPECIFIED: ICD-10-CM

## 2022-06-20 ENCOUNTER — OUTPATIENT (OUTPATIENT)
Dept: OUTPATIENT SERVICES | Facility: HOSPITAL | Age: 72
LOS: 1 days | Discharge: HOME | End: 2022-06-20

## 2022-06-20 DIAGNOSIS — Z98.890 OTHER SPECIFIED POSTPROCEDURAL STATES: Chronic | ICD-10-CM

## 2022-06-20 DIAGNOSIS — Z90.411 ACQUIRED PARTIAL ABSENCE OF PANCREAS: Chronic | ICD-10-CM

## 2022-06-20 DIAGNOSIS — Z98.49 CATARACT EXTRACTION STATUS, UNSPECIFIED EYE: Chronic | ICD-10-CM

## 2022-06-20 DIAGNOSIS — Z87.19 PERSONAL HISTORY OF OTHER DISEASES OF THE DIGESTIVE SYSTEM: Chronic | ICD-10-CM

## 2022-06-20 DIAGNOSIS — Z95.828 PRESENCE OF OTHER VASCULAR IMPLANTS AND GRAFTS: Chronic | ICD-10-CM

## 2022-06-20 DIAGNOSIS — Z93.1 GASTROSTOMY STATUS: Chronic | ICD-10-CM

## 2022-06-20 DIAGNOSIS — C16.9 MALIGNANT NEOPLASM OF STOMACH, UNSPECIFIED: ICD-10-CM

## 2022-06-20 DIAGNOSIS — Z90.3 ACQUIRED ABSENCE OF STOMACH [PART OF]: Chronic | ICD-10-CM

## 2022-06-21 ENCOUNTER — RESULT REVIEW (OUTPATIENT)
Age: 72
End: 2022-06-21

## 2022-06-21 ENCOUNTER — APPOINTMENT (OUTPATIENT)
Dept: SURGICAL ONCOLOGY | Facility: CLINIC | Age: 72
End: 2022-06-21
Payer: MEDICARE

## 2022-06-21 ENCOUNTER — APPOINTMENT (OUTPATIENT)
Dept: HEMATOLOGY ONCOLOGY | Facility: CLINIC | Age: 72
End: 2022-06-21
Payer: MEDICARE

## 2022-06-21 VITALS
TEMPERATURE: 97.6 F | SYSTOLIC BLOOD PRESSURE: 148 MMHG | BODY MASS INDEX: 21.77 KG/M2 | DIASTOLIC BLOOD PRESSURE: 84 MMHG | HEART RATE: 65 BPM | OXYGEN SATURATION: 99 % | WEIGHT: 147 LBS | HEIGHT: 69 IN | RESPIRATION RATE: 16 BRPM

## 2022-06-21 VITALS
WEIGHT: 147.71 LBS | TEMPERATURE: 96.8 F | BODY MASS INDEX: 21.15 KG/M2 | HEIGHT: 69.92 IN | HEART RATE: 76 BPM | DIASTOLIC BLOOD PRESSURE: 95 MMHG | RESPIRATION RATE: 16 BRPM | SYSTOLIC BLOOD PRESSURE: 145 MMHG | OXYGEN SATURATION: 97 %

## 2022-06-21 LAB
BASOPHILS # BLD AUTO: 0.09 K/UL — SIGNIFICANT CHANGE UP (ref 0–0.2)
BASOPHILS NFR BLD AUTO: 1.2 % — SIGNIFICANT CHANGE UP (ref 0–2)
EOSINOPHIL # BLD AUTO: 0.1 K/UL — SIGNIFICANT CHANGE UP (ref 0–0.5)
EOSINOPHIL NFR BLD AUTO: 1.4 % — SIGNIFICANT CHANGE UP (ref 0–6)
HCT VFR BLD CALC: 38.8 % — LOW (ref 39–50)
HGB BLD-MCNC: 12.8 G/DL — LOW (ref 13–17)
IMM GRANULOCYTES NFR BLD AUTO: 0.3 % — SIGNIFICANT CHANGE UP (ref 0–1.5)
LYMPHOCYTES # BLD AUTO: 1.25 K/UL — SIGNIFICANT CHANGE UP (ref 1–3.3)
LYMPHOCYTES # BLD AUTO: 17 % — SIGNIFICANT CHANGE UP (ref 13–44)
MCHC RBC-ENTMCNC: 32.4 PG — SIGNIFICANT CHANGE UP (ref 27–34)
MCHC RBC-ENTMCNC: 33 G/DL — SIGNIFICANT CHANGE UP (ref 32–36)
MCV RBC AUTO: 98.2 FL — SIGNIFICANT CHANGE UP (ref 80–100)
MONOCYTES # BLD AUTO: 0.61 K/UL — SIGNIFICANT CHANGE UP (ref 0–0.9)
MONOCYTES NFR BLD AUTO: 8.3 % — SIGNIFICANT CHANGE UP (ref 2–14)
NEUTROPHILS # BLD AUTO: 5.28 K/UL — SIGNIFICANT CHANGE UP (ref 1.8–7.4)
NEUTROPHILS NFR BLD AUTO: 71.8 % — SIGNIFICANT CHANGE UP (ref 43–77)
NRBC # BLD: 0 /100 WBCS — SIGNIFICANT CHANGE UP (ref 0–0)
PLATELET # BLD AUTO: 261 K/UL — SIGNIFICANT CHANGE UP (ref 150–400)
RBC # BLD: 3.95 M/UL — LOW (ref 4.2–5.8)
RBC # FLD: 14.4 % — SIGNIFICANT CHANGE UP (ref 10.3–14.5)
WBC # BLD: 7.35 K/UL — SIGNIFICANT CHANGE UP (ref 3.8–10.5)
WBC # FLD AUTO: 7.35 K/UL — SIGNIFICANT CHANGE UP (ref 3.8–10.5)

## 2022-06-21 PROCEDURE — 99214 OFFICE O/P EST MOD 30 MIN: CPT

## 2022-06-21 PROCEDURE — 99213 OFFICE O/P EST LOW 20 MIN: CPT

## 2022-06-21 NOTE — PHYSICAL EXAM
[Fully active, able to carry on all pre-disease performance without restriction] : Status 0 - Fully active, able to carry on all pre-disease performance without restriction [Normal] : affect appropriate [de-identified] : has gastric feeding tube in place, +clean/dry dressing in place small midline abdominal wound.

## 2022-06-21 NOTE — HISTORY OF PRESENT ILLNESS
[de-identified] : see dictated letter [de-identified] : Patient is here for 3 months f/u. He feels okay except he concerned about his diabetes and his weight loss. He c/o occasional sharp pain near feeding tube. Denies any vomiting, diarrhea  or constipation. Patient receives feeding via peg tube every evening. His appetite is not great.

## 2022-06-21 NOTE — ASSESSMENT
[Palliative] : Goals of care discussed with patient: Palliative [Palliative Care Plan] : not applicable at this time [FreeTextEntry1] : Pt to continue on surveillance for his recurrent gastric ca with left supraclavicular node metastasis. Pt has no new symptoms increase weight loss. Ordered repeat CT-Scan of abdomen/pelvis/chest, f/u. Pt to continue Endocrinology for his glucose levels and weight control as directed. Ordered repeat tumor markers, f/u. Pa pt to continue Surgical followup for draining wound and feeding tube. Pt to RTO in 3 months or sooner.

## 2022-06-22 LAB
CANCER AG19-9 SERPL-ACNC: 12 U/ML
CEA SERPL-MCNC: 5.8 NG/ML

## 2022-06-22 RX ORDER — ZOLPIDEM TARTRATE 5 MG/1
5 TABLET ORAL
Qty: 25 | Refills: 0 | Status: ACTIVE | COMMUNITY
Start: 2022-05-17

## 2022-06-22 NOTE — PHYSICAL EXAM
[FreeTextEntry1] : Abdomen: soft, nontender/nondistended.  Small opening in superior aspect of incision with small purulent drainage.. [Normal] : supple, no neck mass and thyroid not enlarged [Normal Neck Lymph Nodes] : normal neck lymph nodes  [Normal Supraclavicular Lymph Nodes] : normal supraclavicular lymph nodes [Normal Groin Lymph Nodes] : normal groin lymph nodes [Normal Axillary Lymph Nodes] : normal axillary lymph nodes [Normal] : oriented to person, place and time, with appropriate affect

## 2022-06-22 NOTE — HISTORY OF PRESENT ILLNESS
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 Peguero.  His dysphagia has improved slightly.  He reports clogged jtube.  He denies abdominal pain.\par \par 09/14/2021: Doing well.  He reports improved po intake, but is still predominantly on jtube feeds.  He had a suture granuloma removed last month during visit with Dr. Cabello.  He reports yellowish drainage from removal site in upper abdominal incision.  He denies fever, abdominal pain.\par \par 11/04/2021: Patient still has persistent drainage from suture granuloma removal site in the upper abdominal incisional region.  He changes dressing daily.  Fluid is thick yellow/purulent.  He is maintaining nutrition with supplemental jtube feeds.  He denies fever.\par \par 11/30/2021: Patient is s/p CT abdomen/pelvis and jtube exchange.  He has had mild weight loss since his last visit.  He had persistent drainage from upper midline incisional opening at site of previous suture.  CT abdomen/pelvis 11/26/2021 shows an thin anterior abdominal wall fluid collection just posterior to the rectus muscle measuring 7.8 x 1.7 cm.\par \par 02/22/2022: Overall unchanged.  He reports decreased midline incisional drainage.  He continue to self administer tube feeds without issue.  Tolerating small amounts of oral diet.\par \par 06/21/2022: Patient continues to be dependent on jtube feeding is tolerating small amounts for soft oral diet.  He reports persistent drainage from upper aspect of midline incision.  He denies fever or abdominal pain.

## 2022-06-22 NOTE — ASSESSMENT
[FreeTextEntry1] : Will arrange for IR jejunostomy tube exchange.\par Local wound care to open incision.

## 2022-06-22 NOTE — PROCEDURE
[FreeTextEntry1] : Bedside wound exploration performed of open area.\par Residual suture granuloma identified and removed without difficulty.\par Dry dressing placed.\par Patient tolerated procedure well.

## 2022-06-23 ENCOUNTER — OUTPATIENT (OUTPATIENT)
Dept: OUTPATIENT SERVICES | Facility: HOSPITAL | Age: 72
LOS: 1 days | End: 2022-06-23
Payer: MEDICARE

## 2022-06-23 DIAGNOSIS — Z90.3 ACQUIRED ABSENCE OF STOMACH [PART OF]: Chronic | ICD-10-CM

## 2022-06-23 DIAGNOSIS — Z98.890 OTHER SPECIFIED POSTPROCEDURAL STATES: Chronic | ICD-10-CM

## 2022-06-23 DIAGNOSIS — Z11.52 ENCOUNTER FOR SCREENING FOR COVID-19: ICD-10-CM

## 2022-06-23 DIAGNOSIS — Z90.411 ACQUIRED PARTIAL ABSENCE OF PANCREAS: Chronic | ICD-10-CM

## 2022-06-23 DIAGNOSIS — Z93.1 GASTROSTOMY STATUS: Chronic | ICD-10-CM

## 2022-06-23 DIAGNOSIS — Z98.49 CATARACT EXTRACTION STATUS, UNSPECIFIED EYE: Chronic | ICD-10-CM

## 2022-06-23 DIAGNOSIS — Z95.828 PRESENCE OF OTHER VASCULAR IMPLANTS AND GRAFTS: Chronic | ICD-10-CM

## 2022-06-23 DIAGNOSIS — Z87.19 PERSONAL HISTORY OF OTHER DISEASES OF THE DIGESTIVE SYSTEM: Chronic | ICD-10-CM

## 2022-06-23 LAB — SARS-COV-2 RNA SPEC QL NAA+PROBE: SIGNIFICANT CHANGE UP

## 2022-06-23 PROCEDURE — U0005: CPT

## 2022-06-23 PROCEDURE — U0003: CPT

## 2022-06-23 PROCEDURE — C9803: CPT

## 2022-06-28 ENCOUNTER — OUTPATIENT (OUTPATIENT)
Dept: OUTPATIENT SERVICES | Facility: HOSPITAL | Age: 72
LOS: 1 days | End: 2022-06-28
Payer: MEDICARE

## 2022-06-28 ENCOUNTER — RESULT REVIEW (OUTPATIENT)
Age: 72
End: 2022-06-28

## 2022-06-28 ENCOUNTER — TRANSCRIPTION ENCOUNTER (OUTPATIENT)
Age: 72
End: 2022-06-28

## 2022-06-28 ENCOUNTER — OUTPATIENT (OUTPATIENT)
Dept: OUTPATIENT SERVICES | Facility: HOSPITAL | Age: 72
LOS: 1 days | End: 2022-06-28

## 2022-06-28 ENCOUNTER — APPOINTMENT (OUTPATIENT)
Dept: CT IMAGING | Facility: IMAGING CENTER | Age: 72
End: 2022-06-28

## 2022-06-28 VITALS
OXYGEN SATURATION: 99 % | WEIGHT: 153 LBS | RESPIRATION RATE: 18 BRPM | HEIGHT: 70 IN | SYSTOLIC BLOOD PRESSURE: 178 MMHG | TEMPERATURE: 98 F | HEART RATE: 61 BPM | DIASTOLIC BLOOD PRESSURE: 91 MMHG

## 2022-06-28 DIAGNOSIS — Z95.828 PRESENCE OF OTHER VASCULAR IMPLANTS AND GRAFTS: Chronic | ICD-10-CM

## 2022-06-28 DIAGNOSIS — Z98.49 CATARACT EXTRACTION STATUS, UNSPECIFIED EYE: Chronic | ICD-10-CM

## 2022-06-28 DIAGNOSIS — Z98.890 OTHER SPECIFIED POSTPROCEDURAL STATES: Chronic | ICD-10-CM

## 2022-06-28 DIAGNOSIS — Z90.3 ACQUIRED ABSENCE OF STOMACH [PART OF]: Chronic | ICD-10-CM

## 2022-06-28 DIAGNOSIS — C16.9 MALIGNANT NEOPLASM OF STOMACH, UNSPECIFIED: ICD-10-CM

## 2022-06-28 DIAGNOSIS — Z93.1 GASTROSTOMY STATUS: Chronic | ICD-10-CM

## 2022-06-28 DIAGNOSIS — R13.0 APHAGIA: ICD-10-CM

## 2022-06-28 DIAGNOSIS — Z87.19 PERSONAL HISTORY OF OTHER DISEASES OF THE DIGESTIVE SYSTEM: Chronic | ICD-10-CM

## 2022-06-28 DIAGNOSIS — Z90.411 ACQUIRED PARTIAL ABSENCE OF PANCREAS: Chronic | ICD-10-CM

## 2022-06-28 DIAGNOSIS — Z46.59 ENCOUNTER FOR FITTING AND ADJUSTMENT OF OTHER GASTROINTESTINAL APPLIANCE AND DEVICE: ICD-10-CM

## 2022-06-28 PROCEDURE — 71260 CT THORAX DX C+: CPT

## 2022-06-28 PROCEDURE — 74177 CT ABD & PELVIS W/CONTRAST: CPT | Mod: 26,MH

## 2022-06-28 PROCEDURE — C1769: CPT

## 2022-06-28 PROCEDURE — L8699: CPT

## 2022-06-28 PROCEDURE — 49451 REPLACE DUOD/JEJ TUBE PERC: CPT

## 2022-06-28 PROCEDURE — 71260 CT THORAX DX C+: CPT | Mod: 26,MH

## 2022-06-28 PROCEDURE — 74177 CT ABD & PELVIS W/CONTRAST: CPT

## 2022-06-28 RX ORDER — HUMAN INSULIN 100 [IU]/ML
6 INJECTION, SUSPENSION SUBCUTANEOUS
Qty: 0 | Refills: 0 | DISCHARGE

## 2022-06-28 NOTE — ASU PATIENT PROFILE, ADULT - FALL HARM RISK - UNIVERSAL INTERVENTIONS
Bed in lowest position, wheels locked, appropriate side rails in place/Call bell, personal items and telephone in reach/Instruct patient to call for assistance before getting out of bed or chair/Non-slip footwear when patient is out of bed/[place] to call system/Physically safe environment - no spills, clutter or unnecessary equipment/Purposeful Proactive Rounding/Room/bathroom lighting operational, light cord in reach

## 2022-06-28 NOTE — ASU DISCHARGE PLAN (ADULT/PEDIATRIC) - ASU DC SPECIAL INSTRUCTIONSFT
JTube Exchange    Discharge Instructions  - You have had a JTube exchanged.  - Keep the area clean and dry.  - Do not soak in a tub or pool with the JTube, however you may shower with the JTube and dressing covered in plastic wrap.  - Do not put traction on the JTube and be careful that the JTube does not get accidentally dislodged.  - You may resume your normal diet.  - You may resume your normal medications. Flush JTube aggressively with saline to prevent clogging of the Jtube.  - It is normal to experience some pain over the site for the next few days. You may take apply ice to the area (20 minutes on, 20 minutes off) and take Tylenol for that pain. Do not take more frequently than every 6 hours and do not exceed more than 3000mg of Tylenol in a 24 hour period.    Notify your primary physician and/or Interventional Radiology IMMEDIATELY if you experience any of the following       - Fever of 101F or 38C       - Chills or Rigors/ Shakes       - Swelling and/or Redness in the area of the puncture site       - Worsening Pain       - Blood soaked bandages or worsening bleeding       - Lightheadedness and/or dizziness upon standing       - Chest Pain/ Tightness       - Shortness of Breath       - Difficulty walking    If you have a problem that you believe requires IMMEDIATE attention, please go to your NEAREST Emergency Room. If you believe your problem can safely wait until you speak to a physician, please call Interventional Radiology for any concerns.    Please feel free to contact us at [phone] if any problems arise. After 6PM, Monday through Friday, on weekends and on holidays, please call [phone] and ask for the radiology resident on call to be paged.

## 2022-06-28 NOTE — ASU DISCHARGE PLAN (ADULT/PEDIATRIC) - NURSING INSTRUCTIONS
Please feel free to contact us at [phone] if any problem arises. After 6PM. Monday through Friday,  on weekends and on holidays, please call us at [phone] and ask for the radiology resident on call to be paged.

## 2022-06-28 NOTE — PRE PROCEDURE NOTE - PRE PROCEDURE EVALUATION
Interventional Radiology    HPI: 70 yo male with history of Gastric cancer s/p chemotherapy,  esophagojejunostomy and total gastrectomy 2017.  Patient presents for routine exchange. Last exchanged 4/6/2022. Pt here today for J-tube exchange.    Allergies:   Medications (Abx/Cardiac/Anticoagulation/Blood Products)      Data:  T(C): --  HR: --  BP: --  RR: --  SpO2: --    Exam  General: No acute distress  Chest: Non labored breathing    Imaging: < from: IR Procedure (04.06.22 @ 14:59) >  IMPRESSION:  SUCCESSFUL EXCHANGE TO A NEW 18 Azeri JEJUNOSTOMY    < end of copied text >      Plan: 71y Male presents for J-tube exchange   -Risks/Benefits/alternatives explained with the patient and/or healthcare proxy and witnessed informed consent obtained.

## 2022-07-08 ENCOUNTER — APPOINTMENT (OUTPATIENT)
Dept: MRI IMAGING | Facility: CLINIC | Age: 72
End: 2022-07-08

## 2022-07-09 ENCOUNTER — APPOINTMENT (OUTPATIENT)
Dept: MRI IMAGING | Facility: CLINIC | Age: 72
End: 2022-07-09

## 2022-07-09 PROCEDURE — A9585: CPT

## 2022-07-09 PROCEDURE — 74183 MRI ABD W/O CNTR FLWD CNTR: CPT

## 2022-09-20 ENCOUNTER — RESULT REVIEW (OUTPATIENT)
Age: 72
End: 2022-09-20

## 2022-10-28 ENCOUNTER — RX RENEWAL (OUTPATIENT)
Age: 72
End: 2022-10-28

## 2022-11-08 NOTE — ASU PREOP CHECKLIST - IV STARTED
[General Appearance - Alert] : alert [General Appearance - In No Acute Distress] : in no acute distress [] : no respiratory distress [Respiration, Rhythm And Depth] : normal respiratory rhythm and effort [Exaggerated Use Of Accessory Muscles For Inspiration] : no accessory muscle use [Auscultation Breath Sounds / Voice Sounds] : lungs were clear to auscultation bilaterally [Apical Impulse] : the apical impulse was normal [Heart Rate And Rhythm] : heart rate was normal and rhythm regular [Heart Sounds] : normal S1 and S2 [Bowel Sounds] : normal bowel sounds [Abdomen Soft] : soft [Abdomen Tenderness] : non-tender [No CVA Tenderness] : no ~M costovertebral angle tenderness [Involuntary Movements] : no involuntary movements were seen [No Focal Deficits] : no focal deficits [Oriented To Time, Place, And Person] : oriented to person, place, and time [Impaired Insight] : insight and judgment were intact [Affect] : the affect was normal [Mood] : the mood was normal [Sclera] : the sclera and conjunctiva were normal [Neck Appearance] : the appearance of the neck was normal [Jugular Venous Distention Increased] : there was no jugular-venous distention yes

## 2022-12-08 ENCOUNTER — RESULT REVIEW (OUTPATIENT)
Age: 72
End: 2022-12-08

## 2023-01-24 NOTE — DISCHARGE NOTE ADULT - CARE PROVIDER_API CALL
Detail Level: Zone
Saravanan Cabello), ColonRectal Surgery; Surgery  [address] [place] [address]  Phone: [phone]  Fax: [phone]

## 2023-02-07 ENCOUNTER — OUTPATIENT (OUTPATIENT)
Dept: OUTPATIENT SERVICES | Facility: HOSPITAL | Age: 73
LOS: 1 days | End: 2023-02-07
Payer: MEDICARE

## 2023-02-07 ENCOUNTER — RESULT REVIEW (OUTPATIENT)
Age: 73
End: 2023-02-07

## 2023-02-07 VITALS
HEART RATE: 65 BPM | TEMPERATURE: 98 F | SYSTOLIC BLOOD PRESSURE: 141 MMHG | OXYGEN SATURATION: 97 % | DIASTOLIC BLOOD PRESSURE: 82 MMHG | RESPIRATION RATE: 17 BRPM

## 2023-02-07 VITALS
OXYGEN SATURATION: 96 % | TEMPERATURE: 98 F | DIASTOLIC BLOOD PRESSURE: 85 MMHG | SYSTOLIC BLOOD PRESSURE: 137 MMHG | RESPIRATION RATE: 17 BRPM | HEART RATE: 66 BPM

## 2023-02-07 DIAGNOSIS — Z95.828 PRESENCE OF OTHER VASCULAR IMPLANTS AND GRAFTS: Chronic | ICD-10-CM

## 2023-02-07 DIAGNOSIS — Z98.890 OTHER SPECIFIED POSTPROCEDURAL STATES: Chronic | ICD-10-CM

## 2023-02-07 DIAGNOSIS — Z90.411 ACQUIRED PARTIAL ABSENCE OF PANCREAS: Chronic | ICD-10-CM

## 2023-02-07 DIAGNOSIS — Z93.1 GASTROSTOMY STATUS: Chronic | ICD-10-CM

## 2023-02-07 DIAGNOSIS — Z98.49 CATARACT EXTRACTION STATUS, UNSPECIFIED EYE: Chronic | ICD-10-CM

## 2023-02-07 DIAGNOSIS — Z90.3 ACQUIRED ABSENCE OF STOMACH [PART OF]: Chronic | ICD-10-CM

## 2023-02-07 DIAGNOSIS — Z87.19 PERSONAL HISTORY OF OTHER DISEASES OF THE DIGESTIVE SYSTEM: Chronic | ICD-10-CM

## 2023-02-07 DIAGNOSIS — C16.9 MALIGNANT NEOPLASM OF STOMACH, UNSPECIFIED: ICD-10-CM

## 2023-02-07 PROCEDURE — 49451 REPLACE DUOD/JEJ TUBE PERC: CPT

## 2023-02-14 DIAGNOSIS — Z46.59 ENCOUNTER FOR FITTING AND ADJUSTMENT OF OTHER GASTROINTESTINAL APPLIANCE AND DEVICE: ICD-10-CM

## 2023-02-14 DIAGNOSIS — C16.9 MALIGNANT NEOPLASM OF STOMACH, UNSPECIFIED: ICD-10-CM

## 2023-02-18 ENCOUNTER — RX RENEWAL (OUTPATIENT)
Age: 73
End: 2023-02-18

## 2023-02-23 ENCOUNTER — APPOINTMENT (OUTPATIENT)
Dept: SURGICAL ONCOLOGY | Facility: CLINIC | Age: 73
End: 2023-02-23
Payer: MEDICARE

## 2023-02-23 VITALS
HEART RATE: 67 BPM | SYSTOLIC BLOOD PRESSURE: 151 MMHG | OXYGEN SATURATION: 99 % | BODY MASS INDEX: 19.96 KG/M2 | RESPIRATION RATE: 16 BRPM | HEIGHT: 70.5 IN | DIASTOLIC BLOOD PRESSURE: 83 MMHG | WEIGHT: 141 LBS

## 2023-02-23 PROCEDURE — 99213 OFFICE O/P EST LOW 20 MIN: CPT

## 2023-02-27 NOTE — PHYSICAL EXAM
[FreeTextEntry1] : Abdomen: soft, nontender/nondistended.  Small opening in superior aspect of incision with small purulent drainage - treated with AgNO3.

## 2023-02-27 NOTE — HISTORY OF PRESENT ILLNESS
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 Peguero.  His dysphagia has improved slightly.  He reports clogged jtube.  He denies abdominal pain.\par \par 09/14/2021: Doing well.  He reports improved po intake, but is still predominantly on jtube feeds.  He had a suture granuloma removed last month during visit with Dr. Cabello.  He reports yellowish drainage from removal site in upper abdominal incision.  He denies fever, abdominal pain.\par \par 11/04/2021: Patient still has persistent drainage from suture granuloma removal site in the upper abdominal incisional region.  He changes dressing daily.  Fluid is thick yellow/purulent.  He is maintaining nutrition with supplemental jtube feeds.  He denies fever.\par \par 11/30/2021: Patient is s/p CT abdomen/pelvis and jtube exchange.  He has had mild weight loss since his last visit.  He had persistent drainage from upper midline incisional opening at site of previous suture.  CT abdomen/pelvis 11/26/2021 shows an thin anterior abdominal wall fluid collection just posterior to the rectus muscle measuring 7.8 x 1.7 cm.\par \par 02/22/2022: Overall unchanged.  He reports decreased midline incisional drainage.  He continue to self administer tube feeds without issue.  Tolerating small amounts of oral diet.\par \par 06/21/2022: Patient continues to be dependent on jtube feeding is tolerating small amounts for soft oral diet.  He reports persistent drainage from upper aspect of midline incision.  He denies fever or abdominal pain.\par \par 02/23/2023: Patient is s/p recent jtube exchange.  He is dependent on tube feeds and reports worsening dysphagia with difficulty swallowing pills.  He has had mild weight loss.

## 2023-03-02 ENCOUNTER — OUTPATIENT (OUTPATIENT)
Dept: OUTPATIENT SERVICES | Facility: HOSPITAL | Age: 73
LOS: 1 days | Discharge: HOME | End: 2023-03-02

## 2023-03-02 DIAGNOSIS — C16.9 MALIGNANT NEOPLASM OF STOMACH, UNSPECIFIED: ICD-10-CM

## 2023-03-02 DIAGNOSIS — Z98.890 OTHER SPECIFIED POSTPROCEDURAL STATES: Chronic | ICD-10-CM

## 2023-03-02 DIAGNOSIS — Z90.411 ACQUIRED PARTIAL ABSENCE OF PANCREAS: Chronic | ICD-10-CM

## 2023-03-02 DIAGNOSIS — Z98.49 CATARACT EXTRACTION STATUS, UNSPECIFIED EYE: Chronic | ICD-10-CM

## 2023-03-02 DIAGNOSIS — Z93.1 GASTROSTOMY STATUS: Chronic | ICD-10-CM

## 2023-03-02 DIAGNOSIS — Z90.3 ACQUIRED ABSENCE OF STOMACH [PART OF]: Chronic | ICD-10-CM

## 2023-03-02 DIAGNOSIS — Z87.19 PERSONAL HISTORY OF OTHER DISEASES OF THE DIGESTIVE SYSTEM: Chronic | ICD-10-CM

## 2023-03-02 DIAGNOSIS — Z95.828 PRESENCE OF OTHER VASCULAR IMPLANTS AND GRAFTS: Chronic | ICD-10-CM

## 2023-03-07 ENCOUNTER — APPOINTMENT (OUTPATIENT)
Dept: GASTROENTEROLOGY | Facility: CLINIC | Age: 73
End: 2023-03-07
Payer: MEDICARE

## 2023-03-07 VITALS
RESPIRATION RATE: 14 BRPM | OXYGEN SATURATION: 97 % | HEIGHT: 70.5 IN | DIASTOLIC BLOOD PRESSURE: 64 MMHG | TEMPERATURE: 96.7 F | WEIGHT: 140 LBS | SYSTOLIC BLOOD PRESSURE: 120 MMHG | HEART RATE: 66 BPM | BODY MASS INDEX: 19.82 KG/M2

## 2023-03-07 DIAGNOSIS — Z79.899 OTHER LONG TERM (CURRENT) DRUG THERAPY: ICD-10-CM

## 2023-03-07 PROCEDURE — 99214 OFFICE O/P EST MOD 30 MIN: CPT

## 2023-03-08 ENCOUNTER — RESULT REVIEW (OUTPATIENT)
Age: 73
End: 2023-03-08

## 2023-03-08 ENCOUNTER — APPOINTMENT (OUTPATIENT)
Dept: HEMATOLOGY ONCOLOGY | Facility: CLINIC | Age: 73
End: 2023-03-08
Payer: MEDICARE

## 2023-03-08 VITALS
DIASTOLIC BLOOD PRESSURE: 83 MMHG | TEMPERATURE: 97.7 F | SYSTOLIC BLOOD PRESSURE: 156 MMHG | HEART RATE: 70 BPM | OXYGEN SATURATION: 97 % | BODY MASS INDEX: 19.65 KG/M2 | RESPIRATION RATE: 16 BRPM | WEIGHT: 138.89 LBS

## 2023-03-08 LAB
BASOPHILS # BLD AUTO: 0.08 K/UL — SIGNIFICANT CHANGE UP (ref 0–0.2)
BASOPHILS NFR BLD AUTO: 1.2 % — SIGNIFICANT CHANGE UP (ref 0–2)
EOSINOPHIL # BLD AUTO: 0.19 K/UL — SIGNIFICANT CHANGE UP (ref 0–0.5)
EOSINOPHIL NFR BLD AUTO: 2.9 % — SIGNIFICANT CHANGE UP (ref 0–6)
HCT VFR BLD CALC: 38.3 % — LOW (ref 39–50)
HGB BLD-MCNC: 12.7 G/DL — LOW (ref 13–17)
IMM GRANULOCYTES NFR BLD AUTO: 0.2 % — SIGNIFICANT CHANGE UP (ref 0–0.9)
LYMPHOCYTES # BLD AUTO: 1.33 K/UL — SIGNIFICANT CHANGE UP (ref 1–3.3)
LYMPHOCYTES # BLD AUTO: 20.2 % — SIGNIFICANT CHANGE UP (ref 13–44)
MCHC RBC-ENTMCNC: 33.1 PG — SIGNIFICANT CHANGE UP (ref 27–34)
MCHC RBC-ENTMCNC: 33.2 G/DL — SIGNIFICANT CHANGE UP (ref 32–36)
MCV RBC AUTO: 99.7 FL — SIGNIFICANT CHANGE UP (ref 80–100)
MONOCYTES # BLD AUTO: 0.61 K/UL — SIGNIFICANT CHANGE UP (ref 0–0.9)
MONOCYTES NFR BLD AUTO: 9.3 % — SIGNIFICANT CHANGE UP (ref 2–14)
NEUTROPHILS # BLD AUTO: 4.36 K/UL — SIGNIFICANT CHANGE UP (ref 1.8–7.4)
NEUTROPHILS NFR BLD AUTO: 66.2 % — SIGNIFICANT CHANGE UP (ref 43–77)
NRBC # BLD: 0 /100 WBCS — SIGNIFICANT CHANGE UP (ref 0–0)
PLATELET # BLD AUTO: 255 K/UL — SIGNIFICANT CHANGE UP (ref 150–400)
RBC # BLD: 3.84 M/UL — LOW (ref 4.2–5.8)
RBC # FLD: 14.5 % — SIGNIFICANT CHANGE UP (ref 10.3–14.5)
WBC # BLD: 6.58 K/UL — SIGNIFICANT CHANGE UP (ref 3.8–10.5)
WBC # FLD AUTO: 6.58 K/UL — SIGNIFICANT CHANGE UP (ref 3.8–10.5)

## 2023-03-08 PROCEDURE — 99213 OFFICE O/P EST LOW 20 MIN: CPT

## 2023-03-12 ENCOUNTER — OUTPATIENT (OUTPATIENT)
Dept: OUTPATIENT SERVICES | Facility: HOSPITAL | Age: 73
LOS: 1 days | End: 2023-03-12
Payer: MEDICARE

## 2023-03-12 ENCOUNTER — APPOINTMENT (OUTPATIENT)
Dept: CT IMAGING | Facility: IMAGING CENTER | Age: 73
End: 2023-03-12
Payer: MEDICARE

## 2023-03-12 DIAGNOSIS — C16.9 MALIGNANT NEOPLASM OF STOMACH, UNSPECIFIED: ICD-10-CM

## 2023-03-12 DIAGNOSIS — Z90.3 ACQUIRED ABSENCE OF STOMACH [PART OF]: Chronic | ICD-10-CM

## 2023-03-12 DIAGNOSIS — Z98.49 CATARACT EXTRACTION STATUS, UNSPECIFIED EYE: Chronic | ICD-10-CM

## 2023-03-12 DIAGNOSIS — Z90.411 ACQUIRED PARTIAL ABSENCE OF PANCREAS: Chronic | ICD-10-CM

## 2023-03-12 DIAGNOSIS — Z95.828 PRESENCE OF OTHER VASCULAR IMPLANTS AND GRAFTS: Chronic | ICD-10-CM

## 2023-03-12 DIAGNOSIS — Z87.19 PERSONAL HISTORY OF OTHER DISEASES OF THE DIGESTIVE SYSTEM: Chronic | ICD-10-CM

## 2023-03-12 DIAGNOSIS — Z98.890 OTHER SPECIFIED POSTPROCEDURAL STATES: Chronic | ICD-10-CM

## 2023-03-12 DIAGNOSIS — Z93.1 GASTROSTOMY STATUS: Chronic | ICD-10-CM

## 2023-03-12 PROCEDURE — 74177 CT ABD & PELVIS W/CONTRAST: CPT

## 2023-03-12 PROCEDURE — 71260 CT THORAX DX C+: CPT

## 2023-03-12 PROCEDURE — 71260 CT THORAX DX C+: CPT | Mod: 26,MH

## 2023-03-12 PROCEDURE — 74177 CT ABD & PELVIS W/CONTRAST: CPT | Mod: 26,MH

## 2023-03-12 NOTE — REVIEW OF SYSTEMS
[Fatigue] : fatigue [Negative] : Allergic/Immunologic [FreeTextEntry7] : Patient has decreased appetite and has been losing weight.

## 2023-03-12 NOTE — ASSESSMENT
[FreeTextEntry1] : The patient will continue surveillance for recurrence of his gastric carcinoma status post recurrence in the left supraclavicular lymph node initially treated with chemotherapy followed by radiation.  Overall he has remained without recurrent disease but continues to lose weight.  He will continue with GI follow-up with feedings through the gastric tube and continue to attempt to increase his caloric intake.  He will undergo repeat CAT scans blood testing and evaluation for recurrent disease.  He will also continue surgical follow-up and testing as indicated.  The patient will return to the office in 1 month or sooner as needed. [Curative] : Goals of care discussed with patient: Curative [Palliative Care Plan] : not applicable at this time

## 2023-03-12 NOTE — HISTORY OF PRESENT ILLNESS
[de-identified] : See Heme-Onc consult note 12,23/2016. [de-identified] : Since the last visit the patient overall remained stable but continues to have weight control problems and has been losing weight.  He has been getting feedings through a gastric tube.  The patient has no symptoms indicating recurrence or progression of his disease.

## 2023-03-20 LAB
CANCER AG19-9 SERPL-ACNC: 13 U/ML
CEA SERPL-MCNC: 4.6 NG/ML

## 2023-03-23 ENCOUNTER — RESULT REVIEW (OUTPATIENT)
Age: 73
End: 2023-03-23

## 2023-03-23 ENCOUNTER — OUTPATIENT (OUTPATIENT)
Dept: OUTPATIENT SERVICES | Facility: HOSPITAL | Age: 73
LOS: 1 days | Discharge: HOME | End: 2023-03-23
Payer: MEDICARE

## 2023-03-23 ENCOUNTER — APPOINTMENT (OUTPATIENT)
Dept: GASTROENTEROLOGY | Facility: HOSPITAL | Age: 73
End: 2023-03-23

## 2023-03-23 VITALS
OXYGEN SATURATION: 97 % | DIASTOLIC BLOOD PRESSURE: 74 MMHG | HEART RATE: 86 BPM | SYSTOLIC BLOOD PRESSURE: 121 MMHG | RESPIRATION RATE: 18 BRPM | TEMPERATURE: 98 F | HEIGHT: 65 IN

## 2023-03-23 DIAGNOSIS — Z87.19 PERSONAL HISTORY OF OTHER DISEASES OF THE DIGESTIVE SYSTEM: Chronic | ICD-10-CM

## 2023-03-23 DIAGNOSIS — Z98.890 OTHER SPECIFIED POSTPROCEDURAL STATES: Chronic | ICD-10-CM

## 2023-03-23 DIAGNOSIS — Z90.3 ACQUIRED ABSENCE OF STOMACH [PART OF]: Chronic | ICD-10-CM

## 2023-03-23 DIAGNOSIS — Z95.828 PRESENCE OF OTHER VASCULAR IMPLANTS AND GRAFTS: Chronic | ICD-10-CM

## 2023-03-23 DIAGNOSIS — Z90.411 ACQUIRED PARTIAL ABSENCE OF PANCREAS: Chronic | ICD-10-CM

## 2023-03-23 DIAGNOSIS — Z93.1 GASTROSTOMY STATUS: Chronic | ICD-10-CM

## 2023-03-23 DIAGNOSIS — Z98.49 CATARACT EXTRACTION STATUS, UNSPECIFIED EYE: Chronic | ICD-10-CM

## 2023-03-23 DIAGNOSIS — K20.90 ESOPHAGITIS, UNSPECIFIED WITHOUT BLEEDING: ICD-10-CM

## 2023-03-23 LAB
GLUCOSE BLDC GLUCOMTR-MCNC: 73 MG/DL — SIGNIFICANT CHANGE UP (ref 70–99)
GLUCOSE BLDC GLUCOMTR-MCNC: 78 MG/DL — SIGNIFICANT CHANGE UP (ref 70–99)

## 2023-03-23 PROCEDURE — 88305 TISSUE EXAM BY PATHOLOGIST: CPT | Mod: 26

## 2023-03-23 PROCEDURE — 43247 EGD REMOVE FOREIGN BODY: CPT

## 2023-03-23 RX ORDER — SODIUM CHLORIDE 9 MG/ML
1000 INJECTION, SOLUTION INTRAVENOUS
Refills: 0 | Status: DISCONTINUED | OUTPATIENT
Start: 2023-03-23 | End: 2023-03-23

## 2023-03-23 NOTE — PACU DISCHARGE NOTE - AIRWAY PATENCY:
Quality 130: Documentation Of Current Medications In The Medical Record: Current Medications Documented Satisfactory Quality 431: Preventive Care And Screening: Unhealthy Alcohol Use - Screening: Patient screened for unhealthy alcohol use using a single question and scores less than 2 times per year Detail Level: Detailed Quality 317: Preventative Care And Screening: Screening For High Blood Pressure And Follow-Up Documented: Patient refuses to participate (either BP measurement or follow-up). Quality 131: Pain Assessment And Follow-Up: Pain assessment using a standardized tool is documented as negative, no follow-up plan required Quality 134: Screening For Clinical Depression And Follow-Up Plan: The patient was screened for depression and the screen was negative and no follow up required Quality 265: Biopsy Follow-Up: Biopsy results reviewed, communicated, tracked, and documented Quality 226: Preventive Care And Screening: Tobacco Use: Screening And Cessation Intervention: Patient screened for tobacco and is an ex-smoker Quality 110: Preventive Care And Screening: Influenza Immunization: Influenza Immunization Administered during Influenza season Quality 128: Preventive Care And Screening: Body Mass Index (Bmi) Screening And Follow-Up Plan: BMI is documented within normal parameters and no follow-up plan is required.

## 2023-03-24 LAB — SURGICAL PATHOLOGY STUDY: SIGNIFICANT CHANGE UP

## 2023-03-25 ENCOUNTER — NON-APPOINTMENT (OUTPATIENT)
Age: 73
End: 2023-03-25

## 2023-03-25 VITALS
WEIGHT: 142.2 LBS | HEART RATE: 74 BPM | HEIGHT: 65 IN | SYSTOLIC BLOOD PRESSURE: 144 MMHG | OXYGEN SATURATION: 98 % | RESPIRATION RATE: 17 BRPM | TEMPERATURE: 98 F | DIASTOLIC BLOOD PRESSURE: 83 MMHG

## 2023-03-25 LAB
ALBUMIN SERPL ELPH-MCNC: 3.3 G/DL — SIGNIFICANT CHANGE UP (ref 3.3–5)
ALP SERPL-CCNC: 125 U/L — HIGH (ref 40–120)
ALT FLD-CCNC: 19 U/L — SIGNIFICANT CHANGE UP (ref 10–45)
ANION GAP SERPL CALC-SCNC: 14 MMOL/L — SIGNIFICANT CHANGE UP (ref 5–17)
APPEARANCE UR: CLEAR — SIGNIFICANT CHANGE UP
AST SERPL-CCNC: 20 U/L — SIGNIFICANT CHANGE UP (ref 10–40)
BASOPHILS # BLD AUTO: 0.03 K/UL — SIGNIFICANT CHANGE UP (ref 0–0.2)
BASOPHILS NFR BLD AUTO: 0.1 % — SIGNIFICANT CHANGE UP (ref 0–2)
BILIRUB SERPL-MCNC: 1.2 MG/DL — SIGNIFICANT CHANGE UP (ref 0.2–1.2)
BILIRUB UR-MCNC: NEGATIVE — SIGNIFICANT CHANGE UP
BUN SERPL-MCNC: 48 MG/DL — HIGH (ref 7–23)
CALCIUM SERPL-MCNC: 9 MG/DL — SIGNIFICANT CHANGE UP (ref 8.4–10.5)
CHLORIDE SERPL-SCNC: 95 MMOL/L — LOW (ref 96–108)
CO2 SERPL-SCNC: 24 MMOL/L — SIGNIFICANT CHANGE UP (ref 22–31)
COLOR SPEC: YELLOW — SIGNIFICANT CHANGE UP
CREAT SERPL-MCNC: 3.48 MG/DL — HIGH (ref 0.5–1.3)
DIFF PNL FLD: NEGATIVE — SIGNIFICANT CHANGE UP
EGFR: 18 ML/MIN/1.73M2 — LOW
EOSINOPHIL # BLD AUTO: 0 K/UL — SIGNIFICANT CHANGE UP (ref 0–0.5)
EOSINOPHIL NFR BLD AUTO: 0 % — SIGNIFICANT CHANGE UP (ref 0–6)
FLUAV AG NPH QL: SIGNIFICANT CHANGE UP
FLUBV AG NPH QL: SIGNIFICANT CHANGE UP
GLUCOSE SERPL-MCNC: 183 MG/DL — HIGH (ref 70–99)
GLUCOSE UR QL: NEGATIVE — SIGNIFICANT CHANGE UP
HCT VFR BLD CALC: 39.9 % — SIGNIFICANT CHANGE UP (ref 39–50)
HGB BLD-MCNC: 13.7 G/DL — SIGNIFICANT CHANGE UP (ref 13–17)
IMM GRANULOCYTES NFR BLD AUTO: 1.1 % — HIGH (ref 0–0.9)
KETONES UR-MCNC: NEGATIVE — SIGNIFICANT CHANGE UP
LACTATE SERPL-SCNC: 1.8 MMOL/L — SIGNIFICANT CHANGE UP (ref 0.5–2)
LEUKOCYTE ESTERASE UR-ACNC: NEGATIVE — SIGNIFICANT CHANGE UP
LIDOCAIN IGE QN: 6 U/L — LOW (ref 7–60)
LYMPHOCYTES # BLD AUTO: 0.64 K/UL — LOW (ref 1–3.3)
LYMPHOCYTES # BLD AUTO: 2.9 % — LOW (ref 13–44)
MCHC RBC-ENTMCNC: 33.7 PG — SIGNIFICANT CHANGE UP (ref 27–34)
MCHC RBC-ENTMCNC: 34.3 GM/DL — SIGNIFICANT CHANGE UP (ref 32–36)
MCV RBC AUTO: 98.3 FL — SIGNIFICANT CHANGE UP (ref 80–100)
MONOCYTES # BLD AUTO: 1.99 K/UL — HIGH (ref 0–0.9)
MONOCYTES NFR BLD AUTO: 9.1 % — SIGNIFICANT CHANGE UP (ref 2–14)
NEUTROPHILS # BLD AUTO: 18.97 K/UL — HIGH (ref 1.8–7.4)
NEUTROPHILS NFR BLD AUTO: 86.8 % — HIGH (ref 43–77)
NITRITE UR-MCNC: NEGATIVE — SIGNIFICANT CHANGE UP
NRBC # BLD: 0 /100 WBCS — SIGNIFICANT CHANGE UP (ref 0–0)
PH UR: 6 — SIGNIFICANT CHANGE UP (ref 5–8)
PLATELET # BLD AUTO: 217 K/UL — SIGNIFICANT CHANGE UP (ref 150–400)
POTASSIUM SERPL-MCNC: 4.8 MMOL/L — SIGNIFICANT CHANGE UP (ref 3.5–5.3)
POTASSIUM SERPL-SCNC: 4.8 MMOL/L — SIGNIFICANT CHANGE UP (ref 3.5–5.3)
PROT SERPL-MCNC: 7.5 G/DL — SIGNIFICANT CHANGE UP (ref 6–8.3)
PROT UR-MCNC: NEGATIVE MG/DL — SIGNIFICANT CHANGE UP
RBC # BLD: 4.06 M/UL — LOW (ref 4.2–5.8)
RBC # FLD: 14.7 % — HIGH (ref 10.3–14.5)
RSV RNA NPH QL NAA+NON-PROBE: SIGNIFICANT CHANGE UP
SARS-COV-2 RNA SPEC QL NAA+PROBE: SIGNIFICANT CHANGE UP
SODIUM SERPL-SCNC: 133 MMOL/L — LOW (ref 135–145)
SP GR SPEC: 1.02 — SIGNIFICANT CHANGE UP (ref 1–1.03)
UROBILINOGEN FLD QL: 0.2 E.U./DL — SIGNIFICANT CHANGE UP
WBC # BLD: 21.86 K/UL — HIGH (ref 3.8–10.5)
WBC # FLD AUTO: 21.86 K/UL — HIGH (ref 3.8–10.5)

## 2023-03-25 PROCEDURE — 71045 X-RAY EXAM CHEST 1 VIEW: CPT | Mod: 26

## 2023-03-25 PROCEDURE — 71250 CT THORAX DX C-: CPT | Mod: 26,MC

## 2023-03-25 PROCEDURE — 99285 EMERGENCY DEPT VISIT HI MDM: CPT | Mod: FS

## 2023-03-25 PROCEDURE — 74176 CT ABD & PELVIS W/O CONTRAST: CPT | Mod: 26,MC

## 2023-03-25 RX ORDER — PIPERACILLIN AND TAZOBACTAM 4; .5 G/20ML; G/20ML
3.38 INJECTION, POWDER, LYOPHILIZED, FOR SOLUTION INTRAVENOUS ONCE
Refills: 0 | Status: COMPLETED | OUTPATIENT
Start: 2023-03-25 | End: 2023-03-25

## 2023-03-25 RX ORDER — SODIUM CHLORIDE 9 MG/ML
1000 INJECTION INTRAMUSCULAR; INTRAVENOUS; SUBCUTANEOUS ONCE
Refills: 0 | Status: COMPLETED | OUTPATIENT
Start: 2023-03-25 | End: 2023-03-25

## 2023-03-25 RX ORDER — ACETAMINOPHEN 500 MG
1000 TABLET ORAL ONCE
Refills: 0 | Status: COMPLETED | OUTPATIENT
Start: 2023-03-25 | End: 2023-03-25

## 2023-03-25 RX ADMIN — Medication 400 MILLIGRAM(S): at 20:56

## 2023-03-25 RX ADMIN — SODIUM CHLORIDE 1000 MILLILITER(S): 9 INJECTION INTRAMUSCULAR; INTRAVENOUS; SUBCUTANEOUS at 20:48

## 2023-03-25 RX ADMIN — PIPERACILLIN AND TAZOBACTAM 200 GRAM(S): 4; .5 INJECTION, POWDER, LYOPHILIZED, FOR SOLUTION INTRAVENOUS at 22:20

## 2023-03-25 NOTE — ED PROVIDER NOTE - CLINICAL SUMMARY MEDICAL DECISION MAKING FREE TEXT BOX
71 y/o M with a PMHx of DM type 2, Hypothyroidism, HTN, and anemia presents to the ED c/o abdominal pain, and not having a bowel movement after having a EGD done on 03/23. Will plan to have septic workup, obtain CXR, abdominal CT scan, IV fluids, address HA and consult GI.

## 2023-03-25 NOTE — ED PROVIDER NOTE - OBJECTIVE STATEMENT
73 y/o M with a PMHx of DM Type 2, Hypothyroidism, HTN and anemia presents to the ED c/o fever, chills, abdominal pain, no bowel movement since EGD procedure on 03/23. Pt reports that his abdomen feels more distended then usual. Pt reports not taking any of his medications after the procedure. Pt reports having a fever of 101 today at home. 71 y/o M with a PMHx of gastric CA, DM Type 2, Hypothyroidism, HTN and anemia presents to the ED c/o fever, chills, abdominal pain, no bowel movement since EGD procedure on 03/23. Pt reports that his abdomen feels more distended then usual. Pt reports not taking any of his medications after the procedure. Pt reports having a fever of 101 today at home, also c/o lower back pain

## 2023-03-25 NOTE — ED PROVIDER NOTE - CONSTITUTIONAL, MLM
Cachetic appearing, awake, alert, oriented to person, place, time/situation and in no apparent distress. normal... Ultrasound Used Text: Ultrasound was utilized to place radiation therapy fields.

## 2023-03-25 NOTE — ED PROVIDER NOTE - GASTROINTESTINAL, MLM
Abdomen soft, distended,  non-tender, no guarding. Bowel sounds present to RUQ. Peg tube present with no erythema or warmth.

## 2023-03-25 NOTE — ED PROVIDER NOTE - INPATIENT RESIDENT/ACP NOTIFIED DATE
Triage note: Cough and congestion for two days, denies fever. Reporting abdominal pain.
25-Mar-2023 23:45

## 2023-03-25 NOTE — ED ADULT NURSE NOTE - OBJECTIVE STATEMENT
Received patient is a 72y Male s/p routine EGD on 3/23/23 c/o abd pain, chills, subjective fevers (T 100.1 at home), constipation and difficulty urinating after EGD. Pt with gastrostomy tube to RUQ abd. Pt denies CP, SOB, N/V/D. Received patient is a 72y Male s/p routine EGD on 3/23/23 c/o abd pain, lower back pain, chills, subjective fevers (T 100.1 at home), constipation and difficulty urinating after EGD. Pt with gastrostomy tube to RUQ abd. Pt denies CP, SOB, N/V/D.

## 2023-03-25 NOTE — ED PROVIDER NOTE - ATTENDING APP SHARED VISIT CONTRIBUTION OF CARE
71 y/o M with a PMHx of DM Type 2, Hypothyroidism, HTN and anemia presents to the ED c/o fever, chills, abdominal pain, no bowel movement since EGD procedure on 03/23. Abd soft, low grade fever, will  check labs, CT imaging, reassess.

## 2023-03-25 NOTE — ED PROVIDER NOTE - CARE PLAN
Principal Discharge DX:	LARRY (acute kidney injury)  Secondary Diagnosis:	Fever  Secondary Diagnosis:	Leukocytosis   1

## 2023-03-26 ENCOUNTER — INPATIENT (INPATIENT)
Facility: HOSPITAL | Age: 73
LOS: 15 days | Discharge: HOME | DRG: 268 | End: 2023-04-11
Attending: STUDENT IN AN ORGANIZED HEALTH CARE EDUCATION/TRAINING PROGRAM | Admitting: SURGERY
Payer: MEDICARE

## 2023-03-26 DIAGNOSIS — K20.90 ESOPHAGITIS, UNSPECIFIED WITHOUT BLEEDING: ICD-10-CM

## 2023-03-26 DIAGNOSIS — Z90.411 ACQUIRED PARTIAL ABSENCE OF PANCREAS: Chronic | ICD-10-CM

## 2023-03-26 DIAGNOSIS — Z29.9 ENCOUNTER FOR PROPHYLACTIC MEASURES, UNSPECIFIED: ICD-10-CM

## 2023-03-26 DIAGNOSIS — A41.9 SEPSIS, UNSPECIFIED ORGANISM: ICD-10-CM

## 2023-03-26 DIAGNOSIS — N17.9 ACUTE KIDNEY FAILURE, UNSPECIFIED: ICD-10-CM

## 2023-03-26 DIAGNOSIS — Z87.19 PERSONAL HISTORY OF OTHER DISEASES OF THE DIGESTIVE SYSTEM: Chronic | ICD-10-CM

## 2023-03-26 DIAGNOSIS — Z95.828 PRESENCE OF OTHER VASCULAR IMPLANTS AND GRAFTS: Chronic | ICD-10-CM

## 2023-03-26 DIAGNOSIS — Z98.890 OTHER SPECIFIED POSTPROCEDURAL STATES: Chronic | ICD-10-CM

## 2023-03-26 DIAGNOSIS — C16.9 MALIGNANT NEOPLASM OF STOMACH, UNSPECIFIED: ICD-10-CM

## 2023-03-26 DIAGNOSIS — N13.30 UNSPECIFIED HYDRONEPHROSIS: ICD-10-CM

## 2023-03-26 DIAGNOSIS — E03.9 HYPOTHYROIDISM, UNSPECIFIED: ICD-10-CM

## 2023-03-26 DIAGNOSIS — Z98.49 CATARACT EXTRACTION STATUS, UNSPECIFIED EYE: Chronic | ICD-10-CM

## 2023-03-26 DIAGNOSIS — Z90.3 ACQUIRED ABSENCE OF STOMACH [PART OF]: Chronic | ICD-10-CM

## 2023-03-26 DIAGNOSIS — Z93.1 GASTROSTOMY STATUS: Chronic | ICD-10-CM

## 2023-03-26 LAB
ALBUMIN SERPL ELPH-MCNC: 2.8 G/DL — LOW (ref 3.3–5)
ALP SERPL-CCNC: 113 U/L — SIGNIFICANT CHANGE UP (ref 40–120)
ALT FLD-CCNC: 15 U/L — SIGNIFICANT CHANGE UP (ref 10–45)
ANION GAP SERPL CALC-SCNC: 9 MMOL/L — SIGNIFICANT CHANGE UP (ref 5–17)
ANION GAP SERPL CALC-SCNC: 9 MMOL/L — SIGNIFICANT CHANGE UP (ref 5–17)
AST SERPL-CCNC: 14 U/L — SIGNIFICANT CHANGE UP (ref 10–40)
BASOPHILS # BLD AUTO: 0.03 K/UL — SIGNIFICANT CHANGE UP (ref 0–0.2)
BASOPHILS NFR BLD AUTO: 0.2 % — SIGNIFICANT CHANGE UP (ref 0–2)
BILIRUB SERPL-MCNC: 0.9 MG/DL — SIGNIFICANT CHANGE UP (ref 0.2–1.2)
BUN SERPL-MCNC: 34 MG/DL — HIGH (ref 7–23)
BUN SERPL-MCNC: 42 MG/DL — HIGH (ref 7–23)
CALCIUM SERPL-MCNC: 8.3 MG/DL — LOW (ref 8.4–10.5)
CALCIUM SERPL-MCNC: 8.9 MG/DL — SIGNIFICANT CHANGE UP (ref 8.4–10.5)
CHLORIDE SERPL-SCNC: 100 MMOL/L — SIGNIFICANT CHANGE UP (ref 96–108)
CHLORIDE SERPL-SCNC: 100 MMOL/L — SIGNIFICANT CHANGE UP (ref 96–108)
CO2 SERPL-SCNC: 26 MMOL/L — SIGNIFICANT CHANGE UP (ref 22–31)
CO2 SERPL-SCNC: 27 MMOL/L — SIGNIFICANT CHANGE UP (ref 22–31)
CREAT SERPL-MCNC: 1.45 MG/DL — HIGH (ref 0.5–1.3)
CREAT SERPL-MCNC: 2.6 MG/DL — HIGH (ref 0.5–1.3)
EGFR: 25 ML/MIN/1.73M2 — LOW
EGFR: 51 ML/MIN/1.73M2 — LOW
EOSINOPHIL # BLD AUTO: 0.06 K/UL — SIGNIFICANT CHANGE UP (ref 0–0.5)
EOSINOPHIL NFR BLD AUTO: 0.4 % — SIGNIFICANT CHANGE UP (ref 0–6)
GLUCOSE SERPL-MCNC: 132 MG/DL — HIGH (ref 70–99)
GLUCOSE SERPL-MCNC: 183 MG/DL — HIGH (ref 70–99)
HCT VFR BLD CALC: 40.6 % — SIGNIFICANT CHANGE UP (ref 39–50)
HGB BLD-MCNC: 13.5 G/DL — SIGNIFICANT CHANGE UP (ref 13–17)
IMM GRANULOCYTES NFR BLD AUTO: 0.4 % — SIGNIFICANT CHANGE UP (ref 0–0.9)
LYMPHOCYTES # BLD AUTO: 0.93 K/UL — LOW (ref 1–3.3)
LYMPHOCYTES # BLD AUTO: 5.8 % — LOW (ref 13–44)
MAGNESIUM SERPL-MCNC: 2.3 MG/DL — SIGNIFICANT CHANGE UP (ref 1.6–2.6)
MCHC RBC-ENTMCNC: 33.2 PG — SIGNIFICANT CHANGE UP (ref 27–34)
MCHC RBC-ENTMCNC: 33.3 GM/DL — SIGNIFICANT CHANGE UP (ref 32–36)
MCV RBC AUTO: 99.8 FL — SIGNIFICANT CHANGE UP (ref 80–100)
MONOCYTES # BLD AUTO: 1.39 K/UL — HIGH (ref 0–0.9)
MONOCYTES NFR BLD AUTO: 8.7 % — SIGNIFICANT CHANGE UP (ref 2–14)
NEUTROPHILS # BLD AUTO: 13.57 K/UL — HIGH (ref 1.8–7.4)
NEUTROPHILS NFR BLD AUTO: 84.5 % — HIGH (ref 43–77)
NRBC # BLD: 0 /100 WBCS — SIGNIFICANT CHANGE UP (ref 0–0)
PHOSPHATE SERPL-MCNC: 3.5 MG/DL — SIGNIFICANT CHANGE UP (ref 2.5–4.5)
PLATELET # BLD AUTO: 201 K/UL — SIGNIFICANT CHANGE UP (ref 150–400)
POTASSIUM SERPL-MCNC: 4.3 MMOL/L — SIGNIFICANT CHANGE UP (ref 3.5–5.3)
POTASSIUM SERPL-MCNC: 4.8 MMOL/L — SIGNIFICANT CHANGE UP (ref 3.5–5.3)
POTASSIUM SERPL-SCNC: 4.3 MMOL/L — SIGNIFICANT CHANGE UP (ref 3.5–5.3)
POTASSIUM SERPL-SCNC: 4.8 MMOL/L — SIGNIFICANT CHANGE UP (ref 3.5–5.3)
PROT SERPL-MCNC: 6.9 G/DL — SIGNIFICANT CHANGE UP (ref 6–8.3)
RBC # BLD: 4.07 M/UL — LOW (ref 4.2–5.8)
RBC # FLD: 14.7 % — HIGH (ref 10.3–14.5)
SODIUM SERPL-SCNC: 135 MMOL/L — SIGNIFICANT CHANGE UP (ref 135–145)
SODIUM SERPL-SCNC: 136 MMOL/L — SIGNIFICANT CHANGE UP (ref 135–145)
TROPONIN T SERPL-MCNC: 0.03 NG/ML — HIGH (ref 0–0.01)
WBC # BLD: 16.05 K/UL — HIGH (ref 3.8–10.5)
WBC # FLD AUTO: 16.05 K/UL — HIGH (ref 3.8–10.5)

## 2023-03-26 PROCEDURE — 99223 1ST HOSP IP/OBS HIGH 75: CPT | Mod: GC

## 2023-03-26 RX ORDER — SUCRALFATE 1 G
1 TABLET ORAL
Refills: 0 | Status: DISCONTINUED | OUTPATIENT
Start: 2023-03-26 | End: 2023-04-11

## 2023-03-26 RX ORDER — PANTOPRAZOLE SODIUM 20 MG/1
40 TABLET, DELAYED RELEASE ORAL DAILY
Refills: 0 | Status: DISCONTINUED | OUTPATIENT
Start: 2023-03-26 | End: 2023-04-11

## 2023-03-26 RX ORDER — ZOLPIDEM TARTRATE 10 MG/1
10 TABLET ORAL ONCE
Refills: 0 | Status: DISCONTINUED | OUTPATIENT
Start: 2023-03-26 | End: 2023-03-26

## 2023-03-26 RX ORDER — CEFTRIAXONE 500 MG/1
1000 INJECTION, POWDER, FOR SOLUTION INTRAMUSCULAR; INTRAVENOUS EVERY 24 HOURS
Refills: 0 | Status: DISCONTINUED | OUTPATIENT
Start: 2023-03-26 | End: 2023-03-27

## 2023-03-26 RX ORDER — HEPARIN SODIUM 5000 [USP'U]/ML
5000 INJECTION INTRAVENOUS; SUBCUTANEOUS EVERY 8 HOURS
Refills: 0 | Status: DISCONTINUED | OUTPATIENT
Start: 2023-03-26 | End: 2023-03-26

## 2023-03-26 RX ORDER — ACETAMINOPHEN 500 MG
650 TABLET ORAL EVERY 6 HOURS
Refills: 0 | Status: DISCONTINUED | OUTPATIENT
Start: 2023-03-26 | End: 2023-04-11

## 2023-03-26 RX ORDER — APIXABAN 2.5 MG/1
2.5 TABLET, FILM COATED ORAL
Refills: 0 | Status: DISCONTINUED | OUTPATIENT
Start: 2023-03-26 | End: 2023-03-29

## 2023-03-26 RX ADMIN — PANTOPRAZOLE SODIUM 40 MILLIGRAM(S): 20 TABLET, DELAYED RELEASE ORAL at 11:52

## 2023-03-26 RX ADMIN — CEFTRIAXONE 100 MILLIGRAM(S): 500 INJECTION, POWDER, FOR SOLUTION INTRAMUSCULAR; INTRAVENOUS at 06:16

## 2023-03-26 RX ADMIN — Medication 1 GRAM(S): at 06:16

## 2023-03-26 RX ADMIN — Medication 1000 MILLIGRAM(S): at 00:57

## 2023-03-26 RX ADMIN — Medication 1 GRAM(S): at 11:52

## 2023-03-26 RX ADMIN — APIXABAN 2.5 MILLIGRAM(S): 2.5 TABLET, FILM COATED ORAL at 06:16

## 2023-03-26 RX ADMIN — APIXABAN 2.5 MILLIGRAM(S): 2.5 TABLET, FILM COATED ORAL at 18:29

## 2023-03-26 RX ADMIN — Medication 1 GRAM(S): at 18:29

## 2023-03-26 NOTE — H&P ADULT - NSHPPHYSICALEXAM_GEN_ALL_CORE
VITAL SIGNS:  Vital Signs Last 24 Hrs  T(C): 36.9 (25 Mar 2023 22:44), Max: 37.9 (25 Mar 2023 20:53)  T(F): 98.5 (25 Mar 2023 22:44), Max: 100.2 (25 Mar 2023 20:53)  HR: 66 (25 Mar 2023 22:44) (66 - 74)  BP: 148/91 (25 Mar 2023 22:44) (144/83 - 148/91)  BP(mean): --  RR: 17 (25 Mar 2023 22:44) (17 - 17)  SpO2: 96% (25 Mar 2023 22:44) (96% - 98%)    Parameters below as of 25 Mar 2023 22:44  Patient On (Oxygen Delivery Method): room air      I&O's Summary      PHYSICAL EXAM:    General: WDWN  HEENT: NC/AT; PERRL, anicteric sclera; MMM  Neck: supple  Cardiovascular: +S1/S2; RRR  Respiratory: CTA B/L; no W/R/R  Gastrointestinal: soft, NT/ND; +BSx4  Extremities: WWP; no edema, clubbing or cyanosis  Vascular: 2+ radial, DP/PT pulses B/L  Neurological: AAOx3; no focal deficits VITAL SIGNS:  Vital Signs Last 24 Hrs  T(C): 36.9 (25 Mar 2023 22:44), Max: 37.9 (25 Mar 2023 20:53)  T(F): 98.5 (25 Mar 2023 22:44), Max: 100.2 (25 Mar 2023 20:53)  HR: 66 (25 Mar 2023 22:44) (66 - 74)  BP: 148/91 (25 Mar 2023 22:44) (144/83 - 148/91)  BP(mean): --  RR: 17 (25 Mar 2023 22:44) (17 - 17)  SpO2: 96% (25 Mar 2023 22:44) (96% - 98%)    Parameters below as of 25 Mar 2023 22:44  Patient On (Oxygen Delivery Method): room air      I&O's Summary      PHYSICAL EXAM:    General: WDWN  HEENT: PERRL, anicteric sclera; MMM  Cardiovascular: +S1/S2; RRR  Respiratory: CTA B/L; no W/R/R  Gastrointestinal: soft, NT/ND; +BSx4  Extremities: WWP; no edema  Vascular: 2+ radial, DP/PT pulses B/L  Neurological: AAOx3 VITAL SIGNS:  Vital Signs Last 24 Hrs  T(C): 36.9 (25 Mar 2023 22:44), Max: 37.9 (25 Mar 2023 20:53)  T(F): 98.5 (25 Mar 2023 22:44), Max: 100.2 (25 Mar 2023 20:53)  HR: 66 (25 Mar 2023 22:44) (66 - 74)  BP: 148/91 (25 Mar 2023 22:44) (144/83 - 148/91)  BP(mean): --  RR: 17 (25 Mar 2023 22:44) (17 - 17)  SpO2: 96% (25 Mar 2023 22:44) (96% - 98%)    Parameters below as of 25 Mar 2023 22:44  Patient On (Oxygen Delivery Method): room air      I&O's Summary      PHYSICAL EXAM:    General: NAD  HEENT: PERRL, anicteric sclera; MMM  Cardiovascular: +S1/S2; RRR  Respiratory: CTA B/L; no W/R/R  Gastrointestinal: soft, NT/ND; +BSx4  Extremities: WWP; no edema  Vascular: 2+ radial, DP/PT pulses B/L  Neurological: AAOx3

## 2023-03-26 NOTE — H&P ADULT - BIRTH SEX
Problem: NICU 34-35 weeks: Day of Life 7 to Discharge  Goal: Diagnostic Test/Procedures  Outcome: Progressing Towards Goal  No new labs scheduled for tonight. Goal: Nutrition/Diet  Outcome: Progressing Towards Goal  Taking all feeds orally, but only taking feeds fair. Goal: Treatments/Interventions/Procedures  Outcome: Progressing Towards Goal  Nightly weights. On diaper counts. In open crib. Goal: *Body weight gain 10-15 gm/kg/day  Outcome: Progressing Towards Goal  Infant gaining weight, but not at optimal rate. Male

## 2023-03-26 NOTE — PATIENT PROFILE ADULT - FALL HARM RISK - HARM RISK INTERVENTIONS
Assistance with ambulation/Assistance OOB with selected safe patient handling equipment/Communicate Risk of Fall with Harm to all staff/Discuss with provider need for PT consult/Monitor for mental status changes/Monitor gait and stability/Provide patient with walking aids - walker, cane, crutches/Reinforce activity limits and safety measures with patient and family/Reorient to person, place and time as needed/Review medications for side effects contributing to fall risk/Sit up slowly, dangle for a short time, stand at bedside before walking/Tailored Fall Risk Interventions/Toileting schedule using arm’s reach rule for commode and bathroom/Use of alarms - bed, chair and/or voice tab/Visual Cue: Yellow wristband and red socks/Bed in lowest position, wheels locked, appropriate side rails in place/Call bell, personal items and telephone in reach/Instruct patient to call for assistance before getting out of bed or chair/Non-slip footwear when patient is out of bed/[place] to call system/Physically safe environment - no spills, clutter or unnecessary equipment/Purposeful Proactive Rounding/Room/bathroom lighting operational, light cord in reach Assistance with ambulation/Assistance OOB with selected safe patient handling equipment/Communicate Risk of Fall with Harm to all staff/Reinforce activity limits and safety measures with patient and family/Tailored Fall Risk Interventions/Visual Cue: Yellow wristband and red socks/Bed in lowest position, wheels locked, appropriate side rails in place/Call bell, personal items and telephone in reach/Instruct patient to call for assistance before getting out of bed or chair/Non-slip footwear when patient is out of bed/[place] to call system/Physically safe environment - no spills, clutter or unnecessary equipment/Purposeful Proactive Rounding/Room/bathroom lighting operational, light cord in reach

## 2023-03-26 NOTE — H&P ADULT - NSHPLABSRESULTS_GEN_ALL_CORE
LABS:                        13.7   21.86 )-----------( 217      ( 25 Mar 2023 19:13 )             39.9         133<L>  |  95<L>  |  48<H>  ----------------------------<  183<H>  4.8   |  24  |  3.48<H>    Ca    9.0      25 Mar 2023 19:13    TPro  7.5  /  Alb  3.3  /  TBili  1.2  /  DBili  x   /  AST  20  /  ALT  19  /  AlkPhos  125<H>        Urinalysis Basic - ( 25 Mar 2023 19:13 )    Color: Yellow / Appearance: Clear / S.020 / pH: x  Gluc: x / Ketone: NEGATIVE  / Bili: Negative / Urobili: 0.2 E.U./dL   Blood: x / Protein: NEGATIVE mg/dL / Nitrite: NEGATIVE   Leuk Esterase: NEGATIVE / RBC: x / WBC x   Sq Epi: x / Non Sq Epi: x / Bacteria: x      CAPILLARY BLOOD GLUCOSE          RADIOLOGY & ADDITIONAL TESTS: Reviewed.

## 2023-03-26 NOTE — H&P ADULT - PROBLEM SELECTOR PLAN 4
hx of gastric CA that was diagnosed in 12/2016. Patient is s/p a gastrectomy with partial esophagectomy and partial pancreatectomy.   - outpatient f/u in remission. hx of gastric CA that was diagnosed in 12/2016. Patient is s/p a gastrectomy with partial esophagectomy and partial pancreatectomy.   - outpatient f/u    #Portal vein thrombosis  hx of PVT on eliquis  - restart home eliquis 2.5mg BID

## 2023-03-26 NOTE — CONSULT NOTE ADULT - SUBJECTIVE AND OBJECTIVE BOX
71 y/o M with a PMHx of metastatic gastric CA who presented to the ED on 3/25  c/o fever, chills, abdominal pain, no bowel movement since EGD procedure on 03/23.     Pt reports that his abdomen feels more distended than usual with associated low back pain and inability to make urine. Has also had minimal appetite, but feels this is mostly due to abd distention. Pt reports not taking any of his medications after the procedure.  He described having a fever of 101 at home prior to presentation.     He overall feels significant improvement in all sx since joyner placed in ED. Abd distension, back pain have nearly resolved.  Feeling appetite returning and desires to eat.    No new/worsening dysphagia, epigastric pain otherwise. No melena/hematochezia    Of note, he underwent planned removal of stent on 3/23 via EGD.  Findings/procedure as follows:   Grade D esophagitis in the middle third of the esophagus and lower third of the esophagus compatible with nonspecific erosive esophagitis. Biopsies unremarkable.    EJ anastomosis with some edematous mucosa and acute angulation to the enteral limb, however patent. The blind limb with patent AXIOS stent was noted. The   stent was removed using a rat tooth forceps. No new stent was placed.    Carafate 1 gm QID and trial of liquid to soft diet recommended with plan for repeat EGD in 3 months.    ED course:   Vitals: T: 100.2 rectal | HR: 66 | /83 | RR 17 spo2 96% RA  Labs: WBC 21.86 | Na 133 | BUN/Cr: 48/3.48 | alk phos: 125 | UA negative  EGD esophagus biopsy:  Small intestinal type mucosa showing focally active, chronic inflammation. Separate fragments of squamous mucosa showing reactive epithelial changes and rare intraepithelial eosinophils, consistent with reflux associated changes. Negative for dysplasia or malignancy  CT chest:  1.   Nonspecific esophageal wall thickening, possible esophagitis.  2.   Fluid within the esophageal lumen can be seen with gastroesophageal reflux.  3.   Trace bilateral pleural effusions with compressive atelectasis.  CT A/P:  1.   Main and right portal veins are mildly hyperdense which can be seen with portal vein thrombosis.  2.   Severe left hydronephrosis and hydroureter. Moderate right hydronephrosis and hydroureter. Markedly distended urinary bladder. Question bladder outlet obstruction.  3.   Bladder diverticula with layering stones.  4.   Infrarenal abdominal aortic aneurysm measuring up to 5.2 cm.  5.   Small free fluid.    EKG: sinus rhythm, TW inversions II. III. aVF, nonsp TW changes chest leads  Tx: zosyn, 1L NS, tylenol 1g   (26 Mar 2023 01:44)    Allergies    No Known Allergies    Intolerances      Home Medications:  Eliquis 2.5 mg oral tablet: 1 tab(s) orally 2 times a day (28 Jun 2022 08:25)  HumuLIN N: 8 unit(s) subcutaneous once a day (at bedtime) (28 Jun 2022 08:25)  levothyroxine 100 mcg (0.1 mg) oral tablet: 1 tab(s) orally once a day (28 Jun 2022 08:25)  tamsulosin 0.4 mg oral capsule: 1 cap(s) orally once a day (28 Jun 2022 08:25)  Vitamin D3 50 mcg (2000 intl units) oral tablet: 1 tab(s) orally once a day (28 Jun 2022 08:25)    MEDICATIONS:  MEDICATIONS  (STANDING):  apixaban 2.5 milliGRAM(s) Oral two times a day  cefTRIAXone   IVPB 1000 milliGRAM(s) IV Intermittent every 24 hours  pantoprazole    Tablet 40 milliGRAM(s) Oral daily  sucralfate suspension 1 Gram(s) Oral four times a day    MEDICATIONS  (PRN):  acetaminophen     Tablet .. 650 milliGRAM(s) Oral every 6 hours PRN Temp greater or equal to 38C (100.4F), Mild Pain (1 - 3)    PAST MEDICAL & SURGICAL HISTORY:  Essential hypertension  was on losartan, now diet control      Hypothyroidism      Gastric mass  ulcerated mass in gastric cardia with small perigastric nodes on endoscopy.      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      H/O umbilical hernia repair  2018 with mesh      Port-a-cath in place  right chest wall      History of gastric surgery  2017      Gastrostomy tube in place  open jejunostomy with J-tube November 2020      S/P cataract surgery      H/O endoscopy  & stent placement 12/2020      S/P gastrectomy  total gastrectomy 2017      History of partial pancreatectomy  distal pancreatectomy/ splenectomy esophatojejunostomy      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, gastric primary      History of dysphagia  EGD with Axios stent placment to connect the blind limb to th e efferent limb (jejunojejunostomy)      REVIEW OF SYSTEMS:  All other 10 review of systems is negative unless indicated above.    Vital Signs Last 24 Hrs  T(C): 36.8 (26 Mar 2023 09:09), Max: 37.9 (25 Mar 2023 20:53)  T(F): 98.3 (26 Mar 2023 09:09), Max: 100.2 (25 Mar 2023 20:53)  HR: 65 (26 Mar 2023 09:09) (65 - 74)  BP: 107/63 (26 Mar 2023 09:09) (107/63 - 148/91)  BP(mean): --  RR: 18 (26 Mar 2023 09:09) (17 - 18)  SpO2: 99% (26 Mar 2023 09:09) (96% - 100%)    Parameters below as of 26 Mar 2023 09:09  Patient On (Oxygen Delivery Method): room air        03-25 @ 07:01  -  03-26 @ 07:00  --------------------------------------------------------  IN: 0 mL / OUT: 2500 mL / NET: -2500 mL        PHYSICAL EXAM:    General: No acute distress, thin appearing  Eyes: Anicteric sclerae, moist conjunctivae  HENT: Moist mucous membranes  Neck: Trachea midline, supple  Lungs: Normal respiratory effort and no intercostal retractions  Cardiovascular: RRR  Abdomen: Soft, non-tender non-distended; No rebound or guarding  Joyner draining brian urine  Neurological: Alert and oriented x3  Skin: Warm and dry. No obvious rash    LABS:                        13.7   21.86 )-----------( 217      ( 25 Mar 2023 19:13 )             39.9     03-26    135  |  100  |  42<H>  ----------------------------<  183<H>  4.8   |  26  |  2.60<H>    Ca    8.3<L>      26 Mar 2023 02:56    TPro  7.5  /  Alb  3.3  /  TBili  1.2  /  DBili  x   /  AST  20  /  ALT  19  /  AlkPhos  125<H>  03-25      Culture Results:   No growth at 12 hours (03-25 @ 20:22)  Culture Results:   No growth at 12 hours (03-25 @ 20:22)        Culture - Blood (collected 25 Mar 2023 20:22)  Source: .Blood Blood-Peripheral  Preliminary Report (26 Mar 2023 10:00):    No growth at 12 hours    Culture - Blood (collected 25 Mar 2023 20:22)  Source: .Blood Blood-Peripheral  Preliminary Report (26 Mar 2023 10:00):    No growth at 12 hours    Urinalysis with Rflx Culture (collected 25 Mar 2023 19:13)      RADIOLOGY & ADDITIONAL STUDIES:

## 2023-03-26 NOTE — H&P ADULT - PROBLEM SELECTOR PLAN 2
found to have esophagitis on CT chest. Recent EGD with esophageal biopsy consistent with GERD  - c/w pantoprazole 40mg qd

## 2023-03-26 NOTE — H&P ADULT - PROBLEM SELECTOR PLAN 1
presented with fevers to 101 at home, Tmax in .2, leukocytosis to 21, negative lactate. Source ddx bacterial translocation from recent esophageal biopsy vs UTI.  - f/u UA  - f/u blood, urine cx  - cover empirically with ceftriaxone 1g q24h  - GI consult in AM

## 2023-03-26 NOTE — H&P ADULT - PROBLEM SELECTOR PLAN 3
#hydronephrosis  presenting with Cr elevation to >3, baseline Cr wNL. Likely 2/2 severe urinary retention c/b hydronephrosis. Abarca placed in ED  - trend BMP BID  - f/u UA  - strict I&Os  - renally dose medications  - monitor for resolution with renal US in 48 hours

## 2023-03-26 NOTE — H&P ADULT - HISTORY OF PRESENT ILLNESS
73 y/o M with a PMHx of gastric CA, DM Type 2, Hypothyroidism, HTN and anemia presents to the ED c/o fever, chills, abdominal pain, no bowel movement since EGD procedure on 03/23. Pt reports that his abdomen feels more distended than usual with associated low back pain. Pt reports not taking any of his medications after the procedure. Pt reports having a fever of 101 today at home, also c/o lower back pain that resolved with having the joyner placed in the ED. Denies cough, sputum production, dysphagia, nausea, vomiting.     ED course:   Vitals: T: 100.2 rectal | HR: 66 | /83 | RR 17 spo2 96% RA  Labs: WBC 21.86 | Na 133 | BUN/Cr: 48/3.48 | alk phos: 125 | UA negative  EGD esophagus biopsy:  Small intestinal type mucosa showing focally active, chronic inflammation. Separate fragments of squamous mucosa showing reactive epithelial changes and rare intraepithelial eosinophils, consistent with reflux associated changes. Negative for dysplasia or malignancy  CT chest:  1.   Nonspecific esophageal wall thickening, possible esophagitis.  2.   Fluid within the esophageal lumen can be seen with gastroesophageal reflux.  3.   Trace bilateral pleural effusions with compressive atelectasis.  CT A/P:  1.   Main and right portal veins are mildly hyperdense which can be seen with portal vein thrombosis.  2.   Severe left hydronephrosis and hydroureter. Moderate right hydronephrosis and hydroureter. Markedly distended urinary bladder. Question bladder outlet obstruction.  3.   Bladder diverticula with layering stones.  4.   Infrarenal abdominal aortic aneurysm measuring up to 5.2 cm.  5.   Small free fluid.  EKG: sinus rhythm, TW inversions II. III. aVF, nonsp TW changes chest leads  Tx: zosyn, 1L NS, tylenol 1g

## 2023-03-26 NOTE — CONSULT NOTE ADULT - ASSESSMENT
73 y/o M with a PMHx of metastatic gastric CA with interval development of fever, chills, abdominal pain, urinary retention following EGD procedure on 03/23 admitted with suspected sepsis with bilateral hydronephrosis/hydroureter with markedly distended bladder     Overall significant improvement since joyner placement.  Leukocytosis and fever may be explained my acute inflammatory response with severe urinary tract obstruction which has improved since joyner placement.  Clouded by empiric abx.  Infectious etiology on ddx and theoretical risk of bacterial translocation following stent removal possible, but less likely.      Recommendations:  -Blood cx pending   -Infectious eval otherwise per primary team  -Abx per primary team  -Recommend urology consultation  -Carafate 1 gm QID and liquids OK from GI perspective    Gastroenterology service will sign off  Recommendations discussed with primary team  Case discussed with attending physician and advanced attending Dr. Peguero  Please recall as needed with any GI related questions    Thank you for this consult.     Lalito Zavala DO  Gastroenterology Fellow  Pager: [phone]

## 2023-03-26 NOTE — H&P ADULT - PROBLEM SELECTOR PLAN 5
home meds levothyroxine 100mcg qd  - c/w home med    #eliquis? home meds levothyroxine 100mcg qd  - c/w home med

## 2023-03-26 NOTE — H&P ADULT - ASSESSMENT
71 y/o M with a PMHx of gastric CA, DM Type 2, Hypothyroidism, HTN and anemia s/p EGD with esophageal biopsy 3/23 presents to the ED c/o fevers, low back pain x 2 days. Admitted for sepsis and LARRY.

## 2023-03-26 NOTE — PATIENT PROFILE ADULT - NSPRONUTRITIONRISK_GEN_A_NUR
Enteral/parenteral nutrition prior to admission/Significant decrease of oral intake greater than 3 days prior to admission/Unintentional weight loss prior to admission

## 2023-03-26 NOTE — H&P ADULT - ATTENDING COMMENTS
Patient was seen and examined at bedside on 3/26/2023 at 11 am. Patient reports improvement of all of his symptoms. Denies SOB, CP, urinary symptoms, abdominal pain, N/V, or LE swelling. ROS is otherwise negative. Vitals, labwork and pertinent imaging reviewed. Physical exam - NAD, AAO x  4, PERRLA, EOMI, supple neck, chest - CTA b/l, CV - s1s2, no m/r/g, no edema, abd - soft, NTND + BS,  ext - wwp, psych - normal affect, skin - no rash, back - midline, no spinal TTP,  - + FC    Plan  -C/w abx, f/u cultures  -Ensure BM, ambulation  -Strict i/o's   -Plan for TOV in AM

## 2023-03-27 DIAGNOSIS — R33.8 OTHER RETENTION OF URINE: ICD-10-CM

## 2023-03-27 LAB
ALBUMIN SERPL ELPH-MCNC: 2.7 G/DL — LOW (ref 3.3–5)
ALP SERPL-CCNC: 89 U/L — SIGNIFICANT CHANGE UP (ref 40–120)
ALT FLD-CCNC: 12 U/L — SIGNIFICANT CHANGE UP (ref 10–45)
ANION GAP SERPL CALC-SCNC: 5 MMOL/L — SIGNIFICANT CHANGE UP (ref 5–17)
ANION GAP SERPL CALC-SCNC: 7 MMOL/L — SIGNIFICANT CHANGE UP (ref 5–17)
AST SERPL-CCNC: 14 U/L — SIGNIFICANT CHANGE UP (ref 10–40)
BASOPHILS # BLD AUTO: 0.03 K/UL — SIGNIFICANT CHANGE UP (ref 0–0.2)
BASOPHILS NFR BLD AUTO: 0.3 % — SIGNIFICANT CHANGE UP (ref 0–2)
BILIRUB SERPL-MCNC: 0.6 MG/DL — SIGNIFICANT CHANGE UP (ref 0.2–1.2)
BUN SERPL-MCNC: 22 MG/DL — SIGNIFICANT CHANGE UP (ref 7–23)
BUN SERPL-MCNC: 23 MG/DL — SIGNIFICANT CHANGE UP (ref 7–23)
CALCIUM SERPL-MCNC: 8.4 MG/DL — SIGNIFICANT CHANGE UP (ref 8.4–10.5)
CALCIUM SERPL-MCNC: 8.6 MG/DL — SIGNIFICANT CHANGE UP (ref 8.4–10.5)
CHLORIDE SERPL-SCNC: 100 MMOL/L — SIGNIFICANT CHANGE UP (ref 96–108)
CHLORIDE SERPL-SCNC: 103 MMOL/L — SIGNIFICANT CHANGE UP (ref 96–108)
CO2 SERPL-SCNC: 27 MMOL/L — SIGNIFICANT CHANGE UP (ref 22–31)
CO2 SERPL-SCNC: 29 MMOL/L — SIGNIFICANT CHANGE UP (ref 22–31)
CREAT SERPL-MCNC: 0.76 MG/DL — SIGNIFICANT CHANGE UP (ref 0.5–1.3)
CREAT SERPL-MCNC: 0.77 MG/DL — SIGNIFICANT CHANGE UP (ref 0.5–1.3)
EGFR: 95 ML/MIN/1.73M2 — SIGNIFICANT CHANGE UP
EGFR: 95 ML/MIN/1.73M2 — SIGNIFICANT CHANGE UP
EOSINOPHIL # BLD AUTO: 0.09 K/UL — SIGNIFICANT CHANGE UP (ref 0–0.5)
EOSINOPHIL NFR BLD AUTO: 0.9 % — SIGNIFICANT CHANGE UP (ref 0–6)
GLUCOSE BLDC GLUCOMTR-MCNC: 109 MG/DL — HIGH (ref 70–99)
GLUCOSE BLDC GLUCOMTR-MCNC: 113 MG/DL — HIGH (ref 70–99)
GLUCOSE BLDC GLUCOMTR-MCNC: 141 MG/DL — HIGH (ref 70–99)
GLUCOSE BLDC GLUCOMTR-MCNC: 162 MG/DL — HIGH (ref 70–99)
GLUCOSE SERPL-MCNC: 109 MG/DL — HIGH (ref 70–99)
GLUCOSE SERPL-MCNC: 167 MG/DL — HIGH (ref 70–99)
HCT VFR BLD CALC: 37.9 % — LOW (ref 39–50)
HGB BLD-MCNC: 12.4 G/DL — LOW (ref 13–17)
IMM GRANULOCYTES NFR BLD AUTO: 0.3 % — SIGNIFICANT CHANGE UP (ref 0–0.9)
LYMPHOCYTES # BLD AUTO: 0.91 K/UL — LOW (ref 1–3.3)
LYMPHOCYTES # BLD AUTO: 9.4 % — LOW (ref 13–44)
MAGNESIUM SERPL-MCNC: 2 MG/DL — SIGNIFICANT CHANGE UP (ref 1.6–2.6)
MAGNESIUM SERPL-MCNC: 2.2 MG/DL — SIGNIFICANT CHANGE UP (ref 1.6–2.6)
MCHC RBC-ENTMCNC: 32.7 GM/DL — SIGNIFICANT CHANGE UP (ref 32–36)
MCHC RBC-ENTMCNC: 33.6 PG — SIGNIFICANT CHANGE UP (ref 27–34)
MCV RBC AUTO: 102.7 FL — HIGH (ref 80–100)
MONOCYTES # BLD AUTO: 0.89 K/UL — SIGNIFICANT CHANGE UP (ref 0–0.9)
MONOCYTES NFR BLD AUTO: 9.2 % — SIGNIFICANT CHANGE UP (ref 2–14)
NEUTROPHILS # BLD AUTO: 7.75 K/UL — HIGH (ref 1.8–7.4)
NEUTROPHILS NFR BLD AUTO: 79.9 % — HIGH (ref 43–77)
NRBC # BLD: 0 /100 WBCS — SIGNIFICANT CHANGE UP (ref 0–0)
PHOSPHATE SERPL-MCNC: 2.5 MG/DL — SIGNIFICANT CHANGE UP (ref 2.5–4.5)
PHOSPHATE SERPL-MCNC: 2.6 MG/DL — SIGNIFICANT CHANGE UP (ref 2.5–4.5)
PLATELET # BLD AUTO: 188 K/UL — SIGNIFICANT CHANGE UP (ref 150–400)
POTASSIUM SERPL-MCNC: 3.8 MMOL/L — SIGNIFICANT CHANGE UP (ref 3.5–5.3)
POTASSIUM SERPL-MCNC: 4 MMOL/L — SIGNIFICANT CHANGE UP (ref 3.5–5.3)
POTASSIUM SERPL-SCNC: 3.8 MMOL/L — SIGNIFICANT CHANGE UP (ref 3.5–5.3)
POTASSIUM SERPL-SCNC: 4 MMOL/L — SIGNIFICANT CHANGE UP (ref 3.5–5.3)
PROT SERPL-MCNC: 6.2 G/DL — SIGNIFICANT CHANGE UP (ref 6–8.3)
RBC # BLD: 3.69 M/UL — LOW (ref 4.2–5.8)
RBC # FLD: 14.7 % — HIGH (ref 10.3–14.5)
SODIUM SERPL-SCNC: 134 MMOL/L — LOW (ref 135–145)
SODIUM SERPL-SCNC: 137 MMOL/L — SIGNIFICANT CHANGE UP (ref 135–145)
WBC # BLD: 9.7 K/UL — SIGNIFICANT CHANGE UP (ref 3.8–10.5)
WBC # FLD AUTO: 9.7 K/UL — SIGNIFICANT CHANGE UP (ref 3.8–10.5)

## 2023-03-27 PROCEDURE — 74174 CTA ABD&PLVS W/CONTRAST: CPT | Mod: 26

## 2023-03-27 PROCEDURE — 99233 SBSQ HOSP IP/OBS HIGH 50: CPT | Mod: GC

## 2023-03-27 PROCEDURE — 99222 1ST HOSP IP/OBS MODERATE 55: CPT

## 2023-03-27 PROCEDURE — 71275 CT ANGIOGRAPHY CHEST: CPT | Mod: 26

## 2023-03-27 RX ORDER — DEXTROSE 50 % IN WATER 50 %
25 SYRINGE (ML) INTRAVENOUS ONCE
Refills: 0 | Status: DISCONTINUED | OUTPATIENT
Start: 2023-03-27 | End: 2023-04-11

## 2023-03-27 RX ORDER — DEXTROSE 50 % IN WATER 50 %
12.5 SYRINGE (ML) INTRAVENOUS ONCE
Refills: 0 | Status: DISCONTINUED | OUTPATIENT
Start: 2023-03-27 | End: 2023-04-11

## 2023-03-27 RX ORDER — SODIUM CHLORIDE 9 MG/ML
1000 INJECTION, SOLUTION INTRAVENOUS
Refills: 0 | Status: DISCONTINUED | OUTPATIENT
Start: 2023-03-27 | End: 2023-04-11

## 2023-03-27 RX ORDER — GLUCAGON INJECTION, SOLUTION 0.5 MG/.1ML
1 INJECTION, SOLUTION SUBCUTANEOUS ONCE
Refills: 0 | Status: DISCONTINUED | OUTPATIENT
Start: 2023-03-27 | End: 2023-04-11

## 2023-03-27 RX ORDER — SODIUM CHLORIDE 9 MG/ML
1000 INJECTION INTRAMUSCULAR; INTRAVENOUS; SUBCUTANEOUS
Refills: 0 | Status: DISCONTINUED | OUTPATIENT
Start: 2023-03-27 | End: 2023-03-28

## 2023-03-27 RX ORDER — INSULIN LISPRO 100/ML
VIAL (ML) SUBCUTANEOUS
Refills: 0 | Status: DISCONTINUED | OUTPATIENT
Start: 2023-03-27 | End: 2023-03-29

## 2023-03-27 RX ORDER — SENNA PLUS 8.6 MG/1
2 TABLET ORAL AT BEDTIME
Refills: 0 | Status: DISCONTINUED | OUTPATIENT
Start: 2023-03-27 | End: 2023-04-11

## 2023-03-27 RX ORDER — DEXTROSE 50 % IN WATER 50 %
15 SYRINGE (ML) INTRAVENOUS ONCE
Refills: 0 | Status: DISCONTINUED | OUTPATIENT
Start: 2023-03-27 | End: 2023-04-11

## 2023-03-27 RX ORDER — LEVOTHYROXINE SODIUM 125 MCG
100 TABLET ORAL EVERY 24 HOURS
Refills: 0 | Status: DISCONTINUED | OUTPATIENT
Start: 2023-03-27 | End: 2023-04-03

## 2023-03-27 RX ORDER — POLYETHYLENE GLYCOL 3350 17 G/17G
17 POWDER, FOR SOLUTION ORAL DAILY
Refills: 0 | Status: DISCONTINUED | OUTPATIENT
Start: 2023-03-27 | End: 2023-04-11

## 2023-03-27 RX ADMIN — Medication 1 GRAM(S): at 00:21

## 2023-03-27 RX ADMIN — APIXABAN 2.5 MILLIGRAM(S): 2.5 TABLET, FILM COATED ORAL at 06:50

## 2023-03-27 RX ADMIN — ZOLPIDEM TARTRATE 10 MILLIGRAM(S): 10 TABLET ORAL at 00:22

## 2023-03-27 RX ADMIN — Medication 100 MICROGRAM(S): at 15:06

## 2023-03-27 RX ADMIN — Medication 1 GRAM(S): at 11:36

## 2023-03-27 RX ADMIN — SODIUM CHLORIDE 130 MILLILITER(S): 9 INJECTION INTRAMUSCULAR; INTRAVENOUS; SUBCUTANEOUS at 16:25

## 2023-03-27 RX ADMIN — Medication 1 GRAM(S): at 06:50

## 2023-03-27 RX ADMIN — APIXABAN 2.5 MILLIGRAM(S): 2.5 TABLET, FILM COATED ORAL at 17:31

## 2023-03-27 RX ADMIN — Medication: at 21:36

## 2023-03-27 RX ADMIN — PANTOPRAZOLE SODIUM 40 MILLIGRAM(S): 20 TABLET, DELAYED RELEASE ORAL at 11:35

## 2023-03-27 RX ADMIN — CEFTRIAXONE 100 MILLIGRAM(S): 500 INJECTION, POWDER, FOR SOLUTION INTRAMUSCULAR; INTRAVENOUS at 06:50

## 2023-03-27 RX ADMIN — Medication 1 GRAM(S): at 17:32

## 2023-03-27 NOTE — DIETITIAN INITIAL EVALUATION ADULT - ADD RECOMMEND
1. Jevity 1.5 via J-tube: goal rate of 95ml/hr x12hrs (provides 1140ml TV, 1710kcal, 73gprotein, 866ml free water).   >>8pm-8am schedule  2. Soft and bite sized, kosher PO diet  3. Monitor tolerance to tube feeds   4. RD to remain available prn

## 2023-03-27 NOTE — PROGRESS NOTE ADULT - PROBLEM SELECTOR PLAN 2
found to have esophagitis on CT chest. Recent EGD with esophageal biopsy consistent with GERD  - c/w pantoprazole 40mg qd RESOLVED. On admission, pt met criteria for sepsis (2/4 SIRS -- presented with fevers to 101 at home, Tmax in .2, leukocytosis to 21, negative lactate). Source ddx bacterial translocation from aspiration d/t recent esophageal biopsy    WBC 9.7 (normal) from yesterday 16. No fever since admission-- given Tylenol in ED with resolution; was given Ceftriaxone on floor for empiric tx   UA clean-- no leuk esterase or nitrites. Blood cultures showed no growth.  - f/u urine cx  - d/c ceftriaxone 1g q24h

## 2023-03-27 NOTE — PHYSICAL THERAPY INITIAL EVALUATION ADULT - GAIT DEVIATIONS NOTED, PT EVAL
increased lateral sway observed (pt adamantly declined SC or RW trial)/decreased omayra/decreased step length

## 2023-03-27 NOTE — CONSULT NOTE ADULT - ASSESSMENT
73 y/o M with a PMHx of gastric CA, DM Type 2, Hypothyroidism, HTN and anemia s/p EGD with esophageal biopsy 3/23 presents to the ED c/o fevers, low back pain x 2 days. Admitted for sepsis and LARRY i/s/ bladder outlet obstruction. Vascular Surgery consulted for enlarging infrarenal AAA. Non smoker, no first degree relatives with dx, asymptomatic. Avss, exam benign. Imaging demonstrates 5.5cm distal AAA, previously 4.7cm 11/21. Patient was evaluated in 3/2022 by VS and recommended for surveillance.    - Please obtain CTA chest, abdomen pelvis for planning  - Vascular to continue to follow

## 2023-03-27 NOTE — PROGRESS NOTE ADULT - PROBLEM SELECTOR PLAN 3
#hydronephrosis  presenting with Cr elevation to >3, baseline Cr wNL. Likely 2/2 severe urinary retention c/b hydronephrosis. Abarca placed in ED  - trend BMP BID  - f/u UA  - strict I&Os  - renally dose medications  - monitor for resolution with renal US in 48 hours found to have esophagitis on CT chest. Recent EGD with esophageal biopsy consistent with GERD  - c/w pantoprazole 40mg qd

## 2023-03-27 NOTE — CONSULT NOTE ADULT - PROBLEM SELECTOR RECOMMENDATION 9
with mild bilateral hydronephrosis probably from Bladder outlet obstruction, enlarged prostate; resolving LARRY; no sign of UTI  -continue joyner  -bowel regiman  -follow for post obstructive diuresis  -f/u US renal to f/u hydronephrosis with mild bilateral hydronephrosis probably from Bladder outlet obstruction, enlarged prostate; resolving LARRY; no sign of UTI  -continue joyner  -bowel regiman  -follow for post obstructive diuresis  -f/u Hydronephrosis- pt scheduled for CT angio a/p and chest for AAA- asked for delayed phase imaging of ureters  -d/c with joyner/leg bag  -continue flomax  -will need outpt f/u and w/u  -f/u UROLOGY CLINIC as outpt Urology Clinic  [address] [place] [address], [address] . TEL: [phone]   -will follow with mild bilateral hydronephrosis probably from Bladder outlet obstruction, enlarged prostate; resolving LARRY; no sign of UTI  -continue joyner  -bowel regiman  -follow for post obstructive diuresis  -f/u Hydronephrosis- pt scheduled for CT angio a/p and chest for AAA- asked for delayed phase imaging of ureters  -d/c with joyner/leg bag, f/u outpatient TOV and UDS to better understand retention etiology  -continue flomax  -f/u UROLOGY CLINIC as outpt Urology Clinic  [address] [place] [address], [address] . TEL: [phone]   -will follow  -discussed with  attending with mild bilateral hydronephrosis likely from Bladder outlet obstruction, enlarged prostate; resolving LARRY; no sign of UTI  -continue joyner  -bowel regiman  -follow for post obstructive diuresis  -f/u Hydronephrosis- pt scheduled for CT angio a/p and chest for AAA- asked for delayed phase imaging of ureters to ensure resolution of hydro and betetr delineate anatomy given parapelvic cysts  -d/c with joyner/leg bag, f/u outpatient TOV/consider UDS to better understand retention etiology given extent  -continue flomax  -f/u UROLOGY CLINIC as outpt Urology Clinic  [address] [place] [address], [address] . TEL: [phone]   -will follow  -discussed with  attending

## 2023-03-27 NOTE — PROGRESS NOTE ADULT - ATTENDING COMMENTS
Patient was seen and examined with the resident team today.  I agree with the above assessment and plan with the following exceptions/additions:     Briefly, this is a 71yo gentleman with a PMH of gastric CA, IDDM2, hypothyroidism, HTN and anemia s/p EGD with esophageal biopsy on 3/23 who p/w SIRS 2/2 acute urinary retention w/LARRY and aspiration pneumonitis.  Incidentally found to have an enlarging AAA.    #LARRY w/urinary retention - s/p Abarca; can stop abx as U/A NOT consistent with UTI; monitor for post-obstructive diuresis, start IVF's as putting out 100cc/hr of urine; PM BMP w/Mg and Phos; renal u/s pending; please restart Tamsulosin   #Aspiration PNA - can stop abx and just monitor   #AAA - on Eliquis for AAA; Vascular consult   #IDDM2 - c/w mISS for now but would restart home regimen once TF's running  #Hypothyroidism - c/w Levothyroxine   #DVT PPx - on Eliquis  #Dispo - TBD     Corinne Brothers  [phone] Patient was seen and examined with the resident team today.  I agree with the above assessment and plan with the following exceptions/additions:     Briefly, this is a 71yo gentleman with a PMH of gastric CA, IDDM2, hypothyroidism, HTN and anemia s/p EGD with esophageal biopsy on 3/23 who p/w SIRS 2/2 acute urinary retention w/LARRY and aspiration pneumonitis.  Incidentally found to have an enlarging AAA.    #LARRY w/urinary retention - s/p Abarca; can stop abx as U/A NOT consistent with UTI; monitor for post-obstructive diuresis, start IVF's as putting out 100cc/hr of urine; PM BMP w/Mg and Phos; renal u/s pending; please restart Tamsulosin   #Aspiration pneumonitis - can stop abx and just monitor   #AAA - on Eliquis for AAA; Vascular consult   #IDDM2 - c/w mISS for now but would restart home regimen once TF's running  #Hypothyroidism - c/w Levothyroxine   #DVT PPx - on Eliquis  #Dispo - TBD     Corinne Brothers  [phone]

## 2023-03-27 NOTE — DIETITIAN INITIAL EVALUATION ADULT - PERTINENT MEDS FT
MEDICATIONS  (STANDING):  apixaban 2.5 milliGRAM(s) Oral two times a day  dextrose 5%. 1000 milliLiter(s) (50 mL/Hr) IV Continuous <Continuous>  dextrose 5%. 1000 milliLiter(s) (100 mL/Hr) IV Continuous <Continuous>  dextrose 50% Injectable 25 Gram(s) IV Push once  dextrose 50% Injectable 12.5 Gram(s) IV Push once  dextrose 50% Injectable 25 Gram(s) IV Push once  glucagon  Injectable 1 milliGRAM(s) IntraMuscular once  insulin lispro (ADMELOG) corrective regimen sliding scale   SubCutaneous three times a day before meals  pantoprazole    Tablet 40 milliGRAM(s) Oral daily  sucralfate suspension 1 Gram(s) Oral four times a day    MEDICATIONS  (PRN):  acetaminophen     Tablet .. 650 milliGRAM(s) Oral every 6 hours PRN Temp greater or equal to 38C (100.4F), Mild Pain (1 - 3)  dextrose Oral Gel 15 Gram(s) Oral once PRN Blood Glucose LESS THAN 70 milliGRAM(s)/deciliter

## 2023-03-27 NOTE — PHYSICAL THERAPY INITIAL EVALUATION ADULT - ADDITIONAL COMMENTS
Pt lives alone in an elevator access apt. His two daughters live nearby. At baseline, ambulates independently with no DME. Reports he exercises on the treadmill.

## 2023-03-27 NOTE — PROGRESS NOTE ADULT - PROBLEM SELECTOR PLAN 4
in remission. hx of gastric CA that was diagnosed in 12/2016. Patient is s/p a gastrectomy with partial esophagectomy and partial pancreatectomy.   - outpatient f/u    #Portal vein thrombosis  hx of PVT on eliquis  - restart home eliquis 2.5mg BID

## 2023-03-27 NOTE — CONSULT NOTE ADULT - ASSESSMENT
73 yo male with mild bilateral hydronephrosis probably due to bladder outlet obstruction, enlarged prostate; urinary retention- s/p joyner catheter placement in ED; resolving LARRY 72M with PMH of gastric cancer, T2DM, hypothyroidism, HTN, anemia S/p EGD with esophageal biopsy 3/23 admitted for sepsis and LARRY. Past  hx of BPH on flomax, found to have on CT mild bilateral hydronephrosis likelys secondary to bladder outlet obstruction and enlarged prostate. Pt was found to be in urinary retention- s/p joyner catheter placement in ED, now with resolving LARRY 72M with PMH of gastric cancer, T2DM, hypothyroidism, HTN, anemia S/p EGD with esophageal biopsy 3/23 admitted for sepsis and LARRY. Past  hx of BPH on flomax, found to have on CT mild bilateral hydronephrosis likely secondary to bladder outlet obstruction and enlarged prostate. Pt was found to be in urinary retention- s/p joyner catheter placement in ED, now with resolving LARRY

## 2023-03-27 NOTE — PHYSICAL THERAPY INITIAL EVALUATION ADULT - IMPAIRMENTS CONTRIBUTING TO GAIT DEVIATIONS, PT EVAL
decreased aerobic capacity/endurance/impaired balance/decreased strength PROVIDER:[TOKEN:[[id number]],FOLLOWUP:[1 month],ESTABLISHEDPATIENT:[T]]

## 2023-03-27 NOTE — PROGRESS NOTE ADULT - PROBLEM SELECTOR PLAN 1
presented with fevers to 101 at home, Tmax in .2, leukocytosis to 21, negative lactate. Source ddx bacterial translocation from recent esophageal biopsy vs UTI.  - f/u UA  - f/u blood, urine cx  - cover empirically with ceftriaxone 1g q24h  - GI consult in AM Secondary to severe urinary retention and c/b b/l hydronephrosis  Cr elevation to >3, baseline Cr wNL. Abarca placed in ED with relief of back pain    BUN 23, Cr 0.76 (normal) decreased from BUN 34, Cr 1.45 yesterday  - trend BMP BID-- monitor electrolytes for post obstructive diuresis  - strict I&Os  - renally dose medications  - monitor for resolution with renal US in 48 hours Secondary to severe urinary retention and c/b b/l hydronephrosis  Cr elevation to >3, baseline Cr wNL. Abarca placed in ED with relief of back pain    BUN 23, Cr 0.76 (normal) decreased from BUN 34, Cr 1.45 yesterday  - trend BMP BID-- monitor electrolytes for post obstructive diuresis  - strict I&Os; possible repletion with fluids   - renally dose medications  - monitor for resolution with renal US in 48 hours Secondary to severe urinary retention and c/b b/l hydronephrosis  Cr elevation to >3, baseline Cr wNL. Abarca placed in ED with relief of back pain    BUN 23, Cr 0.76 (normal) decreased from BUN 34, Cr 1.45 yesterday  - trend BMP BID-- monitor electrolytes for post obstructive diuresis  - consult urology; appreciate recs  - strict I&Os; possible repletion with fluids   - renally dose medications  - monitor for resolution with renal US in 48 hours

## 2023-03-27 NOTE — CONSULT NOTE ADULT - SUBJECTIVE AND OBJECTIVE BOX
HPI:  73 y/o M with a PMHx of gastric CA, DM Type 2, Hypothyroidism, HTN and anemia presents to the ED c/o fever, chills, abdominal pain, no bowel movement since EGD procedure on . Pt reports that his abdomen feels more distended than usual with associated low back pain. Pt reports not taking any of his medications after the procedure. Pt reports having a fever of 101 today at home, also c/o lower back pain that resolved with having the joyner placed in the ED. Denies cough, sputum production, dysphagia, nausea, vomiting.     ED course:   Vitals: T: 100.2 rectal | HR: 66 | /83 | RR 17 spo2 96% RA  Labs: WBC 21.86 | Na 133 | BUN/Cr: 48/3.48 | alk phos: 125 | UA negative  EGD esophagus biopsy:  Small intestinal type mucosa showing focally active, chronic inflammation. Separate fragments of squamous mucosa showing reactive epithelial changes and rare intraepithelial eosinophils, consistent with reflux associated changes. Negative for dysplasia or malignancy  CT chest:  1.   Nonspecific esophageal wall thickening, possible esophagitis.  2.   Fluid within the esophageal lumen can be seen with gastroesophageal reflux.  3.   Trace bilateral pleural effusions with compressive atelectasis.  CT A/P:  1.   Main and right portal veins are mildly hyperdense which can be seen with portal vein thrombosis.  2.   Severe left hydronephrosis and hydroureter. Moderate right hydronephrosis and hydroureter. Markedly distended urinary bladder. Question bladder outlet obstruction.  3.   Bladder diverticula with layering stones.  4.   Infrarenal abdominal aortic aneurysm measuring up to 5.2 cm.  5.   Small free fluid.  EKG: sinus rhythm, TW inversions II. III. aVF, nonsp TW changes chest leads  Tx: zosyn, 1L NS, tylenol 1g   (26 Mar 2023 01:44)    : above noted. Patient s/p EGD with biopsy on 3/23.  On 3/24 patient noted decrease in urination and on 3/25 patient developed back pain and abdominal distention and fever to 101F and came to ER. Noted on CT a/p to be in urinary retention with bilateral moderate hydronephrosis. Joyner placed with about 2.5L UO. Patient has h/o BPH on flomax. Does not follow with urologist. Prior to EGD patient voiding normally. No nocturia. No dysuria/hematuria.   Patient felt better after joyenr placed. +BM yesterday.  Noted to have Cr 3.48 in ED.      PAST MEDICAL & SURGICAL HISTORY:  Essential hypertension  was on losartan, now diet control      Hypothyroidism      Gastric mass  ulcerated mass in gastric cardia with small perigastric nodes on endoscopy.      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      H/O umbilical hernia repair   with mesh      Port-a-cath in place  right chest wall      History of gastric surgery  2017      Gastrostomy tube in place  open jejunostomy with J-tube 2020      S/P cataract surgery      H/O endoscopy  & stent placement 2020      S/P gastrectomy  total gastrectomy       History of partial pancreatectomy  distal pancreatectomy/ splenectomy esophatojejunostomy      Status post neck dissection  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, gastric primary      History of dysphagia  EGD with Axios stent placment to connect the blind limb to th e efferent limb (jejunojejunostomy)          MEDICATIONS  (STANDING):  apixaban 2.5 milliGRAM(s) Oral two times a day  dextrose 5%. 1000 milliLiter(s) (50 mL/Hr) IV Continuous <Continuous>  dextrose 5%. 1000 milliLiter(s) (100 mL/Hr) IV Continuous <Continuous>  dextrose 50% Injectable 25 Gram(s) IV Push once  dextrose 50% Injectable 12.5 Gram(s) IV Push once  dextrose 50% Injectable 25 Gram(s) IV Push once  glucagon  Injectable 1 milliGRAM(s) IntraMuscular once  insulin lispro (ADMELOG) corrective regimen sliding scale   SubCutaneous three times a day before meals  levothyroxine 100 MICROGram(s) Oral every 24 hours  pantoprazole    Tablet 40 milliGRAM(s) Oral daily  sucralfate suspension 1 Gram(s) Oral four times a day    MEDICATIONS  (PRN):  acetaminophen     Tablet .. 650 milliGRAM(s) Oral every 6 hours PRN Temp greater or equal to 38C (100.4F), Mild Pain (1 - 3)  dextrose Oral Gel 15 Gram(s) Oral once PRN Blood Glucose LESS THAN 70 milliGRAM(s)/deciliter      Allergies    No Known Allergies    Intolerances        SOCIAL HISTORY:    FAMILY HISTORY:  Family history of ovarian cancer (Mother)  mother    Family history of ischemic heart disease (IHD) (Father)  father        Vital Signs Last 24 Hrs  T(C): 36.9 (27 Mar 2023 11:41), Max: 37.2 (26 Mar 2023 17:31)  T(F): 98.5 (27 Mar 2023 11:41), Max: 99 (26 Mar 2023 17:31)  HR: 65 (27 Mar 2023 11:41) (65 - 71)  BP: 135/84 (27 Mar 2023 11:41) (107/62 - 135/84)  BP(mean): --  RR: 20 (27 Mar 2023 11:41) (18 - 20)  SpO2: 98% (27 Mar 2023 11:41) (93% - 98%)    Parameters below as of 27 Mar 2023 11:41  Patient On (Oxygen Delivery Method): room air        On PE:  General: alert and awake  Abdomen: soft, NT, ND, PEG tube intact    : FC intact, urine clear    EXT: no edema    LABS:                        12.4   9.70  )-----------( 188      ( 27 Mar 2023 07:52 )             37.9     03-27    134<L>  |  100  |  23  ----------------------------<  109<H>  3.8   |  27  |  0.76    Ca    8.6      27 Mar 2023 07:52  Phos  2.6     -  Mg     2.2         TPro  6.2  /  Alb  2.7<L>  /  TBili  0.6  /  DBili  x   /  AST  14  /  ALT  12  /  AlkPhos  89  03-      Urinalysis Basic - ( 25 Mar 2023 19:13 )    Color: Yellow / Appearance: Clear / S.020 / pH: x  Gluc: x / Ketone: NEGATIVE  / Bili: Negative / Urobili: 0.2 E.U./dL   Blood: x / Protein: NEGATIVE mg/dL / Nitrite: NEGATIVE   Leuk Esterase: NEGATIVE / RBC: x / WBC x   Sq Epi: x / Non Sq Epi: x / Bacteria: x        RADIOLOGY & ADDITIONAL STUDIES:< from: CT Abdomen and Pelvis No Cont (03.25.23 @ 22:03) >  INTERPRETATION:  I, Dr. Gonzalez, agree with the preliminary report,   with the following modifications:    1. New mild bilateral hydroureter and bilateral perinephric fat   stranding. Distended urinary bladder. Findings are suggestive of acute   urinary obstruction, specifically bladder outlet obstruction. Limited   evaluation for hydronephrosis given presence of parapelvic cysts and   noncontrast technique. Lack of IV contrast limits evaluation for   infection. These findings were reported to GAVINO Ashraf at 2:12 PM on   3/26/2023.  2. New small irregular groundglass opacity at the posterior right upper   lobe suggestive of mildsmall airway disease or atelectasis.  2. Mild distal esophageal wall thickening suggestive of esophagitis.  4. 5.5 cm distal abdominal aortic aneurysm, previously 4.7 cm in 2021. Recommend vascular surgery consultation. 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    Additional findings:  Status post partial gastrectomy and gastrojejunostomy. Left lower   quadrant jejunostomy tube. No bowel obstruction. Trace pelvic ascites.   Bilateral gynecomastia. Esophagus is mildly distended with gas and fluid   suggestive of gastroesophageal reflux. Calcification at the aortic valve   suggestive of aortic stenosis. Mild pleural thickening and atelectasis at   the posterior bibasilar lungs. Small cysts in the right kidney. Small   left and moderate sized right diverticulum at the posterior bladder wall   with tiny associated layering stones. Enlarged prostate measuring 5.4 cm   in width. Surgically absent spleen. Increased number of small   retroperitoneal nodes, most likely reactive. Mesh anchors are noted along   the lower abdominal wall, most likely from prior hernia repair. Moderate   degenerative changes of the spine.    Ofnote, this report covers the CT chest, abdomen, and pelvis. A separate   report was erroneously dictated due to unlinked accession numbers.    Comparison was made to CT chest abdomen pelvis 3/12/2023 and CT abdomen   pelvis 2021.    IV contrast: 95 cc of Isovue 370 administered intravenously with 5 cc   discarded    < end of copied text >

## 2023-03-27 NOTE — CONSULT NOTE ADULT - SUBJECTIVE AND OBJECTIVE BOX
Vascular Attending:   Dany      HPI:  73 y/o M with a PMHx of gastric CA, DM Type 2, Hypothyroidism, HTN and anemia presents to the ED c/o fever, chills, abdominal pain, no bowel movement since EGD procedure on . Pt reports that his abdomen feels more distended than usual with associated low back pain. Pt reports not taking any of his medications after the procedure. Pt reports having a fever of 101 today at home, also c/o lower back pain that resolved with having the joyner placed in the ED. Denies cough, sputum production, dysphagia, nausea, vomiting.     ED course:   Vitals: T: 100.2 rectal | HR: 66 | /83 | RR 17 spo2 96% RA  Labs: WBC 21.86 | Na 133 | BUN/Cr: 48/3.48 | alk phos: 125 | UA negative  EGD esophagus biopsy:  Small intestinal type mucosa showing focally active, chronic inflammation. Separate fragments of squamous mucosa showing reactive epithelial changes and rare intraepithelial eosinophils, consistent with reflux associated changes. Negative for dysplasia or malignancy  CT chest:  1.   Nonspecific esophageal wall thickening, possible esophagitis.  2.   Fluid within the esophageal lumen can be seen with gastroesophageal reflux.  3.   Trace bilateral pleural effusions with compressive atelectasis.  CT A/P:  1.   Main and right portal veins are mildly hyperdense which can be seen with portal vein thrombosis.  2.   Severe left hydronephrosis and hydroureter. Moderate right hydronephrosis and hydroureter. Markedly distended urinary bladder. Question bladder outlet obstruction.  3.   Bladder diverticula with layering stones.  4.   Infrarenal abdominal aortic aneurysm measuring up to 5.2 cm.  5.   Small free fluid.  EKG: sinus rhythm, TW inversions II. III. aVF, nonsp TW changes chest leads  Tx: zosyn, 1L NS, tylenol 1g   (26 Mar 2023 01:44)      PAST MEDICAL & SURGICAL HISTORY:  Essential hypertension  was on losartan, now diet control      Hypothyroidism      Gastric mass  ulcerated mass in gastric cardia with small perigastric nodes on endoscopy.      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      H/O umbilical hernia repair   with mesh      Port-a-cath in place  right chest wall      History of gastric surgery  2017      Gastrostomy tube in place  open jejunostomy with J-tube 2020      S/P cataract surgery      H/O endoscopy  & stent placement 2020      S/P gastrectomy  total gastrectomy 2017      History of partial pancreatectomy  distal pancreatectomy/ splenectomy esophatojejunostomy      Status post neck dissection  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, gastric primary      History of dysphagia  EGD with Axios stent placment to connect the blind limb to th e efferent limb (jejunojejunostomy)          MEDICATIONS  (STANDING):  apixaban 2.5 milliGRAM(s) Oral two times a day  dextrose 5%. 1000 milliLiter(s) (50 mL/Hr) IV Continuous <Continuous>  dextrose 5%. 1000 milliLiter(s) (100 mL/Hr) IV Continuous <Continuous>  dextrose 50% Injectable 25 Gram(s) IV Push once  dextrose 50% Injectable 12.5 Gram(s) IV Push once  dextrose 50% Injectable 25 Gram(s) IV Push once  glucagon  Injectable 1 milliGRAM(s) IntraMuscular once  insulin lispro (ADMELOG) corrective regimen sliding scale   SubCutaneous three times a day before meals  levothyroxine 100 MICROGram(s) Oral every 24 hours  pantoprazole    Tablet 40 milliGRAM(s) Oral daily  polyethylene glycol 3350 17 Gram(s) Oral daily  senna 2 Tablet(s) Oral at bedtime  sodium chloride 0.9%. 1000 milliLiter(s) (130 mL/Hr) IV Continuous <Continuous>  sucralfate suspension 1 Gram(s) Oral four times a day    MEDICATIONS  (PRN):  acetaminophen     Tablet .. 650 milliGRAM(s) Oral every 6 hours PRN Temp greater or equal to 38C (100.4F), Mild Pain (1 - 3)  dextrose Oral Gel 15 Gram(s) Oral once PRN Blood Glucose LESS THAN 70 milliGRAM(s)/deciliter      Allergies    No Known Allergies    Intolerances        Vital Signs Last 24 Hrs  T(C): 36.7 (27 Mar 2023 17:20), Max: 37.2 (26 Mar 2023 20:42)  T(F): 98.1 (27 Mar 2023 17:20), Max: 98.9 (26 Mar 2023 20:42)  HR: 69 (27 Mar 2023 17:20) (65 - 71)  BP: 133/74 (27 Mar 2023 17:20) (110/55 - 135/84)  BP(mean): --  RR: 20 (27 Mar 2023 17:20) (18 - 20)  SpO2: 98% (27 Mar 2023 17:20) (95% - 98%)    Parameters below as of 27 Mar 2023 17:20  Patient On (Oxygen Delivery Method): room air        PHYSICAL EXAM:  Physical Exam:  General: NAD, resting comfortably in bed, low BMI habitus  Pulmonary: Nonlabored, no respiratory distress  Cardiovascular: regular rate and rhythm   Abdominal: soft, NT/ND, well healed incisions, J tube in place cdi  Extremities: WWP, normal strength  Neuro: A/O x 3, no focal deficits, normal motor/sensation  Pulses: palpable distal pulses, DP/PT,Fem Pop      LABS:                        12.4   9.70  )-----------( 188      ( 27 Mar 2023 07:52 )             37.9     03-27    134<L>  |  100  |  23  ----------------------------<  109<H>  3.8   |  27  |  0.76    Ca    8.6      27 Mar 2023 07:52  Phos  2.6     -  Mg     2.2         TPro  6.2  /  Alb  2.7<L>  /  TBili  0.6  /  DBili  x   /  AST  14  /  ALT  12  /  AlkPhos  89  03      Urinalysis Basic - ( 25 Mar 2023 19:13 )    Color: Yellow / Appearance: Clear / S.020 / pH: x  Gluc: x / Ketone: NEGATIVE  / Bili: Negative / Urobili: 0.2 E.U./dL   Blood: x / Protein: NEGATIVE mg/dL / Nitrite: NEGATIVE   Leuk Esterase: NEGATIVE / RBC: x / WBC x   Sq Epi: x / Non Sq Epi: x / Bacteria: x

## 2023-03-27 NOTE — DIETITIAN INITIAL EVALUATION ADULT - OTHER INFO
71 y/o M with a PMHx of gastric CA, DM Type 2, Hypothyroidism, HTN and anemia s/p EGD with esophageal biopsy 3/23 presents to the ED c/o fevers, low back pain x 2 days. Admitted for sepsis and LARRY.    Pt seen in room for nutrition assessment. Pt s/p J-tube placement 2020, usual tube feed regimen nocturnal feeds of Fiber Source 1.2 @110ml/hr x10 hours (provides 1100ml total volume, 1320kcal, 59gprotein, 889ml free water). No N/V/D/C noted at present. Pt also takes soft food PO: salmon, rice, tuna salad, eggs, yogurt, soups. Pt reports he eats small amounts (2-3 bites at a time) and not does push himself. NKFA. Reports he is not strict kosher and will accept some none kosher food items. His nutrition goals are to prevent wt loss and manage his blood sugar. Pt asking for endocrine referral, communicated to team. Severe muscle/fat loss noted. Pt reports wt fluctuated between 140-150lbs, current wt 146lbs. Discussed importance of meeting nutrition needs to prevent wt loss, pt expressed understanding. Recommend nocturnal feeds of Jevity 1.5 via J-tube: goal rate of 95ml/hr x12hrs (provides 1140ml TV, 1710kcal, 73gprotein, 866ml free water). Please see full nutrition recommendations below. Will continue to follow per RD protocol.

## 2023-03-27 NOTE — PROGRESS NOTE ADULT - ASSESSMENT
73 y/o M with a PMHx of gastric CA, DM Type 2, Hypothyroidism, HTN and anemia s/p EGD with esophageal biopsy 3/23 presents to the ED c/o fevers, low back pain x 2 days. Admitted for sepsis and LARRY.

## 2023-03-27 NOTE — DIETITIAN INITIAL EVALUATION ADULT - PERTINENT LABORATORY DATA
03-27    134<L>  |  100  |  23  ----------------------------<  109<H>  3.8   |  27  |  0.76    Ca    8.6      27 Mar 2023 07:52  Phos  2.6     03-27  Mg     2.2     03-27    TPro  6.2  /  Alb  2.7<L>  /  TBili  0.6  /  DBili  x   /  AST  14  /  ALT  12  /  AlkPhos  89  03-27  POCT Blood Glucose.: 109 mg/dL (03-27-23 @ 08:37)

## 2023-03-27 NOTE — PROGRESS NOTE ADULT - SUBJECTIVE AND OBJECTIVE BOX
CC: Patient is a 72y old male with PMHx of gastric CA s/p gastrectomy w/ J-tube in , T2DM, hypothyroidism, HTN--diet controlled, iron deficiency anemia presents to the ED with c/o fever/chills (highest temp of 101F), abd pain and lack of bowel movement since EGD procedure for stent removal and bx on . Pt endorsed his abd felt more distended w/ associated lower back pain x 2days; denied cough, sputum production, dysphagia, nausea, vomitting. ED course included joyner placement which resolved his back pain and CT abdomen/pelvis showed bilateral hydronephrosis and hydroureter.     INTERVAL EVENTS: No acute events overnight    SUBJECTIVE / INTERVAL HPI: Patient seen and examined at bedside resting comfortable.     ROS: Denies fever/chills, n/v/d, chest pain, SOB, abd pain, abd distention, numbness or tingling; negative unless otherwise stated above.    VITAL SIGNS:  Vital Signs Last 24 Hrs  T(C): 36.9 (27 Mar 2023 11:41), Max: 37.2 (26 Mar 2023 17:31)  T(F): 98.5 (27 Mar 2023 11:41), Max: 99 (26 Mar 2023 17:31)  HR: 65 (27 Mar 2023 11:41) (65 - 71)  BP: 135/84 (27 Mar 2023 11:41) (107/62 - 135/84)  BP(mean): --  RR: 20 (27 Mar 2023 11:41) (18 - 20)  SpO2: 98% (27 Mar 2023 11:41) (93% - 98%)    Parameters below as of 27 Mar 2023 11:41  Patient On (Oxygen Delivery Method): room air      23 @ 07:01  -  23 @ 07:00  --------------------------------------------------------  IN: 0 mL / OUT: 500 mL / NET: -500 mL    23 @ 07:01  -  23 @ 11:46  --------------------------------------------------------  IN: 300 mL / OUT: 1100 mL / NET: -800 mL      PHYSICAL EXAM:    General: NAD, thin   HEENT: MMM, NC/AT  Neck: supple, trachea midline  Cardiovascular: +S1/S2; RRR, no murmurs, rubs, or gallops  Respiratory: CTA B/L; no W/R/R  Gastrointestinal: 5cm vertical scar midline from umbilical hernia repair, soft, NT/ND, skin around PEG tube non erythematous  : no CVA tenderness or suprapubic tenderness on palpation  Extremities: WWP; no edema, clubbing or cyanosis  Vascular: 2+ radial, DP/PT pulses B/L  Neurological: AAOx3; no focal deficits    MEDICATIONS:  MEDICATIONS  (STANDING):  apixaban 2.5 milliGRAM(s) Oral two times a day  dextrose 5%. 1000 milliLiter(s) (50 mL/Hr) IV Continuous <Continuous>  dextrose 5%. 1000 milliLiter(s) (100 mL/Hr) IV Continuous <Continuous>  dextrose 50% Injectable 25 Gram(s) IV Push once  dextrose 50% Injectable 12.5 Gram(s) IV Push once  dextrose 50% Injectable 25 Gram(s) IV Push once  glucagon  Injectable 1 milliGRAM(s) IntraMuscular once  insulin lispro (ADMELOG) corrective regimen sliding scale   SubCutaneous three times a day before meals  pantoprazole    Tablet 40 milliGRAM(s) Oral daily  sucralfate suspension 1 Gram(s) Oral four times a day    MEDICATIONS  (PRN):  acetaminophen     Tablet .. 650 milliGRAM(s) Oral every 6 hours PRN Temp greater or equal to 38C (100.4F), Mild Pain (1 - 3)  dextrose Oral Gel 15 Gram(s) Oral once PRN Blood Glucose LESS THAN 70 milliGRAM(s)/deciliter      ALLERGIES:  Allergies    No Known Allergies    Intolerances        LABS:                        12.4   9.70  )-----------( 188      ( 27 Mar 2023 07:52 )             37.9     03-27    134<L>  |  100  |  23  ----------------------------<  109<H>  3.8   |  27  |  0.76    Ca    8.6      27 Mar 2023 07:52  Phos  2.6       Mg     2.2         TPro  6.2  /  Alb  2.7<L>  /  TBili  0.6  /  DBili  x   /  AST  14  /  ALT  12  /  AlkPhos  89        Urinalysis Basic - ( 25 Mar 2023 19:13 )    Color: Yellow / Appearance: Clear / S.020 / pH: x  Gluc: x / Ketone: NEGATIVE  / Bili: Negative / Urobili: 0.2 E.U./dL   Blood: x / Protein: NEGATIVE mg/dL / Nitrite: NEGATIVE   Leuk Esterase: NEGATIVE / RBC: x / WBC x   Sq Epi: x / Non Sq Epi: x / Bacteria: x      CAPILLARY BLOOD GLUCOSE      POCT Blood Glucose.: 109 mg/dL (27 Mar 2023 08:37)      RADIOLOGY & ADDITIONAL TESTS: No new imaging. CC: Patient is a 72y old male with PMHx of gastric CA s/p gastrectomy w/ J-tube in , T2DM, hypothyroidism, HTN--diet controlled, iron deficiency anemia presents to the ED with c/o fever/chills (highest temp of 101F), abd pain and lack of bowel movement since EGD procedure for stent removal and bx on . Pt endorsed his abd felt more distended w/ associated lower back pain x 2days; denied cough, sputum production, dysphagia, nausea, vomiting. ED course included joyner placement which resolved his back pain and CT abdomen/pelvis showed bilateral hydronephrosis and hydroureter.     INTERVAL EVENTS: No acute events overnight    SUBJECTIVE / INTERVAL HPI: Patient seen and examined at bedside resting comfortable.     ROS: Denies fever/chills, n/v/d, chest pain, SOB, abd pain, abd distention, numbness or tingling; negative unless otherwise stated above.    VITAL SIGNS:  Vital Signs Last 24 Hrs  T(C): 36.9 (27 Mar 2023 11:41), Max: 37.2 (26 Mar 2023 17:31)  T(F): 98.5 (27 Mar 2023 11:41), Max: 99 (26 Mar 2023 17:31)  HR: 65 (27 Mar 2023 11:41) (65 - 71)  BP: 135/84 (27 Mar 2023 11:41) (107/62 - 135/84)  BP(mean): --  RR: 20 (27 Mar 2023 11:41) (18 - 20)  SpO2: 98% (27 Mar 2023 11:41) (93% - 98%)    Parameters below as of 27 Mar 2023 11:41  Patient On (Oxygen Delivery Method): room air      23 @ 07:01  -  23 @ 07:00  --------------------------------------------------------  IN: 0 mL / OUT: 500 mL / NET: -500 mL    23 @ 07:01  -  23 @ 11:46  --------------------------------------------------------  IN: 300 mL / OUT: 1100 mL / NET: -800 mL      PHYSICAL EXAM:    General: NAD, thin   HEENT: MMM, NC/AT  Neck: supple, trachea midline  Cardiovascular: +S1/S2; RRR, no murmurs, rubs, or gallops  Respiratory: CTA B/L; no W/R/R  Gastrointestinal: 5cm vertical scar midline from umbilical hernia repair, soft, NT/ND, skin around PEG tube non erythematous, normoactive bowel sounds x 4 quadrants  : no CVA tenderness or suprapubic tenderness on palpation  Extremities: WWP; no edema, clubbing or cyanosis  Vascular: 2+ radial, DP/PT pulses B/L  Neurological: AAOx3; no focal deficits    MEDICATIONS:  MEDICATIONS  (STANDING):  apixaban 2.5 milliGRAM(s) Oral two times a day  dextrose 5%. 1000 milliLiter(s) (50 mL/Hr) IV Continuous <Continuous>  dextrose 5%. 1000 milliLiter(s) (100 mL/Hr) IV Continuous <Continuous>  dextrose 50% Injectable 25 Gram(s) IV Push once  dextrose 50% Injectable 12.5 Gram(s) IV Push once  dextrose 50% Injectable 25 Gram(s) IV Push once  glucagon  Injectable 1 milliGRAM(s) IntraMuscular once  insulin lispro (ADMELOG) corrective regimen sliding scale   SubCutaneous three times a day before meals  pantoprazole    Tablet 40 milliGRAM(s) Oral daily  sucralfate suspension 1 Gram(s) Oral four times a day    MEDICATIONS  (PRN):  acetaminophen     Tablet .. 650 milliGRAM(s) Oral every 6 hours PRN Temp greater or equal to 38C (100.4F), Mild Pain (1 - 3)  dextrose Oral Gel 15 Gram(s) Oral once PRN Blood Glucose LESS THAN 70 milliGRAM(s)/deciliter      ALLERGIES:  Allergies    No Known Allergies    Intolerances        LABS:                        12.4   9.70  )-----------( 188      ( 27 Mar 2023 07:52 )             37.9     03-27    134<L>  |  100  |  23  ----------------------------<  109<H>  3.8   |  27  |  0.76    Ca    8.6      27 Mar 2023 07:52  Phos  2.6       Mg     2.2         TPro  6.2  /  Alb  2.7<L>  /  TBili  0.6  /  DBili  x   /  AST  14  /  ALT  12  /  AlkPhos  89        Urinalysis Basic - ( 25 Mar 2023 19:13 )    Color: Yellow / Appearance: Clear / S.020 / pH: x  Gluc: x / Ketone: NEGATIVE  / Bili: Negative / Urobili: 0.2 E.U./dL   Blood: x / Protein: NEGATIVE mg/dL / Nitrite: NEGATIVE   Leuk Esterase: NEGATIVE / RBC: x / WBC x   Sq Epi: x / Non Sq Epi: x / Bacteria: x      CAPILLARY BLOOD GLUCOSE      POCT Blood Glucose.: 109 mg/dL (27 Mar 2023 08:37)      RADIOLOGY & ADDITIONAL TESTS: No new imaging.

## 2023-03-28 DIAGNOSIS — I71.40 ABDOMINAL AORTIC ANEURYSM, WITHOUT RUPTURE, UNSPECIFIED: ICD-10-CM

## 2023-03-28 DIAGNOSIS — E11.9 TYPE 2 DIABETES MELLITUS WITHOUT COMPLICATIONS: ICD-10-CM

## 2023-03-28 LAB
A1C WITH ESTIMATED AVERAGE GLUCOSE RESULT: 6 % — HIGH (ref 4–5.6)
ANION GAP SERPL CALC-SCNC: 5 MMOL/L — SIGNIFICANT CHANGE UP (ref 5–17)
BASOPHILS # BLD AUTO: 0.05 K/UL — SIGNIFICANT CHANGE UP (ref 0–0.2)
BASOPHILS NFR BLD AUTO: 0.5 % — SIGNIFICANT CHANGE UP (ref 0–2)
BUN SERPL-MCNC: 18 MG/DL — SIGNIFICANT CHANGE UP (ref 7–23)
CALCIUM SERPL-MCNC: 8.2 MG/DL — LOW (ref 8.4–10.5)
CHLORIDE SERPL-SCNC: 103 MMOL/L — SIGNIFICANT CHANGE UP (ref 96–108)
CO2 SERPL-SCNC: 26 MMOL/L — SIGNIFICANT CHANGE UP (ref 22–31)
CREAT SERPL-MCNC: 0.67 MG/DL — SIGNIFICANT CHANGE UP (ref 0.5–1.3)
EGFR: 99 ML/MIN/1.73M2 — SIGNIFICANT CHANGE UP
EOSINOPHIL # BLD AUTO: 0.09 K/UL — SIGNIFICANT CHANGE UP (ref 0–0.5)
EOSINOPHIL NFR BLD AUTO: 0.9 % — SIGNIFICANT CHANGE UP (ref 0–6)
ESTIMATED AVERAGE GLUCOSE: 126 MG/DL — HIGH (ref 68–114)
GLUCOSE BLDC GLUCOMTR-MCNC: 103 MG/DL — HIGH (ref 70–99)
GLUCOSE BLDC GLUCOMTR-MCNC: 130 MG/DL — HIGH (ref 70–99)
GLUCOSE BLDC GLUCOMTR-MCNC: 156 MG/DL — HIGH (ref 70–99)
GLUCOSE BLDC GLUCOMTR-MCNC: 246 MG/DL — HIGH (ref 70–99)
GLUCOSE BLDC GLUCOMTR-MCNC: 96 MG/DL — SIGNIFICANT CHANGE UP (ref 70–99)
GLUCOSE SERPL-MCNC: 57 MG/DL — LOW (ref 70–99)
HCT VFR BLD CALC: 35.9 % — LOW (ref 39–50)
HGB BLD-MCNC: 11.9 G/DL — LOW (ref 13–17)
IMM GRANULOCYTES NFR BLD AUTO: 0.3 % — SIGNIFICANT CHANGE UP (ref 0–0.9)
LYMPHOCYTES # BLD AUTO: 0.83 K/UL — LOW (ref 1–3.3)
LYMPHOCYTES # BLD AUTO: 8.7 % — LOW (ref 13–44)
MAGNESIUM SERPL-MCNC: 1.9 MG/DL — SIGNIFICANT CHANGE UP (ref 1.6–2.6)
MCHC RBC-ENTMCNC: 33.1 GM/DL — SIGNIFICANT CHANGE UP (ref 32–36)
MCHC RBC-ENTMCNC: 33.6 PG — SIGNIFICANT CHANGE UP (ref 27–34)
MCV RBC AUTO: 101.4 FL — HIGH (ref 80–100)
MONOCYTES # BLD AUTO: 1.05 K/UL — HIGH (ref 0–0.9)
MONOCYTES NFR BLD AUTO: 11.1 % — SIGNIFICANT CHANGE UP (ref 2–14)
NEUTROPHILS # BLD AUTO: 7.45 K/UL — HIGH (ref 1.8–7.4)
NEUTROPHILS NFR BLD AUTO: 78.5 % — HIGH (ref 43–77)
NRBC # BLD: 0 /100 WBCS — SIGNIFICANT CHANGE UP (ref 0–0)
PHOSPHATE SERPL-MCNC: 2.3 MG/DL — LOW (ref 2.5–4.5)
PLATELET # BLD AUTO: 232 K/UL — SIGNIFICANT CHANGE UP (ref 150–400)
POTASSIUM SERPL-MCNC: 4.1 MMOL/L — SIGNIFICANT CHANGE UP (ref 3.5–5.3)
POTASSIUM SERPL-SCNC: 4.1 MMOL/L — SIGNIFICANT CHANGE UP (ref 3.5–5.3)
RBC # BLD: 3.54 M/UL — LOW (ref 4.2–5.8)
RBC # FLD: 14.5 % — SIGNIFICANT CHANGE UP (ref 10.3–14.5)
SODIUM SERPL-SCNC: 134 MMOL/L — LOW (ref 135–145)
WBC # BLD: 9.5 K/UL — SIGNIFICANT CHANGE UP (ref 3.8–10.5)
WBC # FLD AUTO: 9.5 K/UL — SIGNIFICANT CHANGE UP (ref 3.8–10.5)

## 2023-03-28 PROCEDURE — 99233 SBSQ HOSP IP/OBS HIGH 50: CPT | Mod: GC

## 2023-03-28 RX ORDER — CHOLECALCIFEROL (VITAMIN D3) 125 MCG
2000 CAPSULE ORAL DAILY
Refills: 0 | Status: DISCONTINUED | OUTPATIENT
Start: 2023-03-28 | End: 2023-03-28

## 2023-03-28 RX ORDER — LANOLIN ALCOHOL/MO/W.PET/CERES
3 CREAM (GRAM) TOPICAL AT BEDTIME
Refills: 0 | Status: DISCONTINUED | OUTPATIENT
Start: 2023-03-28 | End: 2023-04-11

## 2023-03-28 RX ORDER — INSULIN HUMAN 100 [IU]/ML
12 INJECTION, SOLUTION SUBCUTANEOUS ONCE
Refills: 0 | Status: COMPLETED | OUTPATIENT
Start: 2023-03-28 | End: 2023-03-28

## 2023-03-28 RX ORDER — SODIUM CHLORIDE 9 MG/ML
1000 INJECTION INTRAMUSCULAR; INTRAVENOUS; SUBCUTANEOUS
Refills: 0 | Status: DISCONTINUED | OUTPATIENT
Start: 2023-03-28 | End: 2023-03-29

## 2023-03-28 RX ORDER — ZOLPIDEM TARTRATE 10 MG/1
5 TABLET ORAL ONCE
Refills: 0 | Status: DISCONTINUED | OUTPATIENT
Start: 2023-03-28 | End: 2023-03-28

## 2023-03-28 RX ORDER — ZOLPIDEM TARTRATE 10 MG/1
5 TABLET ORAL AT BEDTIME
Refills: 0 | Status: DISCONTINUED | OUTPATIENT
Start: 2023-03-29 | End: 2023-03-29

## 2023-03-28 RX ORDER — TAMSULOSIN HYDROCHLORIDE 0.4 MG/1
0.4 CAPSULE ORAL AT BEDTIME
Refills: 0 | Status: DISCONTINUED | OUTPATIENT
Start: 2023-03-28 | End: 2023-04-03

## 2023-03-28 RX ADMIN — INSULIN HUMAN 12 UNIT(S): 100 INJECTION, SOLUTION SUBCUTANEOUS at 02:34

## 2023-03-28 RX ADMIN — Medication 2: at 18:25

## 2023-03-28 RX ADMIN — SODIUM CHLORIDE 100 MILLILITER(S): 9 INJECTION INTRAMUSCULAR; INTRAVENOUS; SUBCUTANEOUS at 16:15

## 2023-03-28 RX ADMIN — APIXABAN 2.5 MILLIGRAM(S): 2.5 TABLET, FILM COATED ORAL at 06:45

## 2023-03-28 RX ADMIN — Medication 100 MICROGRAM(S): at 13:12

## 2023-03-28 RX ADMIN — SENNA PLUS 2 TABLET(S): 8.6 TABLET ORAL at 21:55

## 2023-03-28 RX ADMIN — SODIUM CHLORIDE 130 MILLILITER(S): 9 INJECTION INTRAMUSCULAR; INTRAVENOUS; SUBCUTANEOUS at 01:32

## 2023-03-28 RX ADMIN — SODIUM CHLORIDE 130 MILLILITER(S): 9 INJECTION INTRAMUSCULAR; INTRAVENOUS; SUBCUTANEOUS at 11:05

## 2023-03-28 RX ADMIN — TAMSULOSIN HYDROCHLORIDE 0.4 MILLIGRAM(S): 0.4 CAPSULE ORAL at 21:55

## 2023-03-28 RX ADMIN — PANTOPRAZOLE SODIUM 40 MILLIGRAM(S): 20 TABLET, DELAYED RELEASE ORAL at 12:04

## 2023-03-28 RX ADMIN — APIXABAN 2.5 MILLIGRAM(S): 2.5 TABLET, FILM COATED ORAL at 18:24

## 2023-03-28 RX ADMIN — ZOLPIDEM TARTRATE 5 MILLIGRAM(S): 10 TABLET ORAL at 01:01

## 2023-03-28 RX ADMIN — Medication 1 GRAM(S): at 18:24

## 2023-03-28 RX ADMIN — Medication 1 GRAM(S): at 12:03

## 2023-03-28 RX ADMIN — Medication 4: at 23:22

## 2023-03-28 RX ADMIN — Medication 1 GRAM(S): at 23:26

## 2023-03-28 RX ADMIN — Medication 3 MILLIGRAM(S): at 01:22

## 2023-03-28 NOTE — PROGRESS NOTE ADULT - PROBLEM SELECTOR PLAN 2
Known hx of AAA. CT angiogram of the abdomen and pelvis was obtained which demonstrated a 5.5 cm infrarenal aortic aneurysm that has enlarged since 11/2021 when it measured 4.7 cm.  - Vascular consulted; appreciate recs

## 2023-03-28 NOTE — PROGRESS NOTE ADULT - ASSESSMENT
73 y/o M with a PMHx of gastric CA, DM Type 2, Hypothyroidism, HTN and anemia s/p EGD with esophageal biopsy 3/23 presents to the ED c/o fevers, low back pain x 2 days. Admitted for sepsis and LARRY i/s/ bladder outlet obstruction. Vascular Surgery consulted for enlarging infrarenal AAA. Non smoker, no first degree relatives with dx, asymptomatic. Avss, exam benign. Imaging demonstrates 5.5cm distal AAA, previously 4.7cm 11/21. Patient was evaluated in 3/2022 by VS and recommended for surveillance. CTA obtained and results reviewed. Patient is candidate for EVAR.     - Will discuss possibility of repair, plan pending patient discussion  - Vascular to continue to follow

## 2023-03-28 NOTE — PROGRESS NOTE ADULT - PROBLEM SELECTOR PLAN 3
Found to have esophagitis on CT chest. Recent EGD with esophageal biopsy consistent with GERD.  - c/w pantoprazole 40mg qd

## 2023-03-28 NOTE — PROGRESS NOTE ADULT - PROBLEM SELECTOR PLAN 1
Secondary to severe urinary retention and c/b b/l hydronephrosis, now relieved after Abarca placement. Cr elevation to >3, now back to baseline Cr wnl. Now with post-obstructive diuresis. Hydronephrosis resolving as seen on recent CTA. New stricture on left kidney also seen, urology following for possible intervention.  - C/w IVF NS at 100cc/hr for repletion  - Strict I&Os  - Monitor BMP, mag, phos  - Urology appreciate recs

## 2023-03-28 NOTE — PROGRESS NOTE ADULT - PROBLEM SELECTOR PLAN 5
On Humulin 12 units at bedtime per patient. PCP Dr. Obando states patient was prescribed 8 units. FSG at goal inpatient.   - Collateral from outpatient Endo Dr. Garcia (called and left message)  - mISS  - Monitor FSG

## 2023-03-28 NOTE — PROGRESS NOTE ADULT - ASSESSMENT
72M with PMH of gastric cancer, T2DM, hypothyroidism, HTN, anemia S/p EGD with esophageal biopsy 3/23 admitted for sepsis and LARRY. Past  hx of BPH on flomax, found to have on CT mild bilateral hydronephrosis likelys secondary to bladder outlet obstruction and enlarged prostate. Pt was found to be in urinary retention- s/p joyner catheter placement in ED, now with resolving LARRY. CT angio ab/pelvis with delayed phase shows no evidence of b/l hydro, stricture, nor obstruction and stable from scan on 6/2022 . Dr. Liu discussed findings with Dr. Maxwell (addendum was placed and they are unable to change the report at this time).     Recs:  No acute  intervention given resolution of hydro  Continue joyner and dc with joyner (ensure length of joyner catheter does not exceed >1 month)  Keep joyner until establishing care with an urologist at [facility] [phone]   Discussed with  attending      72M with PMH of gastric cancer, T2DM, hypothyroidism, HTN, anemia S/p EGD with esophageal biopsy 3/23 admitted for sepsis and LARRY. Past  hx of BPH on flomax, found to have on CT mild bilateral hydronephrosis likely secondary to bladder outlet obstruction and enlarged prostate. Pt was found to be in urinary retention- s/p joyner catheter placement in ED, now with resolving LARRY. CT angio ab/pelvis with delayed phase shows no evidence of b/l hydro, stricture, nor obstruction and stable from scan on 6/2022 . Dr. Liu discussed findings with Dr. Maxwell (addendum was placed and they are unable to change the report at this time).     Recs:  No acute  intervention given resolution of hydro  Continue joyner and dc with joyner (ensure length of joyner catheter does not exceed >1 month)  Keep joyner until establishing care with an urologist at [facility] [phone]   Discussed with  attending      72M with PMH of gastric cancer, T2DM, hypothyroidism, HTN, anemia S/p EGD with esophageal biopsy 3/23 admitted for sepsis and LARRY. Past  hx of BPH on flomax, found to have on CT mild bilateral hydronephrosis likely secondary to bladder outlet obstruction and enlarged prostate. Pt was found to be in urinary retention- s/p joyner catheter placement in ED, now with resolving LARRY. CT angio ab/pelvis with delayed phase shows no evidence of b/l hydro, stricture, nor obstruction and stable from scan on 6/2022 . Dr. Liu discussed findings with Dr. Maxwell (addendum was placed and they are unable to change the report at this time).     Recs:  No acute  intervention given resolution of hydro  Continue joyner and dc with ojyner (ensure length of joyner catheter does not exceed >1 month)  Keep joyner until establishing care with an urologist at [facility] [phone]   Discussed with  attending     Attestation: I discussed the case extensively with radiology Dr. Maxwell - repeat CTA with delayed phase shows no evidence of hydro, no evidence of obstruction or stricture, parapelvic cysts Bosniak II, no change in renal parenchyma or cysts since June 2022. Maintain joyner, micheal cute intervention indicated. 72M with PMH of gastric cancer, T2DM, hypothyroidism, HTN, anemia S/p EGD with esophageal biopsy 3/23 admitted for sepsis and LARRY. Past  hx of BPH on flomax, found to have on CT mild bilateral hydronephrosis likely secondary to bladder outlet obstruction and enlarged prostate. Pt was found to be in urinary retention- s/p joyner catheter placement in ED, now with resolving LARRY. CT angio ab/pelvis with delayed phase shows no evidence of b/l hydro, stricture, nor obstruction and stable from scan on 6/2022 . Dr. Liu discussed findings with Dr. Maxwell (addendum was placed and they are unable to change the report at this time).     Recs:  No acute  intervention given resolution of hydro  Continue joyner and dc with joyner (ensure length of joyner catheter does not exceed >1 month)  Keep joyner until establishing care with an urologist at [facility] [phone]   Please reach out prior to discharge to discuss possible TOV and to establish clear follow up appointments   Discussed with  attending     Attestation: I discussed the case extensively with radiology Dr. Maxwell - repeat CTA with delayed phase shows no evidence of hydro, no evidence of obstruction or stricture, parapelvic cysts Bosniak II, no change in renal parenchyma or cysts since June 2022. Maintain joyner, micheal cute intervention indicated.

## 2023-03-28 NOTE — PROGRESS NOTE ADULT - NUTRITIONAL ASSESSMENT
This patient has been assessed with a concern for Malnutrition and has been determined to have a diagnosis/diagnoses of Severe protein-calorie malnutrition.    This patient is being managed with:   Diet Soft and Bite Sized-  Tube Feeding Modality: Gastrostomy  Jevity 1.5 Jesús (JEVITY1.5)  Total Volume for 24 Hours (mL): 1140  Total Number of Cans: 5  Continuous  Starting Tube Feed Rate {mL per Hour}: 30  Increase Tube Feed Rate by (mL): 15     Every 2 hours  Until Goal Tube Feed Rate (mL per Hour): 95  Tube Feed Duration (in Hours): 12  Tube Feed Start Time: 08:00  Tube Feed Stop Time: 20:00  Entered: Mar 28 2023  9:09AM    Diet Soft and Bite Sized-  Tube Feeding Modality: Jejunostomy  Jevity 1.5 Jesús (JEVITY1.5)  Total Volume for 24 Hours (mL): 1140  Total Number of Cans: 5  Continuous  Until Goal Tube Feed Rate (mL per Hour): 95  Tube Feed Duration (in Hours): 12  Tube Feed Start Time: 20:00  Tube Feed Stop Time: 08:00  Pump   Rate (mL per Hour): 150   Frequency: Every 4 Hours  Entered: Mar 27 2023 12:16PM    The following pending diet order is being considered for treatment of Severe protein-calorie malnutrition:null

## 2023-03-28 NOTE — PROGRESS NOTE ADULT - SUBJECTIVE AND OBJECTIVE BOX
SUBJECTIVE: Patient seen and evaluated. No specific complaints        MEDICATIONS  (STANDING):  apixaban 2.5 milliGRAM(s) Oral two times a day  cholecalciferol 2000 Unit(s) Oral daily  dextrose 5%. 1000 milliLiter(s) (100 mL/Hr) IV Continuous <Continuous>  dextrose 5%. 1000 milliLiter(s) (50 mL/Hr) IV Continuous <Continuous>  dextrose 50% Injectable 25 Gram(s) IV Push once  dextrose 50% Injectable 12.5 Gram(s) IV Push once  dextrose 50% Injectable 25 Gram(s) IV Push once  glucagon  Injectable 1 milliGRAM(s) IntraMuscular once  insulin lispro (ADMELOG) corrective regimen sliding scale   SubCutaneous every 6 hours  levothyroxine 100 MICROGram(s) Oral every 24 hours  pantoprazole    Tablet 40 milliGRAM(s) Oral daily  polyethylene glycol 3350 17 Gram(s) Oral daily  senna 2 Tablet(s) Oral at bedtime  sodium chloride 0.9%. 1000 milliLiter(s) (130 mL/Hr) IV Continuous <Continuous>  sucralfate suspension 1 Gram(s) Oral four times a day  tamsulosin 0.4 milliGRAM(s) Oral at bedtime    MEDICATIONS  (PRN):  acetaminophen     Tablet .. 650 milliGRAM(s) Oral every 6 hours PRN Temp greater or equal to 38C (100.4F), Mild Pain (1 - 3)  dextrose Oral Gel 15 Gram(s) Oral once PRN Blood Glucose LESS THAN 70 milliGRAM(s)/deciliter  melatonin 3 milliGRAM(s) Oral at bedtime PRN Insomnia      Vital Signs Last 24 Hrs  T(C): 36.8 (28 Mar 2023 11:14), Max: 36.8 (28 Mar 2023 11:14)  T(F): 98.3 (28 Mar 2023 11:14), Max: 98.3 (28 Mar 2023 11:14)  HR: 67 (28 Mar 2023 11:14) (65 - 69)  BP: 135/75 (28 Mar 2023 11:14) (123/78 - 135/75)  BP(mean): --  RR: 20 (28 Mar 2023 11:14) (19 - 20)  SpO2: 99% (28 Mar 2023 11:14) (94% - 99%)    Parameters below as of 28 Mar 2023 11:14  Patient On (Oxygen Delivery Method): room air        Physical Exam:  General: NAD, resting comfortably in bed, low BMI habitus  Pulmonary: Nonlabored, no respiratory distress  Cardiovascular: regular rate and rhythm   Abdominal: soft, NT/ND, well healed incisions, J tube in place cdi  Extremities: WWP, normal strength  Neuro: A/O x 3, no focal deficits, normal motor/sensation  Pulses: palpable distal pulses, DP/PT,Fem Pop    I&O's Summary    27 Mar 2023 07:01  -  28 Mar 2023 07:00  --------------------------------------------------------  IN: 820 mL / OUT: 2000 mL / NET: -1180 mL        LABS:                        11.9   9.50  )-----------( 232      ( 28 Mar 2023 05:30 )             35.9     03-28    134<L>  |  103  |  18  ----------------------------<  57<L>  4.1   |  26  |  0.67    Ca    8.2<L>      28 Mar 2023 05:30  Phos  2.3     03-28  Mg     1.9     03-28    TPro  6.2  /  Alb  2.7<L>  /  TBili  0.6  /  DBili  x   /  AST  14  /  ALT  12  /  AlkPhos  89  03-27        CAPILLARY BLOOD GLUCOSE      POCT Blood Glucose.: 103 mg/dL (28 Mar 2023 12:10)  POCT Blood Glucose.: 96 mg/dL (28 Mar 2023 08:26)  POCT Blood Glucose.: 162 mg/dL (27 Mar 2023 21:13)  POCT Blood Glucose.: 141 mg/dL (27 Mar 2023 17:18)    LIVER FUNCTIONS - ( 27 Mar 2023 07:52 )  Alb: 2.7 g/dL / Pro: 6.2 g/dL / ALK PHOS: 89 U/L / ALT: 12 U/L / AST: 14 U/L / GGT: x             RADIOLOGY & ADDITIONAL STUDIES:

## 2023-03-28 NOTE — PROGRESS NOTE ADULT - NUTRITIONAL ASSESSMENT
This patient has been assessed with a concern for Malnutrition and has been determined to have a diagnosis/diagnoses of Severe protein-calorie malnutrition.    This patient is being managed with:   Diet Soft and Bite Sized-  Kosher  Tube Feeding Modality: Gastrostomy  Jevity 1.5 Jesús (JEVITY1.5)  Total Volume for 24 Hours (mL): 1140  Total Number of Cans: 5  Continuous  Starting Tube Feed Rate {mL per Hour}: 30  Increase Tube Feed Rate by (mL): 15     Every 2 hours  Until Goal Tube Feed Rate (mL per Hour): 95  Tube Feed Duration (in Hours): 12  Tube Feed Start Time: 08:00  Tube Feed Stop Time: 20:00  Entered: Mar 27 2023  3:51PM    Diet Soft and Bite Sized-  Tube Feeding Modality: Jejunostomy  Jevity 1.5 Jesús (JEVITY1.5)  Total Volume for 24 Hours (mL): 1140  Total Number of Cans: 5  Continuous  Until Goal Tube Feed Rate (mL per Hour): 95  Tube Feed Duration (in Hours): 12  Tube Feed Start Time: 20:00  Tube Feed Stop Time: 08:00  Pump   Rate (mL per Hour): 150   Frequency: Every 4 Hours  Entered: Mar 27 2023 12:16PM    The following pending diet order is being considered for treatment of Severe protein-calorie malnutrition:null

## 2023-03-28 NOTE — PROGRESS NOTE ADULT - SUBJECTIVE AND OBJECTIVE BOX
SUBJECTIVE: Seen and evaluated at bedside. SHANNAN. Resting comfortably, states that he feels at his baseline health. OOBA, independent at home. Denies any dysuria, urgency, frequency, fever, chills.       MEDICATIONS  (STANDING):  apixaban 2.5 milliGRAM(s) Oral two times a day  dextrose 5%. 1000 milliLiter(s) (100 mL/Hr) IV Continuous <Continuous>  dextrose 5%. 1000 milliLiter(s) (50 mL/Hr) IV Continuous <Continuous>  dextrose 50% Injectable 25 Gram(s) IV Push once  dextrose 50% Injectable 12.5 Gram(s) IV Push once  dextrose 50% Injectable 25 Gram(s) IV Push once  glucagon  Injectable 1 milliGRAM(s) IntraMuscular once  insulin lispro (ADMELOG) corrective regimen sliding scale   SubCutaneous every 6 hours  levothyroxine 100 MICROGram(s) Oral every 24 hours  pantoprazole    Tablet 40 milliGRAM(s) Oral daily  polyethylene glycol 3350 17 Gram(s) Oral daily  senna 2 Tablet(s) Oral at bedtime  sodium chloride 0.9%. 1000 milliLiter(s) (100 mL/Hr) IV Continuous <Continuous>  sucralfate suspension 1 Gram(s) Oral four times a day  tamsulosin 0.4 milliGRAM(s) Oral at bedtime    MEDICATIONS  (PRN):  acetaminophen     Tablet .. 650 milliGRAM(s) Oral every 6 hours PRN Temp greater or equal to 38C (100.4F), Mild Pain (1 - 3)  dextrose Oral Gel 15 Gram(s) Oral once PRN Blood Glucose LESS THAN 70 milliGRAM(s)/deciliter  melatonin 3 milliGRAM(s) Oral at bedtime PRN Insomnia      Vital Signs Last 24 Hrs  T(C): 36.8 (28 Mar 2023 11:14), Max: 36.8 (28 Mar 2023 11:14)  T(F): 98.3 (28 Mar 2023 11:14), Max: 98.3 (28 Mar 2023 11:14)  HR: 67 (28 Mar 2023 11:14) (65 - 69)  BP: 135/75 (28 Mar 2023 11:14) (123/78 - 135/75)  BP(mean): --  RR: 20 (28 Mar 2023 11:14) (19 - 20)  SpO2: 99% (28 Mar 2023 11:14) (94% - 99%)    Parameters below as of 28 Mar 2023 11:14  Patient On (Oxygen Delivery Method): room air        Physical Exam:  General: NAD, resting comfortably in bed  : anya draining clear yellow urine     I&O's Summary    27 Mar 2023 07:01  -  28 Mar 2023 07:00  --------------------------------------------------------  IN: 820 mL / OUT: 2000 mL / NET: -1180 mL    28 Mar 2023 07:01  -  28 Mar 2023 16:51  --------------------------------------------------------  IN: 0 mL / OUT: 550 mL / NET: -550 mL        LABS:                        11.9   9.50  )-----------( 232      ( 28 Mar 2023 05:30 )             35.9     03-28    134<L>  |  103  |  18  ----------------------------<  57<L>  4.1   |  26  |  0.67    Ca    8.2<L>      28 Mar 2023 05:30  Phos  2.3     03-28  Mg     1.9     03-28    TPro  6.2  /  Alb  2.7<L>  /  TBili  0.6  /  DBili  x   /  AST  14  /  ALT  12  /  AlkPhos  89  03-27        CAPILLARY BLOOD GLUCOSE      POCT Blood Glucose.: 103 mg/dL (28 Mar 2023 12:10)  POCT Blood Glucose.: 96 mg/dL (28 Mar 2023 08:26)  POCT Blood Glucose.: 162 mg/dL (27 Mar 2023 21:13)  POCT Blood Glucose.: 141 mg/dL (27 Mar 2023 17:18)    LIVER FUNCTIONS - ( 27 Mar 2023 07:52 )  Alb: 2.7 g/dL / Pro: 6.2 g/dL / ALK PHOS: 89 U/L / ALT: 12 U/L / AST: 14 U/L / GGT: x             RADIOLOGY & ADDITIONAL STUDIES:    < from: CT Angio Abdomen and Pelvis w/ IV Cont (03.27.23 @ 20:04) >  ACC: [id number] EXAM:  CT ANGIO CHEST AORTA WAWI   ORDERED BY: JAYRO THRASHER     ACC: [id number] EXAM:  CT ANGIO ABD PELV (W)AW IC   ORDERED BY: CHASE MCDANIEL     *** ADDENDUM # 2 ***    Addendum:    Intravenous contrast:  120 cc of Isovue-370 were utilized for intravenous contrast. None were   discarded.    --- End of Report ---    *** END OF ADDENDUM # 2 ***      *** ADDENDUM # 1 ***    Addendum to the urologic findings below:  Kidneys and ureters:  Left kidney: No pelviectasis. Multiple, stable, parapelvic cysts are   stable when compared to study from 6/28/2022. No hydronephrosis.   Symmetric renal enhancement. Curvilinear stable calcification described   is in one of the left parapelvic cysts located located centrally. No   evidence of renal infection bilaterally.    The above findings were discussed with Dr. Dania Liu of urology    --- End of Report ---    *** END OF ADDENDUM # 1 ***      PROCEDURE DATE:  03/27/2023          INTERPRETATION:  Initial report created on 3/27/2023 11:10:10 PM EDT  PROCEDURE INFORMATION:  Exam:  CTA Chest With Contrast  CTA Abdomen and Pelvis With Contrast  Exam date and time: 3/27/2023 7:17 PM  Age: 72 years old  Clinical indication: Other: Aaa; Other: Eval for evar    TECHNIQUE:  Imaging protocol: Computed tomographic angiography of the chest with   contrast.  Computed tomographic angiography of the abdomen and pelvis with contrast.  3D rendering (Not supervised by radiologist): MIP and/or 3D reconstructed  images were created by the technologist.    COMPARISON:  CT ABDOMEN AND PELVIS 3/25/2023 9:54 PM    FINDINGS:  Tubes, catheters and devices: Jejunostomy tube is in expected location.    VASCULATURE:  Pulmonary arteries: Normal. No pulmonary emboli.  Aorta: The ascending thoracic aorta is ectatic at 3.9 cm. as measured at   the level right pulmonary artery. The aortic arch and descending thoracic   aorta are unremarkable.    Fusiform infrarenal abdominal  aortic aneurysm measuring up to 5.7 cm extending over length of 10 cm. No  aortic dissection.  Celiac trunk and mesenteric arteries: 40% stenosis of the celiac artery   origin.  On the arterial phase images there appears to be a 50% stenosis of the SMA  origin with possible dissection of the origin. However this is not   visualized  on the delayed images it appears to be an artifact related to patient   motion.  The SMA is patent. The MARLENE is patent.  Renal arteries: No occlusion or significant stenosis.  Right iliac arteries: 3.7 cm fusiform aneurysm of the rightcommon iliac  artery. The right external iliac artery is normal.  Left iliac arteries: There is a partially thrombosed aneurysm of the left  internal iliac artery measuring 3.4 cm. Fusiform aneurysm of the left   common  iliac artery measuring 2.2 cm. Left internal iliac artery is aneurysmal   at 1.7  cm. Left external iliac artery is normal.    CHEST:  Lungs: Dependent atelectasis in the lower lobes. Lungs are otherwise   clear.  Pleural spaces: Small bilateral pleural effusions.  Heart: Mild cardiomegaly. Trace pericardial effusion.  Coronary arteries: Three-vessel coronary artery atherosclerotic disease.  Mediastinal space: The esophagus is mildly distended with fluid. Mild  circumferential wall thickening in the distal esophagus.    ABDOMEN AND PELVIS:  Liver: No mass.  Gallbladder and bile ducts: Unremarkable. No calcified stones. No ductal  dilation.  Pancreas: Unremarkable. No mass. No ductal dilation.  Spleen: Status post splenectomy.  Adrenal glands: Unremarkable. No mass.  Kidneys and ureters: The kidneys are atrophic. Severe left renal  pelvicaliectasis. There is enhancement in the left renal pelvic lumen and   upper  calices and urothelial enhancement in the proximal ureter. Multiple   peripelvic  cysts in the right kidney. Small cysts in the right kidney. There is a  curvilinear calcification in the left renal pelvis.  Stomach and bowel: Status post partial gastrectomy. Mild wall thickening   in  proximal small bowel at the anastomosis. Small bowel is nondilated. Mild  wall  thickening and edema in the cecum. Remainder of the colon is   unremarkable. No  bowel obstruction.  Appendix: No evidence of appendicitis.  Intraperitoneal space: There is a 5.8 x 1.3 x 3.0 cm preperitoneal fluid  collection containing a small amount of air in the anterior abdomen in the  midline at the level of the umbilicus. No pneumoperitoneum. No definite  communication with the bowel is seen. Trace free fluid.  Urinary bladder: Circumferential urinary bladder wall thickening and   edema.  There is a Abarca catheter in the urinary bladder. 3.0 cm diverticulum  containing layering stones arising from the right posterior urinary   bladder.  1.9 cm left posterior urinary bladder diverticulum containing small   stones.  Reproductive: The prostate gland is enlarged measuring 6 cm in diameter.    Lymph nodes: Unremarkable. No enlarged lymph nodes.  Bones/joints: Advanced degenerative changes in the spine and pelvis.  Soft tissues: Status post anterior abdominal wall hernia repair. No   recurrent  hernia.    IMPRESSION:  1.   Fusiform infrarenal abdominal aortic aneurysm measuring up to 5.7 cm.  2.   Mildly ectatic ascending thoracic aorta.  3.   Motion artifact at the superior mesenteric artery origin. No SMA   stenosis  or occlusion.  4.   Severe left renal pelvicaliectasis with mucosal enhancement   suggesting  ureteropelvic junction stricture and urinary tract infection. Cystitis   with  circumferential urinary bladder wall thickening.  5.   Small preperitoneal abscess inthe midline anterior abdomen at the   level  of the umbilicus.  6.   Small bilateral pleural effusions.  7.   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  8.   Enlarged prostate gland.  9.   Dilated esophagus with distal esophageal wall thickening and edema  suggesting esophagitis. Additional areas of wall thickening in the   proximal  small bowel and colon suggesting an infectious or inflammatory   enterocolitis.  10.   Bilateral posterior urinary bladder wall diverticula.  11.   Less than 50% celiac artery origin stenosis.    --- End of Report ---    ***Please see the addendum at the top of this report. It may contain   additional important information or changes.****        ALEXSANDRA ZUNIGA MD; Attending Radiologist  This document has been electronically signed. Mar 28 2023  8:39AM  1st Addendum: ALEXSANDRA ZUNIGA MD; Attending Radiologist  The first addendum was electronically signed on: Mar 28 2023 10:48AM.  2nd Addendum: ALEXSANDRA ZUNIGA MD; Attending Radiologist  The second addendum was electronically signed on: Mar 28 2023  2:29PM.    < end of copied text >

## 2023-03-28 NOTE — CONSULT NOTE ADULT - SUBJECTIVE AND OBJECTIVE BOX
ID: This is a 72-yo male with gastric cancer S/P total gastrectomy with esophagojejunostomy (2017) at [facility] C/B incisional hernia S/P ventral hernia repair with myofascial flaps and retrorectus mesh placement (2018), jejunostomy feeding tube placement (2020) presenting with undifferentiated sepsis and urinary obstruction S/P Abarca drainage for whom surgery was consulted for possible intervention on preperitoneal abscess.    HPI:   He notes he currently feels well and was able to eat/drink normally this morning. He is moving his bowels normally (although notes one loose BM today). He denies F/C. He is mostly concerned as the plan as per consulting services (for AAA, indwelling Abarca) has not been clarified with him.    Hospital Course: He underwent EGD on 3/23/23 with Dr. Peguero for surevillance showing esophagitis; Axios Stent was removed (previously placed as enteroenterostomy for afferent loop obstruction). He presented to the hospital 3/25 for inability to void and malaise and was found to be septic with marked leukocytosis and urinary obstruction apparent on imaging. Abarca was placed. ABx were continued for ~ 48 hours (off since yesterday). Also on imaging a growing infrarenal AAA was noted and vascular was consulted. CT Angiogram C/A/P on 3/27/23 showed a 5.7 cm AAA, and plan for possible endovascular repair is in process.     His cancer is otherwise in remission. The EGD on 3/23/23 showed no concern for recurrence. He has not received chemothapy in ~ 4 years. He last had a colonoscopy ~ 4 years ago (reportedly normal this time but has had polyps in the distant past).     Of note, the fluid collection in the preperitoneal space is documented in 2021 and spontaneous drainage seems to have been first noticed after removing a stitch from the skin. No interventions on this fluid collection have been undertaken.    PAST MEDICAL & SURGICAL HISTORY:  Essential hypertension  was on losartan, now diet control      Hypothyroidism      Gastric mass  ulcerated mass in gastric cardia with small perigastric nodes on endoscopy.      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      H/O umbilical hernia repair  2018 with mesh      Port-a-cath in place  right chest wall      History of gastric surgery  2017      Gastrostomy tube in place  open jejunostomy with J-tube November 2020      S/P cataract surgery      H/O endoscopy  & stent placement 12/2020      S/P gastrectomy  total gastrectomy 2017      History of partial pancreatectomy  distal pancreatectomy/ splenectomy esophatojejunostomy      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, gastric primary      History of dysphagia  EGD with Axios stent placment to connect the blind limb to th e efferent limb (jejunojejunostomy)          REVIEW OF SYSTEMS    General: no F/C  Neuro: No seizures, focal neurologic symptoms  Psych: No mood changes  CV: No CP, SOB  Pulm: No SOB, coughing  GI: No N/V, abodminal pain. No diarrhea.   : No dysuria  Heme: No weakness, easy bruising.  Skin: No rashes  Lymph: No swelling    MEDICATIONS  (STANDING):  apixaban 2.5 milliGRAM(s) Oral two times a day  cholecalciferol 2000 Unit(s) Oral daily  dextrose 5%. 1000 milliLiter(s) (100 mL/Hr) IV Continuous <Continuous>  dextrose 5%. 1000 milliLiter(s) (50 mL/Hr) IV Continuous <Continuous>  dextrose 50% Injectable 25 Gram(s) IV Push once  dextrose 50% Injectable 12.5 Gram(s) IV Push once  dextrose 50% Injectable 25 Gram(s) IV Push once  glucagon  Injectable 1 milliGRAM(s) IntraMuscular once  insulin lispro (ADMELOG) corrective regimen sliding scale   SubCutaneous every 6 hours  levothyroxine 100 MICROGram(s) Oral every 24 hours  pantoprazole    Tablet 40 milliGRAM(s) Oral daily  polyethylene glycol 3350 17 Gram(s) Oral daily  senna 2 Tablet(s) Oral at bedtime  sodium chloride 0.9%. 1000 milliLiter(s) (130 mL/Hr) IV Continuous <Continuous>  sucralfate suspension 1 Gram(s) Oral four times a day  tamsulosin 0.4 milliGRAM(s) Oral at bedtime    MEDICATIONS  (PRN):  acetaminophen     Tablet .. 650 milliGRAM(s) Oral every 6 hours PRN Temp greater or equal to 38C (100.4F), Mild Pain (1 - 3)  dextrose Oral Gel 15 Gram(s) Oral once PRN Blood Glucose LESS THAN 70 milliGRAM(s)/deciliter  melatonin 3 milliGRAM(s) Oral at bedtime PRN Insomnia      Allergies    No Known Allergies    Intolerances        SOCIAL HISTORY:    FAMILY HISTORY:  Family history of ovarian cancer (Mother)  mother    Family history of ischemic heart disease (IHD) (Father)  father        Vital Signs Last 24 Hrs  T(C): 36.8 (28 Mar 2023 11:14), Max: 36.8 (28 Mar 2023 11:14)  T(F): 98.3 (28 Mar 2023 11:14), Max: 98.3 (28 Mar 2023 11:14)  HR: 67 (28 Mar 2023 11:14) (65 - 69)  BP: 135/75 (28 Mar 2023 11:14) (123/78 - 135/75)  BP(mean): --  RR: 20 (28 Mar 2023 11:14) (19 - 20)  SpO2: 99% (28 Mar 2023 11:14) (94% - 99%)    Parameters below as of 28 Mar 2023 11:14  Patient On (Oxygen Delivery Method): room air        PHYSICAL EXAMINATION    Gen: Underweight but otherwise well-appearing 72-yo male in NAD  Neurologic: AAOx4; moving franca xtremities equally.   CV: Normal rate, regular rhythm  Pulm: Breathing comfortably  Abd: Soft, non-distended; No TTP throughout.   J-Tube in place without erythema/edema/induration  Punctate ostium in mid epigastrium with mild mucoid discharge, expressible. No erythema/edema/induratio in this area. No fluctuance.  Incision: Well healed laparotomy wound  : No Abarca  Skin: No rashes  Extremities: No edema.  Psychiatric: Interacting normally    LABS:                        11.9   9.50  )-----------( 232      ( 28 Mar 2023 05:30 )             35.9     03-28    134<L>  |  103  |  18  ----------------------------<  57<L>  4.1   |  26  |  0.67    Ca    8.2<L>      28 Mar 2023 05:30  Phos  2.3     03-28  Mg     1.9     03-28    TPro  6.2  /  Alb  2.7<L>  /  TBili  0.6  /  DBili  x   /  AST  14  /  ALT  12  /  AlkPhos  89  03-27

## 2023-03-28 NOTE — PROGRESS NOTE ADULT - ATTENDING COMMENTS
Patient was seen and examined with the resident team today.  I agree with the above assessment and plan with the following exceptions/additions:     Briefly, this is a 71yo gentleman with a PMH of gastric CA, IDDM2, hypothyroidism, HTN and anemia s/p EGD with esophageal biopsy on 3/23 who p/w SIRS 2/2 acute urinary retention w/LARRY and aspiration pneumonitis.  Incidentally found to have an enlarging AAA.  Imaging for AAA overnight revealing L-ureteral stricture, along with a preperitoneal abscess.      #LARRY w/urinary retention - s/p Abarca w/resolution of LARRY; can continue to hold abx as U/A NOT consistent with UTI; RESTART Tamsulosin tonight; will being Abarca teaching but will discuss w/Urology TOV as patient does not feel comfortably with being discharged with a Abarca; also discuss stricture with Urology    #Preperitoneal abscess - review imaging and exam with Surgery as abdominal exam at present appears benign   #Aspiration pneumonitis - can stop abx and just monitor   #AAA - on Eliquis for AAA; f/u plans for intervention with Vascular   #IDDM2 - PLEASE complete ADMISSION MED REC to restart home insulin regimen   #Hypothyroidism - c/w Levothyroxine   #Severe PCM - c/w small bites and TF's   #DVT PPx - on Eliquis  #Dispo - TBD     Corinne Brothers  [phone]

## 2023-03-28 NOTE — PROGRESS NOTE ADULT - SUBJECTIVE AND OBJECTIVE BOX
CC: Patient is a 72y old  Male who presents with a chief complaint of Fever (28 Mar 2023 17:55)    INTERVAL EVENTS: KIRILL    SUBJECTIVE / INTERVAL HPI: Patient seen and examined at bedside. Is feeling well with no complaints. Denies headaches, fevers, chills, chest pain, SOB, N/V/D, constipation, dysuria.      ROS: negative unless otherwise stated above.    VITAL SIGNS:  Vital Signs Last 24 Hrs  T(C): 36.8 (28 Mar 2023 11:14), Max: 36.8 (28 Mar 2023 11:14)  T(F): 98.3 (28 Mar 2023 11:14), Max: 98.3 (28 Mar 2023 11:14)  HR: 67 (28 Mar 2023 11:14) (65 - 67)  BP: 135/75 (28 Mar 2023 11:14) (123/78 - 135/75)  RR: 20 (28 Mar 2023 11:14) (19 - 20)  SpO2: 99% (28 Mar 2023 11:14) (94% - 99%)    Parameters below as of 28 Mar 2023 11:14  Patient On (Oxygen Delivery Method): room air      03-27-23 @ 07:01 - 03-28-23 @ 07:00  --------------------------------------------------------  IN: 820 mL / OUT: 2000 mL / NET: -1180 mL    03-28-23 @ 07:01 - 03-28-23 @ 19:45  --------------------------------------------------------  IN: 0 mL / OUT: 550 mL / NET: -550 mL      PHYSICAL EXAM:  General: NAD, pleasant thin male  HEENT: NC/AT, anicteric sclera, MMM  Neck: supple, trachea midline  Cardiovascular: +S1/S2; RRR, no murmurs, rubs, or gallops  Respiratory: CTA B/L; no W/R/R  Gastrointestinal: 5cm vertical scar midline from umbilical hernia repair, soft, NT/ND, skin around PEG tube non erythematous, normal BS  : no CVA tenderness or suprapubic tenderness on palpation  Extremities: WWP; no edema, clubbing or cyanosis  Vascular: 2+ radial, DP/PT pulses B/L  Neurological: AAOx3; no focal deficits    MEDICATIONS:  MEDICATIONS  (STANDING):  apixaban 2.5 milliGRAM(s) Oral two times a day  dextrose 5%. 1000 milliLiter(s) (50 mL/Hr) IV Continuous <Continuous>  dextrose 5%. 1000 milliLiter(s) (100 mL/Hr) IV Continuous <Continuous>  dextrose 50% Injectable 25 Gram(s) IV Push once  dextrose 50% Injectable 12.5 Gram(s) IV Push once  dextrose 50% Injectable 25 Gram(s) IV Push once  glucagon  Injectable 1 milliGRAM(s) IntraMuscular once  insulin lispro (ADMELOG) corrective regimen sliding scale   SubCutaneous every 6 hours  levothyroxine 100 MICROGram(s) Oral every 24 hours  pantoprazole    Tablet 40 milliGRAM(s) Oral daily  polyethylene glycol 3350 17 Gram(s) Oral daily  senna 2 Tablet(s) Oral at bedtime  sodium chloride 0.9%. 1000 milliLiter(s) (100 mL/Hr) IV Continuous <Continuous>  sucralfate suspension 1 Gram(s) Oral four times a day  tamsulosin 0.4 milliGRAM(s) Oral at bedtime    MEDICATIONS  (PRN):  acetaminophen     Tablet .. 650 milliGRAM(s) Oral every 6 hours PRN Temp greater or equal to 38C (100.4F), Mild Pain (1 - 3)  dextrose Oral Gel 15 Gram(s) Oral once PRN Blood Glucose LESS THAN 70 milliGRAM(s)/deciliter  melatonin 3 milliGRAM(s) Oral at bedtime PRN Insomnia      ALLERGIES:  Allergies    No Known Allergies    Intolerances        LABS:                        11.9   9.50  )-----------( 232      ( 28 Mar 2023 05:30 )             35.9     03-28    134<L>  |  103  |  18  ----------------------------<  57<L>  4.1   |  26  |  0.67    Ca    8.2<L>      28 Mar 2023 05:30  Phos  2.3     03-28  Mg     1.9     03-28    TPro  6.2  /  Alb  2.7<L>  /  TBili  0.6  /  DBili  x   /  AST  14  /  ALT  12  /  AlkPhos  89  03-27      POCT Blood Glucose.: 156 mg/dL (28 Mar 2023 18:05)      RADIOLOGY & ADDITIONAL TESTS: Reviewed.

## 2023-03-28 NOTE — CONSULT NOTE ADULT - ASSESSMENT
This is a 72-yo male with gastric cancer S/P total gastrectomy with esophagojejunostomy (2017) at [facility] C/B incisional hernia S/P ventral hernia repair with myofascial flaps and retrorectus mesh placement (2018), jejunostomy feeding tube placement (2020) presenting with undifferentiated sepsis and urinary obstruction for whom surgery was consulted for possible intervention on preperitoneal abscess.    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   - Defer to vascular for planned EVAR. No need for prophylactic intervention for preperitoneal abscess cavity if EVAR performed (ie, if groin incisions and endovascular techniques utilized).   - Continue daily dressing changes (patient can do) for external dressing site of EC fistula.   - Would have patient follow-up with Dr. Banda / Dr. Cabello - his surgeons at [facility] - as scheduled after discharge. Surgery 4C to sign off at this time. Please reconsult with new questions.

## 2023-03-29 LAB
ANION GAP SERPL CALC-SCNC: 6 MMOL/L — SIGNIFICANT CHANGE UP (ref 5–17)
APTT BLD: 31.4 SEC — SIGNIFICANT CHANGE UP (ref 27.5–35.5)
APTT BLD: 37.2 SEC — HIGH (ref 27.5–35.5)
BASOPHILS # BLD AUTO: 0.05 K/UL — SIGNIFICANT CHANGE UP (ref 0–0.2)
BASOPHILS NFR BLD AUTO: 0.7 % — SIGNIFICANT CHANGE UP (ref 0–2)
BUN SERPL-MCNC: 14 MG/DL — SIGNIFICANT CHANGE UP (ref 7–23)
CALCIUM SERPL-MCNC: 8.5 MG/DL — SIGNIFICANT CHANGE UP (ref 8.4–10.5)
CHLORIDE SERPL-SCNC: 102 MMOL/L — SIGNIFICANT CHANGE UP (ref 96–108)
CO2 SERPL-SCNC: 25 MMOL/L — SIGNIFICANT CHANGE UP (ref 22–31)
CREAT SERPL-MCNC: 0.54 MG/DL — SIGNIFICANT CHANGE UP (ref 0.5–1.3)
EGFR: 105 ML/MIN/1.73M2 — SIGNIFICANT CHANGE UP
EOSINOPHIL # BLD AUTO: 0.24 K/UL — SIGNIFICANT CHANGE UP (ref 0–0.5)
EOSINOPHIL NFR BLD AUTO: 3.4 % — SIGNIFICANT CHANGE UP (ref 0–6)
GLUCOSE BLDC GLUCOMTR-MCNC: 138 MG/DL — HIGH (ref 70–99)
GLUCOSE BLDC GLUCOMTR-MCNC: 139 MG/DL — HIGH (ref 70–99)
GLUCOSE BLDC GLUCOMTR-MCNC: 144 MG/DL — HIGH (ref 70–99)
GLUCOSE BLDC GLUCOMTR-MCNC: 216 MG/DL — HIGH (ref 70–99)
GLUCOSE BLDC GLUCOMTR-MCNC: 231 MG/DL — HIGH (ref 70–99)
GLUCOSE SERPL-MCNC: 173 MG/DL — HIGH (ref 70–99)
HCT VFR BLD CALC: 38.4 % — LOW (ref 39–50)
HGB BLD-MCNC: 12.4 G/DL — LOW (ref 13–17)
IMM GRANULOCYTES NFR BLD AUTO: 0.4 % — SIGNIFICANT CHANGE UP (ref 0–0.9)
LYMPHOCYTES # BLD AUTO: 0.95 K/UL — LOW (ref 1–3.3)
LYMPHOCYTES # BLD AUTO: 13.3 % — SIGNIFICANT CHANGE UP (ref 13–44)
MAGNESIUM SERPL-MCNC: 2 MG/DL — SIGNIFICANT CHANGE UP (ref 1.6–2.6)
MCHC RBC-ENTMCNC: 32.3 GM/DL — SIGNIFICANT CHANGE UP (ref 32–36)
MCHC RBC-ENTMCNC: 33.1 PG — SIGNIFICANT CHANGE UP (ref 27–34)
MCV RBC AUTO: 102.4 FL — HIGH (ref 80–100)
MONOCYTES # BLD AUTO: 0.77 K/UL — SIGNIFICANT CHANGE UP (ref 0–0.9)
MONOCYTES NFR BLD AUTO: 10.8 % — SIGNIFICANT CHANGE UP (ref 2–14)
NEUTROPHILS # BLD AUTO: 5.12 K/UL — SIGNIFICANT CHANGE UP (ref 1.8–7.4)
NEUTROPHILS NFR BLD AUTO: 71.4 % — SIGNIFICANT CHANGE UP (ref 43–77)
NRBC # BLD: 0 /100 WBCS — SIGNIFICANT CHANGE UP (ref 0–0)
PHOSPHATE SERPL-MCNC: 2.2 MG/DL — LOW (ref 2.5–4.5)
PLATELET # BLD AUTO: 259 K/UL — SIGNIFICANT CHANGE UP (ref 150–400)
POTASSIUM SERPL-MCNC: 3.9 MMOL/L — SIGNIFICANT CHANGE UP (ref 3.5–5.3)
POTASSIUM SERPL-SCNC: 3.9 MMOL/L — SIGNIFICANT CHANGE UP (ref 3.5–5.3)
RBC # BLD: 3.75 M/UL — LOW (ref 4.2–5.8)
RBC # FLD: 13.9 % — SIGNIFICANT CHANGE UP (ref 10.3–14.5)
SODIUM SERPL-SCNC: 133 MMOL/L — LOW (ref 135–145)
WBC # BLD: 7.16 K/UL — SIGNIFICANT CHANGE UP (ref 3.8–10.5)
WBC # FLD AUTO: 7.16 K/UL — SIGNIFICANT CHANGE UP (ref 3.8–10.5)

## 2023-03-29 PROCEDURE — 99233 SBSQ HOSP IP/OBS HIGH 50: CPT | Mod: GC

## 2023-03-29 RX ORDER — INSULIN LISPRO 100/ML
VIAL (ML) SUBCUTANEOUS EVERY 4 HOURS
Refills: 0 | Status: DISCONTINUED | OUTPATIENT
Start: 2023-03-29 | End: 2023-03-30

## 2023-03-29 RX ORDER — HEPARIN SODIUM 5000 [USP'U]/ML
1500 INJECTION INTRAVENOUS; SUBCUTANEOUS
Qty: 25000 | Refills: 0 | Status: DISCONTINUED | OUTPATIENT
Start: 2023-03-29 | End: 2023-03-31

## 2023-03-29 RX ORDER — INSULIN HUMAN 100 [IU]/ML
12 INJECTION, SOLUTION SUBCUTANEOUS
Refills: 0 | Status: DISCONTINUED | OUTPATIENT
Start: 2023-03-29 | End: 2023-03-30

## 2023-03-29 RX ORDER — HEPARIN SODIUM 5000 [USP'U]/ML
INJECTION INTRAVENOUS; SUBCUTANEOUS
Qty: 25000 | Refills: 0 | Status: DISCONTINUED | OUTPATIENT
Start: 2023-03-29 | End: 2023-03-29

## 2023-03-29 RX ORDER — INSULIN LISPRO 100/ML
VIAL (ML) SUBCUTANEOUS
Refills: 0 | Status: DISCONTINUED | OUTPATIENT
Start: 2023-03-29 | End: 2023-03-29

## 2023-03-29 RX ORDER — ZOLPIDEM TARTRATE 10 MG/1
5 TABLET ORAL AT BEDTIME
Refills: 0 | Status: DISCONTINUED | OUTPATIENT
Start: 2023-03-29 | End: 2023-04-05

## 2023-03-29 RX ORDER — INSULIN HUMAN 100 [IU]/ML
INJECTION, SOLUTION SUBCUTANEOUS EVERY 6 HOURS
Refills: 0 | Status: DISCONTINUED | OUTPATIENT
Start: 2023-03-29 | End: 2023-03-29

## 2023-03-29 RX ORDER — ZOLPIDEM TARTRATE 10 MG/1
5 TABLET ORAL AT BEDTIME
Refills: 0 | Status: DISCONTINUED | OUTPATIENT
Start: 2023-03-29 | End: 2023-04-06

## 2023-03-29 RX ADMIN — Medication 1 GRAM(S): at 11:24

## 2023-03-29 RX ADMIN — HEPARIN SODIUM 12 UNIT(S)/HR: 5000 INJECTION INTRAVENOUS; SUBCUTANEOUS at 15:31

## 2023-03-29 RX ADMIN — Medication 100 MICROGRAM(S): at 06:39

## 2023-03-29 RX ADMIN — HEPARIN SODIUM 15 UNIT(S)/HR: 5000 INJECTION INTRAVENOUS; SUBCUTANEOUS at 21:00

## 2023-03-29 RX ADMIN — Medication 4: at 22:29

## 2023-03-29 RX ADMIN — Medication 1 GRAM(S): at 17:34

## 2023-03-29 RX ADMIN — APIXABAN 2.5 MILLIGRAM(S): 2.5 TABLET, FILM COATED ORAL at 06:37

## 2023-03-29 RX ADMIN — PANTOPRAZOLE SODIUM 40 MILLIGRAM(S): 20 TABLET, DELAYED RELEASE ORAL at 11:24

## 2023-03-29 RX ADMIN — INSULIN HUMAN 12 UNIT(S): 100 INJECTION, SOLUTION SUBCUTANEOUS at 19:40

## 2023-03-29 RX ADMIN — Medication 1 GRAM(S): at 06:37

## 2023-03-29 RX ADMIN — ZOLPIDEM TARTRATE 5 MILLIGRAM(S): 10 TABLET ORAL at 20:41

## 2023-03-29 RX ADMIN — TAMSULOSIN HYDROCHLORIDE 0.4 MILLIGRAM(S): 0.4 CAPSULE ORAL at 21:27

## 2023-03-29 NOTE — CONSULT NOTE ADULT - SUBJECTIVE AND OBJECTIVE BOX
HPI:  71 y/o M with a PMHx of gastric CA, DM Type 2, Hypothyroidism, HTN and anemia presents to the ED c/o fever, chills, abdominal pain, no bowel movement since EGD procedure on 03/23. Pt reports that his abdomen feels more distended than usual with associated low back pain. Pt reports not taking any of his medications after the procedure. Pt reports having a fever of 101 today at home, also c/o lower back pain that resolved with having the joyner placed in the ED. Denies cough, sputum production, dysphagia, nausea, vomiting.     ED course:   Vitals: T: 100.2 rectal | HR: 66 | /83 | RR 17 spo2 96% RA  Labs: WBC 21.86 | Na 133 | BUN/Cr: 48/3.48 | alk phos: 125 | UA negative  EGD esophagus biopsy:  Small intestinal type mucosa showing focally active, chronic inflammation. Separate fragments of squamous mucosa showing reactive epithelial changes and rare intraepithelial eosinophils, consistent with reflux associated changes. Negative for dysplasia or malignancy  CT chest:  1.   Nonspecific esophageal wall thickening, possible esophagitis.  2.   Fluid within the esophageal lumen can be seen with gastroesophageal reflux.  3.   Trace bilateral pleural effusions with compressive atelectasis.  CT A/P:  1.   Main and right portal veins are mildly hyperdense which can be seen with portal vein thrombosis.  2.   Severe left hydronephrosis and hydroureter. Moderate right hydronephrosis and hydroureter. Markedly distended urinary bladder. Question bladder outlet obstruction.  3.   Bladder diverticula with layering stones.  4.   Infrarenal abdominal aortic aneurysm measuring up to 5.2 cm.  5.   Small free fluid.  EKG: sinus rhythm, TW inversions II. III. aVF, nonsp TW changes chest leads  Tx: zosyn, 1L NS, tylenol 1g   (26 Mar 2023 01:44)      ROS: A 10-point review of systems was otherwise negative.    PAST MEDICAL & SURGICAL HISTORY:  Essential hypertension  was on losartan, now diet control      Hypothyroidism      Gastric mass  ulcerated mass in gastric cardia with small perigastric nodes on endoscopy.      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      H/O umbilical hernia repair  2018 with mesh      Port-a-cath in place  right chest wall      History of gastric surgery  2017      Gastrostomy tube in place  open jejunostomy with J-tube November 2020      S/P cataract surgery      H/O endoscopy  & stent placement 12/2020      S/P gastrectomy  total gastrectomy 2017      History of partial pancreatectomy  distal pancreatectomy/ splenectomy esophatojejunostomy      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, gastric primary      History of dysphagia  EGD with Axios stent placment to connect the blind limb to th e efferent limb (jejunojejunostomy)        SOCIAL HISTORY:  FAMILY HISTORY:  Family history of ovarian cancer (Mother)  mother    Family history of ischemic heart disease (IHD) (Father)  father      ALLERGIES: 	  No Known Allergies          MEDICATIONS:  acetaminophen     Tablet .. 650 milliGRAM(s) Oral every 6 hours PRN  dextrose 5%. 1000 milliLiter(s) IV Continuous <Continuous>  dextrose 5%. 1000 milliLiter(s) IV Continuous <Continuous>  dextrose 50% Injectable 12.5 Gram(s) IV Push once  dextrose 50% Injectable 25 Gram(s) IV Push once  dextrose 50% Injectable 25 Gram(s) IV Push once  dextrose Oral Gel 15 Gram(s) Oral once PRN  glucagon  Injectable 1 milliGRAM(s) IntraMuscular once  heparin  Infusion.  Unit(s)/Hr IV Continuous <Continuous>  insulin lispro (ADMELOG) corrective regimen sliding scale   SubCutaneous every 4 hours  insulin regular  human recombinant 12 Unit(s) SubCutaneous before dinner  levothyroxine 100 MICROGram(s) Oral every 24 hours  melatonin 3 milliGRAM(s) Oral at bedtime PRN  pantoprazole    Tablet 40 milliGRAM(s) Oral daily  polyethylene glycol 3350 17 Gram(s) Oral daily  senna 2 Tablet(s) Oral at bedtime  sucralfate suspension 1 Gram(s) Oral four times a day  tamsulosin 0.4 milliGRAM(s) Oral at bedtime  zolpidem 5 milliGRAM(s) Oral at bedtime PRN  zolpidem 5 milliGRAM(s) Oral at bedtime PRN      PHYSICAL EXAM:  T(C): 36.7 (03-29-23 @ 11:08), Max: 36.9 (03-29-23 @ 05:48)  HR: 65 (03-29-23 @ 11:08) (62 - 65)  BP: 129/71 (03-29-23 @ 11:08) (129/71 - 132/76)  RR: 18 (03-29-23 @ 11:08) (18 - 18)  SpO2: 96% (03-29-23 @ 11:08) (96% - 98%)  Wt(kg): --    GEN: Awake, comfortable. NAD.   HEENT: NCAT, PERRL, EOMI. Mucosa moist. No JVD.   RESP: CTA b/l  CV: RRR, normal s1/s2. No m/r/g.  ABD: Soft, NTND. BS+  EXT: Warm.   NEURO: AAOx3. No focal deficits.    I&O's Summary    28 Mar 2023 07:01  -  29 Mar 2023 07:00  --------------------------------------------------------  IN: 0 mL / OUT: 550 mL / NET: -550 mL        LABS:	 	                        12.4   7.16  )-----------( 259      ( 29 Mar 2023 09:16 )             38.4     03-29    133<L>  |  102  |  14  ----------------------------<  173<H>  3.9   |  25  |  0.54    Ca    8.5      29 Mar 2023 09:16  Phos  2.2     03-29  Mg     2.0     03-29       HPI:  71 y/o M with a PMHx of gastric CA, DM Type 2, Hypothyroidism, HTN and anemia presents to the ED c/o fever, chills, abdominal pain, no bowel movement since EGD procedure on 03/23. Pt reports that his abdomen feels more distended than usual with associated low back pain. Pt reports not taking any of his medications after the procedure. Pt reports having a fever of 101 today at home, also c/o lower back pain that resolved with having the joyner placed in the ED. Denies cough, sputum production, dysphagia, nausea, vomiting.     ED course:   Vitals: T: 100.2 rectal | HR: 66 | /83 | RR 17 spo2 96% RA  Labs: WBC 21.86 | Na 133 | BUN/Cr: 48/3.48 | alk phos: 125 | UA negative  EGD esophagus biopsy:  Small intestinal type mucosa showing focally active, chronic inflammation. Separate fragments of squamous mucosa showing reactive epithelial changes and rare intraepithelial eosinophils, consistent with reflux associated changes. Negative for dysplasia or malignancy  CT chest:  1.   Nonspecific esophageal wall thickening, possible esophagitis.  2.   Fluid within the esophageal lumen can be seen with gastroesophageal reflux.  3.   Trace bilateral pleural effusions with compressive atelectasis.  CT A/P:  1.   Main and right portal veins are mildly hyperdense which can be seen with portal vein thrombosis.  2.   Severe left hydronephrosis and hydroureter. Moderate right hydronephrosis and hydroureter. Markedly distended urinary bladder. Question bladder outlet obstruction.  3.   Bladder diverticula with layering stones.  4.   Infrarenal abdominal aortic aneurysm measuring up to 5.2 cm.  5.   Small free fluid.  EKG: sinus rhythm, TW inversions II. III. aVF, nonsp TW changes chest leads  Tx: zosyn, 1L NS, tylenol 1g   (26 Mar 2023 01:44)      ROS: A 10-point review of systems was otherwise negative.    PAST MEDICAL & SURGICAL HISTORY:  Essential hypertension  was on losartan, now diet control      Hypothyroidism      Gastric mass  ulcerated mass in gastric cardia with small perigastric nodes on endoscopy.      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      H/O umbilical hernia repair  2018 with mesh      Port-a-cath in place  right chest wall      History of gastric surgery  2017      Gastrostomy tube in place  open jejunostomy with J-tube November 2020      S/P cataract surgery      H/O endoscopy  & stent placement 12/2020      S/P gastrectomy  total gastrectomy 2017      History of partial pancreatectomy  distal pancreatectomy/ splenectomy esophatojejunostomy      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, gastric primary      History of dysphagia  EGD with Axios stent placment to connect the blind limb to th e efferent limb (jejunojejunostomy)        SOCIAL HISTORY:  FAMILY HISTORY:  Family history of ovarian cancer (Mother)  mother    Family history of ischemic heart disease (IHD) (Father)  father      ALLERGIES: 	  No Known Allergies          MEDICATIONS:  acetaminophen     Tablet .. 650 milliGRAM(s) Oral every 6 hours PRN  dextrose 5%. 1000 milliLiter(s) IV Continuous <Continuous>  dextrose 5%. 1000 milliLiter(s) IV Continuous <Continuous>  dextrose 50% Injectable 12.5 Gram(s) IV Push once  dextrose 50% Injectable 25 Gram(s) IV Push once  dextrose 50% Injectable 25 Gram(s) IV Push once  dextrose Oral Gel 15 Gram(s) Oral once PRN  glucagon  Injectable 1 milliGRAM(s) IntraMuscular once  heparin  Infusion.  Unit(s)/Hr IV Continuous <Continuous>  insulin lispro (ADMELOG) corrective regimen sliding scale   SubCutaneous every 4 hours  insulin regular  human recombinant 12 Unit(s) SubCutaneous before dinner  levothyroxine 100 MICROGram(s) Oral every 24 hours  melatonin 3 milliGRAM(s) Oral at bedtime PRN  pantoprazole    Tablet 40 milliGRAM(s) Oral daily  polyethylene glycol 3350 17 Gram(s) Oral daily  senna 2 Tablet(s) Oral at bedtime  sucralfate suspension 1 Gram(s) Oral four times a day  tamsulosin 0.4 milliGRAM(s) Oral at bedtime  zolpidem 5 milliGRAM(s) Oral at bedtime PRN  zolpidem 5 milliGRAM(s) Oral at bedtime PRN      PHYSICAL EXAM:  T(C): 36.7 (03-29-23 @ 11:08), Max: 36.9 (03-29-23 @ 05:48)  HR: 65 (03-29-23 @ 11:08) (62 - 65)  BP: 129/71 (03-29-23 @ 11:08) (129/71 - 132/76)  RR: 18 (03-29-23 @ 11:08) (18 - 18)  SpO2: 96% (03-29-23 @ 11:08) (96% - 98%)  Wt(kg): --    GEN: Awake, comfortable. NAD.   HEENT: NCAT  RESP: CTA b/l  CV: RRR, normal s1/s2.   ABD: Soft, NTND. BS+  EXT: Warm. no edema   NEURO: AAOx3.     I&O's Summary    28 Mar 2023 07:01  -  29 Mar 2023 07:00  --------------------------------------------------------  IN: 0 mL / OUT: 550 mL / NET: -550 mL        LABS:	 	                        12.4   7.16  )-----------( 259      ( 29 Mar 2023 09:16 )             38.4     03-29    133<L>  |  102  |  14  ----------------------------<  173<H>  3.9   |  25  |  0.54    Ca    8.5      29 Mar 2023 09:16  Phos  2.2     03-29  Mg     2.0     03-29

## 2023-03-29 NOTE — PROGRESS NOTE ADULT - ASSESSMENT
73 y/o M with a PMHx of gastric CA, DM Type 2, Hypothyroidism, HTN and anemia s/p EGD with esophageal biopsy 3/23 presents to the ED c/o fevers, low back pain x 2 days. Admitted for sepsis and LARRY i/s/ bladder outlet obstruction. Vascular Surgery consulted for enlarging infrarenal AAA. Non smoker, no first degree relatives with dx, asymptomatic. Avss, exam benign. Imaging demonstrates 5.5cm distal AAA, previously 4.7cm 11/21. Patient was evaluated in 3/2022 by VS and recommended for surveillance. CTA obtained and results reviewed. Patient is candidate for EVAR, and amenable to repair.     - Please stop PO eliquis and transition to heparin gtt  - Added on to Cath Lab for Friday  - Preop for OR Friday: NPO after MN, AM CBC, BMP, Mg, Phos, Coag, TandS, updated COVID swab  - Vascular to continue to follow  73 y/o M with a PMHx of gastric CA, DM Type 2, Hypothyroidism, HTN and anemia s/p EGD with esophageal biopsy 3/23 presents to the ED c/o fevers, low back pain x 2 days. Admitted for sepsis and LARRY i/s/ bladder outlet obstruction. Vascular Surgery consulted for enlarging infrarenal AAA. Non smoker, no first degree relatives with dx, asymptomatic. Avss, exam benign. Imaging demonstrates 5.5cm distal AAA, previously 4.7cm 11/21. Patient was evaluated in 3/2022 by VS and recommended for surveillance. CTA obtained and results reviewed. Patient is candidate for EVAR, and amenable to repair.     - Please stop PO eliquis and transition to heparin gtt  - Added on to Cath Lab for Friday  - Preop for OR Friday: NPO after MN, hold TF after MN, AM CBC, BMP, Mg, Phos, Coag, TandS, updated COVID swab  - Vascular to continue to follow

## 2023-03-29 NOTE — PROGRESS NOTE ADULT - ASSESSMENT
71 y/o M with a PMHx of gastric CA, DM Type 2, Hypothyroidism, HTN and anemia s/p EGD with esophageal biopsy 3/23 presents to the ED c/o fevers, low back pain x 2 days. Admitted for sepsis and LARRY, 2/2 hydronephrosis, now with post-obstructive diuresis. 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 no

## 2023-03-29 NOTE — CHART NOTE - NSCHARTNOTEFT_GEN_A_CORE
Discussed with patient that he receives tube feeds at night and takes Humulin 12 units at 8:00pm (home injectable pen showed) right before. He also uses Lispro as needed 1-2 units throughout the day after checking fingersticks.     Tried confirming this with his Endocrinologist Dr. Garcia, however no response or call back from outpatient office. PCP Dr. Obando confirms that patient is on unique regimen per his Endo. Patient also lists 3 different pharmacies ([facility] in [place], [facility], and [facility] on [address] [place] [address]). Unclear insulin regimen per pharmacy med recs.     Discussed the risk of having tighter glycemic control while hospitalized. Patient A&Ox3, with capacity, stating he understands this but insists he be given his home regimen of Humulin 12 units before tube feeds at 8:00pm qhs and Lispro prn for coverage. Will monitor FSG q4h for better control. Glucose goal remains 100-180.

## 2023-03-29 NOTE — CONSULT NOTE ADULT - ASSESSMENT
73 y/o M with a PMHx of gastric CA, DM Type 2, Hypothyroidism, HTN and anemia s/p EGD with esophageal biopsy 3/23 presents to the ED c/o fevers, low back pain x 2 days. Admitted for sepsis and LARRY i/s/ bladder outlet obstruction. Vascular Surgery consulted for enlarging infrarenal AAA. Non smoker, no first degree relatives with dx, asymptomatic. Imaging demonstrates 5.5cm distal AAA, previously 4.7cm 11/21. Patient is planned for EVAR on 3/31. Cardiology was consulted for pre-op risk assessment.    Review of studies:   EKG: NST, iLBBB, APC, T wave inversion in leads III, aVF, unchanged prom prior admission   prior work up ( not in the system): ehco (10/7/20): normal LV, RV, EF 60%; mild MR; stress test (2019): normal EKG, normal perfusion, RF 54%    #pre-op risk assessment  73 y/o M with a PMHx of gastric CA, DM Type 2, Hypothyroidism, HTN and anemia s/p EGD with esophageal biopsy 3/23 presents to the ED c/o fevers, low back pain x 2 days. Admitted for sepsis and LARRY i/s/ bladder outlet obstruction. Vascular Surgery consulted for enlarging infrarenal AAA. Non smoker, no first degree relatives with dx, asymptomatic. Imaging demonstrates 5.5cm distal AAA, previously 4.7cm 11/21. Patient is planned for EVAR on 3/31. Cardiology was consulted for pre-op risk assessment.    Review of studies:   EKG: sinus bradycardia, iLBBB, APC, T wave inversion in leads III, aVF, unchanged prom prior admission   prior work up ( not in the system): ehco (10/7/20): normal LV, RV, EF 60%; mild MR; stress test (2019): normal EKG, normal perfusion, RF 54%    #pre-op risk assessment   Patient reports that he is able to walk 1-1.8 miles on a treadmill and does it many times per week. No chest pain or SOB with that. In general denies any limitation in ET. Reports the only prior cardiac history of rare 'skipped beats'. Patient has a cardiologist he follow with Dr. Resendez at [facility], but hasn't seen him in 2 years. Patient follow with a general practitioner Dr. Lon Sanchez, also in [place] (office [phone]; cell [phone])  Recommend a non-urgent echo   RCRI 2 points, class III risk 10% 30 day risk of death, MI, or cardiac arrest   Patient is low-intermediate risk for a high risk procedure (EVAR)   71 y/o M with a PMHx of gastric CA, DM Type 2, Hypothyroidism, HTN and anemia s/p EGD with esophageal biopsy 3/23 presents to the ED c/o fevers, low back pain x 2 days. Admitted for sepsis and LARRY i/s/ bladder outlet obstruction. Vascular Surgery consulted for enlarging infrarenal AAA. Non smoker, no first degree relatives with dx, asymptomatic. Imaging demonstrates 5.5cm distal AAA, previously 4.7cm 11/21. Patient is planned for EVAR on 3/31. Cardiology was consulted for pre-op risk assessment.    Review of studies:   EKG: sinus bradycardia, iLBBB, APC, T wave inversion in leads III, aVF, unchanged prom prior admission   prior work up ( not in the system): ehco (10/7/20): normal LV, RV, EF 60%; mild MR; stress test (2019): normal EKG, normal perfusion, RF 54%    #pre-op risk assessment   Patient reports that he is able to walk 1-1.8 miles on a treadmill and does it many times per week. No chest pain or SOB with that. In general denies any limitation in ET. Reports the only prior cardiac history of rare 'skipped beats'. Patient has a cardiologist he follow with Dr. Resendez at [facility], but hasn't seen him in 2 years. Patient follow with a general practitioner Dr. Lon Sanchez, also in [place] (office [phone]; cell [phone])  Recommend a non-urgent echo   RCRI 2 points, class III risk 10% 30 day risk of death, MI, or cardiac arrest   Patient is low-intermediate risk for an intermediate risk procedure (EVAR)  No further cardiac work up prior to the surgery

## 2023-03-29 NOTE — PROGRESS NOTE ADULT - NUTRITIONAL ASSESSMENT
This patient has been assessed with a concern for Malnutrition and has been determined to have a diagnosis/diagnoses of Severe protein-calorie malnutrition.    This patient is being managed with:   Diet Soft and Bite Sized-  Tube Feeding Modality: Gastrostomy  Jevity 1.5 Jesús (JEVITY1.5)  Total Volume for 24 Hours (mL): 1140  Total Number of Cans: 5  Continuous  Starting Tube Feed Rate {mL per Hour}: 30  Increase Tube Feed Rate by (mL): 15     Every 2 hours  Until Goal Tube Feed Rate (mL per Hour): 95  Tube Feed Duration (in Hours): 12  Tube Feed Start Time: 08:00  Tube Feed Stop Time: 20:00  Entered: Mar 28 2023  9:09AM    The following pending diet order is being considered for treatment of Severe protein-calorie malnutrition:null

## 2023-03-29 NOTE — PROGRESS NOTE ADULT - SUBJECTIVE AND OBJECTIVE BOX
SUBJECTIVE: Patient seen and evaluated.        MEDICATIONS  (STANDING):  dextrose 5%. 1000 milliLiter(s) (50 mL/Hr) IV Continuous <Continuous>  dextrose 5%. 1000 milliLiter(s) (100 mL/Hr) IV Continuous <Continuous>  dextrose 50% Injectable 12.5 Gram(s) IV Push once  dextrose 50% Injectable 25 Gram(s) IV Push once  dextrose 50% Injectable 25 Gram(s) IV Push once  glucagon  Injectable 1 milliGRAM(s) IntraMuscular once  heparin  Infusion.  Unit(s)/Hr (12 mL/Hr) IV Continuous <Continuous>  insulin lispro (ADMELOG) corrective regimen sliding scale   SubCutaneous every 4 hours  insulin regular  human recombinant 12 Unit(s) SubCutaneous before dinner  levothyroxine 100 MICROGram(s) Oral every 24 hours  pantoprazole    Tablet 40 milliGRAM(s) Oral daily  polyethylene glycol 3350 17 Gram(s) Oral daily  senna 2 Tablet(s) Oral at bedtime  sucralfate suspension 1 Gram(s) Oral four times a day  tamsulosin 0.4 milliGRAM(s) Oral at bedtime    MEDICATIONS  (PRN):  acetaminophen     Tablet .. 650 milliGRAM(s) Oral every 6 hours PRN Temp greater or equal to 38C (100.4F), Mild Pain (1 - 3)  dextrose Oral Gel 15 Gram(s) Oral once PRN Blood Glucose LESS THAN 70 milliGRAM(s)/deciliter  melatonin 3 milliGRAM(s) Oral at bedtime PRN Insomnia  zolpidem 5 milliGRAM(s) Oral at bedtime PRN Insomnia  zolpidem 5 milliGRAM(s) Oral at bedtime PRN Insomnia      Vital Signs Last 24 Hrs  T(C): 36.7 (29 Mar 2023 11:08), Max: 36.9 (29 Mar 2023 05:48)  T(F): 98.1 (29 Mar 2023 11:08), Max: 98.4 (29 Mar 2023 05:48)  HR: 65 (29 Mar 2023 11:08) (62 - 65)  BP: 129/71 (29 Mar 2023 11:08) (129/71 - 132/76)  BP(mean): --  RR: 18 (29 Mar 2023 11:08) (18 - 18)  SpO2: 96% (29 Mar 2023 11:08) (96% - 98%)    Parameters below as of 29 Mar 2023 11:08  Patient On (Oxygen Delivery Method): room air        Physical Exam:  General: NAD, resting comfortably in bed, low BMI habitus  Pulmonary: Nonlabored, no respiratory distress  Cardiovascular: regular rate and rhythm   Abdominal: soft, NT/ND, well healed incisions, J tube in place cdi  Extremities: WWP, normal strength  Neuro: A/O x 3, no focal deficits, normal motor/sensation  Pulses: palpable distal pulses, DP/PT,Fem Pop    I&O's Summary    28 Mar 2023 07:01  -  29 Mar 2023 07:00  --------------------------------------------------------  IN: 0 mL / OUT: 550 mL / NET: -550 mL        LABS:                        12.4   7.16  )-----------( 259      ( 29 Mar 2023 09:16 )             38.4     03-29    133<L>  |  102  |  14  ----------------------------<  173<H>  3.9   |  25  |  0.54    Ca    8.5      29 Mar 2023 09:16  Phos  2.2     03-29  Mg     2.0     03-29      PTT - ( 29 Mar 2023 12:00 )  PTT:31.4 sec    CAPILLARY BLOOD GLUCOSE      POCT Blood Glucose.: 144 mg/dL (29 Mar 2023 08:18)  POCT Blood Glucose.: 139 mg/dL (29 Mar 2023 06:41)  POCT Blood Glucose.: 231 mg/dL (29 Mar 2023 03:11)  POCT Blood Glucose.: 246 mg/dL (28 Mar 2023 23:19)  POCT Blood Glucose.: 130 mg/dL (28 Mar 2023 21:26)  POCT Blood Glucose.: 156 mg/dL (28 Mar 2023 18:05)        RADIOLOGY & ADDITIONAL STUDIES:

## 2023-03-29 NOTE — PROGRESS NOTE ADULT - NUTRITIONAL ASSESSMENT
This patient has been assessed with a concern for Malnutrition and has been determined to have a diagnosis/diagnoses of Severe protein-calorie malnutrition.    This patient is being managed with:   Diet Soft and Bite Sized-  Tube Feeding Modality: Gastrostomy  Jevity 1.5 Jesús (JEVITY1.5)  Total Volume for 24 Hours (mL): 1140  Total Number of Cans: 5  Continuous  Starting Tube Feed Rate {mL per Hour}: 30  Increase Tube Feed Rate by (mL): 15     Every 2 hours  Until Goal Tube Feed Rate (mL per Hour): 95  Tube Feed Duration (in Hours): 12  Tube Feed Start Time: 20:00  Tube Feed Stop Time: 08:00  Entered: Mar 29 2023 11:21AM    Diet Soft and Bite Sized-  Tube Feeding Modality: Jejunostomy  Jevity 1.5 Jesús (JEVITY1.5)  Total Volume for 24 Hours (mL): 1140  Total Number of Cans: 5  Continuous  Until Goal Tube Feed Rate (mL per Hour): 95  Tube Feed Duration (in Hours): 12  Tube Feed Start Time: 20:00  Tube Feed Stop Time: 08:00  Pump   Rate (mL per Hour): 150   Frequency: Every 4 Hours  Entered: Mar 27 2023 12:16PM    The following pending diet order is being considered for treatment of Severe protein-calorie malnutrition:null

## 2023-03-29 NOTE — PROGRESS NOTE ADULT - ATTENDING COMMENTS
Patient was seen and examined with the resident team today.  I agree with the above assessment and plan with the following exceptions/additions:     Briefly, this is a 71yo gentleman with a PMH of gastric CA, IDDM2, hypothyroidism, HTN and anemia s/p EGD with esophageal biopsy on 3/23 who p/w SIRS 2/2 acute urinary retention w/LARRY and aspiration pneumonitis.  Incidentally found to have an enlarging AAA.  Imaging for AAA revealing L-ureteral stricture, along with a preperitoneal abscess both of which have been reviewed by their consultants w/no plants to intervene.  No awaiting EVAR on 3/31 with Vascular.     #LARRY w/urinary retention - s/p Abarca w/resolution of LARRY; can continue to hold abx as U/A NOT consistent with UTI; c/w Tamsulosin; TOV after EVAR as pt prefers not to be discharged with a Abarca     #Preperitoneal abscess - has known fistula; will monitor for now per Surgery    #Aspiration pneumonitis - can stop abx and just monitor   #AAA - on Eliquis for AAA but switched to Heparin gtt for EVAR on Friday; needs Cardiology clearance    #IDDM2 - c/w home insulin regimen   #Hypothyroidism - c/w Levothyroxine   #Severe PCM - c/w small bites and TF's   #DVT PPx - on heparin gtt  #Dispo - TBD but will likely transfer to Vascular after his EVAR    Corinne Brothers  [phone]

## 2023-03-30 LAB
ANION GAP SERPL CALC-SCNC: 9 MMOL/L — SIGNIFICANT CHANGE UP (ref 5–17)
APTT BLD: 75.3 SEC — HIGH (ref 27.5–35.5)
BASOPHILS # BLD AUTO: 0.05 K/UL — SIGNIFICANT CHANGE UP (ref 0–0.2)
BASOPHILS NFR BLD AUTO: 0.6 % — SIGNIFICANT CHANGE UP (ref 0–2)
BUN SERPL-MCNC: 13 MG/DL — SIGNIFICANT CHANGE UP (ref 7–23)
CALCIUM SERPL-MCNC: 8.2 MG/DL — LOW (ref 8.4–10.5)
CHLORIDE SERPL-SCNC: 100 MMOL/L — SIGNIFICANT CHANGE UP (ref 96–108)
CO2 SERPL-SCNC: 25 MMOL/L — SIGNIFICANT CHANGE UP (ref 22–31)
CREAT SERPL-MCNC: 0.53 MG/DL — SIGNIFICANT CHANGE UP (ref 0.5–1.3)
CULTURE RESULTS: SIGNIFICANT CHANGE UP
CULTURE RESULTS: SIGNIFICANT CHANGE UP
EGFR: 106 ML/MIN/1.73M2 — SIGNIFICANT CHANGE UP
EOSINOPHIL # BLD AUTO: 0.37 K/UL — SIGNIFICANT CHANGE UP (ref 0–0.5)
EOSINOPHIL NFR BLD AUTO: 4.8 % — SIGNIFICANT CHANGE UP (ref 0–6)
GLUCOSE BLDC GLUCOMTR-MCNC: 136 MG/DL — HIGH (ref 70–99)
GLUCOSE BLDC GLUCOMTR-MCNC: 155 MG/DL — HIGH (ref 70–99)
GLUCOSE BLDC GLUCOMTR-MCNC: 159 MG/DL — HIGH (ref 70–99)
GLUCOSE BLDC GLUCOMTR-MCNC: 169 MG/DL — HIGH (ref 70–99)
GLUCOSE BLDC GLUCOMTR-MCNC: 181 MG/DL — HIGH (ref 70–99)
GLUCOSE BLDC GLUCOMTR-MCNC: 184 MG/DL — HIGH (ref 70–99)
GLUCOSE BLDC GLUCOMTR-MCNC: 229 MG/DL — HIGH (ref 70–99)
GLUCOSE SERPL-MCNC: 191 MG/DL — HIGH (ref 70–99)
HCT VFR BLD CALC: 35.3 % — LOW (ref 39–50)
HGB BLD-MCNC: 11.5 G/DL — LOW (ref 13–17)
IMM GRANULOCYTES NFR BLD AUTO: 0.4 % — SIGNIFICANT CHANGE UP (ref 0–0.9)
LYMPHOCYTES # BLD AUTO: 1.12 K/UL — SIGNIFICANT CHANGE UP (ref 1–3.3)
LYMPHOCYTES # BLD AUTO: 14.4 % — SIGNIFICANT CHANGE UP (ref 13–44)
MAGNESIUM SERPL-MCNC: 1.9 MG/DL — SIGNIFICANT CHANGE UP (ref 1.6–2.6)
MCHC RBC-ENTMCNC: 32.6 GM/DL — SIGNIFICANT CHANGE UP (ref 32–36)
MCHC RBC-ENTMCNC: 33 PG — SIGNIFICANT CHANGE UP (ref 27–34)
MCV RBC AUTO: 101.1 FL — HIGH (ref 80–100)
MONOCYTES # BLD AUTO: 0.85 K/UL — SIGNIFICANT CHANGE UP (ref 0–0.9)
MONOCYTES NFR BLD AUTO: 10.9 % — SIGNIFICANT CHANGE UP (ref 2–14)
NEUTROPHILS # BLD AUTO: 5.36 K/UL — SIGNIFICANT CHANGE UP (ref 1.8–7.4)
NEUTROPHILS NFR BLD AUTO: 68.9 % — SIGNIFICANT CHANGE UP (ref 43–77)
NRBC # BLD: 0 /100 WBCS — SIGNIFICANT CHANGE UP (ref 0–0)
PHOSPHATE SERPL-MCNC: 1.8 MG/DL — LOW (ref 2.5–4.5)
PLATELET # BLD AUTO: 252 K/UL — SIGNIFICANT CHANGE UP (ref 150–400)
POTASSIUM SERPL-MCNC: 3.9 MMOL/L — SIGNIFICANT CHANGE UP (ref 3.5–5.3)
POTASSIUM SERPL-SCNC: 3.9 MMOL/L — SIGNIFICANT CHANGE UP (ref 3.5–5.3)
RBC # BLD: 3.49 M/UL — LOW (ref 4.2–5.8)
RBC # FLD: 13.8 % — SIGNIFICANT CHANGE UP (ref 10.3–14.5)
SARS-COV-2 RNA SPEC QL NAA+PROBE: SIGNIFICANT CHANGE UP
SODIUM SERPL-SCNC: 134 MMOL/L — LOW (ref 135–145)
SPECIMEN SOURCE: SIGNIFICANT CHANGE UP
SPECIMEN SOURCE: SIGNIFICANT CHANGE UP
WBC # BLD: 7.78 K/UL — SIGNIFICANT CHANGE UP (ref 3.8–10.5)
WBC # FLD AUTO: 7.78 K/UL — SIGNIFICANT CHANGE UP (ref 3.8–10.5)

## 2023-03-30 PROCEDURE — 99233 SBSQ HOSP IP/OBS HIGH 50: CPT | Mod: GC

## 2023-03-30 PROCEDURE — 99221 1ST HOSP IP/OBS SF/LOW 40: CPT

## 2023-03-30 PROCEDURE — 99222 1ST HOSP IP/OBS MODERATE 55: CPT | Mod: GC

## 2023-03-30 PROCEDURE — 99232 SBSQ HOSP IP/OBS MODERATE 35: CPT | Mod: GC,25,57

## 2023-03-30 RX ORDER — INSULIN LISPRO 100/ML
VIAL (ML) SUBCUTANEOUS AT BEDTIME
Refills: 0 | Status: DISCONTINUED | OUTPATIENT
Start: 2023-03-30 | End: 2023-04-02

## 2023-03-30 RX ORDER — INSULIN LISPRO 100/ML
6 VIAL (ML) SUBCUTANEOUS ONCE
Refills: 0 | Status: COMPLETED | OUTPATIENT
Start: 2023-03-30 | End: 2023-03-30

## 2023-03-30 RX ORDER — INSULIN LISPRO 100/ML
VIAL (ML) SUBCUTANEOUS
Refills: 0 | Status: DISCONTINUED | OUTPATIENT
Start: 2023-03-30 | End: 2023-04-02

## 2023-03-30 RX ADMIN — PANTOPRAZOLE SODIUM 40 MILLIGRAM(S): 20 TABLET, DELAYED RELEASE ORAL at 11:36

## 2023-03-30 RX ADMIN — Medication 6 UNIT(S): at 20:10

## 2023-03-30 RX ADMIN — Medication 2: at 14:44

## 2023-03-30 RX ADMIN — Medication 2: at 03:00

## 2023-03-30 RX ADMIN — Medication 1 GRAM(S): at 07:17

## 2023-03-30 RX ADMIN — HEPARIN SODIUM 15 UNIT(S)/HR: 5000 INJECTION INTRAVENOUS; SUBCUTANEOUS at 07:33

## 2023-03-30 RX ADMIN — Medication 1 GRAM(S): at 11:36

## 2023-03-30 RX ADMIN — Medication 100 MICROGRAM(S): at 13:26

## 2023-03-30 RX ADMIN — ZOLPIDEM TARTRATE 5 MILLIGRAM(S): 10 TABLET ORAL at 03:14

## 2023-03-30 RX ADMIN — ZOLPIDEM TARTRATE 5 MILLIGRAM(S): 10 TABLET ORAL at 23:16

## 2023-03-30 NOTE — PROGRESS NOTE ADULT - PROBLEM SELECTOR PLAN 1
Secondary to severe urinary retention and c/b b/l hydronephrosis, now relieved after Abarca placement. Cr elevation to >3, now back to baseline Cr wnl. Now with post-obstructive diuresis. Hydronephrosis resolving as seen on recent CTA. New stricture on left kidney also seen, urology following for possible intervention.  - C/w IVF NS at 100cc/hr for repletion  - Strict I&Os  - Monitor BMP, mag, phos  - Urology appreciate recs Known hx of AAA. CT angiogram of the abdomen and pelvis was obtained which demonstrated a 5.5 cm infrarenal aortic aneurysm that has enlarged since 11/2021 when it measured 4.7 cm.  - Vascular surgery intervention scheduled for tomorrow  - WIll prep patient for OR

## 2023-03-30 NOTE — PROGRESS NOTE ADULT - ASSESSMENT
71 y/o M with a PMHx of gastric CA, DM Type 2, Hypothyroidism, HTN and anemia s/p EGD with esophageal biopsy 3/23 presents to the ED c/o fevers, low back pain x 2 days. Admitted for sepsis and LARRY i/s/ bladder outlet obstruction. Vascular Surgery consulted for enlarging infrarenal AAA. Non smoker, no first degree relatives with dx, asymptomatic. Avss, exam benign. Imaging demonstrates 5.5cm distal AAA, previously 4.7cm 11/21. Patient was evaluated in 3/2022 by VS and recommended for surveillance. CTA obtained and results reviewed. Patient is candidate for EVAR, and amenable to repair.     - Continue Hep gtt  - Added on to Cath Lab for Friday  - Preop for OR Friday: NPO after MN, hold TF after MN, AM CBC, BMP, Mg, Phos, Coag, TandS, updated COVID swab  - TTE per Cards  - Glucose control  - Vascular to continue to follow

## 2023-03-30 NOTE — PROGRESS NOTE ADULT - ASSESSMENT
73 y/o M with a PMHx of gastric CA, DM Type 2, Hypothyroidism, HTN and anemia s/p EGD with esophageal biopsy 3/23 presents to the ED c/o fevers, low back pain x 2 days. Admitted for sepsis and LARRY i/s/ bladder outlet obstruction. Vascular Surgery consulted for enlarging infrarenal AAA. Non smoker, no first degree relatives with dx, asymptomatic. Imaging demonstrates 5.5cm distal AAA, previously 4.7cm 11/21. Patient is planned for EVAR on 3/31. Cardiology was consulted for pre-op risk assessment.    Review of studies:   EKG: sinus bradycardia, iLBBB, APC, T wave inversion in leads III, aVF, unchanged prom prior admission   prior work up ( not in the system): ehco (10/7/20): normal LV, RV, EF 60%; mild MR; stress test (2019): normal EKG, normal perfusion, RF 54%    #pre-op risk assessment   Patient reports that he is able to walk 1-1.8 miles on a treadmill and does it many times per week. No chest pain or SOB with that. In general denies any limitation in ET. Reports the only prior cardiac history of rare 'skipped beats'. Patient has a cardiologist he follow with Dr. Resendez at [facility], but hasn't seen him in 2 years. Patient follow with a general practitioner Dr. Lon Sanchez, also in [place] (office [phone]; cell [phone])  Recommend a non-urgent echo   RCRI 2 points, class III risk 10% 30 day risk of death, MI, or cardiac arrest   Patient is low-intermediate risk for an intermediate risk procedure (EVAR)  No further cardiac work up prior to the surgery

## 2023-03-30 NOTE — CONSULT NOTE ADULT - SUBJECTIVE AND OBJECTIVE BOX
HISTORY OF PRESENT ILLNESS  Mr. Ander Kirkland is a 72-year-old male with a past medical history of type 2 diabetes mellitus, hypertension, gastric cancer, hypothyroidism and anemia who presented to the emergency department (3/25/23) complaining of fever (Tmax of 101 F at home), chills and abdominal pain after recent EGD on 3/23/23. He reported experiencing abdominal distention and low back pain. Labs were remarkable for leukocytosis and acute kidney injury (LARRY). CT abdomen and pelvis was significant for severe left hydronephrosis/hydroureter, moderate right hydronephrosis/hydroureter with markedly distended bladder, bladder diverticula with layering stones and infrarenal abdominal aortic aneurysm measuring up to 5.2 cm. His low back pain resolved after he had Abarca catheter placed in the emergency department. Reviewed from EGD biopsy showed small intestinal type mucosa showing focally active, chronic inflammation with separate fragments of squamous mucosa showing reactive epithelial changes and rate intraepithelial eosinophils (consistent with reflux associated changes); negative for dysplasia or malignancy. He was initially admitted to medicine for management of sepsis and LARRY. He was empirically started on ceftriaxone 1 gram IV daily. Urinalysis was negative and blood cultures showed no growth, he remained afebrile with no leukocytosis. Therefore, antibiotics were discontinued as impression was that fever couldn't have been translocation recent esophageal biopsy. CT angiogram of the abdomen and pelvis was obtained which demonstrated a 5.5 cm infrarenal aortic aneurysm that had enlarged from previous study on 1/2021 when it measured 4.7 cm. The patient was evaluated by vascular surgery and he was deemed to be a candidate for EVAR. He will be now transferred to vascular service for further management with plans for OR tomorrow (3/31/23).     Prior to admission, the patient was taking tube feeds for 12 hours overnight and pleasure feeds during the day. He was previously managing his diabetes with Humulin N 12 units at bedtime and a sliding scale during the day to cover for hyperglycemia. After admission, he was continued with Humulin R 12 units at bedtime and a moderate sliding scale every 4 hours. He has been experiencing intermittent hyperglycemia mostly during night time. Endocrinology was consulted for further recommendations regarding diabetes management.     CAPILLARY BLOOD GLUCOSE & INSULIN RECEIVED  144 mg/dL (03-29 @ 08:18) ? Ø  173 mg/dL (03-29 @ 09:16) ? Ø  138 mg/dL (03-29 @ 18:07) ? Ø   —  mg/dL (03-29 @ 19:40) ? 12 units of regular insulin subcutaneously.   216 mg/dL (03-29 @ 22:23) ? 4 units of lispro sliding scale.   181 mg/dL (03-30 @ 02:13) ? 2 units of lispro sliding scale.   191 mg/dL (03-30 @ 05:30) ? Ø  229 mg/dL (03-30 @ 06:20) ? Ø (patient refused insulin coverage).   136 mg/dL (03-30 @ 12:03) ? Ø    DIABETES HISTORY  - Age at diagnosis:   - Symptoms at time of diagnosis:   - Current Therapy:  - History of other regimens:   - History of hypoglycemia:   - History of DKA/HHS:   - Complications:   - Home FSG:        > Fasting: *** mg/dL.        > Before meals: *** mg/dL.        > Bedtime: *** mg/dL.  - Diet:          > Breakfast:         > Lunch:        > Dinner:        > Snacks:  - Physical activity:    - Outpatient follow-up:     PAST MEDICAL & SURGICAL HISTORY  As per history of present illness.     FAMILY HISTORY  - Diabetes:  - Thyroid:  - Autoimmune:  - Other:    SOCIAL HISTORY  - Work:  - Alcohol:  - Smoking:  - Recreational Drugs:    ALLERGIES  No Known Allergies    CURRENT MEDICATIONS  acetaminophen     Tablet .. 650 milliGRAM(s) Oral every 6 hours PRN  dextrose 5%. 1000 milliLiter(s) IV Continuous <Continuous>  dextrose 5%. 1000 milliLiter(s) IV Continuous <Continuous>  dextrose 50% Injectable 25 Gram(s) IV Push once  dextrose 50% Injectable 12.5 Gram(s) IV Push once  dextrose 50% Injectable 25 Gram(s) IV Push once  dextrose Oral Gel 15 Gram(s) Oral once PRN  glucagon  Injectable 1 milliGRAM(s) IntraMuscular once  heparin  Infusion 1500 Unit(s)/Hr IV Continuous <Continuous>  insulin lispro (ADMELOG) corrective regimen sliding scale   SubCutaneous every 4 hours  insulin regular  human recombinant 12 Unit(s) SubCutaneous before dinner  levothyroxine 100 MICROGram(s) Oral every 24 hours  melatonin 3 milliGRAM(s) Oral at bedtime PRN  pantoprazole    Tablet 40 milliGRAM(s) Oral daily  polyethylene glycol 3350 17 Gram(s) Oral daily  senna 2 Tablet(s) Oral at bedtime  sucralfate suspension 1 Gram(s) Oral four times a day  tamsulosin 0.4 milliGRAM(s) Oral at bedtime  zolpidem 5 milliGRAM(s) Oral at bedtime PRN  zolpidem 5 milliGRAM(s) Oral at bedtime PRN    REVIEW OF SYSTEMS  Constitutional:  Negative fever, chills or loss of appetite.  Eyes:  Negative blurry vision or double vision.  Cardiovascular:  Negative for chest pain or palpitations.  Respiratory:  Negative for cough, wheezing, or shortness of breath.   Gastrointestinal:  Negative for nausea, vomiting, diarrhea, constipation, or abdominal pain.  Genitourinary:  Negative frequency, urgency or dysuria.  Neurologic:  No headache, confusion, dizziness, lightheadedness.    PHYSICAL EXAM  Vital Signs Last 24 Hrs  T(C): 36.9 (30 Mar 2023 06:14), Max: 36.9 (30 Mar 2023 06:14)  T(F): 98.4 (30 Mar 2023 06:14), Max: 98.4 (30 Mar 2023 06:14)  HR: 65 (30 Mar 2023 06:14) (61 - 66)  BP: 115/70 (30 Mar 2023 06:14) (102/66 - 125/72)  BP(mean): --  RR: 18 (30 Mar 2023 06:14) (16 - 20)  SpO2: 95% (30 Mar 2023 06:14) (95% - 98%)    Parameters below as of 30 Mar 2023 06:14  Patient On (Oxygen Delivery Method): room air    Constitutional: Awake, alert, in no acute distress.   HEENT: Normocephalic, atraumatic, BRUNILDA, no proptosis or lid retraction.   Neck: supple, no acanthosis, no thyromegaly or palpable thyroid nodules.  Respiratory: Lungs clear to ausculation bilaterally.   Cardiovascular: regular rhythm, normal S1 and S2, no audible murmurs.   GI: soft, non-tender, non-distended, bowel sounds present, no masses appreciated.  Extremities: No lower extremity edema, peripheral pulses present.   Skin: no rashes.   Psychiatric: AAO x 3. Normal affect/mood.     LABS  CBC - WBC/HGB/HTC/PLT: 7.78/11.5/35.3/252 (03-30-23)  BMP: Na/K/Cl/Bicarb/BUN/Cr/Gluc: 134/3.9/100/25/13/0.53/191 (03-30-23)  Anion Gap: 9 (03-30-23)  eGFR: 106 (03-30-23)  Calcium: 8.2 (03-30-23)  Phosphorus: 1.8 (03-30-23)  Magnesium: 1.9 (03-30-23)  LFT - Alb/Tprot/Tbili/Dbili/AlkPhos/ALT/AST: 2.7/--/0.6/--/89/12/14 (03-27-23)  PT/aPTT/INR: --/37.2/-- (03-29-23)      ASSESSMENT / RECOMMENDATIONS    A1C: 6.0 %  BUN: 13  Creatinine: 0.53  GFR: 106  Weight: 66.6  BMI: 21.1  EF:     # Type 2 diabetes mellitus  - Please continue lantus *** units at bedtime.   - Continue lispro *** units before each meal.  - Continue lispro moderate / low dose sliding scale before meals and at bedtime.  - Patient's fingerstick glucose goal is 100-180 mg/dL.    - For discharge, patient can ***.    - Patient can follow up at discharge with [facility] by calling [phone] to make an appointment.      Case discussed with Dr. Richard. Primary team updated.       Cali Garrett    Endocrinology Fellow    Service Pager: [phone]  HISTORY OF PRESENT ILLNESS  Mr. Ander Kirkland is a 72-year-old male with a past medical history of type 2 diabetes mellitus, hypertension, gastric cancer, hypothyroidism and anemia who presented to the emergency department (3/25/23) complaining of fever (Tmax of 101 F at home), chills and abdominal pain after recent EGD on 3/23/23. He reported experiencing abdominal distention and low back pain. Labs were remarkable for leukocytosis and acute kidney injury (LARRY). CT abdomen and pelvis was significant for severe left hydronephrosis/hydroureter, moderate right hydronephrosis/hydroureter with markedly distended bladder, bladder diverticula with layering stones and infrarenal abdominal aortic aneurysm measuring up to 5.2 cm. His low back pain resolved after he had Abarca catheter placed in the emergency department. Reviewed from EGD biopsy showed small intestinal type mucosa showing focally active, chronic inflammation with separate fragments of squamous mucosa showing reactive epithelial changes and rate intraepithelial eosinophils (consistent with reflux associated changes); negative for dysplasia or malignancy. He was initially admitted to medicine for management of sepsis and LARRY. He was empirically started on ceftriaxone 1 gram IV daily. Urinalysis was negative and blood cultures showed no growth, he remained afebrile with no leukocytosis. Therefore, antibiotics were discontinued as impression was that fever couldn't have been translocation recent esophageal biopsy. CT angiogram of the abdomen and pelvis was obtained which demonstrated a 5.5 cm infrarenal aortic aneurysm that had enlarged from previous study on 1/2021 when it measured 4.7 cm. The patient was evaluated by vascular surgery and he was deemed to be a candidate for EVAR. He will be now transferred to vascular service for further management with plans for OR tomorrow (3/31/23). Endocrinology was consulted for recommendations regarding diabetes management.     CAPILLARY BLOOD GLUCOSE & INSULIN RECEIVED  144 mg/dL (03-29 @ 08:18) ? Ø  173 mg/dL (03-29 @ 09:16) ? Ø  138 mg/dL (03-29 @ 18:07) ? Ø   —  mg/dL (03-29 @ 19:40) ? 12 units of regular insulin subcutaneously.   216 mg/dL (03-29 @ 22:23) ? 4 units of lispro sliding scale.   181 mg/dL (03-30 @ 02:13) ? 2 units of lispro sliding scale.   191 mg/dL (03-30 @ 05:30) ? Ø  229 mg/dL (03-30 @ 06:20) ? Ø (patient refused insulin coverage).   136 mg/dL (03-30 @ 12:03) ? Ø    DIABETES HISTORY  The patient was diagnosed with diabetes approximately 10 years ago. At that time, he was started on metformin. He continued the same therapy, and he was later diagnosed with gastric cancer on 12/2016, underwent gastrectomy (per him, with also removal of ~50% of his pancreas and 15% of his esophagus). Afterwards, the metformin was discontinued and his glucose levels remained under control; however, on 5/2020 he was started on tube feeds because he was loosing a lot of weight and he had to be started on insulin to control the hyperglycemia the feeds were causing.   Prior to admission, the patient was taking tube feeds for 12 hours overnight and pleasure feeds during the day. He was previously managing his diabetes with Humulin N 12 units at bedtime and a sliding scale during the day to cover for hyperglycemia. After admission, he was continued with Humulin R 12 units at bedtime and a moderate sliding scale every 4 hours. He has been experiencing intermittent hyperglycemia mostly during night time. Endocrinology was consulted for further recommendations regarding diabetes management.   - Age at diagnosis:   - Symptoms at time of diagnosis:   - Current Therapy:  - History of other regimens:   - History of hypoglycemia:   - History of DKA/HHS:   - Complications:   - Home FSG:        > Fasting: *** mg/dL.        > Before meals: *** mg/dL.        > Bedtime: *** mg/dL.  - Diet:          > Breakfast:         > Lunch:        > Dinner:        > Snacks:  - Physical activity:    - Outpatient follow-up:     PAST MEDICAL & SURGICAL HISTORY  As per history of present illness.     FAMILY HISTORY  - Diabetes:  - Thyroid:  - Autoimmune:  - Other:    SOCIAL HISTORY  - Work:  - Alcohol:  - Smoking:  - Recreational Drugs:    ALLERGIES  No Known Allergies    CURRENT MEDICATIONS  acetaminophen     Tablet .. 650 milliGRAM(s) Oral every 6 hours PRN  dextrose 5%. 1000 milliLiter(s) IV Continuous <Continuous>  dextrose 5%. 1000 milliLiter(s) IV Continuous <Continuous>  dextrose 50% Injectable 25 Gram(s) IV Push once  dextrose 50% Injectable 12.5 Gram(s) IV Push once  dextrose 50% Injectable 25 Gram(s) IV Push once  dextrose Oral Gel 15 Gram(s) Oral once PRN  glucagon  Injectable 1 milliGRAM(s) IntraMuscular once  heparin  Infusion 1500 Unit(s)/Hr IV Continuous <Continuous>  insulin lispro (ADMELOG) corrective regimen sliding scale   SubCutaneous every 4 hours  insulin regular  human recombinant 12 Unit(s) SubCutaneous before dinner  levothyroxine 100 MICROGram(s) Oral every 24 hours  melatonin 3 milliGRAM(s) Oral at bedtime PRN  pantoprazole    Tablet 40 milliGRAM(s) Oral daily  polyethylene glycol 3350 17 Gram(s) Oral daily  senna 2 Tablet(s) Oral at bedtime  sucralfate suspension 1 Gram(s) Oral four times a day  tamsulosin 0.4 milliGRAM(s) Oral at bedtime  zolpidem 5 milliGRAM(s) Oral at bedtime PRN  zolpidem 5 milliGRAM(s) Oral at bedtime PRN    REVIEW OF SYSTEMS  Constitutional:  Negative fever, chills or loss of appetite.  Eyes:  Negative blurry vision or double vision.  Cardiovascular:  Negative for chest pain or palpitations.  Respiratory:  Negative for cough, wheezing, or shortness of breath.   Gastrointestinal:  Negative for nausea, vomiting, diarrhea, constipation, or abdominal pain.  Genitourinary:  Negative frequency, urgency or dysuria.  Neurologic:  No headache, confusion, dizziness, lightheadedness.    PHYSICAL EXAM  Vital Signs Last 24 Hrs  T(C): 36.9 (30 Mar 2023 06:14), Max: 36.9 (30 Mar 2023 06:14)  T(F): 98.4 (30 Mar 2023 06:14), Max: 98.4 (30 Mar 2023 06:14)  HR: 65 (30 Mar 2023 06:14) (61 - 66)  BP: 115/70 (30 Mar 2023 06:14) (102/66 - 125/72)  BP(mean): --  RR: 18 (30 Mar 2023 06:14) (16 - 20)  SpO2: 95% (30 Mar 2023 06:14) (95% - 98%)    Parameters below as of 30 Mar 2023 06:14  Patient On (Oxygen Delivery Method): room air    Constitutional: Awake, alert, in no acute distress.   HEENT: Normocephalic, atraumatic, BRUNILDA, no proptosis or lid retraction.   Neck: supple, no acanthosis, no thyromegaly or palpable thyroid nodules.  Respiratory: Lungs clear to ausculation bilaterally.   Cardiovascular: regular rhythm, normal S1 and S2, no audible murmurs.   GI: soft, non-tender, non-distended, bowel sounds present, no masses appreciated.  Extremities: No lower extremity edema, peripheral pulses present.   Skin: no rashes.   Psychiatric: AAO x 3. Normal affect/mood.     LABS  CBC - WBC/HGB/HTC/PLT: 7.78/11.5/35.3/252 (03-30-23)  BMP: Na/K/Cl/Bicarb/BUN/Cr/Gluc: 134/3.9/100/25/13/0.53/191 (03-30-23)  Anion Gap: 9 (03-30-23)  eGFR: 106 (03-30-23)  Calcium: 8.2 (03-30-23)  Phosphorus: 1.8 (03-30-23)  Magnesium: 1.9 (03-30-23)  LFT - Alb/Tprot/Tbili/Dbili/AlkPhos/ALT/AST: 2.7/--/0.6/--/89/12/14 (03-27-23)  PT/aPTT/INR: --/37.2/-- (03-29-23)      ASSESSMENT / RECOMMENDATIONS    A1C: 6.0 %  BUN: 13  Creatinine: 0.53  GFR: 106  Weight: 66.6  BMI: 21.1  EF:     # Type 2 diabetes mellitus  - Please continue lantus *** units at bedtime.   - Continue lispro *** units before each meal.  - Continue lispro moderate / low dose sliding scale before meals and at bedtime.  - Patient's fingerstick glucose goal is 100-180 mg/dL.    - For discharge, patient can ***.    - Patient can follow up at discharge with [facility] by calling [phone] to make an appointment.      Case discussed with Dr. Richard. Primary team updated.       Cali Garrett    Endocrinology Fellow    Service Pager: [phone]  HISTORY OF PRESENT ILLNESS  Mr. Ander Kirkland is a 72-year-old male with a past medical history of type 2 diabetes mellitus, hypertension, gastric cancer, hypothyroidism and anemia who presented to the emergency department (3/25/23) complaining of fever (Tmax of 101 F at home), chills and abdominal pain after recent EGD on 3/23/23. He reported experiencing abdominal distention and low back pain. Labs were remarkable for leukocytosis and acute kidney injury (LARRY). CT abdomen and pelvis was significant for severe left hydronephrosis/hydroureter, moderate right hydronephrosis/hydroureter with markedly distended bladder, bladder diverticula with layering stones and infrarenal abdominal aortic aneurysm measuring up to 5.2 cm. His low back pain resolved after he had Abarca catheter placed in the emergency department. Reviewed from EGD biopsy showed small intestinal type mucosa showing focally active, chronic inflammation with separate fragments of squamous mucosa showing reactive epithelial changes and rate intraepithelial eosinophils (consistent with reflux associated changes); negative for dysplasia or malignancy. He was initially admitted to medicine for management of sepsis and LARRY. He was empirically started on ceftriaxone 1 gram IV daily. Urinalysis was negative and blood cultures showed no growth, he remained afebrile with no leukocytosis. Therefore, antibiotics were discontinued as impression was that his fever was due to bacterial translocation from recent esophageal biopsy. CT angiogram of the abdomen and pelvis was obtained which demonstrated a 5.5 cm infrarenal aortic aneurysm that had enlarged from previous study on 1/2021 when it measured 4.7 cm. The patient was evaluated by vascular surgery and he was deemed to be a candidate for EVAR. He will be now transferred to vascular service for further management with plans for OR tomorrow (3/31/23). Endocrinology was consulted for recommendations regarding diabetes management.     CAPILLARY BLOOD GLUCOSE & INSULIN RECEIVED  144 mg/dL (03-29 @ 08:18) ? Ø  173 mg/dL (03-29 @ 09:16) ? Ø  138 mg/dL (03-29 @ 18:07) ? Ø   —  mg/dL (03-29 @ 19:40) ? 12 units of regular insulin subcutaneously.   216 mg/dL (03-29 @ 22:23) ? 4 units of lispro sliding scale.   181 mg/dL (03-30 @ 02:13) ? 2 units of lispro sliding scale.   191 mg/dL (03-30 @ 05:30) ? Ø  229 mg/dL (03-30 @ 06:20) ? Ø (patient refused insulin coverage).   136 mg/dL (03-30 @ 12:03) ? Ø    DIABETES HISTORY  The patient was diagnosed with diabetes approximately 10 years ago. At that time, he was started on metformin and continued the same therapy until cancer diagnosis. He was diagnosed with gastric cancer on 12/2016, underwent gastrectomy (per him, ~50% of his pancreas and 15% of his esophagus were also removed). Afterwards, the metformin was discontinued and his glucose levels remained under control; however, on 5/2020 he was started on tube feeds because he was loosing a lot of weight (decreased from ~190 lb to ~150lb). He had to be started on insulin to control the hyperglycemia that the feeds were causing. He was able to maintain his weight for some time, but reports loosing more weight over the past months. Hence, he had an EGD done recently to evaluate for cancer recurrence.     Prior to admission, the he was receiving tube feeds overnight from 8PM to 3AM and was having pleasure feeds during the day. He was previously managing his diabetes with Humulin N 12 units given ~30 minutes before starting tube feeds (~7:30 PM) and 2 units of lispro before meal his blood glucose was above 140 mg/dL. He denies experiencing episodes of hypoglycemia while on this regimen. He denied having any microvascular complications from the diabetes (last eye exam earlier this year). He reports having neuropathy symptoms, but was told it was secondary to the chemotherapy he received for his cancer. He uses a CGM (Freestyle Dolly 2) at home, and states that when he wakes up in the morning (usually wakes up at 3 AM) his blood glucose is around 210-240 mg/dL. He stops the tube feeds around 4 AM and he has noticed that his glucose levels decreased in the next 2 hours to numbers around 120-140 mg/dL without more insulin. He usually eats 1 soft boiled egg for breakfast and has 1 scramble egg with cheese at lunch. He skips dinner most of the time, although occasionally he might have a little of salad. He occasionally snacks 3-5 small pieces of watermelon or a scoop of peanut butter. He typically doesn't eat any meat or fish. The patient reports that his blood glucose levels remain within adequate levels during the rest of the day with numbers between 100-140 mg/dL. He typically injects lispro 2 units between 2-3 times per day. He follows with Dr. Mari Garcia (Endocrinologist) outpatient at [facility].     PAST MEDICAL & SURGICAL HISTORY  As per history of present illness.     FAMILY HISTORY  - Diabetes: Mother had type 2 diabetes.   - Thyroid: Denied.  - Autoimmune: Denied.    SOCIAL HISTORY  - Work: Owns a [employer], works as an .   - Alcohol: Denied.  - Smoking: Denied.  - Recreational Drugs: Denied.    ALLERGIES  No Known Allergies    CURRENT MEDICATIONS  acetaminophen     Tablet .. 650 milliGRAM(s) Oral every 6 hours PRN  dextrose 5%. 1000 milliLiter(s) IV Continuous <Continuous>  dextrose 5%. 1000 milliLiter(s) IV Continuous <Continuous>  dextrose 50% Injectable 25 Gram(s) IV Push once  dextrose 50% Injectable 12.5 Gram(s) IV Push once  dextrose 50% Injectable 25 Gram(s) IV Push once  dextrose Oral Gel 15 Gram(s) Oral once PRN  glucagon  Injectable 1 milliGRAM(s) IntraMuscular once  heparin  Infusion 1500 Unit(s)/Hr IV Continuous <Continuous>  insulin lispro (ADMELOG) corrective regimen sliding scale   SubCutaneous every 4 hours  insulin regular  human recombinant 12 Unit(s) SubCutaneous before dinner  levothyroxine 100 MICROGram(s) Oral every 24 hours  melatonin 3 milliGRAM(s) Oral at bedtime PRN  pantoprazole    Tablet 40 milliGRAM(s) Oral daily  polyethylene glycol 3350 17 Gram(s) Oral daily  senna 2 Tablet(s) Oral at bedtime  sucralfate suspension 1 Gram(s) Oral four times a day  tamsulosin 0.4 milliGRAM(s) Oral at bedtime  zolpidem 5 milliGRAM(s) Oral at bedtime PRN  zolpidem 5 milliGRAM(s) Oral at bedtime PRN    REVIEW OF SYSTEMS  Constitutional:  Negative fever, chills.   Cardiovascular:  Negative for chest pain or palpitations.  Respiratory:  Negative for cough, wheezing, or shortness of breath.   Gastrointestinal:  Negative for nausea, vomiting, diarrhea, constipation.  Neurologic:  No headache, confusion, dizziness, lightheadedness.    PHYSICAL EXAM  Vital Signs Last 24 Hrs  T(C): 36.9 (30 Mar 2023 06:14), Max: 36.9 (30 Mar 2023 06:14)  T(F): 98.4 (30 Mar 2023 06:14), Max: 98.4 (30 Mar 2023 06:14)  HR: 65 (30 Mar 2023 06:14) (61 - 66)  BP: 115/70 (30 Mar 2023 06:14) (102/66 - 125/72)  BP(mean): --  RR: 18 (30 Mar 2023 06:14) (16 - 20)  SpO2: 95% (30 Mar 2023 06:14) (95% - 98%)    Parameters below as of 30 Mar 2023 06:14  Patient On (Oxygen Delivery Method): room air    Constitutional: Awake, alert, elderly male, in no acute distress.   HEENT: Normocephalic, atraumatic, BRUNILDA.  Respiratory: Lungs clear to ausculation bilaterally.   Cardiovascular: regular rhythm, normal S1 and S2, no audible murmurs.   GI: soft, non-tender, non-distended, bowel sounds present. (+) J-tube in place.   Extremities: No lower extremity edema.  Psychiatric: AAO x 3.    LABS  CBC - WBC/HGB/HTC/PLT: 7.78/11.5/35.3/252 (03-30-23)  BMP: Na/K/Cl/Bicarb/BUN/Cr/Gluc: 134/3.9/100/25/13/0.53/191 (03-30-23)  Anion Gap: 9 (03-30-23)  eGFR: 106 (03-30-23)  Calcium: 8.2 (03-30-23)  Phosphorus: 1.8 (03-30-23)  Magnesium: 1.9 (03-30-23)  LFT - Alb/Tprot/Tbili/Dbili/AlkPhos/ALT/AST: 2.7/--/0.6/--/89/12/14 (03-27-23)  PT/aPTT/INR: --/37.2/-- (03-29-23)    ASSESSMENT / RECOMMENDATIONS  Mr. Kirkland is a 72-year-old male with a past medical history of type 2 diabetes mellitus, hypertension, gastric cancer, hypothyroidism and anemia who presented to the emergency department (3/25/23) complaining of fever, chills and abdominal pain after recent EGD on 3/23/23. Labs were remarkable for leukocytosis and acute kidney injury (LARRY). He was initially admitted to medicine for management of sepsis and LARRY. Urinalysis was negative and blood cultures showed no growth and he remained afebrile with no leukocytosis. Therefore, antibiotics were discontinued as impression was that fever was due to bacterial translocation from recent esophageal biopsy. CT angiogram of the abdomen/pelvis demonstrated a 5.5 cm infrarenal aortic aneurysm that had enlarged from previous study. He is now planned for EVAR tomorrow (3/31/23). Endocrinology was consulted for recommendations regarding diabetes management.     A1C: 6.0 %  BUN: 13  Creatinine: 0.53  GFR: 106  Weight: 66.6  BMI: 21.1    # Type 2 diabetes mellitus  - Suspect that at home, the patient's blood glucose decreases from 200s to 100s after stopping his tube feeds due to effects from NPH. In addition, it appears that his dose of NPH is not enough to maintain euglycemia overnight given that his glucose raises up to mid 200s. Discussed that he might need to inject his NPH even before what he has been doing as it usually doesn't start acting right away (typically takes at least one hour) which will help decrease the effects of the NPH insulin that he is seeing in the early morning (drop in blood glucose from 200s to 100s). In addition, he will likely need an increase in NPH dose.   - At home he takes Fibersource 1.2 kwan at 110 mL/hr (300 kwan per 250 mL). He might also need to adjust the amount of tube feeds to meet his nutritional needs (he is only getting ~900 kwan per day which doesn't appear to be enough, which could have been the cause of his continued weight loss).   - He will be NPO after midnight for his procedure tomorrow and will be getting tube feeds from 8PM to 12AM.   - Please administer 6 units of lispro at 8 PM when his tube feeds start (as he will only be getting 4 hours of tube feeds).   - Check a 12 AM and a 6 AM fingerstick, and cover with a low dose lispro insulin sliding scale if glucose > 150 mg/dL.    - Continue lispro low dose sliding scale before meals and at bedtime.  - Patient's fingerstick glucose goal is 100-180 mg/dL.      Case discussed with Dr. Richard. Primary team updated.       Cali Garrett    Endocrinology Fellow    Service Pager: [phone]

## 2023-03-30 NOTE — PROGRESS NOTE ADULT - SUBJECTIVE AND OBJECTIVE BOX
Cardiology Consult    O/N:  Interval History: patient was seen and examined       OBJECTIVE  Vitals:  T(C): 36.9 (03-30-23 @ 12:00), Max: 36.9 (03-30-23 @ 06:14)  HR: 60 (03-30-23 @ 12:00) (60 - 66)  BP: 125/68 (03-30-23 @ 12:00) (102/66 - 125/72)  RR: 18 (03-30-23 @ 12:00) (16 - 20)  SpO2: 95% (03-30-23 @ 12:00) (95% - 98%)  Wt(kg): --    I/O:  I&O's Summary    29 Mar 2023 07:01  -  30 Mar 2023 07:00  --------------------------------------------------------  IN: 363 mL / OUT: 800 mL / NET: -437 mL    30 Mar 2023 07:01  -  30 Mar 2023 15:34  --------------------------------------------------------  IN: 120 mL / OUT: 550 mL / NET: -430 mL        PHYSICAL EXAM:  GEN: Awake, comfortable. NAD.   HEENT: NCAT  RESP: CTA b/l  CV: RRR, normal s1/s2.   ABD: Soft, NTND. BS+  EXT: Warm. no edema   NEURO: AAOx3.     	  LABS:                        11.5   7.78  )-----------( 252      ( 30 Mar 2023 05:30 )             35.3     03-30    134<L>  |  100  |  13  ----------------------------<  191<H>  3.9   |  25  |  0.53    Ca    8.2<L>      30 Mar 2023 05:30  Phos  1.8     03-30  Mg     1.9     03-30      PTT - ( 29 Mar 2023 20:10 )  PTT:37.2 sec      RADIOLOGY & ADDITIONAL TESTS:  Reviewed .    MEDICATIONS  (STANDING):  dextrose 5%. 1000 milliLiter(s) (50 mL/Hr) IV Continuous <Continuous>  dextrose 5%. 1000 milliLiter(s) (100 mL/Hr) IV Continuous <Continuous>  dextrose 50% Injectable 25 Gram(s) IV Push once  dextrose 50% Injectable 12.5 Gram(s) IV Push once  dextrose 50% Injectable 25 Gram(s) IV Push once  glucagon  Injectable 1 milliGRAM(s) IntraMuscular once  heparin  Infusion 1500 Unit(s)/Hr (15 mL/Hr) IV Continuous <Continuous>  insulin lispro (ADMELOG) corrective regimen sliding scale   SubCutaneous every 4 hours  insulin regular  human recombinant 12 Unit(s) SubCutaneous before dinner  levothyroxine 100 MICROGram(s) Oral every 24 hours  pantoprazole    Tablet 40 milliGRAM(s) Oral daily  polyethylene glycol 3350 17 Gram(s) Oral daily  senna 2 Tablet(s) Oral at bedtime  sucralfate suspension 1 Gram(s) Oral four times a day  tamsulosin 0.4 milliGRAM(s) Oral at bedtime    MEDICATIONS  (PRN):  acetaminophen     Tablet .. 650 milliGRAM(s) Oral every 6 hours PRN Temp greater or equal to 38C (100.4F), Mild Pain (1 - 3)  dextrose Oral Gel 15 Gram(s) Oral once PRN Blood Glucose LESS THAN 70 milliGRAM(s)/deciliter  melatonin 3 milliGRAM(s) Oral at bedtime PRN Insomnia  zolpidem 5 milliGRAM(s) Oral at bedtime PRN Insomnia  zolpidem 5 milliGRAM(s) Oral at bedtime PRN Insomnia

## 2023-03-30 NOTE — CHART NOTE - NSCHARTNOTEFT_GEN_A_CORE
Admitting Diagnosis:   Patient is a 72y old  Male who presents with a chief complaint of Fever (30 Mar 2023 13:16)    PAST MEDICAL & SURGICAL HISTORY:  Essential hypertension  was on losartan, now diet control    Hypothyroidism    Gastric mass  ulcerated mass in gastric cardia with small perigastric nodes on endoscopy.    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    H/O umbilical hernia repair   with mesh    Port-a-cath in place  right chest wall    History of gastric surgery      Gastrostomy tube in place  open jejunostomy with J-tube 2020    S/P cataract surgery    H/O endoscopy  & stent placement 2020    S/P gastrectomy  total gastrectomy     History of partial pancreatectomy  distal pancreatectomy/ splenectomy esophatojejunostomy    Status post neck dissection  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, gastric primary    History of dysphagia  EGD with Axios stent placment to connect the blind limb to th e efferent limb (jejunojejunostomy)    Current Nutrition Order:  >>Soft and bite sized oral diet  >>Jevity 1.5 via J-tube @ 95ml/hr x12hrs (provides 1140ml TV, 1710kcal, 73gprotein, 866ml free water)    PO Intake: Good (%) [   ]  Fair (50-75%) [   ] Poor (<25%) [ x ]    GI Issues:   Pt denies N/V/D/C at present    Pain:  No pain noted    Skin Integrity:  No edema noted    Labs:       134<L>  |  100  |  13  ----------------------------<  191<H>  3.9   |  25  |  0.53    Ca    8.2<L>      30 Mar 2023 05:30  Phos  1.8     -30  Mg     1.9     -30      CAPILLARY BLOOD GLUCOSE      POCT Blood Glucose.: 136 mg/dL (30 Mar 2023 12:03)  POCT Blood Glucose.: 229 mg/dL (30 Mar 2023 06:20)  POCT Blood Glucose.: 181 mg/dL (30 Mar 2023 02:13)  POCT Blood Glucose.: 216 mg/dL (29 Mar 2023 22:23)  POCT Blood Glucose.: 138 mg/dL (29 Mar 2023 18:07)      Medications:  MEDICATIONS  (STANDING):  dextrose 5%. 1000 milliLiter(s) (50 mL/Hr) IV Continuous <Continuous>  dextrose 5%. 1000 milliLiter(s) (100 mL/Hr) IV Continuous <Continuous>  dextrose 50% Injectable 25 Gram(s) IV Push once  dextrose 50% Injectable 12.5 Gram(s) IV Push once  dextrose 50% Injectable 25 Gram(s) IV Push once  glucagon  Injectable 1 milliGRAM(s) IntraMuscular once  heparin  Infusion 1500 Unit(s)/Hr (15 mL/Hr) IV Continuous <Continuous>  insulin lispro (ADMELOG) corrective regimen sliding scale   SubCutaneous every 4 hours  insulin regular  human recombinant 12 Unit(s) SubCutaneous before dinner  levothyroxine 100 MICROGram(s) Oral every 24 hours  pantoprazole    Tablet 40 milliGRAM(s) Oral daily  polyethylene glycol 3350 17 Gram(s) Oral daily  senna 2 Tablet(s) Oral at bedtime  sucralfate suspension 1 Gram(s) Oral four times a day  tamsulosin 0.4 milliGRAM(s) Oral at bedtime    MEDICATIONS  (PRN):  acetaminophen     Tablet .. 650 milliGRAM(s) Oral every 6 hours PRN Temp greater or equal to 38C (100.4F), Mild Pain (1 - 3)  dextrose Oral Gel 15 Gram(s) Oral once PRN Blood Glucose LESS THAN 70 milliGRAM(s)/deciliter  melatonin 3 milliGRAM(s) Oral at bedtime PRN Insomnia  zolpidem 5 milliGRAM(s) Oral at bedtime PRN Insomnia  zolpidem 5 milliGRAM(s) Oral at bedtime PRN Insomnia    Height for BMI (FEET)	5 Feet  Height for BMI (INCHES)	10 Inch(s)  Height for BMI (CENTIMETERS)	177.8 Centimeter(s)  Weight for BMI (lbs)	146 lb  Weight for BMI (kg)	66.2 kg  Body Mass Index	20.9    Weight Change: No updated wts    Estimated energy needs:   ABW used for calculations as pt between % of IBW (88%), adjusted for severe PCM  30-35kcal/k-2317kcal  1.2-1.5g/k-99gprotein  30-35ml/k-2317ml fluid    Subjective:   73 y/o M with a PMHx of gastric CA, DM Type 2, Hypothyroidism, HTN and anemia s/p EGD with esophageal biopsy 3/23 presents to the ED c/o fevers, low back pain x 2 days. Admitted for sepsis and LARRY, 2/2 hydronephrosis, now with post-obstructive diuresis. 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    Pt s/p J-tube placement , usual tube feed regimen nocturnal feeds of Fiber Source 1.2 @110ml/hr x10 hours (provides 1100ml total volume, 1320kcal, 59gprotein, 889ml free water). Pt also takes soft food PO: salmon, rice, tuna salad, eggs, yogurt, soups. Pt reports he eats small amounts (2-3 bites at a time) and not does push himself. NKFA. Reports he is not strict kosher and will accept some none kosher food items. His nutrition goals are to prevent wt loss and manage his blood sugar. Pt asking for endocrine referral, communicated to team. Pt reports wt fluctuated between 140-150lbs, current wt 146lbs. Discussed importance of meeting nutrition needs to prevent wt loss, pt expressed understanding. Pt ordered for nocturnal feeds of Jevity 1.5 via J-tube: goal rate of 95ml/hr x12hrs (provides 1140ml TV, 1710kcal, 73gprotein, 866ml free water). Labs reviewed 3/30: POCT -229H. Continue night time insulin regimen, see endocrinology note for recs. Please see full nutrition recommendations below. Will continue to follow per RD protocol.    Previous Nutrition Diagnosis: Severe PCM RT inadequate energy intake to meet nutrition needs d/t CA AEB severe muscle/fat loss    Active [ x ]  Resolved [   ]    If resolved, new PES:     Goal: Pt to meet >75% EER consistently via tolerated route     Recommendations:  1. Jevity 1.5 via J-tube: goal rate of 95ml/hr x12hrs (provides 1140ml TV, 1710kcal, 73gprotein, 866ml free water).   >>8pm-8am schedule  2. Soft and bite sized, kosher PO diet  3. Monitor tolerance to tube feeds   4. Monitor BG q6hrs  5. RD to remain available prn    Education: RD provided education on monitoring tolerance to tube feeds and nocturnal feed regimen. Pt expressed understanding.     Risk Level: High [   ] Moderate [ x ] Low [   ]

## 2023-03-30 NOTE — PROGRESS NOTE ADULT - ASSESSMENT
71 y/o M with a PMHx of gastric CA, DM Type 2, Hypothyroidism, HTN and anemia s/p EGD with esophageal biopsy 3/23 presents to the ED c/o fevers, low back pain x 2 days. Admitted for sepsis and LARRY, 2/2 hydronephrosis, now with post-obstructive diuresis. 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

## 2023-03-30 NOTE — PROGRESS NOTE ADULT - ATTENDING COMMENTS
Patient was seen and examined with the resident team today.  I agree with the above assessment and plan with the following exceptions/additions:     Briefly, this is a 73yo gentleman with a PMH of gastric CA, IDDM2, hypothyroidism, HTN and anemia s/p EGD with esophageal biopsy on 3/23 who p/w SIRS 2/2 acute urinary retention w/LARRY and aspiration pneumonitis.  Incidentally found to have an enlarging AAA.  Imaging for AAA revealing L-ureteral stricture, along with a preperitoneal abscess both of which have been reviewed by their respective consultants w/no plans to intervene on either.  No awaiting EVAR on 3/31 with Vascular.     #LARRY w/urinary retention - s/p Abarca w/resolution of LARRY; can continue to hold abx as U/A NOT consistent with UTI; c/w Tamsulosin; TOV after EVAR as pt prefers not to be discharged with a Abarca and this was ok-ed by Urology  #Preperitoneal abscess - has known fistula; will monitor for now per Surgery; wound care as per Surgery's note on 3/28    #Aspiration pneumonitis - can stop abx and just monitor   #AAA - on Eliquis for AAA as an outpatient but switched to Heparin gtt for EVAR on Friday; Cardiology cleared and recommended non-urgent TTE    #IDDM2 - c/w home insulin regimen for now; however, pt has been dictating TF's and dosing inconsistently and haven't been able to get a hold of his Endocrinologist; despite what his PCP says about "an unusual regimen" we would prefer to involve our Endo team to ensure safety while here and in light of his pending EVAR  #Hypothyroidism - c/w Levothyroxine   #Severe PCM - c/w small bites and TF's   #DVT PPx - on heparin gtt  #Dispo - TBD but will likely transfer to Vascular after his EVAR    Corinne Brothers  [phone]

## 2023-03-30 NOTE — PROGRESS NOTE ADULT - SUBJECTIVE AND OBJECTIVE BOX
CC: Patient is a 72y old  Male who presents with a chief complaint of Fever (29 Mar 2023 16:05)    INTERVAL EVENTS: KIRILL    SUBJECTIVE / INTERVAL HPI: Patient seen and examined at bedside.     ROS: negative unless otherwise stated above.    VITAL SIGNS:  Vital Signs Last 24 Hrs  T(C): 36.9 (30 Mar 2023 06:14), Max: 36.9 (30 Mar 2023 06:14)  T(F): 98.4 (30 Mar 2023 06:14), Max: 98.4 (30 Mar 2023 06:14)  HR: 65 (30 Mar 2023 06:14) (61 - 66)  BP: 115/70 (30 Mar 2023 06:14) (102/66 - 129/71)  RR: 18 (30 Mar 2023 06:14) (16 - 20)  SpO2: 95% (30 Mar 2023 06:14) (95% - 98%)    Parameters below as of 30 Mar 2023 06:14  Patient On (Oxygen Delivery Method): room air      03-29-23 @ 07:01  -  03-30-23 @ 07:00  --------------------------------------------------------  IN: 48 mL / OUT: 800 mL / NET: -752 mL      PHYSICAL EXAM:  General: NAD, pleasant thin male  HEENT: NC/AT, anicteric sclera, MMM  Neck: supple, trachea midline  Cardiovascular: +S1/S2; RRR, no murmurs, rubs, or gallops  Respiratory: CTA B/L; no W/R/R  Gastrointestinal: 5cm vertical scar midline from umbilical hernia repair, soft, NT/ND, skin around PEG tube non erythematous, normal BS  : no CVA tenderness or suprapubic tenderness on palpation  Extremities: WWP; no edema, clubbing or cyanosis  Vascular: 2+ radial, DP/PT pulses B/L  Neurological: AAOx3; no focal deficits    MEDICATIONS:  MEDICATIONS  (STANDING):  dextrose 5%. 1000 milliLiter(s) (50 mL/Hr) IV Continuous <Continuous>  dextrose 5%. 1000 milliLiter(s) (100 mL/Hr) IV Continuous <Continuous>  dextrose 50% Injectable 25 Gram(s) IV Push once  dextrose 50% Injectable 12.5 Gram(s) IV Push once  dextrose 50% Injectable 25 Gram(s) IV Push once  glucagon  Injectable 1 milliGRAM(s) IntraMuscular once  heparin  Infusion 1500 Unit(s)/Hr (15 mL/Hr) IV Continuous <Continuous>  insulin lispro (ADMELOG) corrective regimen sliding scale   SubCutaneous every 4 hours  insulin regular  human recombinant 12 Unit(s) SubCutaneous before dinner  levothyroxine 100 MICROGram(s) Oral every 24 hours  pantoprazole    Tablet 40 milliGRAM(s) Oral daily  polyethylene glycol 3350 17 Gram(s) Oral daily  senna 2 Tablet(s) Oral at bedtime  sucralfate suspension 1 Gram(s) Oral four times a day  tamsulosin 0.4 milliGRAM(s) Oral at bedtime    MEDICATIONS  (PRN):  acetaminophen     Tablet .. 650 milliGRAM(s) Oral every 6 hours PRN Temp greater or equal to 38C (100.4F), Mild Pain (1 - 3)  dextrose Oral Gel 15 Gram(s) Oral once PRN Blood Glucose LESS THAN 70 milliGRAM(s)/deciliter  melatonin 3 milliGRAM(s) Oral at bedtime PRN Insomnia  zolpidem 5 milliGRAM(s) Oral at bedtime PRN Insomnia  zolpidem 5 milliGRAM(s) Oral at bedtime PRN Insomnia      ALLERGIES:  Allergies    No Known Allergies    Intolerances        LABS:                        12.4   7.16  )-----------( 259      ( 29 Mar 2023 09:16 )             38.4     03-29    133<L>  |  102  |  14  ----------------------------<  173<H>  3.9   |  25  |  0.54    Ca    8.5      29 Mar 2023 09:16  Phos  2.2     03-29  Mg     2.0     03-29      PTT - ( 29 Mar 2023 20:10 )  PTT:37.2 sec    CAPILLARY BLOOD GLUCOSE      POCT Blood Glucose.: 229 mg/dL (30 Mar 2023 06:20)      RADIOLOGY & ADDITIONAL TESTS: Reviewed.   ******TRANSFER NOTE RMF TO VASCULAR******    Brief hospital course:   This is a 73yo gentleman with a PMH of gastric CA, IDDM2, hypothyroidism, HTN and anemia s/p EGD with esophageal biopsy on 3/23 who p/w SIRS 2/2 acute urinary retention w/LARRY and aspiration pneumonitis. Incidentally found to have an enlarging AAA. Imaging for AAA revealing L-ureteral stricture, along with a preperitoneal abscess both of which have been reviewed by their respective consultants w/no plans to intervene on either. Now awaiting EVAR on 3/31 with Vascular.     INTERVAL EVENTS: KIRILL    SUBJECTIVE / INTERVAL HPI: Patient seen and examined at bedside. Feeling fine this AM, with no complaints.     ROS: negative unless otherwise stated above.    VITAL SIGNS:  Vital Signs Last 24 Hrs  T(C): 36.9 (30 Mar 2023 06:14), Max: 36.9 (30 Mar 2023 06:14)  T(F): 98.4 (30 Mar 2023 06:14), Max: 98.4 (30 Mar 2023 06:14)  HR: 65 (30 Mar 2023 06:14) (61 - 66)  BP: 115/70 (30 Mar 2023 06:14) (102/66 - 129/71)  RR: 18 (30 Mar 2023 06:14) (16 - 20)  SpO2: 95% (30 Mar 2023 06:14) (95% - 98%)    Parameters below as of 30 Mar 2023 06:14  Patient On (Oxygen Delivery Method): room air      03-29-23 @ 07:01  -  03-30-23 @ 07:00  --------------------------------------------------------  IN: 48 mL / OUT: 800 mL / NET: -752 mL      PHYSICAL EXAM:  General: NAD, pleasant thin male  HEENT: NC/AT, anicteric sclera, MMM  Neck: supple, trachea midline  Cardiovascular: +S1/S2; RRR, no murmurs, rubs, or gallops  Respiratory: CTA B/L; no W/R/R  Gastrointestinal: 5cm vertical scar midline from umbilical hernia repair, soft, NT/ND, skin around PEG tube non erythematous, normal BS  : no CVA tenderness or suprapubic tenderness on palpation, Abarca in place  Extremities: WWP; no edema, clubbing or cyanosis  Vascular: 2+ radial, DP/PT pulses B/L  Neurological: AAOx3; no focal deficits    MEDICATIONS:  MEDICATIONS  (STANDING):  dextrose 5%. 1000 milliLiter(s) (50 mL/Hr) IV Continuous <Continuous>  dextrose 5%. 1000 milliLiter(s) (100 mL/Hr) IV Continuous <Continuous>  dextrose 50% Injectable 25 Gram(s) IV Push once  dextrose 50% Injectable 12.5 Gram(s) IV Push once  dextrose 50% Injectable 25 Gram(s) IV Push once  glucagon  Injectable 1 milliGRAM(s) IntraMuscular once  heparin  Infusion 1500 Unit(s)/Hr (15 mL/Hr) IV Continuous <Continuous>  insulin lispro (ADMELOG) corrective regimen sliding scale   SubCutaneous every 4 hours  insulin regular  human recombinant 12 Unit(s) SubCutaneous before dinner  levothyroxine 100 MICROGram(s) Oral every 24 hours  pantoprazole    Tablet 40 milliGRAM(s) Oral daily  polyethylene glycol 3350 17 Gram(s) Oral daily  senna 2 Tablet(s) Oral at bedtime  sucralfate suspension 1 Gram(s) Oral four times a day  tamsulosin 0.4 milliGRAM(s) Oral at bedtime    MEDICATIONS  (PRN):  acetaminophen     Tablet .. 650 milliGRAM(s) Oral every 6 hours PRN Temp greater or equal to 38C (100.4F), Mild Pain (1 - 3)  dextrose Oral Gel 15 Gram(s) Oral once PRN Blood Glucose LESS THAN 70 milliGRAM(s)/deciliter  melatonin 3 milliGRAM(s) Oral at bedtime PRN Insomnia  zolpidem 5 milliGRAM(s) Oral at bedtime PRN Insomnia  zolpidem 5 milliGRAM(s) Oral at bedtime PRN Insomnia      ALLERGIES:  Allergies    No Known Allergies    Intolerances        LABS:                        12.4   7.16  )-----------( 259      ( 29 Mar 2023 09:16 )             38.4     03-29    133<L>  |  102  |  14  ----------------------------<  173<H>  3.9   |  25  |  0.54    Ca    8.5      29 Mar 2023 09:16  Phos  2.2     03-29  Mg     2.0     03-29      PTT - ( 29 Mar 2023 20:10 )  PTT:37.2 sec    CAPILLARY BLOOD GLUCOSE      POCT Blood Glucose.: 229 mg/dL (30 Mar 2023 06:20)      RADIOLOGY & ADDITIONAL TESTS: Reviewed.

## 2023-03-30 NOTE — PROGRESS NOTE ADULT - SUBJECTIVE AND OBJECTIVE BOX
SUBJECTIVE: Patient seen and evaluated.        MEDICATIONS  (STANDING):  dextrose 5%. 1000 milliLiter(s) (50 mL/Hr) IV Continuous <Continuous>  dextrose 5%. 1000 milliLiter(s) (100 mL/Hr) IV Continuous <Continuous>  dextrose 50% Injectable 25 Gram(s) IV Push once  dextrose 50% Injectable 12.5 Gram(s) IV Push once  dextrose 50% Injectable 25 Gram(s) IV Push once  glucagon  Injectable 1 milliGRAM(s) IntraMuscular once  heparin  Infusion 1500 Unit(s)/Hr (15 mL/Hr) IV Continuous <Continuous>  insulin lispro (ADMELOG) corrective regimen sliding scale   SubCutaneous every 4 hours  insulin regular  human recombinant 12 Unit(s) SubCutaneous before dinner  levothyroxine 100 MICROGram(s) Oral every 24 hours  pantoprazole    Tablet 40 milliGRAM(s) Oral daily  polyethylene glycol 3350 17 Gram(s) Oral daily  senna 2 Tablet(s) Oral at bedtime  sucralfate suspension 1 Gram(s) Oral four times a day  tamsulosin 0.4 milliGRAM(s) Oral at bedtime    MEDICATIONS  (PRN):  acetaminophen     Tablet .. 650 milliGRAM(s) Oral every 6 hours PRN Temp greater or equal to 38C (100.4F), Mild Pain (1 - 3)  dextrose Oral Gel 15 Gram(s) Oral once PRN Blood Glucose LESS THAN 70 milliGRAM(s)/deciliter  melatonin 3 milliGRAM(s) Oral at bedtime PRN Insomnia  zolpidem 5 milliGRAM(s) Oral at bedtime PRN Insomnia  zolpidem 5 milliGRAM(s) Oral at bedtime PRN Insomnia      Vital Signs Last 24 Hrs  T(C): 36.9 (30 Mar 2023 06:14), Max: 36.9 (30 Mar 2023 06:14)  T(F): 98.4 (30 Mar 2023 06:14), Max: 98.4 (30 Mar 2023 06:14)  HR: 65 (30 Mar 2023 06:14) (61 - 66)  BP: 115/70 (30 Mar 2023 06:14) (102/66 - 129/71)  BP(mean): --  RR: 18 (30 Mar 2023 06:14) (16 - 20)  SpO2: 95% (30 Mar 2023 06:14) (95% - 98%)    Parameters below as of 30 Mar 2023 06:14  Patient On (Oxygen Delivery Method): room air        Physical Exam:  General: NAD, resting comfortably in bed, low BMI habitus  Pulmonary: Nonlabored, no respiratory distress  Cardiovascular: regular rate and rhythm   Abdominal: soft, NT/ND, well healed incisions, J tube in place cdi  Extremities: WWP, normal strength  Neuro: A/O x 3, no focal deficits, normal motor/sensation  Pulses: palpable distal pulses, DP/PT,Fem Pop    I&O's Summary    29 Mar 2023 07:01  -  30 Mar 2023 07:00  --------------------------------------------------------  IN: 48 mL / OUT: 800 mL / NET: -752 mL        LABS:                        12.4   7.16  )-----------( 259      ( 29 Mar 2023 09:16 )             38.4     03-29    133<L>  |  102  |  14  ----------------------------<  173<H>  3.9   |  25  |  0.54    Ca    8.5      29 Mar 2023 09:16  Phos  2.2     03-29  Mg     2.0     03-29      PTT - ( 29 Mar 2023 20:10 )  PTT:37.2 sec    CAPILLARY BLOOD GLUCOSE      POCT Blood Glucose.: 229 mg/dL (30 Mar 2023 06:20)  POCT Blood Glucose.: 181 mg/dL (30 Mar 2023 02:13)  POCT Blood Glucose.: 216 mg/dL (29 Mar 2023 22:23)  POCT Blood Glucose.: 138 mg/dL (29 Mar 2023 18:07)  POCT Blood Glucose.: 144 mg/dL (29 Mar 2023 08:18)        RADIOLOGY & ADDITIONAL STUDIES:

## 2023-03-30 NOTE — PROGRESS NOTE ADULT - PROBLEM SELECTOR PLAN 8
F: 1L NS in ED  E: replete prn   N: Tube Feeds  DVT ppx: eliquis  Dispo: F F: 1L NS in ED  E: replete prn   N: Tube Feeds  DVT ppx: eliquis  Dispo: RMF --> vascular

## 2023-03-30 NOTE — CONSULT NOTE ADULT - ATTENDING COMMENTS
Seen at bedside in ER   It is unlikely for recent GI procedure by itself being the trigger or cause of urinary retention. I am wondering the process of anesthesia coupled with discontinuation of meds played a role.     Carafe should be given either as 1 gr slurry or 1 gr tablet, crushed and mixed with an ounce of water, 3-4 time a day.     Follow up with GI (Dr. Peguero) as outpatient.
Patient seen and examined. Imaging reviewed.    Chronic enterocutaneous fistula with minimal drainage, no active infection.  This is not the cause of his septic episode.  Management of aortoiliac disease should not be through a midline incision.  Dry gauze to wound prn - the patient changes this himself daily.  f/u with his surgeons in [place] for further management of his ECF
Initial attending contact date  3/30/23    . See fellow note written above for details. I reviewed the fellow documentation. I have personally seen and examined this patient. I reviewed vitals, labs, medications, cardiac studies, and additional imaging. I agree with the above fellow's findings and plans as written above with the following additions/statements.    -Cardiology consulted for pre op risk assessment for EVAR  -Pt without known cardiac hx and with excellent functional capacity, able to perform > 4 METS without anginal equivalents   -EKG NSR ICLBBB unchanged from prior   -Non urgent echo - given last echo 2020   -Pt considered low risk for intermediate risk procedure   -Will fu post op
Pt seen on rounds this afternoon.  72-yo man with a history type 2 DM , HTN and gastric cancer presented with fever, abdominal distension, abdomina/low back pain, and LARRY.  CT scan showed bilateral hydronephrosis and a markedly distended bladder, which was decompressed with Abarca catheterization.  He is s/p sub-total gastrectomy (plus pancreatic and distal esophageal resection for his CA in 2016).  He was then started on nutritional support with a J-tube in 2020 because of marked weight loss and inability to meet caloric needs via the PO route.  He is currently on overnight feeds with a 1.2 calc/cc formula at 110 cc/hr.  He "covers" this with 12 units of NPH taken 15-30 min before starting the feeds.  He is usually hyperglycemic to the 200 range at 3 AM, but his glucoses then drop to 100-120 over the next 2-3 hours without a correction dose of lispro  He reports that he had maintained his weight until his recent illness (though he is still well below his usual baseline).  His regimen at home does not appear to be adequately covering the feeds, partially because of inadequate insulin, partly because of the timing of his NPH dose, which is probably peaking after the feeds are off.  He would probably improve the situation by taking the NPH at least 1 hr before starting the feeds, and possibly at a higher dose.  He also might do better with a combination of NPH and lispro before starting the feeds.  An additional concern is that his feeds may not be meeting his caloric needs--providing only 900 calories.  He will be going to stent/grafting of the enlarging AAA tomorrow.  For tonight, however, with his feeds to run only from 8 PM to MN, will cover with 6 units of lispro, no NPH.    Will not give basal insulin since he does not seem to need this at home.  .

## 2023-03-30 NOTE — PROGRESS NOTE ADULT - PROBLEM SELECTOR PLAN 4
in remission. hx of gastric CA that was diagnosed in 12/2016. Patient is s/p a gastrectomy with partial esophagectomy and partial pancreatectomy.   - outpatient f/u    #Portal vein thrombosis  hx of PVT on eliquis  - restart home eliquis 2.5mg BID in remission. hx of gastric CA that was diagnosed in 12/2016. Patient is s/p a gastrectomy with partial esophagectomy and partial pancreatectomy.   - outpatient f/u    #Portal vein thrombosis  hx of PVT on eliquis  - holding home eliquis 2.5mg BID  - on heparin gtt

## 2023-03-30 NOTE — PROGRESS NOTE ADULT - PROBLEM SELECTOR PLAN 2
Known hx of AAA. CT angiogram of the abdomen and pelvis was obtained which demonstrated a 5.5 cm infrarenal aortic aneurysm that has enlarged since 11/2021 when it measured 4.7 cm.  - Vascular consulted; appreciate recs Secondary to severe urinary retention and c/b b/l hydronephrosis, now relieved after Abarca placement. Cr elevation to >3, now back to baseline Cr wnl. Post-obstructive diuresis seen, requiring IVF repletion. Hydronephrosis resolving as seen on recent CTA. New stricture on left kidney also seen, urology saying no intervention. Post-obstructive diuresis now resolved.  - Abarca in place; patient prefers not to go home with it  - No intervention per urology  - F/u outpatient urology

## 2023-03-31 ENCOUNTER — TRANSCRIPTION ENCOUNTER (OUTPATIENT)
Age: 73
End: 2023-03-31

## 2023-03-31 DIAGNOSIS — I81 PORTAL VEIN THROMBOSIS: ICD-10-CM

## 2023-03-31 LAB
ANION GAP SERPL CALC-SCNC: 11 MMOL/L — SIGNIFICANT CHANGE UP (ref 5–17)
ANION GAP SERPL CALC-SCNC: 6 MMOL/L — SIGNIFICANT CHANGE UP (ref 5–17)
ANION GAP SERPL CALC-SCNC: 6 MMOL/L — SIGNIFICANT CHANGE UP (ref 5–17)
ANION GAP SERPL CALC-SCNC: 8 MMOL/L — SIGNIFICANT CHANGE UP (ref 5–17)
APTT BLD: 33.4 SEC — SIGNIFICANT CHANGE UP (ref 27.5–35.5)
APTT BLD: 34.5 SEC — SIGNIFICANT CHANGE UP (ref 27.5–35.5)
APTT BLD: 36.5 SEC — HIGH (ref 27.5–35.5)
APTT BLD: 82.8 SEC — HIGH (ref 27.5–35.5)
BASOPHILS # BLD AUTO: 0.07 K/UL — SIGNIFICANT CHANGE UP (ref 0–0.2)
BASOPHILS NFR BLD AUTO: 1 % — SIGNIFICANT CHANGE UP (ref 0–2)
BLD GP AB SCN SERPL QL: NEGATIVE — SIGNIFICANT CHANGE UP
BUN SERPL-MCNC: 10 MG/DL — SIGNIFICANT CHANGE UP (ref 7–23)
BUN SERPL-MCNC: 11 MG/DL — SIGNIFICANT CHANGE UP (ref 7–23)
BUN SERPL-MCNC: 11 MG/DL — SIGNIFICANT CHANGE UP (ref 7–23)
BUN SERPL-MCNC: 12 MG/DL — SIGNIFICANT CHANGE UP (ref 7–23)
CALCIUM SERPL-MCNC: 8.2 MG/DL — LOW (ref 8.4–10.5)
CALCIUM SERPL-MCNC: 8.4 MG/DL — SIGNIFICANT CHANGE UP (ref 8.4–10.5)
CALCIUM SERPL-MCNC: 8.7 MG/DL — SIGNIFICANT CHANGE UP (ref 8.4–10.5)
CALCIUM SERPL-MCNC: 8.8 MG/DL — SIGNIFICANT CHANGE UP (ref 8.4–10.5)
CHLORIDE SERPL-SCNC: 100 MMOL/L — SIGNIFICANT CHANGE UP (ref 96–108)
CHLORIDE SERPL-SCNC: 102 MMOL/L — SIGNIFICANT CHANGE UP (ref 96–108)
CHLORIDE SERPL-SCNC: 102 MMOL/L — SIGNIFICANT CHANGE UP (ref 96–108)
CHLORIDE SERPL-SCNC: 103 MMOL/L — SIGNIFICANT CHANGE UP (ref 96–108)
CO2 SERPL-SCNC: 24 MMOL/L — SIGNIFICANT CHANGE UP (ref 22–31)
CO2 SERPL-SCNC: 25 MMOL/L — SIGNIFICANT CHANGE UP (ref 22–31)
CO2 SERPL-SCNC: 25 MMOL/L — SIGNIFICANT CHANGE UP (ref 22–31)
CO2 SERPL-SCNC: 26 MMOL/L — SIGNIFICANT CHANGE UP (ref 22–31)
COHGB MFR BLDA: 1.4 % — SIGNIFICANT CHANGE UP
CREAT SERPL-MCNC: 0.51 MG/DL — SIGNIFICANT CHANGE UP (ref 0.5–1.3)
CREAT SERPL-MCNC: 0.55 MG/DL — SIGNIFICANT CHANGE UP (ref 0.5–1.3)
CREAT SERPL-MCNC: 0.58 MG/DL — SIGNIFICANT CHANGE UP (ref 0.5–1.3)
CREAT SERPL-MCNC: 0.6 MG/DL — SIGNIFICANT CHANGE UP (ref 0.5–1.3)
EGFR: 102 ML/MIN/1.73M2 — SIGNIFICANT CHANGE UP
EGFR: 103 ML/MIN/1.73M2 — SIGNIFICANT CHANGE UP
EGFR: 105 ML/MIN/1.73M2 — SIGNIFICANT CHANGE UP
EGFR: 107 ML/MIN/1.73M2 — SIGNIFICANT CHANGE UP
EOSINOPHIL # BLD AUTO: 0.46 K/UL — SIGNIFICANT CHANGE UP (ref 0–0.5)
EOSINOPHIL NFR BLD AUTO: 6.3 % — HIGH (ref 0–6)
GLUCOSE BLDC GLUCOMTR-MCNC: 130 MG/DL — HIGH (ref 70–99)
GLUCOSE BLDC GLUCOMTR-MCNC: 137 MG/DL — HIGH (ref 70–99)
GLUCOSE BLDC GLUCOMTR-MCNC: 168 MG/DL — HIGH (ref 70–99)
GLUCOSE BLDC GLUCOMTR-MCNC: 193 MG/DL — HIGH (ref 70–99)
GLUCOSE BLDC GLUCOMTR-MCNC: 195 MG/DL — HIGH (ref 70–99)
GLUCOSE SERPL-MCNC: 138 MG/DL — HIGH (ref 70–99)
GLUCOSE SERPL-MCNC: 152 MG/DL — HIGH (ref 70–99)
GLUCOSE SERPL-MCNC: 173 MG/DL — HIGH (ref 70–99)
GLUCOSE SERPL-MCNC: 206 MG/DL — HIGH (ref 70–99)
HCT VFR BLD CALC: 32.6 % — LOW (ref 39–50)
HCT VFR BLD CALC: 33.3 % — LOW (ref 39–50)
HCT VFR BLD CALC: 33.4 % — LOW (ref 39–50)
HGB BLD-MCNC: 11 G/DL — LOW (ref 13–17)
HGB BLD-MCNC: 11.1 G/DL — LOW (ref 13–17)
HGB BLD-MCNC: 11.3 G/DL — LOW (ref 13–17)
HGB BLDA-MCNC: 10.8 G/DL — LOW (ref 12.6–17.4)
IMM GRANULOCYTES NFR BLD AUTO: 0.7 % — SIGNIFICANT CHANGE UP (ref 0–0.9)
INR BLD: 1.05 — SIGNIFICANT CHANGE UP (ref 0.88–1.16)
INR BLD: 1.12 — SIGNIFICANT CHANGE UP (ref 0.88–1.16)
INR BLD: 1.18 — HIGH (ref 0.88–1.16)
ISTAT ACTK (ACTIVATED CLOTTING TIME KAOLIN): 203 SEC — HIGH (ref 74–137)
ISTAT ACTK (ACTIVATED CLOTTING TIME KAOLIN): 239 SEC — HIGH (ref 74–137)
ISTAT ACTK (ACTIVATED CLOTTING TIME KAOLIN): 269 SEC — HIGH (ref 74–137)
ISTAT ACTK (ACTIVATED CLOTTING TIME KAOLIN): 275 SEC — HIGH (ref 74–137)
ISTAT ACTK (ACTIVATED CLOTTING TIME KAOLIN): 281 SEC — HIGH (ref 74–137)
ISTAT ACTK (ACTIVATED CLOTTING TIME KAOLIN): 311 SEC — HIGH (ref 74–137)
LYMPHOCYTES # BLD AUTO: 1.07 K/UL — SIGNIFICANT CHANGE UP (ref 1–3.3)
LYMPHOCYTES # BLD AUTO: 14.6 % — SIGNIFICANT CHANGE UP (ref 13–44)
MAGNESIUM SERPL-MCNC: 1.7 MG/DL — SIGNIFICANT CHANGE UP (ref 1.6–2.6)
MAGNESIUM SERPL-MCNC: 1.7 MG/DL — SIGNIFICANT CHANGE UP (ref 1.6–2.6)
MAGNESIUM SERPL-MCNC: 2 MG/DL — SIGNIFICANT CHANGE UP (ref 1.6–2.6)
MCHC RBC-ENTMCNC: 33 GM/DL — SIGNIFICANT CHANGE UP (ref 32–36)
MCHC RBC-ENTMCNC: 33.1 PG — SIGNIFICANT CHANGE UP (ref 27–34)
MCHC RBC-ENTMCNC: 33.2 PG — SIGNIFICANT CHANGE UP (ref 27–34)
MCHC RBC-ENTMCNC: 33.6 PG — SIGNIFICANT CHANGE UP (ref 27–34)
MCHC RBC-ENTMCNC: 33.8 GM/DL — SIGNIFICANT CHANGE UP (ref 32–36)
MCHC RBC-ENTMCNC: 34 GM/DL — SIGNIFICANT CHANGE UP (ref 32–36)
MCV RBC AUTO: 100.3 FL — HIGH (ref 80–100)
MCV RBC AUTO: 97.6 FL — SIGNIFICANT CHANGE UP (ref 80–100)
MCV RBC AUTO: 99.4 FL — SIGNIFICANT CHANGE UP (ref 80–100)
METHGB MFR BLDA: 0.1 % — SIGNIFICANT CHANGE UP
MONOCYTES # BLD AUTO: 0.83 K/UL — SIGNIFICANT CHANGE UP (ref 0–0.9)
MONOCYTES NFR BLD AUTO: 11.3 % — SIGNIFICANT CHANGE UP (ref 2–14)
NEUTROPHILS # BLD AUTO: 4.85 K/UL — SIGNIFICANT CHANGE UP (ref 1.8–7.4)
NEUTROPHILS NFR BLD AUTO: 66.1 % — SIGNIFICANT CHANGE UP (ref 43–77)
NRBC # BLD: 0 /100 WBCS — SIGNIFICANT CHANGE UP (ref 0–0)
OXYHGB MFR BLDA: 98.2 % — HIGH (ref 90–95)
PHOSPHATE SERPL-MCNC: 2.5 MG/DL — SIGNIFICANT CHANGE UP (ref 2.5–4.5)
PHOSPHATE SERPL-MCNC: 3.5 MG/DL — SIGNIFICANT CHANGE UP (ref 2.5–4.5)
PHOSPHATE SERPL-MCNC: 3.6 MG/DL — SIGNIFICANT CHANGE UP (ref 2.5–4.5)
PLATELET # BLD AUTO: 254 K/UL — SIGNIFICANT CHANGE UP (ref 150–400)
PLATELET # BLD AUTO: 260 K/UL — SIGNIFICANT CHANGE UP (ref 150–400)
PLATELET # BLD AUTO: 269 K/UL — SIGNIFICANT CHANGE UP (ref 150–400)
POTASSIUM SERPL-MCNC: 3.9 MMOL/L — SIGNIFICANT CHANGE UP (ref 3.5–5.3)
POTASSIUM SERPL-MCNC: 4.4 MMOL/L — SIGNIFICANT CHANGE UP (ref 3.5–5.3)
POTASSIUM SERPL-MCNC: 4.6 MMOL/L — SIGNIFICANT CHANGE UP (ref 3.5–5.3)
POTASSIUM SERPL-MCNC: 4.8 MMOL/L — SIGNIFICANT CHANGE UP (ref 3.5–5.3)
POTASSIUM SERPL-SCNC: 3.9 MMOL/L — SIGNIFICANT CHANGE UP (ref 3.5–5.3)
POTASSIUM SERPL-SCNC: 4.4 MMOL/L — SIGNIFICANT CHANGE UP (ref 3.5–5.3)
POTASSIUM SERPL-SCNC: 4.6 MMOL/L — SIGNIFICANT CHANGE UP (ref 3.5–5.3)
POTASSIUM SERPL-SCNC: 4.8 MMOL/L — SIGNIFICANT CHANGE UP (ref 3.5–5.3)
PROTHROM AB SERPL-ACNC: 12.5 SEC — SIGNIFICANT CHANGE UP (ref 10.5–13.4)
PROTHROM AB SERPL-ACNC: 13.3 SEC — SIGNIFICANT CHANGE UP (ref 10.5–13.4)
PROTHROM AB SERPL-ACNC: 14.1 SEC — HIGH (ref 10.5–13.4)
RBC # BLD: 3.32 M/UL — LOW (ref 4.2–5.8)
RBC # BLD: 3.34 M/UL — LOW (ref 4.2–5.8)
RBC # BLD: 3.36 M/UL — LOW (ref 4.2–5.8)
RBC # FLD: 13.8 % — SIGNIFICANT CHANGE UP (ref 10.3–14.5)
RBC # FLD: 14.1 % — SIGNIFICANT CHANGE UP (ref 10.3–14.5)
RBC # FLD: 14.2 % — SIGNIFICANT CHANGE UP (ref 10.3–14.5)
RH IG SCN BLD-IMP: NEGATIVE — SIGNIFICANT CHANGE UP
SAO2 % BLDA: 99.8 % — HIGH (ref 94–98)
SODIUM SERPL-SCNC: 131 MMOL/L — LOW (ref 135–145)
SODIUM SERPL-SCNC: 134 MMOL/L — LOW (ref 135–145)
SODIUM SERPL-SCNC: 136 MMOL/L — SIGNIFICANT CHANGE UP (ref 135–145)
SODIUM SERPL-SCNC: 137 MMOL/L — SIGNIFICANT CHANGE UP (ref 135–145)
WBC # BLD: 10 K/UL — SIGNIFICANT CHANGE UP (ref 3.8–10.5)
WBC # BLD: 15.5 K/UL — HIGH (ref 3.8–10.5)
WBC # BLD: 7.33 K/UL — SIGNIFICANT CHANGE UP (ref 3.8–10.5)
WBC # FLD AUTO: 10 K/UL — SIGNIFICANT CHANGE UP (ref 3.8–10.5)
WBC # FLD AUTO: 15.5 K/UL — HIGH (ref 3.8–10.5)
WBC # FLD AUTO: 7.33 K/UL — SIGNIFICANT CHANGE UP (ref 3.8–10.5)

## 2023-03-31 PROCEDURE — 99232 SBSQ HOSP IP/OBS MODERATE 35: CPT | Mod: GC

## 2023-03-31 PROCEDURE — 34713 PERQ ACCESS & CLSR FEM ART: CPT | Mod: 50,GC,59

## 2023-03-31 PROCEDURE — 37242 VASC EMBOLIZE/OCCLUDE ARTERY: CPT | Mod: GC

## 2023-03-31 PROCEDURE — 36248 INS CATH ABD/L-EXT ART ADDL: CPT | Mod: GC

## 2023-03-31 PROCEDURE — 34705 EVAC RPR A-BIILIAC NDGFT: CPT | Mod: GC

## 2023-03-31 PROCEDURE — 36247 INS CATH ABD/L-EXT ART 3RD: CPT | Mod: GC

## 2023-03-31 PROCEDURE — 99233 SBSQ HOSP IP/OBS HIGH 50: CPT | Mod: GC

## 2023-03-31 RX ORDER — HUMAN INSULIN 100 [IU]/ML
16 INJECTION, SUSPENSION SUBCUTANEOUS DAILY
Refills: 0 | Status: DISCONTINUED | OUTPATIENT
Start: 2023-03-31 | End: 2023-04-03

## 2023-03-31 RX ORDER — SODIUM CHLORIDE 9 MG/ML
1000 INJECTION, SOLUTION INTRAVENOUS
Refills: 0 | Status: DISCONTINUED | OUTPATIENT
Start: 2023-03-31 | End: 2023-03-31

## 2023-03-31 RX ORDER — SODIUM CHLORIDE 9 MG/ML
1000 INJECTION INTRAMUSCULAR; INTRAVENOUS; SUBCUTANEOUS
Refills: 0 | Status: DISCONTINUED | OUTPATIENT
Start: 2023-03-31 | End: 2023-04-01

## 2023-03-31 RX ORDER — CEFAZOLIN SODIUM 1 G
2000 VIAL (EA) INJECTION EVERY 8 HOURS
Refills: 0 | Status: COMPLETED | OUTPATIENT
Start: 2023-03-31 | End: 2023-04-01

## 2023-03-31 RX ORDER — INSULIN LISPRO 100/ML
6 VIAL (ML) SUBCUTANEOUS
Refills: 0 | Status: DISCONTINUED | OUTPATIENT
Start: 2023-03-31 | End: 2023-04-03

## 2023-03-31 RX ORDER — HEPARIN SODIUM 5000 [USP'U]/ML
1200 INJECTION INTRAVENOUS; SUBCUTANEOUS
Qty: 25000 | Refills: 0 | Status: DISCONTINUED | OUTPATIENT
Start: 2023-03-31 | End: 2023-03-31

## 2023-03-31 RX ORDER — HUMAN INSULIN 100 [IU]/ML
16 INJECTION, SUSPENSION SUBCUTANEOUS ONCE
Refills: 0 | Status: COMPLETED | OUTPATIENT
Start: 2023-03-31 | End: 2023-03-31

## 2023-03-31 RX ORDER — OXYCODONE HYDROCHLORIDE 5 MG/1
5 TABLET ORAL ONCE
Refills: 0 | Status: DISCONTINUED | OUTPATIENT
Start: 2023-03-31 | End: 2023-04-01

## 2023-03-31 RX ORDER — SODIUM CHLORIDE 9 MG/ML
250 INJECTION INTRAMUSCULAR; INTRAVENOUS; SUBCUTANEOUS ONCE
Refills: 0 | Status: COMPLETED | OUTPATIENT
Start: 2023-03-31 | End: 2023-03-31

## 2023-03-31 RX ORDER — HYDROMORPHONE HYDROCHLORIDE 2 MG/ML
0.5 INJECTION INTRAMUSCULAR; INTRAVENOUS; SUBCUTANEOUS ONCE
Refills: 0 | Status: DISCONTINUED | OUTPATIENT
Start: 2023-03-31 | End: 2023-04-01

## 2023-03-31 RX ORDER — HEPARIN SODIUM 5000 [USP'U]/ML
1400 INJECTION INTRAVENOUS; SUBCUTANEOUS
Qty: 25000 | Refills: 0 | Status: DISCONTINUED | OUTPATIENT
Start: 2023-03-31 | End: 2023-04-01

## 2023-03-31 RX ORDER — ONDANSETRON 8 MG/1
4 TABLET, FILM COATED ORAL ONCE
Refills: 0 | Status: DISCONTINUED | OUTPATIENT
Start: 2023-03-31 | End: 2023-04-11

## 2023-03-31 RX ORDER — SODIUM CHLORIDE 9 MG/ML
1000 INJECTION INTRAMUSCULAR; INTRAVENOUS; SUBCUTANEOUS
Refills: 0 | Status: DISCONTINUED | OUTPATIENT
Start: 2023-03-31 | End: 2023-03-31

## 2023-03-31 RX ORDER — INSULIN LISPRO 100/ML
6 VIAL (ML) SUBCUTANEOUS ONCE
Refills: 0 | Status: COMPLETED | OUTPATIENT
Start: 2023-03-31 | End: 2023-03-31

## 2023-03-31 RX ADMIN — SENNA PLUS 2 TABLET(S): 8.6 TABLET ORAL at 22:24

## 2023-03-31 RX ADMIN — Medication 100 MICROGRAM(S): at 17:31

## 2023-03-31 RX ADMIN — Medication 1: at 14:40

## 2023-03-31 RX ADMIN — Medication 100 MILLIGRAM(S): at 19:08

## 2023-03-31 RX ADMIN — SODIUM CHLORIDE 100 MILLILITER(S): 9 INJECTION INTRAMUSCULAR; INTRAVENOUS; SUBCUTANEOUS at 17:31

## 2023-03-31 RX ADMIN — Medication 6 UNIT(S): at 23:16

## 2023-03-31 RX ADMIN — Medication 1 GRAM(S): at 17:31

## 2023-03-31 RX ADMIN — HUMAN INSULIN 16 UNIT(S): 100 INJECTION, SUSPENSION SUBCUTANEOUS at 23:18

## 2023-03-31 RX ADMIN — ZOLPIDEM TARTRATE 5 MILLIGRAM(S): 10 TABLET ORAL at 22:23

## 2023-03-31 RX ADMIN — HEPARIN SODIUM 14 UNIT(S)/HR: 5000 INJECTION INTRAVENOUS; SUBCUTANEOUS at 23:36

## 2023-03-31 RX ADMIN — TAMSULOSIN HYDROCHLORIDE 0.4 MILLIGRAM(S): 0.4 CAPSULE ORAL at 22:24

## 2023-03-31 RX ADMIN — SODIUM CHLORIDE 100 MILLILITER(S): 9 INJECTION INTRAMUSCULAR; INTRAVENOUS; SUBCUTANEOUS at 15:14

## 2023-03-31 RX ADMIN — SODIUM CHLORIDE 100 MILLILITER(S): 9 INJECTION INTRAMUSCULAR; INTRAVENOUS; SUBCUTANEOUS at 15:09

## 2023-03-31 RX ADMIN — Medication 1 GRAM(S): at 23:45

## 2023-03-31 RX ADMIN — SODIUM CHLORIDE 1000 MILLILITER(S): 9 INJECTION INTRAMUSCULAR; INTRAVENOUS; SUBCUTANEOUS at 15:15

## 2023-03-31 RX ADMIN — HEPARIN SODIUM 15 UNIT(S)/HR: 5000 INJECTION INTRAVENOUS; SUBCUTANEOUS at 00:23

## 2023-03-31 NOTE — DISCHARGE NOTE PROVIDER - NSDCFUSCHEDAPPT_GEN_ALL_CORE_FT
Yang Muro  [facility] Physician [facility]  UROLOGY [address]  Scheduled Appointment: 04/25/2023     Sandhya Oliva  [facility] Physician [facility]  UROLOGY [address]  Scheduled Appointment: 04/20/2023    Yang Muro  [facility] Physician [facility]  UROLOGY [address]  Scheduled Appointment: 04/25/2023     Sandhya Oliva  [facility]  Scheduled Appointment: 04/20/2023    [facility]  UROLOGY [address]  Scheduled Appointment: 04/20/2023    Yang Muro  [place] [facility]ician [facility]  UROLOGY [address]  Scheduled Appointment: 04/25/2023

## 2023-03-31 NOTE — DISCHARGE NOTE PROVIDER - PROVIDER TOKENS
PROVIDER:[TOKEN:[[id number]],FOLLOWUP:[1 week],ESTABLISHEDPATIENT:[T]] PROVIDER:[TOKEN:[[id number]],FOLLOWUP:[1 week],ESTABLISHEDPATIENT:[T]],PROVIDER:[TOKEN:[[id number]],FOLLOWUP:[2 weeks],ESTABLISHEDPATIENT:[T]],PROVIDER:[TOKEN:[[id number]],FOLLOWUP:[2 weeks]] PROVIDER:[TOKEN:[[id number]],SCHEDULEDAPPT:[04/14/2023],SCHEDULEDAPPTTIME:[09:30 AM],ESTABLISHEDPATIENT:[T]],PROVIDER:[[id number]],FOLLOWUP:[2 weeks],ESTABLISHEDPATIENT:[T]],PROVIDER:[TOKEN:[[id number]],SCHEDULEDAPPT:[04/25/2023],SCHEDULEDAPPTTIME:[01:50 AM]]

## 2023-03-31 NOTE — PROGRESS NOTE ADULT - PROBLEM SELECTOR PLAN 2
Secondary to severe urinary retention and c/b b/l hydronephrosis, now relieved after Abarca placement. Cr elevation to >3, now back to baseline Cr wnl. Post-obstructive diuresis seen, requiring IVF repletion. Hydronephrosis resolving as seen on recent CTA. New stricture on left kidney also seen, urology saying no intervention. Post-obstructive diuresis now resolved.  - Abarca in place; patient prefers not to go home with it  - TOV when appropriate after vascular procedure  - No intervention per urology  - F/u outpatient urology at [facility]

## 2023-03-31 NOTE — DISCHARGE NOTE PROVIDER - CARE PROVIDER_API CALL
Lon Obando  [address] [id number]  Phone: [phone]  Fax: [phone]  Established Patient  Follow Up Time: 1 week   Lon Obando  [facility]  [address] [place], [place] [address]  Phone: [phone]  Fax: [phone]  Established Patient  Follow Up Time: 1 week    Mari Garcia)  EndocrinologyMetabDiabetes; Internal Medicine  [address] [place] [address]  Phone: [phone]  Fax: [phone]  Established Patient  Follow Up Time: 2 weeks    Yang Muro)  Urology  [address] [facility], [address]nd [place] [address]  [place] [address]  Phone: [phone]  Fax: [phone]  Follow Up Time: 2 weeks   Lon Obando  [facility] MEDICINE  [address] [place], [place] [id number]  Phone: [phone]  Fax: [phone]  Established Patient  Scheduled Appointment: 04/14/2023 09:30 AM    Mari Garcia)  EndocrinologyMetabDiabetes; Internal Medicine  [address] [place] [address]  Phone: [phone]  Fax: [phone]  Established Patient  Follow Up Time: 2 weeks    Yang Muro)  Urology  [address] [facility], [address]nd [address]  [place] [address]  Phone: [phone]  Fax: [phone]  Scheduled Appointment: 04/25/2023 01:50 AM

## 2023-03-31 NOTE — DISCHARGE NOTE PROVIDER - NSDCMRMEDTOKEN_GEN_ALL_CORE_FT
Eliquis 2.5 mg oral tablet: 1 tab(s) orally 2 times a day  HumuLIN N: 8 unit(s) subcutaneous once a day (at bedtime)  levothyroxine 100 mcg (0.1 mg) oral tablet: 1 tab(s) orally once a day  tamsulosin 0.4 mg oral capsule: 1 cap(s) orally once a day  Vitamin D3 50 mcg (2000 intl units) oral tablet: 1 tab(s) orally once a day  zolpidem 5 mg oral tablet: 1 orally once a day (in the evening)   apixaban 2.5 mg oral tablet: 1 tab(s) orally every 12 hours  doxazosin 4 mg oral tablet: 1 tab(s) orally once a day (at bedtime)  finasteride 5 mg oral tablet: 1 tab(s) orally every 24 hours  insulin isophane (NPH) 100 units/mL human recombinant subcutaneous suspension: 21 unit(s) subcutaneous once a day ADMINISTER AT 7:30PM  insulin lispro 100 units/mL injectable solution: 3 unit(s) injectable once a day ADMINISTER AT 7:30PM  insulin lispro 100 units/mL injectable solution: 2 unit(s) injectable 3 times a day (before meals)  levothyroxine 100 mcg (0.1 mg) oral tablet: 1 tab(s) orally once a day  pantoprazole 40 mg oral delayed release tablet: 1 tab(s) orally once a day  sucralfate 1 g/10 mL oral suspension: 10 milliliter(s) orally 4 times a day  zolpidem 5 mg oral tablet: 1 orally once a day (in the evening)   apixaban 2.5 mg oral tablet: 1 tab(s) orally every 12 hours  doxazosin 4 mg oral tablet: 1 tab(s) orally once a day (at bedtime)  finasteride 5 mg oral tablet: 1 tab(s) orally every 24 hours  insulin isophane (NPH) 100 units/mL human recombinant subcutaneous suspension: 21 unit(s) subcutaneous once a day ADMINISTER AT 7:30PM  insulin lispro 100 units/mL injectable solution: 2 unit(s) injectable 3 times a day (before meals)  insulin lispro 100 units/mL injectable solution: 3 unit(s) injectable once a day ADMINISTER 3 UNITS AT 7:30PM BEFORE FEEDS, ADMINISTER 2 UNITS BEFORE MEALS  levothyroxine 100 mcg (0.1 mg) oral tablet: 1 tab(s) orally once a day  pantoprazole 40 mg oral delayed release tablet: 1 tab(s) orally once a day  sucralfate 1 g/10 mL oral suspension: 10 milliliter(s) orally 4 times a day  zolpidem 5 mg oral tablet: 1 orally once a day (in the evening)   apixaban 2.5 mg oral tablet: 1 tab(s) orally every 12 hours  doxazosin 4 mg oral tablet: 1 tab(s) orally once a day (at bedtime)  finasteride 5 mg oral tablet: 1 tab(s) orally every 24 hours  insulin isophane (NPH) 100 units/mL human recombinant subcutaneous suspension: 21 unit(s) subcutaneous once a day ADMINISTER AT 7:30PM  insulin lispro 100 units/mL injectable solution: 2 unit(s) injectable 3 times a day (before meals)  insulin lispro 100 units/mL injectable solution: 3 unit(s) injectable once a day ADMINISTER 3 UNITS AT 7:30PM BEFORE FEEDS, ADMINISTER 2 UNITS BEFORE MEALS  Insulin Pen Needles, 4mm: 1 application subcutaneously 4 times a day. ** Use with insulin pen **  levothyroxine 100 mcg (0.1 mg) oral tablet: 1 tab(s) orally once a day  pantoprazole 40 mg oral delayed release tablet: 1 tab(s) orally once a day  sucralfate 1 g/10 mL oral suspension: 10 milliliter(s) orally 4 times a day  zolpidem 5 mg oral tablet: 1 orally once a day (in the evening)

## 2023-03-31 NOTE — PROGRESS NOTE ADULT - PROBLEM SELECTOR PLAN 4
in remission. hx of gastric CA that was diagnosed in 12/2016. Patient is s/p a gastrectomy with partial esophagectomy and partial pancreatectomy.   - outpatient f/u

## 2023-03-31 NOTE — DISCHARGE NOTE PROVIDER - NSDCFUADDAPPT_GEN_ALL_CORE_FT
Please follow up with your primary care doctor Dr. Obando in 1 week after discharge.    Please follow up with a urologist at your preferred hospital [facility]. Call [phone] to make an appointment. Please follow up with your primary care doctor Dr. Obando in 1 week after discharge.    Please follow up with a urologist at your preferred [facility]. Call [phone] to make an appointment.    Please follow up with your endocrinologist Dr. Mari Garcia in 1-2 weeks after discharge to discuss your new insulin regimen.

## 2023-03-31 NOTE — PROGRESS NOTE ADULT - SUBJECTIVE AND OBJECTIVE BOX
Vascular Surgery Post-Op Note    Procedure: EVAR    Diagnosis/Indication: AAA    Surgeon: Dr. Saenz    S: Pt has no complaints. Denies CP, SOB, WATKINS, calf tenderness. Pain controlled with medication.    O:  T(C): 36.1 (03-31-23 @ 16:00), Max: 36.8 (03-31-23 @ 13:56)  T(F): 97 (03-31-23 @ 16:00), Max: 98.2 (03-31-23 @ 13:56)  HR: 59 (03-31-23 @ 16:16) (56 - 104)  BP: 112/57 (03-31-23 @ 16:16) (84/50 - 112/57)  RR: 20 (03-31-23 @ 16:16) (14 - 22)  SpO2: 95% (03-31-23 @ 16:16) (93% - 97%)  Wt(kg): --                        11.3   10.00 )-----------( 254      ( 31 Mar 2023 14:25 )             33.4     03-31    136  |  103  |  10  ----------------------------<  206<H>  4.8   |  25  |  0.51    Ca    8.4      31 Mar 2023 14:25  Phos  3.5     03-31  Mg     1.7     03-31        Gen: NAD, resting comfortably in bed  C/V: NSR  Pulm: Nonlabored breathing, no respiratory distress  Abd: soft, NT/ND  Extrem: warm, well perfused, no calf edema, RLE: 1+DP/PTs; LLE: 2+DP/PTs      A/P: 72yMale s/p above procedure  Diet: Tube feeds and bite sized diet  IVF until 8pm  Pain/nausea control  Ancef x 24 hrs  Heparin @6pm  AM labs

## 2023-03-31 NOTE — PROGRESS NOTE ADULT - ATTENDING COMMENTS
Patient was seen and examined with the resident team today.  I agree with Dr. Glover's assessment and plan with the following exceptions/additions:     Briefly, this is a 73yo gentleman with a PMH of gastric CA, IDDM2, hypothyroidism, HTN and anemia s/p EGD with esophageal biopsy on 3/23 who p/w SIRS 2/2 acute urinary retention w/LARRY and aspiration pneumonitis.  Incidentally found to have an enlarging AAA.  Imaging for AAA revealing L-ureteral stricture, along with a preperitoneal abscess, both of which have been reviewed by their respective consultants w/no plans to intervene on either.  Going for an EVAR today with Vascular.     #LARRY w/urinary retention - s/p Abarca w/resolution of LARRY; can continue to hold abx as U/A NOT consistent with UTI; c/w Tamsulosin; TOV after EVAR as pt prefers not to be discharged with a Abarca and this was ok-ed by Urology  #Preperitoneal abscess - has known fistula; will monitor for now per Surgery; wound care as per Surgery's note on 3/28    #Aspiration pneumonitis - can stop abx and just monitor   #AAA - cleared by Cardiology; on Eliquis as an outpatient but switched to Heparin gtt for EVAR    #IDDM2 - Endo following for insulin mgmt in light of TF's   #Hypothyroidism - c/w Levothyroxine   #Severe PCM - c/w small bites and TF's   #DVT PPx - on heparin gtt  #Dispo - TBD but will likely transfer to Vascular after his EVAR    Corinne Brothers  [phone]

## 2023-03-31 NOTE — DISCHARGE NOTE PROVIDER - NSDCCPCAREPLAN_GEN_ALL_CORE_FT
PRINCIPAL DISCHARGE DIAGNOSIS  Diagnosis: AAA (abdominal aortic aneurysm)  Assessment and Plan of Treatment:       SECONDARY DISCHARGE DIAGNOSES  Diagnosis: LARRY (acute kidney injury)  Assessment and Plan of Treatment: Acute kidney injury is when the kidneys suddenly stop working. Normally, the kidneys filter the blood and remove waste and excess salt and water. The word "acute" means sudden. Acute kidney injury can have different causes. It can happen when the kidneys get damaged. Some causes of kidney damage are infections, cancer, certain medicines, and some autoimmune conditions. When people do have symptoms, the symptoms can include: urinating less, or not urinating at all, blood in the urine, or urine that is red or brown, swelling, especially in the legs or feet, vomiting, or not feeling hungry, feeling weak, or getting tired easily, acting confused, or not acting like themselves. Call your doctor or nurse if you have any of the above symptoms. We believe you developed an acute kidney injury from post-anesthesia urine retention. We placed a Abarca catheter to help remove the urine from your body which resolved your acute kidney injury. Most of the time, a person's kidneys will heal and work normally again. But it can take weeks to months for the kidneys to heal completely. Until your kidneys can work normally again, you might need treatments to help make sure your body has the right amount of fluid, salt, and nutrients. It is important to follow up closely with your doctors, especially your primary care doctor and urologist for continued management.     PRINCIPAL DISCHARGE DIAGNOSIS  Diagnosis: AAA (abdominal aortic aneurysm)  Assessment and Plan of Treatment:       SECONDARY DISCHARGE DIAGNOSES  Diagnosis: Insulin dependent type 2 diabetes mellitus  Assessment and Plan of Treatment: You have diabetes managed with insulin. We changed your insulin regimen after having our endocrinology team saw you. Please continue to follow their recommendations and take your insulin as prescribed by them. Then be sure to follow up with your endocrinologist after discharge to discuss the changes in your medication regimen.    Diagnosis: LARRY (acute kidney injury)  Assessment and Plan of Treatment: Acute kidney injury is when the kidneys suddenly stop working. Normally, the kidneys filter the blood and remove waste and excess salt and water. The word "acute" means sudden. Acute kidney injury can have different causes. It can happen when the kidneys get damaged. Some causes of kidney damage are infections, cancer, certain medicines, and some autoimmune conditions. When people do have symptoms, the symptoms can include: urinating less, or not urinating at all, blood in the urine, or urine that is red or brown, swelling, especially in the legs or feet, vomiting, or not feeling hungry, feeling weak, or getting tired easily, acting confused, or not acting like themselves. Call your doctor or nurse if you have any of the above symptoms. We believe you developed an acute kidney injury from post-anesthesia urine retention. We placed a Abarca catheter to help remove the urine from your body which resolved your acute kidney injury. Most of the time, a person's kidneys will heal and work normally again. But it can take weeks to months for the kidneys to heal completely. Until your kidneys can work normally again, you might need treatments to help make sure your body has the right amount of fluid, salt, and nutrients. It is important to follow up closely with your doctors, especially your primary care doctor and urologist for continued management.     PRINCIPAL DISCHARGE DIAGNOSIS  Diagnosis: AAA (abdominal aortic aneurysm)  Assessment and Plan of Treatment:       SECONDARY DISCHARGE DIAGNOSES  Diagnosis: LARRY (acute kidney injury)  Assessment and Plan of Treatment: Acute kidney injury is when the kidneys suddenly stop working. Normally, the kidneys filter the blood and remove waste and excess salt and water. The word "acute" means sudden. Acute kidney injury can have different causes. It can happen when the kidneys get damaged. Some causes of kidney damage are infections, cancer, certain medicines, and some autoimmune conditions. When people do have symptoms, the symptoms can include: urinating less, or not urinating at all, blood in the urine, or urine that is red or brown, swelling, especially in the legs or feet, vomiting, or not feeling hungry, feeling weak, or getting tired easily, acting confused, or not acting like themselves. Call your doctor or nurse if you have any of the above symptoms. We believe you developed an acute kidney injury from post-anesthesia urine retention. We placed a Abarca catheter to help remove the urine from your body which resolved your acute kidney injury. Most of the time, a person's kidneys will heal and work normally again. But it can take weeks to months for the kidneys to heal completely. Until your kidneys can work normally again, you might need treatments to help make sure your body has the right amount of fluid, salt, and nutrients. It is important to follow up closely with your doctors, especially your primary care doctor and urologist for continued management.    Diagnosis: Urinary retention  Assessment and Plan of Treatment:      PRINCIPAL DISCHARGE DIAGNOSIS  Diagnosis: BPH with urinary obstruction  Assessment and Plan of Treatment: Benign prostatic hyperplasia, also called "BPH," is a common problem. any men with BPH have no symptoms at all. When symptoms do occur, they can include needing to urinate often, especially at night, having trouble starting to urinate (this means that you might have to wait or strain before urine will come out), having a weak urine stream, leaking or dribbling urine, feeling as though your bladder is not empty even after you urinate. In rare cases, BPH makes it so a man cannot urinate at all. This is a serious problem. If you cannot urinate at all, call your doctor right away.  While you were admitted, you had A JOYNER catheter placed to drain urine. You will be discharged with this joyner in place until you follow-up with your outpatient urologist. In addition, there were adjustments made to your medications which you should continue to take.      SECONDARY DISCHARGE DIAGNOSES  Diagnosis: AAA (abdominal aortic aneurysm)  Assessment and Plan of Treatment: The main blood vessel in your body is the aorta. It carries blood from your heart to the rest of your body. It is a long blood vessel that reaches from your chest into your abdomen. The part of the aorta in your abdomen is called the “abdominal aorta.” An aneurysm is a weak area in a blood vessel. If a blood vessel weakens, it starts to swell like a balloon. If the aneurysm grows too large, your aorta may burst. The most common causes are smoking, high blood pressure, and atherosclerosis. Atherosclerosis is when fat builds up in a blood vessel. This is sometimes called hardening of the arteries. Aneurysms are more common in older people and in men. Your risk of getting an AAA is higher if you have smoked or had high blood pressure for a long time. It also may run in families. Most people have no symptoms. When the blood vessel walls start to leak, you may feel pain in your back, stomach, buttocks, groin, testicle, or leg.  While you were admitted, you have a procedure done by the vascular surgery team to stabilize your AAA.    Diagnosis: Diabetes  Assessment and Plan of Treatment: Type 2 diabetes (sometimes called type 2 "diabetes mellitus") is a disorder that disrupts the way your body uses sugar. All the cells in your body need sugar to work normally. Sugar gets into the cells with the help of a hormone called insulin. If there is not enough insulin, or if the body stops responding to insulin, sugar builds up in the blood. That is what happens to people with diabetes. Even though type 2 diabetes might not make you feel sick, it can cause serious problems over time, if it is not treated. The disorder can lead to heart attacks, strokes, kidney disease, vision problems (or even blindness), pain or loss of feeling in the hands and feet, the need to have fingers, toes, or other body parts removed (amputated). There are a few medicines that help control blood sugar. Some people need to take pills that help the body make more insulin or that help insulin do its job. Others need insulin shots.  When you are discharged, you should take:  - Lispro 2 units three times a day BEFORE MEALS  - Lispro 3 units at 7:30 PM BEFORE starting tube feeds  - NPH 21 units at 7:30 PM BEFORE starting tube feeds

## 2023-03-31 NOTE — PROGRESS NOTE ADULT - SUBJECTIVE AND OBJECTIVE BOX
******TRANSFER NOTE RMF TO VASCULAR******    Brief hospital course:   This is a 71yo gentleman with a PMH of gastric CA, IDDM2, hypothyroidism, HTN and anemia s/p EGD with esophageal biopsy on 3/23 who p/w SIRS 2/2 acute urinary retention w/LARRY and aspiration pneumonitis. Incidentally found to have an enlarging AAA. Imaging for AAA revealing L-ureteral stricture, along with a preperitoneal abscess both of which have been reviewed by their respective consultants w/no plans to intervene on either. Pending EVAR on 3/31 with Vascular surgery. Plan for discharge after medically stable per vascular, TOV (patient would like to go home without Abarca), and final recs from endocrinology regarding insulin regimen.    INTERVAL EVENTS: KIRILL    SUBJECTIVE / INTERVAL HPI: Patient seen and examined at bedside early this morning. Feeling tired but ready for his procedure. No complaints.    ROS: negative unless otherwise stated above.    VITAL SIGNS:  Vital Signs Last 24 Hrs  T(C): 36.6 (31 Mar 2023 08:20), Max: 37.1 (30 Mar 2023 21:24)  T(F): 97.8 (31 Mar 2023 06:11), Max: 98.8 (30 Mar 2023 21:24)  HR: 63 (31 Mar 2023 08:20) (60 - 66)  BP: 136/73 (31 Mar 2023 08:20) (125/68 - 136/73)  RR: 18 (31 Mar 2023 08:20) (16 - 18)  SpO2: 97% (31 Mar 2023 08:20) (95% - 97%)    Parameters below as of 31 Mar 2023 04:53  Patient On (Oxygen Delivery Method): room air      03-30-23 @ 07:01  -  03-31-23 @ 07:00  --------------------------------------------------------  IN: 715 mL / OUT: 2200 mL / NET: -1485 mL    03-31-23 @ 07:01  -  03-31-23 @ 10:33  --------------------------------------------------------  IN: 0 mL / OUT: 900 mL / NET: -900 mL      PHYSICAL EXAM:  General: NAD, pleasant thin male  HEENT: NC/AT, anicteric sclera, MMM  Neck: supple, trachea midline  Cardiovascular: +S1/S2; RRR, no murmurs, rubs, or gallops  Respiratory: CTA B/L; no W/R/R  Gastrointestinal: 5cm vertical scar midline from umbilical hernia repair, soft, NT/ND, skin around PEG tube non erythematous, normal BS  : no CVA tenderness or suprapubic tenderness on palpation, Abarca in place  Extremities: WWP; no edema, clubbing or cyanosis  Vascular: 2+ radial, DP/PT pulses B/L  Neurological: AAOx3; no focal deficits    MEDICATIONS:  MEDICATIONS  (STANDING):  dextrose 5%. 1000 milliLiter(s) (50 mL/Hr) IV Continuous <Continuous>  dextrose 5%. 1000 milliLiter(s) (100 mL/Hr) IV Continuous <Continuous>  dextrose 50% Injectable 25 Gram(s) IV Push once  dextrose 50% Injectable 12.5 Gram(s) IV Push once  dextrose 50% Injectable 25 Gram(s) IV Push once  glucagon  Injectable 1 milliGRAM(s) IntraMuscular once  heparin  Infusion 1500 Unit(s)/Hr (15 mL/Hr) IV Continuous <Continuous>  insulin lispro (ADMELOG) corrective regimen sliding scale   SubCutaneous three times a day before meals  insulin lispro (ADMELOG) corrective regimen sliding scale   SubCutaneous at bedtime  levothyroxine 100 MICROGram(s) Oral every 24 hours  pantoprazole    Tablet 40 milliGRAM(s) Oral daily  polyethylene glycol 3350 17 Gram(s) Oral daily  senna 2 Tablet(s) Oral at bedtime  sucralfate suspension 1 Gram(s) Oral four times a day  tamsulosin 0.4 milliGRAM(s) Oral at bedtime    MEDICATIONS  (PRN):  acetaminophen     Tablet .. 650 milliGRAM(s) Oral every 6 hours PRN Temp greater or equal to 38C (100.4F), Mild Pain (1 - 3)  dextrose Oral Gel 15 Gram(s) Oral once PRN Blood Glucose LESS THAN 70 milliGRAM(s)/deciliter  melatonin 3 milliGRAM(s) Oral at bedtime PRN Insomnia  zolpidem 5 milliGRAM(s) Oral at bedtime PRN Insomnia  zolpidem 5 milliGRAM(s) Oral at bedtime PRN Insomnia      ALLERGIES:  Allergies    No Known Allergies    Intolerances        LABS:                        11.1   7.33  )-----------( 269      ( 31 Mar 2023 05:52 )             32.6     03-31    131<L>  |  100  |  11  ----------------------------<  138<H>  4.6   |  25  |  0.58    Ca    8.8      31 Mar 2023 06:26  Phos  2.5     03-31  Mg     2.0     03-31      PT/INR - ( 31 Mar 2023 06:26 )   PT: 12.5 sec;   INR: 1.05          PTT - ( 31 Mar 2023 06:26 )  PTT:34.5 sec    CAPILLARY BLOOD GLUCOSE      POCT Blood Glucose.: 130 mg/dL (31 Mar 2023 06:14)      RADIOLOGY & ADDITIONAL TESTS: Reviewed.

## 2023-03-31 NOTE — PROGRESS NOTE ADULT - PROBLEM SELECTOR PLAN 1
Known hx of AAA. CT angiogram of the abdomen and pelvis was obtained which demonstrated a 5.5 cm infrarenal aortic aneurysm that has enlarged since 11/2021 when it measured 4.7 cm.  - Vascular surgery intervention (EVAR) scheduled for today  - Low-intermediate risk for intermediate procedure, per cardiology

## 2023-03-31 NOTE — PROGRESS NOTE ADULT - PROBLEM SELECTOR PLAN 5
Hx of PVT on eliquis  - holding home eliquis 2.5mg BID  - on heparin gtt  - resume home med after surgery

## 2023-03-31 NOTE — DISCHARGE NOTE PROVIDER - ATTENDING DISCHARGE PHYSICAL EXAMINATION:
-Gen: NAD, resting in bed  -HEENT: EOMI, PERRL, no scleral icterus, no JVD  -CV: normal S1 and S2  -Lungs: CTABL, normal respiratory effort on RA  -Ab: soft, NT, ND, normal BS  -: joyner in place, draining clear/yellow urine   -Ext: no LE edema  -Neuro: no focal deficits

## 2023-03-31 NOTE — DISCHARGE NOTE PROVIDER - HOSPITAL COURSE
#Discharge:    71 y/o M with a PMHx of gastric CA, DM Type 2, Hypothyroidism, HTN and anemia s/p EGD with esophageal biopsy 3/23 presents to the ED c/o fevers, low back pain x 2 days. Admitted for sepsis and LARRY, 2/2 hydronephrosis, now with post-obstructive diuresis. 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     Problem/Plan - 1:  ·  Problem: AAA (abdominal aortic aneurysm).  ·  Plan: Known hx of AAA. CT angiogram of the abdomen and pelvis was obtained which demonstrated a 5.5 cm infrarenal aortic aneurysm that has enlarged since 11/2021 when it measured 4.7 cm.  - EVAR with vascular surgery on 3/31/23     Problem/Plan - 2:  ·  Problem: LARRY (acute kidney injury).  ·  Plan: Secondary to severe urinary retention and c/b b/l hydronephrosis, now relieved after Abarca placement. Cr elevation to >3, now back to baseline Cr wnl. Post-obstructive diuresis seen, requiring IVF repletion. Hydronephrosis resolving as seen on recent CTA. New stricture on left kidney also seen, urology saying no intervention. Post-obstructive diuresis now resolved.  - Abarca in place; patient prefers not to go home with it  - No intervention per urology  - F/u outpatient urology at [facility] (phone number provided)     Problem/Plan - 3:  ·  Problem: Esophagitis.   ·  Plan: Found to have esophagitis on CT chest. Recent EGD with esophageal biopsy consistent with GERD.  - c/w pantoprazole 40mg qd.     Problem/Plan - 4:  ·  Problem: Gastric cancer.   ·  Plan: in remission. hx of gastric CA that was diagnosed in 12/2016. Patient is s/p a gastrectomy with partial esophagectomy and partial pancreatectomy.   - outpatient f/u    #Portal vein thrombosis  hx of PVT on eliquis  - holding home eliquis 2.5mg BID  - on heparin gtt.     Problem/Plan - 5:  ·  Problem: Insulin dependent type 2 diabetes mellitus.   ·  Plan: On Humulin 12 units at bedtime per patient. PCP Dr. Obando states patient was prescribed 8 units. FSG at goal inpatient.   - Collateral from outpatient Endo Dr. Garcia (called and left message)  - mISS  - Monitor FSG.     Problem/Plan - 6:  ·  Problem: Hypothyroidism.   ·  Plan: home meds levothyroxine 100mcg qd  - c/w home med.     Problem/Plan - 7:  ·  Problem: Sepsis.   ·  Plan: RESOLVED. On admission, pt met criteria for sepsis (2/4 SIRS -- presented with fevers to 101 at home, Tmax in .2, leukocytosis to 21, negative lactate). Source ddx bacterial translocation from aspiration d/t recent esophageal biopsy. Was given Ceftriaxone on floor for empiric tx. UA clean-- no leuk esterase or nitrites. Blood cultures showed no growth. No evidence of infection now, afebrile, no white count, asymptomatic.  - f/u blood, urine cxs--NGTD  - d/c ceftriaxone 1g q24h     #Discharge:    73 y/o M with a PMHx of gastric CA, DM Type 2, Hypothyroidism, HTN and anemia s/p EGD with esophageal biopsy 3/23 presents to the ED c/o fevers, low back pain x 2 days. Admitted for sepsis and LARRY, 2/2 hydronephrosis, now with post-obstructive diuresis. 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     Problem/Plan - 1:  ·  Problem: AAA (abdominal aortic aneurysm).   ·  Plan: Known hx of AAA. CT angiogram of the abdomen and pelvis was obtained which demonstrated a 5.5 cm infrarenal aortic aneurysm that has enlarged since 11/2021 when it measured 4.7 cm.  - Vascular surgery intervention (EVAR) scheduled for today  - Low-intermediate risk for intermediate procedure, per cardiology.     Problem/Plan - 2:  ·  Problem: LARRY (acute kidney injury).   ·  Plan: Secondary to severe urinary retention and c/b b/l hydronephrosis, now relieved after Abarca placement. Cr elevation to >3, now back to baseline Cr wnl. Post-obstructive diuresis seen, requiring IVF repletion. Hydronephrosis resolving as seen on recent CTA. New stricture on left kidney also seen, urology saying no intervention. Post-obstructive diuresis now resolved.  - Abarca in place; patient prefers not to go home with it  - TOV when appropriate after vascular procedure  - No intervention per urology  - F/u outpatient urology at [facility].     Problem/Plan - 3:  ·  Problem: Esophagitis.   ·  Plan: Found to have esophagitis on CT chest. Recent EGD with esophageal biopsy consistent with GERD.  - c/w pantoprazole 40mg qd.     Problem/Plan - 4:  ·  Problem: Gastric cancer.   ·  Plan: in remission. hx of gastric CA that was diagnosed in 12/2016. Patient is s/p a gastrectomy with partial esophagectomy and partial pancreatectomy.   - outpatient f/u.     Problem/Plan - 5:  ·  Problem: Portal vein thrombosis.   ·  Plan: Hx of PVT on eliquis  - holding home eliquis 2.5mg BID  - on heparin gtt  - resume home med after surgery.     Problem/Plan - 6:  ·  Problem: Insulin dependent type 2 diabetes mellitus.   ·  Plan: On Humulin 12 units at bedtime per patient. PCP Dr. Topher states patient was prescribed 8 units. FSG at goal inpatient.   - Collateral from outpatient Endo Dr. Garcia (called and left message)  - mISS  - Monitor FSG.     Problem/Plan - 7:  ·  Problem: Hypothyroidism.   ·  Plan: home meds levothyroxine 100mcg qd  - c/w home med.     Problem/Plan - 8:  ·  Problem: Sepsis.   ·  Plan: RESOLVED. On admission, pt met criteria for sepsis (2/4 SIRS -- presented with fevers to 101 at home, Tmax in .2, leukocytosis to 21, negative lactate). Source ddx bacterial translocation from aspiration d/t recent esophageal biopsy. Was given Ceftriaxone on floor for empiric tx. UA clean-- no leuk esterase or nitrites. Blood cultures showed no growth. No evidence of infection now, afebrile, no white count, asymptomatic.  - f/u blood, urine cxs--NGTD  - d/c ceftriaxone 1g q24h #Discharge:    73 y/o M with a PMHx of gastric CA, DM Type 2, Hypothyroidism, HTN and anemia s/p EGD with esophageal biopsy 3/23 presents to the ED c/o fevers, low back pain x 2 days. Admitted for sepsis and LARRY, 2/2 hydronephrosis, now with post-obstructive diuresis. 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     Problem/Plan - 1:  ·  Problem: AAA (abdominal aortic aneurysm).   ·  Plan: Known hx of AAA. CT angiogram of the abdomen and pelvis was obtained which demonstrated a 5.5 cm infrarenal aortic aneurysm that has enlarged since 11/2021 when it measured 4.7 cm.  - Vascular surgery intervention (EVAR) scheduled for today  - Low-intermediate risk for intermediate procedure, per cardiology.     Problem/Plan - 2:  ·  Problem: LARRY (acute kidney injury).   ·  Plan: Secondary to severe urinary retention and c/b b/l hydronephrosis, now relieved after Abarca placement. Cr elevation to >3, now back to baseline Cr wnl. Post-obstructive diuresis seen, requiring IVF repletion. Hydronephrosis resolving as seen on recent CTA. New stricture on left kidney also seen, urology saying no intervention. Post-obstructive diuresis now resolved.  - Abarca in place; patient prefers not to go home with it  - TOV when appropriate after vascular procedure  - No intervention per urology  - F/u outpatient urology at [facility].     Problem/Plan - 3:  ·  Problem: Esophagitis.   ·  Plan: Found to have esophagitis on CT chest. Recent EGD with esophageal biopsy consistent with GERD.  - c/w pantoprazole 40mg qd.     Problem/Plan - 4:  ·  Problem: Gastric cancer.   ·  Plan: in remission. hx of gastric CA that was diagnosed in 12/2016. Patient is s/p a gastrectomy with partial esophagectomy and partial pancreatectomy.   - outpatient f/u.     Problem/Plan - 5:  ·  Problem: Portal vein thrombosis.   ·  Plan: Hx of PVT on eliquis  - holding home eliquis 2.5mg BID  - on heparin gtt  - resume home med after surgery.     Problem/Plan - 6:  ·  Problem: Insulin dependent type 2 diabetes mellitus.   ·  Plan: On Humulin 12 units at bedtime per patient. PCP Dr. Topher states patient was prescribed 8 units. FSG at goal inpatient.   - Collateral from outpatient Endo Dr. Garcia (called and left message)  - Endo consulted; appreciate recs  - mISS  - Monitor FSG.     Problem/Plan - 7:  ·  Problem: Hypothyroidism.   ·  Plan: home meds levothyroxine 100mcg qd  - c/w home med.     Problem/Plan - 8:  ·  Problem: Sepsis.   ·  Plan: RESOLVED. On admission, pt met criteria for sepsis (2/4 SIRS -- presented with fevers to 101 at home, Tmax in .2, leukocytosis to 21, negative lactate). Source ddx bacterial translocation from aspiration d/t recent esophageal biopsy. Was given Ceftriaxone on floor for empiric tx. UA clean-- no leuk esterase or nitrites. Blood cultures showed no growth. No evidence of infection now, afebrile, no white count, asymptomatic.  - f/u blood, urine cxs--NGTD  - d/c ceftriaxone 1g q24h #Discharge: DO NOT DELETE    HOSPITAL COURSE:    73 yo M with PMHx gastric cancer (s/p PEJ tube feeds), T2DM, pre-peritoneal abscess (chronic enterocutaneous fistula), portal vein thrombosis (Eliquis), hypothyroidism, HTN, anemia (s/p EGD with esophageal bx, 3/23) px to [facility] ED with fevers and low back pain x2d admitted for sepsis 2/2 LARRY i/s/o hydronephrosis initially admitted to [facility] however found to have AAA transferred to vascular surgery service s/p EVAR, R hypogastric embolization (3/31). Hospital course c/b urinary retention now s/p Abarca placement (4/7), auth for PEJ feeds for discharge obtained and plan for further outpatient urological procedure.     Problem List/Main Diagnoses:        New Medications: #Discharge: DO NOT DELETE    HOSPITAL COURSE:    73 yo M with PMHx gastric cancer (s/p PEJ tube feeds), T2DM, pre-peritoneal abscess (chronic enterocutaneous fistula), portal vein thrombosis (Eliquis), hypothyroidism, HTN, anemia (s/p EGD with esophageal bx, 3/23) px to [facility] ED with fevers and low back pain x2d admitted for sepsis 2/2 LARRY i/s/o hydronephrosis initially admitted to [facility] however found to have AAA transferred to vascular surgery service s/p EVAR, R hypogastric embolization (3/31). Hospital course c/b urinary retention now s/p Abarca placement (4/7), auth for PEJ feeds for discharge obtained and plan for further outpatient urological procedure.     Problem List/Main Diagnoses:    #Urinary retention  Hospital course c/b persistent urinary retention with multiple failed TOV likely i/s/o significant BPH. Abarca placed on 4/7 prior to discharge for continued outpatient urology follow-up.  - Urology consulted while admitted, plan for outpatient follow-up with Dr. Oliva  - Doxazosin 4mg Qhs + Finasteride 5mg QD  - Vascular/Cardiology clearance for outpatient urology procedures obtained while admitted     #Esophagitis  s/p EGD with biopsy (3/23)  - Protonix 40mg QD + sucralfate    #AAA  During hospitalization, pt found to have AAA s/p EVAR on 3/31.  - Outpatient vascular surgery follow-up     #Diabetes  Pt with hx of DM. Endocrine consulted while admitted due to concern for adjustment of outpatient DM regimen with new PEJ feeds.  - On discharge, NPH 21U + Lispro 3U QD @ 7:30 prior to tube feeds   - On discharge, Moderate dose SS before meals   - On discharge, Lispro 2U before meals  - PEJ feeds with Jevity 1.5 @ 95/hr x10hr (8PM-6AM)   - Outpatient endocrine follow-up     #Portal vein thrombosis  Hx of portal vein thrombosis (2018) s/p 3 months of Eliquis found to have prothrombin G heterozygous on hypercoag work-up outpatient.  - Continue Eliquis 2.5mg Q12    #Peritoneal abscess  Known fistula for which wound care and general surgery consulted.  - No acute inptient management indicated    New Medications: Adjustment to DM regimen as above    PHYSICAL EXAM:  Gen: Resting comfortably  HEENT: NCAT, MMM; some temporal wasting present   CV: RRR, +S1/S2  Pulm: adequate respiratory effort, no increase in work of breathing  Abd: soft, ND/NT. PEJ tube in place.   Ext: no LE edema   Neuro: AOx3, speaking in full sentences  Psych: affect and behavior appropriate, pleasant at time of interview  Pt appears cachectic and malnourished  : + FC

## 2023-03-31 NOTE — PROGRESS NOTE ADULT - PROBLEM SELECTOR PLAN 8
RESOLVED. On admission, pt met criteria for sepsis (2/4 SIRS -- presented with fevers to 101 at home, Tmax in .2, leukocytosis to 21, negative lactate). Source ddx bacterial translocation from aspiration d/t recent esophageal biopsy. Was given Ceftriaxone on floor for empiric tx. UA clean-- no leuk esterase or nitrites. Blood cultures showed no growth. No evidence of infection now, afebrile, no white count, asymptomatic.  - f/u blood, urine cxs--NGTD  - d/c ceftriaxone 1g q24h

## 2023-03-31 NOTE — DISCHARGE NOTE PROVIDER - DETAILS OF MALNUTRITION DIAGNOSIS/DIAGNOSES
This patient has been assessed with a concern for Malnutrition and was treated during this hospitalization for the following Nutrition diagnosis/diagnoses:     -  03/27/2023: Severe protein-calorie malnutrition

## 2023-03-31 NOTE — BRIEF OPERATIVE NOTE - OPERATION/FINDINGS
B/L CFA access via micropuncture technique under ultrasound guidance. Perclose x2 on both sides. Performed embolization of R hypogastric artery from L groin access with multiple María Coils. Then proceeded to EVAR. Main Body: [id number]; R limb [id number]; L limb [id number]; L limb #2 [id number]. Completion runs showed no endoleak. Perclose x2 bilaterally deployed. Heparin reversed with protamine.

## 2023-03-31 NOTE — PRE-ANESTHESIA EVALUATION ADULT - NSANTHPMHFT_GEN_ALL_CORE
This is a 72 year old male with a PMHx of gastric cancer, DM type 2, hypothyroidism, HTN and anemia s/p EGD with esophageal biopsy 3/23 presents to the ED c/o fevers, low back pain x 2 days. Admitted for sepsis and LARRY. He was started on antibiotics and found to have hydronephrosis/bladder outlet obstruction and vascular for infrarental AAA of 5.5 cm. He now presents to cath lab for EVAR. This is a 72 year old male with a PMHx of gastric cancer, DM type 2, hypothyroidism, HTN and anemia s/p EGD with esophageal biopsy 3/23 presents to the ED c/o fevers, low back pain x 2 days. Admitted for sepsis and LARRY. He was started on antibiotics and found to have hydronephrosis/bladder outlet obstruction and vascular for infrarenal AAA of 5.5 cm. He now presents to cath lab for EVAR.    Upon discussion with patient, no CP/SOB/N/V/GERD. Peripheral neuropathy of hands and feet, bilaterally, which patient attributes to chemotherapy. No weakness. METS > 4. Walks regularly. All other conditions chronic and stable.

## 2023-03-31 NOTE — PROGRESS NOTE ADULT - SUBJECTIVE AND OBJECTIVE BOX
SUBJECTIVE / INTERVAL HPI: Patient was seen and examined this morning. He underwent EVAR earlier today. Blood glucose levels were mostly in range overnight. However, he received a short acting insulin (instead of his usual NPH) as his tube feeds only ran for 4 hours (he was NPO after midnight for procedure). The patient mentioned receiving tube feeds for ~7 hours at home which based on his formula concentration typically gives him around 900 kwan (not enough caloric intake). Discussed with him that he needs to increase his caloric intake. He was switched from Fiber Source 1.2 kwan @110ml/hr x7 hours (770 mL TV, 924 kcal) at home to Jevity 1.5 kwan @95 mL/hr x 12hrs (1140 mL TV, 1710 kcal) upon admission. He has been tolerating tube feeds without any complains (denied diarrhea or feeling distended).     CAPILLARY BLOOD GLUCOSE & INSULIN RECEIVED   —  mg/dL (03-29 @ 19:40) ? 12 units of regular insulin subcutaneously.   216 mg/dL (03-29 @ 22:23) ? 4 units of lispro sliding scale.   181 mg/dL (03-30 @ 02:13) ? 2 units of lispro sliding scale.   191 mg/dL (03-30 @ 05:30) ? Ø  229 mg/dL (03-30 @ 06:20) ? Ø (patient refused insulin coverage).   136 mg/dL (03-30 @ 12:03) ? Ø  184 mg/dL (03-30 @ 14:37) ? 2 units of lispro sliding scale.   169 mg/dL (03-30 @ 18:07) ? Ø  159 mg/dL (03-30 @ 20:08) ? 6 units of lispro.   155 mg/dL (03-30 @ 22:43) ? Ø  195 mg/dL (03-31 @ 00:11) ? Ø  130 mg/dL (03-31 @ 06:14) ? Ø  193 mg/dL (03-31 @ 14:22) ? 2 units of lispro sliding scale.     REVIEW OF SYSTEMS  Constitutional:  Negative fever, chills.  Cardiovascular:  Negative for chest pain or palpitations.  Respiratory:  Negative for cough, wheezing, or shortness of breath.    Gastrointestinal:  Negative for nausea, vomiting, diarrhea, constipation, or abdominal pain.  Neurologic:  No headache, confusion, dizziness, lightheadedness.    PHYSICAL EXAM  Vital Signs Last 24 Hrs  T(C): 36.9 (31 Mar 2023 17:55), Max: 37.1 (30 Mar 2023 21:24)  T(F): 98.4 (31 Mar 2023 17:55), Max: 98.8 (30 Mar 2023 21:24)  HR: 58 (31 Mar 2023 16:50) (56 - 104)  BP: 107/59 (31 Mar 2023 16:50) (84/50 - 136/73)  BP(mean): 74 (31 Mar 2023 16:50) (62 - 79)  RR: 16 (31 Mar 2023 16:50) (14 - 22)  SpO2: 96% (31 Mar 2023 16:50) (93% - 97%)    Parameters below as of 31 Mar 2023 16:50  Patient On (Oxygen Delivery Method): room air    Constitutional: Awake, alert, elderly male, in no acute distress.   HEENT: Normocephalic, atraumatic, BRUNILDA.  Respiratory: Lungs clear to ausculation bilaterally.   Cardiovascular: regular rhythm, normal S1 and S2, no audible murmurs.   GI: soft, non-tender, non-distended, bowel sounds present. (+) J-tube in place.   Extremities: No lower extremity edema.  Psychiatric: AAO x 3.    LABS  CBC - WBC/HGB/HTC/PLT: 10.00/11.3/33.4/254 (03-31-23)  BMP - Na/K/Cl/Bicarb/BUN/Cr/Gluc/AG/eGFR: 136/4.8/103/25/10/0.51/206/8/107 (03-31-23)  Ca - 8.4 (03-31-23)  Phos - 3.5 (03-31-23)  Mg - 1.7 (03-31-23)  LFT - Alb/Tprot/Tbili/Dbili/AlkPhos/ALT/AST: 2.7/--/0.6/--/89/12/14 (03-27-23)  PT/aPTT/INR: 14.1/36.5/1.18 (03-31-23)     MEDICATIONS  MEDICATIONS  (STANDING):  ceFAZolin   IVPB 2000 milliGRAM(s) IV Intermittent every 8 hours  dextrose 5%. 1000 milliLiter(s) (100 mL/Hr) IV Continuous <Continuous>  dextrose 5%. 1000 milliLiter(s) (50 mL/Hr) IV Continuous <Continuous>  dextrose 50% Injectable 25 Gram(s) IV Push once  dextrose 50% Injectable 12.5 Gram(s) IV Push once  dextrose 50% Injectable 25 Gram(s) IV Push once  glucagon  Injectable 1 milliGRAM(s) IntraMuscular once  insulin lispro (ADMELOG) corrective regimen sliding scale   SubCutaneous three times a day before meals  insulin lispro (ADMELOG) corrective regimen sliding scale   SubCutaneous at bedtime  insulin lispro Injectable (ADMELOG) 6 Unit(s) SubCutaneous <User Schedule>  insulin NPH human recombinant 16 Unit(s) SubCutaneous daily  levothyroxine 100 MICROGram(s) Oral every 24 hours  pantoprazole    Tablet 40 milliGRAM(s) Oral daily  polyethylene glycol 3350 17 Gram(s) Oral daily  senna 2 Tablet(s) Oral at bedtime  sodium chloride 0.9%. 1000 milliLiter(s) (100 mL/Hr) IV Continuous <Continuous>  sucralfate suspension 1 Gram(s) Oral four times a day  tamsulosin 0.4 milliGRAM(s) Oral at bedtime    MEDICATIONS  (PRN):  acetaminophen     Tablet .. 650 milliGRAM(s) Oral every 6 hours PRN Temp greater or equal to 38C (100.4F), Mild Pain (1 - 3)  dextrose Oral Gel 15 Gram(s) Oral once PRN Blood Glucose LESS THAN 70 milliGRAM(s)/deciliter  HYDROmorphone  Injectable 0.5 milliGRAM(s) IV Push once PRN Severe Pain (7 -10)  melatonin 3 milliGRAM(s) Oral at bedtime PRN Insomnia  ondansetron Injectable 4 milliGRAM(s) IV Push once PRN Nausea and/or Vomiting  oxyCODONE    IR 5 milliGRAM(s) Oral once PRN Moderate Pain (4 - 6)  zolpidem 5 milliGRAM(s) Oral at bedtime PRN Insomnia  zolpidem 5 milliGRAM(s) Oral at bedtime PRN Insomnia    ASSESSMENT / RECOMMENDATIONS  Mr. Kirkland is a 72-year-old male with a past medical history of type 2 diabetes mellitus, hypertension, gastric cancer, hypothyroidism and anemia who presented to the emergency department (3/25/23) complaining of fever, chills and abdominal pain after recent EGD on 3/23/23. Labs were remarkable for leukocytosis and acute kidney injury (LARRY). He was initially admitted to medicine for management of sepsis and LARRY. Urinalysis was negative and blood cultures showed no growth and he remained afebrile with no leukocytosis. Therefore, antibiotics were discontinued as impression was that fever was due to bacterial translocation from recent esophageal biopsy. CT angiogram of the abdomen/pelvis demonstrated a 5.5 cm infrarenal aortic aneurysm that had enlarged from previous study, now s/p EVAR (3/31/23). Endocrinology following for recommendations regarding diabetes management.     A1C: 6.0 %  BUN: 10  Creatinine: 0.51  GFR: 107  Weight: 66.6  BMI: 21.1    # Type 2 diabetes mellitus  - The patient was taking tube feeds with lower caloric density and for less periods of time before admission. His tube feed regimen was switched upon admission and he will need new prescriptions for his current feeding regimen upon discharge (the insulin regimen will be adjusted based on current regimen).   - Please change tube feeds to Jevity 1.5 kwan @95 mL/hr for 10 hrs (950 mL TV, 1425 kcal) starting from tonight (from 8:00 PM to 6:00 AM). Will switch to 10 hours instead of 12 per patient's preference as this will reflect what he will be doing once he goes home.   - Administer 16 units of NPH insulin and 6 units of lispro at 7:30 PM. Plan is for him to transition to a mixed insulin to allow better coverage of tube feeds in the beginning of the night when NPH has not started to take effect.   - Please check fingerstick glucose level at 10:00 PM, 2:00 AM and 6:00 AM, and cover with moderate dose insulin sliding scale.   - Continue lispro moderate dose sliding scale before meals and at bedtime.  - Patient's fingerstick glucose goal is 100-180 mg/dL.    - Patient can continue to follow with his Endocrinologist or can call [facility] at [phone] to make an appointment (he mentioned that he wanted to switch to an endocrinologist within [facility]).     Case discussed with Dr. Richard. Primary team updated.       Cali Garrett    Endocrinology Fellow    Service Pager: [phone]

## 2023-03-31 NOTE — PROGRESS NOTE ADULT - ATTENDING COMMENTS
Pt seen on rounds this afternoon.  Had EVAR procedure today and tolerated well.  Glucoses were stable in the 150 range during the 4 hours of feeds last night, covered with 6 units of lispro.  The main questions in terms of optimizing his tube feeds concern the inadequacy of the calories being provided on his current regimen as well as the problems with inadequate glycemic control  Per the Nutrition service evaluation on admission he is now on Jevity 1.5, though at a lower rate than he uses at home.    --In terms of increasing his caloric input, will continue the Jevity but increase the duration to 10 hours--8 PM to 6 AM the next morning at 95 cc/hr  This will provide 1420 calories  --In terms of the insulin regimen, it probably makes more sense for him to be on a combination of NPH and lispro, since the NPH does not start working for at least 1-2 hours.  He will also need a higher dose of NPH  Given the success with the 6 units of lispro for the 4 hours last night, and with the need for a somewhat higher NPH dose to cover the increased duration, will give 16 units NPH + 6 units lispro before starting the feeds tonight.  Will need a 2 AM fingerstick to watch for hypoglycemia and provide additional coverage if he is over 150.

## 2023-03-31 NOTE — PROGRESS NOTE ADULT - PROBLEM SELECTOR PLAN 6
On Humulin 12 units at bedtime per patient. PCP Dr. Obando states patient was prescribed 8 units. FSG at goal inpatient.   - Collateral from outpatient Endo Dr. Garcia (called and left message)  - mISS  - Monitor FSG On Humulin 12 units at bedtime per patient. PCP Dr. Obando states patient was prescribed 8 units. FSG at goal inpatient. Attempted to obtain collateral from outpatient Endo Dr. Garcia (called and left message).  - Endo consulted; appreciate recs  - mISS  - Monitor FSG

## 2023-03-31 NOTE — PROGRESS NOTE ADULT - NUTRITIONAL ASSESSMENT
Refill request refused  Patient should already have refill on file    Vasile Caruso RN     This patient has been assessed with a concern for Malnutrition and has been determined to have a diagnosis/diagnoses of Severe protein-calorie malnutrition.    This patient is being managed with:   Diet NPO after Midnight-     NPO Start Date: 30-Mar-2023   NPO Start Time: 23:59  Entered: Mar 30 2023  1:02PM    Diet Soft and Bite Sized-  Tube Feeding Modality: Gastrostomy  Jevity 1.5 Jesús (JEVITY1.5)  Total Volume for 24 Hours (mL): 1140  Total Number of Cans: 5  Continuous  Starting Tube Feed Rate {mL per Hour}: 30  Increase Tube Feed Rate by (mL): 15     Every 2 hours  Until Goal Tube Feed Rate (mL per Hour): 95  Tube Feed Duration (in Hours): 12  Tube Feed Start Time: 20:00  Tube Feed Stop Time: 08:00  Entered: Mar 29 2023 11:21AM    Diet Soft and Bite Sized-  Tube Feeding Modality: Jejunostomy  Jevity 1.5 Jesús (JEVITY1.5)  Total Volume for 24 Hours (mL): 1140  Total Number of Cans: 5  Continuous  Until Goal Tube Feed Rate (mL per Hour): 95  Tube Feed Duration (in Hours): 12  Tube Feed Start Time: 20:00  Tube Feed Stop Time: 08:00  Pump   Rate (mL per Hour): 150   Frequency: Every 4 Hours  Entered: Mar 27 2023 12:16PM    The following pending diet order is being considered for treatment of Severe protein-calorie malnutrition:null

## 2023-04-01 LAB
ANION GAP SERPL CALC-SCNC: 5 MMOL/L — SIGNIFICANT CHANGE UP (ref 5–17)
APTT BLD: 61.7 SEC — HIGH (ref 27.5–35.5)
BUN SERPL-MCNC: 17 MG/DL — SIGNIFICANT CHANGE UP (ref 7–23)
CALCIUM SERPL-MCNC: 8.3 MG/DL — LOW (ref 8.4–10.5)
CHLORIDE SERPL-SCNC: 105 MMOL/L — SIGNIFICANT CHANGE UP (ref 96–108)
CO2 SERPL-SCNC: 26 MMOL/L — SIGNIFICANT CHANGE UP (ref 22–31)
CREAT SERPL-MCNC: 0.7 MG/DL — SIGNIFICANT CHANGE UP (ref 0.5–1.3)
EGFR: 97 ML/MIN/1.73M2 — SIGNIFICANT CHANGE UP
GLUCOSE BLDC GLUCOMTR-MCNC: 118 MG/DL — HIGH (ref 70–99)
GLUCOSE BLDC GLUCOMTR-MCNC: 136 MG/DL — HIGH (ref 70–99)
GLUCOSE BLDC GLUCOMTR-MCNC: 163 MG/DL — HIGH (ref 70–99)
GLUCOSE BLDC GLUCOMTR-MCNC: 163 MG/DL — HIGH (ref 70–99)
GLUCOSE BLDC GLUCOMTR-MCNC: 182 MG/DL — HIGH (ref 70–99)
GLUCOSE BLDC GLUCOMTR-MCNC: 68 MG/DL — LOW (ref 70–99)
GLUCOSE SERPL-MCNC: 193 MG/DL — HIGH (ref 70–99)
HCT VFR BLD CALC: 30 % — LOW (ref 39–50)
HGB BLD-MCNC: 9.7 G/DL — LOW (ref 13–17)
MAGNESIUM SERPL-MCNC: 1.9 MG/DL — SIGNIFICANT CHANGE UP (ref 1.6–2.6)
MCHC RBC-ENTMCNC: 32.3 GM/DL — SIGNIFICANT CHANGE UP (ref 32–36)
MCHC RBC-ENTMCNC: 33.2 PG — SIGNIFICANT CHANGE UP (ref 27–34)
MCV RBC AUTO: 102.7 FL — HIGH (ref 80–100)
NRBC # BLD: 0 /100 WBCS — SIGNIFICANT CHANGE UP (ref 0–0)
PHOSPHATE SERPL-MCNC: 2.9 MG/DL — SIGNIFICANT CHANGE UP (ref 2.5–4.5)
PLATELET # BLD AUTO: 231 K/UL — SIGNIFICANT CHANGE UP (ref 150–400)
POTASSIUM SERPL-MCNC: 4.1 MMOL/L — SIGNIFICANT CHANGE UP (ref 3.5–5.3)
POTASSIUM SERPL-SCNC: 4.1 MMOL/L — SIGNIFICANT CHANGE UP (ref 3.5–5.3)
RBC # BLD: 2.92 M/UL — LOW (ref 4.2–5.8)
RBC # FLD: 14.1 % — SIGNIFICANT CHANGE UP (ref 10.3–14.5)
SODIUM SERPL-SCNC: 136 MMOL/L — SIGNIFICANT CHANGE UP (ref 135–145)
WBC # BLD: 12.01 K/UL — HIGH (ref 3.8–10.5)
WBC # FLD AUTO: 12.01 K/UL — HIGH (ref 3.8–10.5)

## 2023-04-01 PROCEDURE — 93010 ELECTROCARDIOGRAM REPORT: CPT

## 2023-04-01 PROCEDURE — 99222 1ST HOSP IP/OBS MODERATE 55: CPT

## 2023-04-01 RX ORDER — APIXABAN 2.5 MG/1
2.5 TABLET, FILM COATED ORAL EVERY 12 HOURS
Refills: 0 | Status: DISCONTINUED | OUTPATIENT
Start: 2023-04-01 | End: 2023-04-11

## 2023-04-01 RX ADMIN — Medication 1: at 18:23

## 2023-04-01 RX ADMIN — Medication 100 MILLIGRAM(S): at 02:07

## 2023-04-01 RX ADMIN — TAMSULOSIN HYDROCHLORIDE 0.4 MILLIGRAM(S): 0.4 CAPSULE ORAL at 22:27

## 2023-04-01 RX ADMIN — HUMAN INSULIN 16 UNIT(S): 100 INJECTION, SUSPENSION SUBCUTANEOUS at 12:31

## 2023-04-01 RX ADMIN — APIXABAN 2.5 MILLIGRAM(S): 2.5 TABLET, FILM COATED ORAL at 12:30

## 2023-04-01 RX ADMIN — Medication 6 UNIT(S): at 19:46

## 2023-04-01 RX ADMIN — POLYETHYLENE GLYCOL 3350 17 GRAM(S): 17 POWDER, FOR SOLUTION ORAL at 16:10

## 2023-04-01 RX ADMIN — Medication 100 MICROGRAM(S): at 10:09

## 2023-04-01 RX ADMIN — ZOLPIDEM TARTRATE 5 MILLIGRAM(S): 10 TABLET ORAL at 02:18

## 2023-04-01 RX ADMIN — SENNA PLUS 2 TABLET(S): 8.6 TABLET ORAL at 22:27

## 2023-04-01 RX ADMIN — APIXABAN 2.5 MILLIGRAM(S): 2.5 TABLET, FILM COATED ORAL at 22:26

## 2023-04-01 RX ADMIN — PANTOPRAZOLE SODIUM 40 MILLIGRAM(S): 20 TABLET, DELAYED RELEASE ORAL at 14:05

## 2023-04-01 RX ADMIN — Medication 1 GRAM(S): at 14:05

## 2023-04-01 RX ADMIN — HEPARIN SODIUM 14 UNIT(S)/HR: 5000 INJECTION INTRAVENOUS; SUBCUTANEOUS at 08:41

## 2023-04-01 RX ADMIN — Medication 1 GRAM(S): at 07:30

## 2023-04-01 RX ADMIN — Medication 1: at 07:17

## 2023-04-01 RX ADMIN — ZOLPIDEM TARTRATE 5 MILLIGRAM(S): 10 TABLET ORAL at 20:45

## 2023-04-01 RX ADMIN — Medication 1 GRAM(S): at 18:23

## 2023-04-01 NOTE — PROVIDER CONTACT NOTE (OTHER) - SITUATION
Received pt in am in bed while change of shifts. Pt asking for more insulin despite covered with 1 unit of lispro as per sliding scale by night RN. Pt refused PEG tubes and demand to be disconnected.

## 2023-04-01 NOTE — PROGRESS NOTE ADULT - SUBJECTIVE AND OBJECTIVE BOX
Patient is a 72y old  Male who presents with a chief complaint of Fever (01 Apr 2023 06:14)    INTERVAL EVENTS:  last bowel movement yesterday .   tolerating diet   BGs within target range today     SUBJECTIVE:  Patient was seen and examined at bedside.  Review of systems: No CP, dyspnea, nausea or vomiting, dysuria, LE edema.     MEDICATIONS:  MEDICATIONS  (STANDING):  apixaban 2.5 milliGRAM(s) Oral every 12 hours  dextrose 5%. 1000 milliLiter(s) (100 mL/Hr) IV Continuous <Continuous>  dextrose 5%. 1000 milliLiter(s) (50 mL/Hr) IV Continuous <Continuous>  dextrose 50% Injectable 25 Gram(s) IV Push once  dextrose 50% Injectable 12.5 Gram(s) IV Push once  dextrose 50% Injectable 25 Gram(s) IV Push once  glucagon  Injectable 1 milliGRAM(s) IntraMuscular once  insulin lispro (ADMELOG) corrective regimen sliding scale   SubCutaneous three times a day before meals  insulin lispro (ADMELOG) corrective regimen sliding scale   SubCutaneous at bedtime  insulin lispro Injectable (ADMELOG) 6 Unit(s) SubCutaneous <User Schedule>  insulin NPH human recombinant 16 Unit(s) SubCutaneous daily  levothyroxine 100 MICROGram(s) Oral every 24 hours  pantoprazole    Tablet 40 milliGRAM(s) Oral daily  polyethylene glycol 3350 17 Gram(s) Oral daily  senna 2 Tablet(s) Oral at bedtime  sucralfate suspension 1 Gram(s) Oral four times a day  tamsulosin 0.4 milliGRAM(s) Oral at bedtime    MEDICATIONS  (PRN):  acetaminophen     Tablet .. 650 milliGRAM(s) Oral every 6 hours PRN Temp greater or equal to 38C (100.4F), Mild Pain (1 - 3)  dextrose Oral Gel 15 Gram(s) Oral once PRN Blood Glucose LESS THAN 70 milliGRAM(s)/deciliter  melatonin 3 milliGRAM(s) Oral at bedtime PRN Insomnia  ondansetron Injectable 4 milliGRAM(s) IV Push once PRN Nausea and/or Vomiting  zolpidem 5 milliGRAM(s) Oral at bedtime PRN Insomnia  zolpidem 5 milliGRAM(s) Oral at bedtime PRN Insomnia      Allergies    No Known Allergies    Intolerances        OBJECTIVE:  Vital Signs Last 24 Hrs  T(C): 36.6 (01 Apr 2023 18:33), Max: 37.1 (01 Apr 2023 05:13)  T(F): 97.9 (01 Apr 2023 18:33), Max: 98.7 (01 Apr 2023 05:13)  HR: 60 (01 Apr 2023 20:29) (57 - 67)  BP: 118/57 (01 Apr 2023 20:29) (95/61 - 119/58)  BP(mean): --  RR: 18 (01 Apr 2023 20:29) (16 - 18)  SpO2: 94% (01 Apr 2023 20:29) (94% - 97%)    Parameters below as of 01 Apr 2023 20:29  Patient On (Oxygen Delivery Method): room air      I&O's Summary    31 Mar 2023 07:01  -  01 Apr 2023 07:00  --------------------------------------------------------  IN: 1366 mL / OUT: 1625 mL / NET: -259 mL    01 Apr 2023 07:01  -  01 Apr 2023 22:18  --------------------------------------------------------  IN: 372 mL / OUT: 300 mL / NET: 72 mL        PHYSICAL EXAM:  Gen: Reclining in bed at time of exam, appears stated age  HEENT: NCAT, MMM, clear OP  Neck: supple, trachea at midline  CV: borderline bradycardia ; +S1/S2 ; occasional irregular beats on tele   Pulm: adequate respiratory effort, no increase in work of breathing  Abd: soft, ND  Skin: warm and dry,   Ext: No LE edema  ; groin incision sites clean, dry, no oozing   Neuro: AOx3, speaking in full sentences  Psych: affect and behavior appropriate, pleasant at time of interview    LABS:                        9.7    12.01 )-----------( 231      ( 01 Apr 2023 07:00 )             30.0     04-01    136  |  105  |  17  ----------------------------<  193<H>  4.1   |  26  |  0.70    Ca    8.3<L>      01 Apr 2023 07:00  Phos  2.9     04-01  Mg     1.9     04-01        PT/INR - ( 31 Mar 2023 14:25 )   PT: 14.1 sec;   INR: 1.18          PTT - ( 01 Apr 2023 07:01 )  PTT:61.7 sec  CAPILLARY BLOOD GLUCOSE      POCT Blood Glucose.: 118 mg/dL (01 Apr 2023 21:55)  POCT Blood Glucose.: 68 mg/dL (01 Apr 2023 21:20)  POCT Blood Glucose.: 163 mg/dL (01 Apr 2023 18:05)  POCT Blood Glucose.: 136 mg/dL (01 Apr 2023 12:20)  POCT Blood Glucose.: 182 mg/dL (01 Apr 2023 06:42)  POCT Blood Glucose.: 163 mg/dL (01 Apr 2023 02:07)        MICRODATA:      RADIOLOGY/OTHER STUDIES:

## 2023-04-01 NOTE — PROGRESS NOTE ADULT - SUBJECTIVE AND OBJECTIVE BOX
O/N: heparin infusion started                           Assessment and Plan:   · Assessment	  73 y/o M with a PMHx of gastric CA, DM Type 2, Hypothyroidism, HTN and anemia s/p EGD with esophageal biopsy 3/23 presents to the ED c/o fevers, low back pain x 2 days. Admitted for sepsis and LARRY i/s/ bladder outlet obstruction. Vascular Surgery consulted for enlarging infrarenal AAA. Non smoker, no first degree relatives with dx, asymptomatic. Avss, exam benign. Imaging demonstrates 5.5cm distal AAA, previously 4.7cm 11/21. Patient was evaluated in 3/2022 by VS and recommended for surveillance. CTA obtained and results reviewed. Patient is candidate for EVAR, and amenable to repair.     - Continue Hep gtt  - Added on to Cath Lab for Friday  - Preop for OR Friday: NPO after MN, hold TF after MN, AM CBC, BMP, Mg, Phos, Coag, TandS, updated COVID swab  - TTE per Cards  - Glucose control  - Vascular to continue to follow  DAILY PROGRESS NOTE    O/N: heparin infusion started     S: Patient feels well. Has had no issues related to back pain post-procedure. No lumps or pain in his groin. No weakness or pain in his groin. Has not had a bowel movement but also no diarrhea.     O:     T(C): 36.6 (04-01-23 @ 09:24), Max: 37.1 (04-01-23 @ 05:13)  HR: 57 (04-01-23 @ 08:36) (56 - 104)  BP: 110/58 (04-01-23 @ 08:36) (84/50 - 112/57)  RR: 16 (04-01-23 @ 08:36) (14 - 22)  SpO2: 94% (04-01-23 @ 08:36) (93% - 97%)    Physical Exam:     General: Awake, alert. Appears somewhat cachectic and chronically ill.   CV: HDS, WWP.   Pulm: Breathing comfortably on room air.   Abd: S/NT/ND.   Extremity:   Vascular:     I/O (24h)  I:   O:   N:     Labs:       Rads:     A/P:                               Assessment and Plan:   · Assessment	  73 y/o M with a PMHx of gastric CA, DM Type 2, Hypothyroidism, HTN and anemia s/p EGD with esophageal biopsy 3/23 presents to the ED c/o fevers, low back pain x 2 days. Admitted for sepsis and LARRY i/s/ bladder outlet obstruction. Vascular Surgery consulted for enlarging infrarenal AAA. Non smoker, no first degree relatives with dx, asymptomatic. Avss, exam benign. Imaging demonstrates 5.5cm distal AAA, previously 4.7cm 11/21. Patient was evaluated in 3/2022 by VS and recommended for surveillance. CTA obtained and results reviewed. Patient is candidate for EVAR, and amenable to repair.     - Continue Hep gtt  - Added on to Cath Lab for Friday  - Preop for OR Friday: NPO after MN, hold TF after MN, AM CBC, BMP, Mg, Phos, Coag, TandS, updated COVID swab  - TTE per Cards  - Glucose control  - Vascular to continue to follow  DAILY PROGRESS NOTE    O/N: heparin infusion started. Discontinued in AM.     S: Patient feels well. Has had no issues related to back pain post-procedure. No lumps or pain in his groin. No weakness or pain in his groin. No complaints related to his feet or legs. Has not had a bowel movement but also no diarrhea.     O:     T(C): 36.6 (04-01-23 @ 09:24), Max: 37.1 (04-01-23 @ 05:13)  HR: 57 (04-01-23 @ 08:36) (56 - 104)  BP: 110/58 (04-01-23 @ 08:36) (84/50 - 112/57)  RR: 16 (04-01-23 @ 08:36) (14 - 22)  SpO2: 94% (04-01-23 @ 08:36) (93% - 97%)    Physical Exam:     General: Awake, alert. Appears somewhat cachectic and chronically ill.   CV: HDS, WWP.   Pulm: Breathing comfortably on room air.   Abd: S/NT/ND. Bilateral groin punctures soft w/o masses.   Vascular: RLE: 1+DP/PTs; LLE: 2+DP/PTs    I/O (shift/24h)  O: 350/1620    Labs:                       9.7    12.01 )-----------( 231      ( 01 Apr 2023 07:00 )             30.0   04-01    136  |  105  |  17  ----------------------------<  193<H>  4.1   |  26  |  0.70    Ca    8.3<L>      01 Apr 2023 07:00  Phos  2.9     04-01  Mg     1.9     04-01    Assessment and Plan:   · Assessment	  73 y/o M with a PMHx of gastric CA, DM Type 2, Hypothyroidism, HTN and anemia s/p EGD with esophageal biopsy 3/23 presents to the ED c/o fevers, low back pain x 2 days. Admitted for sepsis and LARRY i/s/ bladder outlet obstruction. Vascular Surgery consulted for enlarging infrarenal AAA. Non smoker, no first degree relatives with dx, asymptomatic. Avss, exam benign. Imaging demonstrates 5.5cm distal AAA, previously 4.7cm 11/21. Patient was evaluated in 3/2022 by VS and recommended for surveillance. CTA obtained and results reviewed. Patient is candidate for EVAR, and amenable to repair.     AAA s/p EVAR 3/31  - Doing well  - Acceptable for regional bed if transferred  - will follow up first post-procedure BM to evaluate for ischemic colitis    T2DM, insulin dependent  - Endocrine requesting adjustment to patient's tube feed orders to: Jevity 1.5 kwan @95 mL/hr for 10 hrs (950 mL TV, 1425 kcal) starting from tonight (from 8:00 PM to 6:00 AM).  - Administer 16 units of NPH insulin and 6 units of lispro at 7:30 PM.  - Plan is for him to transition to a mixed insulin to allow better coverage of tube feeds in the beginning of the night when NPH has not started to take effect.  - MDSSI q4h overnight (10pm, 2am, 6am)  - Lispro MDSSI before meals and at bedtime  - BG goal 100-180    LARRY 2/2 obstruction  - Abarca in place  - Discussed with urology coverage 4/1 AM; plan for Abarca removal at 4/2 AM (or otherwise on AM of discharge day) and TOV given concern about general anesthesia and constipation contributing to propensity for retention  - F/u outpatient at [facility] urology    Esophagitis  - Protonix 40mg QD    Gastric cancer  - Chronic, in remission    Portal Vein Thrombosis  - Initially diagnosed in 2021. Unclear from notes I've reviewed the specific indication for ongoing treatment or the status of the thrombus on follow up imaging.   - Discontinue heparin GTT and transition to home Eliquis BID    F: None  E: Replete PRN  N: Jevity 1.5 kwan @95 mL/hr for 10 hrs (950 mL TV, 1425 kcal) x 10h; soft and bite sized diet  GI PPX: PPI  DVT PPX: Eliquis  Code: Full  Dispo: Pending medical readiness     DAILY PROGRESS NOTE    O/N: heparin infusion started. Discontinued in AM.     S: Patient feels well. Has had no issues related to back pain post-procedure. No lumps or pain in his groin. No weakness or pain in his groin. No complaints related to his feet or legs. Has not had a bowel movement but also no diarrhea.     O:     T(C): 36.6 (04-01-23 @ 09:24), Max: 37.1 (04-01-23 @ 05:13)  HR: 57 (04-01-23 @ 08:36) (56 - 104)  BP: 110/58 (04-01-23 @ 08:36) (84/50 - 112/57)  RR: 16 (04-01-23 @ 08:36) (14 - 22)  SpO2: 94% (04-01-23 @ 08:36) (93% - 97%)    Physical Exam:     General: Awake, alert. Appears somewhat cachectic and chronically ill.   CV: HDS, WWP.   Pulm: Breathing comfortably on room air.   Abd: S/NT/ND. Bilateral groin punctures soft w/o masses.   Vascular: RLE: 1+DP/PTs; LLE: 2+DP/PTs    I/O (shift/24h)  O: 350/1620    Labs:                       9.7    12.01 )-----------( 231      ( 01 Apr 2023 07:00 )             30.0   04-01    136  |  105  |  17  ----------------------------<  193<H>  4.1   |  26  |  0.70    Ca    8.3<L>      01 Apr 2023 07:00  Phos  2.9     04-01  Mg     1.9     04-01    Assessment and Plan:   · Assessment	  71 y/o M with a PMHx of gastric CA, DM Type 2, Hypothyroidism, HTN and anemia s/p EGD with esophageal biopsy 3/23 presents to the ED c/o fevers, low back pain x 2 days. Admitted for sepsis and LARRY i/s/ bladder outlet obstruction. Vascular Surgery consulted for enlarging infrarenal AAA. Non smoker, no first degree relatives with dx, asymptomatic. Avss, exam benign. Imaging demonstrates 5.5cm distal AAA, previously 4.7cm 11/21. Patient was evaluated in 3/2022 by VS and recommended for surveillance. CTA obtained and results reviewed. Patient is candidate for EVAR, and amenable to repair.     AAA s/p EVAR 3/31  - Doing well  - Acceptable for regional bed if transferred  - will follow up first post-procedure BM to evaluate for ischemic colitis    T2DM, insulin dependent  - Endocrine requesting adjustment to patient's tube feed orders to: Jevity 1.5 kwan @95 mL/hr for 10 hrs (950 mL TV, 1425 kcal) starting from tonight (from 8:00 PM to 6:00 AM).  - Administer 16 units of NPH insulin and 6 units of lispro at 7:30 PM.  - Plan is for him to transition to a mixed insulin to allow better coverage of tube feeds in the beginning of the night when NPH has not started to take effect.  - MDSSI q4h overnight (10pm, 2am, 6am)  - Lispro MDSSI before meals and at bedtime  - BG goal 100-180    LARRY 2/2 obstruction  - Abarca in place  - Discussed with urology coverage 4/1 AM; plan for Abarca removal at 4/2 AM (or otherwise on AM of discharge day) and TOV given concern about general anesthesia and constipation contributing to propensity for retention  - F/u outpatient at [facility] urology  - On flomax    Esophagitis  - Protonix 40mg QD    Gastric cancer  - Chronic, in remission    Portal Vein Thrombosis  - Initially diagnosed in 2021. Unclear from notes I've reviewed the specific indication for ongoing treatment or the status of the thrombus on follow up imaging.   - Discontinue heparin GTT and transition to home Eliquis BID    Hypothyroidism: continue synthroid    F: None  E: Replete PRN  N: Jevity 1.5 kwan @95 mL/hr for 10 hrs (950 mL TV, 1425 kcal) x 10h; soft and bite sized diet  GI PPX: PPI  DVT PPX: Eliquis  Code: Full  Dispo: Pending medical readiness

## 2023-04-01 NOTE — PROGRESS NOTE ADULT - ASSESSMENT
71 y/o M with a PMHx of gastric CA, DM Type 2, Hypothyroidism, HTN and anemia s/p EGD with esophageal biopsy 3/23 presents to the ED c/o fevers, low back pain x 2 days. Admitted for sepsis and LARRY, 2/2 hydronephrosis, hospital course c/b post-obstructive diuresis.   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 Pending trial of void.    # AAA - s/p EVAR on 3/31/23.   # Acute kidney injury - resolved   # Urinary retention - plan for Trial of void tomorrow morning. patient prefers leaving without joyner if possible  # GERD with esophagitis - s/p EGD with biopsy on 3/23. 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 monitoring. wound care per gen surg consult (3/28)   # Severe protein calorie malnutrition - temporal wasting on physical exam ; dietary supplements per nutrition consult     DVT ppx - already on apixaban for portal vein thrombosis

## 2023-04-02 LAB
ANION GAP SERPL CALC-SCNC: 10 MMOL/L — SIGNIFICANT CHANGE UP (ref 5–17)
BUN SERPL-MCNC: 15 MG/DL — SIGNIFICANT CHANGE UP (ref 7–23)
CALCIUM SERPL-MCNC: 8.9 MG/DL — SIGNIFICANT CHANGE UP (ref 8.4–10.5)
CHLORIDE SERPL-SCNC: 101 MMOL/L — SIGNIFICANT CHANGE UP (ref 96–108)
CO2 SERPL-SCNC: 26 MMOL/L — SIGNIFICANT CHANGE UP (ref 22–31)
CREAT SERPL-MCNC: 0.66 MG/DL — SIGNIFICANT CHANGE UP (ref 0.5–1.3)
EGFR: 99 ML/MIN/1.73M2 — SIGNIFICANT CHANGE UP
GLUCOSE BLDC GLUCOMTR-MCNC: 142 MG/DL — HIGH (ref 70–99)
GLUCOSE BLDC GLUCOMTR-MCNC: 156 MG/DL — HIGH (ref 70–99)
GLUCOSE BLDC GLUCOMTR-MCNC: 177 MG/DL — HIGH (ref 70–99)
GLUCOSE BLDC GLUCOMTR-MCNC: 186 MG/DL — HIGH (ref 70–99)
GLUCOSE BLDC GLUCOMTR-MCNC: 217 MG/DL — HIGH (ref 70–99)
GLUCOSE BLDC GLUCOMTR-MCNC: 234 MG/DL — HIGH (ref 70–99)
GLUCOSE SERPL-MCNC: 122 MG/DL — HIGH (ref 70–99)
HCT VFR BLD CALC: 35.9 % — LOW (ref 39–50)
HGB BLD-MCNC: 11.2 G/DL — LOW (ref 13–17)
MAGNESIUM SERPL-MCNC: 2.1 MG/DL — SIGNIFICANT CHANGE UP (ref 1.6–2.6)
MCHC RBC-ENTMCNC: 31.2 GM/DL — LOW (ref 32–36)
MCHC RBC-ENTMCNC: 32.7 PG — SIGNIFICANT CHANGE UP (ref 27–34)
MCV RBC AUTO: 104.7 FL — HIGH (ref 80–100)
NRBC # BLD: 0 /100 WBCS — SIGNIFICANT CHANGE UP (ref 0–0)
PHOSPHATE SERPL-MCNC: 2.4 MG/DL — LOW (ref 2.5–4.5)
PLATELET # BLD AUTO: 252 K/UL — SIGNIFICANT CHANGE UP (ref 150–400)
POTASSIUM SERPL-MCNC: 4.5 MMOL/L — SIGNIFICANT CHANGE UP (ref 3.5–5.3)
POTASSIUM SERPL-SCNC: 4.5 MMOL/L — SIGNIFICANT CHANGE UP (ref 3.5–5.3)
RBC # BLD: 3.43 M/UL — LOW (ref 4.2–5.8)
RBC # FLD: 14.2 % — SIGNIFICANT CHANGE UP (ref 10.3–14.5)
SODIUM SERPL-SCNC: 137 MMOL/L — SIGNIFICANT CHANGE UP (ref 135–145)
WBC # BLD: 10.14 K/UL — SIGNIFICANT CHANGE UP (ref 3.8–10.5)
WBC # FLD AUTO: 10.14 K/UL — SIGNIFICANT CHANGE UP (ref 3.8–10.5)

## 2023-04-02 PROCEDURE — 99232 SBSQ HOSP IP/OBS MODERATE 35: CPT

## 2023-04-02 RX ORDER — INSULIN LISPRO 100/ML
1 VIAL (ML) SUBCUTANEOUS ONCE
Refills: 0 | Status: COMPLETED | OUTPATIENT
Start: 2023-04-02 | End: 2023-04-02

## 2023-04-02 RX ORDER — INSULIN LISPRO 100/ML
VIAL (ML) SUBCUTANEOUS
Refills: 0 | Status: DISCONTINUED | OUTPATIENT
Start: 2023-04-02 | End: 2023-04-11

## 2023-04-02 RX ORDER — INSULIN LISPRO 100/ML
1 VIAL (ML) SUBCUTANEOUS ONCE
Refills: 0 | Status: DISCONTINUED | OUTPATIENT
Start: 2023-04-02 | End: 2023-04-02

## 2023-04-02 RX ADMIN — Medication 62.5 MILLIMOLE(S): at 12:01

## 2023-04-02 RX ADMIN — Medication 1: at 06:48

## 2023-04-02 RX ADMIN — Medication 1 GRAM(S): at 06:30

## 2023-04-02 RX ADMIN — Medication 2: at 23:00

## 2023-04-02 RX ADMIN — HUMAN INSULIN 16 UNIT(S): 100 INJECTION, SUSPENSION SUBCUTANEOUS at 13:53

## 2023-04-02 RX ADMIN — Medication 1 UNIT(S): at 03:18

## 2023-04-02 RX ADMIN — Medication 6 UNIT(S): at 19:37

## 2023-04-02 RX ADMIN — ZOLPIDEM TARTRATE 5 MILLIGRAM(S): 10 TABLET ORAL at 21:35

## 2023-04-02 RX ADMIN — SENNA PLUS 2 TABLET(S): 8.6 TABLET ORAL at 21:35

## 2023-04-02 RX ADMIN — APIXABAN 2.5 MILLIGRAM(S): 2.5 TABLET, FILM COATED ORAL at 21:36

## 2023-04-02 RX ADMIN — Medication 2: at 17:33

## 2023-04-02 RX ADMIN — TAMSULOSIN HYDROCHLORIDE 0.4 MILLIGRAM(S): 0.4 CAPSULE ORAL at 21:36

## 2023-04-02 RX ADMIN — APIXABAN 2.5 MILLIGRAM(S): 2.5 TABLET, FILM COATED ORAL at 11:52

## 2023-04-02 RX ADMIN — Medication 1 GRAM(S): at 17:55

## 2023-04-02 RX ADMIN — Medication 1 GRAM(S): at 00:13

## 2023-04-02 RX ADMIN — ZOLPIDEM TARTRATE 5 MILLIGRAM(S): 10 TABLET ORAL at 00:12

## 2023-04-02 RX ADMIN — Medication 1 GRAM(S): at 11:53

## 2023-04-02 RX ADMIN — PANTOPRAZOLE SODIUM 40 MILLIGRAM(S): 20 TABLET, DELAYED RELEASE ORAL at 11:52

## 2023-04-02 NOTE — CHART NOTE - NSCHARTNOTEFT_GEN_A_CORE
Patient is a 72M with PMH of gastric cancer, T2DM, hypothyroidism, HTN, anemia S/p EGD with esophageal biopsy 3/23 admitted for sepsis and LARRY. Past  hx of BPH on flomax, found to have on CT mild bilateral hydronephrosis likely secondary to bladder outlet obstruction and enlarged prostate. Pt was found to be in urinary retention- s/p joyner catheter placement in ED, now with resolving LARRY. CT angio ab/pelvis with delayed phase shows no evidence of b/l hydro, stricture, nor obstruction and stable from scan on 6/2022 .    Urology notified 4/2/23 that patient failed TOV overnight with 500 cc PVR after joyner was discontinued. Given that bilateral hydropehrosis with LARRY was a direct result of urinary retention and resolved with joyner placement, joyner catheter replacement following discharge is strongly advised. Patient needs to be counseled on risks of renal injury, bladder injury, and UTI with progression to sepsis if he is discharged without joyner catheter.    Patient requests urology follow up at [facility]. Can set up an appointment by calling the [facility] at [facility] at [phone]

## 2023-04-02 NOTE — PROGRESS NOTE ADULT - NUTRITIONAL ASSESSMENT
This patient has been assessed with a concern for Malnutrition and has been determined to have a diagnosis/diagnoses of Severe protein-calorie malnutrition.    This patient is being managed with:   Diet Soft and Bite Sized-  Tube Feeding Modality: Gastrostomy  Jevity 1.5 Jesús (JEVITY1.5)  Total Volume for 24 Hours (mL): 950  Total Number of Cans: 4  Continuous  Starting Tube Feed Rate {mL per Hour}: 95     Every 2 hours  Until Goal Tube Feed Rate (mL per Hour): 95  Tube Feed Duration (in Hours): 10  Tube Feed Start Time: 20:00  Tube Feed Stop Time: 06:00  Entered: Apr 1 2023 10:32PM    Diet Soft and Bite Sized-  Tube Feeding Modality: Jejunostomy  Jevity 1.5 Jesús (JEVITY1.5)  Total Volume for 24 Hours (mL): 1140  Total Number of Cans: 5  Continuous  Until Goal Tube Feed Rate (mL per Hour): 95  Tube Feed Duration (in Hours): 12  Tube Feed Start Time: 20:00  Tube Feed Stop Time: 08:00  Pump   Rate (mL per Hour): 150   Frequency: Every 4 Hours  Entered: Mar 27 2023 12:16PM    The following pending diet order is being considered for treatment of Severe protein-calorie malnutrition:null

## 2023-04-02 NOTE — PROGRESS NOTE ADULT - SUBJECTIVE AND OBJECTIVE BOX
ON: Abarca removed at midnight           71 y/o M with a PMHx of gastric CA, DM Type 2, Hypothyroidism, HTN and anemia s/p EGD with esophageal biopsy 3/23 presents to the ED c/o fevers, low back pain x 2 days. Admitted for sepsis and LARRY i/s/ bladder outlet obstruction. Vascular Surgery consulted for enlarging infrarenal AAA. Non smoker, no first degree relatives with dx, asymptomatic. Avss, exam benign. Imaging demonstrates 5.5cm distal AAA, previously 4.7cm 11/21. Patient was evaluated in 3/2022 by VS and recommended for surveillance. CTA obtained and results reviewed. Patient is candidate for EVAR, and amenable to repair.     AAA s/p EVAR 3/31  - Doing well  - Acceptable for regional bed if transferred  - will follow up first post-procedure BM to evaluate for ischemic colitis    T2DM, insulin dependent  - Endocrine requesting adjustment to patient's tube feed orders to: Jevity 1.5 kwan @95 mL/hr for 10 hrs (950 mL TV, 1425 kcal) starting from tonight (from 8:00 PM to 6:00 AM).  - Administer 16 units of NPH insulin and 6 units of lispro at 7:30 PM.  - Plan is for him to transition to a mixed insulin to allow better coverage of tube feeds in the beginning of the night when NPH has not started to take effect.  - MDSSI q4h overnight (10pm, 2am, 6am)  - Lispro MDSSI before meals and at bedtime  - BG goal 100-180    LARRY 2/2 obstruction  - Abarca in place  - Discussed with urology coverage 4/1 AM; plan for Abarca removal at 4/2 AM (or otherwise on AM of discharge day) and TOV given concern about general anesthesia and constipation contributing to propensity for retention  - F/u outpatient at [facility] urology  - On flomax    Esophagitis  - Protonix 40mg QD    Gastric cancer  - Chronic, in remission    Portal Vein Thrombosis  - Initially diagnosed in 2021. Unclear from notes I've reviewed the specific indication for ongoing treatment or the status of the thrombus on follow up imaging.   - Discontinue heparin GTT and transition to home Eliquis BID    Hypothyroidism: continue synthroid    F: None  E: Replete PRN  N: Jevity 1.5 kwan @95 mL/hr for 10 hrs (950 mL TV, 1425 kcal) x 10h; soft and bite sized diet  GI PPX: PPI  DVT PPX: Eliquis  Code: Full  Dispo: Pending medical readiness   ON: Abarca removed at midnight     SUBJECTIVE: Patient seen at bedside in no acute distress. Still has not voided, nor feels the urge to. Denies any suprapubic discomfort, CP, SOB, fevers or chills.     Vital Signs Last 24 Hrs  T(C): 37.3 (02 Apr 2023 08:59), Max: 37.3 (02 Apr 2023 08:59)  T(F): 99.2 (02 Apr 2023 08:59), Max: 99.2 (02 Apr 2023 08:59)  HR: 71 (02 Apr 2023 09:07) (60 - 71)  BP: 116/68 (02 Apr 2023 09:07) (96/55 - 121/61)  BP(mean): --  RR: 18 (02 Apr 2023 09:07) (16 - 18)  SpO2: 97% (02 Apr 2023 09:07) (94% - 97%)    Parameters below as of 02 Apr 2023 09:07  Patient On (Oxygen Delivery Method): room air        Physical Exam:  General: Awake, alert. Appears somewhat cachectic and chronically ill.   CV: HDS, WWP.   Pulm: Breathing comfortably on room air.   Abd: S/NT/ND. Bilateral groin punctures soft w/o masses.   Vascular: RLE: 1+DP/PTs; LLE: 2+DP/PTs    Lines/drains/tubes:    I&O's Summary    01 Apr 2023 07:01  -  02 Apr 2023 07:00  --------------------------------------------------------  IN: 1402 mL / OUT: 500 mL / NET: 902 mL        LABS:                        11.2   10.14 )-----------( 252      ( 02 Apr 2023 05:30 )             35.9     04-02    137  |  101  |  15  ----------------------------<  122<H>  4.5   |  26  |  0.66    Ca    8.9      02 Apr 2023 05:30  Phos  2.4     04-02  Mg     2.1     04-02      PT/INR - ( 31 Mar 2023 14:25 )   PT: 14.1 sec;   INR: 1.18          PTT - ( 01 Apr 2023 07:01 )  PTT:61.7 sec    CAPILLARY BLOOD GLUCOSE      POCT Blood Glucose.: 177 mg/dL (02 Apr 2023 06:21)  POCT Blood Glucose.: 234 mg/dL (02 Apr 2023 01:57)  POCT Blood Glucose.: 118 mg/dL (01 Apr 2023 21:55)  POCT Blood Glucose.: 68 mg/dL (01 Apr 2023 21:20)  POCT Blood Glucose.: 163 mg/dL (01 Apr 2023 18:05)  POCT Blood Glucose.: 136 mg/dL (01 Apr 2023 12:20)        RADIOLOGY & ADDITIONAL STUDIES:          71 y/o M with a PMHx of gastric CA, DM Type 2, Hypothyroidism, HTN and anemia s/p EGD with esophageal biopsy 3/23 presents to the ED c/o fevers, low back pain x 2 days. Admitted for sepsis and LARRY i/s/ bladder outlet obstruction. Vascular Surgery consulted for enlarging infrarenal AAA. Non smoker, no first degree relatives with dx, asymptomatic. Avss, exam benign. Imaging demonstrates 5.5cm distal AAA, previously 4.7cm 11/21. Patient was evaluated in 3/2022 by VS and recommended for surveillance. CTA obtained and results reviewed. Patient is candidate for EVAR, and amenable to repair.     AAA s/p EVAR 3/31  - Doing well  - Acceptable for regional bed if transferred  - will follow up first post-procedure BM to evaluate for ischemic colitis    T2DM, insulin dependent  - Endocrine requesting adjustment to patient's tube feed orders to: Jevity 1.5 kwan @95 mL/hr for 10 hrs (950 mL TV, 1425 kcal) starting from tonight (from 8:00 PM to 6:00 AM).  - Administer 16 units of NPH insulin and 6 units of lispro at 7:30 PM.  - Plan is for him to transition to a mixed insulin to allow better coverage of tube feeds in the beginning of the night when NPH has not started to take effect.  - MDSSI q4h overnight (10pm, 2am, 6am)  - Lispro MDSSI before meals and at bedtime  - BG goal 100-180    LARRY 2/2 obstruction  - Abarca in place  - Discussed with urology coverage 4/1 AM; plan for Abarca removal at 4/2 AM (or otherwise on AM of discharge day) and TOV given concern about general anesthesia and constipation contributing to propensity for retention  - F/u outpatient at [facility] urology  - On flomax    Esophagitis  - Protonix 40mg QD    Gastric cancer  - Chronic, in remission    Portal Vein Thrombosis  - Initially diagnosed in 2021. Unclear from notes I've reviewed the specific indication for ongoing treatment or the status of the thrombus on follow up imaging.   - Discontinue heparin GTT and transition to home Eliquis BID    Hypothyroidism: continue synthroid    F: None  E: Replete PRN  N: Jevity 1.5 kwan @95 mL/hr for 10 hrs (950 mL TV, 1425 kcal) x 10h; soft and bite sized diet  GI PPX: PPI  DVT PPX: Eliquis  Code: Full  Dispo: Pending TOV

## 2023-04-02 NOTE — PROGRESS NOTE ADULT - ASSESSMENT
73 y/o M with a PMHx of gastric CA, DM Type 2, Hypothyroidism, HTN and anemia s/p EGD with esophageal biopsy 3/23 presents to the ED c/o fevers, low back pain x 2 days. Admitted for sepsis and LARRY, 2/2 hydronephrosis, hospital course c/b post-obstructive diuresis.   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 Pending trial of void.    # AAA - s/p EVAR on 3/31/23.   # Acute kidney injury - resolved   # Urinary retention - Joyner catheter replaced.  patient prefers leaving without joyner if possible     [ ] discuss switching tamsulosin to doxazosin with urology. discuss adding finasteride     # GERD with esophagitis - s/p EGD with biopsy on 3/23. 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 monitoring. wound care per gen surg consult (3/28)   # Severe protein calorie malnutrition - temporal wasting on physical exam ; dietary supplements per nutrition consult     DVT ppx - already on apixaban for portal vein thrombosis

## 2023-04-02 NOTE — PROGRESS NOTE ADULT - SUBJECTIVE AND OBJECTIVE BOX
Patient is a 72y old  Male who presents with a chief complaint of Fever (02 Apr 2023 06:32)    INTERVAL EVENTS:  last bowel movement today  tolerating diet   BGs within target range today     SUBJECTIVE:  Patient was seen and examined at bedside.  Review of systems: No CP, dyspnea, nausea or vomiting, dysuria, LE edema.     MEDICATIONS:  MEDICATIONS  (STANDING):  apixaban 2.5 milliGRAM(s) Oral every 12 hours  dextrose 5%. 1000 milliLiter(s) (50 mL/Hr) IV Continuous <Continuous>  dextrose 5%. 1000 milliLiter(s) (100 mL/Hr) IV Continuous <Continuous>  dextrose 50% Injectable 25 Gram(s) IV Push once  dextrose 50% Injectable 12.5 Gram(s) IV Push once  dextrose 50% Injectable 25 Gram(s) IV Push once  glucagon  Injectable 1 milliGRAM(s) IntraMuscular once  insulin lispro (ADMELOG) corrective regimen sliding scale   SubCutaneous three times a day before meals  insulin lispro Injectable (ADMELOG) 6 Unit(s) SubCutaneous <User Schedule>  insulin NPH human recombinant 16 Unit(s) SubCutaneous daily  levothyroxine 100 MICROGram(s) Oral every 24 hours  pantoprazole    Tablet 40 milliGRAM(s) Oral daily  polyethylene glycol 3350 17 Gram(s) Oral daily  senna 2 Tablet(s) Oral at bedtime  sucralfate suspension 1 Gram(s) Oral four times a day  tamsulosin 0.4 milliGRAM(s) Oral at bedtime    MEDICATIONS  (PRN):  acetaminophen     Tablet .. 650 milliGRAM(s) Oral every 6 hours PRN Temp greater or equal to 38C (100.4F), Mild Pain (1 - 3)  dextrose Oral Gel 15 Gram(s) Oral once PRN Blood Glucose LESS THAN 70 milliGRAM(s)/deciliter  melatonin 3 milliGRAM(s) Oral at bedtime PRN Insomnia  ondansetron Injectable 4 milliGRAM(s) IV Push once PRN Nausea and/or Vomiting  zolpidem 5 milliGRAM(s) Oral at bedtime PRN Insomnia  zolpidem 5 milliGRAM(s) Oral at bedtime PRN Insomnia      Allergies    No Known Allergies    Intolerances        OBJECTIVE:  Vital Signs Last 24 Hrs  T(C): 37.2 (02 Apr 2023 18:38), Max: 37.3 (02 Apr 2023 08:59)  T(F): 98.9 (02 Apr 2023 18:38), Max: 99.2 (02 Apr 2023 08:59)  HR: 68 (02 Apr 2023 20:29) (60 - 74)  BP: 112/66 (02 Apr 2023 20:29) (110/64 - 124/68)  BP(mean): --  RR: 18 (02 Apr 2023 20:29) (18 - 18)  SpO2: 97% (02 Apr 2023 20:29) (94% - 97%)    Parameters below as of 02 Apr 2023 20:29  Patient On (Oxygen Delivery Method): room air      I&O's Summary    01 Apr 2023 07:01  -  02 Apr 2023 07:00  --------------------------------------------------------  IN: 1402 mL / OUT: 500 mL / NET: 902 mL    02 Apr 2023 07:01  -  02 Apr 2023 23:19  --------------------------------------------------------  IN: 120 mL / OUT: 1040 mL / NET: -920 mL      PHYSICAL EXAM:  Gen: Reclining in bed at time of exam, appears stated age  HEENT: NCAT, MMM, clear OP  Neck: supple, trachea at midline  CV: borderline bradycardia ; +S1/S2 ; occasional irregular beats on tele   Pulm: adequate respiratory effort, no increase in work of breathing  Abd: soft, ND  Skin: warm and dry,   Ext: No LE edema  ; groin incision sites clean, dry, no oozing   Neuro: AOx3, speaking in full sentences  Psych: affect and behavior appropriate, pleasant at time of interview    LABS:                        11.2   10.14 )-----------( 252      ( 02 Apr 2023 05:30 )             35.9     04-02    137  |  101  |  15  ----------------------------<  122<H>  4.5   |  26  |  0.66    Ca    8.9      02 Apr 2023 05:30  Phos  2.4     04-02  Mg     2.1     04-02        PTT - ( 01 Apr 2023 07:01 )  PTT:61.7 sec  CAPILLARY BLOOD GLUCOSE      POCT Blood Glucose.: 156 mg/dL (02 Apr 2023 22:02)  POCT Blood Glucose.: 186 mg/dL (02 Apr 2023 19:11)  POCT Blood Glucose.: 217 mg/dL (02 Apr 2023 17:07)  POCT Blood Glucose.: 142 mg/dL (02 Apr 2023 11:37)  POCT Blood Glucose.: 177 mg/dL (02 Apr 2023 06:21)  POCT Blood Glucose.: 234 mg/dL (02 Apr 2023 01:57)        MICRODATA:      RADIOLOGY/OTHER STUDIES:

## 2023-04-03 ENCOUNTER — TRANSCRIPTION ENCOUNTER (OUTPATIENT)
Age: 73
End: 2023-04-03

## 2023-04-03 LAB
ANION GAP SERPL CALC-SCNC: 7 MMOL/L — SIGNIFICANT CHANGE UP (ref 5–17)
BUN SERPL-MCNC: 16 MG/DL — SIGNIFICANT CHANGE UP (ref 7–23)
CALCIUM SERPL-MCNC: 8.1 MG/DL — LOW (ref 8.4–10.5)
CHLORIDE SERPL-SCNC: 104 MMOL/L — SIGNIFICANT CHANGE UP (ref 96–108)
CO2 SERPL-SCNC: 26 MMOL/L — SIGNIFICANT CHANGE UP (ref 22–31)
CREAT SERPL-MCNC: 0.6 MG/DL — SIGNIFICANT CHANGE UP (ref 0.5–1.3)
EGFR: 102 ML/MIN/1.73M2 — SIGNIFICANT CHANGE UP
GLUCOSE BLDC GLUCOMTR-MCNC: 112 MG/DL — HIGH (ref 70–99)
GLUCOSE BLDC GLUCOMTR-MCNC: 140 MG/DL — HIGH (ref 70–99)
GLUCOSE BLDC GLUCOMTR-MCNC: 178 MG/DL — HIGH (ref 70–99)
GLUCOSE BLDC GLUCOMTR-MCNC: 179 MG/DL — HIGH (ref 70–99)
GLUCOSE BLDC GLUCOMTR-MCNC: 210 MG/DL — HIGH (ref 70–99)
GLUCOSE BLDC GLUCOMTR-MCNC: 213 MG/DL — HIGH (ref 70–99)
GLUCOSE SERPL-MCNC: 246 MG/DL — HIGH (ref 70–99)
HCT VFR BLD CALC: 28.4 % — LOW (ref 39–50)
HGB BLD-MCNC: 9.4 G/DL — LOW (ref 13–17)
MAGNESIUM SERPL-MCNC: 2 MG/DL — SIGNIFICANT CHANGE UP (ref 1.6–2.6)
MCHC RBC-ENTMCNC: 33.1 GM/DL — SIGNIFICANT CHANGE UP (ref 32–36)
MCHC RBC-ENTMCNC: 33.5 PG — SIGNIFICANT CHANGE UP (ref 27–34)
MCV RBC AUTO: 101.1 FL — HIGH (ref 80–100)
NRBC # BLD: 0 /100 WBCS — SIGNIFICANT CHANGE UP (ref 0–0)
PHOSPHATE SERPL-MCNC: 2.4 MG/DL — LOW (ref 2.5–4.5)
PLATELET # BLD AUTO: 254 K/UL — SIGNIFICANT CHANGE UP (ref 150–400)
POTASSIUM SERPL-MCNC: 4.4 MMOL/L — SIGNIFICANT CHANGE UP (ref 3.5–5.3)
POTASSIUM SERPL-SCNC: 4.4 MMOL/L — SIGNIFICANT CHANGE UP (ref 3.5–5.3)
RBC # BLD: 2.81 M/UL — LOW (ref 4.2–5.8)
RBC # FLD: 14.5 % — SIGNIFICANT CHANGE UP (ref 10.3–14.5)
SODIUM SERPL-SCNC: 137 MMOL/L — SIGNIFICANT CHANGE UP (ref 135–145)
WBC # BLD: 8.55 K/UL — SIGNIFICANT CHANGE UP (ref 3.8–10.5)
WBC # FLD AUTO: 8.55 K/UL — SIGNIFICANT CHANGE UP (ref 3.8–10.5)

## 2023-04-03 PROCEDURE — 99233 SBSQ HOSP IP/OBS HIGH 50: CPT

## 2023-04-03 PROCEDURE — 99232 SBSQ HOSP IP/OBS MODERATE 35: CPT

## 2023-04-03 PROCEDURE — 93306 TTE W/DOPPLER COMPLETE: CPT | Mod: 26

## 2023-04-03 PROCEDURE — 99232 SBSQ HOSP IP/OBS MODERATE 35: CPT | Mod: GC

## 2023-04-03 RX ORDER — INSULIN LISPRO 100/ML
5 VIAL (ML) SUBCUTANEOUS ONCE
Refills: 0 | Status: COMPLETED | OUTPATIENT
Start: 2023-04-03 | End: 2023-04-03

## 2023-04-03 RX ORDER — HUMAN INSULIN 100 [IU]/ML
16 INJECTION, SUSPENSION SUBCUTANEOUS DAILY
Refills: 0 | Status: DISCONTINUED | OUTPATIENT
Start: 2023-04-03 | End: 2023-04-03

## 2023-04-03 RX ORDER — HUMAN INSULIN 100 [IU]/ML
18 INJECTION, SUSPENSION SUBCUTANEOUS ONCE
Refills: 0 | Status: COMPLETED | OUTPATIENT
Start: 2023-04-03 | End: 2023-04-03

## 2023-04-03 RX ORDER — LEVOTHYROXINE SODIUM 125 MCG
100 TABLET ORAL EVERY 24 HOURS
Refills: 0 | Status: DISCONTINUED | OUTPATIENT
Start: 2023-04-03 | End: 2023-04-04

## 2023-04-03 RX ORDER — DOXAZOSIN MESYLATE 4 MG
2 TABLET ORAL AT BEDTIME
Refills: 0 | Status: DISCONTINUED | OUTPATIENT
Start: 2023-04-03 | End: 2023-04-04

## 2023-04-03 RX ORDER — HUMAN INSULIN 100 [IU]/ML
18 INJECTION, SUSPENSION SUBCUTANEOUS DAILY
Refills: 0 | Status: DISCONTINUED | OUTPATIENT
Start: 2023-04-03 | End: 2023-04-03

## 2023-04-03 RX ORDER — POTASSIUM PHOSPHATE, MONOBASIC POTASSIUM PHOSPHATE, DIBASIC 236; 224 MG/ML; MG/ML
15 INJECTION, SOLUTION INTRAVENOUS ONCE
Refills: 0 | Status: COMPLETED | OUTPATIENT
Start: 2023-04-03 | End: 2023-04-03

## 2023-04-03 RX ADMIN — HUMAN INSULIN 18 UNIT(S): 100 INJECTION, SUSPENSION SUBCUTANEOUS at 19:59

## 2023-04-03 RX ADMIN — Medication 100 MICROGRAM(S): at 17:40

## 2023-04-03 RX ADMIN — PANTOPRAZOLE SODIUM 40 MILLIGRAM(S): 20 TABLET, DELAYED RELEASE ORAL at 11:59

## 2023-04-03 RX ADMIN — POTASSIUM PHOSPHATE, MONOBASIC POTASSIUM PHOSPHATE, DIBASIC 62.5 MILLIMOLE(S): 236; 224 INJECTION, SOLUTION INTRAVENOUS at 11:24

## 2023-04-03 RX ADMIN — Medication 4: at 06:07

## 2023-04-03 RX ADMIN — ZOLPIDEM TARTRATE 5 MILLIGRAM(S): 10 TABLET ORAL at 21:25

## 2023-04-03 RX ADMIN — ZOLPIDEM TARTRATE 5 MILLIGRAM(S): 10 TABLET ORAL at 03:11

## 2023-04-03 RX ADMIN — Medication 1 GRAM(S): at 12:00

## 2023-04-03 RX ADMIN — APIXABAN 2.5 MILLIGRAM(S): 2.5 TABLET, FILM COATED ORAL at 21:27

## 2023-04-03 RX ADMIN — Medication 1 GRAM(S): at 06:07

## 2023-04-03 RX ADMIN — Medication 2 MILLIGRAM(S): at 21:27

## 2023-04-03 RX ADMIN — Medication 2: at 17:38

## 2023-04-03 RX ADMIN — Medication 5 UNIT(S): at 19:58

## 2023-04-03 RX ADMIN — Medication 1 GRAM(S): at 17:37

## 2023-04-03 RX ADMIN — APIXABAN 2.5 MILLIGRAM(S): 2.5 TABLET, FILM COATED ORAL at 10:07

## 2023-04-03 NOTE — PROGRESS NOTE ADULT - SUBJECTIVE AND OBJECTIVE BOX
O/N: KIRILL, VSS                                      73 y/o M with a PMHx of gastric CA, DM Type 2, Hypothyroidism, HTN and anemia s/p EGD with esophageal biopsy 3/23 presents to the ED c/o fevers, low back pain x 2 days. Admitted for sepsis and LARRY i/s/ bladder outlet obstruction. Vascular Surgery consulted for enlarging infrarenal AAA. Non smoker, no first degree relatives with dx, asymptomatic. Avss, exam benign. Imaging demonstrates 5.5cm distal AAA, previously 4.7cm 11/21. Patient was evaluated in 3/2022 by VS and recommended for surveillance. CTA obtained and results reviewed. Patient is candidate for EVAR, and amenable to repair.     AAA s/p EVAR 3/31  - Doing well  - Acceptable for regional bed if transferred  - will follow up first post-procedure BM to evaluate for ischemic colitis    T2DM, insulin dependent  - Endocrine requesting adjustment to patient's tube feed orders to: Jevity 1.5 kwan @95 mL/hr for 10 hrs (950 mL TV, 1425 kcal) starting from tonight (from 8:00 PM to 6:00 AM).  - Administer 16 units of NPH insulin and 6 units of lispro at 7:30 PM.  - Plan is for him to transition to a mixed insulin to allow better coverage of tube feeds in the beginning of the night when NPH has not started to take effect.  - MDSSI q4h overnight (10pm, 2am, 6am)  - Lispro MDSSI before meals and at bedtime  - BG goal 100-180    LARRY 2/2 obstruction  - Abarca in place  - Discussed with urology coverage 4/1 AM; plan for Abarca removal at 4/2 AM (or otherwise on AM of discharge day) and TOV given concern about general anesthesia and constipation contributing to propensity for retention  - F/u outpatient at [facility] urology  - On flomax    Esophagitis  - Protonix 40mg QD    Gastric cancer  - Chronic, in remission    Portal Vein Thrombosis  - Initially diagnosed in 2021. Unclear from notes I've reviewed the specific indication for ongoing treatment or the status of the thrombus on follow up imaging.   - Discontinue heparin GTT and transition to home Eliquis BID    Hypothyroidism: continue synthroid    F: None  E: Replete PRN  N: Jevity 1.5 kwan @95 mL/hr for 10 hrs (950 mL TV, 1425 kcal) x 10h; soft and bite sized diet  GI PPX: PPI  DVT PPX: Eliquis  Code: Full  Dispo: Pending TOV O/N: KIRILL, VSS    Subjective: Patient seen and examined at bedside. No complaints.    ROS:   Denies Headache, blurred vision, Chest Pain, SOB, Abdominal pain, nausea or vomiting     Social   apixaban 2.5      Allergies    No Known Allergies    Intolerances        Vital Signs Last 24 Hrs  T(C): 37.1 (03 Apr 2023 04:26), Max: 37.3 (02 Apr 2023 08:59)  T(F): 98.8 (03 Apr 2023 04:26), Max: 99.2 (02 Apr 2023 08:59)  HR: 66 (03 Apr 2023 06:07) (66 - 74)  BP: 124/63 (03 Apr 2023 06:07) (110/64 - 124/68)  BP(mean): --  RR: 18 (03 Apr 2023 06:07) (18 - 18)  SpO2: 96% (03 Apr 2023 06:07) (95% - 97%)    Parameters below as of 03 Apr 2023 06:07  Patient On (Oxygen Delivery Method): room air      I&O's Summary    02 Apr 2023 07:01  -  03 Apr 2023 07:00  --------------------------------------------------------  IN: 1310 mL / OUT: 1765 mL / NET: -455 mL        Physical Exam:  General: Awake, alert. Appears somewhat cachectic and chronically ill.   CV: RRR  Pulm: Breathing comfortably on room air.   Abd: S/NT/ND. Bilateral groin punctures soft w/o masses.   Vascular: RLE: 1+DP/PTs; LLE: 2+DP/PTs    LABS:                        9.4    8.55  )-----------( 254      ( 03 Apr 2023 06:15 )             28.4     04-03    137  |  104  |  16  ----------------------------<  246<H>  4.4   |  26  |  0.60    Ca    8.1<L>      03 Apr 2023 06:15  Phos  2.4     04-03  Mg     2.0     04-03        ---------------------------------------------------------------------------  PLEASE CHECK WHEN PRESENT:     [  ]Heart Failure     [  ] Acute     [  ] Acute on Chronic     [  ] Chronic  -------------------------------------------------------------------     [  ]Diastolic [HFpEF]     [  ]Systolic [HFrEF]     [  ]Combined [HFpEF & HFrEF]  .................................................................................     [  ]Other:     [ ] Pulmonary Hypertension     [ ] Afib     [x ] Hypertensive Heart Disease  -------------------------------------------------------------------  [ ] Respiratory failure  [ ] Acute cor pulmonale  [ ] Asthma/COPD Exacerbation  [ ] Pleural effusion  [ ] Aspiration pneumonia  [ ] Obstructive Sleep Apnea  -------------------------------------------------------------------  [ x ]LARRY     [  ]ATN     [  ]Reneal Medullary Necrosis     [  ]Renal Cortical Necrosis     [  ]Other Pathological Lesions:    [  ]CKD 1  [  ]CKD 2  [  ]CKD 3  [  ]CKD 4  [  ]CKD 5  [  ]Other  -------------------------------------------------------------------  [x  ]Diabetes  [  ] Diabetic PVD Ulcer  [  ] Neuropathic ulcer to DM  [  ] Diabetes with Nephropathy  [  ] Osteomyelitis due to diabetes  [  ] Hyperglycemia   [  ]hypoglycemia   --------------------------------------------------------------------  [  ]Malnutrition: See Nutrition Note  [  ]Cachexia  [  ]Other:   [  ]Supplement Ordered:  [  ]Morbid Obesity (BMI >=40]  ---------------------------------------------------------------------  [ x] Sepsis/severe sepsis/septic shock  [ ] Noninfectious SIRS  [ ] UTI  [ ] Pneumonia  -----------------------------------------------------------------------  [ ] Acidosis/alkalosis  [ ] Fluid overload  [ ] Hypokalemia  [ ] Hyperkalemia  [ ] Hypomagnesemia  [ ] Hypophosphatemia  [ ] Hyperphosphatemia  ------------------------------------------------------------------------  [ ] Acute blood loss anemia  [ ] Post op blood loss anemia  [x ] Iron deficiency anemia  [ ] Anemia due to chronic disease  [ ] Hypercoagulable state  [ ] Thrombocytopenia  ----------------------------------------------------------------------  [ ] Cerebral infarction  [ ] Transient ischemia attack  [ ] Encephalopathy - Toxic or Metabolic    71 y/o M with a PMHx of gastric CA, DM Type 2, Hypothyroidism, HTN and anemia s/p EGD with esophageal biopsy 3/23 presents to the ED c/o fevers, low back pain x 2 days. Admitted for sepsis and LARRY i/s/ bladder outlet obstruction. Vascular Surgery consulted for enlarging infrarenal AAA. Non smoker, no first degree relatives with dx, asymptomatic. Avss, exam benign. Imaging demonstrates 5.5cm distal AAA, previously 4.7cm 11/21. Patient was evaluated in 3/2022 by VS and recommended for surveillance. CTA obtained and results reviewed. Patient is candidate for EVAR, and amenable to repair.     AAA s/p EVAR 3/31  - pain control  - am labs    T2DM, insulin dependent  - Endocrine requesting adjustment to patient's tube feed orders to: Jevity 1.5 kwan @95 mL/hr for 10 hrs (950 mL TV, 1425 kcal) starting from tonight (from 8:00 PM to 6:00 AM).  - Administer 16 units of NPH insulin and 6 units of lispro  - mISS    LARRY 2/2 obstruction  - failed TOV.  Abarca in place  - F/u outpatient at [facility] urology  - On flomax    Esophagitis  - Protonix 40mg QD    Gastric cancer  - Chronic, in remission    Portal Vein Thrombosis  - eliquis    Hypothyroidism: continue synthroid    N: Jevity 1.5 kwan @95 mL/hr for 10 hrs (950 mL TV, 1425 kcal) x 10h; soft and bite sized diet  DVT PPX: Eliquis  Dispo: plan to transfer to medicine

## 2023-04-03 NOTE — PROGRESS NOTE ADULT - ATTENDING COMMENTS
Pt seen on rounds this afternoon and events of the weekend reviewed.    As noted by Dr. Cobb, pt's glucoses fluctuated significantly on 4/1 and 4/2 due to mis-timing of NPH dose.  (Glucoses had been mildly above target but quite stable overnight from 3/31 to 4/1).  He is tolerating the feeds without problems.  Somewhat noteworthy is that his PO intake during the day has been better than he had led us to believe.  Abdomen is soft and non-distended  --Although there was some concern about the adequacy of the caloric intake via the tube feeds, this has now been increased with the extra 3 hours and the change to the 1.5 kwan/cc formula  --For tonight, will give the insulin at 7:30, start the feeds at 8 PM.  Increase the NPH to 18 units, decrease the lispro slightly to 5 units

## 2023-04-03 NOTE — PROGRESS NOTE ADULT - SUBJECTIVE AND OBJECTIVE BOX
Patient is a 72y old  Male who presents with a chief complaint of Fever (04 Apr 2023 05:21)    INTERVAL EVENTS:  - tolerating regular texture diet   - did not pass trial of void yesterday     SUBJECTIVE:  Patient was seen and examined at bedside.  Review of systems: No CP, dyspnea, nausea or vomiting, LE edema.     MEDICATIONS:  MEDICATIONS  (STANDING):  apixaban 2.5 milliGRAM(s) Oral every 12 hours  dextrose 5%. 1000 milliLiter(s) (100 mL/Hr) IV Continuous <Continuous>  dextrose 5%. 1000 milliLiter(s) (50 mL/Hr) IV Continuous <Continuous>  dextrose 50% Injectable 25 Gram(s) IV Push once  dextrose 50% Injectable 12.5 Gram(s) IV Push once  dextrose 50% Injectable 25 Gram(s) IV Push once  doxazosin 2 milliGRAM(s) Oral at bedtime  glucagon  Injectable 1 milliGRAM(s) IntraMuscular once  insulin lispro (ADMELOG) corrective regimen sliding scale   SubCutaneous three times a day before meals  levothyroxine 100 MICROGram(s) Oral every 24 hours  pantoprazole    Tablet 40 milliGRAM(s) Oral daily  polyethylene glycol 3350 17 Gram(s) Oral daily  senna 2 Tablet(s) Oral at bedtime  sucralfate suspension 1 Gram(s) Oral four times a day    MEDICATIONS  (PRN):  acetaminophen     Tablet .. 650 milliGRAM(s) Oral every 6 hours PRN Temp greater or equal to 38C (100.4F), Mild Pain (1 - 3)  dextrose Oral Gel 15 Gram(s) Oral once PRN Blood Glucose LESS THAN 70 milliGRAM(s)/deciliter  melatonin 3 milliGRAM(s) Oral at bedtime PRN Insomnia  ondansetron Injectable 4 milliGRAM(s) IV Push once PRN Nausea and/or Vomiting  zolpidem 5 milliGRAM(s) Oral at bedtime PRN Insomnia  zolpidem 5 milliGRAM(s) Oral at bedtime PRN Insomnia      Allergies    No Known Allergies    Intolerances        OBJECTIVE:  Vital Signs Last 24 Hrs  T(C): 36.9 (04 Apr 2023 05:35), Max: 37.1 (03 Apr 2023 22:51)  T(F): 98.5 (04 Apr 2023 05:35), Max: 98.7 (03 Apr 2023 22:51)  HR: 69 (04 Apr 2023 00:53) (64 - 71)  BP: 114/59 (04 Apr 2023 00:53) (114/59 - 144/70)  BP(mean): 92 (03 Apr 2023 20:59) (92 - 98)  RR: 16 (04 Apr 2023 00:53) (16 - 20)  SpO2: 99% (04 Apr 2023 00:53) (95% - 99%)    Parameters below as of 04 Apr 2023 00:53  Patient On (Oxygen Delivery Method): room air      I&O's Summary    03 Apr 2023 07:01  -  04 Apr 2023 07:00  --------------------------------------------------------  IN: 1340 mL / OUT: 1880 mL / NET: -540 mL        PHYSICAL EXAM:  Gen: Reclining in bed at time of exam, appears stated age  HEENT: NCAT, MMM, clear OP  Neck: supple, trachea at midline  CV: RRR , +S1/S2  Pulm: adequate respiratory effort, no increase in work of breathing  Abd: soft, ND  Skin: warm and dry,   Ext: No LE edema  ; groin incision sites clean, dry, no oozing   : Abarca catheter in place   Neuro: AOx3, speaking in full sentences  Psych: affect and behavior appropriate, pleasant at time of interview      LABS:                        9.3    8.70  )-----------( 265      ( 04 Apr 2023 05:57 )             28.8     04-04    136  |  104  |  15  ----------------------------<  163<H>  4.2   |  25  |  0.64    Ca    7.9<L>      04 Apr 2023 05:57  Phos  2.5     04-04  Mg     2.1     04-04          CAPILLARY BLOOD GLUCOSE      POCT Blood Glucose.: 212 mg/dL (04 Apr 2023 06:57)  POCT Blood Glucose.: 112 mg/dL (03 Apr 2023 22:37)  POCT Blood Glucose.: 178 mg/dL (03 Apr 2023 19:45)  POCT Blood Glucose.: 179 mg/dL (03 Apr 2023 17:27)  POCT Blood Glucose.: 140 mg/dL (03 Apr 2023 12:19)        MICRODATA:      RADIOLOGY/OTHER STUDIES:

## 2023-04-03 NOTE — PROGRESS NOTE ADULT - ATTENDING COMMENTS
See fellow note written above for details. I reviewed the fellow documentation. I have personally seen and examined this patient. I reviewed vitals, labs, medications, cardiac studies, and additional imaging. I agree with the above fellow's findings and plans as written above with the following additions/statements.    -Pt seen post EVAR, doing well without active complaints  -Euvolemic on exam   -No active cardiac issues  -Pls reconsult as needed

## 2023-04-03 NOTE — PROGRESS NOTE ADULT - ASSESSMENT
71 y/o M with a PMHx of gastric CA, DM Type 2, Hypothyroidism, HTN and anemia s/p EGD with esophageal biopsy 3/23 presents to the ED c/o fevers, low back pain x 2 days. Admitted for sepsis and LARRY i/s/ bladder outlet obstruction. Vascular Surgery consulted for enlarging infrarenal AAA. Non smoker, no first degree relatives with dx, asymptomatic. Imaging demonstrates 5.5cm distal AAA, previously 4.7cm 11/21. Patient is planned for EVAR on 3/31. Cardiology was consulted for pre-op risk assessment, now s/p EVAR on 3/31/23.     Review of studies:   EKG: sinus bradycardia, iLBBB, APC, T wave inversion in leads III, aVF, unchanged prom prior admission   prior work up ( not in the system): ehco (10/7/20): normal LV, RV, EF 60%; mild MR; stress test (2019): normal EKG, normal perfusion, RF 54%    #Post op check   s/p EVAR on 3/31/23  Patient reports that he is able to walk 1-1.8 miles on a treadmill and does it many times per week. No chest pain or SOB with that. In general denies any limitation in ET. Reports the only prior cardiac history of rare 'skipped beats'. Patient has a cardiologist he follow with Dr. Resendez at [facility], but hasn't seen him in 2 years. Patient follow with a general practitioner Dr. Lon Sanchez, also in [place] (office [phone]; cell [phone])  Portal vein thrombosis - continue apixaban 2.5mg BID     Patient should follow up with his cardiologist as an outpatient     Please reconsult cardiology when needed

## 2023-04-03 NOTE — PROGRESS NOTE ADULT - SUBJECTIVE AND OBJECTIVE BOX
SUBJECTIVE / INTERVAL HPI: Patient was seen and examined this morning. Blood glucose levels were suboptimal over the weekend likely due to incorrect timing of NPH insulin (originally intended to be given half hour before starting tube feeds at 7:30 PM). Patient reports having increased appetite compared to what he usually eats at home.     CAPILLARY BLOOD GLUCOSE & INSULIN RECEIVED  136 mg/dL (04-01 @ 12:20) ? 16 units of NPH insulin.   163 mg/dL (04-01 @ 18:05) ? 6 units of lispro + 1 unit of lispro sliding scale.   68 mg/dL (04-01 @ 21:20) ? Ø  118 mg/dL (04-01 @ 21:55) ? Ø  234 mg/dL (04-02 @ 01:57) ? Ø   —  mg/dL (04-02 @ 03:18) ? 1 unit of lispro.   177 mg/dL (04-02 @ 06:21) ? 1 unit of lispro sliding scale.   142 mg/dL (04-02 @ 11:37) ? Ø   —  mg/dL (04-02 @ 13:53) ? 16 units of NPH insulin.   217 mg/dL (04-02 @ 17:07) ? 2 units of lispro sliding scale.   186 mg/dL (04-02 @ 19:11) ? 6 units of lispro.   156 mg/dL (04-02 @ 22:02) ? 2 units of lispro sliding scale.   213 mg/dL (04-03 @ 02:01) ? Ø  210 mg/dL (04-03 @ 06:06) ? 4 units of lispro sliding scale.   140 mg/dL (04-03 @ 12:19) ? Ø    REVIEW OF SYSTEMS  Constitutional:  Negative fever, chills or loss of appetite.  Cardiovascular:  Negative for chest pain or palpitations.  Respiratory:  Negative for cough, wheezing, or shortness of breath.    Gastrointestinal:  Negative for nausea, vomiting, diarrhea, constipation, or abdominal pain.  Neurologic:  No headache, confusion, dizziness, lightheadedness.    PHYSICAL EXAM  Vital Signs Last 24 Hrs  T(C): 36.9 (03 Apr 2023 18:00), Max: 37.1 (03 Apr 2023 04:26)  T(F): 98.4 (03 Apr 2023 18:00), Max: 98.8 (03 Apr 2023 04:26)  HR: 67 (03 Apr 2023 17:14) (64 - 69)  BP: 144/70 (03 Apr 2023 17:14) (112/66 - 144/70)  BP(mean): 94 (03 Apr 2023 17:14) (94 - 98)  RR: 20 (03 Apr 2023 17:14) (18 - 20)  SpO2: 96% (03 Apr 2023 17:14) (95% - 97%)    Parameters below as of 03 Apr 2023 17:14  Patient On (Oxygen Delivery Method): room air    Constitutional: Awake, alert, underweight, elderly male, in no acute distress.   HEENT: Normocephalic, atraumatic, BRUNILDA.  Respiratory: Lungs clear to ausculation bilaterally.   Cardiovascular: regular rhythm, normal S1 and S2, no audible murmurs.   GI: soft, non-tender, non-distended, bowel sounds present. (+) J-tube in place.   : (+) Abarca catheter in place.   Extremities: No lower extremity edema.  Psychiatric: AAO x 3.    LABS  CBC - WBC/HGB/HTC/PLT: 8.55/9.4/28.4/254 (04-03-23)  BMP - Na/K/Cl/Bicarb/BUN/Cr/Gluc/AG/eGFR: 137/4.4/104/26/16/0.60/246/7/102 (04-03-23)  Ca - 8.1 (04-03-23)  Phos - 2.4 (04-03-23)  Mg - 2.0 (04-03-23)  PT/aPTT/INR: --/61.7/-- (04-01-23)     MEDICATIONS  MEDICATIONS  (STANDING):  apixaban 2.5 milliGRAM(s) Oral every 12 hours  dextrose 5%. 1000 milliLiter(s) (100 mL/Hr) IV Continuous <Continuous>  dextrose 5%. 1000 milliLiter(s) (50 mL/Hr) IV Continuous <Continuous>  dextrose 50% Injectable 25 Gram(s) IV Push once  dextrose 50% Injectable 12.5 Gram(s) IV Push once  dextrose 50% Injectable 25 Gram(s) IV Push once  doxazosin 2 milliGRAM(s) Oral at bedtime  glucagon  Injectable 1 milliGRAM(s) IntraMuscular once  insulin lispro (ADMELOG) corrective regimen sliding scale   SubCutaneous three times a day before meals  levothyroxine 100 MICROGram(s) Oral every 24 hours  pantoprazole    Tablet 40 milliGRAM(s) Oral daily  polyethylene glycol 3350 17 Gram(s) Oral daily  senna 2 Tablet(s) Oral at bedtime  sucralfate suspension 1 Gram(s) Oral four times a day    MEDICATIONS  (PRN):  acetaminophen     Tablet .. 650 milliGRAM(s) Oral every 6 hours PRN Temp greater or equal to 38C (100.4F), Mild Pain (1 - 3)  dextrose Oral Gel 15 Gram(s) Oral once PRN Blood Glucose LESS THAN 70 milliGRAM(s)/deciliter  melatonin 3 milliGRAM(s) Oral at bedtime PRN Insomnia  ondansetron Injectable 4 milliGRAM(s) IV Push once PRN Nausea and/or Vomiting  zolpidem 5 milliGRAM(s) Oral at bedtime PRN Insomnia  zolpidem 5 milliGRAM(s) Oral at bedtime PRN Insomnia    ASSESSMENT / RECOMMENDATIONS  Mr. Kirkland is a 72-year-old male with a past medical history of type 2 diabetes mellitus, hypertension, gastric cancer, hypothyroidism and anemia who presented to the emergency department (3/25/23) complaining of fever, chills and abdominal pain after recent EGD on 3/23/23. Labs were remarkable for leukocytosis and acute kidney injury (LARRY). He was initially admitted to medicine for management of sepsis and LARRY. Urinalysis was negative and blood cultures showed no growth and he remained afebrile with no leukocytosis. Therefore, antibiotics were discontinued as impression was that fever was due to bacterial translocation from recent esophageal biopsy. CT angiogram of the abdomen/pelvis demonstrated a 5.5 cm infrarenal aortic aneurysm that had enlarged from previous study, now s/p EVAR (3/31/23). Endocrinology following for recommendations regarding diabetes management.     A1C: 6.0 %  BUN: 16  Creatinine: 0.60  GFR: 102  Weight: 66.6  BMI: 21.1  EF:     # Type 2 diabetes mellitus  - Based on his insulin requirements from the night he did receive the NPH/Lispro combination (3/31/23) he appeared to be requiring more NPH as his glucose levels increased overnight. In addition, his glucose level dropped with 6 units the night before yesterday. Therefore, will recommend to administer 18 units of NPH insulin and 5 units of lispro at 7:30 PM tonight. Plan is for him to transition to a mixed insulin to allow better coverage of tube feeds in the beginning of the night when NPH has not started to take effect.   - Continue lispro moderate dose sliding scale before meals and at bedtime.  - Patient's fingerstick glucose goal is 100-180 mg/dL.    - Discharge regimen to be discussed.   - Patient can continue to follow with his Endocrinologist or can call [facility] at [phone] to make an appointment (he mentioned that he wanted to switch to an endocrinologist within [facility]).     Case discussed with Dr. Richard. Primary team updated.       Cali Garrett    Endocrinology Fellow    Service Pager: [phone]

## 2023-04-03 NOTE — PROGRESS NOTE ADULT - SUBJECTIVE AND OBJECTIVE BOX
Cardiology Consult    O/N:  Interval History: post op check, patient was seen and examined in am      OBJECTIVE  Vitals:  T(C): 37.1 (04-03-23 @ 04:26), Max: 37.2 (04-02-23 @ 18:38)  HR: 66 (04-03-23 @ 06:07) (66 - 74)  BP: 124/63 (04-03-23 @ 06:07) (110/64 - 124/68)  RR: 18 (04-03-23 @ 06:07) (18 - 18)  SpO2: 96% (04-03-23 @ 06:07) (95% - 97%)  Wt(kg): --    I/O:  I&O's Summary    02 Apr 2023 07:01  -  03 Apr 2023 07:00  --------------------------------------------------------  IN: 1310 mL / OUT: 1765 mL / NET: -455 mL        PHYSICAL EXAM:  GEN: Awake, comfortable. NAD.   HEENT: NCAT  RESP: CTA b/l  CV: RRR, normal s1/s2.   ABD: Soft, NTND. BS+  EXT: Warm. no edema   NEURO: AAOx3.   	  LABS:                        9.4    8.55  )-----------( 254      ( 03 Apr 2023 06:15 )             28.4     04-03    137  |  104  |  16  ----------------------------<  246<H>  4.4   |  26  |  0.60    Ca    8.1<L>      03 Apr 2023 06:15  Phos  2.4     04-03  Mg     2.0     04-03            RADIOLOGY & ADDITIONAL TESTS:  Reviewed .    MEDICATIONS  (STANDING):  apixaban 2.5 milliGRAM(s) Oral every 12 hours  dextrose 5%. 1000 milliLiter(s) (50 mL/Hr) IV Continuous <Continuous>  dextrose 5%. 1000 milliLiter(s) (100 mL/Hr) IV Continuous <Continuous>  dextrose 50% Injectable 25 Gram(s) IV Push once  dextrose 50% Injectable 12.5 Gram(s) IV Push once  dextrose 50% Injectable 25 Gram(s) IV Push once  glucagon  Injectable 1 milliGRAM(s) IntraMuscular once  insulin lispro (ADMELOG) corrective regimen sliding scale   SubCutaneous three times a day before meals  insulin lispro Injectable (ADMELOG) 6 Unit(s) SubCutaneous <User Schedule>  insulin NPH human recombinant 16 Unit(s) SubCutaneous daily  levothyroxine 100 MICROGram(s) Oral every 24 hours  pantoprazole    Tablet 40 milliGRAM(s) Oral daily  polyethylene glycol 3350 17 Gram(s) Oral daily  potassium phosphate IVPB 15 milliMole(s) IV Intermittent once  senna 2 Tablet(s) Oral at bedtime  sucralfate suspension 1 Gram(s) Oral four times a day  tamsulosin 0.4 milliGRAM(s) Oral at bedtime    MEDICATIONS  (PRN):  acetaminophen     Tablet .. 650 milliGRAM(s) Oral every 6 hours PRN Temp greater or equal to 38C (100.4F), Mild Pain (1 - 3)  dextrose Oral Gel 15 Gram(s) Oral once PRN Blood Glucose LESS THAN 70 milliGRAM(s)/deciliter  melatonin 3 milliGRAM(s) Oral at bedtime PRN Insomnia  ondansetron Injectable 4 milliGRAM(s) IV Push once PRN Nausea and/or Vomiting  zolpidem 5 milliGRAM(s) Oral at bedtime PRN Insomnia  zolpidem 5 milliGRAM(s) Oral at bedtime PRN Insomnia

## 2023-04-03 NOTE — PROGRESS NOTE ADULT - ASSESSMENT
73 y/o M with a PMHx of gastric CA, DM Type 2, Hypothyroidism, HTN and anemia s/p EGD with esophageal biopsy 3/23 presents to the ED c/o fevers, low back pain x 2 days. Admitted for sepsis and LARRY, 2/2 hydronephrosis, hospital course c/b post-obstructive diuresis.   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 Pending trial of void.    # AAA - s/p EVAR on 3/31/23.   # Acute kidney injury - resolved   # Urinary retention - Joyner catheter replaced.  patient prefers leaving without joyner if possible   [ ] Switched tamsulosin to 2mg doxazosin qHS   [ ] Check orthostatics in AM     # GERD with esophagitis - s/p EGD with biopsy on 3/23. 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 monitoring. wound care per gen surg consult (3/28)   # Severe protein calorie malnutrition - ; dietary supplements per nutrition consult     Discussed case with primary team. Patient no longer requiring telemetry. Has remaining medical needs (urinary retention, diabetes) . Transfer to [facility] medicine accepted. spoke with bed board. No 7Wo beds available today. To remain under vascular surgery service until regional medicine bed available.     DVT ppx - already on apixaban for portal vein thrombosis

## 2023-04-04 LAB
ANION GAP SERPL CALC-SCNC: 7 MMOL/L — SIGNIFICANT CHANGE UP (ref 5–17)
BUN SERPL-MCNC: 15 MG/DL — SIGNIFICANT CHANGE UP (ref 7–23)
CALCIUM SERPL-MCNC: 7.9 MG/DL — LOW (ref 8.4–10.5)
CHLORIDE SERPL-SCNC: 104 MMOL/L — SIGNIFICANT CHANGE UP (ref 96–108)
CO2 SERPL-SCNC: 25 MMOL/L — SIGNIFICANT CHANGE UP (ref 22–31)
CREAT SERPL-MCNC: 0.64 MG/DL — SIGNIFICANT CHANGE UP (ref 0.5–1.3)
EGFR: 100 ML/MIN/1.73M2 — SIGNIFICANT CHANGE UP
GLUCOSE BLDC GLUCOMTR-MCNC: 103 MG/DL — HIGH (ref 70–99)
GLUCOSE BLDC GLUCOMTR-MCNC: 105 MG/DL — HIGH (ref 70–99)
GLUCOSE BLDC GLUCOMTR-MCNC: 207 MG/DL — HIGH (ref 70–99)
GLUCOSE BLDC GLUCOMTR-MCNC: 212 MG/DL — HIGH (ref 70–99)
GLUCOSE SERPL-MCNC: 163 MG/DL — HIGH (ref 70–99)
HCT VFR BLD CALC: 28.8 % — LOW (ref 39–50)
HGB BLD-MCNC: 9.3 G/DL — LOW (ref 13–17)
MAGNESIUM SERPL-MCNC: 2.1 MG/DL — SIGNIFICANT CHANGE UP (ref 1.6–2.6)
MCHC RBC-ENTMCNC: 32.3 GM/DL — SIGNIFICANT CHANGE UP (ref 32–36)
MCHC RBC-ENTMCNC: 33 PG — SIGNIFICANT CHANGE UP (ref 27–34)
MCV RBC AUTO: 102.1 FL — HIGH (ref 80–100)
NRBC # BLD: 0 /100 WBCS — SIGNIFICANT CHANGE UP (ref 0–0)
PHOSPHATE SERPL-MCNC: 2.5 MG/DL — SIGNIFICANT CHANGE UP (ref 2.5–4.5)
PLATELET # BLD AUTO: 265 K/UL — SIGNIFICANT CHANGE UP (ref 150–400)
POTASSIUM SERPL-MCNC: 4.2 MMOL/L — SIGNIFICANT CHANGE UP (ref 3.5–5.3)
POTASSIUM SERPL-SCNC: 4.2 MMOL/L — SIGNIFICANT CHANGE UP (ref 3.5–5.3)
RBC # BLD: 2.82 M/UL — LOW (ref 4.2–5.8)
RBC # FLD: 14 % — SIGNIFICANT CHANGE UP (ref 10.3–14.5)
SODIUM SERPL-SCNC: 136 MMOL/L — SIGNIFICANT CHANGE UP (ref 135–145)
T4 FREE SERPL-MCNC: 0.94 NG/DL — SIGNIFICANT CHANGE UP (ref 0.93–1.7)
TSH SERPL-MCNC: 4.52 UIU/ML — HIGH (ref 0.27–4.2)
WBC # BLD: 8.7 K/UL — SIGNIFICANT CHANGE UP (ref 3.8–10.5)
WBC # FLD AUTO: 8.7 K/UL — SIGNIFICANT CHANGE UP (ref 3.8–10.5)

## 2023-04-04 PROCEDURE — 99233 SBSQ HOSP IP/OBS HIGH 50: CPT

## 2023-04-04 PROCEDURE — 99232 SBSQ HOSP IP/OBS MODERATE 35: CPT | Mod: GC

## 2023-04-04 RX ORDER — INSULIN LISPRO 100/ML
4 VIAL (ML) SUBCUTANEOUS
Refills: 0 | Status: DISCONTINUED | OUTPATIENT
Start: 2023-04-04 | End: 2023-04-07

## 2023-04-04 RX ORDER — FINASTERIDE 5 MG/1
5 TABLET, FILM COATED ORAL EVERY 24 HOURS
Refills: 0 | Status: DISCONTINUED | OUTPATIENT
Start: 2023-04-04 | End: 2023-04-11

## 2023-04-04 RX ORDER — HUMAN INSULIN 100 [IU]/ML
22 INJECTION, SUSPENSION SUBCUTANEOUS
Refills: 0 | Status: DISCONTINUED | OUTPATIENT
Start: 2023-04-04 | End: 2023-04-07

## 2023-04-04 RX ORDER — LEVOTHYROXINE SODIUM 125 MCG
100 TABLET ORAL ONCE
Refills: 0 | Status: COMPLETED | OUTPATIENT
Start: 2023-04-04 | End: 2023-04-04

## 2023-04-04 RX ORDER — LEVOTHYROXINE SODIUM 125 MCG
100 TABLET ORAL EVERY 24 HOURS
Refills: 0 | Status: DISCONTINUED | OUTPATIENT
Start: 2023-04-05 | End: 2023-04-11

## 2023-04-04 RX ORDER — DOXAZOSIN MESYLATE 4 MG
4 TABLET ORAL AT BEDTIME
Refills: 0 | Status: DISCONTINUED | OUTPATIENT
Start: 2023-04-04 | End: 2023-04-11

## 2023-04-04 RX ADMIN — Medication 100 MICROGRAM(S): at 15:21

## 2023-04-04 RX ADMIN — SENNA PLUS 2 TABLET(S): 8.6 TABLET ORAL at 23:02

## 2023-04-04 RX ADMIN — Medication 4: at 17:14

## 2023-04-04 RX ADMIN — Medication 1 GRAM(S): at 05:54

## 2023-04-04 RX ADMIN — Medication 4 MILLIGRAM(S): at 23:01

## 2023-04-04 RX ADMIN — Medication 1200 MILLIGRAM(S): at 20:07

## 2023-04-04 RX ADMIN — FINASTERIDE 5 MILLIGRAM(S): 5 TABLET, FILM COATED ORAL at 17:15

## 2023-04-04 RX ADMIN — HUMAN INSULIN 22 UNIT(S): 100 INJECTION, SUSPENSION SUBCUTANEOUS at 20:06

## 2023-04-04 RX ADMIN — ZOLPIDEM TARTRATE 5 MILLIGRAM(S): 10 TABLET ORAL at 01:35

## 2023-04-04 RX ADMIN — APIXABAN 2.5 MILLIGRAM(S): 2.5 TABLET, FILM COATED ORAL at 23:02

## 2023-04-04 RX ADMIN — PANTOPRAZOLE SODIUM 40 MILLIGRAM(S): 20 TABLET, DELAYED RELEASE ORAL at 11:39

## 2023-04-04 RX ADMIN — Medication 4 UNIT(S): at 20:05

## 2023-04-04 RX ADMIN — Medication 1 GRAM(S): at 17:15

## 2023-04-04 RX ADMIN — APIXABAN 2.5 MILLIGRAM(S): 2.5 TABLET, FILM COATED ORAL at 09:12

## 2023-04-04 RX ADMIN — Medication 4: at 07:04

## 2023-04-04 RX ADMIN — Medication 1 GRAM(S): at 11:38

## 2023-04-04 RX ADMIN — Medication 1 GRAM(S): at 00:53

## 2023-04-04 NOTE — PROGRESS NOTE ADULT - ATTENDING COMMENTS
Pt seen on rounds this afternoon.  Glucoses have been mostly in target range, but dropped somewhat between start of feeds and 10 PM, and loni to 212 at the end of the feeds this morning.  Although the elevated glucose this morning may have been due to the delayed completion of the feeds and dissipation of the NPH effect, I suspect that he needs an increase in the NPH dose.  Will decrease the lispro to 4 units, increase the NPH to 22 units as noted above  He continues to eat surprisingly well during the day, so will not try to increase the duration of tube feeds in order to provide more calories.

## 2023-04-04 NOTE — PROGRESS NOTE ADULT - ASSESSMENT
71 y/o M with a PMHx of gastric CA, DM Type 2, Hypothyroidism, HTN and anemia s/p EGD with esophageal biopsy 3/23 presents to the ED c/o fevers, low back pain x 2 days. Admitted for sepsis and LARRY, 2/2 hydronephrosis, hospital course c/b post-obstructive diuresis.   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 Pending trial of void.    # AAA - s/p EVAR on 3/31/23.   # Acute kidney injury - resolved   # Urinary retention - Joyner catheter replaced.  patient prefers leaving without joyner if possible   [ ] Switched tamsulosin to 2mg doxazosin qHS on 4/3/2023 ; Checked orthostatics on 4/4/2023; No orthostatic hypotension; Increased doxazosin to 4mg qHS on 4/4/2023. Plan for another trial of void on 4/6/2023. Given history of BPH, when performing TOV, would allow up to 750ml on bladder ultrasound before replacing joyner catheter  [ ] Check orthostatics qd given medication changes to alpha-blocker   [ ] start finasteride 5mg qd     # GERD with esophagitis - s/p EGD with biopsy on 3/23. 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 monitoring. wound care per gen surg consult (3/28)   # Severe protein calorie malnutrition - ; dietary supplements per nutrition consult     Discussed case with primary team. Patient no longer requiring telemetry. Has remaining medical needs (urinary retention, diabetes) . Transfer to regional [facility] accepted. No 7WO beds available on day of transfer. To remain under vascular surgery service until regional medicine bed available.     DVT ppx - already on apixaban for portal vein thrombosis

## 2023-04-04 NOTE — PROGRESS NOTE ADULT - SUBJECTIVE AND OBJECTIVE BOX
Patient is a 72y old  Male who presents with a chief complaint of Fever (04 Apr 2023 05:21)    INTERVAL EVENTS:  - tolerating diet   - Checked orthostatics given change in alpha blocker to doxazosin. no orthostatic hypotension this morning.   - had two bowel movements this morning     SUBJECTIVE:  Patient was seen and examined at bedside.  Review of systems: No CP, dyspnea, nausea or vomiting, LE edema.     MEDICATIONS:  MEDICATIONS  (STANDING):  apixaban 2.5 milliGRAM(s) Oral every 12 hours  dextrose 5%. 1000 milliLiter(s) (100 mL/Hr) IV Continuous <Continuous>  dextrose 5%. 1000 milliLiter(s) (50 mL/Hr) IV Continuous <Continuous>  dextrose 50% Injectable 25 Gram(s) IV Push once  dextrose 50% Injectable 12.5 Gram(s) IV Push once  dextrose 50% Injectable 25 Gram(s) IV Push once  doxazosin 4 milliGRAM(s) Oral at bedtime  finasteride 5 milliGRAM(s) Oral every 24 hours  glucagon  Injectable 1 milliGRAM(s) IntraMuscular once  insulin lispro (ADMELOG) corrective regimen sliding scale   SubCutaneous Before meals and at bedtime  pantoprazole    Tablet 40 milliGRAM(s) Oral daily  polyethylene glycol 3350 17 Gram(s) Oral daily  senna 2 Tablet(s) Oral at bedtime  sucralfate suspension 1 Gram(s) Oral four times a day    MEDICATIONS  (PRN):  acetaminophen     Tablet .. 650 milliGRAM(s) Oral every 6 hours PRN Temp greater or equal to 38C (100.4F), Mild Pain (1 - 3)  dextrose Oral Gel 15 Gram(s) Oral once PRN Blood Glucose LESS THAN 70 milliGRAM(s)/deciliter  melatonin 3 milliGRAM(s) Oral at bedtime PRN Insomnia  ondansetron Injectable 4 milliGRAM(s) IV Push once PRN Nausea and/or Vomiting  zolpidem 5 milliGRAM(s) Oral at bedtime PRN Insomnia  zolpidem 5 milliGRAM(s) Oral at bedtime PRN Insomnia    Allergies    No Known Allergies    Intolerances    OBJECTIVE:  Vital Signs Last 24 Hrs  T(C): 36.4 (04 Apr 2023 13:26), Max: 37.2 (04 Apr 2023 09:44)  T(F): 97.6 (04 Apr 2023 13:26), Max: 99 (04 Apr 2023 09:44)  HR: 74 (04 Apr 2023 08:19) (67 - 74)  BP: 131/65 (04 Apr 2023 08:19) (114/59 - 144/70)  BP(mean): 92 (03 Apr 2023 20:59) (92 - 94)  RR: 17 (04 Apr 2023 08:19) (16 - 20)  SpO2: 95% (04 Apr 2023 08:19) (95% - 99%)    Parameters below as of 04 Apr 2023 08:19  Patient On (Oxygen Delivery Method): room air      I&O's Summary    03 Apr 2023 07:01  -  04 Apr 2023 07:00  --------------------------------------------------------  IN: 1340 mL / OUT: 1880 mL / NET: -540 mL    04 Apr 2023 07:01  -  04 Apr 2023 16:15  --------------------------------------------------------  IN: 280 mL / OUT: 345 mL / NET: -65 mL        PHYSICAL EXAM:  Gen: Reclining in bed at time of exam, appears stated age  HEENT: NCAT, MMM, clear OP; some temporal wasting present   Neck: supple, trachea at midline  CV: RRR, +S1/S2  Pulm: adequate respiratory effort, no increase in work of breathing  Abd: soft, ND;   Skin: warm and dry,   Ext: no LE edema   Neuro: AOx3, speaking in full sentences  Psych: affect and behavior appropriate, pleasant at time of interview    LABS:                        9.3    8.70  )-----------( 265      ( 04 Apr 2023 05:57 )             28.8     04-04    136  |  104  |  15  ----------------------------<  163<H>  4.2   |  25  |  0.64    Ca    7.9<L>      04 Apr 2023 05:57  Phos  2.5     04-04  Mg     2.1     04-04          CAPILLARY BLOOD GLUCOSE      POCT Blood Glucose.: 105 mg/dL (04 Apr 2023 11:43)  POCT Blood Glucose.: 212 mg/dL (04 Apr 2023 06:57)  POCT Blood Glucose.: 112 mg/dL (03 Apr 2023 22:37)  POCT Blood Glucose.: 178 mg/dL (03 Apr 2023 19:45)  POCT Blood Glucose.: 179 mg/dL (03 Apr 2023 17:27)        MICRODATA:      RADIOLOGY/OTHER STUDIES:

## 2023-04-04 NOTE — PROGRESS NOTE ADULT - SUBJECTIVE AND OBJECTIVE BOX
SUBJECTIVE / INTERVAL HPI: Patient was seen and examined this morning. His joyner catheter was removed today for another trial of void. Blood glucose levels have been under better control since yesterday after modifying schedule for NPH insulin. His morning glucose was elevated to 212 mg/dL; however, the patient says that there was an interruption in tube feeds between 4-5 AM and his tube feeds were later resumed until 7AM. Suspect that given tube feeds were extended beyond the scheduled hours, the NPH insulin stopped working and his glucose levels started to increase (patient reports seeing similar pattern in his CGM Freestyle Dolly 2). Of note, his blood glucose dropped from 178 mg/dL at 7PM to 112 at bedtime; therefore, he likely needs less lispro insulin prior to starting tube feeds.     CAPILLARY BLOOD GLUCOSE & INSULIN RECEIVED  140 mg/dL (04-03 @ 12:19) ? Ø  179 mg/dL (04-03 @ 17:27) ? 2 units of lispro sliding scale.   178 mg/dL (04-03 @ 19:45) ? 5 units of lispro + 18 units of NPH insulin.   112 mg/dL (04-03 @ 22:37) ? Ø  212 mg/dL (04-04 @ 06:57) ? 4 units of lispro sliding scale.   105 mg/dL (04-04 @ 11:43) ? Ø    REVIEW OF SYSTEMS  Constitutional:  Negative fever, chills or loss of appetite.  Cardiovascular:  Negative for chest pain or palpitations.  Respiratory:  Negative for cough, wheezing, or shortness of breath.    Gastrointestinal:  Negative for nausea, vomiting, diarrhea, constipation, or abdominal pain.  Neurologic:  No headache, confusion, dizziness, lightheadedness.    PHYSICAL EXAM  Vital Signs Last 24 Hrs  T(C): 37 (04 Apr 2023 18:17), Max: 37.2 (04 Apr 2023 09:44)  T(F): 98.6 (04 Apr 2023 18:17), Max: 99 (04 Apr 2023 09:44)  HR: 73 (04 Apr 2023 17:02) (69 - 74)  BP: 131/67 (04 Apr 2023 17:02) (114/59 - 131/67)  BP(mean): 92 (03 Apr 2023 20:59) (92 - 92)  RR: 16 (04 Apr 2023 17:02) (16 - 17)  SpO2: 96% (04 Apr 2023 17:02) (95% - 99%)    Parameters below as of 04 Apr 2023 17:02  Patient On (Oxygen Delivery Method): room air    Constitutional: Awake, alert, underweight, elderly male, in no acute distress.   HEENT: Normocephalic, atraumatic, BRUNILDA.  Respiratory: Lungs clear to ausculation bilaterally.   Cardiovascular: regular rhythm, normal S1 and S2, no audible murmurs.   GI: soft, non-tender, non-distended, bowel sounds present. (+) J-tube in place.   Extremities: No lower extremity edema.  Psychiatric: AAO x 3.    LABS  CBC - WBC/HGB/HTC/PLT: 8.70/9.3/28.8/265 (04-04-23)  BMP - Na/K/Cl/Bicarb/BUN/Cr/Gluc/AG/eGFR: 136/4.2/104/25/15/0.64/163/7/100 (04-04-23)  Ca - 7.9 (04-04-23)  Phos - 2.5 (04-04-23)  Mg - 2.1 (04-04-23)  PT/aPTT/INR: --/61.7/-- (04-01-23)     MEDICATIONS  MEDICATIONS  (STANDING):  apixaban 2.5 milliGRAM(s) Oral every 12 hours  dextrose 5%. 1000 milliLiter(s) (100 mL/Hr) IV Continuous <Continuous>  dextrose 5%. 1000 milliLiter(s) (50 mL/Hr) IV Continuous <Continuous>  dextrose 50% Injectable 25 Gram(s) IV Push once  dextrose 50% Injectable 12.5 Gram(s) IV Push once  dextrose 50% Injectable 25 Gram(s) IV Push once  doxazosin 4 milliGRAM(s) Oral at bedtime  finasteride 5 milliGRAM(s) Oral every 24 hours  glucagon  Injectable 1 milliGRAM(s) IntraMuscular once  insulin lispro (ADMELOG) corrective regimen sliding scale   SubCutaneous Before meals and at bedtime  insulin lispro Injectable (ADMELOG) 4 Unit(s) SubCutaneous <User Schedule>  insulin NPH human recombinant 22 Unit(s) SubCutaneous <User Schedule>  pantoprazole    Tablet 40 milliGRAM(s) Oral daily  polyethylene glycol 3350 17 Gram(s) Oral daily  senna 2 Tablet(s) Oral at bedtime  sucralfate suspension 1 Gram(s) Oral four times a day    MEDICATIONS  (PRN):  acetaminophen     Tablet .. 650 milliGRAM(s) Oral every 6 hours PRN Temp greater or equal to 38C (100.4F), Mild Pain (1 - 3)  dextrose Oral Gel 15 Gram(s) Oral once PRN Blood Glucose LESS THAN 70 milliGRAM(s)/deciliter  melatonin 3 milliGRAM(s) Oral at bedtime PRN Insomnia  ondansetron Injectable 4 milliGRAM(s) IV Push once PRN Nausea and/or Vomiting  zolpidem 5 milliGRAM(s) Oral at bedtime PRN Insomnia  zolpidem 5 milliGRAM(s) Oral at bedtime PRN Insomnia    ASSESSMENT / RECOMMENDATIONS  Mr. Kirkland is a 72-year-old male with a past medical history of type 2 diabetes mellitus, hypertension, gastric cancer, hypothyroidism and anemia who presented to the emergency department (3/25/23) complaining of fever, chills and abdominal pain after recent EGD on 3/23/23. Labs were remarkable for leukocytosis and acute kidney injury (LARRY). He was initially admitted to medicine for management of sepsis and LARRY. Urinalysis was negative and blood cultures showed no growth and he remained afebrile with no leukocytosis. Therefore, antibiotics were discontinued as impression was that fever was due to bacterial translocation from recent esophageal biopsy. CT angiogram of the abdomen/pelvis demonstrated a 5.5 cm infrarenal aortic aneurysm that had enlarged from previous study, now s/p EVAR (3/31/23). Endocrinology following for recommendations regarding diabetes management.     A1C: 6.0 %  BUN: 15  Creatinine: 0.64  GFR: 100  Weight: 66.6  BMI: 21.1    # Type 2 diabetes mellitus  - Blood glucose levels mostly within target; however, suspect he still needs a higher dose of NPH insulin to cover him overnight and avoid morning hyperglycemia. In addition, will recommend to decrease lispro insulin before tube feeds as a precaution to avoid hypoglycemia given significant drop at bedtime.   - Please increase NPH insulin to 22 units daily at 7:30 PM (half hour before tube feeds start).   - Decrease Lispro to 4 units daily at 7:30 PM (half hour before tube feeds start).   - Continue lispro moderate dose sliding scale before meals and at bedtime.  - Patient's fingerstick glucose goal is 100-180 mg/dL.    - Upon discharge, the patient will need new prescription for Jevity 1.5 kwan @95 mL/hr for 10 hrs (950 mL TV, 1425 kcal) to be administered from 8:00 PM to 6:00 AM every day.   - Discharge recommendations for insulin regimen to be discussed.   - Patient can continue to follow with his Endocrinologist or can call [facility] at [phone] to make an appointment (he mentioned that he wanted to switch to an endocrinologist within [facility]).     Case discussed with Dr. Richard. Primary team updated.       Cali Garrett    Endocrinology Fellow    Service Pager: [phone]

## 2023-04-04 NOTE — PROGRESS NOTE ADULT - SUBJECTIVE AND OBJECTIVE BOX
{\rtf1\sloxhw69462\ansi\iqkwwnx9366\ftnbj\uc1\deff0  {\fonttbl{\f0 \fnil Segoe UI;}{\f1 \fnil \fcharset0 Segoe UI;}{\f2 \fnil [employer] [place];}}  {\colortbl ;\bgw956\uyulq694\llwi824 ;\red0\green0\blue0 ;\red0\green0\blue0 ;}  {\stylesheet{\f0\fs20 Normal;}{\cs1 Default Paragraph Font;}{\cs2\f0\fs16 Line Number;}{\cs3\f2\fs24 Hyperlink;}}  {\*\revtbl{Unknown;}}  \hmfjcb66225\oqefoc97368\xijev6843\shyzm4524\oqnll7817\jytto3916\ssgzqdc934\kdejqme850\nogrowautofit\rxovyd885\formshade\nofeaturethrottle1\dntblnsbdb\fet4\aendnotes\aftnnrlc\pgbrdrhead\pgbrdrfoot  \sectd\tcsjkh35810\bpzaku35921\guttersxn0\qhjkldml0976\aqnxgwfn5669\bfxsajcd4952\vzusrhrp6328\nqwcfvl147\hzvktuf491\sbkpage\pgncont\pgndec  \plain\plain\f0\fs24\ql\plain\f0\fs24\plain\f0\fs20\knvd9784\hich\f0\dbch\f0\loch\f0\fs20 O/N: KIRILL, VSS \par  \par  \par  \par  \par  \par  \par  \par  \plain\f1\fs16\wxuo9679\hich\f1\dbch\f1\loch\f1\cf2\fs16\par  \par  ---------------------------------------------------------------------------\par  PLEASE CHECK WHEN PRESENT:\par     [  ]Heart Failure\par     [  ] Acute\par     [  ] Acute on Chronic\par     [  ] Chronic\par  -------------------------------------------------------------------\par     [  ]Diastolic [HFpEF]\par     [  ]Systolic [HFrEF]\par     [  ]Combined [HFpEF & HFrEF]\par  .................................................................................\par     [  ]Other:\par     [ ] Pulmonary Hypertension\par     [ ] Afib\par     [x ] Hypertensive Heart Disease\par  -------------------------------------------------------------------\par  [ ] Respiratory failure\par  [ ] Acute cor pulmonale\par  [ ] Asthma/COPD Exacerbation\par  [ ] Pleural effusion\par  [ ] Aspiration pneumonia\par  [ ] Obstructive Sleep Apnea\par  -------------------------------------------------------------------\par  [ x ]LARRY\par     [  ]ATN\par     [  ]Reneal Medullary Necrosis\par     [  ]Renal Cortical Necrosis\par     [  ]Other Pathological Lesions:\par  \par  [  ]CKD 1\par  [  ]CKD 2\par  [  ]CKD 3\par  [  ]CKD 4\par  [  ]CKD 5\par  [  ]Other\par  -------------------------------------------------------------------\par  [x  ]Diabetes\par  [  ] Diabetic PVD Ulcer\par  [  ] Neuropathic ulcer to DM\par  [  ] Diabetes with Nephropathy\par  [  ] Osteomyelitis due to diabetes\par  [  ] Hyperglycemia \par  [  ]hypoglycemia \par  --------------------------------------------------------------------\par  [  ]Malnutrition: See Nutrition Note\par  [  ]Cachexia\par  [  ]Other: \par  [  ]Supplement Ordered:\par  [  ]Morbid Obesity (BMI >=40]\par  ---------------------------------------------------------------------\par  [ x] Sepsis/severe sepsis/septic shock\par  [ ] Noninfectious SIRS\par  [ ] UTI\par  [ ] Pneumonia\par  -----------------------------------------------------------------------\par  [ ] Acidosis/alkalosis\par  [ ] Fluid overload\par  [ ] Hypokalemia\par  [ ] Hyperkalemia\par  [ ] Hypomagnesemia\par  [ ] Hypophosphatemia\par  [ ] Hyperphosphatemia\par  ------------------------------------------------------------------------\par  [ ] Acute blood loss anemia\par  [ ] Post op blood loss anemia\par  [x ] Iron deficiency anemia\par  [ ] Anemia due to chronic disease\par  [ ] Hypercoagulable state\par  [ ] Thrombocytopenia\par  ----------------------------------------------------------------------\par  [ ] Cerebral infarction\par  [ ] Transient ischemia attack\par  [ ] Encephalopathy - Toxic or Metabolic\par  \par  71 y/o M with a PMHx of gastric CA, DM Type 2, Hypothyroidism, HTN and anemia s/p EGD with esophageal biopsy 3/23 presents to the ED c/o fevers, low back pain x 2 days. Admitted for sepsis and LARRY i/s/ bladder outlet obstruction. Vascular Surgery consulted   for enlarging infrarenal AAA. Non smoker, no first degree relatives with dx, asymptomatic. Avss, exam benign. Imaging demonstrates 5.5cm distal AAA, previously 4.7cm 11/21. Patient was evaluated in 3/2022 by VS and recommended for surveillance. CTA obtained   and results reviewed. Patient is candidate for EVAR, and amenable to repair. \par  \par  AAA s/p EVAR 3/31\par  - pain control\par  - am labs\par  \par  T2DM, insulin dependent\par  - Endocrine requesting adjustment to patient's tube feed orders to: Jevity 1.5 kwan @95 mL/hr for 10 hrs (950 mL TV, 1425 kcal) starting from tonight (from 8:00 PM to 6:00 AM).\par  - Administer 16 units of NPH insulin and 6 units of lispro\par  - mISS\par  \par  LARRY 2/2 obstruction\par  - failed TOV.  Abarca in place\par  - F/u outpatient at [facility] urology\par  - On flomax\par  \par  Esophagitis\par  - Protonix 40mg QD\par  \par  Gastric cancer\par  - Chronic, in remission\par  \par  Portal Vein Thrombosis\par  - eliquis\par  \par  Hypothyroidism: continue synthroid\par  \par  N: Jevity 1.5 kwan @95 mL/hr for 10 hrs (950 mL TV, 1425 kcal) x 10h; soft and bite sized diet\par  DVT PPX: Eliquis\par  Dispo: plan to transfer to medicine\par  \par  \plain\f1\fs16\esgo2389\hich\f1\dbch\f1\loch\f1\cf2\fs16\strike\plain\f0\fs20\ecwh4305\hich\f0\dbch\f0\loch\f0\fs20\par  }   O/N: KIRILL, VSS     Subjective: Patient seen and examined at bedside with chief. He states pain is well-controlled, able to david TF without n/v, denies cp, sob. OOBA.    ROS:   Denies Headache, blurred vision, Chest Pain, SOB, Abdominal pain, nausea or vomiting     Social   apixaban 2.5  doxazosin 2      Allergies    No Known Allergies    Intolerances        Vital Signs Last 24 Hrs  T(C): 36.9 (04 Apr 2023 05:35), Max: 37.1 (03 Apr 2023 22:51)  T(F): 98.5 (04 Apr 2023 05:35), Max: 98.7 (03 Apr 2023 22:51)  HR: 69 (04 Apr 2023 00:53) (64 - 71)  BP: 114/59 (04 Apr 2023 00:53) (114/59 - 144/70)  BP(mean): 92 (03 Apr 2023 20:59) (92 - 98)  RR: 16 (04 Apr 2023 00:53) (16 - 20)  SpO2: 99% (04 Apr 2023 00:53) (95% - 99%)    Parameters below as of 04 Apr 2023 00:53  Patient On (Oxygen Delivery Method): room air      I&O's Summary    02 Apr 2023 07:01  -  03 Apr 2023 07:00  --------------------------------------------------------  IN: 1310 mL / OUT: 1765 mL / NET: -455 mL    03 Apr 2023 07:01  -  04 Apr 2023 06:47  --------------------------------------------------------  IN: 1150 mL / OUT: 1880 mL / NET: -730 mL        General: NAD, resting comfortably in bed.  C/V: NSR.  Pulm: Nonlabored breathing, no respiratory distress on RA.  Abd: soft, ND/ND. Incisions clean, dry, and intact.  Extrem: WWP, no edema, SCDs in place.  Neuro: motor/sensory grossly intact, moves all ext . to command and spontaneously.  Extremities: RLE: 1+DP/PTs; LLE: 2+DP/PTs    LABS:                        9.3    8.70  )-----------( 265      ( 04 Apr 2023 05:57 )             28.8     04-04    136  |  104  |  15  ----------------------------<  163<H>  4.2   |  25  |  0.64    Ca    7.9<L>      04 Apr 2023 05:57  Phos  2.5     04-04  Mg     2.1     04-04        ---------------------------------------------------------------------------  PLEASE CHECK WHEN PRESENT:     [  ]Heart Failure     [  ] Acute     [  ] Acute on Chronic     [  ] Chronic  -------------------------------------------------------------------     [  ]Diastolic [HFpEF]     [  ]Systolic [HFrEF]     [  ]Combined [HFpEF & HFrEF]  .................................................................................     [  ]Other:     [ ] Pulmonary Hypertension     [ ] Afib     [x ] Hypertensive Heart Disease  -------------------------------------------------------------------  [ ] Respiratory failure  [ ] Acute cor pulmonale  [ ] Asthma/COPD Exacerbation  [ ] Pleural effusion  [ ] Aspiration pneumonia  [ ] Obstructive Sleep Apnea  -------------------------------------------------------------------  [ x ]LARRY     [  ]ATN     [  ]Reneal Medullary Necrosis     [  ]Renal Cortical Necrosis     [  ]Other Pathological Lesions:    [  ]CKD 1  [  ]CKD 2  [  ]CKD 3  [  ]CKD 4  [  ]CKD 5  [  ]Other  -------------------------------------------------------------------  [x  ]Diabetes  [  ] Diabetic PVD Ulcer  [  ] Neuropathic ulcer to DM  [  ] Diabetes with Nephropathy  [  ] Osteomyelitis due to diabetes  [  ] Hyperglycemia   [  ]hypoglycemia   --------------------------------------------------------------------  [x]Malnutrition: See Nutrition Note  [  ]Cachexia  [  ]Other:   [  ]Supplement Ordered:  [  ]Morbid Obesity (BMI >=40]  ---------------------------------------------------------------------  [ x] Sepsis/severe sepsis/septic shock  [ ] Noninfectious SIRS  [ ] UTI  [ ] Pneumonia  -----------------------------------------------------------------------  [ ] Acidosis/alkalosis  [ ] Fluid overload  [ ] Hypokalemia  [ ] Hyperkalemia  [ ] Hypomagnesemia  [ ] Hypophosphatemia  [ ] Hyperphosphatemia  ------------------------------------------------------------------------  [ ] Acute blood loss anemia  [ ] Post op blood loss anemia  [x ] Iron deficiency anemia  [ ] Anemia due to chronic disease  [ ] Hypercoagulable state  [ ] Thrombocytopenia  ----------------------------------------------------------------------  [ ] Cerebral infarction  [ ] Transient ischemia attack  [ ] Encephalopathy - Toxic or Metabolic    71 y/o M PPD4 EVAR, pending transfer to medicine.     AAA s/p EVAR 3/31  - pain control  - am labs    T2DM, insulin dependent  - Endocrine requesting adjustment to patient's tube feed orders to: Jevity 1.5 kwan @95 mL/hr for 10 hrs (950 mL TV, 1425 kcal) starting from tonight (from 8:00 PM to 6:00 AM).  - Administer 16 units of NPH insulin and 6 units of lispro  - mISS    LARRY 2/2 obstruction  - failed TOV.  Abarca in place  - F/u outpatient at [facility] urology  - On flomax    Esophagitis  - Protonix 40mg QD    Gastric cancer  - Chronic, in remission    Portal Vein Thrombosis  - eliquis    Hypothyroidism: continue synthroid    N: Jevity 1.5 kwan @95 mL/hr for 10 hrs (950 mL TV, 1425 kcal) x 10h; soft and bite sized diet  DVT PPX: Eliquis  Dispo: plan to transfer to medicine

## 2023-04-05 DIAGNOSIS — K65.1 PERITONEAL ABSCESS: ICD-10-CM

## 2023-04-05 DIAGNOSIS — I82.409 ACUTE EMBOLISM AND THROMBOSIS OF UNSPECIFIED DEEP VEINS OF UNSPECIFIED LOWER EXTREMITY: ICD-10-CM

## 2023-04-05 DIAGNOSIS — K21.00 GASTRO-ESOPHAGEAL REFLUX DISEASE WITH ESOPHAGITIS, WITHOUT BLEEDING: ICD-10-CM

## 2023-04-05 LAB
ANION GAP SERPL CALC-SCNC: 11 MMOL/L — SIGNIFICANT CHANGE UP (ref 5–17)
BUN SERPL-MCNC: 17 MG/DL — SIGNIFICANT CHANGE UP (ref 7–23)
CALCIUM SERPL-MCNC: 8.6 MG/DL — SIGNIFICANT CHANGE UP (ref 8.4–10.5)
CHLORIDE SERPL-SCNC: 100 MMOL/L — SIGNIFICANT CHANGE UP (ref 96–108)
CO2 SERPL-SCNC: 25 MMOL/L — SIGNIFICANT CHANGE UP (ref 22–31)
CREAT SERPL-MCNC: 0.64 MG/DL — SIGNIFICANT CHANGE UP (ref 0.5–1.3)
EGFR: 100 ML/MIN/1.73M2 — SIGNIFICANT CHANGE UP
GLUCOSE BLDC GLUCOMTR-MCNC: 109 MG/DL — HIGH (ref 70–99)
GLUCOSE BLDC GLUCOMTR-MCNC: 140 MG/DL — HIGH (ref 70–99)
GLUCOSE BLDC GLUCOMTR-MCNC: 163 MG/DL — HIGH (ref 70–99)
GLUCOSE BLDC GLUCOMTR-MCNC: 179 MG/DL — HIGH (ref 70–99)
GLUCOSE SERPL-MCNC: 130 MG/DL — HIGH (ref 70–99)
HCT VFR BLD CALC: 32 % — LOW (ref 39–50)
HGB BLD-MCNC: 10.5 G/DL — LOW (ref 13–17)
MAGNESIUM SERPL-MCNC: 2.2 MG/DL — SIGNIFICANT CHANGE UP (ref 1.6–2.6)
MCHC RBC-ENTMCNC: 32.8 GM/DL — SIGNIFICANT CHANGE UP (ref 32–36)
MCHC RBC-ENTMCNC: 33.8 PG — SIGNIFICANT CHANGE UP (ref 27–34)
MCV RBC AUTO: 102.9 FL — HIGH (ref 80–100)
NRBC # BLD: 0 /100 WBCS — SIGNIFICANT CHANGE UP (ref 0–0)
PHOSPHATE SERPL-MCNC: 2.8 MG/DL — SIGNIFICANT CHANGE UP (ref 2.5–4.5)
PLATELET # BLD AUTO: 295 K/UL — SIGNIFICANT CHANGE UP (ref 150–400)
POTASSIUM SERPL-MCNC: 4.2 MMOL/L — SIGNIFICANT CHANGE UP (ref 3.5–5.3)
POTASSIUM SERPL-SCNC: 4.2 MMOL/L — SIGNIFICANT CHANGE UP (ref 3.5–5.3)
RBC # BLD: 3.11 M/UL — LOW (ref 4.2–5.8)
RBC # FLD: 14.6 % — HIGH (ref 10.3–14.5)
SODIUM SERPL-SCNC: 136 MMOL/L — SIGNIFICANT CHANGE UP (ref 135–145)
WBC # BLD: 10.16 K/UL — SIGNIFICANT CHANGE UP (ref 3.8–10.5)
WBC # FLD AUTO: 10.16 K/UL — SIGNIFICANT CHANGE UP (ref 3.8–10.5)

## 2023-04-05 PROCEDURE — 99233 SBSQ HOSP IP/OBS HIGH 50: CPT

## 2023-04-05 PROCEDURE — 99231 SBSQ HOSP IP/OBS SF/LOW 25: CPT | Mod: GC

## 2023-04-05 RX ADMIN — Medication 2: at 22:10

## 2023-04-05 RX ADMIN — Medication 1 GRAM(S): at 12:04

## 2023-04-05 RX ADMIN — APIXABAN 2.5 MILLIGRAM(S): 2.5 TABLET, FILM COATED ORAL at 22:04

## 2023-04-05 RX ADMIN — Medication 4 MILLIGRAM(S): at 22:04

## 2023-04-05 RX ADMIN — FINASTERIDE 5 MILLIGRAM(S): 5 TABLET, FILM COATED ORAL at 17:55

## 2023-04-05 RX ADMIN — HUMAN INSULIN 22 UNIT(S): 100 INJECTION, SUSPENSION SUBCUTANEOUS at 19:47

## 2023-04-05 RX ADMIN — APIXABAN 2.5 MILLIGRAM(S): 2.5 TABLET, FILM COATED ORAL at 10:03

## 2023-04-05 RX ADMIN — Medication 2: at 07:32

## 2023-04-05 RX ADMIN — Medication 1 GRAM(S): at 17:55

## 2023-04-05 RX ADMIN — ZOLPIDEM TARTRATE 5 MILLIGRAM(S): 10 TABLET ORAL at 01:49

## 2023-04-05 RX ADMIN — Medication 4 UNIT(S): at 19:46

## 2023-04-05 RX ADMIN — PANTOPRAZOLE SODIUM 40 MILLIGRAM(S): 20 TABLET, DELAYED RELEASE ORAL at 12:03

## 2023-04-05 RX ADMIN — Medication 100 MICROGRAM(S): at 07:22

## 2023-04-05 RX ADMIN — Medication 1 GRAM(S): at 07:22

## 2023-04-05 NOTE — PROGRESS NOTE ADULT - PROBLEM SELECTOR PLAN 6
Per chart review of [facility], had portal vein thrombosis in 2018, completed 3 months of eliquis; had recurrent portal vein thrombosis in 2021. 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 Was started on Eliquis 5mg BID. Tapered to apixaban 2.5mg BID  - c/w apixaban 2.5mg BID

## 2023-04-05 NOTE — PROGRESS NOTE ADULT - NUTRITIONAL ASSESSMENT
This patient has been assessed with a concern for Malnutrition and has been determined to have a diagnosis/diagnoses of Severe protein-calorie malnutrition.    This patient is being managed with:   Diet Soft and Bite Sized-  Tube Feeding Modality: Gastrostomy  Jevity 1.5 Jesús (JEVITY1.5)  Total Volume for 24 Hours (mL): 950  Total Number of Cans: 4  Continuous  Starting Tube Feed Rate {mL per Hour}: 95     Every hour  Until Goal Tube Feed Rate (mL per Hour): 95  Tube Feed Duration (in Hours): 10  Tube Feed Start Time: 20:00  Tube Feed Stop Time: 06:00  Free Water Flush  Free Water Flush Instructions:  every 4 hours flush 25 cc.  Entered: Apr 4 2023  7:29PM    Diet Soft and Bite Sized-  Tube Feeding Modality: Jejunostomy  Jevity 1.5 Jesús (JEVITY1.5)  Total Volume for 24 Hours (mL): 1140  Total Number of Cans: 5  Continuous  Until Goal Tube Feed Rate (mL per Hour): 95  Tube Feed Duration (in Hours): 12  Tube Feed Start Time: 20:00  Tube Feed Stop Time: 08:00  Pump   Rate (mL per Hour): 150   Frequency: Every 4 Hours  Entered: Mar 27 2023 12:16PM    The following pending diet order is being considered for treatment of Severe protein-calorie malnutrition:null

## 2023-04-05 NOTE — PROGRESS NOTE ADULT - SUBJECTIVE AND OBJECTIVE BOX
Hospital course:  72M PMH of gastric CA, IDDM2, hypothyroidism, HTN, portal vein thrombosis, and anemia s/p EGD with esophageal biopsy on 3/23 who p/w SIRS 2/2 acute urinary retention w/LARRY and aspiration pneumonitis. Incidentally found to have an enlarging AAA. 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 Preperitoneal abscess with known fistula, wound care per general surgery consult (3/28). Failed TOV 4/2 with joyner replacement and now refusing joyner catheter placement, joyner d/c 4/4 with straight cath 1200 output. Transfer to medicine service. Hospital course:  72M PMH of gastric CA, IDDM2, hypothyroidism, HTN, portal vein thrombosis, and anemia s/p EGD with esophageal biopsy on 3/23 who p/w SIRS 2/2 acute urinary retention w/LARRY and aspiration pneumonitis. Incidentally found to have an enlarging AAA. 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 Preperitoneal abscess with known fistula, wound care per general surgery consult (3/28). Failed TOV 4/2 with joyner replacement and now refusing joyner catheter placement, joyner d/c 4/4 with straight cath 1200 output. Transfer to medicine service.    SUBJECTIVE:  Patient seen and examined at bedside, NAD    Vital Signs Last 12 Hrs  T(F): 99.3 (04-05-23 @ 18:07), Max: 99.3 (04-05-23 @ 18:07)  HR: 69 (04-05-23 @ 20:39) (64 - 69)  BP: 110/59 (04-05-23 @ 20:39) (110/59 - 134/64)  BP(mean): 79 (04-05-23 @ 20:39) (79 - 79)  RR: 16 (04-05-23 @ 20:39) (16 - 16)  SpO2: 95% (04-05-23 @ 20:39) (95% - 95%)  I&O's Summary    04 Apr 2023 07:01  -  05 Apr 2023 07:00  --------------------------------------------------------  IN: 1030 mL / OUT: 1545 mL / NET: -515 mL    05 Apr 2023 07:01  -  05 Apr 2023 22:15  --------------------------------------------------------  IN: 360 mL / OUT: 1450 mL / NET: -1090 mL        PHYSICAL EXAM:  Gen: Reclining in bed at time of exam, appears stated age  HEENT: NCAT, MMM, clear OP; some temporal wasting present   Neck: supple, trachea at midline  CV: RRR, +S1/S2  Pulm: adequate respiratory effort, no increase in work of breathing  Abd: soft, ND;   Skin: warm and dry,   Ext: no LE edema   Neuro: AOx3, speaking in full sentences  Psych: affect and behavior appropriate, pleasant at time of interview      LABS:                        10.5   10.16 )-----------( 295      ( 05 Apr 2023 12:00 )             32.0     04-05    136  |  100  |  17  ----------------------------<  130<H>  4.2   |  25  |  0.64    Ca    8.6      05 Apr 2023 12:00  Phos  2.8     04-05  Mg     2.2     04-05              RADIOLOGY & ADDITIONAL TESTS:    MEDICATIONS  (STANDING):  apixaban 2.5 milliGRAM(s) Oral every 12 hours  dextrose 5%. 1000 milliLiter(s) (100 mL/Hr) IV Continuous <Continuous>  dextrose 5%. 1000 milliLiter(s) (50 mL/Hr) IV Continuous <Continuous>  dextrose 50% Injectable 25 Gram(s) IV Push once  dextrose 50% Injectable 12.5 Gram(s) IV Push once  dextrose 50% Injectable 25 Gram(s) IV Push once  doxazosin 4 milliGRAM(s) Oral at bedtime  finasteride 5 milliGRAM(s) Oral every 24 hours  glucagon  Injectable 1 milliGRAM(s) IntraMuscular once  insulin lispro (ADMELOG) corrective regimen sliding scale   SubCutaneous Before meals and at bedtime  insulin lispro Injectable (ADMELOG) 4 Unit(s) SubCutaneous <User Schedule>  insulin NPH human recombinant 22 Unit(s) SubCutaneous <User Schedule>  levothyroxine 100 MICROGram(s) Oral every 24 hours  pantoprazole    Tablet 40 milliGRAM(s) Oral daily  polyethylene glycol 3350 17 Gram(s) Oral daily  senna 2 Tablet(s) Oral at bedtime  sucralfate suspension 1 Gram(s) Oral four times a day    MEDICATIONS  (PRN):  acetaminophen     Tablet .. 650 milliGRAM(s) Oral every 6 hours PRN Temp greater or equal to 38C (100.4F), Mild Pain (1 - 3)  dextrose Oral Gel 15 Gram(s) Oral once PRN Blood Glucose LESS THAN 70 milliGRAM(s)/deciliter  guaiFENesin ER 1200 milliGRAM(s) Oral every 12 hours PRN Cough  melatonin 3 milliGRAM(s) Oral at bedtime PRN Insomnia  ondansetron Injectable 4 milliGRAM(s) IV Push once PRN Nausea and/or Vomiting  zolpidem 5 milliGRAM(s) Oral at bedtime PRN Insomnia

## 2023-04-05 NOTE — PROGRESS NOTE ADULT - ASSESSMENT
*****incomplete note******* 73 y/o M with a PMHx of gastric CA, DM Type 2, Hypothyroidism, HTN and anemia s/p EGD with esophageal biopsy 3/23 presents to the ED c/o fevers, low back pain x 2 days. Admitted for sepsis and LARRY, 2/2 hydronephrosis, hospital course c/b post-obstructive diuresis.   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 Pending trial of void.    # AAA - s/p EVAR on 3/31/23.   # Acute kidney injury - resolved     # Urinary retention - Joyner catheter replaced.  patient prefers leaving without joyner if possible   [ ] Switched tamsulosin to 2mg doxazosin qHS on 4/3/2023 ; Checked orthostatics on 4/4/2023; No orthostatic hypotension; Increased doxazosin to 4mg qHS on 4/4/2023. Plan for another trial of void on 4/6/2023. Given history of BPH, when performing TOV, would allow up to 750ml on bladder ultrasound before replacing joyner catheter  [ ] Check orthostatics qd given medication changes to alpha-blocker   [ ] start finasteride 5mg qd   Discuss possibility of inpatient TURP with urology team tomorrow given that patient is euvolemic, has been monitored for the last week with no signs of sepsis, and recently tolerated EVAR.   If inpatient TURP not possible, will continue to  patient on need for joyner, and scheduling of TOV/TURP at later date.     # GERD with esophagitis - s/p EGD with biopsy on 3/23. continue with pantoprazole 40mg qd and sucralfate   # Type 2 Diabetes complicated by hyperglycemia - f/u endocrine recs   # [facility], completed 3 months of eliquis; had recurrent portal vein thrombosis in 2021. 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 Was started on Eliquis 5mg BID. Tapered to apixaban 2.5mg BID    # Gastric cancer - f/u outpatient   # Hypothyroidism - continue levothyroxine 100mcg qAM   # Sepsis (present on admission) - Resolved   # preperitoneal abscess - has known fistula. monitoring. wound care per gen surg consult (3/28)   # Severe protein calorie malnutrition - ; dietary supplements per nutrition consult     DVT ppx - already on apixaban for portal vein thrombosis     71 y/o M with a PMHx of gastric CA, DM Type 2, Hypothyroidism, HTN and anemia s/p EGD with esophageal biopsy 3/23 presents to the ED c/o fevers, low back pain x 2 days. Admitted for sepsis and LARRY, 2/2 hydronephrosis, hospital course c/b post-obstructive diuresis.   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 Pending trial of void.    # AAA - s/p EVAR on 3/31/23.   # Acute kidney injury - resolved     # Urinary retention - Joyner catheter replaced.  patient prefers leaving without joyner if possible   [ ] Switched tamsulosin to 2mg doxazosin qHS on 4/3/2023 ; Checked orthostatics on 4/4/2023; No orthostatic hypotension; Increased doxazosin to 4mg qHS on 4/4/2023. Plan for another trial of void on 4/6/2023. Given history of BPH, when performing TOV, would allow up to 750ml on bladder ultrasound before replacing joyner catheter  [ ] Check orthostatics qd given medication changes to alpha-blocker   [ ] start finasteride 5mg qd   Discuss possibility of inpatient TURP with urology team tomorrow given that patient is euvolemic, has been monitored for the last week with no signs of sepsis, and was optimzed for EVAR, which he tolerated last week. Eliquis 2.5mg BID is for long term ppx of portal vein thrombosis (can be bridged with heparin (was bridged with heparin before EVAR)   If inpatient TURP not possible, will continue to  patient on need for joyner, and scheduling of TOV/TURP at later date.     # GERD with esophagitis - s/p EGD with biopsy on 3/23. continue with pantoprazole 40mg qd and sucralfate   # Type 2 Diabetes complicated by hyperglycemia - f/u endocrine recs   # hx of Portal vein thrombosis - per chart review of [facility], had portal vein thrombosis in 2018, completed 3 months of eliquis; had recurrent portal vein thrombosis in 2021. 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 Was started on Eliquis 5mg BID. Tapered to apixaban 2.5mg BID    # Gastric cancer - f/u outpatient   # Hypothyroidism - continue levothyroxine 100mcg qAM   # Sepsis (present on admission) - Resolved   # preperitoneal abscess - has known fistula. monitoring. wound care per gen surg consult (3/28)   # Severe protein calorie malnutrition - ; dietary supplements per nutrition consult     DVT ppx - already on apixaban for portal vein thrombosis     71 y/o M with a PMHx of gastric CA, DM Type 2, Hypothyroidism, HTN and anemia s/p EGD with esophageal biopsy 3/23 presents to the ED c/o fevers, low back pain x 2 days. Admitted for sepsis and LARRY, 2/2 hydronephrosis, hospital course c/b post-obstructive diuresis.   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 Pending trial of void.    # AAA - s/p EVAR on 3/31/23.   # Acute kidney injury - resolved     # Urinary retention - Joyner catheter replaced.  patient prefers leaving without joyner if possible   [ ] Switched tamsulosin to 2mg doxazosin qHS on 4/3/2023 ; Checked orthostatics on 4/4/2023; No orthostatic hypotension; Increased doxazosin to 4mg qHS on 4/4/2023. Plan for another trial of void on 4/6/2023. Given history of BPH, when performing TOV, would allow up to 750ml on bladder ultrasound before replacing joyner catheter  [ ] Check orthostatics qd given medication changes to alpha-blocker   [ ] start finasteride 5mg qd   Discuss with urology if any procedures indicated, and optimal timing of procedure.     # GERD with esophagitis - s/p EGD with biopsy on 3/23. continue with pantoprazole 40mg qd and sucralfate   # Type 2 Diabetes complicated by hyperglycemia - f/u endocrine recs   # hx of Portal vein thrombosis - per chart review of [facility], had portal vein thrombosis in 2018, completed 3 months of eliquis; had recurrent portal vein thrombosis in 2021. 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 Was started on Eliquis 5mg BID. Tapered to apixaban 2.5mg BID    # Gastric cancer - f/u outpatient   # Hypothyroidism - continue levothyroxine 100mcg qAM   # Sepsis (present on admission) - Resolved   # preperitoneal abscess - has known fistula. monitoring. wound care per gen surg consult (3/28)   # Severe protein calorie malnutrition - ; dietary supplements per nutrition consult     DVT ppx - already on apixaban for portal vein thrombosis

## 2023-04-05 NOTE — PROGRESS NOTE ADULT - SUBJECTIVE AND OBJECTIVE BOX
O/N: cough - mucinex, failed TOV , SC 1200                       ---------------------------------------------------------------------------  PLEASE CHECK WHEN PRESENT:     [  ]Heart Failure     [  ] Acute     [  ] Acute on Chronic     [  ] Chronic  -------------------------------------------------------------------     [  ]Diastolic [HFpEF]     [  ]Systolic [HFrEF]     [  ]Combined [HFpEF & HFrEF]  .................................................................................     [  ]Other:     [ ] Pulmonary Hypertension     [ ] Afib     [x ] Hypertensive Heart Disease  -------------------------------------------------------------------  [ ] Respiratory failure  [ ] Acute cor pulmonale  [ ] Asthma/COPD Exacerbation  [ ] Pleural effusion  [ ] Aspiration pneumonia  [ ] Obstructive Sleep Apnea  -------------------------------------------------------------------  [ x ]LARRY     [  ]ATN     [  ]Reneal Medullary Necrosis     [  ]Renal Cortical Necrosis     [  ]Other Pathological Lesions:    [  ]CKD 1  [  ]CKD 2  [  ]CKD 3  [  ]CKD 4  [  ]CKD 5  [  ]Other  -------------------------------------------------------------------  [x  ]Diabetes  [  ] Diabetic PVD Ulcer  [  ] Neuropathic ulcer to DM  [  ] Diabetes with Nephropathy  [  ] Osteomyelitis due to diabetes  [  ] Hyperglycemia   [  ]hypoglycemia   --------------------------------------------------------------------  [x]Malnutrition: See Nutrition Note  [  ]Cachexia  [  ]Other:   [  ]Supplement Ordered:  [  ]Morbid Obesity (BMI >=40]  ---------------------------------------------------------------------  [ x] Sepsis/severe sepsis/septic shock  [ ] Noninfectious SIRS  [ ] UTI  [ ] Pneumonia  -----------------------------------------------------------------------  [ ] Acidosis/alkalosis  [ ] Fluid overload  [ ] Hypokalemia  [ ] Hyperkalemia  [ ] Hypomagnesemia  [ ] Hypophosphatemia  [ ] Hyperphosphatemia  ------------------------------------------------------------------------  [ ] Acute blood loss anemia  [ ] Post op blood loss anemia  [x ] Iron deficiency anemia  [ ] Anemia due to chronic disease  [ ] Hypercoagulable state  [ ] Thrombocytopenia  ----------------------------------------------------------------------  [ ] Cerebral infarction  [ ] Transient ischemia attack  [ ] Encephalopathy - Toxic or Metabolic    71 y/o M PPD4 EVAR, pending transfer to medicine.     AAA s/p EVAR 3/31  - pain control  - am labs    T2DM, insulin dependent  - Endocrine requesting adjustment to patient's tube feed orders to: Jevity 1.5 kwan @95 mL/hr for 10 hrs (950 mL TV, 1425 kcal) starting from tonight (from 8:00 PM to 6:00 AM).  - Administer 16 units of NPH insulin and 6 units of lispro  - mISS    LARRY 2/2 obstruction  - failed TOV.  Abarca in place  - F/u outpatient at [facility] urology  - On flomax    Esophagitis  - Protonix 40mg QD    Gastric cancer  - Chronic, in remission    Portal Vein Thrombosis  - eliquis    Hypothyroidism: continue synthroid    N: Jevity 1.5 kwan @95 mL/hr for 10 hrs (950 mL TV, 1425 kcal) x 10h; soft and bite sized diet  DVT PPX: Eliquis  Dispo: plan to transfer to medicine   O/N: cough - mucinex, failed TOV , SC 1200     S: Patient does not have any complaints    O: Examined in bed resting comfortably     ROS: Denies headache, blurred vision, chest pain, SOB, abdominal pain, nausea or vomiting.         apixaban 2.5  doxazosin 4      Allergies    No Known Allergies    Intolerances        Vital Signs Last 24 Hrs  T(C): 37.3 (05 Apr 2023 05:27), Max: 37.3 (05 Apr 2023 05:27)  T(F): 99.2 (05 Apr 2023 05:27), Max: 99.2 (05 Apr 2023 05:27)  HR: 70 (05 Apr 2023 05:39) (70 - 74)  BP: 112/67 (05 Apr 2023 05:39) (112/67 - 134/64)  BP(mean): --  RR: 16 (05 Apr 2023 05:39) (16 - 17)  SpO2: 96% (05 Apr 2023 05:39) (95% - 96%)    Parameters below as of 05 Apr 2023 05:39  Patient On (Oxygen Delivery Method): room air      I&O's Summary    04 Apr 2023 07:01  -  05 Apr 2023 07:00  --------------------------------------------------------  IN: 1030 mL / OUT: 1545 mL / NET: -515 mL        Physical Exam:  General: alert and awake, NAD  Pulmonary: no respiratory distress  Cardiovascular: RRR  Abd: soft, ND/ND. Incisions clean, dry, and intact.  Extrem: WWP, no edema  Neuro: motor/sensory grossly intact, moves all ext . to command and spontaneously.  Extremities: RLE: 1+DP/PTs; LLE: 2+DP/PTs      LABS:                        9.3    8.70  )-----------( 265      ( 04 Apr 2023 05:57 )             28.8     04-04    136  |  104  |  15  ----------------------------<  163<H>  4.2   |  25  |  0.64    Ca    7.9<L>      04 Apr 2023 05:57  Phos  2.5     04-04  Mg     2.1     04-04          Radiology and Additional Studies:                  ---------------------------------------------------------------------------  PLEASE CHECK WHEN PRESENT:     [  ]Heart Failure     [  ] Acute     [  ] Acute on Chronic     [  ] Chronic  -------------------------------------------------------------------     [  ]Diastolic [HFpEF]     [  ]Systolic [HFrEF]     [  ]Combined [HFpEF & HFrEF]  .................................................................................     [  ]Other:     [ ] Pulmonary Hypertension     [ ] Afib     [x ] Hypertensive Heart Disease  -------------------------------------------------------------------  [ ] Respiratory failure  [ ] Acute cor pulmonale  [ ] Asthma/COPD Exacerbation  [ ] Pleural effusion  [ ] Aspiration pneumonia  [ ] Obstructive Sleep Apnea  -------------------------------------------------------------------  [ x ]LARRY     [  ]ATN     [  ]Reneal Medullary Necrosis     [  ]Renal Cortical Necrosis     [  ]Other Pathological Lesions:    [  ]CKD 1  [  ]CKD 2  [  ]CKD 3  [  ]CKD 4  [  ]CKD 5  [  ]Other  -------------------------------------------------------------------  [x  ]Diabetes  [  ] Diabetic PVD Ulcer  [  ] Neuropathic ulcer to DM  [  ] Diabetes with Nephropathy  [  ] Osteomyelitis due to diabetes  [  ] Hyperglycemia   [  ]hypoglycemia   --------------------------------------------------------------------  [x]Malnutrition: See Nutrition Note  [  ]Cachexia  [  ]Other:   [  ]Supplement Ordered:  [  ]Morbid Obesity (BMI >=40]  ---------------------------------------------------------------------  [ x] Sepsis/severe sepsis/septic shock  [ ] Noninfectious SIRS  [ ] UTI  [ ] Pneumonia  -----------------------------------------------------------------------  [ ] Acidosis/alkalosis  [ ] Fluid overload  [ ] Hypokalemia  [ ] Hyperkalemia  [ ] Hypomagnesemia  [ ] Hypophosphatemia  [ ] Hyperphosphatemia  ------------------------------------------------------------------------  [ ] Acute blood loss anemia  [ ] Post op blood loss anemia  [x ] Iron deficiency anemia  [ ] Anemia due to chronic disease  [ ] Hypercoagulable state  [ ] Thrombocytopenia  ----------------------------------------------------------------------  [ ] Cerebral infarction  [ ] Transient ischemia attack  [ ] Encephalopathy - Toxic or Metabolic    73 y/o M PPD5 EVAR, pending transfer to medicine.     AAA s/p EVAR 3/31  - pain control  - am labs    T2DM, insulin dependent  - Endocrine requesting adjustment to patient's tube feed orders to: Jevity 1.5 kwan @95 mL/hr for 10 hrs (950 mL TV, 1425 kcal) starting from tonight (from 8:00 PM to 6:00 AM).  - Administer 22 units of NPH insulin and 4 units of lispro daily  - mISS    LARRY 2/2 obstruction  - another TOV.  If fails will d/c with anya  - F/u outpatient at [facility] urology  - On flomax    Esophagitis  - Protonix 40mg QD    Gastric cancer  - Chronic, in remission    Portal Vein Thrombosis  - eliquis    Hypothyroidism: continue synthroid    N: Jevity 1.5 kwan @95 mL/hr for 10 hrs (950 mL TV, 1425 kcal) x 10h; soft and bite sized diet  DVT PPX: Eliquis  Dispo: plan to transfer to medicine

## 2023-04-05 NOTE — PROGRESS NOTE ADULT - ATTENDING COMMENTS
Pt seen on rounds this afternoon.  Was feeling well.  Feeds continue to be well tolerated.    Glucoses were in target range with the feeds running overnight, though the 6 AM FS (when feeds completed) was borderline at 179 mg%  His food intake during the day continues to be better than expected, though it does not provoke any significant hyperglycemia.  To continue the current regimen.  Will increase the NPH component by 2 units if he is again borderline high at the end of the feeding cycle

## 2023-04-05 NOTE — PROGRESS NOTE ADULT - SUBJECTIVE AND OBJECTIVE BOX
SUBJECTIVE / INTERVAL HPI: Patient was seen and examined this morning.     CAPILLARY BLOOD GLUCOSE & INSULIN RECEIVED  105 mg/dL (04-04 @ 11:43)  207 mg/dL (04-04 @ 17:03)  103 mg/dL (04-04 @ 21:42)  179 mg/dL (04-05 @ 06:49)  130 mg/dL (04-05 @ 12:00)  109 mg/dL (04-05 @ 12:13)  140 mg/dL (04-05 @ 17:44)      REVIEW OF SYSTEMS  Constitutional:  Negative fever, chills or loss of appetite.  Eyes:  Negative blurry vision or double vision.  Cardiovascular:  Negative for chest pain or palpitations.  Respiratory:  Negative for cough, wheezing, or shortness of breath.    Gastrointestinal:  Negative for nausea, vomiting, diarrhea, constipation, or abdominal pain.  Genitourinary:  Negative frequency, urgency or dysuria.  Neurologic:  No headache, confusion, dizziness, lightheadedness.    PHYSICAL EXAM  Vital Signs Last 24 Hrs  T(C): 37.4 (05 Apr 2023 18:07), Max: 37.4 (05 Apr 2023 18:07)  T(F): 99.3 (05 Apr 2023 18:07), Max: 99.3 (05 Apr 2023 18:07)  HR: 64 (05 Apr 2023 16:43) (64 - 70)  BP: 134/64 (05 Apr 2023 16:43) (112/67 - 134/64)  BP(mean): --  RR: 16 (05 Apr 2023 16:43) (16 - 17)  SpO2: 95% (05 Apr 2023 16:43) (94% - 96%)    Parameters below as of 05 Apr 2023 16:43  Patient On (Oxygen Delivery Method): room air        Constitutional: Awake, alert, in no acute distress.   HEENT: Normocephalic, atraumatic, BRUNILDA.  Respiratory: Lungs clear to ausculation bilaterally.   Cardiovascular: regular rhythm, normal S1 and S2, no audible murmurs.   GI: soft, non-tender, non-distended, bowel sounds present.  Extremities: No lower extremity edema.  Psychiatric: AAO x 3. Normal affect/mood.     LABS  CBC - WBC/HGB/HTC/PLT: 10.16/10.5/32.0/295 (04-05-23)  BMP - Na/K/Cl/Bicarb/BUN/Cr/Gluc/AG/eGFR: 136/4.2/100/25/17/0.64/130/11/100 (04-05-23)  Ca - 8.6 (04-05-23)  Phos - 2.8 (04-05-23)  Mg - 2.2 (04-05-23)    PT/aPTT/INR: --/61.7/-- (04-01-23)   Thyroid Stimulating Hormone, Serum: 4.520 (04-04-23)  Total T4/Free T4: --/0.938 (04-04-23)    MEDICATIONS  MEDICATIONS  (STANDING):  apixaban 2.5 milliGRAM(s) Oral every 12 hours  dextrose 5%. 1000 milliLiter(s) (100 mL/Hr) IV Continuous <Continuous>  dextrose 5%. 1000 milliLiter(s) (50 mL/Hr) IV Continuous <Continuous>  dextrose 50% Injectable 25 Gram(s) IV Push once  dextrose 50% Injectable 12.5 Gram(s) IV Push once  dextrose 50% Injectable 25 Gram(s) IV Push once  doxazosin 4 milliGRAM(s) Oral at bedtime  finasteride 5 milliGRAM(s) Oral every 24 hours  glucagon  Injectable 1 milliGRAM(s) IntraMuscular once  insulin lispro (ADMELOG) corrective regimen sliding scale   SubCutaneous Before meals and at bedtime  insulin lispro Injectable (ADMELOG) 4 Unit(s) SubCutaneous <User Schedule>  insulin NPH human recombinant 22 Unit(s) SubCutaneous <User Schedule>  levothyroxine 100 MICROGram(s) Oral every 24 hours  pantoprazole    Tablet 40 milliGRAM(s) Oral daily  polyethylene glycol 3350 17 Gram(s) Oral daily  senna 2 Tablet(s) Oral at bedtime  sucralfate suspension 1 Gram(s) Oral four times a day    MEDICATIONS  (PRN):  acetaminophen     Tablet .. 650 milliGRAM(s) Oral every 6 hours PRN Temp greater or equal to 38C (100.4F), Mild Pain (1 - 3)  dextrose Oral Gel 15 Gram(s) Oral once PRN Blood Glucose LESS THAN 70 milliGRAM(s)/deciliter  guaiFENesin ER 1200 milliGRAM(s) Oral every 12 hours PRN Cough  melatonin 3 milliGRAM(s) Oral at bedtime PRN Insomnia  ondansetron Injectable 4 milliGRAM(s) IV Push once PRN Nausea and/or Vomiting  zolpidem 5 milliGRAM(s) Oral at bedtime PRN Insomnia    ASSESSMENT / RECOMMENDATIONS    A1C: 6.0 %  BUN: 17  Creatinine: 0.64  GFR: 100  Weight: 66.6  BMI: 21.1  EF:     # Type 2 diabetes mellitus  - Please continue lantus *** units at bedtime.   - Continue lispro *** units before each meal.  - Continue lispro moderate / low dose sliding scale before meals and at bedtime.  - Patient's fingerstick glucose goal is 100-180 mg/dL.    - For discharge, patient can ***.    - Patient can follow up at discharge with [facility] by calling [phone] to make an appointment.      Case discussed with Dr. Richard. Primary team updated.       Cali Garrett    Endocrinology Fellow    Service Pager: [phone]    SUBJECTIVE / INTERVAL HPI: Patient was seen and examined this morning. Blood glucose levels remain mostly within target. Overnight, his glucose increased to 179 mg/dL by 7 AM.     CAPILLARY BLOOD GLUCOSE & INSULIN RECEIVED  105 mg/dL (04-04 @ 11:43) ? Ø  207 mg/dL (04-04 @ 17:03) ?  4 units of lispro sliding scale.    — mg/dL (04-04 @ 20:05) ? 22 units of NPH + 4 units of lispro.   103 mg/dL (04-04 @ 21:42) ? Ø  179 mg/dL (04-05 @ 06:49) ? 2 units of lispro sliding scale.   109 mg/dL (04-05 @ 12:13) ? Ø    REVIEW OF SYSTEMS  Constitutional:  Negative fever, chills or loss of appetite.  Cardiovascular:  Negative for chest pain or palpitations.  Respiratory:  Negative for cough, wheezing, or shortness of breath.    Gastrointestinal:  Negative for nausea, vomiting, diarrhea, constipation, or abdominal pain.  Neurologic:  No headache, confusion, dizziness, lightheadedness.    PHYSICAL EXAM  Vital Signs Last 24 Hrs  T(C): 37.4 (05 Apr 2023 18:07), Max: 37.4 (05 Apr 2023 18:07)  T(F): 99.3 (05 Apr 2023 18:07), Max: 99.3 (05 Apr 2023 18:07)  HR: 64 (05 Apr 2023 16:43) (64 - 70)  BP: 134/64 (05 Apr 2023 16:43) (112/67 - 134/64)  BP(mean): --  RR: 16 (05 Apr 2023 16:43) (16 - 17)  SpO2: 95% (05 Apr 2023 16:43) (94% - 96%)    Parameters below as of 05 Apr 2023 16:43  Patient On (Oxygen Delivery Method): room air    Constitutional: Awake, alert, underweight, elderly male, in no acute distress.   HEENT: Normocephalic, atraumatic, BRUNILDA.  Respiratory: Lungs clear to ausculation bilaterally.   Cardiovascular: regular rhythm, normal S1 and S2, no audible murmurs.   GI: soft, non-tender, non-distended, bowel sounds present. (+) J-tube in place.   Extremities: No lower extremity edema.  Psychiatric: AAO x 3.    LABS  CBC - WBC/HGB/HTC/PLT: 10.16/10.5/32.0/295 (04-05-23)  BMP - Na/K/Cl/Bicarb/BUN/Cr/Gluc/AG/eGFR: 136/4.2/100/25/17/0.64/130/11/100 (04-05-23)  Ca - 8.6 (04-05-23)  Phos - 2.8 (04-05-23)  Mg - 2.2 (04-05-23)    PT/aPTT/INR: --/61.7/-- (04-01-23)   Thyroid Stimulating Hormone, Serum: 4.520 (04-04-23)  Total T4/Free T4: --/0.938 (04-04-23)    MEDICATIONS  MEDICATIONS  (STANDING):  apixaban 2.5 milliGRAM(s) Oral every 12 hours  dextrose 5%. 1000 milliLiter(s) (100 mL/Hr) IV Continuous <Continuous>  dextrose 5%. 1000 milliLiter(s) (50 mL/Hr) IV Continuous <Continuous>  dextrose 50% Injectable 25 Gram(s) IV Push once  dextrose 50% Injectable 12.5 Gram(s) IV Push once  dextrose 50% Injectable 25 Gram(s) IV Push once  doxazosin 4 milliGRAM(s) Oral at bedtime  finasteride 5 milliGRAM(s) Oral every 24 hours  glucagon  Injectable 1 milliGRAM(s) IntraMuscular once  insulin lispro (ADMELOG) corrective regimen sliding scale   SubCutaneous Before meals and at bedtime  insulin lispro Injectable (ADMELOG) 4 Unit(s) SubCutaneous <User Schedule>  insulin NPH human recombinant 22 Unit(s) SubCutaneous <User Schedule>  levothyroxine 100 MICROGram(s) Oral every 24 hours  pantoprazole    Tablet 40 milliGRAM(s) Oral daily  polyethylene glycol 3350 17 Gram(s) Oral daily  senna 2 Tablet(s) Oral at bedtime  sucralfate suspension 1 Gram(s) Oral four times a day    MEDICATIONS  (PRN):  acetaminophen     Tablet .. 650 milliGRAM(s) Oral every 6 hours PRN Temp greater or equal to 38C (100.4F), Mild Pain (1 - 3)  dextrose Oral Gel 15 Gram(s) Oral once PRN Blood Glucose LESS THAN 70 milliGRAM(s)/deciliter  guaiFENesin ER 1200 milliGRAM(s) Oral every 12 hours PRN Cough  melatonin 3 milliGRAM(s) Oral at bedtime PRN Insomnia  ondansetron Injectable 4 milliGRAM(s) IV Push once PRN Nausea and/or Vomiting  zolpidem 5 milliGRAM(s) Oral at bedtime PRN Insomnia    ASSESSMENT / RECOMMENDATIONS  Mr. Kirkland is a 72-year-old male with a past medical history of type 2 diabetes mellitus, hypertension, gastric cancer, hypothyroidism and anemia who presented to the emergency department (3/25/23) complaining of fever, chills and abdominal pain after recent EGD on 3/23/23. Labs were remarkable for leukocytosis and acute kidney injury (LARRY). He was initially admitted to medicine for management of sepsis and LARRY. Urinalysis was negative and blood cultures showed no growth and he remained afebrile with no leukocytosis. Therefore, antibiotics were discontinued as impression was that fever was due to bacterial translocation from recent esophageal biopsy. CT angiogram of the abdomen/pelvis demonstrated a 5.5 cm infrarenal aortic aneurysm that had enlarged from previous study, now s/p EVAR (3/31/23). Endocrinology following for recommendations regarding diabetes management.     A1C: 6.0 %  BUN: 17  Creatinine: 0.64  GFR: 100  Weight: 66.6  BMI: 21.1    # Type 2 diabetes mellitus  - Blood glucose levels mostly within target; however, levels increased slightly by this morning. Will monitor for another day and if he continues to have same pattern, will recommend to increase NPH insulin 2 more units.   - Please continue with NPH insulin 22 units daily at 7:30 PM (half hour before tube feeds start) for now.   - Continue Lispro to 4 units daily at 7:30 PM (half hour before tube feeds start).  - Continue lispro moderate dose sliding scale before meals and at bedtime.  - Patient's fingerstick glucose goal is 100-180 mg/dL.    - Upon discharge, the patient will need new prescription for Jevity 1.5 kwan @95 mL/hr for 10 hrs (950 mL TV, 1425 kcal) to be administered from 8:00 PM to 6:00 AM every day.   - Discharge recommendations for insulin regimen to be discussed.   - Patient can continue to follow with his Endocrinologist or can call [facility] at [phone] to make an appointment (he mentioned that he wanted to switch to an endocrinologist within [facility]).     Case discussed with Dr. Richard. Primary team updated.       Cali Garrett    Endocrinology Fellow    Service Pager: [phone]

## 2023-04-05 NOTE — PROGRESS NOTE ADULT - SUBJECTIVE AND OBJECTIVE BOX
Patient is a 72y old  Male who presents with a chief complaint of Fever (05 Apr 2023 05:18)    INTERVAL EVENTS:  - tolerating diet   - did not pass trial of void overnight   - doxazosin increased to 4mg qHS yesterday     SUBJECTIVE:  Patient was seen and examined at bedside.  Review of systems: No CP, dyspnea, nausea or vomiting, dysuria, LE edema.     MEDICATIONS:  MEDICATIONS  (STANDING):  apixaban 2.5 milliGRAM(s) Oral every 12 hours  dextrose 5%. 1000 milliLiter(s) (100 mL/Hr) IV Continuous <Continuous>  dextrose 5%. 1000 milliLiter(s) (50 mL/Hr) IV Continuous <Continuous>  dextrose 50% Injectable 25 Gram(s) IV Push once  dextrose 50% Injectable 12.5 Gram(s) IV Push once  dextrose 50% Injectable 25 Gram(s) IV Push once  doxazosin 4 milliGRAM(s) Oral at bedtime  finasteride 5 milliGRAM(s) Oral every 24 hours  glucagon  Injectable 1 milliGRAM(s) IntraMuscular once  insulin lispro (ADMELOG) corrective regimen sliding scale   SubCutaneous Before meals and at bedtime  insulin lispro Injectable (ADMELOG) 4 Unit(s) SubCutaneous <User Schedule>  insulin NPH human recombinant 22 Unit(s) SubCutaneous <User Schedule>  levothyroxine 100 MICROGram(s) Oral every 24 hours  pantoprazole    Tablet 40 milliGRAM(s) Oral daily  polyethylene glycol 3350 17 Gram(s) Oral daily  senna 2 Tablet(s) Oral at bedtime  sucralfate suspension 1 Gram(s) Oral four times a day    MEDICATIONS  (PRN):  acetaminophen     Tablet .. 650 milliGRAM(s) Oral every 6 hours PRN Temp greater or equal to 38C (100.4F), Mild Pain (1 - 3)  dextrose Oral Gel 15 Gram(s) Oral once PRN Blood Glucose LESS THAN 70 milliGRAM(s)/deciliter  guaiFENesin ER 1200 milliGRAM(s) Oral every 12 hours PRN Cough  melatonin 3 milliGRAM(s) Oral at bedtime PRN Insomnia  ondansetron Injectable 4 milliGRAM(s) IV Push once PRN Nausea and/or Vomiting  zolpidem 5 milliGRAM(s) Oral at bedtime PRN Insomnia    Allergies    No Known Allergies    Intolerances    OBJECTIVE:  Vital Signs Last 24 Hrs  T(C): 36.6 (05 Apr 2023 13:46), Max: 37.3 (05 Apr 2023 05:27)  T(F): 97.8 (05 Apr 2023 13:46), Max: 99.2 (05 Apr 2023 05:27)  HR: 65 (05 Apr 2023 08:09) (65 - 73)  BP: 123/62 (05 Apr 2023 08:09) (112/67 - 134/64)  BP(mean): --  RR: 17 (05 Apr 2023 08:09) (16 - 17)  SpO2: 94% (05 Apr 2023 08:09) (94% - 96%)    Parameters below as of 05 Apr 2023 08:09  Patient On (Oxygen Delivery Method): room air      I&O's Summary    04 Apr 2023 07:01  -  05 Apr 2023 07:00  --------------------------------------------------------  IN: 1030 mL / OUT: 1545 mL / NET: -515 mL    05 Apr 2023 07:01  -  05 Apr 2023 14:32  --------------------------------------------------------  IN: 360 mL / OUT: 650 mL / NET: -290 mL      PHYSICAL EXAM:  Gen: Reclining in bed at time of exam, appears stated age  HEENT: NCAT, MMM, clear OP; some temporal wasting present   Neck: supple, trachea at midline  CV: RRR, +S1/S2  Pulm: adequate respiratory effort, no increase in work of breathing  Abd: soft, ND;   Skin: warm and dry,   Ext: no LE edema   Neuro: AOx3, speaking in full sentences  Psych: affect and behavior appropriate, pleasant at time of interview      LABS:                        10.5   10.16 )-----------( 295      ( 05 Apr 2023 12:00 )             32.0     04-05    136  |  100  |  17  ----------------------------<  130<H>  4.2   |  25  |  0.64    Ca    8.6      05 Apr 2023 12:00  Phos  2.8     04-05  Mg     2.2     04-05          CAPILLARY BLOOD GLUCOSE      POCT Blood Glucose.: 109 mg/dL (05 Apr 2023 12:13)  POCT Blood Glucose.: 179 mg/dL (05 Apr 2023 06:49)  POCT Blood Glucose.: 103 mg/dL (04 Apr 2023 21:42)  POCT Blood Glucose.: 207 mg/dL (04 Apr 2023 17:03)        MICRODATA:      RADIOLOGY/OTHER STUDIES:

## 2023-04-05 NOTE — PROGRESS NOTE ADULT - PROBLEM SELECTOR PLAN 1
Failed TOV 4/2, joyner placed per urology recs, 1L out. Patient prefers leaving without joyner if possible   -  Switched tamsulosin to 2mg doxazosin qHS on 4/3/2023 ; Checked orthostatics on 4/4/2023; No orthostatic hypotension; Increased doxazosin to 4mg qHS on 4/4/2023.   - Plan for another trial of void on 4/6/2023. Given history of BPH, when performing TOV, would allow up to 750ml on bladder ultrasound before replacing joyner catheter  - Check orthostatics qd given medication changes to alpha-blocker   - c/w  finasteride 5mg qd

## 2023-04-05 NOTE — PROGRESS NOTE ADULT - PROBLEM SELECTOR PLAN 9
F: None   E: Replete as necessary K>4 Mg>2  N: Soft and bite sized diet   DVT Prophylaxis: Eliquis 2.5y   GI prophylaxis: Pantoprazole 40mg qd  CODE STATUS: FULL  DISPO: RMF

## 2023-04-05 NOTE — CHART NOTE - NSCHARTNOTEFT_GEN_A_CORE
72M with PMH of gastric cancer, T2DM, hypothyroidism, HTN, anemia S/p EGD with esophageal biopsy 3/23 admitted for sepsis and LARRY. Past  hx of BPH on flomax, found to have on CT mild bilateral hydronephrosis likely secondary to bladder outlet obstruction and enlarged prostate. Pt was found to be in urinary retention- s/p joyner catheter placement in ED, now with resolving LARRY.      consulted for urinary retention and recommended given that bilateral hydronephrosis with LARRY was a direct result of urinary retention and resolved with joyner placement, joyner catheter replacement following discharge is strongly advised.     paged to see pt today-failed TOV x 2 and refusing joyner catheter placement while inpatient and on discharge. per primary team, pt is medically ready for discharge pending urinary retention/BPH treatement. joyner dc'd 4/4 was straight cath 4/4 with 1200cc outpt and today with 650cc outpt. Has been on doxazosin.  Spoke to patient about need for joyner catheter vs self catheterization on discharge; however pt adamantly refusing. States "I will not leave this hospital until I can pee." Spoke to  attending- definitive  intervention of BPH (ie TURP) is not an option at this time given pt is on active anticoagulation for hx DVT (eliquis),  recent sepsis, and needs to be medically optimized for surgery. Pt states "I do not care if I need to stay in the hospital for a month, I'm not leaving until I can pee." Patient counseled on risks of renal injury, bladder injury, and UTI with progression to sepsis if he is discharged without joyner catheter or self catheterization. Pt verbalizes understanding and still refusing.

## 2023-04-05 NOTE — PROGRESS NOTE ADULT - ASSESSMENT
71 y/o M with a PMHx of gastric CA, DM Type 2, Hypothyroidism, HTN and anemia s/p EGD with esophageal biopsy 3/23 presents to the ED c/o fevers, low back pain x 2 days. Admitted for sepsis and LARRY, 2/2 hydronephrosis now resolved, hospital course c/b post-obstructive diuresis. 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 Pending trial of void.             # preperitoneal abscess - has known fistula. monitoring. wound care per gen surg consult (3/28)

## 2023-04-06 LAB
ALBUMIN SERPL ELPH-MCNC: 2.4 G/DL — LOW (ref 3.3–5)
ALP SERPL-CCNC: 143 U/L — HIGH (ref 40–120)
ALT FLD-CCNC: 40 U/L — SIGNIFICANT CHANGE UP (ref 10–45)
ANION GAP SERPL CALC-SCNC: 6 MMOL/L — SIGNIFICANT CHANGE UP (ref 5–17)
AST SERPL-CCNC: 46 U/L — HIGH (ref 10–40)
BASOPHILS # BLD AUTO: 0.08 K/UL — SIGNIFICANT CHANGE UP (ref 0–0.2)
BASOPHILS NFR BLD AUTO: 1 % — SIGNIFICANT CHANGE UP (ref 0–2)
BILIRUB SERPL-MCNC: 0.4 MG/DL — SIGNIFICANT CHANGE UP (ref 0.2–1.2)
BUN SERPL-MCNC: 17 MG/DL — SIGNIFICANT CHANGE UP (ref 7–23)
CALCIUM SERPL-MCNC: 8.2 MG/DL — LOW (ref 8.4–10.5)
CHLORIDE SERPL-SCNC: 103 MMOL/L — SIGNIFICANT CHANGE UP (ref 96–108)
CO2 SERPL-SCNC: 25 MMOL/L — SIGNIFICANT CHANGE UP (ref 22–31)
CREAT SERPL-MCNC: 0.59 MG/DL — SIGNIFICANT CHANGE UP (ref 0.5–1.3)
EGFR: 102 ML/MIN/1.73M2 — SIGNIFICANT CHANGE UP
EOSINOPHIL # BLD AUTO: 0.33 K/UL — SIGNIFICANT CHANGE UP (ref 0–0.5)
EOSINOPHIL NFR BLD AUTO: 3.9 % — SIGNIFICANT CHANGE UP (ref 0–6)
GLUCOSE BLDC GLUCOMTR-MCNC: 124 MG/DL — HIGH (ref 70–99)
GLUCOSE BLDC GLUCOMTR-MCNC: 140 MG/DL — HIGH (ref 70–99)
GLUCOSE BLDC GLUCOMTR-MCNC: 150 MG/DL — HIGH (ref 70–99)
GLUCOSE BLDC GLUCOMTR-MCNC: 200 MG/DL — HIGH (ref 70–99)
GLUCOSE BLDC GLUCOMTR-MCNC: 93 MG/DL — SIGNIFICANT CHANGE UP (ref 70–99)
GLUCOSE SERPL-MCNC: 142 MG/DL — HIGH (ref 70–99)
HCT VFR BLD CALC: 28.4 % — LOW (ref 39–50)
HGB BLD-MCNC: 9.3 G/DL — LOW (ref 13–17)
IMM GRANULOCYTES NFR BLD AUTO: 0.5 % — SIGNIFICANT CHANGE UP (ref 0–0.9)
LYMPHOCYTES # BLD AUTO: 0.91 K/UL — LOW (ref 1–3.3)
LYMPHOCYTES # BLD AUTO: 10.8 % — LOW (ref 13–44)
MAGNESIUM SERPL-MCNC: 2.1 MG/DL — SIGNIFICANT CHANGE UP (ref 1.6–2.6)
MCHC RBC-ENTMCNC: 32.7 GM/DL — SIGNIFICANT CHANGE UP (ref 32–36)
MCHC RBC-ENTMCNC: 33.7 PG — SIGNIFICANT CHANGE UP (ref 27–34)
MCV RBC AUTO: 102.9 FL — HIGH (ref 80–100)
MONOCYTES # BLD AUTO: 0.83 K/UL — SIGNIFICANT CHANGE UP (ref 0–0.9)
MONOCYTES NFR BLD AUTO: 9.9 % — SIGNIFICANT CHANGE UP (ref 2–14)
NEUTROPHILS # BLD AUTO: 6.23 K/UL — SIGNIFICANT CHANGE UP (ref 1.8–7.4)
NEUTROPHILS NFR BLD AUTO: 73.9 % — SIGNIFICANT CHANGE UP (ref 43–77)
NRBC # BLD: 0 /100 WBCS — SIGNIFICANT CHANGE UP (ref 0–0)
PHOSPHATE SERPL-MCNC: 2.8 MG/DL — SIGNIFICANT CHANGE UP (ref 2.5–4.5)
PLATELET # BLD AUTO: 291 K/UL — SIGNIFICANT CHANGE UP (ref 150–400)
POTASSIUM SERPL-MCNC: 4.6 MMOL/L — SIGNIFICANT CHANGE UP (ref 3.5–5.3)
POTASSIUM SERPL-SCNC: 4.6 MMOL/L — SIGNIFICANT CHANGE UP (ref 3.5–5.3)
PROT SERPL-MCNC: 5.9 G/DL — LOW (ref 6–8.3)
RBC # BLD: 2.76 M/UL — LOW (ref 4.2–5.8)
RBC # FLD: 14.6 % — HIGH (ref 10.3–14.5)
SODIUM SERPL-SCNC: 134 MMOL/L — LOW (ref 135–145)
WBC # BLD: 8.42 K/UL — SIGNIFICANT CHANGE UP (ref 3.8–10.5)
WBC # FLD AUTO: 8.42 K/UL — SIGNIFICANT CHANGE UP (ref 3.8–10.5)

## 2023-04-06 PROCEDURE — 99233 SBSQ HOSP IP/OBS HIGH 50: CPT | Mod: GC

## 2023-04-06 RX ORDER — ZOLPIDEM TARTRATE 10 MG/1
5 TABLET ORAL AT BEDTIME
Refills: 0 | Status: DISCONTINUED | OUTPATIENT
Start: 2023-04-06 | End: 2023-04-06

## 2023-04-06 RX ORDER — ZOLPIDEM TARTRATE 10 MG/1
5 TABLET ORAL ONCE
Refills: 0 | Status: DISCONTINUED | OUTPATIENT
Start: 2023-04-06 | End: 2023-04-06

## 2023-04-06 RX ADMIN — Medication 1 GRAM(S): at 11:57

## 2023-04-06 RX ADMIN — PANTOPRAZOLE SODIUM 40 MILLIGRAM(S): 20 TABLET, DELAYED RELEASE ORAL at 11:57

## 2023-04-06 RX ADMIN — Medication 100 MICROGRAM(S): at 07:11

## 2023-04-06 RX ADMIN — ZOLPIDEM TARTRATE 5 MILLIGRAM(S): 10 TABLET ORAL at 00:53

## 2023-04-06 RX ADMIN — FINASTERIDE 5 MILLIGRAM(S): 5 TABLET, FILM COATED ORAL at 17:00

## 2023-04-06 RX ADMIN — Medication 4 MILLIGRAM(S): at 22:17

## 2023-04-06 RX ADMIN — APIXABAN 2.5 MILLIGRAM(S): 2.5 TABLET, FILM COATED ORAL at 22:17

## 2023-04-06 RX ADMIN — APIXABAN 2.5 MILLIGRAM(S): 2.5 TABLET, FILM COATED ORAL at 10:20

## 2023-04-06 RX ADMIN — Medication 4 UNIT(S): at 19:34

## 2023-04-06 RX ADMIN — Medication 1 GRAM(S): at 00:53

## 2023-04-06 RX ADMIN — Medication 1 GRAM(S): at 07:11

## 2023-04-06 RX ADMIN — ZOLPIDEM TARTRATE 5 MILLIGRAM(S): 10 TABLET ORAL at 22:32

## 2023-04-06 RX ADMIN — Medication 1 GRAM(S): at 17:00

## 2023-04-06 RX ADMIN — HUMAN INSULIN 22 UNIT(S): 100 INJECTION, SUSPENSION SUBCUTANEOUS at 19:34

## 2023-04-06 NOTE — PROGRESS NOTE ADULT - NUTRITIONAL ASSESSMENT
This patient has been assessed with a concern for Malnutrition and has been determined to have a diagnosis/diagnoses of Severe protein-calorie malnutrition.    This patient is being managed with:   Diet Soft and Bite Sized-  Tube Feeding Modality: Gastrostomy  Jevity 1.5 Jesús (JEVITY1.5)  Total Volume for 24 Hours (mL): 950  Total Number of Cans: 4  Continuous  Starting Tube Feed Rate {mL per Hour}: 95     Every hour  Until Goal Tube Feed Rate (mL per Hour): 95  Tube Feed Duration (in Hours): 10  Tube Feed Start Time: 20:00  Tube Feed Stop Time: 06:00  Free Water Flush  Free Water Flush Instructions:  every 4 hours flush 25 cc.  Entered: Apr 4 2023  7:29PM    Diet Soft and Bite Sized-  Tube Feeding Modality: Jejunostomy  Jevity 1.5 Jesús (JEVITY1.5)  Total Volume for 24 Hours (mL): 1140  Total Number of Cans: 5  Continuous  Until Goal Tube Feed Rate (mL per Hour): 95  Tube Feed Duration (in Hours): 12  Tube Feed Start Time: 20:00  Tube Feed Stop Time: 08:00  Pump   Rate (mL per Hour): 150   Frequency: Every 4 Hours  Entered: Mar 27 2023 12:16PM    The following pending diet order is being considered for treatment of Severe protein-calorie malnutrition:null Attending Attestation (For Attendings USE Only)...

## 2023-04-06 NOTE — PROGRESS NOTE ADULT - ASSESSMENT
73 y/o M with a PMHx of gastric CA, DM Type 2, Hypothyroidism, HTN and anemia s/p EGD with esophageal biopsy 3/23 presents to the ED c/o fevers, low back pain x 2 days. Admitted for sepsis and LARRY, 2/2 hydronephrosis now resolved, hospital course c/b post-obstructive diuresis. 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 Pending trial of void.

## 2023-04-06 NOTE — PROGRESS NOTE ADULT - SUBJECTIVE AND OBJECTIVE BOX
BEATA MCGEE, 72y, Male  MRN-[id number]  Patient is a 72y old  Male who presents with a chief complaint of Fever (05 Apr 2023 22:10)    OVERNIGHT EVENTS:     SUBJECTIVE:    12 Point ROS Negative unless noted otherwise above.  -------------------------------------------------------------------------------  VITAL SIGNS:  Vital Signs Last 24 Hrs  T(C): 37 (06 Apr 2023 05:11), Max: 37.4 (05 Apr 2023 18:07)  T(F): 98.6 (06 Apr 2023 05:11), Max: 99.3 (05 Apr 2023 18:07)  HR: 66 (06 Apr 2023 05:11) (64 - 72)  BP: 123/64 (06 Apr 2023 05:11) (110/59 - 134/64)  BP(mean): 79 (05 Apr 2023 20:39) (79 - 79)  RR: 17 (06 Apr 2023 05:11) (16 - 17)  SpO2: 96% (06 Apr 2023 05:11) (94% - 96%) RA    I&O's Summary    05 Apr 2023 07:01  -  06 Apr 2023 07:00  --------------------------------------------------------  IN: 540 mL / OUT: 1450 mL / NET: -910 mL    PHYSICAL EXAM:  Gen: Reclining in bed at time of exam, appears stated age  HEENT: NCAT, MMM, clear OP; some temporal wasting present   Neck: supple, trachea at midline  CV: RRR, +S1/S2  Pulm: adequate respiratory effort, no increase in work of breathing  Abd: soft, ND;   Skin: warm and dry,   Ext: no LE edema   Neuro: AOx3, speaking in full sentences  Psych: affect and behavior appropriate, pleasant at time of interview    ALLERGIES:  No Known Allergies    MEDICATIONS  (STANDING):  apixaban 2.5 milliGRAM(s) Oral every 12 hours  dextrose 5%. 1000 milliLiter(s) (100 mL/Hr) IV Continuous <Continuous>  dextrose 5%. 1000 milliLiter(s) (50 mL/Hr) IV Continuous <Continuous>  dextrose 50% Injectable 25 Gram(s) IV Push once  dextrose 50% Injectable 12.5 Gram(s) IV Push once  dextrose 50% Injectable 25 Gram(s) IV Push once  doxazosin 4 milliGRAM(s) Oral at bedtime  finasteride 5 milliGRAM(s) Oral every 24 hours  glucagon  Injectable 1 milliGRAM(s) IntraMuscular once  insulin lispro (ADMELOG) corrective regimen sliding scale   SubCutaneous Before meals and at bedtime  insulin lispro Injectable (ADMELOG) 4 Unit(s) SubCutaneous <User Schedule>  insulin NPH human recombinant 22 Unit(s) SubCutaneous <User Schedule>  levothyroxine 100 MICROGram(s) Oral every 24 hours  pantoprazole    Tablet 40 milliGRAM(s) Oral daily  polyethylene glycol 3350 17 Gram(s) Oral daily  senna 2 Tablet(s) Oral at bedtime  sucralfate suspension 1 Gram(s) Oral four times a day    MEDICATIONS  (PRN):  acetaminophen     Tablet .. 650 milliGRAM(s) Oral every 6 hours PRN Temp greater or equal to 38C (100.4F), Mild Pain (1 - 3)  dextrose Oral Gel 15 Gram(s) Oral once PRN Blood Glucose LESS THAN 70 milliGRAM(s)/deciliter  guaiFENesin ER 1200 milliGRAM(s) Oral every 12 hours PRN Cough  melatonin 3 milliGRAM(s) Oral at bedtime PRN Insomnia  ondansetron Injectable 4 milliGRAM(s) IV Push once PRN Nausea and/or Vomiting  zolpidem 5 milliGRAM(s) Oral at bedtime PRN Insomnia  ------------------------------------------------------------------------------  LABS:                        10.5   10.16 )-----------( 295      ( 05 Apr 2023 12:00 )             32.0     04-05    136  |  100  |  17  ----------------------------<  130<H>  4.2   |  25  |  0.64    Ca    8.6      05 Apr 2023 12:00  Phos  2.8     04-05  Mg     2.2     04-05      CAPILLARY BLOOD GLUCOSE  POCT Blood Glucose.: 163 mg/dL (05 Apr 2023 22:04)    COVID-19 PCR: NotDetec (30 Mar 2023 14:41)    RADIOLOGY & ADDITIONAL TESTS: Reviewed.   BEATA MCGEE, 72y, Male  MRN-[id number]  Patient is a 72y old  Male who presents with a chief complaint of Fever (05 Apr 2023 22:10)    OVERNIGHT EVENTS: transferred to [facility]    SUBJECTIVE: Patient examined at bedside. States he has not been able to urinate overnight but denies SP tenderness or the urge to urinate. Has been able to tolerate some PO with soft textures. Denies SOB, chest pain.     12 Point ROS Negative unless noted otherwise above.  -------------------------------------------------------------------------------  VITAL SIGNS:  Vital Signs Last 24 Hrs  T(C): 37 (06 Apr 2023 05:11), Max: 37.4 (05 Apr 2023 18:07)  T(F): 98.6 (06 Apr 2023 05:11), Max: 99.3 (05 Apr 2023 18:07)  HR: 66 (06 Apr 2023 05:11) (64 - 72)  BP: 123/64 (06 Apr 2023 05:11) (110/59 - 134/64)  BP(mean): 79 (05 Apr 2023 20:39) (79 - 79)  RR: 17 (06 Apr 2023 05:11) (16 - 17)  SpO2: 96% (06 Apr 2023 05:11) (94% - 96%) RA    I&O's Summary    05 Apr 2023 07:01  -  06 Apr 2023 07:00  --------------------------------------------------------  IN: 540 mL / OUT: 1450 mL / NET: -910 mL    PHYSICAL EXAM:  Gen: Reclining in bed at time of exam, appears stated age  HEENT: NCAT, MMM, clear OP; some temporal wasting present   Neck: supple, trachea at midline  CV: RRR, +S1/S2  Pulm: adequate respiratory effort, no increase in work of breathing  Abd: soft, ND;   Skin: warm and dry,   Ext: no LE edema   Neuro: AOx3, speaking in full sentences  Psych: affect and behavior appropriate, pleasant at time of interview    ALLERGIES:  No Known Allergies    MEDICATIONS  (STANDING):  apixaban 2.5 milliGRAM(s) Oral every 12 hours  dextrose 5%. 1000 milliLiter(s) (100 mL/Hr) IV Continuous <Continuous>  dextrose 5%. 1000 milliLiter(s) (50 mL/Hr) IV Continuous <Continuous>  dextrose 50% Injectable 25 Gram(s) IV Push once  dextrose 50% Injectable 12.5 Gram(s) IV Push once  dextrose 50% Injectable 25 Gram(s) IV Push once  doxazosin 4 milliGRAM(s) Oral at bedtime  finasteride 5 milliGRAM(s) Oral every 24 hours  glucagon  Injectable 1 milliGRAM(s) IntraMuscular once  insulin lispro (ADMELOG) corrective regimen sliding scale   SubCutaneous Before meals and at bedtime  insulin lispro Injectable (ADMELOG) 4 Unit(s) SubCutaneous <User Schedule>  insulin NPH human recombinant 22 Unit(s) SubCutaneous <User Schedule>  levothyroxine 100 MICROGram(s) Oral every 24 hours  pantoprazole    Tablet 40 milliGRAM(s) Oral daily  polyethylene glycol 3350 17 Gram(s) Oral daily  senna 2 Tablet(s) Oral at bedtime  sucralfate suspension 1 Gram(s) Oral four times a day    MEDICATIONS  (PRN):  acetaminophen     Tablet .. 650 milliGRAM(s) Oral every 6 hours PRN Temp greater or equal to 38C (100.4F), Mild Pain (1 - 3)  dextrose Oral Gel 15 Gram(s) Oral once PRN Blood Glucose LESS THAN 70 milliGRAM(s)/deciliter  guaiFENesin ER 1200 milliGRAM(s) Oral every 12 hours PRN Cough  melatonin 3 milliGRAM(s) Oral at bedtime PRN Insomnia  ondansetron Injectable 4 milliGRAM(s) IV Push once PRN Nausea and/or Vomiting  zolpidem 5 milliGRAM(s) Oral at bedtime PRN Insomnia  ------------------------------------------------------------------------------  LABS:                        10.5   10.16 )-----------( 295      ( 05 Apr 2023 12:00 )             32.0     04-05    136  |  100  |  17  ----------------------------<  130<H>  4.2   |  25  |  0.64    Ca    8.6      05 Apr 2023 12:00  Phos  2.8     04-05  Mg     2.2     04-05      CAPILLARY BLOOD GLUCOSE  POCT Blood Glucose.: 163 mg/dL (05 Apr 2023 22:04)    COVID-19 PCR: Ambrocio (30 Mar 2023 14:41)    RADIOLOGY & ADDITIONAL TESTS: Reviewed.   BEATA MCGEE, 72y, Male  MRN-[id number]  Patient is a 72y old  Male who presents with a chief complaint of Fever (05 Apr 2023 22:10)    OVERNIGHT EVENTS: transferred to [facility]    SUBJECTIVE: Patient examined at bedside. States he has not been able to urinate overnight but denies SP tenderness or the urge to urinate. Has been able to tolerate some PO with soft textures. Denies SOB, chest pain.     12 Point ROS Negative unless noted otherwise above.  -------------------------------------------------------------------------------  VITAL SIGNS:  Vital Signs Last 24 Hrs  T(C): 37 (06 Apr 2023 05:11), Max: 37.4 (05 Apr 2023 18:07)  T(F): 98.6 (06 Apr 2023 05:11), Max: 99.3 (05 Apr 2023 18:07)  HR: 66 (06 Apr 2023 05:11) (64 - 72)  BP: 123/64 (06 Apr 2023 05:11) (110/59 - 134/64)  BP(mean): 79 (05 Apr 2023 20:39) (79 - 79)  RR: 17 (06 Apr 2023 05:11) (16 - 17)  SpO2: 96% (06 Apr 2023 05:11) (94% - 96%) RA    I&O's Summary    05 Apr 2023 07:01  -  06 Apr 2023 07:00  --------------------------------------------------------  IN: 540 mL / OUT: 1450 mL / NET: -910 mL    PHYSICAL EXAM:  Gen: Reclining in bed at time of exam, appears stated age  HEENT: NCAT, MMM; some temporal wasting present   Neck: supple, trachea at midline  CV: RRR, +S1/S2  Pulm: adequate respiratory effort, no increase in work of breathing  Abd: soft, ND; some SP fullness but no tenderness  Skin: warm and dry  Ext: no LE edema   Neuro: AOx3, speaking in full sentences  Psych: affect and behavior appropriate, pleasant at time of interview    ALLERGIES:  No Known Allergies    MEDICATIONS  (STANDING):  apixaban 2.5 milliGRAM(s) Oral every 12 hours  dextrose 5%. 1000 milliLiter(s) (100 mL/Hr) IV Continuous <Continuous>  dextrose 5%. 1000 milliLiter(s) (50 mL/Hr) IV Continuous <Continuous>  dextrose 50% Injectable 25 Gram(s) IV Push once  dextrose 50% Injectable 12.5 Gram(s) IV Push once  dextrose 50% Injectable 25 Gram(s) IV Push once  doxazosin 4 milliGRAM(s) Oral at bedtime  finasteride 5 milliGRAM(s) Oral every 24 hours  glucagon  Injectable 1 milliGRAM(s) IntraMuscular once  insulin lispro (ADMELOG) corrective regimen sliding scale   SubCutaneous Before meals and at bedtime  insulin lispro Injectable (ADMELOG) 4 Unit(s) SubCutaneous <User Schedule>  insulin NPH human recombinant 22 Unit(s) SubCutaneous <User Schedule>  levothyroxine 100 MICROGram(s) Oral every 24 hours  pantoprazole    Tablet 40 milliGRAM(s) Oral daily  polyethylene glycol 3350 17 Gram(s) Oral daily  senna 2 Tablet(s) Oral at bedtime  sucralfate suspension 1 Gram(s) Oral four times a day    MEDICATIONS  (PRN):  acetaminophen     Tablet .. 650 milliGRAM(s) Oral every 6 hours PRN Temp greater or equal to 38C (100.4F), Mild Pain (1 - 3)  dextrose Oral Gel 15 Gram(s) Oral once PRN Blood Glucose LESS THAN 70 milliGRAM(s)/deciliter  guaiFENesin ER 1200 milliGRAM(s) Oral every 12 hours PRN Cough  melatonin 3 milliGRAM(s) Oral at bedtime PRN Insomnia  ondansetron Injectable 4 milliGRAM(s) IV Push once PRN Nausea and/or Vomiting  zolpidem 5 milliGRAM(s) Oral at bedtime PRN Insomnia  ------------------------------------------------------------------------------  LABS:                        10.5   10.16 )-----------( 295      ( 05 Apr 2023 12:00 )             32.0     04-05    136  |  100  |  17  ----------------------------<  130<H>  4.2   |  25  |  0.64    Ca    8.6      05 Apr 2023 12:00  Phos  2.8     04-05  Mg     2.2     04-05      CAPILLARY BLOOD GLUCOSE  POCT Blood Glucose.: 163 mg/dL (05 Apr 2023 22:04)    COVID-19 PCR: Ambrocio (30 Mar 2023 14:41)    RADIOLOGY & ADDITIONAL TESTS: Reviewed.

## 2023-04-06 NOTE — PROGRESS NOTE ADULT - SUBJECTIVE AND OBJECTIVE BOX
Subjective: Pt seen and examined at bedside. No complaints.    Denies headache, chest pain, SOB, abdominal pain, N/V, leg pain    Allergies  No Known Allergies    Intolerances        Vital Signs Last 24 Hrs  T(C): 37 (06 Apr 2023 05:11), Max: 37.4 (05 Apr 2023 18:07)  T(F): 98.6 (06 Apr 2023 05:11), Max: 99.3 (05 Apr 2023 18:07)  HR: 66 (06 Apr 2023 05:11) (64 - 72)  BP: 123/64 (06 Apr 2023 05:11) (110/59 - 134/64)  BP(mean): 79 (05 Apr 2023 20:39) (79 - 79)  RR: 17 (06 Apr 2023 05:11) (16 - 17)  SpO2: 96% (06 Apr 2023 05:11) (94% - 96%)    Parameters below as of 06 Apr 2023 05:11  Patient On (Oxygen Delivery Method): room air        I&O's Summary    05 Apr 2023 07:01  -  06 Apr 2023 07:00  --------------------------------------------------------  IN: 540 mL / OUT: 1450 mL / NET: -910 mL        Physical Exam:  General: NAD, resting comfortably  Pulmonary: normal resp effort, on room air  Cardiovascular: Normal rate and rhythm  Abdominal: soft, NT/ND. Fullness in suprapubic region. Incisions c/d/i  Extremities: WWP, no edema. Motor/sensory grossly intact, moves all extremities to command and spontaneously  Pulses: RLE 1+ DP/PT, LLE 2+ DP/PT    LABS:                        10.5   10.16 )-----------( 295      ( 05 Apr 2023 12:00 )             32.0     04-05    136  |  100  |  17  ----------------------------<  130<H>  4.2   |  25  |  0.64    Ca    8.6      05 Apr 2023 12:00  Phos  2.8     04-05  Mg     2.2     04-05      CAPILLARY BLOOD GLUCOSE  POCT Blood Glucose.: 163 mg/dL (05 Apr 2023 22:04)  POCT Blood Glucose.: 140 mg/dL (05 Apr 2023 17:44)  POCT Blood Glucose.: 109 mg/dL (05 Apr 2023 12:13)        Assessment: 72y Male with PMHx gastric cancer (s/p PEJ tube feeds), T2DM, pre-peritoneal abscess (chronic enterocutaneous fistula), portal vein thrombosis (Eliquis), hypothyroidism, HTN, anemia (s/p EGD with esophageal bx, 3/23) px to [facility] ED with fevers and low back pain x2d admitted for sepsis 2/2 LARRY i/s/o hydronephrosis initially admited to [facility] however found to have AAA transferred to vascular surgery service s/p EVAR and R hypogastric embolization (3/31). Hospital course c/b urinary retention for which urology following however pt refusing Abarca placement, managed with frequent BS and SC.      Recommendations:  - s/p EVAR and R hypogastric embolization 3/31  - No vascular interventions indicated at this time, vascular will sign off  - Plan per primary team  - discussed with Chief on call  - call x [phone] with questions Subjective: Pt seen and examined at bedside. No complaints.    Denies headache, chest pain, SOB, abdominal pain, N/V, leg pain    Allergies  No Known Allergies    Intolerances        Vital Signs Last 24 Hrs  T(C): 37 (06 Apr 2023 05:11), Max: 37.4 (05 Apr 2023 18:07)  T(F): 98.6 (06 Apr 2023 05:11), Max: 99.3 (05 Apr 2023 18:07)  HR: 66 (06 Apr 2023 05:11) (64 - 72)  BP: 123/64 (06 Apr 2023 05:11) (110/59 - 134/64)  BP(mean): 79 (05 Apr 2023 20:39) (79 - 79)  RR: 17 (06 Apr 2023 05:11) (16 - 17)  SpO2: 96% (06 Apr 2023 05:11) (94% - 96%)    Parameters below as of 06 Apr 2023 05:11  Patient On (Oxygen Delivery Method): room air        I&O's Summary    05 Apr 2023 07:01  -  06 Apr 2023 07:00  --------------------------------------------------------  IN: 540 mL / OUT: 1450 mL / NET: -910 mL        Physical Exam:  General: NAD, resting comfortably  Pulmonary: normal resp effort, on room air  Cardiovascular: Normal rate and rhythm  Abdominal: soft, NT/ND. Fullness in suprapubic region. Incisions c/d/i  Extremities: WWP, no edema. Motor/sensory grossly intact, moves all extremities to command and spontaneously  Pulses: RLE 1+ DP/PT, LLE 2+ DP/PT    LABS:                        10.5   10.16 )-----------( 295      ( 05 Apr 2023 12:00 )             32.0     04-05    136  |  100  |  17  ----------------------------<  130<H>  4.2   |  25  |  0.64    Ca    8.6      05 Apr 2023 12:00  Phos  2.8     04-05  Mg     2.2     04-05      CAPILLARY BLOOD GLUCOSE  POCT Blood Glucose.: 163 mg/dL (05 Apr 2023 22:04)  POCT Blood Glucose.: 140 mg/dL (05 Apr 2023 17:44)  POCT Blood Glucose.: 109 mg/dL (05 Apr 2023 12:13)        Assessment: 72y Male with PMHx gastric cancer (s/p PEJ tube feeds), T2DM, pre-peritoneal abscess (chronic enterocutaneous fistula), portal vein thrombosis (Eliquis), hypothyroidism, HTN, anemia (s/p EGD with esophageal bx, 3/23) px to [facility] ED with fevers and low back pain x2d admitted for sepsis 2/2 LARRY i/s/o hydronephrosis initially admited to [facility] [place] however found to have AAA transferred to vascular surgery service s/p EVAR and R hypogastric embolization (3/31). Hospital course c/b urinary retention for which urology following however pt refusing Abarca placement, managed with frequent BS and SC.      Recommendations:  - s/p EVAR and R hypogastric embolization 3/31  - No vascular interventions indicated at this time, vascular will sign off. Please have patient follow-up with Dr. Saenz 2 weeks after discharge. Call [phone] for appointments.  - Plan per primary team  - discussed with Chief on call  - call x [phone] with questions

## 2023-04-06 NOTE — PROGRESS NOTE ADULT - ATTENDING COMMENTS
Discussed case with urology consult Discussed case with urology consult service   urology team will discuss if any inpatient procedures are indicated at this juncture,   and if any procedures are offered, will discuss risks and benefits of intervention vs. repeating trial of void as outpatient.

## 2023-04-06 NOTE — CHART NOTE - NSCHARTNOTEFT_GEN_A_CORE
Admitting Diagnosis:   Patient is a 72y old  Male who presents with a chief complaint of Fever (2023 07:48)      PAST MEDICAL & SURGICAL HISTORY:  Essential hypertension  was on losartan, now diet control      Hypothyroidism      Gastric mass  ulcerated mass in gastric cardia with small perigastric nodes on endoscopy.      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      H/O umbilical hernia repair   with mesh      Port-a-cath in place  right chest wall      History of gastric surgery        Gastrostomy tube in place  open jejunostomy with J-tube 2020      S/P cataract surgery      H/O endoscopy  & stent placement 2020      S/P gastrectomy  total gastrectomy       History of partial pancreatectomy  distal pancreatectomy/ splenectomy esophatojejunostomy      Status post neck dissection  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, gastric primary      History of dysphagia  EGD with Axios stent placment to connect the blind limb to th e efferent limb (jejunojejunostomy)          Current Nutrition Order:  >>Soft and bite sized oral diet  >>Jevity 1.5 via J-tube @ 95ml/hr x10hrs (provides 950ml TV, 1425kcal, 60.6gprotein, 722ml free water)    PO Intake: Good (%) [   ]  Fair (50-75%) [X] Poor (<25%) [   ]    GI Issues:   denies nvdc    Pain:  denies pain/discomfort     Skin Integrity:  Multiple surgical incisions  PU stage I at sacrum  cuate scale 19    No edema noted     Labs:       134<L>  |  103  |  17  ----------------------------<  142<H>  4.6   |  25  |  0.59    Ca    8.2<L>      2023 10:23  Phos  2.8     04-06  Mg     2.1     04-06    TPro  5.9<L>  /  Alb  2.4<L>  /  TBili  0.4  /  DBili  x   /  AST  46<H>  /  ALT  40  /  AlkPhos  143<H>  04-06    CAPILLARY BLOOD GLUCOSE      POCT Blood Glucose.: 140 mg/dL (2023 12:39)  POCT Blood Glucose.: 124 mg/dL (2023 08:27)  POCT Blood Glucose.: 163 mg/dL (2023 22:04)  POCT Blood Glucose.: 140 mg/dL (2023 17:44)      Medications:  MEDICATIONS  (STANDING):  apixaban 2.5 milliGRAM(s) Oral every 12 hours  dextrose 5%. 1000 milliLiter(s) (100 mL/Hr) IV Continuous <Continuous>  dextrose 5%. 1000 milliLiter(s) (50 mL/Hr) IV Continuous <Continuous>  dextrose 50% Injectable 25 Gram(s) IV Push once  dextrose 50% Injectable 12.5 Gram(s) IV Push once  dextrose 50% Injectable 25 Gram(s) IV Push once  doxazosin 4 milliGRAM(s) Oral at bedtime  finasteride 5 milliGRAM(s) Oral every 24 hours  glucagon  Injectable 1 milliGRAM(s) IntraMuscular once  insulin lispro (ADMELOG) corrective regimen sliding scale   SubCutaneous Before meals and at bedtime  insulin lispro Injectable (ADMELOG) 4 Unit(s) SubCutaneous <User Schedule>  insulin NPH human recombinant 22 Unit(s) SubCutaneous <User Schedule>  levothyroxine 100 MICROGram(s) Oral every 24 hours  pantoprazole    Tablet 40 milliGRAM(s) Oral daily  polyethylene glycol 3350 17 Gram(s) Oral daily  senna 2 Tablet(s) Oral at bedtime  sucralfate suspension 1 Gram(s) Oral four times a day    MEDICATIONS  (PRN):  acetaminophen     Tablet .. 650 milliGRAM(s) Oral every 6 hours PRN Temp greater or equal to 38C (100.4F), Mild Pain (1 - 3)  dextrose Oral Gel 15 Gram(s) Oral once PRN Blood Glucose LESS THAN 70 milliGRAM(s)/deciliter  guaiFENesin ER 1200 milliGRAM(s) Oral every 12 hours PRN Cough  melatonin 3 milliGRAM(s) Oral at bedtime PRN Insomnia  ondansetron Injectable 4 milliGRAM(s) IV Push once PRN Nausea and/or Vomiting  zolpidem 5 milliGRAM(s) Oral at bedtime PRN Insomnia      Weight:  Daily     Daily     Weight Change: no new wts to review at thsi time, last taken on admit 3/26    Estimated energy needs:   ABW used for calculations as pt between % of IBW (88%), adjusted for severe PCM  30-35kcal/k-2317kcal  1.2-1.5g/k-99gprotein  30-35ml/k-2317ml fluid    Subjective:   73 y/o M with a PMHx of gastric CA, DM Type 2, Hypothyroidism, HTN and anemia s/p EGD with esophageal biopsy 3/23 presents to the ED c/o fevers, low back pain x 2 days. Admitted for sepsis and LARRY, 2/2 hydronephrosis, now with post-obstructive diuresis. 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    Visited pt at bedside this AM, awake and alert. Reports improved PO intake/appetite. Had 100% breakfast this AM which is encouraging. Pt also asked for cream cheese, butter and jelly however did not receive requested items; preferences noted on CBORD. Nutrition related labs reviewed; FSBG 124, 163, 140, 109 - remains on insulin regimen. Pt is content with new BG management (insulin + dietary). Now with PU stage I at sacrum - recommend daily multivitamin to promote better wound healing. Denies nvdc. No pain noted during assessment. View full recs below. Clinical nutrition services will continue to follow up pt per organizational policy. Please place new consult for any acute nutrition-related issues that may arise prior to follow up     Previous Nutrition Diagnosis:  Severe PCM RT inadequate energy intake to meet nutrition needs d/t CA AEB severe muscle/fat loss    Active [ X ]  Resolved [   ]    Goal: Consistently meets > 75% EER     Recommendations:  1. Continue Jevity 1.5 via J-tube: goal rate of 95ml/hr x10hrs (provides 950ml TV, 1425kcal, 60.6gprotein, 722ml free water)  >> maintain aspiration precautions at all time  >> Monitor tolerance to tube feeds  2. Soft and bite sized, flexible kosher PO diet  3. Add daily multivitamin to promote better wound healing   4. Monitor wts, chemistry, GI fxn, skin integrity  5. Monitor PO intake; honor pt food preferences as able   6. RD to remain available prn    Education: encouraged continued adequate PO intake     Risk Level: High [ X ] Moderate [   ] Low [   ] Admitting Diagnosis:   Patient is a 72y old  Male who presents with a chief complaint of Fever (2023 07:48)      PAST MEDICAL & SURGICAL HISTORY:  Essential hypertension  was on losartan, now diet control      Hypothyroidism      Gastric mass  ulcerated mass in gastric cardia with small perigastric nodes on endoscopy.      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      H/O umbilical hernia repair   with mesh      Port-a-cath in place  right chest wall      History of gastric surgery        Gastrostomy tube in place  open jejunostomy with J-tube 2020      S/P cataract surgery      H/O endoscopy  & stent placement 2020      S/P gastrectomy  total gastrectomy       History of partial pancreatectomy  distal pancreatectomy/ splenectomy esophatojejunostomy      Status post neck dissection  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, gastric primary      History of dysphagia  EGD with [employer]xi[employer] stent placment to connect the blind limb to th e efferent limb (jejunojejunostomy)          Current Nutrition Order:  >>Soft and bite sized oral diet  >>Jevity 1.5 via J-tube @ 95ml/hr x10hrs (provides 950ml TV, 1425kcal, 60.6gprotein, 722ml free water)    PO Intake: Good (%) [   ]  Fair (50-75%) [X] Poor (<25%) [   ]    GI Issues:   denies nvdc    Pain:  denies pain/discomfort     Skin Integrity:  Multiple surgical incisions  PU stage I at sacrum  cuate scale 19    No edema noted     Labs:       134<L>  |  103  |  17  ----------------------------<  142<H>  4.6   |  25  |  0.59    Ca    8.2<L>      2023 10:23  Phos  2.8     04-06  Mg     2.1     04-06    TPro  5.9<L>  /  Alb  2.4<L>  /  TBili  0.4  /  DBili  x   /  AST  46<H>  /  ALT  40  /  AlkPhos  143<H>  04-06    CAPILLARY BLOOD GLUCOSE      POCT Blood Glucose.: 140 mg/dL (2023 12:39)  POCT Blood Glucose.: 124 mg/dL (2023 08:27)  POCT Blood Glucose.: 163 mg/dL (2023 22:04)  POCT Blood Glucose.: 140 mg/dL (2023 17:44)      Medications:  MEDICATIONS  (STANDING):  apixaban 2.5 milliGRAM(s) Oral every 12 hours  dextrose 5%. 1000 milliLiter(s) (100 mL/Hr) IV Continuous <Continuous>  dextrose 5%. 1000 milliLiter(s) (50 mL/Hr) IV Continuous <Continuous>  dextrose 50% Injectable 25 Gram(s) IV Push once  dextrose 50% Injectable 12.5 Gram(s) IV Push once  dextrose 50% Injectable 25 Gram(s) IV Push once  doxazosin 4 milliGRAM(s) Oral at bedtime  finasteride 5 milliGRAM(s) Oral every 24 hours  glucagon  Injectable 1 milliGRAM(s) IntraMuscular once  insulin lispro (ADMELOG) corrective regimen sliding scale   SubCutaneous Before meals and at bedtime  insulin lispro Injectable (ADMELOG) 4 Unit(s) SubCutaneous <User Schedule>  insulin NPH human recombinant 22 Unit(s) SubCutaneous <User Schedule>  levothyroxine 100 MICROGram(s) Oral every 24 hours  pantoprazole    Tablet 40 milliGRAM(s) Oral daily  polyethylene glycol 3350 17 Gram(s) Oral daily  senna 2 Tablet(s) Oral at bedtime  sucralfate suspension 1 Gram(s) Oral four times a day    MEDICATIONS  (PRN):  acetaminophen     Tablet .. 650 milliGRAM(s) Oral every 6 hours PRN Temp greater or equal to 38C (100.4F), Mild Pain (1 - 3)  dextrose Oral Gel 15 Gram(s) Oral once PRN Blood Glucose LESS THAN 70 milliGRAM(s)/deciliter  guaiFENesin ER 1200 milliGRAM(s) Oral every 12 hours PRN Cough  melatonin 3 milliGRAM(s) Oral at bedtime PRN Insomnia  ondansetron Injectable 4 milliGRAM(s) IV Push once PRN Nausea and/or Vomiting  zolpidem 5 milliGRAM(s) Oral at bedtime PRN Insomnia      Weight:  Daily     Daily     Weight Change: no new wts to review at thsi time, last taken on admit 3/26    Estimated energy needs:   ABW used for calculations as pt between % of IBW (88%), adjusted for severe PCM  30-35kcal/k-2317kcal  1.2-1.5g/k-99gprotein  30-35ml/k-2317ml fluid    Subjective:   71 y/o M with a PMHx of gastric CA, DM Type 2, Hypothyroidism, HTN and anemia s/p EGD with esophageal biopsy 3/23 presents to the ED c/o fevers, low back pain x 2 days. Admitted for sepsis and LARRY, 2/2 hydronephrosis, now with post-obstructive diuresis. 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    Visited pt at bedside this AM, awake and alert. Reports improved PO intake/appetite. Had 100% breakfast this AM which is encouraging. Pt also asked for cream cheese, butter and jelly however did not receive requested items; preferences noted on CBORD. Nutrition related labs reviewed; FSBG 124, 163, 140, 109 - remains on insulin regimen. Pt is content with new BG management (insulin + dietary). Now with PU stage I at sacrum - recommend daily multivitamin to promote better wound healing. Denies nvdc. No pain noted during assessment. View full recs below. Clinical nutrition services will continue to follow up pt per organizational policy. Please place new consult for any acute nutrition-related issues that may arise prior to follow up     Previous Nutrition Diagnosis:  Severe PCM RT inadequate energy intake to meet nutrition needs d/t CA AEB severe muscle/fat loss    Active [ X ]  Resolved [   ]    Goal: Consistently meets > 75% EER     Recommendations:  1. Continue Jevity 1.5 via J-tube: goal rate of 95ml/hr x10hrs (provides 950ml TV, 1425kcal, 60.6gprotein, 722ml free water)  >> maintain aspiration precautions at all time  >> Monitor tolerance to tube feeds  2. Soft and bite sized, flexible kosher PO diet  3. Add daily multivitamin to promote better wound healing   4. Monitor wts, chemistry, GI fxn, skin integrity  5. Monitor PO intake; honor pt food preferences as able   6. RD to remain available prn    Education: encouraged continued adequate PO intake     Risk Level: High [  ] Moderate [ X ] Low [   ]

## 2023-04-07 LAB
ALBUMIN SERPL ELPH-MCNC: 2.6 G/DL — LOW (ref 3.3–5)
ALP SERPL-CCNC: 141 U/L — HIGH (ref 40–120)
ALT FLD-CCNC: 38 U/L — SIGNIFICANT CHANGE UP (ref 10–45)
ANION GAP SERPL CALC-SCNC: 6 MMOL/L — SIGNIFICANT CHANGE UP (ref 5–17)
AST SERPL-CCNC: 43 U/L — HIGH (ref 10–40)
BILIRUB SERPL-MCNC: 0.3 MG/DL — SIGNIFICANT CHANGE UP (ref 0.2–1.2)
BUN SERPL-MCNC: 18 MG/DL — SIGNIFICANT CHANGE UP (ref 7–23)
CALCIUM SERPL-MCNC: 8.2 MG/DL — LOW (ref 8.4–10.5)
CHLORIDE SERPL-SCNC: 101 MMOL/L — SIGNIFICANT CHANGE UP (ref 96–108)
CO2 SERPL-SCNC: 26 MMOL/L — SIGNIFICANT CHANGE UP (ref 22–31)
CREAT SERPL-MCNC: 0.69 MG/DL — SIGNIFICANT CHANGE UP (ref 0.5–1.3)
EGFR: 98 ML/MIN/1.73M2 — SIGNIFICANT CHANGE UP
GLUCOSE BLDC GLUCOMTR-MCNC: 103 MG/DL — HIGH (ref 70–99)
GLUCOSE BLDC GLUCOMTR-MCNC: 118 MG/DL — HIGH (ref 70–99)
GLUCOSE BLDC GLUCOMTR-MCNC: 144 MG/DL — HIGH (ref 70–99)
GLUCOSE BLDC GLUCOMTR-MCNC: 168 MG/DL — HIGH (ref 70–99)
GLUCOSE BLDC GLUCOMTR-MCNC: 90 MG/DL — SIGNIFICANT CHANGE UP (ref 70–99)
GLUCOSE SERPL-MCNC: 124 MG/DL — HIGH (ref 70–99)
HCT VFR BLD CALC: 30.3 % — LOW (ref 39–50)
HGB BLD-MCNC: 9.7 G/DL — LOW (ref 13–17)
MAGNESIUM SERPL-MCNC: 2.2 MG/DL — SIGNIFICANT CHANGE UP (ref 1.6–2.6)
MCHC RBC-ENTMCNC: 32 GM/DL — SIGNIFICANT CHANGE UP (ref 32–36)
MCHC RBC-ENTMCNC: 33 PG — SIGNIFICANT CHANGE UP (ref 27–34)
MCV RBC AUTO: 103.1 FL — HIGH (ref 80–100)
NRBC # BLD: 0 /100 WBCS — SIGNIFICANT CHANGE UP (ref 0–0)
PHOSPHATE SERPL-MCNC: 2.8 MG/DL — SIGNIFICANT CHANGE UP (ref 2.5–4.5)
PLATELET # BLD AUTO: 300 K/UL — SIGNIFICANT CHANGE UP (ref 150–400)
POTASSIUM SERPL-MCNC: 4.4 MMOL/L — SIGNIFICANT CHANGE UP (ref 3.5–5.3)
POTASSIUM SERPL-SCNC: 4.4 MMOL/L — SIGNIFICANT CHANGE UP (ref 3.5–5.3)
PROT SERPL-MCNC: 5.9 G/DL — LOW (ref 6–8.3)
RBC # BLD: 2.94 M/UL — LOW (ref 4.2–5.8)
RBC # FLD: 14.5 % — SIGNIFICANT CHANGE UP (ref 10.3–14.5)
SODIUM SERPL-SCNC: 133 MMOL/L — LOW (ref 135–145)
WBC # BLD: 8.19 K/UL — SIGNIFICANT CHANGE UP (ref 3.8–10.5)
WBC # FLD AUTO: 8.19 K/UL — SIGNIFICANT CHANGE UP (ref 3.8–10.5)

## 2023-04-07 PROCEDURE — 99231 SBSQ HOSP IP/OBS SF/LOW 25: CPT | Mod: GC

## 2023-04-07 PROCEDURE — 99233 SBSQ HOSP IP/OBS HIGH 50: CPT

## 2023-04-07 PROCEDURE — 99233 SBSQ HOSP IP/OBS HIGH 50: CPT | Mod: GC

## 2023-04-07 RX ORDER — HUMAN INSULIN 100 [IU]/ML
21 INJECTION, SUSPENSION SUBCUTANEOUS
Refills: 0 | Status: DISCONTINUED | OUTPATIENT
Start: 2023-04-07 | End: 2023-04-11

## 2023-04-07 RX ORDER — ZOLPIDEM TARTRATE 10 MG/1
5 TABLET ORAL ONCE
Refills: 0 | Status: DISCONTINUED | OUTPATIENT
Start: 2023-04-07 | End: 2023-04-07

## 2023-04-07 RX ORDER — INSULIN LISPRO 100/ML
3 VIAL (ML) SUBCUTANEOUS
Refills: 0 | Status: DISCONTINUED | OUTPATIENT
Start: 2023-04-07 | End: 2023-04-11

## 2023-04-07 RX ADMIN — Medication 100 MICROGRAM(S): at 06:16

## 2023-04-07 RX ADMIN — APIXABAN 2.5 MILLIGRAM(S): 2.5 TABLET, FILM COATED ORAL at 10:13

## 2023-04-07 RX ADMIN — Medication 3 MILLIGRAM(S): at 04:19

## 2023-04-07 RX ADMIN — Medication 4 MILLIGRAM(S): at 21:33

## 2023-04-07 RX ADMIN — HUMAN INSULIN 21 UNIT(S): 100 INJECTION, SUSPENSION SUBCUTANEOUS at 19:36

## 2023-04-07 RX ADMIN — PANTOPRAZOLE SODIUM 40 MILLIGRAM(S): 20 TABLET, DELAYED RELEASE ORAL at 11:30

## 2023-04-07 RX ADMIN — Medication 1 GRAM(S): at 11:30

## 2023-04-07 RX ADMIN — APIXABAN 2.5 MILLIGRAM(S): 2.5 TABLET, FILM COATED ORAL at 21:33

## 2023-04-07 RX ADMIN — FINASTERIDE 5 MILLIGRAM(S): 5 TABLET, FILM COATED ORAL at 17:28

## 2023-04-07 RX ADMIN — Medication 1 GRAM(S): at 06:16

## 2023-04-07 RX ADMIN — POLYETHYLENE GLYCOL 3350 17 GRAM(S): 17 POWDER, FOR SOLUTION ORAL at 11:30

## 2023-04-07 RX ADMIN — ZOLPIDEM TARTRATE 5 MILLIGRAM(S): 10 TABLET ORAL at 21:32

## 2023-04-07 RX ADMIN — Medication 3 UNIT(S): at 19:36

## 2023-04-07 RX ADMIN — Medication 1 GRAM(S): at 17:28

## 2023-04-07 NOTE — PROGRESS NOTE ADULT - ATTENDING COMMENTS
Pt seen on rounds this afternoon.  Was apparently seen by Urology for his urinary retention, and will be having a procedure on Monday for his BPH.    Glucoses were quite stable overnight, though somewhat "tight." (93/103)  Will decrease the insulin dose slightly to 21 NPH/3 units before starting feeds

## 2023-04-07 NOTE — PROGRESS NOTE ADULT - SUBJECTIVE AND OBJECTIVE BOX
Cardiology Consult    O/N:  Interval History: patient was seen and examined in am       OBJECTIVE  Vitals:  T(C): 36.7 (04-07-23 @ 06:30), Max: 36.8 (04-06-23 @ 14:34)  HR: 62 (04-07-23 @ 06:30) (62 - 67)  BP: 115/62 (04-07-23 @ 06:30) (111/64 - 117/68)  RR: 19 (04-07-23 @ 06:30) (18 - 19)  SpO2: 95% (04-07-23 @ 06:30) (95% - 96%)  Wt(kg): --    I/O:  I&O's Summary    06 Apr 2023 07:01  -  07 Apr 2023 07:00  --------------------------------------------------------  IN: 0 mL / OUT: 780 mL / NET: -780 mL        PHYSICAL EXAM:  Appearance: NAD. Speaking in full sentences.   HEENT: No pallor noted.  Conjunctiva clear b/l. Moist oral mucosa.  Cardiovascular: RRR with no murmurs.  Respiratory: Lungs CTAB.   Gastrointestinal:  Soft, nontender. Non-distended. Non-rigid.	  Extremities: No edema b/l. No erythema b/l. LE WWP b/l.  Vascular: DP intact  Neurologic:  Alert and awake. Moving all extremities. Following commands.   	  LABS:                        9.7    8.19  )-----------( 300      ( 07 Apr 2023 05:30 )             30.3     04-07    133<L>  |  101  |  18  ----------------------------<  124<H>  4.4   |  26  |  0.69    Ca    8.2<L>      07 Apr 2023 05:30  Phos  2.8     04-07  Mg     2.2     04-07    TPro  5.9<L>  /  Alb  2.6<L>  /  TBili  0.3  /  DBili  x   /  AST  43<H>  /  ALT  38  /  AlkPhos  141<H>  04-07          RADIOLOGY & ADDITIONAL TESTS:  Reviewed .    MEDICATIONS  (STANDING):  apixaban 2.5 milliGRAM(s) Oral every 12 hours  dextrose 5%. 1000 milliLiter(s) (100 mL/Hr) IV Continuous <Continuous>  dextrose 5%. 1000 milliLiter(s) (50 mL/Hr) IV Continuous <Continuous>  dextrose 50% Injectable 25 Gram(s) IV Push once  dextrose 50% Injectable 12.5 Gram(s) IV Push once  dextrose 50% Injectable 25 Gram(s) IV Push once  doxazosin 4 milliGRAM(s) Oral at bedtime  finasteride 5 milliGRAM(s) Oral every 24 hours  glucagon  Injectable 1 milliGRAM(s) IntraMuscular once  insulin lispro (ADMELOG) corrective regimen sliding scale   SubCutaneous Before meals and at bedtime  insulin lispro Injectable (ADMELOG) 4 Unit(s) SubCutaneous <User Schedule>  insulin NPH human recombinant 22 Unit(s) SubCutaneous <User Schedule>  levothyroxine 100 MICROGram(s) Oral every 24 hours  pantoprazole    Tablet 40 milliGRAM(s) Oral daily  polyethylene glycol 3350 17 Gram(s) Oral daily  senna 2 Tablet(s) Oral at bedtime  sucralfate suspension 1 Gram(s) Oral four times a day    MEDICATIONS  (PRN):  acetaminophen     Tablet .. 650 milliGRAM(s) Oral every 6 hours PRN Temp greater or equal to 38C (100.4F), Mild Pain (1 - 3)  dextrose Oral Gel 15 Gram(s) Oral once PRN Blood Glucose LESS THAN 70 milliGRAM(s)/deciliter  guaiFENesin ER 1200 milliGRAM(s) Oral every 12 hours PRN Cough  melatonin 3 milliGRAM(s) Oral at bedtime PRN Insomnia  ondansetron Injectable 4 milliGRAM(s) IV Push once PRN Nausea and/or Vomiting

## 2023-04-07 NOTE — PROGRESS NOTE ADULT - ASSESSMENT
73 y/o M with a PMHx of gastric CA, DM Type 2, Hypothyroidism, HTN and anemia s/p EGD with esophageal biopsy 3/23 presents to the ED c/o fevers, low back pain x 2 days. Admitted for sepsis and LARRY i/s/ bladder outlet obstruction. Vascular Surgery consulted for enlarging infrarenal AAA. Non smoker, no first degree relatives with dx, asymptomatic. Imaging demonstrates 5.5cm distal AAA, previously 4.7cm 11/21. Patient is planned for EVAR on 3/31. Cardiology was consulted for pre-op risk assessment for EVAR initially, now s/p EVAR on 3/31/23. No reconsulted for pre op for TURP.     Review of studies:   EKG: sinus bradycardia, iLBBB, APC, T wave inversion in leads III, aVF, unchanged prom prior admission   prior work up ( not in the system): ehco (10/7/20): normal LV, RV, EF 60%; mild MR; stress test (2019): normal EKG, normal perfusion, RF 54%  ECHO (4/3/23): Normal left ventricular size and systolic function. Basal inferior wall is mildly hypokinetic. Remaining segments  demonstrate normal wall motion. Mildly dialed RV size; trivial pericardial effusion     #pre op risk assessment   s/p EVAR on 3/31/23, now pending TURP  Patient reports that he is able to walk 1-1.8 miles on a treadmill and does it many times per week. No chest pain or SOB with that. In general denies any limitation in ET. Reports the only prior cardiac history of rare 'skipped beats'. Patient has a cardiologist he follow with Dr. Resendez at [facility], but hasn't seen him in 2 years. Patient follow with a general practitioner Dr. Lon Sanchez, also in [place] (office [phone]; cell [phone])  Portal vein thrombosis - continue apixaban 2.5mg BID   RCRI 2 points, class III risk 10% 30 day risk of death, MI, or cardiac arrest   Patient is low-intermediate risk for an intermediate risk procedure (TURP)   73 y/o M with a PMHx of gastric CA, DM Type 2, Hypothyroidism, HTN and anemia s/p EGD with esophageal biopsy 3/23 presents to the ED c/o fevers, low back pain x 2 days. Admitted for sepsis and LARRY i/s/ bladder outlet obstruction. Vascular Surgery consulted for enlarging infrarenal AAA. Non smoker, no first degree relatives with dx, asymptomatic. Imaging demonstrates 5.5cm distal AAA, previously 4.7cm 11/21. Patient is planned for EVAR on 3/31. Cardiology was consulted for pre-op risk assessment for EVAR initially, now s/p EVAR on 3/31/23. No reconsulted for pre op for laser enucleation.     Review of studies:   EKG: sinus bradycardia, iLBBB, APC, T wave inversion in leads III, aVF, unchanged prom prior admission   prior work up ( not in the system): ehco (10/7/20): normal LV, RV, EF 60%; mild MR; stress test (2019): normal EKG, normal perfusion, RF 54%  ECHO (4/3/23): Normal left ventricular size and systolic function. Basal inferior wall is mildly hypokinetic. Remaining segments  demonstrate normal wall motion. Mildly dialed RV size; trivial pericardial effusion     #pre op risk assessment   s/p EVAR on 3/31/23, now pending TURP  Patient reports that he is able to walk 1-1.8 miles on a treadmill and does it many times per week. No chest pain or SOB with that. In general denies any limitation in ET. Reports the only prior cardiac history of rare 'skipped beats'. Patient has a cardiologist he follow with Dr. Resendez at [facility], but hasn't seen him in 2 years. Patient follow with a general practitioner Dr. Lon Sanchez, also in [place] (office [phone]; cell [phone])  Portal vein thrombosis - continue apixaban 2.5mg BID   RCRI 2 points, class III risk 10% 30 day risk of death, MI, or cardiac arrest   Patient is low-intermediate risk for an intermediate risk procedure (laser enucleation)

## 2023-04-07 NOTE — PROGRESS NOTE ADULT - SUBJECTIVE AND OBJECTIVE BOX
BEATA MCGEE, 72y, Male  MRN-[id number]  Patient is a 72y old  Male who presents with a chief complaint of Fever (06 Apr 2023 07:48)    OVERNIGHT EVENTS: SCx1 (850cc).     SUBJECTIVE:     12 Point ROS Negative unless noted otherwise above.  -------------------------------------------------------------------------------  VITAL SIGNS:  Vital Signs Last 24 Hrs  T(C): 36.7 (07 Apr 2023 06:30), Max: 36.8 (06 Apr 2023 14:34)  T(F): 98 (07 Apr 2023 06:30), Max: 98.3 (06 Apr 2023 14:34)  HR: 62 (07 Apr 2023 06:30) (62 - 67)  BP: 115/62 (07 Apr 2023 06:30) (111/64 - 117/68)  RR: 19 (07 Apr 2023 06:30) (18 - 19)  SpO2: 95% (07 Apr 2023 06:30) (95% - 96%) RA    I&O's Summary    05 Apr 2023 07:01  -  06 Apr 2023 07:00  --------------------------------------------------------  IN: 540 mL / OUT: 2350 mL / NET: -1810 mL    06 Apr 2023 07:01  -  07 Apr 2023 06:31  --------------------------------------------------------  IN: 0 mL / OUT: 780 mL / NET: -780 mL        PHYSICAL EXAM:    General: NAD, well developed  HEENT: NC/AT; EOMI, PERRLA, anicteric sclera; moist mucosal membranes.  Neck: supple, trachea midline  Cardiovascular: RRR, +S1/S2; NO M/R/G  Respiratory: CTA B/L; no W/R/R  Gastrointestinal: soft, NT/ND; +BSx4  Extremities: WWP; no edema or cyanosis  Vascular: 2+ radial, DP/PT pulses B/L  Neurological: AAOx3; no focal deficits    ALLERGIES:  Allergies    No Known Allergies    Intolerances        MEDICATIONS:  MEDICATIONS  (STANDING):  apixaban 2.5 milliGRAM(s) Oral every 12 hours  dextrose 5%. 1000 milliLiter(s) (100 mL/Hr) IV Continuous <Continuous>  dextrose 5%. 1000 milliLiter(s) (50 mL/Hr) IV Continuous <Continuous>  dextrose 50% Injectable 25 Gram(s) IV Push once  dextrose 50% Injectable 12.5 Gram(s) IV Push once  dextrose 50% Injectable 25 Gram(s) IV Push once  doxazosin 4 milliGRAM(s) Oral at bedtime  finasteride 5 milliGRAM(s) Oral every 24 hours  glucagon  Injectable 1 milliGRAM(s) IntraMuscular once  insulin lispro (ADMELOG) corrective regimen sliding scale   SubCutaneous Before meals and at bedtime  insulin lispro Injectable (ADMELOG) 4 Unit(s) SubCutaneous <User Schedule>  insulin NPH human recombinant 22 Unit(s) SubCutaneous <User Schedule>  levothyroxine 100 MICROGram(s) Oral every 24 hours  pantoprazole    Tablet 40 milliGRAM(s) Oral daily  polyethylene glycol 3350 17 Gram(s) Oral daily  senna 2 Tablet(s) Oral at bedtime  sucralfate suspension 1 Gram(s) Oral four times a day    MEDICATIONS  (PRN):  acetaminophen     Tablet .. 650 milliGRAM(s) Oral every 6 hours PRN Temp greater or equal to 38C (100.4F), Mild Pain (1 - 3)  dextrose Oral Gel 15 Gram(s) Oral once PRN Blood Glucose LESS THAN 70 milliGRAM(s)/deciliter  guaiFENesin ER 1200 milliGRAM(s) Oral every 12 hours PRN Cough  melatonin 3 milliGRAM(s) Oral at bedtime PRN Insomnia  ondansetron Injectable 4 milliGRAM(s) IV Push once PRN Nausea and/or Vomiting      -------------------------------------------------------------------------------  LABS:                        9.3    8.42  )-----------( 291      ( 06 Apr 2023 10:23 )             28.4     04-06    134<L>  |  103  |  17  ----------------------------<  142<H>  4.6   |  25  |  0.59    Ca    8.2<L>      06 Apr 2023 10:23  Phos  2.8     04-06  Mg     2.1     04-06    TPro  5.9<L>  /  Alb  2.4<L>  /  TBili  0.4  /  DBili  x   /  AST  46<H>  /  ALT  40  /  AlkPhos  143<H>  04-06    LIVER FUNCTIONS - ( 06 Apr 2023 10:23 )  Alb: 2.4 g/dL / Pro: 5.9 g/dL / ALK PHOS: 143 U/L / ALT: 40 U/L / AST: 46 U/L / GGT: x               CAPILLARY BLOOD GLUCOSE      POCT Blood Glucose.: 93 mg/dL (06 Apr 2023 22:27)      COVID-19 PCR: NotDetec (30 Mar 2023 14:41)      RADIOLOGY & ADDITIONAL TESTS: Reviewed.   BEATA MCGEE, 72y, Male  MRN-[id number]  Patient is a 72y old  Male who presents with a chief complaint of Fever (06 Apr 2023 07:48)    OVERNIGHT EVENTS: SCx1 (850cc).     SUBJECTIVE:     12 Point ROS Negative unless noted otherwise above.  -------------------------------------------------------------------------------  VITAL SIGNS:  Vital Signs Last 24 Hrs  T(C): 36.7 (07 Apr 2023 06:30), Max: 36.8 (06 Apr 2023 14:34)  T(F): 98 (07 Apr 2023 06:30), Max: 98.3 (06 Apr 2023 14:34)  HR: 62 (07 Apr 2023 06:30) (62 - 67)  BP: 115/62 (07 Apr 2023 06:30) (111/64 - 117/68)  RR: 19 (07 Apr 2023 06:30) (18 - 19)  SpO2: 95% (07 Apr 2023 06:30) (95% - 96%) RA    I&O's Summary    05 Apr 2023 07:01  -  06 Apr 2023 07:00  --------------------------------------------------------  IN: 540 mL / OUT: 2350 mL / NET: -1810 mL    06 Apr 2023 07:01  -  07 Apr 2023 06:31  --------------------------------------------------------  IN: 0 mL / OUT: 780 mL / NET: -780 mL        PHYSICAL EXAM:  Gen: Reclining in bed at time of exam, appears stated age  HEENT: NCAT, MMM; some temporal wasting present   Neck: supple, trachea at midline  CV: RRR, +S1/S2  Pulm: adequate respiratory effort, no increase in work of breathing  Abd: soft, ND; some SP fullness but no tenderness  Skin: warm and dry  Ext: no LE edema   Neuro: AOx3, speaking in full sentences  Psych: affect and behavior appropriate, pleasant at time of interview    ALLERGIES:  No Known Allergies    MEDICATIONS  (STANDING):  apixaban 2.5 milliGRAM(s) Oral every 12 hours  dextrose 5%. 1000 milliLiter(s) (100 mL/Hr) IV Continuous <Continuous>  dextrose 5%. 1000 milliLiter(s) (50 mL/Hr) IV Continuous <Continuous>  dextrose 50% Injectable 25 Gram(s) IV Push once  dextrose 50% Injectable 12.5 Gram(s) IV Push once  dextrose 50% Injectable 25 Gram(s) IV Push once  doxazosin 4 milliGRAM(s) Oral at bedtime  finasteride 5 milliGRAM(s) Oral every 24 hours  glucagon  Injectable 1 milliGRAM(s) IntraMuscular once  insulin lispro (ADMELOG) corrective regimen sliding scale   SubCutaneous Before meals and at bedtime  insulin lispro Injectable (ADMELOG) 4 Unit(s) SubCutaneous <User Schedule>  insulin NPH human recombinant 22 Unit(s) SubCutaneous <User Schedule>  levothyroxine 100 MICROGram(s) Oral every 24 hours  pantoprazole    Tablet 40 milliGRAM(s) Oral daily  polyethylene glycol 3350 17 Gram(s) Oral daily  senna 2 Tablet(s) Oral at bedtime  sucralfate suspension 1 Gram(s) Oral four times a day    MEDICATIONS  (PRN):  acetaminophen     Tablet .. 650 milliGRAM(s) Oral every 6 hours PRN Temp greater or equal to 38C (100.4F), Mild Pain (1 - 3)  dextrose Oral Gel 15 Gram(s) Oral once PRN Blood Glucose LESS THAN 70 milliGRAM(s)/deciliter  guaiFENesin ER 1200 milliGRAM(s) Oral every 12 hours PRN Cough  melatonin 3 milliGRAM(s) Oral at bedtime PRN Insomnia  ondansetron Injectable 4 milliGRAM(s) IV Push once PRN Nausea and/or Vomiting  -------------------------------------------------------------------------------  LABS:                        9.3    8.42  )-----------( 291      ( 06 Apr 2023 10:23 )             28.4     04-06    134<L>  |  103  |  17  ----------------------------<  142<H>  4.6   |  25  |  0.59    Ca    8.2<L>      06 Apr 2023 10:23  Phos  2.8     04-06  Mg     2.1     04-06    TPro  5.9<L>  /  Alb  2.4<L>  /  TBili  0.4  /  DBili  x   /  AST  46<H>  /  ALT  40  /  AlkPhos  143<H>  04-06    LIVER FUNCTIONS - ( 06 Apr 2023 10:23 )  Alb: 2.4 g/dL / Pro: 5.9 g/dL / ALK PHOS: 143 U/L / ALT: 40 U/L / AST: 46 U/L / GGT: x           CAPILLARY BLOOD GLUCOSE  POCT Blood Glucose.: 93 mg/dL (06 Apr 2023 22:27)    COVID-19 PCR: NotDetec (30 Mar 2023 14:41)    RADIOLOGY & ADDITIONAL TESTS: Reviewed.   BEATA MCGEE, 72y, Male  MRN-[id number]  Patient is a 72y old  Male who presents with a chief complaint of Fever (06 Apr 2023 07:48)    OVERNIGHT EVENTS: SCx1 (850cc).     SUBJECTIVE: Patient examined at bedside. OK with joyner placement.     12 Point ROS Negative unless noted otherwise above.  -------------------------------------------------------------------------------  VITAL SIGNS:  Vital Signs Last 24 Hrs  T(C): 36.7 (07 Apr 2023 06:30), Max: 36.8 (06 Apr 2023 14:34)  T(F): 98 (07 Apr 2023 06:30), Max: 98.3 (06 Apr 2023 14:34)  HR: 62 (07 Apr 2023 06:30) (62 - 67)  BP: 115/62 (07 Apr 2023 06:30) (111/64 - 117/68)  RR: 19 (07 Apr 2023 06:30) (18 - 19)  SpO2: 95% (07 Apr 2023 06:30) (95% - 96%) RA    I&O's Summary    05 Apr 2023 07:01  -  06 Apr 2023 07:00  --------------------------------------------------------  IN: 540 mL / OUT: 2350 mL / NET: -1810 mL    06 Apr 2023 07:01  -  07 Apr 2023 06:31  --------------------------------------------------------  IN: 0 mL / OUT: 780 mL / NET: -780 mL        PHYSICAL EXAM:  Gen: Reclining in bed at time of exam, appears stated age  HEENT: NCAT, MMM; some temporal wasting present   Neck: supple, trachea at midline  CV: RRR, +S1/S2  Pulm: adequate respiratory effort, no increase in work of breathing  Abd: soft, ND; some SP fullness but no tenderness  Skin: warm and dry  Ext: no LE edema   Neuro: AOx3, speaking in full sentences  Psych: affect and behavior appropriate, pleasant at time of interview    ALLERGIES:  No Known Allergies    MEDICATIONS  (STANDING):  apixaban 2.5 milliGRAM(s) Oral every 12 hours  dextrose 5%. 1000 milliLiter(s) (100 mL/Hr) IV Continuous <Continuous>  dextrose 5%. 1000 milliLiter(s) (50 mL/Hr) IV Continuous <Continuous>  dextrose 50% Injectable 25 Gram(s) IV Push once  dextrose 50% Injectable 12.5 Gram(s) IV Push once  dextrose 50% Injectable 25 Gram(s) IV Push once  doxazosin 4 milliGRAM(s) Oral at bedtime  finasteride 5 milliGRAM(s) Oral every 24 hours  glucagon  Injectable 1 milliGRAM(s) IntraMuscular once  insulin lispro (ADMELOG) corrective regimen sliding scale   SubCutaneous Before meals and at bedtime  insulin lispro Injectable (ADMELOG) 4 Unit(s) SubCutaneous <User Schedule>  insulin NPH human recombinant 22 Unit(s) SubCutaneous <User Schedule>  levothyroxine 100 MICROGram(s) Oral every 24 hours  pantoprazole    Tablet 40 milliGRAM(s) Oral daily  polyethylene glycol 3350 17 Gram(s) Oral daily  senna 2 Tablet(s) Oral at bedtime  sucralfate suspension 1 Gram(s) Oral four times a day    MEDICATIONS  (PRN):  acetaminophen     Tablet .. 650 milliGRAM(s) Oral every 6 hours PRN Temp greater or equal to 38C (100.4F), Mild Pain (1 - 3)  dextrose Oral Gel 15 Gram(s) Oral once PRN Blood Glucose LESS THAN 70 milliGRAM(s)/deciliter  guaiFENesin ER 1200 milliGRAM(s) Oral every 12 hours PRN Cough  melatonin 3 milliGRAM(s) Oral at bedtime PRN Insomnia  ondansetron Injectable 4 milliGRAM(s) IV Push once PRN Nausea and/or Vomiting  -------------------------------------------------------------------------------  LABS:                        9.3    8.42  )-----------( 291      ( 06 Apr 2023 10:23 )             28.4     04-06    134<L>  |  103  |  17  ----------------------------<  142<H>  4.6   |  25  |  0.59    Ca    8.2<L>      06 Apr 2023 10:23  Phos  2.8     04-06  Mg     2.1     04-06    TPro  5.9<L>  /  Alb  2.4<L>  /  TBili  0.4  /  DBili  x   /  AST  46<H>  /  ALT  40  /  AlkPhos  143<H>  04-06    LIVER FUNCTIONS - ( 06 Apr 2023 10:23 )  Alb: 2.4 g/dL / Pro: 5.9 g/dL / ALK PHOS: 143 U/L / ALT: 40 U/L / AST: 46 U/L / GGT: x           CAPILLARY BLOOD GLUCOSE  POCT Blood Glucose.: 93 mg/dL (06 Apr 2023 22:27)    COVID-19 PCR: NotDetec (30 Mar 2023 14:41)    RADIOLOGY & ADDITIONAL TESTS: Reviewed.

## 2023-04-07 NOTE — CHART NOTE - NSCHARTNOTESELECT_GEN_ALL_CORE
Event Note
Follow Up/Nutrition Services
Nutrition Services
Retention Prior to Discharge/Event Note

## 2023-04-07 NOTE — PROGRESS NOTE ADULT - ATTENDING COMMENTS
71 y/o M with a PMHx of gastric CA, DM Type 2, Hypothyroidism, HTN and anemia s/p EGD with esophageal biopsy 3/23 presents to the ED c/o fevers, low back pain x 2 days. Admitted for sepsis and LARRY, 2/2 hydronephrosis, hospital course c/b post-obstructive diuresis.   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 Did not pass multiple attempts at trial of void. Planned for laser enucleation procedure with urology in late April     Discussed with urology consult housestaff and attending.   planned for laser enucleation of prostate with urology as outpatient ~ late April   Pending auth for Feeds   [ ] Can switch back to tamsulosin 0.4mg qHS (prior home med for BPH).

## 2023-04-07 NOTE — CHART NOTE - NSCHARTNOTEFT_GEN_A_CORE
There are no contraindications to future urological interventions in the presence of the patient's EVAR

## 2023-04-07 NOTE — PROGRESS NOTE ADULT - SUBJECTIVE AND OBJECTIVE BOX
SUBJECTIVE: Patient much better controlled and is pending discharge however does not feel ready to go home yet. Blood sugars well controlled on current regimen. Denies any complaints.         144 mg/dL (04-07 @ 17:41)  118 mg/dL (04-07 @ 12:04)  103 mg/dL (04-07 @ 09:01)  93 mg/dL (04-06 @ 22:27)  200 mg/dL (04-06 @ 19:30)    REVIEW OF SYSTEMS  Constitutional:  Negative fever, chills or loss of appetite.  Eyes:  Negative blurry vision or double vision.  Cardiovascular:  Negative for chest pain or palpitations.  Respiratory:  Negative for cough, wheezing, or shortness of breath.    Gastrointestinal:  Negative for nausea, vomiting, diarrhea, constipation, or abdominal pain.  Genitourinary:  Negative frequency, urgency or dysuria.  Neurologic:  No headache, confusion, dizziness, lightheadedness.    PHYSICAL EXAM  Vital Signs Last 24 Hrs  T(C): 37.7 (07 Apr 2023 12:16), Max: 37.7 (07 Apr 2023 12:16)  T(F): 99.9 (07 Apr 2023 12:16), Max: 99.9 (07 Apr 2023 12:16)  HR: 62 (07 Apr 2023 06:30) (62 - 67)  BP: 101/61 (07 Apr 2023 12:16) (101/61 - 117/68)  BP(mean): --  RR: 18 (07 Apr 2023 12:16) (18 - 19)  SpO2: 99% (07 Apr 2023 12:16) (95% - 99%)    Parameters below as of 07 Apr 2023 12:16  Patient On (Oxygen Delivery Method): room air        Constitutional: Awake, alert, underweight, elderly male, in no acute distress.   HEENT: Normocephalic, atraumatic, BRUNILDA.  Respiratory: Lungs clear to ausculation bilaterally.   Cardiovascular: regular rhythm, normal S1 and S2, no audible murmurs.   GI: soft, non-tender, non-distended, bowel sounds present. (+) J-tube in place.   Extremities: No lower extremity edema.  Psychiatric: AAO x 3.    LABS  CBC - WBC/HGB/HTC/PLT: 8.19/9.7/30.3/300 (04-07-23)  BMP - Na/K/Cl/Bicarb/BUN/Cr/Gluc/AG/eGFR: 133/4.4/101/26/18/0.69/124/6/98 (04-07-23)  Ca - 8.2 (04-07-23)  Phos - 2.8 (04-07-23)  Mg - 2.2 (04-07-23)  LFT - Alb/Tprot/Tbili/Dbili/AlkPhos/ALT/AST: 2.6/--/0.3/--/141/38/43 (04-07-23)  PT/aPTT/INR: --/61.7/-- (04-01-23)   Thyroid Stimulating Hormone, Serum: 4.520 (04-04-23)  Total T4/Free T4: --/0.938 (04-04-23)    MEDICATIONS  MEDICATIONS  (STANDING):  apixaban 2.5 milliGRAM(s) Oral every 12 hours  dextrose 5%. 1000 milliLiter(s) (100 mL/Hr) IV Continuous <Continuous>  dextrose 5%. 1000 milliLiter(s) (50 mL/Hr) IV Continuous <Continuous>  dextrose 50% Injectable 25 Gram(s) IV Push once  dextrose 50% Injectable 12.5 Gram(s) IV Push once  dextrose 50% Injectable 25 Gram(s) IV Push once  doxazosin 4 milliGRAM(s) Oral at bedtime  finasteride 5 milliGRAM(s) Oral every 24 hours  glucagon  Injectable 1 milliGRAM(s) IntraMuscular once  insulin lispro (ADMELOG) corrective regimen sliding scale   SubCutaneous Before meals and at bedtime  insulin lispro Injectable (ADMELOG) 3 Unit(s) SubCutaneous <User Schedule>  insulin NPH human recombinant 21 Unit(s) SubCutaneous <User Schedule>  levothyroxine 100 MICROGram(s) Oral every 24 hours  pantoprazole    Tablet 40 milliGRAM(s) Oral daily  polyethylene glycol 3350 17 Gram(s) Oral daily  senna 2 Tablet(s) Oral at bedtime  sucralfate suspension 1 Gram(s) Oral four times a day    MEDICATIONS  (PRN):  acetaminophen     Tablet .. 650 milliGRAM(s) Oral every 6 hours PRN Temp greater or equal to 38C (100.4F), Mild Pain (1 - 3)  dextrose Oral Gel 15 Gram(s) Oral once PRN Blood Glucose LESS THAN 70 milliGRAM(s)/deciliter  guaiFENesin ER 1200 milliGRAM(s) Oral every 12 hours PRN Cough  melatonin 3 milliGRAM(s) Oral at bedtime PRN Insomnia  ondansetron Injectable 4 milliGRAM(s) IV Push once PRN Nausea and/or Vomiting    ASSESSMENT / RECOMMENDATIONS  Mr. Kirkland is a 72-year-old male with a past medical history of type 2 diabetes mellitus, hypertension, gastric cancer, hypothyroidism and anemia who presented to the emergency department (3/25/23) complaining of fever, chills and abdominal pain after recent EGD on 3/23/23. Labs were remarkable for leukocytosis and acute kidney injury (LARRY). He was initially admitted to medicine for management of sepsis and LARRY. Urinalysis was negative and blood cultures showed no growth and he remained afebrile with no leukocytosis. Therefore, antibiotics were discontinued as impression was that fever was due to bacterial translocation from recent esophageal biopsy. CT angiogram of the abdomen/pelvis demonstrated a 5.5 cm infrarenal aortic aneurysm that had enlarged from previous study, now s/p EVAR (3/31/23). Endocrinology following for recommendations regarding diabetes management.     A1C: 6.0 %  BUN: 17  Creatinine: 0.64  GFR: 100  Weight: 66.6  BMI: 21.1    # Type 2 diabetes mellitus  - Blood glucose levels mostly within target; however tightly controlled.  - Please decrease slightly with NPH insulin 21 units daily at 7:30 PM (half hour before tube feeds start) for now.   - Continue Lispro to 3 units daily at 7:30 PM (half hour before tube feeds start).  - Continue lispro moderate dose sliding scale q6.  - Patient's fingerstick glucose goal is 100-180 mg/dL.      - Upon discharge, the patient will need new prescription for Jevity 1.5 kwan @95 mL/hr for 10 hrs (950 mL TV, 1425 kcal) to be administered from 8:00 PM to 6:00 AM every day.   - Discharge recommendations for insulin regimen to be discussed.   - Patient can continue to follow with his Endocrinologist or can call [facility] at [phone] to make an appointment (he mentioned that he wanted to switch to an endocrinologist within [facility]).     Case discussed with Dr. Richard. Primary team updated.         Mick Hennessy    Endocrinology Fellow    Service Pager: [phone]

## 2023-04-07 NOTE — PROGRESS NOTE ADULT - ASSESSMENT
71 y/o M with a PMHx of gastric CA, DM Type 2, Hypothyroidism, HTN and anemia s/p EGD with esophageal biopsy 3/23 presents to the ED c/o fevers, low back pain x 2 days. Admitted for sepsis and LARRY, 2/2 hydronephrosis now resolved, hospital course c/b post-obstructive diuresis. 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 Pending trial of void.       71 y/o M with a PMHx of gastric CA, DM Type 2, Hypothyroidism, HTN and anemia s/p EGD with esophageal biopsy 3/23 presents to the ED c/o fevers, low back pain x 2 days. Admitted for sepsis and LARRY, 2/2 hydronephrosis now resolved, hospital course c/b post-obstructive diuresis. 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 Failed TOV, now with joyner.

## 2023-04-07 NOTE — PROGRESS NOTE ADULT - PROBLEM SELECTOR PLAN 3
s/p EVAR on 3/31/23. s/p EVAR on 3/31/23.  -per vascular there are no contraindications to future urological interventions in the presence of the patient's EVAR.

## 2023-04-07 NOTE — PROGRESS NOTE ADULT - PROBLEM SELECTOR PLAN 9
F: None   E: Replete as necessary K>4 Mg>2  N: Soft and bite sized diet   DVT Prophylaxis: Eliquis 2.5y   GI prophylaxis: Pantoprazole 40mg qd  CODE STATUS: FULL  DISPO: RMF F: None   E: Replete as necessary K>4 Mg>2  N: Soft and bite sized diet (+jevity)  DVT Prophylaxis: Eliquis 2.5y   GI prophylaxis: Pantoprazole 40mg qd  CODE STATUS: FULL  DISPO: RMF

## 2023-04-07 NOTE — PROGRESS NOTE ADULT - ATTENDING COMMENTS
Initial attending contact date  4/7/23    . See fellow note written above for details. I reviewed the fellow documentation. I have personally seen and examined this patient. I reviewed vitals, labs, medications, cardiac studies, and additional imaging. I agree with the above fellow's findings and plans as written above with the following additions/statements.      73 y/o M with a PMHx of gastric CA, DM Type 2, Hypothyroidism, HTN and anemia s/p EGD with esophageal biopsy 3/23 presents to the ED c/o fevers, low back pain x 2 days. Admitted for sepsis and LARRY i/s/ bladder outlet obstruction. Vascular Surgery consulted for enlarging infrarenal AAA. Non smoker, no first degree relatives with dx, asymptomatic. Imaging demonstrates 5.5cm distal AAA, previously 4.7cm 11/21. Patient is planned for EVAR on 3/31. Cardiology was consulted for pre-op risk assessment for EVAR initially, now s/p EVAR on 3/31/23. No reconsulted for pre op for TURP    -Cardiac imaging as noted  -Pt considered low risk for int risk procedure  -No further testing needed

## 2023-04-08 LAB
ANION GAP SERPL CALC-SCNC: 5 MMOL/L — SIGNIFICANT CHANGE UP (ref 5–17)
BUN SERPL-MCNC: 18 MG/DL — SIGNIFICANT CHANGE UP (ref 7–23)
CALCIUM SERPL-MCNC: 8.1 MG/DL — LOW (ref 8.4–10.5)
CHLORIDE SERPL-SCNC: 104 MMOL/L — SIGNIFICANT CHANGE UP (ref 96–108)
CO2 SERPL-SCNC: 26 MMOL/L — SIGNIFICANT CHANGE UP (ref 22–31)
CREAT SERPL-MCNC: 0.64 MG/DL — SIGNIFICANT CHANGE UP (ref 0.5–1.3)
EGFR: 100 ML/MIN/1.73M2 — SIGNIFICANT CHANGE UP
GLUCOSE BLDC GLUCOMTR-MCNC: 103 MG/DL — HIGH (ref 70–99)
GLUCOSE BLDC GLUCOMTR-MCNC: 126 MG/DL — HIGH (ref 70–99)
GLUCOSE BLDC GLUCOMTR-MCNC: 136 MG/DL — HIGH (ref 70–99)
GLUCOSE BLDC GLUCOMTR-MCNC: 95 MG/DL — SIGNIFICANT CHANGE UP (ref 70–99)
GLUCOSE SERPL-MCNC: 98 MG/DL — SIGNIFICANT CHANGE UP (ref 70–99)
HCT VFR BLD CALC: 29.1 % — LOW (ref 39–50)
HGB BLD-MCNC: 9.5 G/DL — LOW (ref 13–17)
MAGNESIUM SERPL-MCNC: 2.1 MG/DL — SIGNIFICANT CHANGE UP (ref 1.6–2.6)
MCHC RBC-ENTMCNC: 32.6 GM/DL — SIGNIFICANT CHANGE UP (ref 32–36)
MCHC RBC-ENTMCNC: 33 PG — SIGNIFICANT CHANGE UP (ref 27–34)
MCV RBC AUTO: 101 FL — HIGH (ref 80–100)
NRBC # BLD: 0 /100 WBCS — SIGNIFICANT CHANGE UP (ref 0–0)
PHOSPHATE SERPL-MCNC: 3 MG/DL — SIGNIFICANT CHANGE UP (ref 2.5–4.5)
PLATELET # BLD AUTO: 314 K/UL — SIGNIFICANT CHANGE UP (ref 150–400)
POTASSIUM SERPL-MCNC: 4.5 MMOL/L — SIGNIFICANT CHANGE UP (ref 3.5–5.3)
POTASSIUM SERPL-SCNC: 4.5 MMOL/L — SIGNIFICANT CHANGE UP (ref 3.5–5.3)
RBC # BLD: 2.88 M/UL — LOW (ref 4.2–5.8)
RBC # FLD: 14 % — SIGNIFICANT CHANGE UP (ref 10.3–14.5)
SODIUM SERPL-SCNC: 135 MMOL/L — SIGNIFICANT CHANGE UP (ref 135–145)
WBC # BLD: 7.58 K/UL — SIGNIFICANT CHANGE UP (ref 3.8–10.5)
WBC # FLD AUTO: 7.58 K/UL — SIGNIFICANT CHANGE UP (ref 3.8–10.5)

## 2023-04-08 PROCEDURE — 99232 SBSQ HOSP IP/OBS MODERATE 35: CPT | Mod: GC

## 2023-04-08 RX ORDER — ZOLPIDEM TARTRATE 10 MG/1
5 TABLET ORAL ONCE
Refills: 0 | Status: DISCONTINUED | OUTPATIENT
Start: 2023-04-08 | End: 2023-04-08

## 2023-04-08 RX ADMIN — APIXABAN 2.5 MILLIGRAM(S): 2.5 TABLET, FILM COATED ORAL at 22:24

## 2023-04-08 RX ADMIN — FINASTERIDE 5 MILLIGRAM(S): 5 TABLET, FILM COATED ORAL at 17:26

## 2023-04-08 RX ADMIN — APIXABAN 2.5 MILLIGRAM(S): 2.5 TABLET, FILM COATED ORAL at 09:19

## 2023-04-08 RX ADMIN — ZOLPIDEM TARTRATE 5 MILLIGRAM(S): 10 TABLET ORAL at 22:39

## 2023-04-08 RX ADMIN — Medication 3 MILLIGRAM(S): at 02:19

## 2023-04-08 RX ADMIN — SENNA PLUS 2 TABLET(S): 8.6 TABLET ORAL at 22:24

## 2023-04-08 RX ADMIN — Medication 1 GRAM(S): at 06:01

## 2023-04-08 RX ADMIN — Medication 3 UNIT(S): at 19:25

## 2023-04-08 RX ADMIN — PANTOPRAZOLE SODIUM 40 MILLIGRAM(S): 20 TABLET, DELAYED RELEASE ORAL at 11:06

## 2023-04-08 RX ADMIN — Medication 4 MILLIGRAM(S): at 22:24

## 2023-04-08 RX ADMIN — Medication 100 MICROGRAM(S): at 06:01

## 2023-04-08 RX ADMIN — HUMAN INSULIN 21 UNIT(S): 100 INJECTION, SUSPENSION SUBCUTANEOUS at 19:25

## 2023-04-08 NOTE — PROGRESS NOTE ADULT - ASSESSMENT
71 y/o M with a PMHx of gastric CA, DM Type 2, Hypothyroidism, HTN and anemia s/p EGD with esophageal biopsy 3/23 presents to the ED c/o fevers, low back pain x 2 days. Admitted for sepsis and LARRY, 2/2 hydronephrosis now resolved, hospital course c/b post-obstructive diuresis. 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 Failed TOV, now with joyner.

## 2023-04-08 NOTE — PROGRESS NOTE ADULT - PROBLEM SELECTOR PLAN 3
s/p EVAR on 3/31/23.  -per vascular there are no contraindications to future urological interventions in the presence of the patient's EVAR.

## 2023-04-08 NOTE — PROGRESS NOTE ADULT - PROBLEM SELECTOR PLAN 9
F: None   E: Replete as necessary K>4 Mg>2  N: Soft and bite sized diet (+jevity)  DVT Prophylaxis: Eliquis 2.5y   GI prophylaxis: Pantoprazole 40mg qd  CODE STATUS: FULL  DISPO: RMF

## 2023-04-08 NOTE — PROGRESS NOTE ADULT - SUBJECTIVE AND OBJECTIVE BOX
BEATA MCGEE, 72y, Male  MRN-[id number]  Patient is a 72y old  Male who presents with a chief complaint of Fever (06 Apr 2023 07:48)    OVERNIGHT EVENTS: None    SUBJECTIVE: Patient examined at bedside. Patient has no acute complaints    12 Point ROS Negative unless noted otherwise above.  -------------------------------------------------------------------------------      Vital Signs Last 24 Hrs  T(C): 37.7 (08 Apr 2023 12:41), Max: 37.7 (08 Apr 2023 12:41)  T(F): 99.9 (08 Apr 2023 12:41), Max: 99.9 (08 Apr 2023 12:41)  HR: 59 (08 Apr 2023 12:41) (59 - 64)  BP: 110/71 (08 Apr 2023 12:41) (101/60 - 134/70)  BP(mean): --  RR: 16 (08 Apr 2023 12:41) (16 - 18)  SpO2: 96% (08 Apr 2023 12:41) (96% - 97%)    Parameters below as of 08 Apr 2023 12:41  Patient On (Oxygen Delivery Method): room air            PHYSICAL EXAM:  Gen: Reclining in bed at time of exam, appears stated age  HEENT: NCAT, MMM; some temporal wasting present   Neck: supple, trachea at midline  CV: RRR, +S1/S2  Pulm: adequate respiratory effort, no increase in work of breathing  Abd: soft, ND; some SP fullness but no tenderness  Skin: warm and dry  Ext: no LE edema   Neuro: AOx3, speaking in full sentences  Psych: affect and behavior appropriate, pleasant at time of interview  Pt appears cachectic and malnourished  : + FC    ALLERGIES:  No Known Allergies    MEDICATIONS  (STANDING):  apixaban 2.5 milliGRAM(s) Oral every 12 hours  dextrose 5%. 1000 milliLiter(s) (100 mL/Hr) IV Continuous <Continuous>  dextrose 5%. 1000 milliLiter(s) (50 mL/Hr) IV Continuous <Continuous>  dextrose 50% Injectable 25 Gram(s) IV Push once  dextrose 50% Injectable 12.5 Gram(s) IV Push once  dextrose 50% Injectable 25 Gram(s) IV Push once  doxazosin 4 milliGRAM(s) Oral at bedtime  finasteride 5 milliGRAM(s) Oral every 24 hours  glucagon  Injectable 1 milliGRAM(s) IntraMuscular once  insulin lispro (ADMELOG) corrective regimen sliding scale   SubCutaneous Before meals and at bedtime  insulin lispro Injectable (ADMELOG) 3 Unit(s) SubCutaneous <User Schedule>  insulin NPH human recombinant 21 Unit(s) SubCutaneous <User Schedule>  levothyroxine 100 MICROGram(s) Oral every 24 hours  pantoprazole    Tablet 40 milliGRAM(s) Oral daily  polyethylene glycol 3350 17 Gram(s) Oral daily  senna 2 Tablet(s) Oral at bedtime  sucralfate suspension 1 Gram(s) Oral four times a day    MEDICATIONS  (PRN):  acetaminophen     Tablet .. 650 milliGRAM(s) Oral every 6 hours PRN Temp greater or equal to 38C (100.4F), Mild Pain (1 - 3)  dextrose Oral Gel 15 Gram(s) Oral once PRN Blood Glucose LESS THAN 70 milliGRAM(s)/deciliter  guaiFENesin ER 1200 milliGRAM(s) Oral every 12 hours PRN Cough  melatonin 3 milliGRAM(s) Oral at bedtime PRN Insomnia  ondansetron Injectable 4 milliGRAM(s) IV Push once PRN Nausea and/or Vomiting        -------------------------------------------------------------------------------  LABS:                          9.5    7.58  )-----------( 314      ( 08 Apr 2023 08:47 )             29.1   04-08    135  |  104  |  18  ----------------------------<  98  4.5   |  26  |  0.64    Ca    8.1<L>      08 Apr 2023 08:47  Phos  3.0     04-08  Mg     2.1     04-08    TPro  5.9<L>  /  Alb  2.6<L>  /  TBili  0.3  /  DBili  x   /  AST  43<H>  /  ALT  38  /  AlkPhos  141<H>  04-07

## 2023-04-09 LAB
ALBUMIN SERPL ELPH-MCNC: 2.7 G/DL — LOW (ref 3.3–5)
ALP SERPL-CCNC: 150 U/L — HIGH (ref 40–120)
ALT FLD-CCNC: 36 U/L — SIGNIFICANT CHANGE UP (ref 10–45)
ANION GAP SERPL CALC-SCNC: 5 MMOL/L — SIGNIFICANT CHANGE UP (ref 5–17)
AST SERPL-CCNC: 37 U/L — SIGNIFICANT CHANGE UP (ref 10–40)
BASOPHILS # BLD AUTO: 0.08 K/UL — SIGNIFICANT CHANGE UP (ref 0–0.2)
BASOPHILS NFR BLD AUTO: 1.2 % — SIGNIFICANT CHANGE UP (ref 0–2)
BILIRUB SERPL-MCNC: 0.3 MG/DL — SIGNIFICANT CHANGE UP (ref 0.2–1.2)
BUN SERPL-MCNC: 18 MG/DL — SIGNIFICANT CHANGE UP (ref 7–23)
CALCIUM SERPL-MCNC: 8.3 MG/DL — LOW (ref 8.4–10.5)
CHLORIDE SERPL-SCNC: 103 MMOL/L — SIGNIFICANT CHANGE UP (ref 96–108)
CO2 SERPL-SCNC: 26 MMOL/L — SIGNIFICANT CHANGE UP (ref 22–31)
CREAT SERPL-MCNC: 0.68 MG/DL — SIGNIFICANT CHANGE UP (ref 0.5–1.3)
EGFR: 98 ML/MIN/1.73M2 — SIGNIFICANT CHANGE UP
EOSINOPHIL # BLD AUTO: 0.36 K/UL — SIGNIFICANT CHANGE UP (ref 0–0.5)
EOSINOPHIL NFR BLD AUTO: 5.6 % — SIGNIFICANT CHANGE UP (ref 0–6)
GLUCOSE BLDC GLUCOMTR-MCNC: 109 MG/DL — HIGH (ref 70–99)
GLUCOSE BLDC GLUCOMTR-MCNC: 118 MG/DL — HIGH (ref 70–99)
GLUCOSE BLDC GLUCOMTR-MCNC: 120 MG/DL — HIGH (ref 70–99)
GLUCOSE BLDC GLUCOMTR-MCNC: 134 MG/DL — HIGH (ref 70–99)
GLUCOSE SERPL-MCNC: 145 MG/DL — HIGH (ref 70–99)
HCT VFR BLD CALC: 29.5 % — LOW (ref 39–50)
HGB BLD-MCNC: 9.8 G/DL — LOW (ref 13–17)
IMM GRANULOCYTES NFR BLD AUTO: 0.5 % — SIGNIFICANT CHANGE UP (ref 0–0.9)
LYMPHOCYTES # BLD AUTO: 0.8 K/UL — LOW (ref 1–3.3)
LYMPHOCYTES # BLD AUTO: 12.5 % — LOW (ref 13–44)
MAGNESIUM SERPL-MCNC: 2.1 MG/DL — SIGNIFICANT CHANGE UP (ref 1.6–2.6)
MCHC RBC-ENTMCNC: 33.2 GM/DL — SIGNIFICANT CHANGE UP (ref 32–36)
MCHC RBC-ENTMCNC: 33.3 PG — SIGNIFICANT CHANGE UP (ref 27–34)
MCV RBC AUTO: 100.3 FL — HIGH (ref 80–100)
MONOCYTES # BLD AUTO: 0.8 K/UL — SIGNIFICANT CHANGE UP (ref 0–0.9)
MONOCYTES NFR BLD AUTO: 12.5 % — SIGNIFICANT CHANGE UP (ref 2–14)
NEUTROPHILS # BLD AUTO: 4.35 K/UL — SIGNIFICANT CHANGE UP (ref 1.8–7.4)
NEUTROPHILS NFR BLD AUTO: 67.7 % — SIGNIFICANT CHANGE UP (ref 43–77)
NRBC # BLD: 0 /100 WBCS — SIGNIFICANT CHANGE UP (ref 0–0)
PHOSPHATE SERPL-MCNC: 2.9 MG/DL — SIGNIFICANT CHANGE UP (ref 2.5–4.5)
PLATELET # BLD AUTO: 330 K/UL — SIGNIFICANT CHANGE UP (ref 150–400)
POTASSIUM SERPL-MCNC: 4.5 MMOL/L — SIGNIFICANT CHANGE UP (ref 3.5–5.3)
POTASSIUM SERPL-SCNC: 4.5 MMOL/L — SIGNIFICANT CHANGE UP (ref 3.5–5.3)
PROT SERPL-MCNC: 6 G/DL — SIGNIFICANT CHANGE UP (ref 6–8.3)
RBC # BLD: 2.94 M/UL — LOW (ref 4.2–5.8)
RBC # FLD: 14.4 % — SIGNIFICANT CHANGE UP (ref 10.3–14.5)
SODIUM SERPL-SCNC: 134 MMOL/L — LOW (ref 135–145)
WBC # BLD: 6.42 K/UL — SIGNIFICANT CHANGE UP (ref 3.8–10.5)
WBC # FLD AUTO: 6.42 K/UL — SIGNIFICANT CHANGE UP (ref 3.8–10.5)

## 2023-04-09 PROCEDURE — 99232 SBSQ HOSP IP/OBS MODERATE 35: CPT | Mod: GC

## 2023-04-09 RX ORDER — ZOLPIDEM TARTRATE 10 MG/1
5 TABLET ORAL ONCE
Refills: 0 | Status: DISCONTINUED | OUTPATIENT
Start: 2023-04-09 | End: 2023-04-09

## 2023-04-09 RX ADMIN — Medication 100 MICROGRAM(S): at 06:02

## 2023-04-09 RX ADMIN — PANTOPRAZOLE SODIUM 40 MILLIGRAM(S): 20 TABLET, DELAYED RELEASE ORAL at 11:47

## 2023-04-09 RX ADMIN — Medication 3 MILLIGRAM(S): at 03:33

## 2023-04-09 RX ADMIN — Medication 1 GRAM(S): at 17:23

## 2023-04-09 RX ADMIN — FINASTERIDE 5 MILLIGRAM(S): 5 TABLET, FILM COATED ORAL at 17:23

## 2023-04-09 RX ADMIN — Medication 4 MILLIGRAM(S): at 22:16

## 2023-04-09 RX ADMIN — Medication 1 GRAM(S): at 06:02

## 2023-04-09 RX ADMIN — APIXABAN 2.5 MILLIGRAM(S): 2.5 TABLET, FILM COATED ORAL at 09:13

## 2023-04-09 RX ADMIN — APIXABAN 2.5 MILLIGRAM(S): 2.5 TABLET, FILM COATED ORAL at 22:16

## 2023-04-09 RX ADMIN — Medication 1 GRAM(S): at 11:47

## 2023-04-09 RX ADMIN — HUMAN INSULIN 21 UNIT(S): 100 INJECTION, SUSPENSION SUBCUTANEOUS at 19:20

## 2023-04-09 RX ADMIN — Medication 3 UNIT(S): at 19:21

## 2023-04-09 RX ADMIN — ZOLPIDEM TARTRATE 5 MILLIGRAM(S): 10 TABLET ORAL at 22:16

## 2023-04-09 NOTE — PROGRESS NOTE ADULT - SUBJECTIVE AND OBJECTIVE BOX
BEATA MCGEE, 72y, Male  MRN-[id number]  Patient is a 72y old  Male who presents with a chief complaint of Fever (08 Apr 2023 14:07)    OVERNIGHT EVENTS: Ambien for sleep.    SUBJECTIVE:    12 Point ROS Negative unless noted otherwise above.  -------------------------------------------------------------------------------  VITAL SIGNS:  Vital Signs Last 24 Hrs  T(C): 37.1 (09 Apr 2023 06:46), Max: 37.7 (08 Apr 2023 12:41)  T(F): 98.8 (09 Apr 2023 06:46), Max: 99.9 (08 Apr 2023 12:41)  HR: 58 (09 Apr 2023 06:46) (58 - 64)  BP: 131/67 (09 Apr 2023 06:46) (105/47 - 131/67)  BP(mean): 66 (08 Apr 2023 21:41) (66 - 66)  RR: 17 (09 Apr 2023 06:46) (16 - 17)  SpO2: 96% (09 Apr 2023 06:46) (95% - 96%) RA      I&O's Summary    08 Apr 2023 07:01  -  09 Apr 2023 07:00  --------------------------------------------------------  IN: 785 mL / OUT: 275 mL / NET: 510 mL    PHYSICAL EXAM:  Gen: Reclining in bed at time of exam, appears stated age  HEENT: NCAT, MMM; some temporal wasting present   Neck: supple, trachea at midline  CV: RRR, +S1/S2  Pulm: adequate respiratory effort, no increase in work of breathing  Abd: soft, ND; some SP fullness but no tenderness  Skin: warm and dry  Ext: no LE edema   Neuro: AOx3, speaking in full sentences  Psych: affect and behavior appropriate, pleasant at time of interview  Pt appears cachectic and malnourished  : + FC    ALLERGIES:  No Known Allergies    MEDICATIONS  (STANDING):  apixaban 2.5 milliGRAM(s) Oral every 12 hours  dextrose 5%. 1000 milliLiter(s) (100 mL/Hr) IV Continuous <Continuous>  dextrose 5%. 1000 milliLiter(s) (50 mL/Hr) IV Continuous <Continuous>  dextrose 50% Injectable 25 Gram(s) IV Push once  dextrose 50% Injectable 12.5 Gram(s) IV Push once  dextrose 50% Injectable 25 Gram(s) IV Push once  doxazosin 4 milliGRAM(s) Oral at bedtime  finasteride 5 milliGRAM(s) Oral every 24 hours  glucagon  Injectable 1 milliGRAM(s) IntraMuscular once  insulin lispro (ADMELOG) corrective regimen sliding scale   SubCutaneous Before meals and at bedtime  insulin lispro Injectable (ADMELOG) 3 Unit(s) SubCutaneous <User Schedule>  insulin NPH human recombinant 21 Unit(s) SubCutaneous <User Schedule>  levothyroxine 100 MICROGram(s) Oral every 24 hours  pantoprazole    Tablet 40 milliGRAM(s) Oral daily  polyethylene glycol 3350 17 Gram(s) Oral daily  senna 2 Tablet(s) Oral at bedtime  sucralfate suspension 1 Gram(s) Oral four times a day    MEDICATIONS  (PRN):  acetaminophen     Tablet .. 650 milliGRAM(s) Oral every 6 hours PRN Temp greater or equal to 38C (100.4F), Mild Pain (1 - 3)  dextrose Oral Gel 15 Gram(s) Oral once PRN Blood Glucose LESS THAN 70 milliGRAM(s)/deciliter  guaiFENesin ER 1200 milliGRAM(s) Oral every 12 hours PRN Cough  melatonin 3 milliGRAM(s) Oral at bedtime PRN Insomnia  ondansetron Injectable 4 milliGRAM(s) IV Push once PRN Nausea and/or Vomiting  -------------------------------------------------------------------------------  LABS:                        9.5    7.58  )-----------( 314      ( 08 Apr 2023 08:47 )             29.1     04-08    135  |  104  |  18  ----------------------------<  98  4.5   |  26  |  0.64    Ca    8.1<L>      08 Apr 2023 08:47  Phos  3.0     04-08  Mg     2.1     04-08    CAPILLARY BLOOD GLUCOSE  POCT Blood Glucose.: 126 mg/dL (08 Apr 2023 22:07)    COVID-19 PCR: NotDetec (30 Mar 2023 14:41)    RADIOLOGY & ADDITIONAL TESTS: Reviewed.   BEATA MCGEE, 72y, Male  MRN-[id number]  Patient is a 72y old  Male who presents with a chief complaint of Fever (08 Apr 2023 14:07)    OVERNIGHT EVENTS: Ambien for sleep.    SUBJECTIVE: Patient examined at bedside. States appetite has been poor. Tolerating joyner. No other new complaints.     12 Point ROS Negative unless noted otherwise above.  -------------------------------------------------------------------------------  VITAL SIGNS:  Vital Signs Last 24 Hrs  T(C): 37.1 (09 Apr 2023 06:46), Max: 37.7 (08 Apr 2023 12:41)  T(F): 98.8 (09 Apr 2023 06:46), Max: 99.9 (08 Apr 2023 12:41)  HR: 58 (09 Apr 2023 06:46) (58 - 64)  BP: 131/67 (09 Apr 2023 06:46) (105/47 - 131/67)  BP(mean): 66 (08 Apr 2023 21:41) (66 - 66)  RR: 17 (09 Apr 2023 06:46) (16 - 17)  SpO2: 96% (09 Apr 2023 06:46) (95% - 96%) RA      I&O's Summary    08 Apr 2023 07:01  -  09 Apr 2023 07:00  --------------------------------------------------------  IN: 785 mL / OUT: 275 mL / NET: 510 mL    PHYSICAL EXAM:  Gen: Resting in bed, in no acute distress, thin  HEENT: NCAT, MMM; some temporal wasting present   Neck: supple, trachea at midline  CV: RRR, +S1/S2  Pulm: adequate respiratory effort, no increase in work of breathing  Abd: soft, ND/NT  Skin: warm and dry  Ext: no LE edema   Neuro: AOx3, speaking in full sentences  Psych: affect and behavior appropriate, pleasant at time of interview  : + FC    ALLERGIES:  No Known Allergies    MEDICATIONS  (STANDING):  apixaban 2.5 milliGRAM(s) Oral every 12 hours  dextrose 5%. 1000 milliLiter(s) (100 mL/Hr) IV Continuous <Continuous>  dextrose 5%. 1000 milliLiter(s) (50 mL/Hr) IV Continuous <Continuous>  dextrose 50% Injectable 25 Gram(s) IV Push once  dextrose 50% Injectable 12.5 Gram(s) IV Push once  dextrose 50% Injectable 25 Gram(s) IV Push once  doxazosin 4 milliGRAM(s) Oral at bedtime  finasteride 5 milliGRAM(s) Oral every 24 hours  glucagon  Injectable 1 milliGRAM(s) IntraMuscular once  insulin lispro (ADMELOG) corrective regimen sliding scale   SubCutaneous Before meals and at bedtime  insulin lispro Injectable (ADMELOG) 3 Unit(s) SubCutaneous <User Schedule>  insulin NPH human recombinant 21 Unit(s) SubCutaneous <User Schedule>  levothyroxine 100 MICROGram(s) Oral every 24 hours  pantoprazole    Tablet 40 milliGRAM(s) Oral daily  polyethylene glycol 3350 17 Gram(s) Oral daily  senna 2 Tablet(s) Oral at bedtime  sucralfate suspension 1 Gram(s) Oral four times a day    MEDICATIONS  (PRN):  acetaminophen     Tablet .. 650 milliGRAM(s) Oral every 6 hours PRN Temp greater or equal to 38C (100.4F), Mild Pain (1 - 3)  dextrose Oral Gel 15 Gram(s) Oral once PRN Blood Glucose LESS THAN 70 milliGRAM(s)/deciliter  guaiFENesin ER 1200 milliGRAM(s) Oral every 12 hours PRN Cough  melatonin 3 milliGRAM(s) Oral at bedtime PRN Insomnia  ondansetron Injectable 4 milliGRAM(s) IV Push once PRN Nausea and/or Vomiting  -------------------------------------------------------------------------------  LABS:                        9.5    7.58  )-----------( 314      ( 08 Apr 2023 08:47 )             29.1     04-08    135  |  104  |  18  ----------------------------<  98  4.5   |  26  |  0.64    Ca    8.1<L>      08 Apr 2023 08:47  Phos  3.0     04-08  Mg     2.1     04-08    CAPILLARY BLOOD GLUCOSE  POCT Blood Glucose.: 126 mg/dL (08 Apr 2023 22:07)    COVID-19 PCR: NotDetec (30 Mar 2023 14:41)    RADIOLOGY & ADDITIONAL TESTS: Reviewed.

## 2023-04-09 NOTE — PROGRESS NOTE ADULT - ATTENDING COMMENTS
Patient was seen and examined at bedside on 4/9/2023 at 11 am. He feels well. Denies pain, SOB, CP. ROS is otherwise negative. Vitals, labwork and pertinent imaging reviewed. Physical exam - NAD, AAO x 4, PERRLA, EOMI, MMM, supple neck, chest - CTA b/l, CV - rrr, s1s2, no m/r/g, abd - soft, NTND, + BS, ext - wwp, RLE foot wrapped, psych - normal affect, skin - no rash    Plan:  -Patient is medically ready but pending authorization of his feeds

## 2023-04-09 NOTE — PROGRESS NOTE ADULT - PROBLEM SELECTOR PLAN 1
Failed TOV 4/2, joyner placed per urology recs, 1L out. Patient prefers leaving without joyner if possible   -  Switched tamsulosin to 2mg doxazosin qHS on 4/3/2023 ; Checked orthostatics on 4/4/2023; No orthostatic hypotension; Increased doxazosin to 4mg qHS on 4/4/2023.   - Plan for another trial of void on 4/6/2023. Given history of BPH, when performing TOV, would allow up to 750ml on bladder ultrasound before replacing joyner catheter  - Check orthostatics qd given medication changes to alpha-blocker   - c/w  finasteride 5mg qd Failed TOV 4/2, joyner placed per urology recs, 1L out. Patient prefers leaving without joyner if possible   -  Switched tamsulosin to 2mg doxazosin qHS on 4/3/2023 ; Checked orthostatics on 4/4/2023; No orthostatic hypotension; Increased doxazosin to 4mg qHS on 4/4/2023.   - joyner placed (pt will be d/c-ed with joyner)  - Check orthostatics qd given medication changes to alpha-blocker   - c/w  finasteride 5mg qd

## 2023-04-09 NOTE — PROGRESS NOTE ADULT - PROBLEM SELECTOR PLAN 5
- Endocrine consulted 4/5, f/u recs  - mISS  - Lispro 4u Humulin 22u. - Endocrine consulted 4/5, f/u recs  - mISS  - Lispro 3u Humulin 21u.  - f/u final endo recs prior to d/c

## 2023-04-09 NOTE — PROGRESS NOTE ADULT - ASSESSMENT
71 y/o M with a PMHx of gastric CA, DM Type 2, Hypothyroidism, HTN and anemia s/p EGD with esophageal biopsy 3/23 presents to the ED c/o fevers, low back pain x 2 days. Admitted for sepsis and LARRY, 2/2 hydronephrosis now resolved, hospital course c/b post-obstructive diuresis. 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 Failed TOV, now with joyner.        73 y/o M with a PMHx of gastric CA, DM Type 2, Hypothyroidism, HTN and anemia s/p EGD with esophageal biopsy 3/23 presents to the ED c/o fevers, low back pain x 2 days. Admitted for sepsis and LARRY, 2/2 hydronephrosis now resolved, hospital course c/b post-obstructive diuresis. 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 Failed TOV, now with joyner. Pending auth of tube feeds for d/c.

## 2023-04-09 NOTE — PROGRESS NOTE ADULT - PROBLEM SELECTOR PLAN 9
F: None   E: Replete as necessary K>4 Mg>2  N: Soft and bite sized diet (+jevity)  DVT Prophylaxis: Eliquis 2.5y   GI prophylaxis: Pantoprazole 40mg qd  CODE STATUS: FULL  DISPO: RMF F: None   E: Replete as necessary K>4 Mg>2  N: Soft and bite sized diet (+jevity)  DVT Prophylaxis: Eliquis 2.5  GI prophylaxis: Pantoprazole 40mg qd  CODE STATUS: FULL  DISPO: RMF

## 2023-04-10 LAB
GLUCOSE BLDC GLUCOMTR-MCNC: 115 MG/DL — HIGH (ref 70–99)
GLUCOSE BLDC GLUCOMTR-MCNC: 121 MG/DL — HIGH (ref 70–99)
GLUCOSE BLDC GLUCOMTR-MCNC: 128 MG/DL — HIGH (ref 70–99)
GLUCOSE BLDC GLUCOMTR-MCNC: 136 MG/DL — HIGH (ref 70–99)

## 2023-04-10 PROCEDURE — 99231 SBSQ HOSP IP/OBS SF/LOW 25: CPT | Mod: GC

## 2023-04-10 PROCEDURE — 99232 SBSQ HOSP IP/OBS MODERATE 35: CPT | Mod: GC

## 2023-04-10 RX ORDER — ZOLPIDEM TARTRATE 10 MG/1
5 TABLET ORAL ONCE
Refills: 0 | Status: DISCONTINUED | OUTPATIENT
Start: 2023-04-10 | End: 2023-04-10

## 2023-04-10 RX ADMIN — APIXABAN 2.5 MILLIGRAM(S): 2.5 TABLET, FILM COATED ORAL at 21:26

## 2023-04-10 RX ADMIN — Medication 100 MICROGRAM(S): at 06:20

## 2023-04-10 RX ADMIN — ZOLPIDEM TARTRATE 5 MILLIGRAM(S): 10 TABLET ORAL at 22:44

## 2023-04-10 RX ADMIN — PANTOPRAZOLE SODIUM 40 MILLIGRAM(S): 20 TABLET, DELAYED RELEASE ORAL at 11:29

## 2023-04-10 RX ADMIN — APIXABAN 2.5 MILLIGRAM(S): 2.5 TABLET, FILM COATED ORAL at 09:01

## 2023-04-10 RX ADMIN — HUMAN INSULIN 21 UNIT(S): 100 INJECTION, SUSPENSION SUBCUTANEOUS at 19:22

## 2023-04-10 RX ADMIN — Medication 3 UNIT(S): at 19:23

## 2023-04-10 RX ADMIN — FINASTERIDE 5 MILLIGRAM(S): 5 TABLET, FILM COATED ORAL at 17:26

## 2023-04-10 RX ADMIN — Medication 4 MILLIGRAM(S): at 21:26

## 2023-04-10 NOTE — PROGRESS NOTE ADULT - PROBLEM SELECTOR PLAN 1
Failed TOV 4/2, joyner placed per urology recs, 1L out. Patient prefers leaving without joyner if possible   -  Switched tamsulosin to 2mg doxazosin qHS on 4/3/2023 ; Checked orthostatics on 4/4/2023; No orthostatic hypotension; Increased doxazosin to 4mg qHS on 4/4/2023.   - joyner placed (pt will be d/c-ed with joyner)  - Check orthostatics qd given medication changes to alpha-blocker   - c/w  finasteride 5mg qd

## 2023-04-10 NOTE — PROGRESS NOTE ADULT - ATTENDING COMMENTS
Pt seen on rounds this afternoon and events of the weekend reviewed.  He remains on 21 units NPH + 3 units of lispro prior to feeds and glucoses have been stable and in an excellent range  Was to have been discharged today, but will likely stay until tomorrow AM  Will continue the current regimen, which is appropriate for discharge as well.  He will follow up with me post discharge

## 2023-04-10 NOTE — PROGRESS NOTE ADULT - PROBLEM SELECTOR PLAN 5
- Endocrine consulted 4/5, f/u recs  - mISS  - Lispro 3u Humulin 21u.  - f/u final endo recs prior to d/c

## 2023-04-10 NOTE — PROGRESS NOTE ADULT - TIME BILLING
as noted above
Insulin adjustments
Review of hospital course, labs, vitals, medical records.   Bedside exam and interview   Discussed plan of care with primary team ACP and housestaff   Discussed case over the phone with medicine attending who took care of patient before transfer to vascular surgery   Documenting the encounter
Review of hospital course, labs, vitals, medical records.   Bedside exam and interview   Discussed plan of care with vascular surgery housestaff and attd  Documenting the encounter
Insulin adjustment--dose and timing
Tube feeds extended to 10 hours, insulin regimen changed
Review of hospital course, labs, vitals, medical records.   Bedside exam and interview   Discussed plan of care with primary team ACP, housestaff and urology ACP  Documenting the encounter
as noted above
Insulin adjustments
Review of discharge regimen and discussion with pt
Insulin management
Review of hospital course, labs, vitals, medical records.   Bedside exam and interview   Discussed plan of care with primary team housestaff,   discussed case with urology consult resident and attd.   Documenting the encounter

## 2023-04-10 NOTE — PROGRESS NOTE ADULT - ATTENDING SUPERVISION STATEMENT
Fellow
Resident
Fellow
Resident

## 2023-04-10 NOTE — PROGRESS NOTE ADULT - SUBJECTIVE AND OBJECTIVE BOX
BEATA MCGEE, 72y, Male  MRN-[id number]  Patient is a 72y old  Male who presents with a chief complaint of Fever (09 Apr 2023 07:35)    OVERNIGHT EVENTS:     SUBJECTIVE:    12 Point ROS Negative unless noted otherwise above.  -------------------------------------------------------------------------------  VITAL SIGNS:  Vital Signs Last 24 Hrs  T(C): 36.9 (10 Apr 2023 06:14), Max: 36.9 (09 Apr 2023 12:05)  T(F): 98.4 (10 Apr 2023 06:14), Max: 98.5 (09 Apr 2023 12:05)  HR: 64 (10 Apr 2023 06:14) (60 - 64)  BP: 134/70 (10 Apr 2023 06:14) (105/61 - 134/70)  BP(mean): 76 (09 Apr 2023 21:00) (76 - 76)  RR: 18 (10 Apr 2023 06:14) (16 - 18)  SpO2: 94% (10 Apr 2023 06:14) (94% - 96%) RA    I&O's Summary    09 Apr 2023 07:01  -  10 Apr 2023 07:00  --------------------------------------------------------  IN: 1095 mL / OUT: 1750 mL / NET: -655 mL    PHYSICAL EXAM:  Gen: Reclining in bed at time of exam, appears stated age  HEENT: NCAT, MMM; some temporal wasting present   Neck: supple, trachea at midline  CV: RRR, +S1/S2  Pulm: adequate respiratory effort, no increase in work of breathing  Abd: soft, ND; some SP fullness but no tenderness  Skin: warm and dry  Ext: no LE edema   Neuro: AOx3, speaking in full sentences  Psych: affect and behavior appropriate, pleasant at time of interview  Pt appears cachectic and malnourished  : + FC    ALLERGIES:  No Known Allergies    MEDICATIONS  (STANDING):  apixaban 2.5 milliGRAM(s) Oral every 12 hours  dextrose 5%. 1000 milliLiter(s) (100 mL/Hr) IV Continuous <Continuous>  dextrose 5%. 1000 milliLiter(s) (50 mL/Hr) IV Continuous <Continuous>  dextrose 50% Injectable 25 Gram(s) IV Push once  dextrose 50% Injectable 12.5 Gram(s) IV Push once  dextrose 50% Injectable 25 Gram(s) IV Push once  doxazosin 4 milliGRAM(s) Oral at bedtime  finasteride 5 milliGRAM(s) Oral every 24 hours  glucagon  Injectable 1 milliGRAM(s) IntraMuscular once  insulin lispro (ADMELOG) corrective regimen sliding scale   SubCutaneous Before meals and at bedtime  insulin lispro Injectable (ADMELOG) 3 Unit(s) SubCutaneous <User Schedule>  insulin NPH human recombinant 21 Unit(s) SubCutaneous <User Schedule>  levothyroxine 100 MICROGram(s) Oral every 24 hours  pantoprazole    Tablet 40 milliGRAM(s) Oral daily  polyethylene glycol 3350 17 Gram(s) Oral daily  senna 2 Tablet(s) Oral at bedtime  sucralfate suspension 1 Gram(s) Oral four times a day    MEDICATIONS  (PRN):  acetaminophen     Tablet .. 650 milliGRAM(s) Oral every 6 hours PRN Temp greater or equal to 38C (100.4F), Mild Pain (1 - 3)  dextrose Oral Gel 15 Gram(s) Oral once PRN Blood Glucose LESS THAN 70 milliGRAM(s)/deciliter  guaiFENesin ER 1200 milliGRAM(s) Oral every 12 hours PRN Cough  melatonin 3 milliGRAM(s) Oral at bedtime PRN Insomnia  ondansetron Injectable 4 milliGRAM(s) IV Push once PRN Nausea and/or Vomiting  ------------------------------------------------------------------------------  LABS:                        9.8    6.42  )-----------( 330      ( 09 Apr 2023 07:06 )             29.5     04-09    134<L>  |  103  |  18  ----------------------------<  145<H>  4.5   |  26  |  0.68    Ca    8.3<L>      09 Apr 2023 07:06  Phos  2.9     04-09  Mg     2.1     04-09    TPro  6.0  /  Alb  2.7<L>  /  TBili  0.3  /  DBili  x   /  AST  37  /  ALT  36  /  AlkPhos  150<H>  04-09    LIVER FUNCTIONS - ( 09 Apr 2023 07:06 )  Alb: 2.7 g/dL / Pro: 6.0 g/dL / ALK PHOS: 150 U/L / ALT: 36 U/L / AST: 37 U/L / GGT: x           CAPILLARY BLOOD GLUCOSE  POCT Blood Glucose.: 134 mg/dL (09 Apr 2023 21:59)    COVID-19 PCR: NotDetec (30 Mar 2023 14:41)    RADIOLOGY & ADDITIONAL TESTS: Reviewed.   BEATA MCGEE, 72y, Male  MRN-[id number]  Patient is a 72y old  Male who presents with a chief complaint of Fever (09 Apr 2023 07:35)    OVERNIGHT EVENTS: KIRILL    SUBJECTIVE: Examined at bedside. Looking forward to going home.     12 Point ROS Negative unless noted otherwise above.  -------------------------------------------------------------------------------  VITAL SIGNS:  Vital Signs Last 24 Hrs  T(C): 36.9 (10 Apr 2023 06:14), Max: 36.9 (09 Apr 2023 12:05)  T(F): 98.4 (10 Apr 2023 06:14), Max: 98.5 (09 Apr 2023 12:05)  HR: 64 (10 Apr 2023 06:14) (60 - 64)  BP: 134/70 (10 Apr 2023 06:14) (105/61 - 134/70)  BP(mean): 76 (09 Apr 2023 21:00) (76 - 76)  RR: 18 (10 Apr 2023 06:14) (16 - 18)  SpO2: 94% (10 Apr 2023 06:14) (94% - 96%) RA    I&O's Summary    09 Apr 2023 07:01  -  10 Apr 2023 07:00  --------------------------------------------------------  IN: 1095 mL / OUT: 1750 mL / NET: -655 mL    PHYSICAL EXAM:  Gen: Reclining in bed at time of exam, appears stated age  HEENT: NCAT, MMM; some temporal wasting present   Neck: supple, trachea at midline  CV: RRR, +S1/S2  Pulm: adequate respiratory effort, no increase in work of breathing  Abd: soft, ND/NT  Skin: warm and dry  Ext: no LE edema   Neuro: AOx3, speaking in full sentences  Psych: affect and behavior appropriate, pleasant at time of interview  Pt appears cachectic and malnourished  : + FC    ALLERGIES:  No Known Allergies    MEDICATIONS  (STANDING):  apixaban 2.5 milliGRAM(s) Oral every 12 hours  dextrose 5%. 1000 milliLiter(s) (100 mL/Hr) IV Continuous <Continuous>  dextrose 5%. 1000 milliLiter(s) (50 mL/Hr) IV Continuous <Continuous>  dextrose 50% Injectable 25 Gram(s) IV Push once  dextrose 50% Injectable 12.5 Gram(s) IV Push once  dextrose 50% Injectable 25 Gram(s) IV Push once  doxazosin 4 milliGRAM(s) Oral at bedtime  finasteride 5 milliGRAM(s) Oral every 24 hours  glucagon  Injectable 1 milliGRAM(s) IntraMuscular once  insulin lispro (ADMELOG) corrective regimen sliding scale   SubCutaneous Before meals and at bedtime  insulin lispro Injectable (ADMELOG) 3 Unit(s) SubCutaneous <User Schedule>  insulin NPH human recombinant 21 Unit(s) SubCutaneous <User Schedule>  levothyroxine 100 MICROGram(s) Oral every 24 hours  pantoprazole    Tablet 40 milliGRAM(s) Oral daily  polyethylene glycol 3350 17 Gram(s) Oral daily  senna 2 Tablet(s) Oral at bedtime  sucralfate suspension 1 Gram(s) Oral four times a day    MEDICATIONS  (PRN):  acetaminophen     Tablet .. 650 milliGRAM(s) Oral every 6 hours PRN Temp greater or equal to 38C (100.4F), Mild Pain (1 - 3)  dextrose Oral Gel 15 Gram(s) Oral once PRN Blood Glucose LESS THAN 70 milliGRAM(s)/deciliter  guaiFENesin ER 1200 milliGRAM(s) Oral every 12 hours PRN Cough  melatonin 3 milliGRAM(s) Oral at bedtime PRN Insomnia  ondansetron Injectable 4 milliGRAM(s) IV Push once PRN Nausea and/or Vomiting  ------------------------------------------------------------------------------  LABS:                        9.8    6.42  )-----------( 330      ( 09 Apr 2023 07:06 )             29.5     04-09    134<L>  |  103  |  18  ----------------------------<  145<H>  4.5   |  26  |  0.68    Ca    8.3<L>      09 Apr 2023 07:06  Phos  2.9     04-09  Mg     2.1     04-09    TPro  6.0  /  Alb  2.7<L>  /  TBili  0.3  /  DBili  x   /  AST  37  /  ALT  36  /  AlkPhos  150<H>  04-09    LIVER FUNCTIONS - ( 09 Apr 2023 07:06 )  Alb: 2.7 g/dL / Pro: 6.0 g/dL / ALK PHOS: 150 U/L / ALT: 36 U/L / AST: 37 U/L / GGT: x           CAPILLARY BLOOD GLUCOSE  POCT Blood Glucose.: 134 mg/dL (09 Apr 2023 21:59)    COVID-19 PCR: NotDetec (30 Mar 2023 14:41)    RADIOLOGY & ADDITIONAL TESTS: Reviewed.

## 2023-04-10 NOTE — PROGRESS NOTE ADULT - REASON FOR ADMISSION
Fever
Sepsis and LARRY
Fever

## 2023-04-10 NOTE — PROGRESS NOTE ADULT - ASSESSMENT
71 y/o M with a PMHx of gastric CA, DM Type 2, Hypothyroidism, HTN and anemia s/p EGD with esophageal biopsy 3/23 presents to the ED c/o fevers, low back pain x 2 days. Admitted for sepsis and LARRY, 2/2 hydronephrosis now resolved, hospital course c/b post-obstructive diuresis. 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 Failed TOV, now with joyner. Pending auth of tube feeds for d/c.

## 2023-04-10 NOTE — PROGRESS NOTE ADULT - PROBLEM SELECTOR PLAN 9
F: None   E: Replete as necessary K>4 Mg>2  N: Soft and bite sized diet (+jevity)  DVT Prophylaxis: Eliquis 2.5  GI prophylaxis: Pantoprazole 40mg qd  CODE STATUS: FULL  DISPO: RMF

## 2023-04-10 NOTE — PROGRESS NOTE ADULT - PROBLEM SELECTOR PROBLEM 4
Gastric cancer
Gastric cancer
Hypothyroidism
Gastric cancer
Hypothyroidism
Hypothyroidism
Gastric cancer
Gastric cancer
Hypothyroidism

## 2023-04-10 NOTE — PROGRESS NOTE ADULT - PROBLEM SELECTOR PLAN 7
Has known fistula. monitoring. wound care per gen surg consult (3/28).
RESOLVED. On admission, pt met criteria for sepsis (2/4 SIRS -- presented with fevers to 101 at home, Tmax in .2, leukocytosis to 21, negative lactate). Source ddx bacterial translocation from aspiration d/t recent esophageal biopsy. Was given Ceftriaxone on floor for empiric tx. UA clean-- no leuk esterase or nitrites. Blood cultures showed no growth. No evidence of infection now, afebrile, no white count, asymptomatic.  - f/u blood, urine cxs--NGTD  - d/c ceftriaxone 1g q24h
home meds levothyroxine 100mcg qd  - c/w home med
RESOLVED. On admission, pt met criteria for sepsis (2/4 SIRS -- presented with fevers to 101 at home, Tmax in .2, leukocytosis to 21, negative lactate). Source ddx bacterial translocation from aspiration d/t recent esophageal biopsy. Was given Ceftriaxone on floor for empiric tx. UA clean-- no leuk esterase or nitrites. Blood cultures showed no growth. No evidence of infection now, afebrile, no white count, asymptomatic.  - f/u blood, urine cxs--NGTD  - d/c ceftriaxone 1g q24h
Has known fistula. monitoring. wound care per gen surg consult (3/28).
RESOLVED. On admission, pt met criteria for sepsis (2/4 SIRS -- presented with fevers to 101 at home, Tmax in .2, leukocytosis to 21, negative lactate). Source ddx bacterial translocation from aspiration d/t recent esophageal biopsy. Was given Ceftriaxone on floor for empiric tx. UA clean-- no leuk esterase or nitrites. Blood cultures showed no growth. No evidence of infection now, afebrile, no white count, asymptomatic.  - f/u blood, urine cxs--NGTD  - d/c ceftriaxone 1g q24h
Has known fistula. monitoring. wound care per gen surg consult (3/28).
Has known fistula. monitoring. wound care per gen surg consult (3/28).

## 2023-04-10 NOTE — PROGRESS NOTE ADULT - NS ATTEST RISK PROBLEM GEN_ALL_CORE FT
Glucoses in target range
Need for further adjustments in the insulin regimen to cover the tube feeds
Glucoses now very close to target
Glucoses under excellent control
Need for improved glycemic control during the tube feeds
Need for revision of both the tube feeding protocol and the insulin regimen

## 2023-04-10 NOTE — PROGRESS NOTE ADULT - PROBLEM SELECTOR PROBLEM 6
Hypothyroidism
Insulin dependent type 2 diabetes mellitus
Portal vein thrombosis
Portal vein thrombosis
Hypothyroidism
Prophylactic measure
Portal vein thrombosis
Hypothyroidism
Portal vein thrombosis

## 2023-04-10 NOTE — PROGRESS NOTE ADULT - PROVIDER SPECIALTY LIST ADULT
Internal Medicine
Endocrinology
Hospitalist
Internal Medicine
Vascular Surgery
Cardiology
Endocrinology
Endocrinology
Hospitalist
Hospitalist
Urology
Vascular Surgery
Hospitalist
Hospitalist
Vascular Surgery
Internal Medicine
Hospitalist
Internal Medicine
Internal Medicine

## 2023-04-10 NOTE — PROGRESS NOTE ADULT - ATTENDING COMMENTS
72-year-old male with a PMHx of gastric CA, DM Type 2, Hypothyroidism, HTN and anemia s/p EGD with esophageal biopsy 3/23 presents to the ED c/o fevers, low back pain x 2 days. Admitted for sepsis and LARRY, 2/2 hydronephrosis now resolved, hospital course c/b post-obstructive diuresis. 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 Failed TOV, now with joyner. Pending auth of tube feeds for d/c.      #Acute Urinary Retention   -continue with Doxazosin 2mg daily and finasteride 5mg daily    -discharge with Joyner and outpatient urology follow up     #AAA  -s/p EVAR on 3/31    #DMII   -defer discharge regimen to endocrine     DVT PPx: Eliquis     Dispo: home tomorrow, discharge notice given today

## 2023-04-10 NOTE — PROGRESS NOTE ADULT - PROBLEM SELECTOR PROBLEM 5
Insulin dependent type 2 diabetes mellitus
Insulin dependent type 2 diabetes mellitus
Hypothyroidism
Insulin dependent type 2 diabetes mellitus
Insulin dependent type 2 diabetes mellitus
Portal vein thrombosis
Insulin dependent type 2 diabetes mellitus

## 2023-04-10 NOTE — PROGRESS NOTE ADULT - SUBJECTIVE AND OBJECTIVE BOX
SUBJECTIVE / INTERVAL HPI: Patient was seen and examined this morning. Blood glucose levels remain within target range. He's planned for discharge later today.     CAPILLARY BLOOD GLUCOSE & INSULIN RECEIVED  136 mg/dL (04-08 @ 18:31) ? Ø   —  mg/dL (04-08 @ 19:25) ? 21 units of NPH + 3 units of lispro.   126 mg/dL (04-08 @ 22:07) ? Ø  120 mg/dL (04-09 @ 08:56) ? Ø  118 mg/dL (04-09 @ 12:49) ? Ø  109 mg/dL (04-09 @ 18:30) ? Ø   —  mg/dL (04-09 @ 19:21) ? 21 units of NPH + 3 units of lispro.   134 mg/dL (04-09 @ 21:59) ? Ø  115 mg/dL (04-10 @ 09:10) ? Ø    REVIEW OF SYSTEMS  Constitutional:  Negative fever, chills or loss of appetite.  Cardiovascular:  Negative for chest pain or palpitations.  Respiratory:  Negative for cough, wheezing, or shortness of breath.    Gastrointestinal:  Negative for nausea, vomiting, diarrhea, constipation, or abdominal pain.  Neurologic:  No headache, confusion, dizziness, lightheadedness.    PHYSICAL EXAM  Vital Signs Last 24 Hrs  T(C): 37 (10 Apr 2023 12:34), Max: 37 (10 Apr 2023 12:34)  T(F): 98.6 (10 Apr 2023 12:34), Max: 98.6 (10 Apr 2023 12:34)  HR: 72 (10 Apr 2023 12:34) (60 - 72)  BP: 111/71 (10 Apr 2023 12:34) (105/61 - 134/70)  BP(mean): 76 (09 Apr 2023 21:00) (76 - 76)  RR: 16 (10 Apr 2023 12:34) (16 - 18)  SpO2: 97% (10 Apr 2023 12:34) (94% - 97%)    Parameters below as of 10 Apr 2023 12:34  Patient On (Oxygen Delivery Method): room air    Constitutional: Awake, alert, underweight, elderly male, in no acute distress.   HEENT: Normocephalic, atraumatic, BRUNILDA.  Respiratory: Lungs clear to ausculation bilaterally.   Cardiovascular: regular rhythm, normal S1 and S2, no audible murmurs.   GI: soft, non-tender, non-distended, bowel sounds present. (+) J-tube in place.   : (+) Abarca catheter in place.   Extremities: No lower extremity edema.  Psychiatric: AAO x 3.    LABS  CBC - WBC/HGB/HTC/PLT: 6.42/9.8/29.5/330 (04-09-23)  BMP - Na/K/Cl/Bicarb/BUN/Cr/Gluc/AG/eGFR: 134/4.5/103/26/18/0.68/145/5/98 (04-09-23)  Ca - 8.3 (04-09-23)  Phos - 2.9 (04-09-23)  Mg - 2.1 (04-09-23)  LFT - Alb/Tprot/Tbili/Dbili/AlkPhos/ALT/AST: 2.7/--/0.3/--/150/36/37 (04-09-23)  Thyroid Stimulating Hormone, Serum: 4.520 (04-04-23)  Total T4/Free T4: --/0.938 (04-04-23)    MEDICATIONS  MEDICATIONS  (STANDING):  apixaban 2.5 milliGRAM(s) Oral every 12 hours  dextrose 5%. 1000 milliLiter(s) (100 mL/Hr) IV Continuous <Continuous>  dextrose 5%. 1000 milliLiter(s) (50 mL/Hr) IV Continuous <Continuous>  dextrose 50% Injectable 25 Gram(s) IV Push once  dextrose 50% Injectable 12.5 Gram(s) IV Push once  dextrose 50% Injectable 25 Gram(s) IV Push once  doxazosin 4 milliGRAM(s) Oral at bedtime  finasteride 5 milliGRAM(s) Oral every 24 hours  glucagon  Injectable 1 milliGRAM(s) IntraMuscular once  insulin lispro (ADMELOG) corrective regimen sliding scale   SubCutaneous Before meals and at bedtime  insulin lispro Injectable (ADMELOG) 3 Unit(s) SubCutaneous <User Schedule>  insulin NPH human recombinant 21 Unit(s) SubCutaneous <User Schedule>  levothyroxine 100 MICROGram(s) Oral every 24 hours  pantoprazole    Tablet 40 milliGRAM(s) Oral daily  polyethylene glycol 3350 17 Gram(s) Oral daily  senna 2 Tablet(s) Oral at bedtime  sucralfate suspension 1 Gram(s) Oral four times a day    MEDICATIONS  (PRN):  acetaminophen     Tablet .. 650 milliGRAM(s) Oral every 6 hours PRN Temp greater or equal to 38C (100.4F), Mild Pain (1 - 3)  dextrose Oral Gel 15 Gram(s) Oral once PRN Blood Glucose LESS THAN 70 milliGRAM(s)/deciliter  guaiFENesin ER 1200 milliGRAM(s) Oral every 12 hours PRN Cough  melatonin 3 milliGRAM(s) Oral at bedtime PRN Insomnia  ondansetron Injectable 4 milliGRAM(s) IV Push once PRN Nausea and/or Vomiting    ASSESSMENT / RECOMMENDATIONS  Mr. Kirkland is a 72-year-old male with a past medical history of type 2 diabetes mellitus, hypertension, gastric cancer, hypothyroidism and anemia who presented to the emergency department (3/25/23) complaining of fever, chills and abdominal pain after recent EGD on 3/23/23. Labs were remarkable for leukocytosis and acute kidney injury (LARRY). He was initially admitted to medicine for management of sepsis and LARRY. Urinalysis was negative and blood cultures showed no growth and he remained afebrile with no leukocytosis. Therefore, antibiotics were discontinued as impression was that fever was due to bacterial translocation from recent esophageal biopsy. CT angiogram of the abdomen/pelvis demonstrated a 5.5 cm infrarenal aortic aneurysm that had enlarged from previous study, now s/p EVAR (3/31/23). Endocrinology following for recommendations regarding diabetes management.     A1C: 6.0 %  BUN: 18  Creatinine: 0.68  GFR: 98  Weight: 66.6  BMI: 21.1    # Type 2 diabetes mellitus  - Please continue lantus *** units at bedtime.   - Continue lispro *** units before each meal.  - Continue lispro moderate / low dose sliding scale before meals and at bedtime.  - Patient's fingerstick glucose goal is 100-180 mg/dL.    - For discharge, patient can ***.    - Patient can follow up at discharge with [facility] by calling [phone] to make an appointment.      Case discussed with Dr. Richard. Primary team updated.       Cali Garrett    Endocrinology Fellow    Service Pager: [phone]    SUBJECTIVE / INTERVAL HPI: Patient was seen and examined this morning. Blood glucose levels remain within target range. He's planned for discharge later today.     CAPILLARY BLOOD GLUCOSE & INSULIN RECEIVED  136 mg/dL (04-08 @ 18:31) ? Ø   —  mg/dL (04-08 @ 19:25) ? 21 units of NPH + 3 units of lispro.   126 mg/dL (04-08 @ 22:07) ? Ø  120 mg/dL (04-09 @ 08:56) ? Ø  118 mg/dL (04-09 @ 12:49) ? Ø  109 mg/dL (04-09 @ 18:30) ? Ø   —  mg/dL (04-09 @ 19:21) ? 21 units of NPH + 3 units of lispro.   134 mg/dL (04-09 @ 21:59) ? Ø  115 mg/dL (04-10 @ 09:10) ? Ø    REVIEW OF SYSTEMS  Constitutional:  Negative fever, chills or loss of appetite.  Cardiovascular:  Negative for chest pain or palpitations.  Respiratory:  Negative for cough, wheezing, or shortness of breath.    Gastrointestinal:  Negative for nausea, vomiting, diarrhea, constipation, or abdominal pain.  Neurologic:  No headache, confusion, dizziness, lightheadedness.    PHYSICAL EXAM  Vital Signs Last 24 Hrs  T(C): 37 (10 Apr 2023 12:34), Max: 37 (10 Apr 2023 12:34)  T(F): 98.6 (10 Apr 2023 12:34), Max: 98.6 (10 Apr 2023 12:34)  HR: 72 (10 Apr 2023 12:34) (60 - 72)  BP: 111/71 (10 Apr 2023 12:34) (105/61 - 134/70)  BP(mean): 76 (09 Apr 2023 21:00) (76 - 76)  RR: 16 (10 Apr 2023 12:34) (16 - 18)  SpO2: 97% (10 Apr 2023 12:34) (94% - 97%)    Parameters below as of 10 Apr 2023 12:34  Patient On (Oxygen Delivery Method): room air    Constitutional: Awake, alert, underweight, elderly male, in no acute distress.   HEENT: Normocephalic, atraumatic, BRUNILDA.  Respiratory: Lungs clear to ausculation bilaterally.   Cardiovascular: regular rhythm, normal S1 and S2, no audible murmurs.   GI: soft, non-tender, non-distended, bowel sounds present. (+) J-tube in place.   : (+) Abarca catheter in place.   Extremities: No lower extremity edema.  Psychiatric: AAO x 3.    LABS  CBC - WBC/HGB/HTC/PLT: 6.42/9.8/29.5/330 (04-09-23)  BMP - Na/K/Cl/Bicarb/BUN/Cr/Gluc/AG/eGFR: 134/4.5/103/26/18/0.68/145/5/98 (04-09-23)  Ca - 8.3 (04-09-23)  Phos - 2.9 (04-09-23)  Mg - 2.1 (04-09-23)  LFT - Alb/Tprot/Tbili/Dbili/AlkPhos/ALT/AST: 2.7/--/0.3/--/150/36/37 (04-09-23)  Thyroid Stimulating Hormone, Serum: 4.520 (04-04-23)  Total T4/Free T4: --/0.938 (04-04-23)    MEDICATIONS  MEDICATIONS  (STANDING):  apixaban 2.5 milliGRAM(s) Oral every 12 hours  dextrose 5%. 1000 milliLiter(s) (100 mL/Hr) IV Continuous <Continuous>  dextrose 5%. 1000 milliLiter(s) (50 mL/Hr) IV Continuous <Continuous>  dextrose 50% Injectable 25 Gram(s) IV Push once  dextrose 50% Injectable 12.5 Gram(s) IV Push once  dextrose 50% Injectable 25 Gram(s) IV Push once  doxazosin 4 milliGRAM(s) Oral at bedtime  finasteride 5 milliGRAM(s) Oral every 24 hours  glucagon  Injectable 1 milliGRAM(s) IntraMuscular once  insulin lispro (ADMELOG) corrective regimen sliding scale   SubCutaneous Before meals and at bedtime  insulin lispro Injectable (ADMELOG) 3 Unit(s) SubCutaneous <User Schedule>  insulin NPH human recombinant 21 Unit(s) SubCutaneous <User Schedule>  levothyroxine 100 MICROGram(s) Oral every 24 hours  pantoprazole    Tablet 40 milliGRAM(s) Oral daily  polyethylene glycol 3350 17 Gram(s) Oral daily  senna 2 Tablet(s) Oral at bedtime  sucralfate suspension 1 Gram(s) Oral four times a day    MEDICATIONS  (PRN):  acetaminophen     Tablet .. 650 milliGRAM(s) Oral every 6 hours PRN Temp greater or equal to 38C (100.4F), Mild Pain (1 - 3)  dextrose Oral Gel 15 Gram(s) Oral once PRN Blood Glucose LESS THAN 70 milliGRAM(s)/deciliter  guaiFENesin ER 1200 milliGRAM(s) Oral every 12 hours PRN Cough  melatonin 3 milliGRAM(s) Oral at bedtime PRN Insomnia  ondansetron Injectable 4 milliGRAM(s) IV Push once PRN Nausea and/or Vomiting    ASSESSMENT / RECOMMENDATIONS  Mr. Kirkland is a 72-year-old male with a past medical history of type 2 diabetes mellitus, hypertension, gastric cancer, hypothyroidism and anemia who presented to the emergency department (3/25/23) complaining of fever, chills and abdominal pain after recent EGD on 3/23/23. Labs were remarkable for leukocytosis and acute kidney injury (LARRY). He was initially admitted to medicine for management of sepsis and LARRY. Urinalysis was negative and blood cultures showed no growth and he remained afebrile with no leukocytosis. Therefore, antibiotics were discontinued as impression was that fever was due to bacterial translocation from recent esophageal biopsy. CT angiogram of the abdomen/pelvis demonstrated a 5.5 cm infrarenal aortic aneurysm that had enlarged from previous study, now s/p EVAR (3/31/23). Endocrinology following for recommendations regarding diabetes management.     A1C: 6.0 %  BUN: 18  Creatinine: 0.68  GFR: 98  Weight: 66.6  BMI: 21.1    # Type 2 diabetes mellitus  - Blood glucose levels mostly within target.  - Please continue with NPH insulin 21 units and lispro 3 units daily at 7:30 PM (half hour before tube feeds start).   - Continue lispro moderate dose sliding scale before meals and at bedtime.   - Upon discharge, please continue with lispro 2 units before meals. In addition, he should continue NPH insulin 21 units and lispro 3 units daily at 7:30 PM (half hour before tube feeds start). He can continue to receive Jevity 1.5 kwan @95 mL/hr for 10 hrs (950 mL TV, 1425 kcal) to be administered from 8:00 PM to 6:00 AM every day.   - Patient can follow at discharge with Dr. Richard at [facility].     Case discussed with Dr. Richard. Primary team updated.       Cali Garrett    Endocrinology Fellow    Service Pager: [phone]

## 2023-04-11 ENCOUNTER — TRANSCRIPTION ENCOUNTER (OUTPATIENT)
Age: 73
End: 2023-04-11

## 2023-04-11 VITALS
TEMPERATURE: 98 F | OXYGEN SATURATION: 96 % | SYSTOLIC BLOOD PRESSURE: 135 MMHG | DIASTOLIC BLOOD PRESSURE: 53 MMHG | RESPIRATION RATE: 18 BRPM | HEART RATE: 70 BPM

## 2023-04-11 LAB
GLUCOSE BLDC GLUCOMTR-MCNC: 111 MG/DL — HIGH (ref 70–99)
GLUCOSE BLDC GLUCOMTR-MCNC: 123 MG/DL — HIGH (ref 70–99)

## 2023-04-11 PROCEDURE — U0003: CPT

## 2023-04-11 PROCEDURE — C1760: CPT

## 2023-04-11 PROCEDURE — 74176 CT ABD & PELVIS W/O CONTRAST: CPT | Mod: MC

## 2023-04-11 PROCEDURE — 82803 BLOOD GASES ANY COMBINATION: CPT

## 2023-04-11 PROCEDURE — 84439 ASSAY OF FREE THYROXINE: CPT

## 2023-04-11 PROCEDURE — 81003 URINALYSIS AUTO W/O SCOPE: CPT

## 2023-04-11 PROCEDURE — C1889: CPT

## 2023-04-11 PROCEDURE — 84484 ASSAY OF TROPONIN QUANT: CPT

## 2023-04-11 PROCEDURE — 71045 X-RAY EXAM CHEST 1 VIEW: CPT

## 2023-04-11 PROCEDURE — 86901 BLOOD TYPING SEROLOGIC RH(D): CPT

## 2023-04-11 PROCEDURE — 74174 CTA ABD&PLVS W/CONTRAST: CPT

## 2023-04-11 PROCEDURE — 36415 COLL VENOUS BLD VENIPUNCTURE: CPT

## 2023-04-11 PROCEDURE — 86850 RBC ANTIBODY SCREEN: CPT

## 2023-04-11 PROCEDURE — 99285 EMERGENCY DEPT VISIT HI MDM: CPT | Mod: 25

## 2023-04-11 PROCEDURE — 80048 BASIC METABOLIC PNL TOTAL CA: CPT

## 2023-04-11 PROCEDURE — 82962 GLUCOSE BLOOD TEST: CPT

## 2023-04-11 PROCEDURE — 93005 ELECTROCARDIOGRAM TRACING: CPT

## 2023-04-11 PROCEDURE — 85610 PROTHROMBIN TIME: CPT

## 2023-04-11 PROCEDURE — 99239 HOSP IP/OBS DSCHRG MGMT >30: CPT

## 2023-04-11 PROCEDURE — 83690 ASSAY OF LIPASE: CPT

## 2023-04-11 PROCEDURE — U0005: CPT

## 2023-04-11 PROCEDURE — 88305 TISSUE EXAM BY PATHOLOGIST: CPT

## 2023-04-11 PROCEDURE — 84100 ASSAY OF PHOSPHORUS: CPT

## 2023-04-11 PROCEDURE — 71250 CT THORAX DX C-: CPT | Mod: MC

## 2023-04-11 PROCEDURE — 83036 HEMOGLOBIN GLYCOSYLATED A1C: CPT

## 2023-04-11 PROCEDURE — 80053 COMPREHEN METABOLIC PANEL: CPT

## 2023-04-11 PROCEDURE — 85027 COMPLETE CBC AUTOMATED: CPT

## 2023-04-11 PROCEDURE — 96375 TX/PRO/DX INJ NEW DRUG ADDON: CPT

## 2023-04-11 PROCEDURE — 93306 TTE W/DOPPLER COMPLETE: CPT

## 2023-04-11 PROCEDURE — 85730 THROMBOPLASTIN TIME PARTIAL: CPT

## 2023-04-11 PROCEDURE — 83735 ASSAY OF MAGNESIUM: CPT

## 2023-04-11 PROCEDURE — 96374 THER/PROPH/DIAG INJ IV PUSH: CPT

## 2023-04-11 PROCEDURE — 71275 CT ANGIOGRAPHY CHEST: CPT

## 2023-04-11 PROCEDURE — C1894: CPT

## 2023-04-11 PROCEDURE — 76000 FLUOROSCOPY <1 HR PHYS/QHP: CPT

## 2023-04-11 PROCEDURE — C1769: CPT

## 2023-04-11 PROCEDURE — 87040 BLOOD CULTURE FOR BACTERIA: CPT

## 2023-04-11 PROCEDURE — 87637 SARSCOV2&INF A&B&RSV AMP PRB: CPT

## 2023-04-11 PROCEDURE — 97161 PT EVAL LOW COMPLEX 20 MIN: CPT

## 2023-04-11 PROCEDURE — 86900 BLOOD TYPING SEROLOGIC ABO: CPT

## 2023-04-11 PROCEDURE — C1887: CPT

## 2023-04-11 PROCEDURE — 84443 ASSAY THYROID STIM HORMONE: CPT

## 2023-04-11 PROCEDURE — 85025 COMPLETE CBC W/AUTO DIFF WBC: CPT

## 2023-04-11 PROCEDURE — 86923 COMPATIBILITY TEST ELECTRIC: CPT

## 2023-04-11 PROCEDURE — 97110 THERAPEUTIC EXERCISES: CPT

## 2023-04-11 PROCEDURE — 97164 PT RE-EVAL EST PLAN CARE: CPT

## 2023-04-11 PROCEDURE — 83605 ASSAY OF LACTIC ACID: CPT

## 2023-04-11 PROCEDURE — 85347 COAGULATION TIME ACTIVATED: CPT

## 2023-04-11 PROCEDURE — C1874: CPT

## 2023-04-11 PROCEDURE — 97116 GAIT TRAINING THERAPY: CPT

## 2023-04-11 RX ORDER — INSULIN LISPRO 100/ML
2 VIAL (ML) SUBCUTANEOUS
Qty: 1 | Refills: 0
Start: 2023-04-11

## 2023-04-11 RX ORDER — HUMAN INSULIN 100 [IU]/ML
21 INJECTION, SUSPENSION SUBCUTANEOUS
Qty: 0 | Refills: 0 | DISCHARGE
Start: 2023-04-11

## 2023-04-11 RX ORDER — SUCRALFATE 1 G
10 TABLET ORAL
Qty: 1200 | Refills: 0
Start: 2023-04-11 | End: 2023-05-10

## 2023-04-11 RX ORDER — CHOLECALCIFEROL (VITAMIN D3) 125 MCG
1 CAPSULE ORAL
Qty: 0 | Refills: 0 | DISCHARGE

## 2023-04-11 RX ORDER — FINASTERIDE 5 MG/1
1 TABLET, FILM COATED ORAL
Qty: 30 | Refills: 0
Start: 2023-04-11 | End: 2023-05-10

## 2023-04-11 RX ORDER — TAMSULOSIN HYDROCHLORIDE 0.4 MG/1
1 CAPSULE ORAL
Qty: 0 | Refills: 0 | DISCHARGE

## 2023-04-11 RX ORDER — INSULIN LISPRO 100/ML
2 VIAL (ML) SUBCUTANEOUS
Qty: 0 | Refills: 0 | DISCHARGE
Start: 2023-04-11

## 2023-04-11 RX ORDER — INSULIN LISPRO 100/ML
3 VIAL (ML) SUBCUTANEOUS
Qty: 0 | Refills: 0 | DISCHARGE
Start: 2023-04-11

## 2023-04-11 RX ORDER — PANTOPRAZOLE SODIUM 20 MG/1
1 TABLET, DELAYED RELEASE ORAL
Qty: 30 | Refills: 0
Start: 2023-04-11 | End: 2023-05-10

## 2023-04-11 RX ORDER — DOXAZOSIN MESYLATE 4 MG
1 TABLET ORAL
Qty: 30 | Refills: 0
Start: 2023-04-11 | End: 2023-05-10

## 2023-04-11 RX ORDER — APIXABAN 2.5 MG/1
1 TABLET, FILM COATED ORAL
Qty: 0 | Refills: 0 | DISCHARGE
End: 2021-12-30

## 2023-04-11 RX ORDER — HUMAN INSULIN 100 [IU]/ML
8 INJECTION, SUSPENSION SUBCUTANEOUS
Qty: 0 | Refills: 0 | DISCHARGE

## 2023-04-11 RX ORDER — HUMAN INSULIN 100 [IU]/ML
21 INJECTION, SUSPENSION SUBCUTANEOUS
Qty: 1 | Refills: 0
Start: 2023-04-11

## 2023-04-11 RX ORDER — APIXABAN 2.5 MG/1
1 TABLET, FILM COATED ORAL
Qty: 0 | Refills: 0 | DISCHARGE
Start: 2023-04-11

## 2023-04-11 RX ORDER — INSULIN LISPRO 100/ML
3 VIAL (ML) SUBCUTANEOUS
Qty: 1 | Refills: 0
Start: 2023-04-11

## 2023-04-11 RX ADMIN — APIXABAN 2.5 MILLIGRAM(S): 2.5 TABLET, FILM COATED ORAL at 09:19

## 2023-04-11 RX ADMIN — PANTOPRAZOLE SODIUM 40 MILLIGRAM(S): 20 TABLET, DELAYED RELEASE ORAL at 11:06

## 2023-04-11 RX ADMIN — Medication 100 MICROGRAM(S): at 06:12

## 2023-04-11 RX ADMIN — POLYETHYLENE GLYCOL 3350 17 GRAM(S): 17 POWDER, FOR SOLUTION ORAL at 11:06

## 2023-04-11 NOTE — DISCHARGE NOTE NURSING/CASE MANAGEMENT/SOCIAL WORK - PATIENT PORTAL LINK FT
You can access the FollowMyHealth Patient Portal offered by [facility] by registering at the following website: [email or web address]. By joining [facility]’s FollowMyHealth portal, you will also be able to view your health information using other applications (apps) compatible with our system.

## 2023-04-11 NOTE — DISCHARGE NOTE NURSING/CASE MANAGEMENT/SOCIAL WORK - NSDCPEFALRISK_GEN_ALL_CORE
For information on Fall & Injury Prevention, visit: [email or web address] OR  [email or web address] OR  https://www.cdc.gov/steadi/patient.html

## 2023-04-11 NOTE — DISCHARGE NOTE NURSING/CASE MANAGEMENT/SOCIAL WORK - NSDCVIVACCINE_GEN_ALL_CORE_FT
Hib (PRP-OMP); 24-May-2017 18:23; Rita Kiser (RN); [employer] [id number]; IntraMuscular; Deltoid Right.; 0.5 milliLiter(s); VIS (VIS Published: 24-May-2017, VIS Presented: 24-May-2017);   meningococcal MCV4P; 26-May-2017 13:17; Jauny Arriaga); [employer]; [id number]; IntraMuscular; Deltoid Left.; 0.5 milliLiter(s); VIS (VIS Published: 26-May-2017, VIS Presented: 26-May-2017);   Pneumococcal conjugate PCV 13; 24-May-2017 18:19; Rita Kiser); [facility]; [id number]; IntraMuscular; Deltoid Left.; 0.5 milliLiter(s); VIS (VIS Published: 27-Feb-2013, VIS Presented: 24-May-2017);

## 2023-04-11 NOTE — DISCHARGE NOTE NURSING/CASE MANAGEMENT/SOCIAL WORK - NSDCFUADDAPPT_GEN_ALL_CORE_FT
Please follow up with your primary care doctor Dr. Obando in 1 week after discharge.    Please follow up with a urologist at your preferred [facility]. Call [phone] to make an appointment.    Please follow up with your endocrinologist Dr. Mari Garcia in 1-2 weeks after discharge to discuss your new insulin regimen.

## 2023-04-13 ENCOUNTER — APPOINTMENT (OUTPATIENT)
Dept: UROLOGY | Facility: CLINIC | Age: 73
End: 2023-04-13
Payer: MEDICARE

## 2023-04-13 VITALS
DIASTOLIC BLOOD PRESSURE: 70 MMHG | HEART RATE: 70 BPM | TEMPERATURE: 98.7 F | OXYGEN SATURATION: 97 % | SYSTOLIC BLOOD PRESSURE: 117 MMHG

## 2023-04-13 PROCEDURE — 99204 OFFICE O/P NEW MOD 45 MIN: CPT

## 2023-04-13 NOTE — HISTORY OF PRESENT ILLNESS
[FreeTextEntry1] : 72 yr old male with PMH gastric cancer s/p total gastrectomy with esophagojejunostomy and chemo/radiation, T2DM, portal vein thrombosis on eliquis, presents with BPH with urinary retention and catheter is in place. \par \par He underwent an endoscopy on 3/23/23 for a GI stent removal. After the procedure, he had difficulty urinating for two days. He presented to ED after he had not urinated and was found to be in urinary retention with bilateral hydronephrosis and LARRY. He also had fever at that time, so there was concern for UTI. A catheter was placed and he was started on antibiotics. His creatinine improved with catheterization. He failed several attempted void trials in the hospital and was eventually discharged with joyner catheter. \par \par He was found to have an enlarging AAA s/p EVAR and right hypogastric embolization at [facility] during last admission. He takes eliquis 2.5 mg BID for history of portal vein thrombosis. \par \par In the hospital, he received medical clearance and vascular clearance. He will need anticoagulation plan for surgery.

## 2023-04-13 NOTE — PHYSICAL EXAM
[General Appearance - Well Developed] : well developed [General Appearance - Well Nourished] : well nourished [Normal Appearance] : normal appearance [Well Groomed] : well groomed [General Appearance - In No Acute Distress] : no acute distress [Edema] : no peripheral edema [Normal Station and Gait] : the gait and station were normal for the patient's age [] : no rash [No Focal Deficits] : no focal deficits

## 2023-04-13 NOTE — LETTER BODY
[Dear  ___] : Dear  [unfilled], [Courtesy Letter:] : I had the pleasure of seeing your patient, [unfilled], in my office today. [Please see my note below.] : Please see my note below. [Consult Closing:] : Thank you very much for allowing me to participate in the care of this patient.  If you have any questions, please do not hesitate to contact me. [Sincerely,] : Sincerely, [FreeTextEntry3] : Sandhya Oliva MD

## 2023-04-13 NOTE — ASSESSMENT
[FreeTextEntry1] : 72 yr old male with PMH gastric cancer s/p total gastrectomy with esophagojejunostomy and chemo/radiation, T2DM, portal vein thrombosis on eliquis, presents with BPH (70 cc gland) with urinary retention and catheter is in place. \par \par We discussed different therapeutic options including TURP, PVP, simple prostatectomy (open, laparoscopic or transurethral laser enucleation), Aquablation, Urolift therapy, and Rezum in addition to full continuation of medical therapy. Discussed the issues of incontinence, retrograde ejaculation, erectile dysfunction, irritative voiding symptoms, injury to urethra and bladder, persistence of irritative voiding symptoms, urethral stricture or bladder neck contracture, need for open surgery to repair the injury, erectile dysfunction and infertility. He would like to proceed with HoLEP.\par \par UCx sent directly from catheter. Will likely need pre and post antibiotics for which we will call him on Monday to discuss. \par \par Patient has cardiac clearance from inpatient stay. He is still on Eliquis which we will address with his PCP. \par \par  - HoLEP at [facility], plan for inpatient stay\par  - F/U urine culture\par  - Continue joyner catheter\par \par \par

## 2023-04-17 LAB — BACTERIA UR CULT: NORMAL

## 2023-04-18 ENCOUNTER — NON-APPOINTMENT (OUTPATIENT)
Age: 73
End: 2023-04-18

## 2023-04-19 ENCOUNTER — TRANSCRIPTION ENCOUNTER (OUTPATIENT)
Age: 73
End: 2023-04-19

## 2023-04-19 VITALS
OXYGEN SATURATION: 96 % | TEMPERATURE: 98 F | DIASTOLIC BLOOD PRESSURE: 66 MMHG | HEIGHT: 70.5 IN | HEART RATE: 62 BPM | RESPIRATION RATE: 16 BRPM | SYSTOLIC BLOOD PRESSURE: 112 MMHG | WEIGHT: 141.54 LBS

## 2023-04-19 NOTE — ASU PATIENT PROFILE, ADULT - NSICDXPASTMEDICALHX_GEN_ALL_CORE_FT
PAST MEDICAL HISTORY:  Constipation     DVT, lower extremity     Dysphagia, unspecified obstruction at level of esophagojejunostomy    Essential hypertension was on losartan, now diet control    Gastric adenocarcinoma metastatic    Gastric mass ulcerated mass in gastric cardia with small perigastric nodes on endoscopy.    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

## 2023-04-19 NOTE — ASU PATIENT PROFILE, ADULT - FALL HARM RISK - UNIVERSAL INTERVENTIONS
Bed in lowest position, wheels locked, appropriate side rails in place/Call bell, personal items and telephone in reach/Instruct patient to call for assistance before getting out of bed or chair/Non-slip footwear when patient is out of bed/Liberal to call system/Physically safe environment - no spills, clutter or unnecessary equipment/Purposeful Proactive Rounding/Room/bathroom lighting operational, light cord in reach

## 2023-04-19 NOTE — ASU PATIENT PROFILE, ADULT - NSICDXPASTSURGICALHX_GEN_ALL_CORE_FT
PAST SURGICAL HISTORY:  Gastrostomy tube in place open jejunostomy with J-tube November 2020    H/O endoscopy & stent placement 12/2020    H/O umbilical hernia repair 2018 with mesh    History of dysphagia EGD with Axios stent placment to connect the blind limb to th e efferent limb (jejunojejunostomy)    History of gastric surgery 2017    History of partial pancreatectomy distal pancreatectomy/ splenectomy esophatojejunostomy    Port-a-cath in place right chest wall    S/P cataract surgery     S/P gastrectomy total gastrectomy 2017    Status post neck dissection 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, gastric primary     PAST SURGICAL HISTORY:  Gastrostomy tube in place open jejunostomy with J-tube November 2020    H/O endoscopy & stent placement 12/2020    H/O umbilical hernia repair 2018 with mesh    History of dysphagia EGD with Axios stent placment to connect the blind limb to th e efferent limb (jejunojejunostomy)    History of gastric surgery 2017    History of partial pancreatectomy distal pancreatectomy/ splenectomy esophatojejunostomy    Port-a-cath in place right chest wall                 not in use 5 y    S/P cataract surgery     S/P gastrectomy total gastrectomy 2017    Status post neck dissection 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, gastric primary

## 2023-04-20 ENCOUNTER — RESULT REVIEW (OUTPATIENT)
Age: 73
End: 2023-04-20

## 2023-04-20 ENCOUNTER — TRANSCRIPTION ENCOUNTER (OUTPATIENT)
Age: 73
End: 2023-04-20

## 2023-04-20 ENCOUNTER — INPATIENT (INPATIENT)
Facility: HOSPITAL | Age: 73
LOS: 1 days | Discharge: HOME | DRG: 713 | End: 2023-04-22
Attending: SURGERY | Admitting: SURGERY
Payer: MEDICARE

## 2023-04-20 ENCOUNTER — APPOINTMENT (OUTPATIENT)
Dept: UROLOGY | Facility: HOSPITAL | Age: 73
End: 2023-04-20

## 2023-04-20 DIAGNOSIS — Z98.890 OTHER SPECIFIED POSTPROCEDURAL STATES: Chronic | ICD-10-CM

## 2023-04-20 DIAGNOSIS — Z87.19 PERSONAL HISTORY OF OTHER DISEASES OF THE DIGESTIVE SYSTEM: Chronic | ICD-10-CM

## 2023-04-20 DIAGNOSIS — Z90.411 ACQUIRED PARTIAL ABSENCE OF PANCREAS: Chronic | ICD-10-CM

## 2023-04-20 DIAGNOSIS — Z98.49 CATARACT EXTRACTION STATUS, UNSPECIFIED EYE: Chronic | ICD-10-CM

## 2023-04-20 DIAGNOSIS — Z95.828 PRESENCE OF OTHER VASCULAR IMPLANTS AND GRAFTS: Chronic | ICD-10-CM

## 2023-04-20 DIAGNOSIS — Z90.3 ACQUIRED ABSENCE OF STOMACH [PART OF]: Chronic | ICD-10-CM

## 2023-04-20 DIAGNOSIS — Z93.1 GASTROSTOMY STATUS: Chronic | ICD-10-CM

## 2023-04-20 LAB
BLD GP AB SCN SERPL QL: NEGATIVE — SIGNIFICANT CHANGE UP
GLUCOSE BLDC GLUCOMTR-MCNC: 103 MG/DL — HIGH (ref 70–99)
GLUCOSE BLDC GLUCOMTR-MCNC: 119 MG/DL — HIGH (ref 70–99)
GLUCOSE BLDC GLUCOMTR-MCNC: 139 MG/DL — HIGH (ref 70–99)
GLUCOSE BLDC GLUCOMTR-MCNC: 75 MG/DL — SIGNIFICANT CHANGE UP (ref 70–99)
RH IG SCN BLD-IMP: NEGATIVE — SIGNIFICANT CHANGE UP

## 2023-04-20 PROCEDURE — 52317 REMOVE BLADDER STONE: CPT

## 2023-04-20 PROCEDURE — 52649 PROSTATE LASER ENUCLEATION: CPT

## 2023-04-20 PROCEDURE — 88305 TISSUE EXAM BY PATHOLOGIST: CPT | Mod: 26

## 2023-04-20 DEVICE — LASER FIBER MOSES 550 D/F/L: Type: IMPLANTABLE DEVICE | Status: FUNCTIONAL

## 2023-04-20 DEVICE — MOCELLATOR ROTATION SINGLEUSE 4.75: Type: IMPLANTABLE DEVICE | Status: FUNCTIONAL

## 2023-04-20 RX ORDER — INSULIN LISPRO 100/ML
3 VIAL (ML) SUBCUTANEOUS
Refills: 0 | Status: DISCONTINUED | OUTPATIENT
Start: 2023-04-20 | End: 2023-04-22

## 2023-04-20 RX ORDER — DEXTROSE 50 % IN WATER 50 %
25 SYRINGE (ML) INTRAVENOUS ONCE
Refills: 0 | Status: DISCONTINUED | OUTPATIENT
Start: 2023-04-20 | End: 2023-04-22

## 2023-04-20 RX ORDER — LEVOTHYROXINE SODIUM 125 MCG
100 TABLET ORAL DAILY
Refills: 0 | Status: DISCONTINUED | OUTPATIENT
Start: 2023-04-20 | End: 2023-04-22

## 2023-04-20 RX ORDER — SODIUM CHLORIDE 9 MG/ML
1000 INJECTION, SOLUTION INTRAVENOUS
Refills: 0 | Status: DISCONTINUED | OUTPATIENT
Start: 2023-04-20 | End: 2023-04-20

## 2023-04-20 RX ORDER — ZOLPIDEM TARTRATE 10 MG/1
5 TABLET ORAL AT BEDTIME
Refills: 0 | Status: DISCONTINUED | OUTPATIENT
Start: 2023-04-20 | End: 2023-04-22

## 2023-04-20 RX ORDER — PANTOPRAZOLE SODIUM 20 MG/1
40 TABLET, DELAYED RELEASE ORAL
Refills: 0 | Status: DISCONTINUED | OUTPATIENT
Start: 2023-04-20 | End: 2023-04-22

## 2023-04-20 RX ORDER — LEVOTHYROXINE SODIUM 125 MCG
1 TABLET ORAL
Qty: 0 | Refills: 0 | DISCHARGE

## 2023-04-20 RX ORDER — FINASTERIDE 5 MG/1
5 TABLET, FILM COATED ORAL DAILY
Refills: 0 | Status: DISCONTINUED | OUTPATIENT
Start: 2023-04-20 | End: 2023-04-22

## 2023-04-20 RX ORDER — GLUCAGON INJECTION, SOLUTION 0.5 MG/.1ML
1 INJECTION, SOLUTION SUBCUTANEOUS ONCE
Refills: 0 | Status: DISCONTINUED | OUTPATIENT
Start: 2023-04-20 | End: 2023-04-22

## 2023-04-20 RX ORDER — DEXTROSE 50 % IN WATER 50 %
12.5 SYRINGE (ML) INTRAVENOUS ONCE
Refills: 0 | Status: DISCONTINUED | OUTPATIENT
Start: 2023-04-20 | End: 2023-04-22

## 2023-04-20 RX ORDER — SUCRALFATE 1 G
1 TABLET ORAL
Refills: 0 | Status: DISCONTINUED | OUTPATIENT
Start: 2023-04-20 | End: 2023-04-22

## 2023-04-20 RX ORDER — ACETAMINOPHEN 500 MG
650 TABLET ORAL EVERY 6 HOURS
Refills: 0 | Status: DISCONTINUED | OUTPATIENT
Start: 2023-04-20 | End: 2023-04-22

## 2023-04-20 RX ORDER — DOXAZOSIN MESYLATE 4 MG
4 TABLET ORAL AT BEDTIME
Refills: 0 | Status: DISCONTINUED | OUTPATIENT
Start: 2023-04-20 | End: 2023-04-22

## 2023-04-20 RX ORDER — INSULIN HUMAN 100 [IU]/ML
INJECTION, SOLUTION SUBCUTANEOUS EVERY 6 HOURS
Refills: 0 | Status: DISCONTINUED | OUTPATIENT
Start: 2023-04-20 | End: 2023-04-22

## 2023-04-20 RX ORDER — SODIUM CHLORIDE 9 MG/ML
1000 INJECTION, SOLUTION INTRAVENOUS
Refills: 0 | Status: DISCONTINUED | OUTPATIENT
Start: 2023-04-20 | End: 2023-04-22

## 2023-04-20 RX ORDER — INSULIN LISPRO 100/ML
VIAL (ML) SUBCUTANEOUS AT BEDTIME
Refills: 0 | Status: DISCONTINUED | OUTPATIENT
Start: 2023-04-20 | End: 2023-04-20

## 2023-04-20 RX ORDER — HUMAN INSULIN 100 [IU]/ML
21 INJECTION, SUSPENSION SUBCUTANEOUS
Refills: 0 | Status: DISCONTINUED | OUTPATIENT
Start: 2023-04-20 | End: 2023-04-22

## 2023-04-20 RX ORDER — DEXTROSE 50 % IN WATER 50 %
15 SYRINGE (ML) INTRAVENOUS ONCE
Refills: 0 | Status: DISCONTINUED | OUTPATIENT
Start: 2023-04-20 | End: 2023-04-22

## 2023-04-20 RX ORDER — APIXABAN 2.5 MG/1
2.5 TABLET, FILM COATED ORAL EVERY 12 HOURS
Refills: 0 | Status: DISCONTINUED | OUTPATIENT
Start: 2023-04-20 | End: 2023-04-22

## 2023-04-20 RX ADMIN — HUMAN INSULIN 21 UNIT(S): 100 INJECTION, SUSPENSION SUBCUTANEOUS at 20:52

## 2023-04-20 RX ADMIN — SODIUM CHLORIDE 75 MILLILITER(S): 9 INJECTION, SOLUTION INTRAVENOUS at 17:13

## 2023-04-20 RX ADMIN — Medication 3 UNIT(S): at 20:52

## 2023-04-20 RX ADMIN — Medication 4 MILLIGRAM(S): at 21:35

## 2023-04-20 RX ADMIN — ZOLPIDEM TARTRATE 5 MILLIGRAM(S): 10 TABLET ORAL at 21:55

## 2023-04-20 RX ADMIN — APIXABAN 2.5 MILLIGRAM(S): 2.5 TABLET, FILM COATED ORAL at 20:25

## 2023-04-20 NOTE — BRIEF OPERATIVE NOTE - OPERATION/FINDINGS
large median lobe; moderate-severe bladder trabeculations, 2 diverticula noted superior to ureteral orifices, small bladder stones removed via scope; 20F 3-way Abarca placed with clear CBI

## 2023-04-21 ENCOUNTER — TRANSCRIPTION ENCOUNTER (OUTPATIENT)
Age: 73
End: 2023-04-21

## 2023-04-21 LAB
ANION GAP SERPL CALC-SCNC: 11 MMOL/L — SIGNIFICANT CHANGE UP (ref 5–17)
BUN SERPL-MCNC: 19 MG/DL — SIGNIFICANT CHANGE UP (ref 7–23)
CALCIUM SERPL-MCNC: 8.4 MG/DL — SIGNIFICANT CHANGE UP (ref 8.4–10.5)
CHLORIDE SERPL-SCNC: 105 MMOL/L — SIGNIFICANT CHANGE UP (ref 96–108)
CO2 SERPL-SCNC: 21 MMOL/L — LOW (ref 22–31)
CREAT SERPL-MCNC: 0.72 MG/DL — SIGNIFICANT CHANGE UP (ref 0.5–1.3)
CULTURE RESULTS: NO GROWTH — SIGNIFICANT CHANGE UP
CULTURE RESULTS: SIGNIFICANT CHANGE UP
EGFR: 96 ML/MIN/1.73M2 — SIGNIFICANT CHANGE UP
GLUCOSE BLDC GLUCOMTR-MCNC: 110 MG/DL — HIGH (ref 70–99)
GLUCOSE BLDC GLUCOMTR-MCNC: 130 MG/DL — HIGH (ref 70–99)
GLUCOSE BLDC GLUCOMTR-MCNC: 136 MG/DL — HIGH (ref 70–99)
GLUCOSE BLDC GLUCOMTR-MCNC: 141 MG/DL — HIGH (ref 70–99)
GLUCOSE BLDC GLUCOMTR-MCNC: 170 MG/DL — HIGH (ref 70–99)
GLUCOSE BLDC GLUCOMTR-MCNC: 170 MG/DL — HIGH (ref 70–99)
GLUCOSE SERPL-MCNC: 177 MG/DL — HIGH (ref 70–99)
HCT VFR BLD CALC: 31.4 % — LOW (ref 39–50)
HGB BLD-MCNC: 10.4 G/DL — LOW (ref 13–17)
MAGNESIUM SERPL-MCNC: 2.2 MG/DL — SIGNIFICANT CHANGE UP (ref 1.6–2.6)
MCHC RBC-ENTMCNC: 33.1 GM/DL — SIGNIFICANT CHANGE UP (ref 32–36)
MCHC RBC-ENTMCNC: 33.4 PG — SIGNIFICANT CHANGE UP (ref 27–34)
MCV RBC AUTO: 101 FL — HIGH (ref 80–100)
NRBC # BLD: 0 /100 WBCS — SIGNIFICANT CHANGE UP (ref 0–0)
PHOSPHATE SERPL-MCNC: 3.2 MG/DL — SIGNIFICANT CHANGE UP (ref 2.5–4.5)
PLATELET # BLD AUTO: 223 K/UL — SIGNIFICANT CHANGE UP (ref 150–400)
POTASSIUM SERPL-MCNC: 4.3 MMOL/L — SIGNIFICANT CHANGE UP (ref 3.5–5.3)
POTASSIUM SERPL-SCNC: 4.3 MMOL/L — SIGNIFICANT CHANGE UP (ref 3.5–5.3)
RBC # BLD: 3.11 M/UL — LOW (ref 4.2–5.8)
RBC # FLD: 14.9 % — HIGH (ref 10.3–14.5)
SODIUM SERPL-SCNC: 137 MMOL/L — SIGNIFICANT CHANGE UP (ref 135–145)
SPECIMEN SOURCE: SIGNIFICANT CHANGE UP
WBC # BLD: 6.69 K/UL — SIGNIFICANT CHANGE UP (ref 3.8–10.5)
WBC # FLD AUTO: 6.69 K/UL — SIGNIFICANT CHANGE UP (ref 3.8–10.5)

## 2023-04-21 RX ADMIN — Medication 1 TABLET(S): at 19:09

## 2023-04-21 RX ADMIN — Medication 4 MILLIGRAM(S): at 22:00

## 2023-04-21 RX ADMIN — Medication 3 UNIT(S): at 20:46

## 2023-04-21 RX ADMIN — Medication 100 MICROGRAM(S): at 06:20

## 2023-04-21 RX ADMIN — INSULIN HUMAN 2: 100 INJECTION, SOLUTION SUBCUTANEOUS at 06:27

## 2023-04-21 RX ADMIN — APIXABAN 2.5 MILLIGRAM(S): 2.5 TABLET, FILM COATED ORAL at 20:46

## 2023-04-21 RX ADMIN — PANTOPRAZOLE SODIUM 40 MILLIGRAM(S): 20 TABLET, DELAYED RELEASE ORAL at 06:20

## 2023-04-21 RX ADMIN — APIXABAN 2.5 MILLIGRAM(S): 2.5 TABLET, FILM COATED ORAL at 08:26

## 2023-04-21 RX ADMIN — ZOLPIDEM TARTRATE 5 MILLIGRAM(S): 10 TABLET ORAL at 22:00

## 2023-04-21 RX ADMIN — FINASTERIDE 5 MILLIGRAM(S): 5 TABLET, FILM COATED ORAL at 12:56

## 2023-04-21 RX ADMIN — INSULIN HUMAN 2: 100 INJECTION, SOLUTION SUBCUTANEOUS at 12:56

## 2023-04-21 RX ADMIN — HUMAN INSULIN 21 UNIT(S): 100 INJECTION, SUSPENSION SUBCUTANEOUS at 20:47

## 2023-04-21 NOTE — DIETITIAN INITIAL EVALUATION ADULT - PERTINENT LABORATORY DATA
04-21    137  |  105  |  19  ----------------------------<  177<H>  4.3   |  21<L>  |  0.72    Ca    8.4      21 Apr 2023 07:25  Phos  3.2     04-21  Mg     2.2     04-21    POCT Blood Glucose.: 170 mg/dL (04-21-23 @ 11:59)  A1C with Estimated Average Glucose Result: 6.0 % (03-28-23 @ 05:30)

## 2023-04-21 NOTE — DIETITIAN INITIAL EVALUATION ADULT - NS FNS DIET ORDER
Diet, Soft and Bite Sized:   Tube Feeding Modality: Gastrostomy  Jevity 1.5 Jesús (JEVITY1.5)  Total Volume for 24 Hours (mL): 950  Total Number of Cans: 4  Continuous  Starting Tube Feed Rate {mL per Hour}: 95     Every hour  Until Goal Tube Feed Rate (mL per Hour): 95  Tube Feed Duration (in Hours): 10  Tube Feed Start Time: 20:00  Tube Feed Stop Time: 06:00  Free Water Flush  Free Water Flush Instructions:  every 4 hours flush 100 cc. (04-20-23 @ 18:01)

## 2023-04-21 NOTE — DIETITIAN INITIAL EVALUATION ADULT - NSICDXPASTMEDICALHX_GEN_ALL_CORE_FT
PAST MEDICAL HISTORY:  DVT, lower extremity     Dysphagia, unspecified obstruction at level of esophagojejunostomy    Essential hypertension was on losartan, now diet control    Gastric adenocarcinoma metastatic    Gastric mass ulcerated mass in gastric cardia with small perigastric nodes on endoscopy.    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

## 2023-04-21 NOTE — DISCHARGE NOTE PROVIDER - NSDCCPCAREPLAN_GEN_ALL_CORE_FT
PRINCIPAL DISCHARGE DIAGNOSIS  Diagnosis: BPH (benign prostatic hyperplasia)  Assessment and Plan of Treatment:       SECONDARY DISCHARGE DIAGNOSES  Diagnosis: Gastric cancer  Assessment and Plan of Treatment:

## 2023-04-21 NOTE — DISCHARGE NOTE PROVIDER - HOSPITAL COURSE
73yo male with PMH of gastric cancer, g-tube, BPH s/p prostate enucleation POD#1. Patient tolerated procedure well and no post operative complications were noted. Patient's VSS and he is hemodynamically stable. Patient is optimized for discharge with outpatient follow up. 71yo male with PMH of gastric cancer, g-tube, BPH s/p prostate enucleation POD#2. Patient tolerated procedure well and no post operative complications were noted. Patient's VSS and he is hemodynamically stable. Patient is optimized for discharge with outpatient follow up.

## 2023-04-21 NOTE — PROGRESS NOTE ADULT - ASSESSMENT
A/P 71 yo m with bph s/p enucleation  1)advance diet  2) cont IVF until tolerating PO  3)cont cbi  4)nutrion to see for g tube feeds

## 2023-04-21 NOTE — DISCHARGE NOTE PROVIDER - NSDCCPTREATMENT_GEN_ALL_CORE_FT
PRINCIPAL PROCEDURE  Procedure: Enucleation, prostate, using holmium laser  Findings and Treatment:

## 2023-04-21 NOTE — DIETITIAN INITIAL EVALUATION ADULT - OTHER CALCULATIONS
Current body weight used for calculations as is within % IBW (83.5%).  Needs adjusted for CA/hypermetabolic state + malnutrition.   Energy: 1926-2247kcal (30-35cal/kg)  Protein: 90-102g pro (1.4-1.6g/kg pro)  Fluid: 1926-2247ml (30-35ml/kg)

## 2023-04-21 NOTE — DISCHARGE NOTE PROVIDER - DETAILS OF MALNUTRITION DIAGNOSIS/DIAGNOSES
This patient has been assessed with a concern for Malnutrition and was treated during this hospitalization for the following Nutrition diagnosis/diagnoses:     -  04/21/2023: Severe protein-calorie malnutrition

## 2023-04-21 NOTE — DIETITIAN INITIAL EVALUATION ADULT - NSFNSGIIOFT_GEN_A_CORE
04-20-23 @ 07:01  -  04-21-23 @ 07:00  --------------------------------------------------------  OUT:  Total OUT: 0 mL    Total NET: 760 mL

## 2023-04-21 NOTE — DIETITIAN INITIAL EVALUATION ADULT - OTHER INFO
Pt with past medical history significant for gastric CA, DM2 (A1c 6.0) and HTN.  Pt is s/p gastrectomy 2017, s/p partial pancreatectomy, s/p Jtube 2020.    Pt currently with bph s/p enucleation.  Discharge is pending TOV.   Pt endorses UBW ~150-155# which he reports being stable at for the past several months.  Pt admits to recent weight loss which is consistent with admission weight 64.2kg (141.2#).  Pt is noted with moderate temporal, buccal and clavicular wasting.  Weight loss and moderate muscle/fast wasting are consistent with severe malnutrition.    Pt confirms current TF regimen Jevity 1.5 @ 95ml/hr cont x10 hrs (nocturnal) via PEJ is home regimen providing 950ml Tvol, 1425kcal, 60g pro, 722ml free water.  Pt also consumes small amounts PO at home.  Pt reports being able to tolerate all consistencies.  I am concerned that pt is not consuming enough kcal/pro PO to best meet estimated needs in conjunction with his TF.    Pt is concerned about the amount of Insulin he is receiving and his elevated blood glucose levels.  POCT: 170, 170, 141.  Pt does report plan to f/u with Dr. Richard (endocrinology) out-patient.  Consider changing to Glucerna TF to promote glycemic control.  Additionally, pt denies need for soft and bite sized diet.  Consider liberalizing to Regular, if medically appropriate, to optimize PO intake.    Pt is eating hot cereal at time of visit; tolerating well.  No cultural, ethnic, Judaism food preferences noted at this time.  Pt eats Kosher food but is not strict about it.  PCA ordered lunch for patient.    Skin: intact pressure-wise; cuate 22.   Case discussed with Urology team.

## 2023-04-21 NOTE — DIETITIAN NUTRITION RISK NOTIFICATION - ADDITIONAL COMMENTS/DIETITIAN RECOMMENDATIONS
Pt endorses UBW ~150-155# which he reports being stable at for the past several months.  Pt admits to recent weight loss which is consistent with admission weight 64.2kg (141.2#).  Pt is noted with moderate temporal, buccal and clavicular wasting.  Weight loss and moderate muscle/fast wasting are consistent with severe malnutrition.    Pt confirms current TF regimen Jevity 1.5 @ 95ml/hr cont x10 hrs (nocturnal) via PEJ is home regimen providing 950ml Tvol, 1425kcal, 60g pro, 722ml free water.  Pt also consumes small amounts PO at home.  Pt reports being able to tolerate all consistencies.  I am concerned that pt is not consuming enough kcal/pro PO to best meet estimated needs in conjunction with his TF.      Current body weight used for calculations as is within % IBW (83.5%).  Needs adjusted for CA/hypermetabolic state + malnutrition.   Energy: 1926-2247kcal (30-35cal/kg)  Protein: 90-102g pro (1.4-1.6g/kg pro)  Fluid: 1926-2247ml (30-35ml/kg)

## 2023-04-21 NOTE — DIETITIAN INITIAL EVALUATION ADULT - WEIGHT FOR BMI (LBS)
141.5 Due for follow up cmp, cbc and uacs for Medicare, all other labs done in December 2018  Will need diabetic fasting labs in 6 months

## 2023-04-21 NOTE — DISCHARGE NOTE PROVIDER - NSDCFUADDINST_GEN_ALL_CORE_FT
Please continue Augmentin, antibiotic you have at home for 3 more days as prescribed.     General Discharge Instructions:  Use Tylenol for pain control as needed  Please resume all regular home medications unless specifically advised not to take a particular medication. Also, please take any new medications as prescribed.  Please get plenty of rest, continue to ambulate several times per day, and drink adequate amounts of fluids.     Warning Signs:  Please call your doctor if you experience the following:  *You experience new chest pain, pressure, squeezing or tightness.  *New or worsening cough, shortness of breath, or wheeze.  *If you are vomiting and cannot keep down fluids or your medications.  *You are getting dehydrated due to continued vomiting, diarrhea, or other reasons. Signs of dehydration include dry mouth, rapid heartbeat, or feeling dizzy or faint when standing.  *You see blood or dark/black material when you vomit or have a bowel movement.  *You experience burning when you urinate, have blood in your urine, or experience a discharge.  *Your pain is not improving within 8-12 hours or is not gone within 24 hours. Call or return immediately if your pain is getting worse, changes location, or moves to your chest or back.  *You have shaking chills, or fever greater than 100.4 degrees Fahrenheit.  *Any change in your symptoms, or any new symptoms that concern you.

## 2023-04-21 NOTE — DIETITIAN INITIAL EVALUATION ADULT - NSICDXPASTSURGICALHX_GEN_ALL_CORE_FT
PAST SURGICAL HISTORY:  Gastrostomy tube in place open jejunostomy with J-tube November 2020    H/O endoscopy & stent placement 12/2020    H/O umbilical hernia repair 2018 with mesh    History of dysphagia EGD with Axios stent placment to connect the blind limb to th e efferent limb (jejunojejunostomy)    History of gastric surgery 2017    History of partial pancreatectomy distal pancreatectomy/ splenectomy esophatojejunostomy    Port-a-cath in place right chest wall                 not in use 5 y    S/P cataract surgery     S/P gastrectomy total gastrectomy 2017    Status post neck dissection 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, gastric primary

## 2023-04-21 NOTE — DIETITIAN INITIAL EVALUATION ADULT - PERSON TAUGHT/METHOD
Pt is understanding of current EN + PO regimen to best meet estimated nutrient needs. Pt is aware of kcal/pro required to prevent further weight loss.

## 2023-04-21 NOTE — DIETITIAN NUTRITION RISK NOTIFICATION - TREATMENT: THE FOLLOWING DIET HAS BEEN RECOMMENDED
Consider increasing feeds over 12 hours: Jevity 1.5 @ 95ml/hr cont x12 hrs via PEJ providing Tvol 1140ml, 1710kcal, 72g pro, 866ml free water.    1. Monitor tolerance of PO diet - liberalize to Regular, if medically appropriate, to optimize PO intake   2. Monitor tolerance of enteral feeds  3. Keep HOB >30-45 degrees  4. POCT Q 6 hrs  5. Increase TF x12 hrs to better meet estimated needs  6. Appreciate continued assistance and encouragement with meals  7. Ensure estimated nutrient needs are being met with EN + PO  8. Consider changing TF to Glucerna to promote glycemic control

## 2023-04-21 NOTE — DIETITIAN INITIAL EVALUATION ADULT - ENTERAL
Consider increasing feeds over 12 hours: Jevity 1.5 @ 95ml/hr cont x12 hrs via PEJ providing Tvol 1140ml, 1710kcal, 72g pro, 866ml free water.

## 2023-04-21 NOTE — DISCHARGE NOTE PROVIDER - NSDCMRMEDTOKEN_GEN_ALL_CORE_FT
amoxicillin-clavulanate 875 mg-125 mg oral tablet: 1 tab(s) orally every 12 hours  apixaban 2.5 mg oral tablet: 1 tab(s) orally every 12 hours  doxazosin 4 mg oral tablet: 1 tab(s) orally once a day (at bedtime)  insulin isophane (NPH) 100 units/mL human recombinant subcutaneous suspension: 21 unit(s) subcutaneous once a day ADMINISTER AT 7:30PM  insulin lispro 100 units/mL injectable solution: 2 unit(s) injectable 3 times a day (before meals)  Insulin Pen Needles, 4mm: 1 application subcutaneously 4 times a day. ** Use with insulin pen **  levothyroxine 100 mcg (0.1 mg) oral tablet: 1 tab(s) orally once a day  pantoprazole 40 mg oral delayed release tablet: 1 tab(s) orally once a day  sucralfate 1 g/10 mL oral suspension: 10 milliliter(s) orally 4 times a day  zolpidem 5 mg oral tablet: 1 orally once a day (in the evening)

## 2023-04-21 NOTE — DISCHARGE NOTE PROVIDER - NSDCFUSCHEDAPPT_GEN_ALL_CORE_FT
Yang Muro  [facility] Physician [facility]  UROLOGY [address]  Scheduled Appointment: 04/25/2023

## 2023-04-21 NOTE — DIETITIAN INITIAL EVALUATION ADULT - PERTINENT MEDS FT
MEDICATIONS  (STANDING):  amoxicillin  875 milliGRAM(s)/clavulanate 1 Tablet(s) Oral every 12 hours  apixaban 2.5 milliGRAM(s) Oral every 12 hours  dextrose 5%. 1000 milliLiter(s) (50 mL/Hr) IV Continuous <Continuous>  dextrose 5%. 1000 milliLiter(s) (100 mL/Hr) IV Continuous <Continuous>  dextrose 50% Injectable 25 Gram(s) IV Push once  dextrose 50% Injectable 12.5 Gram(s) IV Push once  dextrose 50% Injectable 25 Gram(s) IV Push once  doxazosin 4 milliGRAM(s) Oral at bedtime  finasteride 5 milliGRAM(s) Oral daily  glucagon  Injectable 1 milliGRAM(s) IntraMuscular once  insulin lispro Injectable (ADMELOG) 3 Unit(s) SubCutaneous <User Schedule>  insulin NPH human recombinant 21 Unit(s) SubCutaneous <User Schedule>  insulin regular  human corrective regimen sliding scale   SubCutaneous every 6 hours  levothyroxine 100 MICROGram(s) Oral daily  pantoprazole    Tablet 40 milliGRAM(s) Oral before breakfast  sucralfate suspension 1 Gram(s) Oral four times a day  zolpidem 5 milliGRAM(s) Oral at bedtime    MEDICATIONS  (PRN):  acetaminophen     Tablet .. 650 milliGRAM(s) Oral every 6 hours PRN Temp greater or equal to 38C (100.4F), Mild Pain (1 - 3), Moderate Pain (4 - 6), Severe Pain (7 - 10)  dextrose Oral Gel 15 Gram(s) Oral once PRN Blood Glucose LESS THAN 70 milliGRAM(s)/deciliter

## 2023-04-21 NOTE — DIETITIAN INITIAL EVALUATION ADULT - ADD RECOMMEND
1. Monitor tolerance of PO diet  2. Monitor tolerance of enteral feeds  3. Keep HOB >30-45 degrees  4. POCT Q 6 hrs  5. Increase TF x12 hrs to better meet estimated needs  6. Appreciate continued assistance and encouragement with meals  7. Ensure estimated nutrient needs are being met with EN + PO  8. Consider changing TF to Glucerna to promote glycemic control

## 2023-04-22 ENCOUNTER — TRANSCRIPTION ENCOUNTER (OUTPATIENT)
Age: 73
End: 2023-04-22

## 2023-04-22 VITALS
DIASTOLIC BLOOD PRESSURE: 61 MMHG | OXYGEN SATURATION: 95 % | HEART RATE: 60 BPM | SYSTOLIC BLOOD PRESSURE: 114 MMHG | RESPIRATION RATE: 17 BRPM | TEMPERATURE: 98 F

## 2023-04-22 DIAGNOSIS — N40.1 BENIGN PROSTATIC HYPERPLASIA WITH LOWER URINARY TRACT SYMPTOMS: ICD-10-CM

## 2023-04-22 LAB
ANION GAP SERPL CALC-SCNC: 5 MMOL/L — SIGNIFICANT CHANGE UP (ref 5–17)
BUN SERPL-MCNC: 22 MG/DL — SIGNIFICANT CHANGE UP (ref 7–23)
CALCIUM SERPL-MCNC: 8.1 MG/DL — LOW (ref 8.4–10.5)
CHLORIDE SERPL-SCNC: 104 MMOL/L — SIGNIFICANT CHANGE UP (ref 96–108)
CO2 SERPL-SCNC: 24 MMOL/L — SIGNIFICANT CHANGE UP (ref 22–31)
CREAT SERPL-MCNC: 0.81 MG/DL — SIGNIFICANT CHANGE UP (ref 0.5–1.3)
CULTURE RESULTS: NO GROWTH — SIGNIFICANT CHANGE UP
EGFR: 93 ML/MIN/1.73M2 — SIGNIFICANT CHANGE UP
GLUCOSE BLDC GLUCOMTR-MCNC: 139 MG/DL — HIGH (ref 70–99)
GLUCOSE SERPL-MCNC: 143 MG/DL — HIGH (ref 70–99)
HCT VFR BLD CALC: 27.7 % — LOW (ref 39–50)
HGB BLD-MCNC: 9.2 G/DL — LOW (ref 13–17)
MCHC RBC-ENTMCNC: 33.2 GM/DL — SIGNIFICANT CHANGE UP (ref 32–36)
MCHC RBC-ENTMCNC: 33.3 PG — SIGNIFICANT CHANGE UP (ref 27–34)
MCV RBC AUTO: 100.4 FL — HIGH (ref 80–100)
NRBC # BLD: 0 /100 WBCS — SIGNIFICANT CHANGE UP (ref 0–0)
PLATELET # BLD AUTO: 192 K/UL — SIGNIFICANT CHANGE UP (ref 150–400)
POTASSIUM SERPL-MCNC: 4.3 MMOL/L — SIGNIFICANT CHANGE UP (ref 3.5–5.3)
POTASSIUM SERPL-SCNC: 4.3 MMOL/L — SIGNIFICANT CHANGE UP (ref 3.5–5.3)
RBC # BLD: 2.76 M/UL — LOW (ref 4.2–5.8)
RBC # FLD: 14.5 % — SIGNIFICANT CHANGE UP (ref 10.3–14.5)
SODIUM SERPL-SCNC: 133 MMOL/L — LOW (ref 135–145)
WBC # BLD: 8.53 K/UL — SIGNIFICANT CHANGE UP (ref 3.8–10.5)
WBC # FLD AUTO: 8.53 K/UL — SIGNIFICANT CHANGE UP (ref 3.8–10.5)

## 2023-04-22 PROCEDURE — 80048 BASIC METABOLIC PNL TOTAL CA: CPT

## 2023-04-22 PROCEDURE — 84100 ASSAY OF PHOSPHORUS: CPT

## 2023-04-22 PROCEDURE — C1889: CPT

## 2023-04-22 PROCEDURE — 86900 BLOOD TYPING SEROLOGIC ABO: CPT

## 2023-04-22 PROCEDURE — 85027 COMPLETE CBC AUTOMATED: CPT

## 2023-04-22 PROCEDURE — C1782: CPT

## 2023-04-22 PROCEDURE — 82962 GLUCOSE BLOOD TEST: CPT

## 2023-04-22 PROCEDURE — 86850 RBC ANTIBODY SCREEN: CPT

## 2023-04-22 PROCEDURE — 87086 URINE CULTURE/COLONY COUNT: CPT

## 2023-04-22 PROCEDURE — 86901 BLOOD TYPING SEROLOGIC RH(D): CPT

## 2023-04-22 PROCEDURE — 83735 ASSAY OF MAGNESIUM: CPT

## 2023-04-22 PROCEDURE — 88305 TISSUE EXAM BY PATHOLOGIST: CPT

## 2023-04-22 PROCEDURE — 36415 COLL VENOUS BLD VENIPUNCTURE: CPT

## 2023-04-22 RX ORDER — SENNA PLUS 8.6 MG/1
2 TABLET ORAL ONCE
Refills: 0 | Status: COMPLETED | OUTPATIENT
Start: 2023-04-22 | End: 2023-04-22

## 2023-04-22 RX ADMIN — APIXABAN 2.5 MILLIGRAM(S): 2.5 TABLET, FILM COATED ORAL at 08:36

## 2023-04-22 RX ADMIN — Medication 1 TABLET(S): at 06:31

## 2023-04-22 RX ADMIN — SENNA PLUS 2 TABLET(S): 8.6 TABLET ORAL at 10:25

## 2023-04-22 RX ADMIN — Medication 100 MICROGRAM(S): at 06:32

## 2023-04-22 RX ADMIN — PANTOPRAZOLE SODIUM 40 MILLIGRAM(S): 20 TABLET, DELAYED RELEASE ORAL at 06:32

## 2023-04-22 NOTE — DISCHARGE NOTE NURSING/CASE MANAGEMENT/SOCIAL WORK - PATIENT PORTAL LINK FT
You can access the FollowMyHealth Patient Portal offered by [facility] by registering at the following website: [email or web address]. By joining [facility]’s FollowMyHealth portal, you will also be able to view your health information using other applications (apps) compatible with our system.

## 2023-04-22 NOTE — PROGRESS NOTE ADULT - SUBJECTIVE AND OBJECTIVE BOX
POST OP NOTE:    Patient states has no pain at this time,  denies any nausea or vomiting, denies fever or chills, denies SOB or CP.       04-20-23 @ 07:01  -  04-20-23 @ 15:16  --------------------------------------------------------  IN: 6150 mL / OUT: 6100 mL / NET: 50 mL      T(C): 36.3 (04-20-23 @ 14:58), Max: 36.4 (04-20-23 @ 14:00)  HR: 53 (04-20-23 @ 14:58) (48 - 69)  BP: 161/75 (04-20-23 @ 14:58) (113/57 - 161/75)  RR: 18 (04-20-23 @ 14:58) (13 - 25)  SpO2: 98% (04-20-23 @ 14:58) (98% - 100%)    GEN: NAD  ABD: Soft, ND, NT  : joyner clear on cbi        A/P 73 yo m with bph s/p enucleation  1)advance diet  2) cont IVF until tolerating PO  3)cont cbi  4)nutrion to see for g tube feeds  5) gen surg to see for g tube exchange
 AM Note    No acute events overnight.      Vital Signs Last 24 Hrs  T(C): 36.9 (04-21-23 @ 05:10), Max: 36.9 (04-20-23 @ 20:30)  T(F): 98.4 (04-21-23 @ 05:10), Max: 98.5 (04-20-23 @ 20:30)  HR: 61 (04-21-23 @ 05:10) (48 - 74)  BP: 121/68 (04-21-23 @ 05:10) (101/43 - 164/87)  BP(mean): 86 (04-21-23 @ 05:10) (71 - 101)  RR: 17 (04-21-23 @ 05:10) (13 - 25)  SpO2: 95% (04-21-23 @ 05:10) (94% - 100%)                 I&O's Summary    20 Apr 2023 07:01  -  21 Apr 2023 05:31  --------------------------------------------------------  IN: 80133 mL / OUT: 83648 mL / NET: 4865 mL          PHYSICAL EXAM:    GEN: NAD  ABD: Soft, ND, NT  : anya clear on cbi
BEATA MCGEE  [id number]  04-22-23 @ 06:33      UROLOGY SERVICE: DAILY PROGRESS NOTE    Chief admitting complaint: BPH    No acute events overnight.  No N/V/D/F.  Pain moderately controlled.  Improving voiding overnight.  Tolerating tube feeds.      LABS:   21 Apr 2023 07:25    137    |  105    |  19     ----------------------------<  177    4.3     |  21     |  0.72     Ca    8.4        21 Apr 2023 07:25  Phos  3.2       21 Apr 2023 07:25  Mg     2.2       21 Apr 2023 07:25                            10.4   6.69  )-----------( 223      ( 21 Apr 2023 07:25 )             31.4       I/O:  I&O's Summary    20 Apr 2023 07:01  -  21 Apr 2023 07:00  --------------------------------------------------------  IN: 98419 mL / OUT: 22074 mL / NET: 4660 mL    21 Apr 2023 07:01  -  22 Apr 2023 06:33  --------------------------------------------------------  IN: 1480 mL / OUT: 875 mL / NET: 605 mL        VITALS:  Vital Signs Last 24 Hrs  T(C): 36.6 (04-22-23 @ 05:20), Max: 37 (04-21-23 @ 09:20)  T(F): 97.9 (04-22-23 @ 05:20), Max: 98.6 (04-21-23 @ 09:20)  HR: 60 (04-22-23 @ 05:20) (60 - 72)  BP: 114/61 (04-22-23 @ 05:20) (102/58 - 114/66)  BP(mean): 76 (04-21-23 @ 20:30) (76 - 76)  RR: 17 (04-22-23 @ 05:20) (17 - 18)  SpO2: 95% (04-22-23 @ 05:20) (95% - 98%)      PHYSICAL EXAM:    GEN: NAD, AAOx3  ABD: soft, NT/ND, no guarding or rigidity. Feeding tube in place.  :  voiding  EXT: b/l LE with SCDS on  SKIN: No rashes, lesions, ulcerations

## 2023-04-22 NOTE — PROGRESS NOTE ADULT - ASSESSMENT
A/P 73 yo m with bph s/p prostate enucleation 4/20.  Abarca removed 4/21, improved voiding overnight.  Most recently voided 450 at 6am, PVR 89.

## 2023-04-22 NOTE — DISCHARGE NOTE NURSING/CASE MANAGEMENT/SOCIAL WORK - NSDCPEFALRISK_GEN_ALL_CORE
For information on Fall & Injury Prevention, visit: [email or web address] OR  [email or web address] OR  https://www.cdc.gov/steadi/patient.html

## 2023-04-22 NOTE — DISCHARGE NOTE NURSING/CASE MANAGEMENT/SOCIAL WORK - NSDCVIVACCINE_GEN_ALL_CORE_FT
Hib (PRP-OMP); 24-May-2017 18:23; Rita Kiser (RN); [employer] [id number]; IntraMuscular; Deltoid Right.; 0.5 milliLiter(s); VIS (VIS Published: 24-May-2017, VIS Presented: 24-May-2017);   meningococcal MCV4P; 26-May-2017 13:17; Juany Arriaga); [employer]; [id number]; IntraMuscular; Deltoid Left.; 0.5 milliLiter(s); VIS (VIS Published: 26-May-2017, VIS Presented: 26-May-2017);   Pneumococcal conjugate PCV 13; 24-May-2017 18:19; Rita Kiser); [facility]; [id number]; IntraMuscular; Deltoid Left.; 0.5 milliLiter(s); VIS (VIS Published: 27-Feb-2013, VIS Presented: 24-May-2017);

## 2023-04-25 ENCOUNTER — APPOINTMENT (OUTPATIENT)
Dept: SURGICAL ONCOLOGY | Facility: CLINIC | Age: 73
End: 2023-04-25
Payer: MEDICARE

## 2023-04-25 VITALS
HEIGHT: 70.5 IN | WEIGHT: 140 LBS | DIASTOLIC BLOOD PRESSURE: 83 MMHG | SYSTOLIC BLOOD PRESSURE: 132 MMHG | BODY MASS INDEX: 19.82 KG/M2 | TEMPERATURE: 97.7 F | OXYGEN SATURATION: 99 % | HEART RATE: 75 BPM | RESPIRATION RATE: 17 BRPM

## 2023-04-25 DIAGNOSIS — R13.10 DYSPHAGIA, UNSPECIFIED: ICD-10-CM

## 2023-04-25 DIAGNOSIS — Z85.028 PERSONAL HISTORY OF OTHER MALIGNANT NEOPLASM OF STOMACH: ICD-10-CM

## 2023-04-25 DIAGNOSIS — R33.8 OTHER RETENTION OF URINE: ICD-10-CM

## 2023-04-25 DIAGNOSIS — K21.00 GASTRO-ESOPHAGEAL REFLUX DISEASE WITH ESOPHAGITIS, WITHOUT BLEEDING: ICD-10-CM

## 2023-04-25 DIAGNOSIS — Z79.4 LONG TERM (CURRENT) USE OF INSULIN: ICD-10-CM

## 2023-04-25 DIAGNOSIS — K65.1 PERITONEAL ABSCESS: ICD-10-CM

## 2023-04-25 DIAGNOSIS — Z85.79 PERSONAL HISTORY OF OTHER MALIGNANT NEOPLASMS OF LYMPHOID, HEMATOPOIETIC AND RELATED TISSUES: ICD-10-CM

## 2023-04-25 DIAGNOSIS — Z79.01 LONG TERM (CURRENT) USE OF ANTICOAGULANTS: ICD-10-CM

## 2023-04-25 DIAGNOSIS — E03.9 HYPOTHYROIDISM, UNSPECIFIED: ICD-10-CM

## 2023-04-25 DIAGNOSIS — Z79.899 OTHER LONG TERM (CURRENT) DRUG THERAPY: ICD-10-CM

## 2023-04-25 DIAGNOSIS — E11.65 TYPE 2 DIABETES MELLITUS WITH HYPERGLYCEMIA: ICD-10-CM

## 2023-04-25 DIAGNOSIS — I10 ESSENTIAL (PRIMARY) HYPERTENSION: ICD-10-CM

## 2023-04-25 DIAGNOSIS — N17.9 ACUTE KIDNEY FAILURE, UNSPECIFIED: ICD-10-CM

## 2023-04-25 DIAGNOSIS — Z92.21 PERSONAL HISTORY OF ANTINEOPLASTIC CHEMOTHERAPY: ICD-10-CM

## 2023-04-25 DIAGNOSIS — I72.3 ANEURYSM OF ILIAC ARTERY: ICD-10-CM

## 2023-04-25 DIAGNOSIS — N40.1 BENIGN PROSTATIC HYPERPLASIA WITH LOWER URINARY TRACT SYMPTOMS: ICD-10-CM

## 2023-04-25 DIAGNOSIS — I44.7 LEFT BUNDLE-BRANCH BLOCK, UNSPECIFIED: ICD-10-CM

## 2023-04-25 DIAGNOSIS — Z90.3 ACQUIRED ABSENCE OF STOMACH [PART OF]: ICD-10-CM

## 2023-04-25 DIAGNOSIS — Z86.718 PERSONAL HISTORY OF OTHER VENOUS THROMBOSIS AND EMBOLISM: ICD-10-CM

## 2023-04-25 DIAGNOSIS — Z90.411 ACQUIRED PARTIAL ABSENCE OF PANCREAS: ICD-10-CM

## 2023-04-25 DIAGNOSIS — N32.0 BLADDER-NECK OBSTRUCTION: ICD-10-CM

## 2023-04-25 DIAGNOSIS — D68.59 OTHER PRIMARY THROMBOPHILIA: ICD-10-CM

## 2023-04-25 DIAGNOSIS — Z90.81 ACQUIRED ABSENCE OF SPLEEN: ICD-10-CM

## 2023-04-25 DIAGNOSIS — N13.30 UNSPECIFIED HYDRONEPHROSIS: ICD-10-CM

## 2023-04-25 DIAGNOSIS — Z93.4 OTHER ARTIFICIAL OPENINGS OF GASTROINTESTINAL TRACT STATUS: ICD-10-CM

## 2023-04-25 DIAGNOSIS — D50.9 IRON DEFICIENCY ANEMIA, UNSPECIFIED: ICD-10-CM

## 2023-04-25 DIAGNOSIS — A41.9 SEPSIS, UNSPECIFIED ORGANISM: ICD-10-CM

## 2023-04-25 DIAGNOSIS — Z86.010 PERSONAL HISTORY OF COLONIC POLYPS: ICD-10-CM

## 2023-04-25 DIAGNOSIS — R65.10 SYSTEMIC INFLAMMATORY RESPONSE SYNDROME (SIRS) OF NON-INFECTIOUS ORIGIN WITHOUT ACUTE ORGAN DYSFUNCTION: ICD-10-CM

## 2023-04-25 DIAGNOSIS — Z79.890 HORMONE REPLACEMENT THERAPY: ICD-10-CM

## 2023-04-25 DIAGNOSIS — K63.2 FISTULA OF INTESTINE: ICD-10-CM

## 2023-04-25 DIAGNOSIS — N13.4 HYDROURETER: ICD-10-CM

## 2023-04-25 DIAGNOSIS — E43 UNSPECIFIED SEVERE PROTEIN-CALORIE MALNUTRITION: ICD-10-CM

## 2023-04-25 DIAGNOSIS — I71.43 INFRARENAL ABDOMINAL AORTIC ANEURYSM, WITHOUT RUPTURE: ICD-10-CM

## 2023-04-25 PROCEDURE — 99213 OFFICE O/P EST LOW 20 MIN: CPT

## 2023-04-27 DIAGNOSIS — R33.9 RETENTION OF URINE, UNSPECIFIED: ICD-10-CM

## 2023-04-27 DIAGNOSIS — Z85.028 PERSONAL HISTORY OF OTHER MALIGNANT NEOPLASM OF STOMACH: ICD-10-CM

## 2023-04-27 DIAGNOSIS — Z92.21 PERSONAL HISTORY OF ANTINEOPLASTIC CHEMOTHERAPY: ICD-10-CM

## 2023-04-27 DIAGNOSIS — Z79.01 LONG TERM (CURRENT) USE OF ANTICOAGULANTS: ICD-10-CM

## 2023-04-27 DIAGNOSIS — N40.1 BENIGN PROSTATIC HYPERPLASIA WITH LOWER URINARY TRACT SYMPTOMS: ICD-10-CM

## 2023-04-27 DIAGNOSIS — E43 UNSPECIFIED SEVERE PROTEIN-CALORIE MALNUTRITION: ICD-10-CM

## 2023-04-27 DIAGNOSIS — Z93.1 GASTROSTOMY STATUS: ICD-10-CM

## 2023-04-30 NOTE — PHYSICAL EXAM
[FreeTextEntry1] : Abdomen: soft, nontender/nondistended.  Small opening in superior aspect of incision with small purulent drainage.  Jejunostomy tube in place.

## 2023-04-30 NOTE — ASSESSMENT
[FreeTextEntry1] : IR jtube change.\par Continue with local wound care.\par Other option is surgical exploration with washout and abdominal wall reconstruction which is high risk procedure.\par Diet as tolerated.

## 2023-05-02 LAB — SURGICAL PATHOLOGY STUDY: SIGNIFICANT CHANGE UP

## 2023-05-03 ENCOUNTER — RESULT REVIEW (OUTPATIENT)
Age: 73
End: 2023-05-03

## 2023-05-03 ENCOUNTER — OUTPATIENT (OUTPATIENT)
Dept: OUTPATIENT SERVICES | Facility: HOSPITAL | Age: 73
LOS: 1 days | End: 2023-05-03
Payer: MEDICARE

## 2023-05-03 VITALS
SYSTOLIC BLOOD PRESSURE: 150 MMHG | RESPIRATION RATE: 16 BRPM | HEART RATE: 64 BPM | DIASTOLIC BLOOD PRESSURE: 82 MMHG | OXYGEN SATURATION: 98 % | TEMPERATURE: 99 F

## 2023-05-03 VITALS
OXYGEN SATURATION: 98 % | DIASTOLIC BLOOD PRESSURE: 80 MMHG | TEMPERATURE: 99 F | RESPIRATION RATE: 16 BRPM | HEART RATE: 71 BPM | SYSTOLIC BLOOD PRESSURE: 150 MMHG

## 2023-05-03 DIAGNOSIS — Z87.19 PERSONAL HISTORY OF OTHER DISEASES OF THE DIGESTIVE SYSTEM: Chronic | ICD-10-CM

## 2023-05-03 DIAGNOSIS — Z98.890 OTHER SPECIFIED POSTPROCEDURAL STATES: Chronic | ICD-10-CM

## 2023-05-03 DIAGNOSIS — Z93.1 GASTROSTOMY STATUS: Chronic | ICD-10-CM

## 2023-05-03 DIAGNOSIS — Z95.828 PRESENCE OF OTHER VASCULAR IMPLANTS AND GRAFTS: Chronic | ICD-10-CM

## 2023-05-03 DIAGNOSIS — Z90.3 ACQUIRED ABSENCE OF STOMACH [PART OF]: Chronic | ICD-10-CM

## 2023-05-03 DIAGNOSIS — C16.9 MALIGNANT NEOPLASM OF STOMACH, UNSPECIFIED: ICD-10-CM

## 2023-05-03 DIAGNOSIS — Z98.49 CATARACT EXTRACTION STATUS, UNSPECIFIED EYE: Chronic | ICD-10-CM

## 2023-05-03 DIAGNOSIS — Z90.411 ACQUIRED PARTIAL ABSENCE OF PANCREAS: Chronic | ICD-10-CM

## 2023-05-03 PROCEDURE — 49451 REPLACE DUOD/JEJ TUBE PERC: CPT

## 2023-05-05 ENCOUNTER — APPOINTMENT (OUTPATIENT)
Dept: UROLOGY | Facility: CLINIC | Age: 73
End: 2023-05-05
Payer: MEDICARE

## 2023-05-05 VITALS
SYSTOLIC BLOOD PRESSURE: 145 MMHG | HEART RATE: 60 BPM | HEIGHT: 70 IN | BODY MASS INDEX: 20.19 KG/M2 | OXYGEN SATURATION: 98 % | WEIGHT: 141 LBS | TEMPERATURE: 98 F | DIASTOLIC BLOOD PRESSURE: 71 MMHG

## 2023-05-05 DIAGNOSIS — R33.9 RETENTION OF URINE, UNSPECIFIED: ICD-10-CM

## 2023-05-05 LAB
BILIRUB UR QL STRIP: NORMAL
CLARITY UR: CLEAR
COLLECTION METHOD: NORMAL
GLUCOSE UR-MCNC: NORMAL
HCG UR QL: 1 EU/DL
HGB UR QL STRIP.AUTO: NORMAL
KETONES UR-MCNC: NORMAL
LEUKOCYTE ESTERASE UR QL STRIP: NORMAL
NITRITE UR QL STRIP: NORMAL
PH UR STRIP: 7
PROT UR STRIP-MCNC: 100
SP GR UR STRIP: 1.02

## 2023-05-05 PROCEDURE — 99024 POSTOP FOLLOW-UP VISIT: CPT

## 2023-05-05 PROCEDURE — 51798 US URINE CAPACITY MEASURE: CPT

## 2023-05-05 NOTE — ASSESSMENT
[FreeTextEntry1] : 72 yr old male with PMH gastric cancer s/p total gastrectomy with esophagojejunostomy and chemo/radiation, T2DM, portal vein thrombosis on eliquis, presents with BPH (70 cc gland) with urinary retention and catheter is in place. S/P HoLEP 4/20/23. Passed void trial in hospital POD 1. Pathology with 75 grams benign prostate tissue. Doing well with strong stream, complete emptying, no leakage and no hematuria.\par  - F/U in 3 months for UA, IPSS, PVR, PSA\par

## 2023-05-05 NOTE — HISTORY OF PRESENT ILLNESS
[FreeTextEntry1] : 4/13/23: 72 yr old male with PMH gastric cancer s/p total gastrectomy with esophagojejunostomy and chemo/radiation, T2DM, portal vein thrombosis on eliquis, presents with BPH with urinary retention and catheter is in place. \par \par He underwent an endoscopy on 3/23/23 for a GI stent removal. After the procedure, he had difficulty urinating for two days. He presented to ED after he had not urinated and was found to be in urinary retention with bilateral hydronephrosis and LARRY. He also had fever at that time, so there was concern for UTI. A catheter was placed and he was started on antibiotics. His creatinine improved with catheterization. He failed several attempted void trials in the hospital and was eventually discharged with joyner catheter. \par \par He was found to have an enlarging AAA s/p EVAR and right hypogastric embolization at [facility] during last admission. He takes eliquis 2.5 mg BID for history of portal vein thrombosis. \par \par In the hospital, he received medical clearance and vascular clearance. He will need anticoagulation plan for surgery.\par \par 4/20/23: S/P HoLEP, procedure uncomplicated. Passed void trial POD 1, discharged pOD 2.\par \par Pathology with 75 grams benign prostate tissue\par \par 5/5/23: Doing well. Voiding with strong stream, complete emptying, no leakage, no hematuria. Overall, very happy.\par \par IPSS not completed\par  cc (did not void in office, no urge) Keystone Flap Text: The defect edges were debeveled with a #15 scalpel blade.  Given the location of the defect, shape of the defect a keystone flap was deemed most appropriate.  Using a sterile surgical marker, an appropriate keystone flap was drawn incorporating the defect, outlining the appropriate donor tissue and placing the expected incisions within the relaxed skin tension lines where possible. The area thus outlined was incised deep to adipose tissue with a #15 scalpel blade.  The skin margins were undermined to an appropriate distance in all directions around the primary defect and laterally outward around the flap utilizing iris scissors.

## 2023-05-05 NOTE — PHYSICAL EXAM
[General Appearance - Well Developed] : well developed [General Appearance - Well Nourished] : well nourished [Normal Appearance] : normal appearance [Well Groomed] : well groomed [General Appearance - In No Acute Distress] : no acute distress [] : no rash [Edema] : no peripheral edema [Normal Station and Gait] : the gait and station were normal for the patient's age [No Focal Deficits] : no focal deficits

## 2023-05-08 ENCOUNTER — NON-APPOINTMENT (OUTPATIENT)
Age: 73
End: 2023-05-08

## 2023-05-10 ENCOUNTER — APPOINTMENT (OUTPATIENT)
Dept: ENDOCRINOLOGY | Facility: CLINIC | Age: 73
End: 2023-05-10
Payer: MEDICARE

## 2023-05-10 ENCOUNTER — APPOINTMENT (OUTPATIENT)
Dept: UROLOGY | Facility: CLINIC | Age: 73
End: 2023-05-10

## 2023-05-10 VITALS
BODY MASS INDEX: 20.19 KG/M2 | SYSTOLIC BLOOD PRESSURE: 138 MMHG | HEIGHT: 70 IN | DIASTOLIC BLOOD PRESSURE: 82 MMHG | WEIGHT: 141 LBS | OXYGEN SATURATION: 96 % | HEART RATE: 69 BPM | TEMPERATURE: 98.1 F

## 2023-05-10 DIAGNOSIS — Z46.59 ENCOUNTER FOR FITTING AND ADJUSTMENT OF OTHER GASTROINTESTINAL APPLIANCE AND DEVICE: ICD-10-CM

## 2023-05-10 DIAGNOSIS — C16.9 MALIGNANT NEOPLASM OF STOMACH, UNSPECIFIED: ICD-10-CM

## 2023-05-10 PROCEDURE — 99214 OFFICE O/P EST MOD 30 MIN: CPT

## 2023-05-15 ENCOUNTER — NON-APPOINTMENT (OUTPATIENT)
Age: 73
End: 2023-05-15

## 2023-05-31 NOTE — ASU PATIENT PROFILE, ADULT - DATE OF FIRST COVID-19 BOOSTER
Anesthesia Evaluation     no history of anesthetic complications:  NPO Solid Status: > 8 hours  NPO Liquid Status: > 2 hours           Airway   Mallampati: II  Neck ROM: full  no difficulty expected  Dental - normal exam     Pulmonary - normal exam   (+) asthma,  (-) COPD, sleep apneaSmoker: passive smoke exposure.    PE comment: nonlabored  Cardiovascular - negative cardio ROS and normal exam  Exercise tolerance: good (4-7 METS)    Rhythm: regular  Rate: normal    (-) hypertension, valvular problems/murmurs, past MI, CAD, dysrhythmias, angina      Neuro/Psych- negative ROS  (-) seizures, TIA, CVA  GI/Hepatic/Renal/Endo - negative ROS   (-) GERD, liver disease, no renal disease, diabetes, no thyroid disorder    Musculoskeletal     (+) arthralgias, back pain,   Abdominal    Substance History      OB/GYN          Other   arthritis,    history of cancer (skin cancer)                    Anesthesia Plan    ASA 2     general     intravenous induction     Anesthetic plan, risks, benefits, and alternatives have been provided, discussed and informed consent has been obtained with: patient.        CODE STATUS:        30-Jan-2022

## 2023-06-12 ENCOUNTER — NON-APPOINTMENT (OUTPATIENT)
Age: 73
End: 2023-06-12

## 2023-06-14 ENCOUNTER — APPOINTMENT (OUTPATIENT)
Dept: GASTROENTEROLOGY | Facility: HOSPITAL | Age: 73
End: 2023-06-14

## 2023-06-14 ENCOUNTER — OUTPATIENT (OUTPATIENT)
Dept: OUTPATIENT SERVICES | Facility: HOSPITAL | Age: 73
LOS: 1 days | Discharge: HOME | End: 2023-06-14
Payer: MEDICARE

## 2023-06-14 VITALS
WEIGHT: 154.1 LBS | SYSTOLIC BLOOD PRESSURE: 157 MMHG | DIASTOLIC BLOOD PRESSURE: 68 MMHG | OXYGEN SATURATION: 100 % | RESPIRATION RATE: 16 BRPM

## 2023-06-14 DIAGNOSIS — Z87.19 PERSONAL HISTORY OF OTHER DISEASES OF THE DIGESTIVE SYSTEM: ICD-10-CM

## 2023-06-14 DIAGNOSIS — E03.9 HYPOTHYROIDISM, UNSPECIFIED: ICD-10-CM

## 2023-06-14 DIAGNOSIS — I10 ESSENTIAL (PRIMARY) HYPERTENSION: ICD-10-CM

## 2023-06-14 DIAGNOSIS — Z93.1 GASTROSTOMY STATUS: Chronic | ICD-10-CM

## 2023-06-14 DIAGNOSIS — I81 PORTAL VEIN THROMBOSIS: ICD-10-CM

## 2023-06-14 DIAGNOSIS — E78.5 HYPERLIPIDEMIA, UNSPECIFIED: ICD-10-CM

## 2023-06-14 DIAGNOSIS — Z98.890 OTHER SPECIFIED POSTPROCEDURAL STATES: Chronic | ICD-10-CM

## 2023-06-14 DIAGNOSIS — Z29.9 ENCOUNTER FOR PROPHYLACTIC MEASURES, UNSPECIFIED: ICD-10-CM

## 2023-06-14 DIAGNOSIS — C16.9 MALIGNANT NEOPLASM OF STOMACH, UNSPECIFIED: ICD-10-CM

## 2023-06-14 DIAGNOSIS — Z90.411 ACQUIRED PARTIAL ABSENCE OF PANCREAS: Chronic | ICD-10-CM

## 2023-06-14 DIAGNOSIS — Z95.828 PRESENCE OF OTHER VASCULAR IMPLANTS AND GRAFTS: Chronic | ICD-10-CM

## 2023-06-14 DIAGNOSIS — E11.9 TYPE 2 DIABETES MELLITUS WITHOUT COMPLICATIONS: ICD-10-CM

## 2023-06-14 DIAGNOSIS — Z98.49 CATARACT EXTRACTION STATUS, UNSPECIFIED EYE: Chronic | ICD-10-CM

## 2023-06-14 DIAGNOSIS — Z87.19 PERSONAL HISTORY OF OTHER DISEASES OF THE DIGESTIVE SYSTEM: Chronic | ICD-10-CM

## 2023-06-14 DIAGNOSIS — D50.9 IRON DEFICIENCY ANEMIA, UNSPECIFIED: ICD-10-CM

## 2023-06-14 DIAGNOSIS — K20.80 OTHER ESOPHAGITIS WITHOUT BLEEDING: ICD-10-CM

## 2023-06-14 DIAGNOSIS — Z87.438 PERSONAL HISTORY OF OTHER DISEASES OF MALE GENITAL ORGANS: ICD-10-CM

## 2023-06-14 DIAGNOSIS — R13.10 DYSPHAGIA, UNSPECIFIED: ICD-10-CM

## 2023-06-14 DIAGNOSIS — Z90.3 ACQUIRED ABSENCE OF STOMACH [PART OF]: Chronic | ICD-10-CM

## 2023-06-14 LAB
ALBUMIN SERPL ELPH-MCNC: 3.7 G/DL — SIGNIFICANT CHANGE UP (ref 3.3–5)
ALP SERPL-CCNC: 159 U/L — HIGH (ref 40–120)
ALT FLD-CCNC: 26 U/L — SIGNIFICANT CHANGE UP (ref 10–45)
ANION GAP SERPL CALC-SCNC: 10 MMOL/L — SIGNIFICANT CHANGE UP (ref 5–17)
AST SERPL-CCNC: 32 U/L — SIGNIFICANT CHANGE UP (ref 10–40)
BASOPHILS # BLD AUTO: 0.1 K/UL — SIGNIFICANT CHANGE UP (ref 0–0.2)
BASOPHILS NFR BLD AUTO: 1.6 % — SIGNIFICANT CHANGE UP (ref 0–2)
BILIRUB SERPL-MCNC: 0.8 MG/DL — SIGNIFICANT CHANGE UP (ref 0.2–1.2)
BLD GP AB SCN SERPL QL: NEGATIVE — SIGNIFICANT CHANGE UP
BUN SERPL-MCNC: 14 MG/DL — SIGNIFICANT CHANGE UP (ref 7–23)
CALCIUM SERPL-MCNC: 9.5 MG/DL — SIGNIFICANT CHANGE UP (ref 8.4–10.5)
CHLORIDE SERPL-SCNC: 104 MMOL/L — SIGNIFICANT CHANGE UP (ref 96–108)
CO2 SERPL-SCNC: 27 MMOL/L — SIGNIFICANT CHANGE UP (ref 22–31)
CREAT SERPL-MCNC: 0.66 MG/DL — SIGNIFICANT CHANGE UP (ref 0.5–1.3)
EGFR: 99 ML/MIN/1.73M2 — SIGNIFICANT CHANGE UP
EOSINOPHIL # BLD AUTO: 0.36 K/UL — SIGNIFICANT CHANGE UP (ref 0–0.5)
EOSINOPHIL NFR BLD AUTO: 5.8 % — SIGNIFICANT CHANGE UP (ref 0–6)
GLUCOSE BLDC GLUCOMTR-MCNC: 114 MG/DL — HIGH (ref 70–99)
GLUCOSE BLDC GLUCOMTR-MCNC: 169 MG/DL — HIGH (ref 70–99)
GLUCOSE BLDC GLUCOMTR-MCNC: 80 MG/DL — SIGNIFICANT CHANGE UP (ref 70–99)
GLUCOSE BLDC GLUCOMTR-MCNC: 99 MG/DL — SIGNIFICANT CHANGE UP (ref 70–99)
GLUCOSE SERPL-MCNC: 125 MG/DL — HIGH (ref 70–99)
HCT VFR BLD CALC: 42.4 % — SIGNIFICANT CHANGE UP (ref 39–50)
HGB BLD-MCNC: 13.5 G/DL — SIGNIFICANT CHANGE UP (ref 13–17)
IMM GRANULOCYTES NFR BLD AUTO: 0.3 % — SIGNIFICANT CHANGE UP (ref 0–0.9)
LYMPHOCYTES # BLD AUTO: 1.13 K/UL — SIGNIFICANT CHANGE UP (ref 1–3.3)
LYMPHOCYTES # BLD AUTO: 18.3 % — SIGNIFICANT CHANGE UP (ref 13–44)
MAGNESIUM SERPL-MCNC: 2.2 MG/DL — SIGNIFICANT CHANGE UP (ref 1.6–2.6)
MCHC RBC-ENTMCNC: 31.8 GM/DL — LOW (ref 32–36)
MCHC RBC-ENTMCNC: 31.8 PG — SIGNIFICANT CHANGE UP (ref 27–34)
MCV RBC AUTO: 99.8 FL — SIGNIFICANT CHANGE UP (ref 80–100)
MONOCYTES # BLD AUTO: 0.7 K/UL — SIGNIFICANT CHANGE UP (ref 0–0.9)
MONOCYTES NFR BLD AUTO: 11.3 % — SIGNIFICANT CHANGE UP (ref 2–14)
NEUTROPHILS # BLD AUTO: 3.88 K/UL — SIGNIFICANT CHANGE UP (ref 1.8–7.4)
NEUTROPHILS NFR BLD AUTO: 62.7 % — SIGNIFICANT CHANGE UP (ref 43–77)
NRBC # BLD: 0 /100 WBCS — SIGNIFICANT CHANGE UP (ref 0–0)
PHOSPHATE SERPL-MCNC: 3 MG/DL — SIGNIFICANT CHANGE UP (ref 2.5–4.5)
PLATELET # BLD AUTO: 200 K/UL — SIGNIFICANT CHANGE UP (ref 150–400)
POTASSIUM SERPL-MCNC: 3.9 MMOL/L — SIGNIFICANT CHANGE UP (ref 3.5–5.3)
POTASSIUM SERPL-SCNC: 3.9 MMOL/L — SIGNIFICANT CHANGE UP (ref 3.5–5.3)
PROT SERPL-MCNC: 8.2 G/DL — SIGNIFICANT CHANGE UP (ref 6–8.3)
RBC # BLD: 4.25 M/UL — SIGNIFICANT CHANGE UP (ref 4.2–5.8)
RBC # FLD: 15.6 % — HIGH (ref 10.3–14.5)
RH IG SCN BLD-IMP: NEGATIVE — SIGNIFICANT CHANGE UP
SODIUM SERPL-SCNC: 141 MMOL/L — SIGNIFICANT CHANGE UP (ref 135–145)
WBC # BLD: 6.19 K/UL — SIGNIFICANT CHANGE UP (ref 3.8–10.5)
WBC # FLD AUTO: 6.19 K/UL — SIGNIFICANT CHANGE UP (ref 3.8–10.5)

## 2023-06-14 PROCEDURE — 99222 1ST HOSP IP/OBS MODERATE 55: CPT | Mod: 25

## 2023-06-14 PROCEDURE — 99223 1ST HOSP IP/OBS HIGH 75: CPT | Mod: GC

## 2023-06-14 PROCEDURE — 44360 SMALL BOWEL ENDOSCOPY: CPT

## 2023-06-14 RX ORDER — ACETAMINOPHEN 500 MG
1000 TABLET ORAL ONCE
Refills: 0 | Status: DISCONTINUED | OUTPATIENT
Start: 2023-06-14 | End: 2023-06-15

## 2023-06-14 RX ORDER — ZOLPIDEM TARTRATE 10 MG/1
1 TABLET ORAL
Refills: 0 | DISCHARGE

## 2023-06-14 RX ORDER — LANOLIN ALCOHOL/MO/W.PET/CERES
3 CREAM (GRAM) TOPICAL AT BEDTIME
Refills: 0 | Status: DISCONTINUED | OUTPATIENT
Start: 2023-06-14 | End: 2023-06-15

## 2023-06-14 RX ORDER — SODIUM CHLORIDE 9 MG/ML
1000 INJECTION, SOLUTION INTRAVENOUS
Refills: 0 | Status: DISCONTINUED | OUTPATIENT
Start: 2023-06-14 | End: 2023-06-15

## 2023-06-14 RX ORDER — DEXTROSE 50 % IN WATER 50 %
25 SYRINGE (ML) INTRAVENOUS ONCE
Refills: 0 | Status: DISCONTINUED | OUTPATIENT
Start: 2023-06-14 | End: 2023-06-15

## 2023-06-14 RX ORDER — LEVOTHYROXINE SODIUM 125 MCG
100 TABLET ORAL DAILY
Refills: 0 | Status: DISCONTINUED | OUTPATIENT
Start: 2023-06-14 | End: 2023-06-15

## 2023-06-14 RX ORDER — SUCRALFATE 1 G
1 TABLET ORAL
Refills: 0 | Status: DISCONTINUED | OUTPATIENT
Start: 2023-06-14 | End: 2023-06-15

## 2023-06-14 RX ORDER — INSULIN LISPRO 100/ML
VIAL (ML) SUBCUTANEOUS
Refills: 0 | Status: DISCONTINUED | OUTPATIENT
Start: 2023-06-14 | End: 2023-06-15

## 2023-06-14 RX ORDER — DEXTROSE 50 % IN WATER 50 %
12.5 SYRINGE (ML) INTRAVENOUS ONCE
Refills: 0 | Status: DISCONTINUED | OUTPATIENT
Start: 2023-06-14 | End: 2023-06-15

## 2023-06-14 RX ORDER — APIXABAN 2.5 MG/1
2.5 TABLET, FILM COATED ORAL EVERY 12 HOURS
Refills: 0 | Status: DISCONTINUED | OUTPATIENT
Start: 2023-06-14 | End: 2023-06-15

## 2023-06-14 RX ORDER — INSULIN LISPRO 100/ML
4 VIAL (ML) SUBCUTANEOUS ONCE
Refills: 0 | Status: COMPLETED | OUTPATIENT
Start: 2023-06-14 | End: 2023-06-14

## 2023-06-14 RX ORDER — GLUCAGON INJECTION, SOLUTION 0.5 MG/.1ML
1 INJECTION, SOLUTION SUBCUTANEOUS ONCE
Refills: 0 | Status: DISCONTINUED | OUTPATIENT
Start: 2023-06-14 | End: 2023-06-15

## 2023-06-14 RX ORDER — DEXTROSE 50 % IN WATER 50 %
15 SYRINGE (ML) INTRAVENOUS ONCE
Refills: 0 | Status: DISCONTINUED | OUTPATIENT
Start: 2023-06-14 | End: 2023-06-15

## 2023-06-14 RX ORDER — ZOLPIDEM TARTRATE 10 MG/1
5 TABLET ORAL ONCE
Refills: 0 | Status: DISCONTINUED | OUTPATIENT
Start: 2023-06-14 | End: 2023-06-14

## 2023-06-14 RX ADMIN — SODIUM CHLORIDE 75 MILLILITER(S): 9 INJECTION, SOLUTION INTRAVENOUS at 15:05

## 2023-06-14 RX ADMIN — ZOLPIDEM TARTRATE 5 MILLIGRAM(S): 10 TABLET ORAL at 21:46

## 2023-06-14 RX ADMIN — Medication 1 GRAM(S): at 21:47

## 2023-06-14 RX ADMIN — Medication 4 UNIT(S): at 19:29

## 2023-06-14 RX ADMIN — Medication 1 GRAM(S): at 15:05

## 2023-06-14 RX ADMIN — Medication 100 MICROGRAM(S): at 17:17

## 2023-06-14 NOTE — PATIENT PROFILE ADULT - FUNCTIONAL ASSESSMENT - BASIC MOBILITY 6.
4-calculated by average/Not able to assess (calculate score using [facility] averaging method)

## 2023-06-14 NOTE — CONSULT NOTE ADULT - ASSESSMENT
71 y/o M with a PMHx of gastric CA, DM Type 2, Hypothyroidism, HTN and anemia presenting for outpatient scope for surveillance EGD post AXIOS stent removal in March 2023.     EGD (3/23/23): Grade D esophagitis in the middle third of the esophagus and lower third of the esophagus compatible with nonspecific erosive esophagitis. (Biopsy).  EJ anastomosis with some edematous mucosa and acute angulation to the enteral limb, however patent. The blind limb with patent AXIOS stent was noted. The stent was removed using a rat tooth forceps. No new stent was placed.    EGD (6/14/23): There was evidence of severe LA grade D esophagitis to the level of the mid-esophagus. The esophageal - jejunal anastomosis appeared intact however there was significant inflammation and friability of the overlying mucosa. There was oozing with only contact. The efferent and afferent limb were evaluated. Both limbs were easily traversed with the endoscope. The blind limb was completely patent for which the endoscope was advanced into the distal small bowel without evidence of prior acute angulation or suggestion of obstruction. The decision was made not to place a stent.    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     Teri Fink DO  Gastroenterology Fellow  Pager: [phone]

## 2023-06-14 NOTE — H&P ADULT - PROBLEM SELECTOR PLAN 6
Plan:  F: none  E: replete K<4, Mg<2  N: regular  VTE Prophylaxis: Eliquis  GI: protonix  C: Full Code  D: [facility]

## 2023-06-14 NOTE — H&P ADULT - ATTENDING COMMENTS
Patient with above PMH presents for planned endoscopy, Patient evaluated post procedure reports feeling at baseline, denies chest pain, no SOB, no abdominal pain, no N/V. Reports tolerating P intake along with tube feeds, Denies urinary retention, having BM. No fevers or chills. Denies other complaints    General: AOX3. 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 Appreciate GI, started on Carafate, follow-up Esophagram  Get CBC, CHEM  Nutrition consult for tube feeds  Endocrinology consulted by GI for DM management, follow recommendation  Monitor UOP  On AC, no report of bleeding  Rest per above

## 2023-06-14 NOTE — H&P ADULT - PROBLEM SELECTOR PLAN 1
Hx of gastric cancer s/p total gastrectomy with esophagojejunostomy (EJ) with hx of dysphagia related to a functional obstruction in the setting of a dominant blind limb. s/p EGD (3/10/21) with Dr Peguero, at which time a 20 mm AXIOS stent was placed across the blind dominant limb into a distal portion of the jejunum. In March 2023, AXIOS removed in 3/2023. s/p EGD today which demonstrated severe LA grade D esophagitis; no stent placed.     Plan:   - s/p EGD (6/14/23)      - severe LA grade D esophagitis to level of mid-esophagus. The esophageal- jejunal anastomosis appeared intact however there was significant inflammation and friability of the overlying mucosa. There was oozing with only contact. The efferent and afferent limb were easily traversed with the endoscope. The blind limb was completely patent for which the endoscope was advanced into the distal small bowel without evidence of prior acute angulation or suggestion of obstruction. No stent placed  - admit to medicine to for EGD monitoring  - f/u with GI outpt Hx of gastric cancer s/p total gastrectomy with esophagojejunostomy (EJ) with hx of dysphagia related to a functional obstruction in the setting of a dominant blind limb. s/p EGD (3/10/21) with Dr Peguero, at which time a 20 mm AXIOS stent was placed across the blind dominant limb into a distal portion of the jejunum. In March 2023, AXIOS removed in 3/2023. s/p EGD today which demonstrated severe LA grade D esophagitis; no stent placed.     Plan:   - s/p EGD (6/14/23)      - severe LA grade D esophagitis to level of mid-esophagus. The esophageal- jejunal anastomosis appeared intact however there was significant inflammation and friability of the overlying mucosa. There was oozing with only contact. The efferent and afferent limb were easily traversed with the endoscope. The blind limb was completely patent for which the endoscope was advanced into the distal small bowel without evidence of prior acute angulation or suggestion of obstruction. No stent placed  - start carafate 1g QID  - obtain esophagram   - resume J-tube feeds  - GI following, appreciate recs Hx of gastric cancer s/p total gastrectomy with esophagojejunostomy (EJ) with hx of dysphagia related to a functional obstruction in the setting of a dominant blind limb. s/p EGD (3/10/21) with Dr Peguero, at which time a 20 mm AXIOS stent was placed across the blind dominant limb into a distal portion of the jejunum. In March 2023, AXIOS removed in 3/2023. s/p EGD today which demonstrated severe LA grade D esophagitis; no stent placed.     Plan:   - s/p EGD (6/14/23): severe LA grade D esophagitis to level of mid-esophagus. The esophageal- jejunal anastomosis appeared intact however there was significant inflammation and friability of the overlying mucosa. There was oozing with only contact. The efferent and afferent limb were easily traversed with the endoscope. The blind limb was completely patent for which the endoscope was advanced into the distal small bowel without evidence of prior acute angulation or suggestion of obstruction. No stent placed  - start carafate 1g QID  - obtain esophagram tomorrow (6/15) AM - NPO at [facility] today for esophagram tomorrow  - start PO diet with J-tube feeds  - GI following, appreciate recs

## 2023-06-14 NOTE — PACU DISCHARGE NOTE - NSCLINEINSERTRD_GEN_ALL_CORE
Pt asking to get postdated refill of norco since unable to pickup when due next week. Also told pt will cut down her norco dose to 6tabs/day now. Refill for 5/28/19 given for pt.  [place]
No

## 2023-06-14 NOTE — H&P ADULT - ASSESSMENT
73 y/o M with PMHx of gastric CA, portal vein thrombosis (2018; on Eliquis), AAA (s/p EVAR 3/2023), DM Type 2, Hypothyroidism, HTN, anemia, and BPH presented for outpatient scope for surveillance EGD post AXIOS stent removal in March 2023, admitted for post-EGD monitoring.

## 2023-06-14 NOTE — H&P ADULT - PROBLEM SELECTOR PLAN 5
Hx of hypothyroidism. Home med: levothyroxine 100mcg qd    Plan:   - c/w home levothyroxine 100mcg qd

## 2023-06-14 NOTE — PATIENT PROFILE ADULT - FALL HARM RISK - HARM RISK INTERVENTIONS
Assistance with ambulation/Assistance OOB with selected safe patient handling equipment/Communicate Risk of Fall with Harm to all staff/Monitor for mental status changes/Monitor gait and stability/Reinforce activity limits and safety measures with patient and family/Reorient to person, place and time as needed/Tailored Fall Risk Interventions/Use of alarms - bed, chair and/or voice tab/Visual Cue: Yellow wristband and red socks/Bed in lowest position, wheels locked, appropriate side rails in place/Call bell, personal items and telephone in reach/Instruct patient to call for assistance before getting out of bed or chair/Non-slip footwear when patient is out of bed/[place] to call system/Physically safe environment - no spills, clutter or unnecessary equipment/Purposeful Proactive Rounding/Room/bathroom lighting operational, light cord in reach

## 2023-06-14 NOTE — H&P ADULT - PROBLEM SELECTOR PLAN 3
Hx of DM.     Plan:   - Hx of DM. Home meds: insulin isophane (NPH) 21U qhs, insulin lispro 2-3U before meals (if glucose 130-140).     Plan:   - obtain A1c  - Freq FSG  - ISS for now Hx of DM. Home meds: insulin isophane (NPH) 21U qhs, insulin lispro 2-3U before meals (if glucose 130-140).     Plan:   - obtain A1c  - Freq FSG  - ISS for now  - endo following, appreciate recs

## 2023-06-14 NOTE — H&P ADULT - NSHPPHYSICALEXAM_GEN_ALL_CORE
.  VITAL SIGNS:  T(F): --  HR: --  BP: 157/68 (06-14-23 @ 07:27) (157/68 - 157/68)  BP(mean): --  RR: 16 (06-14-23 @ 07:27) (16 - 16)  SpO2: 100% (06-14-23 @ 07:27) (100% - 100%)    PHYSICAL EXAM:    Constitutional: resting comfortably in bed; NAD  HEENT: NC/AT, PERRL, EOMI, anicteric sclera, no nasal discharge; uvula midline, no oropharyngeal erythema or exudates; MMM  Neck: supple; no JVD or thyromegaly  Respiratory: unlabored breathing, CTA B/L; no W/Rhonchi/Crackles, no retractions or use of accessory muscles   Cardiac: +S1/S2; RRR; no M/R/G; No ventricular heaves, PMI non-displaced  Gastrointestinal: soft, NT/ND; no rebound or guarding; +BSx4  Genitourinary: normal external genitalia  Back: spine midline, no bony tenderness or step-offs; no CVAT B/L  Extremities: WWP, no clubbing or cyanosis; no peripheral edema  Musculoskeletal: NROM x4; no joint swelling, tenderness or erythema  Vascular: 2+ radial, DP/PT pulses B/L  Dermatologic: skin warm, dry and intact; no rashes, wounds, or scars  Lymphatic: no submandibular or cervical LAD  Neurologic: AAOx3; CNII-XII grossly intact; no focal deficits  Psychiatric: affect and characteristics of appearance, verbalizations, behaviors are appropriate, denies SI/HI/AH/VH .  VITAL SIGNS:  T(F): --  HR: --  BP: 157/68 (06-14-23 @ 07:27) (157/68 - 157/68)  BP(mean): --  RR: 16 (06-14-23 @ 07:27) (16 - 16)  SpO2: 100% (06-14-23 @ 07:27) (100% - 100%)    PHYSICAL EXAM:    Constitutional: resting comfortably in bed; NAD  HEENT: NC/AT, PERRL, EOMI, anicteric sclera, no nasal discharge; MMM  Neck: supple  Respiratory: unlabored breathing, CTA B/L; no W/Rhonchi/Crackles, no retractions or use of accessory muscles   Cardiac: +S1/S2; RRR; no M/R/G  Gastrointestinal: open wound to anterior abdomen, no surrounding erythema or ecchymosis; +GJ tube to LLQ; no rebound or guarding; +BS  Extremities: warm extremities, no clubbing or cyanosis; no peripheral edema  Vascular: 2+ radial pulses B/L  Dermatologic: skin warm, dry and intact; no rashes, wounds, or scars  Neurologic: AAOx3; CNII-XII grossly intact; no focal deficits  Psychiatric: affect and characteristics of appearance, verbalizations, behaviors are appropriate

## 2023-06-14 NOTE — H&P ADULT - PROBLEM SELECTOR PLAN 4
Hx of BPH s/p prostate enucleation in 4/2023    - f/u with urology outpt Hx of BPH s/p prostate enucleation in 4/2023, with improvement in BPH sx.     - f/u with urology outpt

## 2023-06-14 NOTE — PATIENT PROFILE ADULT - NSPROPOAURINARYCATHETER_GEN_A_NUR
Call to schedule a follow up appointment with endocrinology   Raj Cheema MD  [address] [facility], [address] [place] [address]  [phone] no

## 2023-06-14 NOTE — H&P ADULT - HISTORY OF PRESENT ILLNESS
71 y/o M with PMHx of gastric CA, portal vein thrombosis (2018; on Eliquis), AAA (s/p EVAR 3/2023), DM Type 2, Hypothyroidism, HTN, anemia, and BPH presented for outpatient scope for surveillance EGD post AXIOS stent removal in March 2023, admitted for post-EGD monitoring.       GI History:   Patient with history of gastric cancer s/p total gastrectomy with esophagojejunostomy (EJ) with history of dysphagia related to a functional obstruction in the setting of a dominant blind limb. Underwent an EGD (3/10/21) with Dr Peguero, at which time a 20 mm AXIOS stent was placed across the blind dominant limb into a distal portion of the jejunum. In March 2023, AXIOS removed (as noted below).     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 The blind limb was patent, no AXIOS stent placement was warranted.     EGD (3/23/23): Grade D esophagitis in the middle third of the esophagus and lower third of the esophagus compatible with nonspecific erosive esophagitis. (Biopsy).  EJ anastomosis with some edematous mucosa and acute angulation to the enteral limb, however patent. The blind limb with patent AXIOS stent was noted. The stent was removed using a rat tooth forceps. No new stent was placed.    EGD (6/14/23): There was evidence of severe LA grade D esophagitis to the level of the mid-esophagus. The esophageal - jejunal anastomosis appeared intact however there was significant inflammation and friability of the overlying mucosa. There was oozing with only contact. The efferent and afferent limb were evaluated. Both limbs were easily traversed with the endoscope. The blind limb was completely patent for which the endoscope was advanced into the distal small bowel without evidence of prior acute angulation or suggestion of obstruction. The decision was made not to place a stent.   73 y/o M with PMHx of gastric CA, portal vein thrombosis (2018; on Eliquis), AAA (s/p EVAR 3/2023), DM Type 2, Hypothyroidism, HTN, anemia, and BPH presented for outpatient scope for surveillance EGD post AXIOS stent removal in March 2023, admitted for post-EGD monitoring. Pt states he feels okay currently, just feels tired after his endoscopy but otherwise has no complaints. No dizziness, lightheadedness, abd pain, nausea, vomiting, chest pain, dyspnea, or pain elsewhere.       GI History:   Patient with history of gastric cancer s/p total gastrectomy with esophagojejunostomy (EJ) with history of dysphagia related to a functional obstruction in the setting of a dominant blind limb. Underwent an EGD (3/10/21) with Dr Peguero, at which time a 20 mm AXIOS stent was placed across the blind dominant limb into a distal portion of the jejunum. In March 2023, AXIOS removed (as noted below).     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 The blind limb was patent, no AXIOS stent placement was warranted.     EGD (3/23/23): Grade D esophagitis in the middle third of the esophagus and lower third of the esophagus compatible with nonspecific erosive esophagitis. (Biopsy).  EJ anastomosis with some edematous mucosa and acute angulation to the enteral limb, however patent. The blind limb with patent AXIOS stent was noted. The stent was removed using a rat tooth forceps. No new stent was placed.    EGD (6/14/23): There was evidence of severe LA grade D esophagitis to the level of the mid-esophagus. The esophageal - jejunal anastomosis appeared intact however there was significant inflammation and friability of the overlying mucosa. There was oozing with only contact. The efferent and afferent limb were evaluated. Both limbs were easily traversed with the endoscope. The blind limb was completely patent for which the endoscope was advanced into the distal small bowel without evidence of prior acute angulation or suggestion of obstruction. The decision was made not to place a stent.

## 2023-06-14 NOTE — CONSULT NOTE ADULT - SUBJECTIVE AND OBJECTIVE BOX
HPI:    73 y/o M with a PMHx of gastric CA, DM Type 2, Hypothyroidism, HTN and anemia presenting for outpatient scope    Allergies    No Known Allergies    Intolerances      MEDICATIONS:  MEDICATIONS  (STANDING):  lactated ringers. 1000 milliLiter(s) (75 mL/Hr) IV Continuous <Continuous>  sucralfate 1 Gram(s) Oral four times a day    MEDICATIONS  (PRN):  acetaminophen   IVPB .. 1000 milliGRAM(s) IV Intermittent Once PRN Moderate Pain (4 - 6)  melatonin 3 milliGRAM(s) Oral at bedtime PRN Insomnia    PAST MEDICAL & SURGICAL HISTORY:  Essential hypertension  was on losartan, now diet control      Hypothyroidism      Gastric mass  ulcerated mass in gastric cardia with small perigastric nodes on endoscopy.      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      H/O umbilical hernia repair  2018 with mesh      Port-a-cath in place  right chest wall                 not in use 5 y      History of gastric surgery  2017      Gastrostomy tube in place  open jejunostomy with J-tube November 2020      S/P cataract surgery      H/O endoscopy  & stent placement 12/2020      S/P gastrectomy  total gastrectomy 2017      History of partial pancreatectomy  distal pancreatectomy/ splenectomy esophatojejunostomy      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, gastric primary      History of dysphagia  EGD with Axios stent placment to connect the blind limb to th e efferent limb (jejunojejunostomy)        FAMILY HISTORY:  Family history of ovarian cancer (Mother)  mother    Family history of ischemic heart disease (IHD) (Father)  father      SOCIAL HISTORY:  [place]: [ ] Current, [ ] Former, [ ] Never; Pack Years:  Alcohol:  Illicit Drugs:    REVIEW OF SYSTEMS:  Constitutional: No fever, chills, weight loss, or fatigue  ENMT:  No visual changes; No difficulty hearing, tinnitus, vertigo; No sinus or throat pain  Neck: No pain or stiffness  Respiratory: No cough, wheezing, or hemoptysis; No shortness of breath  Cardiovascular: No chest pain, palpitations, dizziness, orthopnea, PND, or leg swelling  Gastrointestinal: No abdominal or epigastric pain. No  nausea, vomiting, or hematemesis. No diarrhea, constipation, or steatorrhea. No melena or hematochezia  Genitourinary: No dysuria, urinary frequency/hesitancy, or hematuria  Skin: No itching, burning, rashes or lesions   Musculoskeletal: No joint pain or swelling; No muscle, back or extremity pain    Vital Signs Last 24 Hrs  T(C): --  T(F): --  HR: --  BP: 157/68 (14 Jun 2023 07:27) (157/68 - 157/68)  BP(mean): --  RR: 16 (14 Jun 2023 07:27) (16 - 16)  SpO2: 100% (14 Jun 2023 07:27) (100% - 100%)          PHYSICAL EXAM:    General: Well developed; well nourished; in no acute distress  HEENT: MMM, conjunctiva and sclera clear  Gastrointestinal: Soft, non-tender non-distended; Normal bowel sounds; No rebound or guarding  Extremities: Normal range of motion, No clubbing, cyanosis or edema  Neurological: Alert and oriented x3  Skin: Warm and dry. No obvious rash    LABS:              RADIOLOGY & ADDITIONAL STUDIES:    HPI:    73 y/o M with a PMHx of gastric CA, DM Type 2, Hypothyroidism, HTN and anemia presenting for outpatient scope    EGD (3/23/23): Grade D esophagitis in the middle third of the esophagus and lower third of the esophagus compatible with nonspecific erosive esophagitis. (Biopsy).  EJ anastomosis with some edematous mucosa and acute angulation to the enteral limb, however patent. The blind limb with patent AXIOS stent was noted. The stent was removed using a rat tooth forceps. No new stent was placed.    EGD (6/14/23): There was evidence of severe LA grade D esophagitis to the level of the mid-esophagus. The esophageal - jejunal anastomosis appeared intact however there was significant inflammation and friability of the overlying mucosa. There was oozing with only contact. The efferent and afferent limb were evaluated. Both limbs were easily traversed with the endoscope. The blind limb was completely patent for which the endoscope was advanced into the distal small bowel without evidence of prior acute angulation or suggestion of obstruction. The decision was made not to place a stent.    Allergies    No Known Allergies    Intolerances      MEDICATIONS:  MEDICATIONS  (STANDING):  lactated ringers. 1000 milliLiter(s) (75 mL/Hr) IV Continuous <Continuous>  sucralfate 1 Gram(s) Oral four times a day    MEDICATIONS  (PRN):  acetaminophen   IVPB .. 1000 milliGRAM(s) IV Intermittent Once PRN Moderate Pain (4 - 6)  melatonin 3 milliGRAM(s) Oral at bedtime PRN Insomnia    PAST MEDICAL & SURGICAL HISTORY:  Essential hypertension  was on losartan, now diet control      Hypothyroidism      Gastric mass  ulcerated mass in gastric cardia with small perigastric nodes on endoscopy.      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      H/O umbilical hernia repair  2018 with mesh      Port-a-cath in place  right chest wall                 not in use 5 y      History of gastric surgery  2017      Gastrostomy tube in place  open jejunostomy with J-tube November 2020      S/P cataract surgery      H/O endoscopy  & stent placement 12/2020      S/P gastrectomy  total gastrectomy 2017      History of partial pancreatectomy  distal pancreatectomy/ splenectomy esophatojejunostomy      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, gastric primary      History of dysphagia  EGD with Axios stent placment to connect the blind limb to th e efferent limb (jejunojejunostomy)        FAMILY HISTORY:  Family history of ovarian cancer (Mother)  mother    Family history of ischemic heart disease (IHD) (Father)  father      SOCIAL HISTORY:  [place]: [ ] Current, [ ] Former, [ ] Never; Pack Years:  Alcohol:  Illicit Drugs:    REVIEW OF SYSTEMS:  Constitutional: No fever, chills, weight loss, or fatigue  ENMT:  No visual changes; No difficulty hearing, tinnitus, vertigo; No sinus or throat pain  Neck: No pain or stiffness  Respiratory: No cough, wheezing, or hemoptysis; No shortness of breath  Cardiovascular: No chest pain, palpitations, dizziness, orthopnea, PND, or leg swelling  Gastrointestinal: No abdominal or epigastric pain. No  nausea, vomiting, or hematemesis. No diarrhea, constipation, or steatorrhea. No melena or hematochezia  Genitourinary: No dysuria, urinary frequency/hesitancy, or hematuria  Skin: No itching, burning, rashes or lesions   Musculoskeletal: No joint pain or swelling; No muscle, back or extremity pain    Vital Signs Last 24 Hrs  T(C): --  T(F): --  HR: --  BP: 157/68 (14 Jun 2023 07:27) (157/68 - 157/68)  BP(mean): --  RR: 16 (14 Jun 2023 07:27) (16 - 16)  SpO2: 100% (14 Jun 2023 07:27) (100% - 100%)          PHYSICAL EXAM:    General: Well developed; well nourished; in no acute distress  HEENT: MMM, conjunctiva and sclera clear  Gastrointestinal: Soft, non-tender non-distended; Normal bowel sounds; No rebound or guarding  Extremities: Normal range of motion, No clubbing, cyanosis or edema  Neurological: Alert and oriented x3  Skin: Warm and dry. No obvious rash    LABS:              RADIOLOGY & ADDITIONAL STUDIES:    HPI:    71 y/o M with a PMHx of gastric CA, DM Type 2, Hypothyroidism, HTN and anemia presenting for outpatient scope for surveillance EGD post AXIOS stent removal in March 2023 (results noted below).     Patient with history of gastric cancer s/p total gastrectomy with esophagojejunostomy (EJ) with history of dysphagia related to a functional obstruction in the setting of a dominant blind limb. Underwent an EGD (3/10/21) with Dr Peguero, at which time a 20 mm AXIOS stent was placed across the blind dominant limb into a distal portion of the jejunum. In March 2023, AXIOS removed (as noted below).     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 The blind limb was patent, no AXIOS stent placement was warranted.     EGD (3/23/23): Grade D esophagitis in the middle third of the esophagus and lower third of the esophagus compatible with nonspecific erosive esophagitis. (Biopsy).  EJ anastomosis with some edematous mucosa and acute angulation to the enteral limb, however patent. The blind limb with patent AXIOS stent was noted. The stent was removed using a rat tooth forceps. No new stent was placed.    EGD (6/14/23): There was evidence of severe LA grade D esophagitis to the level of the mid-esophagus. The esophageal - jejunal anastomosis appeared intact however there was significant inflammation and friability of the overlying mucosa. There was oozing with only contact. The efferent and afferent limb were evaluated. Both limbs were easily traversed with the endoscope. The blind limb was completely patent for which the endoscope was advanced into the distal small bowel without evidence of prior acute angulation or suggestion of obstruction. The decision was made not to place a stent.    Allergies    No Known Allergies    Intolerances      MEDICATIONS:  MEDICATIONS  (STANDING):  lactated ringers. 1000 milliLiter(s) (75 mL/Hr) IV Continuous <Continuous>  sucralfate 1 Gram(s) Oral four times a day    MEDICATIONS  (PRN):  acetaminophen   IVPB .. 1000 milliGRAM(s) IV Intermittent Once PRN Moderate Pain (4 - 6)  melatonin 3 milliGRAM(s) Oral at bedtime PRN Insomnia    PAST MEDICAL & SURGICAL HISTORY:  Essential hypertension  was on losartan, now diet control      Hypothyroidism      Gastric mass  ulcerated mass in gastric cardia with small perigastric nodes on endoscopy.      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      H/O umbilical hernia repair  2018 with mesh      Port-a-cath in place  right chest wall                 not in use 5 y      History of gastric surgery  2017      Gastrostomy tube in place  open jejunostomy with J-tube November 2020      S/P cataract surgery      H/O endoscopy  & stent placement 12/2020      S/P gastrectomy  total gastrectomy 2017      History of partial pancreatectomy  distal pancreatectomy/ splenectomy esophatojejunostomy      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, gastric primary      History of dysphagia  EGD with Axios stent placment to connect the blind limb to th e efferent limb (jejunojejunostomy)        FAMILY HISTORY:  Family history of ovarian cancer (Mother)  mother    Family history of ischemic heart disease (IHD) (Father)  father      SOCIAL HISTORY:  [place]: [ ] Current, [ ] Former, [ ] Never; Pack Years:  Alcohol:  Illicit Drugs:    REVIEW OF SYSTEMS:  Constitutional: No fever, chills, weight loss, or fatigue  ENMT:  No visual changes; No difficulty hearing, tinnitus, vertigo; No sinus or throat pain  Neck: No pain or stiffness  Respiratory: No cough, wheezing, or hemoptysis; No shortness of breath  Cardiovascular: No chest pain, palpitations, dizziness, orthopnea, PND, or leg swelling  Gastrointestinal: No abdominal or epigastric pain. No  nausea, vomiting, or hematemesis. No diarrhea, constipation, or steatorrhea. No melena or hematochezia  Genitourinary: No dysuria, urinary frequency/hesitancy, or hematuria  Skin: No itching, burning, rashes or lesions   Musculoskeletal: No joint pain or swelling; No muscle, back or extremity pain    Vital Signs Last 24 Hrs  T(C): --  T(F): --  HR: --  BP: 157/68 (14 Jun 2023 07:27) (157/68 - 157/68)  BP(mean): --  RR: 16 (14 Jun 2023 07:27) (16 - 16)  SpO2: 100% (14 Jun 2023 07:27) (100% - 100%)          PHYSICAL EXAM:    General: Well developed; well nourished; in no acute distress  HEENT: MMM, conjunctiva and sclera clear  Gastrointestinal: Soft, non-tender non-distended; Normal bowel sounds; No rebound or guarding  Extremities: Normal range of motion, No clubbing, cyanosis or edema  Neurological: Alert and oriented x3  Skin: Warm and dry. No obvious rash    LABS:              RADIOLOGY & ADDITIONAL STUDIES:    HPI:    71 y/o M with a PMHx of gastric CA, DM Type 2, Hypothyroidism, HTN and anemia presenting for outpatient scope for surveillance EGD post AXIOS stent removal in March 2023 (results noted below).     Patient with history of gastric cancer s/p total gastrectomy with esophagojejunostomy (EJ) with history of dysphagia related to a functional obstruction in the setting of a dominant blind limb. Underwent an EGD (3/10/21) with Dr Peguero, at which time a 20 mm AXIOS stent was placed across the blind dominant limb into a distal portion of the jejunum. In March 2023, AXIOS removed (as noted below).     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 The blind limb was patent, no AXIOS stent placement was warranted.     EGD (3/23/23): Grade D esophagitis in the middle third of the esophagus and lower third of the esophagus compatible with nonspecific erosive esophagitis. (Biopsy).  EJ anastomosis with some edematous mucosa and acute angulation to the enteral limb, however patent. The blind limb with patent AXIOS stent was noted. The stent was removed using a rat tooth forceps. No new stent was placed.    EGD (6/14/23): There was evidence of severe LA grade D esophagitis to the level of the mid-esophagus. The esophageal - jejunal anastomosis appeared intact however there was significant inflammation and friability of the overlying mucosa. There was oozing with only contact. The efferent and afferent limb were evaluated. Both limbs were easily traversed with the endoscope. The blind limb was completely patent for which the endoscope was advanced into the distal small bowel without evidence of prior acute angulation or suggestion of obstruction. The decision was made not to place a stent.    Allergies    No Known Allergies    Intolerances      MEDICATIONS:  MEDICATIONS  (STANDING):  lactated ringers. 1000 milliLiter(s) (75 mL/Hr) IV Continuous <Continuous>  sucralfate 1 Gram(s) Oral four times a day    MEDICATIONS  (PRN):  acetaminophen   IVPB .. 1000 milliGRAM(s) IV Intermittent Once PRN Moderate Pain (4 - 6)  melatonin 3 milliGRAM(s) Oral at bedtime PRN Insomnia    PAST MEDICAL & SURGICAL HISTORY:  Essential hypertension  was on losartan, now diet control      Hypothyroidism      Gastric mass  ulcerated mass in gastric cardia with small perigastric nodes on endoscopy.      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      H/O umbilical hernia repair  2018 with mesh      Port-a-cath in place  right chest wall                 not in use 5 y      History of gastric surgery  2017      Gastrostomy tube in place  open jejunostomy with J-tube November 2020      S/P cataract surgery      H/O endoscopy  & stent placement 12/2020      S/P gastrectomy  total gastrectomy 2017      History of partial pancreatectomy  distal pancreatectomy/ splenectomy esophatojejunostomy      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, gastric primary      History of dysphagia  EGD with Axios stent placment to connect the blind limb to th e efferent limb (jejunojejunostomy)        FAMILY HISTORY:  Family history of ovarian cancer (Mother)  mother    Family history of ischemic heart disease (IHD) (Father)  father      SOCIAL HISTORY:  [place]: [ ] Current, [ ] Former, [ ] Never; Pack Years:  Alcohol:  Illicit Drugs:    REVIEW OF SYSTEMS:  Constitutional: No fever, chills, weight loss, or fatigue  ENMT:  No visual changes; No difficulty hearing, tinnitus, vertigo; No sinus or throat pain  Neck: No pain or stiffness  Respiratory: No cough, wheezing, or hemoptysis; No shortness of breath  Cardiovascular: No chest pain, palpitations, dizziness, orthopnea, PND, or leg swelling  Gastrointestinal: No abdominal or epigastric pain. No  nausea, vomiting, or hematemesis. No diarrhea, constipation, or steatorrhea. No melena or hematochezia  Genitourinary: No dysuria, urinary frequency/hesitancy, or hematuria  Skin: No itching, burning, rashes or lesions   Musculoskeletal: No joint pain or swelling; No muscle, back or extremity pain    Vital Signs Last 24 Hrs  T(C): --  T(F): --  HR: --  BP: 157/68 (14 Jun 2023 07:27) (157/68 - 157/68)  BP(mean): --  RR: 16 (14 Jun 2023 07:27) (16 - 16)  SpO2: 100% (14 Jun 2023 07:27) (100% - 100%)          PHYSICAL EXAM:    General: thin male; in no acute distress  HEENT: MMM, conjunctiva and sclera clear  Gastrointestinal: Soft, non-tender non-distended; Normal bowel sounds; No rebound or guarding  Extremities: Normal range of motion, No clubbing, cyanosis or edema  Neurological: Alert and oriented x3  Skin: Warm and dry. No obvious rash    LABS:              RADIOLOGY & ADDITIONAL STUDIES:

## 2023-06-14 NOTE — CONSULT NOTE ADULT - SUBJECTIVE AND OBJECTIVE BOX
HISTORY OF PRESENT ILLNESS  Ander Kirkland is a 72-year-old male with a past medical history of past medical history of type 2 diabetes mellitus, hypertension, gastric cancer, portal vein thrombosis (2018, on Eliquis), AAA (s/p EVAR on 3/2023), hypothyroidism, anemia and benign prostatic hyperplasia who presented for outpatient endoscopy for surveillance EGD post-AXIOS stent removal in 3/2023. He was admitted for post-EGD monitoring. Endocrinology following for recommendations regarding diabetes management.     CAPILLARY BLOOD GLUCOSE & INSULIN RECEIVED  99 mg/dL (06-14 @ 07:08)  80 mg/dL (06-14 @ 09:22)    PAST MEDICAL & SURGICAL HISTORY  As per history of present illness.     ALLERGIES  No Known Allergies    CURRENT MEDICATIONS  acetaminophen   IVPB .. 1000 milliGRAM(s) IV Intermittent Once PRN  lactated ringers. 1000 milliLiter(s) IV Continuous <Continuous>  melatonin 3 milliGRAM(s) Oral at bedtime PRN  sucralfate 1 Gram(s) Oral four times a day    REVIEW OF SYSTEMS  Constitutional:  Negative fever, chills or loss of appetite.  Eyes:  Negative blurry vision or double vision.  Cardiovascular:  Negative for chest pain or palpitations.  Respiratory:  Negative for cough, wheezing, or shortness of breath.   Gastrointestinal:  Negative for nausea, vomiting, diarrhea, constipation, or abdominal pain.  Genitourinary:  Negative frequency, urgency or dysuria.  Neurologic:  No headache, confusion, dizziness, lightheadedness.    PHYSICAL EXAM  Vital Signs Last 24 Hrs  T(C): --  T(F): --  HR: --  BP: 157/68 (14 Jun 2023 07:27) (157/68 - 157/68)  BP(mean): --  RR: 16 (14 Jun 2023 07:27) (16 - 16)  SpO2: 100% (14 Jun 2023 07:27) (100% - 100%)    Constitutional: Awake, alert, in no acute distress.   HEENT: Normocephalic, atraumatic, BRUNILDA, no proptosis or lid retraction.   Neck: supple, no acanthosis, no thyromegaly or palpable thyroid nodules.  Respiratory: Lungs clear to ausculation bilaterally.   Cardiovascular: regular rhythm, normal S1 and S2, no audible murmurs.   GI: soft, non-tender, non-distended, bowel sounds present, no masses appreciated.  Extremities: No lower extremity edema, peripheral pulses present.   Skin: no rashes.   Psychiatric: AAO x 3. Normal affect/mood.     LABS  Thyroid Stimulating Hormone, Serum: 4.520 (04-04-23)  Total T4/Free T4: --/0.938 (04-04-23)    ASSESSMENT / RECOMMENDATIONS    A1C: 6.0 %  Weight: 69.9  BMI: 21.8    # Type 2 diabetes mellitus with hyperglycemia  - Please continue lantus *** units at bedtime.   - Continue lispro *** units before each meal.  - Continue lispro moderate / low dose sliding scale before meals and at bedtime.  - Patient's fingerstick glucose goal is 100-180 mg/dL.    - Discharge recommendations to be discussed.   - Patient can follow up at discharge with [facility] by calling [phone] to make an appointment.      Case discussed with Dr. Richard. Primary team updated.       Cali Garrett    Endocrinology Fellow    Service Pager: [phone]  HISTORY OF PRESENT ILLNESS  Ander Kirkland is a 72-year-old male with a past medical history of past medical history of type 2 diabetes mellitus, hypertension, gastric cancer, portal vein thrombosis (2018, on Eliquis), AAA (s/p EVAR on 3/2023), hypothyroidism, anemia and benign prostatic hyperplasia who presented for outpatient endoscopy for surveillance EGD post-AXIOS stent removal in 3/2023. He was admitted for post-EGD monitoring. Endocrinology following for recommendations regarding diabetes management.     CAPILLARY BLOOD GLUCOSE & INSULIN RECEIVED  99 mg/dL (06-14 @ 07:08)  80 mg/dL (06-14 @ 09:22)    DIABETES HISTORY  The patient is known to our service from previous admissions. He was diagnosed with diabetes approximately 10 years ago. At that time, he was started on metformin and continued the same therapy until cancer diagnosis. He was diagnosed with gastric cancer on 12/2016, underwent gastrectomy (per him, ~50% of his pancreas and 15% of his esophagus were also removed). Afterwards, the metformin was discontinued and his glucose levels remained under control; however, on 5/2020 he was started on tube feeds because he was loosing a lot of weight (decreased from ~190 lb to ~150lb). He had to be started on insulin to control the hyperglycemia that the feeds were causing. During last admission, his insulin regimen was adjusted and he was discharged on NPH insulin 21 units and 3 units of Novolog before starting tube feeds (At ~7:30 PM). His tube feeds consist of Jevity 1.5 kwan @95 mL/hr for 10 hrs (950 mL TV, 1425 kcal) to be administered from 8:00 PM to 6:00 AM every day. He follows with Dr. Richard (Endocrinologist) outpatient.     PAST MEDICAL & SURGICAL HISTORY  As per history of present illness.     ALLERGIES  No Known Allergies    CURRENT MEDICATIONS  acetaminophen   IVPB .. 1000 milliGRAM(s) IV Intermittent Once PRN  lactated ringers. 1000 milliLiter(s) IV Continuous <Continuous>  melatonin 3 milliGRAM(s) Oral at bedtime PRN  sucralfate 1 Gram(s) Oral four times a day    REVIEW OF SYSTEMS  Constitutional:  Negative fever, chills or loss of appetite.  Eyes:  Negative blurry vision or double vision.  Cardiovascular:  Negative for chest pain or palpitations.  Respiratory:  Negative for cough, wheezing, or shortness of breath.   Gastrointestinal:  Negative for nausea, vomiting, diarrhea, constipation, or abdominal pain.  Genitourinary:  Negative frequency, urgency or dysuria.  Neurologic:  No headache, confusion, dizziness, lightheadedness.    PHYSICAL EXAM  Vital Signs Last 24 Hrs  T(C): --  T(F): --  HR: --  BP: 157/68 (14 Jun 2023 07:27) (157/68 - 157/68)  BP(mean): --  RR: 16 (14 Jun 2023 07:27) (16 - 16)  SpO2: 100% (14 Jun 2023 07:27) (100% - 100%)    Constitutional: Awake, alert, in no acute distress.   HEENT: Normocephalic, atraumatic, BRUNILDA, no proptosis or lid retraction.   Neck: supple, no acanthosis, no thyromegaly or palpable thyroid nodules.  Respiratory: Lungs clear to ausculation bilaterally.   Cardiovascular: regular rhythm, normal S1 and S2, no audible murmurs.   GI: soft, non-tender, non-distended, bowel sounds present, no masses appreciated.  Extremities: No lower extremity edema, peripheral pulses present.   Skin: no rashes.   Psychiatric: AAO x 3. Normal affect/mood.     LABS  Thyroid Stimulating Hormone, Serum: 4.520 (04-04-23)  Total T4/Free T4: --/0.938 (04-04-23)    ASSESSMENT / RECOMMENDATIONS  Mr. Kirkland is a 72-year-old male with a past medical history of past medical history of type 2 diabetes mellitus, hypertension, gastric cancer, portal vein thrombosis (2018, on Eliquis), AAA (s/p EVAR on 3/2023), hypothyroidism, anemia and benign prostatic hyperplasia who presented for outpatient endoscopy for surveillance EGD post-AXIOS stent removal in 3/2023. He was admitted for post-EGD monitoring. Endocrinology following for recommendations regarding diabetes management.     A1C: 6.0 %  Weight: 69.9  BMI: 21.8    # Type 2 diabetes mellitus with hyperglycemia  - Please continue lantus *** units at bedtime.   - Continue lispro *** units before each meal.  - Continue lispro moderate / low dose sliding scale before meals and at bedtime.  - Patient's fingerstick glucose goal is 100-180 mg/dL.    - Discharge recommendations to be discussed.   - Patient can follow up at discharge with [facility] by calling [phone] to make an appointment.      Case discussed with Dr. Richard. Primary team updated.       Cali Garrett    Endocrinology Fellow    Service Pager: [phone]

## 2023-06-15 ENCOUNTER — TRANSCRIPTION ENCOUNTER (OUTPATIENT)
Age: 73
End: 2023-06-15

## 2023-06-15 VITALS
HEART RATE: 57 BPM | DIASTOLIC BLOOD PRESSURE: 92 MMHG | TEMPERATURE: 98 F | OXYGEN SATURATION: 98 % | SYSTOLIC BLOOD PRESSURE: 137 MMHG | RESPIRATION RATE: 18 BRPM

## 2023-06-15 LAB
A1C WITH ESTIMATED AVERAGE GLUCOSE RESULT: 6.4 % — HIGH (ref 4–5.6)
ANION GAP SERPL CALC-SCNC: 9 MMOL/L — SIGNIFICANT CHANGE UP (ref 5–17)
BUN SERPL-MCNC: 13 MG/DL — SIGNIFICANT CHANGE UP (ref 7–23)
CALCIUM SERPL-MCNC: 8.4 MG/DL — SIGNIFICANT CHANGE UP (ref 8.4–10.5)
CHLORIDE SERPL-SCNC: 104 MMOL/L — SIGNIFICANT CHANGE UP (ref 96–108)
CO2 SERPL-SCNC: 25 MMOL/L — SIGNIFICANT CHANGE UP (ref 22–31)
CREAT SERPL-MCNC: 0.67 MG/DL — SIGNIFICANT CHANGE UP (ref 0.5–1.3)
EGFR: 99 ML/MIN/1.73M2 — SIGNIFICANT CHANGE UP
ESTIMATED AVERAGE GLUCOSE: 137 MG/DL — HIGH (ref 68–114)
GLUCOSE BLDC GLUCOMTR-MCNC: 113 MG/DL — HIGH (ref 70–99)
GLUCOSE SERPL-MCNC: 124 MG/DL — HIGH (ref 70–99)
HCT VFR BLD CALC: 34.4 % — LOW (ref 39–50)
HGB BLD-MCNC: 11.3 G/DL — LOW (ref 13–17)
MAGNESIUM SERPL-MCNC: 2 MG/DL — SIGNIFICANT CHANGE UP (ref 1.6–2.6)
MCHC RBC-ENTMCNC: 32.1 PG — SIGNIFICANT CHANGE UP (ref 27–34)
MCHC RBC-ENTMCNC: 32.8 GM/DL — SIGNIFICANT CHANGE UP (ref 32–36)
MCV RBC AUTO: 97.7 FL — SIGNIFICANT CHANGE UP (ref 80–100)
NRBC # BLD: 0 /100 WBCS — SIGNIFICANT CHANGE UP (ref 0–0)
PHOSPHATE SERPL-MCNC: 3 MG/DL — SIGNIFICANT CHANGE UP (ref 2.5–4.5)
PLATELET # BLD AUTO: 187 K/UL — SIGNIFICANT CHANGE UP (ref 150–400)
POTASSIUM SERPL-MCNC: 4 MMOL/L — SIGNIFICANT CHANGE UP (ref 3.5–5.3)
POTASSIUM SERPL-SCNC: 4 MMOL/L — SIGNIFICANT CHANGE UP (ref 3.5–5.3)
RBC # BLD: 3.52 M/UL — LOW (ref 4.2–5.8)
RBC # FLD: 15.4 % — HIGH (ref 10.3–14.5)
SODIUM SERPL-SCNC: 138 MMOL/L — SIGNIFICANT CHANGE UP (ref 135–145)
WBC # BLD: 7.11 K/UL — SIGNIFICANT CHANGE UP (ref 3.8–10.5)
WBC # FLD AUTO: 7.11 K/UL — SIGNIFICANT CHANGE UP (ref 3.8–10.5)

## 2023-06-15 PROCEDURE — 99232 SBSQ HOSP IP/OBS MODERATE 35: CPT

## 2023-06-15 PROCEDURE — 86850 RBC ANTIBODY SCREEN: CPT

## 2023-06-15 PROCEDURE — 74220 X-RAY XM ESOPHAGUS 1CNTRST: CPT

## 2023-06-15 PROCEDURE — 85025 COMPLETE CBC W/AUTO DIFF WBC: CPT

## 2023-06-15 PROCEDURE — 86900 BLOOD TYPING SEROLOGIC ABO: CPT

## 2023-06-15 PROCEDURE — 36415 COLL VENOUS BLD VENIPUNCTURE: CPT

## 2023-06-15 PROCEDURE — 74220 X-RAY XM ESOPHAGUS 1CNTRST: CPT | Mod: 26

## 2023-06-15 PROCEDURE — 99239 HOSP IP/OBS DSCHRG MGMT >30: CPT

## 2023-06-15 PROCEDURE — 43235 EGD DIAGNOSTIC BRUSH WASH: CPT

## 2023-06-15 PROCEDURE — 86901 BLOOD TYPING SEROLOGIC RH(D): CPT

## 2023-06-15 PROCEDURE — 80053 COMPREHEN METABOLIC PANEL: CPT

## 2023-06-15 PROCEDURE — 80048 BASIC METABOLIC PNL TOTAL CA: CPT

## 2023-06-15 PROCEDURE — 84100 ASSAY OF PHOSPHORUS: CPT

## 2023-06-15 PROCEDURE — 83735 ASSAY OF MAGNESIUM: CPT

## 2023-06-15 PROCEDURE — 82962 GLUCOSE BLOOD TEST: CPT

## 2023-06-15 PROCEDURE — 83036 HEMOGLOBIN GLYCOSYLATED A1C: CPT

## 2023-06-15 PROCEDURE — 85027 COMPLETE CBC AUTOMATED: CPT

## 2023-06-15 RX ORDER — SUCRALFATE 1 G
1 TABLET ORAL
Qty: 120 | Refills: 3
Start: 2023-06-15 | End: 2023-10-12

## 2023-06-15 NOTE — DISCHARGE NOTE PROVIDER - ATTENDING ATTESTATION STATEMENT
I have personally seen and examined the patient. I have collaborated with and supervised the All other review of systems negative, except as noted in HPI

## 2023-06-15 NOTE — DISCHARGE NOTE NURSING/CASE MANAGEMENT/SOCIAL WORK - NSDCVIVACCINE_GEN_ALL_CORE_FT
Hib (PRP-OMP); 24-May-2017 18:23; Rita Kiser (RN); [employer] [id number]; IntraMuscular; Deltoid Right.; 0.5 milliLiter(s); VIS (VIS Published: 24-May-2017, VIS Presented: 24-May-2017);   meningococcal MCV4P; 26-May-2017 13:17; Juany Arriaga); [employer]; [id number]; IntraMuscular; Deltoid Left.; 0.5 milliLiter(s); VIS (VIS Published: 26-May-2017, VIS Presented: 26-May-2017);   Pneumococcal conjugate PCV 13; 24-May-2017 18:19; Rita Kiser); [facility]; [id number]; IntraMuscular; Deltoid Left.; 0.5 milliLiter(s); VIS (VIS Published: 27-Feb-2013, VIS Presented: 24-May-2017);

## 2023-06-15 NOTE — DISCHARGE NOTE PROVIDER - HOSPITAL COURSE
#Discharge: do not delete  73 y/o M with a PMHx of gastric CA, DM Type 2, Hypothyroidism, HTN and anemia presenting for outpatient scope for surveillance EGD post AXIOS stent removal in March 2023.     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 The esophageal - jejunal anastomosis appeared intact however there was significant inflammation and friability of the overlying mucosa. There was oozing with only contact. The efferent and afferent limb were evaluated. Both limbs were easily traversed with the endoscope. The blind limb was completely patent for which the endoscope was advanced into the distal small bowel without evidence of prior acute angulation or suggestion of obstruction. The decision was made not to place a stent. As per GI, esophagram was within normal limits  -Carafate 1g QID    Patient was discharged to: home    New medications: Carafate 1 gram 4 times a day  Changes to old medications: none  Medications that were stopped: none    Items to follow up as outpatient:  GI for management     Physical exam at the time of discharge:  Constitutional: resting comfortably in bed; NAD  HEENT: NC/AT, PERRL, EOMI, anicteric sclera, no nasal discharge; MMM  Neck: supple  Respiratory: unlabored breathing, CTA B/L; no W/Rhonchi/Crackles, no retractions or use of accessory muscles   Cardiac: +S1/S2; RRR; no M/R/G  Gastrointestinal: open wound to anterior abdomen, no surrounding erythema or ecchymosis; +GJ tube to LLQ; no rebound or guarding; +BS  Extremities: warm extremities, no clubbing or cyanosis; no peripheral edema  Vascular: 2+ radial pulses B/L  Dermatologic: skin warm, dry and intact; no rashes, wounds, or scars  Neurologic: AAOx3; CNII-XII grossly intact; no focal deficits  Psychiatric: affect and characteristics of appearance, verbalizations, behaviors are appropriate       #Discharge: do not delete  71 y/o M with a PMHx of gastric CA, DM Type 2, Hypothyroidism, HTN and anemia presenting for outpatient scope for surveillance EGD post AXIOS stent removal in March 2023.     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 The esophageal - jejunal anastomosis appeared intact however there was significant inflammation and friability of the overlying mucosa. There was oozing with only contact. The efferent and afferent limb were evaluated. Both limbs were easily traversed with the endoscope. The blind limb was completely patent for which the endoscope was advanced into the distal small bowel without evidence of prior acute angulation or suggestion of obstruction. The decision was made not to place a stent. As per GI, esophagram was within normal limits  -Carafate 1g QID    #DM2  Patient was seen by endocrinology and was given insulin  Increase NPH 25 u     Patient was discharged to: home    New medications: Carafate 1 gram 4 times a day  Changes to old medications: increase NPH to 25 units  Medications that were stopped: none    Items to follow up as outpatient:  GI for management     Physical exam at the time of discharge:  Constitutional: resting comfortably in bed; NAD  HEENT: NC/AT, PERRL, EOMI, anicteric sclera, no nasal discharge; MMM  Neck: supple  Respiratory: unlabored breathing, CTA B/L; no W/Rhonchi/Crackles, no retractions or use of accessory muscles   Cardiac: +S1/S2; RRR; no M/R/G  Gastrointestinal: open wound to anterior abdomen, no surrounding erythema or ecchymosis; +GJ tube to LLQ; no rebound or guarding; +BS  Extremities: warm extremities, no clubbing or cyanosis; no peripheral edema  Vascular: 2+ radial pulses B/L  Dermatologic: skin warm, dry and intact; no rashes, wounds, or scars  Neurologic: AAOx3; CNII-XII grossly intact; no focal deficits  Psychiatric: affect and characteristics of appearance, verbalizations, behaviors are appropriate

## 2023-06-15 NOTE — CHART NOTE - NSCHARTNOTEFT_GEN_A_CORE
HISTORY OF PRESENT ILLNESS  Ander Kirkland is a 72-year-old male with a past medical history of past medical history of type 2 diabetes mellitus, hypertension, gastric cancer, portal vein thrombosis (2018, on Eliquis), AAA (s/p EVAR on 3/2023), hypothyroidism, anemia and benign prostatic hyperplasia who presented for outpatient endoscopy for surveillance EGD post-AXIOS stent removal in 3/2023. He was admitted for post-EGD monitoring. He was NPO after MN for esophagram this morning. He received about 3.5 hours of feeds before he was made NPO and given lispro 4 units. Endocrinology following for recommendations regarding diabetes management.      A1C: 6.0 %  Weight: 69.9  BMI: 21.8    # Type 2 diabetes mellitus with hyperglycemia  - Patient will continue tube feeds with Jevity 1.5 kwan @95 mL/hr for 10 hrs (950 mL TV, 1425 kcal) starting at 8:00 PM and ending at 6AM.   - Increase NPH to 25 units with feeds at discharge.    Case discussed with Dr. Richard. Primary team updated.
HISTORY OF PRESENT ILLNESS  Ander Kirkland is a 72-year-old male with a past medical history of past medical history of type 2 diabetes mellitus, hypertension, gastric cancer, portal vein thrombosis (2018, on Eliquis), AAA (s/p EVAR on 3/2023), hypothyroidism, anemia and benign prostatic hyperplasia who presented for outpatient endoscopy for surveillance EGD post-AXIOS stent removal in 3/2023. He was admitted for post-EGD monitoring. Endocrinology following for recommendations regarding diabetes management.     CAPILLARY BLOOD GLUCOSE & INSULIN RECEIVED  99 mg/dL (06-14 @ 07:08)  80 mg/dL (06-14 @ 09:22)    DIABETES HISTORY  The patient is known to our service from previous admissions. He was diagnosed with diabetes approximately 10 years ago. At that time, he was started on metformin and continued the same therapy until cancer diagnosis. He was diagnosed with gastric cancer on 12/2016, underwent gastrectomy (per him, ~50% of his pancreas and 15% of his esophagus were also removed). Afterwards, the metformin was discontinued and his glucose levels remained under control; however, on 5/2020 he was started on tube feeds because he was loosing a lot of weight (decreased from ~190 lb to ~150lb). He had to be started on insulin to control the hyperglycemia that the feeds were causing. During last admission, his insulin regimen was adjusted and he was discharged on NPH insulin 21 units and 3 units of Novolog before starting tube feeds (At ~7:30 PM). His tube feeds consist of Jevity 1.5 kwan @95 mL/hr for 10 hrs (950 mL TV, 1425 kcal) to be administered from 8:00 PM to 6:00 AM every day. He follows with Dr. Richard (Endocrinologist) outpatient.     A1C: 6.0 %  Weight: 69.9  BMI: 21.8    # Type 2 diabetes mellitus with hyperglycemia  - Patient will start tube feeds with Jevity 1.5 kwan @95 mL/hr for 10 hrs (950 mL TV, 1425 kcal) starting at 8:00 PM; however, tube feeds will stop at midnight as he will be NPO for procedure. Therefore, he will only receive 4 hours of tube feeds tonight.   - HOLD NPH insulin tonight.   - Administer a one time dose of lispro 4 units at 7:30 PM.   - Continue lispro low dose sliding scale before meals and at bedtime.    Case discussed with Dr. Richard. Primary team updated.       Cali Garrett    Endocrinology Fellow    Service Pager: [phone]

## 2023-06-15 NOTE — PROGRESS NOTE ADULT - NSPROGADDITIONALINFOA_GEN_ALL_CORE
EGD (6/14/23): evidence of severe LA grade D esophagitis to the level of the mid-esophagus. The esophageal - jejunal anastomosis appeared intact but significant inflammation and friability of the overlying mucosa. There was oozing with only contact. The efferent and afferent limb were evaluated. Both limbs were easily traversed with the endoscope. The blind limb was completely patent for which the endoscope was advanced into the distal small bowel without evidence of prior acute angulation or suggestion of obstruction.

## 2023-06-15 NOTE — PROGRESS NOTE ADULT - ASSESSMENT
73 y/o M with a PMHx of gastric CA, DM Type 2, Hypothyroidism, HTN and anemia presenting for outpatient scope for surveillance EGD post AXIOS stent removal in March 2023.     EGD (3/23/23): Grade D esophagitis in the middle third of the esophagus and lower third of the esophagus compatible with nonspecific erosive esophagitis. (Biopsy). EJ anastomosis with some edematous mucosa and acute angulation to the enteral limb, however patent. The blind limb with patent AXIOS stent was noted. The stent was removed using a rat tooth forceps. No new stent was placed.    EGD (6/14/23): There was evidence of severe LA grade D esophagitis to the level of the mid-esophagus. The esophageal - jejunal anastomosis appeared intact however there was significant inflammation and friability of the overlying mucosa. There was oozing with only contact. The efferent and afferent limb were evaluated. Both limbs were easily traversed with the endoscope. The blind limb was completely patent for which the endoscope was advanced into the distal small bowel without evidence of prior acute angulation or suggestion of obstruction. The decision was made not to place a stent.    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     Teri Fink DO  Gastroenterology Fellow  Pager: [phone] 71 y/o M with a PMHx of gastric CA, DM Type 2, Hypothyroidism, HTN and anemia presenting for outpatient scope for surveillance EGD post AXIOS stent removal in March 2023.     EGD (3/23/23): Grade D esophagitis in the middle third of the esophagus and lower third of the esophagus compatible with nonspecific erosive esophagitis. (Biopsy). EJ anastomosis with some edematous mucosa and acute angulation to the enteral limb, however patent. The blind limb with patent AXIOS stent was noted. The stent was removed using a rat tooth forceps. No new stent was placed.    EGD (6/14/23): There was evidence of severe LA grade D esophagitis to the level of the mid-esophagus. The esophageal - jejunal anastomosis appeared intact however there was significant inflammation and friability of the overlying mucosa. There was oozing with only contact. The efferent and afferent limb were evaluated. Both limbs were easily traversed with the endoscope. The blind limb was completely patent for which the endoscope was advanced into the distal small bowel without evidence of prior acute angulation or suggestion of obstruction. The decision was made not to place a stent.    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     Teri Fink DO  Gastroenterology Fellow  Pager: [phone]

## 2023-06-15 NOTE — DISCHARGE NOTE PROVIDER - NSDCCPCAREPLAN_GEN_ALL_CORE_FT
PRINCIPAL DISCHARGE DIAGNOSIS  Diagnosis: Gastric cancer  Assessment and Plan of Treatment: You were monitored in the hospital after your EGD. Please follow up with your outpatient provider. Please continue 1g carafate 4 times a day; script was sent to [facility] Located on the first floor.   EGD: (6/14/23): There was evidence of severe LA grade D esophagitis to the level of the mid-esophagus. The esophageal - jejunal anastomosis appeared intact however there was significant inflammation and friability of the overlying mucosa. There was oozing with only contact. The efferent and afferent limb were evaluated. Both limbs were easily traversed with the endoscope. The blind limb was completely patent for which the endoscope was advanced into the distal small bowel without evidence of prior acute angulation or suggestion of obstruction. The decision was made not to place a stent.  Please follow up of the esophagram     PRINCIPAL DISCHARGE DIAGNOSIS  Diagnosis: Gastric cancer  Assessment and Plan of Treatment: You were monitored in the hospital after your EGD. Please follow up with your outpatient provider. Please continue 1g carafate 4 times a day; script was sent to [facility] Located on the first floor.   EGD: (6/14/23): There was evidence of severe LA grade D esophagitis to the level of the mid-esophagus. The esophageal - jejunal anastomosis appeared intact however there was significant inflammation and friability of the overlying mucosa. There was oozing with only contact. The efferent and afferent limb were evaluated. Both limbs were easily traversed with the endoscope. The blind limb was completely patent for which the endoscope was advanced into the distal small bowel without evidence of prior acute angulation or suggestion of obstruction. The decision was made not to place a stent.  Please follow up of the esophagram      SECONDARY DISCHARGE DIAGNOSES  Diagnosis: Diabetes  Assessment and Plan of Treatment: You have a known hx of diabetes. You were seen by the endocrinology team during hospitalization. Please increased NPH to 25 units. Follow up with outpatient endocrinology

## 2023-06-15 NOTE — PROGRESS NOTE ADULT - ATTENDING COMMENTS
71 y/o M with a PMHx of gastric CA, DM Type 2, Hypothyroidism, HTN and anemia presenting for outpatient scope for surveillance EGD post AXIOS stent removal in March 2023.   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 egd results as above. pending esophagram results  will cw carafate 1g qid, resume diet.   will fu endo recs and plan for dc home with outpt GI fu

## 2023-06-15 NOTE — DISCHARGE NOTE PROVIDER - NSDCMRMEDTOKEN_GEN_ALL_CORE_FT
apixaban 2.5 mg oral tablet: 1 tab(s) orally every 12 hours  insulin isophane (NPH) 100 units/mL human recombinant subcutaneous suspension: 21 unit(s) subcutaneous once a day ADMINISTER AT 7:30PM  insulin lispro 100 units/mL injectable solution: 2 unit(s) injectable 3 times a day (before meals)  Insulin Pen Needles, 4mm: 1 application subcutaneously 4 times a day. ** Use with insulin pen **  levothyroxine 100 mcg (0.1 mg) oral tablet: 1 tab(s) orally once a day   apixaban 2.5 mg oral tablet: 1 tab(s) orally every 12 hours  insulin isophane (NPH) 100 units/mL human recombinant subcutaneous suspension: 21 unit(s) subcutaneous once a day ADMINISTER AT 7:30PM  insulin lispro 100 units/mL injectable solution: 2 unit(s) injectable 3 times a day (before meals)  Insulin Pen Needles, 4mm: 1 application subcutaneously 4 times a day. ** Use with insulin pen **  levothyroxine 100 mcg (0.1 mg) oral tablet: 1 tab(s) orally once a day  sucralfate 1 g oral tablet: 1 tab(s) orally 4 times a day

## 2023-06-15 NOTE — DISCHARGE NOTE NURSING/CASE MANAGEMENT/SOCIAL WORK - PATIENT PORTAL LINK FT
You can access the FollowMyHealth Patient Portal offered by [facility] by registering at the following website: [email or web address]. By joining [facility]’s FollowMyHealth portal, you will also be able to view your health information using other applications (apps) compatible with our system.

## 2023-06-15 NOTE — PROGRESS NOTE ADULT - SUBJECTIVE AND OBJECTIVE BOX
GASTROENTEROLOGY PROGRESS NOTE  Patient seen and examined at bedside.  -EGD (6/14/23): EGD (6/14/23): There was evidence of severe LA grade D esophagitis to the level of the mid-esophagus. The esophageal - jejunal anastomosis appeared intact however there was significant inflammation and friability of the overlying mucosa. There was oozing with only contact. The efferent and afferent limb were evaluated. Both limbs were easily traversed with the endoscope. The blind limb was completely patent for which the endoscope was advanced into the distal small bowel without evidence of prior acute angulation or suggestion of obstruction. The decision was made not to place a stent.    ROS: Patient with no complaints of fevers, chills, NS, abdominal pain, nausea/vomiting.     PERTINENT REVIEW OF SYSTEMS:  CONSTITUTIONAL: No weakness, fevers or chills  HEENT: No visual changes; No vertigo or throat pain   GASTROINTESTINAL: As above.  NEUROLOGICAL: No numbness or weakness  SKIN: No itching, burning, rashes, or lesions     Allergies    No Known Allergies    Intolerances      MEDICATIONS:  MEDICATIONS  (STANDING):  apixaban 2.5 milliGRAM(s) Oral every 12 hours  dextrose 5%. 1000 milliLiter(s) (100 mL/Hr) IV Continuous <Continuous>  dextrose 5%. 1000 milliLiter(s) (50 mL/Hr) IV Continuous <Continuous>  dextrose 50% Injectable 12.5 Gram(s) IV Push once  dextrose 50% Injectable 25 Gram(s) IV Push once  dextrose 50% Injectable 25 Gram(s) IV Push once  glucagon  Injectable 1 milliGRAM(s) IntraMuscular once  insulin lispro (ADMELOG) corrective regimen sliding scale   SubCutaneous three times a day before meals  lactated ringers. 1000 milliLiter(s) (75 mL/Hr) IV Continuous <Continuous>  levothyroxine 100 MICROGram(s) Oral daily  sucralfate 1 Gram(s) Oral four times a day    MEDICATIONS  (PRN):  acetaminophen   IVPB .. 1000 milliGRAM(s) IV Intermittent Once PRN Moderate Pain (4 - 6)  dextrose Oral Gel 15 Gram(s) Oral once PRN Blood Glucose LESS THAN 70 milliGRAM(s)/deciliter  melatonin 3 milliGRAM(s) Oral at bedtime PRN Insomnia    Vital Signs Last 24 Hrs  T(C): 36.8 (15 Rohan 2023 09:14), Max: 36.9 (15 Rohan 2023 04:54)  T(F): 98.2 (15 Rohan 2023 09:14), Max: 98.5 (15 Rohan 2023 04:54)  HR: 57 (15 Rohan 2023 09:14) (57 - 67)  BP: 118/69 (15 Rohan 2023 09:14) (106/69 - 159/71)  BP(mean): --  RR: 18 (15 Rohan 2023 09:14) (18 - 20)  SpO2: 96% (15 Rohan 2023 09:14) (94% - 98%)    Parameters below as of 15 Rohan 2023 09:14  Patient On (Oxygen Delivery Method): room air        PHYSICAL EXAM:    General: thin male, lying comfortably in bed; in no acute distress  HEENT: MMM, conjunctiva and sclera clear  Gastrointestinal: Soft non-tender non-distended; No rebound or guarding; j-tube in place  Skin: Warm and dry. No obvious rash    LABS:                        11.3   7.11  )-----------( 187      ( 15 Rohan 2023 05:30 )             34.4     06-15    138  |  104  |  13  ----------------------------<  124<H>  4.0   |  25  |  0.67    Ca    8.4      15 Rohan 2023 05:30  Phos  3.0     06-15  Mg     2.0     06-15    TPro  8.2  /  Alb  3.7  /  TBili  0.8  /  DBili  x   /  AST  32  /  ALT  26  /  AlkPhos  159<H>  06-14                      RADIOLOGY & ADDITIONAL STUDIES:  Reviewed

## 2023-06-15 NOTE — DISCHARGE NOTE PROVIDER - ATTENDING DISCHARGE PHYSICAL EXAMINATION:
Physical exam:  GENERAL: NAD, lying in bed comfortably  HEAD:  Atraumatic, Normocephalic  EYES: EOMI, PERRLA, conjunctiva and sclera clear  ENT: Moist mucous membranes  NECK: Supple, No JVD  CHEST/LUNG: Clear to auscultation bilaterally; No rales, rhonchi, wheezing, or rubs.  HEART: Regular rate and rhythm; S1+ S2+   ABDOMEN: Bowel sounds present; Soft, Nontender, Nondistended, j-tube in place   EXTREMITIES:  2+ Peripheral Pulses, brisk capillary refill. No clubbing, cyanosis, or edema  NERVOUS SYSTEM:  Alert & Oriented , speech clear   MSK: FROM all 4 extremities, full and equal strength  SKIN: No rashes or lesions    73 y/o M with a PMHx of gastric CA, DM Type 2, Hypothyroidism, HTN and anemia presenting for outpatient scope for surveillance EGD post AXIOS stent removal in March 2023.   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   EGD (6/14/23): evidence of severe LA grade D esophagitis to the level of the mid-esophagus. The esophageal - jejunal anastomosis appeared intact but significant inflammation and friability of the overlying mucosa. There was oozing with only contact. The efferent and afferent limb were evaluated. Both limbs were easily traversed with the endoscope. The blind limb was completely patent for which the endoscope was advanced into the distal small bowel without evidence of prior acute angulation or suggestion of obstruction.  will cw carafate 1g qid, resume diet.   will fu endo recs and plan for dc home

## 2023-06-22 NOTE — ADDENDUM
[FreeTextEntry1] : Labs received from Dr. Obando were reviewed:\par A1c is 5.9%--?? artifactually low due to anemia or hypoglycemia, since fructosamine 276 (upper part of target range)\par Hb 10.6 gm\par LDL 97, HDL 79, TG 43\par Alk phos 161 U/l--?? bone, ?? liver--need to check vitamin D and PTH if bone

## 2023-06-22 NOTE — DATA REVIEWED
[No studies available for review at this time.] : No studies available for review at this time. [FreeTextEntry1] : Pt will have his PCP Dr. Obando fax a copy of his recent labwork

## 2023-06-22 NOTE — ASSESSMENT
[FreeTextEntry2] : Injection technique, feeding regimen [FreeTextEntry1] : 1) Type 2 DM:  Although his average blood glucoses on the Dolly data suggest excellent overall control, his fingersticks seem to be distinctly above target during the interval when his feeds are running--possibly because he has increased the rate on the feeds back to his previous regimen of 110 cc/hr.\par He is also terminating the feeds prematurely, leading to a sharp drop in his blood sugar between 3 AM and 6 AM (though fortunately without hypoglycemia).  He is almost not getting the full allotment of calories which was intended when the duration of the feeds was increased to 10 hrs when he was in the hospital\par --To stay with his current insulin regimen\par --Has not been doing the "air-shot" when injecting--this could be making a significant difference in his lispro dose, since it is so small--(as well as when he takes 2-3 units for his meals during the day).  This was discussed.\par --To decrease the feeding rate back to 95 cc/hr\par --To run the feeds until 6 AM, even if he awakens for the day at 3 AM\par \par 2) Weight loss:  This was evaluated during is hospitalization, and we concluded that the issues in this regard were an insufficient volume of feeds (which was solved by increasing the duration to 10 hours), and sub-optimal glycemic control when the feeds were running (since he reported glucoses in the 200 range during much of the time when the feeds were running).  He was also taking an insufficient dose of NPH to cover the feeds.  \par --For now, as discussed above, will need to see if he can regain weight by having him deliver the full 10 hrs of feeds, and maintain glucoses below 170-180 mg%--(ideally 120-150)\par \par See for follow-up in 3 months\par Dr. Obando to fax recent labwork\par CMP, lipids, A1c, microalb, 25-D, PTH before the visit

## 2023-06-22 NOTE — PHYSICAL EXAM
[Alert] : alert [No Acute Distress] : no acute distress [Normal Sclera/Conjunctiva] : normal sclera/conjunctiva [EOMI] : extra ocular movement intact [PERRL] : pupils equal, round and reactive to light [No Proptosis] : no proptosis [No Lid Lag] : no lid lag [Normal Outer Ear/Nose] : the ears and nose were normal in appearance [Normal Hearing] : hearing was normal [No Neck Mass] : no neck mass was observed [No LAD] : no lymphadenopathy [No Thyroid Nodules] : no palpable thyroid nodules [Thyroid Not Enlarged] : the thyroid was not enlarged [Clear to Auscultation] : lungs were clear to auscultation bilaterally [No Murmurs] : no murmurs [Normal Rate] : heart rate was normal [Regular Rhythm] : with a regular rhythm [Carotids Normal] : carotid pulses were normal with no bruits [No Edema] : no peripheral edema [Not Tender] : non-tender [Soft] : abdomen soft [No HSM] : no hepato-splenomegaly [Normal Supraclavicular Nodes] : no supraclavicular lymphadenopathy [Normal Anterior Cervical Nodes] : no anterior cervical lymphadenopathy [No CVA Tenderness] : no ~M costovertebral angle tenderness [No Involuntary Movements] : no involuntary movements were seen [No Joint Swelling] : no joint swelling seen [1+] : 1+ in the posterior tibialis [Normal] : normal [0] : 0 in the posterior tibialis [2+] : 2+ in the dorsalis pedis [Vibration Dec.] : diminished vibratory sensation at the level of the toes [No Tremors] : no tremors [Normal Sensation on Monofilament Testing] : normal sensation on monofilament testing of lower extremities [Normal Affect] : the affect was normal [Normal Mood] : the mood was normal [Kyphosis] : no kyphosis present [Acanthosis Nigricans] : no acanthosis nigricans [Foot Ulcers] : no foot ulcers [Swelling] : not swollen [Tenderness] : not tender [Erythema] : not erythematous [Position Sense Dec.] : normal position sense at the level of the toes [#1 Diminished] : number 1 was normal [#2 Diminished] : number 2 was normal [#3 Diminished] : number 3 was normal [#4 Diminished] : number 4 was normal [#5 Diminished] : number 5 was normal [#6 Diminished] : number 6 was normal [#7 Diminished] : number 7 was normal [#8 Diminished] : number 8 was normal [#9 Diminished] : number 9 was normal [#10 Diminished] : number 10 was normal [de-identified] : Quite thin [de-identified] : No corneal arcus [de-identified] : DP pulses 2+ on the left, non-palpable on the right [de-identified] : Ulcer with mild serous drainage in the mid-abdomen near the PEJ site [FreeTextEntry2] : Multiple hammer toes [FreeTextEntry6] : Multiple hammer toes

## 2023-06-22 NOTE — REVIEW OF SYSTEMS
[Fatigue] : fatigue [Decreased Appetite] : decreased appetite [Nausea] : nausea [Insomnia] : insomnia [Stress] : stress [Recent Weight Gain (___ Lbs)] : no recent weight gain [Recent Weight Loss (___ Lbs)] : no recent weight loss [Fever] : no fever [Chills] : no chills [Dry Eyes] : no dryness [Blurred Vision] : no blurred vision [Eyes Itch] : no itch [Dysphagia] : no dysphagia [Hearing Loss] : no hearing loss  [Chest Pain] : no chest pain [Palpitations] : no palpitations [Lower Ext Edema] : no lower extremity edema [Shortness Of Breath] : no shortness of breath [Cough] : no cough [Wheezing] : no wheezing [SOB on Exertion] : no shortness of breath on exertion [Constipation] : no constipation [Heartburn] : no heartburn [Diarrhea] : no diarrhea [Polyuria] : no polyuria [Dysuria] : no dysuria [Nocturia] : no nocturia [Joint Pain] : no joint pain [Muscle Weakness] : no muscle weakness [Myalgia] : no myalgia  [Muscle Cramps] : no muscle cramps [Dry Skin] : no dry skin [Ulcer] : no ulcer [Headaches] : no headaches [Tremors] : no tremors [Pain/Numbness of Digits] : no pain/numbness of digits [Depression] : no depression [Anxiety] : no anxiety [Polydipsia] : no polydipsia [Easy Bleeding] : no ~M tendency for easy bleeding [Easy Bruising] : no tendency for easy bruising [FreeTextEntry3] : Needs reading glasses since his cataract surgery

## 2023-06-22 NOTE — HISTORY OF PRESENT ILLNESS
[FreeTextEntry1] : 72-yo man with a history of a total gastrectomy, distal esophagectomy and distal pancreatectomy for gastric CA in 2017.  Had mild type 2 DM before the gastrectomy, was able to be controlled on oral agents after the surgery, but was losing weight during the first few years after the surgery because of poor PO intake.  Had a feeding tube inserted in approximately 2021 because of the weight loss and is currently on overnight feeds.  He has needed insulin to cover the feeds since that time.\par Was seen during his recent hospitalization because of inadequate glycemic control.  His insulin regimen was modified from a single dose of NPH (given prior to starting the feeds each night)  to a current schedule of 22 NPH/3 units lispro prior to starting the feeds.  The duration of his feeds was also increased to 10 hours.  He is now on a 1.4 kwan/cc formula which is supposed to run from 8 PM to 6 AM the following morning, but he admits that he has been stopping it prematurely.  The feeding rate  in the hospital was 95 cc/hr, but he has been running it at 110 cc/hr at home.  \par He takes 5 mg Ambien at bedtime, usually awakens 3 hrs later and will take another 5 mg, then goes to sleep until 3 AM. \par He has not gained any weight since his hospitalization.\par He eats multiple small meals during the day, but admits that he may not be eating as much during the day as he did in the hospital. He will cover some of these with 2 units of lispro, usually taking the insulin only if his glucose is over 130 mg%.\par Blood sugars will usually be in the low 100 range when he starts the feeds, will be 170-180 when he awakens briefly at 11 PM, but will then be in the low 200s at 3 AM.  He has then been stopping the feeds at 3 AM, and his blood sugar will drop to 120 at 6 AM\par Average blood glucoses on the Dolyl are as follows:\par MN-6 AM     146\par 6 AM-noon  130\par Noon-6 PM  127\par 6 PM -MN    138

## 2023-06-27 NOTE — PROGRESS NOTE ADULT - ASSESSMENT
67 y.o. male PMHx of Hypothyroidism, acid reflux, T2DM, hgbA1c 6.5,  proximal Gastric CA, s/p stomach resection for malignancy, now s/p hernia repair:  1. S/p hernia repair - care and diet per Surg, IVF, pain control, incentive spirometry, PT/OOB, d/c planning per Surg  2. BPH - c/w Doxazosyn  3. DM2 - ISS  4. HLD - statin  5. Hypothyroid - c/w Synthroid  6. GERD - c/w PPI  7. Nausea - resolved  8. DVT prophylaxis Finasteride Pregnancy And Lactation Text: This medication is absolutely contraindicated during pregnancy. It is unknown if it is excreted in breast milk.

## 2023-07-09 NOTE — HISTORY OF PRESENT ILLNESS
[FreeTextEntry1] : 73 with total gastrectomy and poorly functioning EJ anastomosis. 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 He has failed top follow up however. In the last few weeks he believes he is having some more trouble swallowing. His weight is stable. Appetite overall is stable but he has limited himself to more soft foods and liquids supplementing with his J tube. He also has a chronic enterocutenaous fistula which sometimes has output and often doesn’t.

## 2023-07-09 NOTE — ASSESSMENT
[FreeTextEntry1] : Plan for EGD with stent removal/replacement\par \par Anterior abdominal wall collection likely related to intestinal cutaneous fistula, but this is hard to assess and not clear where its coming form. At the moment, he is a little underweight\par

## 2023-07-12 NOTE — H&P PST ADULT - PSYCHIATRIC DETAILS
18.6 difficulty to obtain info at PST, pt was uncooperative at times during PST visit/anxious anxious

## 2023-07-13 ENCOUNTER — RESULT REVIEW (OUTPATIENT)
Age: 73
End: 2023-07-13

## 2023-07-13 ENCOUNTER — APPOINTMENT (OUTPATIENT)
Dept: SURGICAL ONCOLOGY | Facility: CLINIC | Age: 73
End: 2023-07-13
Payer: MEDICARE

## 2023-07-13 ENCOUNTER — OUTPATIENT (OUTPATIENT)
Dept: OUTPATIENT SERVICES | Facility: HOSPITAL | Age: 73
LOS: 1 days | End: 2023-07-13
Payer: MEDICARE

## 2023-07-13 VITALS
RESPIRATION RATE: 17 BRPM | OXYGEN SATURATION: 98 % | HEART RATE: 71 BPM | BODY MASS INDEX: 20.47 KG/M2 | HEIGHT: 70 IN | SYSTOLIC BLOOD PRESSURE: 161 MMHG | DIASTOLIC BLOOD PRESSURE: 88 MMHG | WEIGHT: 143 LBS

## 2023-07-13 VITALS
SYSTOLIC BLOOD PRESSURE: 138 MMHG | HEART RATE: 62 BPM | RESPIRATION RATE: 16 BRPM | TEMPERATURE: 98 F | OXYGEN SATURATION: 99 % | DIASTOLIC BLOOD PRESSURE: 72 MMHG

## 2023-07-13 VITALS
OXYGEN SATURATION: 98 % | HEART RATE: 66 BPM | RESPIRATION RATE: 16 BRPM | TEMPERATURE: 98 F | SYSTOLIC BLOOD PRESSURE: 151 MMHG | DIASTOLIC BLOOD PRESSURE: 87 MMHG

## 2023-07-13 DIAGNOSIS — Z98.890 OTHER SPECIFIED POSTPROCEDURAL STATES: Chronic | ICD-10-CM

## 2023-07-13 DIAGNOSIS — Z93.1 GASTROSTOMY STATUS: Chronic | ICD-10-CM

## 2023-07-13 DIAGNOSIS — Z98.49 CATARACT EXTRACTION STATUS, UNSPECIFIED EYE: Chronic | ICD-10-CM

## 2023-07-13 DIAGNOSIS — Z95.828 PRESENCE OF OTHER VASCULAR IMPLANTS AND GRAFTS: Chronic | ICD-10-CM

## 2023-07-13 DIAGNOSIS — Z90.3 ACQUIRED ABSENCE OF STOMACH [PART OF]: Chronic | ICD-10-CM

## 2023-07-13 DIAGNOSIS — C16.9 MALIGNANT NEOPLASM OF STOMACH, UNSPECIFIED: ICD-10-CM

## 2023-07-13 PROCEDURE — 49451 REPLACE DUOD/JEJ TUBE PERC: CPT

## 2023-07-13 PROCEDURE — 99213 OFFICE O/P EST LOW 20 MIN: CPT

## 2023-07-13 NOTE — HISTORY OF PRESENT ILLNESS
[de-identified] : Recently admitted to 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 Peguero.  His dysphagia has improved slightly.  He reports clogged jtube.  He denies abdominal pain.\par \par 09/14/2021: Doing well.  He reports improved po intake, but is still predominantly on jtube feeds.  He had a suture granuloma removed last month during visit with Dr. Cabello.  He reports yellowish drainage from removal site in upper abdominal incision.  He denies fever, abdominal pain.\par \par 11/04/2021: Patient still has persistent drainage from suture granuloma removal site in the upper abdominal incisional region.  He changes dressing daily.  Fluid is thick yellow/purulent.  He is maintaining nutrition with supplemental jtube feeds.  He denies fever.\par \par 11/30/2021: Patient is s/p CT abdomen/pelvis and jtube exchange.  He has had mild weight loss since his last visit.  He had persistent drainage from upper midline incisional opening at site of previous suture.  CT abdomen/pelvis 11/26/2021 shows an thin anterior abdominal wall fluid collection just posterior to the rectus muscle measuring 7.8 x 1.7 cm.\par \par 02/22/2022: Overall unchanged.  He reports decreased midline incisional drainage.  He continue to self administer tube feeds without issue.  Tolerating small amounts of oral diet.\par \par 06/21/2022: Patient continues to be dependent on jtube feeding is tolerating small amounts for soft oral diet.  He reports persistent drainage from upper aspect of midline incision.  He denies fever or abdominal pain.\par \par 02/23/2023: Patient is s/p recent jtube exchange.  He is dependent on tube feeds and reports worsening dysphagia with difficulty swallowing pills.  He has had mild weight loss.\par \par 04/25/2023: Recently admitted to [facility].  He is s/p EGD and removal of EJ Axios stent by Dr. Peguero.  He is due for jtube exchange and reports persistent drainage from upper midline incision at area of previous suture granuloma removal.\par Recent CT abdomen/pelvis reviewed and shows increased subcutaneous collection at site of drainage.  He has no fevers.\par \par 07/13/2023: Patient feels well and is eating better.  EGD/esophagram from 06/2023 at [facility] reviewed.  He continues to use jtube for supplemental nutrition.

## 2023-07-20 DIAGNOSIS — C16.9 MALIGNANT NEOPLASM OF STOMACH, UNSPECIFIED: ICD-10-CM

## 2023-07-20 DIAGNOSIS — Z46.59 ENCOUNTER FOR FITTING AND ADJUSTMENT OF OTHER GASTROINTESTINAL APPLIANCE AND DEVICE: ICD-10-CM

## 2023-07-25 ENCOUNTER — OUTPATIENT (OUTPATIENT)
Dept: OUTPATIENT SERVICES | Facility: HOSPITAL | Age: 73
LOS: 1 days | Discharge: HOME | End: 2023-07-25

## 2023-07-25 DIAGNOSIS — Z95.828 PRESENCE OF OTHER VASCULAR IMPLANTS AND GRAFTS: Chronic | ICD-10-CM

## 2023-07-25 DIAGNOSIS — Z87.19 PERSONAL HISTORY OF OTHER DISEASES OF THE DIGESTIVE SYSTEM: Chronic | ICD-10-CM

## 2023-07-25 DIAGNOSIS — C16.9 MALIGNANT NEOPLASM OF STOMACH, UNSPECIFIED: ICD-10-CM

## 2023-07-25 DIAGNOSIS — Z98.890 OTHER SPECIFIED POSTPROCEDURAL STATES: Chronic | ICD-10-CM

## 2023-07-25 DIAGNOSIS — Z98.49 CATARACT EXTRACTION STATUS, UNSPECIFIED EYE: Chronic | ICD-10-CM

## 2023-07-25 DIAGNOSIS — Z90.411 ACQUIRED PARTIAL ABSENCE OF PANCREAS: Chronic | ICD-10-CM

## 2023-07-25 DIAGNOSIS — Z93.1 GASTROSTOMY STATUS: Chronic | ICD-10-CM

## 2023-07-25 DIAGNOSIS — Z90.3 ACQUIRED ABSENCE OF STOMACH [PART OF]: Chronic | ICD-10-CM

## 2023-08-02 ENCOUNTER — RESULT REVIEW (OUTPATIENT)
Age: 73
End: 2023-08-02

## 2023-08-02 ENCOUNTER — APPOINTMENT (OUTPATIENT)
Dept: HEMATOLOGY ONCOLOGY | Facility: CLINIC | Age: 73
End: 2023-08-02
Payer: MEDICARE

## 2023-08-02 VITALS
DIASTOLIC BLOOD PRESSURE: 88 MMHG | TEMPERATURE: 98.1 F | RESPIRATION RATE: 16 BRPM | OXYGEN SATURATION: 98 % | WEIGHT: 143.3 LBS | BODY MASS INDEX: 20.56 KG/M2 | SYSTOLIC BLOOD PRESSURE: 166 MMHG | HEART RATE: 66 BPM

## 2023-08-02 DIAGNOSIS — D68.52 PROTHROMBIN GENE MUTATION: ICD-10-CM

## 2023-08-02 LAB
ALBUMIN SERPL ELPH-MCNC: 4.1 G/DL
ALP BLD-CCNC: 143 U/L
ALT SERPL-CCNC: 25 U/L
ANION GAP SERPL CALC-SCNC: 11 MMOL/L
AST SERPL-CCNC: 27 U/L
BASOPHILS # BLD AUTO: 0.08 K/UL — SIGNIFICANT CHANGE UP (ref 0–0.2)
BASOPHILS NFR BLD AUTO: 0.7 % — SIGNIFICANT CHANGE UP (ref 0–2)
BILIRUB SERPL-MCNC: 1.1 MG/DL
BUN SERPL-MCNC: 20 MG/DL
CALCIUM SERPL-MCNC: 9.7 MG/DL
CHLORIDE SERPL-SCNC: 100 MMOL/L
CO2 SERPL-SCNC: 25 MMOL/L
CREAT SERPL-MCNC: 0.75 MG/DL
EGFR: 95 ML/MIN/1.73M2
EOSINOPHIL # BLD AUTO: 0.12 K/UL — SIGNIFICANT CHANGE UP (ref 0–0.5)
EOSINOPHIL NFR BLD AUTO: 1.1 % — SIGNIFICANT CHANGE UP (ref 0–6)
GLUCOSE SERPL-MCNC: 117 MG/DL
HCT VFR BLD CALC: 36.6 % — LOW (ref 39–50)
HGB BLD-MCNC: 12.1 G/DL — LOW (ref 13–17)
IMM GRANULOCYTES NFR BLD AUTO: 0.5 % — SIGNIFICANT CHANGE UP (ref 0–0.9)
LYMPHOCYTES # BLD AUTO: 1.04 K/UL — SIGNIFICANT CHANGE UP (ref 1–3.3)
LYMPHOCYTES # BLD AUTO: 9.7 % — LOW (ref 13–44)
MCHC RBC-ENTMCNC: 31.4 PG — SIGNIFICANT CHANGE UP (ref 27–34)
MCHC RBC-ENTMCNC: 33.1 G/DL — SIGNIFICANT CHANGE UP (ref 32–36)
MCV RBC AUTO: 95.1 FL — SIGNIFICANT CHANGE UP (ref 80–100)
MONOCYTES # BLD AUTO: 0.63 K/UL — SIGNIFICANT CHANGE UP (ref 0–0.9)
MONOCYTES NFR BLD AUTO: 5.9 % — SIGNIFICANT CHANGE UP (ref 2–14)
NEUTROPHILS # BLD AUTO: 8.76 K/UL — HIGH (ref 1.8–7.4)
NEUTROPHILS NFR BLD AUTO: 82.1 % — HIGH (ref 43–77)
NRBC # BLD: 0 /100 WBCS — SIGNIFICANT CHANGE UP (ref 0–0)
PLATELET # BLD AUTO: 231 K/UL — SIGNIFICANT CHANGE UP (ref 150–400)
POTASSIUM SERPL-SCNC: 3.9 MMOL/L
PROT SERPL-MCNC: 7.8 G/DL
RBC # BLD: 3.85 M/UL — LOW (ref 4.2–5.8)
RBC # FLD: 15.7 % — HIGH (ref 10.3–14.5)
SODIUM SERPL-SCNC: 137 MMOL/L
WBC # BLD: 10.68 K/UL — HIGH (ref 3.8–10.5)
WBC # FLD AUTO: 10.68 K/UL — HIGH (ref 3.8–10.5)

## 2023-08-02 PROCEDURE — 99214 OFFICE O/P EST MOD 30 MIN: CPT

## 2023-08-02 NOTE — ASSESSMENT
[FreeTextEntry1] : The patient will continue surveillance for gastric carcinoma diagnosed 7 years ago with recurrence in the left supraclavicular area.  Patient is status post surgery, radiation and chemotherapy.  Because of weight loss he has required a feeding tube which he continues to use.  Currently his weight is stable.  He underwent blood testing which showed an elevated alkaline phosphatase and isoenzymes will be performed to see if this is bone or liver derived.  The patient was recently in [facility] for TURP prostate surgery and stenting of 2 aneurysms.  He now is recovering and remained stable.  The patient will continue medical, GI, surgical, urologic follow-up and testing.  He will return to the office in 2 months or sooner as needed. [Curative] : Goals of care discussed with patient: Curative

## 2023-08-02 NOTE — PHYSICAL EXAM
[Restricted in physically strenuous activity but ambulatory and able to carry out work of a light or sedentary nature] : Status 1- Restricted in physically strenuous activity but ambulatory and able to carry out work of a light or sedentary nature, e.g., light house work, office work [Thin] : thin [Normal] : affect appropriate [de-identified] : has feeding tube inplace

## 2023-08-02 NOTE — HISTORY OF PRESENT ILLNESS
[de-identified] : See Heme-Onc consult note 12,23/2016. [de-identified] : Since the last visit the pt remains stable and continues on feeding tube nutritional supplements The patient states his weight is stable and he tolerates the tube feedings. The patient is 7 years since his diagnosis of gastric carcinoma status post chemo and radiation and surgical resection.  The patient has had blood testing and his chemistries were normal except for elevated alkaline phosphatase at 160.  An alkaline phosphatase isoenzyme test was ordered.  This is to evaluate for bone versus liver because of the elevated number.

## 2023-08-04 ENCOUNTER — APPOINTMENT (OUTPATIENT)
Dept: ENDOCRINOLOGY | Facility: CLINIC | Age: 73
End: 2023-08-04
Payer: MEDICARE

## 2023-08-04 VITALS
DIASTOLIC BLOOD PRESSURE: 67 MMHG | BODY MASS INDEX: 20.62 KG/M2 | WEIGHT: 144 LBS | SYSTOLIC BLOOD PRESSURE: 133 MMHG | TEMPERATURE: 97.6 F | HEIGHT: 70 IN | OXYGEN SATURATION: 97 % | HEART RATE: 78 BPM

## 2023-08-04 PROCEDURE — 99214 OFFICE O/P EST MOD 30 MIN: CPT

## 2023-08-08 LAB
ALBUMIN SERPL ELPH-MCNC: 4.1 G/DL
ALP BLD-CCNC: 152 U/L
ALT SERPL-CCNC: 26 U/L
ANION GAP SERPL CALC-SCNC: 14 MMOL/L
AST SERPL-CCNC: 36 U/L
BILIRUB SERPL-MCNC: 1.5 MG/DL
BUN SERPL-MCNC: 19 MG/DL
CALCIUM SERPL-MCNC: 9.4 MG/DL
CANCER AG19-9 SERPL-ACNC: 9 U/ML
CEA SERPL-MCNC: 5.3 NG/ML
CHLORIDE SERPL-SCNC: 101 MMOL/L
CO2 SERPL-SCNC: 24 MMOL/L
CREAT SERPL-MCNC: 0.87 MG/DL
EGFR: 91 ML/MIN/1.73M2
GLUCOSE SERPL-MCNC: 86 MG/DL
POTASSIUM SERPL-SCNC: 4.1 MMOL/L
PROT SERPL-MCNC: 7.8 G/DL
SODIUM SERPL-SCNC: 139 MMOL/L

## 2023-08-08 NOTE — PRE-ANESTHESIA EVALUATION ADULT - NSANTHTOBACCOSD_GEN_ALL_CORE
LVM for patient that he needs an appointment for surgical clearance and labs prior to procedure on 8/11.   LVM for EC, Cee, requesting call back to schedule appointment.   No

## 2023-08-20 NOTE — ASSESSMENT
[FreeTextEntry1] : 1) Type 2 DM:  -To continue his present insulin regimen --Needs to solve the problem of malfunctioning Kangaroo feeding pump--either someone in Dr. Banda's office or ?? the company itself.   --Since he seems to have a "mental block" against running the feeds until 6 AM, to move up the start time to 6 PM, and can then stop at 3 AM   Take levothyroxine at 5 AM, no food until 6 AM May need 3 units Humalog for some of his "snacks" Add bread--have half a sandwich with his cold cuts To start B-12 supplementation with a sublingual preparation.  (Dr. Obando was giving him B-12 injections in the past, but he cannot say whether these were given at a defined frequency),.

## 2023-08-20 NOTE — REVIEW OF SYSTEMS
[Decreased Appetite] : decreased appetite [Nausea] : nausea [Insomnia] : insomnia [Cold Intolerance] : cold intolerance [Easy Bruising] : a tendency for easy bruising [Fatigue] : no fatigue [Fever] : no fever [Chills] : no chills [Dry Eyes] : no dryness [Blurred Vision] : no blurred vision [Eyes Itch] : no itch [Dysphagia] : no dysphagia [Hearing Loss] : no hearing loss  [Chest Pain] : no chest pain [Palpitations] : no palpitations [Lower Ext Edema] : no lower extremity edema [Shortness Of Breath] : no shortness of breath [Cough] : no cough [Wheezing] : no wheezing [SOB on Exertion] : no shortness of breath on exertion [Constipation] : no constipation [Heartburn] : no heartburn [Diarrhea] : no diarrhea [Polyuria] : no polyuria [Dysuria] : no dysuria [Nocturia] : no nocturia [Joint Pain] : no joint pain [Muscle Weakness] : no muscle weakness [Myalgia] : no myalgia  [Joint Stiffness] : no joint stiffness [Muscle Cramps] : no muscle cramps [Dry Skin] : no dry skin [Ulcer] : no ulcer [Headaches] : no headaches [Tremors] : no tremors [Pain/Numbness of Digits] : no pain/numbness of digits [Depression] : no depression [Anxiety] : no anxiety [Stress] : no stress [Polydipsia] : no polydipsia [Heat Intolerance] : no heat intolerance [Easy Bleeding] : no ~M tendency for easy bleeding [FreeTextEntry2] : Has gained 3 lb since his last  [FreeTextEntry3] : Needs reading glasses since his cataract surgery.

## 2023-08-20 NOTE — DATA REVIEWED
[FreeTextEntry1] : [facility]  (7/7/23)  Glucose 104,  A1c 6.2% TSH 2.67 uU/ml  (0.27-4.2) No lipid panel was done B-12 level 304 pg/ml

## 2023-08-20 NOTE — PHYSICAL EXAM
[Alert] : alert [No Acute Distress] : no acute distress [Normal Sclera/Conjunctiva] : normal sclera/conjunctiva [EOMI] : extra ocular movement intact [PERRL] : pupils equal, round and reactive to light [No Proptosis] : no proptosis [No Lid Lag] : no lid lag [Normal Outer Ear/Nose] : the ears and nose were normal in appearance [Normal Hearing] : hearing was normal [No Neck Mass] : no neck mass was observed [No LAD] : no lymphadenopathy [No Thyroid Nodules] : no palpable thyroid nodules [Clear to Auscultation] : lungs were clear to auscultation bilaterally [Normal Rate] : heart rate was normal [Regular Rhythm] : with a regular rhythm [Carotids Normal] : carotid pulses were normal with no bruits [No Edema] : no peripheral edema [Not Tender] : non-tender [Soft] : abdomen soft [No HSM] : no hepato-splenomegaly [Normal Supraclavicular Nodes] : no supraclavicular lymphadenopathy [Normal Anterior Cervical Nodes] : no anterior cervical lymphadenopathy [No CVA Tenderness] : no ~M costovertebral angle tenderness [No Involuntary Movements] : no involuntary movements were seen [No Joint Swelling] : no joint swelling seen [0] : 0 in the dorsalis pedis [No Tremors] : no tremors [Normal Sensation on Monofilament Testing] : normal sensation on monofilament testing of lower extremities [Normal Affect] : the affect was normal [Normal Mood] : the mood was normal [2+] : 2+ in the posterior tibialis [Normal] : normal [1+] : 1+ in the posterior tibialis [Vibration Dec.] : diminished vibratory sensation at the level of the toes [Kyphosis] : no kyphosis present [Acanthosis Nigricans] : no acanthosis nigricans [Foot Ulcers] : no foot ulcers [Swelling] : not swollen [Erythema] : not erythematous [Position Sense Dec.] : normal position sense at the level of the toes [#1 Diminished] : number 1 was normal [#2 Diminished] : number 2 was normal [#3 Diminished] : number 3 was normal [#4 Diminished] : number 4 was normal [#5 Diminished] : number 5 was normal [#6 Diminished] : number 6 was normal [#7 Diminished] : number 7 was normal [#8 Diminished] : number 8 was normal [#9 Diminished] : number 9 was normal [#10 Diminished] : number 10 was normal [de-identified] : Faint corneal arcus [de-identified] : Quite thin [de-identified] : Thyroid gland barely palpable [de-identified] : Short mid-systolic murmur at the LSB [de-identified] : DP pulses 2+ on the left, non-palpable on the right [de-identified] : Ulcer with mild serous drainage in the mid-abdomen near the PEJ site.  PEG tube site in the LLQ, mild jordy-stomal bruising [de-identified] : Scattered ecchymoses on the forearms, lower legs [FreeTextEntry2] : Multiple hammer-toe deformitites [FreeTextEntry6] : Multiple hammer-toe deformities

## 2023-08-20 NOTE — HISTORY OF PRESENT ILLNESS
[FreeTextEntry1] : 72-yo man with a history of a total gastrectomy, distal esophagectomy and distal pancreatectomy for gastric CA in 2017.  Had mild type 2 DM before the gastrectomy, was able to be controlled on oral agents after the surgery, but was losing weight during the first few years after the surgery because of poor PO intake.  Had a feeding tube inserted in approximately 2021 because of the weight loss and is currently on overnight feeds.  He has needed insulin to cover the feeds since that time. Was seen during his recent hospitalization because of inadequate glycemic control.  His insulin regimen was modified from a single dose of NPH (given prior to starting the feeds each night)  to a current schedule of 22 NPH/3 units lispro prior to starting the feeds.  The duration of his feeds was also increased to 10 hours.  He is now on a 1.4 kwan/cc formula which is supposed to run from 8 PM to 6 AM the following morning, but he admits that he has been stopping it prematurely.  The feeding rate  in the hospital was 95 cc/hr, but he has been running it at 110 cc/hr at home.   He takes 5 mg Ambien at bedtime, usually awakens 3 hrs later and will take another 5 mg, then goes to sleep until 3 AM.  He has not gained any weight since his hospitalization. He eats multiple small meals during the day, but admits that he may not be eating as much during the day as he did in the hospital. He will cover some of these with 2 units of lispro, usually taking the insulin only if his glucose is over 130 mg%.  Has gained 3 lb in weight since his visit in May, but has not been able to run his enteral feeds during the past 2 days because the pump has not been functioning.   Saw his surgeon and oncologist within the past 2 weeks. Is still not running his feeds for the full 10 hours.  Awakens at 3 AM (as usual) but takes the pump off at that time.    Average glucoses on his Free Style Dolyl for the past 90 days: MN-6 AM     145     (142) 6 AM-noon   125     (110) Noon-6 PM  129     (127) 6 PM-MN     155     (122) Is taking the levothyroxine at 5 AM--this will need to be worked out if/when he finally starts running his feeds for the full 10 hours Continues to take 2 units of Humalog for his small meals during the day, but is sometimes seeing post-prandial glucoses above 150. First meal is at approximately 8 AM--meals after that time are "random"--amount of carb also varies.  If he has cold cuts, etc, he tends to eat them by themselves without any bread. Taking 24 NPH/3-4 Humalog before he starts the feeds.

## 2023-09-02 LAB
ALP BLD-CCNC: 143 IU/L
ALP BONE CFR SERPL: 26 %
ALP INTEST CFR SERPL: 5 %
ALP LIVER CFR SERPL: 69 %

## 2023-09-15 ENCOUNTER — RESULT REVIEW (OUTPATIENT)
Age: 73
End: 2023-09-15

## 2023-09-19 ENCOUNTER — OUTPATIENT (OUTPATIENT)
Dept: OUTPATIENT SERVICES | Facility: HOSPITAL | Age: 73
LOS: 1 days | End: 2023-09-19
Payer: MEDICARE

## 2023-09-19 ENCOUNTER — RESULT REVIEW (OUTPATIENT)
Age: 73
End: 2023-09-19

## 2023-09-19 VITALS
TEMPERATURE: 98 F | OXYGEN SATURATION: 98 % | HEART RATE: 61 BPM | SYSTOLIC BLOOD PRESSURE: 146 MMHG | RESPIRATION RATE: 19 BRPM | DIASTOLIC BLOOD PRESSURE: 88 MMHG

## 2023-09-19 VITALS
DIASTOLIC BLOOD PRESSURE: 85 MMHG | TEMPERATURE: 98 F | RESPIRATION RATE: 18 BRPM | SYSTOLIC BLOOD PRESSURE: 147 MMHG | HEART RATE: 63 BPM | OXYGEN SATURATION: 98 %

## 2023-09-19 DIAGNOSIS — Z90.3 ACQUIRED ABSENCE OF STOMACH [PART OF]: Chronic | ICD-10-CM

## 2023-09-19 DIAGNOSIS — C16.9 MALIGNANT NEOPLASM OF STOMACH, UNSPECIFIED: ICD-10-CM

## 2023-09-19 DIAGNOSIS — Z90.411 ACQUIRED PARTIAL ABSENCE OF PANCREAS: Chronic | ICD-10-CM

## 2023-09-19 DIAGNOSIS — Z98.890 OTHER SPECIFIED POSTPROCEDURAL STATES: Chronic | ICD-10-CM

## 2023-09-19 DIAGNOSIS — Z87.19 PERSONAL HISTORY OF OTHER DISEASES OF THE DIGESTIVE SYSTEM: Chronic | ICD-10-CM

## 2023-09-19 DIAGNOSIS — Z98.49 CATARACT EXTRACTION STATUS, UNSPECIFIED EYE: Chronic | ICD-10-CM

## 2023-09-19 DIAGNOSIS — Z95.828 PRESENCE OF OTHER VASCULAR IMPLANTS AND GRAFTS: Chronic | ICD-10-CM

## 2023-09-19 DIAGNOSIS — Z93.1 GASTROSTOMY STATUS: Chronic | ICD-10-CM

## 2023-09-19 PROCEDURE — 49451 REPLACE DUOD/JEJ TUBE PERC: CPT

## 2023-09-26 DIAGNOSIS — Z85.028 PERSONAL HISTORY OF OTHER MALIGNANT NEOPLASM OF STOMACH: ICD-10-CM

## 2023-09-26 DIAGNOSIS — Z46.59 ENCOUNTER FOR FITTING AND ADJUSTMENT OF OTHER GASTROINTESTINAL APPLIANCE AND DEVICE: ICD-10-CM

## 2023-09-26 DIAGNOSIS — C16.9 MALIGNANT NEOPLASM OF STOMACH, UNSPECIFIED: ICD-10-CM

## 2023-10-04 ENCOUNTER — APPOINTMENT (OUTPATIENT)
Dept: CT IMAGING | Facility: IMAGING CENTER | Age: 73
End: 2023-10-04
Payer: MEDICARE

## 2023-10-04 ENCOUNTER — OUTPATIENT (OUTPATIENT)
Dept: OUTPATIENT SERVICES | Facility: HOSPITAL | Age: 73
LOS: 1 days | End: 2023-10-04
Payer: MEDICARE

## 2023-10-04 DIAGNOSIS — Z98.49 CATARACT EXTRACTION STATUS, UNSPECIFIED EYE: Chronic | ICD-10-CM

## 2023-10-04 DIAGNOSIS — Z90.411 ACQUIRED PARTIAL ABSENCE OF PANCREAS: Chronic | ICD-10-CM

## 2023-10-04 DIAGNOSIS — Z98.890 OTHER SPECIFIED POSTPROCEDURAL STATES: Chronic | ICD-10-CM

## 2023-10-04 DIAGNOSIS — Z93.1 GASTROSTOMY STATUS: Chronic | ICD-10-CM

## 2023-10-04 DIAGNOSIS — Z87.19 PERSONAL HISTORY OF OTHER DISEASES OF THE DIGESTIVE SYSTEM: Chronic | ICD-10-CM

## 2023-10-04 DIAGNOSIS — Z90.3 ACQUIRED ABSENCE OF STOMACH [PART OF]: Chronic | ICD-10-CM

## 2023-10-04 DIAGNOSIS — C16.9 MALIGNANT NEOPLASM OF STOMACH, UNSPECIFIED: ICD-10-CM

## 2023-10-04 DIAGNOSIS — Z95.828 PRESENCE OF OTHER VASCULAR IMPLANTS AND GRAFTS: Chronic | ICD-10-CM

## 2023-10-04 PROCEDURE — 74177 CT ABD & PELVIS W/CONTRAST: CPT | Mod: 26,MH

## 2023-10-04 PROCEDURE — 71260 CT THORAX DX C+: CPT | Mod: 26,MH

## 2023-10-04 PROCEDURE — 71260 CT THORAX DX C+: CPT

## 2023-10-04 PROCEDURE — 74177 CT ABD & PELVIS W/CONTRAST: CPT

## 2023-10-05 NOTE — BRIEF OPERATIVE NOTE - DISPOSITION
Topical Retinoids Recommendations: Discussed rx vs topical retinoids Detail Level: Zone Nicotinamide Supplementation Recommendations: 500mg Sunscreen Recommendations: SPF 50+ daily PACU, then home Detail Level: Simple

## 2023-10-05 NOTE — ASU PATIENT PROFILE, ADULT - PRO ARRIVE FROM
Subjective:      Patient ID: Kilo Payan is a 54 y.o. female. Patient is here for annual. Patient with vaginal odor/discharge. Gynecologic Exam        Review of Systems   Constitutional: Negative. HENT: Negative. Eyes: Negative. Respiratory: Negative. Cardiovascular: Negative. Gastrointestinal: Negative. Endocrine: Negative. Genitourinary:  Positive for vaginal discharge. Musculoskeletal: Negative. Skin: Negative. Allergic/Immunologic: Negative. Neurological: Negative. Hematological: Negative. Psychiatric/Behavioral: Negative.        Date of Birth 1977  Past Medical History:   Diagnosis Date    Bacterial vaginosis     Fibroid     Menorrhagia      Past Surgical History:   Procedure Laterality Date     SECTION   and     DILATION AND CURETTAGE OF UTERUS       OB History    Para Term  AB Living   3 2 2   1 2   SAB IAB Ectopic Molar Multiple Live Births   1         2      # Outcome Date GA Lbr Ellis/2nd Weight Sex Delivery Anes PTL Lv   3 Term 07   6 lb 12.8 oz (3.085 kg) F CS-Unspec   FAUSTINA   2 Term 03 [id number]  7 lb 9 oz (3.43 kg) M CS-Unspec   FAUSTINA      Birth Comments: Darcy   1 SAB              Social History     Socioeconomic History    Marital status: Single     Spouse name: Not on file    Number of children: Not on file    Years of education: Not on file    Highest education level: Not on file   Occupational History    Not on file   Tobacco Use    Smoking status: Never    Smokeless tobacco: Never   Substance and Sexual Activity    Alcohol use: Yes    Drug use: No    Sexual activity: Not on file   Other Topics Concern    Not on file   Social History Narrative    Not on file     Social Determinants of Health     Financial Resource Strain: Not on file   Food Insecurity: Not on file   Transportation Needs: Not on file   Physical Activity: Not on file   Stress: Not on file   Social Connections: Not on file   Intimate Partner
home

## 2023-10-10 NOTE — ASU PREOP CHECKLIST - ADDITIONAL CONSENTS
Xolair Counseling:  Patient informed of potential adverse effects including but not limited to fever, muscle aches, rash and allergic reactions.  The patient verbalized understanding of the proper use and possible adverse effects of Xolair.  All of the patient's questions and concerns were addressed. n/a

## 2023-11-16 ENCOUNTER — RESULT REVIEW (OUTPATIENT)
Age: 73
End: 2023-11-16

## 2023-11-16 ENCOUNTER — OUTPATIENT (OUTPATIENT)
Dept: OUTPATIENT SERVICES | Facility: HOSPITAL | Age: 73
LOS: 1 days | End: 2023-11-16
Payer: MEDICARE

## 2023-11-16 VITALS
DIASTOLIC BLOOD PRESSURE: 73 MMHG | RESPIRATION RATE: 14 BRPM | OXYGEN SATURATION: 100 % | SYSTOLIC BLOOD PRESSURE: 127 MMHG | TEMPERATURE: 98 F | HEART RATE: 74 BPM

## 2023-11-16 VITALS
TEMPERATURE: 98 F | DIASTOLIC BLOOD PRESSURE: 72 MMHG | SYSTOLIC BLOOD PRESSURE: 143 MMHG | HEART RATE: 77 BPM | OXYGEN SATURATION: 100 % | RESPIRATION RATE: 14 BRPM

## 2023-11-16 DIAGNOSIS — Z95.828 PRESENCE OF OTHER VASCULAR IMPLANTS AND GRAFTS: Chronic | ICD-10-CM

## 2023-11-16 DIAGNOSIS — Z90.3 ACQUIRED ABSENCE OF STOMACH [PART OF]: Chronic | ICD-10-CM

## 2023-11-16 DIAGNOSIS — Z98.890 OTHER SPECIFIED POSTPROCEDURAL STATES: Chronic | ICD-10-CM

## 2023-11-16 DIAGNOSIS — Z90.411 ACQUIRED PARTIAL ABSENCE OF PANCREAS: Chronic | ICD-10-CM

## 2023-11-16 DIAGNOSIS — Z93.1 GASTROSTOMY STATUS: Chronic | ICD-10-CM

## 2023-11-16 DIAGNOSIS — C16.9 MALIGNANT NEOPLASM OF STOMACH, UNSPECIFIED: ICD-10-CM

## 2023-11-16 DIAGNOSIS — Z87.19 PERSONAL HISTORY OF OTHER DISEASES OF THE DIGESTIVE SYSTEM: Chronic | ICD-10-CM

## 2023-11-16 DIAGNOSIS — Z98.49 CATARACT EXTRACTION STATUS, UNSPECIFIED EYE: Chronic | ICD-10-CM

## 2023-11-16 PROCEDURE — 49451 REPLACE DUOD/JEJ TUBE PERC: CPT

## 2023-11-26 RX ORDER — APIXABAN 2.5 MG/1
2.5 TABLET, FILM COATED ORAL
Qty: 60 | Refills: 0 | Status: DISCONTINUED | COMMUNITY
Start: 2021-03-04 | End: 2023-11-26

## 2023-11-27 DIAGNOSIS — Z85.028 PERSONAL HISTORY OF OTHER MALIGNANT NEOPLASM OF STOMACH: ICD-10-CM

## 2023-11-27 DIAGNOSIS — Z46.59 ENCOUNTER FOR FITTING AND ADJUSTMENT OF OTHER GASTROINTESTINAL APPLIANCE AND DEVICE: ICD-10-CM

## 2023-12-11 NOTE — PRE-OP CHECKLIST - MUPIRONCIN COMMENTS
How Severe Is Your Eczema?: mild Is This A New Presentation, Or A Follow-Up?: Rash non ortho /// No orders Wash done on [facility] as per order

## 2023-12-13 LAB
ALBUMIN SERPL ELPH-MCNC: 4 G/DL
ALP BLD-CCNC: 147 U/L
ALT SERPL-CCNC: 25 U/L
ANION GAP SERPL CALC-SCNC: 8 MMOL/L
AST SERPL-CCNC: 36 U/L
BILIRUB SERPL-MCNC: 0.8 MG/DL
BUN SERPL-MCNC: 20 MG/DL
CALCIUM SERPL-MCNC: 9.3 MG/DL
CHLORIDE SERPL-SCNC: 100 MMOL/L
CHOLEST SERPL-MCNC: 186 MG/DL
CO2 SERPL-SCNC: 26 MMOL/L
CREAT SERPL-MCNC: 0.89 MG/DL
CREAT SPEC-SCNC: 126 MG/DL
EGFR: 90 ML/MIN/1.73M2
ESTIMATED AVERAGE GLUCOSE: 131 MG/DL
GLUCOSE SERPL-MCNC: 125 MG/DL
HBA1C MFR BLD HPLC: 6.2 %
HDLC SERPL-MCNC: 82 MG/DL
LDLC SERPL CALC-MCNC: 96 MG/DL
MICROALBUMIN 24H UR DL<=1MG/L-MCNC: 8.4 MG/DL
MICROALBUMIN/CREAT 24H UR-RTO: 67 MG/G
NONHDLC SERPL-MCNC: 104 MG/DL
POTASSIUM SERPL-SCNC: 4.3 MMOL/L
PROT SERPL-MCNC: 7.8 G/DL
SODIUM SERPL-SCNC: 135 MMOL/L
T4 FREE SERPL-MCNC: 1 NG/DL
TRIGL SERPL-MCNC: 43 MG/DL
TSH SERPL-ACNC: 3.01 UIU/ML
VIT B12 SERPL-MCNC: 299 PG/ML

## 2023-12-21 ENCOUNTER — APPOINTMENT (OUTPATIENT)
Dept: DERMATOLOGY | Facility: CLINIC | Age: 73
End: 2023-12-21
Payer: MEDICARE

## 2023-12-21 DIAGNOSIS — R22.9 LOCALIZED SWELLING, MASS AND LUMP, UNSPECIFIED: ICD-10-CM

## 2023-12-21 PROCEDURE — 99204 OFFICE O/P NEW MOD 45 MIN: CPT

## 2023-12-22 ENCOUNTER — APPOINTMENT (OUTPATIENT)
Dept: ENDOCRINOLOGY | Facility: CLINIC | Age: 73
End: 2023-12-22
Payer: MEDICARE

## 2023-12-22 VITALS
BODY MASS INDEX: 21.19 KG/M2 | HEART RATE: 66 BPM | WEIGHT: 148 LBS | TEMPERATURE: 97.3 F | HEIGHT: 70 IN | SYSTOLIC BLOOD PRESSURE: 153 MMHG | DIASTOLIC BLOOD PRESSURE: 83 MMHG | OXYGEN SATURATION: 97 %

## 2023-12-22 DIAGNOSIS — Z01.818 ENCOUNTER FOR OTHER PREPROCEDURAL EXAMINATION: ICD-10-CM

## 2023-12-22 DIAGNOSIS — R19.5 OTHER FECAL ABNORMALITIES: ICD-10-CM

## 2023-12-22 DIAGNOSIS — Z80.0 FAMILY HISTORY OF MALIGNANT NEOPLASM OF DIGESTIVE ORGANS: ICD-10-CM

## 2023-12-22 DIAGNOSIS — K31.89 OTHER DISEASES OF STOMACH AND DUODENUM: ICD-10-CM

## 2023-12-22 PROCEDURE — 99214 OFFICE O/P EST MOD 30 MIN: CPT

## 2023-12-31 PROBLEM — Z01.818 PREOP TESTING: Status: RESOLVED | Noted: 2021-05-06 | Resolved: 2023-12-31

## 2023-12-31 PROBLEM — Z01.818 PREOPERATIVE TESTING: Status: RESOLVED | Noted: 2020-06-20 | Resolved: 2023-12-31

## 2023-12-31 PROBLEM — Z80.0 FAMILY HISTORY OF PANCREATIC CANCER: Status: ACTIVE | Noted: 2023-12-22

## 2024-01-01 ENCOUNTER — OUTPATIENT (OUTPATIENT)
Dept: OUTPATIENT SERVICES | Facility: HOSPITAL | Age: 74
LOS: 1 days | End: 2024-01-01
Payer: MEDICARE

## 2024-01-01 ENCOUNTER — INPATIENT (INPATIENT)
Facility: HOSPITAL | Age: 74
LOS: 1 days | Discharge: HOME | End: 2024-10-20
Attending: SURGERY | Admitting: SURGERY
Payer: MEDICARE

## 2024-01-01 VITALS
SYSTOLIC BLOOD PRESSURE: 147 MMHG | DIASTOLIC BLOOD PRESSURE: 89 MMHG | OXYGEN SATURATION: 97 % | RESPIRATION RATE: 18 BRPM | HEART RATE: 69 BPM

## 2024-01-01 VITALS
RESPIRATION RATE: 17 BRPM | SYSTOLIC BLOOD PRESSURE: 115 MMHG | TEMPERATURE: 98 F | OXYGEN SATURATION: 98 % | HEART RATE: 60 BPM | DIASTOLIC BLOOD PRESSURE: 58 MMHG

## 2024-01-01 DIAGNOSIS — Z98.890 OTHER SPECIFIED POSTPROCEDURAL STATES: Chronic | ICD-10-CM

## 2024-01-01 DIAGNOSIS — Z93.1 GASTROSTOMY STATUS: Chronic | ICD-10-CM

## 2024-01-01 DIAGNOSIS — N40.0 BENIGN PROSTATIC HYPERPLASIA WITHOUT LOWER URINARY TRACT SYMPTOMS: ICD-10-CM

## 2024-01-01 DIAGNOSIS — I97.89 OTHER POSTPROCEDURAL COMPLICATIONS AND DISORDERS OF THE CIRCULATORY SYSTEM, NOT ELSEWHERE CLASSIFIED: ICD-10-CM

## 2024-01-01 DIAGNOSIS — Z87.19 PERSONAL HISTORY OF OTHER DISEASES OF THE DIGESTIVE SYSTEM: Chronic | ICD-10-CM

## 2024-01-01 DIAGNOSIS — Z90.411 ACQUIRED PARTIAL ABSENCE OF PANCREAS: Chronic | ICD-10-CM

## 2024-01-01 DIAGNOSIS — Z90.3 ACQUIRED ABSENCE OF STOMACH [PART OF]: Chronic | ICD-10-CM

## 2024-01-01 DIAGNOSIS — I47.20 VENTRICULAR TACHYCARDIA, UNSPECIFIED: ICD-10-CM

## 2024-01-01 DIAGNOSIS — I81 PORTAL VEIN THROMBOSIS: ICD-10-CM

## 2024-01-01 DIAGNOSIS — D64.9 ANEMIA, UNSPECIFIED: ICD-10-CM

## 2024-01-01 DIAGNOSIS — C16.9 MALIGNANT NEOPLASM OF STOMACH, UNSPECIFIED: ICD-10-CM
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
## 2024-01-01 DIAGNOSIS — E03.9 HYPOTHYROIDISM, UNSPECIFIED: ICD-10-CM

## 2024-01-01 DIAGNOSIS — Z98.49 CATARACT EXTRACTION STATUS, UNSPECIFIED EYE: Chronic | ICD-10-CM

## 2024-01-01 DIAGNOSIS — I10 ESSENTIAL (PRIMARY) HYPERTENSION: ICD-10-CM

## 2024-01-01 DIAGNOSIS — Z95.828 PRESENCE OF OTHER VASCULAR IMPLANTS AND GRAFTS: Chronic | ICD-10-CM

## 2024-01-01 DIAGNOSIS — E11.9 TYPE 2 DIABETES MELLITUS WITHOUT COMPLICATIONS: ICD-10-CM
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
## 2024-01-01 DIAGNOSIS — E44.0 MODERATE PROTEIN-CALORIE MALNUTRITION: ICD-10-CM

## 2024-01-01 DIAGNOSIS — I71.40 ABDOMINAL AORTIC ANEURYSM, WITHOUT RUPTURE, UNSPECIFIED: ICD-10-CM

## 2024-01-01 LAB
A1C WITH ESTIMATED AVERAGE GLUCOSE RESULT: 5.4 % — SIGNIFICANT CHANGE UP (ref 4–5.6)
ALBUMIN SERPL ELPH-MCNC: 3.2 G/DL — LOW (ref 3.3–5)
ALP SERPL-CCNC: 123 U/L — HIGH (ref 40–120)
ALT FLD-CCNC: 19 U/L — SIGNIFICANT CHANGE UP (ref 10–45)
ANION GAP SERPL CALC-SCNC: 5 MMOL/L — SIGNIFICANT CHANGE UP (ref 5–17)
ANION GAP SERPL CALC-SCNC: 6 MMOL/L — SIGNIFICANT CHANGE UP (ref 5–17)
ANION GAP SERPL CALC-SCNC: 8 MMOL/L — SIGNIFICANT CHANGE UP (ref 5–17)
ANION GAP SERPL CALC-SCNC: 9 MMOL/L — SIGNIFICANT CHANGE UP (ref 5–17)
APTT BLD: 29 SEC — SIGNIFICANT CHANGE UP (ref 24.5–35.6)
AST SERPL-CCNC: 32 U/L — SIGNIFICANT CHANGE UP (ref 10–40)
BASOPHILS # BLD AUTO: 0.08 K/UL — SIGNIFICANT CHANGE UP (ref 0–0.2)
BASOPHILS NFR BLD AUTO: 1.3 % — SIGNIFICANT CHANGE UP (ref 0–2)
BILIRUB SERPL-MCNC: 0.7 MG/DL — SIGNIFICANT CHANGE UP (ref 0.2–1.2)
BUN SERPL-MCNC: 19 MG/DL — SIGNIFICANT CHANGE UP (ref 7–23)
BUN SERPL-MCNC: 19 MG/DL — SIGNIFICANT CHANGE UP (ref 7–23)
BUN SERPL-MCNC: 21 MG/DL — SIGNIFICANT CHANGE UP (ref 7–23)
BUN SERPL-MCNC: 23 MG/DL — SIGNIFICANT CHANGE UP (ref 7–23)
CALCIUM SERPL-MCNC: 8.1 MG/DL — LOW (ref 8.4–10.5)
CALCIUM SERPL-MCNC: 8.5 MG/DL — SIGNIFICANT CHANGE UP (ref 8.4–10.5)
CALCIUM SERPL-MCNC: 8.5 MG/DL — SIGNIFICANT CHANGE UP (ref 8.4–10.5)
CALCIUM SERPL-MCNC: 8.6 MG/DL — SIGNIFICANT CHANGE UP (ref 8.4–10.5)
CHLORIDE SERPL-SCNC: 102 MMOL/L — SIGNIFICANT CHANGE UP (ref 96–108)
CHLORIDE SERPL-SCNC: 104 MMOL/L — SIGNIFICANT CHANGE UP (ref 96–108)
CHLORIDE SERPL-SCNC: 104 MMOL/L — SIGNIFICANT CHANGE UP (ref 96–108)
CHLORIDE SERPL-SCNC: 106 MMOL/L — SIGNIFICANT CHANGE UP (ref 96–108)
CHOLEST SERPL-MCNC: 151 MG/DL — SIGNIFICANT CHANGE UP
CO2 SERPL-SCNC: 24 MMOL/L — SIGNIFICANT CHANGE UP (ref 22–31)
CO2 SERPL-SCNC: 25 MMOL/L — SIGNIFICANT CHANGE UP (ref 22–31)
CO2 SERPL-SCNC: 25 MMOL/L — SIGNIFICANT CHANGE UP (ref 22–31)
CO2 SERPL-SCNC: 28 MMOL/L — SIGNIFICANT CHANGE UP (ref 22–31)
CREAT SERPL-MCNC: 0.81 MG/DL — SIGNIFICANT CHANGE UP (ref 0.5–1.3)
CREAT SERPL-MCNC: 0.84 MG/DL — SIGNIFICANT CHANGE UP (ref 0.5–1.3)
CREAT SERPL-MCNC: 0.85 MG/DL — SIGNIFICANT CHANGE UP (ref 0.5–1.3)
CREAT SERPL-MCNC: 0.93 MG/DL — SIGNIFICANT CHANGE UP (ref 0.5–1.3)
EGFR: 86 ML/MIN/1.73M2 — SIGNIFICANT CHANGE UP
EGFR: 86 ML/MIN/1.73M2 — SIGNIFICANT CHANGE UP
EGFR: 91 ML/MIN/1.73M2 — SIGNIFICANT CHANGE UP
EGFR: 92 ML/MIN/1.73M2 — SIGNIFICANT CHANGE UP
EGFR: 92 ML/MIN/1.73M2 — SIGNIFICANT CHANGE UP
EOSINOPHIL # BLD AUTO: 0.18 K/UL — SIGNIFICANT CHANGE UP (ref 0–0.5)
EOSINOPHIL NFR BLD AUTO: 3 % — SIGNIFICANT CHANGE UP (ref 0–6)
ESTIMATED AVERAGE GLUCOSE: 108 MG/DL — SIGNIFICANT CHANGE UP (ref 68–114)
GLUCOSE BLDC GLUCOMTR-MCNC: 105 MG/DL — HIGH (ref 70–99)
GLUCOSE BLDC GLUCOMTR-MCNC: 121 MG/DL — HIGH (ref 70–99)
GLUCOSE BLDC GLUCOMTR-MCNC: 154 MG/DL — HIGH (ref 70–99)
GLUCOSE BLDC GLUCOMTR-MCNC: 215 MG/DL — HIGH (ref 70–99)
GLUCOSE BLDC GLUCOMTR-MCNC: 82 MG/DL — SIGNIFICANT CHANGE UP (ref 70–99)
GLUCOSE BLDC GLUCOMTR-MCNC: 84 MG/DL — SIGNIFICANT CHANGE UP (ref 70–99)
GLUCOSE BLDC GLUCOMTR-MCNC: 85 MG/DL — SIGNIFICANT CHANGE UP (ref 70–99)
GLUCOSE BLDC GLUCOMTR-MCNC: 88 MG/DL — SIGNIFICANT CHANGE UP (ref 70–99)
GLUCOSE BLDC GLUCOMTR-MCNC: 88 MG/DL — SIGNIFICANT CHANGE UP (ref 70–99)
GLUCOSE BLDC GLUCOMTR-MCNC: 93 MG/DL — SIGNIFICANT CHANGE UP (ref 70–99)
GLUCOSE SERPL-MCNC: 179 MG/DL — HIGH (ref 70–99)
GLUCOSE SERPL-MCNC: 83 MG/DL — SIGNIFICANT CHANGE UP (ref 70–99)
GLUCOSE SERPL-MCNC: 84 MG/DL — SIGNIFICANT CHANGE UP (ref 70–99)
GLUCOSE SERPL-MCNC: 84 MG/DL — SIGNIFICANT CHANGE UP (ref 70–99)
HCT VFR BLD CALC: 28.6 % — LOW (ref 39–50)
HCT VFR BLD CALC: 32 % — LOW (ref 39–50)
HCT VFR BLD CALC: 32.2 % — LOW (ref 39–50)
HCT VFR BLD CALC: 34.1 % — LOW (ref 39–50)
HDLC SERPL-MCNC: 67 MG/DL — SIGNIFICANT CHANGE UP
HGB BLD-MCNC: 10.1 G/DL — LOW (ref 13–17)
HGB BLD-MCNC: 10.5 G/DL — LOW (ref 13–17)
HGB BLD-MCNC: 10.8 G/DL — LOW (ref 13–17)
HGB BLD-MCNC: 9.6 G/DL — LOW (ref 13–17)
IMM GRANULOCYTES NFR BLD AUTO: 0.3 % — SIGNIFICANT CHANGE UP (ref 0–0.9)
INR BLD: 1.05 — SIGNIFICANT CHANGE UP (ref 0.85–1.16)
LACTATE SERPL-SCNC: 1.4 MMOL/L — SIGNIFICANT CHANGE UP (ref 0.5–2)
LACTATE SERPL-SCNC: 2.6 MMOL/L — HIGH (ref 0.5–2)
LDLC SERPL-MCNC: 73 MG/DL — SIGNIFICANT CHANGE UP
LIPID PNL WITH DIRECT LDL SERPL: 73 MG/DL — SIGNIFICANT CHANGE UP
LYMPHOCYTES # BLD AUTO: 0.78 K/UL — LOW (ref 1–3.3)
LYMPHOCYTES # BLD AUTO: 13 % — SIGNIFICANT CHANGE UP (ref 13–44)
MAGNESIUM SERPL-MCNC: 2.1 MG/DL — SIGNIFICANT CHANGE UP (ref 1.6–2.6)
MAGNESIUM SERPL-MCNC: 2.1 MG/DL — SIGNIFICANT CHANGE UP (ref 1.6–2.6)
MAGNESIUM SERPL-MCNC: 2.2 MG/DL — SIGNIFICANT CHANGE UP (ref 1.6–2.6)
MAGNESIUM SERPL-MCNC: 2.2 MG/DL — SIGNIFICANT CHANGE UP (ref 1.6–2.6)
MCHC RBC-ENTMCNC: 30.5 PG — SIGNIFICANT CHANGE UP (ref 27–34)
MCHC RBC-ENTMCNC: 31.3 PG — SIGNIFICANT CHANGE UP (ref 27–34)
MCHC RBC-ENTMCNC: 31.6 GM/DL — LOW (ref 32–36)
MCHC RBC-ENTMCNC: 31.7 GM/DL — LOW (ref 32–36)
MCHC RBC-ENTMCNC: 32.2 PG — SIGNIFICANT CHANGE UP (ref 27–34)
MCHC RBC-ENTMCNC: 32.6 GM/DL — SIGNIFICANT CHANGE UP (ref 32–36)
MCHC RBC-ENTMCNC: 32.7 PG — SIGNIFICANT CHANGE UP (ref 27–34)
MCHC RBC-ENTMCNC: 33.6 GM/DL — SIGNIFICANT CHANGE UP (ref 32–36)
MCV RBC AUTO: 96.7 FL — SIGNIFICANT CHANGE UP (ref 80–100)
MCV RBC AUTO: 97.3 FL — SIGNIFICANT CHANGE UP (ref 80–100)
MCV RBC AUTO: 98.8 FL — SIGNIFICANT CHANGE UP (ref 80–100)
MCV RBC AUTO: 98.8 FL — SIGNIFICANT CHANGE UP (ref 80–100)
MONOCYTES # BLD AUTO: 0.6 K/UL — SIGNIFICANT CHANGE UP (ref 0–0.9)
MONOCYTES NFR BLD AUTO: 10 % — SIGNIFICANT CHANGE UP (ref 2–14)
NEUTROPHILS # BLD AUTO: 4.33 K/UL — SIGNIFICANT CHANGE UP (ref 1.8–7.4)
NEUTROPHILS NFR BLD AUTO: 72.4 % — SIGNIFICANT CHANGE UP (ref 43–77)
NONHDLC SERPL-MCNC: 84 MG/DL — SIGNIFICANT CHANGE UP
NRBC # BLD: 0 /100 WBCS — SIGNIFICANT CHANGE UP (ref 0–0)
NRBC BLD-RTO: 0 /100 WBCS — SIGNIFICANT CHANGE UP (ref 0–0)
PHOSPHATE SERPL-MCNC: 2.8 MG/DL — SIGNIFICANT CHANGE UP (ref 2.5–4.5)
PHOSPHATE SERPL-MCNC: 3.1 MG/DL — SIGNIFICANT CHANGE UP (ref 2.5–4.5)
PHOSPHATE SERPL-MCNC: 3.6 MG/DL — SIGNIFICANT CHANGE UP (ref 2.5–4.5)
PLATELET # BLD AUTO: 143 K/UL — LOW (ref 150–400)
PLATELET # BLD AUTO: 150 K/UL — SIGNIFICANT CHANGE UP (ref 150–400)
PLATELET # BLD AUTO: 151 K/UL — SIGNIFICANT CHANGE UP (ref 150–400)
PLATELET # BLD AUTO: 169 K/UL — SIGNIFICANT CHANGE UP (ref 150–400)
POCT ISTAT CREATININE: 0.9 MG/DL — SIGNIFICANT CHANGE UP (ref 0.5–1.3)
POTASSIUM SERPL-MCNC: 3.8 MMOL/L — SIGNIFICANT CHANGE UP (ref 3.5–5.3)
POTASSIUM SERPL-MCNC: 4 MMOL/L — SIGNIFICANT CHANGE UP (ref 3.5–5.3)
POTASSIUM SERPL-MCNC: 4.5 MMOL/L — SIGNIFICANT CHANGE UP (ref 3.5–5.3)
POTASSIUM SERPL-MCNC: 4.6 MMOL/L — SIGNIFICANT CHANGE UP (ref 3.5–5.3)
POTASSIUM SERPL-SCNC: 3.8 MMOL/L — SIGNIFICANT CHANGE UP (ref 3.5–5.3)
POTASSIUM SERPL-SCNC: 4 MMOL/L — SIGNIFICANT CHANGE UP (ref 3.5–5.3)
POTASSIUM SERPL-SCNC: 4.5 MMOL/L — SIGNIFICANT CHANGE UP (ref 3.5–5.3)
POTASSIUM SERPL-SCNC: 4.6 MMOL/L — SIGNIFICANT CHANGE UP (ref 3.5–5.3)
PROT SERPL-MCNC: 6.8 G/DL — SIGNIFICANT CHANGE UP (ref 6–8.3)
PROTHROM AB SERPL-ACNC: 12.1 SEC — SIGNIFICANT CHANGE UP (ref 9.9–13.4)
RBC # BLD: 2.94 M/UL — LOW (ref 4.2–5.8)
RBC # BLD: 3.26 M/UL — LOW (ref 4.2–5.8)
RBC # BLD: 3.31 M/UL — LOW (ref 4.2–5.8)
RBC # BLD: 3.45 M/UL — LOW (ref 4.2–5.8)
RBC # FLD: 15.3 % — HIGH (ref 10.3–14.5)
RBC # FLD: 15.4 % — HIGH (ref 10.3–14.5)
RBC # FLD: 15.8 % — HIGH (ref 10.3–14.5)
RBC # FLD: 15.8 % — HIGH (ref 10.3–14.5)
SODIUM SERPL-SCNC: 135 MMOL/L — SIGNIFICANT CHANGE UP (ref 135–145)
SODIUM SERPL-SCNC: 136 MMOL/L — SIGNIFICANT CHANGE UP (ref 135–145)
SODIUM SERPL-SCNC: 137 MMOL/L — SIGNIFICANT CHANGE UP (ref 135–145)
SODIUM SERPL-SCNC: 138 MMOL/L — SIGNIFICANT CHANGE UP (ref 135–145)
TRIGL SERPL-MCNC: 49 MG/DL — SIGNIFICANT CHANGE UP
WBC # BLD: 5.27 K/UL — SIGNIFICANT CHANGE UP (ref 3.8–10.5)
WBC # BLD: 5.99 K/UL — SIGNIFICANT CHANGE UP (ref 3.8–10.5)
WBC # BLD: 6.2 K/UL — SIGNIFICANT CHANGE UP (ref 3.8–10.5)
WBC # BLD: 6.89 K/UL — SIGNIFICANT CHANGE UP (ref 3.8–10.5)
WBC # FLD AUTO: 5.27 K/UL — SIGNIFICANT CHANGE UP (ref 3.8–10.5)
WBC # FLD AUTO: 5.99 K/UL — SIGNIFICANT CHANGE UP (ref 3.8–10.5)
WBC # FLD AUTO: 6.2 K/UL — SIGNIFICANT CHANGE UP (ref 3.8–10.5)
WBC # FLD AUTO: 6.89 K/UL — SIGNIFICANT CHANGE UP (ref 3.8–10.5)

## 2024-01-01 PROCEDURE — 80061 LIPID PANEL: CPT

## 2024-01-01 PROCEDURE — C1889: CPT

## 2024-01-01 PROCEDURE — 80048 BASIC METABOLIC PNL TOTAL CA: CPT

## 2024-01-01 PROCEDURE — 85025 COMPLETE CBC W/AUTO DIFF WBC: CPT

## 2024-01-01 PROCEDURE — 36247 INS CATH ABD/L-EXT ART 3RD: CPT | Mod: GC,RT

## 2024-01-01 PROCEDURE — 83036 HEMOGLOBIN GLYCOSYLATED A1C: CPT

## 2024-01-01 PROCEDURE — 36415 COLL VENOUS BLD VENIPUNCTURE: CPT

## 2024-01-01 PROCEDURE — 74174 CTA ABD&PLVS W/CONTRAST: CPT | Mod: 26,MH

## 2024-01-01 PROCEDURE — 85610 PROTHROMBIN TIME: CPT

## 2024-01-01 PROCEDURE — C1760: CPT

## 2024-01-01 PROCEDURE — 75625 CONTRAST EXAM ABDOMINL AORTA: CPT | Mod: 26,GC,59

## 2024-01-01 PROCEDURE — 82962 GLUCOSE BLOOD TEST: CPT

## 2024-01-01 PROCEDURE — C1887: CPT

## 2024-01-01 PROCEDURE — 83605 ASSAY OF LACTIC ACID: CPT

## 2024-01-01 PROCEDURE — 84100 ASSAY OF PHOSPHORUS: CPT

## 2024-01-01 PROCEDURE — 83735 ASSAY OF MAGNESIUM: CPT

## 2024-01-01 PROCEDURE — C1894: CPT

## 2024-01-01 PROCEDURE — 82565 ASSAY OF CREATININE: CPT

## 2024-01-01 PROCEDURE — 80053 COMPREHEN METABOLIC PANEL: CPT

## 2024-01-01 PROCEDURE — 74174 CTA ABD&PLVS W/CONTRAST: CPT

## 2024-01-01 PROCEDURE — C9399: CPT

## 2024-01-01 PROCEDURE — C1769: CPT

## 2024-01-01 PROCEDURE — 37242 VASC EMBOLIZE/OCCLUDE ARTERY: CPT | Mod: GC

## 2024-01-01 PROCEDURE — 76000 FLUOROSCOPY <1 HR PHYS/QHP: CPT

## 2024-01-01 PROCEDURE — 85730 THROMBOPLASTIN TIME PARTIAL: CPT

## 2024-01-01 PROCEDURE — 36248 INS CATH ABD/L-EXT ART ADDL: CPT | Mod: GC

## 2024-01-01 PROCEDURE — 85027 COMPLETE CBC AUTOMATED: CPT

## 2024-01-01 PROCEDURE — 75736 ARTERY X-RAYS PELVIS: CPT | Mod: 26,GC,59

## 2024-01-01 PROCEDURE — 75726 ARTERY X-RAYS ABDOMEN: CPT | Mod: 26,GC,59

## 2024-01-01 RX ORDER — INSULIN LISPRO 100 U/ML
5 INJECTION, SOLUTION INTRAVENOUS; SUBCUTANEOUS
Refills: 0 | Status: DISCONTINUED | OUTPATIENT
Start: 2024-01-01 | End: 2024-01-01

## 2024-01-01 RX ORDER — LEVOTHYROXINE SODIUM 300 MCG
100 TABLET ORAL DAILY
Refills: 0 | Status: DISCONTINUED | OUTPATIENT
Start: 2024-01-01 | End: 2024-01-01

## 2024-01-01 RX ORDER — SODIUM CHLORIDE 9 G/1000ML
1000 INJECTION, SOLUTION INTRAVENOUS
Refills: 0 | Status: DISCONTINUED | OUTPATIENT
Start: 2024-01-01 | End: 2024-01-01

## 2024-01-01 RX ORDER — INSULIN LISPRO 100 U/ML
INJECTION, SOLUTION INTRAVENOUS; SUBCUTANEOUS AT BEDTIME
Refills: 0 | Status: DISCONTINUED | OUTPATIENT
Start: 2024-01-01 | End: 2024-01-01

## 2024-01-01 RX ORDER — DEXTROSE 50 % IN WATER 50 %
15 SYRINGE (ML) INTRAVENOUS ONCE
Refills: 0 | Status: DISCONTINUED | OUTPATIENT
Start: 2024-01-01 | End: 2024-01-01

## 2024-01-01 RX ORDER — ATORVASTATIN CALCIUM 80 MG/1
20 TABLET, FILM COATED ORAL AT BEDTIME
Refills: 0 | Status: DISCONTINUED | OUTPATIENT
Start: 2024-01-01 | End: 2024-01-01

## 2024-01-01 RX ORDER — INSULIN LISPRO 100 U/ML
INJECTION, SOLUTION INTRAVENOUS; SUBCUTANEOUS
Refills: 0 | Status: DISCONTINUED | OUTPATIENT
Start: 2024-01-01 | End: 2024-01-01

## 2024-01-01 RX ORDER — APIXABAN 2.5 MG/1
2.5 TABLET, FILM COATED ORAL EVERY 12 HOURS
Refills: 0 | Status: DISCONTINUED | OUTPATIENT
Start: 2024-01-01 | End: 2024-01-01

## 2024-01-01 RX ORDER — DEXTROSE 50 % IN WATER 50 %
12.5 SYRINGE (ML) INTRAVENOUS ONCE
Refills: 0 | Status: DISCONTINUED | OUTPATIENT
Start: 2024-01-01 | End: 2024-01-01

## 2024-01-01 RX ORDER — GLUCAGON 3 MG/1
1 POWDER NASAL ONCE
Refills: 0 | Status: DISCONTINUED | OUTPATIENT
Start: 2024-01-01 | End: 2024-01-01

## 2024-01-01 RX ORDER — DEXTROSE 50 % IN WATER 50 %
25 SYRINGE (ML) INTRAVENOUS ONCE
Refills: 0 | Status: DISCONTINUED | OUTPATIENT
Start: 2024-01-01 | End: 2024-01-01

## 2024-01-01 RX ORDER — INFLUENZA A VIRUS A/IDAHO/07/2018 (H1N1) ANTIGEN (MDCK CELL DERIVED, PROPIOLACTONE INACTIVATED, INFLUENZA A VIRUS A/INDIANA/08/2018 (H3N2) ANTIGEN (MDCK CELL DERIVED, PROPIOLACTONE INACTIVATED), INFLUENZA B VIRUS B/SINGAPORE/INFTT-16-0610/2016 ANTIGEN (MDCK CELL DERIVED, PROPIOLACTONE INACTIVATED), INFLUENZA B VIRUS B/IOWA/06/2017 ANTIGEN (MDCK CELL DERIVED, PROPIOLACTONE INACTIVATED) 15; 15; 15; 15 UG/.5ML; UG/.5ML; UG/.5ML; UG/.5ML
0.5 INJECTION, SUSPENSION INTRAMUSCULAR ONCE
Refills: 0 | Status: DISCONTINUED | OUTPATIENT
Start: 2024-01-01 | End: 2024-01-01

## 2024-01-01 RX ADMIN — ATORVASTATIN CALCIUM 20 MILLIGRAM(S): 80 TABLET, FILM COATED ORAL at 21:34

## 2024-01-01 RX ADMIN — Medication 22 UNIT(S): at 18:44

## 2024-01-01 RX ADMIN — Medication 5 MILLIGRAM(S): at 00:57

## 2024-01-01 RX ADMIN — APIXABAN 2.5 MILLIGRAM(S): 2.5 TABLET, FILM COATED ORAL at 05:48

## 2024-01-01 RX ADMIN — Medication 100 MICROGRAM(S): at 05:48

## 2024-01-01 RX ADMIN — APIXABAN 2.5 MILLIGRAM(S): 2.5 TABLET, FILM COATED ORAL at 10:10

## 2024-01-01 RX ADMIN — INSULIN LISPRO 2: 100 INJECTION, SOLUTION INTRAVENOUS; SUBCUTANEOUS at 08:19

## 2024-01-01 RX ADMIN — Medication 100 MICROGRAM(S): at 05:28

## 2024-01-01 RX ADMIN — ATORVASTATIN CALCIUM 20 MILLIGRAM(S): 80 TABLET, FILM COATED ORAL at 21:25

## 2024-01-01 RX ADMIN — INSULIN LISPRO 5 UNIT(S): 100 INJECTION, SOLUTION INTRAVENOUS; SUBCUTANEOUS at 18:44

## 2024-01-01 RX ADMIN — APIXABAN 2.5 MILLIGRAM(S): 2.5 TABLET, FILM COATED ORAL at 17:42

## 2024-01-01 RX ADMIN — Medication 5 MILLIGRAM(S): at 01:26

## 2024-01-01 RX ADMIN — Medication 1000 MILLILITER(S): at 14:37

## 2024-01-01 RX ADMIN — INSULIN LISPRO 5 UNIT(S): 100 INJECTION, SOLUTION INTRAVENOUS; SUBCUTANEOUS at 17:43

## 2024-01-01 RX ADMIN — Medication 5 MILLIGRAM(S): at 21:25

## 2024-01-01 RX ADMIN — Medication 5 MILLIGRAM(S): at 21:34

## 2024-01-01 RX ADMIN — Medication 22 UNIT(S): at 17:42

## 2024-01-01 RX ADMIN — Medication 100 MILLILITER(S): at 12:43

## 2024-01-04 ENCOUNTER — APPOINTMENT (OUTPATIENT)
Dept: DERMATOLOGY | Facility: CLINIC | Age: 74
End: 2024-01-04
Payer: MEDICARE

## 2024-01-04 DIAGNOSIS — L72.0 EPIDERMAL CYST: ICD-10-CM

## 2024-01-04 PROCEDURE — 99214 OFFICE O/P EST MOD 30 MIN: CPT

## 2024-01-08 NOTE — ADDENDUM
[FreeTextEntry1] : communicated with Dr Richard and oncologist Dr Henry who is aware of fistula and varicosities.

## 2024-01-08 NOTE — HISTORY OF PRESENT ILLNESS
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

## 2024-01-08 NOTE — PHYSICAL EXAM
[Alert] : alert [Oriented x 3] : ~L oriented x 3 [Well Nourished] : well nourished [Conjunctiva Non-injected] : conjunctiva non-injected [No Visual Lymphadenopathy] : no visual  lymphadenopathy [No Clubbing] : no clubbing [No Edema] : no edema [No Bromhidrosis] : no bromhidrosis [No Chromhidrosis] : no chromhidrosis [FreeTextEntry3] : white skin  soft compressible plaque of dilated veins surrounding feeding tube L abdomen medial abdomen with linear incision, superior aspect with open pink area where skin has broken down, beige liquidy drainage present  purpuric patches hands/arms  subcutaneous nodules right frontal scalp and L jaw

## 2024-01-11 ENCOUNTER — APPOINTMENT (OUTPATIENT)
Dept: SURGICAL ONCOLOGY | Facility: CLINIC | Age: 74
End: 2024-01-11
Payer: MEDICARE

## 2024-01-11 ENCOUNTER — OUTPATIENT (OUTPATIENT)
Dept: OUTPATIENT SERVICES | Facility: HOSPITAL | Age: 74
LOS: 1 days | End: 2024-01-11
Payer: MEDICARE

## 2024-01-11 ENCOUNTER — RESULT REVIEW (OUTPATIENT)
Age: 74
End: 2024-01-11

## 2024-01-11 VITALS
OXYGEN SATURATION: 99 % | DIASTOLIC BLOOD PRESSURE: 75 MMHG | SYSTOLIC BLOOD PRESSURE: 144 MMHG | HEART RATE: 69 BPM | TEMPERATURE: 98 F | RESPIRATION RATE: 15 BRPM

## 2024-01-11 VITALS
HEIGHT: 70 IN | HEART RATE: 67 BPM | BODY MASS INDEX: 21.19 KG/M2 | DIASTOLIC BLOOD PRESSURE: 87 MMHG | WEIGHT: 148 LBS | SYSTOLIC BLOOD PRESSURE: 136 MMHG | OXYGEN SATURATION: 97 %

## 2024-01-11 VITALS
HEART RATE: 77 BPM | RESPIRATION RATE: 16 BRPM | DIASTOLIC BLOOD PRESSURE: 74 MMHG | TEMPERATURE: 98 F | SYSTOLIC BLOOD PRESSURE: 154 MMHG | OXYGEN SATURATION: 99 %

## 2024-01-11 DIAGNOSIS — Z90.411 ACQUIRED PARTIAL ABSENCE OF PANCREAS: Chronic | ICD-10-CM

## 2024-01-11 DIAGNOSIS — Z98.890 OTHER SPECIFIED POSTPROCEDURAL STATES: Chronic | ICD-10-CM

## 2024-01-11 DIAGNOSIS — C16.9 MALIGNANT NEOPLASM OF STOMACH, UNSPECIFIED: ICD-10-CM

## 2024-01-11 DIAGNOSIS — Z87.19 PERSONAL HISTORY OF OTHER DISEASES OF THE DIGESTIVE SYSTEM: Chronic | ICD-10-CM

## 2024-01-11 DIAGNOSIS — R13.10 DYSPHAGIA, UNSPECIFIED: ICD-10-CM

## 2024-01-11 DIAGNOSIS — T85.79XA INFECTION AND INFLAMMATORY REACTION DUE TO OTHER INTERNAL PROSTHETIC DEVICES, IMPLANTS AND GRAFTS, INITIAL ENCOUNTER: ICD-10-CM

## 2024-01-11 DIAGNOSIS — Z98.49 CATARACT EXTRACTION STATUS, UNSPECIFIED EYE: Chronic | ICD-10-CM

## 2024-01-11 DIAGNOSIS — Z90.3 ACQUIRED ABSENCE OF STOMACH [PART OF]: Chronic | ICD-10-CM

## 2024-01-11 DIAGNOSIS — Z93.1 GASTROSTOMY STATUS: Chronic | ICD-10-CM

## 2024-01-11 DIAGNOSIS — Z95.828 PRESENCE OF OTHER VASCULAR IMPLANTS AND GRAFTS: Chronic | ICD-10-CM

## 2024-01-11 PROCEDURE — 49451 REPLACE DUOD/JEJ TUBE PERC: CPT

## 2024-01-11 PROCEDURE — 11042 DBRDMT SUBQ TIS 1ST 20SQCM/<: CPT

## 2024-01-15 PROBLEM — T85.79XA INFECTED PROSTHETIC MESH OF ABDOMINAL WALL: Status: ACTIVE | Noted: 2024-01-15

## 2024-01-15 PROBLEM — R13.10 DYSPHAGIA: Status: ACTIVE | Noted: 2020-11-06

## 2024-01-15 NOTE — REASON FOR VISIT
[Follow-Up Visit] : a follow-up visit for [Gastric Cancer] : gastric cancer [FreeTextEntry2] : dysphagia

## 2024-01-15 NOTE — HISTORY OF PRESENT ILLNESS
[de-identified] : Recently admitted to [facility] for progressive dysphagia and inability to tolerate po with weight loss. 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 Open feeding jejunostomy tube placed for nutritional access. Feels well.  12/08/2020: Patient feels well and is managing feeding tube.  01/04/2021: Since his last visit, patient has endoscopic enteroenterostomy by Axios placement of Axios stent by Dr. Peguero.  His dysphagia has improved slightly.  He reports clogged jtube.  He denies abdominal pain.  09/14/2021: Doing well.  He reports improved po intake, but is still predominantly on jtube feeds.  He had a suture granuloma removed last month during visit with Dr. Cabello.  He reports yellowish drainage from removal site in upper abdominal incision.  He denies fever, abdominal pain.  11/04/2021: Patient still has persistent drainage from suture granuloma removal site in the upper abdominal incisional region.  He changes dressing daily.  Fluid is thick yellow/purulent.  He is maintaining nutrition with supplemental jtube feeds.  He denies fever.  11/30/2021: Patient is s/p CT abdomen/pelvis and jtube exchange.  He has had mild weight loss since his last visit.  He had persistent drainage from upper midline incisional opening at site of previous suture.  CT abdomen/pelvis 11/26/2021 shows an thin anterior abdominal wall fluid collection just posterior to the rectus muscle measuring 7.8 x 1.7 cm.  02/22/2022: Overall unchanged.  He reports decreased midline incisional drainage.  He continue to self administer tube feeds without issue.  Tolerating small amounts of oral diet.  06/21/2022: Patient continues to be dependent on jtube feeding is tolerating small amounts for soft oral diet.  He reports persistent drainage from upper aspect of midline incision.  He denies fever or abdominal pain.  02/23/2023: Patient is s/p recent jtube exchange.  He is dependent on tube feeds and reports worsening dysphagia with difficulty swallowing pills.  He has had mild weight loss.  04/25/2023: Recently admitted to [facility].  He is s/p EGD and removal of EJ Axios stent by Dr. Peguero.  He is due for jtube exchange and reports persistent drainage from upper midline incision at area of previous suture granuloma removal. Recent CT abdomen/pelvis reviewed and shows increased subcutaneous collection at site of drainage.  He has no fevers.  07/13/2023: Patient feels well and is eating better.  EGD/esophagram from 06/2023 at [facility] reviewed.  He continues to use jtube for supplemental nutrition.  01/11/2024: Patient had his jejunostomy tube exchanged today.  He reports feeling well. He is tolerating limited amount of food and continues to use jejunostomy tube for supplemental feeding.  He reports persistent drainage from his upper midline chronically open wound, but denies fever.

## 2024-01-15 NOTE — PHYSICAL EXAM
[FreeTextEntry1] : Abdomen: soft, nontender/nondistended.  Small opening in superior aspect of incision with small purulent drainage, there is exposed foreign body seen deep in wound on today's exam. Jejunostomy tube in place. [Normal] : supple, no neck mass and thyroid not enlarged [Normal Neck Lymph Nodes] : normal neck lymph nodes  [Normal Supraclavicular Lymph Nodes] : normal supraclavicular lymph nodes [Normal Groin Lymph Nodes] : normal groin lymph nodes [Normal Axillary Lymph Nodes] : normal axillary lymph nodes [Normal] : grossly intact

## 2024-01-15 NOTE — PROCEDURE
[FreeTextEntry1] : Open wound is probed/examined and nonviable tissue debrided.  There is exposed mesh that is explanted from wound and excised.  No evidence of enteric drainage. Patient tolerated procedure well.

## 2024-01-15 NOTE — ASSESSMENT
[FreeTextEntry1] : Continue supplemental jejunostomy tube feeds. Continue local wound care/monitor for drainage. Patient understand that not all his mesh was excised - would require laparotomy/abdominal wall reconstruction and would not recommend at this time due to high-risk procedure.

## 2024-01-17 ENCOUNTER — OUTPATIENT (OUTPATIENT)
Dept: OUTPATIENT SERVICES | Facility: HOSPITAL | Age: 74
LOS: 1 days | Discharge: HOME | End: 2024-01-17

## 2024-01-17 DIAGNOSIS — Z98.890 OTHER SPECIFIED POSTPROCEDURAL STATES: Chronic | ICD-10-CM

## 2024-01-17 DIAGNOSIS — Z93.1 GASTROSTOMY STATUS: Chronic | ICD-10-CM

## 2024-01-17 DIAGNOSIS — C16.9 MALIGNANT NEOPLASM OF STOMACH, UNSPECIFIED: ICD-10-CM

## 2024-01-17 DIAGNOSIS — Z98.49 CATARACT EXTRACTION STATUS, UNSPECIFIED EYE: Chronic | ICD-10-CM

## 2024-01-17 DIAGNOSIS — Z90.3 ACQUIRED ABSENCE OF STOMACH [PART OF]: Chronic | ICD-10-CM

## 2024-01-17 DIAGNOSIS — Z90.411 ACQUIRED PARTIAL ABSENCE OF PANCREAS: Chronic | ICD-10-CM

## 2024-01-18 ENCOUNTER — APPOINTMENT (OUTPATIENT)
Dept: HEMATOLOGY ONCOLOGY | Facility: CLINIC | Age: 74
End: 2024-01-18
Payer: MEDICARE

## 2024-01-18 VITALS
WEIGHT: 147.71 LBS | HEART RATE: 77 BPM | DIASTOLIC BLOOD PRESSURE: 111 MMHG | RESPIRATION RATE: 16 BRPM | TEMPERATURE: 97.9 F | SYSTOLIC BLOOD PRESSURE: 179 MMHG | OXYGEN SATURATION: 99 % | BODY MASS INDEX: 21.19 KG/M2

## 2024-01-18 DIAGNOSIS — Z85.028 ENCOUNTER FOR FOLLOW-UP EXAMINATION AFTER COMPLETED TREATMENT FOR MALIGNANT NEOPLASM: ICD-10-CM

## 2024-01-18 DIAGNOSIS — Z08 ENCOUNTER FOR FOLLOW-UP EXAMINATION AFTER COMPLETED TREATMENT FOR MALIGNANT NEOPLASM: ICD-10-CM

## 2024-01-18 PROCEDURE — 99214 OFFICE O/P EST MOD 30 MIN: CPT

## 2024-01-20 NOTE — PHYSICAL EXAM
[Restricted in physically strenuous activity but ambulatory and able to carry out work of a light or sedentary nature] : Status 1- Restricted in physically strenuous activity but ambulatory and able to carry out work of a light or sedentary nature, e.g., light house work, office work [Normal] : affect appropriate [de-identified] : abdominal feeding tube

## 2024-01-20 NOTE — ASSESSMENT
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  The patient continues to do well except for difficulty gaining weight which requires that he continue to use feeding tube.  This is because continuous drainage of the abdomen with fluid leakage.  He did undergo surgical evaluation with Dr. Banda who felt surgery would be difficult and is not being recommended.  The patient feels he still needs a feeding tube as he is not able to consume enough calories with his meals.  The patient will undergo repeat CAT scans to assess for response or progression of disease.  He will return to the office in 3 months or sooner as needed. [Curative] : Goals of care discussed with patient: Curative

## 2024-01-20 NOTE — HISTORY OF PRESENT ILLNESS
[de-identified] : See Heme-Onc consult note 12,23/2016. [de-identified] : Pt has abdominal leakage and too high risk for surgery, pt has veins around ostomy. Pt has diabetes and takes insulin.Pt has stable weight. Pt Tu Richard.Pt Eugenoi VANEGAS, Pt to start losartan

## 2024-01-20 NOTE — REVIEW OF SYSTEMS
[Diarrhea: Grade 0] : Diarrhea: Grade 0 [Negative] : Allergic/Immunologic [FreeTextEntry2] : sleeps2-3 hrs on zolpiden [FreeTextEntry7] : has occ spitting up with feeding tube

## 2024-01-25 DIAGNOSIS — C16.9 MALIGNANT NEOPLASM OF STOMACH, UNSPECIFIED: ICD-10-CM

## 2024-01-25 DIAGNOSIS — Z46.59 ENCOUNTER FOR FITTING AND ADJUSTMENT OF OTHER GASTROINTESTINAL APPLIANCE AND DEVICE: ICD-10-CM

## 2024-01-25 DIAGNOSIS — Z85.028 PERSONAL HISTORY OF OTHER MALIGNANT NEOPLASM OF STOMACH: ICD-10-CM

## 2024-01-26 ENCOUNTER — APPOINTMENT (OUTPATIENT)
Dept: GASTROENTEROLOGY | Facility: CLINIC | Age: 74
End: 2024-01-26
Payer: MEDICARE

## 2024-01-26 VITALS
OXYGEN SATURATION: 98 % | TEMPERATURE: 98.3 F | HEART RATE: 54 BPM | WEIGHT: 142 LBS | DIASTOLIC BLOOD PRESSURE: 89 MMHG | BODY MASS INDEX: 20.37 KG/M2 | RESPIRATION RATE: 16 BRPM | SYSTOLIC BLOOD PRESSURE: 163 MMHG

## 2024-01-26 DIAGNOSIS — Z90.3 ACQUIRED ABSENCE OF STOMACH [PART OF]: ICD-10-CM

## 2024-01-26 PROCEDURE — 99214 OFFICE O/P EST MOD 30 MIN: CPT

## 2024-01-26 RX ORDER — INSULIN LISPRO 100 [IU]/ML
100 INJECTION, SOLUTION INTRAVENOUS; SUBCUTANEOUS
Qty: 5 | Refills: 3 | Status: DISCONTINUED | COMMUNITY
Start: 2023-05-10 | End: 2024-01-26

## 2024-01-26 RX ORDER — ALPRAZOLAM 0.5 MG/1
0.5 TABLET ORAL
Qty: 3 | Refills: 0 | Status: DISCONTINUED | COMMUNITY
Start: 2022-04-29 | End: 2024-01-26

## 2024-01-26 RX ORDER — BLOOD SUGAR DIAGNOSTIC
STRIP MISCELLANEOUS
Qty: 300 | Refills: 3 | Status: DISCONTINUED | COMMUNITY
Start: 2023-12-22 | End: 2024-01-26

## 2024-01-26 RX ORDER — FLASH GLUCOSE SENSOR
KIT MISCELLANEOUS
Qty: 6 | Refills: 3 | Status: DISCONTINUED | COMMUNITY
Start: 2022-02-14 | End: 2024-01-26

## 2024-01-26 RX ORDER — ELECTROLYTES/DEXTROSE
31G X 8 MM SOLUTION, ORAL ORAL
Qty: 500 | Refills: 3 | Status: DISCONTINUED | COMMUNITY
Start: 2023-06-12 | End: 2024-01-26

## 2024-01-26 RX ORDER — AMOXICILLIN AND CLAVULANATE POTASSIUM 500; 125 MG/1; MG/1
500-125 TABLET, FILM COATED ORAL
Qty: 14 | Refills: 0 | Status: DISCONTINUED | COMMUNITY
Start: 2023-04-17 | End: 2024-01-26

## 2024-01-26 RX ORDER — CLOBETASOL PROPIONATE 0.5 MG/G
0.05 OINTMENT TOPICAL
Qty: 45 | Refills: 0 | Status: DISCONTINUED | COMMUNITY
Start: 2021-03-16 | End: 2024-01-26

## 2024-01-26 RX ORDER — BLOOD-GLUCOSE METER
W/DEVICE EACH MISCELLANEOUS
Qty: 1 | Refills: 2 | Status: DISCONTINUED | COMMUNITY
Start: 2023-12-22 | End: 2024-01-26

## 2024-01-26 RX ORDER — LOSARTAN POTASSIUM 25 MG/1
25 TABLET, FILM COATED ORAL
Qty: 90 | Refills: 3 | Status: DISCONTINUED | COMMUNITY
Start: 2023-12-22 | End: 2024-01-26

## 2024-01-26 RX ORDER — LANCETS 33 GAUGE
EACH MISCELLANEOUS
Qty: 300 | Refills: 3 | Status: DISCONTINUED | COMMUNITY
Start: 2023-12-22 | End: 2024-01-26

## 2024-01-26 RX ORDER — SUCRALFATE 1 G/10ML
1 SUSPENSION ORAL 4 TIMES DAILY
Qty: 3 | Refills: 1 | Status: DISCONTINUED | COMMUNITY
Start: 2023-03-23 | End: 2024-01-26

## 2024-01-28 PROBLEM — Z90.3 HISTORY OF GASTRECTOMY: Status: ACTIVE | Noted: 2023-12-06

## 2024-01-28 NOTE — PHYSICAL EXAM
[Alert] : alert [Normal Voice/Communication] : normal voice/communication [Healthy Appearing] : healthy appearing [No Acute Distress] : no acute distress [Sclera] : the sclera and conjunctiva were normal [Normal Lips/Gums] : the lips and gums were normal [Hearing Threshold Finger Rub Not Lynn] : hearing was normal [Oropharynx] : the oropharynx was normal [Normal Appearance] : the appearance of the neck was normal [No Neck Mass] : no neck mass was observed [No Respiratory Distress] : no respiratory distress [No Acc Muscle Use] : no accessory muscle use [Respiration, Rhythm And Depth] : normal respiratory rhythm and effort [Auscultation Breath Sounds / Voice Sounds] : lungs were clear to auscultation bilaterally [Heart Rate And Rhythm] : heart rate was normal and rhythm regular [Normal S1, S2] : normal S1 and S2 [Murmurs] : no murmurs [Bowel Sounds] : normal bowel sounds [Abdomen Tenderness] : non-tender [No Masses] : no abdominal mass palpated [Abdomen Soft] : soft [] : no hepatosplenomegaly [Oriented To Time, Place, And Person] : oriented to person, place, and time

## 2024-01-28 NOTE — REVIEW OF SYSTEMS
[As Noted in HPI] : as noted in HPI [Negative] : Endocrine [de-identified] : chronic anticoagulation

## 2024-01-28 NOTE — HISTORY OF PRESENT ILLNESS
[FreeTextEntry1] : 73 with total gastrectomy and poorly functioning EJ anastomosis. 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 He has failed top follow up however. In the last few weeks he believes he is having some more trouble swallowing. His weight is stable. Appetite overall is stable but he has limited himself to more soft foods and liquids supplementing with his J tube. He also has a chronic enterocutenaous fistula which sometimes has output and often doesn't.  1/26/2024-Patient seems to be doing well.  His weight has been stable at 148.  He is tolerating his feedings.  Blood work reveals CEA of 4.9 which is stable.  H/H is 11.7/36.4, MCV 95, alkaline phosphatase 163, total protein/albumin 7.4/3.8, GGTP normal.

## 2024-01-28 NOTE — ASSESSMENT
[FreeTextEntry1] : Patient with history of adenocarcinoma of the stomach status postgastrectomy.  He has had a jejunostomy placed for feedings.  His initial surgery revealed angulation and retained food and fluid in the esophagus.  Patient sees Dr. Peguero for his jejunostomy care.  Patient sees oncology and had recent radiological workup.  His CEA has been stable.

## 2024-02-08 NOTE — REVIEW OF SYSTEMS
[Decreased Appetite] : decreased appetite [Nausea] : nausea [Insomnia] : insomnia [Cold Intolerance] : cold intolerance [Easy Bruising] : a tendency for easy bruising [Fatigue] : no fatigue [Fever] : no fever [Chills] : no chills [Dry Eyes] : no dryness [Blurred Vision] : no blurred vision [Eyes Itch] : no itch [Dysphagia] : no dysphagia [Hearing Loss] : no hearing loss  [Chest Pain] : no chest pain [Palpitations] : no palpitations [Lower Ext Edema] : no lower extremity edema [Shortness Of Breath] : no shortness of breath [Cough] : no cough [Wheezing] : no wheezing [SOB on Exertion] : no shortness of breath on exertion [Constipation] : no constipation [Heartburn] : no heartburn [Diarrhea] : no diarrhea [Polyuria] : no polyuria [Dysuria] : no dysuria [Nocturia] : no nocturia [Joint Pain] : no joint pain [Muscle Weakness] : no muscle weakness [Myalgia] : no myalgia  [Joint Stiffness] : no joint stiffness [Muscle Cramps] : no muscle cramps [Dry Skin] : no dry skin [Ulcer] : no ulcer [Headaches] : no headaches [Tremors] : no tremors [Pain/Numbness of Digits] : no pain/numbness of digits [Depression] : no depression [Anxiety] : no anxiety [Stress] : no stress [Polydipsia] : no polydipsia [Heat Intolerance] : no heat intolerance [Easy Bleeding] : no ~M tendency for easy bleeding [FreeTextEntry2] : Has gained 4 lb since his last  [FreeTextEntry3] : Needs reading glasses since his cataract surgery.

## 2024-02-08 NOTE — HISTORY OF PRESENT ILLNESS
[FreeTextEntry1] : 73-yo man with a history of a total gastrectomy, distal esophagectomy and distal pancreatectomy for gastric CA in 2017.  Had mild type 2 DM before the gastrectomy, was able to be controlled on oral agents after the surgery, but steadily lost weight over the next few years due to poor PO intake.  Had a feeding tube inserted in approximately 2021 because of the weight loss and is currently on overnight feeds.  He has needed insulin to cover the feeds since that time. Was initially seen by me during a hospitalization in April because of inadequate glycemic control.  His insulin regimen was modified from a single dose of NPH (given prior to starting the feeds each night)  to a combination of NPH and lispro prior to starting the feeds.  The duration of his feeds was also increased to 10 hours.  He is now on a 1.4 kwan/cc formula which is supposed to run from 8 PM to 6 AM the following morning, but he admits that he will often stop it prematurely.  The feeding rate in the hospital was 95 cc/hr, but he has been running it at 110 cc/hr at home.   He eats multiple small meals during the day, but admits that he may not be eating as much during the day as he did in the hospital. He will cover some of these meals with 2 units of lispro, usually taking the insulin only if his glucose is over 130 mg%.  Has not had any significant medical issues since his last visit. Taking 24 units NPH/3-4 Humalog before starting the feeds, which he has been starting at 6-7 PM.  He now runs the feeds until 4-4:30 AM, aiming for 10 hours total.  He is running the feeds ([employer] 1.4 kwan/cc) at 110 cc/hr. (Cans are 11 oz each) His blood sugar when he starts the feeds is 110-120 mg%.  He does not usually check his blood sugar at 10-11 PM, and his glucose near the end of the feeding cycle is 180-190.  He then drops to the low 100 range over the next 2-3 hours.   He will not usually eat his small breakfast until 6-7 AM.   Average blood glucose during the past 90 days is 132 mg% 140 117 122 146 Has very rare hypoglycemic reactions, and none overnight when the feeds are running. Denies any numbness or paresthesias in his toes Recent ophth exam was negative for retinopathy Sister (who lives in [place]) was just diagnosed with pancreatic CA       Has gained 3 lb in weight since his visit in May, but has not been able to run his enteral feeds during the past 2 days because the pump has not been functioning.   Saw both his surgeon and oncologist within the past 2 weeks. Is still not running his feeds for the full 10 hours.  Awakens at 3 AM (as usual) but takes the pump off at that time.    Average glucoses on his Free Style Dolly for the past 90 days: MN-6 AM     145     (142) 6 AM-noon   125     (110) Noon-6 PM  129     (127) 6 PM-MN     155     (122) Taking 24 units NPH/3-4 units Humalog before her starts the feeds Is taking his levothyroxine at 5 AM--but his will need to be worked out if/when he finally starts running his feeds for the full 10 hours Continues to take 2 units of Humalog for his small meals during the day, but is sometimes seeing post-prandial glucoses above 150. First meal is at approximately 8 AM--meals after that time are "random".  The amount of carb at these meals also varies.  If he has cold cuts, etc, he tends to eat them by themselves without any bread.

## 2024-02-08 NOTE — PHYSICAL EXAM
[Alert] : alert [No Acute Distress] : no acute distress [Normal Sclera/Conjunctiva] : normal sclera/conjunctiva [EOMI] : extra ocular movement intact [PERRL] : pupils equal, round and reactive to light [No Proptosis] : no proptosis [No Lid Lag] : no lid lag [Normal Outer Ear/Nose] : the ears and nose were normal in appearance [Normal Hearing] : hearing was normal [No Neck Mass] : no neck mass was observed [No LAD] : no lymphadenopathy [No Thyroid Nodules] : no palpable thyroid nodules [Clear to Auscultation] : lungs were clear to auscultation bilaterally [Normal Rate] : heart rate was normal [Regular Rhythm] : with a regular rhythm [Carotids Normal] : carotid pulses were normal with no bruits [No Edema] : no peripheral edema [Not Tender] : non-tender [Soft] : abdomen soft [No HSM] : no hepato-splenomegaly [Normal Supraclavicular Nodes] : no supraclavicular lymphadenopathy [Normal Anterior Cervical Nodes] : no anterior cervical lymphadenopathy [No CVA Tenderness] : no ~M costovertebral angle tenderness [No Involuntary Movements] : no involuntary movements were seen [No Joint Swelling] : no joint swelling seen [0] : 0 in the dorsalis pedis [Normal] : normal [1+] : 1+ in the posterior tibialis [2+] : 2+ in the dorsalis pedis [Vibration Dec.] : diminished vibratory sensation at the level of the toes [No Tremors] : no tremors [Normal Sensation on Monofilament Testing] : normal sensation on monofilament testing of lower extremities [Normal Affect] : the affect was normal [Normal Mood] : the mood was normal [Kyphosis] : no kyphosis present [Acanthosis Nigricans] : no acanthosis nigricans [Foot Ulcers] : no foot ulcers [Swelling] : not swollen [Erythema] : not erythematous [Position Sense Dec.] : normal position sense at the level of the toes [#1 Diminished] : number 1 was normal [#2 Diminished] : number 2 was normal [#3 Diminished] : number 3 was normal [#4 Diminished] : number 4 was normal [#5 Diminished] : number 5 was normal [#6 Diminished] : number 6 was normal [#7 Diminished] : number 7 was normal [#8 Diminished] : number 8 was normal [#9 Diminished] : number 9 was normal [#10 Diminished] : number 10 was normal [de-identified] : Quite thin [de-identified] : Faint corneal arcus [de-identified] : Thyroid gland barely palpable [de-identified] : Short mid-systolic murmur at the LSB [de-identified] : DP pulses 2+ on the left, non-palpable on the right [de-identified] : Ulcer with mild serous drainage in the mid-abdomen near the PEJ site.  PEG tube site in the LLQ, mild jordy-stomal bruising [de-identified] : Scattered ecchymoses on the forearms, lower legs [FreeTextEntry2] : Multiple hammer-toe deformitites [FreeTextEntry6] : Multiple hammer-toe deformities

## 2024-02-26 LAB
ALBUMIN SERPL ELPH-MCNC: 3.8 G/DL
ALP BLD-CCNC: 152 U/L
ALT SERPL-CCNC: 26 U/L
ANION GAP SERPL CALC-SCNC: 10 MMOL/L
AST SERPL-CCNC: 40 U/L
BILIRUB SERPL-MCNC: 0.5 MG/DL
BUN SERPL-MCNC: 22 MG/DL
CALCIUM SERPL-MCNC: 9.3 MG/DL
CHLORIDE SERPL-SCNC: 101 MMOL/L
CHOLEST SERPL-MCNC: 185 MG/DL
CO2 SERPL-SCNC: 26 MMOL/L
CREAT SERPL-MCNC: 0.85 MG/DL
CREAT SPEC-SCNC: 93 MG/DL
EGFR: 91 ML/MIN/1.73M2
ESTIMATED AVERAGE GLUCOSE: 126 MG/DL
GLUCOSE SERPL-MCNC: 78 MG/DL
HBA1C MFR BLD HPLC: 6 %
HDLC SERPL-MCNC: 77 MG/DL
LDLC SERPL CALC-MCNC: 99 MG/DL
MICROALBUMIN 24H UR DL<=1MG/L-MCNC: 5.8 MG/DL
MICROALBUMIN/CREAT 24H UR-RTO: 63 MG/G
NONHDLC SERPL-MCNC: 108 MG/DL
POTASSIUM SERPL-SCNC: 4.4 MMOL/L
PROT SERPL-MCNC: 7.9 G/DL
SODIUM SERPL-SCNC: 137 MMOL/L
T4 FREE SERPL-MCNC: 1.3 NG/DL
TRIGL SERPL-MCNC: 45 MG/DL
TSH SERPL-ACNC: 0.86 UIU/ML
VIT B12 SERPL-MCNC: 205 PG/ML

## 2024-03-01 ENCOUNTER — APPOINTMENT (OUTPATIENT)
Dept: ENDOCRINOLOGY | Facility: CLINIC | Age: 74
End: 2024-03-01
Payer: MEDICARE

## 2024-03-01 VITALS
DIASTOLIC BLOOD PRESSURE: 81 MMHG | SYSTOLIC BLOOD PRESSURE: 140 MMHG | WEIGHT: 148 LBS | OXYGEN SATURATION: 97 % | HEART RATE: 64 BPM | TEMPERATURE: 99.3 F | BODY MASS INDEX: 21.24 KG/M2

## 2024-03-01 DIAGNOSIS — R63.4 ABNORMAL WEIGHT LOSS: ICD-10-CM

## 2024-03-01 DIAGNOSIS — I10 ESSENTIAL (PRIMARY) HYPERTENSION: ICD-10-CM

## 2024-03-01 DIAGNOSIS — R19.4 CHANGE IN BOWEL HABIT: ICD-10-CM

## 2024-03-01 DIAGNOSIS — G47.00 INSOMNIA, UNSPECIFIED: ICD-10-CM

## 2024-03-01 DIAGNOSIS — Z87.898 PERSONAL HISTORY OF OTHER SPECIFIED CONDITIONS: ICD-10-CM

## 2024-03-01 DIAGNOSIS — R11.13: ICD-10-CM

## 2024-03-01 DIAGNOSIS — R21 RASH AND OTHER NONSPECIFIC SKIN ERUPTION: ICD-10-CM

## 2024-03-01 PROCEDURE — 99215 OFFICE O/P EST HI 40 MIN: CPT

## 2024-03-01 RX ORDER — CLOBETASOL PROPIONATE 0.5 MG/G
0.05 OINTMENT TOPICAL TWICE DAILY
Refills: 0 | Status: ACTIVE | COMMUNITY
Start: 2024-03-01

## 2024-03-01 RX ORDER — ATORVASTATIN CALCIUM 10 MG/1
10 TABLET, FILM COATED ORAL
Qty: 90 | Refills: 3 | Status: ACTIVE | COMMUNITY
Start: 2024-03-01 | End: 1900-01-01

## 2024-03-01 RX ORDER — INSULIN LISPRO 100 [IU]/ML
100 INJECTION, SOLUTION INTRAVENOUS; SUBCUTANEOUS
Qty: 10 | Refills: 3 | Status: ACTIVE | COMMUNITY
Start: 2024-03-01

## 2024-03-01 RX ORDER — LOSARTAN POTASSIUM 50 MG/1
50 TABLET, FILM COATED ORAL DAILY
Qty: 90 | Refills: 3 | Status: ACTIVE | COMMUNITY
Start: 2024-03-01 | End: 1900-01-01

## 2024-03-01 RX ORDER — APIXABAN 2.5 MG/1
2.5 TABLET, FILM COATED ORAL
Refills: 0 | Status: ACTIVE | COMMUNITY
Start: 2024-03-01

## 2024-03-01 RX ORDER — ALPRAZOLAM 0.5 MG/1
0.5 TABLET ORAL
Refills: 0 | Status: ACTIVE | COMMUNITY
Start: 2024-03-01

## 2024-03-01 NOTE — REVIEW OF SYSTEMS
[Decreased Appetite] : decreased appetite [Nausea] : nausea [Insomnia] : insomnia [Cold Intolerance] : cold intolerance [Easy Bruising] : a tendency for easy bruising [Fatigue] : no fatigue [Recent Weight Gain (___ Lbs)] : no recent weight gain [Recent Weight Loss (___ Lbs)] : no recent weight loss [Fever] : no fever [Chills] : no chills [Dry Eyes] : no dryness [Blurred Vision] : no blurred vision [Eyes Itch] : no itch [Dysphagia] : no dysphagia [Hearing Loss] : no hearing loss  [Palpitations] : no palpitations [Chest Pain] : no chest pain [Lower Ext Edema] : no lower extremity edema [Shortness Of Breath] : no shortness of breath [Cough] : no cough [Wheezing] : no wheezing [SOB on Exertion] : no shortness of breath on exertion [Constipation] : no constipation [Heartburn] : no heartburn [Diarrhea] : no diarrhea [Polyuria] : no polyuria [Dysuria] : no dysuria [Nocturia] : no nocturia [Joint Pain] : no joint pain [Myalgia] : no myalgia  [Muscle Weakness] : no muscle weakness [Joint Stiffness] : no joint stiffness [Muscle Cramps] : no muscle cramps [Dry Skin] : no dry skin [Ulcer] : no ulcer [Headaches] : no headaches [Tremors] : no tremors [Pain/Numbness of Digits] : no pain/numbness of digits [Depression] : no depression [Anxiety] : no anxiety [Stress] : no stress [Heat Intolerance] : no heat intolerance [Polydipsia] : no polydipsia [Easy Bleeding] : no ~M tendency for easy bleeding [FreeTextEntry3] : Needs reading glasses since his cataract surgery.  [de-identified] : Takes 10 mg Ambien to sleep at night, 5 mg during the day "if I want to take a nap"

## 2024-03-01 NOTE — PHYSICAL EXAM
[Alert] : alert [No Acute Distress] : no acute distress [Normal Sclera/Conjunctiva] : normal sclera/conjunctiva [EOMI] : extra ocular movement intact [PERRL] : pupils equal, round and reactive to light [No Proptosis] : no proptosis [No Lid Lag] : no lid lag [Normal Outer Ear/Nose] : the ears and nose were normal in appearance [No Neck Mass] : no neck mass was observed [Normal Hearing] : hearing was normal [No LAD] : no lymphadenopathy [No Thyroid Nodules] : no palpable thyroid nodules [Clear to Auscultation] : lungs were clear to auscultation bilaterally [Normal Rate] : heart rate was normal [Regular Rhythm] : with a regular rhythm [Carotids Normal] : carotid pulses were normal with no bruits [No Edema] : no peripheral edema [Not Tender] : non-tender [Soft] : abdomen soft [No HSM] : no hepato-splenomegaly [Normal Supraclavicular Nodes] : no supraclavicular lymphadenopathy [Normal Anterior Cervical Nodes] : no anterior cervical lymphadenopathy [No CVA Tenderness] : no ~M costovertebral angle tenderness [No Involuntary Movements] : no involuntary movements were seen [No Joint Swelling] : no joint swelling seen [Normal] : normal [Vibration Dec.] : diminished vibratory sensation at the level of the toes [No Tremors] : no tremors [Normal Sensation on Monofilament Testing] : normal sensation on monofilament testing of lower extremities [Normal Affect] : the affect was normal [Normal Mood] : the mood was normal [No Murmurs] : no murmurs [0] : 0 in the posterior tibialis [2+] : 2+ in the posterior tibialis [1+] : 1+ in the dorsalis pedis [Kyphosis] : no kyphosis present [Acanthosis Nigricans] : no acanthosis nigricans [Foot Ulcers] : no foot ulcers [Swelling] : not swollen [Erythema] : not erythematous [Position Sense Dec.] : normal position sense at the level of the toes [#1 Diminished] : number 1 was normal [#2 Diminished] : number 2 was normal [#3 Diminished] : number 3 was normal [#4 Diminished] : number 4 was normal [#5 Diminished] : number 5 was normal [#6 Diminished] : number 6 was normal [#7 Diminished] : number 7 was normal [#8 Diminished] : number 8 was normal [#9 Diminished] : number 9 was normal [#10 Diminished] : number 10 was normal [de-identified] : Quite thin [de-identified] : No definite corneal arcus [de-identified] : Thyroid gland barely palpable [de-identified] : DP pulses 1+ on the left, non-palpable on the right [de-identified] : Areas of lipohypertrophy on either side of the umbilicus.  Ulcer with mild serous drainage in the mid-abdomen near the PEJ site.  PEG tube site in the LLQ, no definite bruising or skin irriation around the stoma [de-identified] : Multiple hammer-toe deformities on both feet [de-identified] : Has a large ecchymosis over the medial aspect of the left calf, with the superior margin appearing somewhat purpuric [FreeTextEntry2] : Multiple hammer-toe deformitites [FreeTextEntry6] : Multiple hammer-toe deformities [de-identified] : Vibratory sensation absent over the toes

## 2024-03-01 NOTE — DATA REVIEWED
[FreeTextEntry1] : [facility]  (2/26/24)  CMP WNL except for mildly elevated alk phos of 152 U/l A1c level 6.0% LDL 99, HDL 77, TG 45 Urine microalbumin ratio positive at 63  (normal < 30) Vitamin B-12 level quite low at 205 pg/ml TSH level 0.86 uU/ml  (0.27-4.2)

## 2024-03-01 NOTE — HISTORY OF PRESENT ILLNESS
[FreeTextEntry1] : 73-yo man with a history of a total gastrectomy, distal esophagectomy and distal pancreatectomy for gastric CA in 2017.  Had mild type 2 DM before the gastrectomy, was able to be controlled on oral agents after the surgery, but steadily lost weight over the next few years due to poor PO intake.  Had a feeding tube inserted in approximately 2021 because of the weight loss and is currently on overnight feeds.  He has needed insulin to cover the feeds since that time. Was initially seen by me during a hospitalization in April of 2023 because of inadequate glycemic control.  His insulin regimen was modified from a single dose of NPH (given prior to starting the feeds each night)  to a combination of NPH and lispro prior to starting the feeds.  The duration of his feeds was also increased to 10 hours.  He is now on a 1.4 kwan/cc formula which runs from 6 PM to 4 AM the next morning at 110 cc/hr.    He eats multiple small meals during the day, and "covers" these with 2 unit boluses of lispro insulin.   His other active medical problems include hypothyroidism, dyslipidemia, microalbuminuria (presumably secondary to his diabetes) and B-12 deficiency (secondary to his total gastrectomy).  Has not had any acute illnesses or procedures since his last visit.  His gastroenterologist Dr. South suggested that he see a nurse to help him with a device or dressing to decrease the skin irritation around the feeding tube. Feeds are still [employer] 1.4 kwan/cc.  Starts these at 6 PM at a rate is 100 cc/hr, finishes at 3-4 AM when he awakens.  Takes 24 units NPH plus 4 units lispro when he starts the feeds.  Says that his blood sugar will rise to near 170 mg% in the late evening, and will often be in the same range at 3-4 AM.   Continues to monitor with the Dolly, with the following averages for the last 90 days: MN-6 AM      155 6 AM-noon    114 Noon-6 PM   116 6 PM-MN      148  (Overall average is 133 mg%) He continues to eat small meals during the day, takes 2 units of lispro to cover any of the meals which have carbohydrate--(usually crackers, sweet potato, etc--but avoids bread, which "doesn't seem to go down") Usually eats approximately every 2 hours during the day Does not do any structured exercise.  (Has a membership at [employer], but doesn't go).  Nonetheless, does a lot of walking during the course of the day. Ophth exam a few months ago was negative for retinopathy Denies any numbness or paresthesias in his feet.

## 2024-03-01 NOTE — ASSESSMENT
[FreeTextEntry2] : Feeding rates, injection sites, B-12 replacement [FreeTextEntry1] : 1) Type 2 DM:  Glycemic control is excellent, as assessed by both A1c level and Dolly data.  Hypoglycemic reactions are also extremely infrequent, and he has not had any overnight. --To increase his feeds to 110 cc/hr, 6 PM-4 AM --Increase his nutritional insulin to 26 NPH/5 units lispro 15 min before starting the feeds --Needs to avoid injecting into the lipohypertrophic areas of his abdominal wall.  Demonstrated alternative sites, and emphasized that he needs to rotate sites --Continue Dolly CGM.  New Rx sent to [facility]--because he pays a $40 co-pay when he gets these from the local pharmacy  2) Hypothyroidism:  TSH level is in the optimal range of 0.4-2.5 uU/ml --Continue levothyroxine 100 mcg/day  3) Hypercholesterolemia:  LDL-cholesterol level is just below the diabetic target of 100 mg%, but he should be on a statin since he has diabetes, and LDL would optimally be closer to 70 mg%. --Start atorvastatin 10 mg/day  4) Microalbuminuria:  Ratio remains moderately elevated, and presumably reflects early diabetic nephropathy.  He is already on losartan, but only at a starting dose of 25 mg/day --Increase the losartan to 50 mg/day  5) B-12 deficiency:  Level is near the lower limit of normal and is of significant concern.  Vibratory sensation in his toes is absent.  He receives injections from Dr. Obando, but has probably not been going every month. --He will be seeing Dr. Obando next week, and should probably have weekly injections for the next few weeks in order to build up his "reservoir"  6) Hypertension:  BP is at the upper limit of the target range on today's exam.  He does not monitor BPs at home.   --To increase the losartan as noted above--mostly for the microalbuminuria, but should lower his BP as well  7) Weight loss: Has gained 7 lb overall since I initially saw him in the hospital last year.  He has finally been consistent in running the feeds for a full 10 hours/day, but needs to increase back to 110 cc/hr.  (Insulin has been increased slightly to cover this) He also needs to rebuild muscle mass.  Suggested that he restart resistance exercise--light hand weights, "modified" push-ups, etc  8) Insomnia;  Has been taking excessive Ambien--takes 10 mg before sleep in the evening, also will take 5 mg if he "wants to take a nap" during the day.  Told him that he should only nap during the day if sleepy, in which case he should not need or take Ambien.  Suggested that he take only 5 mg at night to sleep, can take another 5 mg if he wakes up in the late evening and can't fall back to sleep  Other issues to be addressed: Elevated alk phos--?? hepatic vs bone   (R/O secondary hyperparathyroidism)--Check GGT Needs DEXA  See for follow-up in mid-May.  CMP, lipids, A1c, TFTs, microalb, CBC, Fe/IBC, GGT. 25-D before the visit

## 2024-03-04 ENCOUNTER — APPOINTMENT (OUTPATIENT)
Dept: DERMATOLOGY | Facility: CLINIC | Age: 74
End: 2024-03-04
Payer: MEDICARE

## 2024-03-04 ENCOUNTER — LABORATORY RESULT (OUTPATIENT)
Age: 74
End: 2024-03-04

## 2024-03-04 DIAGNOSIS — D69.2 OTHER NONTHROMBOCYTOPENIC PURPURA: ICD-10-CM

## 2024-03-04 DIAGNOSIS — D48.9 NEOPLASM OF UNCERTAIN BEHAVIOR, UNSPECIFIED: ICD-10-CM

## 2024-03-04 PROCEDURE — 11105 PUNCH BX SKIN EA SEP/ADDL: CPT | Mod: 59

## 2024-03-04 PROCEDURE — 99214 OFFICE O/P EST MOD 30 MIN: CPT | Mod: 25

## 2024-03-04 PROCEDURE — 11104 PUNCH BX SKIN SINGLE LESION: CPT

## 2024-03-04 RX ORDER — MUPIROCIN 20 MG/G
2 OINTMENT TOPICAL
Qty: 1 | Refills: 0 | Status: ACTIVE | COMMUNITY
Start: 2024-03-04 | End: 1900-01-01

## 2024-03-04 NOTE — PHYSICAL EXAM
[Oriented x 3] : ~L oriented x 3 [Alert] : alert [Well Nourished] : well nourished [Conjunctiva Non-injected] : conjunctiva non-injected [No Visual Lymphadenopathy] : no visual  lymphadenopathy [No Clubbing] : no clubbing [No Edema] : no edema [No Bromhidrosis] : no bromhidrosis [No Chromhidrosis] : no chromhidrosis [FreeTextEntry3] : purpuric patches bilateral arms and legs hemosiderin and petechial macules bilateral lower legs L shin annular ecchymotic patch with numerous raised purpuric papules and medial periphery

## 2024-03-04 NOTE — HISTORY OF PRESENT ILLNESS
[FreeTextEntry1] : leg bruises [de-identified] : estab seen prev for rash around feeding tube x 1 yr or more  hx gastric adenocarcinoma s/p surgical resection with feeding tube placement  bruises come and go since chemo - arms and legs bruise L shin largest bruise to date, now noticing raised in some areas asymptomatic

## 2024-03-07 ENCOUNTER — RESULT REVIEW (OUTPATIENT)
Age: 74
End: 2024-03-07

## 2024-03-07 ENCOUNTER — OUTPATIENT (OUTPATIENT)
Dept: OUTPATIENT SERVICES | Facility: HOSPITAL | Age: 74
LOS: 1 days | End: 2024-03-07
Payer: MEDICARE

## 2024-03-07 ENCOUNTER — APPOINTMENT (OUTPATIENT)
Dept: SURGICAL ONCOLOGY | Facility: CLINIC | Age: 74
End: 2024-03-07
Payer: MEDICARE

## 2024-03-07 VITALS
HEART RATE: 60 BPM | HEIGHT: 70 IN | DIASTOLIC BLOOD PRESSURE: 80 MMHG | WEIGHT: 148 LBS | OXYGEN SATURATION: 98 % | BODY MASS INDEX: 21.19 KG/M2 | SYSTOLIC BLOOD PRESSURE: 141 MMHG

## 2024-03-07 DIAGNOSIS — C16.9 MALIGNANT NEOPLASM OF STOMACH, UNSPECIFIED: ICD-10-CM

## 2024-03-07 DIAGNOSIS — Z90.411 ACQUIRED PARTIAL ABSENCE OF PANCREAS: Chronic | ICD-10-CM

## 2024-03-07 DIAGNOSIS — Z98.890 OTHER SPECIFIED POSTPROCEDURAL STATES: Chronic | ICD-10-CM

## 2024-03-07 DIAGNOSIS — K63.2 FISTULA OF INTESTINE: ICD-10-CM

## 2024-03-07 DIAGNOSIS — Z93.1 GASTROSTOMY STATUS: Chronic | ICD-10-CM

## 2024-03-07 DIAGNOSIS — Z95.828 PRESENCE OF OTHER VASCULAR IMPLANTS AND GRAFTS: Chronic | ICD-10-CM

## 2024-03-07 DIAGNOSIS — Z85.01 PERSONAL HISTORY OF MALIGNANT NEOPLASM OF ESOPHAGUS: ICD-10-CM

## 2024-03-07 DIAGNOSIS — Z98.49 CATARACT EXTRACTION STATUS, UNSPECIFIED EYE: Chronic | ICD-10-CM

## 2024-03-07 DIAGNOSIS — Z90.3 ACQUIRED ABSENCE OF STOMACH [PART OF]: Chronic | ICD-10-CM

## 2024-03-07 DIAGNOSIS — Z46.59 ENCOUNTER FOR FITTING AND ADJUSTMENT OF OTHER GASTROINTESTINAL APPLIANCE AND DEVICE: ICD-10-CM

## 2024-03-07 DIAGNOSIS — Z87.19 PERSONAL HISTORY OF OTHER DISEASES OF THE DIGESTIVE SYSTEM: Chronic | ICD-10-CM

## 2024-03-07 PROCEDURE — 99213 OFFICE O/P EST LOW 20 MIN: CPT

## 2024-03-07 PROCEDURE — 49451 REPLACE DUOD/JEJ TUBE PERC: CPT

## 2024-03-10 PROBLEM — K63.2 ENTEROCUTANEOUS FISTULA: Status: ACTIVE | Noted: 2024-01-04

## 2024-03-10 NOTE — HISTORY OF PRESENT ILLNESS
[de-identified] : Recently admitted to [facility] for progressive dysphagia and inability to tolerate po with weight loss. 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 Open feeding jejunostomy tube placed for nutritional access. Feels well.  12/08/2020: Patient feels well and is managing feeding tube.  01/04/2021: Since his last visit, patient has endoscopic enteroenterostomy by Axios placement of Axios stent by Dr. Peguero.  His dysphagia has improved slightly.  He reports clogged jtube.  He denies abdominal pain.  09/14/2021: Doing well.  He reports improved po intake, but is still predominantly on jtube feeds.  He had a suture granuloma removed last month during visit with Dr. Cabello.  He reports yellowish drainage from removal site in upper abdominal incision.  He denies fever, abdominal pain.  11/04/2021: Patient still has persistent drainage from suture granuloma removal site in the upper abdominal incisional region.  He changes dressing daily.  Fluid is thick yellow/purulent.  He is maintaining nutrition with supplemental jtube feeds.  He denies fever.  11/30/2021: Patient is s/p CT abdomen/pelvis and jtube exchange.  He has had mild weight loss since his last visit.  He had persistent drainage from upper midline incisional opening at site of previous suture.  CT abdomen/pelvis 11/26/2021 shows an thin anterior abdominal wall fluid collection just posterior to the rectus muscle measuring 7.8 x 1.7 cm.  02/22/2022: Overall unchanged.  He reports decreased midline incisional drainage.  He continue to self administer tube feeds without issue.  Tolerating small amounts of oral diet.  06/21/2022: Patient continues to be dependent on jtube feeding is tolerating small amounts for soft oral diet.  He reports persistent drainage from upper aspect of midline incision.  He denies fever or abdominal pain.  02/23/2023: Patient is s/p recent jtube exchange.  He is dependent on tube feeds and reports worsening dysphagia with difficulty swallowing pills.  He has had mild weight loss.  04/25/2023: Recently admitted to [facility].  He is s/p EGD and removal of EJ Axios stent by Dr. Peguero.  He is due for jtube exchange and reports persistent drainage from upper midline incision at area of previous suture granuloma removal. Recent CT abdomen/pelvis reviewed and shows increased subcutaneous collection at site of drainage.  He has no fevers.  07/13/2023: Patient feels well and is eating better.  EGD/esophagram from 06/2023 at [facility] reviewed.  He continues to use jtube for supplemental nutrition.  01/11/2024: Patient had his jejunostomy tube exchanged today.  He reports feeling well. He is tolerating limited amount of food and continues to use jejunostomy tube for supplemental feeding.  He reports persistent drainage from his upper midline chronically open wound, but denies fever.  03/07/2024: Patient reports feeling well overall.  His eating and tube feed requirements are unchanged.  He has chronic low volume drainage from upper midln incision, unchanged.

## 2024-03-10 NOTE — ASSESSMENT
[FreeTextEntry1] : Continue local wound care - chronic drainage likely due to underlying mesh.  Definitive treatment requires laparotomy and mesh explantation. Continue jejunostomy tube feeds and oral intake as tolerated. Stoma nurse evaluation for jejunostomy tube skin irritation.

## 2024-03-10 NOTE — PHYSICAL EXAM
[FreeTextEntry1] : Abdomen: soft, nontender/nondistended.  Small opening in superior aspect of incision with small purulent drainage. Jejunostomy tube in place with chronic skin irritation/thickening. [Normal] : supple, no neck mass and thyroid not enlarged [Normal Neck Lymph Nodes] : normal neck lymph nodes  [Normal Supraclavicular Lymph Nodes] : normal supraclavicular lymph nodes [Normal Groin Lymph Nodes] : normal groin lymph nodes [Normal Axillary Lymph Nodes] : normal axillary lymph nodes [Normal] : grossly intact

## 2024-03-18 ENCOUNTER — RX RENEWAL (OUTPATIENT)
Age: 74
End: 2024-03-18

## 2024-03-18 RX ORDER — APIXABAN 5 MG/1
5 TABLET, FILM COATED ORAL
Qty: 60 | Refills: 4 | Status: ACTIVE | COMMUNITY
Start: 2023-10-09 | End: 1900-01-01

## 2024-03-26 NOTE — PRE-OP CHECKLIST - 4.
Group Therapy Documentation    PATIENT'S NAME: Florian Mosquera  MRN:   [id number]  :   2011  ACCT. NUMBER: [id number]  DATE OF SERVICE: 3/26/24  START TIME: 10:30 AM  END TIME: 12:00 PM  FACILITATOR(S): Gene Mei; Kym Corrales LADC; Ton Knox  TOPIC: BEH Group Therapy  Number of patients attending the group:  6  Group Length:  1.5 Hours    Dimensions addressed 3, 4, 5, and 6    Summary of Group / Topics Discussed:    Group Therapy/Process Group:  Dual Process Group:  Topics:  - Finish Timeline Presentation  - DBT Review  - Introductions to group    Objectives:  - Provide time to complete remainder of timeline presentation and provide feedback, ask questions, and identify successes and challenges  - Review DBT to provide ongoing psychoeducation establishing foundation for further development of DBT skills  - Provide time to complete remainder of client introductions and support peer engagement and healthy group formation      Group Attendance:  Attended group session  Interactive Complexity: No    Patient's response to the group topic/interactions:  cooperative with task, discussed personal experience with topic, expressed understanding of topic, listened actively, and offered helpful suggestions to peers    Patient appeared to be Attentive.       Client specific details:  Client was present and engaged during peer presentation of timeline as evidenced by asking clarifying questions. Client was observed to maintain active eye contact with peers and staff appearing to remain attentive. Client provided examples during DBT review of personal experiences to explain high highs and low lows. Client needed additional prompting and encouraging to remain on topic with examples and contributions to group.        NO BP or BLOOD in LEFT ARM - Picc line   /////// Right infusaport- not accessed

## 2024-03-27 NOTE — ASU PREOP CHECKLIST - ALLERGY BAND ON
"Subjective     Chief Complaint   Patient presents with    Breast Problem     Left breast lump, bump in vaginal area       Sonam Lang is a 39 y.o.  whose LMP is Patient's last menstrual period was 02/10/2024.     Pt presents today with chief complaint of left breast lump  She was seen at urgent care/ER on 3/23 and again on 3/25. She was started on clindamycin for possible breast abscess.  She had breast imaging done this month that returned likely benign and radiologist recommend repeat imaging in 6 months  She denies any masses lumps  She states the area drained some and not as painful now  She also felt a bump on vagina  Pt of Dr. El     No Additional Complaints Reported    The following portions of the patient's history were reviewed and updated as appropriate:vital signs, allergies, current medications, past medical history, past social history, past surgical history, and problem list      Review of Systems   Pertinent items are noted in HPI.     Objective      /74   Ht 172.7 cm (68\")   Wt 113 kg (249 lb)   LMP 02/10/2024   Breastfeeding No   BMI 37.86 kg/m²     Physical Exam    General:   alert and no distress   Heart: Not performed today   Lungs: Not performed today.   Breast: No nipple retraction or dimpling, No nipple discharge or bleeding, No axillary or supraclavicular adenopathy, Normal to palpation without dominant masses, there is a 1 inch reddish brown circular lesion with center that has appeared to drain. Appears to be a resolving abscess. No dominate masses.   Neck: na   Abdomen: {Not performed today   CVA: Not performed today   Pelvis: External genitalia: normal general appearance  Urinary system: urethral meatus normal  Ingrown hair to pubic mons, no s/s infection    Extremities: Not performed today   Neurologic: negative   Psychiatric: Normal affect, judgement, and mood       Lab Review   Labs: No data reviewed     Imaging   No data reviewed    Assessment & Plan "     ASSESSMENT  1. Lesion of skin of breast        PLAN  1. No orders of the defined types were placed in this encounter.      2. Medications prescribed this encounter:      No orders of the defined types were placed in this encounter.      3. Continue clindamycin as prescribed. Appears to be resolving boil. Keep scheduled follow up with Dr El. Warnings signs discussed.      Ann Tony, APRN  3/27/2024            no known allergies

## 2024-04-18 ENCOUNTER — OUTPATIENT (OUTPATIENT)
Dept: OUTPATIENT SERVICES | Facility: HOSPITAL | Age: 74
LOS: 1 days | End: 2024-04-18
Payer: MEDICARE

## 2024-04-18 ENCOUNTER — RESULT REVIEW (OUTPATIENT)
Age: 74
End: 2024-04-18

## 2024-04-18 VITALS
OXYGEN SATURATION: 98 % | DIASTOLIC BLOOD PRESSURE: 76 MMHG | RESPIRATION RATE: 16 BRPM | TEMPERATURE: 98 F | HEART RATE: 63 BPM | SYSTOLIC BLOOD PRESSURE: 144 MMHG

## 2024-04-18 VITALS
TEMPERATURE: 98 F | OXYGEN SATURATION: 98 % | RESPIRATION RATE: 16 BRPM | SYSTOLIC BLOOD PRESSURE: 144 MMHG | HEART RATE: 61 BPM | DIASTOLIC BLOOD PRESSURE: 76 MMHG

## 2024-04-18 DIAGNOSIS — Z93.1 GASTROSTOMY STATUS: Chronic | ICD-10-CM

## 2024-04-18 DIAGNOSIS — Z98.890 OTHER SPECIFIED POSTPROCEDURAL STATES: Chronic | ICD-10-CM

## 2024-04-18 DIAGNOSIS — Z90.3 ACQUIRED ABSENCE OF STOMACH [PART OF]: Chronic | ICD-10-CM

## 2024-04-18 DIAGNOSIS — Z87.19 PERSONAL HISTORY OF OTHER DISEASES OF THE DIGESTIVE SYSTEM: Chronic | ICD-10-CM

## 2024-04-18 DIAGNOSIS — Z98.49 CATARACT EXTRACTION STATUS, UNSPECIFIED EYE: Chronic | ICD-10-CM

## 2024-04-18 DIAGNOSIS — Z90.411 ACQUIRED PARTIAL ABSENCE OF PANCREAS: Chronic | ICD-10-CM

## 2024-04-18 DIAGNOSIS — Z95.828 PRESENCE OF OTHER VASCULAR IMPLANTS AND GRAFTS: Chronic | ICD-10-CM

## 2024-04-18 PROCEDURE — 49451 REPLACE DUOD/JEJ TUBE PERC: CPT | Mod: 53,GC

## 2024-04-19 ENCOUNTER — OUTPATIENT (OUTPATIENT)
Dept: OUTPATIENT SERVICES | Facility: HOSPITAL | Age: 74
LOS: 1 days | Discharge: HOME | End: 2024-04-19

## 2024-04-19 DIAGNOSIS — Z98.890 OTHER SPECIFIED POSTPROCEDURAL STATES: Chronic | ICD-10-CM

## 2024-04-19 DIAGNOSIS — Z87.19 PERSONAL HISTORY OF OTHER DISEASES OF THE DIGESTIVE SYSTEM: Chronic | ICD-10-CM

## 2024-04-19 DIAGNOSIS — Z98.49 CATARACT EXTRACTION STATUS, UNSPECIFIED EYE: Chronic | ICD-10-CM

## 2024-04-19 DIAGNOSIS — Z90.3 ACQUIRED ABSENCE OF STOMACH [PART OF]: Chronic | ICD-10-CM

## 2024-04-19 DIAGNOSIS — Z93.1 GASTROSTOMY STATUS: Chronic | ICD-10-CM

## 2024-04-19 DIAGNOSIS — Z95.828 PRESENCE OF OTHER VASCULAR IMPLANTS AND GRAFTS: Chronic | ICD-10-CM

## 2024-04-19 DIAGNOSIS — C16.9 MALIGNANT NEOPLASM OF STOMACH, UNSPECIFIED: ICD-10-CM

## 2024-04-19 DIAGNOSIS — Z90.411 ACQUIRED PARTIAL ABSENCE OF PANCREAS: Chronic | ICD-10-CM

## 2024-04-22 DIAGNOSIS — K94.13 ENTEROSTOMY MALFUNCTION: ICD-10-CM

## 2024-04-22 DIAGNOSIS — Z85.028 PERSONAL HISTORY OF OTHER MALIGNANT NEOPLASM OF STOMACH: ICD-10-CM

## 2024-04-22 RX ORDER — FLASH GLUCOSE SENSOR
KIT MISCELLANEOUS
Qty: 6 | Refills: 3 | Status: ACTIVE | COMMUNITY
Start: 2024-02-08 | End: 1900-01-01

## 2024-04-25 ENCOUNTER — RESULT REVIEW (OUTPATIENT)
Age: 74
End: 2024-04-25

## 2024-04-29 ENCOUNTER — APPOINTMENT (OUTPATIENT)
Dept: HEMATOLOGY ONCOLOGY | Facility: CLINIC | Age: 74
End: 2024-04-29
Payer: MEDICARE

## 2024-04-29 ENCOUNTER — APPOINTMENT (OUTPATIENT)
Dept: CT IMAGING | Facility: IMAGING CENTER | Age: 74
End: 2024-04-29
Payer: MEDICARE

## 2024-04-29 ENCOUNTER — OUTPATIENT (OUTPATIENT)
Dept: OUTPATIENT SERVICES | Facility: HOSPITAL | Age: 74
LOS: 1 days | End: 2024-04-29
Payer: MEDICARE

## 2024-04-29 VITALS
TEMPERATURE: 97.9 F | OXYGEN SATURATION: 99 % | DIASTOLIC BLOOD PRESSURE: 67 MMHG | BODY MASS INDEX: 21.19 KG/M2 | HEART RATE: 63 BPM | RESPIRATION RATE: 16 BRPM | SYSTOLIC BLOOD PRESSURE: 126 MMHG | HEIGHT: 70 IN | WEIGHT: 148 LBS

## 2024-04-29 DIAGNOSIS — Z98.890 OTHER SPECIFIED POSTPROCEDURAL STATES: Chronic | ICD-10-CM

## 2024-04-29 DIAGNOSIS — Z93.1 GASTROSTOMY STATUS: Chronic | ICD-10-CM

## 2024-04-29 DIAGNOSIS — C16.9 MALIGNANT NEOPLASM OF STOMACH, UNSPECIFIED: ICD-10-CM

## 2024-04-29 DIAGNOSIS — Z98.49 CATARACT EXTRACTION STATUS, UNSPECIFIED EYE: Chronic | ICD-10-CM

## 2024-04-29 DIAGNOSIS — I81 PORTAL VEIN THROMBOSIS: ICD-10-CM

## 2024-04-29 DIAGNOSIS — Z95.828 PRESENCE OF OTHER VASCULAR IMPLANTS AND GRAFTS: Chronic | ICD-10-CM

## 2024-04-29 DIAGNOSIS — Z87.19 PERSONAL HISTORY OF OTHER DISEASES OF THE DIGESTIVE SYSTEM: Chronic | ICD-10-CM

## 2024-04-29 DIAGNOSIS — I82.409 ACUTE EMBOLISM AND THROMBOSIS OF UNSPECIFIED DEEP VEINS OF UNSPECIFIED LOWER EXTREMITY: ICD-10-CM

## 2024-04-29 DIAGNOSIS — Z90.411 ACQUIRED PARTIAL ABSENCE OF PANCREAS: Chronic | ICD-10-CM

## 2024-04-29 DIAGNOSIS — Z90.3 ACQUIRED ABSENCE OF STOMACH [PART OF]: Chronic | ICD-10-CM

## 2024-04-29 PROCEDURE — 74177 CT ABD & PELVIS W/CONTRAST: CPT

## 2024-04-29 PROCEDURE — 71260 CT THORAX DX C+: CPT | Mod: 26,MH

## 2024-04-29 PROCEDURE — 99213 OFFICE O/P EST LOW 20 MIN: CPT

## 2024-04-29 PROCEDURE — 74177 CT ABD & PELVIS W/CONTRAST: CPT | Mod: 26,MH

## 2024-04-29 PROCEDURE — 71260 CT THORAX DX C+: CPT

## 2024-04-29 NOTE — HISTORY OF PRESENT ILLNESS
[de-identified] : See Heme-Onc consult note 12,23/2016. [de-identified] : Since the last visit the pt remains well and has no new symptoms.Pt on losartan and eliquis 5 mg.  The patient returns after having completed follow-up CAT scan of the chest abdomen pelvis.

## 2024-04-29 NOTE — REVIEW OF SYSTEMS
[Diarrhea: Grade 0] : Diarrhea: Grade 0 [Negative] : Allergic/Immunologic [FreeTextEntry7] : as feeding tune x 4 containers

## 2024-04-29 NOTE — ASSESSMENT
[Curative] : Goals of care discussed with patient: Curative [FreeTextEntry1] : The patient continues to do well since his diagnosis of gastric carcinoma in 2016 with recurrence in the left supraclavicular lymph node.  He has no signs or symptoms indicating recurrent disease.  Because of difficulty eating he continues to use a feeding tube which allows him to maintain his weight.  The CAT scan has been completed but has not yet been read and will be reviewed.  It is anticipated there will be no evidence of recurrence of disease.  He will continue medical, GI and urology follow-up and testing as indicated.  He also has follow-up with surgery for his feeding tube.  He will return to the office in 6 months or sooner as needed.

## 2024-05-24 ENCOUNTER — APPOINTMENT (OUTPATIENT)
Dept: ENDOCRINOLOGY | Facility: CLINIC | Age: 74
End: 2024-05-24
Payer: MEDICARE

## 2024-05-24 VITALS
TEMPERATURE: 99 F | HEART RATE: 70 BPM | OXYGEN SATURATION: 95 % | BODY MASS INDEX: 20.76 KG/M2 | HEIGHT: 70 IN | DIASTOLIC BLOOD PRESSURE: 69 MMHG | SYSTOLIC BLOOD PRESSURE: 124 MMHG | WEIGHT: 145 LBS

## 2024-05-24 DIAGNOSIS — E78.00 PURE HYPERCHOLESTEROLEMIA, UNSPECIFIED: ICD-10-CM

## 2024-05-24 DIAGNOSIS — E53.8 DEFICIENCY OF OTHER SPECIFIED B GROUP VITAMINS: ICD-10-CM

## 2024-05-24 DIAGNOSIS — E03.8 OTHER SPECIFIED HYPOTHYROIDISM: ICD-10-CM

## 2024-05-24 DIAGNOSIS — Z79.4 TYPE 2 DIABETES MELLITUS W/OUT COMPLICATIONS: ICD-10-CM

## 2024-05-24 DIAGNOSIS — E11.9 TYPE 2 DIABETES MELLITUS W/OUT COMPLICATIONS: ICD-10-CM

## 2024-05-24 DIAGNOSIS — E06.3 OTHER SPECIFIED HYPOTHYROIDISM: ICD-10-CM

## 2024-05-24 DIAGNOSIS — R80.9 PROTEINURIA, UNSPECIFIED: ICD-10-CM

## 2024-05-24 PROCEDURE — 99215 OFFICE O/P EST HI 40 MIN: CPT

## 2024-05-24 PROCEDURE — G2211 COMPLEX E/M VISIT ADD ON: CPT

## 2024-05-24 RX ORDER — PEN NEEDLE, DIABETIC 29 G X1/2"
31G X 5 MM NEEDLE, DISPOSABLE MISCELLANEOUS
Qty: 500 | Refills: 3 | Status: ACTIVE | COMMUNITY
Start: 2024-03-01 | End: 1900-01-01

## 2024-05-28 ENCOUNTER — RX RENEWAL (OUTPATIENT)
Age: 74
End: 2024-05-28

## 2024-05-28 RX ORDER — INSULIN HUMAN 100 [IU]/ML
100 INJECTION, SUSPENSION SUBCUTANEOUS
Qty: 10 | Refills: 3 | Status: ACTIVE | COMMUNITY
Start: 2021-12-13 | End: 1900-01-01

## 2024-05-30 ENCOUNTER — NON-APPOINTMENT (OUTPATIENT)
Age: 74
End: 2024-05-30

## 2024-05-30 DIAGNOSIS — I25.10 ATHEROSCLEROTIC HEART DISEASE OF NATIVE CORONARY ARTERY W/OUT ANGINA PECTORIS: ICD-10-CM

## 2024-06-04 PROBLEM — E11.9 TYPE 2 DIABETES MELLITUS, WITH LONG-TERM CURRENT USE OF INSULIN: Status: ACTIVE | Noted: 2023-04-25

## 2024-06-04 PROBLEM — E78.00 PURE HYPERCHOLESTEROLEMIA: Status: ACTIVE | Noted: 2023-08-04

## 2024-06-04 PROBLEM — R80.9 MICROALBUMINURIA: Status: ACTIVE | Noted: 2024-03-01

## 2024-06-04 PROBLEM — E53.8 VITAMIN B 12 DEFICIENCY: Status: ACTIVE | Noted: 2023-08-04

## 2024-06-04 PROBLEM — E03.8 HYPOTHYROIDISM DUE TO HASHIMOTO'S THYROIDITIS: Status: ACTIVE | Noted: 2023-08-04

## 2024-06-04 LAB
ALBUMIN SERPL ELPH-MCNC: 4 G/DL
ALP BLD-CCNC: 145 U/L
ALT SERPL-CCNC: 29 U/L
ANION GAP SERPL CALC-SCNC: 13 MMOL/L
AST SERPL-CCNC: 38 U/L
BILIRUB SERPL-MCNC: 0.7 MG/DL
BUN SERPL-MCNC: 23 MG/DL
CALCIUM SERPL-MCNC: 9.2 MG/DL
CHLORIDE SERPL-SCNC: 102 MMOL/L
CHOLEST SERPL-MCNC: 167 MG/DL
CO2 SERPL-SCNC: 24 MMOL/L
CREAT SERPL-MCNC: 0.88 MG/DL
CREAT SPEC-SCNC: 140 MG/DL
EGFR: 90 ML/MIN/1.73M2
ESTIMATED AVERAGE GLUCOSE: 114 MG/DL
GLUCOSE SERPL-MCNC: 92 MG/DL
HBA1C MFR BLD HPLC: 5.6 %
HDLC SERPL-MCNC: 72 MG/DL
LDLC SERPL CALC-MCNC: 87 MG/DL
MICROALBUMIN 24H UR DL<=1MG/L-MCNC: 8.3 MG/DL
MICROALBUMIN/CREAT 24H UR-RTO: 59 MG/G
NONHDLC SERPL-MCNC: 95 MG/DL
POTASSIUM SERPL-SCNC: 4.5 MMOL/L
PROT SERPL-MCNC: 7.8 G/DL
SODIUM SERPL-SCNC: 140 MMOL/L
T4 FREE SERPL-MCNC: 1.4 NG/DL
TRIGL SERPL-MCNC: 36 MG/DL
TSH SERPL-ACNC: 0.36 UIU/ML
VIT B12 SERPL-MCNC: 332 PG/ML

## 2024-06-04 RX ORDER — LOSARTAN POTASSIUM 25 MG/1
25 TABLET, FILM COATED ORAL
Qty: 90 | Refills: 3 | Status: DISCONTINUED | COMMUNITY
Start: 2024-03-01 | End: 2024-06-04

## 2024-06-04 RX ORDER — INSULIN HUMAN 100 [IU]/ML
100 INJECTION, SUSPENSION SUBCUTANEOUS
Qty: 10 | Refills: 3 | Status: DISCONTINUED | COMMUNITY
Start: 2024-03-01 | End: 2024-06-04

## 2024-06-04 RX ORDER — ATORVASTATIN CALCIUM 20 MG/1
20 TABLET, FILM COATED ORAL
Qty: 90 | Refills: 3 | Status: ACTIVE | COMMUNITY
Start: 2024-06-04 | End: 1900-01-01

## 2024-06-04 NOTE — PHYSICAL EXAM
[Alert] : alert [No Acute Distress] : no acute distress [Normal Sclera/Conjunctiva] : normal sclera/conjunctiva [EOMI] : extra ocular movement intact [PERRL] : pupils equal, round and reactive to light [No Proptosis] : no proptosis [No Lid Lag] : no lid lag [Normal Outer Ear/Nose] : the ears and nose were normal in appearance [No Neck Mass] : no neck mass was observed [No LAD] : no lymphadenopathy [No Thyroid Nodules] : no palpable thyroid nodules [Clear to Auscultation] : lungs were clear to auscultation bilaterally [No Murmurs] : no murmurs [Normal Rate] : heart rate was normal [Regular Rhythm] : with a regular rhythm [Carotids Normal] : carotid pulses were normal with no bruits [No Edema] : no peripheral edema [Not Tender] : non-tender [Soft] : abdomen soft [No HSM] : no hepato-splenomegaly [Normal Supraclavicular Nodes] : no supraclavicular lymphadenopathy [Normal Anterior Cervical Nodes] : no anterior cervical lymphadenopathy [No CVA Tenderness] : no ~M costovertebral angle tenderness [No Involuntary Movements] : no involuntary movements were seen [No Joint Swelling] : no joint swelling seen [0] : 0 in the posterior tibialis [Normal] : normal [1+] : 1+ in the dorsalis pedis [Vibration Dec.] : diminished vibratory sensation at the level of the toes [No Tremors] : no tremors [Normal Sensation on Monofilament Testing] : normal sensation on monofilament testing of lower extremities [Normal Affect] : the affect was normal [Normal Mood] : the mood was normal [Kyphosis] : no kyphosis present [Acanthosis Nigricans] : no acanthosis nigricans [Foot Ulcers] : no foot ulcers [Swelling] : not swollen [Erythema] : not erythematous [Position Sense Dec.] : normal position sense at the level of the toes [#1 Diminished] : number 1 was normal [#2 Diminished] : number 2 was normal [#3 Diminished] : number 3 was normal [#4 Diminished] : number 4 was normal [#5 Diminished] : number 5 was normal [#6 Diminished] : number 6 was normal [#7 Diminished] : number 7 was normal [#8 Diminished] : number 8 was normal [#9 Diminished] : number 9 was normal [#10 Diminished] : number 10 was normal [de-identified] : Quite thin [de-identified] : Mild corneal arcus OD [de-identified] : Hearing is moderately decreased [de-identified] : Thyroid gland barely palpable [de-identified] : DP pulses 1+ bilaterally [de-identified] : Areas of lipohypertrophy on either side of the umbilicus are regressing..  Ulcer with mild serous drainage in the mid-abdomen near the PEJ site.  PEG tube site in the LLQ, no definite bruising or skin irriation around the stoma [de-identified] : Multiple hammer-toe deformities on both feet [de-identified] : The large ecchymosis over the medial aspect of the left calf has largely resolved.   [FreeTextEntry2] : Multiple hammer-toe deformitites [FreeTextEntry6] : Multiple hammer-toe deformities [de-identified] : Vibratory sensation absent over the toes

## 2024-06-04 NOTE — REVIEW OF SYSTEMS
[Decreased Appetite] : decreased appetite [Hearing Loss] : hearing loss [Nausea] : nausea [Insomnia] : insomnia [Stress] : stress [Cold Intolerance] : cold intolerance [Easy Bruising] : a tendency for easy bruising [Fatigue] : no fatigue [Fever] : no fever [Chills] : no chills [Dry Eyes] : no dryness [Blurred Vision] : no blurred vision [Eyes Itch] : no itch [Dysphagia] : no dysphagia [Chest Pain] : no chest pain [Palpitations] : no palpitations [Lower Ext Edema] : no lower extremity edema [Shortness Of Breath] : no shortness of breath [Cough] : no cough [Wheezing] : no wheezing [SOB on Exertion] : no shortness of breath on exertion [Constipation] : no constipation [Heartburn] : no heartburn [Diarrhea] : no diarrhea [Polyuria] : no polyuria [Dysuria] : no dysuria [Nocturia] : no nocturia [Joint Pain] : no joint pain [Muscle Weakness] : no muscle weakness [Myalgia] : no myalgia  [Joint Stiffness] : no joint stiffness [Muscle Cramps] : no muscle cramps [Dry Skin] : dry skin [Ulcer] : no ulcer [Headaches] : no headaches [Tremors] : no tremors [Pain/Numbness of Digits] : no pain/numbness of digits [Anxiety] : no anxiety [Polydipsia] : no polydipsia [Heat Intolerance] : no heat intolerance [Easy Bleeding] : no ~M tendency for easy bleeding [FreeTextEntry2] : Has lost 3 lb since his last visit [FreeTextEntry3] : Needs reading glasses since his cataract surgery.  [FreeTextEntry7] : Occasional bloating and belching from the feeds when he first awakens at 3 AM [de-identified] : Still takes occasional Ambien during the day if he is going to nap.  Is still dealing emotionally with his sister's death

## 2024-06-04 NOTE — ASSESSMENT
[FreeTextEntry2] : Duration of tube feeds, use of Ambien during the day [FreeTextEntry1] : 1) Type 2 DM:  Glycemic control is excellent as assessed by the Dolly data.  Will check another A1c today.  He has unfortunately lost 3 lb since his last visit, partially because he has started terminating his feeds prematurely, and possibly also from decrease PO intake secondary to emotional distress after his sister's death. --Was again cautioned that he needs to run his feeds for a full 10 hours (i.e. until 4 AM), even if he awakens at 3 AM --Continue the current insulin regimen --Continue use of the Dolly  2) Hypothyroidism:  Has been on a stable dose of levothyroxine 100 mcg/day.  Absorption seems to be adequate despite his gastrectomy. --Recheck TFTs today  3) Microalbuminuria:  Has been stable but persistent.  Is presumably due to diabetic nephropathy.  Losartan was increased to 50 mg/day in March to "attack" this.   --Recheck microalbumin ratio today:  4) Hypercholesterolemia:  He is currently on 10 mg atorvastatin/day.  Target LDL is 70 mg% given the coronary artery calcification seen on his CT scans, which obviously indicates the presence of plaque. --Repeat lipid profile today  5) B-12 deficiency:  Gets B-12 injections at Dr. Obando's office, but seemingly not on a regular basis  His B-12 level in February was well below target range at 205 pg/ml, but he has since had another injection. --To recheck level today  6) Health Maintenance:  Was cautioned against use of Ambien during the day.  Told him that if he is uncontrollably tired in the afternoon and wants to take a nap, should be limited in time and he should not take Ambien for this  Labs today:  CMP, lipids, TFTs, a1c, microalb, B-12 level.  Call back next week to discuss results See for follow-up in 3 months.  CMP, lipids, A1c, microalb, B-12 before the visit

## 2024-06-04 NOTE — ADDENDUM
[FreeTextEntry1] : Spoke with the pt on 6/4/24 regarding results of the labs drawn on the day of his visit: CMP WNL  (Glucose 92) A1c level excellent at 5.6% LDL 87, HDL 72, TG 36--LDL is above target of 70 mg% (see above) ---To increase the atorvastatin to 20 mg/day TSH level excellent at 0.36 uU/ml--continue levothyroxine 100 mcg/day Urine microalbumin ratio still positive at 59  (normal < 30)--stable over the past 2 years.  Losartan was increased to 50 mg/day in March ---Follow on the increased losartan to see if the proteinuria regresses ---Emphasized need for strict BP control.  BP was excellent at today's visit, but elevated at many of his other MD visits.  Target of < 140/85 (ideally < 130/80) discussed. Suggested that he start monitoring at home B-12 level 332 pg/ml--still below target of 400.  Suggested that he schedule standing appointments to have injections monthly with Dr. Obando.  (?? do the injections himself at home?)

## 2024-07-15 ENCOUNTER — OUTPATIENT (OUTPATIENT)
Dept: OUTPATIENT SERVICES | Facility: HOSPITAL | Age: 74
LOS: 1 days | End: 2024-07-15
Payer: MEDICARE

## 2024-07-15 DIAGNOSIS — Z98.890 OTHER SPECIFIED POSTPROCEDURAL STATES: Chronic | ICD-10-CM

## 2024-07-15 DIAGNOSIS — Z95.828 PRESENCE OF OTHER VASCULAR IMPLANTS AND GRAFTS: Chronic | ICD-10-CM

## 2024-07-15 DIAGNOSIS — Z93.1 GASTROSTOMY STATUS: Chronic | ICD-10-CM

## 2024-07-15 DIAGNOSIS — Z98.49 CATARACT EXTRACTION STATUS, UNSPECIFIED EYE: Chronic | ICD-10-CM

## 2024-07-15 DIAGNOSIS — Z87.19 PERSONAL HISTORY OF OTHER DISEASES OF THE DIGESTIVE SYSTEM: Chronic | ICD-10-CM

## 2024-07-15 DIAGNOSIS — Z90.3 ACQUIRED ABSENCE OF STOMACH [PART OF]: Chronic | ICD-10-CM

## 2024-07-15 DIAGNOSIS — Z90.411 ACQUIRED PARTIAL ABSENCE OF PANCREAS: Chronic | ICD-10-CM

## 2024-07-15 DIAGNOSIS — C16.9 MALIGNANT NEOPLASM OF STOMACH, UNSPECIFIED: ICD-10-CM

## 2024-07-17 ENCOUNTER — RESULT REVIEW (OUTPATIENT)
Age: 74
End: 2024-07-17

## 2024-07-17 VITALS
RESPIRATION RATE: 16 BRPM | DIASTOLIC BLOOD PRESSURE: 70 MMHG | HEART RATE: 65 BPM | SYSTOLIC BLOOD PRESSURE: 126 MMHG | OXYGEN SATURATION: 98 % | TEMPERATURE: 98 F

## 2024-07-17 VITALS
HEART RATE: 61 BPM | OXYGEN SATURATION: 97 % | DIASTOLIC BLOOD PRESSURE: 75 MMHG | TEMPERATURE: 98 F | RESPIRATION RATE: 16 BRPM | SYSTOLIC BLOOD PRESSURE: 138 MMHG

## 2024-07-17 PROCEDURE — 49451 REPLACE DUOD/JEJ TUBE PERC: CPT

## 2024-07-24 DIAGNOSIS — Z46.59 ENCOUNTER FOR FITTING AND ADJUSTMENT OF OTHER GASTROINTESTINAL APPLIANCE AND DEVICE: ICD-10-CM

## 2024-07-24 DIAGNOSIS — C16.9 MALIGNANT NEOPLASM OF STOMACH, UNSPECIFIED: ICD-10-CM

## 2024-08-01 PROBLEM — E06.3 HYPOTHYROIDISM DUE TO HASHIMOTO'S THYROIDITIS: Status: ACTIVE | Noted: 2023-08-04

## 2024-08-23 ENCOUNTER — APPOINTMENT (OUTPATIENT)
Dept: ENDOCRINOLOGY | Facility: CLINIC | Age: 74
End: 2024-08-23
Payer: MEDICARE

## 2024-08-23 VITALS
WEIGHT: 148 LBS | HEIGHT: 70 IN | BODY MASS INDEX: 21.19 KG/M2 | RESPIRATION RATE: 16 BRPM | OXYGEN SATURATION: 96 % | DIASTOLIC BLOOD PRESSURE: 83 MMHG | TEMPERATURE: 98.3 F | HEART RATE: 80 BPM | SYSTOLIC BLOOD PRESSURE: 143 MMHG

## 2024-08-23 DIAGNOSIS — Z79.4 TYPE 2 DIABETES MELLITUS W/OUT COMPLICATIONS: ICD-10-CM

## 2024-08-23 DIAGNOSIS — R80.9 PROTEINURIA, UNSPECIFIED: ICD-10-CM

## 2024-08-23 DIAGNOSIS — E53.8 DEFICIENCY OF OTHER SPECIFIED B GROUP VITAMINS: ICD-10-CM

## 2024-08-23 DIAGNOSIS — I10 ESSENTIAL (PRIMARY) HYPERTENSION: ICD-10-CM

## 2024-08-23 DIAGNOSIS — E78.00 PURE HYPERCHOLESTEROLEMIA, UNSPECIFIED: ICD-10-CM

## 2024-08-23 DIAGNOSIS — E11.9 TYPE 2 DIABETES MELLITUS W/OUT COMPLICATIONS: ICD-10-CM

## 2024-08-23 DIAGNOSIS — E06.3 AUTOIMMUNE THYROIDITIS: ICD-10-CM

## 2024-08-23 PROCEDURE — 99215 OFFICE O/P EST HI 40 MIN: CPT

## 2024-08-23 PROCEDURE — G2211 COMPLEX E/M VISIT ADD ON: CPT

## 2024-08-26 NOTE — HISTORY OF PRESENT ILLNESS
[FreeTextEntry1] : 74-yo man with a history of a total gastrectomy, distal esophagectomy and distal pancreatectomy for gastric CA in 2017.  Had mild type 2 DM before the gastrectomy, was able to be controlled on oral agents after the surgery, but steadily lost weight over the next few years due to poor PO intake.  Had a feeding tube inserted in approximately 2021 because of the weight loss and is currently on overnight feeds.  He has needed insulin to cover the feeds since that time. Was initially seen by me during a hospitalization in April of 2023 because of inadequate glycemic control.  His insulin regimen was modified from a single dose of NPH (given prior to starting the feeds each night)  to a combination of NPH and lispro prior to starting the feeds.  The duration of his feeds was also increased to 10 hours.  He is now on a 1.4 kwan/cc formula which runs from 6 PM to 4 AM the next morning at 110 cc/hr.     Had a blood sugar of 39 mg% on the labwork drawn on 8/16;  He had stopped his feeds at 4:30 AM that morning, but does not recall his glucose at that time.  He did not take any insulin or eat after the feeds were stopped.  He did not have any hypoglycemic symptoms at the time the bloodwork was drawn, and his Dolly did not alarm for hypoglycemia.   He thinks that his feeds have been running more slowly.  Is now starting them at approximately 4:30 in the afternoon, and they run at 110 cc/hr until approximately 3:30 AM..  He usually gets 5 containers of feeding, but now seems to be getting in only 4 1/2 containers.  The feeding pump is not indicating any obstructions, but he thinks that it has been running more slowly.   His blood sugars when he starts the feeds are usually in the 120-130 range.  Takes 26 NPH plus 5 units lispro before starting the feeds.   Average glucoses on the Dolly were reviewed.  His 90-day average was 119 mg%.                          7 days           90 days  MN-6 AM         163 mg%         134 6 AM-noon       115 mg%         104 Noon-6 PM      102 mg%         102 6 PM-MN         144 mg%         136  Notes that his glucoses when the feeds finish at 3-4 AM have been distinctly higher in the past week.  Blames increased work-related stress, but it is unclear how this would affect his blood sugars while the feeds are running. Has had very infrequent hypoglycemia between 6 AM and noon (when the NPH may still be active) Is taking very small doses of Humalog for the food he eats during the day. Is due for colonoscopy, and wants to do this in [place] Is also due for an updated ophth exam. He does not monitor BPs at home.

## 2024-08-26 NOTE — ASSESSMENT
[FreeTextEntry2] : Timing of tube feeds, BP targets [FreeTextEntry1] : 1) Type 2 DM:  Glycemic control is excellent as assessed by the A1c level and the Dolly data, though it is possible that the A1c level is falsely low given the mean blood glucose of 119 mg%.  An A1c level of 5.6% would ordinarily suggest the possibility of frequent undetected hypoglycemia, but the Dolly indicates that hypoglycemia is actually very infrequent.     He has been doing a better job of running his feeds for a full 10 hours, and this may account for his weight gain, which is desirable --With regard to the blood glucose of 39 mg% on the recent labwork, his lack of symptoms or an alarm on the CGM at the time the blood was being drawn suggests that the value of 39 mg% was either a lab error or failure to centrifuge the tube promptly. --He is to continue his current insulin regimen.  2) Hypercholesterolemia: LDL-cholesterol level is now only a trace above his target of 70 mg% with the increase in atorvastatin. --Continue atorvastatin 20 mg/day  3) Hypothyroidism: TSH level was not included in the current labwork, but his levothyroxine dose has been fairly stable. --Continue levothyroxine 100 mcg/day --Recheck TSH level at his next visit   4) Microalbuminuria:  Was unable to give a urine specimen when he went to the lab, but gave a specimen today. --If ratio is rising, will likely increase the losartan to 50 mg BID  5) B-12 deficiency:  Level is now near the lower limit of normal.  Receives injections at Dr. Obando's office, and the last one was approximately 3 months ago. --Told the pt that his B-12 level would ideally be over 400 pg/ml--needs to speak to Dr. Obando about increasing the frequency of his injections (or ?? doing them himself at home)  6) Hypertension:  BP is borderline elevated at today's visit, ?? due to his increased stress this week.  Readings at office visits are usually OK, but if he is elevated at his next visit will push him to begin monitoring at home. --Continue losartan 50 mg/day   Urine microalbumin today See for follow-up in 3 months. CMP, lipids, A1c, fructosamine, microalb, TFTs, PTH and GGT (due to the elevated alk phos) before the visit  Referrals discussed: --Referred to Dr Agarwal for updated ophth exam --To see Dr. Walton regarding colonoscopy --Referred to Dr. Richard for Podiatry

## 2024-08-26 NOTE — HISTORY OF PRESENT ILLNESS
[FreeTextEntry1] : 74-yo man with a history of a total gastrectomy, distal esophagectomy and distal pancreatectomy for gastric CA in 2017.  Had mild type 2 DM before the gastrectomy, was able to be controlled on oral agents after the surgery, but steadily lost weight over the next few years due to poor PO intake.  Had a feeding tube inserted in approximately 2021 because of the weight loss and is currently on overnight feeds.  He has needed insulin to cover the feeds since that time. Was initially seen by me during a hospitalization in April of 2023 because of inadequate glycemic control.  His insulin regimen was modified from a single dose of NPH (given prior to starting the feeds each night)  to a combination of NPH and lispro prior to starting the feeds.  The duration of his feeds was also increased to 10 hours.  He is now on a 1.4 kwan/cc formula which runs from 6 PM to 4 AM the next morning at 110 cc/hr.     Had a blood sugar of 39 mg% on the labwork drawn on 8/16;  He had stopped his feeds at 4:30 AM that morning, but does not recall his glucose at that time.  He did not take any insulin or eat after the feeds were stopped.  He did not have any hypoglycemic symptoms at the time the bloodwork was drawn, and his Dolly did not alarm for hypoglycemia.   He thinks that his feeds have been running more slowly.  Is now starting them at approximately 4:30 in the afternoon, and they run at 110 cc/hr until approximately 3:30 AM..  He usually gets 5 containers of feeding, but now seems to be getting in only 4 1/2 containers.  The feeding pump is not indicating any obstructions, but he thinks that it has been running more slowly.   His blood sugars when he starts the feeds are usually in the 120-130 range.  Takes 26 NPH plus 5 units lispro before starting the feeds.   Average glucoses on the Dolly were reviewed.  His 90-day average was 119 mg%.                          7 days           90 days  MN-6 AM         163 mg%         134 6 AM-noon       115 mg%         104 Noon-6 PM      102 mg%         102 6 PM-MN         144 mg%         136  Notes that his glucoses when the feeds finish at 3-4 AM have been distinctly higher in the past week.  Blames increased work-related stress, but it is unclear how this would affect his blood sugars while the feeds are running. Has had very infrequent hypoglycemia between 6 AM and noon (when the NPH may still be active) Is taking very small doses of Humalog for the food he eats during the day. Is due for colonoscopy, and wants to do this in [place] Is also due for an updated ophth exam. He does not monitor BPs at home.

## 2024-08-26 NOTE — REVIEW OF SYSTEMS
[Hearing Loss] : hearing loss [Nausea] : nausea [Dry Skin] : dry skin [Insomnia] : insomnia [Stress] : stress [Cold Intolerance] : cold intolerance [Easy Bruising] : a tendency for easy bruising [Fatigue] : no fatigue [Decreased Appetite] : appetite not decreased [Fever] : no fever [Chills] : no chills [Dry Eyes] : no dryness [Blurred Vision] : no blurred vision [Eyes Itch] : no itch [Dysphagia] : no dysphagia [Chest Pain] : no chest pain [Palpitations] : no palpitations [Lower Ext Edema] : no lower extremity edema [Shortness Of Breath] : no shortness of breath [Cough] : no cough [Wheezing] : no wheezing [SOB on Exertion] : no shortness of breath on exertion [Constipation] : no constipation [Heartburn] : no heartburn [Diarrhea] : no diarrhea [Polyuria] : no polyuria [Dysuria] : no dysuria [Nocturia] : no nocturia [Joint Pain] : no joint pain [Muscle Weakness] : no muscle weakness [Myalgia] : no myalgia  [Joint Stiffness] : no joint stiffness [Muscle Cramps] : no muscle cramps [Ulcer] : no ulcer [Headaches] : no headaches [Tremors] : no tremors [Pain/Numbness of Digits] : no pain/numbness of digits [Anxiety] : no anxiety [Polydipsia] : no polydipsia [Heat Intolerance] : no heat intolerance [Easy Bleeding] : no ~M tendency for easy bleeding [FreeTextEntry2] : Has gained back the 3 lb which he had lost prior to his last visit [FreeTextEntry3] : Needs reading glasses since his cataract surgery.  [FreeTextEntry7] : Occasional bloating and belching from the feeds when he first awakens at 3 AM [de-identified] : Has decreased dexterity with his hands [de-identified] : Has not been napping during the day

## 2024-08-26 NOTE — REVIEW OF SYSTEMS
[Hearing Loss] : hearing loss [Nausea] : nausea [Dry Skin] : dry skin [Insomnia] : insomnia [Stress] : stress [Cold Intolerance] : cold intolerance [Easy Bruising] : a tendency for easy bruising [Fatigue] : no fatigue [Decreased Appetite] : appetite not decreased [Fever] : no fever [Chills] : no chills [Dry Eyes] : no dryness [Blurred Vision] : no blurred vision [Eyes Itch] : no itch [Dysphagia] : no dysphagia [Chest Pain] : no chest pain [Palpitations] : no palpitations [Lower Ext Edema] : no lower extremity edema [Shortness Of Breath] : no shortness of breath [Cough] : no cough [Wheezing] : no wheezing [SOB on Exertion] : no shortness of breath on exertion [Constipation] : no constipation [Heartburn] : no heartburn [Diarrhea] : no diarrhea [Polyuria] : no polyuria [Dysuria] : no dysuria [Nocturia] : no nocturia [Joint Pain] : no joint pain [Muscle Weakness] : no muscle weakness [Myalgia] : no myalgia  [Joint Stiffness] : no joint stiffness [Muscle Cramps] : no muscle cramps [Ulcer] : no ulcer [Headaches] : no headaches [Tremors] : no tremors [Pain/Numbness of Digits] : no pain/numbness of digits [Anxiety] : no anxiety [Polydipsia] : no polydipsia [Heat Intolerance] : no heat intolerance [Easy Bleeding] : no ~M tendency for easy bleeding [FreeTextEntry2] : Has gained back the 3 lb which he had lost prior to his last visit [FreeTextEntry3] : Needs reading glasses since his cataract surgery.  [FreeTextEntry7] : Occasional bloating and belching from the feeds when he first awakens at 3 AM [de-identified] : Has decreased dexterity with his hands [de-identified] : Has not been napping during the day

## 2024-08-26 NOTE — DATA REVIEWED
[FreeTextEntry1] : [facility] (8/16/24)  FBS 39 mg%,  A1c 5.6% CMP--Alk phos still elevated at 160 U/l LDL 71, HDL 73, TG 47 Vitamin B-12 level 267 pg/ml

## 2024-08-26 NOTE — DATA REVIEWED
[FreeTextEntry1] : [facility] (8/16/24)  FBS 39 mg%,  A1c 5.6% CMP--Alk phos still elevated at 160 U/l LDL 71, HDL 73, TG 47 Vitamin B-12 level 267 pg/ml

## 2024-08-26 NOTE — PHYSICAL EXAM
[Alert] : alert [No Acute Distress] : no acute distress [Normal Sclera/Conjunctiva] : normal sclera/conjunctiva [EOMI] : extra ocular movement intact [PERRL] : pupils equal, round and reactive to light [No Proptosis] : no proptosis [No Lid Lag] : no lid lag [Normal Outer Ear/Nose] : the ears and nose were normal in appearance [No Neck Mass] : no neck mass was observed [No LAD] : no lymphadenopathy [No Thyroid Nodules] : no palpable thyroid nodules [Clear to Auscultation] : lungs were clear to auscultation bilaterally [No Murmurs] : no murmurs [Normal Rate] : heart rate was normal [Regular Rhythm] : with a regular rhythm [Carotids Normal] : carotid pulses were normal with no bruits [No Edema] : no peripheral edema [Not Tender] : non-tender [Soft] : abdomen soft [No HSM] : no hepato-splenomegaly [Normal Supraclavicular Nodes] : no supraclavicular lymphadenopathy [Normal Anterior Cervical Nodes] : no anterior cervical lymphadenopathy [No CVA Tenderness] : no ~M costovertebral angle tenderness [No Involuntary Movements] : no involuntary movements were seen [No Joint Swelling] : no joint swelling seen [0] : 0 in the posterior tibialis [Normal] : normal [1+] : 1+ in the dorsalis pedis [Vibration Dec.] : diminished vibratory sensation at the level of the toes [No Tremors] : no tremors [Normal Sensation on Monofilament Testing] : normal sensation on monofilament testing of lower extremities [Normal Affect] : the affect was normal [Normal Mood] : the mood was normal [Kyphosis] : no kyphosis present [Acanthosis Nigricans] : no acanthosis nigricans [Foot Ulcers] : no foot ulcers [Swelling] : not swollen [Erythema] : not erythematous [Position Sense Dec.] : normal position sense at the level of the toes [#1 Diminished] : number 1 was normal [#2 Diminished] : number 2 was normal [#3 Diminished] : number 3 was normal [#4 Diminished] : number 4 was normal [#5 Diminished] : number 5 was normal [#6 Diminished] : number 6 was normal [#7 Diminished] : number 7 was normal [#8 Diminished] : number 8 was normal [#9 Diminished] : number 9 was normal [#10 Diminished] : number 10 was normal [de-identified] : Quite thin [de-identified] : Mild corneal arcus OD.  2 mm SQ nodule on his left lower lid [de-identified] : Hearing is moderately decreased [de-identified] : Thyroid gland barely palpable [de-identified] : Cannot feel DP pulses today [de-identified] : Areas of lipohypertrophy on either side of the umbilicus are regressing..  Ulcer with mild serous drainage in the mid-abdomen near the PEJ site.  PEG tube site in the LLQ, no definite bruising or skin irriation around the stoma [de-identified] : Multiple hammer-toe deformities on both feet.  Significant onychomycosis of his toenails [de-identified] : The large ecchymosis over the medial aspect of the left calf has largely resolved.   [FreeTextEntry2] : Multiple hammer-toe deformitites [FreeTextEntry6] : Multiple hammer-toe deformities [de-identified] : Vibratory sensation absent over the toes

## 2024-08-26 NOTE — HISTORY OF PRESENT ILLNESS
[FreeTextEntry1] : 74-yo man with a history of a total gastrectomy, distal esophagectomy and distal pancreatectomy for gastric CA in 2017.  Had mild type 2 DM before the gastrectomy, was able to be controlled on oral agents after the surgery, but steadily lost weight over the next few years due to poor PO intake.  Had a feeding tube inserted in approximately 2021 because of the weight loss and is currently on overnight feeds.  He has needed insulin to cover the feeds since that time. Was initially seen by me during a hospitalization in April of 2023 because of inadequate glycemic control.  His insulin regimen was modified from a single dose of NPH (given prior to starting the feeds each night)  to a combination of NPH and lispro prior to starting the feeds.  The duration of his feeds was also increased to 10 hours.  He is now on a 1.4 kwan/cc formula which runs from 6 PM to 4 AM the next morning at 110 cc/hr.     Had a blood sugar of 39 mg% on the labwork drawn on 8/16;  He had stopped his feeds at 4:30 AM that morning, but does not recall his glucose at that time.  He did not take any insulin or eat after the feeds were stopped.  He did not have any hypoglycemic symptoms at the time the bloodwork was drawn, and his Dolly did not alarm for hypoglycemia.   He thinks that his feeds have been running more slowly.  Is now starting them at approximately 4:30 in the afternoon, and they run at 110 cc/hr until approximately 3:30 AM..  He usually gets 5 containers of feeding, but now seems to be getting in only 4 1/2 containers.  The feeding pump is not indicating any obstructions, but he thinks that it has been running more slowly.   His blood sugars when he starts the feeds are usually in the 120-130 range.  Takes 26 NPH plus 5 units lispro before starting the feeds.   Average glucoses on the Dolly were reviewed.  His 90-day average was 119 mg%.                          7 days           90 days  MN-6 AM         163 mg%         134 6 AM-noon       115 mg%         104 Noon-6 PM      102 mg%         102 6 PM-MN         144 mg%         136  Notes that his glucoses when the feeds finish at 3-4 AM have been distinctly higher in the past week.  Blames increased work-related stress, but it is unclear how this would affect his blood sugars while the feeds are running. Has had very infrequent hypoglycemia between 6 AM and noon (when the NPH may still be active) Is taking very small doses of Humalog for the food he eats during the day. Is due for colonoscopy, and wants to do this in [place] Is also due for an updated ophth exam. He does not monitor BPs at home.

## 2024-08-26 NOTE — DATA REVIEWED
[FreeTextEntry1] : [facility] (8/16/24)  FBS 39 mg%,  A1c 5.6% CMP--Alk phos still elevated at 160 U/l LDL 71, HDL 73, TG 47 Vitamin B-12 level 267 pg/ml

## 2024-08-26 NOTE — PHYSICAL EXAM
[Alert] : alert [No Acute Distress] : no acute distress [Normal Sclera/Conjunctiva] : normal sclera/conjunctiva [EOMI] : extra ocular movement intact [PERRL] : pupils equal, round and reactive to light [No Proptosis] : no proptosis [No Lid Lag] : no lid lag [Normal Outer Ear/Nose] : the ears and nose were normal in appearance [No Neck Mass] : no neck mass was observed [No LAD] : no lymphadenopathy [No Thyroid Nodules] : no palpable thyroid nodules [Clear to Auscultation] : lungs were clear to auscultation bilaterally [No Murmurs] : no murmurs [Normal Rate] : heart rate was normal [Regular Rhythm] : with a regular rhythm [Carotids Normal] : carotid pulses were normal with no bruits [No Edema] : no peripheral edema [Not Tender] : non-tender [Soft] : abdomen soft [No HSM] : no hepato-splenomegaly [Normal Supraclavicular Nodes] : no supraclavicular lymphadenopathy [Normal Anterior Cervical Nodes] : no anterior cervical lymphadenopathy [No CVA Tenderness] : no ~M costovertebral angle tenderness [No Involuntary Movements] : no involuntary movements were seen [No Joint Swelling] : no joint swelling seen [0] : 0 in the posterior tibialis [Normal] : normal [1+] : 1+ in the dorsalis pedis [Vibration Dec.] : diminished vibratory sensation at the level of the toes [No Tremors] : no tremors [Normal Sensation on Monofilament Testing] : normal sensation on monofilament testing of lower extremities [Normal Affect] : the affect was normal [Normal Mood] : the mood was normal [Kyphosis] : no kyphosis present [Acanthosis Nigricans] : no acanthosis nigricans [Foot Ulcers] : no foot ulcers [Swelling] : not swollen [Erythema] : not erythematous [Position Sense Dec.] : normal position sense at the level of the toes [#1 Diminished] : number 1 was normal [#2 Diminished] : number 2 was normal [#3 Diminished] : number 3 was normal [#4 Diminished] : number 4 was normal [#5 Diminished] : number 5 was normal [#6 Diminished] : number 6 was normal [#7 Diminished] : number 7 was normal [#8 Diminished] : number 8 was normal [#9 Diminished] : number 9 was normal [#10 Diminished] : number 10 was normal [de-identified] : Quite thin [de-identified] : Mild corneal arcus OD.  2 mm SQ nodule on his left lower lid [de-identified] : Hearing is moderately decreased [de-identified] : Thyroid gland barely palpable [de-identified] : Cannot feel DP pulses today [de-identified] : Areas of lipohypertrophy on either side of the umbilicus are regressing..  Ulcer with mild serous drainage in the mid-abdomen near the PEJ site.  PEG tube site in the LLQ, no definite bruising or skin irriation around the stoma [de-identified] : Multiple hammer-toe deformities on both feet.  Significant onychomycosis of his toenails [de-identified] : The large ecchymosis over the medial aspect of the left calf has largely resolved.   [FreeTextEntry2] : Multiple hammer-toe deformitites [FreeTextEntry6] : Multiple hammer-toe deformities [de-identified] : Vibratory sensation absent over the toes

## 2024-08-26 NOTE — PHYSICAL EXAM
[Alert] : alert [No Acute Distress] : no acute distress [Normal Sclera/Conjunctiva] : normal sclera/conjunctiva [EOMI] : extra ocular movement intact [PERRL] : pupils equal, round and reactive to light [No Proptosis] : no proptosis [No Lid Lag] : no lid lag [Normal Outer Ear/Nose] : the ears and nose were normal in appearance [No Neck Mass] : no neck mass was observed [No LAD] : no lymphadenopathy [No Thyroid Nodules] : no palpable thyroid nodules [Clear to Auscultation] : lungs were clear to auscultation bilaterally [No Murmurs] : no murmurs [Normal Rate] : heart rate was normal [Regular Rhythm] : with a regular rhythm [Carotids Normal] : carotid pulses were normal with no bruits [No Edema] : no peripheral edema [Not Tender] : non-tender [Soft] : abdomen soft [No HSM] : no hepato-splenomegaly [Normal Supraclavicular Nodes] : no supraclavicular lymphadenopathy [Normal Anterior Cervical Nodes] : no anterior cervical lymphadenopathy [No CVA Tenderness] : no ~M costovertebral angle tenderness [No Involuntary Movements] : no involuntary movements were seen [No Joint Swelling] : no joint swelling seen [0] : 0 in the posterior tibialis [Normal] : normal [1+] : 1+ in the dorsalis pedis [Vibration Dec.] : diminished vibratory sensation at the level of the toes [No Tremors] : no tremors [Normal Sensation on Monofilament Testing] : normal sensation on monofilament testing of lower extremities [Normal Affect] : the affect was normal [Normal Mood] : the mood was normal [Kyphosis] : no kyphosis present [Acanthosis Nigricans] : no acanthosis nigricans [Foot Ulcers] : no foot ulcers [Swelling] : not swollen [Erythema] : not erythematous [Position Sense Dec.] : normal position sense at the level of the toes [#1 Diminished] : number 1 was normal [#2 Diminished] : number 2 was normal [#3 Diminished] : number 3 was normal [#4 Diminished] : number 4 was normal [#5 Diminished] : number 5 was normal [#6 Diminished] : number 6 was normal [#7 Diminished] : number 7 was normal [#8 Diminished] : number 8 was normal [#9 Diminished] : number 9 was normal [#10 Diminished] : number 10 was normal [de-identified] : Quite thin [de-identified] : Mild corneal arcus OD.  2 mm SQ nodule on his left lower lid [de-identified] : Hearing is moderately decreased [de-identified] : Thyroid gland barely palpable [de-identified] : Cannot feel DP pulses today [de-identified] : Areas of lipohypertrophy on either side of the umbilicus are regressing..  Ulcer with mild serous drainage in the mid-abdomen near the PEJ site.  PEG tube site in the LLQ, no definite bruising or skin irriation around the stoma [de-identified] : Multiple hammer-toe deformities on both feet.  Significant onychomycosis of his toenails [de-identified] : The large ecchymosis over the medial aspect of the left calf has largely resolved.   [FreeTextEntry2] : Multiple hammer-toe deformitites [FreeTextEntry6] : Multiple hammer-toe deformities [de-identified] : Vibratory sensation absent over the toes

## 2024-08-26 NOTE — REVIEW OF SYSTEMS
[Hearing Loss] : hearing loss [Nausea] : nausea [Dry Skin] : dry skin [Insomnia] : insomnia [Stress] : stress [Cold Intolerance] : cold intolerance [Easy Bruising] : a tendency for easy bruising [Fatigue] : no fatigue [Decreased Appetite] : appetite not decreased [Fever] : no fever [Chills] : no chills [Dry Eyes] : no dryness [Blurred Vision] : no blurred vision [Eyes Itch] : no itch [Dysphagia] : no dysphagia [Chest Pain] : no chest pain [Palpitations] : no palpitations [Lower Ext Edema] : no lower extremity edema [Shortness Of Breath] : no shortness of breath [Cough] : no cough [Wheezing] : no wheezing [SOB on Exertion] : no shortness of breath on exertion [Constipation] : no constipation [Heartburn] : no heartburn [Diarrhea] : no diarrhea [Polyuria] : no polyuria [Dysuria] : no dysuria [Nocturia] : no nocturia [Joint Pain] : no joint pain [Muscle Weakness] : no muscle weakness [Myalgia] : no myalgia  [Joint Stiffness] : no joint stiffness [Muscle Cramps] : no muscle cramps [Ulcer] : no ulcer [Headaches] : no headaches [Tremors] : no tremors [Pain/Numbness of Digits] : no pain/numbness of digits [Anxiety] : no anxiety [Polydipsia] : no polydipsia [Heat Intolerance] : no heat intolerance [Easy Bleeding] : no ~M tendency for easy bleeding [FreeTextEntry2] : Has gained back the 3 lb which he had lost prior to his last visit [FreeTextEntry3] : Needs reading glasses since his cataract surgery.  [FreeTextEntry7] : Occasional bloating and belching from the feeds when he first awakens at 3 AM [de-identified] : Has decreased dexterity with his hands [de-identified] : Has not been napping during the day

## 2024-08-26 NOTE — ADDENDUM
[FreeTextEntry1] : Urine microalbumin ratio checked today was again positive at 39, but has decreased from the value of 59 noted in May.. Will continue the losartan at 50 mg/day as long as the ratio continues to decrease, but will increase the dose if the microalbumin ratio starts to rise or if his home BP monitoring shows elevated values

## 2024-09-23 ENCOUNTER — APPOINTMENT (OUTPATIENT)
Dept: VASCULAR SURGERY | Facility: CLINIC | Age: 74
End: 2024-09-23
Payer: MEDICARE

## 2024-09-23 VITALS
HEART RATE: 80 BPM | DIASTOLIC BLOOD PRESSURE: 64 MMHG | HEIGHT: 70.5 IN | SYSTOLIC BLOOD PRESSURE: 114 MMHG | BODY MASS INDEX: 20.95 KG/M2 | WEIGHT: 148 LBS

## 2024-09-23 DIAGNOSIS — Z01.818 ENCOUNTER FOR OTHER PREPROCEDURAL EXAMINATION: ICD-10-CM

## 2024-09-23 PROCEDURE — 99215 OFFICE O/P EST HI 40 MIN: CPT

## 2024-09-23 NOTE — ADDENDUM
[FreeTextEntry1] : This note was written by Sunshine MURPHY, acting as a scribe for Dr. Aron Saenz.  I, Dr. Aron Saenz, have read and attest that all the information, medical decision-making, and discharge instructions within are true and accurate.  I, Dr. Aron Saenz, personally performed the evaluation and management (E/M) services for this new patient.  That E/M includes conducting the initial examination, assessing all conditions, and establishing the plan of care.  Today, my ACP, Sunshine MURPHY, was here to observe my evaluation and management services for this patient to be followed going forward.

## 2024-09-23 NOTE — HISTORY OF PRESENT ILLNESS
[FreeTextEntry1] : 74yoM with PMHx gastric cancer (s/p PEJ tube feeds), T2DM, pre-peritoneal abscess (chronic enterocutaneous fistula), portal vein thrombosis (Eliquis), hypothyroidism, HTN, anemia, AAA, s/p EVAR, R hypogastric embolization 3/31/2023 who hasn't followed since his procedure returns for an evaluation. He was noted to have a persistent endoleak, the overall size of the residual sac stable approximately 6 cm on CT A/P 5/5/24. Again, seen is a large right internal iliac artery aneurysm measuring approximately 3.9 cm in size with suspicion residual flow as suspected on prior exam of left internal iliac artery aneurysm measures 1.8 cm in size unchanged. Patient is overall doing well, denies abdominal/back pain, no fever, chills.

## 2024-09-23 NOTE — ASSESSMENT
[Aneurysm Surgery] : aneurysm surgery [FreeTextEntry1] : 74yoM with PMHx gastric cancer (s/p PEJ tube feeds), T2DM, pre-peritoneal abscess (chronic enterocutaneous fistula), portal vein thrombosis (Eliquis), hypothyroidism, HTN, anemia, AAA, s/p EVAR, R hypogastric embolization 3/31/2023 who hasn't followed since his procedure returns for an evaluation. Denies abdominal/back pain. Reviewed CT A/P and discussed the findings with the patient. Recommend to proceed with aortogram/endoleak embolization. RABs were discussed, all questions answered. He agrees and would like to proceed mid-October. He will obtain a pre-op clearance from his PCP.  I spent a total of 40 minutes in this encounter.

## 2024-09-23 NOTE — PHYSICAL EXAM
[Respiratory Effort] : normal respiratory effort [Normal Heart Sounds] : normal heart sounds [2+] : left 2+ [Alert] : alert [Calm] : calm [Abdomen Tenderness] : ~T ~M No abdominal tenderness [de-identified] : WN/WD, NAD [de-identified] : NC/AT [de-identified] : supple [de-identified] : peg tube in place, dressing over upper abdomen [de-identified] : FROM

## 2024-09-23 NOTE — PHYSICAL EXAM
[Respiratory Effort] : normal respiratory effort [Normal Heart Sounds] : normal heart sounds [2+] : left 2+ [Alert] : alert [Calm] : calm [Abdomen Tenderness] : ~T ~M No abdominal tenderness [de-identified] : WN/WD, NAD [de-identified] : NC/AT [de-identified] : supple [de-identified] : peg tube in place, dressing over upper abdomen [de-identified] : FROM

## 2024-09-26 LAB
ALBUMIN SERPL ELPH-MCNC: 4 G/DL
ALP BLD-CCNC: 142 U/L
ALT SERPL-CCNC: 22 U/L
ANION GAP SERPL CALC-SCNC: 15 MMOL/L
AST SERPL-CCNC: 38 U/L
BASOPHILS # BLD AUTO: 0.09 K/UL
BASOPHILS NFR BLD AUTO: 1.2 %
BILIRUB SERPL-MCNC: 0.7 MG/DL
BUN SERPL-MCNC: 21 MG/DL
CALCIUM SERPL-MCNC: 9.3 MG/DL
CHLORIDE SERPL-SCNC: 101 MMOL/L
CO2 SERPL-SCNC: 23 MMOL/L
CREAT SERPL-MCNC: 0.88 MG/DL
EGFR: 90 ML/MIN/1.73M2
EOSINOPHIL # BLD AUTO: 0.17 K/UL
EOSINOPHIL NFR BLD AUTO: 2.2 %
GLUCOSE SERPL-MCNC: 76 MG/DL
HCT VFR BLD CALC: 36.3 %
HGB BLD-MCNC: 11.6 G/DL
IMM GRANULOCYTES NFR BLD AUTO: 0.1 %
INR PPP: 1.3 RATIO
LYMPHOCYTES # BLD AUTO: 1.14 K/UL
LYMPHOCYTES NFR BLD AUTO: 14.7 %
MAN DIFF?: NORMAL
MCHC RBC-ENTMCNC: 31.4 PG
MCHC RBC-ENTMCNC: 32 GM/DL
MCV RBC AUTO: 98.4 FL
MONOCYTES # BLD AUTO: 0.69 K/UL
MONOCYTES NFR BLD AUTO: 8.9 %
NEUTROPHILS # BLD AUTO: 5.64 K/UL
NEUTROPHILS NFR BLD AUTO: 72.9 %
PLATELET # BLD AUTO: 219 K/UL
POTASSIUM SERPL-SCNC: 4.6 MMOL/L
PROT SERPL-MCNC: 7.6 G/DL
PT BLD: 14.7 SEC
RBC # BLD: 3.69 M/UL
RBC # FLD: 15.8 %
SODIUM SERPL-SCNC: 139 MMOL/L
WBC # FLD AUTO: 7.74 K/UL

## 2024-10-02 ENCOUNTER — NON-APPOINTMENT (OUTPATIENT)
Age: 74
End: 2024-10-02

## 2024-10-02 ENCOUNTER — APPOINTMENT (OUTPATIENT)
Dept: HEART AND VASCULAR | Facility: CLINIC | Age: 74
End: 2024-10-02
Payer: MEDICARE

## 2024-10-02 VITALS
HEART RATE: 67 BPM | HEIGHT: 70 IN | SYSTOLIC BLOOD PRESSURE: 110 MMHG | TEMPERATURE: 97.8 F | BODY MASS INDEX: 21.05 KG/M2 | WEIGHT: 147 LBS | OXYGEN SATURATION: 98 % | DIASTOLIC BLOOD PRESSURE: 70 MMHG

## 2024-10-02 DIAGNOSIS — E78.00 PURE HYPERCHOLESTEROLEMIA, UNSPECIFIED: ICD-10-CM

## 2024-10-02 DIAGNOSIS — Z79.4 TYPE 2 DIABETES MELLITUS W/OUT COMPLICATIONS: ICD-10-CM

## 2024-10-02 DIAGNOSIS — E11.9 TYPE 2 DIABETES MELLITUS W/OUT COMPLICATIONS: ICD-10-CM

## 2024-10-02 DIAGNOSIS — I10 ESSENTIAL (PRIMARY) HYPERTENSION: ICD-10-CM

## 2024-10-02 DIAGNOSIS — R01.1 CARDIAC MURMUR, UNSPECIFIED: ICD-10-CM

## 2024-10-02 DIAGNOSIS — I97.89 OTHER POSTPROCEDURAL COMPLICATIONS AND DISORDERS OF THE CIRCULATORY SYSTEM, NOT ELSEWHERE CLASSIFIED: ICD-10-CM

## 2024-10-02 PROBLEM — Z95.828 S/P AAA REPAIR USING BIFURCATION GRAFT: Status: ACTIVE | Noted: 2024-09-23

## 2024-10-02 PROCEDURE — G2211 COMPLEX E/M VISIT ADD ON: CPT

## 2024-10-02 PROCEDURE — 99214 OFFICE O/P EST MOD 30 MIN: CPT

## 2024-10-02 PROCEDURE — 93000 ELECTROCARDIOGRAM COMPLETE: CPT

## 2024-10-02 NOTE — PHYSICAL EXAM
[Well Developed] : well developed [Well Nourished] : well nourished [No Acute Distress] : no acute distress [Normal Conjunctiva] : normal conjunctiva [Normal Venous Pressure] : normal venous pressure [No Carotid Bruit] : no carotid bruit [Normal S1, S2] : normal S1, S2 [No Rub] : no rub [No Gallop] : no gallop [Clear Lung Fields] : clear lung fields [Good Air Entry] : good air entry [No Respiratory Distress] : no respiratory distress  [Soft] : abdomen soft [Non Tender] : non-tender [Normal Bowel Sounds] : normal bowel sounds [Normal Gait] : normal gait [No Edema] : no edema [No Cyanosis] : no cyanosis [No Clubbing] : no clubbing [No Varicosities] : no varicosities [No Rash] : no rash [No Skin Lesions] : no skin lesions [Moves all extremities] : moves all extremities [No Focal Deficits] : no focal deficits [Normal Speech] : normal speech [Alert and Oriented] : alert and oriented [Normal memory] : normal memory [Cachectic] : cachexia was observed [de-identified] : soft 2/6 ASYA RUSB  [de-identified] : Feeding tube, mid line inscisional

## 2024-10-02 NOTE — HISTORY OF PRESENT ILLNESS
[FreeTextEntry1] : pre op endoleak repair for TVAR  - HTN, - DM, + hyperlipidemia, tobacco  Adenocarcinoma of stomach, DVT ,  Portal vein thrombosis  Serial CT chest for gastric cancer showed VESSELS: Atherosclerotic changes of the aorta and coronary arteries. Now scheduled aortogram/endoleak embolization no known ASCVD, denies CP, SOB, orthopnea, PND palpitations or edema. Walks 20 blocks unlimited  PEG tube s/p gastrectomy, vegan diet

## 2024-10-02 NOTE — ASSESSMENT
[FreeTextEntry1] :  Hyperlipidemia, [place] DLL < 709  1. AAA 2. s/p TVAR 3. endoleak 4. Pre operative cardiac examination Hyperlipidemia, target LDL < 70   Hx as above  No known obstructive CAD Exercise tolerance >> 4 METS A this point would proceed w surgery without further ischemia evaluation, as no indication for intervention in pt asymptomatic at high workload. Moreover any PCI would mandate extended course of DAPT. There are no cardiac contraindications to planned endovascular repair.  5. Hx DVT 6. Hx portal vein thrombosis maintained on lifelong eliquis for prothrombin gene mutation Norah op management as per Vascular  7. murmur  will obtain echo

## 2024-10-08 ENCOUNTER — RESULT REVIEW (OUTPATIENT)
Age: 74
End: 2024-10-08

## 2024-10-08 ENCOUNTER — OUTPATIENT (OUTPATIENT)
Dept: OUTPATIENT SERVICES | Facility: HOSPITAL | Age: 74
LOS: 1 days | End: 2024-10-08
Payer: MEDICARE

## 2024-10-08 VITALS
OXYGEN SATURATION: 97 % | HEART RATE: 86 BPM | DIASTOLIC BLOOD PRESSURE: 62 MMHG | SYSTOLIC BLOOD PRESSURE: 122 MMHG | TEMPERATURE: 99 F | RESPIRATION RATE: 17 BRPM

## 2024-10-08 VITALS
RESPIRATION RATE: 16 BRPM | SYSTOLIC BLOOD PRESSURE: 134 MMHG | OXYGEN SATURATION: 98 % | TEMPERATURE: 99 F | HEART RATE: 68 BPM | DIASTOLIC BLOOD PRESSURE: 58 MMHG

## 2024-10-08 DIAGNOSIS — Z90.411 ACQUIRED PARTIAL ABSENCE OF PANCREAS: Chronic | ICD-10-CM

## 2024-10-08 DIAGNOSIS — Z98.890 OTHER SPECIFIED POSTPROCEDURAL STATES: Chronic | ICD-10-CM

## 2024-10-08 DIAGNOSIS — Z87.19 PERSONAL HISTORY OF OTHER DISEASES OF THE DIGESTIVE SYSTEM: Chronic | ICD-10-CM

## 2024-10-08 DIAGNOSIS — Z90.3 ACQUIRED ABSENCE OF STOMACH [PART OF]: Chronic | ICD-10-CM

## 2024-10-08 DIAGNOSIS — Z98.49 CATARACT EXTRACTION STATUS, UNSPECIFIED EYE: Chronic | ICD-10-CM

## 2024-10-08 DIAGNOSIS — C16.9 MALIGNANT NEOPLASM OF STOMACH, UNSPECIFIED: ICD-10-CM

## 2024-10-08 DIAGNOSIS — Z93.1 GASTROSTOMY STATUS: Chronic | ICD-10-CM

## 2024-10-08 DIAGNOSIS — Z95.828 PRESENCE OF OTHER VASCULAR IMPLANTS AND GRAFTS: Chronic | ICD-10-CM

## 2024-10-08 PROCEDURE — 49451 REPLACE DUOD/JEJ TUBE PERC: CPT

## 2024-10-10 ENCOUNTER — APPOINTMENT (OUTPATIENT)
Dept: HEART AND VASCULAR | Facility: CLINIC | Age: 74
End: 2024-10-10
Payer: MEDICARE

## 2024-10-10 ENCOUNTER — NON-APPOINTMENT (OUTPATIENT)
Age: 74
End: 2024-10-10

## 2024-10-10 VITALS
HEIGHT: 70 IN | WEIGHT: 148 LBS | OXYGEN SATURATION: 97 % | TEMPERATURE: 97.8 F | SYSTOLIC BLOOD PRESSURE: 136 MMHG | BODY MASS INDEX: 21.19 KG/M2 | DIASTOLIC BLOOD PRESSURE: 76 MMHG | HEART RATE: 67 BPM

## 2024-10-10 VITALS — DIASTOLIC BLOOD PRESSURE: 85 MMHG | SYSTOLIC BLOOD PRESSURE: 130 MMHG

## 2024-10-10 DIAGNOSIS — Z95.828 PRESENCE OF OTHER VASCULAR IMPLANTS AND GRAFTS: ICD-10-CM

## 2024-10-10 DIAGNOSIS — Z86.79 PRESENCE OF OTHER VASCULAR IMPLANTS AND GRAFTS: ICD-10-CM

## 2024-10-10 DIAGNOSIS — D68.52 PROTHROMBIN GENE MUTATION: ICD-10-CM

## 2024-10-10 DIAGNOSIS — I82.409 ACUTE EMBOLISM AND THROMBOSIS OF UNSPECIFIED DEEP VEINS OF UNSPECIFIED LOWER EXTREMITY: ICD-10-CM

## 2024-10-10 DIAGNOSIS — I25.10 ATHEROSCLEROTIC HEART DISEASE OF NATIVE CORONARY ARTERY W/OUT ANGINA PECTORIS: ICD-10-CM

## 2024-10-10 PROCEDURE — G2211 COMPLEX E/M VISIT ADD ON: CPT

## 2024-10-10 PROCEDURE — 93306 TTE W/DOPPLER COMPLETE: CPT

## 2024-10-10 PROCEDURE — 99213 OFFICE O/P EST LOW 20 MIN: CPT

## 2024-10-18 ENCOUNTER — APPOINTMENT (OUTPATIENT)
Dept: VASCULAR SURGERY | Facility: CLINIC | Age: 74
End: 2024-10-18

## 2024-10-18 ENCOUNTER — TRANSCRIPTION ENCOUNTER (OUTPATIENT)
Age: 74
End: 2024-10-18

## 2024-10-19 ENCOUNTER — TRANSCRIPTION ENCOUNTER (OUTPATIENT)
Age: 74
End: 2024-10-19

## 2024-10-20 DIAGNOSIS — Z46.59 ENCOUNTER FOR FITTING AND ADJUSTMENT OF OTHER GASTROINTESTINAL APPLIANCE AND DEVICE: ICD-10-CM

## 2024-10-26 NOTE — PRE-OP CHECKLIST - AS BP NONINV SITE
[facility] Medical Oncology Note  Date of visit: Oct 28, 2024    Assessment:     Stage IB Luminal A invasive breast cancer, status post neoadjuvant chemotherapy followed by lumpectomy and sentinel lymph node biopsies.  While there was obvious response by exam, pathology did not show any significant response to neoadjuvant treatment, confirming that this is a luminal a breast cancer.  Her KI-67 was 24%, and so neoadjuvant chemotherapy was a reasonable strategy here.  And with all the nodes positive, the recommendation would have been for adjuvant chemotherapy.  This has been already been completed.    Because of her persistent description of diffuse pain, we ordered studies to rule out distant metastasis as an etiology. Her PET showed a slightly enlarged left axillary node, and equivocal solitary lesions in T5 and the left lobe for the liver. But subsequent MRIs showed no lesion in the thoracic spine, and no suspicious hepatic lesions. There was hepatic steatosis and stable minimally enlarged port hepatis nodes. So happily there was no evidence of metastatic disease. The left axillary node will be covered by her adjuvant radiotherapy.  S/p initiation of OFS and on anstrozole with a little better tolerance. [place] of care is to offer ribociclib based on the MONY trial. I would usually wait until she has received radiotherapy.  Given her poor tolerance of all of her treatments, I really doubt this is a medication that she will stay on.  And in terms of getting a BSO, she would rather stay on goserelin.  I do absolutely recommend radiotherapy. Eliezer tells me today she doesn't want to do that.  The reasons for this are vague.  But there is no question it is standard treatment.  We left it today that I will make her an appointment to be seen by Dr. Velasquez in [place].  Will be up to her whether or not she wants to go to that appointment.  Her fiancé Saul, and Kathy's mother both want her to get the  radiation.  She continues to have a surprising amount of side effects.  She has profound nausea from letrozole.  The switch to anastrozole has resulted in some improvement in that, but she still has issues.  She has to decide what she is willing to put up with.  Brain metastasis can also cause nausea but she did horribly with her MRI of the liver and does not want to get another MRI right now.  For what its worth, I did review her CT/PET scan and the images of the brain did not show anything obvious (sometimes it does in the setting of brain metastasis).  Kathy has always been challenging to care for and nothing has changed in that regard today.  Significant side effects of treatment in the context of chronic pain requiring narcotics.  I am so happy for the involvement of palliative care.  Ongoing smoking.  She has no plans on quitting despite my encouragement.  It is bad for you.  It has been in all of the newspapers.      Plan:     Continue anastrozole  Referral to Rad Onc in [place] for adjuvant RT  Next goserelin should be 12/6/2024  RTC with me in 3 months for a virtual appointment.  If she is doing better at that point we would consider a discussion about Ribociclib.   Continue follow-up with Palliative care    The longitudinal plan of care for the diagnosis(es)/condition(s) as documented were addressed during this visit. Due to the added complexity in care, I will continue to support Eliezer in the subsequent management and with ongoing continuity of care.      30 minutes spent on the date of the encounter doing chart review, review of test results, interpretation of tests, patient visit, and documentation.      Zachary Nolan MD, MSc  Associate Professor of Medicine  [facility] [employer]  [facility]  [address] [place] [id number]  [phone]    __________________________________________________________________    DIAGNOSES     Pathologic prognostic stage IB  "([id number](sn)) Junction grade 2 invasive ductal breast cancer, diagnosed definitively at lumpectomy and sentinel lymph node biopsy 7/24/2024.  Kathy had a 2.2 cm moderately differentiated tumor with 4 out of 5 sentinel nodes positive for involvement, 1 of which showed a 2 mm area of extracapsular extension.  The tumor was 91 to 100% strongly positive for both ER and TN.  It was negative for HER2 amplification by FISH.  There is no obvious tumor response to neoadjuvant treatment in both the breast and the nodes.  Eliezer presented with  a clinical T2N1 invasive breast cancer diagnosed at breast biopsy 2/9/2024. Eliezer palpated a left breast lump. Mammogram and US showed a 2.5 x 2.5 x 2.0 cm spiculated mass at 2:00 position.   Biopsy showed a Junction grade I IDC, 97% strongly positive for ER, 99% strongly positive for TN, and negative for HER2 by FISH (IHC 2+). KI-67 is 24%. Mammaprint was high risk Luminal B. Left axillary US that showed three abnormal axillary nodes, biopsy positive for IDC. Biopsy showed METASTATIC BREAST CARCINOMA, almost entirely effacing lymph node, at least 11 mm in linear extent.  My first exam prior to neoadjuvant therapy showed a palpable deep left breast mass in the 2 o'clock position measuring roughly 4 cm x 4 cm.   Chronic pain with history of opiate prescriptions from multiple providers. She is currently seeing palliative care (Dr. Kylie Rodriguez), and tried buprenorphine, started on 4/11/2024, but reacted to the adhesive.  Then didn't tolerate fentanyl.  Current recommendation is suboxone.   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  But it was negative for everything else.  History of N/V from migraines, thus managing nausea during therapy has been challenging.         History of Present Illness/Therapy to dte:     Neoadjuvant ddAC initiated 3/15/2024 through 4/26/2024.   Weekly paclitaxel 5/10/2026-.6/14/2026.  It was then " "discontinued due to crippling side effects.   Lumpectomy and sentinel lymph node biopsy, 7/24/2024.    Goserelin 10.8 mg initiated 9/12/2024.  Letrozole 10/10-22/24. This was held due to nausea and vomiting. Anastrozole was substituted 10/22/2024.    Interval History     Eliezer is back with Saul   She has crippling nausea.  She was not throwing up a few times a day while on letrozole.  Going to anastrozole, she is now having every other day emesis.  It is really getting in the way of her life.  Her pain remains severe.  It is in her hands and feet.  She is in a wheelchair today, again, because of the pain.  This is being managed by palliative care.  She is very fatigued.    She always has headaches. She has had migraines since she was a kid and the quality of these headaches is very similar.  She continues to lose weight, not intentionally.  This is another concern.  She still smoking.  She has no plans to quit.  I should save my breath, she tells me.  She really does not want to do radiation.  She does not understand why she should have to now that she is cancer free.  Discussed at length.    Past Medical History:   Melanoma of right foot???     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  She thought about being a , but then got pregnant with her son Homero.  She had a lot of different for jobs.  She spent some time in [facility] [place] where her mom and other family member lives.  She then came back to [place].  She has a fiancé of 6 years named Saul and they currently live in [place], [place].  She is an avid reader, and likes romance novels, but really anything.  She says she read 250 books last year.  She does smoke and has no plans on quitting.  She is currently on a low-carb diet.    Family History:     Family History   Problem Relation Age of Onset    Breast Cancer Mother     Genitourinary Problems Mother         renal disease - minimal change, sponge kidney    Cancer Paternal Grandmother         GYN cancer    Cancer Paternal Grandfather         Stomach            Medications:     Current Outpatient Medications   Medication Sig Dispense Refill    acetaminophen (TYLENOL) 500 MG tablet Take 2 tablets (1,000 mg) by mouth every 8 hours as needed for mild pain      albuterol (PROAIR HFA/PROVENTIL HFA/VENTOLIN HFA) 108 (90 Base) MCG/ACT inhaler Inhale 2 puffs into the lungs every 4 hours as needed      amitriptyline (ELAVIL) 10 MG tablet Take 1-2 tablets (10-20 mg) by mouth every morning AND 5 tablets (50 mg) at bedtime.      amitriptyline (ELAVIL) 25 MG tablet Take 2-3 tablets (50-75 mg) by mouth at bedtime 90 tablet 2    amLODIPine (NORVASC) 10 MG tablet Take 10 mg by mouth at bedtime      anastrozole (ARIMIDEX) 1 MG tablet Take 1 tablet (1 mg) by mouth daily. 30 tablet 11    atorvastatin (LIPITOR) 10 MG tablet Take 10 mg by mouth at bedtime      Blood Glucose Monitoring Suppl (ONETOUCH VERIO FLEX SYSTEM) w/Device KIT       buprenorphine HCl-naloxone HCl (SUBOXONE) 8-2 MG per film Place 1 Film under the tongue every 6 hours. 120 Film 0    cetirizine (ZYRTEC) 10 MG tablet Take 5 mg by mouth as needed      EPINEPHrine (ANY BX GENERIC EQUIV) 0.3 MG/0.3ML injection 2-pack       esomeprazole (NEXIUM) 20 MG DR capsule Take 20 mg by mouth as needed      fluticasone-salmeterol (ADVAIR HFA) 115-21 MCG/ACT inhaler Inhale 2 puffs into the  lungs 2 times daily      hydrochlorothiazide (HYDRODIURIL) 25 MG tablet Take 25 mg by mouth as needed      HYDROcodone-acetaminophen (NORCO)  MG per tablet Take 0.5-1 tablets by mouth every 4 hours as needed for severe pain. 84 tablet 0    ibuprofen (ADVIL/MOTRIN) 200 MG tablet Take 200 mg by mouth      insulin glargine (LANTUS PEN) 100 UNIT/ML pen Inject 35 Units subcutaneously at bedtime.      JANUVIA 100 MG tablet Take 100 mg by mouth at bedtime      [START ON 10/28/2024] LORazepam (ATIVAN) 1 MG tablet Take 1 tablet (1 mg) by mouth every 6 hours as needed for anxiety, nausea or sleep. 30 tablet 1    methocarbamol (ROBAXIN) 500 MG tablet Take 3 tablets (1,500 mg) by mouth 3 times daily as needed for muscle spasms. 270 tablet 1    naloxone (NARCAN) 4 MG/0.1ML nasal spray Spray 4 mg into one nostril alternating nostrils as needed for opioid reversal every 2-3 minutes until assistance arrives      OLANZapine (ZYPREXA) 2.5 MG tablet Take 1-2 tablets (2.5-5 mg) by mouth 3 times daily as needed (Nausea or anxiety) 150 tablet 2    ONETOUCH VERIO IQ test strip       Pregabalin (LYRICA) 200 MG capsule Take 1 capsule (200 mg) by mouth at bedtime. 30 capsule 1    prochlorperazine (COMPAZINE) 10 MG tablet Take 1 tablet (10 mg) by mouth every 6 hours as needed for nausea or vomiting. 30 tablet 1    Prochlorperazine Maleate (COMPAZINE PO) Take 10 mg by mouth 3 times daily as needed for nausea      TRUE COMFORT PRO PEN NEEDLES 31G X 5 MM miscellaneous       urea (GORMEL) 20 % external cream Apply topically as needed (Apply to feet twice daily) 480 g 1              Physical Exam:   not currently breastfeeding.    ECOG PS: 1  Constitutional: WDWN female in NAD, pleasant and appropriate.  She can get up onto the exam table without my assistance.  She smells of tobacco.  HEENT:  NC/AT, no icterus, OP clear, MMM  Skin: No jaundice nor ecchymoses  Lungs: CTAB, no w/r/r, nonlabored breathing.  Cardiovascular: RRR, S1, S2, no  m/r/g  MSK/Extremities: Warm, well perfused. Trace bilateral pitting edema.   LN: no cervical, supraclavicular or left axillary lymphadenopathy  Neurologic: alert, answering questions appropriately, moving all extremities spontaneously. CN 2-12 grossly intact.  Psych: appropriate affect  Data:     CT/PET 9/13/2024  IMPRESSION:      1. Postoperative changes of left breast lumpectomy and left axillary  farhan dissection with presumed postprocedural inflammation and seroma.  Mildly FDG avid probable 1.2 cm left axillary node with adjacent  inflammatory stranding, concerning for possible residual farhan  metastasis.      2. Indeterminate mildly hypermetabolic foci most pronounced within the  left hepatic lobe without CT correlate. Recommend further evaluation  with liver MRI with contrast to exclude hepatic metastases.     3. Mildly FDG avid subtle sclerotic focus within the T5 vertebral  body. Consider further evaluation with thoracic spine MRI with  contrast to exclude osseous metastasis.      MRI THRACIS SPINE 10/20/2024  IMPRESSION: No evidence of metastatic disease in the thoracic spine on  this noncontrast MRI. Specifically, there is no lesion in the T5  vertebral body.    PATHOLOGY 7/24/2024  B. LEFT BREAST, MASS, SEED-LOCALIZED SEGMENTAL MASTECTOMY:  -INVASIVE DUCTAL CARCINOMA, Jeniffer grade 2, 22 mm in greatest dimension.  -Ductal carcinoma in-situ (DCIS), high nuclear grade, cribriform type with focal central necrosis, approx 15 mm in linear extent (negative for EIC).  -Margins are negative; closest margins to invasive carcinoma are posterior at 4 mm and superior at 8 mm; all other margins are at >10 mm; closest uninvolved margin to DCIS is posterior at 4 mm; all other margins are >10 mm from DCIS.  -Calcifications associated with DCIS and invasive carcinoma.  -AJCC pathologic staging is ypT2 N2a(sn).  -See synoptic report.     C.  SENTINEL LYMPH NODE, LEFT AXILLARY #1 WITH RFID, EXCISION:  -METASTATIC BREAST  right upper arm CARCINOMA to three of three lymph nodes, 18 mm in greatest dimension, with 2 mm extranodal extension (3/3).  -Biopsy site changes.     D.  SENTINEL LYMPH NODE, LEFT AXILLARY #2, EXCISION:  -METASTATIC BREAST CARCINOMA to one lymph node, 5 mm in greatest dimension, with no extranodal extension (1/1).     E.  SENTINEL LYMPH NODE, LEFT AXILLARY #3, EXCISION:  -One lymph node, negative for malignancy (0/1).          REGIONAL LYMPH NODES   Regional Lymph Node Status  Tumor present in regional lymph node(s)   Number of Lymph Nodes with Macrometastases  3   Number of Lymph Nodes with Micrometastases  1   Size of Largest [place] Metastatic Deposit  18 mm   Extranodal Extension  Present, 2 mm or less   Total Number of Lymph Nodes Examined (sentinel and non-sentinel)  5   Number of [place] Nodes Examined  5   pTNM CLASSIFICATION (AJCC 8th Edition)   Reporting of pT, pN, and (when applicable) pM categories is based on information available to the pathologist at the time the report is issued. As per the AJCC (Chapter 1, 8th Ed.) it is the managing physician s responsibility to establish the final pathologic stage based upon all pertinent information, including but potentially not limited to this pathology report.   Modified Classification  y   pT Category  pT2   pN Category  pN2a   N Suffix  (sn)   SPECIAL STUDIES        Estrogen Receptor (ER) Status  Positive (greater than 10% of cells demonstrate nuclear positivity)   Percentage of Cells with Nuclear Positivity  >95% %        Progesterone Receptor (PgR) Status  Positive   Percentage of Cells with Nuclear Positivity  100 %        HER2 (by immunohistochemistry)  Equivocal (Score 2+)   Percentage of Cells with Uniform Intense Complete Membrane Staining  Cannot be determined        Ki-67 Percentage of Positive Nuclei  25 %                         Addendum   B. LEFT BREAST, SEED-LOCALIZED SEGMENTAL MASTECTOMY:  -Invasive carcinoma is HER2 negative (score 1+) by immunohistochemistry  (performed on this specimen, see first biomarker reporting template below)     D. LYMPH NODE, LEFT AXILLARY, SENTINEL #2, EXCISION:  -Metastatic carcinoma is HER2 equivocal (score 2+) by immunohistochemistry (performed on this specimen, see second biomarker reporting template below)  -HER2 FISH results will be reported separately by cytogenetics               Left Breast ultrasound 6/24 (compared to 5/10) and personally reviewed by me.    FINDINGS: At the 2 o'clock position, 10 cm from the nipple, there is a  heterogenous hypoechoic shadowing mass measuring 14 x 13 x 9 mm, which has mildly decreased in size, most recently measuring 20 x 19 x 18 mm.          Most Recent 3 CBC's:  Recent Labs   Lab Test 10/22/24  1432 07/23/24  1214 06/21/24  0954 06/14/24  1028   WBC 8.3 8.2 7.3 8.6   HGB 16.1* 15.4 11.8 13.0   MCV 90 94 99 99    337 342 336   ANEUTAUTO 5.0  --  4.9 6.2     Most Recent 3 BMP's:  Recent Labs   Lab Test 10/22/24  1432 07/24/24  0606 06/07/24  1046 05/10/24  1006 04/26/24  1134 04/12/24  0734     --   --   --  136 138   POTASSIUM 4.1  --   --   --  3.9 3.5   CHLORIDE 99  --   --   --  98 102   CO2 24  --   --   --  24 23   BUN 4.6*  --   --   --  7.7 7.0   CR 0.60  --   --   --  0.49* 0.44*   ANIONGAP 12  --   --   --  14 13   REMI 9.5  --   --   --  9.2 9.3   * 298*  --   --  415* 206*   PROTTOTAL 7.2  --  7.1 6.6 6.7 7.0   ALBUMIN 3.9  --  4.2 3.9 3.7 4.0    Most Recent 3 LFT's:  Recent Labs   Lab Test 10/22/24  1432 06/07/24  1046 05/10/24  1006   AST 28 39 28   ALT 24 34 23   ALKPHOS 81 70 67   BILITOTAL 0.3 0.3 0.2    Most Recent 2 TSH and T4:No lab results found.   I reviewed the above labs today.     Other Data     Most Recent 3 CBC's:  Recent Labs   Lab Test 10/22/24  1432 07/23/24  1214 06/21/24  0954 06/14/24  1028   WBC 8.3 8.2 7.3 8.6   HGB 16.1* 15.4 11.8 13.0   MCV 90 94 99 99    337 342 336   ANEUTAUTO 5.0  --  4.9 6.2     Most Recent 3 BMP's:  Recent Labs   Lab  Test 10/22/24  1432 07/24/24  0606 06/07/24  1046 05/10/24  1006 04/26/24  1134 04/12/24  0734     --   --   --  136 138   POTASSIUM 4.1  --   --   --  3.9 3.5   CHLORIDE 99  --   --   --  98 102   CO2 24  --   --   --  24 23   BUN 4.6*  --   --   --  7.7 7.0   CR 0.60  --   --   --  0.49* 0.44*   ANIONGAP 12  --   --   --  14 13   REMI 9.5  --   --   --  9.2 9.3   * 298*  --   --  415* 206*   PROTTOTAL 7.2  --  7.1 6.6 6.7 7.0   ALBUMIN 3.9  --  4.2 3.9 3.7 4.0    Most Recent 3 LFT's:  Recent Labs   Lab Test 10/22/24  1432 06/07/24  1046 05/10/24  1006   AST 28 39 28   ALT 24 34 23   ALKPHOS 81 70 67   BILITOTAL 0.3 0.3 0.2    Most Recent 2 TSH and T4:No lab results found.  I reviewed the above labs today.

## 2024-11-15 ENCOUNTER — APPOINTMENT (OUTPATIENT)
Dept: ENDOCRINOLOGY | Facility: CLINIC | Age: 74
End: 2024-11-15
Payer: MEDICARE

## 2024-11-15 VITALS
WEIGHT: 147 LBS | TEMPERATURE: 97.3 F | BODY MASS INDEX: 21.05 KG/M2 | SYSTOLIC BLOOD PRESSURE: 120 MMHG | OXYGEN SATURATION: 96 % | HEART RATE: 74 BPM | DIASTOLIC BLOOD PRESSURE: 80 MMHG | HEIGHT: 70 IN

## 2024-11-15 VITALS
TEMPERATURE: 97.3 F | WEIGHT: 147 LBS | SYSTOLIC BLOOD PRESSURE: 120 MMHG | OXYGEN SATURATION: 96 % | BODY MASS INDEX: 21.05 KG/M2 | DIASTOLIC BLOOD PRESSURE: 80 MMHG | HEART RATE: 74 BPM | HEIGHT: 70 IN

## 2024-11-15 DIAGNOSIS — E06.3 AUTOIMMUNE THYROIDITIS: ICD-10-CM

## 2024-11-15 DIAGNOSIS — E11.9 TYPE 2 DIABETES MELLITUS W/OUT COMPLICATIONS: ICD-10-CM

## 2024-11-15 DIAGNOSIS — R80.9 PROTEINURIA, UNSPECIFIED: ICD-10-CM

## 2024-11-15 DIAGNOSIS — Z79.4 TYPE 2 DIABETES MELLITUS W/OUT COMPLICATIONS: ICD-10-CM

## 2024-11-15 DIAGNOSIS — I35.8 OTHER NONRHEUMATIC AORTIC VALVE DISORDERS: ICD-10-CM

## 2024-11-15 DIAGNOSIS — E78.00 PURE HYPERCHOLESTEROLEMIA, UNSPECIFIED: ICD-10-CM

## 2024-11-15 PROCEDURE — G2211 COMPLEX E/M VISIT ADD ON: CPT

## 2024-11-15 PROCEDURE — 99214 OFFICE O/P EST MOD 30 MIN: CPT

## 2024-11-16 PROBLEM — E78.00 PURE HYPERCHOLESTEROLEMIA WITH TARGET LOW DENSITY LIPOPROTEIN (LDL) CHOLESTEROL LESS THAN 70 MG/DL: Status: ACTIVE | Noted: 2024-11-16

## 2024-11-16 PROBLEM — I35.8 AORTIC VALVE SCLEROSIS: Status: ACTIVE | Noted: 2024-11-16

## 2024-11-16 RX ORDER — ATORVASTATIN CALCIUM 40 MG/1
40 TABLET, FILM COATED ORAL
Qty: 90 | Refills: 3 | Status: ACTIVE | COMMUNITY
Start: 2024-11-16 | End: 1900-01-01

## 2024-11-18 ENCOUNTER — APPOINTMENT (OUTPATIENT)
Dept: VASCULAR SURGERY | Facility: CLINIC | Age: 74
End: 2024-11-18
Payer: MEDICARE

## 2024-11-18 VITALS
SYSTOLIC BLOOD PRESSURE: 121 MMHG | WEIGHT: 147 LBS | HEART RATE: 74 BPM | HEIGHT: 70 IN | DIASTOLIC BLOOD PRESSURE: 76 MMHG | BODY MASS INDEX: 21.05 KG/M2

## 2024-11-18 DIAGNOSIS — Z86.79 PRESENCE OF OTHER VASCULAR IMPLANTS AND GRAFTS: ICD-10-CM

## 2024-11-18 DIAGNOSIS — I97.89 OTHER POSTPROCEDURAL COMPLICATIONS AND DISORDERS OF THE CIRCULATORY SYSTEM, NOT ELSEWHERE CLASSIFIED: ICD-10-CM

## 2024-11-18 DIAGNOSIS — Z95.828 PRESENCE OF OTHER VASCULAR IMPLANTS AND GRAFTS: ICD-10-CM

## 2024-11-18 PROCEDURE — 99215 OFFICE O/P EST HI 40 MIN: CPT

## 2024-11-18 PROCEDURE — 93978 VASCULAR STUDY: CPT

## 2024-11-25 RX ORDER — BLOOD-GLUCOSE,RECEIVER,CONT
EACH MISCELLANEOUS
Qty: 1 | Refills: 3 | Status: ACTIVE | COMMUNITY
Start: 2024-11-25 | End: 1900-01-01

## 2024-11-25 RX ORDER — BLOOD-GLUCOSE SENSOR
EACH MISCELLANEOUS
Qty: 6 | Refills: 3 | Status: ACTIVE | COMMUNITY
Start: 2024-11-25 | End: 1900-01-01

## 2025-01-01 ENCOUNTER — OUTPATIENT (OUTPATIENT)
Dept: OUTPATIENT SERVICES | Facility: HOSPITAL | Age: 75
LOS: 1 days | Discharge: HOME | End: 2025-01-01

## 2025-01-01 ENCOUNTER — INPATIENT (INPATIENT)
Facility: HOSPITAL | Age: 75
LOS: 9 days | End: 2025-09-01
Attending: INTERNAL MEDICINE | Admitting: INTERNAL MEDICINE
Payer: OTHER MISCELLANEOUS

## 2025-01-01 ENCOUNTER — INPATIENT (INPATIENT)
Facility: HOSPITAL | Age: 75
LOS: 1 days | Discharge: HOSPICE | DRG: 871 | End: 2025-08-22
Attending: STUDENT IN AN ORGANIZED HEALTH CARE EDUCATION/TRAINING PROGRAM | Admitting: STUDENT IN AN ORGANIZED HEALTH CARE EDUCATION/TRAINING PROGRAM
Payer: MEDICARE

## 2025-01-01 ENCOUNTER — INPATIENT (INPATIENT)
Facility: HOSPITAL | Age: 75
LOS: 4 days | Discharge: NURSING, REHAB OR LONG-TERM CARE | End: 2025-08-17
Attending: STUDENT IN AN ORGANIZED HEALTH CARE EDUCATION/TRAINING PROGRAM | Admitting: INTERNAL MEDICINE
Payer: MEDICARE

## 2025-01-01 VITALS
HEIGHT: 70.98 IN | HEART RATE: 119 BPM | TEMPERATURE: 96 F | OXYGEN SATURATION: 96 % | WEIGHT: 117.95 LBS | DIASTOLIC BLOOD PRESSURE: 46 MMHG | SYSTOLIC BLOOD PRESSURE: 64 MMHG | RESPIRATION RATE: 14 BRPM

## 2025-01-01 VITALS
RESPIRATION RATE: 18 BRPM | WEIGHT: 139.99 LBS | HEART RATE: 70 BPM | DIASTOLIC BLOOD PRESSURE: 72 MMHG | TEMPERATURE: 96 F | SYSTOLIC BLOOD PRESSURE: 112 MMHG | HEIGHT: 71 IN

## 2025-01-01 VITALS — HEIGHT: 71 IN

## 2025-01-01 VITALS
TEMPERATURE: 97 F | HEART RATE: 65 BPM | DIASTOLIC BLOOD PRESSURE: 63 MMHG | SYSTOLIC BLOOD PRESSURE: 98 MMHG | RESPIRATION RATE: 17 BRPM | OXYGEN SATURATION: 95 %

## 2025-01-01 VITALS — RESPIRATION RATE: 8 BRPM | HEART RATE: 30 BPM | OXYGEN SATURATION: 74 %

## 2025-01-01 VITALS — WEIGHT: 117.95 LBS

## 2025-01-01 DIAGNOSIS — Z98.49 CATARACT EXTRACTION STATUS, UNSPECIFIED EYE: Chronic | ICD-10-CM

## 2025-01-01 DIAGNOSIS — G47.00 INSOMNIA, UNSPECIFIED: ICD-10-CM

## 2025-01-01 DIAGNOSIS — I72.3 ANEURYSM OF ILIAC ARTERY: ICD-10-CM

## 2025-01-01 DIAGNOSIS — E11.649 TYPE 2 DIABETES MELLITUS WITH HYPOGLYCEMIA WITHOUT COMA: ICD-10-CM

## 2025-01-01 DIAGNOSIS — E43 UNSPECIFIED SEVERE PROTEIN-CALORIE MALNUTRITION: ICD-10-CM

## 2025-01-01 DIAGNOSIS — J69.0 PNEUMONITIS DUE TO INHALATION OF FOOD AND VOMIT: ICD-10-CM

## 2025-01-01 DIAGNOSIS — Z95.828 PRESENCE OF OTHER VASCULAR IMPLANTS AND GRAFTS: Chronic | ICD-10-CM

## 2025-01-01 DIAGNOSIS — Z51.5 ENCOUNTER FOR PALLIATIVE CARE: ICD-10-CM

## 2025-01-01 DIAGNOSIS — Z90.411 ACQUIRED PARTIAL ABSENCE OF PANCREAS: Chronic | ICD-10-CM

## 2025-01-01 DIAGNOSIS — R06.00 DYSPNEA, UNSPECIFIED: ICD-10-CM

## 2025-01-01 DIAGNOSIS — E11.9 TYPE 2 DIABETES MELLITUS WITHOUT COMPLICATIONS: ICD-10-CM

## 2025-01-01 DIAGNOSIS — I49.49 OTHER PREMATURE DEPOLARIZATION: ICD-10-CM

## 2025-01-01 DIAGNOSIS — R53.1 WEAKNESS: ICD-10-CM

## 2025-01-01 DIAGNOSIS — Z90.3 ACQUIRED ABSENCE OF STOMACH [PART OF]: Chronic | ICD-10-CM

## 2025-01-01 DIAGNOSIS — T38.3X5A ADVERSE EFFECT OF INSULIN AND ORAL HYPOGLYCEMIC [ANTIDIABETIC] DRUGS, INITIAL ENCOUNTER: ICD-10-CM

## 2025-01-01 DIAGNOSIS — J96.01 ACUTE RESPIRATORY FAILURE WITH HYPOXIA: ICD-10-CM

## 2025-01-01 DIAGNOSIS — Z86.718 PERSONAL HISTORY OF OTHER VENOUS THROMBOSIS AND EMBOLISM: ICD-10-CM

## 2025-01-01 DIAGNOSIS — A41.9 SEPSIS, UNSPECIFIED ORGANISM: ICD-10-CM

## 2025-01-01 DIAGNOSIS — Z79.4 LONG TERM (CURRENT) USE OF INSULIN: ICD-10-CM

## 2025-01-01 DIAGNOSIS — E03.9 HYPOTHYROIDISM, UNSPECIFIED: ICD-10-CM

## 2025-01-01 DIAGNOSIS — N17.9 ACUTE KIDNEY FAILURE, UNSPECIFIED: ICD-10-CM

## 2025-01-01 DIAGNOSIS — Z98.890 OTHER SPECIFIED POSTPROCEDURAL STATES: ICD-10-CM

## 2025-01-01 DIAGNOSIS — K74.60 UNSPECIFIED CIRRHOSIS OF LIVER: ICD-10-CM

## 2025-01-01 DIAGNOSIS — Z87.19 PERSONAL HISTORY OF OTHER DISEASES OF THE DIGESTIVE SYSTEM: Chronic | ICD-10-CM

## 2025-01-01 DIAGNOSIS — N40.0 BENIGN PROSTATIC HYPERPLASIA WITHOUT LOWER URINARY TRACT SYMPTOMS: ICD-10-CM

## 2025-01-01 DIAGNOSIS — R53.81 OTHER MALAISE: ICD-10-CM

## 2025-01-01 DIAGNOSIS — K76.0 FATTY (CHANGE OF) LIVER, NOT ELSEWHERE CLASSIFIED: ICD-10-CM

## 2025-01-01 DIAGNOSIS — C16.9 MALIGNANT NEOPLASM OF STOMACH, UNSPECIFIED: ICD-10-CM

## 2025-01-01 DIAGNOSIS — R00.1 BRADYCARDIA, UNSPECIFIED: ICD-10-CM

## 2025-01-01 DIAGNOSIS — Z98.890 OTHER SPECIFIED POSTPROCEDURAL STATES: Chronic | ICD-10-CM

## 2025-01-01 DIAGNOSIS — Z71.89 OTHER SPECIFIED COUNSELING: ICD-10-CM

## 2025-01-01 DIAGNOSIS — D50.9 IRON DEFICIENCY ANEMIA, UNSPECIFIED: ICD-10-CM

## 2025-01-01 DIAGNOSIS — Z79.899 OTHER LONG TERM (CURRENT) DRUG THERAPY: ICD-10-CM

## 2025-01-01 DIAGNOSIS — Z93.1 GASTROSTOMY STATUS: Chronic | ICD-10-CM

## 2025-01-01 DIAGNOSIS — R18.8 OTHER ASCITES: ICD-10-CM

## 2025-01-01 DIAGNOSIS — E87.5 HYPERKALEMIA: ICD-10-CM

## 2025-01-01 DIAGNOSIS — I49.1 ATRIAL PREMATURE DEPOLARIZATION: ICD-10-CM

## 2025-01-01 DIAGNOSIS — I81 PORTAL VEIN THROMBOSIS: ICD-10-CM

## 2025-01-01 DIAGNOSIS — Z11.52 ENCOUNTER FOR SCREENING FOR COVID-19: ICD-10-CM

## 2025-01-01 DIAGNOSIS — Z90.3 ACQUIRED ABSENCE OF STOMACH [PART OF]: ICD-10-CM

## 2025-01-01 DIAGNOSIS — R53.2 FUNCTIONAL QUADRIPLEGIA: ICD-10-CM

## 2025-01-01 DIAGNOSIS — D68.52 PROTHROMBIN GENE MUTATION: ICD-10-CM

## 2025-01-01 DIAGNOSIS — R41.89 OTHER SYMPTOMS AND SIGNS INVOLVING COGNITIVE FUNCTIONS AND AWARENESS: ICD-10-CM

## 2025-01-01 DIAGNOSIS — J18.9 PNEUMONIA, UNSPECIFIED ORGANISM: ICD-10-CM

## 2025-01-01 DIAGNOSIS — I71.40 ABDOMINAL AORTIC ANEURYSM, WITHOUT RUPTURE, UNSPECIFIED: ICD-10-CM

## 2025-01-01 DIAGNOSIS — Z79.01 LONG TERM (CURRENT) USE OF ANTICOAGULANTS: ICD-10-CM

## 2025-01-01 DIAGNOSIS — Z93.4 OTHER ARTIFICIAL OPENINGS OF GASTROINTESTINAL TRACT STATUS: ICD-10-CM

## 2025-01-01 DIAGNOSIS — R65.20 SEVERE SEPSIS WITHOUT SEPTIC SHOCK: ICD-10-CM

## 2025-01-01 DIAGNOSIS — I49.3 VENTRICULAR PREMATURE DEPOLARIZATION: ICD-10-CM

## 2025-01-01 DIAGNOSIS — Z79.890 HORMONE REPLACEMENT THERAPY: ICD-10-CM

## 2025-01-01 DIAGNOSIS — Z29.9 ENCOUNTER FOR PROPHYLACTIC MEASURES, UNSPECIFIED: ICD-10-CM

## 2025-01-01 DIAGNOSIS — L89.159 PRESSURE ULCER OF SACRAL REGION, UNSPECIFIED STAGE: ICD-10-CM

## 2025-01-01 DIAGNOSIS — I10 ESSENTIAL (PRIMARY) HYPERTENSION: ICD-10-CM

## 2025-01-01 DIAGNOSIS — R13.10 DYSPHAGIA, UNSPECIFIED: ICD-10-CM

## 2025-01-01 DIAGNOSIS — E87.20 ACIDOSIS, UNSPECIFIED: ICD-10-CM

## 2025-01-01 DIAGNOSIS — Z85.028 PERSONAL HISTORY OF OTHER MALIGNANT NEOPLASM OF STOMACH: ICD-10-CM

## 2025-01-01 DIAGNOSIS — K72.90 HEPATIC FAILURE, UNSPECIFIED WITHOUT COMA: ICD-10-CM

## 2025-01-01 LAB
A1C WITH ESTIMATED AVERAGE GLUCOSE RESULT: 4.8 % — SIGNIFICANT CHANGE UP (ref 4–5.6)
ADD ON TEST-SPECIMEN IN LAB: SIGNIFICANT CHANGE UP
ALBUMIN SERPL ELPH-MCNC: 2 G/DL — LOW (ref 3.3–5)
ALBUMIN SERPL ELPH-MCNC: 2.2 G/DL — LOW (ref 3.3–5)
ALBUMIN SERPL ELPH-MCNC: 2.2 G/DL — LOW (ref 3.3–5)
ALBUMIN SERPL ELPH-MCNC: 2.7 G/DL — LOW (ref 3.3–5)
ALP SERPL-CCNC: 107 U/L — SIGNIFICANT CHANGE UP (ref 40–120)
ALP SERPL-CCNC: 126 U/L — HIGH (ref 40–120)
ALP SERPL-CCNC: 76 U/L — SIGNIFICANT CHANGE UP (ref 40–120)
ALP SERPL-CCNC: 77 U/L — SIGNIFICANT CHANGE UP (ref 40–120)
ALP SERPL-CCNC: 82 U/L — SIGNIFICANT CHANGE UP (ref 40–120)
ALP SERPL-CCNC: 91 U/L — SIGNIFICANT CHANGE UP (ref 40–120)
ALT FLD-CCNC: 11 U/L — SIGNIFICANT CHANGE UP (ref 10–45)
ALT FLD-CCNC: 14 U/L — SIGNIFICANT CHANGE UP (ref 10–45)
ALT FLD-CCNC: 8 U/L — LOW (ref 10–45)
ALT FLD-CCNC: 9 U/L — LOW (ref 10–45)
ALT FLD-CCNC: 9 U/L — LOW (ref 10–45)
ALT FLD-CCNC: SIGNIFICANT CHANGE UP (ref 10–45)
AMMONIA BLD-MCNC: 35 UMOL/L — SIGNIFICANT CHANGE UP (ref 11–55)
ANION GAP SERPL CALC-SCNC: 10 MMOL/L — SIGNIFICANT CHANGE UP (ref 5–17)
ANION GAP SERPL CALC-SCNC: 12 MMOL/L — SIGNIFICANT CHANGE UP (ref 5–17)
ANION GAP SERPL CALC-SCNC: 9 MMOL/L — SIGNIFICANT CHANGE UP (ref 5–17)
ANION GAP SERPL CALC-SCNC: 9 MMOL/L — SIGNIFICANT CHANGE UP (ref 5–17)
APPEARANCE UR: ABNORMAL
APTT BLD: 28.7 SEC — SIGNIFICANT CHANGE UP (ref 26.1–36.8)
AST SERPL-CCNC: 17 U/L — SIGNIFICANT CHANGE UP (ref 10–40)
AST SERPL-CCNC: 20 U/L — SIGNIFICANT CHANGE UP (ref 10–40)
AST SERPL-CCNC: 22 U/L — SIGNIFICANT CHANGE UP (ref 10–40)
AST SERPL-CCNC: 29 U/L — SIGNIFICANT CHANGE UP (ref 10–40)
AST SERPL-CCNC: 34 U/L — SIGNIFICANT CHANGE UP (ref 10–40)
AST SERPL-CCNC: SIGNIFICANT CHANGE UP (ref 10–40)
BASOPHILS # BLD AUTO: 0.01 K/UL — SIGNIFICANT CHANGE UP (ref 0–0.2)
BASOPHILS # BLD AUTO: 0.02 K/UL — SIGNIFICANT CHANGE UP (ref 0–0.2)
BASOPHILS # BLD AUTO: 0.03 K/UL — SIGNIFICANT CHANGE UP (ref 0–0.2)
BASOPHILS NFR BLD AUTO: 0.1 % — SIGNIFICANT CHANGE UP (ref 0–2)
BASOPHILS NFR BLD AUTO: 0.2 % — SIGNIFICANT CHANGE UP (ref 0–2)
BASOPHILS NFR BLD AUTO: 0.3 % — SIGNIFICANT CHANGE UP (ref 0–2)
BASOPHILS NFR BLD AUTO: 0.5 % — SIGNIFICANT CHANGE UP (ref 0–2)
BILIRUB SERPL-MCNC: 0.4 MG/DL — SIGNIFICANT CHANGE UP (ref 0.2–1.2)
BILIRUB SERPL-MCNC: 0.5 MG/DL — SIGNIFICANT CHANGE UP (ref 0.2–1.2)
BILIRUB SERPL-MCNC: 0.7 MG/DL — SIGNIFICANT CHANGE UP (ref 0.2–1.2)
BILIRUB SERPL-MCNC: 0.7 MG/DL — SIGNIFICANT CHANGE UP (ref 0.2–1.2)
BILIRUB SERPL-MCNC: 1.1 MG/DL — SIGNIFICANT CHANGE UP (ref 0.2–1.2)
BILIRUB SERPL-MCNC: 1.2 MG/DL — SIGNIFICANT CHANGE UP (ref 0.2–1.2)
BILIRUB UR-MCNC: NEGATIVE — SIGNIFICANT CHANGE UP
BLD GP AB SCN SERPL QL: NEGATIVE — SIGNIFICANT CHANGE UP
BLD GP AB SCN SERPL QL: NEGATIVE — SIGNIFICANT CHANGE UP
BUN SERPL-MCNC: 21 MG/DL — SIGNIFICANT CHANGE UP (ref 7–23)
BUN SERPL-MCNC: 28 MG/DL — HIGH (ref 7–23)
BUN SERPL-MCNC: 29 MG/DL — HIGH (ref 7–23)
BUN SERPL-MCNC: 30 MG/DL — HIGH (ref 7–23)
BUN SERPL-MCNC: 30 MG/DL — HIGH (ref 7–23)
BUN SERPL-MCNC: 41 MG/DL — HIGH (ref 7–23)
CALCIUM SERPL-MCNC: 7.6 MG/DL — LOW (ref 8.4–10.5)
CALCIUM SERPL-MCNC: 7.8 MG/DL — LOW (ref 8.4–10.5)
CALCIUM SERPL-MCNC: 8 MG/DL — LOW (ref 8.4–10.5)
CALCIUM SERPL-MCNC: 8.2 MG/DL — LOW (ref 8.4–10.5)
CALCIUM SERPL-MCNC: 8.2 MG/DL — LOW (ref 8.4–10.5)
CALCIUM SERPL-MCNC: 8.5 MG/DL — SIGNIFICANT CHANGE UP (ref 8.4–10.5)
CHLORIDE SERPL-SCNC: 100 MMOL/L — SIGNIFICANT CHANGE UP (ref 96–108)
CHLORIDE SERPL-SCNC: 104 MMOL/L — SIGNIFICANT CHANGE UP (ref 96–108)
CHLORIDE SERPL-SCNC: 104 MMOL/L — SIGNIFICANT CHANGE UP (ref 96–108)
CHLORIDE SERPL-SCNC: 106 MMOL/L — SIGNIFICANT CHANGE UP (ref 96–108)
CHLORIDE SERPL-SCNC: 107 MMOL/L — SIGNIFICANT CHANGE UP (ref 96–108)
CHLORIDE SERPL-SCNC: 109 MMOL/L — HIGH (ref 96–108)
CO2 SERPL-SCNC: 19 MMOL/L — LOW (ref 22–31)
CO2 SERPL-SCNC: 20 MMOL/L — LOW (ref 22–31)
CO2 SERPL-SCNC: 20 MMOL/L — LOW (ref 22–31)
CO2 SERPL-SCNC: 23 MMOL/L — SIGNIFICANT CHANGE UP (ref 22–31)
COLOR SPEC: SIGNIFICANT CHANGE UP
CREAT SERPL-MCNC: 0.75 MG/DL — SIGNIFICANT CHANGE UP (ref 0.5–1.3)
CREAT SERPL-MCNC: 0.93 MG/DL — SIGNIFICANT CHANGE UP (ref 0.5–1.3)
CREAT SERPL-MCNC: 0.96 MG/DL — SIGNIFICANT CHANGE UP (ref 0.5–1.3)
CREAT SERPL-MCNC: 1.04 MG/DL — SIGNIFICANT CHANGE UP (ref 0.5–1.3)
CREAT SERPL-MCNC: 1.08 MG/DL — SIGNIFICANT CHANGE UP (ref 0.5–1.3)
CREAT SERPL-MCNC: 1.14 MG/DL — SIGNIFICANT CHANGE UP (ref 0.5–1.3)
CULTURE RESULTS: SIGNIFICANT CHANGE UP
DIFF PNL FLD: ABNORMAL
EGFR: 67 ML/MIN/1.73M2 — SIGNIFICANT CHANGE UP
EGFR: 67 ML/MIN/1.73M2 — SIGNIFICANT CHANGE UP
EGFR: 71 ML/MIN/1.73M2 — SIGNIFICANT CHANGE UP
EGFR: 71 ML/MIN/1.73M2 — SIGNIFICANT CHANGE UP
EGFR: 74 ML/MIN/1.73M2 — SIGNIFICANT CHANGE UP
EGFR: 74 ML/MIN/1.73M2 — SIGNIFICANT CHANGE UP
EGFR: 82 ML/MIN/1.73M2 — SIGNIFICANT CHANGE UP
EGFR: 82 ML/MIN/1.73M2 — SIGNIFICANT CHANGE UP
EGFR: 85 ML/MIN/1.73M2 — SIGNIFICANT CHANGE UP
EGFR: 85 ML/MIN/1.73M2 — SIGNIFICANT CHANGE UP
EGFR: 94 ML/MIN/1.73M2 — SIGNIFICANT CHANGE UP
EGFR: 94 ML/MIN/1.73M2 — SIGNIFICANT CHANGE UP
EOSINOPHIL # BLD AUTO: 0 K/UL — SIGNIFICANT CHANGE UP (ref 0–0.5)
EOSINOPHIL # BLD AUTO: 0.01 K/UL — SIGNIFICANT CHANGE UP (ref 0–0.5)
EOSINOPHIL # BLD AUTO: 0.01 K/UL — SIGNIFICANT CHANGE UP (ref 0–0.5)
EOSINOPHIL # BLD AUTO: 0.02 K/UL — SIGNIFICANT CHANGE UP (ref 0–0.5)
EOSINOPHIL # BLD AUTO: 0.02 K/UL — SIGNIFICANT CHANGE UP (ref 0–0.5)
EOSINOPHIL # BLD AUTO: 0.12 K/UL — SIGNIFICANT CHANGE UP (ref 0–0.5)
EOSINOPHIL NFR BLD AUTO: 0 % — SIGNIFICANT CHANGE UP (ref 0–6)
EOSINOPHIL NFR BLD AUTO: 0.1 % — SIGNIFICANT CHANGE UP (ref 0–6)
EOSINOPHIL NFR BLD AUTO: 0.2 % — SIGNIFICANT CHANGE UP (ref 0–6)
EOSINOPHIL NFR BLD AUTO: 0.5 % — SIGNIFICANT CHANGE UP (ref 0–6)
EOSINOPHIL NFR BLD AUTO: 0.5 % — SIGNIFICANT CHANGE UP (ref 0–6)
EOSINOPHIL NFR BLD AUTO: 2.1 % — SIGNIFICANT CHANGE UP (ref 0–6)
ESTIMATED AVERAGE GLUCOSE: 91 MG/DL — SIGNIFICANT CHANGE UP (ref 68–114)
FLUAV AG NPH QL: SIGNIFICANT CHANGE UP
FLUBV AG NPH QL: SIGNIFICANT CHANGE UP
GAS PNL BLDV: SIGNIFICANT CHANGE UP
GAS PNL BLDV: SIGNIFICANT CHANGE UP
GLUCOSE SERPL-MCNC: 114 MG/DL — HIGH (ref 70–99)
GLUCOSE SERPL-MCNC: 200 MG/DL — HIGH (ref 70–99)
GLUCOSE SERPL-MCNC: 222 MG/DL — HIGH (ref 70–99)
GLUCOSE SERPL-MCNC: 294 MG/DL — HIGH (ref 70–99)
GLUCOSE SERPL-MCNC: 35 MG/DL — CRITICAL LOW (ref 70–99)
GLUCOSE SERPL-MCNC: 77 MG/DL — SIGNIFICANT CHANGE UP (ref 70–99)
GLUCOSE UR QL: NEGATIVE MG/DL — SIGNIFICANT CHANGE UP
HCT VFR BLD CALC: 25 % — LOW (ref 39–50)
HCT VFR BLD CALC: 25.7 % — LOW (ref 39–50)
HCT VFR BLD CALC: 26.7 % — LOW (ref 39–50)
HCT VFR BLD CALC: 26.8 % — LOW (ref 39–50)
HCT VFR BLD CALC: 28.5 % — LOW (ref 39–50)
HCT VFR BLD CALC: 34.1 % — LOW (ref 39–50)
HCT VFR BLD CALC: 39.1 % — SIGNIFICANT CHANGE UP (ref 39–50)
HGB BLD-MCNC: 11.5 G/DL — LOW (ref 13–17)
HGB BLD-MCNC: 13.2 G/DL — SIGNIFICANT CHANGE UP (ref 13–17)
HGB BLD-MCNC: 8.2 G/DL — LOW (ref 13–17)
HGB BLD-MCNC: 9 G/DL — LOW (ref 13–17)
HGB BLD-MCNC: 9.3 G/DL — LOW (ref 13–17)
HGB BLD-MCNC: 9.5 G/DL — LOW (ref 13–17)
HGB BLD-MCNC: 9.8 G/DL — LOW (ref 13–17)
IMM GRANULOCYTES # BLD AUTO: 0 K/UL — SIGNIFICANT CHANGE UP (ref 0–0.07)
IMM GRANULOCYTES # BLD AUTO: 0.01 K/UL — SIGNIFICANT CHANGE UP (ref 0–0.07)
IMM GRANULOCYTES # BLD AUTO: 0.02 K/UL — SIGNIFICANT CHANGE UP (ref 0–0.07)
IMM GRANULOCYTES # BLD AUTO: 0.05 K/UL — SIGNIFICANT CHANGE UP (ref 0–0.07)
IMM GRANULOCYTES # BLD AUTO: 0.06 K/UL — SIGNIFICANT CHANGE UP (ref 0–0.07)
IMM GRANULOCYTES # BLD AUTO: 0.06 K/UL — SIGNIFICANT CHANGE UP (ref 0–0.07)
IMM GRANULOCYTES NFR BLD AUTO: 0 % — SIGNIFICANT CHANGE UP (ref 0–0.9)
IMM GRANULOCYTES NFR BLD AUTO: 0.3 % — SIGNIFICANT CHANGE UP (ref 0–0.9)
IMM GRANULOCYTES NFR BLD AUTO: 0.3 % — SIGNIFICANT CHANGE UP (ref 0–0.9)
IMM GRANULOCYTES NFR BLD AUTO: 0.4 % — SIGNIFICANT CHANGE UP (ref 0–0.9)
IMM GRANULOCYTES NFR BLD AUTO: 0.5 % — SIGNIFICANT CHANGE UP (ref 0–0.9)
IMM GRANULOCYTES NFR BLD AUTO: 0.5 % — SIGNIFICANT CHANGE UP (ref 0–0.9)
IMMATURE PLATELET FRACTION #: 6.1 K/UL — SIGNIFICANT CHANGE UP (ref 3.9–12.5)
IMMATURE PLATELET FRACTION #: 6.5 K/UL — SIGNIFICANT CHANGE UP (ref 3.9–12.5)
IMMATURE PLATELET FRACTION #: 6.9 K/UL — SIGNIFICANT CHANGE UP (ref 3.9–12.5)
IMMATURE PLATELET FRACTION #: 7.1 K/UL — SIGNIFICANT CHANGE UP (ref 3.9–12.5)
IMMATURE PLATELET FRACTION #: 7.6 K/UL — SIGNIFICANT CHANGE UP (ref 3.9–12.5)
IMMATURE PLATELET FRACTION #: 8 K/UL — SIGNIFICANT CHANGE UP (ref 3.9–12.5)
IMMATURE PLATELET FRACTION #: 8.9 K/UL — SIGNIFICANT CHANGE UP (ref 3.9–12.5)
IMMATURE PLATELET FRACTION %: 10.6 % — HIGH (ref 1.6–7.1)
IMMATURE PLATELET FRACTION %: 12.1 % — HIGH (ref 1.6–7.1)
IMMATURE PLATELET FRACTION %: 12.3 % — HIGH (ref 1.6–7.1)
IMMATURE PLATELET FRACTION %: 12.4 % — HIGH (ref 1.6–7.1)
IMMATURE PLATELET FRACTION %: 8.6 % — HIGH (ref 1.6–7.1)
IMMATURE PLATELET FRACTION %: 9.4 % — HIGH (ref 1.6–7.1)
IMMATURE PLATELET FRACTION %: 9.6 % — HIGH (ref 1.6–7.1)
INR BLD: 1.43 — HIGH (ref 0.85–1.16)
KETONES UR QL: ABNORMAL MG/DL
LACTATE SERPL-SCNC: 1.6 MMOL/L — SIGNIFICANT CHANGE UP (ref 0.5–2)
LACTATE SERPL-SCNC: 5.1 MMOL/L — CRITICAL HIGH (ref 0.5–2)
LACTATE SERPL-SCNC: 5.4 MMOL/L — CRITICAL HIGH (ref 0.5–2)
LACTATE SERPL-SCNC: 5.8 MMOL/L — CRITICAL HIGH (ref 0.5–2)
LEGIONELLA AG UR QL: NEGATIVE — SIGNIFICANT CHANGE UP
LEUKOCYTE ESTERASE UR-ACNC: ABNORMAL
LYMPHOCYTES # BLD AUTO: 0.27 K/UL — LOW (ref 1–3.3)
LYMPHOCYTES # BLD AUTO: 0.28 K/UL — LOW (ref 1–3.3)
LYMPHOCYTES # BLD AUTO: 0.29 K/UL — LOW (ref 1–3.3)
LYMPHOCYTES # BLD AUTO: 0.41 K/UL — LOW (ref 1–3.3)
LYMPHOCYTES # BLD AUTO: 0.43 K/UL — LOW (ref 1–3.3)
LYMPHOCYTES # BLD AUTO: 0.44 K/UL — LOW (ref 1–3.3)
LYMPHOCYTES NFR BLD AUTO: 13.4 % — SIGNIFICANT CHANGE UP (ref 13–44)
LYMPHOCYTES NFR BLD AUTO: 2 % — LOW (ref 13–44)
LYMPHOCYTES NFR BLD AUTO: 3.6 % — LOW (ref 13–44)
LYMPHOCYTES NFR BLD AUTO: 4.1 % — LOW (ref 13–44)
LYMPHOCYTES NFR BLD AUTO: 7.5 % — LOW (ref 13–44)
LYMPHOCYTES NFR BLD AUTO: 7.6 % — LOW (ref 13–44)
MAGNESIUM SERPL-MCNC: 2 MG/DL — SIGNIFICANT CHANGE UP (ref 1.6–2.6)
MAGNESIUM SERPL-MCNC: 2.1 MG/DL — SIGNIFICANT CHANGE UP (ref 1.6–2.6)
MAGNESIUM SERPL-MCNC: 2.2 MG/DL — SIGNIFICANT CHANGE UP (ref 1.6–2.6)
MAGNESIUM SERPL-MCNC: 2.4 MG/DL — SIGNIFICANT CHANGE UP (ref 1.6–2.6)
MCHC RBC-ENTMCNC: 32.8 G/DL — SIGNIFICANT CHANGE UP (ref 32–36)
MCHC RBC-ENTMCNC: 33.7 G/DL — SIGNIFICANT CHANGE UP (ref 32–36)
MCHC RBC-ENTMCNC: 33.8 G/DL — SIGNIFICANT CHANGE UP (ref 32–36)
MCHC RBC-ENTMCNC: 34.4 G/DL — SIGNIFICANT CHANGE UP (ref 32–36)
MCHC RBC-ENTMCNC: 34.7 G/DL — SIGNIFICANT CHANGE UP (ref 32–36)
MCHC RBC-ENTMCNC: 35 G/DL — SIGNIFICANT CHANGE UP (ref 32–36)
MCHC RBC-ENTMCNC: 35.3 PG — HIGH (ref 27–34)
MCHC RBC-ENTMCNC: 35.4 PG — HIGH (ref 27–34)
MCHC RBC-ENTMCNC: 35.4 PG — HIGH (ref 27–34)
MCHC RBC-ENTMCNC: 35.6 G/DL — SIGNIFICANT CHANGE UP (ref 32–36)
MCHC RBC-ENTMCNC: 35.7 PG — HIGH (ref 27–34)
MCHC RBC-ENTMCNC: 35.8 PG — HIGH (ref 27–34)
MCHC RBC-ENTMCNC: 35.8 PG — HIGH (ref 27–34)
MCHC RBC-ENTMCNC: 36.1 PG — HIGH (ref 27–34)
MCV RBC AUTO: 101.2 FL — HIGH (ref 80–100)
MCV RBC AUTO: 101.5 FL — HIGH (ref 80–100)
MCV RBC AUTO: 102.9 FL — HIGH (ref 80–100)
MCV RBC AUTO: 103.1 FL — HIGH (ref 80–100)
MCV RBC AUTO: 104.6 FL — HIGH (ref 80–100)
MCV RBC AUTO: 105.7 FL — HIGH (ref 80–100)
MCV RBC AUTO: 109.2 FL — HIGH (ref 80–100)
MONOCYTES # BLD AUTO: 0.03 K/UL — SIGNIFICANT CHANGE UP (ref 0–0.9)
MONOCYTES # BLD AUTO: 0.12 K/UL — SIGNIFICANT CHANGE UP (ref 0–0.9)
MONOCYTES # BLD AUTO: 0.21 K/UL — SIGNIFICANT CHANGE UP (ref 0–0.9)
MONOCYTES # BLD AUTO: 0.29 K/UL — SIGNIFICANT CHANGE UP (ref 0–0.9)
MONOCYTES # BLD AUTO: 0.31 K/UL — SIGNIFICANT CHANGE UP (ref 0–0.9)
MONOCYTES # BLD AUTO: 0.32 K/UL — SIGNIFICANT CHANGE UP (ref 0–0.9)
MONOCYTES NFR BLD AUTO: 1.5 % — LOW (ref 2–14)
MONOCYTES NFR BLD AUTO: 2.2 % — SIGNIFICANT CHANGE UP (ref 2–14)
MONOCYTES NFR BLD AUTO: 2.6 % — SIGNIFICANT CHANGE UP (ref 2–14)
MONOCYTES NFR BLD AUTO: 2.9 % — SIGNIFICANT CHANGE UP (ref 2–14)
MONOCYTES NFR BLD AUTO: 3.2 % — SIGNIFICANT CHANGE UP (ref 2–14)
MONOCYTES NFR BLD AUTO: 3.6 % — SIGNIFICANT CHANGE UP (ref 2–14)
NEUTROPHILS # BLD AUTO: 1.7 K/UL — LOW (ref 1.8–7.4)
NEUTROPHILS # BLD AUTO: 11.25 K/UL — HIGH (ref 1.8–7.4)
NEUTROPHILS # BLD AUTO: 13.66 K/UL — HIGH (ref 1.8–7.4)
NEUTROPHILS # BLD AUTO: 3.35 K/UL — SIGNIFICANT CHANGE UP (ref 1.8–7.4)
NEUTROPHILS # BLD AUTO: 4.97 K/UL — SIGNIFICANT CHANGE UP (ref 1.8–7.4)
NEUTROPHILS # BLD AUTO: 9.13 K/UL — HIGH (ref 1.8–7.4)
NEUTROPHILS NFR BLD AUTO: 84.1 % — HIGH (ref 43–77)
NEUTROPHILS NFR BLD AUTO: 86.3 % — HIGH (ref 43–77)
NEUTROPHILS NFR BLD AUTO: 88.1 % — HIGH (ref 43–77)
NEUTROPHILS NFR BLD AUTO: 92.2 % — HIGH (ref 43–77)
NEUTROPHILS NFR BLD AUTO: 93 % — HIGH (ref 43–77)
NEUTROPHILS NFR BLD AUTO: 95.2 % — HIGH (ref 43–77)
NITRITE UR-MCNC: NEGATIVE — SIGNIFICANT CHANGE UP
NRBC # BLD AUTO: 0 K/UL — SIGNIFICANT CHANGE UP (ref 0–0)
NRBC # FLD: 0 K/UL — SIGNIFICANT CHANGE UP (ref 0–0)
NRBC BLD AUTO-RTO: 0 /100 WBCS — SIGNIFICANT CHANGE UP (ref 0–0)
NT-PROBNP SERPL-SCNC: 5567 PG/ML — HIGH (ref 0–300)
NT-PROBNP SERPL-SCNC: 6296 PG/ML — HIGH (ref 0–300)
OSMOLALITY SERPL: 296 MOSM/KG — SIGNIFICANT CHANGE UP (ref 280–301)
PH UR: 6 — SIGNIFICANT CHANGE UP (ref 5–8)
PHOSPHATE SERPL-MCNC: 2.3 MG/DL — LOW (ref 2.5–4.5)
PHOSPHATE SERPL-MCNC: 2.6 MG/DL — SIGNIFICANT CHANGE UP (ref 2.5–4.5)
PHOSPHATE SERPL-MCNC: 2.8 MG/DL — SIGNIFICANT CHANGE UP (ref 2.5–4.5)
PLATELET # BLD AUTO: 56 K/UL — LOW (ref 150–400)
PLATELET # BLD AUTO: 58 K/UL — LOW (ref 150–400)
PLATELET # BLD AUTO: 63 K/UL — LOW (ref 150–400)
PLATELET # BLD AUTO: 65 K/UL — LOW (ref 150–400)
PLATELET # BLD AUTO: 68 K/UL — LOW (ref 150–400)
PLATELET # BLD AUTO: 84 K/UL — LOW (ref 150–400)
PLATELET # BLD AUTO: 93 K/UL — LOW (ref 150–400)
PMV BLD: 12.1 FL — SIGNIFICANT CHANGE UP (ref 7–13)
PMV BLD: 12.4 FL — SIGNIFICANT CHANGE UP (ref 7–13)
PMV BLD: 12.7 FL — SIGNIFICANT CHANGE UP (ref 7–13)
PMV BLD: 12.8 FL — SIGNIFICANT CHANGE UP (ref 7–13)
PMV BLD: 13.2 FL — HIGH (ref 7–13)
PMV BLD: 13.8 FL — HIGH (ref 7–13)
PMV BLD: 14.3 FL — HIGH (ref 7–13)
POTASSIUM SERPL-MCNC: 4 MMOL/L — SIGNIFICANT CHANGE UP (ref 3.5–5.3)
POTASSIUM SERPL-MCNC: 4.4 MMOL/L — SIGNIFICANT CHANGE UP (ref 3.5–5.3)
POTASSIUM SERPL-MCNC: 4.7 MMOL/L — SIGNIFICANT CHANGE UP (ref 3.5–5.3)
POTASSIUM SERPL-MCNC: 5.3 MMOL/L — SIGNIFICANT CHANGE UP (ref 3.5–5.3)
POTASSIUM SERPL-MCNC: 5.7 MMOL/L — HIGH (ref 3.5–5.3)
POTASSIUM SERPL-MCNC: SIGNIFICANT CHANGE UP (ref 3.5–5.3)
POTASSIUM SERPL-SCNC: 4 MMOL/L — SIGNIFICANT CHANGE UP (ref 3.5–5.3)
POTASSIUM SERPL-SCNC: 4.4 MMOL/L — SIGNIFICANT CHANGE UP (ref 3.5–5.3)
POTASSIUM SERPL-SCNC: 4.7 MMOL/L — SIGNIFICANT CHANGE UP (ref 3.5–5.3)
POTASSIUM SERPL-SCNC: 5.3 MMOL/L — SIGNIFICANT CHANGE UP (ref 3.5–5.3)
POTASSIUM SERPL-SCNC: 5.7 MMOL/L — HIGH (ref 3.5–5.3)
POTASSIUM SERPL-SCNC: SIGNIFICANT CHANGE UP (ref 3.5–5.3)
PROT SERPL-MCNC: 4.8 G/DL — LOW (ref 6–8.3)
PROT SERPL-MCNC: 5 G/DL — LOW (ref 6–8.3)
PROT SERPL-MCNC: 5 G/DL — LOW (ref 6–8.3)
PROT SERPL-MCNC: 5.3 G/DL — LOW (ref 6–8.3)
PROT SERPL-MCNC: 5.8 G/DL — LOW (ref 6–8.3)
PROT SERPL-MCNC: 6.8 G/DL — SIGNIFICANT CHANGE UP (ref 6–8.3)
PROT UR-MCNC: 100 MG/DL
PROTHROM AB SERPL-ACNC: 16.4 SEC — HIGH (ref 9.9–13.4)
RBC # BLD: 2.29 M/UL — LOW (ref 4.2–5.8)
RBC # BLD: 2.54 M/UL — LOW (ref 4.2–5.8)
RBC # BLD: 2.6 M/UL — LOW (ref 4.2–5.8)
RBC # BLD: 2.63 M/UL — LOW (ref 4.2–5.8)
RBC # BLD: 2.77 M/UL — LOW (ref 4.2–5.8)
RBC # BLD: 3.26 M/UL — LOW (ref 4.2–5.8)
RBC # BLD: 3.7 M/UL — LOW (ref 4.2–5.8)
RBC # FLD: 17.3 % — HIGH (ref 10.3–14.5)
RBC # FLD: 17.5 % — HIGH (ref 10.3–14.5)
RBC # FLD: 17.6 % — HIGH (ref 10.3–14.5)
RBC # FLD: 17.8 % — HIGH (ref 10.3–14.5)
RBC # FLD: 17.8 % — HIGH (ref 10.3–14.5)
RBC # FLD: 17.9 % — HIGH (ref 10.3–14.5)
RBC # FLD: 18 % — HIGH (ref 10.3–14.5)
RH IG SCN BLD-IMP: NEGATIVE — SIGNIFICANT CHANGE UP
RH IG SCN BLD-IMP: NEGATIVE — SIGNIFICANT CHANGE UP
RSV RNA NPH QL NAA+NON-PROBE: SIGNIFICANT CHANGE UP
S PNEUM AG UR QL: NEGATIVE — SIGNIFICANT CHANGE UP
SARS-COV-2 RNA SPEC QL NAA+PROBE: SIGNIFICANT CHANGE UP
SODIUM SERPL-SCNC: 133 MMOL/L — LOW (ref 135–145)
SODIUM SERPL-SCNC: 136 MMOL/L — SIGNIFICANT CHANGE UP (ref 135–145)
SODIUM SERPL-SCNC: 137 MMOL/L — SIGNIFICANT CHANGE UP (ref 135–145)
SODIUM SERPL-SCNC: 138 MMOL/L — SIGNIFICANT CHANGE UP (ref 135–145)
SOURCE RESPIRATORY: SIGNIFICANT CHANGE UP
SP GR SPEC: 1.02 — SIGNIFICANT CHANGE UP (ref 1–1.03)
SPECIMEN SOURCE: SIGNIFICANT CHANGE UP
TROPONIN T, HIGH SENSITIVITY RESULT: 106 NG/L — CRITICAL HIGH
UROBILINOGEN FLD QL: 1 MG/DL — SIGNIFICANT CHANGE UP (ref 0.2–1)
WBC # BLD: 10.75 K/UL — HIGH (ref 3.8–10.5)
WBC # BLD: 12.09 K/UL — HIGH (ref 3.8–10.5)
WBC # BLD: 14.35 K/UL — HIGH (ref 3.8–10.5)
WBC # BLD: 2.02 K/UL — LOW (ref 3.8–10.5)
WBC # BLD: 3.8 K/UL — SIGNIFICANT CHANGE UP (ref 3.8–10.5)
WBC # BLD: 5.76 K/UL — SIGNIFICANT CHANGE UP (ref 3.8–10.5)
WBC # BLD: 9.91 K/UL — SIGNIFICANT CHANGE UP (ref 3.8–10.5)
WBC # FLD AUTO: 10.75 K/UL — HIGH (ref 3.8–10.5)
WBC # FLD AUTO: 12.09 K/UL — HIGH (ref 3.8–10.5)
WBC # FLD AUTO: 14.35 K/UL — HIGH (ref 3.8–10.5)
WBC # FLD AUTO: 2.02 K/UL — LOW (ref 3.8–10.5)
WBC # FLD AUTO: 3.8 K/UL — SIGNIFICANT CHANGE UP (ref 3.8–10.5)
WBC # FLD AUTO: 5.76 K/UL — SIGNIFICANT CHANGE UP (ref 3.8–10.5)
WBC # FLD AUTO: 9.91 K/UL — SIGNIFICANT CHANGE UP (ref 3.8–10.5)

## 2025-01-01 PROCEDURE — 87070 CULTURE OTHR SPECIMN AEROBIC: CPT

## 2025-01-01 PROCEDURE — 82330 ASSAY OF CALCIUM: CPT

## 2025-01-01 PROCEDURE — 80053 COMPREHEN METABOLIC PANEL: CPT

## 2025-01-01 PROCEDURE — 93005 ELECTROCARDIOGRAM TRACING: CPT

## 2025-01-01 PROCEDURE — 82042 OTHER SOURCE ALBUMIN QUAN EA: CPT

## 2025-01-01 PROCEDURE — 88341 IMHCHEM/IMCYTCHM EA ADD ANTB: CPT

## 2025-01-01 PROCEDURE — 36415 COLL VENOUS BLD VENIPUNCTURE: CPT

## 2025-01-01 PROCEDURE — 93308 TTE F-UP OR LMTD: CPT | Mod: 26,GC

## 2025-01-01 PROCEDURE — 84439 ASSAY OF FREE THYROXINE: CPT

## 2025-01-01 PROCEDURE — 85027 COMPLETE CBC AUTOMATED: CPT

## 2025-01-01 PROCEDURE — 83605 ASSAY OF LACTIC ACID: CPT

## 2025-01-01 PROCEDURE — 84132 ASSAY OF SERUM POTASSIUM: CPT

## 2025-01-01 PROCEDURE — 99223 1ST HOSP IP/OBS HIGH 75: CPT

## 2025-01-01 PROCEDURE — 87040 BLOOD CULTURE FOR BACTERIA: CPT

## 2025-01-01 PROCEDURE — 82803 BLOOD GASES ANY COMBINATION: CPT

## 2025-01-01 PROCEDURE — 84443 ASSAY THYROID STIM HORMONE: CPT

## 2025-01-01 PROCEDURE — 82570 ASSAY OF URINE CREATININE: CPT

## 2025-01-01 PROCEDURE — 93010 ELECTROCARDIOGRAM REPORT: CPT

## 2025-01-01 PROCEDURE — 83880 ASSAY OF NATRIURETIC PEPTIDE: CPT

## 2025-01-01 PROCEDURE — 99233 SBSQ HOSP IP/OBS HIGH 50: CPT | Mod: GW

## 2025-01-01 PROCEDURE — 84100 ASSAY OF PHOSPHORUS: CPT

## 2025-01-01 PROCEDURE — 82140 ASSAY OF AMMONIA: CPT

## 2025-01-01 PROCEDURE — 71045 X-RAY EXAM CHEST 1 VIEW: CPT | Mod: 26

## 2025-01-01 PROCEDURE — 83036 HEMOGLOBIN GLYCOSYLATED A1C: CPT

## 2025-01-01 PROCEDURE — 88305 TISSUE EXAM BY PATHOLOGIST: CPT

## 2025-01-01 PROCEDURE — 82945 GLUCOSE OTHER FLUID: CPT

## 2025-01-01 PROCEDURE — 86900 BLOOD TYPING SEROLOGIC ABO: CPT

## 2025-01-01 PROCEDURE — 74160 CT ABDOMEN W/CONTRAST: CPT | Mod: 26

## 2025-01-01 PROCEDURE — 83930 ASSAY OF BLOOD OSMOLALITY: CPT

## 2025-01-01 PROCEDURE — 84295 ASSAY OF SERUM SODIUM: CPT

## 2025-01-01 PROCEDURE — 87015 SPECIMEN INFECT AGNT CONCNTJ: CPT

## 2025-01-01 PROCEDURE — 93975 VASCULAR STUDY: CPT | Mod: 26

## 2025-01-01 PROCEDURE — 81001 URINALYSIS AUTO W/SCOPE: CPT

## 2025-01-01 PROCEDURE — 99231 SBSQ HOSP IP/OBS SF/LOW 25: CPT

## 2025-01-01 PROCEDURE — P9047: CPT

## 2025-01-01 PROCEDURE — 84157 ASSAY OF PROTEIN OTHER: CPT

## 2025-01-01 PROCEDURE — 86850 RBC ANTIBODY SCREEN: CPT

## 2025-01-01 PROCEDURE — 99233 SBSQ HOSP IP/OBS HIGH 50: CPT

## 2025-01-01 PROCEDURE — 85730 THROMBOPLASTIN TIME PARTIAL: CPT

## 2025-01-01 PROCEDURE — 85025 COMPLETE CBC W/AUTO DIFF WBC: CPT

## 2025-01-01 PROCEDURE — 82962 GLUCOSE BLOOD TEST: CPT

## 2025-01-01 PROCEDURE — 36556 INSERT NON-TUNNEL CV CATH: CPT | Mod: GC

## 2025-01-01 PROCEDURE — ZZZZZ: CPT

## 2025-01-01 PROCEDURE — 99291 CRITICAL CARE FIRST HOUR: CPT | Mod: 25

## 2025-01-01 PROCEDURE — 93975 VASCULAR STUDY: CPT

## 2025-01-01 PROCEDURE — 84484 ASSAY OF TROPONIN QUANT: CPT

## 2025-01-01 PROCEDURE — 97161 PT EVAL LOW COMPLEX 20 MIN: CPT

## 2025-01-01 PROCEDURE — 71045 X-RAY EXAM CHEST 1 VIEW: CPT

## 2025-01-01 PROCEDURE — 99285 EMERGENCY DEPT VISIT HI MDM: CPT

## 2025-01-01 PROCEDURE — 85610 PROTHROMBIN TIME: CPT

## 2025-01-01 PROCEDURE — 87637 SARSCOV2&INF A&B&RSV AMP PRB: CPT

## 2025-01-01 PROCEDURE — 88112 CYTOPATH CELL ENHANCE TECH: CPT | Mod: 26

## 2025-01-01 PROCEDURE — 74018 RADEX ABDOMEN 1 VIEW: CPT | Mod: 26

## 2025-01-01 PROCEDURE — 31500 INSERT EMERGENCY AIRWAY: CPT

## 2025-01-01 PROCEDURE — 82610 CYSTATIN C: CPT

## 2025-01-01 PROCEDURE — 83735 ASSAY OF MAGNESIUM: CPT

## 2025-01-01 PROCEDURE — 96374 THER/PROPH/DIAG INJ IV PUSH: CPT

## 2025-01-01 PROCEDURE — 87086 URINE CULTURE/COLONY COUNT: CPT

## 2025-01-01 PROCEDURE — 88112 CYTOPATH CELL ENHANCE TECH: CPT

## 2025-01-01 PROCEDURE — 86901 BLOOD TYPING SEROLOGIC RH(D): CPT

## 2025-01-01 PROCEDURE — 96375 TX/PRO/DX INJ NEW DRUG ADDON: CPT

## 2025-01-01 PROCEDURE — 96376 TX/PRO/DX INJ SAME DRUG ADON: CPT

## 2025-01-01 PROCEDURE — 74160 CT ABDOMEN W/CONTRAST: CPT

## 2025-01-01 PROCEDURE — 83615 LACTATE (LD) (LDH) ENZYME: CPT

## 2025-01-01 PROCEDURE — 87205 SMEAR GRAM STAIN: CPT

## 2025-01-01 PROCEDURE — 87899 AGENT NOS ASSAY W/OPTIC: CPT

## 2025-01-01 PROCEDURE — 99239 HOSP IP/OBS DSCHRG MGMT >30: CPT

## 2025-01-01 PROCEDURE — 89051 BODY FLUID CELL COUNT: CPT

## 2025-01-01 PROCEDURE — 99222 1ST HOSP IP/OBS MODERATE 55: CPT

## 2025-01-01 PROCEDURE — 74018 RADEX ABDOMEN 1 VIEW: CPT

## 2025-01-01 PROCEDURE — 88342 IMHCHEM/IMCYTCHM 1ST ANTB: CPT

## 2025-01-01 PROCEDURE — 99221 1ST HOSP IP/OBS SF/LOW 40: CPT

## 2025-01-01 PROCEDURE — 87075 CULTR BACTERIA EXCEPT BLOOD: CPT

## 2025-01-01 PROCEDURE — 76937 US GUIDE VASCULAR ACCESS: CPT | Mod: 26,59

## 2025-01-01 PROCEDURE — 99497 ADVNCD CARE PLAN 30 MIN: CPT | Mod: 25,GC

## 2025-01-01 PROCEDURE — 97165 OT EVAL LOW COMPLEX 30 MIN: CPT

## 2025-01-01 PROCEDURE — 88341 IMHCHEM/IMCYTCHM EA ADD ANTB: CPT | Mod: 26

## 2025-01-01 PROCEDURE — 76604 US EXAM CHEST: CPT | Mod: 26,GC

## 2025-01-01 PROCEDURE — 99497 ADVNCD CARE PLAN 30 MIN: CPT | Mod: 25

## 2025-01-01 PROCEDURE — 99232 SBSQ HOSP IP/OBS MODERATE 35: CPT

## 2025-01-01 PROCEDURE — 88305 TISSUE EXAM BY PATHOLOGIST: CPT | Mod: 26

## 2025-01-01 PROCEDURE — 88342 IMHCHEM/IMCYTCHM 1ST ANTB: CPT | Mod: 26

## 2025-01-01 PROCEDURE — 99233 SBSQ HOSP IP/OBS HIGH 50: CPT | Mod: GC

## 2025-01-01 PROCEDURE — 36569 INSJ PICC 5 YR+ W/O IMAGING: CPT

## 2025-01-01 PROCEDURE — 99291 CRITICAL CARE FIRST HOUR: CPT | Mod: FT,25

## 2025-01-01 PROCEDURE — 99291 CRITICAL CARE FIRST HOUR: CPT

## 2025-01-01 RX ORDER — VANCOMYCIN HCL IN 5 % DEXTROSE 1.5G/250ML
750 PLASTIC BAG, INJECTION (ML) INTRAVENOUS EVERY 24 HOURS
Refills: 0 | Status: DISCONTINUED | OUTPATIENT
Start: 2025-01-01 | End: 2025-01-01

## 2025-01-01 RX ORDER — DEXTROSE 50 % IN WATER 50 %
25 SYRINGE (ML) INTRAVENOUS ONCE
Refills: 0 | Status: DISCONTINUED | OUTPATIENT
Start: 2025-01-01 | End: 2025-01-01

## 2025-01-01 RX ORDER — HYDROMORPHONE/SOD CHLOR,ISO/PF 2 MG/10 ML
1.5 SYRINGE (ML) INJECTION
Refills: 0 | Status: DISCONTINUED | OUTPATIENT
Start: 2025-01-01 | End: 2025-01-01

## 2025-01-01 RX ORDER — INSULIN LISPRO 100 U/ML
INJECTION, SOLUTION INTRAVENOUS; SUBCUTANEOUS
Refills: 0 | Status: DISCONTINUED | OUTPATIENT
Start: 2025-01-01 | End: 2025-01-01

## 2025-01-01 RX ORDER — CALCIUM GLUCONATE 20 MG/ML
1 INJECTION, SOLUTION INTRAVENOUS ONCE
Refills: 0 | Status: DISCONTINUED | OUTPATIENT
Start: 2025-01-01 | End: 2025-01-01

## 2025-01-01 RX ORDER — DEXTROSE 50 % IN WATER 50 %
50 SYRINGE (ML) INTRAVENOUS ONCE
Refills: 0 | Status: COMPLETED | OUTPATIENT
Start: 2025-01-01 | End: 2025-01-01

## 2025-01-01 RX ORDER — GLYCOPYRROLATE 0.2 MG/ML
0.4 INJECTION INTRAMUSCULAR; INTRAVENOUS
Refills: 0 | Status: DISCONTINUED | OUTPATIENT
Start: 2025-01-01 | End: 2025-01-01

## 2025-01-01 RX ORDER — FENTANYL CITRATE-0.9 % NACL/PF 100MCG/2ML
1 SYRINGE (ML) INTRAVENOUS
Qty: 2500 | Refills: 0 | Status: DISCONTINUED | OUTPATIENT
Start: 2025-01-01 | End: 2025-01-01

## 2025-01-01 RX ORDER — DEXTROSE 50 % IN WATER 50 %
15 SYRINGE (ML) INTRAVENOUS ONCE
Refills: 0 | Status: DISCONTINUED | OUTPATIENT
Start: 2025-01-01 | End: 2025-01-01

## 2025-01-01 RX ORDER — CEFTRIAXONE 500 MG/1
1000 INJECTION, POWDER, FOR SOLUTION INTRAMUSCULAR; INTRAVENOUS EVERY 24 HOURS
Refills: 0 | Status: DISCONTINUED | OUTPATIENT
Start: 2025-01-01 | End: 2025-01-01

## 2025-01-01 RX ORDER — CEFTRIAXONE 500 MG/1
1 INJECTION, POWDER, FOR SOLUTION INTRAMUSCULAR; INTRAVENOUS
Qty: 0 | Refills: 0 | DISCHARGE
Start: 2025-01-01

## 2025-01-01 RX ORDER — POLYETHYLENE GLYCOL 3350 17 G/17G
17 POWDER, FOR SOLUTION ORAL EVERY 12 HOURS
Refills: 0 | Status: DISCONTINUED | OUTPATIENT
Start: 2025-01-01 | End: 2025-01-01

## 2025-01-01 RX ORDER — MELATONIN 5 MG
1 TABLET ORAL
Qty: 0 | Refills: 0 | DISCHARGE
Start: 2025-01-01

## 2025-01-01 RX ORDER — ATORVASTATIN CALCIUM 80 MG/1
1 TABLET, FILM COATED ORAL
Refills: 0 | DISCHARGE

## 2025-01-01 RX ORDER — PROPOFOL 10 MG/ML
5 INJECTION, EMULSION INTRAVENOUS
Qty: 1000 | Refills: 0 | Status: DISCONTINUED | OUTPATIENT
Start: 2025-01-01 | End: 2025-01-01

## 2025-01-01 RX ORDER — HYDROMORPHONE/SOD CHLOR,ISO/PF 2 MG/10 ML
0.7 SYRINGE (ML) INJECTION
Qty: 100 | Refills: 0 | Status: DISCONTINUED | OUTPATIENT
Start: 2025-01-01 | End: 2025-01-01

## 2025-01-01 RX ORDER — SODIUM PHOSPHATE,DIBASIC DIHYD
30 POWDER (GRAM) MISCELLANEOUS ONCE
Refills: 0 | Status: COMPLETED | OUTPATIENT
Start: 2025-01-01 | End: 2025-01-01

## 2025-01-01 RX ORDER — ETOMIDATE 2 MG/ML
20 AMPUL (ML) INTRAVENOUS ONCE
Refills: 0 | Status: COMPLETED | OUTPATIENT
Start: 2025-01-01 | End: 2025-01-01

## 2025-01-01 RX ORDER — PIPERACILLIN-TAZO-DEXTROSE,ISO 3.375G/5
3.38 IV SOLUTION, PIGGYBACK PREMIX FROZEN(ML) INTRAVENOUS ONCE
Refills: 0 | Status: COMPLETED | OUTPATIENT
Start: 2025-01-01 | End: 2025-01-01

## 2025-01-01 RX ORDER — MIDAZOLAM IN 0.9 % SOD.CHLORID 1 MG/ML
2 PLASTIC BAG, INJECTION (ML) INTRAVENOUS EVERY 4 HOURS
Refills: 0 | Status: DISCONTINUED | OUTPATIENT
Start: 2025-01-01 | End: 2025-01-01

## 2025-01-01 RX ORDER — ENOXAPARIN SODIUM 100 MG/ML
40 INJECTION SUBCUTANEOUS EVERY 24 HOURS
Refills: 0 | Status: DISCONTINUED | OUTPATIENT
Start: 2025-01-01 | End: 2025-01-01

## 2025-01-01 RX ORDER — FENTANYL CITRATE-0.9 % NACL/PF 100MCG/2ML
0.5 SYRINGE (ML) INTRAVENOUS
Qty: 2500 | Refills: 0 | Status: DISCONTINUED | OUTPATIENT
Start: 2025-01-01 | End: 2025-01-01

## 2025-01-01 RX ORDER — LOSARTAN POTASSIUM 100 MG/1
1 TABLET, FILM COATED ORAL
Refills: 0 | DISCHARGE

## 2025-01-01 RX ORDER — SCOPOLAMINE 1 MG/3D
1 PATCH, EXTENDED RELEASE TRANSDERMAL
Refills: 0 | Status: DISCONTINUED | OUTPATIENT
Start: 2025-01-01 | End: 2025-01-01

## 2025-01-01 RX ORDER — LORAZEPAM 4 MG/ML
1 VIAL (ML) INJECTION EVERY 4 HOURS
Refills: 0 | Status: DISCONTINUED | OUTPATIENT
Start: 2025-01-01 | End: 2025-01-01

## 2025-01-01 RX ORDER — LEVOTHYROXINE SODIUM 300 MCG
75 TABLET ORAL AT BEDTIME
Refills: 0 | Status: DISCONTINUED | OUTPATIENT
Start: 2025-01-01 | End: 2025-01-01

## 2025-01-01 RX ORDER — BISACODYL 5 MG
1 TABLET, DELAYED RELEASE (ENTERIC COATED) ORAL
Qty: 0 | Refills: 0 | DISCHARGE
Start: 2025-01-01

## 2025-01-01 RX ORDER — MEROPENEM 1 G/30ML
2000 INJECTION INTRAVENOUS EVERY 8 HOURS
Refills: 0 | Status: COMPLETED | OUTPATIENT
Start: 2025-01-01 | End: 2025-01-01

## 2025-01-01 RX ORDER — DEXTROSE 50 % IN WATER 50 %
12.5 SYRINGE (ML) INTRAVENOUS ONCE
Refills: 0 | Status: DISCONTINUED | OUTPATIENT
Start: 2025-01-01 | End: 2025-01-01

## 2025-01-01 RX ORDER — MEROPENEM 1 G/30ML
1000 INJECTION INTRAVENOUS EVERY 12 HOURS
Refills: 0 | Status: DISCONTINUED | OUTPATIENT
Start: 2025-01-01 | End: 2025-01-01

## 2025-01-01 RX ORDER — FUROSEMIDE 10 MG/ML
20 INJECTION INTRAMUSCULAR; INTRAVENOUS DAILY
Refills: 0 | Status: DISCONTINUED | OUTPATIENT
Start: 2025-01-01 | End: 2025-01-01

## 2025-01-01 RX ORDER — MEROPENEM 1 G/30ML
1000 INJECTION INTRAVENOUS EVERY 8 HOURS
Refills: 0 | Status: DISCONTINUED | OUTPATIENT
Start: 2025-01-01 | End: 2025-01-01

## 2025-01-01 RX ORDER — INSULIN LISPRO 100 U/ML
INJECTION, SOLUTION INTRAVENOUS; SUBCUTANEOUS EVERY 6 HOURS
Refills: 0 | Status: DISCONTINUED | OUTPATIENT
Start: 2025-01-01 | End: 2025-01-01

## 2025-01-01 RX ORDER — ENOXAPARIN SODIUM 100 MG/ML
50 INJECTION SUBCUTANEOUS EVERY 12 HOURS
Refills: 0 | Status: DISCONTINUED | OUTPATIENT
Start: 2025-01-01 | End: 2025-01-01

## 2025-01-01 RX ORDER — MIRTAZAPINE 30 MG/1
7.5 TABLET, FILM COATED ORAL AT BEDTIME
Refills: 0 | Status: DISCONTINUED | OUTPATIENT
Start: 2025-01-01 | End: 2025-01-01

## 2025-01-01 RX ORDER — BISACODYL 5 MG
10 TABLET, DELAYED RELEASE (ENTERIC COATED) ORAL DAILY
Refills: 0 | Status: DISCONTINUED | OUTPATIENT
Start: 2025-01-01 | End: 2025-01-01

## 2025-01-01 RX ORDER — SODIUM CHLORIDE 9 G/1000ML
1000 INJECTION, SOLUTION INTRAVENOUS
Refills: 0 | Status: DISCONTINUED | OUTPATIENT
Start: 2025-01-01 | End: 2025-01-01

## 2025-01-01 RX ORDER — ALBUMIN (HUMAN) 12.5 G/50ML
30 INJECTION, SOLUTION INTRAVENOUS ONCE
Refills: 0 | Status: COMPLETED | OUTPATIENT
Start: 2025-01-01 | End: 2025-01-01

## 2025-01-01 RX ORDER — SODIUM BICARBONATE 1 MEQ/ML
50 SYRINGE (ML) INTRAVENOUS ONCE
Refills: 0 | Status: COMPLETED | OUTPATIENT
Start: 2025-01-01 | End: 2025-01-01

## 2025-01-01 RX ORDER — MELATONIN 5 MG
5 TABLET ORAL AT BEDTIME
Refills: 0 | Status: DISCONTINUED | OUTPATIENT
Start: 2025-01-01 | End: 2025-01-01

## 2025-01-01 RX ORDER — DEXTROSE 50 % IN WATER 50 %
25 SYRINGE (ML) INTRAVENOUS ONCE
Refills: 0 | Status: COMPLETED | OUTPATIENT
Start: 2025-01-01 | End: 2025-01-01

## 2025-01-01 RX ORDER — SODIUM PHOSPHATE,DIBASIC DIHYD
15 POWDER (GRAM) MISCELLANEOUS ONCE
Refills: 0 | Status: DISCONTINUED | OUTPATIENT
Start: 2025-01-01 | End: 2025-01-01

## 2025-01-01 RX ORDER — HYDROMORPHONE/SOD CHLOR,ISO/PF 2 MG/10 ML
1 SYRINGE (ML) INJECTION
Qty: 0 | Refills: 0 | DISCHARGE
Start: 2025-01-01

## 2025-01-01 RX ORDER — NOREPINEPHRINE BITARTRATE 8 MG
0.05 SOLUTION INTRAVENOUS
Qty: 8 | Refills: 0 | Status: DISCONTINUED | OUTPATIENT
Start: 2025-01-01 | End: 2025-01-01

## 2025-01-01 RX ORDER — GLYCOPYRROLATE 0.2 MG/ML
0.4 INJECTION INTRAMUSCULAR; INTRAVENOUS
Qty: 0 | Refills: 0 | DISCHARGE
Start: 2025-01-01

## 2025-01-01 RX ORDER — ARIPIPRAZOLE 2 MG/1
5 TABLET ORAL EVERY 24 HOURS
Refills: 0 | Status: DISCONTINUED | OUTPATIENT
Start: 2025-01-01 | End: 2025-01-01

## 2025-01-01 RX ORDER — SCOPOLAMINE 1 MG/3D
1 PATCH, EXTENDED RELEASE TRANSDERMAL ONCE
Refills: 0 | Status: COMPLETED | OUTPATIENT
Start: 2025-01-01 | End: 2025-01-01

## 2025-01-01 RX ORDER — LEVOTHYROXINE SODIUM 300 MCG
100 TABLET ORAL EVERY 24 HOURS
Refills: 0 | Status: DISCONTINUED | OUTPATIENT
Start: 2025-01-01 | End: 2025-01-01

## 2025-01-01 RX ORDER — DIAZEPAM 5 MG/1
4 TABLET ORAL EVERY 4 HOURS
Refills: 0 | Status: DISCONTINUED | OUTPATIENT
Start: 2025-01-01 | End: 2025-01-01

## 2025-01-01 RX ORDER — MIDODRINE HYDROCHLORIDE 5 MG/1
5 TABLET ORAL
Refills: 0 | Status: DISCONTINUED | OUTPATIENT
Start: 2025-01-01 | End: 2025-01-01

## 2025-01-01 RX ORDER — MIDAZOLAM IN 0.9 % SOD.CHLORID 1 MG/ML
2 PLASTIC BAG, INJECTION (ML) INTRAVENOUS ONCE
Refills: 0 | Status: DISCONTINUED | OUTPATIENT
Start: 2025-01-01 | End: 2025-01-01

## 2025-01-01 RX ORDER — ALBUMIN (HUMAN) 12.5 G/50ML
150 INJECTION, SOLUTION INTRAVENOUS ONCE
Refills: 0 | Status: COMPLETED | OUTPATIENT
Start: 2025-01-01 | End: 2025-01-01

## 2025-01-01 RX ORDER — LEVOTHYROXINE SODIUM 300 MCG
100 TABLET ORAL DAILY
Refills: 0 | Status: DISCONTINUED | OUTPATIENT
Start: 2025-01-01 | End: 2025-01-01

## 2025-01-01 RX ORDER — HYDROCORTISONE 20 MG
50 TABLET ORAL EVERY 6 HOURS
Refills: 0 | Status: DISCONTINUED | OUTPATIENT
Start: 2025-01-01 | End: 2025-01-01

## 2025-01-01 RX ORDER — VANCOMYCIN HCL IN 5 % DEXTROSE 1.5G/250ML
1000 PLASTIC BAG, INJECTION (ML) INTRAVENOUS ONCE
Refills: 0 | Status: COMPLETED | OUTPATIENT
Start: 2025-01-01 | End: 2025-01-01

## 2025-01-01 RX ORDER — APIXABAN 2.5 MG/1
2.5 TABLET, FILM COATED ORAL
Refills: 0 | Status: DISCONTINUED | OUTPATIENT
Start: 2025-01-01 | End: 2025-01-01

## 2025-01-01 RX ORDER — DEXTROSE MONOHYDRATE 100 G/1000ML
1000 INJECTION, SOLUTION INTRAVENOUS
Refills: 0 | Status: DISCONTINUED | OUTPATIENT
Start: 2025-01-01 | End: 2025-01-01

## 2025-01-01 RX ORDER — ONDANSETRON HCL/PF 4 MG/2 ML
4 VIAL (ML) INJECTION ONCE
Refills: 0 | Status: COMPLETED | OUTPATIENT
Start: 2025-01-01 | End: 2025-01-01

## 2025-01-01 RX ORDER — MIRTAZAPINE 30 MG/1
1 TABLET, FILM COATED ORAL
Qty: 0 | Refills: 0 | DISCHARGE
Start: 2025-01-01

## 2025-01-01 RX ORDER — INSULIN LISPRO 100 U/ML
1 INJECTION, SOLUTION INTRAVENOUS; SUBCUTANEOUS
Qty: 0 | Refills: 0 | DISCHARGE
Start: 2025-01-01

## 2025-01-01 RX ORDER — GLYCOPYRROLATE 0.2 MG/ML
0.4 INJECTION INTRAMUSCULAR; INTRAVENOUS EVERY 4 HOURS
Refills: 0 | Status: DISCONTINUED | OUTPATIENT
Start: 2025-01-01 | End: 2025-01-01

## 2025-01-01 RX ORDER — CALCIUM GLUCONATE 20 MG/ML
1 INJECTION, SOLUTION INTRAVENOUS ONCE
Refills: 0 | Status: COMPLETED | OUTPATIENT
Start: 2025-01-01 | End: 2025-01-01

## 2025-01-01 RX ORDER — MIDODRINE HYDROCHLORIDE 5 MG/1
1 TABLET ORAL
Refills: 0 | DISCHARGE

## 2025-01-01 RX ORDER — PIPERACILLIN-TAZO-DEXTROSE,ISO 3.375G/5
4.5 IV SOLUTION, PIGGYBACK PREMIX FROZEN(ML) INTRAVENOUS ONCE
Refills: 0 | Status: DISCONTINUED | OUTPATIENT
Start: 2025-01-01 | End: 2025-01-01

## 2025-01-01 RX ORDER — SENNA 187 MG
2 TABLET ORAL AT BEDTIME
Refills: 0 | Status: DISCONTINUED | OUTPATIENT
Start: 2025-01-01 | End: 2025-01-01

## 2025-01-01 RX ORDER — CALCIUM GLUCONATE 20 MG/ML
2 INJECTION, SOLUTION INTRAVENOUS ONCE
Refills: 0 | Status: COMPLETED | OUTPATIENT
Start: 2025-01-01 | End: 2025-01-01

## 2025-01-01 RX ORDER — ALBUMIN (HUMAN) 12.5 G/50ML
40 INJECTION, SOLUTION INTRAVENOUS ONCE
Refills: 0 | Status: DISCONTINUED | OUTPATIENT
Start: 2025-01-01 | End: 2025-01-01

## 2025-01-01 RX ORDER — CEFTRIAXONE 500 MG/1
2000 INJECTION, POWDER, FOR SOLUTION INTRAMUSCULAR; INTRAVENOUS EVERY 24 HOURS
Refills: 0 | Status: DISCONTINUED | OUTPATIENT
Start: 2025-01-01 | End: 2025-01-01

## 2025-01-01 RX ORDER — SUCCINYLCHOLINE CHLORIDE 20 MG/ML
100 VIAL (ML) INJECTION ONCE
Refills: 0 | Status: COMPLETED | OUTPATIENT
Start: 2025-01-01 | End: 2025-01-01

## 2025-01-01 RX ORDER — GLUCAGON 3 MG/1
1 POWDER NASAL ONCE
Refills: 0 | Status: DISCONTINUED | OUTPATIENT
Start: 2025-01-01 | End: 2025-01-01

## 2025-01-01 RX ORDER — ALBUMIN (HUMAN) 12.5 G/50ML
25 INJECTION, SOLUTION INTRAVENOUS EVERY 8 HOURS
Refills: 0 | Status: COMPLETED | OUTPATIENT
Start: 2025-01-01 | End: 2025-01-01

## 2025-01-01 RX ORDER — MIDAZOLAM IN 0.9 % SOD.CHLORID 1 MG/ML
1 PLASTIC BAG, INJECTION (ML) INTRAVENOUS
Qty: 0 | Refills: 0 | DISCHARGE
Start: 2025-01-01

## 2025-01-01 RX ORDER — APIXABAN 2.5 MG/1
1 TABLET, FILM COATED ORAL
Qty: 0 | Refills: 0 | DISCHARGE
Start: 2025-01-01

## 2025-01-01 RX ORDER — PHENYLEPHRINE HCL IN 0.9% NACL 0.5 MG/5ML
100 SYRINGE (ML) INTRAVENOUS ONCE
Refills: 0 | Status: COMPLETED | OUTPATIENT
Start: 2025-01-01 | End: 2025-01-01

## 2025-01-01 RX ORDER — AZITHROMYCIN 250 MG
250 CAPSULE ORAL DAILY
Refills: 0 | Status: DISCONTINUED | OUTPATIENT
Start: 2025-01-01 | End: 2025-01-01

## 2025-01-01 RX ORDER — FENTANYL CITRATE-0.9 % NACL/PF 100MCG/2ML
100 SYRINGE (ML) INTRAVENOUS ONCE
Refills: 0 | Status: DISCONTINUED | OUTPATIENT
Start: 2025-01-01 | End: 2025-01-01

## 2025-01-01 RX ADMIN — Medication 0.7 MG/HR: at 19:59

## 2025-01-01 RX ADMIN — GLYCOPYRROLATE 0.4 MILLIGRAM(S): 0.2 INJECTION INTRAMUSCULAR; INTRAVENOUS at 13:14

## 2025-01-01 RX ADMIN — GLYCOPYRROLATE 0.4 MILLIGRAM(S): 0.2 INJECTION INTRAMUSCULAR; INTRAVENOUS at 15:14

## 2025-01-01 RX ADMIN — SCOPOLAMINE 1 PATCH: 1 PATCH, EXTENDED RELEASE TRANSDERMAL at 06:24

## 2025-01-01 RX ADMIN — Medication 50 MILLILITER(S): at 10:27

## 2025-01-01 RX ADMIN — ENOXAPARIN SODIUM 40 MILLIGRAM(S): 100 INJECTION SUBCUTANEOUS at 18:45

## 2025-01-01 RX ADMIN — Medication 5 UNIT(S): at 19:36

## 2025-01-01 RX ADMIN — Medication 6.35 MICROGRAM(S)/KG/HR: at 20:05

## 2025-01-01 RX ADMIN — GLYCOPYRROLATE 0.4 MILLIGRAM(S): 0.2 INJECTION INTRAMUSCULAR; INTRAVENOUS at 18:31

## 2025-01-01 RX ADMIN — DEXTROSE MONOHYDRATE 75 MILLILITER(S): 100 INJECTION, SOLUTION INTRAVENOUS at 09:29

## 2025-01-01 RX ADMIN — Medication 0.7 MG/HR: at 14:39

## 2025-01-01 RX ADMIN — SCOPOLAMINE 1 PATCH: 1 PATCH, EXTENDED RELEASE TRANSDERMAL at 06:05

## 2025-01-01 RX ADMIN — Medication 1.5 MILLIGRAM(S): at 06:57

## 2025-01-01 RX ADMIN — MIDODRINE HYDROCHLORIDE 5 MILLIGRAM(S): 5 TABLET ORAL at 10:08

## 2025-01-01 RX ADMIN — Medication 100 MICROGRAM(S): at 17:00

## 2025-01-01 RX ADMIN — Medication 50 GRAM(S): at 06:57

## 2025-01-01 RX ADMIN — Medication 100 MICROGRAM(S): at 10:33

## 2025-01-01 RX ADMIN — Medication 250 MILLIGRAM(S): at 11:02

## 2025-01-01 RX ADMIN — Medication 250 MILLILITER(S): at 12:32

## 2025-01-01 RX ADMIN — GLYCOPYRROLATE 0.4 MILLIGRAM(S): 0.2 INJECTION INTRAMUSCULAR; INTRAVENOUS at 07:33

## 2025-01-01 RX ADMIN — GLYCOPYRROLATE 0.4 MILLIGRAM(S): 0.2 INJECTION INTRAMUSCULAR; INTRAVENOUS at 11:19

## 2025-01-01 RX ADMIN — MIRTAZAPINE 7.5 MILLIGRAM(S): 30 TABLET, FILM COATED ORAL at 23:57

## 2025-01-01 RX ADMIN — GLYCOPYRROLATE 0.4 MILLIGRAM(S): 0.2 INJECTION INTRAMUSCULAR; INTRAVENOUS at 06:18

## 2025-01-01 RX ADMIN — GLYCOPYRROLATE 0.4 MILLIGRAM(S): 0.2 INJECTION INTRAMUSCULAR; INTRAVENOUS at 13:41

## 2025-01-01 RX ADMIN — GLYCOPYRROLATE 0.4 MILLIGRAM(S): 0.2 INJECTION INTRAMUSCULAR; INTRAVENOUS at 22:52

## 2025-01-01 RX ADMIN — GLYCOPYRROLATE 0.4 MILLIGRAM(S): 0.2 INJECTION INTRAMUSCULAR; INTRAVENOUS at 09:46

## 2025-01-01 RX ADMIN — GLYCOPYRROLATE 0.4 MILLIGRAM(S): 0.2 INJECTION INTRAMUSCULAR; INTRAVENOUS at 17:35

## 2025-01-01 RX ADMIN — Medication 1.5 MILLIGRAM(S): at 13:17

## 2025-01-01 RX ADMIN — GLYCOPYRROLATE 0.4 MILLIGRAM(S): 0.2 INJECTION INTRAMUSCULAR; INTRAVENOUS at 21:22

## 2025-01-01 RX ADMIN — Medication 100 MILLIGRAM(S): at 17:00

## 2025-01-01 RX ADMIN — PROPOFOL 2.1 MICROGRAM(S)/KG/MIN: 10 INJECTION, EMULSION INTRAVENOUS at 19:37

## 2025-01-01 RX ADMIN — Medication 0.5 MG/HR: at 20:24

## 2025-01-01 RX ADMIN — NOREPINEPHRINE BITARTRATE 5.02 MICROGRAM(S)/KG/MIN: 8 SOLUTION at 12:58

## 2025-01-01 RX ADMIN — GLYCOPYRROLATE 0.4 MILLIGRAM(S): 0.2 INJECTION INTRAMUSCULAR; INTRAVENOUS at 10:21

## 2025-01-01 RX ADMIN — Medication 1000 MILLILITER(S): at 17:00

## 2025-01-01 RX ADMIN — SCOPOLAMINE 1 PATCH: 1 PATCH, EXTENDED RELEASE TRANSDERMAL at 07:02

## 2025-01-01 RX ADMIN — MIRTAZAPINE 7.5 MILLIGRAM(S): 30 TABLET, FILM COATED ORAL at 21:16

## 2025-01-01 RX ADMIN — Medication 100 MICROGRAM(S): at 11:02

## 2025-01-01 RX ADMIN — Medication 0.7 MG/HR: at 13:33

## 2025-01-01 RX ADMIN — Medication 0.7 MG/HR: at 17:33

## 2025-01-01 RX ADMIN — PROPOFOL 2.1 MICROGRAM(S)/KG/MIN: 10 INJECTION, EMULSION INTRAVENOUS at 06:36

## 2025-01-01 RX ADMIN — PROPOFOL 2.1 MICROGRAM(S)/KG/MIN: 10 INJECTION, EMULSION INTRAVENOUS at 18:13

## 2025-01-01 RX ADMIN — GLYCOPYRROLATE 0.4 MILLIGRAM(S): 0.2 INJECTION INTRAMUSCULAR; INTRAVENOUS at 21:35

## 2025-01-01 RX ADMIN — SCOPOLAMINE 1 PATCH: 1 PATCH, EXTENDED RELEASE TRANSDERMAL at 14:48

## 2025-01-01 RX ADMIN — ALBUMIN (HUMAN) 60 MILLILITER(S): 12.5 INJECTION, SOLUTION INTRAVENOUS at 23:58

## 2025-01-01 RX ADMIN — GLYCOPYRROLATE 0.4 MILLIGRAM(S): 0.2 INJECTION INTRAMUSCULAR; INTRAVENOUS at 07:37

## 2025-01-01 RX ADMIN — Medication 50 MILLILITER(S): at 11:54

## 2025-01-01 RX ADMIN — Medication 250 MILLILITER(S): at 11:54

## 2025-01-01 RX ADMIN — NOREPINEPHRINE BITARTRATE 5.02 MICROGRAM(S)/KG/MIN: 8 SOLUTION at 19:53

## 2025-01-01 RX ADMIN — ALBUMIN (HUMAN) 25 MILLILITER(S): 12.5 INJECTION, SOLUTION INTRAVENOUS at 00:00

## 2025-01-01 RX ADMIN — ENOXAPARIN SODIUM 40 MILLIGRAM(S): 100 INJECTION SUBCUTANEOUS at 18:23

## 2025-01-01 RX ADMIN — SCOPOLAMINE 1 PATCH: 1 PATCH, EXTENDED RELEASE TRANSDERMAL at 18:58

## 2025-01-01 RX ADMIN — Medication 25 MILLILITER(S): at 00:08

## 2025-01-01 RX ADMIN — MIDODRINE HYDROCHLORIDE 5 MILLIGRAM(S): 5 TABLET ORAL at 18:20

## 2025-01-01 RX ADMIN — GLYCOPYRROLATE 0.4 MILLIGRAM(S): 0.2 INJECTION INTRAMUSCULAR; INTRAVENOUS at 10:00

## 2025-01-01 RX ADMIN — MEROPENEM 300 MILLIGRAM(S): 1 INJECTION INTRAVENOUS at 23:04

## 2025-01-01 RX ADMIN — GLYCOPYRROLATE 0.4 MILLIGRAM(S): 0.2 INJECTION INTRAMUSCULAR; INTRAVENOUS at 06:00

## 2025-01-01 RX ADMIN — MIDODRINE HYDROCHLORIDE 5 MILLIGRAM(S): 5 TABLET ORAL at 11:02

## 2025-01-01 RX ADMIN — MIDODRINE HYDROCHLORIDE 5 MILLIGRAM(S): 5 TABLET ORAL at 18:45

## 2025-01-01 RX ADMIN — CALCIUM GLUCONATE 100 GRAM(S): 20 INJECTION, SOLUTION INTRAVENOUS at 17:00

## 2025-01-01 RX ADMIN — SCOPOLAMINE 1 PATCH: 1 PATCH, EXTENDED RELEASE TRANSDERMAL at 13:32

## 2025-01-01 RX ADMIN — Medication 1.5 MILLIGRAM(S): at 07:47

## 2025-01-01 RX ADMIN — Medication 5 MILLIGRAM(S): at 21:16

## 2025-01-01 RX ADMIN — SCOPOLAMINE 1 PATCH: 1 PATCH, EXTENDED RELEASE TRANSDERMAL at 06:58

## 2025-01-01 RX ADMIN — Medication 100 MICROGRAM(S): at 09:39

## 2025-01-01 RX ADMIN — SCOPOLAMINE 1 PATCH: 1 PATCH, EXTENDED RELEASE TRANSDERMAL at 08:37

## 2025-01-01 RX ADMIN — CALCIUM GLUCONATE 200 GRAM(S): 20 INJECTION, SOLUTION INTRAVENOUS at 11:53

## 2025-01-01 RX ADMIN — Medication 1.5 MILLIGRAM(S): at 11:57

## 2025-01-01 RX ADMIN — NOREPINEPHRINE BITARTRATE 5.02 MICROGRAM(S)/KG/MIN: 8 SOLUTION at 00:08

## 2025-01-01 RX ADMIN — Medication 0.5 MG/HR: at 18:35

## 2025-01-01 RX ADMIN — GLYCOPYRROLATE 0.4 MILLIGRAM(S): 0.2 INJECTION INTRAMUSCULAR; INTRAVENOUS at 18:26

## 2025-01-01 RX ADMIN — Medication 1.5 MILLIGRAM(S): at 15:35

## 2025-01-01 RX ADMIN — Medication 200 GRAM(S): at 11:01

## 2025-01-01 RX ADMIN — GLYCOPYRROLATE 0.4 MILLIGRAM(S): 0.2 INJECTION INTRAMUSCULAR; INTRAVENOUS at 19:32

## 2025-01-01 RX ADMIN — Medication 1.5 MILLIGRAM(S): at 12:12

## 2025-01-01 RX ADMIN — GLYCOPYRROLATE 0.4 MILLIGRAM(S): 0.2 INJECTION INTRAMUSCULAR; INTRAVENOUS at 15:22

## 2025-01-01 RX ADMIN — GLYCOPYRROLATE 0.4 MILLIGRAM(S): 0.2 INJECTION INTRAMUSCULAR; INTRAVENOUS at 06:03

## 2025-01-01 RX ADMIN — Medication 0.7 MG/HR: at 16:55

## 2025-01-01 RX ADMIN — Medication 0.7 MG/HR: at 17:00

## 2025-01-01 RX ADMIN — Medication 1.5 MILLIGRAM(S): at 08:10

## 2025-01-01 RX ADMIN — Medication 0.7 MG/HR: at 19:21

## 2025-01-01 RX ADMIN — GLYCOPYRROLATE 0.4 MILLIGRAM(S): 0.2 INJECTION INTRAMUSCULAR; INTRAVENOUS at 09:42

## 2025-01-01 RX ADMIN — CEFTRIAXONE 100 MILLIGRAM(S): 500 INJECTION, POWDER, FOR SOLUTION INTRAMUSCULAR; INTRAVENOUS at 15:27

## 2025-01-01 RX ADMIN — Medication 0.5 MG/HR: at 18:07

## 2025-01-01 RX ADMIN — Medication 250 MILLIGRAM(S): at 06:22

## 2025-01-01 RX ADMIN — MEROPENEM 300 MILLIGRAM(S): 1 INJECTION INTRAVENOUS at 07:36

## 2025-01-01 RX ADMIN — SCOPOLAMINE 1 PATCH: 1 PATCH, EXTENDED RELEASE TRANSDERMAL at 19:04

## 2025-01-01 RX ADMIN — Medication 1 APPLICATION(S): at 07:34

## 2025-01-01 RX ADMIN — Medication 200 GRAM(S): at 17:30

## 2025-01-01 RX ADMIN — SCOPOLAMINE 1 PATCH: 1 PATCH, EXTENDED RELEASE TRANSDERMAL at 18:23

## 2025-01-01 RX ADMIN — SCOPOLAMINE 1 PATCH: 1 PATCH, EXTENDED RELEASE TRANSDERMAL at 09:03

## 2025-01-01 RX ADMIN — GLYCOPYRROLATE 0.4 MILLIGRAM(S): 0.2 INJECTION INTRAMUSCULAR; INTRAVENOUS at 17:20

## 2025-01-01 RX ADMIN — Medication 250 MILLIGRAM(S): at 18:52

## 2025-01-01 RX ADMIN — GLYCOPYRROLATE 0.4 MILLIGRAM(S): 0.2 INJECTION INTRAMUSCULAR; INTRAVENOUS at 14:01

## 2025-01-01 RX ADMIN — CEFTRIAXONE 100 MILLIGRAM(S): 500 INJECTION, POWDER, FOR SOLUTION INTRAMUSCULAR; INTRAVENOUS at 18:19

## 2025-01-01 RX ADMIN — MIDODRINE HYDROCHLORIDE 5 MILLIGRAM(S): 5 TABLET ORAL at 19:14

## 2025-01-01 RX ADMIN — GLYCOPYRROLATE 0.4 MILLIGRAM(S): 0.2 INJECTION INTRAMUSCULAR; INTRAVENOUS at 22:00

## 2025-01-01 RX ADMIN — ALBUMIN (HUMAN) 25 MILLILITER(S): 12.5 INJECTION, SOLUTION INTRAVENOUS at 15:22

## 2025-01-01 RX ADMIN — Medication 0.7 MG/HR: at 13:11

## 2025-01-01 RX ADMIN — Medication 0.7 MG/HR: at 19:43

## 2025-01-01 RX ADMIN — ENOXAPARIN SODIUM 50 MILLIGRAM(S): 100 INJECTION SUBCUTANEOUS at 09:43

## 2025-01-01 RX ADMIN — Medication 1.5 MILLIGRAM(S): at 02:31

## 2025-01-01 RX ADMIN — PROPOFOL 2.1 MICROGRAM(S)/KG/MIN: 10 INJECTION, EMULSION INTRAVENOUS at 00:08

## 2025-01-01 RX ADMIN — CEFTRIAXONE 100 MILLIGRAM(S): 500 INJECTION, POWDER, FOR SOLUTION INTRAMUSCULAR; INTRAVENOUS at 18:21

## 2025-01-01 RX ADMIN — GLYCOPYRROLATE 0.4 MILLIGRAM(S): 0.2 INJECTION INTRAMUSCULAR; INTRAVENOUS at 13:42

## 2025-01-01 RX ADMIN — NOREPINEPHRINE BITARTRATE 5.02 MICROGRAM(S)/KG/MIN: 8 SOLUTION at 03:18

## 2025-01-01 RX ADMIN — Medication 0.7 MG/HR: at 15:00

## 2025-01-01 RX ADMIN — GLYCOPYRROLATE 0.4 MILLIGRAM(S): 0.2 INJECTION INTRAMUSCULAR; INTRAVENOUS at 15:24

## 2025-01-01 RX ADMIN — GLYCOPYRROLATE 0.4 MILLIGRAM(S): 0.2 INJECTION INTRAMUSCULAR; INTRAVENOUS at 17:42

## 2025-01-01 RX ADMIN — Medication 1.5 MILLIGRAM(S): at 14:27

## 2025-01-01 RX ADMIN — Medication 50 MILLIEQUIVALENT(S): at 17:00

## 2025-01-01 RX ADMIN — APIXABAN 2.5 MILLIGRAM(S): 2.5 TABLET, FILM COATED ORAL at 06:07

## 2025-01-01 RX ADMIN — GLYCOPYRROLATE 0.4 MILLIGRAM(S): 0.2 INJECTION INTRAMUSCULAR; INTRAVENOUS at 18:56

## 2025-01-01 RX ADMIN — Medication 5 MILLIGRAM(S): at 00:09

## 2025-01-01 RX ADMIN — APIXABAN 2.5 MILLIGRAM(S): 2.5 TABLET, FILM COATED ORAL at 09:39

## 2025-01-01 RX ADMIN — MIDODRINE HYDROCHLORIDE 5 MILLIGRAM(S): 5 TABLET ORAL at 19:00

## 2025-01-01 RX ADMIN — Medication 2 MILLIGRAM(S): at 15:43

## 2025-01-01 RX ADMIN — GLYCOPYRROLATE 0.4 MILLIGRAM(S): 0.2 INJECTION INTRAMUSCULAR; INTRAVENOUS at 02:14

## 2025-01-01 RX ADMIN — ALBUMIN (HUMAN) 25 MILLILITER(S): 12.5 INJECTION, SOLUTION INTRAVENOUS at 22:56

## 2025-01-01 RX ADMIN — SCOPOLAMINE 1 PATCH: 1 PATCH, EXTENDED RELEASE TRANSDERMAL at 17:35

## 2025-01-01 RX ADMIN — CEFTRIAXONE 100 MILLIGRAM(S): 500 INJECTION, POWDER, FOR SOLUTION INTRAMUSCULAR; INTRAVENOUS at 13:56

## 2025-01-01 RX ADMIN — SODIUM CHLORIDE 50 MILLILITER(S): 9 INJECTION, SOLUTION INTRAVENOUS at 18:29

## 2025-01-01 RX ADMIN — Medication 4 MILLIGRAM(S): at 11:00

## 2025-01-01 RX ADMIN — Medication 0.7 MG/HR: at 17:36

## 2025-01-01 RX ADMIN — GLYCOPYRROLATE 0.4 MILLIGRAM(S): 0.2 INJECTION INTRAMUSCULAR; INTRAVENOUS at 11:38

## 2025-01-01 RX ADMIN — GLYCOPYRROLATE 0.4 MILLIGRAM(S): 0.2 INJECTION INTRAMUSCULAR; INTRAVENOUS at 11:50

## 2025-01-01 RX ADMIN — SCOPOLAMINE 1 PATCH: 1 PATCH, EXTENDED RELEASE TRANSDERMAL at 02:14

## 2025-01-01 RX ADMIN — Medication 0.5 MG/HR: at 19:53

## 2025-01-01 RX ADMIN — GLYCOPYRROLATE 0.4 MILLIGRAM(S): 0.2 INJECTION INTRAMUSCULAR; INTRAVENOUS at 01:19

## 2025-01-01 RX ADMIN — GLYCOPYRROLATE 0.4 MILLIGRAM(S): 0.2 INJECTION INTRAMUSCULAR; INTRAVENOUS at 02:00

## 2025-01-01 RX ADMIN — Medication 20 MILLIGRAM(S): at 17:00

## 2025-01-01 RX ADMIN — MIRTAZAPINE 7.5 MILLIGRAM(S): 30 TABLET, FILM COATED ORAL at 19:49

## 2025-01-01 RX ADMIN — Medication 50 MILLILITER(S): at 19:37

## 2025-01-01 RX ADMIN — NOREPINEPHRINE BITARTRATE 5.02 MICROGRAM(S)/KG/MIN: 8 SOLUTION at 20:05

## 2025-01-01 RX ADMIN — GLYCOPYRROLATE 0.4 MILLIGRAM(S): 0.2 INJECTION INTRAMUSCULAR; INTRAVENOUS at 14:27

## 2025-01-01 RX ADMIN — Medication 25 MILLILITER(S): at 00:31

## 2025-01-01 RX ADMIN — Medication 1.5 MILLIGRAM(S): at 13:02

## 2025-01-01 RX ADMIN — GLYCOPYRROLATE 0.4 MILLIGRAM(S): 0.2 INJECTION INTRAMUSCULAR; INTRAVENOUS at 05:15

## 2025-01-01 RX ADMIN — APIXABAN 2.5 MILLIGRAM(S): 2.5 TABLET, FILM COATED ORAL at 19:00

## 2025-01-01 RX ADMIN — GLYCOPYRROLATE 0.4 MILLIGRAM(S): 0.2 INJECTION INTRAMUSCULAR; INTRAVENOUS at 05:25

## 2025-01-01 RX ADMIN — SCOPOLAMINE 1 PATCH: 1 PATCH, EXTENDED RELEASE TRANSDERMAL at 19:18

## 2025-01-01 RX ADMIN — Medication 40 MILLIGRAM(S): at 12:43

## 2025-01-01 RX ADMIN — GLYCOPYRROLATE 0.4 MILLIGRAM(S): 0.2 INJECTION INTRAMUSCULAR; INTRAVENOUS at 21:39

## 2025-01-01 RX ADMIN — Medication 1.5 MILLIGRAM(S): at 07:43

## 2025-01-01 RX ADMIN — SCOPOLAMINE 1 PATCH: 1 PATCH, EXTENDED RELEASE TRANSDERMAL at 06:15

## 2025-01-01 RX ADMIN — GLYCOPYRROLATE 0.4 MILLIGRAM(S): 0.2 INJECTION INTRAMUSCULAR; INTRAVENOUS at 06:51

## 2025-01-01 RX ADMIN — GLYCOPYRROLATE 0.4 MILLIGRAM(S): 0.2 INJECTION INTRAMUSCULAR; INTRAVENOUS at 18:03

## 2025-01-01 RX ADMIN — GLYCOPYRROLATE 0.4 MILLIGRAM(S): 0.2 INJECTION INTRAMUSCULAR; INTRAVENOUS at 02:23

## 2025-01-01 RX ADMIN — Medication 0.7 MG/HR: at 19:30

## 2025-01-01 RX ADMIN — SCOPOLAMINE 1 PATCH: 1 PATCH, EXTENDED RELEASE TRANSDERMAL at 20:24

## 2025-01-01 RX ADMIN — SCOPOLAMINE 1 PATCH: 1 PATCH, EXTENDED RELEASE TRANSDERMAL at 19:59

## 2025-01-01 RX ADMIN — Medication 0.5 MG/HR: at 17:52

## 2025-01-01 RX ADMIN — Medication 50 MILLILITER(S): at 10:10

## 2025-01-01 RX ADMIN — Medication 6.35 MICROGRAM(S)/KG/HR: at 19:37

## 2025-01-01 RX ADMIN — INSULIN LISPRO 1: 100 INJECTION, SOLUTION INTRAVENOUS; SUBCUTANEOUS at 22:05

## 2025-01-01 RX ADMIN — GLYCOPYRROLATE 0.4 MILLIGRAM(S): 0.2 INJECTION INTRAMUSCULAR; INTRAVENOUS at 06:57

## 2025-01-01 RX ADMIN — Medication 0.7 MG/HR: at 19:40

## 2025-01-01 RX ADMIN — PROPOFOL 2.1 MICROGRAM(S)/KG/MIN: 10 INJECTION, EMULSION INTRAVENOUS at 20:06

## 2025-01-01 RX ADMIN — GLYCOPYRROLATE 0.4 MILLIGRAM(S): 0.2 INJECTION INTRAMUSCULAR; INTRAVENOUS at 21:41

## 2025-01-01 RX ADMIN — GLYCOPYRROLATE 0.4 MILLIGRAM(S): 0.2 INJECTION INTRAMUSCULAR; INTRAVENOUS at 15:35

## 2025-01-01 RX ADMIN — Medication 85 MILLIMOLE(S): at 11:27

## 2025-01-01 RX ADMIN — GLYCOPYRROLATE 0.4 MILLIGRAM(S): 0.2 INJECTION INTRAMUSCULAR; INTRAVENOUS at 21:15

## 2025-01-01 RX ADMIN — SCOPOLAMINE 1 PATCH: 1 PATCH, EXTENDED RELEASE TRANSDERMAL at 07:22

## 2025-01-01 RX ADMIN — APIXABAN 2.5 MILLIGRAM(S): 2.5 TABLET, FILM COATED ORAL at 19:14

## 2025-01-01 RX ADMIN — Medication 1.5 MILLIGRAM(S): at 07:27

## 2025-01-01 RX ADMIN — MIDODRINE HYDROCHLORIDE 5 MILLIGRAM(S): 5 TABLET ORAL at 10:33

## 2025-01-01 RX ADMIN — Medication 100 MICROGRAM(S): at 18:10

## 2025-01-01 RX ADMIN — CEFTRIAXONE 100 MILLIGRAM(S): 500 INJECTION, POWDER, FOR SOLUTION INTRAMUSCULAR; INTRAVENOUS at 14:44

## 2025-01-01 RX ADMIN — Medication 5 MILLIGRAM(S): at 19:48

## 2025-01-01 RX ADMIN — SCOPOLAMINE 1 PATCH: 1 PATCH, EXTENDED RELEASE TRANSDERMAL at 19:53

## 2025-01-01 RX ADMIN — ALBUMIN (HUMAN) 120 GRAM(S): 12.5 INJECTION, SOLUTION INTRAVENOUS at 23:05

## 2025-01-01 RX ADMIN — Medication 500 MILLILITER(S): at 13:19

## 2025-01-01 RX ADMIN — Medication 75 MICROGRAM(S): at 22:56

## 2025-01-01 RX ADMIN — GLYCOPYRROLATE 0.4 MILLIGRAM(S): 0.2 INJECTION INTRAMUSCULAR; INTRAVENOUS at 01:55

## 2025-01-01 RX ADMIN — Medication 75 MICROGRAM(S): at 01:18

## 2025-01-17 ENCOUNTER — OUTPATIENT (OUTPATIENT)
Dept: OUTPATIENT SERVICES | Facility: HOSPITAL | Age: 75
LOS: 1 days | End: 2025-01-17
Payer: MEDICARE

## 2025-01-17 ENCOUNTER — RESULT REVIEW (OUTPATIENT)
Age: 75
End: 2025-01-17

## 2025-01-17 DIAGNOSIS — Z95.828 PRESENCE OF OTHER VASCULAR IMPLANTS AND GRAFTS: Chronic | ICD-10-CM

## 2025-01-17 DIAGNOSIS — Z90.411 ACQUIRED PARTIAL ABSENCE OF PANCREAS: Chronic | ICD-10-CM

## 2025-01-17 DIAGNOSIS — Z98.890 OTHER SPECIFIED POSTPROCEDURAL STATES: Chronic | ICD-10-CM

## 2025-01-17 DIAGNOSIS — C16.9 MALIGNANT NEOPLASM OF STOMACH, UNSPECIFIED: ICD-10-CM

## 2025-01-17 DIAGNOSIS — Z90.3 ACQUIRED ABSENCE OF STOMACH [PART OF]: Chronic | ICD-10-CM

## 2025-01-17 DIAGNOSIS — Z98.49 CATARACT EXTRACTION STATUS, UNSPECIFIED EYE: Chronic | ICD-10-CM

## 2025-01-17 DIAGNOSIS — Z93.1 GASTROSTOMY STATUS: Chronic | ICD-10-CM

## 2025-01-17 DIAGNOSIS — Z87.19 PERSONAL HISTORY OF OTHER DISEASES OF THE DIGESTIVE SYSTEM: Chronic | ICD-10-CM

## 2025-01-17 PROCEDURE — 49451 REPLACE DUOD/JEJ TUBE PERC: CPT

## 2025-01-20 NOTE — H&P PST ADULT - LAB RESULTS AND INTERPRETATION
----- Message from RERE Villa sent at 1/20/2025 10:02 AM CST -----  Patient is positive for C diff.  This is a bacterial infection that requires antibiotics and is contagious to others.  Will send in prescription to pharmacy and recommend follow up with GI.  
Called and spoke with patient, informed of below. No questions at this time.    
Patient is scheduled.   
A1c    Recent CMP & CBC in chart.

## 2025-01-23 DIAGNOSIS — Z85.028 PERSONAL HISTORY OF OTHER MALIGNANT NEOPLASM OF STOMACH: ICD-10-CM

## 2025-01-23 DIAGNOSIS — Z46.59 ENCOUNTER FOR FITTING AND ADJUSTMENT OF OTHER GASTROINTESTINAL APPLIANCE AND DEVICE: ICD-10-CM

## 2025-01-24 ENCOUNTER — APPOINTMENT (OUTPATIENT)
Dept: ENDOCRINOLOGY | Facility: CLINIC | Age: 75
End: 2025-01-24

## 2025-01-24 VITALS
WEIGHT: 145 LBS | HEART RATE: 52 BPM | HEIGHT: 70 IN | BODY MASS INDEX: 20.76 KG/M2 | OXYGEN SATURATION: 98 % | TEMPERATURE: 96.6 F | SYSTOLIC BLOOD PRESSURE: 120 MMHG | DIASTOLIC BLOOD PRESSURE: 71 MMHG

## 2025-01-24 VITALS — HEART RATE: 59 BPM

## 2025-01-24 DIAGNOSIS — Z79.4 TYPE 2 DIABETES MELLITUS W/OUT COMPLICATIONS: ICD-10-CM

## 2025-01-24 DIAGNOSIS — F41.9 ANXIETY DISORDER, UNSPECIFIED: ICD-10-CM

## 2025-01-24 DIAGNOSIS — C16.9 MALIGNANT NEOPLASM OF STOMACH, UNSPECIFIED: ICD-10-CM

## 2025-01-24 DIAGNOSIS — R80.9 PROTEINURIA, UNSPECIFIED: ICD-10-CM

## 2025-01-24 DIAGNOSIS — Z85.028 PERSONAL HISTORY OF OTHER MALIGNANT NEOPLASM OF STOMACH: ICD-10-CM

## 2025-01-24 DIAGNOSIS — E11.9 TYPE 2 DIABETES MELLITUS W/OUT COMPLICATIONS: ICD-10-CM

## 2025-01-24 DIAGNOSIS — E53.8 DEFICIENCY OF OTHER SPECIFIED B GROUP VITAMINS: ICD-10-CM

## 2025-01-24 DIAGNOSIS — E78.00 PURE HYPERCHOLESTEROLEMIA, UNSPECIFIED: ICD-10-CM

## 2025-01-24 DIAGNOSIS — Z46.59 ENCOUNTER FOR FITTING AND ADJUSTMENT OF OTHER GASTROINTESTINAL APPLIANCE AND DEVICE: ICD-10-CM

## 2025-01-24 LAB
ALBUMIN SERPL ELPH-MCNC: 3.4 G/DL
ALP BLD-CCNC: 120 U/L
ALT SERPL-CCNC: 23 U/L
ANION GAP SERPL CALC-SCNC: 13 MMOL/L
AST SERPL-CCNC: 39 U/L
BILIRUB SERPL-MCNC: 0.9 MG/DL
BUN SERPL-MCNC: 21 MG/DL
CALCIUM SERPL-MCNC: 9.1 MG/DL
CHLORIDE SERPL-SCNC: 105 MMOL/L
CHOLEST SERPL-MCNC: 155 MG/DL
CO2 SERPL-SCNC: 23 MMOL/L
CREAT SERPL-MCNC: 0.93 MG/DL
EGFR: 86 ML/MIN/1.73M2
GLUCOSE SERPL-MCNC: 80 MG/DL
HDLC SERPL-MCNC: 74 MG/DL
LDLC SERPL CALC-MCNC: 67 MG/DL
NONHDLC SERPL-MCNC: 81 MG/DL
POTASSIUM SERPL-SCNC: 4.6 MMOL/L
PROT SERPL-MCNC: 7 G/DL
SODIUM SERPL-SCNC: 142 MMOL/L
TRIGL SERPL-MCNC: 75 MG/DL
VIT B12 SERPL-MCNC: 486 PG/ML

## 2025-01-24 PROCEDURE — G2211 COMPLEX E/M VISIT ADD ON: CPT

## 2025-01-24 PROCEDURE — 99214 OFFICE O/P EST MOD 30 MIN: CPT

## 2025-01-24 RX ORDER — ESCITALOPRAM OXALATE 5 MG/1
5 TABLET ORAL
Qty: 30 | Refills: 5 | Status: ACTIVE | COMMUNITY
Start: 2025-01-24 | End: 1900-01-01

## 2025-01-24 RX ORDER — ZOLPIDEM TARTRATE 10 MG/1
10 TABLET ORAL
Qty: 30 | Refills: 0 | Status: ACTIVE | COMMUNITY
Start: 2025-01-24 | End: 1900-01-01

## 2025-01-25 LAB
BASOPHILS # BLD AUTO: 0.07 K/UL
BASOPHILS NFR BLD AUTO: 1.2 %
EOSINOPHIL # BLD AUTO: 0.24 K/UL
EOSINOPHIL NFR BLD AUTO: 4.2 %
ESTIMATED AVERAGE GLUCOSE: 108 MG/DL
HBA1C MFR BLD HPLC: 5.4 %
HCT VFR BLD CALC: 38.1 %
HGB BLD-MCNC: 12.2 G/DL
IMM GRANULOCYTES NFR BLD AUTO: 0.3 %
LYMPHOCYTES # BLD AUTO: 1.02 K/UL
LYMPHOCYTES NFR BLD AUTO: 17.8 %
MAN DIFF?: NORMAL
MCHC RBC-ENTMCNC: 32 G/DL
MCHC RBC-ENTMCNC: 32.4 PG
MCV RBC AUTO: 101.1 FL
MONOCYTES # BLD AUTO: 0.65 K/UL
MONOCYTES NFR BLD AUTO: 11.3 %
NEUTROPHILS # BLD AUTO: 3.74 K/UL
NEUTROPHILS NFR BLD AUTO: 65.2 %
PLATELET # BLD AUTO: 173 K/UL
RBC # BLD: 3.77 M/UL
RBC # FLD: 16.9 %
WBC # FLD AUTO: 5.74 K/UL

## 2025-01-28 ENCOUNTER — APPOINTMENT (OUTPATIENT)
Dept: SURGICAL ONCOLOGY | Facility: CLINIC | Age: 75
End: 2025-01-28
Payer: MEDICARE

## 2025-01-28 ENCOUNTER — NON-APPOINTMENT (OUTPATIENT)
Age: 75
End: 2025-01-28

## 2025-01-28 VITALS
BODY MASS INDEX: 20.76 KG/M2 | WEIGHT: 145 LBS | OXYGEN SATURATION: 98 % | RESPIRATION RATE: 17 BRPM | SYSTOLIC BLOOD PRESSURE: 154 MMHG | DIASTOLIC BLOOD PRESSURE: 80 MMHG | HEIGHT: 70 IN | HEART RATE: 73 BPM

## 2025-01-28 DIAGNOSIS — K40.90 UNILATERAL INGUINAL HERNIA, W/OUT OBSTRUCTION OR GANGRENE, NOT SPECIFIED AS RECURRENT: ICD-10-CM

## 2025-01-28 PROCEDURE — 99214 OFFICE O/P EST MOD 30 MIN: CPT

## 2025-02-01 ENCOUNTER — APPOINTMENT (OUTPATIENT)
Dept: HEMATOLOGY ONCOLOGY | Facility: CLINIC | Age: 75
End: 2025-02-01

## 2025-02-01 ENCOUNTER — APPOINTMENT (OUTPATIENT)
Dept: CT IMAGING | Facility: IMAGING CENTER | Age: 75
End: 2025-02-01
Payer: MEDICARE

## 2025-02-01 ENCOUNTER — OUTPATIENT (OUTPATIENT)
Dept: OUTPATIENT SERVICES | Facility: HOSPITAL | Age: 75
LOS: 1 days | End: 2025-02-01
Payer: MEDICARE

## 2025-02-01 DIAGNOSIS — Z98.890 OTHER SPECIFIED POSTPROCEDURAL STATES: Chronic | ICD-10-CM

## 2025-02-01 DIAGNOSIS — Z90.411 ACQUIRED PARTIAL ABSENCE OF PANCREAS: Chronic | ICD-10-CM

## 2025-02-01 DIAGNOSIS — Z90.3 ACQUIRED ABSENCE OF STOMACH [PART OF]: Chronic | ICD-10-CM

## 2025-02-01 DIAGNOSIS — Z93.1 GASTROSTOMY STATUS: Chronic | ICD-10-CM

## 2025-02-01 DIAGNOSIS — C16.9 MALIGNANT NEOPLASM OF STOMACH, UNSPECIFIED: ICD-10-CM

## 2025-02-01 DIAGNOSIS — Z95.828 PRESENCE OF OTHER VASCULAR IMPLANTS AND GRAFTS: Chronic | ICD-10-CM

## 2025-02-01 LAB
BASOPHILS # BLD AUTO: 0.04 K/UL — SIGNIFICANT CHANGE UP (ref 0–0.2)
BASOPHILS NFR BLD AUTO: 1 % — SIGNIFICANT CHANGE UP (ref 0–2)
EOSINOPHIL # BLD AUTO: 0.02 K/UL — SIGNIFICANT CHANGE UP (ref 0–0.5)
EOSINOPHIL NFR BLD AUTO: 0.5 % — SIGNIFICANT CHANGE UP (ref 0–6)
HCT VFR BLD CALC: 36 % — LOW (ref 39–50)
HGB BLD-MCNC: 12.2 G/DL — LOW (ref 13–17)
IMM GRANULOCYTES NFR BLD AUTO: 0.3 % — SIGNIFICANT CHANGE UP (ref 0–0.9)
LYMPHOCYTES # BLD AUTO: 1.05 K/UL — SIGNIFICANT CHANGE UP (ref 1–3.3)
LYMPHOCYTES # BLD AUTO: 26.5 % — SIGNIFICANT CHANGE UP (ref 13–44)
MCHC RBC-ENTMCNC: 32.7 PG — SIGNIFICANT CHANGE UP (ref 27–34)
MCHC RBC-ENTMCNC: 33.9 G/DL — SIGNIFICANT CHANGE UP (ref 32–36)
MCV RBC AUTO: 96.5 FL — SIGNIFICANT CHANGE UP (ref 80–100)
MONOCYTES # BLD AUTO: 0.47 K/UL — SIGNIFICANT CHANGE UP (ref 0–0.9)
MONOCYTES NFR BLD AUTO: 11.9 % — SIGNIFICANT CHANGE UP (ref 2–14)
NEUTROPHILS # BLD AUTO: 2.37 K/UL — SIGNIFICANT CHANGE UP (ref 1.8–7.4)
NEUTROPHILS NFR BLD AUTO: 59.8 % — SIGNIFICANT CHANGE UP (ref 43–77)
PLATELET # BLD AUTO: 126 K/UL — LOW (ref 150–400)
RBC # BLD: 3.73 M/UL — LOW (ref 4.2–5.8)
RBC # FLD: 16.8 % — HIGH (ref 10.3–14.5)
WBC # BLD: 3.76 K/UL — LOW (ref 3.8–10.5)
WBC # FLD AUTO: 3.76 K/UL — LOW (ref 3.8–10.5)

## 2025-02-01 PROCEDURE — 71260 CT THORAX DX C+: CPT

## 2025-02-01 PROCEDURE — 71260 CT THORAX DX C+: CPT | Mod: 26

## 2025-02-01 PROCEDURE — 74177 CT ABD & PELVIS W/CONTRAST: CPT

## 2025-02-01 PROCEDURE — 74177 CT ABD & PELVIS W/CONTRAST: CPT | Mod: 26

## 2025-02-02 LAB
ALBUMIN SERPL ELPH-MCNC: 3.3 G/DL
ALP BLD-CCNC: 120 U/L
ALT SERPL-CCNC: 29 U/L
ANION GAP SERPL CALC-SCNC: 10 MMOL/L
AST SERPL-CCNC: 57 U/L
BILIRUB SERPL-MCNC: 0.6 MG/DL
BUN SERPL-MCNC: 18 MG/DL
CALCIUM SERPL-MCNC: 8.3 MG/DL
CANCER AG19-9 SERPL-ACNC: 11 U/ML
CEA SERPL-MCNC: 8.6 NG/ML
CHLORIDE SERPL-SCNC: 102 MMOL/L
CO2 SERPL-SCNC: 23 MMOL/L
CREAT SERPL-MCNC: 1.04 MG/DL
EGFR: 75 ML/MIN/1.73M2
GLUCOSE SERPL-MCNC: 101 MG/DL
POTASSIUM SERPL-SCNC: 4.3 MMOL/L
PROT SERPL-MCNC: 6.7 G/DL
SODIUM SERPL-SCNC: 135 MMOL/L

## 2025-02-07 ENCOUNTER — OUTPATIENT (OUTPATIENT)
Dept: OUTPATIENT SERVICES | Facility: HOSPITAL | Age: 75
LOS: 1 days | Discharge: HOME | End: 2025-02-07

## 2025-02-07 ENCOUNTER — APPOINTMENT (OUTPATIENT)
Dept: HEMATOLOGY ONCOLOGY | Facility: CLINIC | Age: 75
End: 2025-02-07
Payer: MEDICARE

## 2025-02-07 DIAGNOSIS — D68.59 OTHER PRIMARY THROMBOPHILIA: ICD-10-CM

## 2025-02-07 DIAGNOSIS — Z98.49 CATARACT EXTRACTION STATUS, UNSPECIFIED EYE: Chronic | ICD-10-CM

## 2025-02-07 DIAGNOSIS — C16.9 MALIGNANT NEOPLASM OF STOMACH, UNSPECIFIED: ICD-10-CM

## 2025-02-07 DIAGNOSIS — Z98.890 OTHER SPECIFIED POSTPROCEDURAL STATES: Chronic | ICD-10-CM

## 2025-02-07 DIAGNOSIS — I82.409 ACUTE EMBOLISM AND THROMBOSIS OF UNSPECIFIED DEEP VEINS OF UNSPECIFIED LOWER EXTREMITY: ICD-10-CM

## 2025-02-07 DIAGNOSIS — Z90.411 ACQUIRED PARTIAL ABSENCE OF PANCREAS: Chronic | ICD-10-CM

## 2025-02-07 DIAGNOSIS — Z95.828 PRESENCE OF OTHER VASCULAR IMPLANTS AND GRAFTS: Chronic | ICD-10-CM

## 2025-02-07 DIAGNOSIS — Z90.3 ACQUIRED ABSENCE OF STOMACH [PART OF]: Chronic | ICD-10-CM

## 2025-02-07 DIAGNOSIS — Z87.19 PERSONAL HISTORY OF OTHER DISEASES OF THE DIGESTIVE SYSTEM: Chronic | ICD-10-CM

## 2025-02-07 DIAGNOSIS — Z93.1 GASTROSTOMY STATUS: Chronic | ICD-10-CM

## 2025-02-07 PROCEDURE — 99213 OFFICE O/P EST LOW 20 MIN: CPT | Mod: 93

## 2025-02-13 NOTE — PROGRESS NOTE ADULT - PROBLEM SELECTOR PROBLEM 7
Order placed.  Thanks    
Please advise   
Peritoneal abscess
Sepsis
Sepsis
Hypothyroidism
Peritoneal abscess
Sepsis
Peritoneal abscess

## 2025-02-14 ENCOUNTER — RX RENEWAL (OUTPATIENT)
Age: 75
End: 2025-02-14

## 2025-02-18 RX ORDER — ZOLPIDEM TARTRATE 5 MG/1
5 TABLET ORAL
Qty: 60 | Refills: 0 | Status: ACTIVE | COMMUNITY
Start: 2025-02-18 | End: 1900-01-01

## 2025-03-06 ENCOUNTER — RX RENEWAL (OUTPATIENT)
Age: 75
End: 2025-03-06

## 2025-03-10 ENCOUNTER — APPOINTMENT (OUTPATIENT)
Dept: ENDOCRINOLOGY | Facility: CLINIC | Age: 75
End: 2025-03-10
Payer: MEDICARE

## 2025-03-10 VITALS
DIASTOLIC BLOOD PRESSURE: 77 MMHG | HEART RATE: 75 BPM | BODY MASS INDEX: 21.9 KG/M2 | OXYGEN SATURATION: 95 % | TEMPERATURE: 98.2 F | SYSTOLIC BLOOD PRESSURE: 113 MMHG | WEIGHT: 153 LBS | HEIGHT: 70 IN

## 2025-03-10 DIAGNOSIS — R19.4 CHANGE IN BOWEL HABIT: ICD-10-CM

## 2025-03-10 DIAGNOSIS — E88.09 OTHER DISORDERS OF PLASMA-PROTEIN METABOLISM, NOT ELSEWHERE CLASSIFIED: ICD-10-CM

## 2025-03-10 DIAGNOSIS — Z79.4 TYPE 2 DIABETES MELLITUS W/OUT COMPLICATIONS: ICD-10-CM

## 2025-03-10 DIAGNOSIS — R60.0 LOCALIZED EDEMA: ICD-10-CM

## 2025-03-10 DIAGNOSIS — E11.9 TYPE 2 DIABETES MELLITUS W/OUT COMPLICATIONS: ICD-10-CM

## 2025-03-10 PROCEDURE — 99214 OFFICE O/P EST MOD 30 MIN: CPT

## 2025-03-10 PROCEDURE — G2211 COMPLEX E/M VISIT ADD ON: CPT

## 2025-03-10 RX ORDER — SPIRONOLACTONE 25 MG/1
25 TABLET ORAL
Qty: 90 | Refills: 3 | Status: ACTIVE | COMMUNITY
Start: 2025-03-10 | End: 1900-01-01

## 2025-03-11 PROBLEM — E88.09 HYPOALBUMINEMIA: Status: ACTIVE | Noted: 2025-03-11

## 2025-03-11 PROBLEM — R19.4 INCREASED BOWEL FREQUENCY: Status: ACTIVE | Noted: 2025-03-11

## 2025-03-19 ENCOUNTER — APPOINTMENT (OUTPATIENT)
Dept: HEMATOLOGY ONCOLOGY | Facility: CLINIC | Age: 75
End: 2025-03-19
Payer: MEDICARE

## 2025-03-19 VITALS
WEIGHT: 153.44 LBS | DIASTOLIC BLOOD PRESSURE: 82 MMHG | OXYGEN SATURATION: 98 % | TEMPERATURE: 97.8 F | RESPIRATION RATE: 16 BRPM | SYSTOLIC BLOOD PRESSURE: 122 MMHG | BODY MASS INDEX: 22.02 KG/M2 | HEART RATE: 62 BPM

## 2025-03-19 DIAGNOSIS — C16.9 MALIGNANT NEOPLASM OF STOMACH, UNSPECIFIED: ICD-10-CM

## 2025-03-19 PROCEDURE — 99213 OFFICE O/P EST LOW 20 MIN: CPT

## 2025-03-23 NOTE — ASU DISCHARGE PLAN (ADULT/PEDIATRIC) - NS MD DC FALL RISK RISK
For information on Fall & Injury Prevention, visit: [email or web address] OR  [email or web address] OR  https://www.cdc.gov/steadi/patient.html
56.6

## 2025-03-27 ENCOUNTER — APPOINTMENT (OUTPATIENT)
Dept: HEPATOLOGY | Facility: CLINIC | Age: 75
End: 2025-03-27
Payer: MEDICARE

## 2025-03-27 VITALS
HEART RATE: 77 BPM | SYSTOLIC BLOOD PRESSURE: 111 MMHG | TEMPERATURE: 97.8 F | HEIGHT: 70 IN | WEIGHT: 150 LBS | RESPIRATION RATE: 16 BRPM | BODY MASS INDEX: 21.47 KG/M2 | DIASTOLIC BLOOD PRESSURE: 73 MMHG | OXYGEN SATURATION: 94 %

## 2025-03-27 DIAGNOSIS — E87.5 HYPERKALEMIA: ICD-10-CM

## 2025-03-27 PROCEDURE — 99215 OFFICE O/P EST HI 40 MIN: CPT

## 2025-03-28 ENCOUNTER — NON-APPOINTMENT (OUTPATIENT)
Age: 75
End: 2025-03-28

## 2025-03-28 DIAGNOSIS — K74.69 OTHER CIRRHOSIS OF LIVER: ICD-10-CM

## 2025-03-28 DIAGNOSIS — R60.0 LOCALIZED EDEMA: ICD-10-CM

## 2025-03-28 PROBLEM — E87.5 HYPERKALEMIA: Status: ACTIVE | Noted: 2025-03-28

## 2025-03-28 LAB
ALBUMIN SERPL ELPH-MCNC: 3 G/DL
ALP BLD-CCNC: 107 U/L
ALT SERPL-CCNC: 15 U/L
ANION GAP SERPL CALC-SCNC: 8 MMOL/L
AST SERPL-CCNC: 31 U/L
BILIRUB SERPL-MCNC: 1.2 MG/DL
BUN SERPL-MCNC: 32 MG/DL
CALCIUM SERPL-MCNC: 8.1 MG/DL
CHLORIDE SERPL-SCNC: 105 MMOL/L
CO2 SERPL-SCNC: 24 MMOL/L
CREAT SERPL-MCNC: 1.03 MG/DL
EGFRCR SERPLBLD CKD-EPI 2021: 76 ML/MIN/1.73M2
GLUCOSE SERPL-MCNC: 82 MG/DL
POTASSIUM SERPL-SCNC: 5 MMOL/L
PROT SERPL-MCNC: 6.3 G/DL
SODIUM SERPL-SCNC: 137 MMOL/L

## 2025-03-28 RX ORDER — FUROSEMIDE 40 MG/1
40 TABLET ORAL
Qty: 30 | Refills: 1 | Status: ACTIVE | COMMUNITY
Start: 2025-03-28 | End: 1900-01-01

## 2025-03-31 LAB
A1AT SERPL-MCNC: 158 MG/DL
ALBUMIN SERPL ELPH-MCNC: 3 G/DL
ALP BLD-CCNC: 117 U/L
ALT SERPL-CCNC: 14 U/L
ANION GAP SERPL CALC-SCNC: 9 MMOL/L
AST SERPL-CCNC: 45 U/L
BILIRUB SERPL-MCNC: 1 MG/DL
BUN SERPL-MCNC: 32 MG/DL
CALCIUM SERPL-MCNC: 8.4 MG/DL
CHLORIDE SERPL-SCNC: 101 MMOL/L
CO2 SERPL-SCNC: 21 MMOL/L
CREAT SERPL-MCNC: 1.04 MG/DL
DEPRECATED KAPPA LC FREE/LAMBDA SER: 0.87 RATIO
EGFRCR SERPLBLD CKD-EPI 2021: 75 ML/MIN/1.73M2
FERRITIN SERPL-MCNC: 131 NG/ML
GLUCOSE SERPL-MCNC: 94 MG/DL
HBV SURFACE AB SER QL: NONREACTIVE
HBV SURFACE AG SER QL: NONREACTIVE
HEPATITIS A IGG ANTIBODY: NONREACTIVE
IGA SER QL IEP: 880 MG/DL
IGG SER QL IEP: 2012 MG/DL
IGM SER QL IEP: 67 MG/DL
INR PPP: 1.38 RATIO
IRON SATN MFR SERPL: 22 %
IRON SERPL-MCNC: 68 UG/DL
KAPPA LC CSF-MCNC: 11.17 MG/DL
KAPPA LC SERPL-MCNC: 9.76 MG/DL
POTASSIUM SERPL-SCNC: 6.9 MMOL/L
PROT SERPL-MCNC: 7.2 G/DL
PT BLD: 16.4 SEC
SMOOTH MUSCLE AB SER QL IF: NORMAL
SODIUM SERPL-SCNC: 131 MMOL/L
TIBC SERPL-MCNC: 303 UG/DL
UIBC SERPL-MCNC: 235 UG/DL

## 2025-03-31 RX ORDER — BLOOD-GLUCOSE SENSOR
EACH MISCELLANEOUS
Qty: 6 | Refills: 3 | Status: ACTIVE | COMMUNITY
Start: 2025-03-31 | End: 1900-01-01

## 2025-04-01 LAB
ANA PAT FLD IF-IMP: ABNORMAL
ANA SER IF-ACNC: ABNORMAL

## 2025-04-03 LAB
ACARBOXYPROTHROMBIN SERPL-MCNC: 8.4 NG/ML
ALPHA-1-FETOPROTEIN-L3: NORMAL %
ALPHA-1-FETOPROTEIN: 0.9 NG/ML

## 2025-04-15 ENCOUNTER — APPOINTMENT (OUTPATIENT)
Dept: HEPATOLOGY | Facility: CLINIC | Age: 75
End: 2025-04-15

## 2025-04-15 DIAGNOSIS — K74.69 OTHER CIRRHOSIS OF LIVER: ICD-10-CM

## 2025-04-15 PROCEDURE — 76981 USE PARENCHYMA: CPT | Mod: 26

## 2025-04-16 LAB
BASOPHILS # BLD AUTO: 0.05 K/UL
BASOPHILS NFR BLD AUTO: 1.2 %
EOSINOPHIL # BLD AUTO: 0.1 K/UL
EOSINOPHIL NFR BLD AUTO: 2.3 %
HCT VFR BLD CALC: 37 %
HGB BLD-MCNC: 12.5 G/DL
IMM GRANULOCYTES NFR BLD AUTO: 0.2 %
LYMPHOCYTES # BLD AUTO: 0.64 K/UL
LYMPHOCYTES NFR BLD AUTO: 15 %
MAN DIFF?: NORMAL
MCHC RBC-ENTMCNC: 33.8 G/DL
MCHC RBC-ENTMCNC: 34.4 PG
MCV RBC AUTO: 101.9 FL
MONOCYTES # BLD AUTO: 0.38 K/UL
MONOCYTES NFR BLD AUTO: 8.9 %
NEUTROPHILS # BLD AUTO: 3.09 K/UL
NEUTROPHILS NFR BLD AUTO: 72.4 %
PLATELET # BLD AUTO: 147 K/UL
RBC # BLD: 3.63 M/UL
RBC # FLD: 17.6 %
WBC # FLD AUTO: 4.27 K/UL

## 2025-04-25 ENCOUNTER — APPOINTMENT (OUTPATIENT)
Dept: ENDOCRINOLOGY | Facility: CLINIC | Age: 75
End: 2025-04-25
Payer: MEDICARE

## 2025-04-25 VITALS
HEIGHT: 70 IN | BODY MASS INDEX: 20.38 KG/M2 | WEIGHT: 142.38 LBS | HEART RATE: 76 BPM | SYSTOLIC BLOOD PRESSURE: 94 MMHG | DIASTOLIC BLOOD PRESSURE: 56 MMHG | TEMPERATURE: 97.3 F | OXYGEN SATURATION: 98 %

## 2025-04-25 DIAGNOSIS — E06.3 AUTOIMMUNE THYROIDITIS: ICD-10-CM

## 2025-04-25 DIAGNOSIS — K74.69 OTHER CIRRHOSIS OF LIVER: ICD-10-CM

## 2025-04-25 DIAGNOSIS — G47.00 INSOMNIA, UNSPECIFIED: ICD-10-CM

## 2025-04-25 DIAGNOSIS — E78.00 PURE HYPERCHOLESTEROLEMIA, UNSPECIFIED: ICD-10-CM

## 2025-04-25 DIAGNOSIS — Z79.4 TYPE 2 DIABETES MELLITUS W/OUT COMPLICATIONS: ICD-10-CM

## 2025-04-25 DIAGNOSIS — E11.9 TYPE 2 DIABETES MELLITUS W/OUT COMPLICATIONS: ICD-10-CM

## 2025-04-25 DIAGNOSIS — E53.8 DEFICIENCY OF OTHER SPECIFIED B GROUP VITAMINS: ICD-10-CM

## 2025-04-25 LAB
ALBUMIN SERPL ELPH-MCNC: 3 G/DL
ALP BLD-CCNC: 107 U/L
ALT SERPL-CCNC: 17 U/L
ANION GAP SERPL CALC-SCNC: 13 MMOL/L
AST SERPL-CCNC: 37 U/L
BILIRUB SERPL-MCNC: 1 MG/DL
BUN SERPL-MCNC: 27 MG/DL
CALCIUM SERPL-MCNC: 8.8 MG/DL
CHLORIDE SERPL-SCNC: 101 MMOL/L
CHOLEST SERPL-MCNC: 164 MG/DL
CO2 SERPL-SCNC: 24 MMOL/L
CREAT SERPL-MCNC: 1.21 MG/DL
EGFRCR SERPLBLD CKD-EPI 2021: 62 ML/MIN/1.73M2
ESTIMATED AVERAGE GLUCOSE: 100 MG/DL
GLUCOSE SERPL-MCNC: 114 MG/DL
HBA1C MFR BLD HPLC: 5.1 %
HDLC SERPL-MCNC: 74 MG/DL
LDLC SERPL-MCNC: 76 MG/DL
NONHDLC SERPL-MCNC: 90 MG/DL
POTASSIUM SERPL-SCNC: 3.9 MMOL/L
PROT SERPL-MCNC: 6.6 G/DL
SODIUM SERPL-SCNC: 138 MMOL/L
T4 FREE SERPL-MCNC: 0.9 NG/DL
TRIGL SERPL-MCNC: 71 MG/DL
TSH SERPL-ACNC: 7.76 UIU/ML
VIT B12 SERPL-MCNC: 421 PG/ML

## 2025-04-25 PROCEDURE — 99215 OFFICE O/P EST HI 40 MIN: CPT

## 2025-04-25 PROCEDURE — G2211 COMPLEX E/M VISIT ADD ON: CPT

## 2025-04-28 RX ORDER — RIFAXIMIN 550 MG/1
550 TABLET ORAL
Qty: 60 | Refills: 11 | Status: ACTIVE | COMMUNITY
Start: 2025-04-28 | End: 1900-01-01

## 2025-05-01 DIAGNOSIS — G31.84 MILD COGNITIVE IMPAIRMENT, SO STATED: ICD-10-CM

## 2025-05-04 NOTE — PROGRESS NOTE ADULT - SUBJECTIVE AND OBJECTIVE BOX
CC: Patient is a 72y old  Male who presents with a chief complaint of Fever (28 Mar 2023 17:55)    INTERVAL EVENTS: KIRILL    SUBJECTIVE / INTERVAL HPI: Patient seen and examined at bedside. Feeling well with no acute complaints. Having normal BMs, able to eat a little throughout the day. Is concerned about his insulin regimen.    ROS: negative unless otherwise stated above.    VITAL SIGNS:  Vital Signs Last 24 Hrs  T(C): 36.9 (29 Mar 2023 05:48), Max: 36.9 (29 Mar 2023 05:48)  T(F): 98.4 (29 Mar 2023 05:48), Max: 98.4 (29 Mar 2023 05:48)  HR: 62 (29 Mar 2023 05:48) (62 - 67)  BP: 131/69 (29 Mar 2023 05:48) (131/69 - 135/75)  BP(mean): --  RR: 18 (29 Mar 2023 05:48) (18 - 20)  SpO2: 96% (29 Mar 2023 05:48) (96% - 99%)    Parameters below as of 29 Mar 2023 05:48  Patient On (Oxygen Delivery Method): room air    03-28-23 @ 07:01  -  03-29-23 @ 07:00  --------------------------------------------------------  IN: 0 mL / OUT: 550 mL / NET: -550 mL    PHYSICAL EXAM:  General: NAD, pleasant thin male  HEENT: NC/AT, anicteric sclera, MMM  Neck: supple, trachea midline  Cardiovascular: +S1/S2; RRR, no murmurs, rubs, or gallops  Respiratory: CTA B/L; no W/R/R  Gastrointestinal: 5cm vertical scar midline from umbilical hernia repair, soft, NT/ND, skin around PEG tube non erythematous, normal BS  : no CVA tenderness or suprapubic tenderness on palpation  Extremities: WWP; no edema, clubbing or cyanosis  Vascular: 2+ radial, DP/PT pulses B/L  Neurological: AAOx3; no focal deficits    MEDICATIONS:  MEDICATIONS  (STANDING):  apixaban 2.5 milliGRAM(s) Oral two times a day  dextrose 5%. 1000 milliLiter(s) (50 mL/Hr) IV Continuous <Continuous>  dextrose 5%. 1000 milliLiter(s) (100 mL/Hr) IV Continuous <Continuous>  dextrose 50% Injectable 25 Gram(s) IV Push once  dextrose 50% Injectable 12.5 Gram(s) IV Push once  dextrose 50% Injectable 25 Gram(s) IV Push once  glucagon  Injectable 1 milliGRAM(s) IntraMuscular once  insulin lispro (ADMELOG) corrective regimen sliding scale   SubCutaneous three times a day before meals  insulin regular  human corrective regimen sliding scale   SubCutaneous every 6 hours  levothyroxine 100 MICROGram(s) Oral every 24 hours  pantoprazole    Tablet 40 milliGRAM(s) Oral daily  polyethylene glycol 3350 17 Gram(s) Oral daily  senna 2 Tablet(s) Oral at bedtime  sodium chloride 0.9%. 1000 milliLiter(s) (100 mL/Hr) IV Continuous <Continuous>  sucralfate suspension 1 Gram(s) Oral four times a day  tamsulosin 0.4 milliGRAM(s) Oral at bedtime  zolpidem 5 milliGRAM(s) Oral at bedtime    MEDICATIONS  (PRN):  acetaminophen     Tablet .. 650 milliGRAM(s) Oral every 6 hours PRN Temp greater or equal to 38C (100.4F), Mild Pain (1 - 3)  dextrose Oral Gel 15 Gram(s) Oral once PRN Blood Glucose LESS THAN 70 milliGRAM(s)/deciliter  melatonin 3 milliGRAM(s) Oral at bedtime PRN Insomnia      ALLERGIES:  Allergies    No Known Allergies    Intolerances        LABS:                        11.9   9.50  )-----------( 232      ( 28 Mar 2023 05:30 )             35.9     03-28    134<L>  |  103  |  18  ----------------------------<  57<L>  4.1   |  26  |  0.67    Ca    8.2<L>      28 Mar 2023 05:30  Phos  2.3     03-28  Mg     1.9     03-28          CAPILLARY BLOOD GLUCOSE      POCT Blood Glucose.: 144 mg/dL (29 Mar 2023 08:18)      RADIOLOGY & ADDITIONAL TESTS: Reviewed.   Home

## 2025-05-23 ENCOUNTER — APPOINTMENT (OUTPATIENT)
Dept: ENDOCRINOLOGY | Facility: CLINIC | Age: 75
End: 2025-05-23
Payer: MEDICARE

## 2025-05-23 ENCOUNTER — INPATIENT (INPATIENT)
Facility: HOSPITAL | Age: 75
LOS: 11 days | Discharge: HOME | End: 2025-06-04
Attending: STUDENT IN AN ORGANIZED HEALTH CARE EDUCATION/TRAINING PROGRAM | Admitting: HOSPITALIST
Payer: MEDICARE

## 2025-05-23 VITALS
TEMPERATURE: 98 F | RESPIRATION RATE: 18 BRPM | SYSTOLIC BLOOD PRESSURE: 95 MMHG | HEIGHT: 70 IN | WEIGHT: 139.99 LBS | DIASTOLIC BLOOD PRESSURE: 67 MMHG | HEART RATE: 80 BPM | OXYGEN SATURATION: 98 %

## 2025-05-23 VITALS
WEIGHT: 140 LBS | HEART RATE: 55 BPM | OXYGEN SATURATION: 91 % | BODY MASS INDEX: 20.04 KG/M2 | HEIGHT: 70 IN | DIASTOLIC BLOOD PRESSURE: 68 MMHG | SYSTOLIC BLOOD PRESSURE: 112 MMHG | TEMPERATURE: 95.9 F

## 2025-05-23 DIAGNOSIS — I81 PORTAL VEIN THROMBOSIS: ICD-10-CM

## 2025-05-23 DIAGNOSIS — C16.9 MALIGNANT NEOPLASM OF STOMACH, UNSPECIFIED: ICD-10-CM

## 2025-05-23 DIAGNOSIS — Z90.411 ACQUIRED PARTIAL ABSENCE OF PANCREAS: Chronic | ICD-10-CM

## 2025-05-23 DIAGNOSIS — I10 ESSENTIAL (PRIMARY) HYPERTENSION: ICD-10-CM

## 2025-05-23 DIAGNOSIS — Z79.4 TYPE 2 DIABETES MELLITUS W/OUT COMPLICATIONS: ICD-10-CM

## 2025-05-23 DIAGNOSIS — Z87.19 PERSONAL HISTORY OF OTHER DISEASES OF THE DIGESTIVE SYSTEM: Chronic | ICD-10-CM

## 2025-05-23 DIAGNOSIS — R47.81 SLURRED SPEECH: ICD-10-CM

## 2025-05-23 DIAGNOSIS — K74.60 UNSPECIFIED CIRRHOSIS OF LIVER: ICD-10-CM

## 2025-05-23 DIAGNOSIS — Z90.3 ACQUIRED ABSENCE OF STOMACH [PART OF]: Chronic | ICD-10-CM

## 2025-05-23 DIAGNOSIS — Z98.890 OTHER SPECIFIED POSTPROCEDURAL STATES: Chronic | ICD-10-CM

## 2025-05-23 DIAGNOSIS — E03.9 HYPOTHYROIDISM, UNSPECIFIED: ICD-10-CM

## 2025-05-23 DIAGNOSIS — K74.69 OTHER CIRRHOSIS OF LIVER: ICD-10-CM

## 2025-05-23 DIAGNOSIS — Z98.49 CATARACT EXTRACTION STATUS, UNSPECIFIED EYE: Chronic | ICD-10-CM

## 2025-05-23 DIAGNOSIS — Z93.1 GASTROSTOMY STATUS: Chronic | ICD-10-CM

## 2025-05-23 DIAGNOSIS — R60.0 LOCALIZED EDEMA: ICD-10-CM

## 2025-05-23 DIAGNOSIS — L03.90 CELLULITIS, UNSPECIFIED: ICD-10-CM

## 2025-05-23 DIAGNOSIS — R53.1 WEAKNESS: ICD-10-CM

## 2025-05-23 DIAGNOSIS — E11.9 TYPE 2 DIABETES MELLITUS WITHOUT COMPLICATIONS: ICD-10-CM

## 2025-05-23 DIAGNOSIS — Z29.9 ENCOUNTER FOR PROPHYLACTIC MEASURES, UNSPECIFIED: ICD-10-CM

## 2025-05-23 DIAGNOSIS — Z95.828 PRESENCE OF OTHER VASCULAR IMPLANTS AND GRAFTS: Chronic | ICD-10-CM

## 2025-05-23 DIAGNOSIS — E11.9 TYPE 2 DIABETES MELLITUS W/OUT COMPLICATIONS: ICD-10-CM

## 2025-05-23 DIAGNOSIS — D64.9 ANEMIA, UNSPECIFIED: ICD-10-CM

## 2025-05-23 LAB
ADD ON TEST-SPECIMEN IN LAB: SIGNIFICANT CHANGE UP
ALBUMIN SERPL ELPH-MCNC: 2.9 G/DL — LOW (ref 3.3–5)
ALP SERPL-CCNC: 96 U/L — SIGNIFICANT CHANGE UP (ref 40–120)
ALT FLD-CCNC: 16 U/L — SIGNIFICANT CHANGE UP (ref 10–45)
ANION GAP SERPL CALC-SCNC: 14 MMOL/L — SIGNIFICANT CHANGE UP (ref 5–17)
APTT BLD: 26.6 SEC — SIGNIFICANT CHANGE UP (ref 26.1–36.8)
AST SERPL-CCNC: SIGNIFICANT CHANGE UP (ref 10–40)
BASOPHILS # BLD AUTO: 0 K/UL — SIGNIFICANT CHANGE UP (ref 0–0.2)
BASOPHILS NFR BLD AUTO: 0 % — SIGNIFICANT CHANGE UP (ref 0–2)
BILIRUB SERPL-MCNC: 1.7 MG/DL — HIGH (ref 0.2–1.2)
BUN SERPL-MCNC: 23 MG/DL — SIGNIFICANT CHANGE UP (ref 7–23)
CALCIUM SERPL-MCNC: 8.1 MG/DL — LOW (ref 8.4–10.5)
CHLORIDE SERPL-SCNC: 99 MMOL/L — SIGNIFICANT CHANGE UP (ref 96–108)
CO2 SERPL-SCNC: 24 MMOL/L — SIGNIFICANT CHANGE UP (ref 22–31)
CREAT SERPL-MCNC: 1.21 MG/DL — SIGNIFICANT CHANGE UP (ref 0.5–1.3)
EGFR: 62 ML/MIN/1.73M2 — SIGNIFICANT CHANGE UP
EGFR: 62 ML/MIN/1.73M2 — SIGNIFICANT CHANGE UP
EOSINOPHIL # BLD AUTO: 0.09 K/UL — SIGNIFICANT CHANGE UP (ref 0–0.5)
EOSINOPHIL NFR BLD AUTO: 1.8 % — SIGNIFICANT CHANGE UP (ref 0–6)
FLUAV AG NPH QL: SIGNIFICANT CHANGE UP
FLUBV AG NPH QL: SIGNIFICANT CHANGE UP
GAS PNL BLDV: SIGNIFICANT CHANGE UP
GLUCOSE SERPL-MCNC: 116 MG/DL — HIGH (ref 70–99)
HCT VFR BLD CALC: 29.7 % — LOW (ref 39–50)
HGB BLD-MCNC: 10.6 G/DL — LOW (ref 13–17)
INR BLD: 1.18 — HIGH (ref 0.85–1.16)
LACTATE SERPL-SCNC: 1.1 MMOL/L — SIGNIFICANT CHANGE UP (ref 0.5–2)
LACTATE SERPL-SCNC: 2.4 MMOL/L — HIGH (ref 0.5–2)
LYMPHOCYTES # BLD AUTO: 0.23 K/UL — LOW (ref 1–3.3)
LYMPHOCYTES # BLD AUTO: 4.4 % — LOW (ref 13–44)
MCHC RBC-ENTMCNC: 35.7 G/DL — SIGNIFICANT CHANGE UP (ref 32–36)
MCHC RBC-ENTMCNC: 35.7 PG — HIGH (ref 27–34)
MCV RBC AUTO: 100 FL — SIGNIFICANT CHANGE UP (ref 80–100)
MONOCYTES # BLD AUTO: 0.24 K/UL — SIGNIFICANT CHANGE UP (ref 0–0.9)
MONOCYTES NFR BLD AUTO: 4.5 % — SIGNIFICANT CHANGE UP (ref 2–14)
NEUTROPHILS # BLD AUTO: 4.67 K/UL — SIGNIFICANT CHANGE UP (ref 1.8–7.4)
NEUTROPHILS NFR BLD AUTO: 89.3 % — HIGH (ref 43–77)
PLATELET # BLD AUTO: 142 K/UL — LOW (ref 150–400)
POTASSIUM SERPL-MCNC: 3.7 MMOL/L — SIGNIFICANT CHANGE UP (ref 3.5–5.3)
POTASSIUM SERPL-SCNC: 3.7 MMOL/L — SIGNIFICANT CHANGE UP (ref 3.5–5.3)
PROT SERPL-MCNC: 6.6 G/DL — SIGNIFICANT CHANGE UP (ref 6–8.3)
PROTHROM AB SERPL-ACNC: 13.6 SEC — HIGH (ref 9.9–13.4)
RBC # BLD: 2.97 M/UL — LOW (ref 4.2–5.8)
RBC # FLD: 15.9 % — HIGH (ref 10.3–14.5)
RSV RNA NPH QL NAA+NON-PROBE: SIGNIFICANT CHANGE UP
SARS-COV-2 RNA SPEC QL NAA+PROBE: SIGNIFICANT CHANGE UP
SODIUM SERPL-SCNC: 137 MMOL/L — SIGNIFICANT CHANGE UP (ref 135–145)
SOURCE RESPIRATORY: SIGNIFICANT CHANGE UP
WBC # BLD: 5.23 K/UL — SIGNIFICANT CHANGE UP (ref 3.8–10.5)
WBC # FLD AUTO: 5.23 K/UL — SIGNIFICANT CHANGE UP (ref 3.8–10.5)

## 2025-05-23 PROCEDURE — 70450 CT HEAD/BRAIN W/O DYE: CPT | Mod: 26,XU

## 2025-05-23 PROCEDURE — 99285 EMERGENCY DEPT VISIT HI MDM: CPT

## 2025-05-23 PROCEDURE — 76705 ECHO EXAM OF ABDOMEN: CPT | Mod: 26

## 2025-05-23 PROCEDURE — 93970 EXTREMITY STUDY: CPT | Mod: 26

## 2025-05-23 PROCEDURE — 70496 CT ANGIOGRAPHY HEAD: CPT | Mod: 26

## 2025-05-23 PROCEDURE — 71045 X-RAY EXAM CHEST 1 VIEW: CPT | Mod: 26

## 2025-05-23 PROCEDURE — 70498 CT ANGIOGRAPHY NECK: CPT | Mod: 26

## 2025-05-23 PROCEDURE — 73590 X-RAY EXAM OF LOWER LEG: CPT | Mod: 26,RT

## 2025-05-23 PROCEDURE — 99223 1ST HOSP IP/OBS HIGH 75: CPT | Mod: GC

## 2025-05-23 PROCEDURE — G2211 COMPLEX E/M VISIT ADD ON: CPT

## 2025-05-23 PROCEDURE — 73630 X-RAY EXAM OF FOOT: CPT | Mod: 26,RT

## 2025-05-23 PROCEDURE — 93925 LOWER EXTREMITY STUDY: CPT | Mod: 26

## 2025-05-23 PROCEDURE — 93010 ELECTROCARDIOGRAM REPORT: CPT

## 2025-05-23 PROCEDURE — 99214 OFFICE O/P EST MOD 30 MIN: CPT

## 2025-05-23 RX ORDER — GLUCAGON 3 MG/1
1 POWDER NASAL ONCE
Refills: 0 | Status: DISCONTINUED | OUTPATIENT
Start: 2025-05-23 | End: 2025-05-24

## 2025-05-23 RX ORDER — DEXTROSE 50 % IN WATER 50 %
15 SYRINGE (ML) INTRAVENOUS ONCE
Refills: 0 | Status: DISCONTINUED | OUTPATIENT
Start: 2025-05-23 | End: 2025-05-24

## 2025-05-23 RX ORDER — DEXTROSE 50 % IN WATER 50 %
12.5 SYRINGE (ML) INTRAVENOUS ONCE
Refills: 0 | Status: DISCONTINUED | OUTPATIENT
Start: 2025-05-23 | End: 2025-05-24

## 2025-05-23 RX ORDER — SODIUM CHLORIDE 9 G/1000ML
1000 INJECTION, SOLUTION INTRAVENOUS
Refills: 0 | Status: DISCONTINUED | OUTPATIENT
Start: 2025-05-23 | End: 2025-05-24

## 2025-05-23 RX ORDER — VANCOMYCIN HCL IN 5 % DEXTROSE 1.5G/250ML
1000 PLASTIC BAG, INJECTION (ML) INTRAVENOUS ONCE
Refills: 0 | Status: COMPLETED | OUTPATIENT
Start: 2025-05-23 | End: 2025-05-23

## 2025-05-23 RX ORDER — INSULIN LISPRO 100 U/ML
INJECTION, SOLUTION INTRAVENOUS; SUBCUTANEOUS
Refills: 0 | Status: DISCONTINUED | OUTPATIENT
Start: 2025-05-23 | End: 2025-05-24

## 2025-05-23 RX ORDER — DEXTROSE 50 % IN WATER 50 %
25 SYRINGE (ML) INTRAVENOUS ONCE
Refills: 0 | Status: DISCONTINUED | OUTPATIENT
Start: 2025-05-23 | End: 2025-05-24

## 2025-05-23 RX ORDER — PIPERACILLIN-TAZO-DEXTROSE,ISO 3.375G/5
3.38 IV SOLUTION, PIGGYBACK PREMIX FROZEN(ML) INTRAVENOUS ONCE
Refills: 0 | Status: COMPLETED | OUTPATIENT
Start: 2025-05-23 | End: 2025-05-23

## 2025-05-23 RX ORDER — LEVOTHYROXINE SODIUM 300 MCG
100 TABLET ORAL DAILY
Refills: 0 | Status: DISCONTINUED | OUTPATIENT
Start: 2025-05-24 | End: 2025-06-04

## 2025-05-23 RX ORDER — APIXABAN 2.5 MG/1
5 TABLET, FILM COATED ORAL EVERY 12 HOURS
Refills: 0 | Status: DISCONTINUED | OUTPATIENT
Start: 2025-05-23 | End: 2025-05-30

## 2025-05-23 RX ORDER — APIXABAN 2.5 MG/1
0 TABLET, FILM COATED ORAL
Refills: 0 | DISCHARGE

## 2025-05-23 RX ORDER — CEFAZOLIN SODIUM IN 0.9 % NACL 3 G/100 ML
1000 INTRAVENOUS SOLUTION, PIGGYBACK (ML) INTRAVENOUS EVERY 8 HOURS
Refills: 0 | Status: DISCONTINUED | OUTPATIENT
Start: 2025-05-23 | End: 2025-05-27

## 2025-05-23 RX ORDER — SPIRONOLACTONE 25 MG
1 TABLET ORAL
Refills: 0 | DISCHARGE

## 2025-05-23 RX ADMIN — Medication 5 MILLIGRAM(S): at 23:32

## 2025-05-23 RX ADMIN — Medication 3.38 GRAM(S): at 17:00

## 2025-05-23 RX ADMIN — Medication 100 MILLIGRAM(S): at 23:33

## 2025-05-23 RX ADMIN — Medication 500 MILLILITER(S): at 14:35

## 2025-05-23 RX ADMIN — Medication 500 MILLILITER(S): at 17:00

## 2025-05-23 RX ADMIN — Medication 250 MILLIGRAM(S): at 17:00

## 2025-05-23 RX ADMIN — Medication 40 MILLIEQUIVALENT(S): at 20:58

## 2025-05-23 RX ADMIN — Medication 200 GRAM(S): at 14:49

## 2025-05-23 RX ADMIN — APIXABAN 5 MILLIGRAM(S): 2.5 TABLET, FILM COATED ORAL at 23:32

## 2025-05-23 NOTE — ED PROVIDER NOTE - CLINICAL SUMMARY MEDICAL DECISION MAKING FREE TEXT BOX
Impression: Mr. Kirkland is a 72 year old man with PMH of gastric cancer (s/p feeding J-tube), portal vein thrombosis (on Eliquis) AAA (s/p EVAR in 2023), T2DM, hypothyroidism, HTN, anemia, BPH, type 2 endoleak repair, s/p embolization of internal iliac artery branch (10/24), who presents to the ED with worsening bilateral leg swelling and gen weakness. Patient reports bilateral leg swelling for a couple of months, gradually worsening with the right leg more affected than the left. Swelling initially started in the feet and has now progressed up to the knees. Patient denies pain but reports leg weakness, difficulty walking, and getting onto examination table due to leg swelling. Cold sensation in feet attributed to post-chemotherapy effects, unchanged.  Patient noted to have discoloration of RLE at Dr. Richard's office. + Decreased appetite and oral intake in the past couple of weeks. No n/v. + frequent stools due to tube feeds, no blood. No fever, chills. Pt also endorses right facial droop x few months and is not sure if he had a stroke. Has been told to have slurred speech by his friends but does not know when it began. Daughter noted speech to be slurred 4d ago. Of note, pt was leaning against weapons detector at triage when he bent over to retrieve something. Detector fell over, causing pt to fall onto knees. Denies head strike/ loc.    Afebrile. Soft bp at triage, normal on rpt. VS otherwise stable. + findings of rle cellulitis. NVI. No leuikocytosis. + baseline anemia and thrombocytopenia (plt low at 126 on 2/1/25). + hypoalbuminemia, tb 1.7. ? findings related to known h/o cirrhosis. Lactate 2.4->1.1. Doppler of lower ext neg for dvt, + sts. CXR neg for i/e/chf, + trace left pleural effusion, NO PTX as d/w Dr. Krishnamurthy. Xrays of rle/ foot neg for osseous destruction/ periosteal rxn. Pt given dose of zosyn/ vanc. Will arrange for CTH/ CTA H/N. Stroke consulted as well and will see pt as inpt. If no evidence of ICH, plan for admission for further work up and management. Pt s/o to Dr. Best.

## 2025-05-23 NOTE — H&P ADULT - PROBLEM SELECTOR PLAN 7
Home medications: Levothyroxine 100 mcg/day  Endocrinologist: Dr. Mckeon, last saw 4/25    - C/w home medications Follows Dr. Mckeon with very well controlled diabetes, last saw this AM w/ positive levels.   Per HIE - regimen is NPH 18 units at night, Lispro 3 units while on tube feeds    - Start patient on low dose insulin sliding scale tonight   - q6h FS  - Once tube feeds resume, 18 Lantus/3 units lispro to be given 30 min before starting nocturnal feeds (possibly tomorrow)  - Endocrinology aware, will see patient tomorrow Hx of HTN, was taking Losartan 50mg but has since discontinued. Normotensive.    - No acute intervention, continue to monitor

## 2025-05-23 NOTE — H&P ADULT - NSHPPHYSICALEXAM_GEN_ALL_CORE
PHYSICAL EXAM:  General: in no acute distress  Eyes: EOMI intact bilaterally. Anicteric sclerae, moist conjunctivae  HENT: Moist mucous membranes  Neck: Trachea midline, supple. No cervical lymphadenopathy in anterior or posterior chain.  Lungs: CTA B/L. No wheezes, rales, or rhonchi  Cardiovascular: RRR. No murmurs, rubs, or gallops  Abdomen: +BS, soft, non-tender non-distended; No rebound or guarding  Extremities: WWP, No clubbing, cyanosis or edema. 2+ peripheral pulses, cap refill <2 seconds  Neurological: AOx3 to person, place, time  Skin: Warm and dry. No obvious rash PHYSICAL EXAM:  General: in no acute distress  Eyes: EOMI intact bilaterally. Anicteric sclerae, moist conjunctivae  HENT: dry mucous membranes  Neck: Trachea midline, supple. No cervical lymphadenopathy in anterior or posterior chain.  Lungs: CTA B/L. No wheezes, rales, or rhonchi  Cardiovascular: RRR. No murmurs, rubs, or gallops  Abdomen: +BS, soft, non-tender, slight distension with hernias, J tube in place with drainage, scar near umbilicus, no active purulence or drainage; No rebound or guarding  Extremities: WWP, +4 pitting edema in bilateral lower extremities from midthigh until feet. RLE is erythematous and warm to touch, no active purulence or drainage, LLE is pale, chronic leg changes bilaterally. Palpable pulses but very difficult given extensive edema.  Neurological: AOx3 to person, place, time. Cranial nerves II-XI intact, motor 5/5 in b/l UE and LE, sensory intact in b/l UE and LE.  Skin: Warm and dry. No obvious rash

## 2025-05-23 NOTE — H&P ADULT - ASSESSMENT
72M w/ PMH of gastric cancer (s/p feeding J-tube), portal vein thrombosis (on Eliquis) AAA (s/p EVAR in 2023), T2DM, hypothyroidism, HTN, anemia, BPH, type 2 endoleak repair, s/p embolization of internal iliac artery branch (10/24), cirrhosis, who presents to the ED with worsening bilateral leg swelling and gen weakness, admitted for further evaluation.

## 2025-05-23 NOTE — H&P ADULT - NSHPLABSRESULTS_GEN_ALL_CORE
LABS:                         10.6   5.23  )-----------( 142      ( 23 May 2025 13:55 )             29.7     05-23    137  |  99  |  23  ----------------------------<  116[H]  3.7   |  24  |  1.21    Ca    8.1[L]      23 May 2025 13:55    TPro  6.6  /  Alb  2.9[L]  /  TBili  1.7[H]  /  DBili  x   /  AST  See Note  /  ALT  16  /  AlkPhos  96  05-23    PT/INR - ( 23 May 2025 13:55 )   PT: 13.6 sec;   INR: 1.18          PTT - ( 23 May 2025 13:55 )  PTT:26.6 sec  Urinalysis Basic - ( 23 May 2025 13:55 )    Color: x / Appearance: x / SG: x / pH: x  Gluc: 116 mg/dL / Ketone: x  / Bili: x / Urobili: x   Blood: x / Protein: x / Nitrite: x   Leuk Esterase: x / RBC: x / WBC x   Sq Epi: x / Non Sq Epi: x / Bacteria: x            Lactate, Blood: 1.1 mmol/L (05-23 @ 17:06)  Lactate, Blood: 2.4 mmol/L (05-23 @ 13:55)      RADIOLOGY, EKG & ADDITIONAL TESTS: Reviewed.

## 2025-05-23 NOTE — H&P ADULT - HISTORY OF PRESENT ILLNESS
HPI: 72M w/ PMH of gastric cancer (s/p feeding J-tube), portal vein thrombosis (on Eliquis) AAA (s/p EVAR in 2023), T2DM, hypothyroidism, HTN, anemia, BPH, type 2 endoleak repair, s/p embolization of internal iliac artery branch (10/24), cirrhosis, who presents to the ED with worsening bilateral leg swelling and gen weakness. Patient reports bilateral leg swelling for a couple of months, gradually worsening with the right leg more affected than the left. Swelling initially started in the feet and has now progressed up to the knees. Patient denies pain but reports leg weakness, difficulty walking, and getting onto examination table due to leg swelling. Cold sensation in feet attributed to post-chemotherapy effects, unchanged.  Patient noted to have discoloration of RLE at Dr. Richard's office. + Decreased appetite and oral intake in the past couple of weeks. No n/v. + frequent stools due to tube feeds, no blood. No fever, chills. Pt also endorses right facial droop x few months and is not sure if he had a stroke. Has been told to have slurred speech by his friends but does not know when it began. Daughter noted speech to be slurred 4d ago. Of note, pt was leaning against weapons detector at triage when he bent over to retrieve something. Detector fell over, causing pt to fall onto knees. Denies head strike/ loc.      Of note,        Patient last admitted,      Medications:  Pharmacy:  PCP:  Allergies:    In the ED,  VS: T   , HR   , BP    , RR    , SpO2     % (RA/NC)  Labs:  Urine:  EKG:  CXR:  Imaging: Other  Orders:   Consults: HPI: 72M w/ PMH of gastric cancer (s/p feeding J-tube), portal vein thrombosis (on Eliquis) AAA (s/p EVAR in 2023), T2DM, hypothyroidism, HTN, anemia, BPH, type 2 endoleak repair, s/p embolization of internal iliac artery branch (10/24), cirrhosis, who presents to the ED with worsening bilateral leg swelling and gen weakness. Patient reports bilateral leg swelling for a couple of months, gradually worsening with the right leg more affected than the left. Swelling initially started in the feet and has now progressed up to the knees. Patient denies pain but reports leg weakness, difficulty walking, and getting onto examination table due to leg swelling. Cold sensation in feet attributed to post-chemotherapy effects, unchanged.  Patient noted to have discoloration of RLE at Dr. Richard's office. + Decreased appetite and oral intake in the past couple of weeks. No n/v. + frequent stools due to tube feeds, no blood. No fever, chills. Pt also endorses right facial droop x few months and is not sure if he had a stroke. Has been told to have slurred speech by his friends but does not know when it began. Daughter noted speech to be slurred 4d ago. Of note, pt was leaning against weapons detector at triage when he bent over to retrieve something. Detector fell over, causing pt to fall onto knees. Denies head strike/ loc.    Medications:  Pharmacy:  PCP:  Allergies:    In the ED,  VS: T 98-98.3, HR 58-80, BP /66-67, RR 18, SpO2 % RA  Labs: H/H 10.6/29.7, PT 13.6, INR 1.18, lactate 2.4 ->1.1, Bili 1.7   VBG: pH 7.43, HCO3 21  Limited RVP negative  CTH: Arachnoid cyst and senescent cerebral changes. No acute intracranial hemorrhage.  CT Angio H: Left C6 ICA stenosis, 40-50%.  Dopplers b/l LE: No evidence of deep venous thrombosis in either lower extremity. Soft tissue swelling in both lower extremities.  CXray: Heart size, mediastinal and hilar contours are unchanged and within normal limits. Surgical clips are again noted in the left supraclavicular/left apical region as well as in the LUQ The lung zones are clear. There is a trace left sided pneumothorax. No pnemothorax seen.  Xray R foot and Tib/Fib: Osteopenia. No osseous destruction or periosteal reaction. No fracture or dislocation. There is a hallux valgus alignment with first MTP joint osteoarthrosis. Tibiotalar joint space narrowing is also identified. Second through fourth hammertoe deformities. Diffuse soft tissue swelling of the imaged lower extremity. Vascular calcification is present.  EKG:  Orders: Zosyn 3.375mg x1, Vancomycin 1000mg x1, NaCl 500 cc bolus x1  Consults: Neurology HPI: 72M w/ PMH of gastric cancer (s/p feeding J-tube), portal vein thrombosis (on Eliquis) AAA (s/p EVAR in 2023), T2DM, hypothyroidism, HTN, anemia, BPH, type 2 endoleak repair, s/p embolization of internal iliac artery branch (10/24), cirrhosis, who presents to the ED with worsening bilateral leg swelling and gen weakness. Patient reports bilateral leg swelling for a couple of months, gradually worsening with the right leg more affected than the left. Swelling initially started in the feet and has now progressed up to the knees. Patient denies pain but reports leg weakness, difficulty walking, and getting onto examination table due to leg swelling. Cold sensation in feet attributed to post-chemotherapy effects, unchanged.  Patient noted to have discoloration of RLE at Dr. Richard's office. + Decreased appetite and oral intake in the past couple of weeks. No n/v. + frequent stools due to tube feeds, no blood. No fever, chills. Pt also endorses right facial droop x few months and is not sure if he had a stroke. Has been told to have slurred speech by his friends but does not know when it began. Daughter noted speech to be slurred 4d ago. Of note, pt was leaning against weapons detector at triage when he bent over to retrieve something. Detector fell over, causing pt to fall onto knees. Denies head strike/ loc.    Medications: Eliquis 5mg BID, Spironolactone 25mg qd, Furosemide 40mg qd, Lovethyroxine 100mg qd, NPH 18, Lispro 3, Ambien 5mg    In the ED,  VS: T 98-98.3, HR 58-80, BP /66-67, RR 18, SpO2 % RA  Labs: H/H 10.6/29.7, PT 13.6, INR 1.18, lactate 2.4 ->1.1, Bili 1.7   VBG: pH 7.43, HCO3 21  Limited RVP negative  CTH: Arachnoid cyst and senescent cerebral changes. No acute intracranial hemorrhage.  CT Angio H: Left C6 ICA stenosis, 40-50%.  Dopplers b/l LE: No evidence of deep venous thrombosis in either lower extremity. Soft tissue swelling in both lower extremities.  CXray: Heart size, mediastinal and hilar contours are unchanged and within normal limits. Surgical clips are again noted in the left supraclavicular/left apical region as well as in the LUQ The lung zones are clear. There is a trace left sided pneumothorax. No pnemothorax seen.  Xray R foot and Tib/Fib: Osteopenia. No osseous destruction or periosteal reaction. No fracture or dislocation. There is a hallux valgus alignment with first MTP joint osteoarthrosis. Tibiotalar joint space narrowing is also identified. Second through fourth hammertoe deformities. Diffuse soft tissue swelling of the imaged lower extremity. Vascular calcification is present.  EKG: PACs  Orders: Zosyn 3.375mg x1, Vancomycin 1000mg x1, NaCl 500 cc bolus x1  Consults: Neurology HPI: 72M w/ PMH of gastric cancer (s/p feeding J-tube), portal vein thrombosis (on Eliquis) AAA (s/p EVAR in 2023), T2DM, hypothyroidism, HTN, anemia, BPH, type 2 endoleak repair, s/p embolization of internal iliac artery branch (10/24), cirrhosis, who presents to the ED with worsening bilateral leg swelling and gen weakness. Patient reports bilateral leg swelling for a couple of weeks, worsening in severity for the past 4 days. He was in Dr. Richards's office and was told to go to the emergency room because of swelling and discoloration of his legs. He is on furosemide and spironolactone and doubled his spironolactone dose in order to help with the swelling. He also reports erythema of the RLE x 4 days, no injury or falls. Additionally, he and his daughter report slurred speech for the past 4 days. He reports no fevers, chills, headache, chest pain, shortness of breath, abdominal pain, nausea, vomiting, diarrhea, constipation.     Medications: Eliquis 5mg BID, Spironolactone 25mg qd, Furosemide 40mg qd, Levothyroxine 100mg qd, NPH 18, Lispro 3, Ambien 5mg qd    In the ED,  VS: T 98-98.3, HR 58-80, BP /66-67, RR 18, SpO2 % RA  Labs: H/H 10.6/29.7, PT 13.6, INR 1.18, lactate 2.4 ->1.1, Bili 1.7   VBG: pH 7.43, HCO3 21  Limited RVP negative  CTH: Arachnoid cyst and senescent cerebral changes. No acute intracranial hemorrhage.  CT Angio H: Left C6 ICA stenosis, 40-50%.  Dopplers b/l LE: No evidence of deep venous thrombosis in either lower extremity. Soft tissue swelling in both lower extremities.  CXray: Heart size, mediastinal and hilar contours are unchanged and within normal limits. Surgical clips are again noted in the left supraclavicular/left apical region as well as in the LUQ The lung zones are clear. There is a trace left sided pneumothorax. No pnemothorax seen.  Xray R foot and Tib/Fib: Osteopenia. No osseous destruction or periosteal reaction. No fracture or dislocation. There is a hallux valgus alignment with first MTP joint osteoarthrosis. Tibiotalar joint space narrowing is also identified. Second through fourth hammertoe deformities. Diffuse soft tissue swelling of the imaged lower extremity. Vascular calcification is present.  EKG: PACs  Orders: Zosyn 3.375mg x1, Vancomycin 1000mg x1, NaCl 500 cc bolus x1  Consults: Neurology

## 2025-05-23 NOTE — H&P ADULT - PROBLEM SELECTOR PLAN 6
F:  E: Replete as needed Mg>2 and K>4)  N:  DVT:  GI:  Bowel:  Activity:  C:  Dispo: Home medications: NPH 18 units, Lispro 3 units  Endocrinologist: Dr. Mckeon, last saw 4/25    - q6h FS  - [facility] Hx of HTN, was taking Losartan 50mg but has since discontinued. Normotensive.    - No acute intervention, continue to monitor Hx of gastric cancer treated with chemotherapy 5 years ago now w/ feeding J-tube, previously followed Dr. Saravanan Cabello and Dr. Chema Richmond. Currently followed Heme/onc: Dr. Henry.    - No acute intervention, continue outpatient management  - C/w tube feeds- resume tomorrow night and give insulin accordingly (as in diabetes below)

## 2025-05-23 NOTE — ED ADULT TRIAGE NOTE - OTHER COMPLAINTS
Pt had a mechanical trip and fall while being registered. pt leaned over the weapons detection system, kneeled on the floor, assisted by  and triage RN, ED tech and [employer] , placed on Wheelchair, denies any injuries.

## 2025-05-23 NOTE — H&P ADULT - PROBLEM SELECTOR PLAN 3
CTH: Arachnoid cyst and senescent cerebral changes. No acute intracranial hemorrhage.  CT Angio H: Left C6 ICA stenosis, 40-50%.  Dopplers b/l LE: No evidence of deep venous thrombosis in either lower extremity. Soft tissue swelling in both lower extremities.  CXray: Heart size, mediastinal and hilar contours are unchanged and within normal limits. Surgical clips are again noted in the left supraclavicular/left apical region as well as in the LUQ The lung zones are clear. There is a trace left sided pneumothorax. No pnemothorax seen.  Xray R foot and Tib/Fib: Osteopenia. No osseous destruction or periosteal reaction. No fracture or dislocation. There is a hallux valgus alignment with first MTP joint osteoarthrosis. Tibiotalar joint space narrowing is also identified. Second through fourth hammertoe deformities. Diffuse soft tissue swelling of the imaged lower extremity. Vascular calcification is present. CTH: Arachnoid cyst and senescent cerebral changes. No acute intracranial hemorrhage.  CT Angio H: Left C6 ICA stenosis, 40-50%.    - Neurology consulted, f/u recs Per patient has had slurred speech x several days, no precipitating factor; since resolved  CTH: Arachnoid cyst and senescent cerebral changes. No acute intracranial hemorrhage.  CT Angio H: Left C6 ICA stenosis, 40-50%.  Neurology consulted in ED, no acute concern for stroke, will continue to follow    - Neurology consulted, f/u recs

## 2025-05-23 NOTE — CHART NOTE - NSCHARTNOTEFT_GEN_A_CORE
Pt seen this evening while still in the ED awaiting transfer to the floor  Had been seen in the office this morning for routine visit, but presented with marked weakness and edema which had worsened despite the furosemide which had been started by the Hepatology team at [facility]    Please see my notes in [employer] for a more complete summary of his multiple medical problems and meds.    Pertinent findings noted during ER evaluation included marked hypokalemia (presumably from the Lasix, even though only 40 mg/day), hypoalbuminemia which was actually less severe than on previous labs, CTH which was negative for stroke, LE Dopplers which were negative for DVT  The only change in exam is that his RLE is now somewhat more erythematous and warm, somewhat more typically cellulitic.  It was erythematous but cool in the office.    Suggestions at this point:  --The severity of the edema is difficult to explain, especially given only mild hypoalbuminemia and a lack of response to a Lasix dose which was sufficient to cause marked hypokalemia       Would be concerned about something more proximal going on in his abdomen, and would check an abdominal ultrasound  --He should also have an abdominal ultrasound to look at his liver and ascites.  The etiology of his cirrhosis is unclear--no work-up was completed on [place] except bloodwork and Fibroscan, the latter uninterpretable due to jordy-hepatic ascites. Also impossible to explain the description of his liver as "normal" on last year's CT, with "cirrhotic morphology" noted on the one earlier this year.  Would have the GI service (preferably the Hepatology service--Dr. Neal--see the pt.  He will transfer care to the service here  --Can resume nocturnal tube feeds tomorrow night--unrealistic to start these tonight.  See insulin regimen in [employer]--was decreased to 18 Lantus/3 units lispro to be given 30 min before starting nocturnal feeds.  Use low-dose sliding scale.  His glycemic control has been excellent--average glucose on the  mg% for the last 3 months  --May need Abx for the RLE,  --Need to make a decision regarding anti-coagulation.  Has been on Eliquis for several years, ?? for the portal vein thrombosis, but his INR is already elevated secondary to the liver disease and he has multiple ecchymoses on his extremities    JERED Richard MD

## 2025-05-23 NOTE — H&P ADULT - PROBLEM SELECTOR PLAN 5
s/p feeding J-tube CTH: Arachnoid cyst and senescent cerebral changes. No acute intracranial hemorrhage.  CT Angio H: Left C6 ICA stenosis, 40-50%.  Dopplers b/l LE: No evidence of deep venous thrombosis in either lower extremity. Soft tissue swelling in both lower extremities.  CXray: Heart size, mediastinal and hilar contours are unchanged and within normal limits. Surgical clips are again noted in the left supraclavicular/left apical region as well as in the LUQ The lung zones are clear. There is a trace left sided pneumothorax. No pnemothorax seen.  Xray R foot and Tib/Fib: Osteopenia. No osseous destruction or periosteal reaction. No fracture or dislocation. There is a hallux valgus alignment with first MTP joint osteoarthrosis. Tibiotalar joint space narrowing is also identified. Second through fourth hammertoe deformities. Diffuse soft tissue swelling of the imaged lower extremity. Vascular calcification is present. Hx of gastric cancer treated with chemotherapy 5 years ago now w/ feeding J-tube, previously followed Dr. Saravanan Cabello and Dr. Chema Richmond. Currently followed Heme/onc: Dr. Henry.    - No acute intervention, continue outpatient management  - C/w tube feeds- resume tomorrow night and give insulin accordingly (as in diabetes below) Recently diagnosed with liver cirrhosis (2025?), per chart note by Dr. Mckeon and review on [facility] -  etiology of his cirrhosis is unclear--no work-up was completed on [place] except bloodwork and Fibroscan, the latter uninterpretable due to jordy-hepatic ascites.   , INR 1.18, AST/ALT not quantified/16  CT A/P this year showed cirrhotic morphology, last year had normal findings  ED bedside U/S without pocket to TAP per signout    - F/u abdominal U/S  - Hepatology consult in AM  - Daily MELD score  - Diuretics pending improvement in BMP

## 2025-05-23 NOTE — H&P ADULT - ATTENDING COMMENTS
72M w/ PMH of gastric cancer (s/p feeding J-tube), portal vein thrombosis (on Eliquis) AAA (s/p EVAR in 2023), T2DM, hypothyroidism, HTN, anemia, BPH, type 2 endoleak repair, s/p embolization of internal iliac artery branch (10/24), cirrhosis, who presents to the ED with worsening bilateral leg swelling, RLE discoloration and gen weakness. Family also endorsing worensing gait, slurred speech. Stroke consulted in ED. CTH/CTA headneck neg for acute stroke, no FND. Admitted for RLE cellulitis and further work up.   On PE: elderly male resting comfortably in bed. NAD. +MMM, +PERRLA, no facial droop, no FND AO x3, s1s2 no murmur, no JVD. abd soft, NT, +J tube in place, no surrounding erythema. +3 b/l LE edema. chronic venous changes, RLE erythema/tenderness.      Plan   -c/w cefazolin for non purulent cellultis RLE. Serial exams    -LE venous doppler neg for for DVT    -vascular consult. F/up LE arterial dopplers.    -f/up TSH, pro BNP, TTE   -c/w home meds   -MRI brain per neuro recs    -daily MELD, hepatology consult    -fall precautions    -PT/SW

## 2025-05-23 NOTE — ED ADULT TRIAGE NOTE - CHIEF COMPLAINT QUOTE
pt c/o bilateral leg swelling and unsteady gait x weeks.  as per daughter pt was sent to ED by MD Mckeon. RLE  noted red, swollen, with small wound draining.  pt hypotensive in triage. as per daughter pt also noted to have slurred speech unk since when.

## 2025-05-23 NOTE — CONSULT NOTE ADULT - SUBJECTIVE AND OBJECTIVE BOX
**STROKE CONSULT NOTE**    HPI: 72M w/ PMH of gastric cancer (s/p feeding J-tube), portal vein thrombosis (on Eliquis) AAA (s/p EVAR in 2023), T2DM, hypothyroidism, HTN, anemia, BPH, type 2 endoleak repair, s/p embolization of internal iliac artery branch (10/24), cirrhosis, who presents to the ED with worsening bilateral leg swelling and gen weakness. Patient reports bilateral leg swelling for a couple of months, gradually worsening with the right leg more affected than the left. Swelling initially started in the feet and has now progressed up to the knees. Patient denies pain but reports leg weakness, difficulty walking, and getting onto examination table due to leg swelling. Cold sensation in feet attributed to post-chemotherapy effects, unchanged.  Patient noted to have discoloration of RLE at Dr. Richard's office. + Decreased appetite and oral intake in the past couple of weeks. No n/v. + frequent stools due to tube feeds, no blood. No fever, chills. Pt also endorses right facial droop x few months and is not sure if he had a stroke. Has been told to have slurred speech by his friends but does not know when it began. Daughter noted speech to be slurred 4d ago. Of note, pt was leaning against weapons detector at triage when he bent over to retrieve something. Detector fell over, causing pt to fall onto knees. Denies head strike/ loc.    Stroke service consulted for     T(C): 36.6 (05-23-25 @ 21:53), Max: 36.8 (05-23-25 @ 13:42)  HR: 55 (05-23-25 @ 21:53) (55 - 80)  BP: 107/71 (05-23-25 @ 21:53) (95/67 - 114/66)  RR: 19 (05-23-25 @ 21:53) (18 - 19)  SpO2: 97% (05-23-25 @ 21:53) (97% - 100%)    PAST MEDICAL & SURGICAL HISTORY:  Essential hypertension  was on losartan, now diet control      Hypothyroidism      Gastric mass  ulcerated mass in gastric cardia with small perigastric nodes on endoscopy.      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      H/O umbilical hernia repair  2018 with mesh      Port-a-cath in place  right chest wall                 not in use 5 y      History of gastric surgery  2017      Gastrostomy tube in place  open jejunostomy with J-tube November 2020      S/P cataract surgery      H/O endoscopy  & stent placement 12/2020      S/P gastrectomy  total gastrectomy 2017      History of partial pancreatectomy  distal pancreatectomy/ splenectomy esophatojejunostomy      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   Smoking status:  Drinking:  Drug Use:     ROS: ***  Constitutional: No fever, weight loss or fatigue  Eyes: No eye pain, visual disturbances, or discharge  ENMT:  No difficulty hearing, tinnitus; No sinus or throat pain  Neck: No pain or stiffness  Respiratory: No cough, wheezing, chills or hemoptysis  Cardiovascular: No chest pain, palpitations, shortness of breath, or leg swelling  Gastrointestinal: No abdominal pain. No nausea, vomiting or hematemesis; No diarrhea or constipation. Nohematochezia.  Genitourinary: No dysuria, frequency, hematuria or incontinence  Neurological: As per HPI  Skin: No itching, burning, rashes or lesions   Endocrine: No heat or cold intolerance; No hair loss  Musculoskeletal: No joint pain or swelling; No muscle, back or extremity pain  Heme/Lymph: No easy bruising or bleeding gums    MEDICATIONS  (STANDING):  apixaban 5 milliGRAM(s) Oral every 12 hours  ceFAZolin   IVPB 1000 milliGRAM(s) IV Intermittent every 8 hours  dextrose 5%. 1000 milliLiter(s) (100 mL/Hr) IV Continuous <Continuous>  dextrose 5%. 1000 milliLiter(s) (50 mL/Hr) IV Continuous <Continuous>  dextrose 50% Injectable 25 Gram(s) IV Push once  dextrose 50% Injectable 12.5 Gram(s) IV Push once  dextrose 50% Injectable 25 Gram(s) IV Push once  glucagon  Injectable 1 milliGRAM(s) IntraMuscular once  insulin lispro (ADMELOG) corrective regimen sliding scale   SubCutaneous three times a day before meals    MEDICATIONS  (PRN):  dextrose Oral Gel 15 Gram(s) Oral once PRN Blood Glucose LESS THAN 70 milliGRAM(s)/deciliter  zolpidem 5 milliGRAM(s) Oral at bedtime PRN Insomnia    Allergies    No Known Allergies    Intolerances      Vital Signs Last 24 Hrs  T(C): 36.6 (23 May 2025 21:53), Max: 36.8 (23 May 2025 13:42)  T(F): 97.8 (23 May 2025 21:53), Max: 98.3 (23 May 2025 13:42)  HR: 55 (23 May 2025 21:53) (55 - 80)  BP: 107/71 (23 May 2025 21:53) (95/67 - 114/66)  BP(mean): --  RR: 19 (23 May 2025 21:53) (18 - 19)  SpO2: 97% (23 May 2025 21:53) (97% - 100%)    Parameters below as of 23 May 2025 17:02  Patient On (Oxygen Delivery Method): room air        Physical exam:  Constitutional: No acute distress, conversant  Eyes: Anicteric sclerae, moist conjunctivae, see below for CNs  Neck: trachea midline, FROM, supple, no thyromegaly or lymphadenopathy  Cardiovascular: Regular rate and rhythm, no murmurs, rubs, or gallops. No carotid bruits.   Pulmonary: Anterior breath sounds clear bilaterally, no crackles or wheezing. No use of accessory muscles  GI: Abdomen soft, non-distended, non-tender  Extremities: Radial and DP pulses +2, no edema    Neurologic:  -Mental status: Awake, alert, oriented to person, age, and month. Speech is fluent with intact naming, repetition, and comprehension, no dysarthria. Recent and remote memory intact. Follows commands. Attention/concentration intact. Fund of knowledge appropriate.  -Cranial nerves:   II: Visual fields are full to confrontation.  III, IV, VI: Extraocular movements are intact without nystagmus. Pupils equally round and reactive to light  V:  Facial sensation V1-V3 equal and intact   VII: Face is symmetric with normal eye closure and smile  VIII: Hearing is bilaterally intact to finger rub  IX, X: Uvula is midline and soft palate rises symmetrically  XI: Head turning and shoulder shrug are intact.  XII: Tongue protrudes midline  Motor: Normal bulk and tone. No pronator drift. Strength bilateral upper extremity 5/5, bilateral lower extremities 5/5.  Rapid alternating movements intact and symmetric  Sensation: Intact to light touch bilaterally. No neglect or extinction on double simultaneous testing.  Coordination: No dysmetria on finger-to-nose and heel-to-shin bilaterally  Reflexes: Downgoing toes bilaterally   Gait: Narrow gait and steady    NIHSS: **** ASPECT Score: ***** ICH Score: ****** (GCS)    Fingerstick Blood Glucose: CAPILLARY BLOOD GLUCOSE        LABS:                        10.6   5.23  )-----------( 142      ( 23 May 2025 13:55 )             29.7     05-23    137  |  99  |  23  ----------------------------<  116[H]  3.7   |  24  |  1.21    Ca    8.1[L]      23 May 2025 13:55    TPro  6.6  /  Alb  2.9[L]  /  TBili  1.7[H]  /  DBili  x   /  AST  See Note  /  ALT  16  /  AlkPhos  96  05-23    PT/INR - ( 23 May 2025 13:55 )   PT: 13.6 sec;   INR: 1.18          PTT - ( 23 May 2025 13:55 )  PTT:26.6 sec      Urinalysis Basic - ( 23 May 2025 13:55 )    Color: x / Appearance: x / SG: x / pH: x  Gluc: 116 mg/dL / Ketone: x  / Bili: x / Urobili: x   Blood: x / Protein: x / Nitrite: x   Leuk Esterase: x / RBC: x / WBC x   Sq Epi: x / Non Sq Epi: x / Bacteria: x        RADIOLOGY & ADDITIONAL STUDIES:      -----------------------------------------------------------------------------------------------------------------  IV-thrombolytic (Y/N):    ***                              Bolus time:    Tenecteplase Dose Verification w/ RN:  Reason thrombolytic not given: ***    **STROKE CONSULT NOTE**    HPI: 72M w/ PMH of gastric cancer (s/p feeding J-tube), portal vein thrombosis (on Eliquis) AAA (s/p EVAR in 2023), T2DM, hypothyroidism, HTN, anemia, BPH, type 2 endoleak repair, s/p embolization of internal iliac artery branch (10/24), cirrhosis, who presents to the ED with worsening bilateral leg swelling and gen weakness. Patient reports bilateral leg swelling for a couple of months, gradually worsening with the right leg more affected than the left. Swelling initially started in the feet and has now progressed up to the knees. Patient denies pain but reports leg weakness, difficulty walking, and getting onto examination table due to leg swelling. Cold sensation in feet attributed to post-chemotherapy effects, unchanged.  Patient noted to have discoloration of RLE at Dr. Richard's office. + Decreased appetite and oral intake in the past couple of weeks. No n/v. + frequent stools due to tube feeds, no blood. No fever, chills. Pt also endorses right facial droop x few months and is not sure if he had a stroke. Has been told to have slurred speech by his friends but does not know when it began. Daughter noted speech to be slurred 4d ago. Of note, pt was leaning against weapons detector at triage when he bent over to retrieve something. Detector fell over, causing pt to fall onto knees. Denies head strike/ loc.    Stroke service consulted for right facial droop and dysarthria noted to be present for a few months by patient's close family and friends. Facial droop not present on exam at time of consult.     T(C): 36.6 (05-23-25 @ 21:53), Max: 36.8 (05-23-25 @ 13:42)  HR: 55 (05-23-25 @ 21:53) (55 - 80)  BP: 107/71 (05-23-25 @ 21:53) (95/67 - 114/66)  RR: 19 (05-23-25 @ 21:53) (18 - 19)  SpO2: 97% (05-23-25 @ 21:53) (97% - 100%)    PAST MEDICAL & SURGICAL HISTORY:  Essential hypertension  was on losartan, now diet control      Hypothyroidism      Gastric mass  ulcerated mass in gastric cardia with small perigastric nodes on endoscopy.      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      H/O umbilical hernia repair  2018 with mesh      Port-a-cath in place  right chest wall                 not in use 5 y      History of gastric surgery  2017      Gastrostomy tube in place  open jejunostomy with J-tube November 2020      S/P cataract surgery      H/O endoscopy  & stent placement 12/2020      S/P gastrectomy  total gastrectomy 2017      History of partial pancreatectomy  distal pancreatectomy/ splenectomy esophatojejunostomy      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, gastric primary      History of dysphagia  EGD with [employer]xios stent placment to connect the blind limb to th e efferent limb (jejunojejunostomy)          FAMILY HISTORY:  Family history of ovarian cancer (Mother)  mother    Family history of ischemic heart disease (IHD) (Father)  father        SOCIAL HISTORY:   Patient lives at home w/ aide   Smoking status: NA  Drinking: NA  Drug Use: NA    ROS:   Constitutional: weight loss  Eyes: No eye pain, visual disturbances, or discharge  ENMT:  No difficulty hearing, tinnitus; No sinus or throat pain  Neck: No pain or stiffness  Respiratory: No cough, wheezing, chills or hemoptysis  Cardiovascular: b/l leg swelling, 3+ edema   Gastrointestinal: No abdominal pain. No nausea, vomiting or hematemesis; No diarrhea or constipation. Nohematochezia.  Genitourinary: No dysuria, frequency, hematuria or incontinence  Neurological: As per HPI  Skin: No itching, burning, rashes or lesions   Endocrine: No heat or cold intolerance; No hair loss  Musculoskeletal: lower extremity pain   Heme/Lymph: No easy bruising or bleeding gums    MEDICATIONS  (STANDING):  apixaban 5 milliGRAM(s) Oral every 12 hours  ceFAZolin   IVPB 1000 milliGRAM(s) IV Intermittent every 8 hours  dextrose 5%. 1000 milliLiter(s) (100 mL/Hr) IV Continuous <Continuous>  dextrose 5%. 1000 milliLiter(s) (50 mL/Hr) IV Continuous <Continuous>  dextrose 50% Injectable 25 Gram(s) IV Push once  dextrose 50% Injectable 12.5 Gram(s) IV Push once  dextrose 50% Injectable 25 Gram(s) IV Push once  glucagon  Injectable 1 milliGRAM(s) IntraMuscular once  insulin lispro (ADMELOG) corrective regimen sliding scale   SubCutaneous three times a day before meals    MEDICATIONS  (PRN):  dextrose Oral Gel 15 Gram(s) Oral once PRN Blood Glucose LESS THAN 70 milliGRAM(s)/deciliter  zolpidem 5 milliGRAM(s) Oral at bedtime PRN Insomnia    Allergies    No Known Allergies    Intolerances      Vital Signs Last 24 Hrs  T(C): 36.6 (23 May 2025 21:53), Max: 36.8 (23 May 2025 13:42)  T(F): 97.8 (23 May 2025 21:53), Max: 98.3 (23 May 2025 13:42)  HR: 55 (23 May 2025 21:53) (55 - 80)  BP: 107/71 (23 May 2025 21:53) (95/67 - 114/66)  BP(mean): --  RR: 19 (23 May 2025 21:53) (18 - 19)  SpO2: 97% (23 May 2025 21:53) (97% - 100%)    Parameters below as of 23 May 2025 17:02  Patient On (Oxygen Delivery Method): room air        Physical exam:  Constitutional: No acute distress, conversant  Eyes: Anicteric sclerae, moist conjunctivae, see below for CNs  Pulmonary: No use of accessory muscles  Extremities: pitting 3+ edema lower extrmeities     Neurologic:  -Mental status: Awake, alert, oriented to person, age, and month. Speech is fluent with intact naming, repetition, and comprehension, mild dysarthria. Recent and remote memory intact. Follows commands. Attention/concentration intact.  -Cranial nerves:   II: Visual fields are full to confrontation.  III, IV, VI: Extraocular movements are intact without nystagmus. Pupils equally round and reactive to light  V:  Facial sensation V1-V3 equal and intact   VII: Face is symmetric with normal eye closure and smile  VIII: Hearing is bilaterally intact to finger rub  XII: Tongue protrudes midline  Motor: Normal bulk and tone. No pronator drift. Strength bilateral upper extremity 5/5, bilateral lower extremities 4-/5. Bilateral lower extremities have pitting edema, pt is able to keep legs elevated, though only a few inches off the bed due to edema. Able to push down w/ moderate strength.   Sensation: Intact to light touch bilaterally. No neglect or extinction on double simultaneous testing.  Coordination: Pt had mild dysmetria in both extremities, appearing to having trouble concentrating on the task and missing provider's finger.   Gait: deferred due to edema    NIHSS: 0    Fingerstick Blood Glucose: CAPILLARY BLOOD GLUCOSE        LABS:                        10.6   5.23  )-----------( 142      ( 23 May 2025 13:55 )             29.7     05-23    137  |  99  |  23  ----------------------------<  116[H]  3.7   |  24  |  1.21    Ca    8.1[L]      23 May 2025 13:55    TPro  6.6  /  Alb  2.9[L]  /  TBili  1.7[H]  /  DBili  x   /  AST  See Note  /  ALT  16  /  AlkPhos  96  05-23    PT/INR - ( 23 May 2025 13:55 )   PT: 13.6 sec;   INR: 1.18          PTT - ( 23 May 2025 13:55 )  PTT:26.6 sec      Urinalysis Basic - ( 23 May 2025 13:55 )    Color: x / Appearance: x / SG: x / pH: x  Gluc: 116 mg/dL / Ketone: x  / Bili: x / Urobili: x   Blood: x / Protein: x / Nitrite: x   Leuk Esterase: x / RBC: x / WBC x   Sq Epi: x / Non Sq Epi: x / Bacteria: x        RADIOLOGY & ADDITIONAL STUDIES:    < from: CT Head No Cont (05.23.25 @ 17:26) >  IMPRESSION:    1.  Arachnoid cyst and senescent cerebral changes.  2.  No acute intracranial hemorrhage.    < end of copied text >    < from: CT Angio Head w/ IV Cont (05.23.25 @ 17:28) >  1.  Left C6 ICA stenosis, 40-50%.    < end of copied text >          **STROKE CONSULT NOTE**    HPI: 72M w/ PMH of gastric cancer (s/p feeding J-tube), portal vein thrombosis (on Eliquis) AAA (s/p EVAR in 2023), T2DM, hypothyroidism, HTN, anemia, BPH, type 2 endoleak repair, s/p embolization of internal iliac artery branch (10/24), cirrhosis, who presents to the ED with worsening bilateral leg swelling and gen weakness. Patient reports bilateral leg swelling for a couple of months, gradually worsening with the right leg more affected than the left. Swelling initially started in the feet and has now progressed up to the knees. Patient denies pain but reports leg weakness, difficulty walking, and getting onto examination table due to leg swelling. Cold sensation in feet attributed to post-chemotherapy effects, unchanged.  Patient noted to have discoloration of RLE at Dr. Richard's office. + Decreased appetite and oral intake in the past couple of weeks. No n/v. + frequent stools due to tube feeds, no blood. No fever, chills. Pt also endorses right facial droop x few months and is not sure if he had a stroke. Has been told to have slurred speech by his friends but does not know when it began. Daughter noted speech to be slurred 4d ago. Of note, pt was leaning against weapons detector at triage when he bent over to retrieve something. Detector fell over, causing pt to fall onto knees. Denies head strike/ loc.    Stroke service consulted for right facial droop and dysarthria noted to be present for a few months by patient's close family and friends. Facial droop not present on exam at time of consult.     T(C): 36.6 (05-23-25 @ 21:53), Max: 36.8 (05-23-25 @ 13:42)  HR: 55 (05-23-25 @ 21:53) (55 - 80)  BP: 107/71 (05-23-25 @ 21:53) (95/67 - 114/66)  RR: 19 (05-23-25 @ 21:53) (18 - 19)  SpO2: 97% (05-23-25 @ 21:53) (97% - 100%)    PAST MEDICAL & SURGICAL HISTORY:  Essential hypertension  was on losartan, now diet control      Hypothyroidism      Gastric mass  ulcerated mass in gastric cardia with small perigastric nodes on endoscopy.      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      H/O umbilical hernia repair  2018 with mesh      Port-a-cath in place  right chest wall                 not in use 5 y      History of gastric surgery  2017      Gastrostomy tube in place  open jejunostomy with J-tube November 2020      S/P cataract surgery      H/O endoscopy  & stent placement 12/2020      S/P gastrectomy  total gastrectomy 2017      History of partial pancreatectomy  distal pancreatectomy/ splenectomy esophatojejunostomy      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, gastric primary      History of dysphagia  EGD with [employer] stent placment to connect the blind limb to th e efferent limb (jejunojejunostomy)          FAMILY HISTORY:  Family history of ovarian cancer (Mother)  mother    Family history of ischemic heart disease (IHD) (Father)  father        SOCIAL HISTORY:   Patient lives at home w/ aide   Smoking status: NA  Drinking: NA  Drug Use: NA    ROS:   Constitutional: weight loss  Eyes: No eye pain, visual disturbances, or discharge  ENMT:  No difficulty hearing, tinnitus; No sinus or throat pain  Neck: No pain or stiffness  Respiratory: No cough, wheezing, chills or hemoptysis  Cardiovascular: b/l leg swelling, 3+ edema   Gastrointestinal: No abdominal pain. No nausea, vomiting or hematemesis; No diarrhea or constipation. Nohematochezia.  Genitourinary: No dysuria, frequency, hematuria or incontinence  Neurological: As per HPI  Skin: No itching, burning, rashes or lesions   Endocrine: No heat or cold intolerance; No hair loss  Musculoskeletal: lower extremity pain   Heme/Lymph: No easy bruising or bleeding gums    MEDICATIONS  (STANDING):  apixaban 5 milliGRAM(s) Oral every 12 hours  ceFAZolin   IVPB 1000 milliGRAM(s) IV Intermittent every 8 hours  dextrose 5%. 1000 milliLiter(s) (100 mL/Hr) IV Continuous <Continuous>  dextrose 5%. 1000 milliLiter(s) (50 mL/Hr) IV Continuous <Continuous>  dextrose 50% Injectable 25 Gram(s) IV Push once  dextrose 50% Injectable 12.5 Gram(s) IV Push once  dextrose 50% Injectable 25 Gram(s) IV Push once  glucagon  Injectable 1 milliGRAM(s) IntraMuscular once  insulin lispro (ADMELOG) corrective regimen sliding scale   SubCutaneous three times a day before meals    MEDICATIONS  (PRN):  dextrose Oral Gel 15 Gram(s) Oral once PRN Blood Glucose LESS THAN 70 milliGRAM(s)/deciliter  zolpidem 5 milliGRAM(s) Oral at bedtime PRN Insomnia    Allergies    No Known Allergies    Intolerances      Vital Signs Last 24 Hrs  T(C): 36.6 (23 May 2025 21:53), Max: 36.8 (23 May 2025 13:42)  T(F): 97.8 (23 May 2025 21:53), Max: 98.3 (23 May 2025 13:42)  HR: 55 (23 May 2025 21:53) (55 - 80)  BP: 107/71 (23 May 2025 21:53) (95/67 - 114/66)  BP(mean): --  RR: 19 (23 May 2025 21:53) (18 - 19)  SpO2: 97% (23 May 2025 21:53) (97% - 100%)    Parameters below as of 23 May 2025 17:02  Patient On (Oxygen Delivery Method): room air        Physical exam:  Constitutional: No acute distress, conversant  Eyes: Anicteric sclerae, moist conjunctivae, see below for CNs  Pulmonary: No use of accessory muscles  Extremities: pitting 3+ edema lower extremities     Neurologic:  -Mental status: Awake, alert, oriented to person, age, and month. Speech is fluent with intact naming, repetition, and comprehension, mild dysarthria. Recent and remote memory intact. Follows commands. Attention/concentration intact.  -Cranial nerves:   II: Visual fields are full to confrontation.  III, IV, VI: Extraocular movements are intact without nystagmus. Pupils equally round and reactive to light  V:  Facial sensation V1-V3 equal and intact   VII: Face is symmetric with normal eye closure and smile  VIII: Hearing is bilaterally intact to finger rub  XII: Tongue protrudes midline  Motor: Normal bulk and tone. No pronator drift. Strength bilateral upper extremity 5/5, bilateral lower extremities 4-/5. Bilateral lower extremities have pitting edema, pt is able to keep legs elevated, though only a few inches off the bed due to edema. Able to push down w/ moderate strength.   Sensation: Intact to light touch bilaterally. No neglect or extinction on double simultaneous testing.  Coordination: Pt had mild dysmetria in both extremities, appearing to having trouble concentrating on the task and missing provider's finger.   Gait: deferred due to edema    NIHSS: 1    Fingerstick Blood Glucose: CAPILLARY BLOOD GLUCOSE        LABS:                        10.6   5.23  )-----------( 142      ( 23 May 2025 13:55 )             29.7     05-23    137  |  99  |  23  ----------------------------<  116[H]  3.7   |  24  |  1.21    Ca    8.1[L]      23 May 2025 13:55    TPro  6.6  /  Alb  2.9[L]  /  TBili  1.7[H]  /  DBili  x   /  AST  See Note  /  ALT  16  /  AlkPhos  96  05-23    PT/INR - ( 23 May 2025 13:55 )   PT: 13.6 sec;   INR: 1.18          PTT - ( 23 May 2025 13:55 )  PTT:26.6 sec      Urinalysis Basic - ( 23 May 2025 13:55 )    Color: x / Appearance: x / SG: x / pH: x  Gluc: 116 mg/dL / Ketone: x  / Bili: x / Urobili: x   Blood: x / Protein: x / Nitrite: x   Leuk Esterase: x / RBC: x / WBC x   Sq Epi: x / Non Sq Epi: x / Bacteria: x        RADIOLOGY & ADDITIONAL STUDIES:    < from: CT Head No Cont (05.23.25 @ 17:26) >  IMPRESSION:    1.  Arachnoid cyst and senescent cerebral changes.  2.  No acute intracranial hemorrhage.    < end of copied text >    < from: CT Angio Head w/ IV Cont (05.23.25 @ 17:28) >  1.  Left C6 ICA stenosis, 40-50%.    < end of copied text >

## 2025-05-23 NOTE — H&P ADULT - PROBLEM SELECTOR PLAN 2
Dopplers b/l LE: No evidence of deep venous thrombosis in either lower extremity. Soft tissue swelling in both lower extremities.  Xray R foot and Tib/Fib: Osteopenia. No osseous destruction or periosteal reaction. No fracture or dislocation. There is a hallux valgus alignment with first MTP joint osteoarthrosis. Tibiotalar joint space narrowing is also identified. Second through fourth hammertoe deformities. Diffuse soft tissue swelling of the imaged lower extremity. Vascular calcification is present.  S/p Zosyn 3.375mg x1, Vancomycin 1000mg x1    - C/w  Zosyn 3.375mg  - C/w Vancomycin 1000mg   - F/u Blood cultures Dopplers b/l LE: No evidence of deep venous thrombosis in either lower extremity. Soft tissue swelling in both lower extremities.  Xray R foot and Tib/Fib: Osteopenia. No osseous destruction or periosteal reaction. No fracture or dislocation. There is a hallux valgus alignment with first MTP joint osteoarthrosis. Tibiotalar joint space narrowing is also identified. Second through fourth hammertoe deformities. Diffuse soft tissue swelling of the imaged lower extremity. Vascular calcification is present.  S/p Zosyn 3.375mg x1, Vancomycin 1000mg x1  On exam, RLE without purulence or drainage, chronic changes, erythematous   Afebrile, no WBC    - C/w  Cefazolin   - F/u Blood cultures  - Vascular consulted - f/u VA duplex in b/l LE RLE w/ erythema, warmth, no active purulence or drainage x 4 days  Dopplers b/l LE: No evidence of deep venous thrombosis in either lower extremity. Soft tissue swelling in both lower extremities.  Xray R foot and Tib/Fib: Osteopenia. No osseous destruction or periosteal reaction. No fracture or dislocation. There is a hallux valgus alignment with first MTP joint osteoarthrosis. Tibiotalar joint space narrowing is also identified. Second through fourth hammertoe deformities. Diffuse soft tissue swelling of the imaged lower extremity. Vascular calcification is present.  S/p Zosyn 3.375mg x1, Vancomycin 1000mg x1  Afebrile, no WBC    - C/w  Cefazolin 1g q8h (5/24 - )  - F/u Blood cultures  - Vascular consulted - f/u VA duplex in b/l LE

## 2025-05-23 NOTE — H&P ADULT - PROBLEM SELECTOR PLAN 4
s/p feeding J-tube  Heme/onc: Dr. Henry Recently diagnosed with liver cirrhosis (2025?), per chart note by Dr. Mckeon and review on [facility] -  etiology of his cirrhosis is unclear--no work-up was completed on [place] except bloodwork and Fibroscan, the latter uninterpretable due to jordy-hepatic ascites.   , INR 1.18, AST/ALT not quantified/16  CT A/P this year showed cirrhotic morphology, last year had normal findings  ED bedside U/S without pocket to TAP    - F/u abdominal U/S  - Hepatology consult   - Daily MELD score Edema of unknown etiology, +4 pitting in b/l LE extending from mid thigh to feet  Despite being on Furosemide and Spironolactone, persistent edema; Alb 2.9 not consistent w/ this level of edema  TTE in 10/24 w/ EF 65%  Possibly i/s/o liver cirrhosis    - F/u RUQ U/S  - F/u proBNP  - F/u TTE Edema of unknown etiology, +4 pitting in b/l LE extending from mid thigh to feet  Despite being on Furosemide and Spironolactone, persistent edema; Alb 2.9 not consistent w/ this level of edema  TTE in 10/24 w/ EF 65%  Possibly i/s/o liver cirrhosis vs chronic CHF     - F/u RUQ U/S  - F/u proBNP, TSH   - F/u TTE  - strict I/Os   - c/w RLE cellulitis management   - vascular consult

## 2025-05-23 NOTE — H&P ADULT - PROBLEM SELECTOR PLAN 8
Home medications: Levothyroxine 100 mcg/day  Endocrinologist: Dr. Mckeon, last saw 4/25    - C/w home medications Follows Dr. Mckeon with very well controlled diabetes, last saw this AM w/ positive levels.   Per HIE - regimen is NPH 18 units at night, Lispro 3 units while on tube feeds    - Start patient on low dose insulin sliding scale tonight   - q6h FS  - Once tube feeds resume, 18 Lantus/3 units lispro to be given 30 min before starting nocturnal feeds (possibly tomorrow)  - Endocrinology aware, will see patient tomorrow

## 2025-05-23 NOTE — H&P ADULT - PROBLEM SELECTOR PLAN 10
F:  E: Replete as needed Mg>2 and K>4)  N:  DVT:  GI:  Bowel:  Activity:  C:  Dispo: Hx of portal vein thrombosis and has since been on Eliquis 5mg BID Known hx of anemia, Hgb 10.6, baseline 9-10 per HIE.  In chart review has hx of B12 deficiency    - CTM CBC  - Keep active T/S  - Transfuse for Hgb <7

## 2025-05-23 NOTE — H&P ADULT - PROBLEM SELECTOR PLAN 12
F: none  E: Replete as needed Mg>2 and K>4)  N: Consistent Carb and Tube Feeds  DVT: Eliquis 5mg BID  GI: none  Bowel: miralax and senna prn  Activity: Bedrest  Dispo: F

## 2025-05-23 NOTE — ED PROVIDER NOTE - PHYSICAL EXAMINATION
VITAL SIGNS: I have reviewed nursing notes and confirm.  CONSTITUTIONAL: Well appearing, in no acute distress.   SKIN:  warm and dry, no acute rash.   HEAD:  normocephalic, atraumatic.  EYES: EOM intact; conjunctiva and sclera clear.  ENT: No nasal discharge; airway clear.   NECK: Supple.  CARD: S1, S2, Regular rate and rhythm.   RESP:  Clear to auscultation b/l, no wheezes, rales or rhonchi.  ABD: Normal bowel sounds; soft; non-distended; non-tender; no guarding/ rebound.  EXT: + pitting edema of b/l lower ext, r > l, with erythema to dorsal/lateral surface of r foot and rle. No warmth. No calf tenderness/ cords. Compartments soft. + dopplerable dp/pt pulses b/l. Normal rom.   NEURO: Alert, oriented x 3. + slight r facial droop, however pt has symmmetric smile. Remainder of CN intact. Motor/ sensation intact and equal b/l. Neg pronator drift. Speech slightly slurred at times. ,FTN, HTS, LEANN wnl.

## 2025-05-23 NOTE — CONSULT NOTE ADULT - ASSESSMENT
74y Male with PMH    CT head showed:  CTA/CTP with  Initial NIHSS:    1)Secondary stroke prevention  - start ASA 81mg PO daily and Plavix 75mg PO daily  - start Atorvastatin 80mg PO daily    2) Stroke risk factors  - A1C:   - LDL:   - atrial fibrillation  - HTN    3) Further management  - obtain MRI brain without  - recommend SBP goal <180  - recommend q4hr stroke neuro checks  - may need outpt neurology follow up  - provide stroke education    DVT prophylaxis   -Lovenox SQ and SCDs    Case discussed with  **** 72M w/ PMH of gastric cancer (s/p feeding J-tube), portal vein thrombosis (on Eliquis) AAA (s/p EVAR in 2023), T2DM, hypothyroidism, HTN, anemia, BPH, type 2 endoleak repair, s/p embolization of internal iliac artery branch (10/24), cirrhosis, who presents to the ED with worsening bilateral leg swelling and gen weakness. Pt also endorses right facial droop x few months and is not sure if he had a stroke. Has been told to have slurred speech by his friends but does not know when it began. Daughter noted speech to be slurred 4d ago. Of note, pt was leaning against weapons detector at triage when he bent over to retrieve something. Detector fell over, causing pt to fall onto knees. Denies head strike/ loc. Admitted to medicine for further evaluation.     Stroke service consulted for right facial droop and dysarthria noted to be present for a few months by patient's close family and friends. Facial droop not present on exam at time of consult.     CT head showed: Arachnoid cyst and senescent cerebral changes; No acute intracranial hemorrhage.  CTA/CTP with Left C6 ICA stenosis, 40-50%.  Initial NIHSS: 1    1)Secondary stroke prevention  - c/w home Eliquis 5mg BID, if primary team notes no contraindications     2) Stroke risk factors  - obtain A1C, LDL, TSH  - portal vein thrombosis   - HTN    3) Further management  - obtain MRI brain without  - recommend SBP goal <180  - recommend q4hr stroke neuro checks  - may need outpt neurology follow up  - provide stroke education    DVT prophylaxis   -Lovenox SQ and SCDs    Case discussed with Dr. Glover

## 2025-05-23 NOTE — H&P ADULT - PROBLEM SELECTOR PLAN 11
F: none  E: Replete as needed Mg>2 and K>4)  N:  DVT:  GI:  Bowel: miralax and senna prn  Activity: Bedrest  C:  Dispo: RMF Hx of portal vein thrombosis and has since been on Eliquis 5mg BID    - C/w Eliquis 5mg BID

## 2025-05-23 NOTE — ED ADULT NURSE NOTE - OBJECTIVE STATEMENT
Pt A&Ox3 with peg tube in place, intact, p/w b/l LE swelling x weeks and right foot swelling/redness. Stated he uses Gtube and PO feeding but no appetite for the past few days. Daughter at bedside.  Sepsis work up initiated.

## 2025-05-23 NOTE — ED PROVIDER NOTE - OBJECTIVE STATEMENT
Mr. Kirkland is a 72 year old man with PMH of gastric cancer (s/p feeding J-tube), portal vein thrombosis (on Eliquis) AAA (s/p EVAR in 2023), T2DM, hypothyroidism, HTN, anemia, BPH, type 2 endoleak repair, s/p embolization of internal iliac artery branch (10/24), cirrhosis, who presents to the ED with worsening bilateral leg swelling and gen weakness. Patient reports bilateral leg swelling for a couple of months, gradually worsening with the right leg more affected than the left. Swelling initially started in the feet and has now progressed up to the knees. Patient denies pain but reports leg weakness, difficulty walking, and getting onto examination table due to leg swelling. Cold sensation in feet attributed to post-chemotherapy effects, unchanged.  Patient noted to have discoloration of RLE at Dr. Richard's office. + Decreased appetite and oral intake in the past couple of weeks. No n/v. + frequent stools due to tube feeds, no blood. No fever, chills. Pt also endorses right facial droop x few months and is not sure if he had a stroke. Has been told to have slurred speech by his friends but does not know when it began. Daughter noted speech to be slurred 4d ago. Of note, pt was leaning against weapons detector at triage when he bent over to retrieve something. Detector fell over, causing pt to fall onto knees. Denies head strike/ loc.

## 2025-05-23 NOTE — ED ADULT NURSE NOTE - NSFALLRISKINTERV_ED_ALL_ED
Assistance OOB with selected safe patient handling equipment if applicable/Assistance with ambulation/Communicate fall risk and risk factors to all staff, patient, and family/Monitor gait and stability/Provide visual cue: yellow wristband, yellow gown, etc/Reinforce activity limits and safety measures with patient and family/Call bell, personal items and telephone in reach/Instruct patient to call for assistance before getting out of bed/chair/stretcher/Non-slip footwear applied when patient is off stretcher/[place] to call system/Physically safe environment - no spills, clutter or unnecessary equipment/Purposeful Proactive Rounding/Room/bathroom lighting operational, light cord in reach

## 2025-05-23 NOTE — H&P ADULT - PROBLEM SELECTOR PLAN 1
Weakness and leg pain x 4 days, presents with decreased ability to walk, likely i/s/o abdominal distension and significant swelling in b/l LE. Reports taking additional doses of Spironolactone because of edema.   Admission K of 3.7, VBG 2.5.   Suspected to be 2/2 to electrolyte abnormalities i/s/o diuretic use, fluid overload with immobility    - Replete K and f/u EKG - baseline EKG with PACs  - Hold diuretics i/s/o electrolytes abnormalities Weakness and leg pain x 4 days, presents with decreased ability to walk, likely i/s/o abdominal distension and significant swelling in b/l LE. Reports taking additional doses of Spironolactone because of edema.   Admission K of 3.7, VBG 2.5.   Suspected to be 2/2 to electrolyte abnormalities i/s/o diuretic use, fluid overload with immobility  Stroke workup negative per neuro, imaging findings without acute pathology    - Replete K and recheck, f/u EKG baseline EKG with PACs  - Hold diuretics i/s/o electrolytes abnormalities  - F/u B12, Folate, Thiamine  - Obtain orthostatics in AM Weakness, generalized and leg pain x 4 days, presents with decreased ability to walk, likely i/s/o abdominal distension and significant swelling in b/l LE. Reports taking additional doses of Spironolactone because of edema.   Admission K of 3.7, VBG 2.5.   Suspected to be 2/2  fluid overload with immobility, RLE cellulitis   Stroke workup negative per neuro, imaging findings without acute pathology. recommending MRI     - F/u B12, Folate, Thiamine, TSH/t4  - Obtain orthostatics in AM  - PT/SW  - fall precautions   - MRI brain per neuro recs

## 2025-05-23 NOTE — H&P ADULT - PROBLEM SELECTOR PLAN 9
Known hx of anemia, Hgb 10.6, baseline 9-10 per HIE.  In chart review has hx of B12 deficiency Home medications: Levothyroxine 100 mcg/day  Endocrinologist: Dr. Mckeon, last saw 4/25    - C/w home medications

## 2025-05-24 LAB
A1C WITH ESTIMATED AVERAGE GLUCOSE RESULT: 4.8 % — SIGNIFICANT CHANGE UP (ref 4–5.6)
ALBUMIN SERPL ELPH-MCNC: 2.5 G/DL — LOW (ref 3.3–5)
ALP SERPL-CCNC: 82 U/L — SIGNIFICANT CHANGE UP (ref 40–120)
ALT FLD-CCNC: SIGNIFICANT CHANGE UP U/L (ref 10–45)
ANION GAP SERPL CALC-SCNC: 9 MMOL/L — SIGNIFICANT CHANGE UP (ref 5–17)
APTT BLD: 27.2 SEC — SIGNIFICANT CHANGE UP (ref 26.1–36.8)
AST SERPL-CCNC: SIGNIFICANT CHANGE UP U/L (ref 10–40)
BASOPHILS # BLD AUTO: 0.06 K/UL — SIGNIFICANT CHANGE UP (ref 0–0.2)
BASOPHILS NFR BLD AUTO: 1.8 % — SIGNIFICANT CHANGE UP (ref 0–2)
BILIRUB SERPL-MCNC: 1.1 MG/DL — SIGNIFICANT CHANGE UP (ref 0.2–1.2)
BLD GP AB SCN SERPL QL: NEGATIVE — SIGNIFICANT CHANGE UP
BUN SERPL-MCNC: 17 MG/DL — SIGNIFICANT CHANGE UP (ref 7–23)
BUN SERPL-MCNC: 18 MG/DL — SIGNIFICANT CHANGE UP (ref 7–23)
BUN SERPL-MCNC: 19 MG/DL — SIGNIFICANT CHANGE UP (ref 7–23)
CALCIUM SERPL-MCNC: 7.8 MG/DL — LOW (ref 8.4–10.5)
CALCIUM SERPL-MCNC: 7.9 MG/DL — LOW (ref 8.4–10.5)
CALCIUM SERPL-MCNC: 8.2 MG/DL — LOW (ref 8.4–10.5)
CHLORIDE SERPL-SCNC: 101 MMOL/L — SIGNIFICANT CHANGE UP (ref 96–108)
CHLORIDE SERPL-SCNC: 102 MMOL/L — SIGNIFICANT CHANGE UP (ref 96–108)
CHLORIDE SERPL-SCNC: 103 MMOL/L — SIGNIFICANT CHANGE UP (ref 96–108)
CHOLEST SERPL-MCNC: 150 MG/DL — SIGNIFICANT CHANGE UP
CO2 SERPL-SCNC: 24 MMOL/L — SIGNIFICANT CHANGE UP (ref 22–31)
CO2 SERPL-SCNC: 26 MMOL/L — SIGNIFICANT CHANGE UP (ref 22–31)
CO2 SERPL-SCNC: 27 MMOL/L — SIGNIFICANT CHANGE UP (ref 22–31)
CREAT SERPL-MCNC: 0.89 MG/DL — SIGNIFICANT CHANGE UP (ref 0.5–1.3)
CREAT SERPL-MCNC: 1.01 MG/DL — SIGNIFICANT CHANGE UP (ref 0.5–1.3)
CREAT SERPL-MCNC: 1.07 MG/DL — SIGNIFICANT CHANGE UP (ref 0.5–1.3)
EGFR: 72 ML/MIN/1.73M2 — SIGNIFICANT CHANGE UP
EGFR: 72 ML/MIN/1.73M2 — SIGNIFICANT CHANGE UP
EGFR: 78 ML/MIN/1.73M2 — SIGNIFICANT CHANGE UP
EGFR: 78 ML/MIN/1.73M2 — SIGNIFICANT CHANGE UP
EGFR: 89 ML/MIN/1.73M2 — SIGNIFICANT CHANGE UP
EGFR: 89 ML/MIN/1.73M2 — SIGNIFICANT CHANGE UP
EOSINOPHIL # BLD AUTO: 0.15 K/UL — SIGNIFICANT CHANGE UP (ref 0–0.5)
EOSINOPHIL NFR BLD AUTO: 4.6 % — SIGNIFICANT CHANGE UP (ref 0–6)
ESTIMATED AVERAGE GLUCOSE: 91 MG/DL — SIGNIFICANT CHANGE UP (ref 68–114)
FOLATE SERPL-MCNC: 10.2 NG/ML — SIGNIFICANT CHANGE UP
GLUCOSE SERPL-MCNC: 163 MG/DL — HIGH (ref 70–99)
GLUCOSE SERPL-MCNC: 79 MG/DL — SIGNIFICANT CHANGE UP (ref 70–99)
GLUCOSE SERPL-MCNC: 79 MG/DL — SIGNIFICANT CHANGE UP (ref 70–99)
HCT VFR BLD CALC: 33.1 % — LOW (ref 39–50)
HDLC SERPL-MCNC: 63 MG/DL — SIGNIFICANT CHANGE UP
HGB BLD-MCNC: 11 G/DL — LOW (ref 13–17)
IMM GRANULOCYTES NFR BLD AUTO: 0.3 % — SIGNIFICANT CHANGE UP (ref 0–0.9)
INR BLD: 1.32 — HIGH (ref 0.85–1.16)
LDLC SERPL-MCNC: 73 MG/DL — SIGNIFICANT CHANGE UP
LIPID PNL WITH DIRECT LDL SERPL: 73 MG/DL — SIGNIFICANT CHANGE UP
LYMPHOCYTES # BLD AUTO: 0.54 K/UL — LOW (ref 1–3.3)
LYMPHOCYTES # BLD AUTO: 16.6 % — SIGNIFICANT CHANGE UP (ref 13–44)
MAGNESIUM SERPL-MCNC: 2 MG/DL — SIGNIFICANT CHANGE UP (ref 1.6–2.6)
MAGNESIUM SERPL-MCNC: 2.1 MG/DL — SIGNIFICANT CHANGE UP (ref 1.6–2.6)
MCHC RBC-ENTMCNC: 33.2 G/DL — SIGNIFICANT CHANGE UP (ref 32–36)
MCHC RBC-ENTMCNC: 34.8 PG — HIGH (ref 27–34)
MCV RBC AUTO: 104.7 FL — HIGH (ref 80–100)
MONOCYTES # BLD AUTO: 0.25 K/UL — SIGNIFICANT CHANGE UP (ref 0–0.9)
MONOCYTES NFR BLD AUTO: 7.7 % — SIGNIFICANT CHANGE UP (ref 2–14)
NEUTROPHILS # BLD AUTO: 2.25 K/UL — SIGNIFICANT CHANGE UP (ref 1.8–7.4)
NEUTROPHILS NFR BLD AUTO: 69 % — SIGNIFICANT CHANGE UP (ref 43–77)
NONHDLC SERPL-MCNC: 87 MG/DL — SIGNIFICANT CHANGE UP
NRBC BLD AUTO-RTO: 0 /100 WBCS — SIGNIFICANT CHANGE UP (ref 0–0)
PHOSPHATE SERPL-MCNC: 2 MG/DL — LOW (ref 2.5–4.5)
PHOSPHATE SERPL-MCNC: 3.5 MG/DL — SIGNIFICANT CHANGE UP (ref 2.5–4.5)
PLATELET # BLD AUTO: 131 K/UL — LOW (ref 150–400)
POTASSIUM SERPL-MCNC: 3.3 MMOL/L — LOW (ref 3.5–5.3)
POTASSIUM SERPL-MCNC: 4.3 MMOL/L — SIGNIFICANT CHANGE UP (ref 3.5–5.3)
POTASSIUM SERPL-MCNC: SIGNIFICANT CHANGE UP MMOL/L (ref 3.5–5.3)
POTASSIUM SERPL-SCNC: 3.3 MMOL/L — LOW (ref 3.5–5.3)
POTASSIUM SERPL-SCNC: 4.3 MMOL/L — SIGNIFICANT CHANGE UP (ref 3.5–5.3)
POTASSIUM SERPL-SCNC: SIGNIFICANT CHANGE UP MMOL/L (ref 3.5–5.3)
PROT SERPL-MCNC: 6.3 G/DL — SIGNIFICANT CHANGE UP (ref 6–8.3)
PROTHROM AB SERPL-ACNC: 15.1 SEC — HIGH (ref 9.9–13.4)
RBC # BLD: 3.16 M/UL — LOW (ref 4.2–5.8)
RBC # FLD: 16 % — HIGH (ref 10.3–14.5)
RH IG SCN BLD-IMP: NEGATIVE — SIGNIFICANT CHANGE UP
SODIUM SERPL-SCNC: 135 MMOL/L — SIGNIFICANT CHANGE UP (ref 135–145)
SODIUM SERPL-SCNC: 137 MMOL/L — SIGNIFICANT CHANGE UP (ref 135–145)
SODIUM SERPL-SCNC: 138 MMOL/L — SIGNIFICANT CHANGE UP (ref 135–145)
TRIGL SERPL-MCNC: 73 MG/DL — SIGNIFICANT CHANGE UP
TSH SERPL-MCNC: 2.08 UIU/ML — SIGNIFICANT CHANGE UP (ref 0.27–4.2)
VIT B12 SERPL-MCNC: 455 PG/ML — SIGNIFICANT CHANGE UP (ref 232–1245)
WBC # BLD: 3.26 K/UL — LOW (ref 3.8–10.5)
WBC # FLD AUTO: 3.26 K/UL — LOW (ref 3.8–10.5)

## 2025-05-24 PROCEDURE — 99222 1ST HOSP IP/OBS MODERATE 55: CPT

## 2025-05-24 PROCEDURE — 99233 SBSQ HOSP IP/OBS HIGH 50: CPT

## 2025-05-24 RX ORDER — POLYETHYLENE GLYCOL 3350 17 G/17G
17 POWDER, FOR SOLUTION ORAL DAILY
Refills: 0 | Status: DISCONTINUED | OUTPATIENT
Start: 2025-05-24 | End: 2025-06-04

## 2025-05-24 RX ORDER — POTASSIUM PHOSPHATE, MONOBASIC POTASSIUM PHOSPHATE, DIBASIC INJECTION, 236; 224 MG/ML; MG/ML
30 SOLUTION, CONCENTRATE INTRAVENOUS ONCE
Refills: 0 | Status: COMPLETED | OUTPATIENT
Start: 2025-05-24 | End: 2025-05-24

## 2025-05-24 RX ORDER — SENNA 187 MG
2 TABLET ORAL AT BEDTIME
Refills: 0 | Status: DISCONTINUED | OUTPATIENT
Start: 2025-05-24 | End: 2025-06-04

## 2025-05-24 RX ORDER — SPIRONOLACTONE 25 MG
25 TABLET ORAL EVERY 24 HOURS
Refills: 0 | Status: DISCONTINUED | OUTPATIENT
Start: 2025-05-24 | End: 2025-06-04

## 2025-05-24 RX ORDER — FUROSEMIDE 10 MG/ML
40 INJECTION INTRAMUSCULAR; INTRAVENOUS EVERY 24 HOURS
Refills: 0 | Status: DISCONTINUED | OUTPATIENT
Start: 2025-05-24 | End: 2025-05-25

## 2025-05-24 RX ADMIN — FUROSEMIDE 40 MILLIGRAM(S): 10 INJECTION INTRAMUSCULAR; INTRAVENOUS at 13:14

## 2025-05-24 RX ADMIN — Medication 100 MILLIGRAM(S): at 05:14

## 2025-05-24 RX ADMIN — Medication 100 MILLIGRAM(S): at 15:54

## 2025-05-24 RX ADMIN — APIXABAN 5 MILLIGRAM(S): 2.5 TABLET, FILM COATED ORAL at 21:55

## 2025-05-24 RX ADMIN — APIXABAN 5 MILLIGRAM(S): 2.5 TABLET, FILM COATED ORAL at 11:10

## 2025-05-24 RX ADMIN — Medication 5 MILLIGRAM(S): at 21:55

## 2025-05-24 RX ADMIN — Medication 100 MILLIGRAM(S): at 21:54

## 2025-05-24 RX ADMIN — Medication 25 MILLIGRAM(S): at 17:42

## 2025-05-24 RX ADMIN — POTASSIUM PHOSPHATE, MONOBASIC POTASSIUM PHOSPHATE, DIBASIC INJECTION, 83.33 MILLIMOLE(S): 236; 224 SOLUTION, CONCENTRATE INTRAVENOUS at 03:34

## 2025-05-24 NOTE — PROGRESS NOTE ADULT - SUBJECTIVE AND OBJECTIVE BOX
OVERNIGHT EVENTS:    SUBJECTIVE / INTERVAL HPI: Patient seen and examined at bedside.     VITAL SIGNS:  Vital Signs Last 24 Hrs  T(C): 36.2 (24 May 2025 12:36), Max: 36.7 (23 May 2025 17:02)  T(F): 97.2 (24 May 2025 12:36), Max: 98 (23 May 2025 17:02)  HR: 60 (24 May 2025 12:36) (52 - 60)  BP: 111/74 (24 May 2025 12:36) (98/63 - 114/66)  BP(mean): 72 (24 May 2025 06:20) (72 - 72)  RR: 18 (24 May 2025 12:36) (18 - 19)  SpO2: 96% (24 May 2025 12:36) (96% - 100%)    Parameters below as of 24 May 2025 12:36  Patient On (Oxygen Delivery Method): room air      I&O's Summary      PHYSICAL EXAM:  General: WDWN  HEENT: NC/AT; PERRL, anicteric sclera; MMM  Neck: supple  Cardiovascular: +S1/S2; RRR  Respiratory: CTA B/L; no W/R/R  Gastrointestinal: soft, NT/ND; +BSx4  Extremities: WWP; no edema, clubbing or cyanosis  Vascular: 2+ radial, DP/PT pulses B/L  Neurological: AAOx3; no focal deficits    MEDICATIONS:  MEDICATIONS  (STANDING):  apixaban 5 milliGRAM(s) Oral every 12 hours  ceFAZolin   IVPB 1000 milliGRAM(s) IV Intermittent every 8 hours  dextrose 5%. 1000 milliLiter(s) (100 mL/Hr) IV Continuous <Continuous>  dextrose 5%. 1000 milliLiter(s) (50 mL/Hr) IV Continuous <Continuous>  dextrose 50% Injectable 25 Gram(s) IV Push once  dextrose 50% Injectable 12.5 Gram(s) IV Push once  dextrose 50% Injectable 25 Gram(s) IV Push once  furosemide    Tablet 40 milliGRAM(s) Oral every 24 hours  glucagon  Injectable 1 milliGRAM(s) IntraMuscular once  insulin lispro (ADMELOG) corrective regimen sliding scale   SubCutaneous three times a day before meals  levothyroxine 100 MICROGram(s) Oral daily  senna 2 Tablet(s) Oral at bedtime  spironolactone 25 milliGRAM(s) Oral every 24 hours    MEDICATIONS  (PRN):  dextrose Oral Gel 15 Gram(s) Oral once PRN Blood Glucose LESS THAN 70 milliGRAM(s)/deciliter  polyethylene glycol 3350 17 Gram(s) Oral daily PRN Constipation  zolpidem 5 milliGRAM(s) Oral at bedtime PRN Insomnia      ALLERGIES:  Allergies    No Known Allergies    Intolerances        LABS:                        11.0   3.26  )-----------( 131      ( 24 May 2025 05:30 )             33.1     05-24    137  |  101  |  17  ----------------------------<  163[H]  4.3   |  27  |  0.89    Ca    8.2[L]      24 May 2025 12:18  Phos  3.5     05-24  Mg     2.1     05-24    TPro  6.3  /  Alb  2.5[L]  /  TBili  1.1  /  DBili  x   /  AST  see note  /  ALT  see note  /  AlkPhos  82  05-24    PT/INR - ( 24 May 2025 05:30 )   PT: 15.1 sec;   INR: 1.32          PTT - ( 24 May 2025 05:30 )  PTT:27.2 sec  Urinalysis Basic - ( 24 May 2025 12:18 )    Color: x / Appearance: x / SG: x / pH: x  Gluc: 163 mg/dL / Ketone: x  / Bili: x / Urobili: x   Blood: x / Protein: x / Nitrite: x   Leuk Esterase: x / RBC: x / WBC x   Sq Epi: x / Non Sq Epi: x / Bacteria: x      CAPILLARY BLOOD GLUCOSE      POCT Blood Glucose.: 132 mg/dL (24 May 2025 12:09)      RADIOLOGY & ADDITIONAL TESTS: Reviewed.   OVERNIGHT EVENTS: no overnight events    SUBJECTIVE / INTERVAL HPI: Patient seen and examined at bedside. Feels well with no complaints at this time including chest pain, SOB, f/c. States leg pain is well controlled. Denies any other symptoms at this time.     VITAL SIGNS:  Vital Signs Last 24 Hrs  T(C): 36.2 (24 May 2025 12:36), Max: 36.7 (23 May 2025 17:02)  T(F): 97.2 (24 May 2025 12:36), Max: 98 (23 May 2025 17:02)  HR: 60 (24 May 2025 12:36) (52 - 60)  BP: 111/74 (24 May 2025 12:36) (98/63 - 114/66)  BP(mean): 72 (24 May 2025 06:20) (72 - 72)  RR: 18 (24 May 2025 12:36) (18 - 19)  SpO2: 96% (24 May 2025 12:36) (96% - 100%)    Parameters below as of 24 May 2025 12:36  Patient On (Oxygen Delivery Method): room air      I&O's Summary      PHYSICAL EXAM:  General: in no acute distress  Eyes: EOMI intact bilaterally. Anicteric sclerae, moist conjunctivae  HENT: dry mucous membranes  Neck: Trachea midline, supple. No cervical lymphadenopathy in anterior or posterior chain.  Lungs: CTA B/L. No wheezes, rales, or rhonchi  Cardiovascular: RRR. No murmurs, rubs, or gallops  Abdomen: +BS, soft, non-tender, slight distension with hernias, J tube in place with drainage, scar near umbilicus, no active purulence or drainage; No rebound or guarding  Extremities: WWP, +4 pitting edema in bilateral lower extremities from midthigh until feet. RLE is erythematous and warm to touch, no active purulence or drainage, LLE is pale, chronic leg changes bilaterally. Palpable pulses but very difficult given extensive edema.  Neurological: AOx3 to person, place, time. Cranial nerves II-XI intact, motor 5/5 in b/l UE and LE, sensory intact in b/l UE and LE.  Skin: Warm and dry. No obvious rash    MEDICATIONS:  MEDICATIONS  (STANDING):  apixaban 5 milliGRAM(s) Oral every 12 hours  ceFAZolin   IVPB 1000 milliGRAM(s) IV Intermittent every 8 hours  dextrose 5%. 1000 milliLiter(s) (100 mL/Hr) IV Continuous <Continuous>  dextrose 5%. 1000 milliLiter(s) (50 mL/Hr) IV Continuous <Continuous>  dextrose 50% Injectable 25 Gram(s) IV Push once  dextrose 50% Injectable 12.5 Gram(s) IV Push once  dextrose 50% Injectable 25 Gram(s) IV Push once  furosemide    Tablet 40 milliGRAM(s) Oral every 24 hours  glucagon  Injectable 1 milliGRAM(s) IntraMuscular once  insulin lispro (ADMELOG) corrective regimen sliding scale   SubCutaneous three times a day before meals  levothyroxine 100 MICROGram(s) Oral daily  senna 2 Tablet(s) Oral at bedtime  spironolactone 25 milliGRAM(s) Oral every 24 hours    MEDICATIONS  (PRN):  dextrose Oral Gel 15 Gram(s) Oral once PRN Blood Glucose LESS THAN 70 milliGRAM(s)/deciliter  polyethylene glycol 3350 17 Gram(s) Oral daily PRN Constipation  zolpidem 5 milliGRAM(s) Oral at bedtime PRN Insomnia      ALLERGIES:  Allergies    No Known Allergies    Intolerances        LABS:                        11.0   3.26  )-----------( 131      ( 24 May 2025 05:30 )             33.1     05-24    137  |  101  |  17  ----------------------------<  163[H]  4.3   |  27  |  0.89    Ca    8.2[L]      24 May 2025 12:18  Phos  3.5     05-24  Mg     2.1     05-24    TPro  6.3  /  Alb  2.5[L]  /  TBili  1.1  /  DBili  x   /  AST  see note  /  ALT  see note  /  AlkPhos  82  05-24    PT/INR - ( 24 May 2025 05:30 )   PT: 15.1 sec;   INR: 1.32          PTT - ( 24 May 2025 05:30 )  PTT:27.2 sec  Urinalysis Basic - ( 24 May 2025 12:18 )    Color: x / Appearance: x / SG: x / pH: x  Gluc: 163 mg/dL / Ketone: x  / Bili: x / Urobili: x   Blood: x / Protein: x / Nitrite: x   Leuk Esterase: x / RBC: x / WBC x   Sq Epi: x / Non Sq Epi: x / Bacteria: x      CAPILLARY BLOOD GLUCOSE      POCT Blood Glucose.: 132 mg/dL (24 May 2025 12:09)      RADIOLOGY & ADDITIONAL TESTS: Reviewed.

## 2025-05-24 NOTE — DIETITIAN INITIAL EVALUATION ADULT - PROBLEM SELECTOR PLAN 9
Home medications: Levothyroxine 100 mcg/day  Endocrinologist: Dr. Mckeon, last saw 4/25    - C/w home medications

## 2025-05-24 NOTE — DIETITIAN INITIAL EVALUATION ADULT - PROBLEM SELECTOR PLAN 6
Hx of gastric cancer treated with chemotherapy 5 years ago now w/ feeding J-tube, previously followed Dr. Saravanan Cabello and Dr. Chema Richmond. Currently followed Heme/onc: Dr. Henry.    - No acute intervention, continue outpatient management  - C/w tube feeds- resume tomorrow night and give insulin accordingly (as in diabetes below)

## 2025-05-24 NOTE — DIETITIAN INITIAL EVALUATION ADULT - PROBLEM SELECTOR PLAN 2
RLE w/ erythema, warmth, no active purulence or drainage x 4 days  Dopplers b/l LE: No evidence of deep venous thrombosis in either lower extremity. Soft tissue swelling in both lower extremities.  Xray R foot and Tib/Fib: Osteopenia. No osseous destruction or periosteal reaction. No fracture or dislocation. There is a hallux valgus alignment with first MTP joint osteoarthrosis. Tibiotalar joint space narrowing is also identified. Second through fourth hammertoe deformities. Diffuse soft tissue swelling of the imaged lower extremity. Vascular calcification is present.  S/p Zosyn 3.375mg x1, Vancomycin 1000mg x1  Afebrile, no WBC    - C/w  Cefazolin 1g q8h (5/24 - )  - F/u Blood cultures  - Vascular consulted - f/u VA duplex in b/l LE

## 2025-05-24 NOTE — DIETITIAN NUTRITION RISK NOTIFICATION - ADDITIONAL COMMENTS/DIETITIAN RECOMMENDATIONS
Overt signs of severe muscle and fat wasting identified. Per ASPEN guidelines, pt meets criteria for severe malnutrition.

## 2025-05-24 NOTE — DIETITIAN INITIAL EVALUATION ADULT - PROBLEM SELECTOR PLAN 4
Edema of unknown etiology, +4 pitting in b/l LE extending from mid thigh to feet  Despite being on Furosemide and Spironolactone, persistent edema; Alb 2.9 not consistent w/ this level of edema  TTE in 10/24 w/ EF 65%  Possibly i/s/o liver cirrhosis vs chronic CHF     - F/u RUQ U/S  - F/u proBNP, TSH   - F/u TTE  - strict I/Os   - c/w RLE cellulitis management   - vascular consult

## 2025-05-24 NOTE — DIETITIAN INITIAL EVALUATION ADULT - PROBLEM SELECTOR PROBLEM 7
Have Your Skin Lesions Been Treated?: not been treated Is This A New Presentation, Or A Follow-Up?: Skin Lesions Essential hypertension

## 2025-05-24 NOTE — PROGRESS NOTE ADULT - PROBLEM SELECTOR PLAN 8
Follows Dr. Mckeon with very well controlled diabetes, last saw this AM w/ positive levels.   Per HIE - regimen is NPH 18 units at night, Lispro 3 units while on tube feeds  - Start patient on low dose insulin sliding scale tonight   - q6h FS  - Once tube feeds resume, started lantus per endocrine recs

## 2025-05-24 NOTE — DIETITIAN INITIAL EVALUATION ADULT - PERTINENT LABORATORY DATA
05-24    135  |  102  |  18  ----------------------------<  79  see note   |  24  |  1.01    Ca    7.8[L]      24 May 2025 05:30  Phos  3.5     05-24  Mg     2.1     05-24    TPro  6.3  /  Alb  2.5[L]  /  TBili  1.1  /  DBili  x   /  AST  see note  /  ALT  see note  /  AlkPhos  82  05-24  POCT Blood Glucose.: 133 mg/dL (05-24-25 @ 11:05)  A1C with Estimated Average Glucose Result: 4.8 % (05-24-25 @ 05:30)  A1C with Estimated Average Glucose Result: 5.4 % (10-18-24 @ 07:30)

## 2025-05-24 NOTE — PROGRESS NOTE ADULT - PROBLEM SELECTOR PLAN 3
Per patient has had slurred speech x several days, no precipitating factor; since resolved  CTH: Arachnoid cyst and senescent cerebral changes. No acute intracranial hemorrhage.  CT Angio H: Left C6 ICA stenosis, 40-50%.  Neurology consulted in ED, no acute concern for stroke, will continue to follow  - Neurology consulted, f/u recs  - pending MR brain

## 2025-05-24 NOTE — PROVIDER CONTACT NOTE (HYPOGLYCEMIA EVENT) - NS PROVIDER CONTACT RECOMMEND-HYPO
Patient eating breakfast now but says he needs to eat slow and complaining everything is sugar free on tray; apple sauce given with juice and will re-check third FS once patient eats

## 2025-05-24 NOTE — DIETITIAN INITIAL EVALUATION ADULT - PROBLEM SELECTOR PLAN 5
Recently diagnosed with liver cirrhosis (2025?), per chart note by Dr. Mckeon and review on [facility] -  etiology of his cirrhosis is unclear--no work-up was completed on [place] except bloodwork and Fibroscan, the latter uninterpretable due to jordy-hepatic ascites.   , INR 1.18, AST/ALT not quantified/16  CT A/P this year showed cirrhotic morphology, last year had normal findings  ED bedside U/S without pocket to TAP per signout    - F/u abdominal U/S  - Hepatology consult in AM  - Daily MELD score  - Diuretics pending improvement in BMP

## 2025-05-24 NOTE — PROVIDER CONTACT NOTE (HYPOGLYCEMIA EVENT) - NS PROVIDER CONTACT BACKGROUND-HYPO
Age: 74y    Gender: Male    POCT Blood Glucose:  66 mg/dL (05-24-25 @ 09:29)  69 mg/dL (05-24-25 @ 08:24)      eMAR:

## 2025-05-24 NOTE — PROGRESS NOTE ADULT - PROBLEM SELECTOR PLAN 4
Edema of unknown etiology, +4 pitting in b/l LE extending from mid thigh to feet  Despite being on Furosemide and Spironolactone, persistent edema; Alb 2.9 not consistent w/ this level of edema  TTE in 10/24 w/ EF 65%  Possibly i/s/o liver cirrhosis vs chronic CHF   - F/u proBNP, TSH   - F/u TTE  - strict I/Os   - c/w RLE cellulitis management   - vascular consult  - c/w lasix and spironolactone

## 2025-05-24 NOTE — DIETITIAN INITIAL EVALUATION ADULT - PROBLEM SELECTOR PLAN 7
Hx of HTN, was taking Losartan 50mg but has since discontinued. Normotensive.    - No acute intervention, continue to monitor

## 2025-05-24 NOTE — PROGRESS NOTE ADULT - PROBLEM SELECTOR PLAN 10
Known hx of anemia, Hgb 10.6, baseline 9-10 per HIE.  In chart review has hx of B12 deficiency  - CTM CBC  - Keep active T/S  - Transfuse for Hgb <7

## 2025-05-24 NOTE — DIETITIAN INITIAL EVALUATION ADULT - PROBLEM SELECTOR PLAN 3
Per patient has had slurred speech x several days, no precipitating factor; since resolved  CTH: Arachnoid cyst and senescent cerebral changes. No acute intracranial hemorrhage.  CT Angio H: Left C6 ICA stenosis, 40-50%.  Neurology consulted in ED, no acute concern for stroke, will continue to follow    - Neurology consulted, f/u recs

## 2025-05-24 NOTE — DIETITIAN INITIAL EVALUATION ADULT - OTHER INFO
72M with PMH of gastric cancer (status post feeding J-tube), portal vein thrombosis (on Eliquis) AAA (status post EVAR in 2023), T2DM, hypothyroidism, HTN, anemia, BPH, type 2 endoleak repair, status post embolization of internal iliac artery branch (10/24), and cirrhosis who presents to the ED with worsening bilateral leg swelling and gen weakness, admitted for further evaluation.    Pt seen on 7WO for assessment. Labs and medication orders reviewed. Ordered for lasix, PO synthroid, spironolactone. On Consistent Carbohydrate diet. Pt resting in bed on visit this AM. Reports 72M with PMH of gastric cancer (status post feeding J-tube), portal vein thrombosis (on Eliquis) AAA (status post EVAR in 2023), T2DM, hypothyroidism, HTN, anemia, BPH, type 2 endoleak repair, status post embolization of internal iliac artery branch (10/24), and cirrhosis who presents to the ED with worsening bilateral leg swelling and gen weakness, admitted for further evaluation. Stroke service consulted for right facial droop and dysarthria noted to be present for a few months by patient's close family and friends, CTH neg for stroke, incidental finding of arachnoid cyst, CTA with L C6 ICA 40-50% stenosis, pending MR brain.      Pt seen on 7WO for assessment. Labs and medication orders reviewed. Electrolytes WNL, BUN/Cr WNL, POC blood glucose (5/23-5/24) , HgbA1c 4.8% (5/24). Ordered for lasix, PO synthroid, spironolactone. On Consistent Carbohydrate diet. Pt resting in bed on visit this AM. Reports chronically fair/variable intake worsened over the past month due to anorexia and diarrhea. Notes appetite improved this AM, RD observed pt completed >70% eggs, toast fruit, and applesauce. Reports no difficulty chewing/swallowing. Endorses use of nightly tube feed via J-tube: [employer] Standard 1.4 x4-5 cans over x6-8hr *?accuracy volume vs time frame as would indicate extremely high feeding rate*; administers insulin prior to initiating feed. Reports good tolerance of tube feed regimen >1 year. Pt states UBW ~147 pounds x3 months ago, endorses wt loss; admission wt 140 pounds / 63.5 kilograms indicates 7 pound / 5% wt loss - not clinically significant. Overt signs of severe muscle and fat wasting identified. Per ASPEN guidelines, pt meets criteria for severe malnutrition - see nutrition risk notification. Pt denies nausea/vomiting and abdominal discomfort. No pain reported. Pt confirms no known food allergies or intolerances. No Taoist/ethnic/cultural food preferences noted. Stage I sacral pressure injury noted, right knee skin tear and abdominal old surgical incision documented, 3+ bilateral foot edema noted, Marcus score 18. RD reviewed formulary and fortified food options, pt notes preference for whole foods vs liquids, receptive to fortified mashed potatoes and Gelatein. See nutrition recommendations - RD communicated to team. RD to remain available.

## 2025-05-24 NOTE — CONSULT NOTE ADULT - SUBJECTIVE AND OBJECTIVE BOX
GASTROENTEROLOGY CONSULT NOTE  HPI:  HPI: 72M w/ PMH of gastric cancer (s/p feeding J-tube), portal vein thrombosis (on Eliquis) AAA (s/p EVAR in 2023), T2DM, hypothyroidism, HTN, anemia, BPH, type 2 endoleak repair, s/p embolization of internal iliac artery branch (10/24), cirrhosis, who presents to the ED with worsening bilateral leg swelling and gen weakness. Patient reports bilateral leg swelling for a couple of weeks, worsening in severity for the past 4 days. He was in Dr. Richards's office and was told to go to the emergency room because of swelling and discoloration of his legs. He is on furosemide and spironolactone and doubled his spironolactone dose in order to help with the swelling. He also reports erythema of the RLE x 4 days, no injury or falls. Additionally, he and his daughter report slurred speech for the past 4 days. He reports no fevers, chills, headache, chest pain, shortness of breath, abdominal pain, nausea, vomiting, diarrhea, constipation.     Medications: Eliquis 5mg BID, Spironolactone 25mg qd, Furosemide 40mg qd, Levothyroxine 100mg qd, NPH 18, Lispro 3, Ambien 5mg qd    In the ED,  VS: T 98-98.3, HR 58-80, BP /66-67, RR 18, SpO2 % RA  Labs: H/H 10.6/29.7, PT 13.6, INR 1.18, lactate 2.4 ->1.1, Bili 1.7   VBG: pH 7.43, HCO3 21  Limited RVP negative  CTH: Arachnoid cyst and senescent cerebral changes. No acute intracranial hemorrhage.  CT Angio H: Left C6 ICA stenosis, 40-50%.  Dopplers b/l LE: No evidence of deep venous thrombosis in either lower extremity. Soft tissue swelling in both lower extremities.  CXray: Heart size, mediastinal and hilar contours are unchanged and within normal limits. Surgical clips are again noted in the left supraclavicular/left apical region as well as in the LUQ The lung zones are clear. There is a trace left sided pneumothorax. No pnemothorax seen.  Xray R foot and Tib/Fib: Osteopenia. No osseous destruction or periosteal reaction. No fracture or dislocation. There is a hallux valgus alignment with first MTP joint osteoarthrosis. Tibiotalar joint space narrowing is also identified. Second through fourth hammertoe deformities. Diffuse soft tissue swelling of the imaged lower extremity. Vascular calcification is present.  EKG: PACs  Orders: Zosyn 3.375mg x1, Vancomycin 1000mg x1, NaCl 500 cc bolus x1  Consults: Neurology (23 May 2025 18:32)    Allergies    No Known Allergies    Intolerances      Home Medications:  Eliquis 5 mg oral tablet: 1 tab(s) orally every 12 hours (23 May 2025 22:45)  furosemide 40 mg oral tablet: 1 tab(s) orally once a day (23 May 2025 22:45)  Insulin Glargine: 18 unit(s) once a day (at bedtime) given 30 min before starting nocturnal feeds (23 May 2025 22:52)  Insulin Lispro: 3 unit(s) once a day (at bedtime) TO be given 30 minutes before feeds (23 May 2025 22:50)  Insulin Lispro: 3 unit(s) once a day (at bedtime) given 30 min before starting nocturnal feeds (23 May 2025 22:52)  levothyroxine 100 mcg (0.1 mg) oral tablet: 1 tab(s) orally once a day (23 May 2025 22:47)  spironolactone 25 mg oral tablet: 1 tab(s) orally once a day (23 May 2025 22:45)  zolpidem 5 mg oral tablet: 1 tab(s) orally once a day (at bedtime) (23 May 2025 22:47)    MEDICATIONS:  MEDICATIONS  (STANDING):  apixaban 5 milliGRAM(s) Oral every 12 hours  ceFAZolin   IVPB 1000 milliGRAM(s) IV Intermittent every 8 hours  furosemide    Tablet 40 milliGRAM(s) Oral every 24 hours  levothyroxine 100 MICROGram(s) Oral daily  senna 2 Tablet(s) Oral at bedtime  spironolactone 25 milliGRAM(s) Oral every 24 hours    MEDICATIONS  (PRN):  polyethylene glycol 3350 17 Gram(s) Oral daily PRN Constipation  zolpidem 5 milliGRAM(s) Oral at bedtime PRN Insomnia    PAST MEDICAL & SURGICAL HISTORY:  Essential hypertension  was on losartan, now diet control  Hypothyroidism  Gastric mass  ulcerated mass in gastric cardia with small perigastric nodes on endoscopy.  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    Vital Signs Last 24 Hrs  T(C): 36.7 (24 May 2025 20:35), Max: 36.7 (24 May 2025 20:35)  T(F): 98.1 (24 May 2025 20:35), Max: 98.1 (24 May 2025 20:35)  HR: 61 (24 May 2025 20:35) (52 - 61)  BP: 104/64 (24 May 2025 20:35) (98/63 - 111/74)  BP(mean): 72 (24 May 2025 06:20) (72 - 72)  RR: 17 (24 May 2025 20:35) (17 - 18)  SpO2: 96% (24 May 2025 20:35) (96% - 96%)    Parameters below as of 24 May 2025 20:35  Patient On (Oxygen Delivery Method): room air    PHYSICAL EXAM:    General: lying in bed, in no acute distress  HEENT: Neck supple, mmm, no jvd  Lungs: Normal respiratory effort, no intercostal retractions  Cardiovascular: regular rate  Abdomen: Soft, non-tender non-distended; No rebound or guarding  Extremities: wwp, no cce  Neurological: MCCAIN, speech fluent  Skin: Warm and dry. No obvious rash  Rectal: Normal tone, brown stool    LABS:                        11.0   3.26  )-----------( 131      ( 24 May 2025 05:30 )             33.1     05-24    137  |  101  |  17  ----------------------------<  163[H]  4.3   |  27  |  0.89    Ca    8.2[L]      24 May 2025 12:18  Phos  3.5     05-24  Mg     2.1     05-24    TPro  6.3  /  Alb  2.5[L]  /  TBili  1.1  /  DBili  x   /  AST  see note  /  ALT  see note  /  AlkPhos  82  05-24        PT/INR - ( 24 May 2025 05:30 )   PT: 15.1 sec;   INR: 1.32          PTT - ( 24 May 2025 05:30 )  PTT:27.2 sec    Culture - Blood (collected 23 May 2025 13:55)  Source: Blood Blood-Peripheral  Preliminary Report (24 May 2025 22:02):    No growth at 24 hours    Culture - Blood (collected 23 May 2025 13:55)  Source: Blood Blood-Peripheral  Preliminary Report (24 May 2025 22:02):    No growth at 24 hours      RADIOLOGY & ADDITIONAL STUDIES:     Reviewed

## 2025-05-24 NOTE — DIETITIAN INITIAL EVALUATION ADULT - PROBLEM SELECTOR PLAN 1
Weakness, generalized and leg pain x 4 days, presents with decreased ability to walk, likely i/s/o abdominal distension and significant swelling in b/l LE. Reports taking additional doses of Spironolactone because of edema.   Admission K of 3.7, VBG 2.5.   Suspected to be 2/2  fluid overload with immobility, RLE cellulitis   Stroke workup negative per neuro, imaging findings without acute pathology. recommending MRI     - F/u B12, Folate, Thiamine, TSH/t4  - Obtain orthostatics in AM  - PT/SW  - fall precautions   - MRI brain per neuro recs

## 2025-05-24 NOTE — CHART NOTE - NSCHARTNOTEFT_GEN_A_CORE
Discussed with Dr. Richard.   Patient should resume his home night tube feeds and insulin.   Can start [employer] 1.2 from 8pm to 4am at 110cc/hr.   He should get 3 units of lispro and NPH 16 units half hr before tube feed starts at 7:30pm.   Please change sliding scale to LOW insulin sliding scale before meals and bedtime.     Cathryn Dodge  Endocrinology fellow

## 2025-05-24 NOTE — PROGRESS NOTE ADULT - SUBJECTIVE AND OBJECTIVE BOX
Neurology Stroke Progress Note    INTERVAL HPI/OVERNIGHT EVENTS:  Patient seen and examined at bedside. Following up on overnight consult. Patient reports no acute complaints besides b/l LE edema that brought him in to hospital.     MEDICATIONS  (STANDING):  apixaban 5 milliGRAM(s) Oral every 12 hours  ceFAZolin   IVPB 1000 milliGRAM(s) IV Intermittent every 8 hours  dextrose 5%. 1000 milliLiter(s) (100 mL/Hr) IV Continuous <Continuous>  dextrose 5%. 1000 milliLiter(s) (50 mL/Hr) IV Continuous <Continuous>  dextrose 50% Injectable 25 Gram(s) IV Push once  dextrose 50% Injectable 12.5 Gram(s) IV Push once  dextrose 50% Injectable 25 Gram(s) IV Push once  glucagon  Injectable 1 milliGRAM(s) IntraMuscular once  insulin lispro (ADMELOG) corrective regimen sliding scale   SubCutaneous three times a day before meals  levothyroxine 100 MICROGram(s) Oral daily  senna 2 Tablet(s) Oral at bedtime    MEDICATIONS  (PRN):  dextrose Oral Gel 15 Gram(s) Oral once PRN Blood Glucose LESS THAN 70 milliGRAM(s)/deciliter  polyethylene glycol 3350 17 Gram(s) Oral daily PRN Constipation  zolpidem 5 milliGRAM(s) Oral at bedtime PRN Insomnia      Allergies    No Known Allergies    Intolerances        Vital Signs Last 24 Hrs  T(C): 36.4 (24 May 2025 06:20), Max: 36.8 (23 May 2025 13:42)  T(F): 97.5 (24 May 2025 06:20), Max: 98.3 (23 May 2025 13:42)  HR: 52 (24 May 2025 06:20) (52 - 80)  BP: 98/63 (24 May 2025 06:20) (95/67 - 114/66)  BP(mean): 72 (24 May 2025 06:20) (72 - 72)  RR: 18 (24 May 2025 06:20) (18 - 19)  SpO2: 96% (24 May 2025 06:20) (96% - 100%)    Parameters below as of 24 May 2025 06:20  Patient On (Oxygen Delivery Method): room air      Physical exam  Constitutional: No acute distress, conversant  Eyes: Anicteric sclerae, moist conjunctivae, see below for CNs  Pulmonary: No use of accessory muscles  Extremities: pitting 3+ edema lower extremities     Neurologic:  -Mental status: Awake, alert, oriented to person, age, and month. Speech is fluent with intact naming, repetition, and comprehension, mild dysarthria. Recent and remote memory intact. Follows commands. Attention/concentration intact.  -Cranial nerves:   II: Visual fields are full to confrontation.  III, IV, VI: Extraocular movements are intact without nystagmus. Pupils equally round and reactive to light  V:  Facial sensation V1-V3 equal and intact   VII: Face is symmetric with normal eye closure and smile  VIII: Hearing is bilaterally intact to finger rub  XII: Tongue protrudes midline  Motor: Normal bulk and tone. No pronator drift. Strength bilateral upper extremity 5/5, bilateral lower extremities 4-/5. Pt is able to keep legs elevated, though only a few inches off the bed due to edema. Able to push down w/ moderate strength.   Sensation: Intact to light touch bilaterally. No neglect or extinction on double simultaneous testing.  Coordination: No dysmetria noted on finger-to-nose  Gait: deferred due to edema    LABS:                        10.6   5.23  )-----------( 142      ( 23 May 2025 13:55 )             29.7     05-24    138  |  103  |  19  ----------------------------<  79  3.3[L]   |  26  |  1.07    Ca    7.9[L]      24 May 2025 00:33  Phos  2.0     05-24  Mg     2.0     05-24    TPro  6.6  /  Alb  2.9[L]  /  TBili  1.7[H]  /  DBili  x   /  AST  See Note  /  ALT  16  /  AlkPhos  96  05-23    PT/INR - ( 24 May 2025 05:30 )   PT: 15.1 sec;   INR: 1.32          PTT - ( 24 May 2025 05:30 )  PTT:27.2 sec  Urinalysis Basic - ( 24 May 2025 00:33 )    Color: x / Appearance: x / SG: x / pH: x  Gluc: 79 mg/dL / Ketone: x  / Bili: x / Urobili: x   Blood: x / Protein: x / Nitrite: x   Leuk Esterase: x / RBC: x / WBC x   Sq Epi: x / Non Sq Epi: x / Bacteria: x        RADIOLOGY & ADDITIONAL TESTS:    Reviewed

## 2025-05-24 NOTE — DIETITIAN INITIAL EVALUATION ADULT - PERTINENT MEDS FT
MEDICATIONS  (STANDING):  apixaban 5 milliGRAM(s) Oral every 12 hours  ceFAZolin   IVPB 1000 milliGRAM(s) IV Intermittent every 8 hours  dextrose 5%. 1000 milliLiter(s) (100 mL/Hr) IV Continuous <Continuous>  dextrose 5%. 1000 milliLiter(s) (50 mL/Hr) IV Continuous <Continuous>  dextrose 50% Injectable 25 Gram(s) IV Push once  dextrose 50% Injectable 12.5 Gram(s) IV Push once  dextrose 50% Injectable 25 Gram(s) IV Push once  furosemide    Tablet 40 milliGRAM(s) Oral every 24 hours  glucagon  Injectable 1 milliGRAM(s) IntraMuscular once  insulin lispro (ADMELOG) corrective regimen sliding scale   SubCutaneous three times a day before meals  levothyroxine 100 MICROGram(s) Oral daily  senna 2 Tablet(s) Oral at bedtime  spironolactone 25 milliGRAM(s) Oral every 24 hours    MEDICATIONS  (PRN):  dextrose Oral Gel 15 Gram(s) Oral once PRN Blood Glucose LESS THAN 70 milliGRAM(s)/deciliter  polyethylene glycol 3350 17 Gram(s) Oral daily PRN Constipation  zolpidem 5 milliGRAM(s) Oral at bedtime PRN Insomnia

## 2025-05-24 NOTE — PROGRESS NOTE ADULT - PROBLEM SELECTOR PLAN 6
Hx of gastric cancer treated with chemotherapy 5 years ago now w/ feeding J-tube, previously followed Dr. Saravanan Cabello and Dr. Chema Richmond. Currently followed Heme/onc: Dr. Henry.  - No acute intervention, continue outpatient management  - C/w tube feeds- resumed and give insulin accordingly (as in diabetes below)

## 2025-05-24 NOTE — DIETITIAN INITIAL EVALUATION ADULT - OTHER CALCULATIONS
Estimated needs based on dosing wt as within %  pounds / 75.5 kilograms (84%). Needs adjusted for age, cirrhosis, wound healing, and malnutrition. Defer fluids to team.

## 2025-05-24 NOTE — DIETITIAN INITIAL EVALUATION ADULT - ADD RECOMMEND
1. As medically feasible, recommend resume tube feed via J-tube: [employer] Standard 1.4 with goal rate at 108mL/hr x12hr (8PM-8AM) to provide 1296mL total volume (=x4 325mL cans), 1814kcal (29kcal/kg dosing wt 63.5kg), 80g protein (1.27g/kg dosing wt 63.5kg), and 933mL free water.   >>Defer free water flushes to team.  >>Note above regimen made with consideration for supplemental PO intake; if pt NPO, recommend increase goal to 110mL/hr x14hr. Standard formula recommended in setting of reported hx of standard formula with HgbA1c WNL and POC blood glucose WNL/low; if with elevated blood glucose recommend change regimen to [employer] Glucose Support 1.2 with goal at 102mL/hr x16hr.   >>Monitor GI tolerance and maintain aspiration precautions. RD to remain available to adjust EN regimen prn.   2. Continue Regular diet.   >>Dietary to provide fortified mashed potatoes (240kcal, 14g protein) and Gelatein (80kcal, 20g protein, <1g carbohydrate) x1/day.   3. Monitor weight trends, labs, skin integrity, & hydration status.  4. Pain and bowel regimens per team.  >>Consider Banatrol prn to promote fecal bulk.   5. RD remains available for dietary education/intervention prn.  1. As medically feasible, recommend resume tube feed via J-tube: [employer] Standard 1.4 with goal rate at 108mL/hr x12hr (8PM-8AM) to provide 1296mL total volume (=x4 325mL cans), 1814kcal (29kcal/kg dosing wt 63.5kg), 80g protein (1.27g/kg dosing wt 63.5kg), and 933mL free water.   >>Defer free water flushes to team.  >>Note above regimen made with consideration for supplemental PO intake; if pt NPO, recommend increase goal to 110mL/hr x14hr. Standard formula recommended in setting of reported hx of standard formula with HgbA1c WNL and POC blood glucose WNL/low; if with elevated blood glucose recommend change regimen to [employer] Glucose Support 1.2 with goal at 102mL/hr x16hr.   >>Note pt ordered for PO synthroid, recommend administer >/=1hr apart from nutrition.   >>Monitor GI tolerance and maintain aspiration precautions. RD to remain available to adjust EN regimen prn.   2. Continue Regular diet.   >>Dietary to provide fortified mashed potatoes (240kcal, 14g protein) and Gelatein (80kcal, 20g protein, <1g carbohydrate) x1/day.   3. Monitor weight trends, labs, skin integrity, & hydration status.  4. Pain and bowel regimens per team.  >>Consider Banatrol prn to promote fecal bulk.   5. RD remains available for dietary education/intervention prn.

## 2025-05-24 NOTE — CONSULT NOTE ADULT - ASSESSMENT
72M w/ PMH of gastric cancer (s/p feeding J-tube), portal vein thrombosis (on Eliquis) AAA (s/p EVAR in 2023), T2DM, hypothyroidism, HTN, anemia, BPH, type 2 endoleak repair, s/p embolization of internal iliac artery branch (10/24), cirrhosis, who presents to the ED with worsening bilateral leg swelling and gen weakness, admitted for further evaluation. GI consulted for new diag of cirrhosis    EGD 6/23  Severe esophagitis LA Grade D  Esophageal-jejunal anastomosis intact w/ patent afferent and efferent limbs without evidence of acute angulation or suggestion of obstruction.    MELD: 11    #cirrhosis of unknown etiology  -US Liver duplex r/o PVT  -Please order AFP  -Please perform diagnostic Paracentesis and send for cell count, culture, cytology  -MELD: Hepatic function panel and coags Daily  -Viral: Hep panel including hep A IgG, Hep B Core ab, Hep B S ag, Hep B S ab, Hep C ab w/ reflex to PCR RNA  -Toxins: UTox and Peth  -Metabolic: TSH, Lipids, A1C   -Autoimmune: EBER, ASMA, AMA, IgGs, Lipids   -Genetic: Iron Panel, Alpha-1 antitrypsin, Ceruloplasmin, LKmicrosomal ab  -for IVF, would favor albumin over LR or NS   -avoid NSAIDs and opiates if possible   -Tylenol not exceeding 2g per day   -lactulose for 2-3 bowel movements per day  -low sodium/high protein diet   -Thiamine, folic acid, multivitamin  -Order b12/Folate levels given macrocytosis  -will warrant EGD for EV screening inpt vs outpt, however given gastrectomy, less likely to have EV  -c/w home spironolactone 25mg  -hold home furosemide 40mg, Give Furosemide 40mg IV     Patient seen and discussed with GI Attending on Rounds Dr. Tierra Cooper DO, PhD  Gastroenterology Fellow  GI Consult Weekday 7am-5pm Pager: [phone]  Weeknights/Weekend/Holiday Coverage: Please Call the  for contact info

## 2025-05-24 NOTE — DIETITIAN INITIAL EVALUATION ADULT - PROBLEM SELECTOR PLAN 8
Follows Dr. Mckeon with very well controlled diabetes, last saw this AM w/ positive levels.   Per HIE - regimen is NPH 18 units at night, Lispro 3 units while on tube feeds    - Start patient on low dose insulin sliding scale tonight   - q6h FS  - Once tube feeds resume, 18 Lantus/3 units lispro to be given 30 min before starting nocturnal feeds (possibly tomorrow)  - Endocrinology aware, will see patient tomorrow

## 2025-05-24 NOTE — DIETITIAN INITIAL EVALUATION ADULT - PERSON TAUGHT/METHOD
Discussed strategies to maximize nutritional status via PO diet. Educated on small frequent meals, energy/protein dense foods aligned with preferences, and oral nutrition supplements. Discussed resumption of nocturnal PEJ feeds. RD answered all pt questions. Pt aware RD remains available for additional questions/concerns./verbal instruction/patient instructed

## 2025-05-24 NOTE — PROGRESS NOTE ADULT - PROBLEM SELECTOR PLAN 1
Weakness, generalized and leg pain x 4 days, presents with decreased ability to walk, likely i/s/o abdominal distension and significant swelling in b/l LE. Reports taking additional doses of Spironolactone because of edema.   Admission K of 3.7, VBG 2.5.   Suspected to be 2/2  fluid overload with immobility, RLE cellulitis   Stroke workup negative per neuro, imaging findings without acute pathology. recommending MRI   Nutritional workup so far negative  - f/u orthostatics  - PT/SW  - fall precautions   - MRI brain per neuro recs

## 2025-05-24 NOTE — PROGRESS NOTE ADULT - PROBLEM SELECTOR PLAN 5
Recently diagnosed with liver cirrhosis (2025?), per chart note by Dr. Mckeon and review on [facility] -  etiology of his cirrhosis is unclear--no work-up was completed on [place] except bloodwork and Fibroscan, the latter uninterpretable due to jordy-hepatic ascites.   , INR 1.18, AST/ALT not quantified/16  CT A/P this year showed cirrhotic morphology, last year had normal findings  ED bedside U/S without pocket to TAP per signout  Abd US showing ascites.  - Hepatology consulted, appreciate recs  - Daily MELD score  - Diuretics pending improvement in BMP  - restarted home lasix and spironolactone

## 2025-05-24 NOTE — DIETITIAN INITIAL EVALUATION ADULT - NSFNSPHYEXAMSKINFT_GEN_A_CORE
Pressure Injury 1: sacrum, Stage I  Pressure Injury 2: none, none  Pressure Injury 3: none, none  Pressure Injury 4: none, none  Pressure Injury 5: none, none  Pressure Injury 6: none, none  Pressure Injury 7: none, none  Pressure Injury 8: none, none  Pressure Injury 9: none, none  Pressure Injury 10: none, none  Pressure Injury 11: none, none, Pressure Injury 1: sacrum, Stage I  Pressure Injury 2: none, none  Pressure Injury 3: none, none  Pressure Injury 4: none, none  Pressure Injury 5: none, none  Pressure Injury 6: none, none  Pressure Injury 7: none, none  Pressure Injury 8: none, none  Pressure Injury 9: none, none  Pressure Injury 10: none, none  Pressure Injury 11: none, none, Pressure Injury 1: sacrum, Stage I  Pressure Injury 2: none, none  Pressure Injury 3: none, none  Pressure Injury 4: none, none  Pressure Injury 5: none, none  Pressure Injury 6: none, none  Pressure Injury 7: none, none  Pressure Injury 8: none, none  Pressure Injury 9: none, none  Pressure Injury 10: none, none  Pressure Injury 11: none, none Pressure Injury 1: sacrum, Stage I

## 2025-05-24 NOTE — PROGRESS NOTE ADULT - ASSESSMENT
72M w/ PMH of gastric cancer (s/p feeding J-tube), portal vein thrombosis (on Eliquis) AAA (s/p EVAR in 2023), T2DM, hypothyroidism, HTN, anemia, BPH, type 2 endoleak repair, s/p embolization of internal iliac artery branch (10/24), cirrhosis, who presents to the ED with worsening bilateral leg swelling and gen weakness for a couple of months, gradually worsening with the right leg more affected than the left, admitted for possible LE cellulitis, currently on Ancef. Stroke service consulted for right facial droop and dysarthria noted to be present for a few months by patient's close family and friends. Daughter noted slurred speech more pronounced 4d ago. Facial droop not present on exam at time of consult. NIHSS 1 for mild dysarthria. CTH neg for stroke, incidental finding of arachnoid cyst. CTA with L C6 ICA 40-50% stenosis. Continuing home Eliquis 5mg bid. Pending MR brain    **INCOMPLETE**    1)Secondary stroke prevention  - c/w home Eliquis 5mg BID, if primary team notes no contraindications     2) Stroke risk factors  - obtain A1C, LDL, TSH  - portal vein thrombosis   - HTN    3) Further management  - obtain MRI brain without  - recommend SBP goal <180  - recommend q4hr stroke neuro checks  - may need outpt neurology follow up  - provide stroke education    DVT prophylaxis   -Lovenox SQ and SCDs    Case discussed with Dr. Glover    72M w/ PMH of gastric cancer (now on remission, s/p feeding J-tube), portal vein thrombosis (on Eliquis) AAA (s/p EVAR in 2023), T2DM, hypothyroidism, HTN, anemia, BPH, type 2 endoleak repair, s/p embolization of internal iliac artery branch (10/24), cirrhosis, who presents to the ED with worsening bilateral leg swelling and gen weakness for a couple of months, gradually worsening with the right leg more affected than the left, admitted for possible LE cellulitis, currently on Ancef. Stroke service consulted for intermittent right facial droop and dysarthria noted to be present for a few months by patient's close family and friends. Daughter noted slurred speech more pronounced 4d ago. Facial droop not present on exam at time of consult. NIHSS 1 for mild dysarthria. CTH neg for stroke, incidental finding of arachnoid cyst. CTA with L C6 ICA 40-50% stenosis. Continuing home Eliquis 5mg bid. Pending MR brain    1)Secondary stroke prevention  - c/w home Eliquis 5mg BID, if primary team notes no contraindications     2) Stroke risk factors  - obtain A1C, LDL, TSH  - portal vein thrombosis   - HTN    3) Further management  - obtain MRI brain without  - recommend SBP goal <180  - recommend q4hr stroke neuro checks  - will need outpt neurology follow up for possibly symptomatic L ICA stenosis with self-reported intermittent R facial droop presentation. Please call stroke team on day of discharge to set up outpatient follow-up  - provide stroke education    DVT prophylaxis   -Lovenox SQ and SCDs    Case discussed with Dr. Glover

## 2025-05-25 LAB
ADD ON TEST-SPECIMEN IN LAB: SIGNIFICANT CHANGE UP
ADD ON TEST-SPECIMEN IN LAB: SIGNIFICANT CHANGE UP
ALBUMIN SERPL ELPH-MCNC: 2.5 G/DL — LOW (ref 3.3–5)
ALP SERPL-CCNC: 91 U/L — SIGNIFICANT CHANGE UP (ref 40–120)
ALT FLD-CCNC: 12 U/L — SIGNIFICANT CHANGE UP (ref 10–45)
ANION GAP SERPL CALC-SCNC: 11 MMOL/L — SIGNIFICANT CHANGE UP (ref 5–17)
ANION GAP SERPL CALC-SCNC: 12 MMOL/L — SIGNIFICANT CHANGE UP (ref 5–17)
APTT BLD: 31.5 SEC — SIGNIFICANT CHANGE UP (ref 26.1–36.8)
AST SERPL-CCNC: 33 U/L — SIGNIFICANT CHANGE UP (ref 10–40)
BILIRUB SERPL-MCNC: 0.8 MG/DL — SIGNIFICANT CHANGE UP (ref 0.2–1.2)
BUN SERPL-MCNC: 14 MG/DL — SIGNIFICANT CHANGE UP (ref 7–23)
BUN SERPL-MCNC: 14 MG/DL — SIGNIFICANT CHANGE UP (ref 7–23)
CALCIUM SERPL-MCNC: 7.9 MG/DL — LOW (ref 8.4–10.5)
CALCIUM SERPL-MCNC: 8.1 MG/DL — LOW (ref 8.4–10.5)
CHLORIDE SERPL-SCNC: 105 MMOL/L — SIGNIFICANT CHANGE UP (ref 96–108)
CHLORIDE SERPL-SCNC: 105 MMOL/L — SIGNIFICANT CHANGE UP (ref 96–108)
CHOLEST SERPL-MCNC: 156 MG/DL — SIGNIFICANT CHANGE UP
CO2 SERPL-SCNC: 21 MMOL/L — LOW (ref 22–31)
CO2 SERPL-SCNC: 23 MMOL/L — SIGNIFICANT CHANGE UP (ref 22–31)
CREAT SERPL-MCNC: 0.87 MG/DL — SIGNIFICANT CHANGE UP (ref 0.5–1.3)
CREAT SERPL-MCNC: 0.94 MG/DL — SIGNIFICANT CHANGE UP (ref 0.5–1.3)
EGFR: 85 ML/MIN/1.73M2 — SIGNIFICANT CHANGE UP
EGFR: 85 ML/MIN/1.73M2 — SIGNIFICANT CHANGE UP
EGFR: 90 ML/MIN/1.73M2 — SIGNIFICANT CHANGE UP
EGFR: 90 ML/MIN/1.73M2 — SIGNIFICANT CHANGE UP
GLUCOSE SERPL-MCNC: 132 MG/DL — HIGH (ref 70–99)
GLUCOSE SERPL-MCNC: 81 MG/DL — SIGNIFICANT CHANGE UP (ref 70–99)
HDLC SERPL-MCNC: 67 MG/DL — SIGNIFICANT CHANGE UP
INR BLD: 1.35 — HIGH (ref 0.85–1.16)
LDLC SERPL-MCNC: 75 MG/DL — SIGNIFICANT CHANGE UP
LIPID PNL WITH DIRECT LDL SERPL: 75 MG/DL — SIGNIFICANT CHANGE UP
MAGNESIUM SERPL-MCNC: 2 MG/DL — SIGNIFICANT CHANGE UP (ref 1.6–2.6)
MELD SCORE WITH DIALYSIS: 23 POINTS — SIGNIFICANT CHANGE UP
MELD SCORE WITHOUT DIALYSIS: 10 POINTS — SIGNIFICANT CHANGE UP
NONHDLC SERPL-MCNC: 89 MG/DL — SIGNIFICANT CHANGE UP
PCP SPEC-MCNC: SIGNIFICANT CHANGE UP
PHOSPHATE SERPL-MCNC: 2.7 MG/DL — SIGNIFICANT CHANGE UP (ref 2.5–4.5)
POTASSIUM SERPL-MCNC: 3.8 MMOL/L — SIGNIFICANT CHANGE UP (ref 3.5–5.3)
POTASSIUM SERPL-MCNC: 4 MMOL/L — SIGNIFICANT CHANGE UP (ref 3.5–5.3)
POTASSIUM SERPL-SCNC: 3.8 MMOL/L — SIGNIFICANT CHANGE UP (ref 3.5–5.3)
POTASSIUM SERPL-SCNC: 4 MMOL/L — SIGNIFICANT CHANGE UP (ref 3.5–5.3)
PROT SERPL-MCNC: 6.5 G/DL — SIGNIFICANT CHANGE UP (ref 6–8.3)
PROTHROM AB SERPL-ACNC: 15.7 SEC — HIGH (ref 9.9–13.4)
SODIUM SERPL-SCNC: 138 MMOL/L — SIGNIFICANT CHANGE UP (ref 135–145)
SODIUM SERPL-SCNC: 139 MMOL/L — SIGNIFICANT CHANGE UP (ref 135–145)
TRIGL SERPL-MCNC: 75 MG/DL — SIGNIFICANT CHANGE UP
TSH SERPL-MCNC: 4.85 UIU/ML — HIGH (ref 0.27–4.2)

## 2025-05-25 PROCEDURE — 99233 SBSQ HOSP IP/OBS HIGH 50: CPT

## 2025-05-25 PROCEDURE — 99232 SBSQ HOSP IP/OBS MODERATE 35: CPT | Mod: GC

## 2025-05-25 RX ORDER — GLUCAGON 3 MG/1
1 POWDER NASAL ONCE
Refills: 0 | Status: DISCONTINUED | OUTPATIENT
Start: 2025-05-25 | End: 2025-05-26

## 2025-05-25 RX ORDER — LACTULOSE 10 G/15ML
10 SOLUTION ORAL EVERY 8 HOURS
Refills: 0 | Status: DISCONTINUED | OUTPATIENT
Start: 2025-05-25 | End: 2025-05-28

## 2025-05-25 RX ORDER — B1/B2/B3/B5/B6/B12/VIT C/FOLIC 500-0.5 MG
1 TABLET ORAL DAILY
Refills: 0 | Status: DISCONTINUED | OUTPATIENT
Start: 2025-05-25 | End: 2025-06-04

## 2025-05-25 RX ORDER — INSULIN LISPRO 100 U/ML
3 INJECTION, SOLUTION INTRAVENOUS; SUBCUTANEOUS ONCE
Refills: 0 | Status: COMPLETED | OUTPATIENT
Start: 2025-05-25 | End: 2025-05-25

## 2025-05-25 RX ORDER — DEXTROSE 50 % IN WATER 50 %
12.5 SYRINGE (ML) INTRAVENOUS ONCE
Refills: 0 | Status: DISCONTINUED | OUTPATIENT
Start: 2025-05-25 | End: 2025-05-26

## 2025-05-25 RX ORDER — SODIUM CHLORIDE 9 G/1000ML
1000 INJECTION, SOLUTION INTRAVENOUS
Refills: 0 | Status: DISCONTINUED | OUTPATIENT
Start: 2025-05-25 | End: 2025-05-26

## 2025-05-25 RX ORDER — DEXTROSE 50 % IN WATER 50 %
15 SYRINGE (ML) INTRAVENOUS ONCE
Refills: 0 | Status: DISCONTINUED | OUTPATIENT
Start: 2025-05-25 | End: 2025-05-26

## 2025-05-25 RX ORDER — DEXTROSE 50 % IN WATER 50 %
25 SYRINGE (ML) INTRAVENOUS ONCE
Refills: 0 | Status: DISCONTINUED | OUTPATIENT
Start: 2025-05-25 | End: 2025-05-26

## 2025-05-25 RX ORDER — FUROSEMIDE 10 MG/ML
40 INJECTION INTRAMUSCULAR; INTRAVENOUS ONCE
Refills: 0 | Status: DISCONTINUED | OUTPATIENT
Start: 2025-05-25 | End: 2025-05-25

## 2025-05-25 RX ORDER — FUROSEMIDE 10 MG/ML
40 INJECTION INTRAMUSCULAR; INTRAVENOUS ONCE
Refills: 0 | Status: COMPLETED | OUTPATIENT
Start: 2025-05-25 | End: 2025-05-25

## 2025-05-25 RX ORDER — INSULIN LISPRO 100 U/ML
INJECTION, SOLUTION INTRAVENOUS; SUBCUTANEOUS
Refills: 0 | Status: DISCONTINUED | OUTPATIENT
Start: 2025-05-25 | End: 2025-05-26

## 2025-05-25 RX ADMIN — APIXABAN 5 MILLIGRAM(S): 2.5 TABLET, FILM COATED ORAL at 23:02

## 2025-05-25 RX ADMIN — LACTULOSE 10 GRAM(S): 10 SOLUTION ORAL at 23:01

## 2025-05-25 RX ADMIN — APIXABAN 5 MILLIGRAM(S): 2.5 TABLET, FILM COATED ORAL at 10:34

## 2025-05-25 RX ADMIN — Medication 16 UNIT(S): at 18:25

## 2025-05-25 RX ADMIN — FUROSEMIDE 40 MILLIGRAM(S): 10 INJECTION INTRAMUSCULAR; INTRAVENOUS at 10:34

## 2025-05-25 RX ADMIN — Medication 5 MILLIGRAM(S): at 20:39

## 2025-05-25 RX ADMIN — FUROSEMIDE 40 MILLIGRAM(S): 10 INJECTION INTRAMUSCULAR; INTRAVENOUS at 06:10

## 2025-05-25 RX ADMIN — Medication 25 MILLIGRAM(S): at 15:14

## 2025-05-25 RX ADMIN — Medication 100 MILLIGRAM(S): at 23:02

## 2025-05-25 RX ADMIN — Medication 100 MILLIGRAM(S): at 06:10

## 2025-05-25 RX ADMIN — Medication 100 MILLIGRAM(S): at 15:15

## 2025-05-25 RX ADMIN — LACTULOSE 10 GRAM(S): 10 SOLUTION ORAL at 15:14

## 2025-05-25 RX ADMIN — INSULIN LISPRO 3 UNIT(S): 100 INJECTION, SOLUTION INTRAVENOUS; SUBCUTANEOUS at 18:21

## 2025-05-25 RX ADMIN — Medication 100 MILLIGRAM(S): at 06:11

## 2025-05-25 RX ADMIN — Medication 100 MICROGRAM(S): at 06:09

## 2025-05-25 RX ADMIN — Medication 1 TABLET(S): at 10:38

## 2025-05-25 NOTE — PHYSICAL THERAPY INITIAL EVALUATION ADULT - WEIGHT-BEARING RESTRICTIONS: STAND/SIT, REHAB EVAL
Hearing Aid Visit:    Name:  Melvinia Osgood  :  1948  Age:  75 y o  Date of Evaluation: 22     Kasie Henson is being seen for a hearing aid visit  Patient is fit with [employer] More 3 miniRITE -R  hearing aid(s)  Right serial [id number]  Left serial [id number]  Warranty date: 2024 (Loss/Damage and repair)  Patient is here to  her new earmolds  Action:  Both earmolds appeared to be a good fit  Changed acoustic settings in software, updated firmware as well  Patient was pleased       Recommendations:   Return Talia Weiss   Clinical Audiologist full weight-bearing

## 2025-05-25 NOTE — PROGRESS NOTE ADULT - ASSESSMENT
72M w/ PMH of gastric cancer (s/p feeding J-tube), portal vein thrombosis (on Eliquis) AAA (s/p EVAR in 2023), T2DM, hypothyroidism, HTN, anemia, BPH, type 2 endoleak repair, s/p embolization of internal iliac artery branch (10/24), cirrhosis, who presents to the ED with worsening bilateral leg swelling and gen weakness, admitted for further evaluation. GI consulted for new diag of cirrhosis    EGD 6/23  Severe esophagitis LA Grade D  Esophageal-jejunal anastomosis intact w/ patent afferent and efferent limbs without evidence of acute angulation or suggestion of obstruction.    US Abd 5/23:  IMPRESSION:  Small to moderate amount of ascites most prominent of the right lower quadrant on the submitted images.    Tsat 32  Ferritin 228  A1C 4.8  B12: 455  Folate 10.2  Trig 73, LDL 73  TSH 2.08  Hep B C, S ab, Sg all non react  Hep C neg  UTox Neg    MELD: 11 admission, 10 now    #cirrhosis of unknown etiology  -US Liver duplex r/o PVT  -Recommend outpatient Hep B vaccination  -Please order AFP  -Please perform diagnostic Paracentesis and send for cell count, culture, cytology  -MELD: Hepatic function panel and coags Daily  -Viral: Hep panel including hep A IgG  -Toxins: Peth  -Autoimmune: EBER, ASMA, AMA, IgGs  -Genetic: Alpha-1 antitrypsin, Ceruloplasmin, LKmicrosomal ab  -for IVF, would favor albumin over LR or NS   -avoid NSAIDs and opiates if possible   -Tylenol not exceeding 2g per day   -lactulose for 2-3 bowel movements per day  -low sodium/high protein diet   -Thiamine, folic acid, multivitamin  -will warrant EGD for EV screening inpt vs outpt, however given gastrectomy, less likely to have EV  -c/w home spironolactone 25mg  -hold home furosemide 40mg, Give Furosemide 40mg IV     Patient seen and discussed with GI Attending on Rounds Dr. Tierra Cooper DO, PhD  Gastroenterology Fellow  GI Consult Weekday 7am-5pm Pager: [phone]  Weeknights/Weekend/Holiday Coverage: Please Call the  for contact info

## 2025-05-25 NOTE — PHYSICAL THERAPY INITIAL EVALUATION ADULT - MODALITIES TREATMENT COMMENTS
Pt reports cold sensation in B UE and B LE s/o chemo. All CN testing symmetrical; III, IV, VI: Extraocular movements intact without nystagmus, VII: Face is symmetric with normal eye close and smile, VIII: Hearing is grossly intact, XI: head turning and shoulder shrug intact b/l XII: Tongue protrudes midline. Quadrant test; symmetrical with no deficits. Visual tracking/scanning intact.

## 2025-05-25 NOTE — CONSULT NOTE ADULT - SUBJECTIVE AND OBJECTIVE BOX
Patient is a 74y old  Male who presents with a chief complaint of RLE cellulitis (24 May 2025 19:12)      HPI:  HPI: 72M w/ PMH of gastric cancer (s/p feeding J-tube), portal vein thrombosis (on Eliquis) AAA (s/p EVAR in 2023), T2DM, hypothyroidism, HTN, anemia, BPH, type 2 endoleak repair, s/p embolization of internal iliac artery branch (10/24), cirrhosis, who presents to the ED with worsening bilateral leg swelling and gen weakness. Patient reports bilateral leg swelling for a couple of weeks, worsening in severity for the past 4 days. He was in Dr. Richards's office and was told to go to the emergency room because of swelling and discoloration of his legs. He is on furosemide and spironolactone and doubled his spironolactone dose in order to help with the swelling. He also reports erythema of the RLE x 4 days, no injury or falls. Additionally, he and his daughter report slurred speech for the past 4 days. He reports no fevers, chills, headache, chest pain, shortness of breath, abdominal pain, nausea, vomiting, diarrhea, constipation.     Medications: Eliquis 5mg BID, Spironolactone 25mg qd, Furosemide 40mg qd, Levothyroxine 100mg qd, NPH 18, Lispro 3, Ambien 5mg qd    In the ED,  VS: T 98-98.3, HR 58-80, BP /66-67, RR 18, SpO2 % RA  Labs: H/H 10.6/29.7, PT 13.6, INR 1.18, lactate 2.4 ->1.1, Bili 1.7   VBG: pH 7.43, HCO3 21  Limited RVP negative  CTH: Arachnoid cyst and senescent cerebral changes. No acute intracranial hemorrhage.  CT Angio H: Left C6 ICA stenosis, 40-50%.  Dopplers b/l LE: No evidence of deep venous thrombosis in either lower extremity. Soft tissue swelling in both lower extremities.  CXray: Heart size, mediastinal and hilar contours are unchanged and within normal limits. Surgical clips are again noted in the left supraclavicular/left apical region as well as in the LUQ The lung zones are clear. There is a trace left sided pneumothorax. No pnemothorax seen.  Xray R foot and Tib/Fib: Osteopenia. No osseous destruction or periosteal reaction. No fracture or dislocation. There is a hallux valgus alignment with first MTP joint osteoarthrosis. Tibiotalar joint space narrowing is also identified. Second through fourth hammertoe deformities. Diffuse soft tissue swelling of the imaged lower extremity. Vascular calcification is present.  EKG: PACs  Orders: Zosyn 3.375mg x1, Vancomycin 1000mg x1, NaCl 500 cc bolus x1  Consults: Neurology (23 May 2025 18:32)    PAST MEDICAL & SURGICAL HISTORY:  Essential hypertension  was on losartan, now diet control      Hypothyroidism      Gastric mass  ulcerated mass in gastric cardia with small perigastric nodes on endoscopy.      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      H/O umbilical hernia repair  2018 with mesh      Port-a-cath in place  right chest wall                 not in use 5 y      History of gastric surgery  2017      Gastrostomy tube in place  open jejunostomy with J-tube November 2020      S/P cataract surgery      H/O endoscopy  & stent placement 12/2020      S/P gastrectomy  total gastrectomy 2017      History of partial pancreatectomy  distal pancreatectomy/ splenectomy esophatojejunostomy      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, gastric primary      History of dysphagia  EGD with [employer] stent placment to connect the blind limb to th e efferent limb (jejunojejunostomy)        MEDICATIONS  (STANDING):  apixaban 5 milliGRAM(s) Oral every 12 hours  ceFAZolin   IVPB 1000 milliGRAM(s) IV Intermittent every 8 hours  furosemide   Injectable 40 milliGRAM(s) IV Push once  lactulose Syrup 10 Gram(s) Oral every 8 hours  levothyroxine 100 MICROGram(s) Oral daily  multivitamin 1 Tablet(s) Oral daily  senna 2 Tablet(s) Oral at bedtime  sodium chloride 0.9% lock flush 200 milliLiter(s) IV Push every 6 hours  spironolactone 25 milliGRAM(s) Oral every 24 hours  thiamine 100 milliGRAM(s) Oral every 24 hours    MEDICATIONS  (PRN):  polyethylene glycol 3350 17 Gram(s) Oral daily PRN Constipation  zolpidem 5 milliGRAM(s) Oral at bedtime PRN Insomnia        FAMILY HISTORY:  Family history of ovarian cancer (Mother)  mother    Family history of ischemic heart disease (IHD) (Father)  father        CBC Full  -  ( 24 May 2025 05:30 )  WBC Count : 3.26 K/uL  RBC Count : 3.16 M/uL  Hemoglobin : 11.0 g/dL  Hematocrit : 33.1 %  Platelet Count - Automated : 131 K/uL  Mean Cell Volume : 104.7 fl  Mean Cell Hemoglobin : 34.8 pg  Mean Cell Hemoglobin Concentration : 33.2 g/dL  Auto Neutrophil # : x  Auto Lymphocyte # : x  Auto Monocyte # : x  Auto Eosinophil # : x  Auto Basophil # : x  Auto Neutrophil % : x  Auto Lymphocyte % : x  Auto Monocyte % : x  Auto Eosinophil % : x  Auto Basophil % : x      05-25    138  |  105  |  14  ----------------------------<  81  3.8   |  21[L]  |  0.87    Ca    7.9[L]      25 May 2025 06:51  Phos  2.7     05-25  Mg     2.0     05-25    TPro  6.5  /  Alb  2.5[L]  /  TBili  0.8  /  DBili  x   /  AST  33  /  ALT  12  /  AlkPhos  91  05-25      Urinalysis Basic - ( 25 May 2025 06:51 )    Color: x / Appearance: x / SG: x / pH: x  Gluc: 81 mg/dL / Ketone: x  / Bili: x / Urobili: x   Blood: x / Protein: x / Nitrite: x   Leuk Esterase: x / RBC: x / WBC x   Sq Epi: x / Non Sq Epi: x / Bacteria: x        Radiology :       ACC: [id number] EXAM:  US DPLX LWR EXT VEINS COMPL BI   ORDERED BY: KATHRIN JACKSON     PROCEDURE DATE:  05/23/2025          INTERPRETATION:  CLINICAL INFORMATION: Bilateral lower extremity   swelling, right worse than left.    COMPARISON: Bilateral lower extremity venous ultrasound exam from   12/31/20.    TECHNIQUE: Duplex sonography of the BILATERAL LOWER extremity veins with   color and spectral Doppler, with and without compression.    FINDINGS:    RIGHT:  Normal compressibility of the RIGHT common femoral, femoral and popliteal   veins.  Doppler examination shows normal spontaneous and phasic flow.  No RIGHT calf vein thrombosis is detected.    LEFT:  Normal compressibility of the LEFT common femoral, femoral and popliteal   veins.  Doppler examination shows normal spontaneous and phasic flow.  No LEFT calf vein thrombosis is detected.    There is soft tissue swelling in both lower extremities.    IMPRESSION:  No evidence of deep venous thrombosis in either lower extremity.    Soft tissue swelling in both lower extremities.      ACC: [id number] EXAM:  CT BRAIN   ORDERED BY: JUNE H REE     PROCEDURE DATE:  05/23/2025          INTERPRETATION:  EXAMINATION: CT HEAD    CLINICAL INDICATION: slurred speech x few months  TECHNIQUE: CT images of the head were obtained without contrast. Coronal   and sagittal reconstructions were performed.  COMPARISON: Whole-body PET/CT 4/2/2018.    FINDINGS:    An incidental arachnoid cyst is noted in the right middle cranial fossa   measuring 4.0 x 1.5 x 2.4 cm. This is a very common incidental finding.   The lesion is stable, although the prior exam was not optimized to   evaluate the brain.    Mild-to-moderate generalized cerebral volume loss, with distention of the   sulci and concomitant ex-vacuo ventricular dilatation. Mild nonspecific   low attenuation in the periventricular and subcortical white matter.    No acute intracranial hemorrhage. No midline shift or herniation. No CT   evidence of acute territorial infarction, although MRI with DWI would be   more sensitive.    Limitedviews of the sinuses and mastoids show mild mucosal thickening   without air-fluid levels, likely chronic.    Bilateral lens implants. Limited views of the orbits and visualized soft   tissues of the neck, face, scalp, skull base, and calvarium are otherwise   unremarkable.    IMPRESSION:    1.  Arachnoid cyst and senescent cerebral changes.  2.  No acute intracranial hemorrhage.      ACC: [id number] EXAM:  XR TIB FIB 2 VIEWS RT   ORDERED BY: JUNE H REE     ACC: [id number] EXAM:  XR FOOT COMP MIN 3 VIEWS RT   ORDERED BY: JUNE H REE     PROCEDURE DATE:  05/23/2025          INTERPRETATION:  CLINICAL INDICATION: Right foot swelling andpain with   redness.    TECHNIQUE: 2 views right tibia fibula; 3 views right foot.    COMPARISON: None.      FINDINGS/  IMPRESSION:  Osteopenia. No osseous destruction or periosteal reaction.. No fracture   or dislocation. There is a hallux valgus alignment with first MTP joint   osteoarthrosis. Tibiotalar joint space narrowing is also identified..   Second through fourth hammertoe deformities. Diffuse soft tissue swelling   of the imaged lower extremity. Vascular calcification is present.          Review of Systems : per HPI         Vital Signs Last 24 Hrs  T(C): 36.7 (25 May 2025 05:58), Max: 36.7 (24 May 2025 20:35)  T(F): 98.1 (25 May 2025 05:58), Max: 98.1 (24 May 2025 20:35)  HR: 52 (25 May 2025 05:58) (52 - 61)  BP: 106/66 (25 May 2025 05:58) (104/64 - 111/74)  BP(mean): --  RR: 18 (25 May 2025 05:58) (17 - 18)  SpO2: 95% (25 May 2025 05:58) (95% - 96%)    Parameters below as of 25 May 2025 05:58  Patient On (Oxygen Delivery Method): room air            Physical Exam:   74 y o man lying comfortably in semi Boyle's position , awake , alert , no acute complaints     Head: normocephalic , atraumatic    Eyes: PERRLA , EOMI , no nystagmus , sclera anicteric    ENT / FACE: neg nasal discharge , uvula midline , no oropharyngeal erythema / exudate    Neck: supple , negative JVD , negative carotid bruits , no thyromegaly    Chest: CTA bilaterally , neg wheeze / rhonchi / rales / crackles / egophany    Cardiovascular: regular rate and rhythm , neg murmurs / rubs / gallops    Abdomen: soft , non distended , no tenderness to palpation in all 4 quadrants ,  normal bowel sounds     Extremities:   3+ pitting edema in bilateral lower extremities from midthigh until feet. RLE is erythematous and warm to touch, no active purulence or drainage, LLE is pale, chronic leg changes bilaterally. Palpable pulses but very difficult given extensive edema.    Neurologic Exam:     Alert and oriented to person , place , date/year , speech fluent w/o dysarthria      Cranial Nerves:           II:                         pupils equal , round and reactive to light , visual fields intact         III/ IV/VI:             extraocular movements intact , neg nystagmus , neg ptosis        V:                        facial sensation intact , V1-3 normal        VII:                      face symmetric , no droop , normal eye closure and smile        VIII:                     hearing intact to finger rub bilaterally        IX and X:             no hoarseness , gag intact , palate/ uvula rise symmetrically        XI:                       SCM / trapezius strength intact bilateral        XII:                      no tongue deviation    Motor Exam:        > 3+/5 x 4 extremities , without drift     Sensation:         intact to light touch x 4 extremities                            no neglect or extinction on double simultaneous testing    DTR:           biceps/brachioradialis: equal                            patella/ankle: equal          neg Babinski     Coordination:            Finger to Nose:  neg dysmetria bilaterally        Gait:  not tested         PM&R Impression: admitted for R LE cellulitis      - deconditioned    - no focal weakness         Recommendations / Plan:       1) Physical / Occupational therapy focusing on therapeutic exercises , equipment evaluation , bed mobility/transfer out of bed evaluation , progressive ambulation with assistive devices prn .    2) Current disposition plan recommendation:    pending functional progress

## 2025-05-25 NOTE — PHYSICAL THERAPY INITIAL EVALUATION ADULT - ADDITIONAL COMMENTS
Pt lived alone in elevator apt with no EVIE. Pt has family that lives near by and is in the process of setting up A.

## 2025-05-25 NOTE — PROGRESS NOTE ADULT - SUBJECTIVE AND OBJECTIVE BOX
GASTROENTEROLOGY PROGRESS NOTE  Patient seen and examined at bedside.      PERTINENT REVIEW OF SYSTEMS:  CONSTITUTIONAL: No weakness, fevers or chills  HEENT: No visual changes; No vertigo or throat pain   GASTROINTESTINAL: As above.  NEUROLOGICAL: No numbness or weakness  SKIN: No itching, burning, rashes, or lesions     Allergies    No Known Allergies    Intolerances      MEDICATIONS:  MEDICATIONS  (STANDING):  apixaban 5 milliGRAM(s) Oral every 12 hours  ceFAZolin   IVPB 1000 milliGRAM(s) IV Intermittent every 8 hours  lactulose Syrup 10 Gram(s) Oral every 8 hours  levothyroxine 100 MICROGram(s) Oral daily  multivitamin 1 Tablet(s) Oral daily  senna 2 Tablet(s) Oral at bedtime  sodium chloride 0.9% lock flush 200 milliLiter(s) IV Push every 6 hours  spironolactone 25 milliGRAM(s) Oral every 24 hours  thiamine 100 milliGRAM(s) Oral every 24 hours    MEDICATIONS  (PRN):  polyethylene glycol 3350 17 Gram(s) Oral daily PRN Constipation  zolpidem 5 milliGRAM(s) Oral at bedtime PRN Insomnia    Vital Signs Last 24 Hrs  T(C): 36.3 (25 May 2025 10:32), Max: 36.7 (24 May 2025 20:35)  T(F): 97.3 (25 May 2025 10:32), Max: 98.1 (24 May 2025 20:35)  HR: 64 (25 May 2025 10:32) (52 - 64)  BP: 106/72 (25 May 2025 10:32) (104/64 - 106/72)  BP(mean): --  RR: 18 (25 May 2025 10:32) (17 - 18)  SpO2: 99% (25 May 2025 10:32) (95% - 99%)    Parameters below as of 25 May 2025 10:32  Patient On (Oxygen Delivery Method): room air        05-24 @ 07:01  -  05-25 @ 07:00  --------------------------------------------------------  IN: 0 mL / OUT: 250 mL / NET: -250 mL      PHYSICAL EXAM:  General: lying in bed, in no acute distress  HEENT: MMM, conjunctiva and sclera clear  Gastrointestinal: Soft non-tender non-distended; No rebound or guarding  Skin: Warm and dry. No obvious rash  Bilateral LE edema, Right more erythema than the left    LABS:                        11.8   3.76  )-----------( 167      ( 25 May 2025 10:50 )             34.7     05-25    138  |  105  |  14  ----------------------------<  81  3.8   |  21[L]  |  0.87    Ca    7.9[L]      25 May 2025 06:51  Phos  2.7     05-25  Mg     2.0     05-25    TPro  6.5  /  Alb  2.5[L]  /  TBili  0.8  /  DBili  x   /  AST  33  /  ALT  12  /  AlkPhos  91  05-25    PT/INR - ( 25 May 2025 06:51 )   PT: 15.7 sec;   INR: 1.35       PTT - ( 25 May 2025 06:51 )  PTT:31.5 sec    Urinalysis Basic - ( 25 May 2025 06:51 )    Color: x / Appearance: x / SG: x / pH: x  Gluc: 81 mg/dL / Ketone: x  / Bili: x / Urobili: x   Blood: x / Protein: x / Nitrite: x   Leuk Esterase: x / RBC: x / WBC x   Sq Epi: x / Non Sq Epi: x / Bacteria: x    Culture - Blood (collected 23 May 2025 13:55)  Source: Blood Blood-Peripheral  Preliminary Report (24 May 2025 22:02):    No growth at 24 hours    Culture - Blood (collected 23 May 2025 13:55)  Source: Blood Blood-Peripheral  Preliminary Report (24 May 2025 22:02):    No growth at 24 hours      RADIOLOGY & ADDITIONAL STUDIES:  Reviewed

## 2025-05-25 NOTE — CONSULT NOTE ADULT - ASSESSMENT
I M    72M w/ PMH of gastric cancer (s/p feeding J-tube), portal vein thrombosis (on Eliquis) AAA (s/p EVAR in 2023), T2DM, hypothyroidism, HTN, anemia, BPH, type 2 endoleak repair, s/p embolization of internal iliac artery branch (10/24), cirrhosis, who presents to the ED with worsening bilateral leg swelling and gen weakness, admitted for further evaluation.      Problem/Plan - 1:  ·  Problem: Weakness.   ·  Plan: Weakness, generalized and leg pain x 4 days, presents with decreased ability to walk, likely i/s/o abdominal distension and significant swelling in b/l LE. Reports taking additional doses of Spironolactone because of edema.   Admission K of 3.7, VBG 2.5.   Suspected to be 2/2  fluid overload with immobility, RLE cellulitis   Stroke workup negative per neuro, imaging findings without acute pathology. recommending MRI     - F/u B12, Folate, Thiamine, TSH/t4  - Obtain orthostatics in AM  - PT/SW  - fall precautions   - MRI brain per neuro recs.    Problem/Plan - 2:  ·  Problem: Cellulitis.   ·  Plan: RLE w/ erythema, warmth, no active purulence or drainage x 4 days  Dopplers b/l LE: No evidence of deep venous thrombosis in either lower extremity. Soft tissue swelling in both lower extremities.  Xray R foot and Tib/Fib: Osteopenia. No osseous destruction or periosteal reaction. No fracture or dislocation. There is a hallux valgus alignment with first MTP joint osteoarthrosis. Tibiotalar joint space narrowing is also identified. Second through fourth hammertoe deformities. Diffuse soft tissue swelling of the imaged lower extremity. Vascular calcification is present.  S/p Zosyn 3.375mg x1, Vancomycin 1000mg x1  Afebrile, no WBC    - C/w  Cefazolin 1g q8h (5/24 - )  - F/u Blood cultures  - Vascular consulted - f/u [facility] duplex in b/l LE.    Problem/Plan - 3:  ·  Problem: Slurred speech.   ·  Plan: Per patient has had slurred speech x several days, no precipitating factor; since resolved  CTH: Arachnoid cyst and senescent cerebral changes. No acute intracranial hemorrhage.  CT Angio H: Left C6 ICA stenosis, 40-50%.  Neurology consulted in ED, no acute concern for stroke, will continue to follow    - Neurology consulted, f/u recs.    Problem/Plan - 4:  ·  Problem: Leg edema.   ·  Plan: Edema of unknown etiology, +4 pitting in b/l LE extending from mid thigh to feet  Despite being on Furosemide and Spironolactone, persistent edema; Alb 2.9 not consistent w/ this level of edema  TTE in 10/24 w/ EF 65%  Possibly i/s/o liver cirrhosis vs chronic CHF     - F/u RUQ U/S  - F/u proBNP, TSH   - F/u TTE  - strict I/Os   - c/w RLE cellulitis management   - vascular consult.    Problem/Plan - 5:  ·  Problem: Liver cirrhosis.   ·  Plan: Recently diagnosed with liver cirrhosis (2025?), per chart note by Dr. Mckeon and review on HIE -  etiology of his cirrhosis is unclear--no work-up was completed on [place] except bloodwork and Fibroscan, the latter uninterpretable due to jordy-hepatic ascites.   , INR 1.18, AST/ALT not quantified/16  CT A/P this year showed cirrhotic morphology, last year had normal findings  ED bedside U/S without pocket to TAP per signout    - F/u abdominal U/S  - Hepatology consult in AM  - Daily MELD score  - Diuretics pending improvement in BMP.    Problem/Plan - 6:  ·  Problem: Gastric adenocarcinoma.   ·  Plan: Hx of gastric cancer treated with chemotherapy 5 years ago now w/ feeding J-tube, previously followed Dr. Saravanan Cabello and Dr. Chema Richmond. Currently followed Heme/onc: Dr. Henry.    - No acute intervention, continue outpatient management  - C/w tube feeds- resume tomorrow night and give insulin accordingly (as in diabetes below).    Problem/Plan - 7:  ·  Problem: Essential hypertension.   ·  Plan: Hx of HTN, was taking Losartan 50mg but has since discontinued. Normotensive.    - No acute intervention, continue to monitor.    Problem/Plan - 8:  ·  Problem: Type 2 diabetes mellitus.   ·  Plan: Follows Dr. Mckeon with very well controlled diabetes, last saw this AM w/ positive levels.   Per HIE - regimen is NPH 18 units at night, Lispro 3 units while on tube feeds    - Start patient on low dose insulin sliding scale tonight   - q6h FS  - Once tube feeds resume, 18 Lantus/3 units lispro to be given 30 min before starting nocturnal feeds (possibly tomorrow)  - Endocrinology aware, will see patient tomorrow.    Problem/Plan - 9:  ·  Problem: Hypothyroidism.   ·  Plan: Home medications: Levothyroxine 100 mcg/day  Endocrinologist: Dr. Mckeon, last saw 4/25    - C/w home medications.    Problem/Plan - 10:  ·  Problem: Anemia.   ·  Plan; Known hx of anemia, Hgb 10.6, baseline 9-10 per HIE.  In chart review has hx of B12 deficiency    - CTM CBC  - Keep active T/S  - Transfuse for Hgb <7.    Problem/Plan - 11:  ·  Problem: Portal vein thrombosis.   ·  Plan: Hx of portal vein thrombosis and has since been on Eliquis 5mg BID    - C/w Eliquis 5mg BID.    Problem/Plan - 12:  ·  Problem: Prophylactic measure.   ·  Plan: F: none  E: Replete as needed Mg>2 and K>4)  N: Consistent Carb and Tube Feeds  DVT: Eliquis 5mg BID  GI: none  Bowel: miralax and senna prn  Activity: Bedrest  Dispo: F.

## 2025-05-25 NOTE — PROGRESS NOTE ADULT - ASSESSMENT
72M w/ PMH of gastric cancer (s/p feeding J-tube), portal vein thrombosis (on Eliquis) AAA (s/p EVAR in 2023), T2DM, hypothyroidism, HTN, anemia, BPH, type 2 endoleak repair s/p embolization of internal iliac artery branch (10/24), cirrhosis, who presented to the ED from his endocrinologists office with worsening bilateral leg swelling and RLE erythema admitted for cellulitis and fluid overload; also reporting slurred speech PTA and undergoing CVA r/o    Plan:  Still with significant b/l LE edema - will give lasix 40 IV, c/w spironolactone, monitor electrolytes, low sodium diet f/u further GI recs  Cirrhosis - etiology unclear, GI requesting para as well as lab workup -- in progress; daily MELD score  Cellulitis of RLE/foot - c/w cefazolin - course based on clinical improvement, at least 5 days   Weakness - f/u orthostatics, PT consult  Slurred speech - now resolved. speech still somewhat slow - perhaps component of HE? CTH neg, neuro requesting MRI non-con; continue with lactulose - titrate to 2-3 bms daily  DM2 - resume tube feeds with insulin dosing as per endocrine recs  Hypothyroidism - c/w home synthroid  Macrocytic anemia - hx B12 deficiency - B12 455; folate wnl; likely 2/2 liver cirrhosis  Leukopenia - mild, likely 2/2 cirrhosis   If anemia/leukopenia persist patient can consider seeing hematology outpatient to r/o other etiologies e.g. MDS  hx PVT- c/w eliquis     Dispo: pending PT eval

## 2025-05-25 NOTE — PHYSICAL THERAPY INITIAL EVALUATION ADULT - PERTINENT HX OF CURRENT PROBLEM, REHAB EVAL
72M w/ PMH of gastric cancer (s/p feeding J-tube), portal vein thrombosis (on Eliquis) AAA (s/p EVAR in 2023), T2DM, hypothyroidism, HTN, anemia, BPH, type 2 endoleak repair s/p embolization of internal iliac artery branch (10/24), cirrhosis, who presented to the ED from his endocrinologists office with worsening bilateral leg swelling and RLE erythema admitted for cellulitis and fluid overload.

## 2025-05-25 NOTE — PHYSICAL THERAPY INITIAL EVALUATION ADULT - GAIT DEVIATIONS NOTED, PT EVAL
decreased omayra/increased time in double stance/decreased step length/decreased weight-shifting ability

## 2025-05-25 NOTE — PHYSICAL THERAPY INITIAL EVALUATION ADULT - GENERAL OBSERVATIONS, REHAB EVAL
PT IE completed. Pt cleared for PT by GAVINO Kendall. Pt received sitting OOB in chair eating breakfast, on RA, +heplock ,in NAD, agreeable to PT.

## 2025-05-26 PROCEDURE — 93975 VASCULAR STUDY: CPT | Mod: 26

## 2025-05-26 PROCEDURE — 99232 SBSQ HOSP IP/OBS MODERATE 35: CPT

## 2025-05-26 PROCEDURE — 99231 SBSQ HOSP IP/OBS SF/LOW 25: CPT | Mod: GC

## 2025-05-26 PROCEDURE — 99233 SBSQ HOSP IP/OBS HIGH 50: CPT

## 2025-05-26 RX ORDER — SODIUM CHLORIDE 9 G/1000ML
1000 INJECTION, SOLUTION INTRAVENOUS
Refills: 0 | Status: DISCONTINUED | OUTPATIENT
Start: 2025-05-26 | End: 2025-06-04

## 2025-05-26 RX ORDER — DEXTROSE 50 % IN WATER 50 %
25 SYRINGE (ML) INTRAVENOUS ONCE
Refills: 0 | Status: DISCONTINUED | OUTPATIENT
Start: 2025-05-26 | End: 2025-06-04

## 2025-05-26 RX ORDER — INSULIN LISPRO 100 U/ML
INJECTION, SOLUTION INTRAVENOUS; SUBCUTANEOUS
Refills: 0 | Status: DISCONTINUED | OUTPATIENT
Start: 2025-05-26 | End: 2025-05-27

## 2025-05-26 RX ORDER — INSULIN LISPRO 100 U/ML
INJECTION, SOLUTION INTRAVENOUS; SUBCUTANEOUS AT BEDTIME
Refills: 0 | Status: DISCONTINUED | OUTPATIENT
Start: 2025-05-26 | End: 2025-06-04

## 2025-05-26 RX ORDER — SODIUM PHOSPHATE,DIBASIC DIHYD
15 POWDER (GRAM) MISCELLANEOUS ONCE
Refills: 0 | Status: COMPLETED | OUTPATIENT
Start: 2025-05-26 | End: 2025-05-26

## 2025-05-26 RX ORDER — DEXTROSE 50 % IN WATER 50 %
15 SYRINGE (ML) INTRAVENOUS ONCE
Refills: 0 | Status: DISCONTINUED | OUTPATIENT
Start: 2025-05-26 | End: 2025-06-04

## 2025-05-26 RX ORDER — GLUCAGON 3 MG/1
1 POWDER NASAL ONCE
Refills: 0 | Status: DISCONTINUED | OUTPATIENT
Start: 2025-05-26 | End: 2025-06-04

## 2025-05-26 RX ORDER — DEXTROSE 50 % IN WATER 50 %
12.5 SYRINGE (ML) INTRAVENOUS ONCE
Refills: 0 | Status: DISCONTINUED | OUTPATIENT
Start: 2025-05-26 | End: 2025-06-04

## 2025-05-26 RX ADMIN — LACTULOSE 10 GRAM(S): 10 SOLUTION ORAL at 22:23

## 2025-05-26 RX ADMIN — Medication 100 MILLIGRAM(S): at 22:25

## 2025-05-26 RX ADMIN — APIXABAN 5 MILLIGRAM(S): 2.5 TABLET, FILM COATED ORAL at 11:05

## 2025-05-26 RX ADMIN — Medication 25 MILLIGRAM(S): at 15:21

## 2025-05-26 RX ADMIN — Medication 100 MILLIGRAM(S): at 07:10

## 2025-05-26 RX ADMIN — Medication 100 MILLIGRAM(S): at 07:11

## 2025-05-26 RX ADMIN — Medication 100 MILLIGRAM(S): at 02:31

## 2025-05-26 RX ADMIN — Medication 14 UNIT(S): at 19:35

## 2025-05-26 RX ADMIN — Medication 100 MICROGRAM(S): at 07:10

## 2025-05-26 RX ADMIN — APIXABAN 5 MILLIGRAM(S): 2.5 TABLET, FILM COATED ORAL at 22:24

## 2025-05-26 RX ADMIN — LACTULOSE 10 GRAM(S): 10 SOLUTION ORAL at 02:31

## 2025-05-26 RX ADMIN — LACTULOSE 10 GRAM(S): 10 SOLUTION ORAL at 07:11

## 2025-05-26 RX ADMIN — Medication 5 MILLIGRAM(S): at 19:37

## 2025-05-26 RX ADMIN — Medication 1 TABLET(S): at 11:05

## 2025-05-26 RX ADMIN — Medication 2 TABLET(S): at 22:24

## 2025-05-26 RX ADMIN — Medication 62.5 MILLIMOLE(S): at 15:21

## 2025-05-26 NOTE — PROGRESS NOTE ADULT - ASSESSMENT
I M    72M w/ PMH of gastric cancer (s/p feeding J-tube), portal vein thrombosis (on Eliquis) AAA (s/p EVAR in 2023), T2DM, hypothyroidism, HTN, anemia, BPH, type 2 endoleak repair, s/p embolization of internal iliac artery branch (10/24), cirrhosis, who presents to the ED with worsening bilateral leg swelling and gen weakness, admitted for further evaluation.      Nutritional Assessment:  · Nutritional Assessment  This patient has been assessed with a concern for Malnutrition and has been determined to have a diagnosis/diagnoses of Severe protein-calorie malnutrition.    This patient is being managed with:   Diet Consistent Carbohydrate w/Evening Snack-  Entered: May 24 2025 12:04AM    Problem/Plan - 1:  ·  Problem: Weakness.   ·  Plan: Weakness, generalized and leg pain x 4 days, presents with decreased ability to walk, likely i/s/o abdominal distension and significant swelling in b/l LE. Reports taking additional doses of Spironolactone because of edema.   Admission K of 3.7, VBG 2.5.   Suspected to be 2/2  fluid overload with immobility, RLE cellulitis   Stroke workup negative per neuro, imaging findings without acute pathology. recommending MRI   Nutritional workup so far negative  - f/u orthostatics  - PT/SW  - fall precautions   - MRI brain per neuro recs.    Problem/Plan - 2:  ·  Problem: Cellulitis.   ·  Plan: RLE w/ erythema, warmth, no active purulence or drainage x 4 days  Dopplers b/l LE: No evidence of deep venous thrombosis in either lower extremity. Soft tissue swelling in both lower extremities.  Xray R foot and Tib/Fib: Osteopenia. No osseous destruction or periosteal reaction. No fracture or dislocation. There is a hallux valgus alignment with first MTP joint osteoarthrosis. Tibiotalar joint space narrowing is also identified. Second through fourth hammertoe deformities. Diffuse soft tissue swelling of the imaged lower extremity. Vascular calcification is present.  S/p Zosyn 3.375mg x1, Vancomycin 1000mg x1  Afebrile, no WBC  - C/w  Cefazolin 1g q8h (5/24 - )  - F/u Blood cultures  - Vascular consulted - f/u [facility] duplex in b/l LE.    Problem/Plan - 3:  ·  Problem: Slurred speech.   ·  Plan: Per patient has had slurred speech x several days, no precipitating factor; since resolved  CTH: Arachnoid cyst and senescent cerebral changes. No acute intracranial hemorrhage.  CT Angio H: Left C6 ICA stenosis, 40-50%.  Neurology consulted in ED, no acute concern for stroke, will continue to follow  - Neurology consulted, f/u recs  - pending MR brain.    Problem/Plan - 4:  ·  Problem: Leg edema.   ·  Plan: Edema of unknown etiology, +4 pitting in b/l LE extending from mid thigh to feet  Despite being on Furosemide and Spironolactone, persistent edema; Alb 2.9 not consistent w/ this level of edema  TTE in 10/24 w/ EF 65%  Possibly i/s/o liver cirrhosis vs chronic CHF   - F/u proBNP, TSH   - F/u TTE  - strict I/Os   - c/w RLE cellulitis management   - vascular consult  - c/w lasix and spironolactone.    Problem/Plan - 5:  ·  Problem: Liver cirrhosis.   ·  Plan: Recently diagnosed with liver cirrhosis (2025?), per chart note by Dr. Mckeon and review on HIE -  etiology of his cirrhosis is unclear--no work-up was completed on [place] except bloodwork and Fibroscan, the latter uninterpretable due to jordy-hepatic ascites.   , INR 1.18, AST/ALT not quantified/16  CT A/P this year showed cirrhotic morphology, last year had normal findings  ED bedside U/S without pocket to TAP per signout  Abd US showing ascites.  - Hepatology consulted, appreciate recs  - Daily MELD score  - Diuretics pending improvement in BMP  - restarted home lasix and spironolactone.    Problem/Plan - 6:  ·  Problem: Gastric adenocarcinoma.   ·  Plan: Hx of gastric cancer treated with chemotherapy 5 years ago now w/ feeding J-tube, previously followed Dr. Saravanan Cabello and Dr. Chema Richmond. Currently followed Heme/onc: Dr. Henry.  - No acute intervention, continue outpatient management  - C/w tube feeds- resumed and give insulin accordingly (as in diabetes below).    Problem/Plan - 7:  ·  Problem: Essential hypertension.   ·  Plan: Hx of HTN, was taking Losartan 50mg but has since discontinued. Normotensive.  - No acute intervention, continue to monitor.    Problem/Plan - 8:  ·  Problem: Type 2 diabetes mellitus.   ·  Plan: Follows Dr. Mckeon with very well controlled diabetes, last saw this AM w/ positive levels.   Per HIE - regimen is NPH 18 units at night, Lispro 3 units while on tube feeds  - Start patient on low dose insulin sliding scale tonight   - q6h FS  - Once tube feeds resume, started lantus per endocrine recs.    Problem/Plan - 9:  ·  Problem: Hypothyroidism.   ·  Plan: Home medications: Levothyroxine 100 mcg/day  Endocrinologist: Dr. Mckeon, last saw 4/25  - C/w home medications.    Problem/Plan - 10:  ·  Problem: Anemia.   ·  Plan; Known hx of anemia, Hgb 10.6, baseline 9-10 per HIE.  In chart review has hx of B12 deficiency  - CTM CBC  - Keep active T/S  - Transfuse for Hgb <7.    Problem/Plan - 11:  ·  Problem: Portal vein thrombosis.   ·  Plan: Hx of portal vein thrombosis and has since been on Eliquis 5mg BID  - C/w Eliquis 5mg BID.    Problem/Plan - 12:  ·  Problem: Prophylactic measure.   ·  Plan: F: none  E: Replete as needed Mg>2 and K>4)  N: Consistent Carb and Tube Feeds  DVT: Eliquis 5mg BID  GI: none  Bowel: miralax and senna prn  Activity: Bedrest  Dispo: [facility].

## 2025-05-26 NOTE — PROGRESS NOTE ADULT - PROBLEM SELECTOR PLAN 2
RLE w/ erythema, warmth, no active purulence or drainage x 4 days  Dopplers b/l LE: No evidence of deep venous thrombosis in either lower extremity. Soft tissue swelling in both lower extremities.  Xray R foot and Tib/Fib: Osteopenia. No osseous destruction or periosteal reaction. No fracture or dislocation. There is a hallux valgus alignment with first MTP joint osteoarthrosis. Tibiotalar joint space narrowing is also identified. Second through fourth hammertoe deformities. Diffuse soft tissue swelling of the imaged lower extremity. Vascular calcification is present.  S/p Zosyn 3.375mg x1, Vancomycin 1000mg x1  Afebrile, no WBC  - C/w  Cefazolin 1g q8h (5/24 - )  - f/u Blood cultures

## 2025-05-26 NOTE — PROGRESS NOTE ADULT - SUBJECTIVE AND OBJECTIVE BOX
GASTROENTEROLOGY PROGRESS NOTE  Patient seen and examined at bedside.  doing well this morning  work up pending  tolerating PO    PERTINENT REVIEW OF SYSTEMS:  CONSTITUTIONAL: No weakness, fevers or chills  HEENT: No visual changes; No vertigo or throat pain   GASTROINTESTINAL: As above.  NEUROLOGICAL: No numbness or weakness  SKIN: No itching, burning, rashes, or lesions     Allergies    No Known Allergies    Intolerances      MEDICATIONS:  MEDICATIONS  (STANDING):  apixaban 5 milliGRAM(s) Oral every 12 hours  ceFAZolin   IVPB 1000 milliGRAM(s) IV Intermittent every 8 hours  dextrose 5%. 1000 milliLiter(s) (50 mL/Hr) IV Continuous <Continuous>  dextrose 5%. 1000 milliLiter(s) (100 mL/Hr) IV Continuous <Continuous>  dextrose 50% Injectable 25 Gram(s) IV Push once  dextrose 50% Injectable 12.5 Gram(s) IV Push once  dextrose 50% Injectable 25 Gram(s) IV Push once  glucagon  Injectable 1 milliGRAM(s) IntraMuscular once  insulin lispro (ADMELOG) corrective regimen sliding scale   SubCutaneous three times a day before meals  insulin lispro (ADMELOG) corrective regimen sliding scale   SubCutaneous at bedtime  insulin NPH human recombinant 14 Unit(s) SubCutaneous daily  lactulose Syrup 10 Gram(s) Oral every 8 hours  levothyroxine 100 MICROGram(s) Oral daily  multivitamin 1 Tablet(s) Oral daily  senna 2 Tablet(s) Oral at bedtime  spironolactone 25 milliGRAM(s) Oral every 24 hours  thiamine 100 milliGRAM(s) Oral every 24 hours    MEDICATIONS  (PRN):  dextrose Oral Gel 15 Gram(s) Oral once PRN Blood Glucose LESS THAN 70 milliGRAM(s)/deciliter  polyethylene glycol 3350 17 Gram(s) Oral daily PRN Constipation  zolpidem 5 milliGRAM(s) Oral at bedtime PRN Insomnia    Vital Signs Last 24 Hrs  T(C): 36.4 (26 May 2025 15:17), Max: 36.7 (26 May 2025 06:39)  T(F): 97.6 (26 May 2025 15:17), Max: 98.1 (26 May 2025 06:39)  HR: 78 (26 May 2025 15:17) (59 - 86)  BP: 103/73 (26 May 2025 15:17) (95/60 - 112/65)  BP(mean): 83 (26 May 2025 06:39) (83 - 83)  RR: 16 (26 May 2025 15:17) (16 - 17)  SpO2: 99% (26 May 2025 15:17) (93% - 99%)    Parameters below as of 26 May 2025 15:17  Patient On (Oxygen Delivery Method): room air    05-25 @ 07:01  -  05-26 @ 07:00  --------------------------------------------------------  IN: 470 mL / OUT: 0 mL / NET: 470 mL      PHYSICAL EXAM:  General: lying in bed, in no acute distress  HEENT: MMM, conjunctiva and sclera clear  Gastrointestinal: Soft non-tender non-distended; No rebound or guarding  Skin: Warm and dry. No obvious rash  Bilateral LE edema, Right more erythema than the left  LABS:                        11.5   3.96  )-----------( 161      ( 26 May 2025 12:00 )             33.5     05-26    136  |  102  |  14  ----------------------------<  135[H]  4.6   |  23  |  0.85    Ca    8.3[L]      26 May 2025 12:00  Phos  2.2     05-26  Mg     2.1     05-26    TPro  6.2  /  Alb  2.7[L]  /  TBili  0.5  /  DBili  x   /  AST  see note  /  ALT  8[L]  /  AlkPhos  100  05-26    PT/INR - ( 25 May 2025 06:51 )   PT: 15.7 sec;   INR: 1.35          PTT - ( 25 May 2025 06:51 )  PTT:31.5 sec      Urinalysis Basic - ( 26 May 2025 12:00 )    Color: x / Appearance: x / SG: x / pH: x  Gluc: 135 mg/dL / Ketone: x  / Bili: x / Urobili: x   Blood: x / Protein: x / Nitrite: x   Leuk Esterase: x / RBC: x / WBC x   Sq Epi: x / Non Sq Epi: x / Bacteria: x                RADIOLOGY & ADDITIONAL STUDIES:  Reviewed

## 2025-05-26 NOTE — PROGRESS NOTE ADULT - SUBJECTIVE AND OBJECTIVE BOX
INTERVAL HPI/OVERNIGHT EVENTS:    Patient is a 74y old  Male who presents with a chief complaint of RLE cellulitis (26 May 2025 10:31)    Pt seen at the bedside this afternoon and events since admission were reviewed.  Reports feeling distinctly better, and looks better.  Glucoses had been running somewhat low in the AM without overnight tube feeds or NPH insulin--likely due to his liver disease.  Feeds were started last night, with 18 NPH/3 lispro to cover them, but he was hypoglycemic at 10 PM and again this morning.  The feeds are somewhat lower in concentration (1.2 rather than 1.4 calorie/cc [employer]), but this would not fully explain the drop in blood sugar.  His requirements at home were also decreasing over the past few months.  Of note with his feeds last night was that he had no bowel movement until this morning.  He had been complaining of needing to get up to have a BM nearly every hour when at home.    His PO intake remains minimal at this point, but this is baseline for him--he has essentially no gastric pouch.    He is on Ancef for the RLE cellulitis, which appears improved.  He denies any pain in the leg.  Arterial Dopplers were significant for impaired circulation below the knee on the right, with monophasic waveforms below the trifurcation.  His potassium level is now normal, though his albumin level is back down to 2.5 gm/dl      Pt reports the following symptoms:    CONSTITUTIONAL:  Negative fever or chills, feels well, appetite is minimal  EYES:  Negative  blurry vision or double vision  CARDIOVASCULAR:  Negative for chest pain or palpitations  RESPIRATORY:  Reports moderate WATKINS, without associated chest pain or tightness.  No cough, wheezing  GASTROINTESTINAL:  See comments in the HPI re: bowel movements.  Negative for nausea, vomiting, diarrhea, constipation, or abdominal pain  GENITOURINARY:  Negative frequency, urgency or dysuria  NEUROLOGIC:  No headache, confusion, dizziness, lightheadedness.  Complains of leg weakness when attempting to ambulate    MEDICATIONS  (STANDING):  apixaban 5 milliGRAM(s) Oral every 12 hours  ceFAZolin   IVPB 1000 milliGRAM(s) IV Intermittent every 8 hours  dextrose 5%. 1000 milliLiter(s) (100 mL/Hr) IV Continuous <Continuous>  dextrose 5%. 1000 milliLiter(s) (50 mL/Hr) IV Continuous <Continuous>  dextrose 50% Injectable 25 Gram(s) IV Push once  dextrose 50% Injectable 12.5 Gram(s) IV Push once  dextrose 50% Injectable 25 Gram(s) IV Push once  glucagon  Injectable 1 milliGRAM(s) IntraMuscular once  insulin lispro (ADMELOG) corrective regimen sliding scale   SubCutaneous three times a day before meals  insulin lispro (ADMELOG) corrective regimen sliding scale   SubCutaneous at bedtime  insulin NPH human recombinant 14 Unit(s) SubCutaneous daily  lactulose Syrup 10 Gram(s) Oral every 8 hours  levothyroxine 100 MICROGram(s) Oral daily  multivitamin 1 Tablet(s) Oral daily  senna 2 Tablet(s) Oral at bedtime  sodium phosphate 15 milliMole(s)/250 mL IVPB 15 milliMole(s) IV Intermittent once  spironolactone 25 milliGRAM(s) Oral every 24 hours  thiamine 100 milliGRAM(s) Oral every 24 hours    MEDICATIONS  (PRN):  dextrose Oral Gel 15 Gram(s) Oral once PRN Blood Glucose LESS THAN 70 milliGRAM(s)/deciliter  polyethylene glycol 3350 17 Gram(s) Oral daily PRN Constipation  zolpidem 5 milliGRAM(s) Oral at bedtime PRN Insomnia      PHYSICAL EXAM  Vital Signs Last 24 Hrs  T(C): 36.2 (26 May 2025 11:48), Max: 36.7 (26 May 2025 06:39)  T(F): 97.1 (26 May 2025 11:48), Max: 98.1 (26 May 2025 06:39)  HR: 86 (26 May 2025 11:48) (59 - 86)  BP: 112/65 (26 May 2025 11:48) (95/60 - 112/65)  BP(mean): 83 (26 May 2025 06:39) (83 - 83)  RR: 17 (26 May 2025 11:48) (17 - 17)  SpO2: 93% (26 May 2025 11:48) (93% - 97%)    Parameters below as of 26 May 2025 11:48  Patient On (Oxygen Delivery Method): room air        Constitutional: wn/wd in NAD.   HEENT: NCAT, MMM, EOMI, no proptosis or lid retraction  Neck: no thyromegaly or palpable thyroid nodules   Respiratory: lungs CTAB.  Cardiovascular: regular rhythm, normal S1 and S2, no audible murmurs.  Pre-tibial edema has resolved.  Still with 1+ ankles edema.  GI: soft, NT/ND, no masses/HSM appreciated.  J-tube in the left mid-abdomen.  Gauze dressing over the area of wound dehiscence in the midline  Neurology: no tremors or asterixis  Skin: R lower leg mildly erythematous and warm.    Psychiatric: AAO x 3, normal affect/mood.    LABS:                        11.5   3.96  )-----------( 161      ( 26 May 2025 12:00 )             33.5     05-26    136  |  102  |  14  ----------------------------<  135[H]  4.6   |  23  |  0.85    Ca    8.3[L]      26 May 2025 12:00  Phos  2.2     05-26  Mg     2.1     05-26    TPro  6.2  /  Alb  2.7[L]  /  TBili  0.5  /  DBili  x   /  AST  see note  /  ALT  8[L]  /  AlkPhos  100  05-26    PT/INR - ( 25 May 2025 06:51 )   PT: 15.7 sec;   INR: 1.35          PTT - ( 25 May 2025 06:51 )  PTT:31.5 sec  Urinalysis Basic - ( 26 May 2025 12:00 )    Color: x / Appearance: x / SG: x / pH: x  Gluc: 135 mg/dL / Ketone: x  / Bili: x / Urobili: x   Blood: x / Protein: x / Nitrite: x   Leuk Esterase: x / RBC: x / WBC x   Sq Epi: x / Non Sq Epi: x / Bacteria: x      Thyroid Stimulating Hormone, Serum: 4.850 uIU/mL (05-25 @ 06:51)  Thyroid Stimulating Hormone, Serum: 2.080 uIU/mL (05-24 @ 05:30)      HbA1C:   CAPILLARY BLOOD GLUCOSE      POCT Blood Glucose.: 78 mg/dL (26 May 2025 12:23)  POCT Blood Glucose.: 65 mg/dL (26 May 2025 08:57)  POCT Blood Glucose.: 78 mg/dL (25 May 2025 23:08)  POCT Blood Glucose.: 63 mg/dL (25 May 2025 21:58)  POCT Blood Glucose.: 124 mg/dL (25 May 2025 17:31)      A/P: 74y Male with history of DM type II, hypothyroidism, gastric CA and recently diagnosed hepatic cirrhosis who presented with weakness and RLE cellulitis      1.  DM - Was hypoglycemic last night with NPH 18/lispro 3 but apparently received this at 10 PM, not when feeds started.  --To decrease to 14 NPH, no lispro.  Should be given 30 min before feeds are started, and held if feeds are not to be started  --Continue current feeding protocol--[employer] 1.2 at 110 cc/hr X 10 hours    2) Hypothyroidism:  Has one normal and one mildly elevated TSH level since admission.  His recent outpatient TFTs were normal and his levothyroxine dose stable.  --Continue levothyroxine 100 mcg/day  --Repeat free T4 and TSH on 5/28    3) RLE cellulits--resolving on antibiotics    4) Anti-coagulation--Eliquis was apparently started several years ago for the portal vein thrombosis.  Does he need to continue this?--shahida since his elevated INR from the cirrhosis    He is also due for a change in his feeding tube.  This is usually done by IR at [facility].  Please contact IR here to see if they will do this    Plans discussed in detail with the 7WO team

## 2025-05-26 NOTE — PROGRESS NOTE ADULT - SUBJECTIVE AND OBJECTIVE BOX
Physical Medicine and Rehabilitation Progress Note :       Patient is a 74y old  Male who presents with a chief complaint of RLE cellulitis (25 May 2025 15:37)      HPI:  HPI: 72M w/ PMH of gastric cancer (s/p feeding J-tube), portal vein thrombosis (on Eliquis) AAA (s/p EVAR in 2023), T2DM, hypothyroidism, HTN, anemia, BPH, type 2 endoleak repair, s/p embolization of internal iliac artery branch (10/24), cirrhosis, who presents to the ED with worsening bilateral leg swelling and gen weakness. Patient reports bilateral leg swelling for a couple of weeks, worsening in severity for the past 4 days. He was in Dr. Richards's office and was told to go to the emergency room because of swelling and discoloration of his legs. He is on furosemide and spironolactone and doubled his spironolactone dose in order to help with the swelling. He also reports erythema of the RLE x 4 days, no injury or falls. Additionally, he and his daughter report slurred speech for the past 4 days. He reports no fevers, chills, headache, chest pain, shortness of breath, abdominal pain, nausea, vomiting, diarrhea, constipation.     Medications: Eliquis 5mg BID, Spironolactone 25mg qd, Furosemide 40mg qd, Levothyroxine 100mg qd, NPH 18, Lispro 3, Ambien 5mg qd    In the ED,  VS: T 98-98.3, HR 58-80, BP /66-67, RR 18, SpO2 % RA  Labs: H/H 10.6/29.7, PT 13.6, INR 1.18, lactate 2.4 ->1.1, Bili 1.7   VBG: pH 7.43, HCO3 21  Limited RVP negative  CTH: Arachnoid cyst and senescent cerebral changes. No acute intracranial hemorrhage.  CT Angio H: Left C6 ICA stenosis, 40-50%.  Dopplers b/l LE: No evidence of deep venous thrombosis in either lower extremity. Soft tissue swelling in both lower extremities.  CXray: Heart size, mediastinal and hilar contours are unchanged and within normal limits. Surgical clips are again noted in the left supraclavicular/left apical region as well as in the LUQ The lung zones are clear. There is a trace left sided pneumothorax. No pnemothorax seen.  Xray R foot and Tib/Fib: Osteopenia. No osseous destruction or periosteal reaction. No fracture or dislocation. There is a hallux valgus alignment with first MTP joint osteoarthrosis. Tibiotalar joint space narrowing is also identified. Second through fourth hammertoe deformities. Diffuse soft tissue swelling of the imaged lower extremity. Vascular calcification is present.  EKG: PACs  Orders: Zosyn 3.375mg x1, Vancomycin 1000mg x1, NaCl 500 cc bolus x1  Consults: Neurology (23 May 2025 18:32)                            11.8   3.76  )-----------( 167      ( 25 May 2025 10:50 )             34.7       05-25    139  |  105  |  14  ----------------------------<  132[H]  4.0   |  23  |  0.94    Ca    8.1[L]      25 May 2025 18:26  Phos  2.7     05-25  Mg     2.1     05-25    TPro  6.5  /  Alb  2.5[L]  /  TBili  0.8  /  DBili  x   /  AST  33  /  ALT  12  /  AlkPhos  91  05-25    Vital Signs Last 24 Hrs  T(C): 36.7 (26 May 2025 06:39), Max: 36.7 (26 May 2025 06:39)  T(F): 98.1 (26 May 2025 06:39), Max: 98.1 (26 May 2025 06:39)  HR: 66 (26 May 2025 06:39) (59 - 66)  BP: 105/72 (26 May 2025 06:39) (95/60 - 106/72)  BP(mean): 83 (26 May 2025 06:39) (83 - 83)  RR: 17 (26 May 2025 06:39) (17 - 18)  SpO2: 93% (26 May 2025 06:39) (93% - 99%)    Parameters below as of 26 May 2025 06:39  Patient On (Oxygen Delivery Method): room air        MEDICATIONS  (STANDING):  apixaban 5 milliGRAM(s) Oral every 12 hours  ceFAZolin   IVPB 1000 milliGRAM(s) IV Intermittent every 8 hours  dextrose 5%. 1000 milliLiter(s) (50 mL/Hr) IV Continuous <Continuous>  dextrose 5%. 1000 milliLiter(s) (100 mL/Hr) IV Continuous <Continuous>  dextrose 50% Injectable 25 Gram(s) IV Push once  dextrose 50% Injectable 12.5 Gram(s) IV Push once  dextrose 50% Injectable 25 Gram(s) IV Push once  glucagon  Injectable 1 milliGRAM(s) IntraMuscular once  insulin lispro (ADMELOG) corrective regimen sliding scale   SubCutaneous three times a day before meals  lactulose Syrup 10 Gram(s) Oral every 8 hours  levothyroxine 100 MICROGram(s) Oral daily  multivitamin 1 Tablet(s) Oral daily  senna 2 Tablet(s) Oral at bedtime  spironolactone 25 milliGRAM(s) Oral every 24 hours  thiamine 100 milliGRAM(s) Oral every 24 hours    MEDICATIONS  (PRN):  dextrose Oral Gel 15 Gram(s) Oral once PRN Blood Glucose LESS THAN 70 milliGRAM(s)/deciliter  polyethylene glycol 3350 17 Gram(s) Oral daily PRN Constipation  zolpidem 5 milliGRAM(s) Oral at bedtime PRN Insomnia      T(C): 36.7 (05-26-25 @ 06:39), Max: 36.7 (05-26-25 @ 06:39)  HR: 66 (05-26-25 @ 06:39) (59 - 66)  BP: 105/72 (05-26-25 @ 06:39) (95/60 - 106/72)  RR: 17 (05-26-25 @ 06:39) (17 - 18)  SpO2: 93% (05-26-25 @ 06:39) (93% - 99%)    Physical Exam:     74 y o man lying comfortably in semi Boyle's position , awake , alert , no acute complaints     Head: normocephalic , atraumatic    Eyes: PERRLA , EOMI , no nystagmus , sclera anicteric    ENT / FACE: neg nasal discharge , uvula midline , no oropharyngeal erythema / exudate    Neck: supple , negative JVD , negative carotid bruits , no thyromegaly    Chest: CTA bilaterally , neg wheeze / rhonchi / rales / crackles / egophany    Cardiovascular: regular rate and rhythm , neg murmurs / rubs / gallops    Abdomen: soft , non distended , no tenderness to palpation in all 4 quadrants ,  normal bowel sounds     Extremities:   3+ pitting edema in bilateral lower extremities from midthigh until feet. RLE is erythematous and warm to touch, no active purulence or drainage, LLE is pale, chronic leg changes bilaterally. Palpable pulses but very difficult given extensive edema.    Neurologic Exam:     Alert and oriented to person , place , date/year , speech fluent w/o dysarthria      Cranial Nerves:           II:                         pupils equal , round and reactive to light , visual fields intact         III/ IV/VI:             extraocular movements intact , neg nystagmus , neg ptosis        V:                        facial sensation intact , V1-3 normal        VII:                      face symmetric , no droop , normal eye closure and smile        VIII:                     hearing intact to finger rub bilaterally        IX and X:             no hoarseness , gag intact , palate/ uvula rise symmetrically        XI:                       SCM / trapezius strength intact bilateral        XII:                      no tongue deviation    Motor Exam:        > 3+/5 x 4 extremities , without drift     Sensation:         intact to light touch x 4 extremities                            no neglect or extinction on double simultaneous testing    DTR:           biceps/brachioradialis: equal                            patella/ankle: equal          neg Babinski     Coordination:            Finger to Nose:  neg dysmetria bilaterally      Initial Functional Status Assessment :       Previous Level of Function:     · Ambulation Skills  independent  · Transfer Skills  independent  · ADL Skills  independent  · Work/Leisure Activity  independent  · Additional Comments  Pt lived alone in elevator [facility] with no EVIE. Pt has family that lives near by and is in the process of setting up [facility].    Cognitive Status Examination:   · Orientation  oriented to person, place, time and situation  · Level of Consciousness  alert  · Follows Commands and Answers Questions  100% of the time  · Personal Safety and Judgment  intact  · Short Term Memory  intact  · Long Term Memory  intact    Range of Motion Exam:   · Range of Motion Examination  bilateral upper extremity ROM was WFL (within functional limits); bilateral lower extremity ROM was WFL (within functional limits)    Manual Muscle Testing:   · Manual Muscle Testing Results  Grossly 3+/5 from functional mobility assessment against gravity.    Bed Mobility: Rolling/Turning:     · Level of [place]  pt received and returned OOB in chair    Transfer: Sit to Stand:     · Level of [place]  contact guard  · Physical Assist/Nonphysical Assist  1 person assist; nonverbal cues (demo/gestures); verbal cues  · Weight-Bearing Restrictions  full weight-bearing  · Assistive Device  rolling walker    Transfer: Stand to Sit:     · Level of [place]  contact guard  · Physical Assist/Nonphysical Assist  1 person assist; nonverbal cues (demo/gestures); verbal cues  · Weight-Bearing Restrictions  full weight-bearing  · Assistive Device  rolling walker    Sit/Stand Transfer Safety Analysis:     · Transfer Safety Concerns Noted  decreased balance during turns; decreased weight-shifting ability  · Impairments Contributing to Impaired Transfers  impaired balance; decreased strength    Gait Skills:     · Level of [place]  contact guard  · Physical Assist/Nonphysical Assist  1 person assist; nonverbal cues (demo/gestures); verbal cues  · Weight-Bearing Restrictions  full weight-bearing  · Assistive Device  rolling walker  · Gait Distance  50 feet; x2    Gait Analysis:     · Gait Deviations Noted  decreased omayra; decreased weight-shifting ability; increased time in double stance; decreased step length  · Impairments Contributing to Gait Deviations  impaired balance; decreased strength    Balance Skills Assessment:     · Sitting Balance: Static  good balance  · Sitting Balance: Dynamic  good balance  · Sit-to-Stand Balance  fair balance  · Standing Balance: Static  fair balance  · Standing Balance: Dynamic  fair balance    Treatment Location:   · Comments  Pt reports cold sensation in B UE and B LE s/o chemo. All CN testing symmetrical; III, IV, VI: Extraocular movements intact without nystagmus, VII: Face is symmetric with normal eye close and smile, VIII: Hearing is grossly intact, XI: head turning and shoulder shrug intact b/l XII: Tongue protrudes midline. Quadrant test; symmetrical with no deficits. Visual tracking/scanning intact.    Clinical Impressions:   · Criteria for Skilled Therapeutic Interventions  impairments found; functional limitations in following categories; risk reduction/prevention; rehab potential; therapy frequency; anticipated discharge recommendation  · Impairments Found (describe specific impairments)  aerobic capacity/endurance; gait, locomotion, and balance; gross motor; muscle strength  · Functional Limitations in Following Categories (describe specific limitations)  self-care; home management; community/leisure  · Risk Reduction/Prevention (Describe Specific Areas of risk reduction/prevention)  risk factors  · Risk Areas  fall        PM&R Impression : as above    Current disposition plan recommendation :   d/c home with home physical therapy , home assist

## 2025-05-26 NOTE — PROGRESS NOTE ADULT - PROBLEM SELECTOR PLAN 5
Recently diagnosed with liver cirrhosis (2025?), per chart note by Dr. Mckeon and review on [facility] -  etiology of his cirrhosis is unclear--no work-up was completed on [place] except bloodwork and Fibroscan, the latter uninterpretable due to jordy-hepatic ascites.   , INR 1.18, AST/ALT not quantified/16  CT A/P this year showed cirrhotic morphology, last year had normal findings  ED bedside U/S without pocket to TAP per signout  Abd US showing ascites.  - Hepatology consulted, appreciate recs  - plan for diagnostic para  - Daily MELD score  - Diuretics pending improvement in BMP  - restarted home lasix and spironolactone

## 2025-05-26 NOTE — PROGRESS NOTE ADULT - ASSESSMENT
72M w/ PMH of gastric cancer (s/p feeding J-tube), portal vein thrombosis (on Eliquis) AAA (s/p EVAR in 2023), T2DM, hypothyroidism, HTN, anemia, BPH, type 2 endoleak repair, s/p embolization of internal iliac artery branch (10/24), cirrhosis, who presents to the ED with worsening bilateral leg swelling and gen weakness, admitted for further evaluation. GI consulted for new diag of cirrhosis    EGD 6/23  Severe esophagitis LA Grade D  Esophageal-jejunal anastomosis intact w/ patent afferent and efferent limbs without evidence of acute angulation or suggestion of obstruction.    US Abd 5/23:  IMPRESSION:  Small to moderate amount of ascites most prominent of the right lower quadrant on the submitted images.    Tsat 32  Ferritin 228  A1C 4.8  B12: 455  Folate 10.2  Trig 73, LDL 73  TSH 2.08  Hep A neg  Hep B C, S ab, Sg all non react  Hep C neg  UTox Neg  AFP <1.8  Alpha 1: 172  ceruloplasmin: 22    Duplex Negative    MELD: 11 admission, 10 now    #cirrhosis of unknown etiology  -Recommend outpatient Hep B vaccination  -Please perform diagnostic Paracentesis and send for cell count, culture, cytology  -MELD: Hepatic function panel and coags Daily  -Toxins: Peth  -FU labs:: EBER, ASMA, AMA, IgGs,  LKmicrosomal ab  -for IVF, would favor albumin over LR or NS   -avoid NSAIDs and opiates if possible   -Tylenol not exceeding 2g per day   -lactulose for 2-3 bowel movements per day  -low sodium/high protein diet   -Thiamine, folic acid, multivitamin  -will warrant EGD for EV screening inpt vs outpt, however given gastrectomy and compensation, less likely to have EV  -c/w home spironolactone 25mg  -hold home furosemide 40mg, Give Furosemide 40mg IV     Patient seen and discussed with GI Attending on Rounds Dr. Tierra Cooper DO, PhD  Gastroenterology Fellow  GI Consult Weekday 7am-5pm Pager: [phone]  Weeknights/Weekend/Holiday Coverage: Please Call the  for contact info

## 2025-05-26 NOTE — PROGRESS NOTE ADULT - SUBJECTIVE AND OBJECTIVE BOX
OVERNIGHT EVENTS:    SUBJECTIVE / INTERVAL HPI: Patient seen and examined at bedside.     VITAL SIGNS:  Vital Signs Last 24 Hrs  T(C): 36.7 (26 May 2025 06:39), Max: 36.7 (26 May 2025 06:39)  T(F): 98.1 (26 May 2025 06:39), Max: 98.1 (26 May 2025 06:39)  HR: 66 (26 May 2025 06:39) (59 - 66)  BP: 105/72 (26 May 2025 06:39) (95/60 - 105/72)  BP(mean): 83 (26 May 2025 06:39) (83 - 83)  RR: 17 (26 May 2025 06:39) (17 - 17)  SpO2: 93% (26 May 2025 06:39) (93% - 97%)    Parameters below as of 26 May 2025 06:39  Patient On (Oxygen Delivery Method): room air      I&O's Summary    25 May 2025 07:01  -  26 May 2025 07:00  --------------------------------------------------------  IN: 470 mL / OUT: 0 mL / NET: 470 mL        PHYSICAL EXAM:  General: WDWN  HEENT: NC/AT; PERRL, anicteric sclera; MMM  Neck: supple  Cardiovascular: +S1/S2; RRR  Respiratory: CTA B/L; no W/R/R  Gastrointestinal: soft, NT/ND; +BSx4  Extremities: WWP; no edema, clubbing or cyanosis  Vascular: 2+ radial, DP/PT pulses B/L  Neurological: AAOx3; no focal deficits    MEDICATIONS:  MEDICATIONS  (STANDING):  apixaban 5 milliGRAM(s) Oral every 12 hours  ceFAZolin   IVPB 1000 milliGRAM(s) IV Intermittent every 8 hours  dextrose 5%. 1000 milliLiter(s) (50 mL/Hr) IV Continuous <Continuous>  dextrose 5%. 1000 milliLiter(s) (100 mL/Hr) IV Continuous <Continuous>  dextrose 50% Injectable 25 Gram(s) IV Push once  dextrose 50% Injectable 12.5 Gram(s) IV Push once  dextrose 50% Injectable 25 Gram(s) IV Push once  glucagon  Injectable 1 milliGRAM(s) IntraMuscular once  insulin lispro (ADMELOG) corrective regimen sliding scale   SubCutaneous three times a day before meals  lactulose Syrup 10 Gram(s) Oral every 8 hours  levothyroxine 100 MICROGram(s) Oral daily  multivitamin 1 Tablet(s) Oral daily  senna 2 Tablet(s) Oral at bedtime  spironolactone 25 milliGRAM(s) Oral every 24 hours  thiamine 100 milliGRAM(s) Oral every 24 hours    MEDICATIONS  (PRN):  dextrose Oral Gel 15 Gram(s) Oral once PRN Blood Glucose LESS THAN 70 milliGRAM(s)/deciliter  polyethylene glycol 3350 17 Gram(s) Oral daily PRN Constipation  zolpidem 5 milliGRAM(s) Oral at bedtime PRN Insomnia      ALLERGIES:  Allergies    No Known Allergies    Intolerances        LABS:                        11.8   3.76  )-----------( 167      ( 25 May 2025 10:50 )             34.7     05-25    139  |  105  |  14  ----------------------------<  132[H]  4.0   |  23  |  0.94    Ca    8.1[L]      25 May 2025 18:26  Phos  2.7     05-25  Mg     2.1     05-25    TPro  6.5  /  Alb  2.5[L]  /  TBili  0.8  /  DBili  x   /  AST  33  /  ALT  12  /  AlkPhos  91  05-25    PT/INR - ( 25 May 2025 06:51 )   PT: 15.7 sec;   INR: 1.35          PTT - ( 25 May 2025 06:51 )  PTT:31.5 sec  Urinalysis Basic - ( 25 May 2025 18:26 )    Color: x / Appearance: x / SG: x / pH: x  Gluc: 132 mg/dL / Ketone: x  / Bili: x / Urobili: x   Blood: x / Protein: x / Nitrite: x   Leuk Esterase: x / RBC: x / WBC x   Sq Epi: x / Non Sq Epi: x / Bacteria: x      CAPILLARY BLOOD GLUCOSE      POCT Blood Glucose.: 65 mg/dL (26 May 2025 08:57)      RADIOLOGY & ADDITIONAL TESTS: Reviewed.   OVERNIGHT EVENTS: no overnight events    SUBJECTIVE / INTERVAL HPI: Patient seen and examined at bedside. Feeling well at  this time, no complaints. States that the swelling in his legs has greatly decreased, has no symptoms. Tolerating tube feeds well.    VITAL SIGNS:  Vital Signs Last 24 Hrs  T(C): 36.7 (26 May 2025 06:39), Max: 36.7 (26 May 2025 06:39)  T(F): 98.1 (26 May 2025 06:39), Max: 98.1 (26 May 2025 06:39)  HR: 66 (26 May 2025 06:39) (59 - 66)  BP: 105/72 (26 May 2025 06:39) (95/60 - 105/72)  BP(mean): 83 (26 May 2025 06:39) (83 - 83)  RR: 17 (26 May 2025 06:39) (17 - 17)  SpO2: 93% (26 May 2025 06:39) (93% - 97%)    Parameters below as of 26 May 2025 06:39  Patient On (Oxygen Delivery Method): room air      I&O's Summary    25 May 2025 07:01  -  26 May 2025 07:00  --------------------------------------------------------  IN: 470 mL / OUT: 0 mL / NET: 470 mL        PHYSICAL EXAM:  General: Thin  male NAD  HEENT: NC/AT  Neck: supple  Cardiovascular: +S1/S2; RRR  Respiratory: CTA B/L; no W/R/R  Gastrointestinal: soft, NT/ND; +J tube in place  Extremities: WWP; 3+ pitting LE b/l, right dorsal foot into ankle slightly warm and erythematous   Neurological: AAOx3; speech perhaps slightly slow but clear without aphasia, no focal deficits noted       MEDICATIONS:  MEDICATIONS  (STANDING):  apixaban 5 milliGRAM(s) Oral every 12 hours  ceFAZolin   IVPB 1000 milliGRAM(s) IV Intermittent every 8 hours  dextrose 5%. 1000 milliLiter(s) (50 mL/Hr) IV Continuous <Continuous>  dextrose 5%. 1000 milliLiter(s) (100 mL/Hr) IV Continuous <Continuous>  dextrose 50% Injectable 25 Gram(s) IV Push once  dextrose 50% Injectable 12.5 Gram(s) IV Push once  dextrose 50% Injectable 25 Gram(s) IV Push once  glucagon  Injectable 1 milliGRAM(s) IntraMuscular once  insulin lispro (ADMELOG) corrective regimen sliding scale   SubCutaneous three times a day before meals  lactulose Syrup 10 Gram(s) Oral every 8 hours  levothyroxine 100 MICROGram(s) Oral daily  multivitamin 1 Tablet(s) Oral daily  senna 2 Tablet(s) Oral at bedtime  spironolactone 25 milliGRAM(s) Oral every 24 hours  thiamine 100 milliGRAM(s) Oral every 24 hours    MEDICATIONS  (PRN):  dextrose Oral Gel 15 Gram(s) Oral once PRN Blood Glucose LESS THAN 70 milliGRAM(s)/deciliter  polyethylene glycol 3350 17 Gram(s) Oral daily PRN Constipation  zolpidem 5 milliGRAM(s) Oral at bedtime PRN Insomnia      ALLERGIES:  Allergies    No Known Allergies    Intolerances        LABS:                        11.8   3.76  )-----------( 167      ( 25 May 2025 10:50 )             34.7     05-25    139  |  105  |  14  ----------------------------<  132[H]  4.0   |  23  |  0.94    Ca    8.1[L]      25 May 2025 18:26  Phos  2.7     05-25  Mg     2.1     05-25    TPro  6.5  /  Alb  2.5[L]  /  TBili  0.8  /  DBili  x   /  AST  33  /  ALT  12  /  AlkPhos  91  05-25    PT/INR - ( 25 May 2025 06:51 )   PT: 15.7 sec;   INR: 1.35          PTT - ( 25 May 2025 06:51 )  PTT:31.5 sec  Urinalysis Basic - ( 25 May 2025 18:26 )    Color: x / Appearance: x / SG: x / pH: x  Gluc: 132 mg/dL / Ketone: x  / Bili: x / Urobili: x   Blood: x / Protein: x / Nitrite: x   Leuk Esterase: x / RBC: x / WBC x   Sq Epi: x / Non Sq Epi: x / Bacteria: x      CAPILLARY BLOOD GLUCOSE      POCT Blood Glucose.: 65 mg/dL (26 May 2025 08:57)      RADIOLOGY & ADDITIONAL TESTS: Reviewed.

## 2025-05-26 NOTE — PROGRESS NOTE ADULT - PROBLEM SELECTOR PLAN 8
Follows Dr. Mckeon with very well controlled diabetes, last saw this AM w/ positive levels.   Per [facility] - regimen is NPH 18 units at night, Lispro 3 units while on tube feeds  - Start patient on low dose insulin sliding scale tonight   - q6h FS  - endocine recs for insulin based on tube feeds, today changed to nph 14u, no lispro, mISS

## 2025-05-27 ENCOUNTER — RESULT REVIEW (OUTPATIENT)
Age: 75
End: 2025-05-27

## 2025-05-27 LAB
AMMONIA BLD-MCNC: 18 UMOL/L — SIGNIFICANT CHANGE UP (ref 11–55)
ANION GAP SERPL CALC-SCNC: 13 MMOL/L — SIGNIFICANT CHANGE UP (ref 5–17)
ANION GAP SERPL CALC-SCNC: 8 MMOL/L — SIGNIFICANT CHANGE UP (ref 5–17)
BUN SERPL-MCNC: 16 MG/DL — SIGNIFICANT CHANGE UP (ref 7–23)
BUN SERPL-MCNC: 16 MG/DL — SIGNIFICANT CHANGE UP (ref 7–23)
CALCIUM SERPL-MCNC: 7.9 MG/DL — LOW (ref 8.4–10.5)
CALCIUM SERPL-MCNC: 7.9 MG/DL — LOW (ref 8.4–10.5)
CHLORIDE SERPL-SCNC: 101 MMOL/L — SIGNIFICANT CHANGE UP (ref 96–108)
CHLORIDE SERPL-SCNC: 104 MMOL/L — SIGNIFICANT CHANGE UP (ref 96–108)
CO2 SERPL-SCNC: 19 MMOL/L — LOW (ref 22–31)
CO2 SERPL-SCNC: 27 MMOL/L — SIGNIFICANT CHANGE UP (ref 22–31)
CREAT SERPL-MCNC: 0.78 MG/DL — SIGNIFICANT CHANGE UP (ref 0.5–1.3)
CREAT SERPL-MCNC: 0.83 MG/DL — SIGNIFICANT CHANGE UP (ref 0.5–1.3)
EGFR: 91 ML/MIN/1.73M2 — SIGNIFICANT CHANGE UP
EGFR: 91 ML/MIN/1.73M2 — SIGNIFICANT CHANGE UP
EGFR: 93 ML/MIN/1.73M2 — SIGNIFICANT CHANGE UP
EGFR: 93 ML/MIN/1.73M2 — SIGNIFICANT CHANGE UP
GLUCOSE SERPL-MCNC: 51 MG/DL — CRITICAL LOW (ref 70–99)
GLUCOSE SERPL-MCNC: 78 MG/DL — SIGNIFICANT CHANGE UP (ref 70–99)
HCT VFR BLD CALC: 33.6 % — LOW (ref 39–50)
HGB BLD-MCNC: 10.8 G/DL — LOW (ref 13–17)
MAGNESIUM SERPL-MCNC: 2.2 MG/DL — SIGNIFICANT CHANGE UP (ref 1.6–2.6)
MCHC RBC-ENTMCNC: 32.1 G/DL — SIGNIFICANT CHANGE UP (ref 32–36)
MCHC RBC-ENTMCNC: 34.7 PG — HIGH (ref 27–34)
MCV RBC AUTO: 108 FL — HIGH (ref 80–100)
NRBC BLD AUTO-RTO: 0 /100 WBCS — SIGNIFICANT CHANGE UP (ref 0–0)
PHOSPHATE SERPL-MCNC: 2.1 MG/DL — LOW (ref 2.5–4.5)
PLATELET # BLD AUTO: 150 K/UL — SIGNIFICANT CHANGE UP (ref 150–400)
POTASSIUM SERPL-MCNC: 3.6 MMOL/L — SIGNIFICANT CHANGE UP (ref 3.5–5.3)
POTASSIUM SERPL-MCNC: 5 MMOL/L — SIGNIFICANT CHANGE UP (ref 3.5–5.3)
POTASSIUM SERPL-SCNC: 3.6 MMOL/L — SIGNIFICANT CHANGE UP (ref 3.5–5.3)
POTASSIUM SERPL-SCNC: 5 MMOL/L — SIGNIFICANT CHANGE UP (ref 3.5–5.3)
RBC # BLD: 3.11 M/UL — LOW (ref 4.2–5.8)
RBC # FLD: 17 % — HIGH (ref 10.3–14.5)
SODIUM SERPL-SCNC: 136 MMOL/L — SIGNIFICANT CHANGE UP (ref 135–145)
SODIUM SERPL-SCNC: 136 MMOL/L — SIGNIFICANT CHANGE UP (ref 135–145)
WBC # BLD: 4.61 K/UL — SIGNIFICANT CHANGE UP (ref 3.8–10.5)
WBC # FLD AUTO: 4.61 K/UL — SIGNIFICANT CHANGE UP (ref 3.8–10.5)

## 2025-05-27 PROCEDURE — 93306 TTE W/DOPPLER COMPLETE: CPT | Mod: 26

## 2025-05-27 PROCEDURE — 99232 SBSQ HOSP IP/OBS MODERATE 35: CPT | Mod: GC

## 2025-05-27 PROCEDURE — 99233 SBSQ HOSP IP/OBS HIGH 50: CPT

## 2025-05-27 RX ORDER — FUROSEMIDE 10 MG/ML
40 INJECTION INTRAMUSCULAR; INTRAVENOUS ONCE
Refills: 0 | Status: COMPLETED | OUTPATIENT
Start: 2025-05-27 | End: 2025-05-27

## 2025-05-27 RX ORDER — LACTULOSE 10 G/15ML
10 SOLUTION ORAL EVERY 12 HOURS
Refills: 0 | Status: DISCONTINUED | OUTPATIENT
Start: 2025-05-27 | End: 2025-05-27

## 2025-05-27 RX ORDER — DEXTROSE 50 % IN WATER 50 %
25 SYRINGE (ML) INTRAVENOUS ONCE
Refills: 0 | Status: COMPLETED | OUTPATIENT
Start: 2025-05-27 | End: 2025-05-27

## 2025-05-27 RX ORDER — CEFAZOLIN SODIUM IN 0.9 % NACL 3 G/100 ML
1000 INTRAVENOUS SOLUTION, PIGGYBACK (ML) INTRAVENOUS EVERY 8 HOURS
Refills: 0 | Status: COMPLETED | OUTPATIENT
Start: 2025-05-27 | End: 2025-05-28

## 2025-05-27 RX ORDER — SODIUM PHOSPHATE,DIBASIC DIHYD
15 POWDER (GRAM) MISCELLANEOUS ONCE
Refills: 0 | Status: COMPLETED | OUTPATIENT
Start: 2025-05-27 | End: 2025-05-27

## 2025-05-27 RX ADMIN — Medication 100 MILLIGRAM(S): at 18:01

## 2025-05-27 RX ADMIN — Medication 40 MILLIEQUIVALENT(S): at 19:02

## 2025-05-27 RX ADMIN — Medication 1 TABLET(S): at 11:17

## 2025-05-27 RX ADMIN — Medication 25 MILLILITER(S): at 06:56

## 2025-05-27 RX ADMIN — FUROSEMIDE 40 MILLIGRAM(S): 10 INJECTION INTRAMUSCULAR; INTRAVENOUS at 11:17

## 2025-05-27 RX ADMIN — Medication 5 MILLIGRAM(S): at 20:38

## 2025-05-27 RX ADMIN — Medication 62.5 MILLIMOLE(S): at 12:38

## 2025-05-27 RX ADMIN — APIXABAN 5 MILLIGRAM(S): 2.5 TABLET, FILM COATED ORAL at 22:20

## 2025-05-27 RX ADMIN — Medication 25 MILLIGRAM(S): at 15:16

## 2025-05-27 RX ADMIN — Medication 100 MILLIGRAM(S): at 06:54

## 2025-05-27 RX ADMIN — APIXABAN 5 MILLIGRAM(S): 2.5 TABLET, FILM COATED ORAL at 11:17

## 2025-05-27 RX ADMIN — Medication 100 MILLIGRAM(S): at 07:01

## 2025-05-27 RX ADMIN — Medication 100 MICROGRAM(S): at 06:54

## 2025-05-27 NOTE — PROGRESS NOTE ADULT - PROBLEM SELECTOR PLAN 5
Recently diagnosed with liver cirrhosis (2025?), per chart note by Dr. Mckeon and review on [facility] -  etiology of his cirrhosis is unclear--no work-up was completed on [place] except bloodwork and Fibroscan, the latter uninterpretable due to jordy-hepatic ascites.   , INR 1.18, AST/ALT not quantified/16  CT A/P this year showed cirrhotic morphology, last year had normal findings  ED bedside U/S without pocket to TAP per signout  Abd US showing ascites.  - Hepatology consulted, appreciate recs  - plan for diagnostic para  - Daily MELD score  - Diuretics pending improvement in BMP  - restarted home lasix and spironolactone

## 2025-05-27 NOTE — PROGRESS NOTE ADULT - ASSESSMENT
72M w/ PMH of gastric cancer (s/p feeding J-tube), portal vein thrombosis (on Eliquis, now resolved on US) AAA (s/p EVAR in 2023), DM2, hypothyroidism, HTN, anemia, BPH, type 2 endoleak repair, s/p embolization of internal iliac artery branch (10/24), and new diagnosis of cirrhosis, who first presented to ED with worsening bilateral leg swelling and gen weakness. Hepatology consulted for RUQ US showing cirrhosis.     Patient denies any significant ETOH use in the past.   Cirrhosis of unknown etiology but most likely 2/2 to MASLD (given DM2 and HTN) vs. prior radiation exposure (received multiple rounds when treated for gastric cancer.   BMI currently 20, and patient reports that at his heaviest he weighed 170 lbs at 6'0" tall (BMI of 23).     EV screening: none noted on EGD in 2023 but significant esophagitis may have obscured views  HE:     RUQ US:   - small to moderate amount of ascites most prominent of the right lower quadrant on the submitted images.  - nodular liver consistent with cirrhosis   - patent vasculature despite history of PVT thrombosis     Meld 3.0 score: 11 on 05/27   Platelets 150 and INR 1.35, indicating good synthetic dysfunction.     Most recent EGD (June 2023):   - Severe esophagitis LA Grade D  - Esophageal-jejunal anastomosis intact w/ patent afferent and efferent limbs without evidence of acute angulation or suggestion of obstruction.    Cirrhosis work-up:   - iron sat 32 and ferritin 228   - hep A IgM nonreactive, hep A IgG pending   - hep C nonreactive  - hep B surface antigen nonreactive, surface antibody and core pending   - AFP WNL <1.8  - anti-LMKA <20.1   - alpha-1 antitrypsin    - ceruloplasmin WNL 22          Recommendations:   - consult IR for diagnotic paracentesis given small to moderate ascites noted on US   - please send fluid for cell count, cultures, albumin, protein, and cytology to ensure no gastric cancer recurrence   - please send   - trend CBC, CMP, and INR daily   - f/u PETH, EBER, ASMA, and IgG       - low sodium/high protein diet     -Tylenol not exceeding 2g per day     -low sodium/high protein diet   -Thiamine, folic acid, multivitamin  -will warrant EGD for EV screening inpt vs outpt, however given gastrectomy and compensation, less likely to have EV  -c/w home spironolactone 25mg  -hold home furosemide 40mg, Give Furosemide 40mg IV     Patient seen and discussed with GI Attending on Rounds Dr. Tierra Cooper DO, PhD  Gastroenterology Fellow  GI Consult Weekday 7am-5pm Pager: [phone]  Weeknights/Weekend/Holiday Coverage: Please Call the  for contact info   72M w/ PMH of gastric cancer (s/p feeding J-tube), portal vein thrombosis (on Eliquis, now resolved on US) AAA (s/p EVAR in 2023), DM2, hypothyroidism, HTN, anemia, BPH, type 2 endoleak repair, s/p embolization of internal iliac artery branch (10/24), and new diagnosis of cirrhosis, who first presented to ED with worsening bilateral leg swelling and gen weakness. Hepatology consulted for RUQ US showing cirrhosis.     Patient denies any significant ETOH use in the past.   Cirrhosis of unknown etiology but most likely 2/2 to MASLD (given DM2 and HTN) vs. prior radiation exposure (received multiple rounds when treated for gastric cancer.   BMI currently 20, and patient reports that at his heaviest he weighed 170 lbs at 6'0" tall (BMI of 23).     EV screening: none noted on EGD in 2023 but significant esophagitis may have obscured views.   HE: none prior   HCC screening: AFP WNL and MRI pending     RUQ US:   - small to moderate amount of ascites most prominent of the right lower quadrant on the submitted images.  - nodular liver consistent with cirrhosis   - patent vasculature despite history of PVT thrombosis     Meld 3.0 score: 11 on 05/27   Platelets 150 and INR 1.35, indicating good synthetic dysfunction.     Most recent EGD (June 2023):   - Severe esophagitis LA Grade D  - Esophageal-jejunal anastomosis intact w/ patent afferent and efferent limbs without evidence of acute angulation or suggestion of obstruction.    Cirrhosis work-up:   - iron sat 32 and ferritin 228   - hep A IgM nonreactive, hep A IgG pending   - hep C nonreactive  - hep B surface antigen nonreactive, surface antibody and core pending   - AFP WNL <1.8  - anti-LMKA <20.1   - alpha-1 antitrypsin    - ceruloplasmin WNL 22    Recommendations:   - consult IR for diagnotic paracentesis given small to moderate ascites noted on US   - please send fluid for cell count, cultures, albumin, protein, and cytology to ensure no gastric cancer recurrence   - please obtain liver MRI with IV contrast   - trend CBC, CMP, and INR daily   - f/u PETH, EBER, ASMA, IgG, and IgM   - please send 24-hour urine copper screen, immunoglobulin panel, and anti-SLA  - in addition to acute hepatitis panel, please send HAV IgG, and HBV surface Ab, and HBV core total Ab  - low sodium/high protein diet   - Tylenol not exceeding 2g per day   - continue home spironolactone 25 mg PO daily and Lasix 40 mg PO daily   - consider EGD for EV screening inpt vs. out-pt, however given gastrectomy and compensation, less likely to have EV    Case discussed with Dr. Neal. Hepatology Team will continue to follow.     Katie Miller D.O.   Gastroenterology Fellow  Weekday 7am-5pm Pager: [phone]  Weeknights/Weekend/Holiday Coverage: Please call the  for contact info

## 2025-05-27 NOTE — PROGRESS NOTE ADULT - PROBLEM SELECTOR PLAN 4
Edema of unknown etiology, +4 pitting in b/l LE extending from mid thigh to feet  Despite being on Furosemide and Spironolactone, persistent edema; Alb 2.9 not consistent w/ this level of edema  TTE in 10/24 w/ EF 65%  Possibly i/s/o liver cirrhosis vs chronic CHF   - F/u proBNP, TSH   - F/u TTE  - strict I/Os   - c/w RLE cellulitis management   - vascular consult  - c/w lasix and spironolactone Edema of unknown etiology, +4 pitting in b/l LE extending from mid thigh to feet  Despite being on Furosemide and Spironolactone, persistent edema; Alb 2.9 not consistent w/ this level of edema  TTE in 10/24 w/ EF 65%  Possibly i/s/o liver cirrhosis vs chronic CHF   - F/u TTE  - strict I/Os   - c/w RLE cellulitis management   - c/w lasix and spironolactone

## 2025-05-27 NOTE — PROGRESS NOTE ADULT - SUBJECTIVE AND OBJECTIVE BOX
OVERNIGHT EVENTS:    SUBJECTIVE / INTERVAL HPI: Patient seen and examined at bedside.     VITAL SIGNS:  Vital Signs Last 24 Hrs  T(C): 36.5 (27 May 2025 06:02), Max: 36.7 (26 May 2025 21:15)  T(F): 97.7 (27 May 2025 06:02), Max: 98 (26 May 2025 21:15)  HR: 71 (27 May 2025 06:02) (68 - 86)  BP: 96/58 (27 May 2025 06:02) (96/58 - 112/65)  BP(mean): --  RR: 17 (27 May 2025 06:02) (16 - 17)  SpO2: 95% (27 May 2025 06:02) (93% - 99%)    Parameters below as of 26 May 2025 21:15  Patient On (Oxygen Delivery Method): room air      I&O's Summary      PHYSICAL EXAM:  General: WDWN  HEENT: NC/AT; PERRL, anicteric sclera; MMM  Neck: supple  Cardiovascular: +S1/S2; RRR  Respiratory: CTA B/L; no W/R/R  Gastrointestinal: soft, NT/ND; +BSx4  Extremities: WWP; no edema, clubbing or cyanosis  Vascular: 2+ radial, DP/PT pulses B/L  Neurological: AAOx3; no focal deficits    MEDICATIONS:  MEDICATIONS  (STANDING):  apixaban 5 milliGRAM(s) Oral every 12 hours  ceFAZolin   IVPB 1000 milliGRAM(s) IV Intermittent every 8 hours  dextrose 5%. 1000 milliLiter(s) (50 mL/Hr) IV Continuous <Continuous>  dextrose 5%. 1000 milliLiter(s) (100 mL/Hr) IV Continuous <Continuous>  dextrose 50% Injectable 25 Gram(s) IV Push once  dextrose 50% Injectable 12.5 Gram(s) IV Push once  dextrose 50% Injectable 25 Gram(s) IV Push once  glucagon  Injectable 1 milliGRAM(s) IntraMuscular once  insulin lispro (ADMELOG) corrective regimen sliding scale   SubCutaneous three times a day before meals  insulin lispro (ADMELOG) corrective regimen sliding scale   SubCutaneous at bedtime  insulin NPH human recombinant 14 Unit(s) SubCutaneous daily  lactulose Syrup 10 Gram(s) Oral every 8 hours  levothyroxine 100 MICROGram(s) Oral daily  multivitamin 1 Tablet(s) Oral daily  senna 2 Tablet(s) Oral at bedtime  spironolactone 25 milliGRAM(s) Oral every 24 hours  thiamine 100 milliGRAM(s) Oral every 24 hours    MEDICATIONS  (PRN):  dextrose Oral Gel 15 Gram(s) Oral once PRN Blood Glucose LESS THAN 70 milliGRAM(s)/deciliter  polyethylene glycol 3350 17 Gram(s) Oral daily PRN Constipation  zolpidem 5 milliGRAM(s) Oral at bedtime PRN Insomnia      ALLERGIES:  Allergies    No Known Allergies    Intolerances        LABS:                        11.5   3.96  )-----------( 161      ( 26 May 2025 12:00 )             33.5     05-26    136  |  102  |  14  ----------------------------<  135[H]  4.6   |  23  |  0.85    Ca    8.3[L]      26 May 2025 12:00  Phos  2.2     05-26  Mg     2.1     05-26    TPro  6.2  /  Alb  2.7[L]  /  TBili  0.5  /  DBili  x   /  AST  see note  /  ALT  8[L]  /  AlkPhos  100  05-26      Urinalysis Basic - ( 26 May 2025 12:00 )    Color: x / Appearance: x / SG: x / pH: x  Gluc: 135 mg/dL / Ketone: x  / Bili: x / Urobili: x   Blood: x / Protein: x / Nitrite: x   Leuk Esterase: x / RBC: x / WBC x   Sq Epi: x / Non Sq Epi: x / Bacteria: x      CAPILLARY BLOOD GLUCOSE      POCT Blood Glucose.: 67 mg/dL (27 May 2025 07:50)      RADIOLOGY & ADDITIONAL TESTS: Reviewed.   OVERNIGHT EVENTS: no overnight events    SUBJECTIVE / INTERVAL HPI: Patient seen and examined at bedside. Feels well at this time, has no complaints.     VITAL SIGNS:  Vital Signs Last 24 Hrs  T(C): 36.5 (27 May 2025 06:02), Max: 36.7 (26 May 2025 21:15)  T(F): 97.7 (27 May 2025 06:02), Max: 98 (26 May 2025 21:15)  HR: 71 (27 May 2025 06:02) (68 - 86)  BP: 96/58 (27 May 2025 06:02) (96/58 - 112/65)  BP(mean): --  RR: 17 (27 May 2025 06:02) (16 - 17)  SpO2: 95% (27 May 2025 06:02) (93% - 99%)    Parameters below as of 26 May 2025 21:15  Patient On (Oxygen Delivery Method): room air      I&O's Summary      PHYSICAL EXAM:  General: WDWN  HEENT: NC/AT; PERRL, anicteric sclera; MMM  Neck: supple  Cardiovascular: +S1/S2; RRR  Respiratory: CTA B/L; no W/R/R  Gastrointestinal: soft, NT/ND; +BSx4  Extremities: WWP; no edema, clubbing or cyanosis  Vascular: 2+ radial, DP/PT pulses B/L  Neurological: AAOx3; no focal deficits    MEDICATIONS:  MEDICATIONS  (STANDING):  apixaban 5 milliGRAM(s) Oral every 12 hours  ceFAZolin   IVPB 1000 milliGRAM(s) IV Intermittent every 8 hours  dextrose 5%. 1000 milliLiter(s) (50 mL/Hr) IV Continuous <Continuous>  dextrose 5%. 1000 milliLiter(s) (100 mL/Hr) IV Continuous <Continuous>  dextrose 50% Injectable 25 Gram(s) IV Push once  dextrose 50% Injectable 12.5 Gram(s) IV Push once  dextrose 50% Injectable 25 Gram(s) IV Push once  glucagon  Injectable 1 milliGRAM(s) IntraMuscular once  insulin lispro (ADMELOG) corrective regimen sliding scale   SubCutaneous three times a day before meals  insulin lispro (ADMELOG) corrective regimen sliding scale   SubCutaneous at bedtime  insulin NPH human recombinant 14 Unit(s) SubCutaneous daily  lactulose Syrup 10 Gram(s) Oral every 8 hours  levothyroxine 100 MICROGram(s) Oral daily  multivitamin 1 Tablet(s) Oral daily  senna 2 Tablet(s) Oral at bedtime  spironolactone 25 milliGRAM(s) Oral every 24 hours  thiamine 100 milliGRAM(s) Oral every 24 hours    MEDICATIONS  (PRN):  dextrose Oral Gel 15 Gram(s) Oral once PRN Blood Glucose LESS THAN 70 milliGRAM(s)/deciliter  polyethylene glycol 3350 17 Gram(s) Oral daily PRN Constipation  zolpidem 5 milliGRAM(s) Oral at bedtime PRN Insomnia      ALLERGIES:  Allergies    No Known Allergies    Intolerances        LABS:                        11.5   3.96  )-----------( 161      ( 26 May 2025 12:00 )             33.5     05-26    136  |  102  |  14  ----------------------------<  135[H]  4.6   |  23  |  0.85    Ca    8.3[L]      26 May 2025 12:00  Phos  2.2     05-26  Mg     2.1     05-26    TPro  6.2  /  Alb  2.7[L]  /  TBili  0.5  /  DBili  x   /  AST  see note  /  ALT  8[L]  /  AlkPhos  100  05-26      Urinalysis Basic - ( 26 May 2025 12:00 )    Color: x / Appearance: x / SG: x / pH: x  Gluc: 135 mg/dL / Ketone: x  / Bili: x / Urobili: x   Blood: x / Protein: x / Nitrite: x   Leuk Esterase: x / RBC: x / WBC x   Sq Epi: x / Non Sq Epi: x / Bacteria: x      CAPILLARY BLOOD GLUCOSE      POCT Blood Glucose.: 67 mg/dL (27 May 2025 07:50)      RADIOLOGY & ADDITIONAL TESTS: Reviewed.   OVERNIGHT EVENTS: no overnight events    SUBJECTIVE / INTERVAL HPI: Patient seen and examined at bedside. Feels well at this time, has no complaints.     VITAL SIGNS:  Vital Signs Last 24 Hrs  T(C): 36.5 (27 May 2025 06:02), Max: 36.7 (26 May 2025 21:15)  T(F): 97.7 (27 May 2025 06:02), Max: 98 (26 May 2025 21:15)  HR: 71 (27 May 2025 06:02) (68 - 86)  BP: 96/58 (27 May 2025 06:02) (96/58 - 112/65)  BP(mean): --  RR: 17 (27 May 2025 06:02) (16 - 17)  SpO2: 95% (27 May 2025 06:02) (93% - 99%)    Parameters below as of 26 May 2025 21:15  Patient On (Oxygen Delivery Method): room air      I&O's Summary      PHYSICAL EXAM:  General: Thin  male NAD  HEENT: NC/AT  Neck: supple  Cardiovascular: +S1/S2; RRR  Respiratory: CTA B/L; no W/R/R  Gastrointestinal: soft, NT/ND; +J tube in place  Extremities: WWP; 3+ pitting LE b/l, right dorsal foot into ankle slightly warm and erythematous   Neurological: AAOx3; speech perhaps slightly slow but clear without aphasia, no focal deficits noted     MEDICATIONS:  MEDICATIONS  (STANDING):  apixaban 5 milliGRAM(s) Oral every 12 hours  ceFAZolin   IVPB 1000 milliGRAM(s) IV Intermittent every 8 hours  dextrose 5%. 1000 milliLiter(s) (50 mL/Hr) IV Continuous <Continuous>  dextrose 5%. 1000 milliLiter(s) (100 mL/Hr) IV Continuous <Continuous>  dextrose 50% Injectable 25 Gram(s) IV Push once  dextrose 50% Injectable 12.5 Gram(s) IV Push once  dextrose 50% Injectable 25 Gram(s) IV Push once  glucagon  Injectable 1 milliGRAM(s) IntraMuscular once  insulin lispro (ADMELOG) corrective regimen sliding scale   SubCutaneous three times a day before meals  insulin lispro (ADMELOG) corrective regimen sliding scale   SubCutaneous at bedtime  insulin NPH human recombinant 14 Unit(s) SubCutaneous daily  lactulose Syrup 10 Gram(s) Oral every 8 hours  levothyroxine 100 MICROGram(s) Oral daily  multivitamin 1 Tablet(s) Oral daily  senna 2 Tablet(s) Oral at bedtime  spironolactone 25 milliGRAM(s) Oral every 24 hours  thiamine 100 milliGRAM(s) Oral every 24 hours    MEDICATIONS  (PRN):  dextrose Oral Gel 15 Gram(s) Oral once PRN Blood Glucose LESS THAN 70 milliGRAM(s)/deciliter  polyethylene glycol 3350 17 Gram(s) Oral daily PRN Constipation  zolpidem 5 milliGRAM(s) Oral at bedtime PRN Insomnia      ALLERGIES:  Allergies    No Known Allergies    Intolerances        LABS:                        11.5   3.96  )-----------( 161      ( 26 May 2025 12:00 )             33.5     05-26    136  |  102  |  14  ----------------------------<  135[H]  4.6   |  23  |  0.85    Ca    8.3[L]      26 May 2025 12:00  Phos  2.2     05-26  Mg     2.1     05-26    TPro  6.2  /  Alb  2.7[L]  /  TBili  0.5  /  DBili  x   /  AST  see note  /  ALT  8[L]  /  AlkPhos  100  05-26      Urinalysis Basic - ( 26 May 2025 12:00 )    Color: x / Appearance: x / SG: x / pH: x  Gluc: 135 mg/dL / Ketone: x  / Bili: x / Urobili: x   Blood: x / Protein: x / Nitrite: x   Leuk Esterase: x / RBC: x / WBC x   Sq Epi: x / Non Sq Epi: x / Bacteria: x      CAPILLARY BLOOD GLUCOSE      POCT Blood Glucose.: 67 mg/dL (27 May 2025 07:50)      RADIOLOGY & ADDITIONAL TESTS: Reviewed.

## 2025-05-27 NOTE — CHART NOTE - NSCHARTNOTEFT_GEN_A_CORE
Admitting Diagnosis:   Patient is a 74y old  Male who presents with a chief complaint of RLE cellulitis (27 May 2025 08:54)    PAST MEDICAL & SURGICAL HISTORY:  Essential hypertension was on losartan, now diet control  Hypothyroidism  Gastric mass ulcerated mass in gastric cardia with small perigastric nodes on endoscopy.  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  H/O umbilical hernia repair  with mesh  Port-a-cath in place right chest wall                 not in use 5 y  History of gastric surgery 2017  Gastrostomy tube in place open jejunostomy with J-tube 2020  S/P cataract surgery  H/O endoscopy & stent placement 2020  S/P gastrectomy total gastrectomy   History of partial pancreatectomy distal pancreatectomy/ splenectomy esophatojejunostomy  Status post neck dissection 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, gastric primary  History of dysphagia EGD with Axios stent placment to connect the blind limb to th e efferent limb (jejunojejunostomy)    Current Nutrition Order:  Consistent Carbohydrate {Evening Snack}   Tube Feed via Jejunostomy: Prime Focus Glucose Support 1.2 at 110mL/hr x10hr (19:00-05:00) to provide 1100mL total volume, 1320kcal, 70g protein, and 836mL free water.     PO Intake: Good (%) [   ]  Fair (50-75%) [   ] Poor (<25%) [ x ]     GI Issues:   RN reports pt spitting up overnight  No nausea/vomiting/diarrhea at this time  Last recorded BM  - bowel regimen ordered    Pain:  No pain reported     Skin Integrity:  Abdominal wound, right knee skin tear, and stage I sacral pressure injury documented  2+ bilateral foot edema noted  Marcus score 18    25 @ 07:01  -  25 @ 07:00  --------------------------------------------------------  IN: 1210 mL / OUT: 1000 mL / NET: 210 mL    Labs:       136  |  104  |  16  ----------------------------<  51[LL]  5.0   |  19[L]  |  0.78    Ca    7.9[L]      27 May 2025 05:30  Phos  2.1       Mg     2.2         TPro  6.2  /  Alb  2.7[L]  /  TBili  0.5  /  DBili  x   /  AST  see note  /  ALT  8[L]  /  AlkPhos  100      CAPILLARY BLOOD GLUCOSE  POCT Blood Glucose.: 67 mg/dL (27 May 2025 07:50)  POCT Blood Glucose.: 66 mg/dL (27 May 2025 05:56)  POCT Blood Glucose.: 102 mg/dL (26 May 2025 17:20)  POCT Blood Glucose.: 78 mg/dL (26 May 2025 12:23)    Medications:  MEDICATIONS  (STANDING):  apixaban 5 milliGRAM(s) Oral every 12 hours  ceFAZolin   IVPB 1000 milliGRAM(s) IV Intermittent every 8 hours  dextrose 5%. 1000 milliLiter(s) (50 mL/Hr) IV Continuous <Continuous>  dextrose 5%. 1000 milliLiter(s) (100 mL/Hr) IV Continuous <Continuous>  dextrose 50% Injectable 25 Gram(s) IV Push once  dextrose 50% Injectable 12.5 Gram(s) IV Push once  dextrose 50% Injectable 25 Gram(s) IV Push once  glucagon  Injectable 1 milliGRAM(s) IntraMuscular once  insulin lispro (ADMELOG) corrective regimen sliding scale   SubCutaneous three times a day before meals  insulin lispro (ADMELOG) corrective regimen sliding scale   SubCutaneous at bedtime  insulin NPH human recombinant 14 Unit(s) SubCutaneous daily  lactulose Syrup 10 Gram(s) Oral every 8 hours  levothyroxine 100 MICROGram(s) Oral daily  multivitamin 1 Tablet(s) Oral daily  senna 2 Tablet(s) Oral at bedtime  sodium phosphate 15 milliMole(s)/250 mL IVPB 15 milliMole(s) IV Intermittent once  spironolactone 25 milliGRAM(s) Oral every 24 hours  thiamine 100 milliGRAM(s) Oral every 24 hours    MEDICATIONS  (PRN):  dextrose Oral Gel 15 Gram(s) Oral once PRN Blood Glucose LESS THAN 70 milliGRAM(s)/deciliter  polyethylene glycol 3350 17 Gram(s) Oral daily PRN Constipation  zolpidem 5 milliGRAM(s) Oral at bedtime PRN Insomnia    Anthropometrics:  Height: 5'10"  Weight: 140 pounds / 63.5 kilograms  BMI: 20  IBW: 166 pounds / 75.5 kilograms            84%IBW    Weight Change:   No new weights obtained since admission. Recommend nursing to trend weekly weights. RD to continue monitoring weights as able.     Severe Acute Protein Calorie Malnutrition: Risk Assessment Completed   - NFPE: severe muscle and fat wasting     Estimated energy needs:   Calories: 30-35kcal/k-2222kcal/d  Protein: 1.5-2g/k-127g/d  Defer fluids to team.   Estimated needs based on dosing wt as within %  pounds / 75.5 kilograms (84%). Needs adjusted for age, cirrhosis, wound healing, and malnutrition.    Subjective:   72M with PMH of gastric cancer (status post feeding J-tube), portal vein thrombosis (on Eliquis) AAA (status post EVAR in ), T2DM, hypothyroidism, HTN, anemia, BPH, type 2 endoleak repair, status post embolization of internal iliac artery branch (10/24), and cirrhosis who presents to the ED with worsening bilateral leg swelling and gen weakness, admitted for further evaluation. Stroke service consulted for right facial droop and dysarthria noted to be present for a few months by patient's close family and friends, CTH neg for stroke, incidental finding of arachnoid cyst, CTA with L C6 ICA 40-50% stenosis. Ongoing work-up.     Pt seen on 7WO for follow-up. Labs and medication orders reviewed. Na/K/Mg WNL, Phos 2.1 <low>, BUN/Cr WNL, POC blood glucose (-) . Ordered for 14U NPH, sodium phosphate, multivitamin, thiamine, PO synthroid, spironolactone. On Consistent Carbohydrate diet with tube feed via J-tube. RD observed feed off this AM per order (7PM-5AM). Pt engaged with team or off the floor on x3 visits to room, interview deferred to RN. Reports pt not tolerating tube feed overnight, spitting up around 3AM and feeling unwell. Endorses infrequent BMs - bowel regimen advanced . RD observed pt consumed a few bites of toast with butter and jelly this AM, RN reports pt eating though poor/variable intake. See nutrition recommendations - RD communicated to team. RD to remain available.     Previous Nutrition Diagnosis:  Severe acute malnutrition related to inadequate nutrition as evidenced by severe muscle and fat wasting.     Active [ x ]  Resolved [   ]    Goal:  Pt to consistently meet >75% nutrient needs via most appropriate route for nutrition. Reduce signs and symptoms of protein-calorie malnutrition.    Recommendations:  1. Recommend tube feed via J-tube: Prime Focus Standard 1.4 with goal rate at 86mL/hr x15hr (6PM-9AM) to provide 1290mL total volume (~x4 325mL cans), 1806kcal (28kcal/kg dosing wt 63.5kg), 80g protein (1.3g/kg dosing wt 63.5kg), and 929mL free water.   >>Defer free water flushes to team.  >>Note above regimen made with consideration for supplemental PO intake; if pt NPO, recommend at x1/day LPS (100kcal, 15g protein). Standard formula recommended in setting of reported hx of standard formula with HgbA1c WNL and POC blood glucose WNL/low; if with elevated blood glucose recommend change regimen to Prime Focus Glucose Support 1.2 with goal at 85mL/hr x16hr + x1/day LPS.   >>Note pt ordered for PO synthroid, recommend administer >/=1hr apart from nutrition.   >>Monitor GI tolerance and maintain aspiration precautions. RD to remain available to adjust EN regimen prn.   2. Continue Regular diet.   >>Dietary to provide fortified mashed potatoes (240kcal, 14g protein) and Gelatein (80kcal, 20g protein, <1g carbohydrate) x1/day.   3. Continue micronutrient supplementation. Continue d/c once pt consistently tolerating tube feed regimen at goal.   4. Monitor weight trends, labs, skin integrity, & hydration status.  5. Pain and bowel regimens per team.  6. RD remains available for dietary education/intervention prn.    Education:  Pt unavailable for education on visits today. Pt aware RD remains available for additional questions/concerns. Admitting Diagnosis:   Patient is a 74y old  Male who presents with a chief complaint of RLE cellulitis (27 May 2025 08:54)    PAST MEDICAL & SURGICAL HISTORY:  Essential hypertension was on losartan, now diet control  Hypothyroidism  Gastric mass ulcerated mass in gastric cardia with small perigastric nodes on endoscopy.  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  H/O umbilical hernia repair  with mesh  Port-a-cath in place right chest wall                 not in use 5 y  History of gastric surgery 2017  Gastrostomy tube in place open jejunostomy with J-tube 2020  S/P cataract surgery  H/O endoscopy & stent placement 2020  S/P gastrectomy total gastrectomy   History of partial pancreatectomy distal pancreatectomy/ splenectomy esophatojejunostomy  Status post neck dissection 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, gastric primary  History of dysphagia EGD with Axios stent placment to connect the blind limb to th e efferent limb (jejunojejunostomy)    Current Nutrition Order:  Consistent Carbohydrate {Evening Snack}   Tube Feed via Jejunostomy: sezmi Glucose Support 1.2 at 110mL/hr x10hr (19:00-05:00) to provide 1100mL total volume, 1320kcal, 70g protein, and 836mL free water.     PO Intake: Good (%) [   ]  Fair (50-75%) [   ] Poor (<25%) [ x ]     GI Issues:   RN reports pt spitting up overnight  No nausea/vomiting/diarrhea at this time  Last recorded BM  - bowel regimen ordered    Pain:  No pain reported     Skin Integrity:  Abdominal wound, right knee skin tear, and stage I sacral pressure injury documented  2+ bilateral foot edema noted  Marcus score 18    25 @ 07:01  -  25 @ 07:00  --------------------------------------------------------  IN: 1210 mL / OUT: 1000 mL / NET: 210 mL    Labs:       136  |  104  |  16  ----------------------------<  51[LL]  5.0   |  19[L]  |  0.78    Ca    7.9[L]      27 May 2025 05:30  Phos  2.1       Mg     2.2         TPro  6.2  /  Alb  2.7[L]  /  TBili  0.5  /  DBili  x   /  AST  see note  /  ALT  8[L]  /  AlkPhos  100      CAPILLARY BLOOD GLUCOSE  POCT Blood Glucose.: 67 mg/dL (27 May 2025 07:50)  POCT Blood Glucose.: 66 mg/dL (27 May 2025 05:56)  POCT Blood Glucose.: 102 mg/dL (26 May 2025 17:20)  POCT Blood Glucose.: 78 mg/dL (26 May 2025 12:23)    Medications:  MEDICATIONS  (STANDING):  apixaban 5 milliGRAM(s) Oral every 12 hours  ceFAZolin   IVPB 1000 milliGRAM(s) IV Intermittent every 8 hours  dextrose 5%. 1000 milliLiter(s) (50 mL/Hr) IV Continuous <Continuous>  dextrose 5%. 1000 milliLiter(s) (100 mL/Hr) IV Continuous <Continuous>  dextrose 50% Injectable 25 Gram(s) IV Push once  dextrose 50% Injectable 12.5 Gram(s) IV Push once  dextrose 50% Injectable 25 Gram(s) IV Push once  glucagon  Injectable 1 milliGRAM(s) IntraMuscular once  insulin lispro (ADMELOG) corrective regimen sliding scale   SubCutaneous three times a day before meals  insulin lispro (ADMELOG) corrective regimen sliding scale   SubCutaneous at bedtime  insulin NPH human recombinant 14 Unit(s) SubCutaneous daily  lactulose Syrup 10 Gram(s) Oral every 8 hours  levothyroxine 100 MICROGram(s) Oral daily  multivitamin 1 Tablet(s) Oral daily  senna 2 Tablet(s) Oral at bedtime  sodium phosphate 15 milliMole(s)/250 mL IVPB 15 milliMole(s) IV Intermittent once  spironolactone 25 milliGRAM(s) Oral every 24 hours  thiamine 100 milliGRAM(s) Oral every 24 hours    MEDICATIONS  (PRN):  dextrose Oral Gel 15 Gram(s) Oral once PRN Blood Glucose LESS THAN 70 milliGRAM(s)/deciliter  polyethylene glycol 3350 17 Gram(s) Oral daily PRN Constipation  zolpidem 5 milliGRAM(s) Oral at bedtime PRN Insomnia    Anthropometrics:  Height: 5'10"  Weight: 140 pounds / 63.5 kilograms  BMI: 20  IBW: 166 pounds / 75.5 kilograms            84%IBW    Weight Change:   No new weights obtained since admission. Recommend nursing to trend weekly weights. RD to continue monitoring weights as able.     Severe Acute Protein Calorie Malnutrition: Risk Assessment Completed   - NFPE: severe muscle and fat wasting     Estimated energy needs:   Calories: 30-35kcal/k-2222kcal/d  Protein: 1.5-2g/k-127g/d  Defer fluids to team.   Estimated needs based on dosing wt as within %  pounds / 75.5 kilograms (84%). Needs adjusted for age, cirrhosis, wound healing, and malnutrition.    Subjective:   72M with PMH of gastric cancer (status post feeding J-tube), portal vein thrombosis (on Eliquis) AAA (status post EVAR in ), T2DM, hypothyroidism, HTN, anemia, BPH, type 2 endoleak repair, status post embolization of internal iliac artery branch (10/24), and cirrhosis who presents to the ED with worsening bilateral leg swelling and gen weakness, admitted for further evaluation. Stroke service consulted for right facial droop and dysarthria noted to be present for a few months by patient's close family and friends, CTH neg for stroke, incidental finding of arachnoid cyst, CTA with L C6 ICA 40-50% stenosis. Ongoing work-up.     Pt seen on 7WO for follow-up. Labs and medication orders reviewed. Na/K/Mg WNL, Phos 2.1 <low>, BUN/Cr WNL, POC blood glucose (-) . Ordered for 14U NPH, sodium phosphate, multivitamin, thiamine, PO synthroid, spironolactone. On Consistent Carbohydrate diet with tube feed via J-tube. RD observed feed off this AM per order (7PM-5AM). Pt engaged with team or off the floor on x3 visits to room, interview deferred to RN. Reports pt not tolerating tube feed overnight, spitting up around 3AM and feeling unwell. Endorses infrequent BMs - bowel regimen advanced . RD observed pt consumed a few bites of toast with butter and jelly this AM, RN reports pt eating though poor/variable intake. See nutrition recommendations - RD communicated to team. RD to remain available.     Previous Nutrition Diagnosis:  Severe acute malnutrition related to inadequate nutrition as evidenced by severe muscle and fat wasting.     Active [ x ]  Resolved [   ]    Goal:  Pt to consistently meet >75% nutrient needs via most appropriate route for nutrition. Reduce signs and symptoms of protein-calorie malnutrition.    Recommendations:  1. Recommend tube feed via J-tube: sezmi Standard 1.4 with goal rate at 86mL/hr x15hr (6PM-9AM) to provide 1290mL total volume (~x4 325mL cans), 1806kcal (28kcal/kg dosing wt 63.5kg), 80g protein (1.3g/kg dosing wt 63.5kg), and 929mL free water.   >>Defer free water flushes to team.  >>Note above regimen made with consideration for supplemental PO intake; if pt NPO, recommend at x1/day LPS (100kcal, 15g protein). Standard formula recommended in setting of reported hx of standard formula with HgbA1c WNL and POC blood glucose WNL/low; if with elevated blood glucose recommend change regimen to sezmi Glucose Support 1.2 with goal at 85mL/hr x16hr + x1/day LPS.   >>Note pt ordered for PO synthroid, recommend administer >/=1hr apart from nutrition.   >>Monitor GI tolerance and maintain aspiration precautions. RD to remain available to adjust EN regimen prn.   2. Continue Regular diet.   >>Dietary to provide fortified mashed potatoes (240kcal, 14g pro, 27g CHO), Gelatein Plus (160kcal, 20g pro, 20g CHO) x1/day, strawberry vanilla fortified smoothie (230kcal, 17g protein, 19g CHO) x1/day, and jelly (~40kcal, ~10g CHO/packet) x2/day.   3. Continue micronutrient supplementation. Continue d/c once pt consistently tolerating tube feed regimen at goal.   4. Monitor weight trends, labs, skin integrity, & hydration status.  5. Pain and bowel regimens per team.  6. RD remains available for dietary education/intervention prn.    Education:  Pt unavailable for education on visits today. Pt aware RD remains available for additional questions/concerns.

## 2025-05-27 NOTE — PROGRESS NOTE ADULT - PROBLEM SELECTOR PLAN 2
RLE w/ erythema, warmth, no active purulence or drainage x 4 days  Dopplers b/l LE: No evidence of deep venous thrombosis in either lower extremity. Soft tissue swelling in both lower extremities.  Xray R foot and Tib/Fib: Osteopenia. No osseous destruction or periosteal reaction. No fracture or dislocation. There is a hallux valgus alignment with first MTP joint osteoarthrosis. Tibiotalar joint space narrowing is also identified. Second through fourth hammertoe deformities. Diffuse soft tissue swelling of the imaged lower extremity. Vascular calcification is present.  S/p Zosyn 3.375mg x1, Vancomycin 1000mg x1  Afebrile, no WBC  - C/w  Cefazolin 1g q8h (5/24 - )  - f/u Blood cultures RLE w/ erythema, warmth, no active purulence or drainage x 4 days  Dopplers b/l LE: No evidence of deep venous thrombosis in either lower extremity. Soft tissue swelling in both lower extremities.  Xray R foot and Tib/Fib: Osteopenia. No osseous destruction or periosteal reaction. No fracture or dislocation. There is a hallux valgus alignment with first MTP joint osteoarthrosis. Tibiotalar joint space narrowing is also identified. Second through fourth hammertoe deformities. Diffuse soft tissue swelling of the imaged lower extremity. Vascular calcification is present.  S/p Zosyn 3.375mg x1, Vancomycin 1000mg x1  Afebrile, no WBC  - C/w  Cefazolin 1g q8h (5/24 - 5/28)  - f/u Blood cultures

## 2025-05-27 NOTE — PROGRESS NOTE ADULT - SUBJECTIVE AND OBJECTIVE BOX
Hepatology Consult Progress Note:     OVERNIGHT EVENTS: KIRILL.    SUBJECTIVE / INTERVAL HPI:   Patient seen and examined at bedside. States that overall he feels well. No acute complaints at this time.     VITAL SIGNS:  Vital Signs Last 24 Hrs  T(C): 36.3 (27 May 2025 12:22), Max: 36.7 (26 May 2025 21:15)  T(F): 97.4 (27 May 2025 12:22), Max: 98 (26 May 2025 21:15)  HR: 62 (27 May 2025 12:22) (62 - 71)  BP: 111/72 (27 May 2025 12:22) (96/58 - 111/72)  BP(mean): 85 (27 May 2025 12:22) (85 - 85)  RR: 17 (27 May 2025 12:22) (16 - 17)  SpO2: 96% (27 May 2025 12:22) (95% - 98%)    Parameters below as of 27 May 2025 12:22  Patient On (Oxygen Delivery Method): room air      05-26-25 @ 07:01  -  05-27-25 @ 07:00  --------------------------------------------------------  IN: 1210 mL / OUT: 1000 mL / NET: 210 mL      PHYSICAL EXAM:  General: No acute distress  Lungs: Normal respiratory effort and no intercostal retractions  Cardiovascular: RRR  Abdomen: Soft, non-tender, +fluid in abdomen (R>L), +j-tube   Neurological: Alert and oriented x3  Skin: Warm and dry. No obvious rash       MEDICATIONS:  MEDICATIONS  (STANDING):  apixaban 5 milliGRAM(s) Oral every 12 hours  ceFAZolin   IVPB 1000 milliGRAM(s) IV Intermittent every 8 hours  dextrose 5%. 1000 milliLiter(s) (50 mL/Hr) IV Continuous <Continuous>  dextrose 5%. 1000 milliLiter(s) (100 mL/Hr) IV Continuous <Continuous>  dextrose 50% Injectable 25 Gram(s) IV Push once  dextrose 50% Injectable 12.5 Gram(s) IV Push once  dextrose 50% Injectable 25 Gram(s) IV Push once  glucagon  Injectable 1 milliGRAM(s) IntraMuscular once  insulin lispro (ADMELOG) corrective regimen sliding scale   SubCutaneous at bedtime  lactulose Syrup 10 Gram(s) Oral every 8 hours  levothyroxine 100 MICROGram(s) Oral daily  multivitamin 1 Tablet(s) Oral daily  senna 2 Tablet(s) Oral at bedtime  spironolactone 25 milliGRAM(s) Oral every 24 hours  thiamine 100 milliGRAM(s) Oral every 24 hours    MEDICATIONS  (PRN):  dextrose Oral Gel 15 Gram(s) Oral once PRN Blood Glucose LESS THAN 70 milliGRAM(s)/deciliter  polyethylene glycol 3350 17 Gram(s) Oral daily PRN Constipation  zolpidem 5 milliGRAM(s) Oral at bedtime PRN Insomnia      ALLERGIES:  Allergies    No Known Allergies    Intolerances        LABS:                        10.8   4.61  )-----------( 150      ( 27 May 2025 05:30 )             33.6     05-27    136  |  104  |  16  ----------------------------<  51[LL]  5.0   |  19[L]  |  0.78    Ca    7.9[L]      27 May 2025 05:30  Phos  2.1     05-27  Mg     2.2     05-27    TPro  6.2  /  Alb  2.7[L]  /  TBili  0.5  /  DBili  x   /  AST  see note  /  ALT  8[L]  /  AlkPhos  100  05-26      Urinalysis Basic - ( 27 May 2025 05:30 )    Color: x / Appearance: x / SG: x / pH: x  Gluc: 51 mg/dL / Ketone: x  / Bili: x / Urobili: x   Blood: x / Protein: x / Nitrite: x   Leuk Esterase: x / RBC: x / WBC x   Sq Epi: x / Non Sq Epi: x / Bacteria: x      CAPILLARY BLOOD GLUCOSE      POCT Blood Glucose.: 92 mg/dL (27 May 2025 12:04)  RADIOLOGY & ADDITIONAL TESTS: Reviewed.

## 2025-05-27 NOTE — PROGRESS NOTE ADULT - SUBJECTIVE AND OBJECTIVE BOX
SUBJECTIVE / INTERVAL HPI: Patient was seen and examined this morning. Eating very poorly.     Overnight events:  Hypoglycemia     CAPILLARY BLOOD GLUCOSE & INSULIN RECEIVED  65 mg/dL (05-26 @ 08:57)  78 mg/dL (05-26 @ 12:23)  102 mg/dL (05-26 @ 17:20)  66 mg/dL (05-27 @ 05:56)  67 mg/dL (05-27 @ 07:50)  92 mg/dL (05-27 @ 12:04)  74 mg/dL (05-27 @ 17:25)      REVIEW OF SYSTEMS  Constitutional:  Negative fever, chills   Cardiovascular:  Negative for chest pain or palpitations.  Respiratory:  Negative for cough, wheezing, or shortness of breath.    Gastrointestinal:  Negative for nausea, vomiting, diarrhea, constipation, or abdominal pain.  Genitourinary:  Negative frequency, urgency or dysuria.  Neurologic:  No headache, confusion, dizziness, lightheadedness.    PHYSICAL EXAM  Vital Signs Last 24 Hrs  T(C): 36.3 (27 May 2025 12:22), Max: 36.7 (26 May 2025 21:15)  T(F): 97.4 (27 May 2025 12:22), Max: 98 (26 May 2025 21:15)  HR: 62 (27 May 2025 12:22) (62 - 71)  BP: 111/72 (27 May 2025 12:22) (96/58 - 111/72)  BP(mean): 85 (27 May 2025 12:22) (85 - 85)  RR: 17 (27 May 2025 12:22) (16 - 17)  SpO2: 96% (27 May 2025 12:22) (95% - 98%)    Parameters below as of 27 May 2025 12:22  Patient On (Oxygen Delivery Method): room air    Constitutional: Awake, alert, in no acute distress.   HEENT: Normocephalic, atraumatic,   Respiratory: Lungs clear to ausculation bilaterally.   Cardiovascular: regular rhythm, normal S1 and S2, no audible murmurs.   GI: soft, non-tender, distended, ascites present bowel sounds present.  Extremities: + lower extremity edema.     LABS  CBC - WBC/HGB/HTC/PLT: 4.61/10.8/33.6/150 (05-27-25)  BMP - Na/K/Cl/Bicarb/BUN/Cr/Gluc/AG/eGFR: 136/3.6/101/27/16/0.83/78/8/91 (05-27-25)  Ca - 7.9 (05-27-25)  Phos - -- (05-27-25)  Mg - -- (05-27-25)  LFT - Alb/Tprot/Tbili/Dbili/AlkPhos/ALT/AST: 2.7/--/0.5/--/100/8/see note (05-26-25)  PT/aPTT/INR: 15.7/31.5/1.35 (05-25-25)   Thyroid Stimulating Hormone, Serum: 4.850 (05-25-25)  Total T4/Free T4: --/1.050 (05-25-25)      05-26-25 @ 07:01  -  05-27-25 @ 07:00  --------------------------------------------------------  IN: 1210 mL / OUT: 1000 mL / NET: 210 mL        MEDICATIONS  MEDICATIONS  (STANDING):  apixaban 5 milliGRAM(s) Oral every 12 hours  ceFAZolin   IVPB 1000 milliGRAM(s) IV Intermittent every 8 hours  dextrose 5%. 1000 milliLiter(s) (50 mL/Hr) IV Continuous <Continuous>  dextrose 5%. 1000 milliLiter(s) (100 mL/Hr) IV Continuous <Continuous>  dextrose 50% Injectable 25 Gram(s) IV Push once  dextrose 50% Injectable 12.5 Gram(s) IV Push once  dextrose 50% Injectable 25 Gram(s) IV Push once  glucagon  Injectable 1 milliGRAM(s) IntraMuscular once  insulin lispro (ADMELOG) corrective regimen sliding scale   SubCutaneous at bedtime  lactulose Syrup 10 Gram(s) Oral every 8 hours  levothyroxine 100 MICROGram(s) Oral daily  multivitamin 1 Tablet(s) Oral daily  potassium chloride   Powder 40 milliEquivalent(s) Oral once  senna 2 Tablet(s) Oral at bedtime  spironolactone 25 milliGRAM(s) Oral every 24 hours  thiamine 100 milliGRAM(s) Oral every 24 hours    MEDICATIONS  (PRN):  dextrose Oral Gel 15 Gram(s) Oral once PRN Blood Glucose LESS THAN 70 milliGRAM(s)/deciliter  polyethylene glycol 3350 17 Gram(s) Oral daily PRN Constipation  zolpidem 5 milliGRAM(s) Oral at bedtime PRN Insomnia    ASSESSMENT / RECOMMENDATIONS  74y Male with history of DM type II, hypothyroidism, gastric CA and recently diagnosed hepatic cirrhosis who presented with weakness and RLE cellulitis. Endocrinology following for diabetes and tube feed management. He has been hypoglycemic in the am, unclear as he is on same tube feeds at home and on higher doses of NPH. Will try stopping NPH today. Abdomen more distended today planned for paracentesis. PO intake is very poor.     A1C: 4.8 %  Creatinine: 0.83, GFR: 91  Weight: 63.5, BMI: 20.1    # Type 2 diabetes mellitus  - c/w [place] 1.4 at 110cc/hr from 6pm to 9am  - NO NPH tonight.   - ok to have glucose tabs to treat lows.   - Continue lispro LOW dose sliding scale before meals and at bedtime.  - Patient's fingerstick glucose goal is 100-180 mg/dL.    - Discharge recommendations TBD  - can f/u with Dr. Richard on discharge.     # Hypothyroidism:    - Has one normal and one mildly elevated TSH level since admission.  His recent outpatient TFTs were normal and his levothyroxine dose stable.  --Continue levothyroxine 100 mcg/day  --Repeat free T4 and TSH on 5/28      Case discussed with Dr. Richard. Primary team updated.       Cathryn Dodge   Endocrinology Fellow    Service Pager: [phone]

## 2025-05-28 LAB
ALBUMIN SERPL ELPH-MCNC: 2.5 G/DL — LOW (ref 3.3–5)
ALP SERPL-CCNC: 111 U/L — SIGNIFICANT CHANGE UP (ref 40–120)
ALT FLD-CCNC: <5 U/L — LOW (ref 10–45)
ANION GAP SERPL CALC-SCNC: 10 MMOL/L — SIGNIFICANT CHANGE UP (ref 5–17)
APTT BLD: 33.1 SEC — SIGNIFICANT CHANGE UP (ref 26.1–36.8)
AST SERPL-CCNC: 25 U/L — SIGNIFICANT CHANGE UP (ref 10–40)
BILIRUB SERPL-MCNC: 0.5 MG/DL — SIGNIFICANT CHANGE UP (ref 0.2–1.2)
BUN SERPL-MCNC: 16 MG/DL — SIGNIFICANT CHANGE UP (ref 7–23)
CALCIUM SERPL-MCNC: 8.1 MG/DL — LOW (ref 8.4–10.5)
CHLORIDE SERPL-SCNC: 103 MMOL/L — SIGNIFICANT CHANGE UP (ref 96–108)
CO2 SERPL-SCNC: 23 MMOL/L — SIGNIFICANT CHANGE UP (ref 22–31)
CREAT SERPL-MCNC: 0.79 MG/DL — SIGNIFICANT CHANGE UP (ref 0.5–1.3)
CULTURE RESULTS: SIGNIFICANT CHANGE UP
CULTURE RESULTS: SIGNIFICANT CHANGE UP
EGFR: 93 ML/MIN/1.73M2 — SIGNIFICANT CHANGE UP
EGFR: 93 ML/MIN/1.73M2 — SIGNIFICANT CHANGE UP
GLUCOSE SERPL-MCNC: 129 MG/DL — HIGH (ref 70–99)
HBV CORE AB SER-ACNC: SIGNIFICANT CHANGE UP
HBV SURFACE AB SER-ACNC: ABNORMAL
HCT VFR BLD CALC: 32.5 % — LOW (ref 39–50)
HGB BLD-MCNC: 11 G/DL — LOW (ref 13–17)
INR BLD: 1.74 — HIGH (ref 0.85–1.16)
MAGNESIUM SERPL-MCNC: 2.1 MG/DL — SIGNIFICANT CHANGE UP (ref 1.6–2.6)
MCHC RBC-ENTMCNC: 33.8 G/DL — SIGNIFICANT CHANGE UP (ref 32–36)
MCHC RBC-ENTMCNC: 35.1 PG — HIGH (ref 27–34)
MCV RBC AUTO: 103.8 FL — HIGH (ref 80–100)
MELD SCORE WITH DIALYSIS: 26 POINTS — SIGNIFICANT CHANGE UP
MELD SCORE WITHOUT DIALYSIS: 14 POINTS — SIGNIFICANT CHANGE UP
NRBC BLD AUTO-RTO: 0 /100 WBCS — SIGNIFICANT CHANGE UP (ref 0–0)
PHOSPHATE SERPL-MCNC: 2.4 MG/DL — LOW (ref 2.5–4.5)
PLATELET # BLD AUTO: 153 K/UL — SIGNIFICANT CHANGE UP (ref 150–400)
POTASSIUM SERPL-MCNC: 4.8 MMOL/L — SIGNIFICANT CHANGE UP (ref 3.5–5.3)
POTASSIUM SERPL-SCNC: 4.8 MMOL/L — SIGNIFICANT CHANGE UP (ref 3.5–5.3)
PROT SERPL-MCNC: 5.9 G/DL — LOW (ref 6–8.3)
PROTHROM AB SERPL-ACNC: 19.9 SEC — HIGH (ref 9.9–13.4)
RBC # BLD: 3.13 M/UL — LOW (ref 4.2–5.8)
RBC # FLD: 16.6 % — HIGH (ref 10.3–14.5)
SODIUM SERPL-SCNC: 136 MMOL/L — SIGNIFICANT CHANGE UP (ref 135–145)
SPECIMEN SOURCE: SIGNIFICANT CHANGE UP
SPECIMEN SOURCE: SIGNIFICANT CHANGE UP
WBC # BLD: 4.93 K/UL — SIGNIFICANT CHANGE UP (ref 3.8–10.5)
WBC # FLD AUTO: 4.93 K/UL — SIGNIFICANT CHANGE UP (ref 3.8–10.5)

## 2025-05-28 PROCEDURE — 99233 SBSQ HOSP IP/OBS HIGH 50: CPT

## 2025-05-28 PROCEDURE — 99232 SBSQ HOSP IP/OBS MODERATE 35: CPT | Mod: GC

## 2025-05-28 RX ORDER — INSULIN LISPRO 100 U/ML
INJECTION, SOLUTION INTRAVENOUS; SUBCUTANEOUS
Refills: 0 | Status: DISCONTINUED | OUTPATIENT
Start: 2025-05-28 | End: 2025-06-04

## 2025-05-28 RX ADMIN — APIXABAN 5 MILLIGRAM(S): 2.5 TABLET, FILM COATED ORAL at 23:18

## 2025-05-28 RX ADMIN — Medication 25 MILLIGRAM(S): at 17:12

## 2025-05-28 RX ADMIN — Medication 1 TABLET(S): at 10:39

## 2025-05-28 RX ADMIN — Medication 100 MILLIGRAM(S): at 02:18

## 2025-05-28 RX ADMIN — APIXABAN 5 MILLIGRAM(S): 2.5 TABLET, FILM COATED ORAL at 10:39

## 2025-05-28 RX ADMIN — Medication 100 MICROGRAM(S): at 05:07

## 2025-05-28 RX ADMIN — Medication 100 MILLIGRAM(S): at 06:05

## 2025-05-28 RX ADMIN — Medication 100 MILLIGRAM(S): at 17:12

## 2025-05-28 RX ADMIN — Medication 100 MILLIGRAM(S): at 10:39

## 2025-05-28 RX ADMIN — Medication 5 MILLIGRAM(S): at 18:40

## 2025-05-28 NOTE — DISCHARGE NOTE PROVIDER - NSDCCPCAREPLAN_GEN_ALL_CORE_FT
PRINCIPAL DISCHARGE DIAGNOSIS  Diagnosis: Ascites  Assessment and Plan of Treatment: scites is the buildup of extra fluid in the belly, most often caused by liver disease such as cirrhosis. This fluid can make the stomach area look and feel swollen or tight. Other causes of ascites include heart failure, cancer, or infections, but liver disease is the most common reason in adults  Symptoms:  • Swelling or bloating of the belly  • Feeling full quickly when eating  • Weight gain  • Shortness of breath (if the fluid is pressing on the lungs)  • Sometimes, swelling in the legs  Diagnosis:  Doctors diagnose ascites by asking about symptoms, doing a physical exam, and using tests like ultrasound. A sample of the fluid was  taken from the belly with a thin needle to find out the cause and check for infection  Please followup with PCP within 1 week of discharge        SECONDARY DISCHARGE DIAGNOSES  Diagnosis: Cellulitis  Assessment and Plan of Treatment: Cellulitis is an infection of the skin. This infection can cause redness, pain, and swelling. Cellulitis tends to affect deep layers of skin and sometimes the fat under the skin. This condition can happen when germs get into the skin. Normally, different types of germs live on a person's skin, and mostly these germs do not cause any problems. But if a person gets a cut or break in the skin, the germs can penetrate and cause an infection. Certain conditions can increase a person's chance of getting cellulitis. These include: having a cut (even a tiny one), having another type of skin infection or a long-term skin condition, swelling of the skin or swelling in the body, and being overweight.       PRINCIPAL DISCHARGE DIAGNOSIS  Diagnosis: Ascites  Assessment and Plan of Treatment: scites is the buildup of extra fluid in the belly, most often caused by liver disease such as cirrhosis. This fluid can make the stomach area look and feel swollen or tight. Other causes of ascites include heart failure, cancer, or infections, but liver disease is the most common reason in adults  Symptoms:  • Swelling or bloating of the belly  • Feeling full quickly when eating  • Weight gain  • Shortness of breath (if the fluid is pressing on the lungs)  • Sometimes, swelling in the legs  Diagnosis:  Doctors diagnose ascites by asking about symptoms, doing a physical exam, and using tests like ultrasound. A sample of the fluid was  taken from the belly with a thin needle to find out the cause and check for infection  Please take your lasix 60mg everyday. This was increased from your usual dose of 40mg daily.   Please followup with your gastroenterology appointment with Dr. Neal on Monday.          SECONDARY DISCHARGE DIAGNOSES  Diagnosis: Cellulitis  Assessment and Plan of Treatment: Cellulitis is an infection of the skin. This infection can cause redness, pain, and swelling. Cellulitis tends to affect deep layers of skin and sometimes the fat under the skin. This condition can happen when germs get into the skin. Normally, different types of germs live on a person's skin, and mostly these germs do not cause any problems. But if a person gets a cut or break in the skin, the germs can penetrate and cause an infection. Certain conditions can increase a person's chance of getting cellulitis. These include: having a cut (even a tiny one), having another type of skin infection or a long-term skin condition, swelling of the skin or swelling in the body, and being overweight.

## 2025-05-28 NOTE — PROGRESS NOTE ADULT - PROBLEM SELECTOR PLAN 4
Edema of unknown etiology, +4 pitting in b/l LE extending from mid thigh to feet  Despite being on Furosemide and Spironolactone, persistent edema; Alb 2.9 not consistent w/ this level of edema  TTE in 10/24 w/ EF 65%  Possibly i/s/o liver cirrhosis vs chronic CHF   - F/u TTE  - strict I/Os   - c/w RLE cellulitis management   - c/w lasix and spironolactone Edema of unknown etiology, +4 pitting in b/l LE extending from mid thigh to feet  Despite being on Furosemide and Spironolactone, persistent edema; Alb 2.9 not consistent w/ this level of edema  TTE in 10/24 w/ EF 65%  TTE 5/27 revealed enlarged RV cavity with moderate mitral and tricuspid regurgitation. Nl LV thickness and cavity size. EF 52%.  Possibly i/s/o liver cirrhosis vs chronic CHF   - strict I/Os   - c/w RLE cellulitis management   - c/w lasix and spironolactone Edema of unknown etiology, +4 pitting in b/l LE extending from mid thigh to feet  Despite being on Furosemide and Spironolactone, persistent edema; Alb 2.9 not consistent w/ this level of edema  TTE in 10/24 w/ EF 65%  TTE 5/27 revealed enlarged RV cavity with moderate mitral and tricuspid regurgitation. Nl LV thickness and cavity size. EF 52%.  Possibly i/s/o liver cirrhosis vs chronic CHF   - strict I/Os   - c/w RLE cellulitis management   - c/w lasix and spironolactone - holding lasix today, pending results of paracentesis

## 2025-05-28 NOTE — DISCHARGE NOTE PROVIDER - CARE PROVIDER_API CALL
Jeff Neal  Transplant Hepatology  [address] [place] [address]  Phone: [phone]  Fax: [phone]  Established Patient  Scheduled Appointment: 06/09/2025 02:30 PM   Jeff Neal  Transplant Hepatology  [address] [address]  Phone: [phone]  Fax: [phone]  Established Patient  Scheduled Appointment: 06/09/2025 02:30 PM    Antoine Edwards)  Internal Medicine  [address]  [place] [address]  Phone: [phone]  Fax: [phone]  Scheduled Appointment: 06/06/2025 02:30 PM

## 2025-05-28 NOTE — DISCHARGE NOTE PROVIDER - NSDCFUADDAPPT_GEN_ALL_CORE_FT
Please follow up with Dr. Aron Saenz.   [address], [place] [address] | [phone] | Fax: [phone]

## 2025-05-28 NOTE — PROGRESS NOTE ADULT - SUBJECTIVE AND OBJECTIVE BOX
***Note in progress***    OVERNIGHT EVENTS: NAEO    SUBJECTIVE / INTERVAL HPI: Patient seen and examined at bedside. Patient denying chest pain, SOB, palpitations, cough. Patient denies fever, chills, HA, Dizziness, N/V, abdominal pain, diarrhea, constipation, hematochezia/melena, dysuria, hematuria, new onset weakness/numbness, LE pain and/or swelling.    Remaining ROS negative       PHYSICAL EXAM:    General:NAD.   HEENT: NC/AT; PERRL, anicteric sclera; MMM  Neck: supple  Cardiovascular: +S1/S2, RRR  Respiratory: CTA B/L; no W/R/R  Gastrointestinal: soft, NT/ND; +BSx4  Extremities: WWP; no edema, clubbing or cyanosis  Vascular: 2+ radial, DP/PT pulses B/L  Neurological: AAOx3; no focal deficits  Psychiatric: pleasant mood and affect  Dermatologic: no appreciable wounds or damage to the skin    VITAL SIGNS:  Vital Signs Last 24 Hrs  T(C): 36.4 (28 May 2025 05:55), Max: 36.6 (27 May 2025 20:32)  T(F): 97.6 (28 May 2025 05:55), Max: 97.9 (27 May 2025 20:32)  HR: 76 (28 May 2025 05:55) (62 - 76)  BP: 101/68 (28 May 2025 05:55) (99/60 - 111/72)  BP(mean): 85 (27 May 2025 12:22) (85 - 85)  RR: 17 (28 May 2025 05:55) (17 - 18)  SpO2: 92% (28 May 2025 05:55) (92% - 96%)    Parameters below as of 27 May 2025 20:32  Patient On (Oxygen Delivery Method): room air        MEDICATIONS:  MEDICATIONS  (STANDING):  apixaban 5 milliGRAM(s) Oral every 12 hours  ceFAZolin   IVPB 1000 milliGRAM(s) IV Intermittent every 8 hours  dextrose 5%. 1000 milliLiter(s) (50 mL/Hr) IV Continuous <Continuous>  dextrose 5%. 1000 milliLiter(s) (100 mL/Hr) IV Continuous <Continuous>  dextrose 50% Injectable 25 Gram(s) IV Push once  dextrose 50% Injectable 12.5 Gram(s) IV Push once  dextrose 50% Injectable 25 Gram(s) IV Push once  glucagon  Injectable 1 milliGRAM(s) IntraMuscular once  insulin lispro (ADMELOG) corrective regimen sliding scale   SubCutaneous at bedtime  lactulose Syrup 10 Gram(s) Oral every 8 hours  levothyroxine 100 MICROGram(s) Oral daily  multivitamin 1 Tablet(s) Oral daily  senna 2 Tablet(s) Oral at bedtime  spironolactone 25 milliGRAM(s) Oral every 24 hours  thiamine 100 milliGRAM(s) Oral every 24 hours    MEDICATIONS  (PRN):  dextrose Oral Gel 15 Gram(s) Oral once PRN Blood Glucose LESS THAN 70 milliGRAM(s)/deciliter  polyethylene glycol 3350 17 Gram(s) Oral daily PRN Constipation  zolpidem 5 milliGRAM(s) Oral at bedtime PRN Insomnia      ALLERGIES:  Allergies    No Known Allergies    Intolerances        LABS:                        10.8   4.61  )-----------( 150      ( 27 May 2025 05:30 )             33.6     05-27    136  |  101  |  16  ----------------------------<  78  3.6   |  27  |  0.83    Ca    7.9[L]      27 May 2025 16:47  Phos  2.1     05-27  Mg     2.2     05-27    TPro  6.2  /  Alb  2.7[L]  /  TBili  0.5  /  DBili  x   /  AST  see note  /  ALT  8[L]  /  AlkPhos  100  05-26      Urinalysis Basic - ( 27 May 2025 16:47 )    Color: x / Appearance: x / SG: x / pH: x  Gluc: 78 mg/dL / Ketone: x  / Bili: x / Urobili: x   Blood: x / Protein: x / Nitrite: x   Leuk Esterase: x / RBC: x / WBC x   Sq Epi: x / Non Sq Epi: x / Bacteria: x      CAPILLARY BLOOD GLUCOSE      POCT Blood Glucose.: 109 mg/dL (27 May 2025 21:42)      RADIOLOGY & ADDITIONAL TESTS: Reviewed. ***Note in progress***    OVERNIGHT EVENTS: NAEO    SUBJECTIVE / INTERVAL HPI: Patient seen and examined at bedside. Patient reports chills and dry mouth. Patient denying chest pain, SOB, palpitations, cough. Patient denies fever, HA, Dizziness, N/V, abdominal pain, diarrhea, constipation, hematochezia/melena, dysuria, hematuria, new onset weakness/numbness, LE pain and/or swelling. Patient states his glucose this morning was 156 post-prandially without taking insulin. He reports urinating twice throughout the night.    Remaining ROS negative       PHYSICAL EXAM:    General: NAD.   HEENT: NC/AT; PERRL, anicteric sclera; dry mucous membranes  Neck: supple  Cardiovascular: +S1/S2, RRR  Respiratory: CTA B/L; no W/R/R  Gastrointestinal: soft, NT, distended; +BSx4, +fluid wave  Extremities: WWP; 2+ edema from ankle to mid-shin, erythematous with ill-defined borders, warm to touch (R leg); 3+ edema on ankle, 2+ ankle to mid-shin (L leg); No clubbing or cyanosis  Vascular: 2+ radial, DP/PT pulses B/L  Neurological: AAOx3; no focal deficits  Psychiatric: pleasant mood and affect  Dermatologic: no appreciable wounds or damage to the skin    VITAL SIGNS:  Vital Signs Last 24 Hrs  T(C): 36.4 (28 May 2025 05:55), Max: 36.6 (27 May 2025 20:32)  T(F): 97.6 (28 May 2025 05:55), Max: 97.9 (27 May 2025 20:32)  HR: 76 (28 May 2025 05:55) (62 - 76)  BP: 101/68 (28 May 2025 05:55) (99/60 - 111/72)  BP(mean): 85 (27 May 2025 12:22) (85 - 85)  RR: 17 (28 May 2025 05:55) (17 - 18)  SpO2: 92% (28 May 2025 05:55) (92% - 96%)    Parameters below as of 27 May 2025 20:32  Patient On (Oxygen Delivery Method): room air        MEDICATIONS:  MEDICATIONS  (STANDING):  apixaban 5 milliGRAM(s) Oral every 12 hours  ceFAZolin   IVPB 1000 milliGRAM(s) IV Intermittent every 8 hours  dextrose 5%. 1000 milliLiter(s) (50 mL/Hr) IV Continuous <Continuous>  dextrose 5%. 1000 milliLiter(s) (100 mL/Hr) IV Continuous <Continuous>  dextrose 50% Injectable 25 Gram(s) IV Push once  dextrose 50% Injectable 12.5 Gram(s) IV Push once  dextrose 50% Injectable 25 Gram(s) IV Push once  glucagon  Injectable 1 milliGRAM(s) IntraMuscular once  insulin lispro (ADMELOG) corrective regimen sliding scale   SubCutaneous at bedtime  lactulose Syrup 10 Gram(s) Oral every 8 hours  levothyroxine 100 MICROGram(s) Oral daily  multivitamin 1 Tablet(s) Oral daily  senna 2 Tablet(s) Oral at bedtime  spironolactone 25 milliGRAM(s) Oral every 24 hours  thiamine 100 milliGRAM(s) Oral every 24 hours    MEDICATIONS  (PRN):  dextrose Oral Gel 15 Gram(s) Oral once PRN Blood Glucose LESS THAN 70 milliGRAM(s)/deciliter  polyethylene glycol 3350 17 Gram(s) Oral daily PRN Constipation  zolpidem 5 milliGRAM(s) Oral at bedtime PRN Insomnia      ALLERGIES:  Allergies    No Known Allergies    Intolerances        LABS:                        10.8   4.61  )-----------( 150      ( 27 May 2025 05:30 )             33.6     05-27    136  |  101  |  16  ----------------------------<  78  3.6   |  27  |  0.83    Ca    7.9[L]      27 May 2025 16:47  Phos  2.1     05-27  Mg     2.2     05-27    TPro  6.2  /  Alb  2.7[L]  /  TBili  0.5  /  DBili  x   /  AST  see note  /  ALT  8[L]  /  AlkPhos  100  05-26      Urinalysis Basic - ( 27 May 2025 16:47 )    Color: x / Appearance: x / SG: x / pH: x  Gluc: 78 mg/dL / Ketone: x  / Bili: x / Urobili: x   Blood: x / Protein: x / Nitrite: x   Leuk Esterase: x / RBC: x / WBC x   Sq Epi: x / Non Sq Epi: x / Bacteria: x      CAPILLARY BLOOD GLUCOSE      POCT Blood Glucose.: 109 mg/dL (27 May 2025 21:42)      RADIOLOGY & ADDITIONAL TESTS: Reviewed. ***Note in progress***    OVERNIGHT EVENTS: NAEO    SUBJECTIVE / INTERVAL HPI: Patient seen and examined at bedside. Patient reports chills and dry mouth. Patient denying chest pain, SOB, palpitations, cough. Patient denies fever, HA, Dizziness, N/V, abdominal pain, diarrhea, constipation, hematochezia/melena, dysuria, hematuria, new onset weakness/numbness, LE pain and/or swelling. Patient states his glucose this morning was 156 post-prandially without taking insulin. He reports urinating twice throughout the night.    Remaining ROS negative       PHYSICAL EXAM:    General: NAD.   HEENT: NC/AT; PERRL, anicteric sclera; dry mucous membranes  Neck: supple  Cardiovascular: +S1/S2, RRR  Respiratory: CTA B/L; no W/R/R  Gastrointestinal: soft, NT, distended; +BSx4, +fluid wave  Extremities: WWP; 2+ edema from ankle to mid-shin, erythematous with ill-defined borders, warm to touch (R leg); 3+ edema on ankle, 2+ ankle to mid-shin (L leg); No clubbing or cyanosis  Vascular: 2+ radial, DP/PT pulses B/L  Neurological: AAOx3; no focal deficits  Psychiatric: pleasant mood and affect  Dermatologic: no appreciable wounds or damage to the skin    VITAL SIGNS:  Vital Signs Last 24 Hrs  T(C): 36.4 (28 May 2025 05:55), Max: 36.6 (27 May 2025 20:32)  T(F): 97.6 (28 May 2025 05:55), Max: 97.9 (27 May 2025 20:32)  HR: 76 (28 May 2025 05:55) (62 - 76)  BP: 101/68 (28 May 2025 05:55) (99/60 - 111/72)  BP(mean): 85 (27 May 2025 12:22) (85 - 85)  RR: 17 (28 May 2025 05:55) (17 - 18)  SpO2: 92% (28 May 2025 05:55) (92% - 96%)    Parameters below as of 27 May 2025 20:32  Patient On (Oxygen Delivery Method): room air        MEDICATIONS:  MEDICATIONS  (STANDING):  apixaban 5 milliGRAM(s) Oral every 12 hours  ceFAZolin   IVPB 1000 milliGRAM(s) IV Intermittent every 8 hours  dextrose 5%. 1000 milliLiter(s) (50 mL/Hr) IV Continuous <Continuous>  dextrose 5%. 1000 milliLiter(s) (100 mL/Hr) IV Continuous <Continuous>  dextrose 50% Injectable 25 Gram(s) IV Push once  dextrose 50% Injectable 12.5 Gram(s) IV Push once  dextrose 50% Injectable 25 Gram(s) IV Push once  glucagon  Injectable 1 milliGRAM(s) IntraMuscular once  insulin lispro (ADMELOG) corrective regimen sliding scale   SubCutaneous at bedtime  lactulose Syrup 10 Gram(s) Oral every 8 hours  levothyroxine 100 MICROGram(s) Oral daily  multivitamin 1 Tablet(s) Oral daily  senna 2 Tablet(s) Oral at bedtime  spironolactone 25 milliGRAM(s) Oral every 24 hours  thiamine 100 milliGRAM(s) Oral every 24 hours    MEDICATIONS  (PRN):  dextrose Oral Gel 15 Gram(s) Oral once PRN Blood Glucose LESS THAN 70 milliGRAM(s)/deciliter  polyethylene glycol 3350 17 Gram(s) Oral daily PRN Constipation    LABS:                          11.0   4.93  )-----------( 153      ( 28 May 2025 05:30 )             32.5     05-28    136  |  103  |  16  ----------------------------<  129[H]  4.8   |  23  |  0.79    Ca    8.1[L]      28 May 2025 05:30  Phos  2.4     05-28  Mg     2.1     05-28    TPro  5.9[L]  /  Alb  2.5[L]  /  TBili  0.5  /  DBili  x   /  AST  25  /  ALT  <5[L]  /  AlkPhos  111  05-28    LIVER FUNCTIONS - ( 28 May 2025 05:30 )  Alb: 2.5 g/dL / Pro: 5.9 g/dL / ALK PHOS: 111 U/L / ALT: <5 U/L / AST: 25 U/L / GGT: x           PT/INR - ( 28 May 2025 05:30 )   PT: 19.9 sec;   INR: 1.74          PTT - ( 28 May 2025 05:30 )  PTT:33.1 sec  Urinalysis Basic - ( 28 May 2025 05:30 )    Color: x / Appearance: x / SG: x / pH: x  Gluc: 129 mg/dL / Ketone: x  / Bili: x / Urobili: x   Blood: x / Protein: x / Nitrite: x   Leuk Esterase: x / RBC: x / WBC x   Sq Epi: x / Non Sq Epi: x / Bacteria: x      zolpidem 5 milliGRAM(s) Oral at bedtime PRN Insomnia      ALLERGIES:  Allergies    No Known Allergies    Intolerances         ***Note in progress***    OVERNIGHT EVENTS: NAEO    SUBJECTIVE / INTERVAL HPI: Patient seen and examined at bedside. Patient reports chills and dry mouth. Patient denying chest pain, SOB, palpitations, cough. Patient denies fever, HA, Dizziness, N/V, abdominal pain, diarrhea, constipation, hematochezia/melena, dysuria, hematuria, new onset weakness/numbness, LE pain and/or swelling. Patient states his glucose this morning was 156 post-prandially without taking insulin. He reports urinating twice throughout the night.    Remaining ROS negative       PHYSICAL EXAM:    General: NAD.   HEENT: NC/AT; PERRL, anicteric sclera; dry mucous membranes  Neck: supple  Cardiovascular: +S1/S2, RRR  Respiratory: CTA B/L; no W/R/R  Gastrointestinal: soft, NT, distended; +BSx4, +fluid wave  Extremities: WWP; 2+ edema from ankle to mid-shin, erythematous with ill-defined borders, warm to touch (R leg); 3+ edema on ankle, 2+ ankle to mid-shin (L leg); No clubbing or cyanosis  Vascular: 2+ radial, DP/PT pulses B/L  Neurological: AAOx3; no focal deficits  Psychiatric: pleasant mood and affect  Dermatologic: no appreciable wounds or damage to the skin    VITAL SIGNS:  Vital Signs Last 24 Hrs  T(C): 36.4 (28 May 2025 05:55), Max: 36.6 (27 May 2025 20:32)  T(F): 97.6 (28 May 2025 05:55), Max: 97.9 (27 May 2025 20:32)  HR: 76 (28 May 2025 05:55) (62 - 76)  BP: 101/68 (28 May 2025 05:55) (99/60 - 111/72)  BP(mean): 85 (27 May 2025 12:22) (85 - 85)  RR: 17 (28 May 2025 05:55) (17 - 18)  SpO2: 92% (28 May 2025 05:55) (92% - 96%)    Parameters below as of 27 May 2025 20:32  Patient On (Oxygen Delivery Method): room air        MEDICATIONS:  MEDICATIONS  (STANDING):  apixaban 5 milliGRAM(s) Oral every 12 hours  ceFAZolin   IVPB 1000 milliGRAM(s) IV Intermittent every 8 hours  dextrose 5%. 1000 milliLiter(s) (50 mL/Hr) IV Continuous <Continuous>  dextrose 5%. 1000 milliLiter(s) (100 mL/Hr) IV Continuous <Continuous>  dextrose 50% Injectable 25 Gram(s) IV Push once  dextrose 50% Injectable 12.5 Gram(s) IV Push once  dextrose 50% Injectable 25 Gram(s) IV Push once  glucagon  Injectable 1 milliGRAM(s) IntraMuscular once  insulin lispro (ADMELOG) corrective regimen sliding scale   SubCutaneous at bedtime  lactulose Syrup 10 Gram(s) Oral every 8 hours  levothyroxine 100 MICROGram(s) Oral daily  multivitamin 1 Tablet(s) Oral daily  senna 2 Tablet(s) Oral at bedtime  spironolactone 25 milliGRAM(s) Oral every 24 hours  thiamine 100 milliGRAM(s) Oral every 24 hours    MEDICATIONS  (PRN):  dextrose Oral Gel 15 Gram(s) Oral once PRN Blood Glucose LESS THAN 70 milliGRAM(s)/deciliter  polyethylene glycol 3350 17 Gram(s) Oral daily PRN Constipation    LABS:                          11.0   4.93  )-----------( 153      ( 28 May 2025 05:30 )             32.5     05-28    136  |  103  |  16  ----------------------------<  129[H]  4.8   |  23  |  0.79    Ca    8.1[L]      28 May 2025 05:30  Phos  2.4     05-28  Mg     2.1     05-28    TPro  5.9[L]  /  Alb  2.5[L]  /  TBili  0.5  /  DBili  x   /  AST  25  /  ALT  <5[L]  /  AlkPhos  111  05-28    LIVER FUNCTIONS - ( 28 May 2025 05:30 )  Alb: 2.5 g/dL / Pro: 5.9 g/dL / ALK PHOS: 111 U/L / ALT: <5 U/L / AST: 25 U/L / GGT: x           PT/INR - ( 28 May 2025 05:30 )   PT: 19.9 sec;   INR: 1.74     PTT - ( 28 May 2025 05:30 )  PTT:33.1 sec  Urinalysis Basic - ( 28 May 2025 05:30 )    Color: x / Appearance: x / SG: x / pH: x  Gluc: 129 mg/dL / Ketone: x  / Bili: x / Urobili: x   Blood: x / Protein: x / Nitrite: x   Leuk Esterase: x / RBC: x / WBC x   Sq Epi: x / Non Sq Epi: x / Bacteria: x      zolpidem 5 milliGRAM(s) Oral at bedtime PRN Insomnia      ALLERGIES:  Allergies    No Known Allergies    Intolerances         OVERNIGHT EVENTS: NAEO    SUBJECTIVE / INTERVAL HPI: Patient seen and examined at bedside. Patient reports chills and dry mouth. Patient denying chest pain, SOB, palpitations, cough. Patient denies fever, HA, Dizziness, N/V, abdominal pain, diarrhea, constipation, hematochezia/melena, dysuria, hematuria, new onset weakness/numbness, LE pain and/or swelling. Patient states his glucose this morning was 156 post-prandially without taking insulin. He reports urinating twice throughout the night.    Remaining ROS negative       PHYSICAL EXAM:    General: NAD.   HEENT: NC/AT; PERRL, anicteric sclera; dry mucous membranes  Neck: supple  Cardiovascular: +S1/S2, RRR  Respiratory: CTA B/L; no W/R/R  Gastrointestinal: soft, NT, distended; +BSx4, +fluid wave  Extremities: WWP; 2+ edema from ankle to mid-shin, erythematous with ill-defined borders, warm to touch (R leg); 3+ edema on ankle, 2+ ankle to mid-shin (L leg); No clubbing or cyanosis  Vascular: 2+ radial, DP/PT pulses B/L  Neurological: AAOx3; no focal deficits  Psychiatric: pleasant mood and affect  Dermatologic: no appreciable wounds or damage to the skin    VITAL SIGNS:  Vital Signs Last 24 Hrs  T(C): 36.4 (28 May 2025 05:55), Max: 36.6 (27 May 2025 20:32)  T(F): 97.6 (28 May 2025 05:55), Max: 97.9 (27 May 2025 20:32)  HR: 76 (28 May 2025 05:55) (62 - 76)  BP: 101/68 (28 May 2025 05:55) (99/60 - 111/72)  BP(mean): 85 (27 May 2025 12:22) (85 - 85)  RR: 17 (28 May 2025 05:55) (17 - 18)  SpO2: 92% (28 May 2025 05:55) (92% - 96%)    Parameters below as of 27 May 2025 20:32  Patient On (Oxygen Delivery Method): room air        MEDICATIONS:  MEDICATIONS  (STANDING):  apixaban 5 milliGRAM(s) Oral every 12 hours  ceFAZolin   IVPB 1000 milliGRAM(s) IV Intermittent every 8 hours  dextrose 5%. 1000 milliLiter(s) (50 mL/Hr) IV Continuous <Continuous>  dextrose 5%. 1000 milliLiter(s) (100 mL/Hr) IV Continuous <Continuous>  dextrose 50% Injectable 25 Gram(s) IV Push once  dextrose 50% Injectable 12.5 Gram(s) IV Push once  dextrose 50% Injectable 25 Gram(s) IV Push once  glucagon  Injectable 1 milliGRAM(s) IntraMuscular once  insulin lispro (ADMELOG) corrective regimen sliding scale   SubCutaneous at bedtime  lactulose Syrup 10 Gram(s) Oral every 8 hours  levothyroxine 100 MICROGram(s) Oral daily  multivitamin 1 Tablet(s) Oral daily  senna 2 Tablet(s) Oral at bedtime  spironolactone 25 milliGRAM(s) Oral every 24 hours  thiamine 100 milliGRAM(s) Oral every 24 hours    MEDICATIONS  (PRN):  dextrose Oral Gel 15 Gram(s) Oral once PRN Blood Glucose LESS THAN 70 milliGRAM(s)/deciliter  polyethylene glycol 3350 17 Gram(s) Oral daily PRN Constipation    LABS:                          11.0   4.93  )-----------( 153      ( 28 May 2025 05:30 )             32.5     05-28    136  |  103  |  16  ----------------------------<  129[H]  4.8   |  23  |  0.79    Ca    8.1[L]      28 May 2025 05:30  Phos  2.4     05-28  Mg     2.1     05-28    TPro  5.9[L]  /  Alb  2.5[L]  /  TBili  0.5  /  DBili  x   /  AST  25  /  ALT  <5[L]  /  AlkPhos  111  05-28    LIVER FUNCTIONS - ( 28 May 2025 05:30 )  Alb: 2.5 g/dL / Pro: 5.9 g/dL / ALK PHOS: 111 U/L / ALT: <5 U/L / AST: 25 U/L / GGT: x           PT/INR - ( 28 May 2025 05:30 )   PT: 19.9 sec;   INR: 1.74     PTT - ( 28 May 2025 05:30 )  PTT:33.1 sec  Urinalysis Basic - ( 28 May 2025 05:30 )    Color: x / Appearance: x / SG: x / pH: x  Gluc: 129 mg/dL / Ketone: x  / Bili: x / Urobili: x   Blood: x / Protein: x / Nitrite: x   Leuk Esterase: x / RBC: x / WBC x   Sq Epi: x / Non Sq Epi: x / Bacteria: x      zolpidem 5 milliGRAM(s) Oral at bedtime PRN Insomnia      ALLERGIES:  Allergies    No Known Allergies    Intolerances

## 2025-05-28 NOTE — PROGRESS NOTE ADULT - PROBLEM SELECTOR PLAN 5
Recently diagnosed with liver cirrhosis (2025?), per chart note by Dr. Mckeon and review on [facility] -  etiology of his cirrhosis is unclear--no work-up was completed on [place] except bloodwork and Fibroscan, the latter uninterpretable due to jordy-hepatic ascites.   , INR 1.18, AST/ALT not quantified/16  CT A/P this year showed cirrhotic morphology, last year had normal findings  ED bedside U/S without pocket to TAP per signout  Abd US showing ascites.  - Hepatology consulted, appreciate recs  - plan for diagnostic para  - Daily MELD score  - Diuretics pending improvement in BMP  - restarted home lasix and spironolactone

## 2025-05-28 NOTE — DISCHARGE NOTE PROVIDER - DETAILS OF MALNUTRITION DIAGNOSIS/DIAGNOSES
This patient has been assessed with a concern for Malnutrition and was treated during this hospitalization for the following Nutrition diagnosis/diagnoses:     -  05/24/2025: Severe protein-calorie malnutrition

## 2025-05-28 NOTE — PROGRESS NOTE ADULT - ASSESSMENT
72M w/ PMH of gastric cancer (s/p feeding J-tube), portal vein thrombosis (on Eliquis, now resolved on US) AAA (s/p EVAR in 2023), DM2, hypothyroidism, HTN, anemia, BPH, type 2 endoleak repair, s/p embolization of internal iliac artery branch (10/24), and new diagnosis of cirrhosis, who first presented to ED with worsening bilateral leg swelling and gen weakness. Hepatology consulted for RUQ US showing cirrhosis.     Patient denies any significant ETOH use in the past.   Cirrhosis of unknown etiology but most likely 2/2 to MASLD (given DM2 and HTN) vs. prior radiation exposure (received multiple rounds when treated for gastric cancer.   BMI currently 20, and patient reports that at his heaviest he weighed 170 lbs at 6'0" tall (BMI of 23).     EV screening: none noted on EGD in 2023 but significant esophagitis may have obscured views.   HE: none prior   HCC screening: AFP WNL and MRI pending   SBP: none prior     RUQ US:   - small to moderate amount of ascites most prominent of the right lower quadrant on the submitted images.  - nodular liver consistent with cirrhosis   - patent vasculature despite history of PVT thrombosis     Meld 3.0 score: 11 on 05/27 --> 14 on 05/28   Platelets >150 and INR 1.35 --> 1.74 on Eliquis, indicating good synthetic dysfunction.     Most recent EGD (June 2023):   - Severe esophagitis LA Grade D  - Esophageal-jejunal anastomosis intact w/ patent afferent and efferent limbs without evidence of acute angulation or suggestion of obstruction.    Cirrhosis work-up:   - iron sat 32 and ferritin 228   - hep A IgM nonreactive, hep A IgG pending   - hep C nonreactive  - hep B surface antigen nonreactive, surface antibody and core pending   - AFP WNL <1.8  - anti-LMKA <20.1   - alpha-1 antitrypsin    - ceruloplasmin WNL 22    Recommendations:   - consult IR for diagnotic paracentesis given small to moderate ascites noted on US   - please send fluid for cell count, cultures, albumin, protein, and cytology to ensure no gastric cancer recurrence   - please obtain liver MRI with IV contrast   - please give vitamin K   - trend CBC, CMP, and INR daily   - f/u PETH, EBER, ASMA, IgG, and IgM   - please send 24-hour urine copper screen, immunoglobulin panel, and anti-SLA  - in addition to acute hepatitis panel, please send HAV IgG, and HBV surface Ab, and HBV core total Ab  - low sodium/high protein diet with tube feeds via j-tube   - Tylenol not exceeding 2g per day   - continue home spironolactone 25 mg PO daily and Lasix 40 mg PO daily   - consider EGD for EV screening inpt vs. out-pt     Case discussed with Dr. Gomez. Hepatology Team will continue to follow.     Katie Miller D.O.   Gastroenterology Fellow  Weekday 7am-5pm Pager: [phone]  Weeknights/Weekend/Holiday Coverage: Please call the  for contact info

## 2025-05-28 NOTE — PROGRESS NOTE ADULT - SUBJECTIVE AND OBJECTIVE BOX
Hepatology Consult Progress Note:     OVERNIGHT EVENTS: KIRILL.    SUBJECTIVE / INTERVAL HPI:   Patient seen and examined at bedside. Reports that overall he feels well. No acute complaints at this time. Still pending liver MRI and paracentesis       VITAL SIGNS:  Vital Signs Last 24 Hrs  T(C): 36.8 (28 May 2025 12:05), Max: 36.8 (28 May 2025 12:05)  T(F): 98.3 (28 May 2025 12:05), Max: 98.3 (28 May 2025 12:05)  HR: 81 (28 May 2025 12:05) (63 - 81)  BP: 105/69 (28 May 2025 12:05) (99/60 - 105/69)  BP(mean): --  RR: 20 (28 May 2025 12:05) (17 - 20)  SpO2: 95% (28 May 2025 12:05) (92% - 95%)    Parameters below as of 28 May 2025 12:05  Patient On (Oxygen Delivery Method): room air      PHYSICAL EXAM:  General: No acute distress  Lungs: Normal respiratory effort and no intercostal retractions  Cardiovascular: RRR  Abdomen: Soft, non-tender, distended 2/2 pocket of ascites (R>L), +j-tube   Neurological: Alert and oriented x3  Skin: Warm and dry. No obvious rash    MEDICATIONS:  MEDICATIONS  (STANDING):  apixaban 5 milliGRAM(s) Oral every 12 hours  ceFAZolin   IVPB 1000 milliGRAM(s) IV Intermittent every 8 hours  dextrose 5%. 1000 milliLiter(s) (100 mL/Hr) IV Continuous <Continuous>  dextrose 5%. 1000 milliLiter(s) (50 mL/Hr) IV Continuous <Continuous>  dextrose 50% Injectable 25 Gram(s) IV Push once  dextrose 50% Injectable 12.5 Gram(s) IV Push once  dextrose 50% Injectable 25 Gram(s) IV Push once  glucagon  Injectable 1 milliGRAM(s) IntraMuscular once  insulin lispro (ADMELOG) corrective regimen sliding scale   SubCutaneous at bedtime  levothyroxine 100 MICROGram(s) Oral daily  multivitamin 1 Tablet(s) Oral daily  senna 2 Tablet(s) Oral at bedtime  spironolactone 25 milliGRAM(s) Oral every 24 hours  thiamine 100 milliGRAM(s) Oral every 24 hours    MEDICATIONS  (PRN):  dextrose Oral Gel 15 Gram(s) Oral once PRN Blood Glucose LESS THAN 70 milliGRAM(s)/deciliter  polyethylene glycol 3350 17 Gram(s) Oral daily PRN Constipation  zolpidem 5 milliGRAM(s) Oral at bedtime PRN Insomnia      ALLERGIES:  Allergies    No Known Allergies    Intolerances        LABS:                        11.0   4.93  )-----------( 153      ( 28 May 2025 05:30 )             32.5     05-28    136  |  103  |  16  ----------------------------<  129[H]  4.8   |  23  |  0.79    Ca    8.1[L]      28 May 2025 05:30  Phos  2.4     05-28  Mg     2.1     05-28    TPro  5.9[L]  /  Alb  2.5[L]  /  TBili  0.5  /  DBili  x   /  AST  25  /  ALT  <5[L]  /  AlkPhos  111  05-28    PT/INR - ( 28 May 2025 05:30 )   PT: 19.9 sec;   INR: 1.74          PTT - ( 28 May 2025 05:30 )  PTT:33.1 sec  Urinalysis Basic - ( 28 May 2025 05:30 )    Color: x / Appearance: x / SG: x / pH: x  Gluc: 129 mg/dL / Ketone: x  / Bili: x / Urobili: x   Blood: x / Protein: x / Nitrite: x   Leuk Esterase: x / RBC: x / WBC x   Sq Epi: x / Non Sq Epi: x / Bacteria: x      CAPILLARY BLOOD GLUCOSE      POCT Blood Glucose.: 96 mg/dL (28 May 2025 12:06)  RADIOLOGY & ADDITIONAL TESTS: Reviewed.

## 2025-05-28 NOTE — DISCHARGE NOTE PROVIDER - NSDCCONDITION_GEN_ALL_CORE
Physical Therapy    Physical Therapy Evaluation    Patient Name: mathieu Quispe  MRN: [id number]  Today's Date: 5/6/2024        Assessment/Plan   PT Assessment  PT Assessment Results: Decreased endurance  Rehab Prognosis: Good  Barriers to Discharge: none  Evaluation/Treatment Tolerance: Patient limited by fatigue  Medical Staff Made Aware: Yes  Strengths: Attitude of self, Coping skills, Living arrangement secure, Support of extended family/friends  Barriers to Participation: Insight into problems  End of Session Communication: Bedside nurse  Assessment Comment: 77 y/o F with diahrrea and weakness was found to be dehydrated with bradycardia, Hypo K+, Acute gastroenteritis.  Pt is mobilizing well.  Demo'sIndependece with all transfers,gait, ambulation.  Pt declines any further PT.  End of Session Patient Position: Bed, 2 rail up, Alarm off, not on at start of session  IP OR SWING BED PT PLAN  Inpatient or Swing Bed: Inpatient  PT Plan  PT Plan: PT Eval only  PT Eval Only Reason: No acute PT needs identified  PT Discharge Recommendations: Low intensity level of continued care  PT - OK to Discharge: Yes      Subjective   General Visit Information:  General  Reason for Referral: Impaired mobility 2nd to Diahrrea ,weakness, low K+, Dehydration, gastroenteritis, Bradycardia  Referred By: Akila Sosa CNP  Past Medical History Relevant to Rehab: Breast CA, Colon resection 5 yrs ago COPD, Anxiety,  depression, panic attack, thyroid, Gerd, Gastric Bypass  Family/Caregiver Present: Yes  Prior to Session Communication: Bedside nurse (Pt K+ low.  Verified with nurse.  States she gave K+ this am.  Ok to see the patient.)  Patient Position Received: Bed, 2 rail up, Alarm off, not on at start of session  Preferred Learning Style: verbal  General Comment: Admit through the ER with complaintes of Diahrrea and weakness for 2 days.  Found Dehydration, Acute gs troenteritis  Marty, HypoK+  Home Living:  Home Living  Type of Home:  House  Lives With: Spouse  Home Adaptive Equipment: None  Home Layout: Two level  Home Access:  (Chair lift)  Bathroom Shower/Tub: Tub/shower unit  Bathroom Toilet: Handicapped height  Bathroom Equipment: Shower chair with back  Prior Level of Function:  Prior Function Per Pt/Caregiver Report  Level of Cooper: Independent with ADLs and functional transfers, Independent with homemaking with ambulation  Receives Help From: Family  ADL Assistance: Independent  Homemaking Assistance: Independent  Ambulatory Assistance: Independent  Precautions:  Precautions  Medical Precautions: Fall precautions  Precautions Comment: No activity restriction  Vital Signs:  Vital Signs  Heart Rate: 53  Heart Rate Source: Monitor  Resp: 14  SpO2: 95 %  Patient Position: Lying    Objective   Pain:  Pain Assessment  Pain Assessment: 0-10  Pain Score: 0 - No pain (Feels awful, Feels weak and tired.)  Cognition:  Cognition  Overall Cognitive Status: Within Functional Limits  Orientation Level: Oriented X4    General Assessments:  General Observation  General Observation: Pt found in bed curled up.  Reported feeling awful.  Reports independence.  Demo's independence with transfers and ambulation               Activity Tolerance  Endurance: Tolerates less than 10 min exercise, no significant change in vital signs    Sensation  Light Touch: No apparent deficits    Strength  Strength Comments: 4/5  Strength  Strength Comments: 4/5    Perception  Inattention/Neglect: Appears intact      Coordination  Movements are Fluid and Coordinated: Yes  Rapid Alternating Movements: Intact  Finger to Target: Intact    Postural Control  Postural Control: Within Functional Limits    Static Sitting Balance  Static Sitting-Balance Support:  (Good)  Static Sitting-Level of Assistance: Independent  Dynamic Sitting Balance  Dynamic Sitting-Balance Support: No upper extremity supported (Good)    Static Standing Balance  Static Standing-Balance Support:   (Good)  Dynamic Standing Balance  Dynamic Standing-Balance Support:  (Good)  Functional Assessments:  Bed Mobility  Bed Mobility: Yes  Bed Mobility 1  Bed Mobility 1: Supine to sitting, Sitting to supine, Rolling left, Scooting  Level of Assistance 1: Independent    Transfers  Transfer: Yes  Transfer 1  Transfer From 1: Sit to, Stand to  Transfer to 1: Stand  Technique 1: Sit to stand  Transfer Level of Assistance 1: Close supervision    Ambulation/Gait Training  Ambulation/Gait Training Performed: Yes  Extremity/Trunk Assessments:  RUE   RUE : Within Functional Limits  LUE   LUE: Within Functional Limits  RLE   RLE : Within Functional Limits  LLE   LLE : Within Functional Limits  Outcome Measures:  [facility] Basic Mobility  Turning from your back to your side while in a flat bed without using bedrails: None  Moving from lying on your back to sitting on the side of a flat bed without using bedrails: None  Moving to and from bed to chair (including a wheelchair): None  Standing up from a chair using your arms (e.g. wheelchair or bedside chair): None  To walk in hospital room: None  Climbing 3-5 steps with railing: None  Basic Mobility - Total Score: 24    Encounter Problems       Encounter Problems (Active)       Pain - Adult              Education Documentation  Mobility Training, taught by Yahaira Long PT at 5/6/2024  2:39 PM.  Learner: Patient  Readiness: Acceptance  Method: Explanation, Demonstration  Response: Verbalizes Understanding, Demonstrated Understanding    Precautions, taught by Yahaira Long PT at 5/6/2024  2:39 PM.  Learner: Patient  Readiness: Acceptance  Method: Explanation, Demonstration  Response: Verbalizes Understanding, Demonstrated Understanding    ADL Training, taught by Yahaira Long PT at 5/6/2024  2:39 PM.  Learner: Patient  Readiness: Acceptance  Method: Explanation, Demonstration  Response: Verbalizes Understanding, Demonstrated Understanding    Mobility Training, taught by  Yahaira Long PT at 5/6/2024  2:35 PM.  Learner: Patient  Readiness: Acceptance  Method: Explanation, Demonstration  Response: Verbalizes Understanding, Demonstrated Understanding    Precautions, taught by Yahaira Long PT at 5/6/2024  2:35 PM.  Learner: Patient  Readiness: Acceptance  Method: Explanation, Demonstration  Response: Verbalizes Understanding, Demonstrated Understanding    ADL Training, taught by Yahaira Long, PT at 5/6/2024  2:35 PM.  Learner: Patient  Readiness: Acceptance  Method: Explanation, Demonstration  Response: Verbalizes Understanding, Demonstrated Understanding    Education Comments  No comments found.             Stable

## 2025-05-28 NOTE — DISCHARGE NOTE PROVIDER - CARE PROVIDERS DIRECT ADDRESSES
,[email or web address] H Plasty Text: Given the location of the defect, shape of the defect and the proximity to free margins a H-plasty was deemed most appropriate for repair.  Using a sterile surgical marker, the appropriate advancement arms of the H-plasty were drawn incorporating the defect and placing the expected incisions within the relaxed skin tension lines where possible. The area thus outlined was incised deep to adipose tissue with a #15 scalpel blade. The skin margins were undermined to an appropriate distance in all directions utilizing iris scissors.  The opposing advancement arms were then advanced into place in opposite direction and anchored with interrupted buried subcutaneous sutures.

## 2025-05-28 NOTE — PROGRESS NOTE ADULT - PROBLEM SELECTOR PLAN 2
RLE w/ erythema, warmth, no active purulence or drainage x 4 days  Dopplers b/l LE: No evidence of deep venous thrombosis in either lower extremity. Soft tissue swelling in both lower extremities.  Xray R foot and Tib/Fib: Osteopenia. No osseous destruction or periosteal reaction. No fracture or dislocation. There is a hallux valgus alignment with first MTP joint osteoarthrosis. Tibiotalar joint space narrowing is also identified. Second through fourth hammertoe deformities. Diffuse soft tissue swelling of the imaged lower extremity. Vascular calcification is present.  S/p Zosyn 3.375mg x1, Vancomycin 1000mg x1  Afebrile, no WBC  - C/w  Cefazolin 1g q8h (5/24 - 5/28)  - f/u Blood cultures RLE w/ erythema, warmth, no active purulence or drainage x 4 days  Dopplers b/l LE: No evidence of deep venous thrombosis in either lower extremity. Soft tissue swelling in both lower extremities.  Xray R foot and Tib/Fib: Osteopenia. No osseous destruction or periosteal reaction. No fracture or dislocation. There is a hallux valgus alignment with first MTP joint osteoarthrosis. Tibiotalar joint space narrowing is also identified. Second through fourth hammertoe deformities. Diffuse soft tissue swelling of the imaged lower extremity. Vascular calcification is present.  S/p Zosyn 3.375mg x1, Vancomycin 1000mg x1  Afebrile, no WBC  - C/w  Cefazolin 1g q8h (5/24 - 5/28)  - f/u Blood cultures (5/24): No growth to date

## 2025-05-28 NOTE — DISCHARGE NOTE PROVIDER - PROVIDER TOKENS
PROVIDER:[TOKEN:[[id number]],SCHEDULEDAPPT:[06/09/2025],SCHEDULEDAPPTTIME:[02:30 PM],ESTABLISHEDPATIENT:[T]] PROVIDER:[TOKEN:[[id number]],SCHEDULEDAPPT:[06/09/2025],SCHEDULEDAPPTTIME:[02:30 PM],ESTABLISHEDPATIENT:[T]],PROVIDER:[TOKEN:[[id number]],SCHEDULEDAPPT:[06/06/2025],SCHEDULEDAPPTTIME:[02:30 PM]]

## 2025-05-28 NOTE — PROGRESS NOTE ADULT - SUBJECTIVE AND OBJECTIVE BOX
SUBJECTIVE / INTERVAL HPI: Patient was seen and examined this morning.     Overnight events:    CAPILLARY BLOOD GLUCOSE & INSULIN RECEIVED  66 mg/dL (05-27 @ 05:56)  67 mg/dL (05-27 @ 07:50)  92 mg/dL (05-27 @ 12:04)  74 mg/dL (05-27 @ 17:25)  109 mg/dL (05-27 @ 21:42)  107 mg/dL (05-28 @ 08:21)  96 mg/dL (05-28 @ 12:06)      REVIEW OF SYSTEMS  Constitutional:  Negative fever, chills   Cardiovascular:  Negative for chest pain or palpitations.  Respiratory:  Negative for cough, wheezing, or shortness of breath.    Gastrointestinal:  Negative for nausea, vomiting, diarrhea, constipation, or abdominal pain.  Genitourinary:  Negative frequency, urgency or dysuria.  Neurologic:  No headache, confusion, dizziness, lightheadedness.    PHYSICAL EXAM  Vital Signs Last 24 Hrs  T(C): 36.8 (28 May 2025 12:05), Max: 36.8 (28 May 2025 12:05)  T(F): 98.3 (28 May 2025 12:05), Max: 98.3 (28 May 2025 12:05)  HR: 81 (28 May 2025 12:05) (63 - 81)  BP: 105/69 (28 May 2025 12:05) (99/60 - 105/69)  BP(mean): --  RR: 20 (28 May 2025 12:05) (17 - 20)  SpO2: 95% (28 May 2025 12:05) (92% - 95%)    Parameters below as of 28 May 2025 12:05  Patient On (Oxygen Delivery Method): room air        Constitutional: Awake, alert, in no acute distress.   HEENT: Normocephalic, atraumatic, BRUNILDA.  Respiratory: Lungs clear to ausculation bilaterally.   Cardiovascular: regular rhythm, normal S1 and S2, no audible murmurs.   GI: soft, non-tender, non-distended, bowel sounds present.  Extremities: No lower extremity edema.     LABS  CBC - WBC/HGB/HTC/PLT: 4.93/11.0/32.5/153 (05-28-25)  BMP - Na/K/Cl/Bicarb/BUN/Cr/Gluc/AG/eGFR: 136/4.8/103/23/16/0.79/129/10/93 (05-28-25)  Ca - 8.1 (05-28-25)  Phos - 2.4 (05-28-25)  Mg - 2.1 (05-28-25)  LFT - Alb/Tprot/Tbili/Dbili/AlkPhos/ALT/AST: 2.5/--/0.5/--/111/<5/25 (05-28-25)  PT/aPTT/INR: 19.9/33.1/1.74 (05-28-25)   Thyroid Stimulating Hormone, Serum: 4.850 (05-25-25)  Total T4/Free T4: --/1.050 (05-25-25)        MEDICATIONS  MEDICATIONS  (STANDING):  apixaban 5 milliGRAM(s) Oral every 12 hours  ceFAZolin   IVPB 1000 milliGRAM(s) IV Intermittent every 8 hours  dextrose 5%. 1000 milliLiter(s) (100 mL/Hr) IV Continuous <Continuous>  dextrose 5%. 1000 milliLiter(s) (50 mL/Hr) IV Continuous <Continuous>  dextrose 50% Injectable 25 Gram(s) IV Push once  dextrose 50% Injectable 12.5 Gram(s) IV Push once  dextrose 50% Injectable 25 Gram(s) IV Push once  glucagon  Injectable 1 milliGRAM(s) IntraMuscular once  insulin lispro (ADMELOG) corrective regimen sliding scale   SubCutaneous at bedtime  lactulose Syrup 10 Gram(s) Oral every 8 hours  levothyroxine 100 MICROGram(s) Oral daily  multivitamin 1 Tablet(s) Oral daily  senna 2 Tablet(s) Oral at bedtime  spironolactone 25 milliGRAM(s) Oral every 24 hours  thiamine 100 milliGRAM(s) Oral every 24 hours    MEDICATIONS  (PRN):  dextrose Oral Gel 15 Gram(s) Oral once PRN Blood Glucose LESS THAN 70 milliGRAM(s)/deciliter  polyethylene glycol 3350 17 Gram(s) Oral daily PRN Constipation  zolpidem 5 milliGRAM(s) Oral at bedtime PRN Insomnia    ASSESSMENT / RECOMMENDATIONS  74y Male with history of DM type II, hypothyroidism, gastric CA and recently diagnosed hepatic cirrhosis who presented with weakness and RLE cellulitis. Endocrinology following for diabetes and tube feed management. He has been hypoglycemic in the am, unclear as he is on same tube feeds at home and on higher doses of NPH. Standing insulin stopped. Abdomen more distended, planned for paracentesis. PO intake is very poor.     A1C: 4.8 %  Creatinine: 0.79, GFR: 93  Weight: 63.5, BMI: 20.1    # Type 2 diabetes mellitus  - c/w [place] 1.4 at 110cc/hr from 6pm to 9am  - NO NPH. No standing insulin.   - ok to have glucose tabs to treat lows.   - Continue lispro LOW dose sliding scale before meals and at bedtime.  - Patient's fingerstick glucose goal is 100-180 mg/dL.    - Discharge recommendations TBD  - can f/u with Dr. Richard on discharge.     # Hypothyroidism:    - Has one normal and one mildly elevated TSH level since admission.  His recent outpatient TFTs were normal and his levothyroxine dose stable.  --Continue levothyroxine 100 mcg/day  --Repeat free T4 and TSH on 5/28      Case discussed with Dr. Richard. Primary team updated.       Cathryn Dodge   Endocrinology Fellow    Service Pager: [phone]    SUBJECTIVE / INTERVAL HPI: Patient was seen and examined this morning. He has poor PO intake. Waiting for the IR team for paracentesis. NO insulin last night. No hypoglycemia.     Overnight events:    CAPILLARY BLOOD GLUCOSE & INSULIN RECEIVED  66 mg/dL (05-27 @ 05:56)  67 mg/dL (05-27 @ 07:50)  92 mg/dL (05-27 @ 12:04)  74 mg/dL (05-27 @ 17:25)  109 mg/dL (05-27 @ 21:42)  107 mg/dL (05-28 @ 08:21)  96 mg/dL (05-28 @ 12:06)      REVIEW OF SYSTEMS  Constitutional:  Negative fever, chills   Cardiovascular:  Negative for chest pain or palpitations.  Respiratory:  Negative for cough, wheezing, or shortness of breath.    Gastrointestinal:  Negative for nausea, vomiting, diarrhea, constipation, or abdominal pain.  Genitourinary:  Negative frequency, urgency or dysuria.  Neurologic:  No headache, confusion, dizziness, lightheadedness.    PHYSICAL EXAM  Vital Signs Last 24 Hrs  T(C): 36.8 (28 May 2025 12:05), Max: 36.8 (28 May 2025 12:05)  T(F): 98.3 (28 May 2025 12:05), Max: 98.3 (28 May 2025 12:05)  HR: 81 (28 May 2025 12:05) (63 - 81)  BP: 105/69 (28 May 2025 12:05) (99/60 - 105/69)  BP(mean): --  RR: 20 (28 May 2025 12:05) (17 - 20)  SpO2: 95% (28 May 2025 12:05) (92% - 95%)    Parameters below as of 28 May 2025 12:05  Patient On (Oxygen Delivery Method): room air    Constitutional: Awake, alert, in no acute distress.   HEENT: Normocephalic, atraumatic,   Respiratory: Lungs clear to ausculation bilaterally.   Cardiovascular: regular rhythm, normal S1 and S2, no audible murmurs.   GI: soft, non-tender, distended, positive fluid wave, bowel sounds present.  Extremities: LE edema better on left then right.     LABS  CBC - WBC/HGB/HTC/PLT: 4.93/11.0/32.5/153 (05-28-25)  BMP - Na/K/Cl/Bicarb/BUN/Cr/Gluc/AG/eGFR: 136/4.8/103/23/16/0.79/129/10/93 (05-28-25)  Ca - 8.1 (05-28-25)  Phos - 2.4 (05-28-25)  Mg - 2.1 (05-28-25)  LFT - Alb/Tprot/Tbili/Dbili/AlkPhos/ALT/AST: 2.5/--/0.5/--/111/<5/25 (05-28-25)  PT/aPTT/INR: 19.9/33.1/1.74 (05-28-25)   Thyroid Stimulating Hormone, Serum: 4.850 (05-25-25)  Total T4/Free T4: --/1.050 (05-25-25)        MEDICATIONS  MEDICATIONS  (STANDING):  apixaban 5 milliGRAM(s) Oral every 12 hours  ceFAZolin   IVPB 1000 milliGRAM(s) IV Intermittent every 8 hours  dextrose 5%. 1000 milliLiter(s) (100 mL/Hr) IV Continuous <Continuous>  dextrose 5%. 1000 milliLiter(s) (50 mL/Hr) IV Continuous <Continuous>  dextrose 50% Injectable 25 Gram(s) IV Push once  dextrose 50% Injectable 12.5 Gram(s) IV Push once  dextrose 50% Injectable 25 Gram(s) IV Push once  glucagon  Injectable 1 milliGRAM(s) IntraMuscular once  insulin lispro (ADMELOG) corrective regimen sliding scale   SubCutaneous at bedtime  lactulose Syrup 10 Gram(s) Oral every 8 hours  levothyroxine 100 MICROGram(s) Oral daily  multivitamin 1 Tablet(s) Oral daily  senna 2 Tablet(s) Oral at bedtime  spironolactone 25 milliGRAM(s) Oral every 24 hours  thiamine 100 milliGRAM(s) Oral every 24 hours    MEDICATIONS  (PRN):  dextrose Oral Gel 15 Gram(s) Oral once PRN Blood Glucose LESS THAN 70 milliGRAM(s)/deciliter  polyethylene glycol 3350 17 Gram(s) Oral daily PRN Constipation  zolpidem 5 milliGRAM(s) Oral at bedtime PRN Insomnia    ASSESSMENT / RECOMMENDATIONS  74y Male with history of DM type II, hypothyroidism, gastric CA and recently diagnosed hepatic cirrhosis who presented with weakness and RLE cellulitis. Endocrinology following for diabetes and tube feed management. He has been hypoglycemic in the am, unclear as he is on same tube feeds at home and on higher doses of NPH. Standing insulin stopped. Abdomen more distended, planned for paracentesis. PO intake is very poor.     A1C: 4.8 %  Creatinine: 0.79, GFR: 93  Weight: 63.5, BMI: 20.1    # Type 2 diabetes mellitus  - c/w [place] 1.4 at 110cc/hr from 6pm to 9am  - NO NPH. No standing insulin.   - ok to have glucose tabs to treat lows.   - Continue lispro LOW dose sliding scale before meals and at bedtime.  - Patient's fingerstick glucose goal is 100-180 mg/dL.    - Discharge recommendations TBD  - can f/u with Dr. Richard on discharge.     # Hypothyroidism:    - Has one normal and one mildly elevated TSH level since admission.  His recent outpatient TFTs were normal and his levothyroxine dose stable.  --Continue levothyroxine 100 mcg/day      Case discussed with Dr. Richard. Primary team updated.       Cathryn Dodge   Endocrinology Fellow    Service Pager: [phone]

## 2025-05-28 NOTE — DISCHARGE NOTE PROVIDER - NSDCCPTREATMENT_GEN_ALL_CORE_FT
PRINCIPAL PROCEDURE  Procedure: Colonoscopy, and UGI endoscopy  Findings and Treatment:   Colonoscopy Findings:  Mucosa Normal mucosa was noted in the whole colon. There were no AVMs, diverticula, polyps, masses, evidence of colitis or other abnormalities seen.  Colonoscopy Impressions:  Normal mucosa in the colon.  EGD Findings:Esophagus Normal esophagus.  Stomach Normal stomach.  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  This is the likely primary source of malignancy.  EGD Impressions:  Normal esophagus.  Normal stomach.  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 This is the likely primary source of

## 2025-05-28 NOTE — DISCHARGE NOTE PROVIDER - NSDCMRMEDTOKEN_GEN_ALL_CORE_FT
Eliquis 5 mg oral tablet: 1 tab(s) orally every 12 hours  furosemide 40 mg oral tablet: 1 tab(s) orally once a day  Insulin Glargine: 18 unit(s) once a day (at bedtime) given 30 min before starting nocturnal feeds  Insulin Lispro: 3 unit(s) once a day (at bedtime) TO be given 30 minutes before feeds  Insulin Lispro: 3 unit(s) once a day (at bedtime) given 30 min before starting nocturnal feeds  levothyroxine 100 mcg (0.1 mg) oral tablet: 1 tab(s) orally once a day  spironolactone 25 mg oral tablet: 1 tab(s) orally once a day  zolpidem 5 mg oral tablet: 1 tab(s) orally once a day (at bedtime)   Eliquis 2.5 mg oral tablet: 1 tab(s) orally every 12 hours  Eliquis 5 mg oral tablet: 1 tab(s) orally every 12 hours  furosemide 40 mg oral tablet: 1 tab(s) orally once a day  Insulin Glargine: 18 unit(s) once a day (at bedtime) given 30 min before starting nocturnal feeds  Insulin Lispro: 3 unit(s) once a day (at bedtime) TO be given 30 minutes before feeds  Insulin Lispro: 3 unit(s) once a day (at bedtime) given 30 min before starting nocturnal feeds  levothyroxine 100 mcg (0.1 mg) oral tablet: 1 tab(s) orally once a day  spironolactone 25 mg oral tablet: 1 tab(s) orally once a day  zolpidem 5 mg oral tablet: 1 tab(s) orally once a day (at bedtime)   Eliquis 2.5 mg oral tablet: 1 tab(s) orally every 12 hours  furosemide 40 mg oral tablet: 1 tab(s) orally once a day  levothyroxine 100 mcg (0.1 mg) oral tablet: 1 tab(s) orally once a day  spironolactone 25 mg oral tablet: 1 tab(s) orally once a day  zolpidem 5 mg oral tablet: 1 tab(s) orally once a day (at bedtime)   Eliquis 5 mg oral tablet: 5 milligram(s) orally 2 times a day  furosemide 40 mg oral tablet: 1 tab(s) orally once a day  levothyroxine 100 mcg (0.1 mg) oral tablet: 1 tab(s) orally once a day  spironolactone 25 mg oral tablet: 1 tab(s) orally once a day  zolpidem 5 mg oral tablet: 1 tab(s) orally once a day (at bedtime)   Eliquis 5 mg oral tablet: 5 milligram(s) orally 2 times a day  furosemide: 60 milligram(s) orally once a day  levothyroxine 100 mcg (0.1 mg) oral tablet: 1 tab(s) orally once a day  spironolactone 25 mg oral tablet: 1 tab(s) orally once a day  zolpidem 5 mg oral tablet: 1 tab(s) orally once a day (at bedtime)   Eliquis 5 mg oral tablet: 5 milligram(s) orally 2 times a day  furosemide 20 mg oral tablet: 3 tab(s) orally once a day  levothyroxine 100 mcg (0.1 mg) oral tablet: 1 tab(s) orally once a day  spironolactone 25 mg oral tablet: 1 tab(s) orally once a day  zolpidem 5 mg oral tablet: 1 tab(s) orally once a day (at bedtime)

## 2025-05-28 NOTE — DISCHARGE NOTE PROVIDER - NSDCFUSCHEDAPPT_GEN_ALL_CORE_FT
[facility]  ENDOCRIN [address]  Scheduled Appointment: 06/06/2025    Gage Posadas Physician [facility]  HEPATOLOGY [address] [place] [address]   Scheduled Appointment: 06/26/2025     [facility]  ENDOCRIN [address]  Scheduled Appointment: 06/06/2025    Rodney Gaines  [place] [facility]  HEARTVASC [address]  Scheduled Appointment: 06/12/2025    Gage Posadas  [place] [facility]  HEPATOLOGY [address] [facility]   Scheduled Appointment: 06/26/2025     Cj Richard  [facility]  ENDOCRIN [address]  Scheduled Appointment: 06/06/2025    Rodney Gaines  [facility]ician [facility]  HEARTVASC [address]  Scheduled Appointment: 06/12/2025    Gage Posadas  [facility]  HEPATOLOGY [address]   Scheduled Appointment: 06/26/2025    Aron Saenz  [facility]  VASCULAR [address]  Scheduled Appointment: 06/26/2025     Cj Richard  [facility]  ENDOCRIN [address]  Scheduled Appointment: 06/06/2025    [facility]  INTMED [address]  Scheduled Appointment: 06/06/2025    Rodney Gaines  [facility]  HEARTVASC [address]  Scheduled Appointment: 06/12/2025    Gage Posadas  [facility]  HEPATOLOGY [address]   Scheduled Appointment: 06/26/2025    Aron Saenz  [facility]  VASCULAR [address]  Scheduled Appointment: 06/26/2025

## 2025-05-28 NOTE — DISCHARGE NOTE PROVIDER - HOSPITAL COURSE
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 Admitted for further workup. Lower extremity swelling improved with diuretics. Underwent bedside paracentesis.         #Liver cirrhosis.   ·  Plan: Recently diagnosed with liver cirrhosis (2025?), per chart note by Dr. Mckeon and review on [facility] -  etiology of his cirrhosis is unclear--no work-up was completed on [place] except bloodwork and Fibroscan, the latter uninterpretable due to jordy-hepatic ascites.   , INR 1.18, AST/ALT not quantified/16  CT A/P this year showed cirrhotic morphology, last year had normal findings  ED bedside U/S without pocket to TAP per signout  Abd US showing ascites.  - Hepatology consulted, appreciate recs  - plan for diagnostic para  - Daily MELD score  - Diuretics pending improvement in BMP  - restarted home lasix and spironolactone.      #Weakness.   ·  Plan: Weakness, generalized and leg pain x 4 days, presents with decreased ability to walk, likely i/s/o abdominal distension and significant swelling in b/l LE. Reports taking additional doses of Spironolactone because of edema.   Admission K of 3.7, VBG 2.5.   Suspected to be 2/2  fluid overload with immobility, RLE cellulitis   Stroke workup negative per neuro, imaging findings without acute pathology. recommending MRI   Nutritional workup so far negative  - f/u orthostatics  - PT/SW  - fall precautions   - MRI brain per neuro recs.     Problem/Plan - 2:  ·  Problem: Cellulitis.   ·  Plan: RLE w/ erythema, warmth, no active purulence or drainage x 4 days  Dopplers b/l LE: No evidence of deep venous thrombosis in either lower extremity. Soft tissue swelling in both lower extremities.  Xray R foot and Tib/Fib: Osteopenia. No osseous destruction or periosteal reaction. No fracture or dislocation. There is a hallux valgus alignment with first MTP joint osteoarthrosis. Tibiotalar joint space narrowing is also identified. Second through fourth hammertoe deformities. Diffuse soft tissue swelling of the imaged lower extremity. Vascular calcification is present.  S/p Zosyn 3.375mg x1, Vancomycin 1000mg x1  Afebrile, no WBC  - C/w  Cefazolin 1g q8h (5/24 - 5/28)  - f/u Blood cultures (5/24): No growth to date.         Problem/Plan - 4:  ·  Problem: Leg edema.   ·  Plan: Edema of unknown etiology, +4 pitting in b/l LE extending from mid thigh to feet  Despite being on Furosemide and Spironolactone, persistent edema; Alb 2.9 not consistent w/ this level of edema  TTE in 10/24 w/ EF 65%  TTE 5/27 revealed enlarged RV cavity with moderate mitral and tricuspid regurgitation. Nl LV thickness and cavity size. EF 52%.  Possibly i/s/o liver cirrhosis vs chronic CHF   - strict I/Os   - c/w RLE cellulitis management   - c/w lasix and spironolactone - holding lasix today, pending results of paracentesis.      Gastric adenocarcinoma.   ·  Plan: Hx of gastric cancer treated with chemotherapy 5 years ago now w/ feeding J-tube, previously followed Dr. Saravanan Cabello and Dr. Chema Richmond. Currently followed Heme/onc: Dr. Henry.  - No acute intervention, continue outpatient management  - C/w tube feeds- resumed and give insulin accordingly (as in diabetes below).    Essential hypertension.   ·  Plan: Hx of HTN, was taking Losartan 50mg but has since discontinued. Normotensive.  - No acute intervention, continue to monitor.     Type 2 diabetes mellitus.    Follows Dr. Mckeon with very well controlled diabetes, last saw this AM w/ positive levels.   Per HIE - regimen is NPH 18 units at night, Lispro 3 units while on tube feeds  - Start patient on low dose insulin sliding scale tonight   - q6h FS  - endocrine recs for insulin based on tube feeds, today changed to nph 14u, no lispro, .        Patient was discharged to: (home/ZEE/acute rehab/hospice, etc, and with what services – home health PT/RN? 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 Admitted for further workup. Lower extremity swelling improved with diuretics. Underwent bedside paracentesis.         #Liver cirrhosis.   ·  Plan: Recently diagnosed with liver cirrhosis (2025?), per chart note by Dr. Mckeon and review on [facility] -  etiology of his cirrhosis is unclear--no work-up was completed on [place] except bloodwork and Fibroscan, the latter uninterpretable due to jordy-hepatic ascites.   , INR 1.18, AST/ALT not quantified/16  CT A/P this year showed cirrhotic morphology, last year had normal findings  ED bedside U/S without pocket to TAP   Abd US showing ascites.  - Hepatology consulted, appreciate recs  - plan for diagnostic para  - Daily MELD score  - Diuretics pending improvement in BMP  - restarted spironolactone.  - diagnostic and therapeutic paracentesis done on 5/30     #Weakness.   ·  Plan: Weakness, generalized and leg pain x 4 days, presents with decreased ability to walk, likely i/s/o abdominal distension and significant swelling in b/l LE. Reports taking additional doses of Spironolactone because of edema.   Admission K of 3.7, VBG 2.5.   Suspected to be 2/2  fluid overload with immobility, RLE cellulitis   Stroke workup negative per neuro, imaging findings without acute pathology. recommending MRI   Nutritional workup so far negative  - f/u orthostatics  - PT/SW  - fall precautions   - MRI brain per neuro recs.     Problem/Plan - 2:  ·  Problem: Cellulitis.   ·  Plan: RLE w/ erythema, warmth, no active purulence or drainage x 4 days  Dopplers b/l LE: No evidence of deep venous thrombosis in either lower extremity. Soft tissue swelling in both lower extremities.  Xray R foot and Tib/Fib: Osteopenia. No osseous destruction or periosteal reaction. No fracture or dislocation. There is a hallux valgus alignment with first MTP joint osteoarthrosis. Tibiotalar joint space narrowing is also identified. Second through fourth hammertoe deformities. Diffuse soft tissue swelling of the imaged lower extremity. Vascular calcification is present.  S/p Zosyn 3.375mg x1, Vancomycin 1000mg x1  Afebrile, no WBC  - C/w  Cefazolin 1g q8h (5/24 - 5/28)  - f/u Blood cultures (5/24): No growth to date.         Problem/Plan - 4:  ·  Problem: Leg edema.   ·  Plan: Edema of unknown etiology, +4 pitting in b/l LE extending from mid thigh to feet  Despite being on Furosemide and Spironolactone, persistent edema; Alb 2.9 not consistent w/ this level of edema  TTE in 10/24 w/ EF 65%  TTE 5/27 revealed enlarged RV cavity with moderate mitral and tricuspid regurgitation. Nl LV thickness and cavity size. EF 52%.  Possibly i/s/o liver cirrhosis vs chronic CHF   - strict I/Os   - c/w RLE cellulitis management   - c/w lasix and spironolactone - holding lasix today, pending results of paracentesis.      Gastric adenocarcinoma.   ·  Plan: Hx of gastric cancer treated with chemotherapy 5 years ago now w/ feeding J-tube, previously followed Dr. Saravanan Cabello and Dr. Chema Richmond. Currently followed Heme/onc: Dr. Henry.  - No acute intervention, continue outpatient management  - C/w tube feeds- resumed and give insulin accordingly (as in diabetes below).    Essential hypertension.   ·  Plan: Hx of HTN, was taking Losartan 50mg but has since discontinued. Normotensive.  - No acute intervention, continue to monitor.     Type 2 diabetes mellitus.    Follows Dr. Mckeon with very well controlled diabetes, last saw this AM w/ positive levels.   Per HIE - regimen is NPH 18 units at night, Lispro 3 units while on tube feeds  - Start patient on low dose insulin sliding scale tonight   - q6h FS  - endocrine recs for insulin based on tube feeds, today changed to nph 14u, no lispro, mISS.        Patient was discharged to: (home/ZEE/acute rehab/hospice, etc, and with what services – home health PT/RN? 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 Admitted for further workup. Lower extremity swelling improved with diuretics. Underwent bedside paracentesis.       Hospital Course by Problem:     #Problem: Liver cirrhosis.  Recently diagnosed with liver cirrhosis (2025?), per chart note by Dr. Mckeon and review on [facility] -  etiology of his cirrhosis is unclear--no work-up was completed on [place] except bloodwork and Fibroscan, the latter uninterpretable due to jordy-hepatic ascites.   , INR 1.18, AST/ALT not quantified/16  CT A/P this year showed cirrhotic morphology, last year had normal findings  ED bedside U/S without pocket to TAP per signout  Abd US showing ascites.  - Hepatology consulted, appreciate recs  - diagnostic and therapeutic paracentesis today   - Daily MELD score.    #Weakness.  ·  Plan: Weakness, generalized and leg pain x 4 days, presents with decreased ability to walk, likely i/s/o abdominal distension and significant swelling in b/l LE. Reports taking additional doses of Spironolactone because of edema. Also noted slurred speech.   Admission K of 3.7, VBG 2.5.   Suspected to be 2/2  fluid overload with immobility, RLE cellulitis   Stroke workup negative per neuro, imaging findings without acute pathology.   MRI brain showed no acute abnormalities.  Nutritional workup so far negative  - f/u orthostatics  - PT/SW  - fall precautions.      #Cellulitis.  RLE w/ erythema, warmth, no active purulence or drainage x 4 days  Dopplers b/l LE: No evidence of deep venous thrombosis in either lower extremity. Soft tissue swelling in both lower extremities.  Xray R foot and Tib/Fib: Osteopenia. No osseous destruction or periosteal reaction. No fracture or dislocation. There is a hallux valgus alignment with first MTP joint osteoarthrosis. Tibiotalar joint space narrowing is also identified. Second through fourth hammertoe deformities. Diffuse soft tissue swelling of the imaged lower extremity. Vascular calcification is present.  S/p Zosyn 3.375mg x1, Vancomycin 1000mg x1  Patient finished Cefazolin regimen on 5/28 [1g q8h (5/24 - 5/28)]  Afebrile, no WBC  - f/u Blood cultures (5/24): No growth to date.    #Leg edema.   ·  Plan: Edema of unknown etiology, +4 pitting in b/l LE extending from mid thigh to feet  Despite being on Furosemide and Spironolactone, persistent edema; Alb 2.9 not consistent w/ this level of edema  TTE in 10/24 w/ EF 65%  TTE 5/27 revealed enlarged RV cavity with moderate mitral and tricuspid regurgitation. Nl LV thickness and cavity size. EF 52%.  Possibly i/s/o liver cirrhosis vs chronic CHF   - strict I/Os   - c/w RLE cellulitis management   - c/w spironolactone - holding lasix today pending para.    #Gastric adenocarcinoma.   ·  Plan: Hx of gastric cancer treated with chemotherapy 5 years ago now w/ feeding J-tube, previously followed Dr. Saravanan Cabello and Dr. Chema Richmond. Currently followed Heme/onc: Dr. Henry.  - No acute intervention, continue outpatient management  - C/w tube feeds- resumed and give insulin accordingly (as in diabetes below).    #Type 2 diabetes mellitus.   ·  Plan: Follows Dr. Mckeon with very well controlled diabetes, last saw this AM w/ positive levels.   Per HIE - regimen is NPH 18 units at night, Lispro 3 units while on tube feeds  - Start patient on low dose insulin sliding scale tonight   - q6h FS  - endocrine recs for insulin based on tube feeds, today changed to nph 14u, no lispro, mISS.    # Hypothyroidism.   Home medications: Levothyroxine 100 mcg/day  Endocrinologist: Dr. Mckeon, last saw 4/25  - C/w home medications.            Patient was discharged to: home     New medications:   Changes to old medications:  Medications that were stopped: Lasix - restarted prior to discharge     Items to follow up as outpatient: Peritoneal fluid studies (cell count, cultures, gram stain)     Physical exam at the time of discharge:  General:NAD.   HEENT: NC/AT; PERRL, anicteric sclera; MMM  Neck: supple  Cardiovascular: +S1/S2, RRR  Respiratory: CTA B/L; no W/R/R  Gastrointestinal: soft, NT/ND; +BSx4, no fluid wave   Extremities: WWP; no clubbing or cyanosis. R leg is erythematous, warm, and edematous 1+. L leg is not erythematous but is edematous 2+.   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 Admitted for further workup. Lower extremity swelling improved with diuretics. Underwent bedside paracentesis.       Hospital Course by Problem:     #Problem: Liver cirrhosis w/ascieties.  Recently diagnosed with liver cirrhosis (2025?), per chart note by Dr. Mckeon and review on [facility] -  etiology of his cirrhosis is unclear--no work-up was completed on [place] except bloodwork and Fibroscan, the latter uninterruptible due to jordy-hepatic ascites.   , INR 1.18, AST/ALT not quantified/16  CT A/P this year showed cirrhotic morphology, last year had normal findings  ED bedside U/S without pocket to TAP   Abd US showing ascites.  - Hepatology consulted, appreciate recs  - diagnostic and therapeutic paracentesis today   - Daily MELD score.    #Weakness.  ·  Plan: Weakness, generalized and leg pain x 4 days, presents with decreased ability to walk, likely i/s/o abdominal distension and significant swelling in b/l LE. Reports taking additional doses of Spironolactone because of edema. Also noted slurred speech.   Admission K of 3.7, VBG 2.5.   Suspected to be 2/2  fluid overload with immobility, RLE cellulitis   Stroke workup negative per neuro, imaging findings without acute pathology.   MRI brain showed no acute abnormalities.  Nutritional workup so far negative  - f/u orthostatics  - PT/SW  - fall precautions.      #Cellulitis.  RLE w/ erythema, warmth, no active purulence or drainage x 4 days  Dopplers b/l LE: No evidence of deep venous thrombosis in either lower extremity. Soft tissue swelling in both lower extremities.  Xray R foot and Tib/Fib: Osteopenia. No osseous destruction or periosteal reaction. No fracture or dislocation. There is a hallux valgus alignment with first MTP joint osteoarthrosis. Tibiotalar joint space narrowing is also identified. Second through fourth hammertoe deformities. Diffuse soft tissue swelling of the imaged lower extremity. Vascular calcification is present.  S/p Zosyn 3.375mg x1, Vancomycin 1000mg x1  Patient finished Cefazolin regimen on 5/28 [1g q8h (5/24 - 5/28)]  Afebrile, no WBC  - f/u Blood cultures (5/24): No growth to date.    #Leg edema.   ·  Plan: Edema of unknown etiology, +4 pitting in b/l LE extending from mid thigh to feet  Despite being on Furosemide and Spironolactone, persistent edema; Alb 2.9 not consistent w/ this level of edema  TTE in 10/24 w/ EF 65%  TTE 5/27 revealed enlarged RV cavity with moderate mitral and tricuspid regurgitation. Nl LV thickness and cavity size. EF 52%.  Possibly i/s/o liver cirrhosis vs chronic CHF   - strict I/Os   - c/w RLE cellulitis management   - c/w spironolactone - holding lasix today pending para.    #Gastric adenocarcinoma.   ·  Plan: Hx of gastric cancer treated with chemotherapy 5 years ago now w/ feeding J-tube, previously followed Dr. Saravanan Cabello and Dr. Chema Richmond. Currently followed Heme/onc: Dr. Henry.  - No acute intervention, continue outpatient management  - C/w tube feeds- resumed and give insulin accordingly (as in diabetes below).    #Type 2 diabetes mellitus.   ·  Plan: Follows Dr. Mckeon with very well controlled diabetes, last saw this AM w/ positive levels.   Per HIE - regimen is NPH 18 units at night, Lispro 3 units while on tube feeds  - Start patient on low dose insulin sliding scale tonight   - q6h FS  - endocrine recs for insulin based on tube feeds, today changed to nph 14u, no lispro, mISS.    # Hypothyroidism.   Home medications: Levothyroxine 100 mcg/day  Endocrinologist: Dr. Mckeon, last saw 4/25  - C/w home medications.            Patient was discharged to: home     New medications:   Changes to old medications:  Medications that were stopped: Lasix - restarted prior to discharge     Items to follow up as outpatient: Peritoneal fluid studies (cell count, cultures, gram stain)     Physical exam at the time of discharge:  General:NAD.   HEENT: NC/AT; PERRL, anicteric sclera; MMM  Neck: supple  Cardiovascular: +S1/S2, RRR  Respiratory: CTA B/L; no W/R/R  Gastrointestinal: soft, NT/ND; +BSx4, no fluid wave   Extremities: WWP; no clubbing or cyanosis. R leg is erythematous, warm, and edematous 1+. L leg is not erythematous but is edematous 2+.   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 Admitted for further workup. Lower extremity swelling improved with diuretics. Underwent bedside paracentesis.       Hospital Course by Problem:     #Problem: Liver cirrhosis w/ascieties.  Recently diagnosed with liver cirrhosis (2025?), per chart note by Dr. Mckeon and review on [facility] -  etiology of his cirrhosis is unclear--no work-up was completed on [place] except bloodwork and Fibroscan, the latter uninterruptible due to jordy-hepatic ascites.   , INR 1.18, AST/ALT not quantified/16  CT A/P this year showed cirrhotic morphology, last year had normal findings  ED bedside U/S without pocket to TAP   Abd US showing ascites.  - Hepatology consulted, appreciate recs  - diagnostic and therapeutic paracentesis today   - Daily MELD score.    #Weakness.  ·  Plan: Weakness, generalized and leg pain x 4 days, presents with decreased ability to walk, likely i/s/o abdominal distension and significant swelling in b/l LE. Reports taking additional doses of Spironolactone because of edema. Also noted slurred speech.   Admission K of 3.7, VBG 2.5.   Suspected to be 2/2  fluid overload with immobility, RLE cellulitis   Stroke workup negative per neuro, imaging findings without acute pathology.   MRI brain showed no acute abnormalities.  Nutritional workup so far negative  - f/u orthostatics  - PT/SW  - fall precautions.      #Cellulitis.  RLE w/ erythema, warmth, no active purulence or drainage x 4 days  Dopplers b/l LE: No evidence of deep venous thrombosis in either lower extremity. Soft tissue swelling in both lower extremities.  Xray R foot and Tib/Fib: Osteopenia. No osseous destruction or periosteal reaction. No fracture or dislocation. There is a hallux valgus alignment with first MTP joint osteoarthrosis. Tibiotalar joint space narrowing is also identified. Second through fourth hammertoe deformities. Diffuse soft tissue swelling of the imaged lower extremity. Vascular calcification is present.  S/p Zosyn 3.375mg x1, Vancomycin 1000mg x1  Patient finished Cefazolin regimen on 5/28 [1g q8h (5/24 - 5/28)]  Afebrile, no WBC  - Blood cultures (5/24): No growth to date.    #Leg edema.   ·  Plan: Edema of unknown etiology, +4 pitting in b/l LE extending from mid thigh to feet  Despite being on Furosemide and Spironolactone, persistent edema; Alb 2.9 not consistent w/ this level of edema  TTE in 10/24 w/ EF 65%  TTE 5/27 revealed enlarged RV cavity with moderate mitral and tricuspid regurgitation. Nl LV thickness and cavity size. EF 52%.  Possibly i/s/o liver cirrhosis vs chronic CHF   - c/w spironolactone  and Lasix 40mg qd    #Gastric adenocarcinoma.   ·  Plan: Hx of gastric cancer treated with chemotherapy 5 years ago now w/ feeding J-tube, previously followed Dr. Saravanan Cabello and Dr. Chema Richmond. Currently followed Heme/onc: Dr. Henry.  - No acute intervention, continue outpatient management  - C/w tube feeds- resumed and give insulin accordingly (as in diabetes below).    #Type 2 diabetes mellitus.   ·  Plan: Follows Dr. Mckeon with very well controlled diabetes, last saw this AM w/ positive levels.   Per HIE - regimen is NPH 18 units at night, Lispro 3 units while on tube feeds  Endo consulted, recommended no NPH.     # Hypothyroidism.   Home medications: Levothyroxine 100 mcg/day  Endocrinologist: Dr. Mckeon, last saw 4/25  - C/w home medications.    Patient was discharged to: home     New medications:   Changes to old medications: Eliquis   Medications that were stopped:     Items to follow up as outpatient: Peritoneal fluid studies (cell count, cultures, gram stain)     Physical exam at the time of discharge:  General:NAD.   HEENT: NC/AT; PERRL, anicteric sclera; MMM  Neck: supple  Cardiovascular: +S1/S2, RRR  Respiratory: CTA B/L; no W/R/R  Gastrointestinal: soft, NT/ND; +BSx4, no fluid wave   Extremities: WWP; no clubbing or cyanosis. R leg is erythematous, warm, and edematous 1+. L leg is not erythematous but is edematous 2+.   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 Admitted for further workup. Lower extremity swelling improved with diuretics. Underwent bedside paracentesis.       Hospital Course by Problem:     #Problem: Liver cirrhosis w/ascieties.  Recently diagnosed with liver cirrhosis (2025?), per chart note by Dr. Mckeon and review on [facility] -  etiology of his cirrhosis is unclear--no work-up was completed on [place] except bloodwork and Fibroscan, the latter uninterruptible due to jordy-hepatic ascites.   , INR 1.18, AST/ALT not quantified/16  CT A/P this year showed cirrhotic morphology, last year had normal findings  ED bedside U/S without pocket to TAP   Abd US showing ascites.  Hepatology consulted  s/p diagnostic and therapeutic para 5/30.  - follow up with hepatology outpatient   - continue home spironolactone 25 mg PO daily and Lasix 40 mg PO daily     #Weakness.  ·  Plan: Weakness, generalized and leg pain x 4 days, presents with decreased ability to walk, likely i/s/o abdominal distension and significant swelling in b/l LE. Reports taking additional doses of Spironolactone because of edema. Also noted slurred speech.   Admission K of 3.7, VBG 2.5.   Suspected to be 2/2  fluid overload with immobility, RLE cellulitis   Stroke workup negative per neuro, imaging findings without acute pathology.   MRI brain showed no acute abnormalities.  Nutritional workup so far negative      #Cellulitis.  RLE w/ erythema, warmth, no active purulence or drainage x 4 days  Dopplers b/l LE: No evidence of deep venous thrombosis in either lower extremity. Soft tissue swelling in both lower extremities.  Xray R foot and Tib/Fib: Osteopenia. No osseous destruction or periosteal reaction. No fracture or dislocation. There is a hallux valgus alignment with first MTP joint osteoarthrosis. Tibiotalar joint space narrowing is also identified. Second through fourth hammertoe deformities. Diffuse soft tissue swelling of the imaged lower extremity. Vascular calcification is present.  S/p Zosyn 3.375mg x1, Vancomycin 1000mg x1  Patient finished Cefazolin regimen on 5/28 [1g q8h (5/24 - 5/28)]  Afebrile, no WBC  - Blood cultures (5/24): No growth to date.    #Leg edema.   ·  Plan: Edema of unknown etiology, +4 pitting in b/l LE extending from mid thigh to feet  Despite being on Furosemide and Spironolactone, persistent edema; Alb 2.9 not consistent w/ this level of edema  TTE in 10/24 w/ EF 65%  TTE 5/27 revealed enlarged RV cavity with moderate mitral and tricuspid regurgitation. Nl LV thickness and cavity size. EF 52%.  Possibly i/s/o liver cirrhosis vs chronic CHF   - c/w spironolactone  and Lasix 40mg qd    #Gastric adenocarcinoma.   ·  Plan: Hx of gastric cancer treated with chemotherapy 5 years ago now w/ feeding J-tube, previously followed Dr. Saravanan Cabello and Dr. Chema Richmond. Currently followed Heme/onc: Dr. Henry.  - No acute intervention, continue outpatient management  - C/w tube feeds- resumed and give insulin accordingly (as in diabetes below).    #Type 2 diabetes mellitus.   ·  Plan: Follows Dr. Mckeon with very well controlled diabetes, last saw this AM w/ positive levels.   Per HIE - regimen is NPH 18 units at night, Lispro 3 units while on tube feeds  Endo consulted, recommended no NPH or standing insulin     # Hypothyroidism.   Home medications: Levothyroxine 100 mcg/day  Endocrinologist: Dr. Mckeon, last saw 4/25  - C/w home medications.    Patient was discharged to: home     New medications: none   Changes to old medications: Eliquis 5 decreased to 2.5mg   Medications that were stopped:     Items to follow up as outpatient: Peritoneal fluid studies (cell count, cultures, gram stain)     Physical exam at the time of discharge:  General:NAD.   HEENT: NC/AT; PERRL, anicteric sclera; MMM  Neck: supple  Cardiovascular: +S1/S2, RRR  Respiratory: CTA B/L; no W/R/R  Gastrointestinal: soft, NT/ND; +BSx4, no fluid wave   Extremities: WWP; no clubbing or cyanosis. R leg is erythematous, warm, and edematous 1+. L leg is not erythematous but is edematous 2+.   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 Admitted for further workup. Lower extremity swelling improved with diuretics. Underwent bedside paracentesis.       Hospital Course by Problem:     #Problem: Liver cirrhosis w/ascieties.  Recently diagnosed with liver cirrhosis (2025?), per chart note by Dr. Mckeon and review on [facility] -  etiology of his cirrhosis is unclear--no work-up was completed on [place] except bloodwork and Fibroscan, the latter uninterruptible due to jordy-hepatic ascites.   , INR 1.18, AST/ALT not quantified/16  CT A/P this year showed cirrhotic morphology, last year had normal findings  ED bedside U/S without pocket to TAP   Abd US showing ascites.  Hepatology consulted  s/p diagnostic and therapeutic para 5/30.  - follow up with hepatology outpatient   - continue home spironolactone 25 mg PO daily and Lasix 40 mg PO daily     #Weakness.  ·  Plan: Weakness, generalized and leg pain x 4 days, presents with decreased ability to walk, likely i/s/o abdominal distension and significant swelling in b/l LE. Reports taking additional doses of Spironolactone because of edema. Also noted slurred speech.   Admission K of 3.7, VBG 2.5.   Suspected to be 2/2  fluid overload with immobility, RLE cellulitis   Stroke workup negative per neuro, imaging findings without acute pathology.   MRI brain showed no acute abnormalities.  Nutritional workup so far negative      #Cellulitis.  RLE w/ erythema, warmth, no active purulence or drainage x 4 days  Dopplers b/l LE: No evidence of deep venous thrombosis in either lower extremity. Soft tissue swelling in both lower extremities.  Xray R foot and Tib/Fib: Osteopenia. No osseous destruction or periosteal reaction. No fracture or dislocation. There is a hallux valgus alignment with first MTP joint osteoarthrosis. Tibiotalar joint space narrowing is also identified. Second through fourth hammertoe deformities. Diffuse soft tissue swelling of the imaged lower extremity. Vascular calcification is present.  S/p Zosyn 3.375mg x1, Vancomycin 1000mg x1  Patient finished Cefazolin regimen on 5/28 [1g q8h (5/24 - 5/28)]  Afebrile, no WBC  - Blood cultures (5/24): No growth to date.    #Leg edema.   ·  Plan: Edema of unknown etiology, +4 pitting in b/l LE extending from mid thigh to feet  Despite being on Furosemide and Spironolactone, persistent edema; Alb 2.9 not consistent w/ this level of edema  TTE in 10/24 w/ EF 65%  TTE 5/27 revealed enlarged RV cavity with moderate mitral and tricuspid regurgitation. Nl LV thickness and cavity size. EF 52%.  Possibly i/s/o liver cirrhosis vs chronic CHF   - c/w spironolactone    - increased lasix to 60mg Qd given significant ascites reaccumulation and elevated potassium inhibiting further uptitration of Spironolactone to ideal 100:40 ratio of sergey to lasix     #Gastric adenocarcinoma.   ·  Plan: Hx of gastric cancer treated with chemotherapy 5 years ago now w/ feeding J-tube, previously followed Dr. Saravanan Cabello and Dr. Chema Richmond. Currently followed Heme/onc: Dr. Henry.  - No acute intervention, continue outpatient management  - C/w tube feeds- resumed     #Type 2 diabetes mellitus.   ·  Plan: Follows Dr. Mckeon with very well controlled diabetes, last saw this AM w/ positive levels.   Per HIE - regimen is NPH 18 units at night, Lispro 3 units while on tube feeds  Endo consulted, recommended no NPH or standing insulin     # Hypothyroidism.   Home medications: Levothyroxine 100 mcg/day  Endocrinologist: Dr. Mckeon, last saw 4/25  - C/w home medications.    Patient was discharged to: home     New medications: none   Changes to old medications: Increased furosemide dosing from 40 to 60 daily   Medications that were stopped: Insulin     Items to follow up as outpatient: Peritoneal fluid studies (cell count, cultures, gram stain)     Physical exam at the time of discharge:  General:NAD.   HEENT: NC/AT; PERRL, anicteric sclera; MMM  Neck: supple  Cardiovascular: +S1/S2, RRR  Respiratory: CTA B/L; no W/R/R  Gastrointestinal: soft, NT/ND; +BSx4, no fluid wave   Extremities: WWP; no clubbing or cyanosis. R leg is erythematous, warm, and edematous 1+. L leg is not erythematous but is edematous 2+.   Vascular: 2+ radial, DP/PT pulses B/L  Neurological: AAOx3; no focal deficits  Psychiatric: pleasant mood and affect  Dermatologic: no appreciable wounds or damage to the skin   #Discharge: do not delete  72M w/ PMH of gastric cancer (s/p feeding J-tube), portal vein thrombosis (on Eliquis) AAA (s/p EVAR in 2023), T2DM, hypothyroidism, HTN, anemia, BPH, type 2 endoleak repair, s/p embolization of internal iliac artery branch (10/24), cirrhosis, who presents to the ED with worsening bilateral leg swelling and gen weakness, found to have new onset non-alcoholic cirrhosis. Admitted for further workup. Lower extremity swelling improved with diuretics. Underwent bedside paracentesis.     #Problem: Liver cirrhosis w/ascieties.  Recently diagnosed with liver cirrhosis (2025?), per chart note by Dr. Mckeon and review on [facility] -  etiology of his cirrhosis is unclear--no work-up was completed on [place] except bloodwork and Fibroscan, the latter uninterruptible due to jordy-hepatic ascites.   , INR 1.18, AST/ALT not quantified/16  CT A/P this year showed cirrhotic morphology, last year had normal findings. Abd US showing ascites.  s/p diagnostic and therapeutic para 5/30.  - follow up with hepatology outpatient. continue home spironolactone 25 mg PO daily and Lasix 60 mg PO daily (increased from 40mg daily)    #Weakness.  ·  Plan: Weakness, generalized and leg pain x 4 days, presents with decreased ability to walk, likely i/s/o abdominal distension and significant swelling in b/l LE. Reports taking additional doses of Spironolactone because of edema. Also noted slurred speech.   Admission K of 3.7, VBG 2.5. Suspected to be 2/2  fluid overload with immobility, RLE cellulitis. Stroke workup negative per neuro, imaging findings without acute pathology.  MRI brain showed no acute abnormalities. Nutritional workup so far negative    #Cellulitis.  RLE w/ erythema, warmth, no active purulence or drainage x 4 days  Dopplers b/l LE: No evidence of deep venous thrombosis in either lower extremity. Soft tissue swelling in both lower extremities.  Xray R foot and Tib/Fib: Osteopenia. No osseous destruction or periosteal reaction. No fracture or dislocation. There is a hallux valgus alignment with first MTP joint osteoarthrosis. Tibiotalar joint space narrowing is also identified. Second through fourth hammertoe deformities. Diffuse soft tissue swelling of the imaged lower extremity. Vascular calcification is present.  S/p Zosyn 3.375mg x1, Vancomycin 1000mg x1  Patient finished Cefazolin regimen on 5/28 [1g q8h (5/24 - 5/28)]  - Blood cultures (5/24): No growth to date.  #Leg edema.   ·  Plan: Edema of unknown etiology, +4 pitting in b/l LE extending from mid thigh to feet  Despite being on Furosemide and Spironolactone, persistent edema; Alb 2.9 not consistent w/ this level of edema  TTE in 10/24 w/ EF 65%  TTE 5/27 revealed enlarged RV cavity with moderate mitral and tricuspid regurgitation. Nl LV thickness and cavity size. EF 52%.  Possibly i/s/o liver cirrhosis vs chronic CHF   - c/w spironolactone; increased lasix to 60mg Qd given significant ascites reaccumulation and elevated potassium inhibiting further uptitration of Spironolactone to ideal 100:40 ratio of sergey to lasix   #Gastric adenocarcinoma.   ·  Plan: Hx of gastric cancer treated with chemotherapy 5 years ago now w/ feeding J-tube, previously followed Dr. Saravanan Cabello and Dr. Chema Richmond. Currently followed Heme/onc: Dr. Henry.  - C/w tube feeds- resumed   #Type 2 diabetes mellitus.   ·  Plan: Follows Dr. Mckeon with very well controlled diabetes, last saw this AM w/ positive levels.   Per HIE - regimen is NPH 18 units at night, Lispro 3 units while on tube feeds  Endo consulted, recommended no NPH or standing insulin   # Hypothyroidism.   Home medications: Levothyroxine 100 mcg/day  Endocrinologist: Dr. Mckeon, last saw 4/25    Patient was discharged to: home   New medications: none   Changes to old medications: Increased furosemide dosing from 40 to 60 daily   Items to follow up as outpatient: NA  Physical exam at the time of discharge:  General:NAD.   HEENT: NC/AT; PERRL, anicteric sclera; MMM  Neck: supple  Cardiovascular: +S1/S2, RRR  Respiratory: CTA B/L; no W/R/R  Gastrointestinal: soft, Mild distension present. Non tender. BSx4, no fluid wave   Extremities: WWP; no clubbing or cyanosis. R leg is erythematous, warm, and edematous 1+. L leg is not erythematous but is edematous 2+.   Vascular: 2+ radial, DP/PT pulses B/L  Neurological: AAOx3; no focal deficits  Psychiatric: pleasant mood and affect  Dermatologic: no appreciable wounds or damage to the skin    Attending Attestation  72M w Gastric CA (s/p feeding J-tube), portal vein thrombosis with prothrombin gene mutation (on Eliquis), AAA (s/p EVAR in 2023), IDDM2, hypothyroidism, HTN, anemia, BPH, type 2 endoleak repair s/p embolization of internal iliac artery branch (10/24), cirrhosis, p/w BLE swelling and RLE erythema from Dr. Richard's office - admitted for cellulitis and fluid overload; also reporting slurred speech PTA and undergoing CVA r/o, Brain MRI negative for stroke, s/p therapeutic para 5/30 (4.5L; neg for SBP), eliquis dose now back to 5mg BID in discussion w heme-onc and hepatology    #Decompensated Portal Hypertension d/t cirrhosis-suspect MAFLD. Followed by Hepatology and referral to transplantation   -Volume: Ascites: HIGH SAAG (2.2). s/p 4.5L 5/29. Managed w Lasix 40mg and aldactone 25mg daily   -Infection: NEG for SBP (PMN 1) on 5/30   -Bleeding: pt will need EGD for EV surveillance   -Encephalopathy: pt does not appear in HE. No asterixis today   -Child [place] B. MELD 10  #History of PVT  #Prothrombin gene mutation   - on eliquis 5mg BID --> however this admission was transitioned to 2.5mg BID    #Encephalopathy - on admission. Ammonia nml -  18 on admit. MR brain negative. Appears resolved today.  #Cellulitis - s/p IV abx x5d course.  Vanc/Zosyn -> ancef    #HFpEF - ascites present. TTE 5/27 - 52% LVEF.  #Macrocytic anemia -  11.3. B12/Folate nml. TSat 32%. Possibly d/t AoCD  #Hyponatremia - 134  #IDDM2 - a1c 4.8   - Endocrine currently following   - on SSI. Home was on long/Short acting insulin  #Insomnia -zolpidem 5mg HS  #Hypothyroidism- c/w  home synthroid 100mcg  #h/o gastric CA  #on Tube feeds via J tube  - [place] 1.4. 86cc/hr 1800h - 0900h. At goal  #AAA  s/p EVAR 2023  - type 2 endoleak s/p embolization of R iliac artery 2024.  - s/p CTA A/P 6/1. Per vascular sx recs - to f/u outpatient w Dr. Saenz - 2-3wk post dc--PLAN is for outpatient endoleak repair    Optimized for DC home  Pt counseled to monitor for lightheadedness, hypotension. DC Home w HPT. f/u Hepatology, Endocrine, Vasc Sx

## 2025-05-29 ENCOUNTER — APPOINTMENT (OUTPATIENT)
Dept: VASCULAR SURGERY | Facility: CLINIC | Age: 75
End: 2025-05-29

## 2025-05-29 LAB
ALBUMIN SERPL ELPH-MCNC: 2.5 G/DL — LOW (ref 3.3–5)
ALP SERPL-CCNC: 105 U/L — SIGNIFICANT CHANGE UP (ref 40–120)
ALT FLD-CCNC: <5 U/L — LOW (ref 10–45)
ANION GAP SERPL CALC-SCNC: 7 MMOL/L — SIGNIFICANT CHANGE UP (ref 5–17)
AST SERPL-CCNC: 23 U/L — SIGNIFICANT CHANGE UP (ref 10–40)
BASOPHILS # BLD AUTO: 0.04 K/UL — SIGNIFICANT CHANGE UP (ref 0–0.2)
BASOPHILS NFR BLD AUTO: 0.8 % — SIGNIFICANT CHANGE UP (ref 0–2)
BILIRUB SERPL-MCNC: 0.8 MG/DL — SIGNIFICANT CHANGE UP (ref 0.2–1.2)
BUN SERPL-MCNC: 22 MG/DL — SIGNIFICANT CHANGE UP (ref 7–23)
CALCIUM SERPL-MCNC: 8 MG/DL — LOW (ref 8.4–10.5)
CHLORIDE SERPL-SCNC: 102 MMOL/L — SIGNIFICANT CHANGE UP (ref 96–108)
CO2 SERPL-SCNC: 26 MMOL/L — SIGNIFICANT CHANGE UP (ref 22–31)
CREAT SERPL-MCNC: 0.89 MG/DL — SIGNIFICANT CHANGE UP (ref 0.5–1.3)
EGFR: 89 ML/MIN/1.73M2 — SIGNIFICANT CHANGE UP
EGFR: 89 ML/MIN/1.73M2 — SIGNIFICANT CHANGE UP
EOSINOPHIL # BLD AUTO: 0.13 K/UL — SIGNIFICANT CHANGE UP (ref 0–0.5)
EOSINOPHIL NFR BLD AUTO: 2.6 % — SIGNIFICANT CHANGE UP (ref 0–6)
GLUCOSE SERPL-MCNC: 163 MG/DL — HIGH (ref 70–99)
HAV IGG SER QL IA: REACTIVE
HCT VFR BLD CALC: 29.9 % — LOW (ref 39–50)
HGB BLD-MCNC: 10.3 G/DL — LOW (ref 13–17)
IGA FLD-MCNC: 832 MG/DL — HIGH (ref 84–499)
IGG FLD-MCNC: 1701 MG/DL — HIGH (ref 610–1660)
IGM SERPL-MCNC: 67 MG/DL — SIGNIFICANT CHANGE UP (ref 35–242)
IMM GRANULOCYTES NFR BLD AUTO: 0.4 % — SIGNIFICANT CHANGE UP (ref 0–0.9)
INR BLD: 1.9 — HIGH (ref 0.85–1.16)
KAPPA LC SER QL IFE: 8.39 MG/DL — HIGH (ref 0.33–1.94)
KAPPA/LAMBDA FREE LIGHT CHAIN RATIO, SERUM: 0.75 RATIO — SIGNIFICANT CHANGE UP (ref 0.26–1.65)
LAMBDA LC SER QL IFE: 11.12 MG/DL — HIGH (ref 0.57–2.63)
LYMPHOCYTES # BLD AUTO: 0.5 K/UL — LOW (ref 1–3.3)
LYMPHOCYTES # BLD AUTO: 10.1 % — LOW (ref 13–44)
MAGNESIUM SERPL-MCNC: 2.4 MG/DL — SIGNIFICANT CHANGE UP (ref 1.6–2.6)
MCHC RBC-ENTMCNC: 34.4 G/DL — SIGNIFICANT CHANGE UP (ref 32–36)
MCHC RBC-ENTMCNC: 35.4 PG — HIGH (ref 27–34)
MCV RBC AUTO: 102.7 FL — HIGH (ref 80–100)
MELD SCORE WITH DIALYSIS: 28 POINTS — SIGNIFICANT CHANGE UP
MELD SCORE WITHOUT DIALYSIS: 16 POINTS — SIGNIFICANT CHANGE UP
MONOCYTES # BLD AUTO: 0.38 K/UL — SIGNIFICANT CHANGE UP (ref 0–0.9)
MONOCYTES NFR BLD AUTO: 7.7 % — SIGNIFICANT CHANGE UP (ref 2–14)
NEUTROPHILS # BLD AUTO: 3.87 K/UL — SIGNIFICANT CHANGE UP (ref 1.8–7.4)
NEUTROPHILS NFR BLD AUTO: 78.4 % — HIGH (ref 43–77)
NRBC BLD AUTO-RTO: 0 /100 WBCS — SIGNIFICANT CHANGE UP (ref 0–0)
PHOSPHATE SERPL-MCNC: 2 MG/DL — LOW (ref 2.5–4.5)
PLATELET # BLD AUTO: 169 K/UL — SIGNIFICANT CHANGE UP (ref 150–400)
POTASSIUM SERPL-MCNC: 4.8 MMOL/L — SIGNIFICANT CHANGE UP (ref 3.5–5.3)
POTASSIUM SERPL-SCNC: 4.8 MMOL/L — SIGNIFICANT CHANGE UP (ref 3.5–5.3)
PROT SERPL-MCNC: 5.8 G/DL — LOW (ref 6–8.3)
PROTHROM AB SERPL-ACNC: 22.1 SEC — HIGH (ref 9.9–13.4)
RBC # BLD: 2.91 M/UL — LOW (ref 4.2–5.8)
RBC # FLD: 16.4 % — HIGH (ref 10.3–14.5)
SODIUM SERPL-SCNC: 135 MMOL/L — SIGNIFICANT CHANGE UP (ref 135–145)
WBC # BLD: 4.94 K/UL — SIGNIFICANT CHANGE UP (ref 3.8–10.5)
WBC # FLD AUTO: 4.94 K/UL — SIGNIFICANT CHANGE UP (ref 3.8–10.5)

## 2025-05-29 PROCEDURE — 99232 SBSQ HOSP IP/OBS MODERATE 35: CPT | Mod: GC

## 2025-05-29 PROCEDURE — 99233 SBSQ HOSP IP/OBS HIGH 50: CPT

## 2025-05-29 PROCEDURE — 99232 SBSQ HOSP IP/OBS MODERATE 35: CPT

## 2025-05-29 PROCEDURE — 74183 MRI ABD W/O CNTR FLWD CNTR: CPT | Mod: 26

## 2025-05-29 PROCEDURE — 70551 MRI BRAIN STEM W/O DYE: CPT | Mod: 26

## 2025-05-29 RX ORDER — SODIUM PHOSPHATE,DIBASIC DIHYD
30 POWDER (GRAM) MISCELLANEOUS ONCE
Refills: 0 | Status: COMPLETED | OUTPATIENT
Start: 2025-05-29 | End: 2025-05-29

## 2025-05-29 RX ORDER — SOD PHOS DI, MONO/K PHOS MONO 250 MG
2 TABLET ORAL ONCE
Refills: 0 | Status: DISCONTINUED | OUTPATIENT
Start: 2025-05-29 | End: 2025-05-29

## 2025-05-29 RX ADMIN — Medication 100 MICROGRAM(S): at 05:25

## 2025-05-29 RX ADMIN — Medication 85 MILLIMOLE(S): at 10:27

## 2025-05-29 RX ADMIN — Medication 1 TABLET(S): at 10:26

## 2025-05-29 RX ADMIN — Medication 5 MILLIGRAM(S): at 19:18

## 2025-05-29 RX ADMIN — Medication 25 MILLIGRAM(S): at 17:39

## 2025-05-29 RX ADMIN — Medication 100 MILLIGRAM(S): at 06:27

## 2025-05-29 NOTE — PROGRESS NOTE ADULT - SUBJECTIVE AND OBJECTIVE BOX
SUBJECTIVE / INTERVAL HPI: Patient was seen and examined this morning.     Overnight events:    CAPILLARY BLOOD GLUCOSE & INSULIN RECEIVED  107 mg/dL (05-28 @ 08:21)  96 mg/dL (05-28 @ 12:06)  112 mg/dL (05-28 @ 17:07)  193 mg/dL (05-28 @ 21:51) X  138 mg/dL (05-29 @ 08:35)      REVIEW OF SYSTEMS  Constitutional:  Negative fever, chills   Cardiovascular:  Negative for chest pain or palpitations.  Respiratory:  Negative for cough, wheezing, or shortness of breath.    Gastrointestinal:  Negative for nausea, vomiting, diarrhea, constipation, or abdominal pain.  Genitourinary:  Negative frequency, urgency or dysuria.  Neurologic:  No headache, confusion, dizziness, lightheadedness.    PHYSICAL EXAM  Vital Signs Last 24 Hrs  T(C): 36.5 (29 May 2025 06:03), Max: 36.8 (28 May 2025 12:05)  T(F): 97.7 (29 May 2025 06:03), Max: 98.3 (28 May 2025 12:05)  HR: 63 (29 May 2025 06:03) (63 - 85)  BP: 102/68 (29 May 2025 06:03) (93/58 - 105/69)  BP(mean): 80 (29 May 2025 06:03) (70 - 80)  RR: 17 (29 May 2025 06:03) (17 - 20)  SpO2: 95% (29 May 2025 06:03) (95% - 97%)    Parameters below as of 29 May 2025 06:03  Patient On (Oxygen Delivery Method): room air      Constitutional: Awake, alert, in no acute distress.   HEENT: Normocephalic, atraumatic, .  Respiratory: Lungs clear to ausculation bilaterally.   Cardiovascular: regular rhythm, normal S1 and S2, no audible murmurs.   GI: soft, non-tender, distended with fluid wave, bowel sounds present.  Extremities: No lower extremity edema.     LABS  CBC - WBC/HGB/HTC/PLT: 4.94/10.3/29.9/169 (05-29-25)  BMP - Na/K/Cl/Bicarb/BUN/Cr/Gluc/AG/eGFR: 135/4.8/102/26/22/0.89/163/7/89 (05-29-25)  Ca - 8.0 (05-29-25)  Phos - 2.0 (05-29-25)  Mg - 2.4 (05-29-25)  LFT - Alb/Tprot/Tbili/Dbili/AlkPhos/ALT/AST: 2.5/--/0.8/--/105/<5/23 (05-29-25)  PT/aPTT/INR: 22.1/--/1.90 (05-29-25)   Thyroid Stimulating Hormone, Serum: 4.850 (05-25-25)  Total T4/Free T4: --/1.050 (05-25-25)        MEDICATIONS  MEDICATIONS  (STANDING):  apixaban 5 milliGRAM(s) Oral every 12 hours  dextrose 5%. 1000 milliLiter(s) (50 mL/Hr) IV Continuous <Continuous>  dextrose 5%. 1000 milliLiter(s) (100 mL/Hr) IV Continuous <Continuous>  dextrose 50% Injectable 25 Gram(s) IV Push once  dextrose 50% Injectable 12.5 Gram(s) IV Push once  dextrose 50% Injectable 25 Gram(s) IV Push once  glucagon  Injectable 1 milliGRAM(s) IntraMuscular once  insulin lispro (ADMELOG) corrective regimen sliding scale   SubCutaneous at bedtime  insulin lispro (ADMELOG) corrective regimen sliding scale   SubCutaneous three times a day before meals  levothyroxine 100 MICROGram(s) Oral daily  multivitamin 1 Tablet(s) Oral daily  senna 2 Tablet(s) Oral at bedtime  spironolactone 25 milliGRAM(s) Oral every 24 hours  thiamine 100 milliGRAM(s) Oral every 24 hours    MEDICATIONS  (PRN):  dextrose Oral Gel 15 Gram(s) Oral once PRN Blood Glucose LESS THAN 70 milliGRAM(s)/deciliter  polyethylene glycol 3350 17 Gram(s) Oral daily PRN Constipation  zolpidem 5 milliGRAM(s) Oral at bedtime PRN Insomnia    ASSESSMENT / RECOMMENDATIONS  74y Male with history of DM type II, hypothyroidism, gastric CA and recently diagnosed hepatic cirrhosis who presented with weakness and RLE cellulitis. Endocrinology following for diabetes and tube feed management. He has been hypoglycemic in the am, unclear as he is on same tube feeds at home and on higher doses of NPH. Standing insulin stopped. Abdomen more distended, planned for paracentesis. PO intake is very poor.     A1C: 4.8 %  Creatinine: 0.89, GFR: 89  Weight: 63.5, BMI: 20.1  INR increasing: today 1.9     # Type 2 diabetes mellitus  - c/w [place] 1.4 at 110cc/hr from 6pm to 9am  - NO NPH. No standing insulin.   - ok to have glucose tabs to treat lows.   - Continue lispro LOW dose sliding scale before meals and at bedtime.  - Patient's fingerstick glucose goal is 100-180 mg/dL.    - Discharge recommendations TBD  - can f/u with Dr. Richard on discharge.     # Hypothyroidism:    - Has one normal and one mildly elevated TSH level since admission.  His recent outpatient TFTs were normal and his levothyroxine dose stable.  -Continue levothyroxine 100 mcg/day    Case discussed with Dr. Richard. Primary team updated.       Cathryn Dodge   Endocrinology Fellow    Service Pager: [phone]      SUBJECTIVE / INTERVAL HPI: Patient was seen and examined this morning. Poor appetite. Still not eating much. He is stopping the feeds at 6am. Feels he does not like being hooked up to the pole for that long although he isn't getting out of bed much. Abdomen is distended. Plan for paracentesis.     Overnight events:  No events.     CAPILLARY BLOOD GLUCOSE & INSULIN RECEIVED  107 mg/dL (05-28 @ 08:21)  96 mg/dL (05-28 @ 12:06)  112 mg/dL (05-28 @ 17:07)  193 mg/dL (05-28 @ 21:51) X  138 mg/dL (05-29 @ 08:35)      REVIEW OF SYSTEMS  Constitutional:  Negative fever, chills   Cardiovascular:  Negative for chest pain or palpitations.  Respiratory:  Negative for cough, wheezing, or shortness of breath.    Gastrointestinal:  Negative for nausea, vomiting, diarrhea, constipation, or abdominal pain.  Genitourinary:  Negative frequency, urgency or dysuria.  Neurologic:  No headache, confusion, dizziness, lightheadedness.    PHYSICAL EXAM  Vital Signs Last 24 Hrs  T(C): 36.5 (29 May 2025 06:03), Max: 36.8 (28 May 2025 12:05)  T(F): 97.7 (29 May 2025 06:03), Max: 98.3 (28 May 2025 12:05)  HR: 63 (29 May 2025 06:03) (63 - 85)  BP: 102/68 (29 May 2025 06:03) (93/58 - 105/69)  BP(mean): 80 (29 May 2025 06:03) (70 - 80)  RR: 17 (29 May 2025 06:03) (17 - 20)  SpO2: 95% (29 May 2025 06:03) (95% - 97%)    Parameters below as of 29 May 2025 06:03  Patient On (Oxygen Delivery Method): room air      Constitutional: Awake, alert, in no acute distress.   HEENT: Normocephalic, atraumatic, .  Respiratory: Lungs clear to ausculation bilaterally.   Cardiovascular: regular rhythm, normal S1 and S2, no audible murmurs.   GI: soft, non-tender, distended with fluid wave, bowel sounds present.  Extremities: No lower extremity edema.     LABS  CBC - WBC/HGB/HTC/PLT: 4.94/10.3/29.9/169 (05-29-25)  BMP - Na/K/Cl/Bicarb/BUN/Cr/Gluc/AG/eGFR: 135/4.8/102/26/22/0.89/163/7/89 (05-29-25)  Ca - 8.0 (05-29-25)  Phos - 2.0 (05-29-25)  Mg - 2.4 (05-29-25)  LFT - Alb/Tprot/Tbili/Dbili/AlkPhos/ALT/AST: 2.5/--/0.8/--/105/<5/23 (05-29-25)  PT/aPTT/INR: 22.1/--/1.90 (05-29-25)   Thyroid Stimulating Hormone, Serum: 4.850 (05-25-25)  Total T4/Free T4: --/1.050 (05-25-25)        MEDICATIONS  MEDICATIONS  (STANDING):  apixaban 5 milliGRAM(s) Oral every 12 hours  dextrose 5%. 1000 milliLiter(s) (50 mL/Hr) IV Continuous <Continuous>  dextrose 5%. 1000 milliLiter(s) (100 mL/Hr) IV Continuous <Continuous>  dextrose 50% Injectable 25 Gram(s) IV Push once  dextrose 50% Injectable 12.5 Gram(s) IV Push once  dextrose 50% Injectable 25 Gram(s) IV Push once  glucagon  Injectable 1 milliGRAM(s) IntraMuscular once  insulin lispro (ADMELOG) corrective regimen sliding scale   SubCutaneous at bedtime  insulin lispro (ADMELOG) corrective regimen sliding scale   SubCutaneous three times a day before meals  levothyroxine 100 MICROGram(s) Oral daily  multivitamin 1 Tablet(s) Oral daily  senna 2 Tablet(s) Oral at bedtime  spironolactone 25 milliGRAM(s) Oral every 24 hours  thiamine 100 milliGRAM(s) Oral every 24 hours    MEDICATIONS  (PRN):  dextrose Oral Gel 15 Gram(s) Oral once PRN Blood Glucose LESS THAN 70 milliGRAM(s)/deciliter  polyethylene glycol 3350 17 Gram(s) Oral daily PRN Constipation  zolpidem 5 milliGRAM(s) Oral at bedtime PRN Insomnia    ASSESSMENT / RECOMMENDATIONS  74y Male with history of DM type II, hypothyroidism, gastric CA and recently diagnosed hepatic cirrhosis who presented with weakness and RLE cellulitis. Endocrinology following for diabetes and tube feed management. He has been hypoglycemic in the am, unclear as he is on same tube feeds at home and on higher doses of NPH. Standing insulin stopped. Abdomen more distended, planned for paracentesis. PO intake is very poor.     A1C: 4.8 %  Creatinine: 0.89, GFR: 89  Weight: 63.5, BMI: 20.1  INR increasing: today 1.9     # Type 2 diabetes mellitus  - c/w [place] 1.4 at 110cc/hr from 6pm to 9am. (Ok to do 12hrs from 6 to 6. Would not recommend increasing rate as patient does not tolerate).   - NO NPH. No standing insulin.   - ok to have glucose tabs to treat lows.   - Continue lispro LOW dose sliding scale before meals and at bedtime.  - Patient's fingerstick glucose goal is 100-180 mg/dL.    - Discharge recommendations TBD  - can f/u with Dr. Rihcard on discharge.     # Hypothyroidism:    - Has one normal and one mildly elevated TSH level since admission.  His recent outpatient TFTs were normal and his levothyroxine dose stable.  -Continue levothyroxine 100 mcg/day    Case discussed with Dr. Richard. Primary team updated.       Cathryn Dodge   Endocrinology Fellow    Service Pager: [phone]

## 2025-05-29 NOTE — PROGRESS NOTE ADULT - ASSESSMENT
72M w/ PMH of gastric cancer (s/p feeding J-tube), portal vein thrombosis (on Eliquis, now resolved on US) AAA (s/p EVAR in 2023), DM2, hypothyroidism, HTN, anemia, BPH, type 2 endoleak repair, s/p embolization of internal iliac artery branch (10/24), and new diagnosis of cirrhosis, who first presented to ED with worsening bilateral leg swelling and gen weakness. Hepatology consulted for RUQ US showing cirrhosis.     Patient denies any significant ETOH use in the past.   Cirrhosis of unknown etiology but most likely 2/2 to MASLD (given DM2 and HTN) vs. prior radiation exposure (received multiple rounds when treated for gastric cancer.   BMI currently 20, and patient reports that at his heaviest he weighed 170 lbs at 6'0" tall (BMI of 23).     EV screening: none noted on EGD in 2023 but significant esophagitis may have obscured views.   HE: none prior   HCC screening: AFP WNL and MRI pending   SBP: none prior     RUQ US:   - small to moderate amount of ascites most prominent of the right lower quadrant on the submitted images.  - nodular liver consistent with cirrhosis   - patent vasculature despite history of PVT thrombosis     Meld 3.0 score: 11 on 05/27 --> 14 on 05/28 --> 15 on 05/29   Platelets >150 and INR 1.35 --> 1.74 --> 1.90 on Eliquis    Most recent EGD (June 2023):   - Severe esophagitis LA Grade D  - Esophageal-jejunal anastomosis intact w/ patent afferent and efferent limbs without evidence of acute angulation or suggestion of obstruction.    Cirrhosis work-up:   - iron sat 32 and ferritin 228   - hep A IgM nonreactive, hep A IgG reactive   - hep C nonreactive  - hep B surface antigen nonreactive, surface antibody and core non-reactive   - AFP WNL <1.8  - anti-LMKA <20.1   - alpha-1 antitrypsin    - ceruloplasmin WNL 22    Liver MRI (05/29/25): Cirrhosis of liver, no evidence of HCC, marked abdominal and pelvic ascites, no portal vein thrombosis.    Recommendations:   - consult IR for diagnotic paracentesis given small to moderate ascites noted on US   - please send fluid for cell count, cultures, albumin, protein, and cytology to ensure no gastric cancer recurrence   - trend CBC, CMP, and INR daily   - f/u PETH, EBER, ASMA, IgG, IgM, anti-SLA,   - please send 24-hour urine copper screen and immunoglobulin panel   - low sodium/high protein diet with tube feeds via j-tube   - Tylenol not exceeding 2g per day   - continue home spironolactone 25 mg PO daily and Lasix 40 mg PO daily   - consider EGD for EV screening inpt vs. out-pt     Case discussed with Dr. Neal. Hepatology Team will continue to follow.     Katie Miller D.O.   Gastroenterology Fellow  Weekday 7am-5pm Pager: [phone]  Weeknights/Weekend/Holiday Coverage: Please call the  for contact info 72M w/ PMH of gastric cancer (s/p feeding J-tube), portal vein thrombosis (on Eliquis, now resolved on US) AAA (s/p EVAR in 2023), DM2, hypothyroidism, HTN, anemia, BPH, type 2 endoleak repair, s/p embolization of internal iliac artery branch (10/24), and new diagnosis of cirrhosis, who first presented to ED with worsening bilateral leg swelling and gen weakness. Hepatology consulted for RUQ US showing cirrhosis.     Patient denies any significant ETOH use in the past.   Cirrhosis of unknown etiology but most likely 2/2 to MASLD (given DM2 and HTN) vs. prior radiation exposure (received multiple rounds when treated for gastric cancer.   BMI currently 20, and patient reports that at his heaviest he weighed 170 lbs at 6'0" tall (BMI of 23).     EV screening: none noted on EGD in 2023 but significant esophagitis may have obscured views.   HE: none prior   HCC screening: AFP WNL and MRI pending   SBP: none prior     RUQ US:   - small to moderate amount of ascites most prominent of the right lower quadrant on the submitted images.  - nodular liver consistent with cirrhosis   - patent vasculature despite history of PVT thrombosis     Meld 3.0 score: 11 on 05/27 --> 14 on 05/28 --> 15 on 05/29   Platelets >150 and INR 1.35 --> 1.74 --> 1.90 on Eliquis    Most recent EGD (June 2023):   - Severe esophagitis LA Grade D  - Esophageal-jejunal anastomosis intact w/ patent afferent and efferent limbs without evidence of acute angulation or suggestion of obstruction.    Cirrhosis work-up:   - iron sat 32 and ferritin 228   - hep A IgM nonreactive, hep A IgG reactive   - hep C nonreactive  - hep B surface antigen nonreactive, surface antibody and core non-reactive   - AFP WNL <1.8  - anti-LMKA <20.1   - alpha-1 antitrypsin    - ceruloplasmin WNL 22    Liver MRI (05/29/25): Cirrhosis of liver, no evidence of HCC, marked abdominal and pelvic ascites, no portal vein thrombosis.    Recommendations:   - consult IR for diagnotic paracentesis given small to moderate ascites noted on US   - please send fluid for cell count, cultures, albumin, protein, and cytology to ensure no gastric cancer recurrence   - trend CBC, CMP, and INR daily   - f/u PETH, EBER, ASMA, IgG, IgM, anti-SLA,   - please send 24-hour urine copper screen and immunoglobulin panel   - low sodium/high protein diet with tube feeds via j-tube   - Tylenol not exceeding 2g per day   - continue home spironolactone 25 mg PO daily and Lasix 40 mg PO daily   - consider EGD for EV screening inpt vs. out-pt     Please ensure out-patient follow-up with hepatology (Dr. Neal) and please include this information in discharge paperwork:   [facility]  [address], [address] [place] [address]     Case discussed with Dr. Neal. Hepatology Team will continue to follow.     Katie Miller D.O.   Gastroenterology Fellow  Weekday 7am-5pm Pager: [phone]  Weeknights/Weekend/Holiday Coverage: Please call the  for contact info

## 2025-05-29 NOTE — PROGRESS NOTE ADULT - PROBLEM SELECTOR PLAN 1
Weakness, generalized and leg pain x 4 days, presents with decreased ability to walk, likely i/s/o abdominal distension and significant swelling in b/l LE. Reports taking additional doses of Spironolactone because of edema.   Admission K of 3.7, VBG 2.5.   Suspected to be 2/2  fluid overload with immobility, RLE cellulitis   Stroke workup negative per neuro, imaging findings without acute pathology. recommending MRI   Nutritional workup so far negative  - f/u orthostatics  - PT/SW  - fall precautions   - MRI brain per neuro recs Weakness, generalized and leg pain x 4 days, presents with decreased ability to walk, likely i/s/o abdominal distension and significant swelling in b/l LE. Reports taking additional doses of Spironolactone because of edema.   Admission K of 3.7, VBG 2.5.   Suspected to be 2/2  fluid overload with immobility, RLE cellulitis   Stroke workup negative per neuro, imaging findings without acute pathology.   MRI brain showed no acute abnormalities.  Nutritional workup so far negative  - f/u orthostatics  - PT/SW  - fall precautions

## 2025-05-29 NOTE — PROGRESS NOTE ADULT - PROBLEM SELECTOR PLAN 5
Recently diagnosed with liver cirrhosis (2025?), per chart note by Dr. Mckeon and review on [facility] -  etiology of his cirrhosis is unclear--no work-up was completed on [place] except bloodwork and Fibroscan, the latter uninterpretable due to jordy-hepatic ascites.   , INR 1.18, AST/ALT not quantified/16  CT A/P this year showed cirrhotic morphology, last year had normal findings  ED bedside U/S without pocket to TAP per signout  Abd US showing ascites.  - Hepatology consulted, appreciate recs  - plan for diagnostic para  - Daily MELD score  - Diuretics pending improvement in BMP  - restarted home lasix and spironolactone

## 2025-05-29 NOTE — CHART NOTE - NSCHARTNOTEFT_GEN_A_CORE
Admitting Diagnosis:   Patient is a 74y old  Male who presents with a chief complaint of RLE cellulitis (29 May 2025 10:57)    PAST MEDICAL & SURGICAL HISTORY:  Essential hypertension  was on losartan, now diet control  Hypothyroidism  Gastric mass  ulcerated mass in gastric cardia with small perigastric nodes on endoscopy.  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  H/O umbilical hernia repair   with mesh  Port-a-cath in place  right chest wall                 not in use 5 y  History of gastric surgery  2017  Gastrostomy tube in place  open jejunostomy with J-tube 2020  S/P cataract surgery  H/O endoscopy  & stent placement 2020  S/P gastrectomy  total gastrectomy   History of partial pancreatectomy  distal pancreatectomy/ splenectomy esophatojejunostomy  Status post neck dissection  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, gastric primary  History of dysphagia  EGD with [employer]xi[employer] stent placment to connect the blind limb to th e efferent limb (jejunojejunostomy)    Current Nutrition Order:  Low Sodium  Tube Feeding via Jejunostomy: Butter Standard 1.4 at 86mL/hr x15hr (6PM-9AM) + x1/day LPS to provide 1290mL total volume (~x4 325mL cans), 1806kcal (28kcal/kg dosing wt 63.5kg), 80g protein (1.3g/kg dosing wt 63.5kg), and 929mL free water.     PO Intake: Good (%) [   ]  Fair (50-75%) [   ] Poor (<25%) [ x ]  GI Issues:   Pt denies nausea/vomiting/diarrhea/constipation   Reports last BM overnight   Denies abdominal discomfort    Pain:  No pain reported     Skin Integrity:  2+ bilateral foot edema noted  Right knee skin tear, abdominal wound, and sacral stage I pressure injury documented  Marcus score 20    Labs:       135  |  102  |  22  ----------------------------<  163[H]  4.8   |  26  |  0.89    Ca    8.0[L]      29 May 2025 07:03  Phos  2.0       Mg     2.4         TPro  5.8[L]  /  Alb  2.5[L]  /  TBili  0.8  /  DBili  x   /  AST  23  /  ALT  <5[L]  /  AlkPhos  105      CAPILLARY BLOOD GLUCOSE  POCT Blood Glucose.: 138 mg/dL (29 May 2025 08:35)  POCT Blood Glucose.: 193 mg/dL (28 May 2025 21:51)  POCT Blood Glucose.: 112 mg/dL (28 May 2025 17:07)  POCT Blood Glucose.: 96 mg/dL (28 May 2025 12:06)    Medications:  MEDICATIONS  (STANDING):  apixaban 5 milliGRAM(s) Oral every 12 hours  dextrose 5%. 1000 milliLiter(s) (50 mL/Hr) IV Continuous <Continuous>  dextrose 5%. 1000 milliLiter(s) (100 mL/Hr) IV Continuous <Continuous>  dextrose 50% Injectable 25 Gram(s) IV Push once  dextrose 50% Injectable 12.5 Gram(s) IV Push once  dextrose 50% Injectable 25 Gram(s) IV Push once  glucagon  Injectable 1 milliGRAM(s) IntraMuscular once  insulin lispro (ADMELOG) corrective regimen sliding scale   SubCutaneous at bedtime  insulin lispro (ADMELOG) corrective regimen sliding scale   SubCutaneous three times a day before meals  levothyroxine 100 MICROGram(s) Oral daily  multivitamin 1 Tablet(s) Oral daily  senna 2 Tablet(s) Oral at bedtime  spironolactone 25 milliGRAM(s) Oral every 24 hours  thiamine 100 milliGRAM(s) Oral every 24 hours    MEDICATIONS  (PRN):  dextrose Oral Gel 15 Gram(s) Oral once PRN Blood Glucose LESS THAN 70 milliGRAM(s)/deciliter  polyethylene glycol 3350 17 Gram(s) Oral daily PRN Constipation  zolpidem 5 milliGRAM(s) Oral at bedtime PRN Insomnia    Anthropometrics:  Height: 5'10"  Weight: 140 pounds / 63.5 kilograms  BMI: 20  IBW: 166 pounds / 75.5 kilograms            84%IBW    Weight Change:   No new weights obtained since admission. Recommend nursing to trend weekly weights. RD to continue monitoring weights as able.     Severe Acute Protein Calorie Malnutrition: Risk Assessment Completed   - NFPE: severe muscle and fat wasting     Estimated energy needs:   Calories: 30-35kcal/k-2222kcal/d  Protein: 1.5-2g/k-127g/d  Defer fluids to team.   Estimated needs based on dosing wt as within %  pounds / 75.5 kilograms (84%). Needs adjusted for age, cirrhosis, wound healing, and malnutrition.    Subjective:   72M with PMH of gastric cancer (status post feeding J-tube), portal vein thrombosis (on Eliquis) AAA (status post EVAR in ), T2DM, hypothyroidism, HTN, anemia, BPH, type 2 endoleak repair, status post embolization of internal iliac artery branch (10/24), and cirrhosis who presents to the ED with worsening bilateral leg swelling and gen weakness, admitted for further evaluation. Stroke service consulted for right facial droop and dysarthria noted to be present for a few months by patient's close family and friends, CTH neg for stroke, incidental finding of arachnoid cyst, CTA with L C6 ICA 40-50% stenosis. Ongoing work-up.     Pt seen on 7WO for follow-up. Labs and medication orders reviewed. Na/K/Mg WNL Phos 2 <low>, BUN/Cr WNL, POC blood glucose (-) . Ordered for PO synthroid, multivitamin, thiamine, spironolactone. On Low Sodium diet with tube feed via J-tube 6PM-9AM. Per RN, pt requested overnight nurse to stop feed at ~6:30AM. Pt reports good tolerance of current regimen, endorses stopping feed intermittently due to disliking the feeling of being connected to the pump. Reports variable use of tube feed at home, frequently getting up early (~3AM) to stop the feed. Endorses regular BMs and no GI discomfort. Notes frequently "spitting up" during PO meals and when feed is running especially at increased rate (previously 110mL/hr) - RD notified MD. Pt reports poor/variable PO intake. RD observed pt consumed only some ice cream for breakfast this AM. RD obtained pt food preferences - forwarded to Dietary. See nutrition recommendations - RD communicated to team. RD to remain available.      Previous Nutrition Diagnosis:  Severe acute malnutrition related to inadequate nutrition as evidenced by severe muscle and fat wasting.     Active [ x ]  Resolved [   ]    Goal:  Pt to consistently meet >75% nutrient needs via most appropriate route for nutrition. Reduce signs and symptoms of protein-calorie malnutrition.    Recommendations:  1. Recommend tube feed via J-tube: [employer]essa [employer] Standard 1.4 with goal rate at 86mL/hr x15hr (6PM-9AM) + x1/day LPS (100kcal, 15g protein) to provide ~1290mL total volume (~x4 325mL cans), 1906kcal (30kcal/kg dosing wt 63.5kg), 95g protein (1.5g/kg dosing wt 63.5kg), and 929mL free water.   *Above regimen made with consideration for limited/variable PO intake and increased spitting up at high feeding rate*  >>Defer free water flushes to team.  >>Note pt ordered for PO synthroid, recommend administer >/=1hr apart from nutrition.   >>Monitor GI tolerance and maintain aspiration precautions. RD to remain available to adjust EN regimen prn.   2. Continue Regular diet.   >>Dietary to provide fortified mashed potatoes (240kcal, 14g pro, 27g CHO) x1/day, Magic Cup (290kcal, 9g protein) x2/day, and jelly (~40kcal, ~10g CHO/packet) x2/day.   3. Continue micronutrient supplementation. Continue d/c once pt consistently tolerating tube feed regimen at goal.   4. Monitor weight trends, labs, skin integrity, & hydration status.  5. Pain and bowel regimens per team.  6. RD remains available for dietary education/intervention prn.    Education:  Discussed extensively with pt regarding importance of completing goal tube feed volume to meet nutrient demands in setting of limited/variable PO intake and reasoning for current order. Discussed customizing tube feed timing at home while still reaching goal volume and educated on liquid protein supplement. Educated on signs of feed tolerance to monitor and importance of aspiration precautions. Reviewed strategies to maximize PO intake and fortified foods. Pt aware RD remains available for additional questions/concerns. Admitting Diagnosis:   Patient is a 74y old  Male who presents with a chief complaint of RLE cellulitis (29 May 2025 10:57)    PAST MEDICAL & SURGICAL HISTORY:  Essential hypertension  was on losartan, now diet control  Hypothyroidism  Gastric mass  ulcerated mass in gastric cardia with small perigastric nodes on endoscopy.  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  H/O umbilical hernia repair   with mesh  Port-a-cath in place  right chest wall                 not in use 5 y  History of gastric surgery  2017  Gastrostomy tube in place  open jejunostomy with J-tube 2020  S/P cataract surgery  H/O endoscopy  & stent placement 2020  S/P gastrectomy  total gastrectomy   History of partial pancreatectomy  distal pancreatectomy/ splenectomy esophatojejunostomy  Status post neck dissection  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, gastric primary  History of dysphagia  EGD with [employer] stent placment to connect the blind limb to th e efferent limb (jejunojejunostomy)    Current Nutrition Order:  Low Sodium  Tube Feeding via Jejunostomy: [employer] Standard 1.4 at 86mL/hr x15hr (6PM-9AM) + x1/day LPS to provide 1290mL total volume (~x4 325mL cans), 1806kcal (28kcal/kg dosing wt 63.5kg), 80g protein (1.3g/kg dosing wt 63.5kg), and 929mL free water.     PO Intake: Good (%) [   ]  Fair (50-75%) [   ] Poor (<25%) [ x ]  GI Issues:   Pt denies nausea/vomiting/diarrhea/constipation   Reports last BM overnight   Denies abdominal discomfort    Pain:  No pain reported     Skin Integrity:  2+ bilateral foot edema noted  Right knee skin tear, abdominal wound, and sacral stage I pressure injury documented  Marcus score 20    Labs:       135  |  102  |  22  ----------------------------<  163[H]  4.8   |  26  |  0.89    Ca    8.0[L]      29 May 2025 07:03  Phos  2.0       Mg     2.4         TPro  5.8[L]  /  Alb  2.5[L]  /  TBili  0.8  /  DBili  x   /  AST  23  /  ALT  <5[L]  /  AlkPhos  105      CAPILLARY BLOOD GLUCOSE  POCT Blood Glucose.: 138 mg/dL (29 May 2025 08:35)  POCT Blood Glucose.: 193 mg/dL (28 May 2025 21:51)  POCT Blood Glucose.: 112 mg/dL (28 May 2025 17:07)  POCT Blood Glucose.: 96 mg/dL (28 May 2025 12:06)    Medications:  MEDICATIONS  (STANDING):  apixaban 5 milliGRAM(s) Oral every 12 hours  dextrose 5%. 1000 milliLiter(s) (50 mL/Hr) IV Continuous <Continuous>  dextrose 5%. 1000 milliLiter(s) (100 mL/Hr) IV Continuous <Continuous>  dextrose 50% Injectable 25 Gram(s) IV Push once  dextrose 50% Injectable 12.5 Gram(s) IV Push once  dextrose 50% Injectable 25 Gram(s) IV Push once  glucagon  Injectable 1 milliGRAM(s) IntraMuscular once  insulin lispro (ADMELOG) corrective regimen sliding scale   SubCutaneous at bedtime  insulin lispro (ADMELOG) corrective regimen sliding scale   SubCutaneous three times a day before meals  levothyroxine 100 MICROGram(s) Oral daily  multivitamin 1 Tablet(s) Oral daily  senna 2 Tablet(s) Oral at bedtime  spironolactone 25 milliGRAM(s) Oral every 24 hours  thiamine 100 milliGRAM(s) Oral every 24 hours    MEDICATIONS  (PRN):  dextrose Oral Gel 15 Gram(s) Oral once PRN Blood Glucose LESS THAN 70 milliGRAM(s)/deciliter  polyethylene glycol 3350 17 Gram(s) Oral daily PRN Constipation  zolpidem 5 milliGRAM(s) Oral at bedtime PRN Insomnia    Anthropometrics:  Height: 5'10"  Weight: 140 pounds / 63.5 kilograms  BMI: 20  IBW: 166 pounds / 75.5 kilograms            84%IBW    Weight Change:   No new weights obtained since admission. Recommend nursing to trend weekly weights. RD to continue monitoring weights as able.     Severe Acute Protein Calorie Malnutrition: Risk Assessment Completed   - NFPE: severe muscle and fat wasting     Estimated energy needs:   Calories: 30-35kcal/k-2222kcal/d  Protein: 1.5-2g/k-127g/d  Defer fluids to team.   Estimated needs based on dosing wt as within %  pounds / 75.5 kilograms (84%). Needs adjusted for age, cirrhosis, wound healing, and malnutrition.    Subjective:   72M with PMH of gastric cancer (status post feeding J-tube), portal vein thrombosis (on Eliquis) AAA (status post EVAR in ), T2DM, hypothyroidism, HTN, anemia, BPH, type 2 endoleak repair, status post embolization of internal iliac artery branch (10/24), and cirrhosis who presents to the ED with worsening bilateral leg swelling and gen weakness, admitted for further evaluation. Stroke service consulted for right facial droop and dysarthria noted to be present for a few months by patient's close family and friends, CTH neg for stroke, incidental finding of arachnoid cyst, CTA with L C6 ICA 40-50% stenosis. Ongoing work-up.     Pt seen on 7WO for follow-up. Labs and medication orders reviewed. Na/K/Mg WNL Phos 2 <low>, BUN/Cr WNL, POC blood glucose (-) . Ordered for PO synthroid, multivitamin, thiamine, spironolactone. On Low Sodium diet with tube feed via J-tube 6PM-9AM. Per RN, pt requested overnight nurse to stop feed at ~6:30AM. Pt reports good tolerance of current regimen, endorses stopping feed intermittently due to disliking the feeling of being connected to the pump. Reports variable use of tube feed at home, frequently getting up early (~3AM) to stop the feed. Endorses regular BMs and no GI discomfort. Notes frequently "spitting up" during PO meals and when feed is running especially at increased rate (previously 110mL/hr) - RD notified MD. Pt reports poor/variable PO intake. RD observed pt consumed only some ice cream for breakfast this AM. RD obtained pt food preferences - forwarded to Dietary. See nutrition recommendations - RD communicated to team. RD to remain available.      Previous Nutrition Diagnosis:  Severe acute malnutrition related to inadequate nutrition as evidenced by severe muscle and fat wasting.     Active [ x ]  Resolved [   ]    Goal:  Pt to consistently meet >75% nutrient needs via most appropriate route for nutrition. Reduce signs and symptoms of protein-calorie malnutrition.    Recommendations:  1. Recommend tube feed via J-tube: [employer] Standard 1.4 with goal rate at 86mL/hr x15hr (6PM-9AM) + x1/day LPS (100kcal, 15g protein) to provide ~1290mL total volume (~x4 325mL cans), 1906kcal (30kcal/kg dosing wt 63.5kg), 95g protein (1.5g/kg dosing wt 63.5kg), and 929mL free water.   *Above regimen made with consideration for limited/variable PO intake and increased spitting up at high feeding rate*  >>Defer free water flushes to team.  >>Note pt ordered for PO synthroid, recommend administer >/=1hr apart from nutrition.   >>Monitor GI tolerance and maintain aspiration precautions. RD to remain available to adjust EN regimen prn.   2. Continue Low Sodium diet.   >>Dietary to provide fortified mashed potatoes (240kcal, 14g pro, 27g CHO) x1/day, Magic Cup (290kcal, 9g protein) x2/day, and jelly (~40kcal, ~10g CHO/packet) x2/day.   3. Continue micronutrient supplementation. Continue d/c once pt consistently tolerating tube feed regimen at goal.   4. Monitor weight trends, labs, skin integrity, & hydration status.  5. Pain and bowel regimens per team.  6. RD remains available for dietary education/intervention prn.    Education:  Discussed extensively with pt regarding importance of completing goal tube feed volume to meet nutrient demands in setting of limited/variable PO intake and reasoning for current order. Discussed customizing tube feed timing at home while still reaching goal volume and educated on liquid protein supplement. Educated on signs of feed tolerance to monitor and importance of aspiration precautions. Reviewed strategies to maximize PO intake and fortified foods. Pt aware RD remains available for additional questions/concerns.

## 2025-05-29 NOTE — PROGRESS NOTE ADULT - SUBJECTIVE AND OBJECTIVE BOX
***Note in progress***    OVERNIGHT EVENTS: NAEO    SUBJECTIVE / INTERVAL HPI: Patient seen and examined at bedside. Patient denying chest pain, SOB, palpitations, cough. Patient denies fever, chills, HA, Dizziness, N/V, abdominal pain, diarrhea, constipation, hematochezia/melena, dysuria, hematuria, new onset weakness/numbness, LE pain and/or swelling.    Remaining ROS negative       PHYSICAL EXAM:    General:NAD.   HEENT: NC/AT; PERRL, anicteric sclera; MMM  Neck: supple  Cardiovascular: +S1/S2, RRR  Respiratory: CTA B/L; no W/R/R  Gastrointestinal: soft, NT/ND; +BSx4  Extremities: WWP; no edema, clubbing or cyanosis  Vascular: 2+ radial, DP/PT pulses B/L  Neurological: AAOx3; no focal deficits  Psychiatric: pleasant mood and affect  Dermatologic: no appreciable wounds or damage to the skin    VITAL SIGNS:  Vital Signs Last 24 Hrs  T(C): 36.5 (29 May 2025 06:03), Max: 36.8 (28 May 2025 12:05)  T(F): 97.7 (29 May 2025 06:03), Max: 98.3 (28 May 2025 12:05)  HR: 63 (29 May 2025 06:03) (63 - 85)  BP: 102/68 (29 May 2025 06:03) (93/58 - 105/69)  BP(mean): 80 (29 May 2025 06:03) (70 - 80)  RR: 17 (29 May 2025 06:03) (17 - 20)  SpO2: 95% (29 May 2025 06:03) (95% - 97%)    Parameters below as of 29 May 2025 06:03  Patient On (Oxygen Delivery Method): room air        MEDICATIONS:  MEDICATIONS  (STANDING):  apixaban 5 milliGRAM(s) Oral every 12 hours  dextrose 5%. 1000 milliLiter(s) (50 mL/Hr) IV Continuous <Continuous>  dextrose 5%. 1000 milliLiter(s) (100 mL/Hr) IV Continuous <Continuous>  dextrose 50% Injectable 25 Gram(s) IV Push once  dextrose 50% Injectable 12.5 Gram(s) IV Push once  dextrose 50% Injectable 25 Gram(s) IV Push once  glucagon  Injectable 1 milliGRAM(s) IntraMuscular once  insulin lispro (ADMELOG) corrective regimen sliding scale   SubCutaneous at bedtime  insulin lispro (ADMELOG) corrective regimen sliding scale   SubCutaneous three times a day before meals  levothyroxine 100 MICROGram(s) Oral daily  multivitamin 1 Tablet(s) Oral daily  senna 2 Tablet(s) Oral at bedtime  spironolactone 25 milliGRAM(s) Oral every 24 hours  thiamine 100 milliGRAM(s) Oral every 24 hours    MEDICATIONS  (PRN):  dextrose Oral Gel 15 Gram(s) Oral once PRN Blood Glucose LESS THAN 70 milliGRAM(s)/deciliter  polyethylene glycol 3350 17 Gram(s) Oral daily PRN Constipation  zolpidem 5 milliGRAM(s) Oral at bedtime PRN Insomnia      ALLERGIES:  Allergies    No Known Allergies    Intolerances        LABS:                        11.0   4.93  )-----------( 153      ( 28 May 2025 05:30 )             32.5     05-28    136  |  103  |  16  ----------------------------<  129[H]  4.8   |  23  |  0.79    Ca    8.1[L]      28 May 2025 05:30  Phos  2.4     05-28  Mg     2.1     05-28    TPro  5.9[L]  /  Alb  2.5[L]  /  TBili  0.5  /  DBili  x   /  AST  25  /  ALT  <5[L]  /  AlkPhos  111  05-28    PT/INR - ( 28 May 2025 05:30 )   PT: 19.9 sec;   INR: 1.74          PTT - ( 28 May 2025 05:30 )  PTT:33.1 sec  Urinalysis Basic - ( 28 May 2025 05:30 )    Color: x / Appearance: x / SG: x / pH: x  Gluc: 129 mg/dL / Ketone: x  / Bili: x / Urobili: x   Blood: x / Protein: x / Nitrite: x   Leuk Esterase: x / RBC: x / WBC x   Sq Epi: x / Non Sq Epi: x / Bacteria: x      CAPILLARY BLOOD GLUCOSE      POCT Blood Glucose.: 193 mg/dL (28 May 2025 21:51)      RADIOLOGY & ADDITIONAL TESTS: Reviewed. ***Note in progress***    OVERNIGHT EVENTS: NAEO    SUBJECTIVE / INTERVAL HPI: Patient seen and examined at bedside. Patient denying chest pain, SOB, palpitations, cough. Patient denies fever, chills, HA, Dizziness, N/V, abdominal pain, diarrhea, constipation, hematochezia/melena, dysuria, hematuria, new onset weakness/numbness, LE pain and/or swelling. Reports some coughing in the night productive of brown sputum. Reports "very little" urination throughout the night, but states he had two bowel movements within one hour this morning.     Remaining ROS negative       PHYSICAL EXAM:    General:NAD.   HEENT: NC/AT; PERRL, anicteric sclera; MMM  Neck: supple  Cardiovascular: +S1/S2, RRR  Respiratory: CTA B/L; no W/R/R  Gastrointestinal: soft, NT/ND; +BSx4  Extremities: WWP; No clubbing or cyanosis. R lower leg and warm, erythematous, and edematous +2. L lower leg and ankle are edematous +2 but not   Vascular: 2+ radial, DP/PT pulses B/L  Neurological: AAOx3; no focal deficits  Psychiatric: pleasant mood and affect  Dermatologic: no appreciable wounds or damage to the skin    VITAL SIGNS:  Vital Signs Last 24 Hrs  T(C): 36.5 (29 May 2025 06:03), Max: 36.8 (28 May 2025 12:05)  T(F): 97.7 (29 May 2025 06:03), Max: 98.3 (28 May 2025 12:05)  HR: 63 (29 May 2025 06:03) (63 - 85)  BP: 102/68 (29 May 2025 06:03) (93/58 - 105/69)  BP(mean): 80 (29 May 2025 06:03) (70 - 80)  RR: 17 (29 May 2025 06:03) (17 - 20)  SpO2: 95% (29 May 2025 06:03) (95% - 97%)    Parameters below as of 29 May 2025 06:03  Patient On (Oxygen Delivery Method): room air        MEDICATIONS:  MEDICATIONS  (STANDING):  apixaban 5 milliGRAM(s) Oral every 12 hours  dextrose 5%. 1000 milliLiter(s) (50 mL/Hr) IV Continuous <Continuous>  dextrose 5%. 1000 milliLiter(s) (100 mL/Hr) IV Continuous <Continuous>  dextrose 50% Injectable 25 Gram(s) IV Push once  dextrose 50% Injectable 12.5 Gram(s) IV Push once  dextrose 50% Injectable 25 Gram(s) IV Push once  glucagon  Injectable 1 milliGRAM(s) IntraMuscular once  insulin lispro (ADMELOG) corrective regimen sliding scale   SubCutaneous at bedtime  insulin lispro (ADMELOG) corrective regimen sliding scale   SubCutaneous three times a day before meals  levothyroxine 100 MICROGram(s) Oral daily  multivitamin 1 Tablet(s) Oral daily  senna 2 Tablet(s) Oral at bedtime  spironolactone 25 milliGRAM(s) Oral every 24 hours  thiamine 100 milliGRAM(s) Oral every 24 hours    MEDICATIONS  (PRN):  dextrose Oral Gel 15 Gram(s) Oral once PRN Blood Glucose LESS THAN 70 milliGRAM(s)/deciliter  polyethylene glycol 3350 17 Gram(s) Oral daily PRN Constipation  zolpidem 5 milliGRAM(s) Oral at bedtime PRN Insomnia      ALLERGIES:  Allergies    No Known Allergies    Intolerances        LABS:                        11.0   4.93  )-----------( 153      ( 28 May 2025 05:30 )             32.5     05-28    136  |  103  |  16  ----------------------------<  129[H]  4.8   |  23  |  0.79    Ca    8.1[L]      28 May 2025 05:30  Phos  2.4     05-28  Mg     2.1     05-28    TPro  5.9[L]  /  Alb  2.5[L]  /  TBili  0.5  /  DBili  x   /  AST  25  /  ALT  <5[L]  /  AlkPhos  111  05-28    PT/INR - ( 28 May 2025 05:30 )   PT: 19.9 sec;   INR: 1.74          PTT - ( 28 May 2025 05:30 )  PTT:33.1 sec  Urinalysis Basic - ( 28 May 2025 05:30 )    Color: x / Appearance: x / SG: x / pH: x  Gluc: 129 mg/dL / Ketone: x  / Bili: x / Urobili: x   Blood: x / Protein: x / Nitrite: x   Leuk Esterase: x / RBC: x / WBC x   Sq Epi: x / Non Sq Epi: x / Bacteria: x      CAPILLARY BLOOD GLUCOSE      POCT Blood Glucose.: 193 mg/dL (28 May 2025 21:51)      RADIOLOGY & ADDITIONAL TESTS: Reviewed. ***Note in progress***    OVERNIGHT EVENTS: NAEO    SUBJECTIVE / INTERVAL HPI: Patient seen and examined at bedside. Patient denying chest pain, SOB, palpitations, cough. Patient denies fever, chills, HA, Dizziness, N/V, abdominal pain, diarrhea, constipation, hematochezia/melena, dysuria, hematuria, new onset weakness/numbness, LE pain and/or swelling.    Remaining ROS negative       PHYSICAL EXAM:    General:NAD.   HEENT: NC/AT; PERRL, anicteric sclera; MMM  Neck: supple  Cardiovascular: +S1/S2, RRR  Respiratory: CTA B/L; no W/R/R  Gastrointestinal: soft, NT/ND; +BSx4  Extremities: WWP; no edema, clubbing or cyanosis  Vascular: 2+ radial, DP/PT pulses B/L  Neurological: AAOx3; no focal deficits  Psychiatric: pleasant mood and affect  Dermatologic: no appreciable wounds or damage to the skin    VITAL SIGNS:  Vital Signs Last 24 Hrs  T(C): 36.5 (29 May 2025 06:03), Max: 36.8 (28 May 2025 12:05)  T(F): 97.7 (29 May 2025 06:03), Max: 98.3 (28 May 2025 12:05)  HR: 63 (29 May 2025 06:03) (63 - 85)  BP: 102/68 (29 May 2025 06:03) (93/58 - 105/69)  BP(mean): 80 (29 May 2025 06:03) (70 - 80)  RR: 17 (29 May 2025 06:03) (17 - 20)  SpO2: 95% (29 May 2025 06:03) (95% - 97%)    Parameters below as of 29 May 2025 06:03  Patient On (Oxygen Delivery Method): room air        MEDICATIONS:  MEDICATIONS  (STANDING):  apixaban 5 milliGRAM(s) Oral every 12 hours  dextrose 5%. 1000 milliLiter(s) (50 mL/Hr) IV Continuous <Continuous>  dextrose 5%. 1000 milliLiter(s) (100 mL/Hr) IV Continuous <Continuous>  dextrose 50% Injectable 25 Gram(s) IV Push once  dextrose 50% Injectable 12.5 Gram(s) IV Push once  dextrose 50% Injectable 25 Gram(s) IV Push once  glucagon  Injectable 1 milliGRAM(s) IntraMuscular once  insulin lispro (ADMELOG) corrective regimen sliding scale   SubCutaneous at bedtime  insulin lispro (ADMELOG) corrective regimen sliding scale   SubCutaneous three times a day before meals  levothyroxine 100 MICROGram(s) Oral daily  multivitamin 1 Tablet(s) Oral daily  senna 2 Tablet(s) Oral at bedtime  spironolactone 25 milliGRAM(s) Oral every 24 hours  thiamine 100 milliGRAM(s) Oral every 24 hours    MEDICATIONS  (PRN):  dextrose Oral Gel 15 Gram(s) Oral once PRN Blood Glucose LESS THAN 70 milliGRAM(s)/deciliter  polyethylene glycol 3350 17 Gram(s) Oral daily PRN Constipation  zolpidem 5 milliGRAM(s) Oral at bedtime PRN Insomnia      ALLERGIES:  Allergies    No Known Allergies    Intolerances        LABS:                        10.3   4.94  )-----------( 169      ( 29 May 2025 07:03 )             29.9     05-29    135  |  102  |  22  ----------------------------<  163[H]  4.8   |  26  |  0.89    Ca    8.0[L]      29 May 2025 07:03  Phos  2.0     05-29  Mg     2.4     05-29    TPro  5.8[L]  /  Alb  2.5[L]  /  TBili  0.8  /  DBili  x   /  AST  23  /  ALT  <5[L]  /  AlkPhos  105  05-29    PT/INR - ( 29 May 2025 07:03 )   PT: 22.1 sec;   INR: 1.90          PTT - ( 28 May 2025 05:30 )  PTT:33.1 sec  Urinalysis Basic - ( 29 May 2025 07:03 )    Color: x / Appearance: x / SG: x / pH: x  Gluc: 163 mg/dL / Ketone: x  / Bili: x / Urobili: x   Blood: x / Protein: x / Nitrite: x   Leuk Esterase: x / RBC: x / WBC x   Sq Epi: x / Non Sq Epi: x / Bacteria: x      CAPILLARY BLOOD GLUCOSE      POCT Blood Glucose.: 138 mg/dL (29 May 2025 08:35)      RADIOLOGY & ADDITIONAL TESTS: Reviewed. ***Note in progress***    OVERNIGHT EVENTS: NAEO    SUBJECTIVE / INTERVAL HPI: Patient seen and examined at bedside. Patient denying chest pain, SOB, palpitations, cough. Patient denies fever, chills, HA, Dizziness, N/V, abdominal pain, diarrhea, constipation, hematochezia/melena, dysuria, hematuria, new onset weakness/numbness, LE pain and/or swelling. Reports mild cough in the night productive of brown sputum. Reports "very little" urination throughout the night, but also reports two episodes of bowel movements within one hour this morning. Describes the stools as solid, pellet-like, and light brown.     Remaining ROS negative       PHYSICAL EXAM:    General:NAD.   HEENT: NC/AT; PERRL, anicteric sclera; MMM  Neck: supple  Cardiovascular: +S1/S2, RRR  Respiratory: CTA B/L; no W/R/R  Gastrointestinal: soft, NT/ND; +BSx4  Extremities: WWP; no clubbing or cyanosis. R leg is erythematous, warm, and edematous 1+. L leg is not erythematous but is edematous 2+.   Vascular: 2+ radial, DP/PT pulses B/L  Neurological: AAOx3; no focal deficits  Psychiatric: pleasant mood and affect  Dermatologic: no appreciable wounds or damage to the skin    VITAL SIGNS:  Vital Signs Last 24 Hrs  T(C): 36.5 (29 May 2025 06:03), Max: 36.8 (28 May 2025 12:05)  T(F): 97.7 (29 May 2025 06:03), Max: 98.3 (28 May 2025 12:05)  HR: 63 (29 May 2025 06:03) (63 - 85)  BP: 102/68 (29 May 2025 06:03) (93/58 - 105/69)  BP(mean): 80 (29 May 2025 06:03) (70 - 80)  RR: 17 (29 May 2025 06:03) (17 - 20)  SpO2: 95% (29 May 2025 06:03) (95% - 97%)    Parameters below as of 29 May 2025 06:03  Patient On (Oxygen Delivery Method): room air        MEDICATIONS:  MEDICATIONS  (STANDING):  apixaban 5 milliGRAM(s) Oral every 12 hours  dextrose 5%. 1000 milliLiter(s) (50 mL/Hr) IV Continuous <Continuous>  dextrose 5%. 1000 milliLiter(s) (100 mL/Hr) IV Continuous <Continuous>  dextrose 50% Injectable 25 Gram(s) IV Push once  dextrose 50% Injectable 12.5 Gram(s) IV Push once  dextrose 50% Injectable 25 Gram(s) IV Push once  glucagon  Injectable 1 milliGRAM(s) IntraMuscular once  insulin lispro (ADMELOG) corrective regimen sliding scale   SubCutaneous at bedtime  insulin lispro (ADMELOG) corrective regimen sliding scale   SubCutaneous three times a day before meals  levothyroxine 100 MICROGram(s) Oral daily  multivitamin 1 Tablet(s) Oral daily  senna 2 Tablet(s) Oral at bedtime  spironolactone 25 milliGRAM(s) Oral every 24 hours  thiamine 100 milliGRAM(s) Oral every 24 hours    MEDICATIONS  (PRN):  dextrose Oral Gel 15 Gram(s) Oral once PRN Blood Glucose LESS THAN 70 milliGRAM(s)/deciliter  polyethylene glycol 3350 17 Gram(s) Oral daily PRN Constipation  zolpidem 5 milliGRAM(s) Oral at bedtime PRN Insomnia      ALLERGIES:  Allergies    No Known Allergies    Intolerances      LABS                        10.3   4.94  )-----------( 169      ( 29 May 2025 07:03 )             29.9     05-29    135  |  102  |  22  ----------------------------<  163[H]  4.8   |  26  |  0.89    Ca    8.0[L]      29 May 2025 07:03  Phos  2.0     05-29  Mg     2.4     05-29    TPro  5.8[L]  /  Alb  2.5[L]  /  TBili  0.8  /  DBili  x   /  AST  23  /  ALT  <5[L]  /  AlkPhos  105  05-29    LIVER FUNCTIONS - ( 29 May 2025 07:03 )  Alb: 2.5 g/dL / Pro: 5.8 g/dL / ALK PHOS: 105 U/L / ALT: <5 U/L / AST: 23 U/L / GGT: x           PT/INR - ( 29 May 2025 07:03 )   PT: 22.1 sec;   INR: 1.90          PTT - ( 28 May 2025 05:30 )  PTT:33.1 sec      Urinalysis Basic - ( 29 May 2025 07:03 )    Color: x / Appearance: x / SG: x / pH: x  Gluc: 163 mg/dL / Ketone: x  / Bili: x / Urobili: x   Blood: x / Protein: x / Nitrite: x   Leuk Esterase: x / RBC: x / WBC x   Sq Epi: x / Non Sq Epi: x / Bacteria: x        CAPILLARY BLOOD GLUCOSE      POCT Blood Glucose.: 138 mg/dL (29 May 2025 08:35)      RADIOLOGY & ADDITIONAL TESTS: Reviewed.

## 2025-05-29 NOTE — PROGRESS NOTE ADULT - SUBJECTIVE AND OBJECTIVE BOX
Hepatology Consult Progress Note:     OVERNIGHT EVENTS: KIRILL.    SUBJECTIVE / INTERVAL HPI:   Patient seen and examined at bedside. Reports that overall he feels well. Underwent liver MRI, which showed: cirrhosis, no HCC, marked abdominal/pelvic ascites, no PVT.       VITAL SIGNS:  Vital Signs Last 24 Hrs  T(C): 36.6 (29 May 2025 11:43), Max: 36.7 (28 May 2025 20:30)  T(F): 97.8 (29 May 2025 11:43), Max: 98.1 (28 May 2025 20:30)  HR: 65 (29 May 2025 11:43) (63 - 85)  BP: 114/75 (29 May 2025 11:43) (93/58 - 114/75)  BP(mean): 80 (29 May 2025 06:03) (70 - 80)  RR: 18 (29 May 2025 11:43) (17 - 18)  SpO2: 95% (29 May 2025 11:43) (95% - 97%)    Parameters below as of 29 May 2025 11:43  Patient On (Oxygen Delivery Method): room air      PHYSICAL EXAM:  General: No acute distress  Lungs: Normal respiratory effort and no intercostal retractions  Cardiovascular: RRR  Abdomen: Soft, non-tender, distended 2/2 pocket of ascites (R>L), +j-tube   Neurological: Alert and oriented x3  Skin: Warm and dry. No obvious rash    MEDICATIONS:  MEDICATIONS  (STANDING):  apixaban 5 milliGRAM(s) Oral every 12 hours  dextrose 5%. 1000 milliLiter(s) (50 mL/Hr) IV Continuous <Continuous>  dextrose 5%. 1000 milliLiter(s) (100 mL/Hr) IV Continuous <Continuous>  dextrose 50% Injectable 25 Gram(s) IV Push once  dextrose 50% Injectable 12.5 Gram(s) IV Push once  dextrose 50% Injectable 25 Gram(s) IV Push once  glucagon  Injectable 1 milliGRAM(s) IntraMuscular once  insulin lispro (ADMELOG) corrective regimen sliding scale   SubCutaneous at bedtime  insulin lispro (ADMELOG) corrective regimen sliding scale   SubCutaneous three times a day before meals  levothyroxine 100 MICROGram(s) Oral daily  multivitamin 1 Tablet(s) Oral daily  senna 2 Tablet(s) Oral at bedtime  spironolactone 25 milliGRAM(s) Oral every 24 hours  thiamine 100 milliGRAM(s) Oral every 24 hours    MEDICATIONS  (PRN):  dextrose Oral Gel 15 Gram(s) Oral once PRN Blood Glucose LESS THAN 70 milliGRAM(s)/deciliter  polyethylene glycol 3350 17 Gram(s) Oral daily PRN Constipation  zolpidem 5 milliGRAM(s) Oral at bedtime PRN Insomnia      ALLERGIES:  Allergies    No Known Allergies    Intolerances        LABS:                        10.3   4.94  )-----------( 169      ( 29 May 2025 07:03 )             29.9     05-29    135  |  102  |  22  ----------------------------<  163[H]  4.8   |  26  |  0.89    Ca    8.0[L]      29 May 2025 07:03  Phos  2.0     05-29  Mg     2.4     05-29    TPro  5.8[L]  /  Alb  2.5[L]  /  TBili  0.8  /  DBili  x   /  AST  23  /  ALT  <5[L]  /  AlkPhos  105  05-29    PT/INR - ( 29 May 2025 07:03 )   PT: 22.1 sec;   INR: 1.90          PTT - ( 28 May 2025 05:30 )  PTT:33.1 sec  Urinalysis Basic - ( 29 May 2025 07:03 )    Color: x / Appearance: x / SG: x / pH: x  Gluc: 163 mg/dL / Ketone: x  / Bili: x / Urobili: x   Blood: x / Protein: x / Nitrite: x   Leuk Esterase: x / RBC: x / WBC x   Sq Epi: x / Non Sq Epi: x / Bacteria: x      CAPILLARY BLOOD GLUCOSE      POCT Blood Glucose.: 141 mg/dL (29 May 2025 12:09)  RADIOLOGY & ADDITIONAL TESTS: Reviewed.

## 2025-05-29 NOTE — PROGRESS NOTE ADULT - PROBLEM SELECTOR PLAN 4
Edema of unknown etiology, +4 pitting in b/l LE extending from mid thigh to feet  Despite being on Furosemide and Spironolactone, persistent edema; Alb 2.9 not consistent w/ this level of edema  TTE in 10/24 w/ EF 65%  TTE 5/27 revealed enlarged RV cavity with moderate mitral and tricuspid regurgitation. Nl LV thickness and cavity size. EF 52%.  Possibly i/s/o liver cirrhosis vs chronic CHF   - strict I/Os   - c/w RLE cellulitis management   - c/w lasix and spironolactone - holding lasix today, pending results of paracentesis

## 2025-05-29 NOTE — PROGRESS NOTE ADULT - PROBLEM SELECTOR PLAN 2
RLE w/ erythema, warmth, no active purulence or drainage x 4 days  Dopplers b/l LE: No evidence of deep venous thrombosis in either lower extremity. Soft tissue swelling in both lower extremities.  Xray R foot and Tib/Fib: Osteopenia. No osseous destruction or periosteal reaction. No fracture or dislocation. There is a hallux valgus alignment with first MTP joint osteoarthrosis. Tibiotalar joint space narrowing is also identified. Second through fourth hammertoe deformities. Diffuse soft tissue swelling of the imaged lower extremity. Vascular calcification is present.  S/p Zosyn 3.375mg x1, Vancomycin 1000mg x1  Afebrile, no WBC  - C/w  Cefazolin 1g q8h (5/24 - 5/28)  - f/u Blood cultures (5/24): No growth to date RLE w/ erythema, warmth, no active purulence or drainage x 4 days  Dopplers b/l LE: No evidence of deep venous thrombosis in either lower extremity. Soft tissue swelling in both lower extremities.  Xray R foot and Tib/Fib: Osteopenia. No osseous destruction or periosteal reaction. No fracture or dislocation. There is a hallux valgus alignment with first MTP joint osteoarthrosis. Tibiotalar joint space narrowing is also identified. Second through fourth hammertoe deformities. Diffuse soft tissue swelling of the imaged lower extremity. Vascular calcification is present.  S/p Zosyn 3.375mg x1, Vancomycin 1000mg x1  Patient finished Cefazolin regimen on 5/28 [1g q8h (5/24 - 5/28)]  Afebrile, no WBC  - f/u Blood cultures (5/24): No growth to date

## 2025-05-29 NOTE — PROGRESS NOTE ADULT - PROBLEM SELECTOR PLAN 3
Per patient has had slurred speech x several days, no precipitating factor; since resolved  CTH: Arachnoid cyst and senescent cerebral changes. No acute intracranial hemorrhage.  CT Angio H: Left C6 ICA stenosis, 40-50%.  Neurology consulted in ED, no acute concern for stroke, will continue to follow  - Neurology consulted, f/u recs  - pending MR brain Per patient has had slurred speech x several days, no precipitating factor; since resolved  CTH: Arachnoid cyst and senescent cerebral changes. No acute intracranial hemorrhage.  CT Angio H: Left C6 ICA stenosis, 40-50%.  MRI head: No acute abnormalities  Neurology consulted in ED, no acute concern for stroke, will continue to follow  - Neurology consulted, f/u recs

## 2025-05-29 NOTE — CHART NOTE - NSCHARTNOTEFT_GEN_A_CORE
75 y/o M w/ PMHx of gastric cancer (s/p feeding J-tube), portal vein thrombosis (on Eliquis) AAA (s/p EVAR in 2023), T2DM, hypothyroidism, HTN, anemia, BPH, type 2 endoleak repair, s/p embolization of internal iliac artery branch (10/24), cirrhosis, who presented to the ED on 5/23 with worsening bilateral leg swelling and general weakness for a couple of months, gradually worsening with the right leg more affected than the left, admitted for possible LE cellulitis, currently on Ancef. Stroke service consulted on day of admission for right facial droop and dysarthria noted to be present for a few months by patient's close family and friends. Daughter noted slurred speech more pronounced 4d ago. Facial droop not present on exam at time of consult. NIHSS 1 for mild dysarthria. CTH neg for stroke, incidental finding of arachnoid cyst. CTA with L C6 ICA 40-50% stenosis. Continuing home Eliquis 5mg bid.     1)Secondary stroke prevention  - c/w home Eliquis 5mg BID    2) Stroke risk factors  - A1c 4.8, LDL 75, TSH 4.850  - portal vein thrombosis   - HTN    3) Further management  - MRI Brain w/o contrast: limited due to motion artifact but no evidence of acute intracranial pathology     DVT prophylaxis   -Lovenox SQ and SCDs    No further stroke w/u warranted at this time. Stroke team will sign-off.     Rest of care as per primary team.    Case discussed with Stroke Neurology Attending, Dr. Parikh.

## 2025-05-29 NOTE — PROGRESS NOTE ADULT - PROBLEM SELECTOR PLAN 8
Follows Dr. Mckeon with very well controlled diabetes, last saw this AM w/ positive levels.   Per [facility] - regimen is NPH 18 units at night, Lispro 3 units while on tube feeds  - Start patient on low dose insulin sliding scale tonight   - q6h FS  - endocrine recs for insulin based on tube feeds, today changed to nph 14u, no lispro, mISS

## 2025-05-30 LAB
ADD ON TEST-SPECIMEN IN LAB: SIGNIFICANT CHANGE UP
ALBUMIN FLD-MCNC: 0.3 G/DL — SIGNIFICANT CHANGE UP
ALBUMIN SERPL ELPH-MCNC: 2.5 G/DL — LOW (ref 3.3–5)
ALP SERPL-CCNC: 111 U/L — SIGNIFICANT CHANGE UP (ref 40–120)
ALT FLD-CCNC: <5 U/L — LOW (ref 10–45)
ANION GAP SERPL CALC-SCNC: 9 MMOL/L — SIGNIFICANT CHANGE UP (ref 5–17)
AST SERPL-CCNC: 26 U/L — SIGNIFICANT CHANGE UP (ref 10–40)
BASOPHILS # BLD AUTO: 0.06 K/UL — SIGNIFICANT CHANGE UP (ref 0–0.2)
BASOPHILS NFR BLD AUTO: 1 % — SIGNIFICANT CHANGE UP (ref 0–2)
BILIRUB SERPL-MCNC: 1 MG/DL — SIGNIFICANT CHANGE UP (ref 0.2–1.2)
BILIRUB SERPL-MCNC: 1.2 MG/DL — SIGNIFICANT CHANGE UP (ref 0.2–1.2)
BUN SERPL-MCNC: 23 MG/DL — SIGNIFICANT CHANGE UP (ref 7–23)
CALCIUM SERPL-MCNC: 8.4 MG/DL — SIGNIFICANT CHANGE UP (ref 8.4–10.5)
CHLORIDE SERPL-SCNC: 101 MMOL/L — SIGNIFICANT CHANGE UP (ref 96–108)
CO2 SERPL-SCNC: 25 MMOL/L — SIGNIFICANT CHANGE UP (ref 22–31)
CREAT SERPL-MCNC: 0.78 MG/DL — SIGNIFICANT CHANGE UP (ref 0.5–1.3)
CREAT SERPL-MCNC: 0.79 MG/DL — SIGNIFICANT CHANGE UP (ref 0.5–1.3)
EGFR: 93 ML/MIN/1.73M2 — SIGNIFICANT CHANGE UP
EOSINOPHIL # BLD AUTO: 0.17 K/UL — SIGNIFICANT CHANGE UP (ref 0–0.5)
EOSINOPHIL NFR BLD AUTO: 2.8 % — SIGNIFICANT CHANGE UP (ref 0–6)
GLUCOSE FLD-MCNC: 157 MG/DL — SIGNIFICANT CHANGE UP
GLUCOSE SERPL-MCNC: 173 MG/DL — HIGH (ref 70–99)
HCT VFR BLD CALC: 30.2 % — LOW (ref 39–50)
HGB BLD-MCNC: 10.5 G/DL — LOW (ref 13–17)
IMM GRANULOCYTES NFR BLD AUTO: 0.5 % — SIGNIFICANT CHANGE UP (ref 0–0.9)
LDH SERPL L TO P-CCNC: 40 U/L — SIGNIFICANT CHANGE UP
LYMPHOCYTES # BLD AUTO: 0.52 K/UL — LOW (ref 1–3.3)
LYMPHOCYTES # BLD AUTO: 8.5 % — LOW (ref 13–44)
MAGNESIUM SERPL-MCNC: 2.3 MG/DL — SIGNIFICANT CHANGE UP (ref 1.6–2.6)
MCHC RBC-ENTMCNC: 34.7 PG — HIGH (ref 27–34)
MCHC RBC-ENTMCNC: 34.8 G/DL — SIGNIFICANT CHANGE UP (ref 32–36)
MCV RBC AUTO: 99.7 FL — SIGNIFICANT CHANGE UP (ref 80–100)
MELD SCORE WITH DIALYSIS: 25 POINTS — SIGNIFICANT CHANGE UP
MELD SCORE WITHOUT DIALYSIS: 9 POINTS — SIGNIFICANT CHANGE UP
MONOCYTES # BLD AUTO: 0.47 K/UL — SIGNIFICANT CHANGE UP (ref 0–0.9)
MONOCYTES NFR BLD AUTO: 7.7 % — SIGNIFICANT CHANGE UP (ref 2–14)
NEUTROPHILS # BLD AUTO: 4.87 K/UL — SIGNIFICANT CHANGE UP (ref 1.8–7.4)
NEUTROPHILS NFR BLD AUTO: 79.5 % — HIGH (ref 43–77)
NRBC BLD AUTO-RTO: 0 /100 WBCS — SIGNIFICANT CHANGE UP (ref 0–0)
PHOSPHATE SERPL-MCNC: 2.6 MG/DL — SIGNIFICANT CHANGE UP (ref 2.5–4.5)
PLATELET # BLD AUTO: 171 K/UL — SIGNIFICANT CHANGE UP (ref 150–400)
POTASSIUM SERPL-MCNC: 5 MMOL/L — SIGNIFICANT CHANGE UP (ref 3.5–5.3)
POTASSIUM SERPL-SCNC: 5 MMOL/L — SIGNIFICANT CHANGE UP (ref 3.5–5.3)
PROT FLD-MCNC: 0.3 G/DL — SIGNIFICANT CHANGE UP
PROT SERPL-MCNC: 6.1 G/DL — SIGNIFICANT CHANGE UP (ref 6–8.3)
RBC # BLD: 3.03 M/UL — LOW (ref 4.2–5.8)
RBC # FLD: 16.5 % — HIGH (ref 10.3–14.5)
SODIUM SERPL-SCNC: 134 MMOL/L — LOW (ref 135–145)
SODIUM SERPL-SCNC: 135 MMOL/L — SIGNIFICANT CHANGE UP (ref 135–145)
WBC # BLD: 6.12 K/UL — SIGNIFICANT CHANGE UP (ref 3.8–10.5)
WBC # FLD AUTO: 6.12 K/UL — SIGNIFICANT CHANGE UP (ref 3.8–10.5)

## 2025-05-30 PROCEDURE — 49082 ABD PARACENTESIS: CPT | Mod: GC

## 2025-05-30 RX ORDER — INSULIN LISPRO 100 U/ML
3 INJECTION, SOLUTION INTRAVENOUS; SUBCUTANEOUS
Refills: 0 | DISCHARGE

## 2025-05-30 RX ORDER — LIDOCAINE HCL/PF 10 MG/ML
10 VIAL (ML) INJECTION ONCE
Refills: 0 | Status: COMPLETED | OUTPATIENT
Start: 2025-05-30 | End: 2025-05-30

## 2025-05-30 RX ORDER — APIXABAN 2.5 MG/1
2.5 TABLET, FILM COATED ORAL EVERY 12 HOURS
Refills: 0 | Status: DISCONTINUED | OUTPATIENT
Start: 2025-05-30 | End: 2025-06-03

## 2025-05-30 RX ORDER — APIXABAN 2.5 MG/1
1 TABLET, FILM COATED ORAL
Qty: 0 | Refills: 0 | DISCHARGE
Start: 2025-05-30

## 2025-05-30 RX ORDER — APIXABAN 2.5 MG/1
1 TABLET, FILM COATED ORAL
Refills: 0 | DISCHARGE

## 2025-05-30 RX ORDER — INSULIN GLARGINE-YFGN 100 [IU]/ML
18 INJECTION, SOLUTION SUBCUTANEOUS
Refills: 0 | DISCHARGE

## 2025-05-30 RX ADMIN — Medication 100 MICROGRAM(S): at 05:52

## 2025-05-30 RX ADMIN — Medication 10 MILLILITER(S): at 14:00

## 2025-05-30 RX ADMIN — Medication 25 MILLIGRAM(S): at 18:17

## 2025-05-30 RX ADMIN — APIXABAN 2.5 MILLIGRAM(S): 2.5 TABLET, FILM COATED ORAL at 18:17

## 2025-05-30 RX ADMIN — Medication 5 MILLIGRAM(S): at 18:17

## 2025-05-30 RX ADMIN — Medication 100 MILLIGRAM(S): at 05:53

## 2025-05-30 RX ADMIN — Medication 1 TABLET(S): at 12:50

## 2025-05-30 NOTE — CONSULT NOTE ADULT - ASSESSMENT
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 Hematology is consulted for anticogulation recommendations.     #Recurrent Portal Vein Thrombosis  #Heterozygous Prothrombin gene     Patient has had recurrent portal vein thromboses since 2018, and has intermittently been on and off full dose eliquis since then. A reduced dose eliquis was most recently recommended given his bleeding risk, though patient had been taking full dose eliquis prior to admission without any bleeding episodes. Furthermore, imaging at this time has not yielded any evidence of new thromboses, though INR appears to be worsening calling into question the appropriate dose of the eliquis. Generally, eliquis is not recommended in Child Cancino Class C patients, and unclear if efficacy is reduced in patient's with total gastrectomy. However, patient also planned for paracentesis and if INR can be corrected with vitamin K, patient's Child Cancino Class would reduce to B in which no dosage adjustments are generally recommended. Of note earlier this admission, patient had concern for CVA without evidence of infarction on imaging, for which neurology following and also recommending eliquis for stroke prevention.    Plan  - patient is currently tolerating eliquis without evidence of bleeding and no evidence of new thromboses, though INR worsening and overall liver function appears to be worsening as well. Would support use of reduced dose eliquis long term if okay from neurologic perspective. Please note that prothrombin gene mutation should not influence choice of anticoagulant, though given recurrent thromboses coupled with gene mutation would warrant indefinite/lifelong anticoagulation.  - patient also planned for EGD screening for varices which may further inform this decision  - patient can follow up with Dr. Henry    Discussed with Dr. Meyer. Recommendations final after attending attestation.   Hematology/Oncology will sign off. Please call back with questions or concerns.

## 2025-05-30 NOTE — PROGRESS NOTE ADULT - PROBLEM SELECTOR PLAN 4
Edema of unknown etiology, +4 pitting in b/l LE extending from mid thigh to feet  Despite being on Furosemide and Spironolactone, persistent edema; Alb 2.9 not consistent w/ this level of edema  TTE in 10/24 w/ EF 65%  TTE 5/27 revealed enlarged RV cavity with moderate mitral and tricuspid regurgitation. Nl LV thickness and cavity size. EF 52%.  Possibly i/s/o liver cirrhosis vs chronic CHF   - strict I/Os   - c/w RLE cellulitis management   - c/w lasix and spironolactone - holding lasix today, pending results of paracentesis Edema of unknown etiology, +4 pitting in b/l LE extending from mid thigh to feet  Despite being on Furosemide and Spironolactone, persistent edema; Alb 2.9 not consistent w/ this level of edema  TTE in 10/24 w/ EF 65%  TTE 5/27 revealed enlarged RV cavity with moderate mitral and tricuspid regurgitation. Nl LV thickness and cavity size. EF 52%.  Possibly i/s/o liver cirrhosis vs chronic CHF   - strict I/Os   - c/w RLE cellulitis management   - c/w spironolactone - holding lasix today pending para

## 2025-05-30 NOTE — PROGRESS NOTE ADULT - ASSESSMENT
72M w/ PMH of gastric cancer (s/p feeding J-tube), portal vein thrombosis (on Eliquis, now resolved on US) AAA (s/p EVAR in 2023), DM2, hypothyroidism, HTN, anemia, BPH, type 2 endoleak repair, s/p embolization of internal iliac artery branch (10/24), and new diagnosis of cirrhosis, who first presented to ED with worsening bilateral leg swelling and gen weakness. Hepatology consulted for RUQ US showing cirrhosis.     Patient denies any significant ETOH use in the past.   Cirrhosis of unknown etiology but most likely 2/2 to MASLD (given DM2 and HTN) vs. prior radiation exposure (received multiple rounds when treated for gastric cancer.   BMI currently 20, and patient reports that at his heaviest he weighed 170 lbs at 6'0" tall (BMI of 23).     EV screening: none noted on EGD in 2023 but significant esophagitis may have obscured views.   HE: none prior   HCC screening: AFP WNL and MRI pending   SBP: none prior     RUQ US:   - small to moderate amount of ascites most prominent of the right lower quadrant on the submitted images.  - nodular liver consistent with cirrhosis   - patent vasculature despite history of PVT thrombosis     Meld 3.0 score: 11 on 05/27 --> 14 on 05/28 --> 15 on 05/29   Platelets >150 and INR 1.35 --> 1.74 --> 1.90 on Eliquis -> 1.2    Most recent EGD (June 2023):   - Severe esophagitis LA Grade D  - Esophageal-jejunal anastomosis intact w/ patent afferent and efferent limbs without evidence of acute angulation or suggestion of obstruction.    Cirrhosis work-up:   - iron sat 32 and ferritin 228   - hep A IgM nonreactive, hep A IgG reactive   - hep C nonreactive  - hep B surface antigen nonreactive, surface antibody and core non-reactive   - AFP WNL <1.8  - anti-LMKA <20.1   - alpha-1 antitrypsin    - ceruloplasmin WNL 22  - PETH negative   - EBER + Speckled 1:80  - AMA negative  - ASMA neg  - LKMAA: neg  - IgG 1700 | 1gA 832 | IgM 67      Liver MRI (05/29/25): Cirrhosis of liver, no evidence of HCC, marked abdominal and pelvic ascites, no portal vein thrombosis.    Recommendations:   - Plan for diagnotic paracentesis today  - please send fluid for cell count, cultures, albumin, protein, and cytology to ensure no gastric cancer recurrence   - trend CBC, CMP, and INR daily   - low sodium/high protein diet with tube feeds via j-tube   - Tylenol not exceeding 2g per day   - continue home spironolactone 25 mg PO daily and Lasix 40 mg PO daily   - Recommend EGD for EV screening inpt vs. outpt, on imaging pt has dilated esophagus and distal esophageal wall which needs endoscopic evaluation by Hepatology    Please ensure out-patient follow-up with hepatology, previously followed by [facility]     Case discussed with Dr. Gomez. Hepatology Team will continue to follow.     Zachary Cooper DO, PhD  Gastroenterology Fellow  GI Consult Weekday 7am-5pm Pager: [phone]  Weeknights/Weekend/Holiday Coverage: Please Call the  for contact info

## 2025-05-30 NOTE — PROGRESS NOTE ADULT - PROBLEM SELECTOR PLAN 1
Weakness, generalized and leg pain x 4 days, presents with decreased ability to walk, likely i/s/o abdominal distension and significant swelling in b/l LE. Reports taking additional doses of Spironolactone because of edema.   Admission K of 3.7, VBG 2.5.   Suspected to be 2/2  fluid overload with immobility, RLE cellulitis   Stroke workup negative per neuro, imaging findings without acute pathology.   MRI brain showed no acute abnormalities.  Nutritional workup so far negative  - f/u orthostatics  - PT/SW  - fall precautions Recently diagnosed with liver cirrhosis (2025?), per chart note by Dr. Mckeon and review on [facility] -  etiology of his cirrhosis is unclear--no work-up was completed on [place] except bloodwork and Fibroscan, the latter uninterpretable due to jordy-hepatic ascites.   , INR 1.18, AST/ALT not quantified/16  CT A/P this year showed cirrhotic morphology, last year had normal findings  ED bedside U/S without pocket to TAP per signout  Abd US showing ascites.    Plan   - Hepatology consulted, appreciate recs  - diagnostic and therapeutic paracentesis today   - Daily MELD score

## 2025-05-30 NOTE — CONSULT NOTE ADULT - SUBJECTIVE AND OBJECTIVE BOX
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 Hematology is consulted for anticogulation recommendations    Regarding hx of portal vein thromboses, he was found to have PVT in September 2018, and was anticoagulated with Eliquis for 3 months. A routine screening MRI of his abdomen in Feb 20221 found a new portal vein thrombosis.. Hypercoaguable testing including PT, APTT, protein C, protein S, DRVVT, SCT, anticardiolipin antibodies, beta 2 glycoprotein 1 antibodies, and factor V ledien were normal. He was found to be heterozygous for prothrombin [id number]. Anticoagulation with Eliquis 5mg BID was resumed. He was seen by Hematology May 2021 and was designated to have lifelong anticoagulation due to his prothrombin gene mutation and recurrent portal vein thromboses, though dose reduced to 2.5mg twice daily. He then had a third portal vein thrombosis 10/3/25, for which he increased his eliquis back to 5mg BID, and plan was to dose reduce thereafter, though patient was also noted to be noncompliant during this time.    Patient now presents with bilateral leg swelling and discoloration, and was found to have new onset cirrhosis, undergoing workup by GI and hepatology, thought to be 2/2 to MASLD. Ultrasound 5/26/25 with all vessels patent. 5/23/25 ultrasound lower extremity with no evidence of DVT. Hgb 10.3, Platelet 169. INR 1.9. No current signs of bleeding        PAST MEDICAL & SURGICAL HISTORY:  Essential hypertension  was on losartan, now diet control      Hypothyroidism      Gastric mass  ulcerated mass in gastric cardia with small perigastric nodes on endoscopy.      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      H/O umbilical hernia repair  2018 with mesh      Port-a-cath in place  right chest wall                 not in use 5 y      History of gastric surgery  2017      Gastrostomy tube in place  open jejunostomy with J-tube November 2020      S/P cataract surgery      H/O endoscopy  & stent placement 12/2020      S/P gastrectomy  total gastrectomy 2017      History of partial pancreatectomy  distal pancreatectomy/ splenectomy esophatojejunostomy      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, gastric primary      History of dysphagia  EGD with Axios stent placment to connect the blind limb to th e efferent limb (jejunojejunostomy)          Allergies:  No Known Allergies      Medications:  apixaban 5 milliGRAM(s) Oral every 12 hours        Social History:    FAMILY HISTORY:  Family history of ovarian cancer (Mother)  mother    Family history of ischemic heart disease (IHD) (Father)  father        PHYSICAL EXAM:    T(F): 98.1 (05-30-25 @ 05:53), Max: 98.1 (05-30-25 @ 05:53)  HR: 67 (05-30-25 @ 05:53) (65 - 74)  BP: 112/75 (05-30-25 @ 05:53) (97/63 - 114/75)  RR: 18 (05-30-25 @ 05:53) (18 - 18)  SpO2: 94% (05-30-25 @ 05:53) (94% - 96%)  Wt(kg): --    Daily     Daily     Gen: well developed, well nourished, comfortable  HEENT: normocephalic/atraumatic, no conjunctival pallor, no scleral icterus, no oral thrush/mucosal bleeding/mucositis  Neck: supple, no masses, no JVD  Breasts: deferred  Back: nontender  Cardiovascular: RR, nl S1S2, no murmurs/rubs/gallops  Respiratory: clear air entry b/l  Gastrointestinal: BS+, soft, NT/ND, no masses, no splenomegaly, no hepatomegaly, no evidence for ascites  Genitourinary: deferred  Rectal: deferred  Extremities: no clubbing/cyanosis, no edema, no calf tenderness  Vascular:  DP/PT 2+ b/l  Neurological: CN 2-12 grossly intact, no focal deficits  Skin: no rash on visible skin  Lymph Nodes:  no cervical/supraclavicular LAD, no axillary/groin LAD  Musculoskeletal:  full ROM  Psychiatric:  mood stable            Labs:                          10.3   4.94  )-----------( 169      ( 29 May 2025 07:03 )             29.9     CBC Full  -  ( 29 May 2025 07:03 )  WBC Count : 4.94 K/uL  RBC Count : 2.91 M/uL  Hemoglobin : 10.3 g/dL  Hematocrit : 29.9 %  Platelet Count - Automated : 169 K/uL  Mean Cell Volume : 102.7 fl  Mean Cell Hemoglobin : 35.4 pg  Mean Cell Hemoglobin Concentration : 34.4 g/dL  Auto Neutrophil # : x  Auto Lymphocyte # : x  Auto Monocyte # : x  Auto Eosinophil # : x  Auto Basophil # : x  Auto Neutrophil % : x  Auto Lymphocyte % : x  Auto Monocyte % : x  Auto Eosinophil % : x  Auto Basophil % : x    PT/INR - ( 29 May 2025 07:03 )   PT: 22.1 sec;   INR: 1.90              05-29    135  |  102  |  22  ----------------------------<  163[H]  4.8   |  26  |  0.89    Ca    8.0[L]      29 May 2025 07:03  Phos  2.0     05-29  Mg     2.4     05-29    TPro  5.8[L]  /  Alb  2.5[L]  /  TBili  0.8  /  DBili  x   /  AST  23  /  ALT  <5[L]  /  AlkPhos  105  05-29      Urinalysis Basic - ( 29 May 2025 07:03 )    Color: x / Appearance: x / SG: x / pH: x  Gluc: 163 mg/dL / Ketone: x  / Bili: x / Urobili: x   Blood: x / Protein: x / Nitrite: x   Leuk Esterase: x / RBC: x / WBC x   Sq Epi: x / Non Sq Epi: x / Bacteria: x        Other Labs:    Cultures:    Pathology:    Imaging Studies:       Hematology Oncology Consult Note     BEATA MCGEE is a 72 year old year old with past medical history of 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 Hematology is consulted for anticogulation recommendations    Regarding hx of portal vein thromboses, he was found to have PVT in September 2018, and was anticoagulated with Eliquis for 3 months. A routine screening MRI of his abdomen in Feb 20221 found a new portal vein thrombosis.. Hypercoaguable testing including PT, APTT, protein C, protein S, DRVVT, SCT, anticardiolipin antibodies, beta 2 glycoprotein 1 antibodies, and factor V ledien were normal. He was found to be heterozygous for prothrombin K1217H. Anticoagulation with Eliquis 5mg BID was resumed. He was seen by Hematology May 2021 and was designated to have lifelong anticoagulation due to his prothrombin gene mutation and recurrent portal vein thromboses, though dose reduced to 2.5mg twice daily. He then had a third portal vein thrombosis 10/3/25, for which he increased his eliquis back to 5mg BID, and plan was to dose reduce thereafter, though patient was also noted to be noncompliant during this time.    Patient now presents with bilateral leg swelling and discoloration, and was found to have new onset cirrhosis, undergoing workup by GI and hepatology, thought to be 2/2 to MASLD. Ultrasound 5/26/25 with all vessels patent. 5/23/25 ultrasound lower extremity with no evidence of DVT. Hgb 10.3, Platelet 169. INR 1.9. No current signs of bleeding        PAST MEDICAL & SURGICAL HISTORY:  Essential hypertension  was on losartan, now diet control      Hypothyroidism      Gastric mass  ulcerated mass in gastric cardia with small perigastric nodes on endoscopy.      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      H/O umbilical hernia repair  2018 with mesh      Port-a-cath in place  right chest wall                 not in use 5 y      History of gastric surgery  2017      Gastrostomy tube in place  open jejunostomy with J-tube November 2020      S/P cataract surgery      H/O endoscopy  & stent placement 12/2020      S/P gastrectomy  total gastrectomy 2017      History of partial pancreatectomy  distal pancreatectomy/ splenectomy esophatojejunostomy      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, gastric primary      History of dysphagia  EGD with Axios stent placment to connect the blind limb to th e efferent limb (jejunojejunostomy)          Allergies:  No Known Allergies      Medications:  apixaban 5 milliGRAM(s) Oral every 12 hours        Social History:    FAMILY HISTORY:  Family history of ovarian cancer (Mother)  mother    Family history of ischemic heart disease (IHD) (Father)  father        PHYSICAL EXAM:    T(F): 98.1 (05-30-25 @ 05:53), Max: 98.1 (05-30-25 @ 05:53)  HR: 67 (05-30-25 @ 05:53) (65 - 74)  BP: 112/75 (05-30-25 @ 05:53) (97/63 - 114/75)  RR: 18 (05-30-25 @ 05:53) (18 - 18)  SpO2: 94% (05-30-25 @ 05:53) (94% - 96%)  Wt(kg): --    Daily     Daily     General: in no acute distress, non-toxic appearing, though dishelved  Eyes: EOMI intact bilaterally. Anicteric sclerae, moist conjunctivae  HENT: Moist mucous membranes  Neck: Trachea midline, supple  Lungs: Normal respiratory effort. No accessory muscle use  GI; soft, nontender, distended 2/2 pocket of ascites, + j-tube  Neurological: Alert and appropriately conversant  Skin: Warm and dry. No obvious rash            Labs:                          10.3   4.94  )-----------( 169      ( 29 May 2025 07:03 )             29.9     CBC Full  -  ( 29 May 2025 07:03 )  WBC Count : 4.94 K/uL  RBC Count : 2.91 M/uL  Hemoglobin : 10.3 g/dL  Hematocrit : 29.9 %  Platelet Count - Automated : 169 K/uL  Mean Cell Volume : 102.7 fl  Mean Cell Hemoglobin : 35.4 pg  Mean Cell Hemoglobin Concentration : 34.4 g/dL  Auto Neutrophil # : x  Auto Lymphocyte # : x  Auto Monocyte # : x  Auto Eosinophil # : x  Auto Basophil # : x  Auto Neutrophil % : x  Auto Lymphocyte % : x  Auto Monocyte % : x  Auto Eosinophil % : x  Auto Basophil % : x    PT/INR - ( 29 May 2025 07:03 )   PT: 22.1 sec;   INR: 1.90              05-29    135  |  102  |  22  ----------------------------<  163[H]  4.8   |  26  |  0.89    Ca    8.0[L]      29 May 2025 07:03  Phos  2.0     05-29  Mg     2.4     05-29    TPro  5.8[L]  /  Alb  2.5[L]  /  TBili  0.8  /  DBili  x   /  AST  23  /  ALT  <5[L]  /  AlkPhos  105  05-29      Urinalysis Basic - ( 29 May 2025 07:03 )    Color: x / Appearance: x / SG: x / pH: x  Gluc: 163 mg/dL / Ketone: x  / Bili: x / Urobili: x   Blood: x / Protein: x / Nitrite: x   Leuk Esterase: x / RBC: x / WBC x   Sq Epi: x / Non Sq Epi: x / Bacteria: x

## 2025-05-30 NOTE — CONSULT NOTE ADULT - SUBJECTIVE AND OBJECTIVE BOX
Vascular Attending:  Dr. Saenz    HPI: 72M w/ PMH of gastric cancer (s/p feeding J-tube), portal vein thrombosis (on Eliquis) AAA (s/p EVAR in 2023), T2DM, hypothyroidism, HTN, anemia, BPH, type 2 endoleak repair, s/p embolization of right internal iliac artery branch (10/24), cirrhosis, who presents to the ED with worsening bilateral leg swelling and gen weakness. Patient is currently undergoing evaluation of abdominal ascitics in the setting of recently diagnosed cirrhosis. 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 On questioning the patient denies any LE or pelvic pain and says that prior to his recent BLE swelling he was ambulating independently and active with no complaints. He denies any further complaints and is awaiting a paracentesis as part of this work up for his ascites      PAST MEDICAL & SURGICAL HISTORY:  Essential hypertension was on losartan, now diet control  Hypothyroidism  Gastric mass ulcerated mass in gastric cardia with small perigastric nodes on endoscopy.  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 1000 milliLiter(s) (50 mL/Hr) IV Continuous <Continuous>  dextrose 5%. 1000 milliLiter(s) (100 mL/Hr) IV Continuous <Continuous>  dextrose 50% Injectable 25 Gram(s) IV Push once  dextrose 50% Injectable 25 Gram(s) IV Push once  dextrose 50% Injectable 12.5 Gram(s) IV Push once  glucagon  Injectable 1 milliGRAM(s) IntraMuscular once  insulin lispro (ADMELOG) corrective regimen sliding scale   SubCutaneous at bedtime  insulin lispro (ADMELOG) corrective regimen sliding scale   SubCutaneous three times a day before meals  levothyroxine 100 MICROGram(s) Oral daily  lidocaine 1% (Preservative-free) Injectable 10 milliLiter(s) Local Injection once  multivitamin 1 Tablet(s) Oral daily  senna 2 Tablet(s) Oral at bedtime  spironolactone 25 milliGRAM(s) Oral every 24 hours  thiamine 100 milliGRAM(s) Oral every 24 hours    MEDICATIONS  (PRN):  dextrose Oral Gel 15 Gram(s) Oral once PRN Blood Glucose LESS THAN 70 milliGRAM(s)/deciliter  polyethylene glycol 3350 17 Gram(s) Oral daily PRN Constipation  zolpidem 5 milliGRAM(s) Oral at bedtime PRN Insomnia      FAMILY HISTORY:  Family history of ovarian cancer (Mother)  mother    Family history of ischemic heart disease (IHD) (Father)  father        Vital Signs Last 24 Hrs  T(C): 36.7 (30 May 2025 12:52), Max: 36.7 (30 May 2025 05:53)  T(F): 98 (30 May 2025 12:52), Max: 98.1 (30 May 2025 05:53)  HR: 90 (30 May 2025 12:52) (67 - 90)  BP: 100/65 (30 May 2025 12:52) (97/63 - 112/75)  BP(mean): 74 (29 May 2025 20:48) (74 - 74)  RR: 20 (30 May 2025 12:52) (18 - 20)  SpO2: 98% (30 May 2025 12:52) (94% - 98%)    Parameters below as of 30 May 2025 12:52  Patient On (Oxygen Delivery Method): room air        PHYSICAL EXAM:  General: NAD, pt resting comfortably in bed  Pulm: No respiratory distress, nonlabored breathing, on room air  CVS: NSR, HDS  Abd: Soft, but distended with ascites shift, RLQ J tube capped with some tenderness surrounding  Extremities: WWP, 1+pitting edema bilaterally of the LE  Pulses: Palpable Fem/Pop/DPPT bilaterally       LABS:                        10.5   6.12  )-----------( 171      ( 30 May 2025 07:38 )             30.2     05-30    134[L]  |  x   |  x   ----------------------------<  x   x    |  x   |  0.78    Ca    8.4      30 May 2025 07:38  Phos  2.6     05-30  Mg     2.3     05-30    TPro  x   /  Alb  x   /  TBili  1.2  /  DBili  x   /  AST  x   /  ALT  x   /  AlkPhos  x   05-30    PT/INR - ( 30 May 2025 11:04 )   PT: 14.1 sec;   INR: 1.21          PTT - ( 30 May 2025 11:04 )  PTT:31.9 sec  Urinalysis Basic - ( 30 May 2025 07:38 )    Color: x / Appearance: x / SG: x / pH: x  Gluc: 173 mg/dL / Ketone: x  / Bili: x / Urobili: x   Blood: x / Protein: x / Nitrite: x   Leuk Esterase: x / RBC: x / WBC x   Sq Epi: x / Non Sq Epi: x / Bacteria: x        A/P: 72M w/ PMH of gastric cancer (s/p feeding J-tube), portal vein thrombosis (on Eliquis) AAA (s/p EVAR in 2023), T2DM, hypothyroidism, HTN, anemia, BPH, type 2 endoleak repair, s/p embolization of right internal iliac artery branch (10/24), cirrhosis, who presents to the ED with worsening bilateral leg swelling and gen weakness. Patient is currently undergoing evaluation of abdominal ascitics in the setting of recently diagnosed cirrhosis. 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      - Please obtain CTA A/P for further assessment of AAA sac in the setting of interval growth and iliac aneurysms  - Remainder of care per primary  - Vascular surgery will continue to follow

## 2025-05-30 NOTE — PHARMACY COMMUNICATION NOTE - COMMENTS
Med rec complete for [facility] patient. Confirmed medication list with patient's pharmacies. Tried to confirm med list with patient, but patient is poor historian and said he takes whatever meds that are already documented in the system and to give him what he needs.     Patient's meds are filled by 3 different pharmacies:  1.) [facility] (preferred pharmacy)  Pharmacy's address: [address] [address]  Pharmacy's phone number: [phone]    2.) [facility]  Pharmacy's address: [address] [place] [address]  Pharmacy's phone number: [phone]    3.) [facility]  Pharmacy's address: [address] [place] [id number]  Pharmacy's phone number: [phone]    Patient's prior to admission medication list:  Filled by [facility] :  ·	zolpidem 5mg PO QHS PRN for insomnia   - last picked up 4/2025; new refill is ready to be picked up after patient is discharged  - patient takes 1/2 of a 10mg tab  ·	spironolactone 25mg PO once daily   - last filled 3/11/25 for 90 days supply  ·	furosemide 40mg PO once daily   - last filled 4/20/25 for 30 days supply; has refill that's not used    Filled by [facility]:  ·	Freestyle Dolly 2 Sensor   - last filled 5/4/25 for 28 days supply   ·	escitalopram 5mg PO once daily ?  - last filled 3/25/25 for 30 days supply and hasn't been refilled since. Unsure why it was not refilled. Tried asking patient about adherence to Lexapro and he stated he's not sure if he takes this med.     Filled by [facility]:  ·	levothyroxine 100mcg PO once daily   - last filled 4/25/25 for 90 days supply  ·	apixaban 5mg PO BID  - last filled 4/30/25 for 30 days supply  ·	atorvastatin 20mg PO QHS ?  - last filled 11/30/24 for 90 days supply and hasn't been refilled since. Patient stated not sure if he takes this med.   ·	Humulin N 100units/mL KwikPen inject 25units 15 minutes before starting tube feed  - last filled 3/13/25 for 30 days supply; new refill is ready to be picked up after patient is discharged.  ·	Humalog 100units/mL KwikPen inject 3units before meals and tube feed (30 units/day)   - last filled 11/13/24 for 90 days supply; unsure why this was not refilled.  ·	rifaximin 550mg PO BID  - filled 5/26/25 for 30 days supply; this is a new medication that is not yet picked up. Ready for  after patient is discharged.    OTC meds (per patient):  ·	Multivitamin once daily   ·	Vitamin D once daily   - patient is unsure if it's plain vitamin D or with calcium    Med that are not active anymore:  ·	losartan 50mg PO once daily   - last filled by [facility] on 3/6/25 for 90 day supply

## 2025-05-30 NOTE — PROGRESS NOTE ADULT - PROBLEM SELECTOR PLAN 5
Recently diagnosed with liver cirrhosis (2025?), per chart note by Dr. Mckeon and review on [facility] -  etiology of his cirrhosis is unclear--no work-up was completed on [place] except bloodwork and Fibroscan, the latter uninterpretable due to jordy-hepatic ascites.   , INR 1.18, AST/ALT not quantified/16  CT A/P this year showed cirrhotic morphology, last year had normal findings  ED bedside U/S without pocket to TAP per signout  Abd US showing ascites.  - Hepatology consulted, appreciate recs  - plan for diagnostic para  - Daily MELD score  - Diuretics pending improvement in BMP  - restarted home lasix and spironolactone Per patient has had slurred speech x several days, no precipitating factor; *********RESOLVED***********  CTH: Arachnoid cyst and senescent cerebral changes. No acute intracranial hemorrhage.  CT Angio H: Left C6 ICA stenosis, 40-50%.  MRI head: No acute abnormalities  Neurology consulted in ED, no acute concern for stroke, will continue to follow  - Neurology consulted, f/u recs

## 2025-05-30 NOTE — PROGRESS NOTE ADULT - SUBJECTIVE AND OBJECTIVE BOX
***Note in progress***    OVERNIGHT EVENTS: NAEO    SUBJECTIVE / INTERVAL HPI: Patient seen and examined at bedside. Patient denying chest pain, SOB, palpitations, cough. Patient denies fever, chills, HA, Dizziness, N/V, abdominal pain, diarrhea, constipation, hematochezia/melena, dysuria, hematuria, new onset weakness/numbness, LE pain and/or swelling.    Remaining ROS negative       PHYSICAL EXAM:    General:NAD.   HEENT: NC/AT; PERRL, anicteric sclera; MMM  Neck: supple  Cardiovascular: +S1/S2, RRR  Respiratory: CTA B/L; no W/R/R  Gastrointestinal: soft, NT/ND; +BSx4  Extremities: WWP; no edema, clubbing or cyanosis  Vascular: 2+ radial, DP/PT pulses B/L  Neurological: AAOx3; no focal deficits  Psychiatric: pleasant mood and affect  Dermatologic: no appreciable wounds or damage to the skin    VITAL SIGNS:  Vital Signs Last 24 Hrs  T(C): 36.7 (30 May 2025 05:53), Max: 36.7 (30 May 2025 05:53)  T(F): 98.1 (30 May 2025 05:53), Max: 98.1 (30 May 2025 05:53)  HR: 67 (30 May 2025 05:53) (65 - 74)  BP: 112/75 (30 May 2025 05:53) (97/63 - 114/75)  BP(mean): 74 (29 May 2025 20:48) (74 - 74)  RR: 18 (30 May 2025 05:53) (18 - 18)  SpO2: 94% (30 May 2025 05:53) (94% - 96%)    Parameters below as of 29 May 2025 20:48  Patient On (Oxygen Delivery Method): room air        MEDICATIONS:  MEDICATIONS  (STANDING):  apixaban 5 milliGRAM(s) Oral every 12 hours  dextrose 5%. 1000 milliLiter(s) (100 mL/Hr) IV Continuous <Continuous>  dextrose 5%. 1000 milliLiter(s) (50 mL/Hr) IV Continuous <Continuous>  dextrose 50% Injectable 25 Gram(s) IV Push once  dextrose 50% Injectable 12.5 Gram(s) IV Push once  dextrose 50% Injectable 25 Gram(s) IV Push once  glucagon  Injectable 1 milliGRAM(s) IntraMuscular once  insulin lispro (ADMELOG) corrective regimen sliding scale   SubCutaneous at bedtime  insulin lispro (ADMELOG) corrective regimen sliding scale   SubCutaneous three times a day before meals  levothyroxine 100 MICROGram(s) Oral daily  multivitamin 1 Tablet(s) Oral daily  senna 2 Tablet(s) Oral at bedtime  spironolactone 25 milliGRAM(s) Oral every 24 hours  thiamine 100 milliGRAM(s) Oral every 24 hours    MEDICATIONS  (PRN):  dextrose Oral Gel 15 Gram(s) Oral once PRN Blood Glucose LESS THAN 70 milliGRAM(s)/deciliter  polyethylene glycol 3350 17 Gram(s) Oral daily PRN Constipation  zolpidem 5 milliGRAM(s) Oral at bedtime PRN Insomnia      ALLERGIES:  Allergies    No Known Allergies    Intolerances        LABS:                        10.3   4.94  )-----------( 169      ( 29 May 2025 07:03 )             29.9     05-29    135  |  102  |  22  ----------------------------<  163[H]  4.8   |  26  |  0.89    Ca    8.0[L]      29 May 2025 07:03  Phos  2.0     05-29  Mg     2.4     05-29    TPro  5.8[L]  /  Alb  2.5[L]  /  TBili  0.8  /  DBili  x   /  AST  23  /  ALT  <5[L]  /  AlkPhos  105  05-29    PT/INR - ( 29 May 2025 07:03 )   PT: 22.1 sec;   INR: 1.90            Urinalysis Basic - ( 29 May 2025 07:03 )    Color: x / Appearance: x / SG: x / pH: x  Gluc: 163 mg/dL / Ketone: x  / Bili: x / Urobili: x   Blood: x / Protein: x / Nitrite: x   Leuk Esterase: x / RBC: x / WBC x   Sq Epi: x / Non Sq Epi: x / Bacteria: x      CAPILLARY BLOOD GLUCOSE      POCT Blood Glucose.: 184 mg/dL (29 May 2025 21:35)      RADIOLOGY & ADDITIONAL TESTS: Reviewed. OVERNIGHT EVENTS: NAEO    SUBJECTIVE / INTERVAL HPI: Patient seen and examined at bedside. Reports feeling cold and needing to have on a jacket as well as multiple blankets. Had two small bowel movements yesterday. Patient denying chest pain, SOB, palpitations, cough. Patient denies fever, chills, HA, Dizziness, N/V, abdominal pain, diarrhea, constipation, hematochezia/melena, dysuria, hematuria, new onset weakness/numbness, LE pain and/or swelling.    Remaining ROS negative       PHYSICAL EXAM:  General:NAD.   HEENT: NC/AT; PERRL, anicteric sclera; MMM  Neck: supple  Cardiovascular: +S1/S2, RRR  Respiratory: CTA B/L; no W/R/R  Gastrointestinal: soft, NT/ND; +BSx4  Extremities: WWP; no clubbing or cyanosis. R leg is erythematous, warm, and edematous 1+. L leg is not erythematous but is edematous 2+.   Vascular: 2+ radial, DP/PT pulses B/L  Neurological: AAOx3; no focal deficits  Psychiatric: pleasant mood and affect  Dermatologic: no appreciable wounds or damage to the skin    VITAL SIGNS:  Vital Signs Last 24 Hrs  T(C): 36.7 (30 May 2025 05:53), Max: 36.7 (30 May 2025 05:53)  T(F): 98.1 (30 May 2025 05:53), Max: 98.1 (30 May 2025 05:53)  HR: 67 (30 May 2025 05:53) (65 - 74)  BP: 112/75 (30 May 2025 05:53) (97/63 - 114/75)  BP(mean): 74 (29 May 2025 20:48) (74 - 74)  RR: 18 (30 May 2025 05:53) (18 - 18)  SpO2: 94% (30 May 2025 05:53) (94% - 96%)    Parameters below as of 29 May 2025 20:48  Patient On (Oxygen Delivery Method): room air        MEDICATIONS:  MEDICATIONS  (STANDING):  apixaban 5 milliGRAM(s) Oral every 12 hours  dextrose 5%. 1000 milliLiter(s) (100 mL/Hr) IV Continuous <Continuous>  dextrose 5%. 1000 milliLiter(s) (50 mL/Hr) IV Continuous <Continuous>  dextrose 50% Injectable 25 Gram(s) IV Push once  dextrose 50% Injectable 12.5 Gram(s) IV Push once  dextrose 50% Injectable 25 Gram(s) IV Push once  glucagon  Injectable 1 milliGRAM(s) IntraMuscular once  insulin lispro (ADMELOG) corrective regimen sliding scale   SubCutaneous at bedtime  insulin lispro (ADMELOG) corrective regimen sliding scale   SubCutaneous three times a day before meals  levothyroxine 100 MICROGram(s) Oral daily  multivitamin 1 Tablet(s) Oral daily  senna 2 Tablet(s) Oral at bedtime  spironolactone 25 milliGRAM(s) Oral every 24 hours  thiamine 100 milliGRAM(s) Oral every 24 hours    MEDICATIONS  (PRN):  dextrose Oral Gel 15 Gram(s) Oral once PRN Blood Glucose LESS THAN 70 milliGRAM(s)/deciliter  polyethylene glycol 3350 17 Gram(s) Oral daily PRN Constipation  zolpidem 5 milliGRAM(s) Oral at bedtime PRN Insomnia      ALLERGIES:  Allergies    No Known Allergies    Intolerances        LABS:                        10.3   4.94  )-----------( 169      ( 29 May 2025 07:03 )             29.9     05-29    135  |  102  |  22  ----------------------------<  163[H]  4.8   |  26  |  0.89    Ca    8.0[L]      29 May 2025 07:03  Phos  2.0     05-29  Mg     2.4     05-29    TPro  5.8[L]  /  Alb  2.5[L]  /  TBili  0.8  /  DBili  x   /  AST  23  /  ALT  <5[L]  /  AlkPhos  105  05-29    PT/INR - ( 29 May 2025 07:03 )   PT: 22.1 sec;   INR: 1.90            Urinalysis Basic - ( 29 May 2025 07:03 )    Color: x / Appearance: x / SG: x / pH: x  Gluc: 163 mg/dL / Ketone: x  / Bili: x / Urobili: x   Blood: x / Protein: x / Nitrite: x   Leuk Esterase: x / RBC: x / WBC x   Sq Epi: x / Non Sq Epi: x / Bacteria: x      CAPILLARY BLOOD GLUCOSE      POCT Blood Glucose.: 184 mg/dL (29 May 2025 21:35)      RADIOLOGY & ADDITIONAL TESTS: Reviewed.

## 2025-05-30 NOTE — PROGRESS NOTE ADULT - SUBJECTIVE AND OBJECTIVE BOX
GASTROENTEROLOGY PROGRESS NOTE  Patient seen and examined at bedside.  Doing well  denies abd pain  eating well  Denies fever, chills, chest pain, shortness of breath, abd pain, N/V.      PERTINENT REVIEW OF SYSTEMS:  CONSTITUTIONAL: No weakness, fevers or chills  HEENT: No visual changes; No vertigo or throat pain   GASTROINTESTINAL: As above.  NEUROLOGICAL: No numbness or weakness  SKIN: No itching, burning, rashes, or lesions     Allergies    No Known Allergies    Intolerances      MEDICATIONS:  MEDICATIONS  (STANDING):  apixaban 2.5 milliGRAM(s) Oral every 12 hours  dextrose 5%. 1000 milliLiter(s) (50 mL/Hr) IV Continuous <Continuous>  dextrose 5%. 1000 milliLiter(s) (100 mL/Hr) IV Continuous <Continuous>  dextrose 50% Injectable 12.5 Gram(s) IV Push once  dextrose 50% Injectable 25 Gram(s) IV Push once  dextrose 50% Injectable 25 Gram(s) IV Push once  glucagon  Injectable 1 milliGRAM(s) IntraMuscular once  insulin lispro (ADMELOG) corrective regimen sliding scale   SubCutaneous at bedtime  insulin lispro (ADMELOG) corrective regimen sliding scale   SubCutaneous three times a day before meals  levothyroxine 100 MICROGram(s) Oral daily  multivitamin 1 Tablet(s) Oral daily  senna 2 Tablet(s) Oral at bedtime  spironolactone 25 milliGRAM(s) Oral every 24 hours  thiamine 100 milliGRAM(s) Oral every 24 hours    MEDICATIONS  (PRN):  dextrose Oral Gel 15 Gram(s) Oral once PRN Blood Glucose LESS THAN 70 milliGRAM(s)/deciliter  polyethylene glycol 3350 17 Gram(s) Oral daily PRN Constipation  zolpidem 5 milliGRAM(s) Oral at bedtime PRN Insomnia    Vital Signs Last 24 Hrs  T(C): 36.5 (30 May 2025 21:18), Max: 36.7 (30 May 2025 05:53)  T(F): 97.7 (30 May 2025 21:18), Max: 98.1 (30 May 2025 05:53)  HR: 66 (30 May 2025 21:18) (66 - 90)  BP: 111/71 (30 May 2025 21:18) (100/65 - 112/75)  BP(mean): --  RR: 19 (30 May 2025 21:18) (18 - 20)  SpO2: 99% (30 May 2025 21:18) (94% - 99%)    Parameters below as of 30 May 2025 21:18  Patient On (Oxygen Delivery Method): room air        05-29 @ 07:01  -  05-30 @ 07:00  --------------------------------------------------------  IN: 86 mL / OUT: 0 mL / NET: 86 mL    05-30 @ 07:01  -  05-30 @ 22:30  --------------------------------------------------------  IN: 172 mL / OUT: 0 mL / NET: 172 mL      PHYSICAL EXAM:  General: No acute distress  Lungs: Normal respiratory effort and no intercostal retractions  Cardiovascular: RRR  Abdomen: Soft, non-tender, distended 2/2 pocket of ascites (R>L), +j-tube   Neurological: Alert and oriented x3  Skin: Warm and dry. No obvious rash    LABS:                        10.5   6.12  )-----------( 171      ( 30 May 2025 07:38 )             30.2     05-30    134[L]  |  x   |  x   ----------------------------<  x   x    |  x   |  0.78    Ca    8.4      30 May 2025 07:38  Phos  2.6     05-30  Mg     2.3     05-30    TPro  x   /  Alb  x   /  TBili  1.2  /  DBili  x   /  AST  x   /  ALT  x   /  AlkPhos  x   05-30    PT/INR - ( 30 May 2025 11:04 )   PT: 14.1 sec;   INR: 1.21          PTT - ( 30 May 2025 11:04 )  PTT:31.9 sec      Urinalysis Basic - ( 30 May 2025 07:38 )    Color: x / Appearance: x / SG: x / pH: x  Gluc: 173 mg/dL / Ketone: x  / Bili: x / Urobili: x   Blood: x / Protein: x / Nitrite: x   Leuk Esterase: x / RBC: x / WBC x   Sq Epi: x / Non Sq Epi: x / Bacteria: x                RADIOLOGY & ADDITIONAL STUDIES:  Reviewed

## 2025-05-30 NOTE — PROGRESS NOTE ADULT - PROBLEM SELECTOR PLAN 2
RLE w/ erythema, warmth, no active purulence or drainage x 4 days  Dopplers b/l LE: No evidence of deep venous thrombosis in either lower extremity. Soft tissue swelling in both lower extremities.  Xray R foot and Tib/Fib: Osteopenia. No osseous destruction or periosteal reaction. No fracture or dislocation. There is a hallux valgus alignment with first MTP joint osteoarthrosis. Tibiotalar joint space narrowing is also identified. Second through fourth hammertoe deformities. Diffuse soft tissue swelling of the imaged lower extremity. Vascular calcification is present.  S/p Zosyn 3.375mg x1, Vancomycin 1000mg x1  Patient finished Cefazolin regimen on 5/28 [1g q8h (5/24 - 5/28)]  Afebrile, no WBC  - f/u Blood cultures (5/24): No growth to date Weakness, generalized and leg pain x 4 days, presents with decreased ability to walk, likely i/s/o abdominal distension and significant swelling in b/l LE. Reports taking additional doses of Spironolactone because of edema.   Admission K of 3.7, VBG 2.5.   Suspected to be 2/2  fluid overload with immobility, RLE cellulitis   Stroke workup negative per neuro, imaging findings without acute pathology.   MRI brain showed no acute abnormalities.  Nutritional workup so far negative  - f/u orthostatics  - PT/SW  - fall precautions

## 2025-05-30 NOTE — PROGRESS NOTE ADULT - PROBLEM SELECTOR PLAN 3
Per patient has had slurred speech x several days, no precipitating factor; since resolved  CTH: Arachnoid cyst and senescent cerebral changes. No acute intracranial hemorrhage.  CT Angio H: Left C6 ICA stenosis, 40-50%.  MRI head: No acute abnormalities  Neurology consulted in ED, no acute concern for stroke, will continue to follow  - Neurology consulted, f/u recs RLE w/ erythema, warmth, no active purulence or drainage x 4 days  Dopplers b/l LE: No evidence of deep venous thrombosis in either lower extremity. Soft tissue swelling in both lower extremities.  Xray R foot and Tib/Fib: Osteopenia. No osseous destruction or periosteal reaction. No fracture or dislocation. There is a hallux valgus alignment with first MTP joint osteoarthrosis. Tibiotalar joint space narrowing is also identified. Second through fourth hammertoe deformities. Diffuse soft tissue swelling of the imaged lower extremity. Vascular calcification is present.  S/p Zosyn 3.375mg x1, Vancomycin 1000mg x1  Patient finished Cefazolin regimen on 5/28 [1g q8h (5/24 - 5/28)]  Afebrile, no WBC  - f/u Blood cultures (5/24): No growth to date

## 2025-05-31 DIAGNOSIS — I71.40 ABDOMINAL AORTIC ANEURYSM, WITHOUT RUPTURE, UNSPECIFIED: ICD-10-CM

## 2025-05-31 LAB
ALBUMIN SERPL ELPH-MCNC: 2.5 G/DL — LOW (ref 3.3–5)
ALP SERPL-CCNC: 118 U/L — SIGNIFICANT CHANGE UP (ref 40–120)
ALT FLD-CCNC: <5 U/L — LOW (ref 10–45)
ANION GAP SERPL CALC-SCNC: 9 MMOL/L — SIGNIFICANT CHANGE UP (ref 5–17)
APTT BLD: 30.8 SEC — SIGNIFICANT CHANGE UP (ref 26.1–36.8)
AST SERPL-CCNC: 26 U/L — SIGNIFICANT CHANGE UP (ref 10–40)
BASOPHILS # BLD AUTO: 0.06 K/UL — SIGNIFICANT CHANGE UP (ref 0–0.2)
BASOPHILS NFR BLD AUTO: 1 % — SIGNIFICANT CHANGE UP (ref 0–2)
BILIRUB SERPL-MCNC: 1.2 MG/DL — SIGNIFICANT CHANGE UP (ref 0.2–1.2)
BUN SERPL-MCNC: 22 MG/DL — SIGNIFICANT CHANGE UP (ref 7–23)
CALCIUM SERPL-MCNC: 8.1 MG/DL — LOW (ref 8.4–10.5)
CHLORIDE SERPL-SCNC: 103 MMOL/L — SIGNIFICANT CHANGE UP (ref 96–108)
CO2 SERPL-SCNC: 24 MMOL/L — SIGNIFICANT CHANGE UP (ref 22–31)
CREAT SERPL-MCNC: 0.8 MG/DL — SIGNIFICANT CHANGE UP (ref 0.5–1.3)
EGFR: 92 ML/MIN/1.73M2 — SIGNIFICANT CHANGE UP
EGFR: 92 ML/MIN/1.73M2 — SIGNIFICANT CHANGE UP
EOSINOPHIL # BLD AUTO: 0.15 K/UL — SIGNIFICANT CHANGE UP (ref 0–0.5)
EOSINOPHIL NFR BLD AUTO: 2.6 % — SIGNIFICANT CHANGE UP (ref 0–6)
GLUCOSE SERPL-MCNC: 140 MG/DL — HIGH (ref 70–99)
HCT VFR BLD CALC: 31.9 % — LOW (ref 39–50)
HGB BLD-MCNC: 11 G/DL — LOW (ref 13–17)
IMM GRANULOCYTES NFR BLD AUTO: 0.3 % — SIGNIFICANT CHANGE UP (ref 0–0.9)
INR BLD: 1.22 — HIGH (ref 0.85–1.16)
LYMPHOCYTES # BLD AUTO: 0.53 K/UL — LOW (ref 1–3.3)
LYMPHOCYTES # BLD AUTO: 9.2 % — LOW (ref 13–44)
MAGNESIUM SERPL-MCNC: 2.4 MG/DL — SIGNIFICANT CHANGE UP (ref 1.6–2.6)
MCHC RBC-ENTMCNC: 34.5 G/DL — SIGNIFICANT CHANGE UP (ref 32–36)
MCHC RBC-ENTMCNC: 35.5 PG — HIGH (ref 27–34)
MCV RBC AUTO: 102.9 FL — HIGH (ref 80–100)
MELD SCORE WITH DIALYSIS: 23 POINTS — SIGNIFICANT CHANGE UP
MELD SCORE WITHOUT DIALYSIS: 9 POINTS — SIGNIFICANT CHANGE UP
MONOCYTES # BLD AUTO: 0.37 K/UL — SIGNIFICANT CHANGE UP (ref 0–0.9)
MONOCYTES NFR BLD AUTO: 6.4 % — SIGNIFICANT CHANGE UP (ref 2–14)
NEUTROPHILS # BLD AUTO: 4.62 K/UL — SIGNIFICANT CHANGE UP (ref 1.8–7.4)
NEUTROPHILS NFR BLD AUTO: 80.5 % — HIGH (ref 43–77)
NRBC BLD AUTO-RTO: 0 /100 WBCS — SIGNIFICANT CHANGE UP (ref 0–0)
PHOSPHATE SERPL-MCNC: 2.2 MG/DL — LOW (ref 2.5–4.5)
PLATELET # BLD AUTO: 183 K/UL — SIGNIFICANT CHANGE UP (ref 150–400)
POTASSIUM SERPL-MCNC: 5.1 MMOL/L — SIGNIFICANT CHANGE UP (ref 3.5–5.3)
POTASSIUM SERPL-SCNC: 5.1 MMOL/L — SIGNIFICANT CHANGE UP (ref 3.5–5.3)
PROT SERPL-MCNC: 6.3 G/DL — SIGNIFICANT CHANGE UP (ref 6–8.3)
PROTHROM AB SERPL-ACNC: 14.3 SEC — HIGH (ref 9.9–13.4)
RBC # BLD: 3.1 M/UL — LOW (ref 4.2–5.8)
RBC # FLD: 16.9 % — HIGH (ref 10.3–14.5)
SODIUM SERPL-SCNC: 136 MMOL/L — SIGNIFICANT CHANGE UP (ref 135–145)
WBC # BLD: 5.75 K/UL — SIGNIFICANT CHANGE UP (ref 3.8–10.5)
WBC # FLD AUTO: 5.75 K/UL — SIGNIFICANT CHANGE UP (ref 3.8–10.5)

## 2025-05-31 PROCEDURE — 74174 CTA ABD&PLVS W/CONTRAST: CPT | Mod: 26

## 2025-05-31 PROCEDURE — 99233 SBSQ HOSP IP/OBS HIGH 50: CPT

## 2025-05-31 RX ORDER — SODIUM PHOSPHATE,DIBASIC DIHYD
30 POWDER (GRAM) MISCELLANEOUS ONCE
Refills: 0 | Status: COMPLETED | OUTPATIENT
Start: 2025-05-31 | End: 2025-05-31

## 2025-05-31 RX ADMIN — Medication 5 MILLIGRAM(S): at 21:06

## 2025-05-31 RX ADMIN — Medication 100 MILLIGRAM(S): at 05:59

## 2025-05-31 RX ADMIN — Medication 1 TABLET(S): at 11:03

## 2025-05-31 RX ADMIN — Medication 25 MILLIGRAM(S): at 17:09

## 2025-05-31 RX ADMIN — APIXABAN 2.5 MILLIGRAM(S): 2.5 TABLET, FILM COATED ORAL at 05:59

## 2025-05-31 RX ADMIN — Medication 100 MICROGRAM(S): at 05:59

## 2025-05-31 RX ADMIN — APIXABAN 2.5 MILLIGRAM(S): 2.5 TABLET, FILM COATED ORAL at 17:09

## 2025-05-31 RX ADMIN — Medication 85 MILLIMOLE(S): at 11:04

## 2025-05-31 NOTE — PROGRESS NOTE ADULT - ASSESSMENT
A/P: 72M w/ PMH of gastric cancer (s/p feeding J-tube), portal vein thrombosis (on Eliquis) AAA (s/p EVAR in 2023), T2DM, hypothyroidism, HTN, anemia, BPH, type 2 endoleak repair, s/p embolization of right internal iliac artery branch (10/24), cirrhosis, who presents to the ED with worsening bilateral leg swelling and gen weakness. Patient is currently undergoing evaluation of abdominal ascitics in the setting of recently diagnosed cirrhosis. 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      - Please obtain CTA A/P for further assessment of AAA sac in the setting of interval growth and iliac aneurysms  - Remainder of care per primary  - Vascular surgery will continue to follow  - Please call [phone] with any questions A/P: 72M w/ PMH of gastric cancer (s/p feeding J-tube), portal vein thrombosis (on Eliquis) AAA (s/p EVAR in 2023), T2DM, hypothyroidism, HTN, anemia, BPH, type 2 endoleak repair, s/p embolization of right internal iliac artery branch (10/24), cirrhosis, who presents to the ED with worsening bilateral leg swelling and gen weakness. Patient is currently undergoing evaluation of abdominal ascitics in the setting of recently diagnosed cirrhosis. 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      - Please obtain CTA A/P for further assessment of AAA sac in the setting of interval growth and iliac aneurysms  - Remainder of care per primary  - Vascular surgery will continue to follow  - Please call [phone] with any questions    Follow up with Dr. Aron Saenz  [address], [address], [place] [address] | [phone] | Fax: [phone]

## 2025-05-31 NOTE — PROGRESS NOTE ADULT - PROBLEM SELECTOR PLAN 2
Weakness, generalized and leg pain x 4 days, presents with decreased ability to walk, likely i/s/o abdominal distension and significant swelling in b/l LE. Reports taking additional doses of Spironolactone because of edema.   Admission K of 3.7, VBG 2.5.   Suspected to be 2/2  fluid overload with immobility, RLE cellulitis   Stroke workup negative per neuro, imaging findings without acute pathology.   MRI brain showed no acute abnormalities.  Nutritional workup so far negative  - f/u orthostatics  - PT/SW  - fall precautions Weakness, generalized and leg pain x 4 days, presents with decreased ability to walk, likely i/s/o abdominal distension and significant swelling in b/l LE. Reports taking additional doses of Spironolactone because of edema.   Admission K of 3.7, VBG 2.5.   Suspected to be 2/2  fluid overload with immobility, RLE cellulitis   Stroke workup negative per neuro, imaging findings without acute pathology.   MRI brain showed no acute abnormalities.  Nutritional workup so far negative    - PT/SW  - fall precautions

## 2025-05-31 NOTE — PROGRESS NOTE ADULT - PROBLEM SELECTOR PLAN 5
Per patient has had slurred speech x several days, no precipitating factor; *********RESOLVED***********  CTH: Arachnoid cyst and senescent cerebral changes. No acute intracranial hemorrhage.  CT Angio H: Left C6 ICA stenosis, 40-50%.  MRI head: No acute abnormalities  Neurology consulted in ED, no acute concern for stroke, will continue to follow  - Neurology consulted, f/u recs

## 2025-05-31 NOTE — PROGRESS NOTE ADULT - SUBJECTIVE AND OBJECTIVE BOX
Patient is a 74y old  Male who presents with a chief complaint of RLE cellulitis (30 May 2025 22:29)      HOSPITAL COURSE:     OVERNIGHT EVENTS:    SUBJECTIVE:     ROS: otherwise negative      T(C): 36.5 (05-30-25 @ 21:18), Max: 36.7 (05-30-25 @ 12:52)  HR: 66 (05-30-25 @ 21:18) (66 - 90)  BP: 111/71 (05-30-25 @ 21:18) (100/65 - 111/71)  RR: 19 (05-30-25 @ 21:18) (19 - 20)  SpO2: 99% (05-30-25 @ 21:18) (98% - 99%)  Wt(kg): --Vital Signs Last 24 Hrs  T(C): 36.5 (30 May 2025 21:18), Max: 36.7 (30 May 2025 12:52)  T(F): 97.7 (30 May 2025 21:18), Max: 98 (30 May 2025 12:52)  HR: 66 (30 May 2025 21:18) (66 - 90)  BP: 111/71 (30 May 2025 21:18) (100/65 - 111/71)  BP(mean): --  RR: 19 (30 May 2025 21:18) (19 - 20)  SpO2: 99% (30 May 2025 21:18) (98% - 99%)    Parameters below as of 30 May 2025 21:18  Patient On (Oxygen Delivery Method): room air      T(C): 36.5 (05-30-25 @ 21:18), Max: 36.7 (05-30-25 @ 12:52)  HR: 66 (05-30-25 @ 21:18) (66 - 90)  BP: 111/71 (05-30-25 @ 21:18) (100/65 - 111/71)  RR: 19 (05-30-25 @ 21:18) (19 - 20)  SpO2: 99% (05-30-25 @ 21:18) (98% - 99%)  Wt(kg): --    05-29-25 @ 07:01  -  05-30-25 @ 07:00  --------------------------------------------------------  IN: 86 mL / OUT: 0 mL / NET: 86 mL    05-30-25 @ 07:01  -  05-31-25 @ 05:53  --------------------------------------------------------  IN: 172 mL / OUT: 0 mL / NET: 172 mL        PHYSICAL EXAM:  Constitutional: resting comfortably in bed; NAD  Head: NC/AT  Eyes: PERRL, EOMI, anicteric sclera  ENT: no nasal discharge; MMM  Neck: supple; no JVD or thyromegaly  Respiratory: CTA B/L; no W/R/R, no retractions  Cardiac: +S1/S2; RRR; no M/R/G  Gastrointestinal: soft, NT/ND; no rebound or guarding; +BSx4  Back: spine midline, no bony tenderness or step-offs; no CVAT B/L  Extremities: WWP, no clubbing or cyanosis; no peripheral edema. Capillary refill <2 sec  Musculoskeletal: NROM x4; no joint swelling, tenderness or erythema  Vascular: 2+ radial, DP/PT pulses B/L  Dermatologic: skin warm, dry and intact; no rashes, wounds, or scars  Lymphatic: no submandibular or cervical LAD  Neurologic: AAOx3; CNII-XII grossly intact; no focal deficits  Psychiatric: affect and characteristics of appearance, verbalizations, behaviors are appropriate    LABS:                        10.5   6.12  )-----------( 171      ( 30 May 2025 07:38 )             30.2     05-30    134[L]  |  x   |  x   ----------------------------<  x   x    |  x   |  0.78    Ca    8.4      30 May 2025 07:38  Phos  2.6     05-30  Mg     2.3     05-30    TPro  x   /  Alb  x   /  TBili  1.2  /  DBili  x   /  AST  x   /  ALT  x   /  AlkPhos  x   05-30      PT/INR - ( 30 May 2025 11:04 )   PT: 14.1 sec;   INR: 1.21          PTT - ( 30 May 2025 11:04 )  PTT:31.9 sec  Urinalysis Basic - ( 30 May 2025 07:38 )    Color: x / Appearance: x / SG: x / pH: x  Gluc: 173 mg/dL / Ketone: x  / Bili: x / Urobili: x   Blood: x / Protein: x / Nitrite: x   Leuk Esterase: x / RBC: x / WBC x   Sq Epi: x / Non Sq Epi: x / Bacteria: x      CAPILLARY BLOOD GLUCOSE      POCT Blood Glucose.: 165 mg/dL (30 May 2025 21:57)  POCT Blood Glucose.: 119 mg/dL (30 May 2025 17:40)  POCT Blood Glucose.: 99 mg/dL (30 May 2025 12:56)  POCT Blood Glucose.: 149 mg/dL (30 May 2025 08:16)        Urinalysis Basic - ( 30 May 2025 07:38 )    Color: x / Appearance: x / SG: x / pH: x  Gluc: 173 mg/dL / Ketone: x  / Bili: x / Urobili: x   Blood: x / Protein: x / Nitrite: x   Leuk Esterase: x / RBC: x / WBC x   Sq Epi: x / Non Sq Epi: x / Bacteria: x        MEDICATIONS  (STANDING):  apixaban 2.5 milliGRAM(s) Oral every 12 hours  dextrose 5%. 1000 milliLiter(s) (50 mL/Hr) IV Continuous <Continuous>  dextrose 5%. 1000 milliLiter(s) (100 mL/Hr) IV Continuous <Continuous>  dextrose 50% Injectable 12.5 Gram(s) IV Push once  dextrose 50% Injectable 25 Gram(s) IV Push once  dextrose 50% Injectable 25 Gram(s) IV Push once  glucagon  Injectable 1 milliGRAM(s) IntraMuscular once  insulin lispro (ADMELOG) corrective regimen sliding scale   SubCutaneous at bedtime  insulin lispro (ADMELOG) corrective regimen sliding scale   SubCutaneous three times a day before meals  levothyroxine 100 MICROGram(s) Oral daily  multivitamin 1 Tablet(s) Oral daily  senna 2 Tablet(s) Oral at bedtime  spironolactone 25 milliGRAM(s) Oral every 24 hours  thiamine 100 milliGRAM(s) Oral every 24 hours    MEDICATIONS  (PRN):  dextrose Oral Gel 15 Gram(s) Oral once PRN Blood Glucose LESS THAN 70 milliGRAM(s)/deciliter  polyethylene glycol 3350 17 Gram(s) Oral daily PRN Constipation  zolpidem 5 milliGRAM(s) Oral at bedtime PRN Insomnia      RADIOLOGY & ADDITIONAL TESTS: Reviewed   Patient is a 74y old  Male who presents with a chief complaint of RLE cellulitis (30 May 2025 22:29)      OVERNIGHT/INTERVAL EVENTS: Patient s/p paracentesis yesterday with 4.5L acetic fluid drained, WBC 22, low concern for SBP.      SUBJECTIVE: Patient seen and examined at bedside. Patient reports feeling better after paracentesis. Reported feeling nauseated and vomiting after attempting to eat or drink, and reported having 2 bowel movements.     ROS: otherwise negative      T(C): 36.5 (05-30-25 @ 21:18), Max: 36.7 (05-30-25 @ 12:52)  HR: 66 (05-30-25 @ 21:18) (66 - 90)  BP: 111/71 (05-30-25 @ 21:18) (100/65 - 111/71)  RR: 19 (05-30-25 @ 21:18) (19 - 20)  SpO2: 99% (05-30-25 @ 21:18) (98% - 99%)  Wt(kg): --Vital Signs Last 24 Hrs  T(C): 36.5 (30 May 2025 21:18), Max: 36.7 (30 May 2025 12:52)  T(F): 97.7 (30 May 2025 21:18), Max: 98 (30 May 2025 12:52)  HR: 66 (30 May 2025 21:18) (66 - 90)  BP: 111/71 (30 May 2025 21:18) (100/65 - 111/71)  BP(mean): --  RR: 19 (30 May 2025 21:18) (19 - 20)  SpO2: 99% (30 May 2025 21:18) (98% - 99%)    Parameters below as of 30 May 2025 21:18  Patient On (Oxygen Delivery Method): room air      T(C): 36.5 (05-30-25 @ 21:18), Max: 36.7 (05-30-25 @ 12:52)  HR: 66 (05-30-25 @ 21:18) (66 - 90)  BP: 111/71 (05-30-25 @ 21:18) (100/65 - 111/71)  RR: 19 (05-30-25 @ 21:18) (19 - 20)  SpO2: 99% (05-30-25 @ 21:18) (98% - 99%)  Wt(kg): --    05-29-25 @ 07:01  -  05-30-25 @ 07:00  --------------------------------------------------------  IN: 86 mL / OUT: 0 mL / NET: 86 mL    05-30-25 @ 07:01  -  05-31-25 @ 05:53  --------------------------------------------------------  IN: 172 mL / OUT: 0 mL / NET: 172 mL        PHYSICAL EXAM:  Constitutional: NAD   Eyes: EOMI, anicteric sclera  ENT: MMM  Neck: supple  Respiratory: CTA B/L; no iWOB  Cardiac: +S1/S2; RRR  Gastrointestinal: soft, distended, nontender   Extremities: WWP, no clubbing or cyanosis; LLE +1 pitting edema   Musculoskeletal: no joint swelling, tenderness or erythema  Dermatologic: skin warm, dry and intact  Neurologic: AAOx3;no focal deficits  Psychiatric: affect and characteristics of appearance, verbalizations, behaviors are appropriate    LABS:                        10.5   6.12  )-----------( 171      ( 30 May 2025 07:38 )             30.2     05-30    134[L]  |  x   |  x   ----------------------------<  x   x    |  x   |  0.78    Ca    8.4      30 May 2025 07:38  Phos  2.6     05-30  Mg     2.3     05-30    TPro  x   /  Alb  x   /  TBili  1.2  /  DBili  x   /  AST  x   /  ALT  x   /  AlkPhos  x   05-30      PT/INR - ( 30 May 2025 11:04 )   PT: 14.1 sec;   INR: 1.21          PTT - ( 30 May 2025 11:04 )  PTT:31.9 sec  Urinalysis Basic - ( 30 May 2025 07:38 )    Color: x / Appearance: x / SG: x / pH: x  Gluc: 173 mg/dL / Ketone: x  / Bili: x / Urobili: x   Blood: x / Protein: x / Nitrite: x   Leuk Esterase: x / RBC: x / WBC x   Sq Epi: x / Non Sq Epi: x / Bacteria: x      CAPILLARY BLOOD GLUCOSE      POCT Blood Glucose.: 165 mg/dL (30 May 2025 21:57)  POCT Blood Glucose.: 119 mg/dL (30 May 2025 17:40)  POCT Blood Glucose.: 99 mg/dL (30 May 2025 12:56)  POCT Blood Glucose.: 149 mg/dL (30 May 2025 08:16)        Urinalysis Basic - ( 30 May 2025 07:38 )    Color: x / Appearance: x / SG: x / pH: x  Gluc: 173 mg/dL / Ketone: x  / Bili: x / Urobili: x   Blood: x / Protein: x / Nitrite: x   Leuk Esterase: x / RBC: x / WBC x   Sq Epi: x / Non Sq Epi: x / Bacteria: x        MEDICATIONS  (STANDING):  apixaban 2.5 milliGRAM(s) Oral every 12 hours  dextrose 5%. 1000 milliLiter(s) (50 mL/Hr) IV Continuous <Continuous>  dextrose 5%. 1000 milliLiter(s) (100 mL/Hr) IV Continuous <Continuous>  dextrose 50% Injectable 12.5 Gram(s) IV Push once  dextrose 50% Injectable 25 Gram(s) IV Push once  dextrose 50% Injectable 25 Gram(s) IV Push once  glucagon  Injectable 1 milliGRAM(s) IntraMuscular once  insulin lispro (ADMELOG) corrective regimen sliding scale   SubCutaneous at bedtime  insulin lispro (ADMELOG) corrective regimen sliding scale   SubCutaneous three times a day before meals  levothyroxine 100 MICROGram(s) Oral daily  multivitamin 1 Tablet(s) Oral daily  senna 2 Tablet(s) Oral at bedtime  spironolactone 25 milliGRAM(s) Oral every 24 hours  thiamine 100 milliGRAM(s) Oral every 24 hours    MEDICATIONS  (PRN):  dextrose Oral Gel 15 Gram(s) Oral once PRN Blood Glucose LESS THAN 70 milliGRAM(s)/deciliter  polyethylene glycol 3350 17 Gram(s) Oral daily PRN Constipation  zolpidem 5 milliGRAM(s) Oral at bedtime PRN Insomnia      RADIOLOGY & ADDITIONAL TESTS: Reviewed   Patient is a 74y old  Male who presents with a chief complaint of RLE cellulitis (30 May 2025 22:29)      OVERNIGHT/INTERVAL EVENTS: Patient s/p paracentesis yesterday with 4.5L acetic fluid drained, WBC 22, low concern for SBP. Patient pending CTA abdomen per vascular to assess AAA    SUBJECTIVE: Patient seen and examined at bedside. Patient reports feeling better after paracentesis. Reported feeling nauseated and vomiting after attempting to eat or drink, and reported having 2 bowel movements.     ROS: otherwise negative      T(C): 36.5 (05-30-25 @ 21:18), Max: 36.7 (05-30-25 @ 12:52)  HR: 66 (05-30-25 @ 21:18) (66 - 90)  BP: 111/71 (05-30-25 @ 21:18) (100/65 - 111/71)  RR: 19 (05-30-25 @ 21:18) (19 - 20)  SpO2: 99% (05-30-25 @ 21:18) (98% - 99%)  Wt(kg): --Vital Signs Last 24 Hrs  T(C): 36.5 (30 May 2025 21:18), Max: 36.7 (30 May 2025 12:52)  T(F): 97.7 (30 May 2025 21:18), Max: 98 (30 May 2025 12:52)  HR: 66 (30 May 2025 21:18) (66 - 90)  BP: 111/71 (30 May 2025 21:18) (100/65 - 111/71)  BP(mean): --  RR: 19 (30 May 2025 21:18) (19 - 20)  SpO2: 99% (30 May 2025 21:18) (98% - 99%)    Parameters below as of 30 May 2025 21:18  Patient On (Oxygen Delivery Method): room air      T(C): 36.5 (05-30-25 @ 21:18), Max: 36.7 (05-30-25 @ 12:52)  HR: 66 (05-30-25 @ 21:18) (66 - 90)  BP: 111/71 (05-30-25 @ 21:18) (100/65 - 111/71)  RR: 19 (05-30-25 @ 21:18) (19 - 20)  SpO2: 99% (05-30-25 @ 21:18) (98% - 99%)  Wt(kg): --    05-29-25 @ 07:01  -  05-30-25 @ 07:00  --------------------------------------------------------  IN: 86 mL / OUT: 0 mL / NET: 86 mL    05-30-25 @ 07:01  -  05-31-25 @ 05:53  --------------------------------------------------------  IN: 172 mL / OUT: 0 mL / NET: 172 mL        PHYSICAL EXAM:  Constitutional: NAD   Eyes: EOMI, anicteric sclera  ENT: MMM  Neck: supple  Respiratory: CTA B/L; no iWOB  Cardiac: +S1/S2; RRR  Gastrointestinal: soft, distended, nontender   Extremities: WWP, no clubbing or cyanosis; LLE +1 pitting edema   Musculoskeletal: no joint swelling, tenderness or erythema  Dermatologic: skin warm, dry and intact  Neurologic: AAOx3;no focal deficits  Psychiatric: affect and characteristics of appearance, verbalizations, behaviors are appropriate    LABS:                        10.5   6.12  )-----------( 171      ( 30 May 2025 07:38 )             30.2     05-30    134[L]  |  x   |  x   ----------------------------<  x   x    |  x   |  0.78    Ca    8.4      30 May 2025 07:38  Phos  2.6     05-30  Mg     2.3     05-30    TPro  x   /  Alb  x   /  TBili  1.2  /  DBili  x   /  AST  x   /  ALT  x   /  AlkPhos  x   05-30      PT/INR - ( 30 May 2025 11:04 )   PT: 14.1 sec;   INR: 1.21          PTT - ( 30 May 2025 11:04 )  PTT:31.9 sec  Urinalysis Basic - ( 30 May 2025 07:38 )    Color: x / Appearance: x / SG: x / pH: x  Gluc: 173 mg/dL / Ketone: x  / Bili: x / Urobili: x   Blood: x / Protein: x / Nitrite: x   Leuk Esterase: x / RBC: x / WBC x   Sq Epi: x / Non Sq Epi: x / Bacteria: x      CAPILLARY BLOOD GLUCOSE      POCT Blood Glucose.: 165 mg/dL (30 May 2025 21:57)  POCT Blood Glucose.: 119 mg/dL (30 May 2025 17:40)  POCT Blood Glucose.: 99 mg/dL (30 May 2025 12:56)  POCT Blood Glucose.: 149 mg/dL (30 May 2025 08:16)        Urinalysis Basic - ( 30 May 2025 07:38 )    Color: x / Appearance: x / SG: x / pH: x  Gluc: 173 mg/dL / Ketone: x  / Bili: x / Urobili: x   Blood: x / Protein: x / Nitrite: x   Leuk Esterase: x / RBC: x / WBC x   Sq Epi: x / Non Sq Epi: x / Bacteria: x        MEDICATIONS  (STANDING):  apixaban 2.5 milliGRAM(s) Oral every 12 hours  dextrose 5%. 1000 milliLiter(s) (50 mL/Hr) IV Continuous <Continuous>  dextrose 5%. 1000 milliLiter(s) (100 mL/Hr) IV Continuous <Continuous>  dextrose 50% Injectable 12.5 Gram(s) IV Push once  dextrose 50% Injectable 25 Gram(s) IV Push once  dextrose 50% Injectable 25 Gram(s) IV Push once  glucagon  Injectable 1 milliGRAM(s) IntraMuscular once  insulin lispro (ADMELOG) corrective regimen sliding scale   SubCutaneous at bedtime  insulin lispro (ADMELOG) corrective regimen sliding scale   SubCutaneous three times a day before meals  levothyroxine 100 MICROGram(s) Oral daily  multivitamin 1 Tablet(s) Oral daily  senna 2 Tablet(s) Oral at bedtime  spironolactone 25 milliGRAM(s) Oral every 24 hours  thiamine 100 milliGRAM(s) Oral every 24 hours    MEDICATIONS  (PRN):  dextrose Oral Gel 15 Gram(s) Oral once PRN Blood Glucose LESS THAN 70 milliGRAM(s)/deciliter  polyethylene glycol 3350 17 Gram(s) Oral daily PRN Constipation  zolpidem 5 milliGRAM(s) Oral at bedtime PRN Insomnia      RADIOLOGY & ADDITIONAL TESTS: Reviewed

## 2025-05-31 NOTE — PROGRESS NOTE ADULT - PROBLEM SELECTOR PLAN 11
Hx of portal vein thrombosis and has since been on Eliquis 5mg BID  - C/w Eliquis 5mg BID Hx of portal vein thrombosis and has since been on Eliquis 5mg BID. Dose decreased to 2.5mg iso worsening liver function and INR.   - C/w Eliquis 2.5mg BID

## 2025-05-31 NOTE — PROGRESS NOTE ADULT - PROBLEM SELECTOR PLAN 3
RLE w/ erythema, warmth, no active purulence or drainage x 4 days  Dopplers b/l LE: No evidence of deep venous thrombosis in either lower extremity. Soft tissue swelling in both lower extremities.  Xray R foot and Tib/Fib: Osteopenia. No osseous destruction or periosteal reaction. No fracture or dislocation. There is a hallux valgus alignment with first MTP joint osteoarthrosis. Tibiotalar joint space narrowing is also identified. Second through fourth hammertoe deformities. Diffuse soft tissue swelling of the imaged lower extremity. Vascular calcification is present.  S/p Zosyn 3.375mg x1, Vancomycin 1000mg x1  Patient finished Cefazolin regimen on 5/28 [1g q8h (5/24 - 5/28)]  Afebrile, no WBC  - f/u Blood cultures (5/24): No growth to date RLE w/ erythema, warmth, no active purulence or drainage x 4 days  Dopplers b/l LE: No evidence of deep venous thrombosis in either lower extremity. Soft tissue swelling in both lower extremities.  Xray R foot and Tib/Fib: Osteopenia. No osseous destruction or periosteal reaction. No fracture or dislocation. There is a hallux valgus alignment with first MTP joint osteoarthrosis. Tibiotalar joint space narrowing is also identified. Second through fourth hammertoe deformities. Diffuse soft tissue swelling of the imaged lower extremity. Vascular calcification is present.  S/p Zosyn 3.375mg x1, Vancomycin 1000mg x1  Patient finished Cefazolin regimen on 5/28 [1g q8h (5/24 - 5/28)]  Afebrile, no WBC  - Blood cultures (5/24): No growth to date

## 2025-05-31 NOTE — PROGRESS NOTE ADULT - PROBLEM SELECTOR PLAN 7
Hx of HTN, was taking Losartan 50mg but has since discontinued. Normotensive.  - No acute intervention, continue to monitor Hx of gastric cancer treated with chemotherapy 5 years ago now w/ feeding J-tube, previously followed Dr. Saravanan Cabello and Dr. Chema Richmond. Currently followed Heme/onc: Dr. Henry.  - No acute intervention, continue outpatient management  - C/w tube feeds- resumed and give insulin accordingly (as in diabetes below)

## 2025-05-31 NOTE — PROGRESS NOTE ADULT - PROBLEM SELECTOR PLAN 1
Recently diagnosed with liver cirrhosis (2025?), per chart note by Dr. Mckeon and review on [facility] -  etiology of his cirrhosis is unclear--no work-up was completed on [place] except bloodwork and Fibroscan, the latter uninterpretable due to jordy-hepatic ascites.   , INR 1.18, AST/ALT not quantified/16  CT A/P this year showed cirrhotic morphology, last year had normal findings  ED bedside U/S without pocket to TAP per signout  Abd US showing ascites.    Plan   - Hepatology consulted, appreciate recs  - diagnostic and therapeutic paracentesis today   - Daily MELD score Recently diagnosed with liver cirrhosis (2025?), per chart note by Dr. Mckeon and review on [facility] -  etiology of his cirrhosis is unclear--no work-up was completed on [place] except bloodwork and Fibroscan, the latter uninterpretable due to jordy-hepatic ascites.   , INR 1.18, AST/ALT not quantified/16  CT A/P this year showed cirrhotic morphology, last year had normal findings  ED bedside U/S without pocket to TAP per signout  Abd US showing ascites.    Plan   - Hepatology consulted, appreciate recs  - diagnostic and therapeutic paracentesis done yesterday   - Daily MELD score Recently diagnosed with liver cirrhosis (2025?), per chart note by Dr. Mckeon and review on [facility] -  etiology of his cirrhosis is unclear--no work-up was completed on [place] except bloodwork and Fibroscan, the latter uninterpretable due to jordy-hepatic ascites.   , INR 1.18, AST/ALT not quantified/16  CT A/P this year showed cirrhotic morphology, last year had normal findings  ED bedside U/S without pocket to TAP per signout  Abd US showing ascites.  Fluid study: WBC 22, low concern for SBP    Plan   - Hepatology consulted, appreciate recs  - diagnostic and therapeutic paracentesis done yesterday  - Daily MELD score

## 2025-05-31 NOTE — PROGRESS NOTE ADULT - PROBLEM SELECTOR PLAN 6
Hx of gastric cancer treated with chemotherapy 5 years ago now w/ feeding J-tube, previously followed Dr. Saravanan Cabello and Dr. Chema Richmond. Currently followed Heme/onc: Dr. Henry.  - No acute intervention, continue outpatient management  - C/w tube feeds- resumed and give insulin accordingly (as in diabetes below) Patient with hx of AAA. MRI abdomen of interval growth of AAA sac from 6.1 cm to 6.4 cm as well as a L internal iliac aneurysm measuring 2.1 cm.    Vascular following     - Obtain CTA A/P for assessment of AAA iso interval growth and iliac aneurysm

## 2025-05-31 NOTE — PROGRESS NOTE ADULT - PROBLEM SELECTOR PLAN 4
Edema of unknown etiology, +4 pitting in b/l LE extending from mid thigh to feet  Despite being on Furosemide and Spironolactone, persistent edema; Alb 2.9 not consistent w/ this level of edema  TTE in 10/24 w/ EF 65%  TTE 5/27 revealed enlarged RV cavity with moderate mitral and tricuspid regurgitation. Nl LV thickness and cavity size. EF 52%.  Possibly i/s/o liver cirrhosis vs chronic CHF   - strict I/Os   - c/w RLE cellulitis management   - c/w spironolactone - holding lasix today pending para

## 2025-05-31 NOTE — PROGRESS NOTE ADULT - SUBJECTIVE AND OBJECTIVE BOX
Subjective:   Patient reports feeling well. He has no abdominal pain, nausea or vomiting.    ROS: Denies headache, blurred vision, chest pain, SOB, abdominal pain, nausea or vomiting       Social   apixaban 2.5 milliGRAM(s) Oral every 12 hours  dextrose 5%. 1000 milliLiter(s) IV Continuous <Continuous>  dextrose 5%. 1000 milliLiter(s) IV Continuous <Continuous>  dextrose 50% Injectable 25 Gram(s) IV Push once  dextrose 50% Injectable 12.5 Gram(s) IV Push once  dextrose 50% Injectable 25 Gram(s) IV Push once  dextrose Oral Gel 15 Gram(s) Oral once PRN  glucagon  Injectable 1 milliGRAM(s) IntraMuscular once  insulin lispro (ADMELOG) corrective regimen sliding scale   SubCutaneous at bedtime  insulin lispro (ADMELOG) corrective regimen sliding scale   SubCutaneous three times a day before meals  levothyroxine 100 MICROGram(s) Oral daily  multivitamin 1 Tablet(s) Oral daily  polyethylene glycol 3350 17 Gram(s) Oral daily PRN  senna 2 Tablet(s) Oral at bedtime  sodium phosphate 30 milliMole(s)/500 mL IVPB 30 milliMole(s) IV Intermittent once  spironolactone 25 milliGRAM(s) Oral every 24 hours  thiamine 100 milliGRAM(s) Oral every 24 hours  zolpidem 5 milliGRAM(s) Oral at bedtime PRN  apixaban 2.5 milliGRAM(s) Oral every 12 hours  dextrose 5%. 1000 milliLiter(s) IV Continuous <Continuous>  dextrose 5%. 1000 milliLiter(s) IV Continuous <Continuous>  dextrose 50% Injectable 25 Gram(s) IV Push once  dextrose 50% Injectable 12.5 Gram(s) IV Push once  dextrose 50% Injectable 25 Gram(s) IV Push once  dextrose Oral Gel 15 Gram(s) Oral once PRN  glucagon  Injectable 1 milliGRAM(s) IntraMuscular once  insulin lispro (ADMELOG) corrective regimen sliding scale   SubCutaneous at bedtime  insulin lispro (ADMELOG) corrective regimen sliding scale   SubCutaneous three times a day before meals  levothyroxine 100 MICROGram(s) Oral daily  multivitamin 1 Tablet(s) Oral daily  polyethylene glycol 3350 17 Gram(s) Oral daily PRN  senna 2 Tablet(s) Oral at bedtime  sodium phosphate 30 milliMole(s)/500 mL IVPB 30 milliMole(s) IV Intermittent once  spironolactone 25 milliGRAM(s) Oral every 24 hours  thiamine 100 milliGRAM(s) Oral every 24 hours  zolpidem 5 milliGRAM(s) Oral at bedtime PRN      Allergies    No Known Allergies    Intolerances        Vital Signs Last 24 Hrs  T(C): 36.6 (31 May 2025 06:31), Max: 36.7 (30 May 2025 12:52)  T(F): 97.9 (31 May 2025 06:31), Max: 98 (30 May 2025 12:52)  HR: 64 (31 May 2025 06:31) (64 - 90)  BP: 115/70 (31 May 2025 06:31) (100/65 - 115/70)  BP(mean): 85 (31 May 2025 06:31) (85 - 85)  RR: 17 (31 May 2025 06:31) (17 - 20)  SpO2: 96% (31 May 2025 06:31) (96% - 99%)    Parameters below as of 31 May 2025 06:31  Patient On (Oxygen Delivery Method): room air      I&O's Summary    30 May 2025 07:01  -  31 May 2025 07:00  --------------------------------------------------------  IN: 172 mL / OUT: 0 mL / NET: 172 mL          Physical Exam:  General: NAD, resting comfortably in bed  Pulmonary: No respiratory distress, nonlabored breathing, on room air  Cardiovascular: NSR  Abdominal: Soft, mildly distended, J-tube over RLQ capped, no palpable pulsatile mass  Extremities: WWP, 1+ pitting edema bilaterally  Pulses:  palpable fem/pop bilaterally, LLE DP/PT palpable, RLE DP/PT biphasic doppler      LABS:                        11.0   5.75  )-----------( 183      ( 31 May 2025 07:35 )             31.9     05-31    136  |  103  |  22  ----------------------------<  140[H]  5.1   |  24  |  0.80    Ca    8.1[L]      31 May 2025 07:35  Phos  2.2     05-31  Mg     2.4     05-31    TPro  6.3  /  Alb  2.5[L]  /  TBili  1.2  /  DBili  x   /  AST  26  /  ALT  <5[L]  /  AlkPhos  118  05-31    PT/INR - ( 31 May 2025 07:35 )   PT: 14.3 sec;   INR: 1.22          PTT - ( 31 May 2025 07:35 )  PTT:30.8 sec          A/P: 74y YO Male    Recommendations:  -  - Discussed with Chief and Attending  - Call [phone] with any questions or concerns

## 2025-05-31 NOTE — PROGRESS NOTE ADULT - PROBLEM SELECTOR PLAN 8
Follows Dr. Mckeon with very well controlled diabetes, last saw this AM w/ positive levels.   Per [facility] - regimen is NPH 18 units at night, Lispro 3 units while on tube feeds  - Start patient on low dose insulin sliding scale tonight   - q6h FS  - endocrine recs for insulin based on tube feeds, today changed to nph 14u, no lispro, mISS Hx of HTN, was taking Losartan 50mg but has since discontinued. Normotensive.  - No acute intervention, continue to monitor

## 2025-06-01 LAB
ALBUMIN SERPL ELPH-MCNC: 2.6 G/DL — LOW (ref 3.3–5)
ALP SERPL-CCNC: 118 U/L — SIGNIFICANT CHANGE UP (ref 40–120)
ALT FLD-CCNC: <5 U/L — LOW (ref 10–45)
ANION GAP SERPL CALC-SCNC: 10 MMOL/L — SIGNIFICANT CHANGE UP (ref 5–17)
AST SERPL-CCNC: 30 U/L — SIGNIFICANT CHANGE UP (ref 10–40)
BASOPHILS # BLD AUTO: 0.06 K/UL — SIGNIFICANT CHANGE UP (ref 0–0.2)
BASOPHILS NFR BLD AUTO: 1.1 % — SIGNIFICANT CHANGE UP (ref 0–2)
BILIRUB SERPL-MCNC: 1.2 MG/DL — SIGNIFICANT CHANGE UP (ref 0.2–1.2)
BUN SERPL-MCNC: 23 MG/DL — SIGNIFICANT CHANGE UP (ref 7–23)
CALCIUM SERPL-MCNC: 8.1 MG/DL — LOW (ref 8.4–10.5)
CHLORIDE SERPL-SCNC: 100 MMOL/L — SIGNIFICANT CHANGE UP (ref 96–108)
CO2 SERPL-SCNC: 23 MMOL/L — SIGNIFICANT CHANGE UP (ref 22–31)
CREAT SERPL-MCNC: 0.81 MG/DL — SIGNIFICANT CHANGE UP (ref 0.5–1.3)
EGFR: 92 ML/MIN/1.73M2 — SIGNIFICANT CHANGE UP
EGFR: 92 ML/MIN/1.73M2 — SIGNIFICANT CHANGE UP
EOSINOPHIL # BLD AUTO: 0.15 K/UL — SIGNIFICANT CHANGE UP (ref 0–0.5)
EOSINOPHIL NFR BLD AUTO: 2.7 % — SIGNIFICANT CHANGE UP (ref 0–6)
GLUCOSE SERPL-MCNC: 133 MG/DL — HIGH (ref 70–99)
HCT VFR BLD CALC: 32.8 % — LOW (ref 39–50)
HGB BLD-MCNC: 11.3 G/DL — LOW (ref 13–17)
IMM GRANULOCYTES NFR BLD AUTO: 0.4 % — SIGNIFICANT CHANGE UP (ref 0–0.9)
INR BLD: 1.27 — HIGH (ref 0.85–1.16)
LYMPHOCYTES # BLD AUTO: 0.71 K/UL — LOW (ref 1–3.3)
LYMPHOCYTES # BLD AUTO: 12.7 % — LOW (ref 13–44)
MAGNESIUM SERPL-MCNC: 2.4 MG/DL — SIGNIFICANT CHANGE UP (ref 1.6–2.6)
MCHC RBC-ENTMCNC: 34.5 G/DL — SIGNIFICANT CHANGE UP (ref 32–36)
MCHC RBC-ENTMCNC: 35.9 PG — HIGH (ref 27–34)
MCV RBC AUTO: 104.1 FL — HIGH (ref 80–100)
MELD SCORE WITH DIALYSIS: 25 POINTS — SIGNIFICANT CHANGE UP
MELD SCORE WITHOUT DIALYSIS: 10 POINTS — SIGNIFICANT CHANGE UP
MONOCYTES # BLD AUTO: 0.42 K/UL — SIGNIFICANT CHANGE UP (ref 0–0.9)
MONOCYTES NFR BLD AUTO: 7.5 % — SIGNIFICANT CHANGE UP (ref 2–14)
NEUTROPHILS # BLD AUTO: 4.22 K/UL — SIGNIFICANT CHANGE UP (ref 1.8–7.4)
NEUTROPHILS NFR BLD AUTO: 75.6 % — SIGNIFICANT CHANGE UP (ref 43–77)
NRBC BLD AUTO-RTO: 0 /100 WBCS — SIGNIFICANT CHANGE UP (ref 0–0)
PHOSPHATE SERPL-MCNC: 2.9 MG/DL — SIGNIFICANT CHANGE UP (ref 2.5–4.5)
PLATELET # BLD AUTO: 178 K/UL — SIGNIFICANT CHANGE UP (ref 150–400)
POTASSIUM SERPL-MCNC: 4.7 MMOL/L — SIGNIFICANT CHANGE UP (ref 3.5–5.3)
POTASSIUM SERPL-SCNC: 4.7 MMOL/L — SIGNIFICANT CHANGE UP (ref 3.5–5.3)
PROT SERPL-MCNC: 6.6 G/DL — SIGNIFICANT CHANGE UP (ref 6–8.3)
PROTHROM AB SERPL-ACNC: 14.6 SEC — HIGH (ref 9.9–13.4)
RBC # BLD: 3.15 M/UL — LOW (ref 4.2–5.8)
RBC # FLD: 17.2 % — HIGH (ref 10.3–14.5)
SODIUM SERPL-SCNC: 133 MMOL/L — LOW (ref 135–145)
WBC # BLD: 5.58 K/UL — SIGNIFICANT CHANGE UP (ref 3.8–10.5)
WBC # FLD AUTO: 5.58 K/UL — SIGNIFICANT CHANGE UP (ref 3.8–10.5)

## 2025-06-01 PROCEDURE — 74174 CTA ABD&PLVS W/CONTRAST: CPT | Mod: 26

## 2025-06-01 PROCEDURE — 99233 SBSQ HOSP IP/OBS HIGH 50: CPT

## 2025-06-01 RX ADMIN — Medication 100 MICROGRAM(S): at 06:06

## 2025-06-01 RX ADMIN — Medication 25 MILLIGRAM(S): at 17:54

## 2025-06-01 RX ADMIN — Medication 1 TABLET(S): at 09:35

## 2025-06-01 RX ADMIN — Medication 100 MILLIGRAM(S): at 06:05

## 2025-06-01 RX ADMIN — APIXABAN 2.5 MILLIGRAM(S): 2.5 TABLET, FILM COATED ORAL at 06:05

## 2025-06-01 RX ADMIN — Medication 5 MILLIGRAM(S): at 21:52

## 2025-06-01 RX ADMIN — APIXABAN 2.5 MILLIGRAM(S): 2.5 TABLET, FILM COATED ORAL at 17:54

## 2025-06-01 NOTE — PROGRESS NOTE ADULT - PROBLEM SELECTOR PLAN 11
Hx of portal vein thrombosis and has since been on Eliquis 5mg BID. Dose decreased to 2.5mg iso worsening liver function and INR.   - C/w Eliquis 2.5mg BID

## 2025-06-01 NOTE — PROGRESS NOTE ADULT - PROBLEM SELECTOR PLAN 4
Edema of unknown etiology, +4 pitting in b/l LE extending from mid thigh to feet  Despite being on Furosemide and Spironolactone, persistent edema; Alb 2.9 not consistent w/ this level of edema  TTE in 10/24 w/ EF 65%  TTE 5/27 revealed enlarged RV cavity with moderate mitral and tricuspid regurgitation. Nl LV thickness and cavity size. EF 52%.  Possibly i/s/o liver cirrhosis vs chronic CHF   - strict I/Os   - c/w RLE cellulitis management   - c/w spironolactone, resume lasix

## 2025-06-01 NOTE — PROGRESS NOTE ADULT - SUBJECTIVE AND OBJECTIVE BOX
Subjective: patient seen and examined at bedside, no acute complaints    ROS: Denies headache, blurred vision, chest pain, SOB, abdominal pain, nausea or vomiting       Social   apixaban 2.5 milliGRAM(s) Oral every 12 hours  dextrose 5%. 1000 milliLiter(s) IV Continuous <Continuous>  dextrose 5%. 1000 milliLiter(s) IV Continuous <Continuous>  dextrose 50% Injectable 25 Gram(s) IV Push once  dextrose 50% Injectable 12.5 Gram(s) IV Push once  dextrose 50% Injectable 25 Gram(s) IV Push once  dextrose Oral Gel 15 Gram(s) Oral once PRN  glucagon  Injectable 1 milliGRAM(s) IntraMuscular once  insulin lispro (ADMELOG) corrective regimen sliding scale   SubCutaneous at bedtime  insulin lispro (ADMELOG) corrective regimen sliding scale   SubCutaneous three times a day before meals  levothyroxine 100 MICROGram(s) Oral daily  multivitamin 1 Tablet(s) Oral daily  polyethylene glycol 3350 17 Gram(s) Oral daily PRN  senna 2 Tablet(s) Oral at bedtime  spironolactone 25 milliGRAM(s) Oral every 24 hours  thiamine 100 milliGRAM(s) Oral every 24 hours  zolpidem 5 milliGRAM(s) Oral at bedtime PRN  apixaban 2.5 milliGRAM(s) Oral every 12 hours  dextrose 5%. 1000 milliLiter(s) IV Continuous <Continuous>  dextrose 5%. 1000 milliLiter(s) IV Continuous <Continuous>  dextrose 50% Injectable 25 Gram(s) IV Push once  dextrose 50% Injectable 12.5 Gram(s) IV Push once  dextrose 50% Injectable 25 Gram(s) IV Push once  dextrose Oral Gel 15 Gram(s) Oral once PRN  glucagon  Injectable 1 milliGRAM(s) IntraMuscular once  insulin lispro (ADMELOG) corrective regimen sliding scale   SubCutaneous at bedtime  insulin lispro (ADMELOG) corrective regimen sliding scale   SubCutaneous three times a day before meals  levothyroxine 100 MICROGram(s) Oral daily  multivitamin 1 Tablet(s) Oral daily  polyethylene glycol 3350 17 Gram(s) Oral daily PRN  senna 2 Tablet(s) Oral at bedtime  spironolactone 25 milliGRAM(s) Oral every 24 hours  thiamine 100 milliGRAM(s) Oral every 24 hours  zolpidem 5 milliGRAM(s) Oral at bedtime PRN      Allergies    No Known Allergies    Intolerances        Vital Signs Last 24 Hrs  T(C): 36.5 (01 Jun 2025 09:38), Max: 36.7 (31 May 2025 11:14)  T(F): 97.7 (01 Jun 2025 09:38), Max: 98 (31 May 2025 11:14)  HR: 64 (01 Jun 2025 09:38) (60 - 64)  BP: 116/76 (01 Jun 2025 09:38) (106/61 - 118/71)  BP(mean): --  RR: 18 (01 Jun 2025 09:38) (18 - 18)  SpO2: 97% (01 Jun 2025 09:38) (95% - 99%)    Parameters below as of 01 Jun 2025 09:38  Patient On (Oxygen Delivery Method): room air      I&O's Summary        Physical Exam:  General: NAD, resting comfortably in bed  Pulmonary: No respiratory distress, nonlabored breathing, on room air  Cardiovascular: NSR  Abdominal: Soft, NT, ND. pulsatile abd mass.  Extremities: WWP      LABS:                        11.3   5.58  )-----------( 178      ( 01 Jun 2025 07:10 )             32.8     06-01    133[L]  |  100  |  23  ----------------------------<  133[H]  4.7   |  23  |  0.81    Ca    8.1[L]      01 Jun 2025 07:10  Phos  2.9     06-01  Mg     2.4     06-01    TPro  6.6  /  Alb  2.6[L]  /  TBili  1.2  /  DBili  x   /  AST  30  /  ALT  <5[L]  /  AlkPhos  118  06-01    PT/INR - ( 01 Jun 2025 07:10 )   PT: 14.6 sec;   INR: 1.27          PTT - ( 31 May 2025 07:35 )  PTT:30.8 sec          A/P:  72M w/ PMH of gastric cancer (s/p feeding J-tube), portal vein thrombosis (on Eliquis) AAA (s/p EVAR in 2023), T2DM, hypothyroidism, HTN, anemia, BPH, type 2 endoleak repair, s/p embolization of right internal iliac artery branch (10/24), cirrhosis, who presents to the ED with worsening bilateral leg swelling and gen weakness. Patient is currently undergoing evaluation of abdominal ascitics in the setting of recently diagnosed cirrhosis. 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 Pending CTA to further eval endoleak.    Recommendations:  - Obtain CTA A/P to eval for endoleak  - Discussed with Chief and Attending  - Call [phone] with any questions or concerns Subjective: patient seen and examined at bedside, no acute complaints    ROS: Denies headache, blurred vision, chest pain, SOB, abdominal pain, nausea or vomiting       Social   apixaban 2.5 milliGRAM(s) Oral every 12 hours  dextrose 5%. 1000 milliLiter(s) IV Continuous <Continuous>  dextrose 5%. 1000 milliLiter(s) IV Continuous <Continuous>  dextrose 50% Injectable 25 Gram(s) IV Push once  dextrose 50% Injectable 12.5 Gram(s) IV Push once  dextrose 50% Injectable 25 Gram(s) IV Push once  dextrose Oral Gel 15 Gram(s) Oral once PRN  glucagon  Injectable 1 milliGRAM(s) IntraMuscular once  insulin lispro (ADMELOG) corrective regimen sliding scale   SubCutaneous at bedtime  insulin lispro (ADMELOG) corrective regimen sliding scale   SubCutaneous three times a day before meals  levothyroxine 100 MICROGram(s) Oral daily  multivitamin 1 Tablet(s) Oral daily  polyethylene glycol 3350 17 Gram(s) Oral daily PRN  senna 2 Tablet(s) Oral at bedtime  spironolactone 25 milliGRAM(s) Oral every 24 hours  thiamine 100 milliGRAM(s) Oral every 24 hours  zolpidem 5 milliGRAM(s) Oral at bedtime PRN  apixaban 2.5 milliGRAM(s) Oral every 12 hours  dextrose 5%. 1000 milliLiter(s) IV Continuous <Continuous>  dextrose 5%. 1000 milliLiter(s) IV Continuous <Continuous>  dextrose 50% Injectable 25 Gram(s) IV Push once  dextrose 50% Injectable 12.5 Gram(s) IV Push once  dextrose 50% Injectable 25 Gram(s) IV Push once  dextrose Oral Gel 15 Gram(s) Oral once PRN  glucagon  Injectable 1 milliGRAM(s) IntraMuscular once  insulin lispro (ADMELOG) corrective regimen sliding scale   SubCutaneous at bedtime  insulin lispro (ADMELOG) corrective regimen sliding scale   SubCutaneous three times a day before meals  levothyroxine 100 MICROGram(s) Oral daily  multivitamin 1 Tablet(s) Oral daily  polyethylene glycol 3350 17 Gram(s) Oral daily PRN  senna 2 Tablet(s) Oral at bedtime  spironolactone 25 milliGRAM(s) Oral every 24 hours  thiamine 100 milliGRAM(s) Oral every 24 hours  zolpidem 5 milliGRAM(s) Oral at bedtime PRN      Allergies    No Known Allergies    Intolerances        Vital Signs Last 24 Hrs  T(C): 36.5 (01 Jun 2025 09:38), Max: 36.7 (31 May 2025 11:14)  T(F): 97.7 (01 Jun 2025 09:38), Max: 98 (31 May 2025 11:14)  HR: 64 (01 Jun 2025 09:38) (60 - 64)  BP: 116/76 (01 Jun 2025 09:38) (106/61 - 118/71)  BP(mean): --  RR: 18 (01 Jun 2025 09:38) (18 - 18)  SpO2: 97% (01 Jun 2025 09:38) (95% - 99%)    Parameters below as of 01 Jun 2025 09:38  Patient On (Oxygen Delivery Method): room air      I&O's Summary        Physical Exam:  General: NAD, resting comfortably in bed  Pulmonary: No respiratory distress, nonlabored breathing, on room air  Cardiovascular: NSR  Abdominal: Soft, NT, ND. pulsatile abd mass.  Extremities: WWP      LABS:                        11.3   5.58  )-----------( 178      ( 01 Jun 2025 07:10 )             32.8     06-01    133[L]  |  100  |  23  ----------------------------<  133[H]  4.7   |  23  |  0.81    Ca    8.1[L]      01 Jun 2025 07:10  Phos  2.9     06-01  Mg     2.4     06-01    TPro  6.6  /  Alb  2.6[L]  /  TBili  1.2  /  DBili  x   /  AST  30  /  ALT  <5[L]  /  AlkPhos  118  06-01    PT/INR - ( 01 Jun 2025 07:10 )   PT: 14.6 sec;   INR: 1.27          PTT - ( 31 May 2025 07:35 )  PTT:30.8 sec          A/P:  72M w/ PMH of HTN, T2DM, hypothyroidism, BPH, anemia, gastric cancer (s/p feeding J-tube), portal vein thrombosis (on Eliquis), cirrhosis, AAA (s/p EVAR in 2023, had type 2 endoleak s/p embolization of R internal iliac artery 2024), presented to the ED with worsening bilateral leg swelling and generalized weakness. Patient is currently undergoing evaluation of abdominal ascitics in the setting of recently diagnosed cirrhosis. 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 Pending CTA to further eval.    Recommendations:  - Obtain CTA A/P to eval AAA, L internal iliac aneurysm, and further characterize endoleak  - Discussed with Chief and Attending  - Call [phone] with any questions or concerns Subjective: patient seen and examined at bedside, no acute complaints    ROS: Denies headache, blurred vision, chest pain, SOB, abdominal pain, nausea or vomiting       Social   apixaban 2.5 milliGRAM(s) Oral every 12 hours  dextrose 5%. 1000 milliLiter(s) IV Continuous <Continuous>  dextrose 5%. 1000 milliLiter(s) IV Continuous <Continuous>  dextrose 50% Injectable 25 Gram(s) IV Push once  dextrose 50% Injectable 12.5 Gram(s) IV Push once  dextrose 50% Injectable 25 Gram(s) IV Push once  dextrose Oral Gel 15 Gram(s) Oral once PRN  glucagon  Injectable 1 milliGRAM(s) IntraMuscular once  insulin lispro (ADMELOG) corrective regimen sliding scale   SubCutaneous at bedtime  insulin lispro (ADMELOG) corrective regimen sliding scale   SubCutaneous three times a day before meals  levothyroxine 100 MICROGram(s) Oral daily  multivitamin 1 Tablet(s) Oral daily  polyethylene glycol 3350 17 Gram(s) Oral daily PRN  senna 2 Tablet(s) Oral at bedtime  spironolactone 25 milliGRAM(s) Oral every 24 hours  thiamine 100 milliGRAM(s) Oral every 24 hours  zolpidem 5 milliGRAM(s) Oral at bedtime PRN  apixaban 2.5 milliGRAM(s) Oral every 12 hours  dextrose 5%. 1000 milliLiter(s) IV Continuous <Continuous>  dextrose 5%. 1000 milliLiter(s) IV Continuous <Continuous>  dextrose 50% Injectable 25 Gram(s) IV Push once  dextrose 50% Injectable 12.5 Gram(s) IV Push once  dextrose 50% Injectable 25 Gram(s) IV Push once  dextrose Oral Gel 15 Gram(s) Oral once PRN  glucagon  Injectable 1 milliGRAM(s) IntraMuscular once  insulin lispro (ADMELOG) corrective regimen sliding scale   SubCutaneous at bedtime  insulin lispro (ADMELOG) corrective regimen sliding scale   SubCutaneous three times a day before meals  levothyroxine 100 MICROGram(s) Oral daily  multivitamin 1 Tablet(s) Oral daily  polyethylene glycol 3350 17 Gram(s) Oral daily PRN  senna 2 Tablet(s) Oral at bedtime  spironolactone 25 milliGRAM(s) Oral every 24 hours  thiamine 100 milliGRAM(s) Oral every 24 hours  zolpidem 5 milliGRAM(s) Oral at bedtime PRN      Allergies    No Known Allergies    Intolerances        Vital Signs Last 24 Hrs  T(C): 36.5 (01 Jun 2025 09:38), Max: 36.7 (31 May 2025 11:14)  T(F): 97.7 (01 Jun 2025 09:38), Max: 98 (31 May 2025 11:14)  HR: 64 (01 Jun 2025 09:38) (60 - 64)  BP: 116/76 (01 Jun 2025 09:38) (106/61 - 118/71)  BP(mean): --  RR: 18 (01 Jun 2025 09:38) (18 - 18)  SpO2: 97% (01 Jun 2025 09:38) (95% - 99%)    Parameters below as of 01 Jun 2025 09:38  Patient On (Oxygen Delivery Method): room air      I&O's Summary        Physical Exam:  General: NAD, resting comfortably in bed  Pulmonary: No respiratory distress, nonlabored breathing, on room air  Cardiovascular: NSR  Abdominal: Soft, NT, ND. pulsatile abd mass.  Extremities: WWP      LABS:                        11.3   5.58  )-----------( 178      ( 01 Jun 2025 07:10 )             32.8     06-01    133[L]  |  100  |  23  ----------------------------<  133[H]  4.7   |  23  |  0.81    Ca    8.1[L]      01 Jun 2025 07:10  Phos  2.9     06-01  Mg     2.4     06-01    TPro  6.6  /  Alb  2.6[L]  /  TBili  1.2  /  DBili  x   /  AST  30  /  ALT  <5[L]  /  AlkPhos  118  06-01    PT/INR - ( 01 Jun 2025 07:10 )   PT: 14.6 sec;   INR: 1.27          PTT - ( 31 May 2025 07:35 )  PTT:30.8 sec          A/P:  72M w/ PMH of HTN, T2DM, hypothyroidism, BPH, anemia, gastric cancer (s/p feeding J-tube), portal vein thrombosis (on Eliquis), cirrhosis, AAA (s/p EVAR in 2023, had type 2 endoleak s/p embolization of R internal iliac artery 2024), presented to the ED with worsening bilateral leg swelling and generalized weakness. Patient is currently undergoing evaluation of abdominal ascitics in the setting of recently diagnosed cirrhosis. 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 Pending CTA to further eval.    Recommendations:  - Obtain CTA A/P to eval AAA, L internal iliac aneurysm, and further characterize endoleak  - Discussed with Chief and Attending  - Call [phone] with any questions or concerns    **Update  - CTA A/P (6/1/25) reviewed  - Plan for endoleak repair outpatient once medically optimized  - Patient should follow up with Dr. Saenz within 2-3 weeks after discharge - [address], [facility], [address], [place] [address] | [phone] | Fax: [phone] Subjective: patient seen and examined at bedside, no acute complaints    ROS: Denies headache, blurred vision, chest pain, SOB, abdominal pain, nausea or vomiting       Social   apixaban 2.5 milliGRAM(s) Oral every 12 hours  dextrose 5%. 1000 milliLiter(s) IV Continuous <Continuous>  dextrose 5%. 1000 milliLiter(s) IV Continuous <Continuous>  dextrose 50% Injectable 25 Gram(s) IV Push once  dextrose 50% Injectable 12.5 Gram(s) IV Push once  dextrose 50% Injectable 25 Gram(s) IV Push once  dextrose Oral Gel 15 Gram(s) Oral once PRN  glucagon  Injectable 1 milliGRAM(s) IntraMuscular once  insulin lispro (ADMELOG) corrective regimen sliding scale   SubCutaneous at bedtime  insulin lispro (ADMELOG) corrective regimen sliding scale   SubCutaneous three times a day before meals  levothyroxine 100 MICROGram(s) Oral daily  multivitamin 1 Tablet(s) Oral daily  polyethylene glycol 3350 17 Gram(s) Oral daily PRN  senna 2 Tablet(s) Oral at bedtime  spironolactone 25 milliGRAM(s) Oral every 24 hours  thiamine 100 milliGRAM(s) Oral every 24 hours  zolpidem 5 milliGRAM(s) Oral at bedtime PRN  apixaban 2.5 milliGRAM(s) Oral every 12 hours  dextrose 5%. 1000 milliLiter(s) IV Continuous <Continuous>  dextrose 5%. 1000 milliLiter(s) IV Continuous <Continuous>  dextrose 50% Injectable 25 Gram(s) IV Push once  dextrose 50% Injectable 12.5 Gram(s) IV Push once  dextrose 50% Injectable 25 Gram(s) IV Push once  dextrose Oral Gel 15 Gram(s) Oral once PRN  glucagon  Injectable 1 milliGRAM(s) IntraMuscular once  insulin lispro (ADMELOG) corrective regimen sliding scale   SubCutaneous at bedtime  insulin lispro (ADMELOG) corrective regimen sliding scale   SubCutaneous three times a day before meals  levothyroxine 100 MICROGram(s) Oral daily  multivitamin 1 Tablet(s) Oral daily  polyethylene glycol 3350 17 Gram(s) Oral daily PRN  senna 2 Tablet(s) Oral at bedtime  spironolactone 25 milliGRAM(s) Oral every 24 hours  thiamine 100 milliGRAM(s) Oral every 24 hours  zolpidem 5 milliGRAM(s) Oral at bedtime PRN      Allergies    No Known Allergies    Intolerances        Vital Signs Last 24 Hrs  T(C): 36.5 (01 Jun 2025 09:38), Max: 36.7 (31 May 2025 11:14)  T(F): 97.7 (01 Jun 2025 09:38), Max: 98 (31 May 2025 11:14)  HR: 64 (01 Jun 2025 09:38) (60 - 64)  BP: 116/76 (01 Jun 2025 09:38) (106/61 - 118/71)  BP(mean): --  RR: 18 (01 Jun 2025 09:38) (18 - 18)  SpO2: 97% (01 Jun 2025 09:38) (95% - 99%)    Parameters below as of 01 Jun 2025 09:38  Patient On (Oxygen Delivery Method): room air      I&O's Summary        Physical Exam:  General: NAD, resting comfortably in bed  Pulmonary: No respiratory distress, nonlabored breathing, on room air  Cardiovascular: NSR  Abdominal: Soft, NT, ND. pulsatile abd mass.  Extremities: WWP      LABS:                        11.3   5.58  )-----------( 178      ( 01 Jun 2025 07:10 )             32.8     06-01    133[L]  |  100  |  23  ----------------------------<  133[H]  4.7   |  23  |  0.81    Ca    8.1[L]      01 Jun 2025 07:10  Phos  2.9     06-01  Mg     2.4     06-01    TPro  6.6  /  Alb  2.6[L]  /  TBili  1.2  /  DBili  x   /  AST  30  /  ALT  <5[L]  /  AlkPhos  118  06-01    PT/INR - ( 01 Jun 2025 07:10 )   PT: 14.6 sec;   INR: 1.27          PTT - ( 31 May 2025 07:35 )  PTT:30.8 sec          A/P:  72M w/ PMH of HTN, T2DM, hypothyroidism, BPH, anemia, gastric cancer (s/p feeding J-tube), portal vein thrombosis (on Eliquis), cirrhosis, AAA (s/p EVAR in 2023, had type 2 endoleak s/p embolization of R internal iliac artery 2024), presented to the ED with worsening bilateral leg swelling and generalized weakness. Patient is currently undergoing evaluation of abdominal ascitics in the setting of recently diagnosed cirrhosis. 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 Pending CTA to further eval.    Recommendations:  - Obtain CTA A/P to eval AAA, L internal iliac aneurysm, and further characterize endoleak  - Discussed with Chief and Attending  - Vascular surgery will sign off at this time, please re-consult as needed [phone]    **Update  - CTA A/P (6/1/25) reviewed  - Plan for endoleak repair outpatient once medically optimized  - Patient should follow up with Dr. Saenz within 2-3 weeks after discharge - [address], [address], [place] [address] | [phone] | Fax: [phone]

## 2025-06-01 NOTE — PROGRESS NOTE ADULT - SUBJECTIVE AND OBJECTIVE BOX
Patient is a 74y old  Male who presents with a chief complaint of RLE cellulitis (01 Jun 2025 10:15)    INTERVAL EVENTS:  Vascular surgery wanted a repeat CTA to further evaluate the endoleak     SUBJECTIVE:  Patient was seen and examined at bedside.    Review of systems: No CP, dyspnea, nausea or vomiting, dysuria, LE edema.     Diet, Regular:   Low Sodium  Tube Feeding Modality: Jejunostomy  Nessa [place] Standard 1.4  Continuous  Starting Tube Feed Rate mL per Hour: 40  Increase Tube Feed Rate by (mL): 20     Every 2 hours  Until Goal Tube Feed Rate (mL per Hour): 86  Tube Feed Duration (in Hours): 24  Tube Feed Start Time: 18:00  Tube Feed Stop Time: 09:00  Liquid Protein Supplement     Qty per Day:  1 (05-28-25 @ 14:07) [Active]      MEDICATIONS:  MEDICATIONS  (STANDING):  apixaban 2.5 milliGRAM(s) Oral every 12 hours  dextrose 5%. 1000 milliLiter(s) (100 mL/Hr) IV Continuous <Continuous>  dextrose 5%. 1000 milliLiter(s) (50 mL/Hr) IV Continuous <Continuous>  dextrose 50% Injectable 25 Gram(s) IV Push once  dextrose 50% Injectable 12.5 Gram(s) IV Push once  dextrose 50% Injectable 25 Gram(s) IV Push once  glucagon  Injectable 1 milliGRAM(s) IntraMuscular once  insulin lispro (ADMELOG) corrective regimen sliding scale   SubCutaneous three times a day before meals  insulin lispro (ADMELOG) corrective regimen sliding scale   SubCutaneous at bedtime  levothyroxine 100 MICROGram(s) Oral daily  multivitamin 1 Tablet(s) Oral daily  senna 2 Tablet(s) Oral at bedtime  spironolactone 25 milliGRAM(s) Oral every 24 hours  thiamine 100 milliGRAM(s) Oral every 24 hours    MEDICATIONS  (PRN):  dextrose Oral Gel 15 Gram(s) Oral once PRN Blood Glucose LESS THAN 70 milliGRAM(s)/deciliter  polyethylene glycol 3350 17 Gram(s) Oral daily PRN Constipation  zolpidem 5 milliGRAM(s) Oral at bedtime PRN Insomnia      Allergies    No Known Allergies    Intolerances        OBJECTIVE:  Vital Signs Last 24 Hrs  T(C): 36.5 (01 Jun 2025 09:38), Max: 36.5 (01 Jun 2025 09:38)  T(F): 97.7 (01 Jun 2025 09:38), Max: 97.7 (01 Jun 2025 09:38)  HR: 64 (01 Jun 2025 09:38) (60 - 64)  BP: 116/76 (01 Jun 2025 09:38) (106/61 - 118/71)  BP(mean): --  RR: 18 (01 Jun 2025 09:38) (18 - 18)  SpO2: 97% (01 Jun 2025 09:38) (95% - 99%)    Parameters below as of 01 Jun 2025 09:38  Patient On (Oxygen Delivery Method): room air      I&O's Summary      PHYSICAL EXAM:  Gen: Reclining in bed at time of exam, appears stated age  HEENT: NCAT, MMM, clear OP  Neck: supple, trachea at midline  CV: RRR, +S1/S2  Pulm: adequate respiratory effort, no increase in work of breathing  Abd: soft, slightly distended, J tube  Skin: warm and dry,  Neuro: AOx3, speaking in full sentences  Psych: affect and behavior appropriate, pleasant at time of interview    LABS:                        11.3   5.58  )-----------( 178      ( 01 Jun 2025 07:10 )             32.8     06-01    133[L]  |  100  |  23  ----------------------------<  133[H]  4.7   |  23  |  0.81    Ca    8.1[L]      01 Jun 2025 07:10  Phos  2.9     06-01  Mg     2.4     06-01    TPro  6.6  /  Alb  2.6[L]  /  TBili  1.2  /  DBili  x   /  AST  30  /  ALT  <5[L]  /  AlkPhos  118  06-01    LIVER FUNCTIONS - ( 01 Jun 2025 07:10 )  Alb: 2.6 g/dL / Pro: 6.6 g/dL / ALK PHOS: 118 U/L / ALT: <5 U/L / AST: 30 U/L / GGT: x           PT/INR - ( 01 Jun 2025 07:10 )   PT: 14.6 sec;   INR: 1.27          PTT - ( 31 May 2025 07:35 )  PTT:30.8 sec  CAPILLARY BLOOD GLUCOSE      POCT Blood Glucose.: 105 mg/dL (01 Jun 2025 17:18)  POCT Blood Glucose.: 110 mg/dL (01 Jun 2025 12:07)  POCT Blood Glucose.: 123 mg/dL (01 Jun 2025 08:49)  POCT Blood Glucose.: 130 mg/dL (31 May 2025 20:53)    Urinalysis Basic - ( 01 Jun 2025 07:10 )    Color: x / Appearance: x / SG: x / pH: x  Gluc: 133 mg/dL / Ketone: x  / Bili: x / Urobili: x   Blood: x / Protein: x / Nitrite: x   Leuk Esterase: x / RBC: x / WBC x   Sq Epi: x / Non Sq Epi: x / Bacteria: x        MICRODATA:    Culture - Body Fluid with Gram Stain (collected 30 May 2025 14:48)  Source: Body Fluid peritoneal fluid  Gram Stain (31 May 2025 00:38):    No polymorphonuclear cells seen    No organisms seen    by cytocentrifuge  Preliminary Report (31 May 2025 15:31):    No growth        RADIOLOGY/OTHER STUDIES:

## 2025-06-01 NOTE — PROGRESS NOTE ADULT - PROBLEM SELECTOR PLAN 1
Recently diagnosed with liver cirrhosis (2025?), per chart note by Dr. Mckeon and review on [facility] -  etiology of his cirrhosis is unclear--no work-up was completed on [place] except bloodwork and Fibroscan, the latter uninterpretable due to jordy-hepatic ascites.   , INR 1.18, AST/ALT not quantified/16  CT A/P this year showed cirrhotic morphology, last year had normal findings  ED bedside U/S without pocket to TAP per signout  Abd US showing ascites.  Fluid study: WBC 22, low concern for SBP    Plan   - Hepatology consulted, appreciate recs  - diagnostic and therapeutic paracentesis done   - Given the recent CTA showing large volume ascites, will monitor abdominal exam and consider a repeat paracentesis prior to discharge   - Daily MELD score

## 2025-06-01 NOTE — PROGRESS NOTE ADULT - PROBLEM SELECTOR PLAN 6
Patient with hx of AAA. MRI abdomen of interval growth of AAA sac from 6.1 cm to 6.4 cm as well as a L internal iliac aneurysm measuring 2.1 cm.    Vascular following   Initial CTA showing persistent Endoleak   Vascular surgery on board, which wanted another repeat CTA done today:   * Abdominal aortic aneurysm status post aortobiiliac stent graft repair with persistent endoleak. The excluded aneurysm sac measures 6.1 cm, not significantly changed since 2/1/2025. Bilateral internal iliac artery aneurysms are also unchanged since 2/1/2025. Please see full details in the body of the report.  * Cirrhosis.  * Anasarca. Large volume of ascites. Bilateral small pleural effusions. Marked subcutaneous edema, asymmetrically greater along the right flank compared to the left.  * The imaged lower esophagus contains fluid/debris and demonstrates mild wall thickening that may represent esophagitis.  Plan   Follow up Vascular surgery plan

## 2025-06-01 NOTE — PROGRESS NOTE ADULT - PROBLEM SELECTOR PLAN 3
RLE w/ erythema, warmth, no active purulence or drainage x 4 days  Dopplers b/l LE: No evidence of deep venous thrombosis in either lower extremity. Soft tissue swelling in both lower extremities.  Xray R foot and Tib/Fib: Osteopenia. No osseous destruction or periosteal reaction. No fracture or dislocation. There is a hallux valgus alignment with first MTP joint osteoarthrosis. Tibiotalar joint space narrowing is also identified. Second through fourth hammertoe deformities. Diffuse soft tissue swelling of the imaged lower extremity. Vascular calcification is present.  S/p Zosyn 3.375mg x1, Vancomycin 1000mg x1  Patient finished Cefazolin regimen on 5/28 [1g q8h (5/24 - 5/28)]  Afebrile, no WBC  - Blood cultures (5/24): No growth to date

## 2025-06-01 NOTE — PROGRESS NOTE ADULT - PROBLEM SELECTOR PLAN 2
Weakness, generalized and leg pain x 4 days, presents with decreased ability to walk, likely i/s/o abdominal distension and significant swelling in b/l LE. Reports taking additional doses of Spironolactone because of edema.   Admission K of 3.7, VBG 2.5.   Suspected to be 2/2  fluid overload with immobility, RLE cellulitis   Stroke workup negative per neuro, imaging findings without acute pathology.   MRI brain showed no acute abnormalities.  Nutritional workup so far negative    - PT/SW  - fall precautions

## 2025-06-01 NOTE — PROGRESS NOTE ADULT - PROBLEM SELECTOR PLAN 5
Per patient has had slurred speech x several days, no precipitating factor; *********RESOLVED***********  CTH: Arachnoid cyst and senescent cerebral changes. No acute intracranial hemorrhage.  CT Angio H: Left C6 ICA stenosis, 40-50%.  MRI head: No acute abnormalities  Neurology consulted in ED, no acute concern for stroke, will continue to follow

## 2025-06-02 LAB
ALBUMIN SERPL ELPH-MCNC: 2.6 G/DL — LOW (ref 3.3–5)
ALP SERPL-CCNC: 107 U/L — SIGNIFICANT CHANGE UP (ref 40–120)
ALT FLD-CCNC: 6 U/L — LOW (ref 10–45)
ANION GAP SERPL CALC-SCNC: 9 MMOL/L — SIGNIFICANT CHANGE UP (ref 5–17)
APTT BLD: 30.9 SEC — SIGNIFICANT CHANGE UP (ref 26.1–36.8)
AST SERPL-CCNC: 27 U/L — SIGNIFICANT CHANGE UP (ref 10–40)
BILIRUB SERPL-MCNC: 1.1 MG/DL — SIGNIFICANT CHANGE UP (ref 0.2–1.2)
BLD GP AB SCN SERPL QL: NEGATIVE — SIGNIFICANT CHANGE UP
BUN SERPL-MCNC: 24 MG/DL — HIGH (ref 7–23)
CALCIUM SERPL-MCNC: 8.1 MG/DL — LOW (ref 8.4–10.5)
CHLORIDE SERPL-SCNC: 101 MMOL/L — SIGNIFICANT CHANGE UP (ref 96–108)
CO2 SERPL-SCNC: 23 MMOL/L — SIGNIFICANT CHANGE UP (ref 22–31)
CREAT SERPL-MCNC: 0.82 MG/DL — SIGNIFICANT CHANGE UP (ref 0.5–1.3)
EGFR: 92 ML/MIN/1.73M2 — SIGNIFICANT CHANGE UP
EGFR: 92 ML/MIN/1.73M2 — SIGNIFICANT CHANGE UP
GLUCOSE SERPL-MCNC: 137 MG/DL — HIGH (ref 70–99)
HCT VFR BLD CALC: 31.7 % — LOW (ref 39–50)
HGB BLD-MCNC: 10.9 G/DL — LOW (ref 13–17)
INR BLD: 1.34 — HIGH (ref 0.85–1.16)
MAGNESIUM SERPL-MCNC: 2.3 MG/DL — SIGNIFICANT CHANGE UP (ref 1.6–2.6)
MCHC RBC-ENTMCNC: 34.4 G/DL — SIGNIFICANT CHANGE UP (ref 32–36)
MCHC RBC-ENTMCNC: 35.3 PG — HIGH (ref 27–34)
MCV RBC AUTO: 102.6 FL — HIGH (ref 80–100)
MELD SCORE WITH DIALYSIS: 25 POINTS — SIGNIFICANT CHANGE UP
MELD SCORE WITHOUT DIALYSIS: 10 POINTS — SIGNIFICANT CHANGE UP
NRBC BLD AUTO-RTO: 0 /100 WBCS — SIGNIFICANT CHANGE UP (ref 0–0)
PHOSPHATE SERPL-MCNC: 2.9 MG/DL — SIGNIFICANT CHANGE UP (ref 2.5–4.5)
PLATELET # BLD AUTO: 200 K/UL — SIGNIFICANT CHANGE UP (ref 150–400)
POTASSIUM SERPL-MCNC: 5.1 MMOL/L — SIGNIFICANT CHANGE UP (ref 3.5–5.3)
POTASSIUM SERPL-SCNC: 5.1 MMOL/L — SIGNIFICANT CHANGE UP (ref 3.5–5.3)
PROT SERPL-MCNC: 6 G/DL — SIGNIFICANT CHANGE UP (ref 6–8.3)
PROTHROM AB SERPL-ACNC: 15.6 SEC — HIGH (ref 9.9–13.4)
RBC # BLD: 3.09 M/UL — LOW (ref 4.2–5.8)
RBC # FLD: 17.3 % — HIGH (ref 10.3–14.5)
RH IG SCN BLD-IMP: NEGATIVE — SIGNIFICANT CHANGE UP
SODIUM SERPL-SCNC: 133 MMOL/L — LOW (ref 135–145)
WBC # BLD: 5.46 K/UL — SIGNIFICANT CHANGE UP (ref 3.8–10.5)
WBC # FLD AUTO: 5.46 K/UL — SIGNIFICANT CHANGE UP (ref 3.8–10.5)

## 2025-06-02 PROCEDURE — 99233 SBSQ HOSP IP/OBS HIGH 50: CPT

## 2025-06-02 PROCEDURE — 99232 SBSQ HOSP IP/OBS MODERATE 35: CPT | Mod: GC

## 2025-06-02 PROCEDURE — 99233 SBSQ HOSP IP/OBS HIGH 50: CPT | Mod: GC

## 2025-06-02 RX ADMIN — APIXABAN 2.5 MILLIGRAM(S): 2.5 TABLET, FILM COATED ORAL at 18:19

## 2025-06-02 RX ADMIN — APIXABAN 2.5 MILLIGRAM(S): 2.5 TABLET, FILM COATED ORAL at 05:38

## 2025-06-02 RX ADMIN — Medication 100 MICROGRAM(S): at 05:38

## 2025-06-02 RX ADMIN — Medication 1 TABLET(S): at 12:20

## 2025-06-02 RX ADMIN — Medication 5 MILLIGRAM(S): at 21:11

## 2025-06-02 RX ADMIN — Medication 25 MILLIGRAM(S): at 18:19

## 2025-06-02 RX ADMIN — Medication 100 MILLIGRAM(S): at 05:38

## 2025-06-02 NOTE — PROGRESS NOTE ADULT - PROBLEM SELECTOR PLAN 5
Per patient has had slurred speech x several days, no precipitating factor; *********RESOLVED***********  CTH: Arachnoid cyst and senescent cerebral changes. No acute intracranial hemorrhage.  CT Angio H: Left C6 ICA stenosis, 40-50%.  MRI head: No acute abnormalities  Neurology consulted in ED, no acute concern for stroke, will continue to follow  - MRI brain w/o acute pathology

## 2025-06-02 NOTE — PROGRESS NOTE ADULT - PROBLEM SELECTOR PLAN 1
Recently diagnosed with liver cirrhosis (2025?), per chart note by Dr. Mckeon and review on [facility] -  etiology of his cirrhosis is unclear--no work-up was completed on [place] except bloodwork and Fibroscan, the latter uninterpretable due to jordy-hepatic ascites.   , INR 1.18, AST/ALT not quantified/16  CT A/P this year showed cirrhotic morphology, last year had normal findings  ED bedside U/S without pocket to TAP per signout  Abd US showing ascites.  Fluid study: WBC 22, low concern for SBP    Plan   - Hepatology consulted, appreciate recs  - s/p diagnostic and therapeutic paracentesis done 5/30  - Daily MELD score

## 2025-06-02 NOTE — PROGRESS NOTE ADULT - PROBLEM SELECTOR PLAN 4
Edema of unknown etiology, +4 pitting in b/l LE extending from mid thigh to feet  Despite being on Furosemide and Spironolactone, persistent edema; Alb 2.9 not consistent w/ this level of edema  TTE in 10/24 w/ EF 65%  TTE 5/27 revealed enlarged RV cavity with moderate mitral and tricuspid regurgitation. Nl LV thickness and cavity size. EF 52%.  Possibly i/s/o liver cirrhosis vs chronic CHF       Plan   - strict I/Os   - c/w RLE cellulitis management   - c/w spironolactone   - c/w home lasix

## 2025-06-02 NOTE — PROGRESS NOTE ADULT - PROBLEM SELECTOR PLAN 6
Patient with hx of AAA. MRI abdomen of interval growth of AAA sac from 6.1 cm to 6.4 cm as well as a L internal iliac aneurysm measuring 2.1 cm.    Vascular following     - Obtain CTA A/P for assessment of AAA iso interval growth and iliac aneurysm Patient with hx of AAA. MRI abdomen of interval growth of AAA sac from 6.1 cm to 6.4 cm as well as a L internal iliac aneurysm measuring 2.1 cm.    Vascular following   CTA A/P: AAA s/p  aortobiiliac stent graft repair with persistent endoleak. aneurysm sac measures 6.1 cm, not significantly changed since 2/1/2025. Bilateral internal iliac artery aneurysms are also unchanged since 2/1/2025    - F/u outpatient with vascular surgery for aneurysm size monitoring

## 2025-06-02 NOTE — PROGRESS NOTE ADULT - SUBJECTIVE AND OBJECTIVE BOX
GASTROENTEROLOGY PROGRESS NOTE  Patient seen and examined at bedside.  Doing well this morning  Denies fever, chills, chest pain, shortness of breath, abd pain, N/V.  Eating well  eager to walk and do PT    PERTINENT REVIEW OF SYSTEMS:  CONSTITUTIONAL: No weakness, fevers or chills  HEENT: No visual changes; No vertigo or throat pain   GASTROINTESTINAL: As above.  NEUROLOGICAL: No numbness or weakness  SKIN: No itching, burning, rashes, or lesions     Allergies    No Known Allergies    Intolerances      MEDICATIONS:  MEDICATIONS  (STANDING):  apixaban 2.5 milliGRAM(s) Oral every 12 hours  dextrose 5%. 1000 milliLiter(s) (50 mL/Hr) IV Continuous <Continuous>  dextrose 5%. 1000 milliLiter(s) (100 mL/Hr) IV Continuous <Continuous>  dextrose 50% Injectable 25 Gram(s) IV Push once  dextrose 50% Injectable 12.5 Gram(s) IV Push once  dextrose 50% Injectable 25 Gram(s) IV Push once  glucagon  Injectable 1 milliGRAM(s) IntraMuscular once  insulin lispro (ADMELOG) corrective regimen sliding scale   SubCutaneous at bedtime  insulin lispro (ADMELOG) corrective regimen sliding scale   SubCutaneous three times a day before meals  levothyroxine 100 MICROGram(s) Oral daily  multivitamin 1 Tablet(s) Oral daily  senna 2 Tablet(s) Oral at bedtime  spironolactone 25 milliGRAM(s) Oral every 24 hours  thiamine 100 milliGRAM(s) Oral every 24 hours    MEDICATIONS  (PRN):  dextrose Oral Gel 15 Gram(s) Oral once PRN Blood Glucose LESS THAN 70 milliGRAM(s)/deciliter  polyethylene glycol 3350 17 Gram(s) Oral daily PRN Constipation  zolpidem 5 milliGRAM(s) Oral at bedtime PRN Insomnia    Vital Signs Last 24 Hrs  T(C): 36.6 (02 Jun 2025 12:26), Max: 36.7 (01 Jun 2025 21:00)  T(F): 97.9 (02 Jun 2025 12:26), Max: 98 (01 Jun 2025 21:00)  HR: 69 (02 Jun 2025 12:26) (61 - 72)  BP: 114/77 (02 Jun 2025 12:26) (114/72 - 115/75)  BP(mean): --  RR: 18 (02 Jun 2025 12:26) (18 - 18)  SpO2: 97% (02 Jun 2025 12:26) (97% - 98%)    Parameters below as of 02 Jun 2025 12:26  Patient On (Oxygen Delivery Method): room air        PHYSICAL EXAM:  General: No acute distress  Lungs: Normal respiratory effort and no intercostal retractions  Cardiovascular: RRR  Abdomen: Soft, non-tender, distended 2/2 pocket of ascites (R>L), +j-tube   Neurological: Alert and oriented x3  Skin: Warm and dry. No obvious rash      LABS:                        10.9   5.46  )-----------( 200      ( 02 Jun 2025 06:51 )             31.7     06-02    133[L]  |  101  |  24[H]  ----------------------------<  137[H]  5.1   |  23  |  0.82    Ca    8.1[L]      02 Jun 2025 06:51  Phos  2.9     06-02  Mg     2.3     06-02    TPro  6.0  /  Alb  2.6[L]  /  TBili  1.1  /  DBili  x   /  AST  27  /  ALT  6[L]  /  AlkPhos  107  06-02    PT/INR - ( 02 Jun 2025 06:51 )   PT: 15.6 sec;   INR: 1.34        PTT - ( 02 Jun 2025 06:51 )  PTT:30.9 sec    Urinalysis Basic - ( 02 Jun 2025 06:51 )    Color: x / Appearance: x / SG: x / pH: x  Gluc: 137 mg/dL / Ketone: x  / Bili: x / Urobili: x   Blood: x / Protein: x / Nitrite: x   Leuk Esterase: x / RBC: x / WBC x   Sq Epi: x / Non Sq Epi: x / Bacteria: x    RADIOLOGY & ADDITIONAL STUDIES:  Reviewed

## 2025-06-02 NOTE — PROGRESS NOTE ADULT - SUBJECTIVE AND OBJECTIVE BOX
SUBJECTIVE / INTERVAL HPI: Patient was seen and examined this morning.     Overnight events: No events     CAPILLARY BLOOD GLUCOSE & INSULIN RECEIVED  123 mg/dL (06-01 @ 08:49)  110 mg/dL (06-01 @ 12:07)  105 mg/dL (06-01 @ 17:18)  129 mg/dL (06-01 @ 21:24)  164 mg/dL (06-02 @ 00:20)  145 mg/dL (06-02 @ 06:11)  163 mg/dL (06-02 @ 08:46)      REVIEW OF SYSTEMS  Constitutional:  Negative fever, chills   Cardiovascular:  Negative for chest pain or palpitations.  Respiratory:  Negative for cough, wheezing, or shortness of breath.    Gastrointestinal:  Negative for nausea, vomiting, diarrhea, constipation, or abdominal pain.  Genitourinary:  Negative frequency, urgency or dysuria.  Neurologic:  No headache, confusion, dizziness, lightheadedness.    PHYSICAL EXAM  Vital Signs Last 24 Hrs  T(C): 36.7 (02 Jun 2025 05:40), Max: 36.7 (01 Jun 2025 21:00)  T(F): 98 (02 Jun 2025 05:40), Max: 98 (01 Jun 2025 21:00)  HR: 72 (02 Jun 2025 05:40) (61 - 72)  BP: 115/75 (02 Jun 2025 05:40) (114/72 - 115/75)  BP(mean): --  RR: 18 (02 Jun 2025 05:40) (18 - 18)  SpO2: 97% (02 Jun 2025 05:40) (97% - 98%)    Parameters below as of 02 Jun 2025 05:40  Patient On (Oxygen Delivery Method): room air        Constitutional: Awake, alert, in no acute distress.   HEENT: Normocephalic, atraumatic,   Respiratory: Lungs clear to ausculation bilaterally.   Cardiovascular: regular rhythm, normal S1 and S2, no audible murmurs.   GI: soft, non-tender, non-distended, bowel sounds present.  Extremities: No lower extremity edema.     LABS  CBC - WBC/HGB/HTC/PLT: 5.46/10.9/31.7/200 (06-02-25)  BMP - Na/K/Cl/Bicarb/BUN/Cr/Gluc/AG/eGFR: 133/5.1/101/23/24/0.82/137/9/92 (06-02-25)  Ca - 8.1 (06-02-25)  Phos - 2.9 (06-02-25)  Mg - 2.3 (06-02-25)  LFT - Alb/Tprot/Tbili/Dbili/AlkPhos/ALT/AST: 2.6/--/1.1/--/107/6/27 (06-02-25)  PT/aPTT/INR: 15.6/30.9/1.34 (06-02-25)   Thyroid Stimulating Hormone, Serum: 4.850 (05-25-25)  Total T4/Free T4: --/1.050 (05-25-25)    MEDICATIONS  MEDICATIONS  (STANDING):  apixaban 2.5 milliGRAM(s) Oral every 12 hours  dextrose 5%. 1000 milliLiter(s) (50 mL/Hr) IV Continuous <Continuous>  dextrose 5%. 1000 milliLiter(s) (100 mL/Hr) IV Continuous <Continuous>  dextrose 50% Injectable 25 Gram(s) IV Push once  dextrose 50% Injectable 12.5 Gram(s) IV Push once  dextrose 50% Injectable 25 Gram(s) IV Push once  glucagon  Injectable 1 milliGRAM(s) IntraMuscular once  insulin lispro (ADMELOG) corrective regimen sliding scale   SubCutaneous at bedtime  insulin lispro (ADMELOG) corrective regimen sliding scale   SubCutaneous three times a day before meals  levothyroxine 100 MICROGram(s) Oral daily  multivitamin 1 Tablet(s) Oral daily  senna 2 Tablet(s) Oral at bedtime  spironolactone 25 milliGRAM(s) Oral every 24 hours  thiamine 100 milliGRAM(s) Oral every 24 hours    MEDICATIONS  (PRN):  dextrose Oral Gel 15 Gram(s) Oral once PRN Blood Glucose LESS THAN 70 milliGRAM(s)/deciliter  polyethylene glycol 3350 17 Gram(s) Oral daily PRN Constipation  zolpidem 5 milliGRAM(s) Oral at bedtime PRN Insomnia      ASSESSMENT / RECOMMENDATIONS  74y Male with history of DM type II, hypothyroidism, gastric CA and recently diagnosed hepatic cirrhosis who presented with weakness and RLE cellulitis. Endocrinology following for diabetes and tube feed management. He has been hypoglycemic in the am, unclear as he is on same tube feeds at home and on higher doses of NPH. Standing insulin stopped with FS within normal range. He is s/p paracentesis. Evaluated by vascular for AAA of 6.1cm. he is planned for outpatient repair of endoleak. PO intake is very poor.     A1C: 4.8 %  Creatinine: 0.82, GFR: 92  Weight: 63.5, BMI: 20.1  INR: 1.34     # Type 2 diabetes mellitus  - c/w [place] 1.4 at 86cc/hr from 6pm to 9am. (Ok to do 12hrs from 6 to 6. Would not recommend increasing rate as patient does not tolerate).   - NO NPH. No standing insulin.   - ok to have glucose tabs to treat lows.   - Continue lispro LOW dose sliding scale before meals and at bedtime.  - Patient's fingerstick glucose goal is 100-180 mg/dL.    - Discharge recommendations TBD  - can f/u with Dr. Richard on discharge.     # Hypothyroidism:    - Has one normal and one mildly elevated TSH level since admission.  His recent outpatient TFTs were normal and his levothyroxine dose stable.  -Continue levothyroxine 100 mcg/day. Change timing to     Case discussed with Dr. Richard. Primary team updated.       Cathryn Dodge   Endocrinology Fellow    Service Pager: [phone]      SUBJECTIVE / INTERVAL HPI: Patient was seen and examined this morning. This am he still reports poor appetite. Abdominal distention is better. Denies shortness of breath. Reports being cold. Tube feed rate was decreased to 86cc/hr due to spitting up. He stops the feeds himself at 7am even though he is ordered until 9am. States he does not like being attached to the pole even though he has not been moving around much. He is having about 4 BMs/ day.     Overnight events: No events     CAPILLARY BLOOD GLUCOSE & INSULIN RECEIVED  123 mg/dL (06-01 @ 08:49)  110 mg/dL (06-01 @ 12:07)  105 mg/dL (06-01 @ 17:18)  129 mg/dL (06-01 @ 21:24)  164 mg/dL (06-02 @ 00:20)  145 mg/dL (06-02 @ 06:11)  163 mg/dL (06-02 @ 08:46)      REVIEW OF SYSTEMS  Constitutional:  Negative fever, chills   Cardiovascular:  Negative for chest pain or palpitations.  Respiratory:  Negative for cough, wheezing, or shortness of breath.    Gastrointestinal:  Negative for nausea, vomiting, diarrhea, constipation, or abdominal pain.  Genitourinary:  Negative frequency, urgency or dysuria.  Neurologic:  No headache, confusion, dizziness, lightheadedness.    PHYSICAL EXAM  Vital Signs Last 24 Hrs  T(C): 36.7 (02 Jun 2025 05:40), Max: 36.7 (01 Jun 2025 21:00)  T(F): 98 (02 Jun 2025 05:40), Max: 98 (01 Jun 2025 21:00)  HR: 72 (02 Jun 2025 05:40) (61 - 72)  BP: 115/75 (02 Jun 2025 05:40) (114/72 - 115/75)  BP(mean): --  RR: 18 (02 Jun 2025 05:40) (18 - 18)  SpO2: 97% (02 Jun 2025 05:40) (97% - 98%)    Parameters below as of 02 Jun 2025 05:40  Patient On (Oxygen Delivery Method): room air    Constitutional: Awake, alert, in no acute distress.   HEENT: Normocephalic, atraumatic,   Respiratory: Lungs clear to ausculation bilaterally.   Cardiovascular: regular rhythm, normal S1 and S2, no audible murmurs.   GI: soft, non-tender, distended but better, bowel sounds present.  Extremities: No lower extremity edema.     LABS  CBC - WBC/HGB/HTC/PLT: 5.46/10.9/31.7/200 (06-02-25)  BMP - Na/K/Cl/Bicarb/BUN/Cr/Gluc/AG/eGFR: 133/5.1/101/23/24/0.82/137/9/92 (06-02-25)  Ca - 8.1 (06-02-25)  Phos - 2.9 (06-02-25)  Mg - 2.3 (06-02-25)  LFT - Alb/Tprot/Tbili/Dbili/AlkPhos/ALT/AST: 2.6/--/1.1/--/107/6/27 (06-02-25)  PT/aPTT/INR: 15.6/30.9/1.34 (06-02-25)   Thyroid Stimulating Hormone, Serum: 4.850 (05-25-25)  Total T4/Free T4: --/1.050 (05-25-25)    MEDICATIONS  MEDICATIONS  (STANDING):  apixaban 2.5 milliGRAM(s) Oral every 12 hours  dextrose 5%. 1000 milliLiter(s) (50 mL/Hr) IV Continuous <Continuous>  dextrose 5%. 1000 milliLiter(s) (100 mL/Hr) IV Continuous <Continuous>  dextrose 50% Injectable 25 Gram(s) IV Push once  dextrose 50% Injectable 12.5 Gram(s) IV Push once  dextrose 50% Injectable 25 Gram(s) IV Push once  glucagon  Injectable 1 milliGRAM(s) IntraMuscular once  insulin lispro (ADMELOG) corrective regimen sliding scale   SubCutaneous at bedtime  insulin lispro (ADMELOG) corrective regimen sliding scale   SubCutaneous three times a day before meals  levothyroxine 100 MICROGram(s) Oral daily  multivitamin 1 Tablet(s) Oral daily  senna 2 Tablet(s) Oral at bedtime  spironolactone 25 milliGRAM(s) Oral every 24 hours  thiamine 100 milliGRAM(s) Oral every 24 hours    MEDICATIONS  (PRN):  dextrose Oral Gel 15 Gram(s) Oral once PRN Blood Glucose LESS THAN 70 milliGRAM(s)/deciliter  polyethylene glycol 3350 17 Gram(s) Oral daily PRN Constipation  zolpidem 5 milliGRAM(s) Oral at bedtime PRN Insomnia      ASSESSMENT / RECOMMENDATIONS  74y Male with history of DM type II, hypothyroidism, gastric CA and recently diagnosed hepatic cirrhosis who presented with weakness and RLE cellulitis. Endocrinology following for diabetes and tube feed management. He has been hypoglycemic in the am, unclear as he is on same tube feeds at home and on higher doses of NPH. Standing insulin stopped with FS within normal range. He is s/p paracentesis. Evaluated by vascular for AAA of 6.1cm. he is planned for outpatient repair of endoleak. PO intake is still very poor.     A1C: 4.8 %  Creatinine: 0.82, GFR: 92  Weight: 63.5, BMI: 20.1  INR: 1.34     # Type 2 diabetes mellitus  - c/w [place] 1.4 at 86cc/hr from 6pm to 9am. (patient stops the tube feeds at 7am, does not get full dose)   - No standing insulin.   - ok to have glucose tabs to treat lows.   - Continue lispro LOW dose sliding scale before meals and at bedtime.  - Patient's fingerstick glucose goal is 100-180 mg/dL.    - Discharge recommendations TBD  - can f/u with Dr. Richard on discharge.     # Hypothyroidism:    - Has one normal and one mildly elevated TSH level since admission.  His recent outpatient TFTs were normal and his levothyroxine dose stable.  -Continue levothyroxine 100 mcg/day.   Case discussed with Dr. Richard. Primary team updated.       Cathryn Dodge   Endocrinology Fellow    Service Pager: [phone]

## 2025-06-02 NOTE — PROGRESS NOTE ADULT - NS ATTEST RISK PROBLEM GEN_ALL_CORE FT
Pt admitted with cellulitis, cirrhosis.  Has been having intermittent hypoglycemia on standing insulin
Pt with type 2 DM, malnutrition on tube feeds
Marked decrease in blood sugars, increased ascites
Pt with cirrhosis, ascites, and hypoglycemia
Pt with negligible PO intake, not tolerating the feeds as well

## 2025-06-02 NOTE — PROGRESS NOTE ADULT - ASSESSMENT
72M w/ PMH of gastric cancer (s/p feeding J-tube), portal vein thrombosis (on Eliquis, now resolved on US) AAA (s/p EVAR in ), DM2, hypothyroidism, HTN, anemia, BPH, type 2 endoleak repair, s/p embolization of internal iliac artery branch (10/24), and new diagnosis of cirrhosis, who first presented to ED with worsening bilateral leg swelling and gen weakness. Hepatology consulted for RUQ US showing cirrhosis.     Patient denies any significant ETOH use in the past.   Cirrhosis of unknown etiology but most likely 2/2 to MASLD (given DM2 and HTN) vs. prior radiation exposure (received multiple rounds when treated for gastric cancer.   BMI currently 20, and patient reports that at his heaviest he weighed 170 lbs at 6'0" tall (BMI of 23).     EV screening: none noted on EGD in  but significant esophagitis may have obscured views.   HE: none prior   HCC screening: AFP WNL and MRI pending   SBP: none prior     RUQ US:   - small to moderate amount of ascites most prominent of the right lower quadrant on the submitted images.  - nodular liver consistent with cirrhosis   - patent vasculature despite history of PVT thrombosis     Meld 3.0 score: 10    Most recent EGD (2023):   - Severe esophagitis LA Grade D  - Esophageal-jejunal anastomosis intact w/ patent afferent and efferent limbs without evidence of acute angulation or suggestion of obstruction.    Cirrhosis work-up:   - iron sat 32 and ferritin 228   - hep A IgM nonreactive, hep A IgG reactive   - hep C nonreactive  - hep B surface antigen nonreactive, surface antibody and core non-reactive   - AFP WNL <1.8  - anti-LMKA <20.1   - alpha-1 antitrypsin    - ceruloplasmin WNL 22  - PETH negative   - EBER + Speckled 1:80  - AMA negative  - ASMA neg  - LKMAA: neg  - IgG 1700 | 1gA 832 | IgM 67    Para done: neg SBP, SAA.2    Liver MRI (25): Cirrhosis of liver, no evidence of HCC, marked abdominal and pelvic ascites, no portal vein thrombosis.    Recommendations:   - trend CBC, CMP, and INR daily   - low sodium/high protein diet with tube feeds via j-tube   - Tylenol not exceeding 2g per day   - continue home spironolactone 25 mg PO daily and Lasix 40 mg PO daily   - Recommend EGD for EV screening inpt vs. outpt, on imaging pt has dilated esophagus and distal esophageal wall which needs endoscopic evaluation by Hepatology    Please ensure out-patient follow-up with hepatology, previously followed by [facility]     Case discussed with Dr. Gomez. Hepatology Team will continue to follow.     Zachary Cooper DO, PhD  Gastroenterology Fellow  GI Consult Weekday 7am-5pm Pager: [phone]  Weeknights/Weekend/Holiday Coverage: Please Call the  for contact info

## 2025-06-02 NOTE — PROGRESS NOTE ADULT - SUBJECTIVE AND OBJECTIVE BOX
***Note in progress***    OVERNIGHT EVENTS: NAEO    SUBJECTIVE / INTERVAL HPI: Patient seen and examined at bedside. Patient denying chest pain, SOB, palpitations, cough. Patient denies fever, chills, HA, Dizziness, N/V, abdominal pain, diarrhea, constipation, hematochezia/melena, dysuria, hematuria, new onset weakness/numbness, LE pain and/or swelling.    Remaining ROS negative       PHYSICAL EXAM:    General:NAD.   HEENT: NC/AT; PERRL, anicteric sclera; MMM  Neck: supple  Cardiovascular: +S1/S2, RRR  Respiratory: CTA B/L; no W/R/R  Gastrointestinal: soft, NT/ND; +BSx4  Extremities: WWP; no edema, clubbing or cyanosis  Vascular: 2+ radial, DP/PT pulses B/L  Neurological: AAOx3; no focal deficits  Psychiatric: pleasant mood and affect  Dermatologic: no appreciable wounds or damage to the skin    VITAL SIGNS:  Vital Signs Last 24 Hrs  T(C): 36.7 (02 Jun 2025 05:40), Max: 36.7 (01 Jun 2025 21:00)  T(F): 98 (02 Jun 2025 05:40), Max: 98 (01 Jun 2025 21:00)  HR: 72 (02 Jun 2025 05:40) (61 - 72)  BP: 115/75 (02 Jun 2025 05:40) (114/72 - 116/76)  BP(mean): --  RR: 18 (02 Jun 2025 05:40) (18 - 18)  SpO2: 97% (02 Jun 2025 05:40) (97% - 98%)    Parameters below as of 02 Jun 2025 05:40  Patient On (Oxygen Delivery Method): room air        MEDICATIONS:  MEDICATIONS  (STANDING):  apixaban 2.5 milliGRAM(s) Oral every 12 hours  dextrose 5%. 1000 milliLiter(s) (50 mL/Hr) IV Continuous <Continuous>  dextrose 5%. 1000 milliLiter(s) (100 mL/Hr) IV Continuous <Continuous>  dextrose 50% Injectable 25 Gram(s) IV Push once  dextrose 50% Injectable 12.5 Gram(s) IV Push once  dextrose 50% Injectable 25 Gram(s) IV Push once  glucagon  Injectable 1 milliGRAM(s) IntraMuscular once  insulin lispro (ADMELOG) corrective regimen sliding scale   SubCutaneous at bedtime  insulin lispro (ADMELOG) corrective regimen sliding scale   SubCutaneous three times a day before meals  levothyroxine 100 MICROGram(s) Oral daily  multivitamin 1 Tablet(s) Oral daily  senna 2 Tablet(s) Oral at bedtime  spironolactone 25 milliGRAM(s) Oral every 24 hours  thiamine 100 milliGRAM(s) Oral every 24 hours    MEDICATIONS  (PRN):  dextrose Oral Gel 15 Gram(s) Oral once PRN Blood Glucose LESS THAN 70 milliGRAM(s)/deciliter  polyethylene glycol 3350 17 Gram(s) Oral daily PRN Constipation  zolpidem 5 milliGRAM(s) Oral at bedtime PRN Insomnia      ALLERGIES:  Allergies    No Known Allergies    Intolerances        LABS:                        11.3   5.58  )-----------( 178      ( 01 Jun 2025 07:10 )             32.8     06-01    133[L]  |  100  |  23  ----------------------------<  133[H]  4.7   |  23  |  0.81    Ca    8.1[L]      01 Jun 2025 07:10  Phos  2.9     06-01  Mg     2.4     06-01    TPro  6.6  /  Alb  2.6[L]  /  TBili  1.2  /  DBili  x   /  AST  30  /  ALT  <5[L]  /  AlkPhos  118  06-01    PT/INR - ( 01 Jun 2025 07:10 )   PT: 14.6 sec;   INR: 1.27          PTT - ( 31 May 2025 07:35 )  PTT:30.8 sec  Urinalysis Basic - ( 01 Jun 2025 07:10 )    Color: x / Appearance: x / SG: x / pH: x  Gluc: 133 mg/dL / Ketone: x  / Bili: x / Urobili: x   Blood: x / Protein: x / Nitrite: x   Leuk Esterase: x / RBC: x / WBC x   Sq Epi: x / Non Sq Epi: x / Bacteria: x      CAPILLARY BLOOD GLUCOSE      POCT Blood Glucose.: 145 mg/dL (02 Jun 2025 06:11)      RADIOLOGY & ADDITIONAL TESTS: Reviewed. OVERNIGHT EVENTS: NAEO    SUBJECTIVE / INTERVAL HPI: Patient seen and examined at bedside. Reports feeling cold. States he wants to stay until Wednesday [facility] Otherwise feels well. Patient denying chest pain, SOB, palpitations, cough. Patient denies fever, chills, HA, Dizziness, N/V, abdominal pain, diarrhea, constipation, hematochezia/melena.     Remaining ROS negative       PHYSICAL EXAM:  Constitutional: NAD   Eyes: EOMI, anicteric sclera  ENT: MMM  Neck: supple  Respiratory: CTA B/L; no iWOB  Cardiac: +S1/S2; RRR  Gastrointestinal: soft, distended, nontender, no warmth or erythema from J tube site. Packing in place in right lower quadrant at site of paracentesis. No purulent drainage     Extremities: WWP, no clubbing or cyanosis; no lower extremity edema    Musculoskeletal: no joint swelling, tenderness or erythema  Dermatologic: skin warm, dry and intact  Neurologic: AAOx3;no focal deficits  Psychiatric: affect and characteristics of appearance, verbalizations, behaviors are appropriate    VITAL SIGNS:  Vital Signs Last 24 Hrs  T(C): 36.7 (02 Jun 2025 05:40), Max: 36.7 (01 Jun 2025 21:00)  T(F): 98 (02 Jun 2025 05:40), Max: 98 (01 Jun 2025 21:00)  HR: 72 (02 Jun 2025 05:40) (61 - 72)  BP: 115/75 (02 Jun 2025 05:40) (114/72 - 116/76)  BP(mean): --  RR: 18 (02 Jun 2025 05:40) (18 - 18)  SpO2: 97% (02 Jun 2025 05:40) (97% - 98%)    Parameters below as of 02 Jun 2025 05:40  Patient On (Oxygen Delivery Method): room air        MEDICATIONS:  MEDICATIONS  (STANDING):  apixaban 2.5 milliGRAM(s) Oral every 12 hours  dextrose 5%. 1000 milliLiter(s) (50 mL/Hr) IV Continuous <Continuous>  dextrose 5%. 1000 milliLiter(s) (100 mL/Hr) IV Continuous <Continuous>  dextrose 50% Injectable 25 Gram(s) IV Push once  dextrose 50% Injectable 12.5 Gram(s) IV Push once  dextrose 50% Injectable 25 Gram(s) IV Push once  glucagon  Injectable 1 milliGRAM(s) IntraMuscular once  insulin lispro (ADMELOG) corrective regimen sliding scale   SubCutaneous at bedtime  insulin lispro (ADMELOG) corrective regimen sliding scale   SubCutaneous three times a day before meals  levothyroxine 100 MICROGram(s) Oral daily  multivitamin 1 Tablet(s) Oral daily  senna 2 Tablet(s) Oral at bedtime  spironolactone 25 milliGRAM(s) Oral every 24 hours  thiamine 100 milliGRAM(s) Oral every 24 hours    MEDICATIONS  (PRN):  dextrose Oral Gel 15 Gram(s) Oral once PRN Blood Glucose LESS THAN 70 milliGRAM(s)/deciliter  polyethylene glycol 3350 17 Gram(s) Oral daily PRN Constipation  zolpidem 5 milliGRAM(s) Oral at bedtime PRN Insomnia      ALLERGIES:  Allergies    No Known Allergies    Intolerances        LABS:                        11.3   5.58  )-----------( 178      ( 01 Jun 2025 07:10 )             32.8     06-01    133[L]  |  100  |  23  ----------------------------<  133[H]  4.7   |  23  |  0.81    Ca    8.1[L]      01 Jun 2025 07:10  Phos  2.9     06-01  Mg     2.4     06-01    TPro  6.6  /  Alb  2.6[L]  /  TBili  1.2  /  DBili  x   /  AST  30  /  ALT  <5[L]  /  AlkPhos  118  06-01    PT/INR - ( 01 Jun 2025 07:10 )   PT: 14.6 sec;   INR: 1.27          PTT - ( 31 May 2025 07:35 )  PTT:30.8 sec  Urinalysis Basic - ( 01 Jun 2025 07:10 )    Color: x / Appearance: x / SG: x / pH: x  Gluc: 133 mg/dL / Ketone: x  / Bili: x / Urobili: x   Blood: x / Protein: x / Nitrite: x   Leuk Esterase: x / RBC: x / WBC x   Sq Epi: x / Non Sq Epi: x / Bacteria: x      CAPILLARY BLOOD GLUCOSE      POCT Blood Glucose.: 145 mg/dL (02 Jun 2025 06:11)      RADIOLOGY & ADDITIONAL TESTS: Reviewed.

## 2025-06-03 LAB
ALBUMIN SERPL ELPH-MCNC: 2.6 G/DL — LOW (ref 3.3–5)
ALP SERPL-CCNC: 125 U/L — HIGH (ref 40–120)
ALT FLD-CCNC: 7 U/L — LOW (ref 10–45)
ANION GAP SERPL CALC-SCNC: 8 MMOL/L — SIGNIFICANT CHANGE UP (ref 5–17)
APTT BLD: 31.5 SEC — SIGNIFICANT CHANGE UP (ref 26.1–36.8)
AST SERPL-CCNC: 28 U/L — SIGNIFICANT CHANGE UP (ref 10–40)
BASOPHILS # BLD AUTO: 0.07 K/UL — SIGNIFICANT CHANGE UP (ref 0–0.2)
BASOPHILS NFR BLD AUTO: 1.3 % — SIGNIFICANT CHANGE UP (ref 0–2)
BILIRUB SERPL-MCNC: 0.9 MG/DL — SIGNIFICANT CHANGE UP (ref 0.2–1.2)
BUN SERPL-MCNC: 26 MG/DL — HIGH (ref 7–23)
CALCIUM SERPL-MCNC: 8.2 MG/DL — LOW (ref 8.4–10.5)
CHLORIDE SERPL-SCNC: 102 MMOL/L — SIGNIFICANT CHANGE UP (ref 96–108)
CO2 SERPL-SCNC: 24 MMOL/L — SIGNIFICANT CHANGE UP (ref 22–31)
CREAT SERPL-MCNC: 0.87 MG/DL — SIGNIFICANT CHANGE UP (ref 0.5–1.3)
EGFR: 90 ML/MIN/1.73M2 — SIGNIFICANT CHANGE UP
EGFR: 90 ML/MIN/1.73M2 — SIGNIFICANT CHANGE UP
EOSINOPHIL # BLD AUTO: 0.17 K/UL — SIGNIFICANT CHANGE UP (ref 0–0.5)
EOSINOPHIL NFR BLD AUTO: 3.1 % — SIGNIFICANT CHANGE UP (ref 0–6)
GLUCOSE SERPL-MCNC: 143 MG/DL — HIGH (ref 70–99)
HCT VFR BLD CALC: 33.1 % — LOW (ref 39–50)
HGB BLD-MCNC: 11.3 G/DL — LOW (ref 13–17)
IMM GRANULOCYTES NFR BLD AUTO: 0.5 % — SIGNIFICANT CHANGE UP (ref 0–0.9)
INR BLD: 1.33 — HIGH (ref 0.85–1.16)
LYMPHOCYTES # BLD AUTO: 0.8 K/UL — LOW (ref 1–3.3)
LYMPHOCYTES # BLD AUTO: 14.5 % — SIGNIFICANT CHANGE UP (ref 13–44)
MAGNESIUM SERPL-MCNC: 2.4 MG/DL — SIGNIFICANT CHANGE UP (ref 1.6–2.6)
MCHC RBC-ENTMCNC: 34.1 G/DL — SIGNIFICANT CHANGE UP (ref 32–36)
MCHC RBC-ENTMCNC: 35.6 PG — HIGH (ref 27–34)
MCV RBC AUTO: 104.4 FL — HIGH (ref 80–100)
MELD SCORE WITH DIALYSIS: 25 POINTS — SIGNIFICANT CHANGE UP
MELD SCORE WITHOUT DIALYSIS: 10 POINTS — SIGNIFICANT CHANGE UP
MONOCYTES # BLD AUTO: 0.5 K/UL — SIGNIFICANT CHANGE UP (ref 0–0.9)
MONOCYTES NFR BLD AUTO: 9 % — SIGNIFICANT CHANGE UP (ref 2–14)
NEUTROPHILS # BLD AUTO: 3.96 K/UL — SIGNIFICANT CHANGE UP (ref 1.8–7.4)
NEUTROPHILS NFR BLD AUTO: 71.6 % — SIGNIFICANT CHANGE UP (ref 43–77)
NRBC BLD AUTO-RTO: 0 /100 WBCS — SIGNIFICANT CHANGE UP (ref 0–0)
PHOSPHATE SERPL-MCNC: 2.5 MG/DL — SIGNIFICANT CHANGE UP (ref 2.5–4.5)
PLATELET # BLD AUTO: 194 K/UL — SIGNIFICANT CHANGE UP (ref 150–400)
POTASSIUM SERPL-MCNC: 5.1 MMOL/L — SIGNIFICANT CHANGE UP (ref 3.5–5.3)
POTASSIUM SERPL-SCNC: 5.1 MMOL/L — SIGNIFICANT CHANGE UP (ref 3.5–5.3)
PROT SERPL-MCNC: 6.5 G/DL — SIGNIFICANT CHANGE UP (ref 6–8.3)
PROTHROM AB SERPL-ACNC: 15.3 SEC — HIGH (ref 9.9–13.4)
RBC # BLD: 3.17 M/UL — LOW (ref 4.2–5.8)
RBC # FLD: 17.2 % — HIGH (ref 10.3–14.5)
SODIUM SERPL-SCNC: 134 MMOL/L — LOW (ref 135–145)
SOLUBLE LIVER IGG SER IA-ACNC: 5 — SIGNIFICANT CHANGE UP (ref 0–20)
WBC # BLD: 5.53 K/UL — SIGNIFICANT CHANGE UP (ref 3.8–10.5)
WBC # FLD AUTO: 5.53 K/UL — SIGNIFICANT CHANGE UP (ref 3.8–10.5)

## 2025-06-03 PROCEDURE — 99233 SBSQ HOSP IP/OBS HIGH 50: CPT | Mod: GC

## 2025-06-03 RX ORDER — APIXABAN 2.5 MG/1
5 TABLET, FILM COATED ORAL EVERY 12 HOURS
Refills: 0 | Status: DISCONTINUED | OUTPATIENT
Start: 2025-06-03 | End: 2025-06-04

## 2025-06-03 RX ORDER — SODIUM PHOSPHATE,DIBASIC DIHYD
15 POWDER (GRAM) MISCELLANEOUS ONCE
Refills: 0 | Status: COMPLETED | OUTPATIENT
Start: 2025-06-03 | End: 2025-06-03

## 2025-06-03 RX ORDER — FUROSEMIDE 10 MG/ML
60 INJECTION INTRAMUSCULAR; INTRAVENOUS DAILY
Refills: 0 | Status: DISCONTINUED | OUTPATIENT
Start: 2025-06-03 | End: 2025-06-04

## 2025-06-03 RX ADMIN — Medication 5 MILLIGRAM(S): at 21:16

## 2025-06-03 RX ADMIN — Medication 62.5 MILLIMOLE(S): at 11:38

## 2025-06-03 RX ADMIN — APIXABAN 2.5 MILLIGRAM(S): 2.5 TABLET, FILM COATED ORAL at 06:55

## 2025-06-03 RX ADMIN — Medication 1 TABLET(S): at 08:34

## 2025-06-03 RX ADMIN — Medication 100 MILLIGRAM(S): at 06:55

## 2025-06-03 RX ADMIN — APIXABAN 5 MILLIGRAM(S): 2.5 TABLET, FILM COATED ORAL at 17:54

## 2025-06-03 RX ADMIN — FUROSEMIDE 60 MILLIGRAM(S): 10 INJECTION INTRAMUSCULAR; INTRAVENOUS at 11:38

## 2025-06-03 RX ADMIN — Medication 25 MILLIGRAM(S): at 17:20

## 2025-06-03 RX ADMIN — Medication 100 MICROGRAM(S): at 06:55

## 2025-06-03 NOTE — PROGRESS NOTE ADULT - SUBJECTIVE AND OBJECTIVE BOX
GASTROENTEROLOGY PROGRESS NOTE  Patient seen and examined at bedside.  doing well  d/c tomorrow    will FU with Dr. Neal next week in hep clinic    PERTINENT REVIEW OF SYSTEMS:  CONSTITUTIONAL: No weakness, fevers or chills  HEENT: No visual changes; No vertigo or throat pain   GASTROINTESTINAL: As above.  NEUROLOGICAL: No numbness or weakness  SKIN: No itching, burning, rashes, or lesions     Allergies    No Known Allergies    Intolerances      MEDICATIONS:  MEDICATIONS  (STANDING):  apixaban 5 milliGRAM(s) Oral every 12 hours  dextrose 5%. 1000 milliLiter(s) (50 mL/Hr) IV Continuous <Continuous>  dextrose 5%. 1000 milliLiter(s) (100 mL/Hr) IV Continuous <Continuous>  dextrose 50% Injectable 25 Gram(s) IV Push once  dextrose 50% Injectable 12.5 Gram(s) IV Push once  dextrose 50% Injectable 25 Gram(s) IV Push once  furosemide    Tablet 60 milliGRAM(s) Oral daily  glucagon  Injectable 1 milliGRAM(s) IntraMuscular once  insulin lispro (ADMELOG) corrective regimen sliding scale   SubCutaneous at bedtime  insulin lispro (ADMELOG) corrective regimen sliding scale   SubCutaneous three times a day before meals  levothyroxine 100 MICROGram(s) Oral daily  multivitamin 1 Tablet(s) Oral daily  senna 2 Tablet(s) Oral at bedtime  spironolactone 25 milliGRAM(s) Oral every 24 hours  thiamine 100 milliGRAM(s) Oral every 24 hours    MEDICATIONS  (PRN):  dextrose Oral Gel 15 Gram(s) Oral once PRN Blood Glucose LESS THAN 70 milliGRAM(s)/deciliter  polyethylene glycol 3350 17 Gram(s) Oral daily PRN Constipation  zolpidem 5 milliGRAM(s) Oral at bedtime PRN Insomnia    Vital Signs Last 24 Hrs  T(C): 36.8 (03 Jun 2025 11:30), Max: 36.8 (03 Jun 2025 11:30)  T(F): 98.2 (03 Jun 2025 11:30), Max: 98.2 (03 Jun 2025 11:30)  HR: 64 (03 Jun 2025 11:30) (60 - 70)  BP: 102/66 (03 Jun 2025 11:30) (100/67 - 109/71)  BP(mean): --  RR: 18 (03 Jun 2025 11:30) (18 - 18)  SpO2: 98% (03 Jun 2025 11:30) (95% - 98%)    Parameters below as of 03 Jun 2025 11:30  Patient On (Oxygen Delivery Method): room air        06-02 @ 07:01  -  06-03 @ 07:00  --------------------------------------------------------  IN: 172 mL / OUT: 0 mL / NET: 172 mL      PHYSICAL EXAM:  General: No acute distress  Lungs: Normal respiratory effort and no intercostal retractions  Cardiovascular: RRR  Abdomen: Soft, non-tender, distended 2/2 pocket of ascites (R>L), +j-tube   Neurological: Alert and oriented x3  Skin: Warm and dry. No obvious rash    LABS:                        11.3   5.53  )-----------( 194      ( 03 Jun 2025 05:30 )             33.1     06-03    134[L]  |  102  |  26[H]  ----------------------------<  143[H]  5.1   |  24  |  0.87    Ca    8.2[L]      03 Jun 2025 05:30  Phos  2.5     06-03  Mg     2.4     06-03    TPro  6.5  /  Alb  2.6[L]  /  TBili  0.9  /  DBili  x   /  AST  28  /  ALT  7[L]  /  AlkPhos  125[H]  06-03    PT/INR - ( 03 Jun 2025 05:30 )   PT: 15.3 sec;   INR: 1.33          PTT - ( 03 Jun 2025 05:30 )  PTT:31.5 sec      Urinalysis Basic - ( 03 Jun 2025 05:30 )    Color: x / Appearance: x / SG: x / pH: x  Gluc: 143 mg/dL / Ketone: x  / Bili: x / Urobili: x   Blood: x / Protein: x / Nitrite: x   Leuk Esterase: x / RBC: x / WBC x   Sq Epi: x / Non Sq Epi: x / Bacteria: x                RADIOLOGY & ADDITIONAL STUDIES:  Reviewed

## 2025-06-03 NOTE — CHART NOTE - NSCHARTNOTEFT_GEN_A_CORE
Admitting Diagnosis:   Patient is a 74y old  Male who presents with a chief complaint of RLE cellulitis (2025 06:31)    PAST MEDICAL & SURGICAL HISTORY:  Essential hypertension  was on losartan, now diet control  Hypothyroidism  Gastric mass  ulcerated mass in gastric cardia with small perigastric nodes on endoscopy.  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  H/O umbilical hernia repair   with mesh  Port-a-cath in place  right chest wall                 not in use 5 y  History of gastric surgery  2017  Gastrostomy tube in place  open jejunostomy with J-tube 2020  S/P cataract surgery  H/O endoscopy  & stent placement 2020  S/P gastrectomy  total gastrectomy   History of partial pancreatectomy  distal pancreatectomy/ splenectomy esophatojejunostomy  Status post neck dissection  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, gastric primary  History of dysphagia  EGD with Axios stent placment to connect the blind limb to th e efferent limb (jejunojejunostomy)    Current Nutrition Order:  Low Sodium  Tube Feed via Jejunostomy: PxRadia Standard 1.4 at 86mL/hr x15hr (18:00-09:00) + x1/day LPS (100kcal, 15g protein) to provide 1290mL total volume (~x4 325mL cans), 1806kcal (28kcal/kg dosing wt 63.5kg), 80g protein (1.3g/kg dosing wt 63.5kg), and 929mL free water.     PO Intake: Good (%) [   ]  Fair (50-75%) [   ] Poor (<25%) [ x ]    GI Issues:   Pt denies nausea/vomiting/diarrhea/constipation  Reports last BM this AM 6/3  Endorses abdominal distension secondary to ascites, no discomfort    Pain:  Denies pain this AM    Skin Integrity:  Right knee skin tear, abdominal wound, sacral stage I pressure injury documented  2+ bilateral foot edema noted    25 @ 07:01  -  25 @ 07:00  --------------------------------------------------------  IN: 172 mL / OUT: 0 mL / NET: 172 mL    Labs:       134[L]  |  102  |  26[H]  ----------------------------<  143[H]  5.1   |  24  |  0.87    Ca    8.2[L]      2025 05:30  Phos  2.5       Mg     2.4         TPro  6.5  /  Alb  2.6[L]  /  TBili  0.9  /  DBili  x   /  AST  28  /  ALT  7[L]  /  AlkPhos  125[H]      CAPILLARY BLOOD GLUCOSE  POCT Blood Glucose.: 133 mg/dL (2025 08:34)  POCT Blood Glucose.: 164 mg/dL (2025 21:09)  POCT Blood Glucose.: 109 mg/dL (2025 17:33)  POCT Blood Glucose.: 129 mg/dL (2025 12:29)    Medications:  MEDICATIONS  (STANDING):  apixaban 2.5 milliGRAM(s) Oral every 12 hours  dextrose 5%. 1000 milliLiter(s) (50 mL/Hr) IV Continuous <Continuous>  dextrose 5%. 1000 milliLiter(s) (100 mL/Hr) IV Continuous <Continuous>  dextrose 50% Injectable 25 Gram(s) IV Push once  dextrose 50% Injectable 12.5 Gram(s) IV Push once  dextrose 50% Injectable 25 Gram(s) IV Push once  glucagon  Injectable 1 milliGRAM(s) IntraMuscular once  insulin lispro (ADMELOG) corrective regimen sliding scale   SubCutaneous at bedtime  insulin lispro (ADMELOG) corrective regimen sliding scale   SubCutaneous three times a day before meals  levothyroxine 100 MICROGram(s) Oral daily  multivitamin 1 Tablet(s) Oral daily  senna 2 Tablet(s) Oral at bedtime  sodium phosphate 15 milliMole(s)/250 mL IVPB 15 milliMole(s) IV Intermittent once  spironolactone 25 milliGRAM(s) Oral every 24 hours  thiamine 100 milliGRAM(s) Oral every 24 hours    MEDICATIONS  (PRN):  dextrose Oral Gel 15 Gram(s) Oral once PRN Blood Glucose LESS THAN 70 milliGRAM(s)/deciliter  polyethylene glycol 3350 17 Gram(s) Oral daily PRN Constipation  zolpidem 5 milliGRAM(s) Oral at bedtime PRN Insomnia    Anthropometrics:  Height: 5'10"  Weight: 140 pounds / 63.5 kilograms  BMI: 20  IBW: 166 pounds / 75.5 kilograms            84%IBW    Weight Change:   No new weights obtained since admission. Recommend nursing to trend weekly weights. RD to continue monitoring weights as able.     Severe Acute Protein Calorie Malnutrition: Risk Assessment Completed   - NFPE: severe muscle and fat wasting     Estimated energy needs:   Calories: 30-35kcal/k-2222kcal/d  Protein: 1.5-2g/k-127g/d  Defer fluids to team.   Estimated needs based on dosing wt as within %  pounds / 75.5 kilograms (84%). Needs adjusted for age, cirrhosis, wound healing, and malnutrition.    Subjective:   72M with PMH of gastric cancer (status post feeding J-tube), portal vein thrombosis (on Eliquis) AAA (status post EVAR in ), T2DM, hypothyroidism, HTN, anemia, BPH, type 2 endoleak repair, status post embolization of internal iliac artery branch (10/24), and cirrhosis who presents to the ED with worsening bilateral leg swelling and gen weakness, admitted for further evaluation. Stroke service consulted for right facial droop and dysarthria noted to be present for a few months by patient's close family and friends, CTH neg for stroke, incidental finding of arachnoid cyst, CTA with L C6 ICA 40-50% stenosis. : prelim MRI brain negative for acute findings. : paracentesis -/5L.     Pt seen on 7WO for follow-up. Labs and medication orders reviewed. Na 134 <low>, K/Mg/Phos WNL, BUN/Cr 26 <high>/0.87 WNL, POC blood glucose (-6/3) 109-164. Ordered for multivitamin, thiamine, spironolactone, PO synthroid. On Low Sodium diet with J-tube cyclic nocturnal feed 6PM-9AM. Pt resting in bed on visit this AM. Reports ongoing poor appetite and intake, RD observed pt consumed a few bites of eggs and potatoes this AM. Endorses ongoing tolerance of tube feed regimen. Denies abdominal discomfort and endorses regular BMs. Notes abdominal distension due to reaccumulation of fluid in setting of cirrhosis. Reports intermittently "cheating" tube feed regimen due to disliking connection to pump, requested nursing to d/c feed at 8AM today. Pt receptive to RD education, reports improved tolerance to current order vs higher rate regimen on admission and states he feels comfortable going home on current regimen. See nutrition recommendations. RD to remain available.      Previous Nutrition Diagnosis:  Severe acute malnutrition related to inadequate nutrition as evidenced by severe muscle and fat wasting.     Active [ x ]  Resolved [   ]    Goal:  Pt to consistently meet >75% nutrient needs via most appropriate route for nutrition. Reduce signs and symptoms of protein-calorie malnutrition.    Recommendations:  1. Continue tube feed via J-tube: PxRadia Standard 1.4 with goal rate at 86mL/hr x15hr (6PM-9AM) + x1/day LPS (100kcal, 15g protein) to provide ~1290mL total volume (~x4 325mL cans), 1906kcal (30kcal/kg dosing wt 63.5kg), 95g protein (1.5g/kg dosing wt 63.5kg), and 929mL free water.   *Above regimen made with consideration for limited/variable PO intake and increased spitting up at high feeding rate*  >>Defer free water flushes to team.  >>Note pt ordered for PO synthroid, recommend administer >/=1hr apart from nutrition.   >>Monitor GI tolerance and maintain aspiration precautions. RD to remain available to adjust EN regimen prn.   2. Continue Low Sodium diet.   >>Dietary to provide fortified mashed potatoes (240kcal, 14g pro, 27g CHO) x1/day, Magic Cup (290kcal, 9g protein) x2/day, and jelly (~40kcal, ~10g CHO/packet) x2/day.   3. Continue micronutrient supplementation. Continue d/c once pt consistently tolerating tube feed regimen at goal.   4. Monitor weight trends, labs, skin integrity, & hydration status.  5. Pain and bowel regimens per team.  6. RD remains available for dietary education/intervention prn.    Education:  Reviewed importance of completing goal tube feed volume to meet nutrient demands in setting of limited/variable PO intake and indication for current regimen. Discussed customizing tube feed timing at home to meet goal volume. Reviewed signs of feed tolerance to monitor and strategies to maximize PO intake. Pt aware RD remains available for additional questions/concerns.

## 2025-06-03 NOTE — PROGRESS NOTE ADULT - PROBLEM SELECTOR PLAN 6
Patient with hx of AAA. MRI abdomen of interval growth of AAA sac from 6.1 cm to 6.4 cm as well as a L internal iliac aneurysm measuring 2.1 cm.    Vascular following   CTA A/P: AAA s/p  aortobiiliac stent graft repair with persistent endoleak. aneurysm sac measures 6.1 cm, not significantly changed since 2/1/2025. Bilateral internal iliac artery aneurysms are also unchanged since 2/1/2025    - F/u outpatient with vascular surgery for aneurysm size monitoring

## 2025-06-03 NOTE — PROGRESS NOTE ADULT - SUBJECTIVE AND OBJECTIVE BOX
***Note in progress***    OVERNIGHT EVENTS: NAEO    SUBJECTIVE / INTERVAL HPI: Patient seen and examined at bedside. Patient denying chest pain, SOB, palpitations, cough. Patient denies fever, chills, HA, Dizziness, N/V, abdominal pain, diarrhea, constipation, hematochezia/melena, dysuria, hematuria, new onset weakness/numbness, LE pain and/or swelling.    Remaining ROS negative       PHYSICAL EXAM:    General:NAD.   HEENT: NC/AT; PERRL, anicteric sclera; MMM  Neck: supple  Cardiovascular: +S1/S2, RRR  Respiratory: CTA B/L; no W/R/R  Gastrointestinal: soft, NT/ND; +BSx4  Extremities: WWP; no edema, clubbing or cyanosis  Vascular: 2+ radial, DP/PT pulses B/L  Neurological: AAOx3; no focal deficits  Psychiatric: pleasant mood and affect  Dermatologic: no appreciable wounds or damage to the skin    VITAL SIGNS:  Vital Signs Last 24 Hrs  T(C): 36.3 (03 Jun 2025 06:05), Max: 36.6 (02 Jun 2025 12:26)  T(F): 97.4 (03 Jun 2025 06:05), Max: 97.9 (02 Jun 2025 12:26)  HR: 70 (03 Jun 2025 06:05) (60 - 70)  BP: 109/71 (03 Jun 2025 06:05) (100/67 - 114/77)  BP(mean): --  RR: 18 (03 Jun 2025 06:05) (18 - 18)  SpO2: 98% (03 Jun 2025 06:05) (95% - 98%)    Parameters below as of 02 Jun 2025 21:17  Patient On (Oxygen Delivery Method): room air        MEDICATIONS:  MEDICATIONS  (STANDING):  apixaban 2.5 milliGRAM(s) Oral every 12 hours  dextrose 5%. 1000 milliLiter(s) (50 mL/Hr) IV Continuous <Continuous>  dextrose 5%. 1000 milliLiter(s) (100 mL/Hr) IV Continuous <Continuous>  dextrose 50% Injectable 25 Gram(s) IV Push once  dextrose 50% Injectable 12.5 Gram(s) IV Push once  dextrose 50% Injectable 25 Gram(s) IV Push once  glucagon  Injectable 1 milliGRAM(s) IntraMuscular once  insulin lispro (ADMELOG) corrective regimen sliding scale   SubCutaneous at bedtime  insulin lispro (ADMELOG) corrective regimen sliding scale   SubCutaneous three times a day before meals  levothyroxine 100 MICROGram(s) Oral daily  multivitamin 1 Tablet(s) Oral daily  senna 2 Tablet(s) Oral at bedtime  spironolactone 25 milliGRAM(s) Oral every 24 hours  thiamine 100 milliGRAM(s) Oral every 24 hours    MEDICATIONS  (PRN):  dextrose Oral Gel 15 Gram(s) Oral once PRN Blood Glucose LESS THAN 70 milliGRAM(s)/deciliter  polyethylene glycol 3350 17 Gram(s) Oral daily PRN Constipation  zolpidem 5 milliGRAM(s) Oral at bedtime PRN Insomnia      ALLERGIES:  Allergies    No Known Allergies    Intolerances        LABS:                        10.9   5.46  )-----------( 200      ( 02 Jun 2025 06:51 )             31.7     06-02    133[L]  |  101  |  24[H]  ----------------------------<  137[H]  5.1   |  23  |  0.82    Ca    8.1[L]      02 Jun 2025 06:51  Phos  2.9     06-02  Mg     2.3     06-02    TPro  6.0  /  Alb  2.6[L]  /  TBili  1.1  /  DBili  x   /  AST  27  /  ALT  6[L]  /  AlkPhos  107  06-02    PT/INR - ( 02 Jun 2025 06:51 )   PT: 15.6 sec;   INR: 1.34          PTT - ( 02 Jun 2025 06:51 )  PTT:30.9 sec  Urinalysis Basic - ( 02 Jun 2025 06:51 )    Color: x / Appearance: x / SG: x / pH: x  Gluc: 137 mg/dL / Ketone: x  / Bili: x / Urobili: x   Blood: x / Protein: x / Nitrite: x   Leuk Esterase: x / RBC: x / WBC x   Sq Epi: x / Non Sq Epi: x / Bacteria: x      CAPILLARY BLOOD GLUCOSE      POCT Blood Glucose.: 164 mg/dL (02 Jun 2025 21:09)      RADIOLOGY & ADDITIONAL TESTS: Reviewed. ***Note in progress***    OVERNIGHT EVENTS: NAEO    SUBJECTIVE / INTERVAL HPI: Patient seen and examined at bedside. Patient denying chest pain, SOB, palpitations, cough. Patient denies fever, chills, HA, Dizziness, N/V, abdominal pain, diarrhea, constipation, hematochezia/melena, dysuria, hematuria, new onset weakness/numbness, LE pain. Reports swelling in the ankles and the abdomen. He nearly finished his full tube feeds today (stopped around 730) but regurgitated a little (spat up in a kidney bowl).     Remaining ROS negative       PHYSICAL EXAM:    General:NAD.   HEENT: NC/AT; PERRL, anicteric sclera; MMM  Neck: supple  Cardiovascular: +S1/S2, RRR  Respiratory: CTA B/L; no W/R/R  Gastrointestinal: soft, NT; +BSx4. The abdomen is distended.   Extremities: WWP; no edema, clubbing or cyanosis. 1+ pitting edema in the R ankle with erythema on the R shin and ankle. 2+ pitting edema on the L ankle without any erythema.  Vascular: 2+ radial, DP/PT pulses B/L  Neurological: AAOx3; no focal deficits  Psychiatric: pleasant mood and affect  Dermatologic: no appreciable wounds or damage to the skin    VITAL SIGNS:  Vital Signs Last 24 Hrs  T(C): 36.3 (03 Jun 2025 06:05), Max: 36.6 (02 Jun 2025 12:26)  T(F): 97.4 (03 Jun 2025 06:05), Max: 97.9 (02 Jun 2025 12:26)  HR: 70 (03 Jun 2025 06:05) (60 - 70)  BP: 109/71 (03 Jun 2025 06:05) (100/67 - 114/77)  BP(mean): --  RR: 18 (03 Jun 2025 06:05) (18 - 18)  SpO2: 98% (03 Jun 2025 06:05) (95% - 98%)    Parameters below as of 02 Jun 2025 21:17  Patient On (Oxygen Delivery Method): room air        MEDICATIONS:  MEDICATIONS  (STANDING):  apixaban 2.5 milliGRAM(s) Oral every 12 hours  dextrose 5%. 1000 milliLiter(s) (50 mL/Hr) IV Continuous <Continuous>  dextrose 5%. 1000 milliLiter(s) (100 mL/Hr) IV Continuous <Continuous>  dextrose 50% Injectable 25 Gram(s) IV Push once  dextrose 50% Injectable 12.5 Gram(s) IV Push once  dextrose 50% Injectable 25 Gram(s) IV Push once  glucagon  Injectable 1 milliGRAM(s) IntraMuscular once  insulin lispro (ADMELOG) corrective regimen sliding scale   SubCutaneous at bedtime  insulin lispro (ADMELOG) corrective regimen sliding scale   SubCutaneous three times a day before meals  levothyroxine 100 MICROGram(s) Oral daily  multivitamin 1 Tablet(s) Oral daily  senna 2 Tablet(s) Oral at bedtime  spironolactone 25 milliGRAM(s) Oral every 24 hours  thiamine 100 milliGRAM(s) Oral every 24 hours    MEDICATIONS  (PRN):  dextrose Oral Gel 15 Gram(s) Oral once PRN Blood Glucose LESS THAN 70 milliGRAM(s)/deciliter  polyethylene glycol 3350 17 Gram(s) Oral daily PRN Constipation  zolpidem 5 milliGRAM(s) Oral at bedtime PRN Insomnia      ALLERGIES:  Allergies    No Known Allergies    Intolerances        LABS:                        10.9   5.46  )-----------( 200      ( 02 Jun 2025 06:51 )             31.7     06-02    133[L]  |  101  |  24[H]  ----------------------------<  137[H]  5.1   |  23  |  0.82    Ca    8.1[L]      02 Jun 2025 06:51  Phos  2.9     06-02  Mg     2.3     06-02    TPro  6.0  /  Alb  2.6[L]  /  TBili  1.1  /  DBili  x   /  AST  27  /  ALT  6[L]  /  AlkPhos  107  06-02    PT/INR - ( 02 Jun 2025 06:51 )   PT: 15.6 sec;   INR: 1.34          PTT - ( 02 Jun 2025 06:51 )  PTT:30.9 sec  Urinalysis Basic - ( 02 Jun 2025 06:51 )    Color: x / Appearance: x / SG: x / pH: x  Gluc: 137 mg/dL / Ketone: x  / Bili: x / Urobili: x   Blood: x / Protein: x / Nitrite: x   Leuk Esterase: x / RBC: x / WBC x   Sq Epi: x / Non Sq Epi: x / Bacteria: x      CAPILLARY BLOOD GLUCOSE      POCT Blood Glucose.: 164 mg/dL (02 Jun 2025 21:09)      RADIOLOGY & ADDITIONAL TESTS: Reviewed. ***Note in progress***    OVERNIGHT EVENTS: NAEO    SUBJECTIVE / INTERVAL HPI: Patient seen and examined at bedside. Patient denying chest pain, SOB, palpitations, cough. Patient denies fever, chills, HA, Dizziness, N/V, abdominal pain, diarrhea, constipation, hematochezia/melena, dysuria, hematuria, new onset weakness/numbness, LE pain. Reports swelling in the ankles and the abdomen. He nearly finished his full tube feeds today (stopped around 730) but regurgitated (spat up in a kidney bowl), a bit more than usual.     Remaining ROS negative       PHYSICAL EXAM:    General: NAD.   HEENT: NC/AT; PERRL, anicteric sclera; MMM  Neck: supple  Cardiovascular: +S1/S2, RRR  Respiratory: CTA B/L; no W/R/R  Gastrointestinal: soft, NT; +BSx4. The abdomen is distended.   Extremities: WWP; no edema, clubbing or cyanosis. 1+ pitting edema in the R ankle with erythema on the R shin and ankle. 2+ pitting edema on the L ankle without any erythema.  Vascular: 2+ radial, DP/PT pulses B/L  Neurological: AAOx3; no focal deficits  Psychiatric: pleasant mood and affect  Dermatologic: no appreciable wounds or damage to the skin    VITAL SIGNS:  Vital Signs Last 24 Hrs  T(C): 36.3 (03 Jun 2025 06:05), Max: 36.6 (02 Jun 2025 12:26)  T(F): 97.4 (03 Jun 2025 06:05), Max: 97.9 (02 Jun 2025 12:26)  HR: 70 (03 Jun 2025 06:05) (60 - 70)  BP: 109/71 (03 Jun 2025 06:05) (100/67 - 114/77)  BP(mean): --  RR: 18 (03 Jun 2025 06:05) (18 - 18)  SpO2: 98% (03 Jun 2025 06:05) (95% - 98%)    Parameters below as of 02 Jun 2025 21:17  Patient On (Oxygen Delivery Method): room air        MEDICATIONS:  MEDICATIONS  (STANDING):  apixaban 2.5 milliGRAM(s) Oral every 12 hours  dextrose 5%. 1000 milliLiter(s) (50 mL/Hr) IV Continuous <Continuous>  dextrose 5%. 1000 milliLiter(s) (100 mL/Hr) IV Continuous <Continuous>  dextrose 50% Injectable 25 Gram(s) IV Push once  dextrose 50% Injectable 12.5 Gram(s) IV Push once  dextrose 50% Injectable 25 Gram(s) IV Push once  glucagon  Injectable 1 milliGRAM(s) IntraMuscular once  insulin lispro (ADMELOG) corrective regimen sliding scale   SubCutaneous at bedtime  insulin lispro (ADMELOG) corrective regimen sliding scale   SubCutaneous three times a day before meals  levothyroxine 100 MICROGram(s) Oral daily  multivitamin 1 Tablet(s) Oral daily  senna 2 Tablet(s) Oral at bedtime  spironolactone 25 milliGRAM(s) Oral every 24 hours  thiamine 100 milliGRAM(s) Oral every 24 hours    MEDICATIONS  (PRN):  dextrose Oral Gel 15 Gram(s) Oral once PRN Blood Glucose LESS THAN 70 milliGRAM(s)/deciliter  polyethylene glycol 3350 17 Gram(s) Oral daily PRN Constipation  zolpidem 5 milliGRAM(s) Oral at bedtime PRN Insomnia      ALLERGIES:  Allergies    No Known Allergies    Intolerances        LABS:                        11.3   5.53  )-----------( 194      ( 03 Jun 2025 05:30 )             33.1     06-03    134[L]  |  102  |  26[H]  ----------------------------<  143[H]  5.1   |  24  |  0.87    Ca    8.2[L]      03 Jun 2025 05:30  Phos  2.5     06-03  Mg     2.4     06-03    TPro  6.5  /  Alb  2.6[L]  /  TBili  0.9  /  DBili  x   /  AST  28  /  ALT  7[L]  /  AlkPhos  125[H]  06-03    PT/INR - ( 03 Jun 2025 05:30 )   PT: 15.3 sec;   INR: 1.33          PTT - ( 03 Jun 2025 05:30 )  PTT:31.5 sec  Urinalysis Basic - ( 03 Jun 2025 05:30 )    Color: x / Appearance: x / SG: x / pH: x  Gluc: 143 mg/dL / Ketone: x  / Bili: x / Urobili: x   Blood: x / Protein: x / Nitrite: x   Leuk Esterase: x / RBC: x / WBC x   Sq Epi: x / Non Sq Epi: x / Bacteria: x      CAPILLARY BLOOD GLUCOSE      POCT Blood Glucose.: 133 mg/dL (03 Jun 2025 08:34)      RADIOLOGY & ADDITIONAL TESTS: Reviewed. ***Note in progress***    OVERNIGHT EVENTS: NAEO    SUBJECTIVE / INTERVAL HPI: Patient seen and examined at bedside. Patient denying chest pain, SOB, palpitations, cough. Patient denies fever, HA, Dizziness, N/V, abdominal pain, diarrhea, constipation, hematochezia/melena, dysuria, hematuria, new onset weakness/numbness, LE pain. Reports swelling in the ankles and the abdomen. He nearly finished his full tube feeds today (stopped around 730) but regurgitated (spat up in a kidney bowl), a bit more than usual. He reports feeling cold. He reports he had four bowel movements in the last 24 hours. He states that his ears feeling "clogged" when he exercises. He also currently reports that his L ear is slightly warm.     Remaining ROS negative       PHYSICAL EXAM:    General: NAD.   HEENT: NC/AT; PERRL, anicteric sclera; MMM. L ear is slightly warm but is not erythematous. There are no lesions on and there is no drainage coming from the ear.   Neck: supple  Cardiovascular: +S1/S2, RRR  Respiratory: CTA B/L; no W/R/R  Gastrointestinal: soft, NT; +BSx4. The abdomen is distended.   Extremities: WWP; no edema, clubbing or cyanosis. 1+ pitting edema in the R ankle with erythema on the R shin and ankle. 2+ pitting edema on the L ankle without any erythema.  Vascular: 2+ radial, DP/PT pulses B/L  Neurological: AAOx3; no focal deficits  Psychiatric: pleasant mood and affect  Dermatologic: no appreciable wounds or damage to the skin    VITAL SIGNS:  Vital Signs Last 24 Hrs  T(C): 36.3 (03 Jun 2025 06:05), Max: 36.6 (02 Jun 2025 12:26)  T(F): 97.4 (03 Jun 2025 06:05), Max: 97.9 (02 Jun 2025 12:26)  HR: 70 (03 Jun 2025 06:05) (60 - 70)  BP: 109/71 (03 Jun 2025 06:05) (100/67 - 114/77)  BP(mean): --  RR: 18 (03 Jun 2025 06:05) (18 - 18)  SpO2: 98% (03 Jun 2025 06:05) (95% - 98%)    Parameters below as of 02 Jun 2025 21:17  Patient On (Oxygen Delivery Method): room air        MEDICATIONS:  MEDICATIONS  (STANDING):  apixaban 2.5 milliGRAM(s) Oral every 12 hours  dextrose 5%. 1000 milliLiter(s) (50 mL/Hr) IV Continuous <Continuous>  dextrose 5%. 1000 milliLiter(s) (100 mL/Hr) IV Continuous <Continuous>  dextrose 50% Injectable 25 Gram(s) IV Push once  dextrose 50% Injectable 12.5 Gram(s) IV Push once  dextrose 50% Injectable 25 Gram(s) IV Push once  glucagon  Injectable 1 milliGRAM(s) IntraMuscular once  insulin lispro (ADMELOG) corrective regimen sliding scale   SubCutaneous at bedtime  insulin lispro (ADMELOG) corrective regimen sliding scale   SubCutaneous three times a day before meals  levothyroxine 100 MICROGram(s) Oral daily  multivitamin 1 Tablet(s) Oral daily  senna 2 Tablet(s) Oral at bedtime  spironolactone 25 milliGRAM(s) Oral every 24 hours  thiamine 100 milliGRAM(s) Oral every 24 hours    MEDICATIONS  (PRN):  dextrose Oral Gel 15 Gram(s) Oral once PRN Blood Glucose LESS THAN 70 milliGRAM(s)/deciliter  polyethylene glycol 3350 17 Gram(s) Oral daily PRN Constipation  zolpidem 5 milliGRAM(s) Oral at bedtime PRN Insomnia      ALLERGIES:  Allergies    No Known Allergies    Intolerances        LABS:                        11.3   5.53  )-----------( 194      ( 03 Jun 2025 05:30 )             33.1     06-03    134[L]  |  102  |  26[H]  ----------------------------<  143[H]  5.1   |  24  |  0.87    Ca    8.2[L]      03 Jun 2025 05:30  Phos  2.5     06-03  Mg     2.4     06-03    TPro  6.5  /  Alb  2.6[L]  /  TBili  0.9  /  DBili  x   /  AST  28  /  ALT  7[L]  /  AlkPhos  125[H]  06-03    PT/INR - ( 03 Jun 2025 05:30 )   PT: 15.3 sec;   INR: 1.33          PTT - ( 03 Jun 2025 05:30 )  PTT:31.5 sec  Urinalysis Basic - ( 03 Jun 2025 05:30 )    Color: x / Appearance: x / SG: x / pH: x  Gluc: 143 mg/dL / Ketone: x  / Bili: x / Urobili: x   Blood: x / Protein: x / Nitrite: x   Leuk Esterase: x / RBC: x / WBC x   Sq Epi: x / Non Sq Epi: x / Bacteria: x      CAPILLARY BLOOD GLUCOSE      POCT Blood Glucose.: 133 mg/dL (03 Jun 2025 08:34)      RADIOLOGY & ADDITIONAL TESTS: Reviewed. ***Note in progress***    OVERNIGHT EVENTS: NAEO    SUBJECTIVE / INTERVAL HPI: Patient seen and examined at bedside. Patient denying chest pain, SOB, palpitations, cough. Patient denies fever, HA, Dizziness, N/V, abdominal pain, diarrhea, constipation, hematochezia/melena, dysuria, hematuria, new onset weakness/numbness, LE pain. Reports swelling in the ankles and the abdomen. He nearly finished his full tube feeds today (stopped around 730) but regurgitated (spat up in a kidney bowl), a bit more than usual. He reports feeling cold. He reports he had four bowel movements in the last 24 hours. He states that his ears feeling "clogged" when he exercises. He also currently reports that his L ear is slightly warm.     Remaining ROS negative       PHYSICAL EXAM:    General: NAD.   HEENT: NC/AT; PERRL, anicteric sclera; MMM. L ear is slightly warm but is not erythematous. R ear has no abnormalities. There are no lesions on and there is no drainage coming from the L ear.   Neck: supple  Cardiovascular: +S1/S2, RRR  Respiratory: CTA B/L; no W/R/R  Gastrointestinal: soft, NT; +BSx4. The abdomen is distended.   Extremities: WWP; no edema, clubbing or cyanosis. 1+ pitting edema in the R ankle with erythema on the R shin and ankle. 2+ pitting edema on the L ankle without any erythema.  Vascular: 2+ radial, DP/PT pulses B/L  Neurological: AAOx3; no focal deficits  Psychiatric: pleasant mood and affect  Dermatologic: no appreciable wounds or damage to the skin    VITAL SIGNS:  Vital Signs Last 24 Hrs  T(C): 36.3 (03 Jun 2025 06:05), Max: 36.6 (02 Jun 2025 12:26)  T(F): 97.4 (03 Jun 2025 06:05), Max: 97.9 (02 Jun 2025 12:26)  HR: 70 (03 Jun 2025 06:05) (60 - 70)  BP: 109/71 (03 Jun 2025 06:05) (100/67 - 114/77)  BP(mean): --  RR: 18 (03 Jun 2025 06:05) (18 - 18)  SpO2: 98% (03 Jun 2025 06:05) (95% - 98%)    Parameters below as of 02 Jun 2025 21:17  Patient On (Oxygen Delivery Method): room air        MEDICATIONS:  MEDICATIONS  (STANDING):  apixaban 2.5 milliGRAM(s) Oral every 12 hours  dextrose 5%. 1000 milliLiter(s) (50 mL/Hr) IV Continuous <Continuous>  dextrose 5%. 1000 milliLiter(s) (100 mL/Hr) IV Continuous <Continuous>  dextrose 50% Injectable 25 Gram(s) IV Push once  dextrose 50% Injectable 12.5 Gram(s) IV Push once  dextrose 50% Injectable 25 Gram(s) IV Push once  glucagon  Injectable 1 milliGRAM(s) IntraMuscular once  insulin lispro (ADMELOG) corrective regimen sliding scale   SubCutaneous at bedtime  insulin lispro (ADMELOG) corrective regimen sliding scale   SubCutaneous three times a day before meals  levothyroxine 100 MICROGram(s) Oral daily  multivitamin 1 Tablet(s) Oral daily  senna 2 Tablet(s) Oral at bedtime  spironolactone 25 milliGRAM(s) Oral every 24 hours  thiamine 100 milliGRAM(s) Oral every 24 hours    MEDICATIONS  (PRN):  dextrose Oral Gel 15 Gram(s) Oral once PRN Blood Glucose LESS THAN 70 milliGRAM(s)/deciliter  polyethylene glycol 3350 17 Gram(s) Oral daily PRN Constipation  zolpidem 5 milliGRAM(s) Oral at bedtime PRN Insomnia      ALLERGIES:  Allergies    No Known Allergies    Intolerances        LABS:                        11.3   5.53  )-----------( 194      ( 03 Jun 2025 05:30 )             33.1     06-03    134[L]  |  102  |  26[H]  ----------------------------<  143[H]  5.1   |  24  |  0.87    Ca    8.2[L]      03 Jun 2025 05:30  Phos  2.5     06-03  Mg     2.4     06-03    TPro  6.5  /  Alb  2.6[L]  /  TBili  0.9  /  DBili  x   /  AST  28  /  ALT  7[L]  /  AlkPhos  125[H]  06-03    PT/INR - ( 03 Jun 2025 05:30 )   PT: 15.3 sec;   INR: 1.33          PTT - ( 03 Jun 2025 05:30 )  PTT:31.5 sec  Urinalysis Basic - ( 03 Jun 2025 05:30 )    Color: x / Appearance: x / SG: x / pH: x  Gluc: 143 mg/dL / Ketone: x  / Bili: x / Urobili: x   Blood: x / Protein: x / Nitrite: x   Leuk Esterase: x / RBC: x / WBC x   Sq Epi: x / Non Sq Epi: x / Bacteria: x      CAPILLARY BLOOD GLUCOSE      POCT Blood Glucose.: 133 mg/dL (03 Jun 2025 08:34)      RADIOLOGY & ADDITIONAL TESTS: Reviewed. ***Note in progress***    OVERNIGHT EVENTS: NAEO    SUBJECTIVE / INTERVAL HPI: Patient seen and examined at bedside. Patient denying chest pain, SOB, palpitations, cough. Patient denies fever, HA, Dizziness, N/V, abdominal pain, diarrhea, constipation, hematochezia/melena, dysuria, hematuria, new onset weakness/numbness, LE pain. Reports swelling in the ankles and the abdomen. He nearly finished his full tube feeds today (stopped around 730) but regurgitated (spat up in a kidney bowl), a bit more than usual. He reports feeling cold. He reports he had four bowel movements in the last 24 hours. He also states that his ears feeling "clogged" when he exercises and reports that his L ear is currently slightly warm.     Remaining ROS negative       PHYSICAL EXAM:    General: NAD.   HEENT: NC/AT; PERRL, anicteric sclera; MMM. L ear is slightly warm but is not erythematous. R ear has no abnormalities. There are no lesions on and there is no drainage coming from the L ear.   Neck: supple  Cardiovascular: +S1/S2, RRR  Respiratory: CTA B/L; no W/R/R  Gastrointestinal: soft, NT; +BSx4. The abdomen is distended.   Extremities: WWP; no edema, clubbing or cyanosis. 1+ pitting edema in the R ankle with erythema on the R shin and ankle. 2+ pitting edema on the L ankle without any erythema.  Vascular: 2+ radial, DP/PT pulses B/L  Neurological: AAOx3; no focal deficits  Psychiatric: pleasant mood and affect  Dermatologic: no appreciable wounds or damage to the skin    VITAL SIGNS:  Vital Signs Last 24 Hrs  T(C): 36.3 (03 Jun 2025 06:05), Max: 36.6 (02 Jun 2025 12:26)  T(F): 97.4 (03 Jun 2025 06:05), Max: 97.9 (02 Jun 2025 12:26)  HR: 70 (03 Jun 2025 06:05) (60 - 70)  BP: 109/71 (03 Jun 2025 06:05) (100/67 - 114/77)  BP(mean): --  RR: 18 (03 Jun 2025 06:05) (18 - 18)  SpO2: 98% (03 Jun 2025 06:05) (95% - 98%)    Parameters below as of 02 Jun 2025 21:17  Patient On (Oxygen Delivery Method): room air        MEDICATIONS:  MEDICATIONS  (STANDING):  apixaban 2.5 milliGRAM(s) Oral every 12 hours  dextrose 5%. 1000 milliLiter(s) (50 mL/Hr) IV Continuous <Continuous>  dextrose 5%. 1000 milliLiter(s) (100 mL/Hr) IV Continuous <Continuous>  dextrose 50% Injectable 25 Gram(s) IV Push once  dextrose 50% Injectable 12.5 Gram(s) IV Push once  dextrose 50% Injectable 25 Gram(s) IV Push once  glucagon  Injectable 1 milliGRAM(s) IntraMuscular once  insulin lispro (ADMELOG) corrective regimen sliding scale   SubCutaneous at bedtime  insulin lispro (ADMELOG) corrective regimen sliding scale   SubCutaneous three times a day before meals  levothyroxine 100 MICROGram(s) Oral daily  multivitamin 1 Tablet(s) Oral daily  senna 2 Tablet(s) Oral at bedtime  spironolactone 25 milliGRAM(s) Oral every 24 hours  thiamine 100 milliGRAM(s) Oral every 24 hours    MEDICATIONS  (PRN):  dextrose Oral Gel 15 Gram(s) Oral once PRN Blood Glucose LESS THAN 70 milliGRAM(s)/deciliter  polyethylene glycol 3350 17 Gram(s) Oral daily PRN Constipation  zolpidem 5 milliGRAM(s) Oral at bedtime PRN Insomnia      ALLERGIES:  Allergies    No Known Allergies    Intolerances        LABS:                        11.3   5.53  )-----------( 194      ( 03 Jun 2025 05:30 )             33.1     06-03    134[L]  |  102  |  26[H]  ----------------------------<  143[H]  5.1   |  24  |  0.87    Ca    8.2[L]      03 Jun 2025 05:30  Phos  2.5     06-03  Mg     2.4     06-03    TPro  6.5  /  Alb  2.6[L]  /  TBili  0.9  /  DBili  x   /  AST  28  /  ALT  7[L]  /  AlkPhos  125[H]  06-03    PT/INR - ( 03 Jun 2025 05:30 )   PT: 15.3 sec;   INR: 1.33          PTT - ( 03 Jun 2025 05:30 )  PTT:31.5 sec  Urinalysis Basic - ( 03 Jun 2025 05:30 )    Color: x / Appearance: x / SG: x / pH: x  Gluc: 143 mg/dL / Ketone: x  / Bili: x / Urobili: x   Blood: x / Protein: x / Nitrite: x   Leuk Esterase: x / RBC: x / WBC x   Sq Epi: x / Non Sq Epi: x / Bacteria: x      CAPILLARY BLOOD GLUCOSE      POCT Blood Glucose.: 133 mg/dL (03 Jun 2025 08:34)      RADIOLOGY & ADDITIONAL TESTS: Reviewed.

## 2025-06-03 NOTE — PROGRESS NOTE ADULT - ASSESSMENT
I M    74M w/ PMH of gastric cancer (s/p feeding J-tube), portal vein thrombosis (on Eliquis) AAA (s/p EVAR in 2023), T2DM, hypothyroidism, HTN, anemia, BPH, type 2 endoleak repair, s/p embolization of internal iliac artery branch (10/24), cirrhosis, who presents to the ED with worsening bilateral leg swelling and gen weakness, admitted for further evaluation.      Nutritional Assessment:  · Nutritional Assessment  This patient has been assessed with a concern for Malnutrition and has been determined to have a diagnosis/diagnoses of Severe protein-calorie malnutrition.    This patient is being managed with:   Diet Regular-  Low Sodium  Tube Feeding Modality: Jejunostomy  [facility] Standard 1.4  Continuous  Starting Tube Feed Rate {mL per Hour}: 40  Increase Tube Feed Rate by (mL): 20     Every 2 hours  Until Goal Tube Feed Rate (mL per Hour): 86  Tube Feed Duration (in Hours): 24  Tube Feed Start Time: 18:00  Tube Feed Stop Time: 09:00  Liquid Protein Supplement     Qty per Day:  1  Entered: May 28 2025  2:07PM    Problem/Plan - 1:  ·  Problem: Liver cirrhosis.   ·  Plan: Recently diagnosed with liver cirrhosis (2025?), per chart note by Dr. Mckeon and review on [facility] -  etiology of his cirrhosis is unclear--no work-up was completed on [place] except bloodwork and Fibroscan, the latter uninterpretable due to jordy-hepatic ascites.   , INR 1.18, AST/ALT not quantified/16  CT A/P this year showed cirrhotic morphology, last year had normal findings  ED bedside U/S without pocket to TAP per signout  Abd US showing ascites.  Fluid study: WBC 22, low concern for SBP    Plan   - s/p diagnostic and therapeutic paracentesis done 5/30  - Will f/u with hepatology regarding restarting Eliquis at 5  - Daily MELD score (MELD today is 10).    Problem/Plan - 2:  ·  Problem: Weakness.   ·  Plan: Weakness, generalized and leg pain x 4 days, presents with decreased ability to walk, likely i/s/o abdominal distension and significant swelling in b/l LE. Reports taking additional doses of Spironolactone because of edema.   Admission K of 3.7, VBG 2.5.   Suspected to be 2/2  fluid overload with immobility, RLE cellulitis   Stroke workup negative per neuro, imaging findings without acute pathology.   MRI brain showed no acute abnormalities.  Nutritional workup so far negative    - PT/SW  - fall precautions.    Problem/Plan - 3:  ·  Problem: Cellulitis.   ·  Plan: RLE w/ erythema, warmth, no active purulence or drainage x 4 days  Dopplers b/l LE: No evidence of deep venous thrombosis in either lower extremity. Soft tissue swelling in both lower extremities.  Xray R foot and Tib/Fib: Osteopenia. No osseous destruction or periosteal reaction. No fracture or dislocation. There is a hallux valgus alignment with first MTP joint osteoarthrosis. Tibiotalar joint space narrowing is also identified. Second through fourth hammertoe deformities. Diffuse soft tissue swelling of the imaged lower extremity. Vascular calcification is present.  S/p Zosyn 3.375mg x1, Vancomycin 1000mg x1  Patient finished Cefazolin regimen on 5/28 [1g q8h (5/24 - 5/28)]  Afebrile, no WBC  - Blood cultures (5/24): No growth to date.    Problem/Plan - 4:  ·  Problem: Leg edema.   ·  Plan: Edema of unknown etiology, +4 pitting in b/l LE extending from mid thigh to feet  Despite being on Furosemide and Spironolactone, persistent edema; Alb 2.9 not consistent w/ this level of edema  TTE in 10/24 w/ EF 65%  TTE 5/27 revealed enlarged RV cavity with moderate mitral and tricuspid regurgitation. Nl LV thickness and cavity size. EF 52%.  Possibly i/s/o liver cirrhosis vs chronic CHF     Plan   - strict I/Os   - [place] is 25  - c/w RLE cellulitis management   - c/w spironolactone   - Restart home Lasix, but increase from 40 to 60 due to persistent ascites.    Problem/Plan - 5:  ·  Problem: Slurred speech.   ·  Plan: Per patient has had slurred speech x several days, no precipitating factor; *********RESOLVED***********  CTH: Arachnoid cyst and senescent cerebral changes. No acute intracranial hemorrhage.  CT Angio H: Left C6 ICA stenosis, 40-50%.  MRI head: No acute abnormalities  Neurology consulted in ED, no acute concern for stroke, will continue to follow  - MRI brain w/o acute pathology.    Problem/Plan - 6:  ·  Problem: Abdominal aortic aneurysm (AAA).   ·  Plan: Patient with hx of AAA. MRI abdomen of interval growth of AAA sac from 6.1 cm to 6.4 cm as well as a L internal iliac aneurysm measuring 2.1 cm.    Vascular following   CTA A/P: AAA s/p  aortobiiliac stent graft repair with persistent endoleak. aneurysm sac measures 6.1 cm, not significantly changed since 2/1/2025. Bilateral internal iliac artery aneurysms are also unchanged since 2/1/2025    - F/u outpatient with vascular surgery for aneurysm size monitoring.    Problem/Plan - 7:  ·  Problem: Gastric adenocarcinoma.   ·  Plan: Hx of gastric cancer treated with chemotherapy 5 years ago now w/ feeding J-tube, previously followed Dr. Saravanan Cabello and Dr. Chema Richmond. Currently followed Heme/onc: Dr. Henry.  - No acute intervention, continue outpatient management  - C/w tube feeds- resumed and give insulin accordingly (as in diabetes below).    Problem/Plan - 8:  ·  Problem: Essential hypertension.   ·  Plan: Hx of HTN, was taking Losartan 50mg but has since discontinued. Normotensive.  - No acute intervention, continue to monitor.    Problem/Plan - 9:  ·  Problem: Hypothyroidism.   ·  Plan: Home medications: Levothyroxine 100 mcg/day  Endocrinologist: Dr. Mckeon, last saw 4/25  - C/w home medications.    Problem/Plan - 10:  ·  Problem: Anemia.   ·  Plan; Known hx of anemia, Hgb 10.6, baseline 9-10 per HIE.  In chart review has hx of B12 deficiency  - CTM CBC  - Keep active T/S  - Transfuse for Hgb <7.    Problem/Plan - 11:  ·  Problem: Portal vein thrombosis.   ·  Plan: Hx of portal vein thrombosis and has since been on Eliquis 5mg BID. Dose decreased to 2.5mg iso worsening liver function and INR.   - Consult with hepatology regarding restarting Eliquis 5 mg BID.    Problem/Plan - 12:  ·  Problem: Prophylactic measure.   ·  Plan: F: none  E: Replete as needed Mg>2 and K>4)  N: Consistent Carb and Tube Feeds  DVT: Eliquis 5mg BID  GI: none  Bowel: Terminate miralax and senna prn due to patient having 4 bowel movements in one day  Activity: Bedrest  Dispo: [facility].

## 2025-06-03 NOTE — PROGRESS NOTE ADULT - ATTENDING COMMENTS
I have seen and examined patient at bedside. I agree with hx, ROS, physical exam, imp/suggestions as written by the fellow.
Pt seen on rounds this afternoon and events of the weekend reviewed  Feeds have been decreased to 85 cc/hr because of ?? reflux, (though the feeds are being delivered jejunally)  He continues to stop the feeds prematurely, without a good reason--told me he was "bored."    His appetite has not improved despite partial drainage of the ascites.  His abdomen is still moderately distended, though non-tender.  Edema is essentially resolved.   Vascular Surgery evaluation noted.  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  --Needs to have his feeding tube changed by IR before discharge
No complaints.  waiting on complettion liver eval.  thanks
Pt seen on rounds this afternoon.  Glucoses remain extremely low ( since last night) despite discontinuation of all standing insulin.  He has been tolerating the tube feeds, and the glucoses have remained this low despite the increase to the 1.4 kwan/cc [employer] formula.  He again went through the night without any diarrhea, and he says that he had a formed bowel movement in the morning  His PO intake has been negligible  Edema is markedly decreased, is limited to 1+ at the ankles and feet  Ascites seems to have increased further--abdomen is markedly distended--?? whether this is affecting his appetite.  --Continue off insulin  --No change in tube feeds  --Still need opinion from Cardiology, ?? GI regarding need for Eliquis, ?? at reduced dose
Seen at bedside  74-year-old with multiple comorbid conditions including history of gastric cancer status post total gastrectomy, cirrhosis, abdominal aortic aneurysm and atherosclerosis.  He is doing fine and is stable from hepatology point of view  History of portal vein thrombosis.  We will review the MRI to see if he has evidence of any remaining clot or any extension into SMV.  He is on Eliquis 5 mg twice a day.  This drug is metabolized through the liver and in patients with CTP class C, the level of the medication in the blood may be higher than expected.  At the same time, he has been tolerating the drug well.  His INR is going up which could be in part due to Eliquis.  We can consider lowering the dose to 2.5 mg twice a day.  He has been on that dose also but it was changed at some point and I am not sure about the exact reason behind it.  Had a long conversation with patient and emphasized on the importance of improving his nutritional status.  This is a difficult task as he does not have stomach.  He has a jejunostomy tube and I recommended him to use the tube 5-6 times a day to deliver calorie.  He is a 6 foot man and would require at least 2500 kwan a day with 2 to 2.5 g/kg of protein.  patient will be followed by Dr. Neal as outpatient   Asked
Stable   work up underway
Pt seen on rounds this afternoon.  Abdominal distension remains significantly increased from admission but appears about the same as yesterday.  Has no tenderness on exam.  Edema is limited to his ankles/feet and is only 1+ bilaterally  PO intake has been minimal, and it is possible that the "pressure" effect of the ascites is responsible, though I would expect this to produce more early satiety than actual loss of appetite, and the latter seems to be a bigger factor.  Glucoses still remain in target range without insulin  Spoke with him about stopping the feeds prematurely (which he will often do at home).  Nonetheless, 12 hours (6 PM-6AM) at 110 cc/hr provides 1850 kcals, which should be sufficient.  I have still encouraged him to allow the feeds to run until 8 AM.  An increase in rate to above 110 cc/hr is not likely to be tolerated.  Will await GI recommendations regarding Eliquis in light of the rise in INR to 1.9  Still awaiting diagnostic/therapeutic paracentesis  MRI suggests ?? slight increase in AAA size and ?? new iliac artery aneurysm.  Will ask Dr. Saenz to evaluate
Pt seen on rounds this afternoon.  Had no new complaints, but his glucoses have remained inexplicably low despite a decrease in insulin dose to cover the feeds--was hypoglycemic this morning after a decrease in NPH to 14 units--he previously took as much as 25 units (+ 5 lispro) at home.  Also difficult to explain is the lack of any diarrhea during the time the feeds were running.  Again had no bowel movements overnight last night.  (Reported having to get up every hour at home).  The decrease in glucoses and lack of diarrhea are too significant to be explained by the slightly lower concentration of the tube feed formula ([employer] 1.2 vs 1.4 at home)  Exam also different today--edema seems worse, now back to 2+  Ascites also seems to have increased significantly  Paracentesis is planned.  Will try stopping the NPH insulin.  Will also change the feeds back to the 1.4 formula
72M w/ PMH of gastric cancer (s/p feeding J-tube), portal vein thrombosis (on Eliquis) AAA (s/p EVAR in 2023), T2DM, hypothyroidism, HTN, anemia, BPH, type 2 endoleak repair s/p embolization of internal iliac artery branch (10/24), cirrhosis, who presented to the ED from his endocrinologists office with worsening bilateral leg swelling and RLE erythema admitted for cellulitis and fluid overload; also reporting slurred speech PTA and undergoing CVA r/o    --received IV lasix 40 x 2 yesterday to excellent effect - still with room for further diuresis so will give the same today with electrolyte monitoring  --cellulitis improving -- c/w cefazolin   --cirrhosis - etiology unclear, GI requesting para as well as lab workup -- in progress; daily MELD score  --Slurred speech - now resolved. speech seemed slow yesterday but improved today - perhaps component of HE? CTH neg, neuro requesting MRI non-con; continue with lactulose - titrate to 2-3 bms daily  --DM2 - resume tube feeds with insulin dosing as per endocrine recs  --Hypothyroidism - c/w home synthroid  --Macrocytic anemia - hx B12 deficiency - B12 455; folate wnl; likely 2/2 liver cirrhosis  --hx PVT- c/w eliquis     Dispo: home PT
72M with PMH of gastric cancer (s/p feeding J-tube), portal vein thrombosis (on Eliquis) AAA (s/p EVAR in 2023), T2DM, hypothyroidism, HTN, anemia, BPH, type 2 endoleak repair s/p embolization of internal iliac artery branch (10/24), cirrhosis, who presented to the ED from his endocrinologists office with worsening bilateral leg swelling and RLE erythema admitted for cellulitis and fluid overload; also reporting slurred speech PTA and undergoing CVA r/o    --planning to give IV lasix in the morning, and monitor response.  If not getting paracentesis today, can get another dose in the evening and monitor electrolytes.  Still has bilateral pitting edema  - cellulitis is improving while on cefazolin  - appreciate input from GI - pending paracentesis and further workup for cirrhosis  - check BMP this afternoon after lasix as well as ammonia  - pending MRI brain per neurology for slurred speech on admission - ? component of hepatic encephalopathy.  No evidence of slurred speech while speaking with patient this morning  - continue with tube feeds  - endo consult for blood glucose management - follows with Dr. Richard outpatient  - continue with eliquis BID    Dispo: home PT    50 minutes spent on this encounter, including face to face with patient, care coordination, review of chart and documentation.     Time spent on the encounter excludes teaching and separately reported services
72M w Gastric CA (s/p feeding J-tube), portal vein thrombosis with prothrombin gene mutation (on Eliquis), AAA (s/p EVAR in 2023), IDDM2, hypothyroidism, HTN, anemia, BPH, type 2 endoleak repair s/p embolization of internal iliac artery branch (10/24), cirrhosis, p/w BLE swelling and RLE erythema from Dr. Richard's office - admitted for cellulitis and fluid overload; also reporting slurred speech PTA and undergoing CVA r/o, Brain MRI negative for stroke, s/p therapeutic para 5/30 (4.5L; neg for SBP), notably eliquis dose reduced to 2.5mg BID in discussion w heme-onc    Pt feels well overall. Denies abd pain, chest pain, dyspnea, pleuritic chest discomfort  Exam shows male in NAD on RA. MMM, RRR, nml resp effort, CTAB. Abd slightly distended. J tube in place. Dressing over para site. non-tender abdomen wo rebound. Some LE edema present. moving all extremities. NO asterixis    #Decompensated Portal Hypertension d/t cirrhosis-suspect MAFLD. Followed by Hepatology and referral to transplantation   -Volume: Ascites: HIGH SAAG (2.2). s/p 4.5L 5/29. Managed w Lasix 40mg and aldactone 25mg daily   -Infection: NEG for SBP (PMN 1) on 5/30   -Bleeding: pt will need EGD for EV surveillance   -Encephalopathy: pt does not appear in HE. No asterixis today   -Child Cancino B. MELD 10    #History of PVT  #Prothrombin gene mutation   - on eliquis 5mg BID --> however this admission was transitioned to 2.5mg BID    #Encephalopathy - on admission. Ammonia nml -  18 on admit. MR brain negative. Appears resolved today.  #Cellulitis - s/p IV abx x5d course.  Vanc/Zosyn -> ancef    #HFpEF - ascites present. TTE 5/27 - 52% LVEF.  #Macrocytic anemia -  10.9. B12/Folate nml. TSat 32%. Possibly d/t AoCD  #Hyponatremia - 133  #IDDM2 - a1c 4.8   - Endocrine currently following   - on SSI. Home was on long/Short acting insulin  #Insomnia -zolpidem 5mg HS  #Hypothyroidism- c/w  home synthroid 100mcg    #h/o gastric CA  #on Tube feeds via J tube  - Nessa Longoria 1.4. 86cc/hr 1800h - 0900h. At goal    #AAA  s/p EVAR 2023  - type 2 endoleak s/p embolization of R iliac artery 2024.  - s/p CTA A/P 6/1. Per vascular sx recs - to f/u outpatient w Dr. Saenz - 2-3wk post dc--PLAN is for outpatient endoleak repair    Plan  Overall - medically optimized for dc. 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 Noted that change was made when pt had higher INR  CBC w diff, CMP, PTT/INR in AM    Above d/w housestaff  DISPO: Home - anticipate WED. will need follow-up Endocrine, Hepatology, vascular sx
72M w/ PMH of gastric cancer (s/p feeding J-tube), portal vein thrombosis (on Eliquis) AAA (s/p EVAR in 2023), T2DM, hypothyroidism, HTN, anemia, BPH, type 2 endoleak repair, s/p embolization of internal iliac artery branch (10/24), cirrhosis, who presented to the ED with worsening bilateral leg swelling, RLE discoloration and gen weakness. Family also endorsing worensing gait, slurred speech. Stroke consulted in ED. CTH/CTA headneck neg for acute stroke, no FND. Admitted for RLE cellulitis and further work up.   On PE: elderly male resting comfortably in bed. NAD. +MMM, +PERRLA, no facial droop, no FND AO x3, s1s2 no murmur, no JVD. abd soft, NT, +J tube in place, no surrounding erythema. +3 b/l LE edema. chronic venous changes, RLE erythema/tenderness.      Plan   -c/w cefazolin for non purulent cellultis RLE. Serial exams    - Endocrine consulted, appreciate recs. Will restart tube feeds with adjusted insulin regimen.   - Appreciate Neurology input. Pending MRI brain.   -LE venous doppler neg for for DVT    -vascular consult. F/up LE arterial dopplers.    -f/up TSH, B12, folate, pro BNP, TTE   -c/w home meds  - resume home dirusis regimen with lasix and spironolactone  -daily MELD, hepatology consult  -fall precautions    -PT/SW
72M w/ PMH of gastric cancer (s/p feeding J-tube), portal vein thrombosis (on Eliquis) AAA (s/p EVAR in 2023), T2DM, hypothyroidism, HTN, anemia, BPH, type 2 endoleak repair, s/p embolization of internal iliac artery branch (10/24), cirrhosis, who presents to the ED with worsening bilateral leg swelling and gen weakness, admitted for further evaluation.  Patient underwent paracentesis and has significant relief   Since vascular surgery has obtained a CT scan, however vascular wishes for one more CT prior to discharge   Will discuss with vascular team regarding patient vs outpatient workup
72M with PMH of gastric cancer (s/p feeding J-tube), portal vein thrombosis (on Eliquis) AAA (s/p EVAR in 2023), T2DM, hypothyroidism, HTN, anemia, BPH, type 2 endoleak repair s/p embolization of internal iliac artery branch (10/24), cirrhosis, who presented to the ED from his endocrinologists office with worsening bilateral leg swelling and RLE erythema admitted for cellulitis and fluid overload; also reporting slurred speech PTA and undergoing CVA r/o    - Still has bilateral pitting edema., but improved after dose of lasix yesterday.  Once has paracentesis, can resume daily  - cellulitis is improving while on cefazolin  - appreciate input from hepatology - pending paracentesis and further workup for cirrhosis   - can continue spironolactone, holding lasix until after paracentesis to avoid excessive fluid shifts  - discussed TTE findings with outpatient cardiologist yesterday- slightly reduced EF from TTE last year, as well as a wall motion abnormalities, nothing for team to do inpatient, but recommends outpatient follow up  - liver MRI with evidence of cirrhosis and significant ascites  - IR consult for paracentesis, due to abdominal surgery history, and presence of J tube  - continue with tube feeds  - endo consult for blood glucose management - follows with Dr. Richard outpatient  - continue with eliquis BID- has history of multiple portal vein thromboses, hematology to see patient tomorrow.  Because of history, likely would benefit from lifelong anticoagulation.  At this time, he does not have a thrombus on ultrasound.  He is heterozygous for a prothrombin gene mutation, tested outpatient back in 2021.  Report available in [facility].   - MRI brain negative for stroke    Dispo: home PT    50 minutes spent on this encounter, including face to face with patient, care coordination, review of chart and documentation.     Time spent on the encounter excludes teaching and separately reported services
72M with PMH of gastric cancer (s/p feeding J-tube), portal vein thrombosis (on Eliquis) AAA (s/p EVAR in 2023), T2DM, hypothyroidism, HTN, anemia, BPH, type 2 endoleak repair s/p embolization of internal iliac artery branch (10/24), cirrhosis, who presented to the ED from his endocrinologists office with worsening bilateral leg swelling and RLE erythema admitted for cellulitis and fluid overload; also reporting slurred speech PTA and undergoing CVA r/o    - Still has bilateral pitting edema., but improved after dose of lasix yesterday.  Once has paracentesis, can resume daily  - cellulitis is improving while on cefazolin  - appreciate input from hepatology - pending paracentesis and further workup for cirrhosis - will need to be done with IR  - can continue spironolactone, holding lasix until after paracentesis to avoid excessive fluid shifts  - discussed TTE findings with outpatient cardiologist - slightly reduced EF from TTE last year, as well as a wall motion abnormalities, nothing for team to do inpatient, but recommends outpatient follow up  - pending liver MRI  - brain MRI does not need to happen inpatient - patient already on eliquis, and imaging may not   - continue with tube feeds  - endo consult for blood glucose management - follows with Dr. Richard outpatient  - continue with eliquis BID - will need to discuss with hepatology about long term anticoagulation, particularly if there are better options given that patient has significant cirrhosis (?child ramos B/C)    Dispo: home PT    50 minutes spent on this encounter, including face to face with patient, care coordination, review of chart and documentation.     Time spent on the encounter excludes teaching and separately reported services
72M with PMH of gastric cancer (s/p feeding J-tube), portal vein thrombosis (on Eliquis) AAA (s/p EVAR in 2023), T2DM, hypothyroidism, HTN, anemia, BPH, type 2 endoleak repair s/p embolization of internal iliac artery branch (10/24), cirrhosis, who presented to the ED from his endocrinologists office with worsening bilateral leg swelling and RLE erythema admitted for cellulitis and fluid overload; also reporting slurred speech PTA and undergoing CVA r/o    - s/p bedside diagnostic and therapeutic paracentesis.  No SBP.   - resume lasix tomorrow in addition to continuing spironolactone  - adjusted eliquis dosing to 2.5mg BID per hematology consult  - hepatology following, appreciate input - patient will medically to leave tomorrow morning  - discussed TTE findings with outpatient cardiologist yesterday- slightly reduced EF from TTE last year, as well as a wall motion abnormalities, nothing for team to do inpatient, but recommends outpatient follow up  - liver MRI with evidence of cirrhosis and significant ascites  - continue with tube feeds - prescription for new tube feeds called in to pharmacy  - endo consult for blood glucose management - follows with Dr. Richard outpatient - appreciate input   - MRI brain negative for stroke    Dispo: home PT    50 minutes spent on this encounter, including face to face with patient, care coordination, review of chart and documentation.     Time spent on the encounter excludes teaching and separately reported services
72M w Gastric CA (s/p feeding J-tube), portal vein thrombosis with prothrombin gene mutation (on Eliquis), AAA (s/p EVAR in 2023), IDDM2, hypothyroidism, HTN, anemia, BPH, type 2 endoleak repair s/p embolization of internal iliac artery branch (10/24), cirrhosis, p/w BLE swelling and RLE erythema from Dr. Richard's office - admitted for cellulitis and fluid overload; also reporting slurred speech PTA and undergoing CVA r/o, Brain MRI negative for stroke, s/p therapeutic para 5/30 (4.5L; neg for SBP), eliquis dose now back to 5mg BID in discussion w heme-onc and hepatology    Pt feels well overall. Denies abd pain, chest pain, dyspnea, pleuritic chest discomfort  Exam shows male in NAD on RA. MMM, RRR, nml resp effort, CTAB. Abd slightly distended. J tube in place. Dressing over para site. non-tender abdomen wo rebound. Some LE edema present. moving all extremities. NO asterixis    #Decompensated Portal Hypertension d/t cirrhosis-suspect MAFLD. Followed by Hepatology and referral to transplantation   -Volume: Ascites: HIGH SAAG (2.2). s/p 4.5L 5/29. Managed w Lasix 40mg and aldactone 25mg daily   -Infection: NEG for SBP (PMN 1) on 5/30   -Bleeding: pt will need EGD for EV surveillance   -Encephalopathy: pt does not appear in HE. No asterixis today   -Child Cancino B. MELD 10    #History of PVT  #Prothrombin gene mutation   - on eliquis 5mg BID --> however this admission was transitioned to 2.5mg BID    #Encephalopathy - on admission. Ammonia nml -  18 on admit. MR brain negative. Appears resolved today.  #Cellulitis - s/p IV abx x5d course.  Vanc/Zosyn -> ancef    #HFpEF - ascites present. TTE 5/27 - 52% LVEF.  #Macrocytic anemia -  11.3. B12/Folate nml. TSat 32%. Possibly d/t AoCD  #Hyponatremia - 134  #IDDM2 - a1c 4.8   - Endocrine currently following   - on SSI. Home was on long/Short acting insulin  #Insomnia -zolpidem 5mg HS  #Hypothyroidism- c/w  home synthroid 100mcg    #h/o gastric CA  #on Tube feeds via J tube  - Nessa Longoria 1.4. 86cc/hr 1800h - 0900h. At goal    #AAA  s/p EVAR 2023  - type 2 endoleak s/p embolization of R iliac artery 2024.  - s/p CTA A/P 6/1. Per vascular sx recs - to f/u outpatient w Dr. Saenz - 2-3wk post dc--PLAN is for outpatient endoleak repair    Plan  Overall - medically optimized for dc. 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 Discussion with hepatology (Dr. Gomez) and hematology (Dr. Orellana) and will return back to eliquis 5mg q12h  Will increase lasix to 60mg daily -- monitor BP  CBC w diff, CMP, PTT/INR in AM    Above d/w housestaff  DISPO: Home - anticipate WED. will need follow-up Endocrine, Hepatology, vascular sx .

## 2025-06-03 NOTE — PROGRESS NOTE ADULT - SUBJECTIVE AND OBJECTIVE BOX
Physical Medicine and Rehabilitation Progress Note :       Patient is a 74y old  Male who presents with a chief complaint of RLE cellulitis (03 Jun 2025 06:31)      HPI:  HPI: 72M w/ PMH of gastric cancer (s/p feeding J-tube), portal vein thrombosis (on Eliquis) AAA (s/p EVAR in 2023), T2DM, hypothyroidism, HTN, anemia, BPH, type 2 endoleak repair, s/p embolization of internal iliac artery branch (10/24), cirrhosis, who presents to the ED with worsening bilateral leg swelling and gen weakness. Patient reports bilateral leg swelling for a couple of weeks, worsening in severity for the past 4 days. He was in Dr. Richards's office and was told to go to the emergency room because of swelling and discoloration of his legs. He is on furosemide and spironolactone and doubled his spironolactone dose in order to help with the swelling. He also reports erythema of the RLE x 4 days, no injury or falls. Additionally, he and his daughter report slurred speech for the past 4 days. He reports no fevers, chills, headache, chest pain, shortness of breath, abdominal pain, nausea, vomiting, diarrhea, constipation.     Medications: Eliquis 5mg BID, Spironolactone 25mg qd, Furosemide 40mg qd, Levothyroxine 100mg qd, NPH 18, Lispro 3, Ambien 5mg qd    In the ED,  VS: T 98-98.3, HR 58-80, BP /66-67, RR 18, SpO2 % RA  Labs: H/H 10.6/29.7, PT 13.6, INR 1.18, lactate 2.4 ->1.1, Bili 1.7   VBG: pH 7.43, HCO3 21  Limited RVP negative  CTH: Arachnoid cyst and senescent cerebral changes. No acute intracranial hemorrhage.  CT Angio H: Left C6 ICA stenosis, 40-50%.  Dopplers b/l LE: No evidence of deep venous thrombosis in either lower extremity. Soft tissue swelling in both lower extremities.  CXray: Heart size, mediastinal and hilar contours are unchanged and within normal limits. Surgical clips are again noted in the left supraclavicular/left apical region as well as in the LUQ The lung zones are clear. There is a trace left sided pneumothorax. No pnemothorax seen.  Xray R foot and Tib/Fib: Osteopenia. No osseous destruction or periosteal reaction. No fracture or dislocation. There is a hallux valgus alignment with first MTP joint osteoarthrosis. Tibiotalar joint space narrowing is also identified. Second through fourth hammertoe deformities. Diffuse soft tissue swelling of the imaged lower extremity. Vascular calcification is present.  EKG: PACs  Orders: Zosyn 3.375mg x1, Vancomycin 1000mg x1, NaCl 500 cc bolus x1  Consults: Neurology (23 May 2025 18:32)                            11.3   5.53  )-----------( 194      ( 03 Jun 2025 05:30 )             33.1       06-03    134[L]  |  102  |  26[H]  ----------------------------<  143[H]  5.1   |  24  |  0.87    Ca    8.2[L]      03 Jun 2025 05:30  Phos  2.5     06-03  Mg     2.4     06-03    TPro  6.5  /  Alb  2.6[L]  /  TBili  0.9  /  DBili  x   /  AST  28  /  ALT  7[L]  /  AlkPhos  125[H]  06-03    Vital Signs Last 24 Hrs  T(C): 36.8 (03 Jun 2025 11:30), Max: 36.8 (03 Jun 2025 11:30)  T(F): 98.2 (03 Jun 2025 11:30), Max: 98.2 (03 Jun 2025 11:30)  HR: 64 (03 Jun 2025 11:30) (60 - 70)  BP: 102/66 (03 Jun 2025 11:30) (100/67 - 109/71)  BP(mean): --  RR: 18 (03 Jun 2025 11:30) (18 - 18)  SpO2: 98% (03 Jun 2025 11:30) (95% - 98%)    Parameters below as of 03 Jun 2025 11:30  Patient On (Oxygen Delivery Method): room air        MEDICATIONS  (STANDING):  apixaban 2.5 milliGRAM(s) Oral every 12 hours  dextrose 5%. 1000 milliLiter(s) (50 mL/Hr) IV Continuous <Continuous>  dextrose 5%. 1000 milliLiter(s) (100 mL/Hr) IV Continuous <Continuous>  dextrose 50% Injectable 25 Gram(s) IV Push once  dextrose 50% Injectable 12.5 Gram(s) IV Push once  dextrose 50% Injectable 25 Gram(s) IV Push once  furosemide    Tablet 60 milliGRAM(s) Oral daily  glucagon  Injectable 1 milliGRAM(s) IntraMuscular once  insulin lispro (ADMELOG) corrective regimen sliding scale   SubCutaneous at bedtime  insulin lispro (ADMELOG) corrective regimen sliding scale   SubCutaneous three times a day before meals  levothyroxine 100 MICROGram(s) Oral daily  multivitamin 1 Tablet(s) Oral daily  senna 2 Tablet(s) Oral at bedtime  spironolactone 25 milliGRAM(s) Oral every 24 hours  thiamine 100 milliGRAM(s) Oral every 24 hours    MEDICATIONS  (PRN):  dextrose Oral Gel 15 Gram(s) Oral once PRN Blood Glucose LESS THAN 70 milliGRAM(s)/deciliter  polyethylene glycol 3350 17 Gram(s) Oral daily PRN Constipation  zolpidem 5 milliGRAM(s) Oral at bedtime PRN Insomnia      T(C): 36.8 (06-03-25 @ 11:30), Max: 36.8 (06-03-25 @ 11:30)  HR: 64 (06-03-25 @ 11:30) (60 - 70)  BP: 102/66 (06-03-25 @ 11:30) (100/67 - 109/71)  RR: 18 (06-03-25 @ 11:30) (18 - 18)  SpO2: 98% (06-03-25 @ 11:30) (95% - 98%)    Physical Exam:   74 y o man lying comfortably in semi Boyle's position , awake , alert , no acute complaints     Head: normocephalic , atraumatic    Eyes: PERRLA , EOMI , no nystagmus , sclera anicteric    ENT / FACE: neg nasal discharge , uvula midline , no oropharyngeal erythema / exudate    Neck: supple , negative JVD , negative carotid bruits , no thyromegaly    Chest: CTA bilaterally , neg wheeze / rhonchi / rales / crackles / egophany    Cardiovascular: regular rate and rhythm , neg murmurs / rubs / gallops    Abdomen: soft , non distended , no tenderness to palpation in all 4 quadrants ,  normal bowel sounds     Extremities:   3+ pitting edema in bilateral lower extremities from midthigh until feet. RLE is erythematous and warm to touch, no active purulence or drainage, LLE is pale, chronic leg changes bilaterally. Palpable pulses but very difficult given extensive edema.    Neurologic Exam:     Alert and oriented to person , place , date/year , speech fluent w/o dysarthria      Cranial Nerves:           II:                         pupils equal , round and reactive to light , visual fields intact         III/ IV/VI:             extraocular movements intact , neg nystagmus , neg ptosis        V:                        facial sensation intact , V1-3 normal        VII:                      face symmetric , no droop , normal eye closure and smile        VIII:                     hearing intact to finger rub bilaterally        IX and X:             no hoarseness , gag intact , palate/ uvula rise symmetrically        XI:                       SCM / trapezius strength intact bilateral        XII:                      no tongue deviation    Motor Exam:        > 3+/5 x 4 extremities , without drift     Sensation:         intact to light touch x 4 extremities                            no neglect or extinction on double simultaneous testing    DTR:           biceps/brachioradialis: equal                            patella/ankle: equal          neg Babinski     Coordination:            Finger to Nose:  neg dysmetria bilaterally        6/2/2025  Functional Status Assessment :       Pain Assessment/Number Scale (0-10) Adult  Presence of Pain: denies pain/discomfort (Rating = 0)  Pain Rating (0-10): Rest: 0 (no pain/absence of nonverbal indicators of pain)  Pain Rating (0-10): Activity: 0 (no pain/absence of nonverbal indicators of pain)    Safety      [facility] Functional Assessment: Basic Mobility  Type of Assessment: Daily assessment  Turning from your back to your side while in a flat bed without using bedrails?: 4 = No assist / stand by assistance  Moving from lying on your back to sitting on the flat side of a flat bed without using bedrails?: 4 = No assist / stand by assistance  Moving to and from a bed to a chair (including a wheelchair)?: 4 = No assist / stand by assistance  Standing up from a chair using your arms (e.g. wheelchair or bedside chair)?: 3 = A little assistance  Walking in hospital room?: 3 = A little assistance  Climbing 3-5 steps with a railing?:    3-calculated by average   Score: 21   Row Comment: Ask the patient "How much help from another person do you currently need? (If the patient hasn't done an activity recently, how much help from another person do you think he/she needs if he/she tried?)    Cognitive/Neuro      Cognitive/Neuro/Behavioral  Cognitive/Neuro/Behavioral [WDL Definition: Alert; opens eyes spontaneously; arouses to voice or touch; oriented x 4; follows commands; speech spontaneous, logical; purposeful motor response; behavior appropriate to situation]: WDL    Language Assistance  Preferred Language to Address Healthcare Preferred Language to Address Healthcare: English    Therapeutic Interventions      Bed Mobility  Bed Mobility Training Sit-to-Supine: independent  Bed Mobility Training Supine-to-Sit: independent    Sit-Stand Transfer Training  Transfer Training Sit-to-Stand Transfer: supervsion;  stand-by assist;  supervision;  full weight-bearing   no AD   Transfer Training Stand-to-Sit Transfer: supervsion;  stand-by assist;  supervision;  full weight-bearing   no AD   Sit-to-Stand Transfer Training Transfer Safety Analysis: decreased balance;  decreased weight-shifting ability;  decreased strength;  impaired balance    Gait Training  Gait Training: contact guard;  1 person assist;  nonverbal cues (demo/gestures);  verbal cues;  full weight-bearing   no AD ;  50 feet;  x3  Gait Analysis: decreased omayra;  increased time in double stance;  decreased weight-shifting ability;  decreased step length;  decreased arm swing;  decreased strength;  impaired balance            PM&R Impression : as above    Current disposition plan recommendation :      d/c home with home physical therapy

## 2025-06-03 NOTE — PROGRESS NOTE ADULT - ASSESSMENT
72M w/ PMH of gastric cancer (s/p feeding J-tube), portal vein thrombosis (on Eliquis, now resolved on US) AAA (s/p EVAR in ), DM2, hypothyroidism, HTN, anemia, BPH, type 2 endoleak repair, s/p embolization of internal iliac artery branch (10/24), and new diagnosis of cirrhosis, who first presented to ED with worsening bilateral leg swelling and gen weakness. Hepatology consulted for RUQ US showing cirrhosis.     Patient denies any significant ETOH use in the past.   Cirrhosis of unknown etiology but most likely 2/2 to MASLD (given DM2 and HTN) vs. prior radiation exposure (received multiple rounds when treated for gastric cancer.   BMI currently 20, and patient reports that at his heaviest he weighed 170 lbs at 6'0" tall (BMI of 23).     EV screening: none noted on EGD in  but significant esophagitis may have obscured views.   HE: none prior   HCC screening: AFP WNL and MRI pending   SBP: none prior     RUQ US:   - small to moderate amount of ascites most prominent of the right lower quadrant on the submitted images.  - nodular liver consistent with cirrhosis   - patent vasculature despite history of PVT thrombosis     Meld 3.0 score: 10    Most recent EGD (2023):   - Severe esophagitis LA Grade D  - Esophageal-jejunal anastomosis intact w/ patent afferent and efferent limbs without evidence of acute angulation or suggestion of obstruction.    Cirrhosis work-up:   - iron sat 32 and ferritin 228   - hep A IgM nonreactive, hep A IgG reactive   - hep C nonreactive  - hep B surface antigen nonreactive, surface antibody and core non-reactive   - AFP WNL <1.8  - anti-LMKA <20.1   - alpha-1 antitrypsin    - ceruloplasmin WNL 22  - PETH negative   - EBER + Speckled 1:80  - AMA negative  - ASMA neg  - LKMAA: neg  - IgG 1700 | 1gA 832 | IgM 67    Para done: neg SBP, SAA.2    Liver MRI (25): Cirrhosis of liver, no evidence of HCC, marked abdominal and pelvic ascites, no portal vein thrombosis.    Neg work up to date - consider possibility of Radiation induced cirrhosis    Recommendations:   - trend CBC, CMP, and INR daily   - low sodium/high protein diet with tube feeds via j-tube   - Tylenol not exceeding 2g per day   - continue home spironolactone 25 mg PO daily and Lasix 40 mg PO daily   - Recommend EGD for EV screening inpt vs. outpt, on imaging pt has dilated esophagus and distal esophageal wall which needs endoscopic evaluation by Hepatology  - Agree w/ D/C with eliquis 5mg BID     Pt will FU with Dr. Neal in Clinic, needs appt, 1 week from today with Dr. Neal    [facility]  [address] [place] [address], [address], [place] [id number] [phone]      Case discussed with Dr. Neal. Hepatology Team will continue to follow.     Zachary Cooper DO, PhD  Gastroenterology Fellow  GI Consult Weekday 7am-5pm Pager: [phone]  Weeknights/Weekend/Holiday Coverage: Please Call the  for contact info

## 2025-06-03 NOTE — CHART NOTE - NSCHARTNOTESELECT_GEN_ALL_CORE
Nutrition Services
Nutrition Services
Endocrinology/Event Note
Event Note
Nutrition Services
Off Service Note
endocrine/Event Note

## 2025-06-03 NOTE — PROGRESS NOTE ADULT - ASSESSMENT
72M w/ PMH of gastric cancer (s/p feeding J-tube), portal vein thrombosis (on Eliquis) AAA (s/p EVAR in 2023), T2DM, hypothyroidism, HTN, anemia, BPH, type 2 endoleak repair, s/p embolization of internal iliac artery branch (10/24), cirrhosis, who presents to the ED with worsening bilateral leg swelling and gen weakness, admitted for further evaluation. 74M w/ PMH of gastric cancer (s/p feeding J-tube), portal vein thrombosis (on Eliquis) AAA (s/p EVAR in 2023), T2DM, hypothyroidism, HTN, anemia, BPH, type 2 endoleak repair, s/p embolization of internal iliac artery branch (10/24), cirrhosis, who presents to the ED with worsening bilateral leg swelling and gen weakness, admitted for further evaluation.

## 2025-06-03 NOTE — PROGRESS NOTE ADULT - TIME BILLING
Review of weekend events since admission.  Adjustments in insulin regimen
Review of data and PO intake (which is minimal).  Discontinuation of standing insulin
Assessment of data and PO intake.  Assessment of possible need for changes in tube feeds.
Review of data and PO intake.  Assessment of need for insulin
Review of data and weekend events.  Assessment for possible restarting of insulin
50 min were spent excluding teaching and separately billed services   Time was spent on chart review, imaging review, lab review and face to face encounter
Bedside exam and interview.  Reviewed of laboratory data, radiology results, consultants' recommendations.   Discussion with patient/caregivers/housestaff and interdisciplinary staff (such as , social workers, etc).  Documentation of encounter.  Interventions were performed as documented above.  Excludes teaching time and/or separately reported services.
Face to face visit. Review of meds, imaging, labs, orders. Coordination of care and documentation
50 min were spent on this encounter excluding teaching and separately billed services   Time was spent on imaging review and interpretation, chart review, lab review and face to face encounter
Face to face visit. Review of meds, imaging, labs, orders. Coordination of care and documentation

## 2025-06-03 NOTE — PROGRESS NOTE ADULT - PROBLEM SELECTOR PLAN 4
Edema of unknown etiology, +4 pitting in b/l LE extending from mid thigh to feet  Despite being on Furosemide and Spironolactone, persistent edema; Alb 2.9 not consistent w/ this level of edema  TTE in 10/24 w/ EF 65%  TTE 5/27 revealed enlarged RV cavity with moderate mitral and tricuspid regurgitation. Nl LV thickness and cavity size. EF 52%.  Possibly i/s/o liver cirrhosis vs chronic CHF       Plan   - strict I/Os   - c/w RLE cellulitis management   - c/w spironolactone   - c/w home lasix Edema of unknown etiology, +4 pitting in b/l LE extending from mid thigh to feet  Despite being on Furosemide and Spironolactone, persistent edema; Alb 2.9 not consistent w/ this level of edema  TTE in 10/24 w/ EF 65%  TTE 5/27 revealed enlarged RV cavity with moderate mitral and tricuspid regurgitation. Nl LV thickness and cavity size. EF 52%.  Possibly i/s/o liver cirrhosis vs chronic CHF       Plan   - strict I/Os   - restart Eliquis at 60 mg (from 40 before) because of tx failure before. [place] is 25  - c/w RLE cellulitis management   - c/w spironolactone   - c/w home lasix Edema of unknown etiology, +4 pitting in b/l LE extending from mid thigh to feet  Despite being on Furosemide and Spironolactone, persistent edema; Alb 2.9 not consistent w/ this level of edema  TTE in 10/24 w/ EF 65%  TTE 5/27 revealed enlarged RV cavity with moderate mitral and tricuspid regurgitation. Nl LV thickness and cavity size. EF 52%.  Possibly i/s/o liver cirrhosis vs chronic CHF       Plan   - strict I/Os   - [place] is 25  - c/w RLE cellulitis management   - c/w spironolactone   - Restart home Lasix, but increase from 40 to 60 due to persistent ascites Edema of unknown etiology, +4 pitting in b/l LE extending from mid thigh to feet  Despite being on Furosemide and Spironolactone, persistent edema; Alb 2.9 not consistent w/ this level of edema  TTE in 10/24 w/ EF 65%  TTE 5/27 revealed enlarged RV cavity with moderate mitral and tricuspid regurgitation. Nl LV thickness and cavity size. EF 52%.  Possibly i/s/o liver cirrhosis vs chronic CHF     Plan   - strict I/Os   - [place] is 25  - c/w RLE cellulitis management   - c/w spironolactone   - Restart home Lasix, but increase from 40 to 60 due to persistent ascites Edema of unknown etiology, +4 pitting in b/l LE extending from mid thigh to feet  Despite being on Furosemide and Spironolactone, persistent edema; Alb 2.9 not consistent w/ this level of edema  TTE in 10/24 w/ EF 65%  TTE 5/27 revealed enlarged RV cavity with moderate mitral and tricuspid regurgitation. Nl LV thickness and cavity size. EF 52%.  Possibly i/s/o liver cirrhosis vs chronic CHF     Plan   - strict I/Os   - c/w RLE cellulitis management   - c/w spironolactone 25  - Restart home Lasix, but increase from 40 to 60 due to persistent ascites

## 2025-06-03 NOTE — PROGRESS NOTE ADULT - PROBLEM SELECTOR PLAN 12
F: none  E: Replete as needed Mg>2 and K>4)  N: Consistent Carb and Tube Feeds  DVT: Eliquis 5mg BID  GI: none  Bowel: miralax and senna prn  Activity: Bedrest  Dispo: F F: none  E: Replete as needed Mg>2 and K>4)  N: Consistent Carb and Tube Feeds  DVT: Eliquis 5mg BID  GI: none  Bowel: Terminate miralax and senna prn due to patient having 4 bowel movements in one day  Activity: Bedrest  Dispo: [facility] Patient says that his ears feel "clogged" when he exercises. Does not report any pain, discharge, or changes to hearing. Also says that his L ear currently feels warm, but again denies pain.     - Will continue to monitor clinically  - outpatient f/u with PCP

## 2025-06-03 NOTE — PROGRESS NOTE ADULT - PROBLEM SELECTOR PLAN 1
Recently diagnosed with liver cirrhosis (2025?), per chart note by Dr. Mckeon and review on [facility] -  etiology of his cirrhosis is unclear--no work-up was completed on [place] except bloodwork and Fibroscan, the latter uninterpretable due to jordy-hepatic ascites.   , INR 1.18, AST/ALT not quantified/16  CT A/P this year showed cirrhotic morphology, last year had normal findings  ED bedside U/S without pocket to TAP per signout  Abd US showing ascites.  Fluid study: WBC 22, low concern for SBP    Plan   - Hepatology consulted, appreciate recs  - s/p diagnostic and therapeutic paracentesis done 5/30  - Daily MELD score Recently diagnosed with liver cirrhosis (2025?), per chart note by Dr. Mckeon and review on [facility] -  etiology of his cirrhosis is unclear--no work-up was completed on [place] except bloodwork and Fibroscan, the latter uninterpretable due to jordy-hepatic ascites.   , INR 1.18, AST/ALT not quantified/16  CT A/P this year showed cirrhotic morphology, last year had normal findings  ED bedside U/S without pocket to TAP per signout  Abd US showing ascites.  Fluid study: WBC 22, low concern for SBP    Plan   - s/p diagnostic and therapeutic paracentesis done 5/30  - Will f/u with hepatology regarding restarting Eliquis at 5  - Daily MELD score (MELD today is 10) Recently diagnosed with liver cirrhosis (2025?), per chart note by Dr. Mckeon and review on [facility] -  etiology of his cirrhosis is unclear--no work-up was completed on [place] except bloodwork and Fibroscan, the latter uninterpretable due to jordy-hepatic ascites.   , INR 1.18, AST/ALT not quantified/16  CT A/P this year showed cirrhotic morphology, last year had normal findings  ED bedside U/S without pocket to TAP per signout  Abd US showing ascites.  Fluid study: WBC 22, low concern for SBP    Plan   - s/p diagnostic and therapeutic paracentesis done 5/30  - Will f/u with hepatology regarding restarting Eliquis at 5  - Daily MELD score (MELD today is 10)  - Dressings changed today (6/3) with no discharge or bleeding noted

## 2025-06-03 NOTE — PROGRESS NOTE ADULT - PROBLEM SELECTOR PLAN 11
Hx of portal vein thrombosis and has since been on Eliquis 5mg BID. Dose decreased to 2.5mg iso worsening liver function and INR.   - C/w Eliquis 2.5mg BID Hx of portal vein thrombosis and has since been on Eliquis 5mg BID. Dose decreased to 2.5mg iso worsening liver function and INR.   - Consult with hepatology regarding restarting Eliquis 5 mg BID

## 2025-06-04 ENCOUNTER — TRANSCRIPTION ENCOUNTER (OUTPATIENT)
Age: 75
End: 2025-06-04

## 2025-06-04 VITALS
HEART RATE: 73 BPM | TEMPERATURE: 98 F | SYSTOLIC BLOOD PRESSURE: 111 MMHG | RESPIRATION RATE: 18 BRPM | DIASTOLIC BLOOD PRESSURE: 79 MMHG | OXYGEN SATURATION: 97 %

## 2025-06-04 LAB
ALBUMIN SERPL ELPH-MCNC: 2.3 G/DL — LOW (ref 3.3–5)
ALP SERPL-CCNC: 115 U/L — SIGNIFICANT CHANGE UP (ref 40–120)
ALT FLD-CCNC: 7 U/L — LOW (ref 10–45)
ANION GAP SERPL CALC-SCNC: 8 MMOL/L — SIGNIFICANT CHANGE UP (ref 5–17)
APTT BLD: 32.7 SEC — SIGNIFICANT CHANGE UP (ref 26.1–36.8)
AST SERPL-CCNC: 29 U/L — SIGNIFICANT CHANGE UP (ref 10–40)
BASOPHILS # BLD AUTO: 0.08 K/UL — SIGNIFICANT CHANGE UP (ref 0–0.2)
BASOPHILS NFR BLD AUTO: 1.4 % — SIGNIFICANT CHANGE UP (ref 0–2)
BILIRUB SERPL-MCNC: 0.8 MG/DL — SIGNIFICANT CHANGE UP (ref 0.2–1.2)
BUN SERPL-MCNC: 28 MG/DL — HIGH (ref 7–23)
CALCIUM SERPL-MCNC: 8 MG/DL — LOW (ref 8.4–10.5)
CHLORIDE SERPL-SCNC: 101 MMOL/L — SIGNIFICANT CHANGE UP (ref 96–108)
CO2 SERPL-SCNC: 23 MMOL/L — SIGNIFICANT CHANGE UP (ref 22–31)
CREAT SERPL-MCNC: 0.94 MG/DL — SIGNIFICANT CHANGE UP (ref 0.5–1.3)
EGFR: 85 ML/MIN/1.73M2 — SIGNIFICANT CHANGE UP
EGFR: 85 ML/MIN/1.73M2 — SIGNIFICANT CHANGE UP
EOSINOPHIL # BLD AUTO: 0.14 K/UL — SIGNIFICANT CHANGE UP (ref 0–0.5)
EOSINOPHIL NFR BLD AUTO: 2.5 % — SIGNIFICANT CHANGE UP (ref 0–6)
GLUCOSE SERPL-MCNC: 133 MG/DL — HIGH (ref 70–99)
HCT VFR BLD CALC: 31.1 % — LOW (ref 39–50)
HGB BLD-MCNC: 10.7 G/DL — LOW (ref 13–17)
IMM GRANULOCYTES NFR BLD AUTO: 0.4 % — SIGNIFICANT CHANGE UP (ref 0–0.9)
INR BLD: 1.4 — HIGH (ref 0.85–1.16)
LYMPHOCYTES # BLD AUTO: 0.5 K/UL — LOW (ref 1–3.3)
LYMPHOCYTES # BLD AUTO: 8.9 % — LOW (ref 13–44)
MAGNESIUM SERPL-MCNC: 2.4 MG/DL — SIGNIFICANT CHANGE UP (ref 1.6–2.6)
MCHC RBC-ENTMCNC: 34.4 G/DL — SIGNIFICANT CHANGE UP (ref 32–36)
MCHC RBC-ENTMCNC: 35.9 PG — HIGH (ref 27–34)
MCV RBC AUTO: 104.4 FL — HIGH (ref 80–100)
MELD SCORE WITH DIALYSIS: 26 POINTS — SIGNIFICANT CHANGE UP
MELD SCORE WITHOUT DIALYSIS: 10 POINTS — SIGNIFICANT CHANGE UP
MONOCYTES # BLD AUTO: 0.58 K/UL — SIGNIFICANT CHANGE UP (ref 0–0.9)
MONOCYTES NFR BLD AUTO: 10.3 % — SIGNIFICANT CHANGE UP (ref 2–14)
NEUTROPHILS # BLD AUTO: 4.3 K/UL — SIGNIFICANT CHANGE UP (ref 1.8–7.4)
NEUTROPHILS NFR BLD AUTO: 76.5 % — SIGNIFICANT CHANGE UP (ref 43–77)
NRBC BLD AUTO-RTO: 0 /100 WBCS — SIGNIFICANT CHANGE UP (ref 0–0)
PHOSPHATE SERPL-MCNC: 3.1 MG/DL — SIGNIFICANT CHANGE UP (ref 2.5–4.5)
PLATELET # BLD AUTO: 190 K/UL — SIGNIFICANT CHANGE UP (ref 150–400)
POTASSIUM SERPL-MCNC: 4.9 MMOL/L — SIGNIFICANT CHANGE UP (ref 3.5–5.3)
POTASSIUM SERPL-SCNC: 4.9 MMOL/L — SIGNIFICANT CHANGE UP (ref 3.5–5.3)
PROT SERPL-MCNC: 6.5 G/DL — SIGNIFICANT CHANGE UP (ref 6–8.3)
PROTHROM AB SERPL-ACNC: 16.3 SEC — HIGH (ref 9.9–13.4)
RBC # BLD: 2.98 M/UL — LOW (ref 4.2–5.8)
RBC # FLD: 17.2 % — HIGH (ref 10.3–14.5)
SODIUM SERPL-SCNC: 132 MMOL/L — LOW (ref 135–145)
WBC # BLD: 5.62 K/UL — SIGNIFICANT CHANGE UP (ref 3.8–10.5)
WBC # FLD AUTO: 5.62 K/UL — SIGNIFICANT CHANGE UP (ref 3.8–10.5)

## 2025-06-04 PROCEDURE — 86708 HEPATITIS A ANTIBODY: CPT

## 2025-06-04 PROCEDURE — 86255 FLUORESCENT ANTIBODY SCREEN: CPT

## 2025-06-04 PROCEDURE — 74174 CTA ABD&PLVS W/CONTRAST: CPT

## 2025-06-04 PROCEDURE — 74183 MRI ABD W/O CNTR FLWD CNTR: CPT

## 2025-06-04 PROCEDURE — 82945 GLUCOSE OTHER FLUID: CPT

## 2025-06-04 PROCEDURE — 89051 BODY FLUID CELL COUNT: CPT

## 2025-06-04 PROCEDURE — 83520 IMMUNOASSAY QUANT NOS NONAB: CPT

## 2025-06-04 PROCEDURE — 96366 THER/PROPH/DIAG IV INF ADDON: CPT

## 2025-06-04 PROCEDURE — 84295 ASSAY OF SERUM SODIUM: CPT

## 2025-06-04 PROCEDURE — 87015 SPECIMEN INFECT AGNT CONCNTJ: CPT

## 2025-06-04 PROCEDURE — 86900 BLOOD TYPING SEROLOGIC ABO: CPT

## 2025-06-04 PROCEDURE — 85027 COMPLETE CBC AUTOMATED: CPT

## 2025-06-04 PROCEDURE — 86381 MITOCHONDRIAL ANTIBODY EACH: CPT

## 2025-06-04 PROCEDURE — 96375 TX/PRO/DX INJ NEW DRUG ADDON: CPT

## 2025-06-04 PROCEDURE — 80307 DRUG TEST PRSMV CHEM ANLYZR: CPT

## 2025-06-04 PROCEDURE — A9585: CPT

## 2025-06-04 PROCEDURE — 87070 CULTURE OTHR SPECIMN AEROBIC: CPT

## 2025-06-04 PROCEDURE — 87637 SARSCOV2&INF A&B&RSV AMP PRB: CPT

## 2025-06-04 PROCEDURE — 86704 HEP B CORE ANTIBODY TOTAL: CPT

## 2025-06-04 PROCEDURE — 82607 VITAMIN B-12: CPT

## 2025-06-04 PROCEDURE — 83615 LACTATE (LD) (LDH) ENZYME: CPT

## 2025-06-04 PROCEDURE — 83880 ASSAY OF NATRIURETIC PEPTIDE: CPT

## 2025-06-04 PROCEDURE — 71045 X-RAY EXAM CHEST 1 VIEW: CPT

## 2025-06-04 PROCEDURE — 86038 ANTINUCLEAR ANTIBODIES: CPT

## 2025-06-04 PROCEDURE — 82962 GLUCOSE BLOOD TEST: CPT

## 2025-06-04 PROCEDURE — 93925 LOWER EXTREMITY STUDY: CPT

## 2025-06-04 PROCEDURE — 87205 SMEAR GRAM STAIN: CPT

## 2025-06-04 PROCEDURE — 85610 PROTHROMBIN TIME: CPT

## 2025-06-04 PROCEDURE — 82746 ASSAY OF FOLIC ACID SERUM: CPT

## 2025-06-04 PROCEDURE — 86706 HEP B SURFACE ANTIBODY: CPT

## 2025-06-04 PROCEDURE — 93306 TTE W/DOPPLER COMPLETE: CPT

## 2025-06-04 PROCEDURE — 82803 BLOOD GASES ANY COMBINATION: CPT

## 2025-06-04 PROCEDURE — 80321 ALCOHOLS BIOMARKERS 1OR 2: CPT

## 2025-06-04 PROCEDURE — 93970 EXTREMITY STUDY: CPT

## 2025-06-04 PROCEDURE — 86376 MICROSOMAL ANTIBODY EACH: CPT

## 2025-06-04 PROCEDURE — 36415 COLL VENOUS BLD VENIPUNCTURE: CPT

## 2025-06-04 PROCEDURE — 84157 ASSAY OF PROTEIN OTHER: CPT

## 2025-06-04 PROCEDURE — 80053 COMPREHEN METABOLIC PANEL: CPT

## 2025-06-04 PROCEDURE — 80074 ACUTE HEPATITIS PANEL: CPT

## 2025-06-04 PROCEDURE — 83540 ASSAY OF IRON: CPT

## 2025-06-04 PROCEDURE — 82390 ASSAY OF CERULOPLASMIN: CPT

## 2025-06-04 PROCEDURE — 86901 BLOOD TYPING SEROLOGIC RH(D): CPT

## 2025-06-04 PROCEDURE — 83036 HEMOGLOBIN GLYCOSYLATED A1C: CPT

## 2025-06-04 PROCEDURE — 76705 ECHO EXAM OF ABDOMEN: CPT

## 2025-06-04 PROCEDURE — 80048 BASIC METABOLIC PNL TOTAL CA: CPT

## 2025-06-04 PROCEDURE — 84132 ASSAY OF SERUM POTASSIUM: CPT

## 2025-06-04 PROCEDURE — 82565 ASSAY OF CREATININE: CPT

## 2025-06-04 PROCEDURE — 82787 IGG 1 2 3 OR 4 EACH: CPT

## 2025-06-04 PROCEDURE — 93005 ELECTROCARDIOGRAM TRACING: CPT

## 2025-06-04 PROCEDURE — 83521 IG LIGHT CHAINS FREE EACH: CPT

## 2025-06-04 PROCEDURE — 70551 MRI BRAIN STEM W/O DYE: CPT

## 2025-06-04 PROCEDURE — 82105 ALPHA-FETOPROTEIN SERUM: CPT

## 2025-06-04 PROCEDURE — 83605 ASSAY OF LACTIC ACID: CPT

## 2025-06-04 PROCEDURE — 82140 ASSAY OF AMMONIA: CPT

## 2025-06-04 PROCEDURE — 83550 IRON BINDING TEST: CPT

## 2025-06-04 PROCEDURE — 82247 BILIRUBIN TOTAL: CPT

## 2025-06-04 PROCEDURE — 99285 EMERGENCY DEPT VISIT HI MDM: CPT

## 2025-06-04 PROCEDURE — 82784 ASSAY IGA/IGD/IGG/IGM EACH: CPT

## 2025-06-04 PROCEDURE — 85025 COMPLETE CBC W/AUTO DIFF WBC: CPT

## 2025-06-04 PROCEDURE — 85730 THROMBOPLASTIN TIME PARTIAL: CPT

## 2025-06-04 PROCEDURE — 73590 X-RAY EXAM OF LOWER LEG: CPT

## 2025-06-04 PROCEDURE — 87075 CULTR BACTERIA EXCEPT BLOOD: CPT

## 2025-06-04 PROCEDURE — 82042 OTHER SOURCE ALBUMIN QUAN EA: CPT

## 2025-06-04 PROCEDURE — 84443 ASSAY THYROID STIM HORMONE: CPT

## 2025-06-04 PROCEDURE — 87040 BLOOD CULTURE FOR BACTERIA: CPT

## 2025-06-04 PROCEDURE — 70496 CT ANGIOGRAPHY HEAD: CPT

## 2025-06-04 PROCEDURE — 83735 ASSAY OF MAGNESIUM: CPT

## 2025-06-04 PROCEDURE — 73630 X-RAY EXAM OF FOOT: CPT

## 2025-06-04 PROCEDURE — 84439 ASSAY OF FREE THYROXINE: CPT

## 2025-06-04 PROCEDURE — 99239 HOSP IP/OBS DSCHRG MGMT >30: CPT

## 2025-06-04 PROCEDURE — 82103 ALPHA-1-ANTITRYPSIN TOTAL: CPT

## 2025-06-04 PROCEDURE — 97161 PT EVAL LOW COMPLEX 20 MIN: CPT

## 2025-06-04 PROCEDURE — 97116 GAIT TRAINING THERAPY: CPT

## 2025-06-04 PROCEDURE — 86850 RBC ANTIBODY SCREEN: CPT

## 2025-06-04 PROCEDURE — 93975 VASCULAR STUDY: CPT

## 2025-06-04 PROCEDURE — 70498 CT ANGIOGRAPHY NECK: CPT

## 2025-06-04 PROCEDURE — 82330 ASSAY OF CALCIUM: CPT

## 2025-06-04 PROCEDURE — 82728 ASSAY OF FERRITIN: CPT

## 2025-06-04 PROCEDURE — 80061 LIPID PANEL: CPT

## 2025-06-04 PROCEDURE — 70450 CT HEAD/BRAIN W/O DYE: CPT

## 2025-06-04 PROCEDURE — 84100 ASSAY OF PHOSPHORUS: CPT

## 2025-06-04 PROCEDURE — 96365 THER/PROPH/DIAG IV INF INIT: CPT

## 2025-06-04 RX ORDER — APIXABAN 2.5 MG/1
5 TABLET, FILM COATED ORAL
Qty: 0 | Refills: 0 | DISCHARGE
Start: 2025-06-04

## 2025-06-04 RX ORDER — FUROSEMIDE 10 MG/ML
1 INJECTION INTRAMUSCULAR; INTRAVENOUS
Refills: 0 | DISCHARGE

## 2025-06-04 RX ORDER — FUROSEMIDE 10 MG/ML
3 INJECTION INTRAMUSCULAR; INTRAVENOUS
Qty: 180 | Refills: 0
Start: 2025-06-04 | End: 2025-08-02

## 2025-06-04 RX ORDER — FUROSEMIDE 10 MG/ML
60 INJECTION INTRAMUSCULAR; INTRAVENOUS
Qty: 0 | Refills: 0 | DISCHARGE
Start: 2025-06-04

## 2025-06-04 RX ADMIN — APIXABAN 5 MILLIGRAM(S): 2.5 TABLET, FILM COATED ORAL at 06:44

## 2025-06-04 RX ADMIN — FUROSEMIDE 60 MILLIGRAM(S): 10 INJECTION INTRAMUSCULAR; INTRAVENOUS at 06:44

## 2025-06-04 RX ADMIN — Medication 100 MICROGRAM(S): at 06:44

## 2025-06-04 RX ADMIN — Medication 100 MILLIGRAM(S): at 06:44

## 2025-06-04 NOTE — PROGRESS NOTE ADULT - ASSESSMENT
72M w/ PMH of gastric cancer (s/p feeding J-tube), portal vein thrombosis (on Eliquis) AAA (s/p EVAR in 2023), T2DM, hypothyroidism, HTN, anemia, BPH, type 2 endoleak repair, s/p embolization of internal iliac artery branch (10/24), cirrhosis, who presents to the ED with worsening bilateral leg swelling and gen weakness, admitted for further evaluation. 74M w/ PMH of gastric cancer (s/p feeding J-tube), portal vein thrombosis (on Eliquis) AAA (s/p EVAR in 2023), T2DM, hypothyroidism, HTN, anemia, BPH, type 2 endoleak repair, s/p embolization of internal iliac artery branch (10/24), cirrhosis, who presents to the ED with worsening bilateral leg swelling and generalized weakness, admitted for further evaluation.

## 2025-06-04 NOTE — PROGRESS NOTE ADULT - PROBLEM SELECTOR PLAN 1
Recently diagnosed with liver cirrhosis (2025?), per chart note by Dr. Mckeon and review on [facility] -  etiology of his cirrhosis is unclear--no work-up was completed on [place] except bloodwork and Fibroscan, the latter uninterpretable due to jordy-hepatic ascites.   , INR 1.18, AST/ALT not quantified/16  CT A/P this year showed cirrhotic morphology, last year had normal findings  ED bedside U/S without pocket to TAP per signout  Abd US showing ascites.    Plan   - Hepatology consulted, appreciate recs  - diagnostic and therapeutic paracentesis today   - Daily MELD score Recently diagnosed with liver cirrhosis (2025?), per chart note by Dr. Mckeon and review on [facility] -  etiology of his cirrhosis is unclear--no work-up was completed on [employer] except bloodwork and Fibroscan, the latter uninterpretable due to jordy-hepatic ascites.   , INR 1.18, AST/ALT not quantified/16  CT A/P this year showed cirrhotic morphology, last year had normal findings  ED bedside U/S without pocket to TAP per signout  Abd US showing ascites.  Fluid study: WBC 22, low concern for SBP    Plan   - s/p diagnostic and therapeutic paracentesis done 5/30  - Dressings changed as of 6/3 with no discharge or bleeding noted  - Will f/u with hepatology regarding restarting Eliquis at 5  - Daily MELD score (MELD today is 10) Recently diagnosed with liver cirrhosis (2025?), per chart note by Dr. Mckeon and review on [facility] -  etiology of his cirrhosis is unclear--no work-up was completed on [place] except bloodwork and Fibroscan, the latter uninterpretable due to jordy-hepatic ascites.   , INR 1.18, AST/ALT not quantified/16  CT A/P this year showed cirrhotic morphology, last year had normal findings  ED bedside U/S without pocket to TAP per signout  Abd US showing ascites.  Fluid study: WBC 22, low concern for SBP    Plan   - s/p diagnostic and therapeutic paracentesis done 5/30  - Dressings changed as of 6/3 with no discharge or bleeding noted  - starting Eliquis at 5 per hepatology recs  - Daily MELD score (MELD today is ***) Recently diagnosed with liver cirrhosis (2025?), per chart note by Dr. Mckeon and review on [facility] -  etiology of his cirrhosis is unclear--no work-up was completed on [place] except bloodwork and Fibroscan, the latter uninterpretable due to jordy-hepatic ascites.   , INR 1.18, AST/ALT not quantified/16  CT A/P this year showed cirrhotic morphology, last year had normal findings  ED bedside U/S without pocket to TAP per signout  Abd US showing ascites.  Fluid study: WBC 22, low concern for SBP    Plan   - s/p diagnostic and therapeutic paracentesis done 5/30  - Dressings changed as of 6/3 with no discharge or bleeding noted  - starting Eliquis at 5 per hepatology recs  - Daily MELD score (MELD today is 10)

## 2025-06-04 NOTE — PROGRESS NOTE ADULT - REASON FOR ADMISSION
RLE cellulitis

## 2025-06-04 NOTE — DISCHARGE NOTE NURSING/CASE MANAGEMENT/SOCIAL WORK - NSDCVIVACCINE_GEN_ALL_CORE_FT
Hib (PRP-OMP); 24-May-2017 18:23; Rita Kiser (RN); [employer] [id number]; IntraMuscular; Deltoid Right.; 0.5 milliLiter(s); VIS (VIS Published: 24-May-2017, VIS Presented: 24-May-2017);   meningococcal MCV4P; 26-May-2017 13:17; Juany Arriaga); [employer]; [id number]; IntraMuscular; Deltoid Left.; 0.5 milliLiter(s); VIS (VIS Published: 26-May-2017, VIS Presented: 26-May-2017);   Pneumococcal conjugate PCV 13; 24-May-2017 18:19; Rita Kiser); [facility]; [id number]; IntraMuscular; Deltoid Left.; 0.5 milliLiter(s); VIS (VIS Published: 27-Feb-2013, VIS Presented: 24-May-2017);

## 2025-06-04 NOTE — PROGRESS NOTE ADULT - PROBLEM SELECTOR PLAN 9
Home medications: Levothyroxine 100 mcg/day  Endocrinologist: Dr. Mckeon, last saw 4/25  - C/w home medications Known hx of anemia, Hgb 10.6, baseline 9-10 per HIE.  In chart review has hx of B12 deficiency  - CTM CBC  - Keep active T/S  - Transfuse for Hgb <7 Hx of portal vein thrombosis and has since been on Eliquis 5mg BID. Dose decreased to 2.5mg iso worsening liver function and INR.   - restarted Eliquis 5 mg BID per hepatology recs

## 2025-06-04 NOTE — PROGRESS NOTE ADULT - PROBLEM SELECTOR PLAN 8
Follows Dr. Mckeon with very well controlled diabetes, last saw this AM w/ positive levels.   Per HIE - regimen is NPH 18 units at night, Lispro 3 units while on tube feeds  - Start patient on low dose insulin sliding scale tonight   - q6h FS  - endocrine recs for insulin based on tube feeds, today changed to nph 14u, no lispro, mISS Hx of HTN, was taking Losartan 50mg but has since discontinued. Normotensive.  - No acute intervention, continue to monitor Home medications: Levothyroxine 100 mcg/day  Endocrinologist: Dr. Mckeon, last saw 4/25  - C/w home medications Known hx of anemia, Hgb 10.6, baseline 9-10 per HIE.  In chart review has hx of B12 deficiency  - CTM CBC  - Keep active T/S  - Transfuse for Hgb <7

## 2025-06-04 NOTE — PROGRESS NOTE ADULT - NUTRITIONAL ASSESSMENT
This patient has been assessed with a concern for Malnutrition and has been determined to have a diagnosis/diagnoses of Severe protein-calorie malnutrition.    This patient is being managed with:   Diet Regular-  Low Sodium  Tube Feeding Modality: Jejunostomy  Nessa Longoria Standard 1.4  Continuous  Starting Tube Feed Rate {mL per Hour}: 40  Increase Tube Feed Rate by (mL): 20     Every 2 hours  Until Goal Tube Feed Rate (mL per Hour): 86  Tube Feed Duration (in Hours): 24  Tube Feed Start Time: 18:00  Tube Feed Stop Time: 09:00  Liquid Protein Supplement     Qty per Day:  1  Entered: May 28 2025  2:07PM  
This patient has been assessed with a concern for Malnutrition and has been determined to have a diagnosis/diagnoses of Severe protein-calorie malnutrition.    This patient is being managed with:   Diet Consistent Carbohydrate w/Evening Snack-  Entered: May 24 2025 12:04AM  
This patient has been assessed with a concern for Malnutrition and has been determined to have a diagnosis/diagnoses of Severe protein-calorie malnutrition.    This patient is being managed with:   Diet Regular-  Consistent Carbohydrate {Evening Snack} (CSTCHOSN)  Tube Feeding Modality: Jejunostomy  Nessa [facility] Glucose Support 1.2 (KFGLU1.2RTH)  Total Volume for 24 Hours (mL): 1100  Total Number of Cans: 5  Continuous  Until Goal Tube Feed Rate (mL per Hour): 110  Tube Feed Duration (in Hours): 10  Tube Feed Start Time: 19:00  Tube Feed Stop Time: 05:00  Entered: May 25 2025  7:20PM  
This patient has been assessed with a concern for Malnutrition and has been determined to have a diagnosis/diagnoses of Severe protein-calorie malnutrition.    This patient is being managed with:   Diet Regular-  Low Sodium  Tube Feeding Modality: Jejunostomy  Nessa Longoria Standard 1.4  Continuous  Starting Tube Feed Rate {mL per Hour}: 40  Increase Tube Feed Rate by (mL): 20     Every 2 hours  Until Goal Tube Feed Rate (mL per Hour): 86  Tube Feed Duration (in Hours): 24  Tube Feed Start Time: 18:00  Tube Feed Stop Time: 09:00  Liquid Protein Supplement     Qty per Day:  1  Entered: May 28 2025  2:07PM  
This patient has been assessed with a concern for Malnutrition and has been determined to have a diagnosis/diagnoses of Severe protein-calorie malnutrition.    This patient is being managed with:   Diet Consistent Carbohydrate w/Evening Snack-  Entered: May 24 2025 12:04AM  
This patient has been assessed with a concern for Malnutrition and has been determined to have a diagnosis/diagnoses of Severe protein-calorie malnutrition.    This patient is being managed with:   Diet Regular-  Consistent Carbohydrate {Evening Snack} (CSTCHOSN)  Tube Feeding Modality: Jejunostomy  Nessa [facility] Glucose Support 1.2 (KFGLU1.2RTH)  Total Volume for 24 Hours (mL): 1100  Total Number of Cans: 5  Continuous  Until Goal Tube Feed Rate (mL per Hour): 110  Tube Feed Duration (in Hours): 10  Tube Feed Start Time: 19:00  Tube Feed Stop Time: 05:00  Entered: May 25 2025  7:20PM  
This patient has been assessed with a concern for Malnutrition and has been determined to have a diagnosis/diagnoses of Severe protein-calorie malnutrition.    This patient is being managed with:   Diet Regular-  Tube Feeding Modality: Jejunostomy  Nessa Longoria Standard 1.4  Continuous  Starting Tube Feed Rate {mL per Hour}: 40  Increase Tube Feed Rate by (mL): 20     Every 2 hours  Until Goal Tube Feed Rate (mL per Hour): 86  Tube Feed Duration (in Hours): 24  Tube Feed Start Time: 18:00  Tube Feed Stop Time: 09:00  Liquid Protein Supplement     Qty per Day:  1  Entered: May 27 2025  2:54PM  
This patient has been assessed with a concern for Malnutrition and has been determined to have a diagnosis/diagnoses of Severe protein-calorie malnutrition.    This patient is being managed with:   Diet Regular-  Low Sodium  Tube Feeding Modality: Jejunostomy  Nessa Longoria Standard 1.4  Continuous  Starting Tube Feed Rate {mL per Hour}: 40  Increase Tube Feed Rate by (mL): 20     Every 2 hours  Until Goal Tube Feed Rate (mL per Hour): 86  Tube Feed Duration (in Hours): 24  Tube Feed Start Time: 18:00  Tube Feed Stop Time: 09:00  Liquid Protein Supplement     Qty per Day:  1  Entered: May 28 2025  2:07PM  

## 2025-06-04 NOTE — PROGRESS NOTE ADULT - PROBLEM SELECTOR PLAN 6
Hx of gastric cancer treated with chemotherapy 5 years ago now w/ feeding J-tube, previously followed Dr. Saravanan Cabello and Dr. Chema Richmond. Currently followed Heme/onc: Dr. Henry.  - No acute intervention, continue outpatient management  - C/w tube feeds- resumed and give insulin accordingly (as in diabetes below) Patient with hx of AAA. MRI abdomen of interval growth of AAA sac from 6.1 cm to 6.4 cm as well as a L internal iliac aneurysm measuring 2.1 cm.    Vascular following   CTA A/P: AAA s/p  aortobiiliac stent graft repair with persistent endoleak. aneurysm sac measures 6.1 cm, not significantly changed since 2/1/2025. Bilateral internal iliac artery aneurysms are also unchanged since 2/1/2025    - F/u outpatient with vascular surgery for aneurysm size monitoring Plan: Hx of HTN, was taking Losartan 50mg but has since discontinued. Normotensive.  - No acute intervention, continue to monitor.

## 2025-06-04 NOTE — PROGRESS NOTE ADULT - PROBLEM SELECTOR PLAN 5
Per patient has had slurred speech x several days, no precipitating factor; *********RESOLVED***********  CTH: Arachnoid cyst and senescent cerebral changes. No acute intracranial hemorrhage.  CT Angio H: Left C6 ICA stenosis, 40-50%.  MRI head: No acute abnormalities  Neurology consulted in ED, no acute concern for stroke, will continue to follow  - Neurology consulted, f/u recs Per patient has had slurred speech x several days, no precipitating factor; *********RESOLVED***********  CTH: Arachnoid cyst and senescent cerebral changes. No acute intracranial hemorrhage.  CT Angio H: Left C6 ICA stenosis, 40-50%.  MRI head: No acute abnormalities  Neurology consulted in ED, no acute concern for stroke, will continue to follow  - MRI brain w/o acute pathology Hx of gastric cancer treated with chemotherapy 5 years ago now w/ feeding J-tube, previously followed Dr. Saravanan Cabello and Dr. Chema Richmond. Currently followed Heme/onc: Dr. Henry.  - No acute intervention, continue outpatient management  - C/w tube feeds- resumed and give insulin accordingly (as in diabetes below)

## 2025-06-04 NOTE — DISCHARGE NOTE NURSING/CASE MANAGEMENT/SOCIAL WORK - FINANCIAL ASSISTANCE
[facility] provides services at a reduced cost to those who are determined to be eligible through [facility]’s financial assistance program. Information regarding [facility]’s financial assistance program can be found by going to [email or web address] or by calling [phone].

## 2025-06-04 NOTE — DISCHARGE NOTE NURSING/CASE MANAGEMENT/SOCIAL WORK - PATIENT PORTAL LINK FT
You can access the FollowMyHealth Patient Portal offered by [facility] by registering at the following website: [email or web address]. By joining [facility]’s FollowMyHealth portal, you will also be able to view your health information using other applications (apps) compatible with our system.

## 2025-06-04 NOTE — PROGRESS NOTE ADULT - PROBLEM/PLAN-7
DISPLAY PLAN FREE TEXT
Detail Level: Detailed
Photo Preface (Leave Blank If You Do Not Want): Photographs were obtained today
DISPLAY PLAN FREE TEXT

## 2025-06-04 NOTE — DISCHARGE NOTE NURSING/CASE MANAGEMENT/SOCIAL WORK - NSDCFUADDAPPT_GEN_ALL_CORE_FT
Please follow up with Dr. Aron Saenz.   [address], [address], [place] [address] | [phone] | Fax: [phone]

## 2025-06-04 NOTE — PROGRESS NOTE ADULT - PROBLEM SELECTOR PLAN 4
Edema of unknown etiology, +4 pitting in b/l LE extending from mid thigh to feet  Despite being on Furosemide and Spironolactone, persistent edema; Alb 2.9 not consistent w/ this level of edema  TTE in 10/24 w/ EF 65%  TTE 5/27 revealed enlarged RV cavity with moderate mitral and tricuspid regurgitation. Nl LV thickness and cavity size. EF 52%.  Possibly i/s/o liver cirrhosis vs chronic CHF   - strict I/Os   - c/w RLE cellulitis management   - c/w spironolactone - holding lasix today pending para Edema of unknown etiology, +4 pitting in b/l LE extending from mid thigh to feet  Despite being on Furosemide and Spironolactone, persistent edema; Alb 2.9 not consistent w/ this level of edema  TTE in 10/24 w/ EF 65%  TTE 5/27 revealed enlarged RV cavity with moderate mitral and tricuspid regurgitation. Nl LV thickness and cavity size. EF 52%.  Possibly i/s/o liver cirrhosis vs chronic CHF     Plan   - strict I/Os   - c/w RLE cellulitis management   - c/w spironolactone 25  - Restart home Lasix, but increase from 40 to 60 due to persistent ascites Edema of unknown etiology, +4 pitting in b/l LE extending from mid thigh to feet  Despite being on Furosemide and Spironolactone, persistent edema; Alb 2.9 not consistent w/ this level of edema  TTE in 10/24 w/ EF 65%  TTE 5/27 revealed enlarged RV cavity with moderate mitral and tricuspid regurgitation. Nl LV thickness and cavity size. EF 52%.  Possibly i/s/o liver cirrhosis vs chronic CHF     Plan   - strict I/Os   - c/w RLE cellulitis management   - c/w spironolactone 25  - on Lasix 60 (increased from previous home Lasix of 40) due to persistent ascites

## 2025-06-04 NOTE — PROGRESS NOTE ADULT - PROBLEM SELECTOR PLAN 11
Hx of portal vein thrombosis and has since been on Eliquis 5mg BID  - C/w Eliquis 5mg BID Hx of portal vein thrombosis and has since been on Eliquis 5mg BID. Dose decreased to 2.5mg iso worsening liver function and INR.   - Consult with hepatology regarding restarting Eliquis 5 mg BID Patient says that his ears feel "clogged" when he exercises. Does not report any pain, discharge, or changes to hearing. Also says that his L ear currently feels warm, but again denies pain.     - Will continue to monitor clinically  - outpatient f/u with PCP Patient with hx of AAA. MRI abdomen of interval growth of AAA sac from 6.1 cm to 6.4 cm as well as a L internal iliac aneurysm measuring 2.1 cm.    Vascular following   CTA A/P: AAA s/p  aortobiiliac stent graft repair with persistent endoleak. aneurysm sac measures 6.1 cm, not significantly changed since 2/1/2025. Bilateral internal iliac artery aneurysms are also unchanged since 2/1/2025    - F/u outpatient with vascular surgery for aneurysm size monitoring Patient says that his ears feel "clogged" when he exercises. Does not report any pain, discharge, or changes to hearing. Also says that his R ear currently feels warm, but again denies pain.     - Will continue to monitor clinically  - outpatient f/u with PCP

## 2025-06-04 NOTE — PROGRESS NOTE ADULT - PROBLEM SELECTOR PLAN 7
Hx of HTN, was taking Losartan 50mg but has since discontinued. Normotensive.  - No acute intervention, continue to monitor Hx of gastric cancer treated with chemotherapy 5 years ago now w/ feeding J-tube, previously followed Dr. Saravanan Cabello and Dr. Chema Richmond. Currently followed Heme/onc: Dr. Henry.  - No acute intervention, continue outpatient management  - C/w tube feeds- resumed and give insulin accordingly (as in diabetes below) Plan: Hx of HTN, was taking Losartan 50mg but has since discontinued. Normotensive.  - No acute intervention, continue to monitor. Home medications: Levothyroxine 100 mcg/day  Endocrinologist: Dr. Mckeon, last saw 4/25  - C/w home medications

## 2025-06-04 NOTE — PROGRESS NOTE ADULT - PROBLEM SELECTOR PROBLEM 11
Portal vein thrombosis Prophylactic measure Abdominal aortic aneurysm (AAA) Fullness in ear, bilateral

## 2025-06-04 NOTE — DISCHARGE NOTE NURSING/CASE MANAGEMENT/SOCIAL WORK - NSSCNAMETXT_GEN_ALL_CORE
[facility] for feeds/ [facility] for nursing and PT

## 2025-06-04 NOTE — PROGRESS NOTE ADULT - PROVIDER SPECIALTY LIST ADULT
Endocrinology
Gastroenterology
Gastroenterology
Rehab Medicine
Hepatology
Internal Medicine
Internal Medicine
Vascular Surgery
Hepatology
Internal Medicine
Internal Medicine
Neurology
Rehab Medicine
Vascular Surgery
Internal Medicine
Internal Medicine
Endocrinology
Internal Medicine

## 2025-06-04 NOTE — PROGRESS NOTE ADULT - SUBJECTIVE AND OBJECTIVE BOX
***Note in progress***    OVERNIGHT EVENTS: NAEO    SUBJECTIVE / INTERVAL HPI: Patient seen and examined at bedside. Patient denying chest pain, SOB, palpitations, cough. Patient denies fever, chills, HA, Dizziness, N/V, abdominal pain, diarrhea, constipation, hematochezia/melena, dysuria, hematuria, new onset weakness/numbness, LE pain and/or swelling.    Remaining ROS negative       PHYSICAL EXAM:    General:NAD.   HEENT: NC/AT; PERRL, anicteric sclera; MMM  Neck: supple  Cardiovascular: +S1/S2, RRR  Respiratory: CTA B/L; no W/R/R  Gastrointestinal: soft, NT/ND; +BSx4  Extremities: WWP; no edema, clubbing or cyanosis  Vascular: 2+ radial, DP/PT pulses B/L  Neurological: AAOx3; no focal deficits  Psychiatric: pleasant mood and affect  Dermatologic: no appreciable wounds or damage to the skin    VITAL SIGNS:  Vital Signs Last 24 Hrs  T(C): 36.4 (04 Jun 2025 06:15), Max: 36.8 (03 Jun 2025 11:30)  T(F): 97.6 (04 Jun 2025 06:15), Max: 98.2 (03 Jun 2025 11:30)  HR: 70 (04 Jun 2025 06:15) (64 - 70)  BP: 106/74 (04 Jun 2025 06:15) (102/66 - 106/74)  BP(mean): --  RR: 18 (04 Jun 2025 06:15) (18 - 18)  SpO2: 97% (04 Jun 2025 06:15) (95% - 98%)    Parameters below as of 04 Jun 2025 06:15  Patient On (Oxygen Delivery Method): room air          MEDICATIONS:  MEDICATIONS  (STANDING):  apixaban 5 milliGRAM(s) Oral every 12 hours  dextrose 5%. 1000 milliLiter(s) (50 mL/Hr) IV Continuous <Continuous>  dextrose 5%. 1000 milliLiter(s) (100 mL/Hr) IV Continuous <Continuous>  dextrose 50% Injectable 12.5 Gram(s) IV Push once  dextrose 50% Injectable 25 Gram(s) IV Push once  dextrose 50% Injectable 25 Gram(s) IV Push once  furosemide    Tablet 60 milliGRAM(s) Oral daily  glucagon  Injectable 1 milliGRAM(s) IntraMuscular once  insulin lispro (ADMELOG) corrective regimen sliding scale   SubCutaneous at bedtime  insulin lispro (ADMELOG) corrective regimen sliding scale   SubCutaneous three times a day before meals  levothyroxine 100 MICROGram(s) Oral daily  multivitamin 1 Tablet(s) Oral daily  senna 2 Tablet(s) Oral at bedtime  spironolactone 25 milliGRAM(s) Oral every 24 hours  thiamine 100 milliGRAM(s) Oral every 24 hours    MEDICATIONS  (PRN):  dextrose Oral Gel 15 Gram(s) Oral once PRN Blood Glucose LESS THAN 70 milliGRAM(s)/deciliter  polyethylene glycol 3350 17 Gram(s) Oral daily PRN Constipation  zolpidem 5 milliGRAM(s) Oral at bedtime PRN Insomnia      ALLERGIES:  Allergies    No Known Allergies    Intolerances        LABS:                        11.3   5.53  )-----------( 194      ( 03 Jun 2025 05:30 )             33.1     06-03    134[L]  |  102  |  26[H]  ----------------------------<  143[H]  5.1   |  24  |  0.87    Ca    8.2[L]      03 Jun 2025 05:30  Phos  2.5     06-03  Mg     2.4     06-03    TPro  6.5  /  Alb  2.6[L]  /  TBili  0.9  /  DBili  x   /  AST  28  /  ALT  7[L]  /  AlkPhos  125[H]  06-03    PT/INR - ( 03 Jun 2025 05:30 )   PT: 15.3 sec;   INR: 1.33          PTT - ( 03 Jun 2025 05:30 )  PTT:31.5 sec  Urinalysis Basic - ( 03 Jun 2025 05:30 )    Color: x / Appearance: x / SG: x / pH: x  Gluc: 143 mg/dL / Ketone: x  / Bili: x / Urobili: x   Blood: x / Protein: x / Nitrite: x   Leuk Esterase: x / RBC: x / WBC x   Sq Epi: x / Non Sq Epi: x / Bacteria: x      CAPILLARY BLOOD GLUCOSE      POCT Blood Glucose.: 151 mg/dL (03 Jun 2025 21:24)      RADIOLOGY & ADDITIONAL TESTS: Reviewed. ***Note in progress***    OVERNIGHT EVENTS: NAEO    SUBJECTIVE / INTERVAL HPI: Patient seen and examined at bedside. Patient denying chest pain, SOB, palpitations, cough. Patient denies fever, chills, HA, Dizziness, N/V, abdominal pain, diarrhea, constipation, hematochezia/melena, dysuria, hematuria, new onset weakness/numbness/tingling, LE pain and/or swelling. He reports feeling cold this morning but has no other complaints. He states that he underwent tube feeds for 11 hours straight, which is an improvement from the day before. He reports spitting up brown fluid, about 4 oz today (more than usual), following his feed. He describes his R ear as feeling slightly warm but without pain or changes to hearing.    Remaining ROS negative       PHYSICAL EXAM:    General: NAD.   HEENT: NC/AT; PERRL, anicteric sclera; MMM  Neck: supple  Cardiovascular: +S1/S2, RRR  Respiratory: CTA B/L; no W/R/R  Gastrointestinal: soft, NT/ND; +BSx4  Extremities: WWP; no clubbing or cyanosis. There is no erythema surrounding the incision site from his paracentesis. The L ankle has 2+ pitting edema and is cool to touch. The R ankle is erythematous and slightly warm and demonstrates 1+ pitting edema.   Vascular: 2+ radial, DP/PT pulses B/L  Neurological: AAOx3; no focal deficits  Psychiatric: pleasant mood and affect  Dermatologic: no appreciable wounds or damage to the skin    VITAL SIGNS:  Vital Signs Last 24 Hrs  T(C): 36.4 (04 Jun 2025 06:15), Max: 36.8 (03 Jun 2025 11:30)  T(F): 97.6 (04 Jun 2025 06:15), Max: 98.2 (03 Jun 2025 11:30)  HR: 70 (04 Jun 2025 06:15) (64 - 70)  BP: 106/74 (04 Jun 2025 06:15) (102/66 - 106/74)  BP(mean): --  RR: 18 (04 Jun 2025 06:15) (18 - 18)  SpO2: 97% (04 Jun 2025 06:15) (95% - 98%)    Parameters below as of 04 Jun 2025 06:15  Patient On (Oxygen Delivery Method): room air          MEDICATIONS:  MEDICATIONS  (STANDING):  apixaban 5 milliGRAM(s) Oral every 12 hours  dextrose 5%. 1000 milliLiter(s) (50 mL/Hr) IV Continuous <Continuous>  dextrose 5%. 1000 milliLiter(s) (100 mL/Hr) IV Continuous <Continuous>  dextrose 50% Injectable 12.5 Gram(s) IV Push once  dextrose 50% Injectable 25 Gram(s) IV Push once  dextrose 50% Injectable 25 Gram(s) IV Push once  furosemide    Tablet 60 milliGRAM(s) Oral daily  glucagon  Injectable 1 milliGRAM(s) IntraMuscular once  insulin lispro (ADMELOG) corrective regimen sliding scale   SubCutaneous at bedtime  insulin lispro (ADMELOG) corrective regimen sliding scale   SubCutaneous three times a day before meals  levothyroxine 100 MICROGram(s) Oral daily  multivitamin 1 Tablet(s) Oral daily  senna 2 Tablet(s) Oral at bedtime  spironolactone 25 milliGRAM(s) Oral every 24 hours  thiamine 100 milliGRAM(s) Oral every 24 hours    MEDICATIONS  (PRN):  dextrose Oral Gel 15 Gram(s) Oral once PRN Blood Glucose LESS THAN 70 milliGRAM(s)/deciliter  polyethylene glycol 3350 17 Gram(s) Oral daily PRN Constipation  zolpidem 5 milliGRAM(s) Oral at bedtime PRN Insomnia      ALLERGIES:  Allergies    No Known Allergies    Intolerances        LABS:                        11.3   5.53  )-----------( 194      ( 03 Jun 2025 05:30 )             33.1     06-03    134[L]  |  102  |  26[H]  ----------------------------<  143[H]  5.1   |  24  |  0.87    Ca    8.2[L]      03 Jun 2025 05:30  Phos  2.5     06-03  Mg     2.4     06-03    TPro  6.5  /  Alb  2.6[L]  /  TBili  0.9  /  DBili  x   /  AST  28  /  ALT  7[L]  /  AlkPhos  125[H]  06-03    PT/INR - ( 03 Jun 2025 05:30 )   PT: 15.3 sec;   INR: 1.33          PTT - ( 03 Jun 2025 05:30 )  PTT:31.5 sec  Urinalysis Basic - ( 03 Jun 2025 05:30 )    Color: x / Appearance: x / SG: x / pH: x  Gluc: 143 mg/dL / Ketone: x  / Bili: x / Urobili: x   Blood: x / Protein: x / Nitrite: x   Leuk Esterase: x / RBC: x / WBC x   Sq Epi: x / Non Sq Epi: x / Bacteria: x      CAPILLARY BLOOD GLUCOSE      POCT Blood Glucose.: 151 mg/dL (03 Jun 2025 21:24)      RADIOLOGY & ADDITIONAL TESTS: Reviewed. ***Note in progress***    OVERNIGHT EVENTS: NAEO    SUBJECTIVE / INTERVAL HPI: Patient seen and examined at bedside. Patient denying chest pain, SOB, palpitations, cough. Patient denies fever, chills, HA, Dizziness, N/V, abdominal pain, diarrhea, constipation, hematochezia/melena, dysuria, hematuria, new onset weakness/numbness/tingling, LE pain and/or swelling. He reports feeling cold this morning but has no other complaints. He states that he underwent tube feeds for 11 hours straight, which is an improvement from the day before. He reports spitting up brown fluid, about 4 oz today (more than usual), following his feed. He describes his R ear as feeling slightly warm but without pain or changes to hearing.    Remaining ROS negative       PHYSICAL EXAM:    General: NAD.   HEENT: NC/AT; PERRL, anicteric sclera; MMM. R ear is slightly warmer to touch than L ear. There are no lesions visible on the ears.  Neck: supple  Cardiovascular: +S1/S2, RRR  Respiratory: CTA B/L; no W/R/R  Gastrointestinal: soft, NT/ND; +BSx4  Extremities: WWP; no clubbing or cyanosis. There is no erythema surrounding the incision site from his paracentesis. The L ankle has 2+ pitting edema and is cool to touch. The R ankle is erythematous and slightly warm and demonstrates 1+ pitting edema.   Vascular: 2+ radial, DP/PT pulses B/L  Neurological: AAOx3; no focal deficits  Psychiatric: pleasant mood and affect  Dermatologic: no appreciable wounds or damage to the skin    VITAL SIGNS:  Vital Signs Last 24 Hrs  T(C): 36.4 (04 Jun 2025 06:15), Max: 36.8 (03 Jun 2025 11:30)  T(F): 97.6 (04 Jun 2025 06:15), Max: 98.2 (03 Jun 2025 11:30)  HR: 70 (04 Jun 2025 06:15) (64 - 70)  BP: 106/74 (04 Jun 2025 06:15) (102/66 - 106/74)  BP(mean): --  RR: 18 (04 Jun 2025 06:15) (18 - 18)  SpO2: 97% (04 Jun 2025 06:15) (95% - 98%)    Parameters below as of 04 Jun 2025 06:15  Patient On (Oxygen Delivery Method): room air          MEDICATIONS:  MEDICATIONS  (STANDING):  apixaban 5 milliGRAM(s) Oral every 12 hours  dextrose 5%. 1000 milliLiter(s) (50 mL/Hr) IV Continuous <Continuous>  dextrose 5%. 1000 milliLiter(s) (100 mL/Hr) IV Continuous <Continuous>  dextrose 50% Injectable 12.5 Gram(s) IV Push once  dextrose 50% Injectable 25 Gram(s) IV Push once  dextrose 50% Injectable 25 Gram(s) IV Push once  furosemide    Tablet 60 milliGRAM(s) Oral daily  glucagon  Injectable 1 milliGRAM(s) IntraMuscular once  insulin lispro (ADMELOG) corrective regimen sliding scale   SubCutaneous at bedtime  insulin lispro (ADMELOG) corrective regimen sliding scale   SubCutaneous three times a day before meals  levothyroxine 100 MICROGram(s) Oral daily  multivitamin 1 Tablet(s) Oral daily  senna 2 Tablet(s) Oral at bedtime  spironolactone 25 milliGRAM(s) Oral every 24 hours  thiamine 100 milliGRAM(s) Oral every 24 hours    MEDICATIONS  (PRN):  dextrose Oral Gel 15 Gram(s) Oral once PRN Blood Glucose LESS THAN 70 milliGRAM(s)/deciliter  polyethylene glycol 3350 17 Gram(s) Oral daily PRN Constipation  zolpidem 5 milliGRAM(s) Oral at bedtime PRN Insomnia      ALLERGIES:  Allergies    No Known Allergies    Intolerances        LABS:                        11.3   5.53  )-----------( 194      ( 03 Jun 2025 05:30 )             33.1     06-03    134[L]  |  102  |  26[H]  ----------------------------<  143[H]  5.1   |  24  |  0.87    Ca    8.2[L]      03 Jun 2025 05:30  Phos  2.5     06-03  Mg     2.4     06-03    TPro  6.5  /  Alb  2.6[L]  /  TBili  0.9  /  DBili  x   /  AST  28  /  ALT  7[L]  /  AlkPhos  125[H]  06-03    PT/INR - ( 03 Jun 2025 05:30 )   PT: 15.3 sec;   INR: 1.33          PTT - ( 03 Jun 2025 05:30 )  PTT:31.5 sec  Urinalysis Basic - ( 03 Jun 2025 05:30 )    Color: x / Appearance: x / SG: x / pH: x  Gluc: 143 mg/dL / Ketone: x  / Bili: x / Urobili: x   Blood: x / Protein: x / Nitrite: x   Leuk Esterase: x / RBC: x / WBC x   Sq Epi: x / Non Sq Epi: x / Bacteria: x      CAPILLARY BLOOD GLUCOSE      POCT Blood Glucose.: 151 mg/dL (03 Jun 2025 21:24)      RADIOLOGY & ADDITIONAL TESTS: Reviewed. ***Note in progress***    OVERNIGHT EVENTS: NAEO    SUBJECTIVE / INTERVAL HPI: Patient seen and examined at bedside. Patient denying chest pain, SOB, palpitations, cough. Patient denies fever, chills, HA, Dizziness, N/V, abdominal pain, diarrhea, constipation, hematochezia/melena, dysuria, hematuria, new onset weakness/numbness/tingling, LE pain and/or swelling. He reports feeling cold this morning but has no other complaints. He states that he underwent tube feeds for 11 hours straight, which is an improvement from the day before. He reports spitting up brown fluid, about 4 oz today (more than usual), following his feed. He describes his R ear as feeling slightly warm but without pain or changes to hearing.    Remaining ROS negative       PHYSICAL EXAM:    General: NAD.   HEENT: NC/AT; PERRL, anicteric sclera; MMM. R ear is slightly warmer to touch than L ear. There are no lesions visible on the ears.  Neck: supple  Cardiovascular: +S1/S2, RRR  Respiratory: CTA B/L; no W/R/R  Gastrointestinal: soft, NT/ND; +BSx4  Extremities: WWP; no clubbing or cyanosis. There is no erythema surrounding the incision site from his paracentesis. The L ankle has 2+ pitting edema and is cool to touch. The R ankle is erythematous and slightly warm and demonstrates 1+ pitting edema.   Vascular: 2+ radial, DP/PT pulses B/L  Neurological: AAOx3; no focal deficits  Psychiatric: pleasant mood and affect  Dermatologic: no appreciable wounds or damage to the skin    VITAL SIGNS:  Vital Signs Last 24 Hrs  T(C): 36.4 (04 Jun 2025 06:15), Max: 36.8 (03 Jun 2025 11:30)  T(F): 97.6 (04 Jun 2025 06:15), Max: 98.2 (03 Jun 2025 11:30)  HR: 70 (04 Jun 2025 06:15) (64 - 70)  BP: 106/74 (04 Jun 2025 06:15) (102/66 - 106/74)  BP(mean): --  RR: 18 (04 Jun 2025 06:15) (18 - 18)  SpO2: 97% (04 Jun 2025 06:15) (95% - 98%)    Parameters below as of 04 Jun 2025 06:15  Patient On (Oxygen Delivery Method): room air          MEDICATIONS:  MEDICATIONS  (STANDING):  apixaban 5 milliGRAM(s) Oral every 12 hours  dextrose 5%. 1000 milliLiter(s) (50 mL/Hr) IV Continuous <Continuous>  dextrose 5%. 1000 milliLiter(s) (100 mL/Hr) IV Continuous <Continuous>  dextrose 50% Injectable 12.5 Gram(s) IV Push once  dextrose 50% Injectable 25 Gram(s) IV Push once  dextrose 50% Injectable 25 Gram(s) IV Push once  furosemide    Tablet 60 milliGRAM(s) Oral daily  glucagon  Injectable 1 milliGRAM(s) IntraMuscular once  insulin lispro (ADMELOG) corrective regimen sliding scale   SubCutaneous at bedtime  insulin lispro (ADMELOG) corrective regimen sliding scale   SubCutaneous three times a day before meals  levothyroxine 100 MICROGram(s) Oral daily  multivitamin 1 Tablet(s) Oral daily  senna 2 Tablet(s) Oral at bedtime  spironolactone 25 milliGRAM(s) Oral every 24 hours  thiamine 100 milliGRAM(s) Oral every 24 hours    MEDICATIONS  (PRN):  dextrose Oral Gel 15 Gram(s) Oral once PRN Blood Glucose LESS THAN 70 milliGRAM(s)/deciliter  polyethylene glycol 3350 17 Gram(s) Oral daily PRN Constipation  zolpidem 5 milliGRAM(s) Oral at bedtime PRN Insomnia      ALLERGIES:  Allergies    No Known Allergies    Intolerances      LABS:                        10.7   5.62  )-----------( 190      ( 04 Jun 2025 05:30 )             31.1     06-04    132[L]  |  101  |  28[H]  ----------------------------<  133[H]  4.9   |  23  |  0.94    Ca    8.0[L]      04 Jun 2025 05:30  Phos  3.1     06-04  Mg     2.4     06-04    TPro  6.5  /  Alb  2.3[L]  /  TBili  0.8  /  DBili  x   /  AST  29  /  ALT  7[L]  /  AlkPhos  115  06-04    PT/INR - ( 04 Jun 2025 05:30 )   PT: 16.3 sec;   INR: 1.40          PTT - ( 04 Jun 2025 05:30 )  PTT:32.7 sec  Urinalysis Basic - ( 04 Jun 2025 05:30 )    Color: x / Appearance: x / SG: x / pH: x  Gluc: 133 mg/dL / Ketone: x  / Bili: x / Urobili: x   Blood: x / Protein: x / Nitrite: x   Leuk Esterase: x / RBC: x / WBC x   Sq Epi: x / Non Sq Epi: x / Bacteria: x      CAPILLARY BLOOD GLUCOSE      POCT Blood Glucose.: 118 mg/dL (04 Jun 2025 09:03)      RADIOLOGY & ADDITIONAL TESTS: Reviewed.

## 2025-06-04 NOTE — PROGRESS NOTE ADULT - PROBLEM SELECTOR PROBLEM 4
Leg edema

## 2025-06-04 NOTE — DISCHARGE NOTE NURSING/CASE MANAGEMENT/SOCIAL WORK - [id number]_GEN_ALL_CORE
For information on Fall & Injury Prevention, visit: [email or web address] OR  [email or web address] OR  https://www.cdc.gov/steadi/patient.html

## 2025-06-04 NOTE — PROGRESS NOTE ADULT - PROBLEM SELECTOR PLAN 12
Attending Statement:. I saw and evaluated the patient. I discussed the patient's case with the Resident.  Ulcerative lesion left angle of mouth x 2 weeks and 2 lesions on soft palate/tonsilar pillars noticed in last day.  Low grade temp x 5 days.  Mildly decreased appetite and oral intake but urinating as usual.  Strep negative.  Suspect viral illness - possibly primary HSV infection.  Defer anti-virals given duration.  Supportive care - fluids, rest.  Close f/u.  Red flags reviewed.    Caro Schuler MD       F: none  E: Replete as needed Mg>2 and K>4)  N: Consistent Carb and Tube Feeds  DVT: Eliquis 5mg BID  GI: none  Bowel: miralax and senna prn  Activity: Bedrest  Dispo: F Patient says that his ears feel "clogged" when he exercises. Does not report any pain, discharge, or changes to hearing. Also says that his L ear currently feels warm, but again denies pain.     - Will continue to monitor clinically  - outpatient f/u with PCP Patient with hx of AAA. MRI abdomen of interval growth of AAA sac from 6.1 cm to 6.4 cm as well as a L internal iliac aneurysm measuring 2.1 cm.    Vascular following   CTA A/P: AAA s/p  aortobiiliac stent graft repair with persistent endoleak. aneurysm sac measures 6.1 cm, not significantly changed since 2/1/2025. Bilateral internal iliac artery aneurysms are also unchanged since 2/1/2025    - F/u outpatient with vascular surgery for aneurysm size monitoring Patient says that his ears feel "clogged" when he exercises. Does not report any pain, discharge, or changes to hearing. Also says that his R ear currently feels warm, but again denies pain.     - Will continue to monitor clinically  - outpatient f/u with PCP F: none  E: Replete as needed Mg>2 and K>4)  N: Consistent Carb and Tube Feeds  DVT: Eliquis 5mg BID  GI: none  Bowel: miralax and senna prn  Activity: Bedrest  Dispo: F.

## 2025-06-04 NOTE — PROGRESS NOTE ADULT - PROBLEM SELECTOR PLAN 10
Known hx of anemia, Hgb 10.6, baseline 9-10 per HIE.  In chart review has hx of B12 deficiency  - CTM CBC  - Keep active T/S  - Transfuse for Hgb <7 Hx of portal vein thrombosis and has since been on Eliquis 5mg BID. Dose decreased to 2.5mg iso worsening liver function and INR.   - Consult with hepatology regarding restarting Eliquis 5 mg BID Patient with hx of AAA. MRI abdomen of interval growth of AAA sac from 6.1 cm to 6.4 cm as well as a L internal iliac aneurysm measuring 2.1 cm.    Vascular following   CTA A/P: AAA s/p  aortobiiliac stent graft repair with persistent endoleak. aneurysm sac measures 6.1 cm, not significantly changed since 2/1/2025. Bilateral internal iliac artery aneurysms are also unchanged since 2/1/2025    - F/u outpatient with vascular surgery for aneurysm size monitoring

## 2025-06-05 RX ORDER — SPIRONOLACTONE 25 MG/1
25 TABLET ORAL
Refills: 0 | Status: ACTIVE | COMMUNITY
Start: 2025-06-05

## 2025-06-06 ENCOUNTER — NON-APPOINTMENT (OUTPATIENT)
Age: 75
End: 2025-06-06

## 2025-06-06 ENCOUNTER — APPOINTMENT (OUTPATIENT)
Dept: ENDOCRINOLOGY | Facility: CLINIC | Age: 75
End: 2025-06-06

## 2025-06-06 ENCOUNTER — APPOINTMENT (OUTPATIENT)
Dept: INTERNAL MEDICINE | Facility: CLINIC | Age: 75
End: 2025-06-06

## 2025-06-06 VITALS
WEIGHT: 140 LBS | HEIGHT: 70 IN | BODY MASS INDEX: 20.04 KG/M2 | SYSTOLIC BLOOD PRESSURE: 111 MMHG | DIASTOLIC BLOOD PRESSURE: 75 MMHG | TEMPERATURE: 97 F | HEART RATE: 88 BPM | OXYGEN SATURATION: 97 %

## 2025-06-06 PROCEDURE — G2211 COMPLEX E/M VISIT ADD ON: CPT

## 2025-06-06 PROCEDURE — 99214 OFFICE O/P EST MOD 30 MIN: CPT

## 2025-06-12 ENCOUNTER — APPOINTMENT (OUTPATIENT)
Dept: HEART AND VASCULAR | Facility: CLINIC | Age: 75
End: 2025-06-12
Payer: MEDICARE

## 2025-06-12 VITALS
WEIGHT: 136 LBS | SYSTOLIC BLOOD PRESSURE: 80 MMHG | HEIGHT: 70 IN | HEART RATE: 60 BPM | TEMPERATURE: 98.3 F | OXYGEN SATURATION: 94 % | BODY MASS INDEX: 19.47 KG/M2 | DIASTOLIC BLOOD PRESSURE: 60 MMHG

## 2025-06-12 PROCEDURE — G2211 COMPLEX E/M VISIT ADD ON: CPT

## 2025-06-12 PROCEDURE — 93000 ELECTROCARDIOGRAM COMPLETE: CPT

## 2025-06-12 PROCEDURE — 99213 OFFICE O/P EST LOW 20 MIN: CPT

## 2025-06-26 ENCOUNTER — APPOINTMENT (OUTPATIENT)
Dept: HEPATOLOGY | Facility: CLINIC | Age: 75
End: 2025-06-26

## 2025-06-26 ENCOUNTER — OUTPATIENT (OUTPATIENT)
Dept: OUTPATIENT SERVICES | Facility: HOSPITAL | Age: 75
LOS: 1 days | End: 2025-06-26

## 2025-06-26 ENCOUNTER — APPOINTMENT (OUTPATIENT)
Dept: VASCULAR SURGERY | Facility: CLINIC | Age: 75
End: 2025-06-26

## 2025-06-26 VITALS
SYSTOLIC BLOOD PRESSURE: 107 MMHG | DIASTOLIC BLOOD PRESSURE: 57 MMHG | HEART RATE: 59 BPM | OXYGEN SATURATION: 100 % | TEMPERATURE: 97 F | RESPIRATION RATE: 16 BRPM

## 2025-06-26 VITALS
DIASTOLIC BLOOD PRESSURE: 67 MMHG | WEIGHT: 136 LBS | HEART RATE: 65 BPM | BODY MASS INDEX: 19.47 KG/M2 | SYSTOLIC BLOOD PRESSURE: 102 MMHG | HEIGHT: 70 IN

## 2025-06-26 VITALS
TEMPERATURE: 97 F | SYSTOLIC BLOOD PRESSURE: 105 MMHG | HEART RATE: 50 BPM | RESPIRATION RATE: 20 BRPM | OXYGEN SATURATION: 100 % | DIASTOLIC BLOOD PRESSURE: 65 MMHG

## 2025-06-26 DIAGNOSIS — Z90.411 ACQUIRED PARTIAL ABSENCE OF PANCREAS: Chronic | ICD-10-CM

## 2025-06-26 DIAGNOSIS — Z90.3 ACQUIRED ABSENCE OF STOMACH [PART OF]: Chronic | ICD-10-CM

## 2025-06-26 DIAGNOSIS — Z98.49 CATARACT EXTRACTION STATUS, UNSPECIFIED EYE: Chronic | ICD-10-CM

## 2025-06-26 DIAGNOSIS — Z95.828 PRESENCE OF OTHER VASCULAR IMPLANTS AND GRAFTS: Chronic | ICD-10-CM

## 2025-06-26 DIAGNOSIS — Z87.19 PERSONAL HISTORY OF OTHER DISEASES OF THE DIGESTIVE SYSTEM: Chronic | ICD-10-CM

## 2025-06-26 DIAGNOSIS — Z98.890 OTHER SPECIFIED POSTPROCEDURAL STATES: Chronic | ICD-10-CM

## 2025-06-26 DIAGNOSIS — Z93.1 GASTROSTOMY STATUS: Chronic | ICD-10-CM

## 2025-06-26 DIAGNOSIS — K21.9 GASTRO-ESOPHAGEAL REFLUX DISEASE WITHOUT ESOPHAGITIS: ICD-10-CM

## 2025-06-26 PROCEDURE — 99214 OFFICE O/P EST MOD 30 MIN: CPT

## 2025-06-26 PROCEDURE — 49451 REPLACE DUOD/JEJ TUBE PERC: CPT

## 2025-06-30 ENCOUNTER — APPOINTMENT (OUTPATIENT)
Dept: HEMATOLOGY ONCOLOGY | Facility: CLINIC | Age: 75
End: 2025-06-30
Payer: MEDICARE

## 2025-06-30 ENCOUNTER — APPOINTMENT (OUTPATIENT)
Dept: SURGICAL ONCOLOGY | Facility: CLINIC | Age: 75
End: 2025-06-30
Payer: MEDICARE

## 2025-06-30 ENCOUNTER — NON-APPOINTMENT (OUTPATIENT)
Age: 75
End: 2025-06-30

## 2025-06-30 VITALS
BODY MASS INDEX: 19.51 KG/M2 | WEIGHT: 136 LBS | RESPIRATION RATE: 17 BRPM | SYSTOLIC BLOOD PRESSURE: 104 MMHG | TEMPERATURE: 98 F | HEART RATE: 89 BPM | DIASTOLIC BLOOD PRESSURE: 73 MMHG | OXYGEN SATURATION: 97 %

## 2025-06-30 VITALS
HEART RATE: 68 BPM | WEIGHT: 136 LBS | OXYGEN SATURATION: 96 % | RESPIRATION RATE: 16 BRPM | HEIGHT: 70 IN | BODY MASS INDEX: 19.47 KG/M2

## 2025-06-30 PROCEDURE — 99213 OFFICE O/P EST LOW 20 MIN: CPT

## 2025-07-01 ENCOUNTER — NON-APPOINTMENT (OUTPATIENT)
Age: 75
End: 2025-07-01

## 2025-08-02 ENCOUNTER — RX RENEWAL (OUTPATIENT)
Age: 75
End: 2025-08-02

## 2025-08-13 ENCOUNTER — RESULT REVIEW (OUTPATIENT)
Age: 75
End: 2025-08-13

## 2025-08-13 RX ORDER — FLASH GLUCOSE SCANNING READER
EACH MISCELLANEOUS
Qty: 1 | Refills: 0 | Status: ACTIVE | COMMUNITY
Start: 2025-08-13 | End: 1900-01-01

## 2025-08-15 ENCOUNTER — TRANSCRIPTION ENCOUNTER (OUTPATIENT)
Age: 75
End: 2025-08-15

## 2025-08-20 PROBLEM — Z86.79 PERSONAL HISTORY OF OTHER DISEASES OF THE CIRCULATORY SYSTEM: Chronic | Status: ACTIVE | Noted: 2025-01-01

## 2025-08-22 ENCOUNTER — TRANSCRIPTION ENCOUNTER (OUTPATIENT)
Age: 75
End: 2025-08-22

## 2025-09-03 DIAGNOSIS — E43 UNSPECIFIED SEVERE PROTEIN-CALORIE MALNUTRITION: ICD-10-CM

## 2025-09-03 DIAGNOSIS — Z74.01 BED CONFINEMENT STATUS: ICD-10-CM

## 2025-09-03 DIAGNOSIS — E03.9 HYPOTHYROIDISM, UNSPECIFIED: ICD-10-CM

## 2025-09-03 DIAGNOSIS — K76.82 HEPATIC ENCEPHALOPATHY: ICD-10-CM

## 2025-09-03 DIAGNOSIS — J69.0 PNEUMONITIS DUE TO INHALATION OF FOOD AND VOMIT: ICD-10-CM

## 2025-09-03 DIAGNOSIS — Z93.1 GASTROSTOMY STATUS: ICD-10-CM

## 2025-09-03 DIAGNOSIS — Z86.711 PERSONAL HISTORY OF PULMONARY EMBOLISM: ICD-10-CM

## 2025-09-03 DIAGNOSIS — R06.02 SHORTNESS OF BREATH: ICD-10-CM

## 2025-09-03 DIAGNOSIS — R64 CACHEXIA: ICD-10-CM

## 2025-09-03 DIAGNOSIS — G93.41 METABOLIC ENCEPHALOPATHY: ICD-10-CM

## 2025-09-03 DIAGNOSIS — I48.91 UNSPECIFIED ATRIAL FIBRILLATION: ICD-10-CM

## 2025-09-03 DIAGNOSIS — E11.9 TYPE 2 DIABETES MELLITUS WITHOUT COMPLICATIONS: ICD-10-CM

## 2025-09-03 DIAGNOSIS — J96.01 ACUTE RESPIRATORY FAILURE WITH HYPOXIA: ICD-10-CM

## 2025-09-03 DIAGNOSIS — Z92.21 PERSONAL HISTORY OF ANTINEOPLASTIC CHEMOTHERAPY: ICD-10-CM

## 2025-09-03 DIAGNOSIS — Z79.01 LONG TERM (CURRENT) USE OF ANTICOAGULANTS: ICD-10-CM

## 2025-09-03 DIAGNOSIS — Z85.028 PERSONAL HISTORY OF OTHER MALIGNANT NEOPLASM OF STOMACH: ICD-10-CM

## 2025-09-03 DIAGNOSIS — K76.6 PORTAL HYPERTENSION: ICD-10-CM

## 2025-09-03 DIAGNOSIS — D50.9 IRON DEFICIENCY ANEMIA, UNSPECIFIED: ICD-10-CM

## 2025-09-03 DIAGNOSIS — Z98.49 CATARACT EXTRACTION STATUS, UNSPECIFIED EYE: ICD-10-CM

## 2025-09-03 DIAGNOSIS — Z79.4 LONG TERM (CURRENT) USE OF INSULIN: ICD-10-CM

## 2025-09-03 DIAGNOSIS — A41.9 SEPSIS, UNSPECIFIED ORGANISM: ICD-10-CM

## 2025-09-03 DIAGNOSIS — Z79.890 HORMONE REPLACEMENT THERAPY: ICD-10-CM

## 2025-09-03 DIAGNOSIS — R53.2 FUNCTIONAL QUADRIPLEGIA: ICD-10-CM

## 2025-09-03 DIAGNOSIS — I10 ESSENTIAL (PRIMARY) HYPERTENSION: ICD-10-CM

## 2025-09-03 DIAGNOSIS — K59.00 CONSTIPATION, UNSPECIFIED: ICD-10-CM

## 2025-09-03 DIAGNOSIS — K74.60 UNSPECIFIED CIRRHOSIS OF LIVER: ICD-10-CM

## 2025-09-03 DIAGNOSIS — R18.8 OTHER ASCITES: ICD-10-CM

## 2025-09-03 DIAGNOSIS — R65.21 SEVERE SEPSIS WITH SEPTIC SHOCK: ICD-10-CM

## 2025-09-03 DIAGNOSIS — E87.5 HYPERKALEMIA: ICD-10-CM

## 2025-09-03 DIAGNOSIS — Z90.3 ACQUIRED ABSENCE OF STOMACH [PART OF]: ICD-10-CM

## 2025-09-03 DIAGNOSIS — R13.10 DYSPHAGIA, UNSPECIFIED: ICD-10-CM

## 2025-09-03 DIAGNOSIS — K65.2 SPONTANEOUS BACTERIAL PERITONITIS: ICD-10-CM

## 2025-09-03 DIAGNOSIS — N40.0 BENIGN PROSTATIC HYPERPLASIA WITHOUT LOWER URINARY TRACT SYMPTOMS: ICD-10-CM

## 2025-09-08 DIAGNOSIS — Z66 DO NOT RESUSCITATE: ICD-10-CM

## 2025-09-08 DIAGNOSIS — G93.40 ENCEPHALOPATHY, UNSPECIFIED: ICD-10-CM

## 2025-09-08 DIAGNOSIS — R53.2 FUNCTIONAL QUADRIPLEGIA: ICD-10-CM

## 2025-09-08 DIAGNOSIS — Z74.1 NEED FOR ASSISTANCE WITH PERSONAL CARE: ICD-10-CM

## 2025-09-08 DIAGNOSIS — E11.9 TYPE 2 DIABETES MELLITUS WITHOUT COMPLICATIONS: ICD-10-CM

## 2025-09-08 DIAGNOSIS — R32 UNSPECIFIED URINARY INCONTINENCE: ICD-10-CM

## 2025-09-08 DIAGNOSIS — Z51.5 ENCOUNTER FOR PALLIATIVE CARE: ICD-10-CM

## 2025-09-08 DIAGNOSIS — I81 PORTAL VEIN THROMBOSIS: ICD-10-CM

## 2025-09-08 DIAGNOSIS — Z90.49 ACQUIRED ABSENCE OF OTHER SPECIFIED PARTS OF DIGESTIVE TRACT: ICD-10-CM

## 2025-09-08 DIAGNOSIS — R64 CACHEXIA: ICD-10-CM

## 2025-09-08 DIAGNOSIS — Z79.4 LONG TERM (CURRENT) USE OF INSULIN: ICD-10-CM

## 2025-09-08 DIAGNOSIS — R41.0 DISORIENTATION, UNSPECIFIED: ICD-10-CM

## 2025-09-08 DIAGNOSIS — I10 ESSENTIAL (PRIMARY) HYPERTENSION: ICD-10-CM

## 2025-09-08 DIAGNOSIS — Z79.890 HORMONE REPLACEMENT THERAPY: ICD-10-CM

## 2025-09-08 DIAGNOSIS — Z86.718 PERSONAL HISTORY OF OTHER VENOUS THROMBOSIS AND EMBOLISM: ICD-10-CM

## 2025-09-08 DIAGNOSIS — E03.9 HYPOTHYROIDISM, UNSPECIFIED: ICD-10-CM

## 2025-09-08 DIAGNOSIS — C77.3 SECONDARY AND UNSPECIFIED MALIGNANT NEOPLASM OF AXILLA AND UPPER LIMB LYMPH NODES: ICD-10-CM

## 2025-09-08 DIAGNOSIS — K76.82 HEPATIC ENCEPHALOPATHY: ICD-10-CM

## 2025-09-08 DIAGNOSIS — K74.60 UNSPECIFIED CIRRHOSIS OF LIVER: ICD-10-CM

## 2025-09-08 DIAGNOSIS — Z93.1 GASTROSTOMY STATUS: ICD-10-CM

## 2025-09-08 DIAGNOSIS — R13.10 DYSPHAGIA, UNSPECIFIED: ICD-10-CM

## 2025-09-08 DIAGNOSIS — Z98.49 CATARACT EXTRACTION STATUS, UNSPECIFIED EYE: ICD-10-CM

## 2025-09-08 DIAGNOSIS — J96.01 ACUTE RESPIRATORY FAILURE WITH HYPOXIA: ICD-10-CM

## 2025-09-08 DIAGNOSIS — E43 UNSPECIFIED SEVERE PROTEIN-CALORIE MALNUTRITION: ICD-10-CM

## 2025-09-08 DIAGNOSIS — Z85.028 PERSONAL HISTORY OF OTHER MALIGNANT NEOPLASM OF STOMACH: ICD-10-CM

## 2025-09-08 DIAGNOSIS — Z74.01 BED CONFINEMENT STATUS: ICD-10-CM

## 2025-09-08 DIAGNOSIS — R18.8 OTHER ASCITES: ICD-10-CM

## 2025-09-08 DIAGNOSIS — K65.2 SPONTANEOUS BACTERIAL PERITONITIS: ICD-10-CM

## (undated) DEVICE — POSITIONER STRAP ARMBOARD VELCRO TS-30

## (undated) DEVICE — SOL IRR BAG NS 0.9% 3000ML

## (undated) DEVICE — TAPE SILK 3"

## (undated) DEVICE — DRSG STERISTRIPS 0.5 X 4"

## (undated) DEVICE — CANISTER DISPOSABLE THIN WALL 3000CC

## (undated) DEVICE — UROVAC

## (undated) DEVICE — DRSG BENZOIN 0.6CC

## (undated) DEVICE — GLV 7.5 PROTEXIS (WHITE)

## (undated) DEVICE — VENODYNE/SCD SLEEVE CALF MEDIUM

## (undated) DEVICE — SOL IRR POUR H2O 500ML

## (undated) DEVICE — FOLEY CATH 3-WAY 22FR 30CC LATEX HEMATURIA COUDE

## (undated) DEVICE — ELCTR CUTTING 24/26FR

## (undated) DEVICE — SUT MONOCRYL 4-0 27" PS-2 UNDYED

## (undated) DEVICE — BASIN SET DOUBLE

## (undated) DEVICE — SUT SILK 2-0 30" TIES

## (undated) DEVICE — GLV 7.5 PROTEXIS (CREAM) MICRO

## (undated) DEVICE — ELCTR BOVIE BLADE 3/4" EXTENDED LENGTH 6"

## (undated) DEVICE — BLADE SURGICAL #11 CARBON

## (undated) DEVICE — FORCEP RADIAL JAW 4 W NDL 2.2MM 2.8MM 240CM ORANGE DISP

## (undated) DEVICE — DRSG TEGADERM 4X4.75"

## (undated) DEVICE — CONTAINER TISSUE COLLECTING DISP

## (undated) DEVICE — SUT VICRYL 3-0 27" SH UNDYED

## (undated) DEVICE — PACK CYSTO

## (undated) DEVICE — DRAPE COVER SNAP 36X30"

## (undated) DEVICE — DRAPE CHEST BREAST 106" X 122"

## (undated) DEVICE — ELCTR BIVAP BIPOLAR VAPORIZATION 26FR

## (undated) DEVICE — GOWN LG

## (undated) DEVICE — TUBING TUR 2 PRONG

## (undated) DEVICE — TUBING SET FOR SUCTION PUMP

## (undated) DEVICE — ELCTR BOVIE BLADE .75"

## (undated) DEVICE — MEDELA OVERFLOW FILTER DISPOSABLE

## (undated) DEVICE — ELCTR BOVIE TIP BLADE INSULATED 2.75" EDGE

## (undated) DEVICE — TUBING RANGER FLUID IRRIGATION SET DISP

## (undated) DEVICE — ATTACH DISTAL

## (undated) DEVICE — ELCTR GROUNDING PAD ADULT COVIDIEN

## (undated) DEVICE — FORCEP RESCUE COMBO

## (undated) DEVICE — DRAINAGE BAG URINARY 4L

## (undated) DEVICE — WARMING BLANKET FULL UNDERBODY

## (undated) DEVICE — NDL HYPO REGULAR BEVEL 22G X 1.5" (TURQUOISE)